# Patient Record
Sex: MALE | Race: WHITE | NOT HISPANIC OR LATINO | Employment: OTHER | URBAN - METROPOLITAN AREA
[De-identification: names, ages, dates, MRNs, and addresses within clinical notes are randomized per-mention and may not be internally consistent; named-entity substitution may affect disease eponyms.]

---

## 2017-01-31 RX ORDER — WARFARIN 3 MG/1
TABLET ORAL
Qty: 30 TABLET | Refills: 0 | OUTPATIENT
Start: 2017-01-31

## 2017-02-06 ENCOUNTER — CLINICAL SUPPORT (OUTPATIENT)
Dept: ELECTROPHYSIOLOGY | Facility: CLINIC | Age: 57
End: 2017-02-06
Payer: MEDICARE

## 2017-02-06 DIAGNOSIS — I50.9 CONGESTIVE HEART FAILURE: ICD-10-CM

## 2017-02-06 DIAGNOSIS — Z95.810 AUTOMATIC IMPLANTABLE CARDIOVERTER-DEFIBRILLATOR IN SITU: ICD-10-CM

## 2017-02-06 PROCEDURE — 93296 REM INTERROG EVL PM/IDS: CPT | Mod: PBBFAC | Performed by: INTERNAL MEDICINE

## 2017-02-06 PROCEDURE — 93295 DEV INTERROG REMOTE 1/2/MLT: CPT | Mod: ,,, | Performed by: INTERNAL MEDICINE

## 2017-02-16 RX ORDER — WARFARIN 3 MG/1
TABLET ORAL
Qty: 30 TABLET | Refills: 0 | OUTPATIENT
Start: 2017-02-16

## 2017-03-29 ENCOUNTER — TELEPHONE (OUTPATIENT)
Dept: TRANSPLANT | Facility: CLINIC | Age: 57
End: 2017-03-29

## 2017-03-29 DIAGNOSIS — I50.22 CHRONIC SYSTOLIC CONGESTIVE HEART FAILURE: Primary | ICD-10-CM

## 2017-03-31 ENCOUNTER — LAB VISIT (OUTPATIENT)
Dept: LAB | Facility: HOSPITAL | Age: 57
End: 2017-03-31
Attending: INTERNAL MEDICINE
Payer: MEDICARE

## 2017-03-31 ENCOUNTER — OFFICE VISIT (OUTPATIENT)
Dept: TRANSPLANT | Facility: CLINIC | Age: 57
End: 2017-03-31
Attending: INTERNAL MEDICINE
Payer: MEDICARE

## 2017-03-31 VITALS
WEIGHT: 251.13 LBS | HEART RATE: 70 BPM | HEIGHT: 70 IN | BODY MASS INDEX: 35.95 KG/M2 | DIASTOLIC BLOOD PRESSURE: 75 MMHG | SYSTOLIC BLOOD PRESSURE: 110 MMHG

## 2017-03-31 DIAGNOSIS — I50.22 CHRONIC SYSTOLIC CONGESTIVE HEART FAILURE: Primary | ICD-10-CM

## 2017-03-31 DIAGNOSIS — I48.0 PAROXYSMAL ATRIAL FIBRILLATION: ICD-10-CM

## 2017-03-31 DIAGNOSIS — Z91.89 AT RISK FOR AMIODARONE TOXICITY WITH LONG TERM USE: ICD-10-CM

## 2017-03-31 DIAGNOSIS — Z79.01 LONG-TERM (CURRENT) USE OF ANTICOAGULANTS: ICD-10-CM

## 2017-03-31 DIAGNOSIS — I50.22 CHRONIC SYSTOLIC CONGESTIVE HEART FAILURE: ICD-10-CM

## 2017-03-31 DIAGNOSIS — Z79.899 AT RISK FOR AMIODARONE TOXICITY WITH LONG TERM USE: ICD-10-CM

## 2017-03-31 DIAGNOSIS — I42.0 COCM (CONGESTIVE CARDIOMYOPATHY): ICD-10-CM

## 2017-03-31 DIAGNOSIS — E78.2 MIXED HYPERLIPIDEMIA: ICD-10-CM

## 2017-03-31 LAB
ALBUMIN SERPL BCP-MCNC: 3.9 G/DL
ALP SERPL-CCNC: 44 U/L
ALT SERPL W/O P-5'-P-CCNC: 88 U/L
ANION GAP SERPL CALC-SCNC: 11 MMOL/L
AST SERPL-CCNC: 48 U/L
BILIRUB SERPL-MCNC: 0.7 MG/DL
BNP SERPL-MCNC: 158 PG/ML
BUN SERPL-MCNC: 24 MG/DL
CALCIUM SERPL-MCNC: 9.1 MG/DL
CHLORIDE SERPL-SCNC: 105 MMOL/L
CO2 SERPL-SCNC: 28 MMOL/L
CREAT SERPL-MCNC: 1.6 MG/DL
EST. GFR  (AFRICAN AMERICAN): 54.5 ML/MIN/1.73 M^2
EST. GFR  (NON AFRICAN AMERICAN): 47.1 ML/MIN/1.73 M^2
GLUCOSE SERPL-MCNC: 110 MG/DL
POTASSIUM SERPL-SCNC: 3.9 MMOL/L
PROT SERPL-MCNC: 7.4 G/DL
SODIUM SERPL-SCNC: 144 MMOL/L

## 2017-03-31 PROCEDURE — 99999 PR PBB SHADOW E&M-EST. PATIENT-LVL III: CPT | Mod: PBBFAC,,, | Performed by: INTERNAL MEDICINE

## 2017-03-31 PROCEDURE — 99214 OFFICE O/P EST MOD 30 MIN: CPT | Mod: S$PBB,,, | Performed by: INTERNAL MEDICINE

## 2017-03-31 PROCEDURE — 99213 OFFICE O/P EST LOW 20 MIN: CPT | Mod: PBBFAC | Performed by: INTERNAL MEDICINE

## 2017-03-31 RX ORDER — LEVALBUTEROL INHALATION SOLUTION 1.25 MG/3ML
SOLUTION RESPIRATORY (INHALATION)
Status: ON HOLD | COMMUNITY
Start: 2017-03-15 | End: 2017-07-10

## 2017-03-31 RX ORDER — ATORVASTATIN CALCIUM 20 MG/1
1 TABLET, FILM COATED ORAL DAILY
COMMUNITY
Start: 2017-03-22 | End: 2017-04-06 | Stop reason: SDUPTHER

## 2017-03-31 RX ORDER — WARFARIN 3 MG/1
TABLET ORAL
COMMUNITY
Start: 2017-02-22 | End: 2017-03-31 | Stop reason: ALTCHOICE

## 2017-03-31 RX ORDER — CARVEDILOL 6.25 MG/1
1 TABLET ORAL 2 TIMES DAILY
Status: ON HOLD | COMMUNITY
Start: 2017-02-22 | End: 2017-07-14 | Stop reason: HOSPADM

## 2017-03-31 RX ORDER — AMIODARONE HYDROCHLORIDE 200 MG/1
1 TABLET ORAL DAILY
COMMUNITY
Start: 2017-03-22 | End: 2017-08-14 | Stop reason: SDUPTHER

## 2017-03-31 NOTE — LETTER
April 6, 2017        Dipesh De La Torre  [address] [place] [address]  Phone: [phone]  Fax: [phone]             [facility]  Tony Chavez  [facility] [address]  Phone: [phone]   Patient: Tae Delgado   MR Number: [id number]   YOB: 1960   Date of Visit: 3/31/2017       Dear Dr. Dipesh De La Torre    Thank you for referring Tae Delgado to me for evaluation. Attached you will find relevant portions of my assessment and plan of care.    If you have questions, please do not hesitate to call me. I look forward to following Tae Delgado along with you.    Sincerely,    Karson Bucio Jr, MD    Enclosure    If you would like to receive this communication electronically, please contact [email or web address] or [phone] to request Conjure Link access.    Conjure Link is a tool which provides read-only access to select patient information with whom you have a relationship. Its easy to use and provides real time access to review your patients record including encounter summaries, notes, results, and demographic information.    If you feel you have received this communication in error or would no longer like to receive these types of communications, please e-mail [email or web address]

## 2017-03-31 NOTE — PROGRESS NOTES
Subjective:     Patient ID:  Tae Delgado is a 57 y.o. male who presents for follow-up of Congestive Heart Failure    HPI:  58 yo WM with COCM and severely reduced LVEF was seen by me for first time in July 2016 at which time he refused change to Entresto.  I was worried regarding decline in renal function and possibility that he was downplaying symptoms and asked him to f/u with Dr. Pearson Oct 2016.  He did not do so and returns today for routine visit.  He has declined consideration of advanced options and last visit declined Entresto.  He loves Dr. Pearson and was to f/u with him Oct 2016 but did not do so.  He stopped lisinopril for lightheaded sensations and BP 86/50 range at that time. Off treatment BP generally 95 range.  He emjoyed hunting season.  Says walking on level ground can do whatever he wants but problem climbing up boat ramp. Sleeps on 2-3 pillows for general comfort and denies PND.  No shocks or symptomatic arrhythmia.    Review of Systems   Constitution: Negative for chills, fever, weakness, malaise/fatigue, night sweats, weight gain and weight loss.   Cardiovascular: Positive for dyspnea on exertion (see HPI.  This year continued to hunt large deer and haul carcas out of the woods--see HPI) and leg swelling (at the end of the day). Negative for chest pain, irregular heartbeat, near-syncope, orthopnea (2-3 pillows for comfort), palpitations, paroxysmal nocturnal dyspnea and syncope.   Respiratory: Negative for cough, sputum production and wheezing.    Hematologic/Lymphatic: Does not bruise/bleed easily.   Musculoskeletal: Negative for arthritis, joint pain and stiffness.        Tendinitis and had injection yesterday.  Denies using NSAIDs.   Gastrointestinal: Negative for hematochezia and melena.   Genitourinary: Negative for hematuria.   Neurological: Negative for brief paralysis, focal weakness and seizures.        Objective:   Physical Exam   Constitutional: He is oriented to person, place, and  "time. No distress.   BP 95/65 (BP Location: Right arm, Patient Position: Sitting, BP Method: Automatic)  Pulse (!) 57  Ht 5' 10" (1.778 m)  Wt 116.3 kg (256 lb 6.3 oz)  BMI 36.79 kg/m2  Friendly, obese WM in NAD   HENT:   Head: Normocephalic and atraumatic.   Eyes: Conjunctivae are normal.   Neck: No JVD (just at clavicle sitting est 10 cm) present. No tracheal deviation present. No thyromegaly present.   Cardiovascular: Normal rate and regular rhythm.  Exam reveals gallop (S3).    No murmur heard.  Pulmonary/Chest: Effort normal and breath sounds normal.   Abdominal: Soft. Bowel sounds are normal. He exhibits no distension (obese). There is no tenderness. There is no rebound and no guarding.   Musculoskeletal: He exhibits edema (trace). He exhibits no tenderness.   Neurological: He is alert and oriented to person, place, and time.   Skin: Skin is warm. He is not diaphoretic.   Psychiatric: He has a normal mood and affect. His behavior is normal.     Assessment:     1. Chronic systolic congestive heart failure    2. COCM (congestive cardiomyopathy)    3. Paroxysmal atrial fibrillation    4. At risk for amiodarone toxicity with long term use    5. Long-term (current) use of anticoagulants    6. Mixed hyperlipidemia      Plan:   Obtain CBC, TSH, T4 CXR ON AMIODARONE  Check lipids  For risk stratification obtain CPX and RHC--he wants to wait 3 months  He is interested in going with Eliquis instead of coumadin--not taking  I told him that he could stop ASA    4/6/17 ADDENDUM REVIEWED LABS  Lab Results   Component Value Date     (H) 03/31/2017     03/31/2017    K 3.9 03/31/2017    MG 2.4 07/20/2016     03/31/2017    CO2 28 03/31/2017    BUN 24 (H) 03/31/2017    CREATININE 1.6 (H) 03/31/2017     03/31/2017    AST 48 (H) 03/31/2017    ALT 88 (H) 03/31/2017    ALBUMIN 3.9 03/31/2017    PROT 7.4 03/31/2017    BILITOT 0.7 03/31/2017    WBC 6.10 02/12/2015    HGB 16.2 02/12/2015    HCT 48.6 02/12/2015 "     02/12/2015    INR 1.3 (H) 01/18/2016    TSH 1.790 01/18/2016    CHOL 196 01/18/2016    HDL 29 (L) 01/18/2016    LDLCALC 122.8 01/18/2016    TRIG 221 (H) 01/18/2016     Lipids should be treated for primary prevention though he is not inclined towards medicine I did offer and sent in script for atorvastatin 40 mg (supposed to have been taking 20 mg) bedtime to  and start if agreeable and check CMP and lipids on treatment 3 months (phone message)

## 2017-04-06 RX ORDER — ATORVASTATIN CALCIUM 40 MG/1
40 TABLET, FILM COATED ORAL NIGHTLY
Qty: 30 TABLET | Refills: 5 | Status: SHIPPED | OUTPATIENT
Start: 2017-04-06 | End: 2017-11-22 | Stop reason: SDUPTHER

## 2017-05-15 ENCOUNTER — CLINICAL SUPPORT (OUTPATIENT)
Dept: ELECTROPHYSIOLOGY | Facility: CLINIC | Age: 57
End: 2017-05-15
Payer: MEDICARE

## 2017-05-15 DIAGNOSIS — I50.9 CHF (CONGESTIVE HEART FAILURE): ICD-10-CM

## 2017-05-15 DIAGNOSIS — I50.22 CHRONIC SYSTOLIC CONGESTIVE HEART FAILURE: ICD-10-CM

## 2017-05-15 DIAGNOSIS — I48.0 PAROXYSMAL ATRIAL FIBRILLATION: ICD-10-CM

## 2017-05-15 PROCEDURE — 93282 PRGRMG EVAL IMPLANTABLE DFB: CPT | Mod: PBBFAC | Performed by: INTERNAL MEDICINE

## 2017-05-15 RX ORDER — SPIRONOLACTONE 25 MG/1
TABLET ORAL
Qty: 30 TABLET | Refills: 0 | Status: SHIPPED | OUTPATIENT
Start: 2017-05-15 | End: 2017-06-19 | Stop reason: SDUPTHER

## 2017-06-19 RX ORDER — SPIRONOLACTONE 25 MG/1
TABLET ORAL
Qty: 30 TABLET | Refills: 0 | Status: SHIPPED | OUTPATIENT
Start: 2017-06-19 | End: 2017-08-14 | Stop reason: SDUPTHER

## 2017-07-07 DIAGNOSIS — I50.22 CHRONIC SYSTOLIC (CONGESTIVE) HEART FAILURE: Primary | ICD-10-CM

## 2017-07-07 DIAGNOSIS — Z86.711 PERSONAL HISTORY OF PULMONARY EMBOLISM: ICD-10-CM

## 2017-07-10 ENCOUNTER — HOSPITAL ENCOUNTER (INPATIENT)
Facility: HOSPITAL | Age: 57
LOS: 4 days | Discharge: HOME | DRG: 287 | End: 2017-07-14
Attending: INTERNAL MEDICINE | Admitting: INTERNAL MEDICINE
Payer: MEDICARE

## 2017-07-10 ENCOUNTER — HOSPITAL ENCOUNTER (OUTPATIENT)
Dept: CARDIOLOGY | Facility: CLINIC | Age: 57
Discharge: HOME | DRG: 287 | End: 2017-07-10
Attending: INTERNAL MEDICINE
Payer: MEDICARE

## 2017-07-10 DIAGNOSIS — I50.43 ACUTE ON CHRONIC COMBINED SYSTOLIC AND DIASTOLIC HEART FAILURE: ICD-10-CM

## 2017-07-10 DIAGNOSIS — I48.0 PAROXYSMAL ATRIAL FIBRILLATION: ICD-10-CM

## 2017-07-10 DIAGNOSIS — E78.2 MIXED HYPERLIPIDEMIA: Primary | ICD-10-CM

## 2017-07-10 DIAGNOSIS — I42.0 COCM (CONGESTIVE CARDIOMYOPATHY): ICD-10-CM

## 2017-07-10 DIAGNOSIS — Z91.89 AT RISK FOR AMIODARONE TOXICITY WITH LONG TERM USE: ICD-10-CM

## 2017-07-10 DIAGNOSIS — I11.0 HYPERTENSIVE HEART DISEASE WITH HEART FAILURE: ICD-10-CM

## 2017-07-10 DIAGNOSIS — Z79.899 AT RISK FOR AMIODARONE TOXICITY WITH LONG TERM USE: ICD-10-CM

## 2017-07-10 DIAGNOSIS — Z79.01 CHRONIC ANTICOAGULATION: ICD-10-CM

## 2017-07-10 DIAGNOSIS — E66.01 MORBID OBESITY DUE TO EXCESS CALORIES: ICD-10-CM

## 2017-07-10 DIAGNOSIS — I50.22 CHRONIC SYSTOLIC CONGESTIVE HEART FAILURE: ICD-10-CM

## 2017-07-10 DIAGNOSIS — Z86.711 HISTORY OF PULMONARY EMBOLISM: ICD-10-CM

## 2017-07-10 DIAGNOSIS — I24.0 LEFT VENTRICULAR THROMBUS WITHOUT MI: ICD-10-CM

## 2017-07-10 DIAGNOSIS — Z79.01 LONG TERM (CURRENT) USE OF ANTICOAGULANTS: ICD-10-CM

## 2017-07-10 DIAGNOSIS — R07.9 CHEST PAIN: ICD-10-CM

## 2017-07-10 DIAGNOSIS — I50.23 ACUTE ON CHRONIC SYSTOLIC HEART FAILURE: ICD-10-CM

## 2017-07-10 LAB
BNP SERPL-MCNC: 203 PG/ML
DIASTOLIC DYSFUNCTION: NO
INR PPP: 1.1
PROTHROMBIN TIME: 11.9 SEC

## 2017-07-10 PROCEDURE — 85610 PROTHROMBIN TIME: CPT

## 2017-07-10 PROCEDURE — 4A023N6 MEASUREMENT OF CARDIAC SAMPLING AND PRESSURE, RIGHT HEART, PERCUTANEOUS APPROACH: ICD-10-PCS | Performed by: INTERNAL MEDICINE

## 2017-07-10 PROCEDURE — 83880 ASSAY OF NATRIURETIC PEPTIDE: CPT

## 2017-07-10 PROCEDURE — 63600175 PHARM REV CODE 636 W HCPCS: Performed by: PHYSICIAN ASSISTANT

## 2017-07-10 PROCEDURE — 93005 ELECTROCARDIOGRAM TRACING: CPT

## 2017-07-10 PROCEDURE — 20600001 HC STEP DOWN PRIVATE ROOM

## 2017-07-10 PROCEDURE — 02HQ32Z INSERTION OF MONITORING DEVICE INTO RIGHT PULMONARY ARTERY, PERCUTANEOUS APPROACH: ICD-10-PCS | Performed by: INTERNAL MEDICINE

## 2017-07-10 PROCEDURE — 25000003 PHARM REV CODE 250: Performed by: PHYSICIAN ASSISTANT

## 2017-07-10 PROCEDURE — 93010 ELECTROCARDIOGRAM REPORT: CPT | Mod: ,,, | Performed by: INTERNAL MEDICINE

## 2017-07-10 PROCEDURE — 94621 CARDIOPULM EXERCISE TESTING: CPT | Mod: 26,S$PBB,, | Performed by: INTERNAL MEDICINE

## 2017-07-10 PROCEDURE — B2141ZZ FLUOROSCOPY OF RIGHT HEART USING LOW OSMOLAR CONTRAST: ICD-10-PCS | Performed by: INTERNAL MEDICINE

## 2017-07-10 RX ORDER — AMIODARONE HYDROCHLORIDE 200 MG/1
200 TABLET ORAL DAILY
Status: DISCONTINUED | OUTPATIENT
Start: 2017-07-11 | End: 2017-07-11

## 2017-07-10 RX ORDER — ATORVASTATIN CALCIUM 20 MG/1
40 TABLET, FILM COATED ORAL NIGHTLY
Status: DISCONTINUED | OUTPATIENT
Start: 2017-07-10 | End: 2017-07-11

## 2017-07-10 RX ORDER — WARFARIN SODIUM 5 MG/1
5 TABLET ORAL DAILY
Status: DISCONTINUED | OUTPATIENT
Start: 2017-07-10 | End: 2017-07-12

## 2017-07-10 RX ORDER — FUROSEMIDE 10 MG/ML
80 INJECTION INTRAMUSCULAR; INTRAVENOUS ONCE
Status: COMPLETED | OUTPATIENT
Start: 2017-07-10 | End: 2017-07-10

## 2017-07-10 RX ORDER — ACETAMINOPHEN 325 MG/1
650 TABLET ORAL EVERY 4 HOURS PRN
Status: DISCONTINUED | OUTPATIENT
Start: 2017-07-10 | End: 2017-07-14 | Stop reason: HOSPADM

## 2017-07-10 RX ORDER — METHOCARBAMOL 500 MG/1
500 TABLET, FILM COATED ORAL 3 TIMES DAILY PRN
Status: DISCONTINUED | OUTPATIENT
Start: 2017-07-11 | End: 2017-07-14 | Stop reason: HOSPADM

## 2017-07-10 RX ORDER — SODIUM CHLORIDE 0.9 % (FLUSH) 0.9 %
3 SYRINGE (ML) INJECTION EVERY 8 HOURS
Status: DISCONTINUED | OUTPATIENT
Start: 2017-07-10 | End: 2017-07-14 | Stop reason: HOSPADM

## 2017-07-10 RX ORDER — SPIRONOLACTONE 25 MG/1
25 TABLET ORAL DAILY
Status: DISCONTINUED | OUTPATIENT
Start: 2017-07-11 | End: 2017-07-14 | Stop reason: HOSPADM

## 2017-07-10 RX ORDER — CARVEDILOL 6.25 MG/1
6.25 TABLET ORAL 2 TIMES DAILY
Status: DISCONTINUED | OUTPATIENT
Start: 2017-07-10 | End: 2017-07-11

## 2017-07-10 RX ADMIN — ACETAMINOPHEN 650 MG: 325 TABLET ORAL at 11:07

## 2017-07-10 RX ADMIN — FUROSEMIDE 80 MG: 10 INJECTION, SOLUTION INTRAVENOUS at 09:07

## 2017-07-10 RX ADMIN — WARFARIN SODIUM 5 MG: 5 TABLET ORAL at 11:07

## 2017-07-10 RX ADMIN — CARVEDILOL 6.25 MG: 6.25 TABLET, FILM COATED ORAL at 09:07

## 2017-07-10 RX ADMIN — FUROSEMIDE 10 MG/HR: 10 INJECTION, SOLUTION INTRAMUSCULAR; INTRAVENOUS at 09:07

## 2017-07-10 RX ADMIN — SODIUM CHLORIDE, PRESERVATIVE FREE 3 ML: 5 INJECTION INTRAVENOUS at 11:07

## 2017-07-10 NOTE — ASSESSMENT & PLAN NOTE
-continued home dose of Amiodarone  -[id number] is 2.  Will stop Eliquis and start Coumadin at patients request.

## 2017-07-10 NOTE — ASSESSMENT & PLAN NOTE
-Patient wants to stop Eliquis and change to Coumadin  -Will start Coumadin 5 mg QDaily and get 2D echo to evaluate if thrombus is still present

## 2017-07-10 NOTE — HPI
55 y.o. WM with history of NICMP diagnosed in 2010, ICD, LV thrombus (with prior splenic and renal emboli), paroxysmal atrial fib, HTN, HLP admitted from the procedure room after RHC demonstrated significantly elevated left and right side filling pressures (RA= 17, PCWP=40 mm of Hg). Severe pulmonary HTN  (PA=89/ mm of Hg)in the setting of elevated left sided filling pressures. Low cardiac index and output  (CI=1.4, CO= L/min). After NTG, PA pressures decrEased dramatically to 52/30 Mean PA=35 mm of Hg. PCWP decreased to 25 mm of Hg. CI increased to 2.2 l/MIN/M2. CPX demonstrated: indicative of functional impairment secondary to circulatory insufficiency and ventilatory impairment. Patient reports prior to today he has felt well and has no complaints.  He reports yesterday he cut down a tree with no problems.  He reports he had to stop for 20-30 minutes to rest but he reports this is his baseline and hasn't noticed any worsening symptoms.  He reports he has had some increase leg edema but denies weight gain, increasing SOB, orthopnea and PND. He reports he does not think he is interested in pursuing LVAD/OHTx workup as he lives alone and doesn't have much family around.  He is open to getting information. He wants to get changed from Eliquis to Coumadin as his insurance is not covering Eliquis and he can get labwork around his house.

## 2017-07-10 NOTE — H&P
[facility]  Heart Transplant  H&P    Patient Name: Tae Delgado  MRN: [id number]  Admission Date: (Not on file)  Attending Physician: Karson Bucio Jr.,*  Primary Care Provider: Elham Pearson MD  Principal Problem:Acute on chronic combined systolic and diastolic heart failure    Subjective:     History of Present Illness:  55 y.o. WM with history of NICMP diagnosed in 2010, ICD, LV thrombus (with prior splenic and renal emboli), paroxysmal atrial fib, HTN, HLP admitted from the procedure room after RHC demonstrated significantly elevated left and right side filling pressures (RA= 17, PCWP=40 mm of Hg). Severe pulmonary HTN  (PA=89/ mm of Hg)in the setting of elevated left sided filling pressures. Low cardiac index and output  (CI=1.4, CO= L/min). After NTG, PA pressures decrEased dramatically to 52/30 Mean PA=35 mm of Hg. PCWP decreased to 25 mm of Hg. CI increased to 2.2 l/MIN/M2. CPX demonstrated: indicative of functional impairment secondary to circulatory insufficiency and ventilatory impairment.    Patient reports prior to today he has felt well and has no complaints.  He reports yesterday he cut down a tree with no problems.  He reports he had to stop for 20-30 minutes to rest but he reports this is his baseline and hasn't noticed any worsening symptoms.  He reports he has had some increase leg edema but denies weight gain, increasing SOB, orthopnea and PND.    He reports he does not think he is interested in pursuing LVAD/OHTx workup as he lives alone and doesn't have much family around.  He is open to getting information. He wants to get changed from Eliquis to Coumadin as his insurance is not covering Eliquis and he can get labwork around his house.      Past Medical History:   Diagnosis Date    CHF (congestive heart failure) 1/2010    Dx  1/2010 w/ decreased LV systolic function (EF 15%) by ECHO 1/2015    COCM (congestive cardiomyopathy) 7/20/2016    Hyperlipidemia      Hypertension     Paroxysmal atrial fibrillation     Pulmonary embolus 2008    Stroke     Superficial thrombophlebitis        Past Surgical History:   Procedure Laterality Date    TONSILLECTOMY      VEIN LIGATION AND STRIPPING         Review of patient's allergies indicates:   Allergen Reactions    Percocet [oxycodone-acetaminophen] Itching    Penicillins Rash       No current facility-administered medications for this encounter.      Current Outpatient Prescriptions   Medication Sig    amiodarone (PACERONE) 200 MG Tab Take 1 tablet by mouth Daily.    apixaban 5 mg Tab Take 1 tablet (5 mg total) by mouth 2 (two) times daily.    atorvastatin (LIPITOR) 40 MG tablet Take 1 tablet (40 mg total) by mouth every evening.    carvedilol (COREG) 6.25 MG tablet Take 1 tablet by mouth 2 (two) times daily.    spironolactone (ALDACTONE) 25 MG tablet TAKE ONE TABLET BY MOUTH ONCE DAILY    furosemide (LASIX) 40 MG tablet Take 1 tablet (40 mg total) by mouth once daily.     Family History     Problem Relation (Age of Onset)    Cancer Mother        Social History Main Topics    Smoking status: Never Smoker    Smokeless tobacco: Never Used    Alcohol use No    Drug use: No    Sexual activity: Not on file     Review of Systems  Objective:     Vital Signs (Most Recent):    Vital Signs (24h Range):           There is no height or weight on file to calculate BMI.    No intake or output data in the 24 hours ending 07/10/17 1604    Physical Exam   Constitutional: He is oriented to person, place, and time. He appears well-developed and well-nourished.   Neck: Normal range of motion. Neck supple. JVD present.   JVD elevation to jawline   Cardiovascular: Normal rate and regular rhythm.  Exam reveals no gallop and no friction rub.    No murmur heard.  Pulmonary/Chest: Effort normal and breath sounds normal. He has no wheezes. He has no rales.   Abdominal: Soft. Bowel sounds are normal. There is no tenderness.    Musculoskeletal: He exhibits no edema.   Neurological: He is alert and oriented to person, place, and time.   Skin: Skin is warm and dry. There is erythema.   2+ edema lower extremities bilaterally        Significant Labs:  CBC:    Recent Labs  Lab 07/10/17  1315   WBC 6.54   RBC 4.70   HGB 13.4*   HCT 40.3      MCV 86   MCH 28.5   MCHC 33.3     BNP:  No results for input(s): BNP in the last 72 hours.    Invalid input(s): BNPTRIAGELBLO  CMP:    Recent Labs  Lab 07/10/17  1315      CALCIUM 9.2   ALBUMIN 3.8   PROT 7.0      K 4.0   CO2 26      BUN 23*   CREATININE 1.6*   ALKPHOS 52*   ALT 68*   AST 50*   BILITOT 0.7      Coagulation:   No results for input(s): INR, APTT in the last 72 hours.    Invalid input(s): PT  LDH:  No results for input(s): LDH in the last 72 hours.  Microbiology:  Microbiology Results (last 7 days)     ** No results found for the last 168 hours. **          I have reviewed all pertinent labs within the past 24 hours.    Diagnostic Results:see report for full detail  CPX: 7/10/17  RHC: 7/10/17    Assessment/Plan:     55 y.o. WM with history of NICMP diagnosed in 2010, ICD, LV thrombus (with prior splenic and renal emboli), paroxysmal atrial fib, HTN, HLP admitted from the procedure room after RHC demonstrated significantly elevated left and right side filling pressures    * Acute on chronic combined systolic and diastolic heart failure    -NICM EF 20-25% per echo done January 2016; will repeat echo tomorroe   -Hypervolemic on examination today  -Current diuretic regimen: Furosemide PO QDaily at home.  RHC with elevated filling pressures.  Will start diuresis with Lasix infusion at 10mg/hr after 80mg IV bolus.  Will monitor response and titrate as needed  -GDMT with home dose of Coreg and Spironolactone.  Will monitor and consider adding Entresto.  He had refused it in the past but is not open to it if his insurance covers it.   -Patient is not currently being  evaluated for OHTx/VAD  -2g Na dietary restriction, 1500 mL fluid restriction, strict I/Os          Left ventricular thrombus without MI    -Patient wants to stop Eliquis and change to Coumadin  -Will start Coumadin 5 mg QDaily and get 2D echo to evaluate if thrombus is still present        Hypertension    -continued home dose of Coreg and Spironolactone.    -Will titrate as tolerated        Hyperlipidemia    -continue home Lipitor dose        Obesity    -Patient will likely need sleep study upon discharge        Paroxysmal atrial fibrillation    -continued home dose of Amiodarone  -[id number] is 2.  Will stop Eliquis and start Coumadin at patients request.             MARBELLA Mcfadden  Heart Transplant spectralink: [phone]  [facility]

## 2017-07-10 NOTE — SUBJECTIVE & OBJECTIVE
Past Medical History:   Diagnosis Date    CHF (congestive heart failure) 1/2010    Dx  1/2010 w/ decreased LV systolic function (EF 15%) by ECHO 1/2015    COCM (congestive cardiomyopathy) 7/20/2016    Hyperlipidemia     Hypertension     Paroxysmal atrial fibrillation     Pulmonary embolus 2008    Stroke     Superficial thrombophlebitis        Past Surgical History:   Procedure Laterality Date    TONSILLECTOMY      VEIN LIGATION AND STRIPPING         Review of patient's allergies indicates:   Allergen Reactions    Percocet [oxycodone-acetaminophen] Itching    Penicillins Rash       No current facility-administered medications for this encounter.      Current Outpatient Prescriptions   Medication Sig    amiodarone (PACERONE) 200 MG Tab Take 1 tablet by mouth Daily.    apixaban 5 mg Tab Take 1 tablet (5 mg total) by mouth 2 (two) times daily.    atorvastatin (LIPITOR) 40 MG tablet Take 1 tablet (40 mg total) by mouth every evening.    carvedilol (COREG) 6.25 MG tablet Take 1 tablet by mouth 2 (two) times daily.    spironolactone (ALDACTONE) 25 MG tablet TAKE ONE TABLET BY MOUTH ONCE DAILY    furosemide (LASIX) 40 MG tablet Take 1 tablet (40 mg total) by mouth once daily.     Family History     Problem Relation (Age of Onset)    Cancer Mother        Social History Main Topics    Smoking status: Never Smoker    Smokeless tobacco: Never Used    Alcohol use No    Drug use: No    Sexual activity: Not on file     Review of Systems  Objective:     Vital Signs (Most Recent):    Vital Signs (24h Range):           There is no height or weight on file to calculate BMI.    No intake or output data in the 24 hours ending 07/10/17 1604    Physical Exam   Constitutional: He is oriented to person, place, and time. He appears well-developed and well-nourished.   Neck: Normal range of motion. Neck supple. JVD present.   JVD elevation to jawline   Cardiovascular: Normal rate and regular rhythm.  Exam reveals no  gallop and no friction rub.    No murmur heard.  Pulmonary/Chest: Effort normal and breath sounds normal. He has no wheezes. He has no rales.   Abdominal: Soft. Bowel sounds are normal. There is no tenderness.   Musculoskeletal: He exhibits no edema.   Neurological: He is alert and oriented to person, place, and time.   Skin: Skin is warm and dry. There is erythema.   2+ edema lower extremities bilaterally        Significant Labs:  CBC:    Recent Labs  Lab 07/10/17  1315   WBC 6.54   RBC 4.70   HGB 13.4*   HCT 40.3      MCV 86   MCH 28.5   MCHC 33.3     BNP:  No results for input(s): BNP in the last 72 hours.    Invalid input(s): BNPTRIAGELBLO  CMP:    Recent Labs  Lab 07/10/17  1315      CALCIUM 9.2   ALBUMIN 3.8   PROT 7.0      K 4.0   CO2 26      BUN 23*   CREATININE 1.6*   ALKPHOS 52*   ALT 68*   AST 50*   BILITOT 0.7      Coagulation:   No results for input(s): INR, APTT in the last 72 hours.    Invalid input(s): PT  LDH:  No results for input(s): LDH in the last 72 hours.  Microbiology:  Microbiology Results (last 7 days)     ** No results found for the last 168 hours. **          I have reviewed all pertinent labs within the past 24 hours.    Diagnostic Results:see report for full detail  CPX: 7/10/17  RHC: 7/10/17

## 2017-07-10 NOTE — ASSESSMENT & PLAN NOTE
-NICM EF 20-25% per echo done January 2016; will repeat echo tomorroe   -Hypervolemic on examination today  -Current diuretic regimen: Furosemide PO QDaily at home.  RHC with elevated filling pressures.  Will start diuresis with Lasix infusion at 10mg/hr after 80mg IV bolus.  Will monitor response and titrate as needed  -GDMT with home dose of Coreg and Spironolactone.  Will monitor and consider adding Entresto.  He had refused it in the past but is not open to it if his insurance covers it.   -Patient is not currently being evaluated for OHTx/VAD  -2g Na dietary restriction, 1500 mL fluid restriction, strict I/Os

## 2017-07-11 LAB
ALBUMIN SERPL BCP-MCNC: 4 G/DL
ALP SERPL-CCNC: 48 U/L
ALT SERPL W/O P-5'-P-CCNC: 68 U/L
ANION GAP SERPL CALC-SCNC: 14 MMOL/L
AORTIC VALVE REGURGITATION: ABNORMAL
AST SERPL-CCNC: 38 U/L
BASOPHILS # BLD AUTO: 0.03 K/UL
BASOPHILS NFR BLD: 0.4 %
BILIRUB SERPL-MCNC: 0.7 MG/DL
BUN SERPL-MCNC: 22 MG/DL
CALCIUM SERPL-MCNC: 9.6 MG/DL
CHLORIDE SERPL-SCNC: 102 MMOL/L
CO2 SERPL-SCNC: 27 MMOL/L
CREAT SERPL-MCNC: 1.6 MG/DL
DIASTOLIC DYSFUNCTION: YES
DIFFERENTIAL METHOD: ABNORMAL
EOSINOPHIL # BLD AUTO: 0.2 K/UL
EOSINOPHIL NFR BLD: 2.5 %
ERYTHROCYTE [DISTWIDTH] IN BLOOD BY AUTOMATED COUNT: 13.3 %
EST. GFR  (AFRICAN AMERICAN): 54.5 ML/MIN/1.73 M^2
EST. GFR  (NON AFRICAN AMERICAN): 47.1 ML/MIN/1.73 M^2
ESTIMATED PA SYSTOLIC PRESSURE: 21.66
GLUCOSE SERPL-MCNC: 106 MG/DL
HCT VFR BLD AUTO: 40.8 %
HGB BLD-MCNC: 13.9 G/DL
INR PPP: 1.1
LYMPHOCYTES # BLD AUTO: 1.9 K/UL
LYMPHOCYTES NFR BLD: 26.6 %
MAGNESIUM SERPL-MCNC: 2.2 MG/DL
MCH RBC QN AUTO: 29 PG
MCHC RBC AUTO-ENTMCNC: 34.1 %
MCV RBC AUTO: 85 FL
MITRAL VALVE REGURGITATION: ABNORMAL
MONOCYTES # BLD AUTO: 0.8 K/UL
MONOCYTES NFR BLD: 11.6 %
NEUTROPHILS # BLD AUTO: 4.2 K/UL
NEUTROPHILS NFR BLD: 58.2 %
PLATELET # BLD AUTO: 167 K/UL
PMV BLD AUTO: 11.4 FL
POTASSIUM SERPL-SCNC: 3.8 MMOL/L
PROT SERPL-MCNC: 7.3 G/DL
PROTHROMBIN TIME: 11.7 SEC
RBC # BLD AUTO: 4.8 M/UL
RETIRED EF AND QEF - SEE NOTES: 23 (ref 55–65)
SODIUM SERPL-SCNC: 143 MMOL/L
TRICUSPID VALVE REGURGITATION: ABNORMAL
WBC # BLD AUTO: 7.23 K/UL

## 2017-07-11 PROCEDURE — 93306 TTE W/DOPPLER COMPLETE: CPT | Mod: 26,,, | Performed by: INTERNAL MEDICINE

## 2017-07-11 PROCEDURE — 63600175 PHARM REV CODE 636 W HCPCS: Performed by: INTERNAL MEDICINE

## 2017-07-11 PROCEDURE — 93306 TTE W/DOPPLER COMPLETE: CPT

## 2017-07-11 PROCEDURE — 83735 ASSAY OF MAGNESIUM: CPT

## 2017-07-11 PROCEDURE — 25000003 PHARM REV CODE 250: Performed by: PHYSICIAN ASSISTANT

## 2017-07-11 PROCEDURE — 85025 COMPLETE CBC W/AUTO DIFF WBC: CPT

## 2017-07-11 PROCEDURE — 20600001 HC STEP DOWN PRIVATE ROOM

## 2017-07-11 PROCEDURE — 80053 COMPREHEN METABOLIC PANEL: CPT

## 2017-07-11 PROCEDURE — 36415 COLL VENOUS BLD VENIPUNCTURE: CPT

## 2017-07-11 PROCEDURE — 25000003 PHARM REV CODE 250: Performed by: INTERNAL MEDICINE

## 2017-07-11 PROCEDURE — 63600175 PHARM REV CODE 636 W HCPCS: Performed by: PHYSICIAN ASSISTANT

## 2017-07-11 PROCEDURE — 85610 PROTHROMBIN TIME: CPT

## 2017-07-11 PROCEDURE — 99222 1ST HOSP IP/OBS MODERATE 55: CPT | Mod: ,,, | Performed by: INTERNAL MEDICINE

## 2017-07-11 RX ORDER — ENOXAPARIN SODIUM 100 MG/ML
100 INJECTION SUBCUTANEOUS EVERY 12 HOURS
Qty: 14 SYRINGE | Refills: 1 | Status: SHIPPED | OUTPATIENT
Start: 2017-07-11 | End: 2017-07-14 | Stop reason: HOSPADM

## 2017-07-11 RX ORDER — ATORVASTATIN CALCIUM 20 MG/1
40 TABLET, FILM COATED ORAL DAILY
Status: DISCONTINUED | OUTPATIENT
Start: 2017-07-11 | End: 2017-07-14 | Stop reason: HOSPADM

## 2017-07-11 RX ORDER — METOPROLOL SUCCINATE 25 MG/1
50 TABLET, EXTENDED RELEASE ORAL DAILY
Status: DISCONTINUED | OUTPATIENT
Start: 2017-07-12 | End: 2017-07-14 | Stop reason: HOSPADM

## 2017-07-11 RX ORDER — AMIODARONE HYDROCHLORIDE 200 MG/1
200 TABLET ORAL DAILY
Status: DISCONTINUED | OUTPATIENT
Start: 2017-07-11 | End: 2017-07-14 | Stop reason: HOSPADM

## 2017-07-11 RX ORDER — ENOXAPARIN SODIUM 100 MG/ML
100 INJECTION SUBCUTANEOUS 2 TIMES DAILY
Status: DISCONTINUED | OUTPATIENT
Start: 2017-07-11 | End: 2017-07-14

## 2017-07-11 RX ORDER — ENOXAPARIN SODIUM 100 MG/ML
100 INJECTION SUBCUTANEOUS 2 TIMES DAILY
Status: DISCONTINUED | OUTPATIENT
Start: 2017-07-11 | End: 2017-07-11

## 2017-07-11 RX ADMIN — SACUBITRIL AND VALSARTAN 1 TABLET: 24; 26 TABLET, FILM COATED ORAL at 09:07

## 2017-07-11 RX ADMIN — SODIUM CHLORIDE, PRESERVATIVE FREE 3 ML: 5 INJECTION INTRAVENOUS at 09:07

## 2017-07-11 RX ADMIN — METOPROLOL SUCCINATE 12.5 MG: 25 TABLET, EXTENDED RELEASE ORAL at 11:07

## 2017-07-11 RX ADMIN — WARFARIN SODIUM 5 MG: 5 TABLET ORAL at 04:07

## 2017-07-11 RX ADMIN — CARVEDILOL 6.25 MG: 6.25 TABLET, FILM COATED ORAL at 08:07

## 2017-07-11 RX ADMIN — SPIRONOLACTONE 25 MG: 25 TABLET, FILM COATED ORAL at 08:07

## 2017-07-11 RX ADMIN — ENOXAPARIN SODIUM 100 MG: 100 INJECTION SUBCUTANEOUS at 09:07

## 2017-07-11 RX ADMIN — FUROSEMIDE 10 MG/HR: 10 INJECTION, SOLUTION INTRAMUSCULAR; INTRAVENOUS at 09:07

## 2017-07-11 RX ADMIN — AMIODARONE HYDROCHLORIDE 200 MG: 200 TABLET ORAL at 09:07

## 2017-07-11 RX ADMIN — ATORVASTATIN CALCIUM 40 MG: 20 TABLET, FILM COATED ORAL at 09:07

## 2017-07-11 NOTE — ASSESSMENT & PLAN NOTE
-Patient wants to stop Eliquis and change to Coumadin  -Will start Coumadin 5 mg QDaily and get 2D echo to evaluate if thrombus is still present  -bridging with Lovenox

## 2017-07-11 NOTE — PROGRESS NOTES
[facility]  Heart Transplant  Progress Note    Patient Name: Tae Delgado  MRN: [id number]  Admission Date: 7/10/2017  Hospital Length of Stay: 1 days  Attending Physician: Karson Bucio Jr.,*  Primary Care Provider: Elham Pearson MD  Principal Problem:Acute on chronic combined systolic and diastolic heart failure    Subjective:     Interval History: Patient reports feeling better since starting on IV Lasix yesterday.  He states his urine output has decreased just slightly today.  Denies CP, SOB, palpitations or dizziness.      Continuous Infusions:   furosemide (LASIX) 1 mg/mL infusion (non-titrating) 10 mg/hr (07/10/17 2107)     Scheduled Meds:   amiodarone  200 mg Oral Daily    atorvastatin  40 mg Oral Daily    enoxaparin  100 mg Subcutaneous BID    metoprolol succinate  12.5 mg Oral Once    [START ON 7/12/2017] metoprolol succinate  50 mg Oral Daily    sacubitril-valsartan  1 tablet Oral BID    sodium chloride 0.9%  3 mL Intravenous Q8H    spironolactone  25 mg Oral Daily    warfarin  5 mg Oral Daily     PRN Meds:acetaminophen, methocarbamol    Review of patient's allergies indicates:   Allergen Reactions    Percocet [oxycodone-acetaminophen] Itching    Penicillins Rash     Objective:     Vital Signs (Most Recent):  Temp: 97.8 °F (36.6 °C) (07/11/17 0803)  Pulse: 70 (07/11/17 0803)  Resp: 18 (07/11/17 0803)  BP: 109/74 (07/11/17 0803)  SpO2: (!) 94 % (07/11/17 0803) Vital Signs (24h Range):  Temp:  [97.1 °F (36.2 °C)-98.4 °F (36.9 °C)] 97.8 °F (36.6 °C)  Pulse:  [59-70] 70  Resp:  [16-18] 18  SpO2:  [93 %-97 %] 94 %  BP: ()/(55-86) 109/74     Weight: 111.7 kg (246 lb 4.1 oz)  Body mass index is 35.33 kg/m².      Intake/Output Summary (Last 24 hours) at 07/11/17 1051  Last data filed at 07/11/17 0600   Gross per 24 hour   Intake             1150 ml   Output             3475 ml   Net            -2325 ml       Physical Exam   Constitutional: He is oriented to person, place,  and time. He appears well-developed and well-nourished.   Neck: Normal range of motion. Neck supple.   JVD difficult to access secondary to body habitus.     Cardiovascular: Normal rate and regular rhythm.  Exam reveals no gallop and no friction rub.    No murmur heard.  Pulmonary/Chest: He has no wheezes. He has no rales.   CTA   Abdominal: Soft. Bowel sounds are normal. There is no tenderness.   Musculoskeletal: He exhibits edema.   Trace edema lower extremities bilaterally    Neurological: He is alert and oriented to person, place, and time.   Skin: Skin is warm and dry.       Significant Labs:  CBC:    Recent Labs  Lab 07/11/17  0439   WBC 7.23   RBC 4.80   HGB 13.9*   HCT 40.8      MCV 85   MCH 29.0   MCHC 34.1     BNP:    Recent Labs  Lab 07/10/17  1943   *     CMP:    Recent Labs  Lab 07/11/17 0439      CALCIUM 9.6   ALBUMIN 4.0   PROT 7.3      K 3.8   CO2 27      BUN 22*   CREATININE 1.6*   ALKPHOS 48*   ALT 68*   AST 38   BILITOT 0.7      Coagulation:     Recent Labs  Lab 07/11/17  0439   INR 1.1     LDH:  No results for input(s): LDH in the last 72 hours.  Microbiology:  Microbiology Results (last 7 days)     ** No results found for the last 168 hours. **          I have reviewed all pertinent labs within the past 24 hours.    Estimated Creatinine Clearance: 63.8 mL/min (based on Cr of 1.6).    Diagnostic Results: see report for full details;  CPX: 7/10/17  RHC: 7/10/17      Assessment and Plan:     55 y.o. WM with history of NICMP diagnosed in 2010, ICD, LV thrombus (with prior splenic and renal emboli), paroxysmal atrial fib, HTN, HLP admitted from the procedure room after RHC demonstrated significantly elevated left and right side filling pressures    * Acute on chronic combined systolic and diastolic heart failure    -NICM EF 20-25% per echo done January 2016; repeat echo ordered for today  -Hypervolemic on examination today; RHC on admit with elevated filling  pressures   -Continue Lasix infusion at 10mg/hr.    -GDMT: will continue home dose of Spironolactone; change Coreg to Metoprolol and add Entresto.   -Patient is not currently being evaluated for OHTx/VAD; he is unclear if he would be agreeable to LVAD.  Will provide patient education and not initiate pathway   -2g Na dietary restriction, 1500 mL fluid restriction, strict I/Os          Left ventricular thrombus without MI    -Patient wants to stop Eliquis and change to Coumadin  -Will start Coumadin 5 mg QDaily and get 2D echo to evaluate if thrombus is still present  -bridging with Lovenox        Paroxysmal atrial fibrillation    -continued home dose of Amiodarone  -[id number] is 4 with hx CVA.  Patient requested to stop Eliquis and start Coumadin. Will start 5mg and adjust once INR therapeutic.  Patient reports he was previously on 3mg when he took it before.  Will bridge with Lovenox  -CT head done to r/o CVA and was negative.         Hyperlipidemia    -continue home Lipitor dose        Hypertension    -Will monitor BP with changes made today: stop Coreg and start Metoprolol, start Entresto and continue home dose of Spironolactone.     -Will titrate as tolerated        Obesity    -Body mass index is 35.33 kg/m².  -Patient will need sleep study upon discharge            MARBELLA Mcfadden  Heart Transplant  [facility]

## 2017-07-11 NOTE — PROGRESS NOTES
Pt reported that he has had a stroke in the past, started with a headache in his right lower head/upper neck very sharp and severe that went away on its own,  Pt has partial visual field loss on the right side of each eye (medial field of his left eye and lateral field of his right eye).  He reported that the headaches have been common on eloquis.    Two nights ago he got a pretty bad headache that started as a sharp pain at the base of his neck and radiated centrally to the posterior top center of his head, pt complains that his head is  to the touch today, two days later, and he is concerned that he had another stroke and is more worried with starting 5 mg coumadin.  He had no other symptoms.   Notified Dr. Ronquillo of pt complaints.     0100  CT of the head ordered and completed. Tylenol provided patient adequate headache relief. Will continue to closely monitor.

## 2017-07-11 NOTE — PLAN OF CARE
Problem: Patient Care Overview (Adult)  Goal: Plan of Care Review  Outcome: Ongoing (interventions implemented as appropriate)  Plan of care discussed with patient. Patient is free of fall/trauma/injury. Denies CP, SOB, or pain/discomfort. LAsix maintained at 10mg/h. All questions addressed. Will continue to monitor.

## 2017-07-11 NOTE — PLAN OF CARE
Problem: Patient Care Overview  Goal: Plan of Care Review  Outcome: Ongoing (interventions implemented as appropriate)  Pt c/o head tenderness at rest, since 2 days age. CT of head completed.  Pt remains free of injury and falls. All personal items within reach, call bell in reach, bed is lowest locked position, Non skid socks used while out of bed. Room free of clutter. Reviewed new medications with patient.  Pt is being diuresed with Lasix gtt at 10mg/hr.   Pt reported that he does not feel like he has much fluid on him.  He is actively ready for dietary changes and strategies to strengthen his heart and preserve what function he has and wants to improve.      VS and assessment per flow sheet, patient progressing towards goals as tolerated, plan of care reviewed with Tae Delgado and family, all concerns addressed, will continue to monitor.      Problem: Fall Risk (Adult)  Goal: Absence of Falls  Patient will demonstrate the desired outcomes by discharge/transition of care.   Outcome: Ongoing (interventions implemented as appropriate)  Pt has a steady gait, he remains free of injuries and falls. Lasix gtt, close BP monitoring.     Problem: Fluid Volume Excess (Adult,Obstetrics,Pediatric)  Goal: Stable Weight  Patient will demonstrate the desired outcomes by discharge/transition of care.  Outcome: Ongoing (interventions implemented as appropriate)  Daily standing weight  Goal: Balanced Intake/Output  Patient will demonstrate the desired outcomes by discharge/transition of care.  1500 Fluid restriction, cardiac low sodium diet.  Pt is informed of importance of recording urine output and oral intake and verbalized understanding.     Problem: Cardiac Output Decreased (Adult)  Goal: Adequate Cardiac Output/Effective Tissue Perfusion  Patient will demonstrate the desired outcomes by discharge/transition of care.  Outcome: Ongoing (interventions implemented as appropriate)  Medication management

## 2017-07-11 NOTE — ASSESSMENT & PLAN NOTE
-Will monitor BP with changes made today: stop Coreg and start Metoprolol, start Entresto and continue home dose of Spironolactone.     -Will titrate as tolerated

## 2017-07-11 NOTE — ASSESSMENT & PLAN NOTE
-continued home dose of Amiodarone  -[id number] is 4 with hx CVA.  Patient requested to stop Eliquis and start Coumadin. Will start 5mg and adjust once INR therapeutic.  Patient reports he was previously on 3mg when he took it before.  Will bridge with Lovenox  -CT head done to r/o CVA and was negative.

## 2017-07-11 NOTE — PROGRESS NOTES
Pt arrived to the floor, oriented to room, voiced no complaints or pain or discomfort. RIJ dressing is clean, dry, and intact from [facility]. Notified telemetry that box is needed. Awaiting box.  Informed patient of lasix gtt and intake and output monitoring as well as early morning standing weights.  Pt verbalized understanding.

## 2017-07-11 NOTE — SUBJECTIVE & OBJECTIVE
Interval History: Patient reports feeling better since starting on IV Lasix yesterday.  He states his urine output has decreased just slightly today.  Denies CP, SOB, palpitations or dizziness.      Continuous Infusions:   furosemide (LASIX) 1 mg/mL infusion (non-titrating) 10 mg/hr (07/10/17 2107)     Scheduled Meds:   amiodarone  200 mg Oral Daily    atorvastatin  40 mg Oral Daily    enoxaparin  100 mg Subcutaneous BID    metoprolol succinate  12.5 mg Oral Once    [START ON 7/12/2017] metoprolol succinate  50 mg Oral Daily    sacubitril-valsartan  1 tablet Oral BID    sodium chloride 0.9%  3 mL Intravenous Q8H    spironolactone  25 mg Oral Daily    warfarin  5 mg Oral Daily     PRN Meds:acetaminophen, methocarbamol    Review of patient's allergies indicates:   Allergen Reactions    Percocet [oxycodone-acetaminophen] Itching    Penicillins Rash     Objective:     Vital Signs (Most Recent):  Temp: 97.8 °F (36.6 °C) (07/11/17 0803)  Pulse: 70 (07/11/17 0803)  Resp: 18 (07/11/17 0803)  BP: 109/74 (07/11/17 0803)  SpO2: (!) 94 % (07/11/17 0803) Vital Signs (24h Range):  Temp:  [97.1 °F (36.2 °C)-98.4 °F (36.9 °C)] 97.8 °F (36.6 °C)  Pulse:  [59-70] 70  Resp:  [16-18] 18  SpO2:  [93 %-97 %] 94 %  BP: ()/(55-86) 109/74     Weight: 111.7 kg (246 lb 4.1 oz)  Body mass index is 35.33 kg/m².      Intake/Output Summary (Last 24 hours) at 07/11/17 1051  Last data filed at 07/11/17 0600   Gross per 24 hour   Intake             1150 ml   Output             3475 ml   Net            -2325 ml       Physical Exam   Constitutional: He is oriented to person, place, and time. He appears well-developed and well-nourished.   Neck: Normal range of motion. Neck supple.   JVD difficult to access secondary to body habitus.     Cardiovascular: Normal rate and regular rhythm.  Exam reveals no gallop and no friction rub.    No murmur heard.  Pulmonary/Chest: He has no wheezes. He has no rales.   CTA   Abdominal: Soft. Bowel  sounds are normal. There is no tenderness.   Musculoskeletal: He exhibits edema.   Trace edema lower extremities bilaterally    Neurological: He is alert and oriented to person, place, and time.   Skin: Skin is warm and dry.       Significant Labs:  CBC:    Recent Labs  Lab 07/11/17 0439   WBC 7.23   RBC 4.80   HGB 13.9*   HCT 40.8      MCV 85   MCH 29.0   MCHC 34.1     BNP:    Recent Labs  Lab 07/10/17  1943   *     CMP:    Recent Labs  Lab 07/11/17 0439      CALCIUM 9.6   ALBUMIN 4.0   PROT 7.3      K 3.8   CO2 27      BUN 22*   CREATININE 1.6*   ALKPHOS 48*   ALT 68*   AST 38   BILITOT 0.7      Coagulation:     Recent Labs  Lab 07/11/17 0439   INR 1.1     LDH:  No results for input(s): LDH in the last 72 hours.  Microbiology:  Microbiology Results (last 7 days)     ** No results found for the last 168 hours. **          I have reviewed all pertinent labs within the past 24 hours.    Estimated Creatinine Clearance: 63.8 mL/min (based on Cr of 1.6).    Diagnostic Results: see report for full details;  CPX: 7/10/17  RHC: 7/10/17

## 2017-07-12 LAB
ALBUMIN SERPL BCP-MCNC: 4.2 G/DL
ALP SERPL-CCNC: 53 U/L
ALT SERPL W/O P-5'-P-CCNC: 74 U/L
ANION GAP SERPL CALC-SCNC: 14 MMOL/L
AST SERPL-CCNC: 40 U/L
BASOPHILS # BLD AUTO: 0.02 K/UL
BASOPHILS NFR BLD: 0.2 %
BILIRUB SERPL-MCNC: 0.7 MG/DL
BUN SERPL-MCNC: 27 MG/DL
CALCIUM SERPL-MCNC: 9.7 MG/DL
CHLORIDE SERPL-SCNC: 96 MMOL/L
CO2 SERPL-SCNC: 31 MMOL/L
CREAT SERPL-MCNC: 1.9 MG/DL
DIFFERENTIAL METHOD: ABNORMAL
EOSINOPHIL # BLD AUTO: 0.1 K/UL
EOSINOPHIL NFR BLD: 1.6 %
ERYTHROCYTE [DISTWIDTH] IN BLOOD BY AUTOMATED COUNT: 13.3 %
EST. GFR  (AFRICAN AMERICAN): 44.3 ML/MIN/1.73 M^2
EST. GFR  (NON AFRICAN AMERICAN): 38.3 ML/MIN/1.73 M^2
GLUCOSE SERPL-MCNC: 129 MG/DL
HCT VFR BLD AUTO: 43.2 %
HGB BLD-MCNC: 15.2 G/DL
INR PPP: 1.3
LYMPHOCYTES # BLD AUTO: 2.1 K/UL
LYMPHOCYTES NFR BLD: 23.3 %
MAGNESIUM SERPL-MCNC: 2.4 MG/DL
MCH RBC QN AUTO: 29.7 PG
MCHC RBC AUTO-ENTMCNC: 35.2 %
MCV RBC AUTO: 84 FL
MONOCYTES # BLD AUTO: 1.1 K/UL
MONOCYTES NFR BLD: 12.1 %
NEUTROPHILS # BLD AUTO: 5.5 K/UL
NEUTROPHILS NFR BLD: 62.3 %
PLATELET # BLD AUTO: 174 K/UL
PMV BLD AUTO: 11.3 FL
POTASSIUM SERPL-SCNC: 3.3 MMOL/L
PROT SERPL-MCNC: 7.8 G/DL
PROTHROMBIN TIME: 13.3 SEC
RBC # BLD AUTO: 5.12 M/UL
SODIUM SERPL-SCNC: 141 MMOL/L
WBC # BLD AUTO: 8.83 K/UL

## 2017-07-12 PROCEDURE — 85025 COMPLETE CBC W/AUTO DIFF WBC: CPT

## 2017-07-12 PROCEDURE — 93005 ELECTROCARDIOGRAM TRACING: CPT

## 2017-07-12 PROCEDURE — 20600001 HC STEP DOWN PRIVATE ROOM

## 2017-07-12 PROCEDURE — 25000003 PHARM REV CODE 250: Performed by: PHYSICIAN ASSISTANT

## 2017-07-12 PROCEDURE — 99232 SBSQ HOSP IP/OBS MODERATE 35: CPT | Mod: ,,, | Performed by: INTERNAL MEDICINE

## 2017-07-12 PROCEDURE — 93010 ELECTROCARDIOGRAM REPORT: CPT | Mod: ,,, | Performed by: INTERNAL MEDICINE

## 2017-07-12 PROCEDURE — 63600175 PHARM REV CODE 636 W HCPCS: Performed by: INTERNAL MEDICINE

## 2017-07-12 PROCEDURE — 85610 PROTHROMBIN TIME: CPT

## 2017-07-12 PROCEDURE — 80053 COMPREHEN METABOLIC PANEL: CPT

## 2017-07-12 PROCEDURE — 36415 COLL VENOUS BLD VENIPUNCTURE: CPT

## 2017-07-12 PROCEDURE — 25000003 PHARM REV CODE 250: Performed by: INTERNAL MEDICINE

## 2017-07-12 PROCEDURE — 83735 ASSAY OF MAGNESIUM: CPT

## 2017-07-12 RX ORDER — FUROSEMIDE 40 MG/1
40 TABLET ORAL DAILY
Status: DISCONTINUED | OUTPATIENT
Start: 2017-07-13 | End: 2017-07-14 | Stop reason: HOSPADM

## 2017-07-12 RX ORDER — WARFARIN 2 MG/1
4 TABLET ORAL DAILY
Status: DISCONTINUED | OUTPATIENT
Start: 2017-07-12 | End: 2017-07-13

## 2017-07-12 RX ORDER — POTASSIUM CHLORIDE 20 MEQ/1
40 TABLET, EXTENDED RELEASE ORAL EVERY 4 HOURS
Status: COMPLETED | OUTPATIENT
Start: 2017-07-12 | End: 2017-07-12

## 2017-07-12 RX ORDER — DIGOXIN 125 MCG
0.12 TABLET ORAL DAILY
Status: DISCONTINUED | OUTPATIENT
Start: 2017-07-12 | End: 2017-07-14 | Stop reason: HOSPADM

## 2017-07-12 RX ADMIN — METOPROLOL SUCCINATE 50 MG: 25 TABLET, EXTENDED RELEASE ORAL at 08:07

## 2017-07-12 RX ADMIN — ENOXAPARIN SODIUM 100 MG: 100 INJECTION SUBCUTANEOUS at 08:07

## 2017-07-12 RX ADMIN — ATORVASTATIN CALCIUM 40 MG: 20 TABLET, FILM COATED ORAL at 08:07

## 2017-07-12 RX ADMIN — AMIODARONE HYDROCHLORIDE 200 MG: 200 TABLET ORAL at 08:07

## 2017-07-12 RX ADMIN — SODIUM CHLORIDE, PRESERVATIVE FREE 3 ML: 5 INJECTION INTRAVENOUS at 02:07

## 2017-07-12 RX ADMIN — POTASSIUM CHLORIDE 40 MEQ: 1500 TABLET, EXTENDED RELEASE ORAL at 02:07

## 2017-07-12 RX ADMIN — SODIUM CHLORIDE, PRESERVATIVE FREE 3 ML: 5 INJECTION INTRAVENOUS at 09:07

## 2017-07-12 RX ADMIN — WARFARIN SODIUM 4 MG: 2 TABLET ORAL at 05:07

## 2017-07-12 RX ADMIN — SACUBITRIL AND VALSARTAN 1 TABLET: 24; 26 TABLET, FILM COATED ORAL at 09:07

## 2017-07-12 RX ADMIN — SACUBITRIL AND VALSARTAN 1 TABLET: 24; 26 TABLET, FILM COATED ORAL at 08:07

## 2017-07-12 RX ADMIN — POTASSIUM CHLORIDE 40 MEQ: 1500 TABLET, EXTENDED RELEASE ORAL at 08:07

## 2017-07-12 RX ADMIN — ENOXAPARIN SODIUM 100 MG: 100 INJECTION SUBCUTANEOUS at 09:07

## 2017-07-12 RX ADMIN — DIGOXIN 0.12 MG: 125 TABLET ORAL at 08:07

## 2017-07-12 RX ADMIN — SPIRONOLACTONE 25 MG: 25 TABLET, FILM COATED ORAL at 08:07

## 2017-07-12 NOTE — ASSESSMENT & PLAN NOTE
-continued home dose of Amiodarone  -[id number] is 4 with hx CVA.  Patient requested to stop Eliquis and start Coumadin. Started 5mg and will adjust once INR therapeutic.  Patient reports he was previously on 3mg when he took it before.  Will bridge with Lovenox  -CT head done to r/o CVA and was negative.

## 2017-07-12 NOTE — PROGRESS NOTES
[facility]  Heart Transplant  Progress Note    Patient Name: Tae Delgado  MRN: [id number]  Admission Date: 7/10/2017  Hospital Length of Stay: 2 days  Attending Physician: Karson Bucio Jr.,*  Primary Care Provider: Elham Pearson MD  Principal Problem:Acute on chronic combined systolic and diastolic heart failure    Subjective:     Interval History: Patient reports urine output has decreased and he feels like he is at his baseline in terms of fluid status.  He has no complaints and is eager to go home.  2D echo done yesterday and EF 23% and no thrombus appreciated    Continuous Infusions:     Scheduled Meds:   amiodarone  200 mg Oral Daily    atorvastatin  40 mg Oral Daily    digoxin  0.125 mg Oral Daily    enoxaparin  100 mg Subcutaneous BID    [START ON 7/13/2017] furosemide  40 mg Oral Daily    metoprolol succinate  50 mg Oral Daily    potassium chloride  40 mEq Oral Q4H    sacubitril-valsartan  1 tablet Oral BID    sodium chloride 0.9%  3 mL Intravenous Q8H    spironolactone  25 mg Oral Daily    warfarin  4 mg Oral Daily     PRN Meds:acetaminophen, methocarbamol    Review of patient's allergies indicates:   Allergen Reactions    Percocet [oxycodone-acetaminophen] Itching    Penicillins Rash     Objective:     Vital Signs (Most Recent):  Temp: 98.4 °F (36.9 °C) (07/12/17 0835)  Pulse: 63 (07/12/17 1100)  Resp: 18 (07/12/17 0835)  BP: 112/72 (07/12/17 0835)  SpO2: (!) 94 % (07/12/17 0835) Vital Signs (24h Range):  Temp:  [97.5 °F (36.4 °C)-99.2 °F (37.3 °C)] 98.4 °F (36.9 °C)  Pulse:  [56-71] 63  Resp:  [16-18] 18  SpO2:  [93 %-97 %] 94 %  BP: ()/(57-78) 112/72     Weight: 110.7 kg (244 lb 0.8 oz)  Body mass index is 35.02 kg/m².      Intake/Output Summary (Last 24 hours) at 07/12/17 1202  Last data filed at 07/12/17 0600   Gross per 24 hour   Intake              720 ml   Output             3100 ml   Net            -2380 ml       Physical Exam   Constitutional: He is  oriented to person, place, and time. He appears well-developed and well-nourished.   Neck: Normal range of motion. Neck supple.   JVD difficult to access secondary to body habitus, but today appears just above the clavicle     Cardiovascular: Normal rate and regular rhythm.  Exam reveals no gallop and no friction rub.    No murmur heard.  Pulmonary/Chest: He has no wheezes. He has no rales.   CTA   Abdominal: Soft. Bowel sounds are normal. There is no tenderness.   Musculoskeletal: He exhibits edema.   Trace edema lower extremities bilaterally    Neurological: He is alert and oriented to person, place, and time.   Skin: Skin is warm and dry.       Significant Labs:  CBC:    Recent Labs  Lab 07/12/17  0426   WBC 8.83   RBC 5.12   HGB 15.2   HCT 43.2      MCV 84   MCH 29.7   MCHC 35.2     BNP:    Recent Labs  Lab 07/10/17  1943   *     CMP:    Recent Labs  Lab 07/12/17 0426   *   CALCIUM 9.7   ALBUMIN 4.2   PROT 7.8      K 3.3*   CO2 31*   CL 96   BUN 27*   CREATININE 1.9*   ALKPHOS 53*   ALT 74*   AST 40   BILITOT 0.7      Coagulation:     Recent Labs  Lab 07/12/17 0426   INR 1.3*     LDH:  No results for input(s): LDH in the last 72 hours.  Microbiology:  Microbiology Results (last 7 days)     ** No results found for the last 168 hours. **          I have reviewed all pertinent labs within the past 24 hours.    Estimated Creatinine Clearance: 53.5 mL/min (based on Cr of 1.9).    Diagnostic Results: see report for full details;  CPX: 7/10/17  RHC: 7/10/17  2D echo with CFD: 7/11/17      Assessment and Plan:   55 y.o. WM with history of NICMP diagnosed in 2010, ICD, LV thrombus (with prior splenic and renal emboli), paroxysmal atrial fib, HTN, HLP admitted from the procedure room after RHC demonstrated significantly elevated left and right side filling pressures      * Acute on chronic combined systolic and diastolic heart failure    -NICM EF 20-25%; LVEDD: 6.7 cm  -Diuresed on Lasix  infusion at 10mg/hr and is net negative 4.4L total.  BUN/Creat up slightly today.  Will stop infusion and start oral Lasix at previous home dose tomorrow    -GDMT: continued home dose of Spironolactone; changed Coreg to Metoprolol; added Entresto and Digoxin.   -Patient is not currently being evaluated for OHTx/VAD; he is unclear if he would be agreeable to LVAD.  Will provide patient education and not initiate pathway   -2g Na dietary restriction, 1500 mL fluid restriction, strict I/Os          Left ventricular thrombus without MI    -Patient wanted to stop Eliquis and change to Coumadin  -Thrombus not present on current echo   -bridging with Lovenox        Paroxysmal atrial fibrillation    -continued home dose of Amiodarone  -[id number] is 4 with hx CVA.  Patient requested to stop Eliquis and start Coumadin. Started 5mg and will adjust once INR therapeutic.  Patient reports he was previously on 3mg when he took it before.  Will bridge with Lovenox  -CT head done to r/o CVA and was negative.         Hyperlipidemia    -continue home Lipitor dose        Hypertension    -Will monitor BP with changes made today: stop Coreg and start Metoprolol, start Entresto and continue home dose of Spironolactone.     -Will titrate as tolerated        Obesity    -Body mass index is 35.33 kg/m².  -Patient will need sleep study upon discharge            MARBELLA Mcfadden  Heart Transplant spectralink: [id number]  [facility]

## 2017-07-12 NOTE — ASSESSMENT & PLAN NOTE
-Patient wanted to stop Eliquis and change to Coumadin  -Thrombus not present on current echo   -bridging with Lovenox

## 2017-07-12 NOTE — ASSESSMENT & PLAN NOTE
-NICM EF 20-25%; LVEDD: 6.7 cm  -Diuresed on Lasix infusion at 10mg/hr and is net negative 4.4L total.  BUN/Creat up slightly today.  Will stop infusion and start oral Lasix at previous home dose tomorrow    -GDMT: continued home dose of Spironolactone; changed Coreg to Metoprolol; added Entresto and Digoxin.   -Patient is not currently being evaluated for OHTx/VAD; he is unclear if he would be agreeable to LVAD.  Will provide patient education and not initiate pathway   -2g Na dietary restriction, 1500 mL fluid restriction, strict I/Os

## 2017-07-12 NOTE — PLAN OF CARE
Problem: Patient Care Overview  Goal: Plan of Care Review  Outcome: Ongoing (interventions implemented as appropriate)  Plan of care discussed with patient. Patient is free of fall/trauma/injury. Lasix drip d/c, to start Lasix PO 40mg daily tomorrow. Also started on digoxin today. Episode of discoloration of hands-- see previous note. All questions addressed. Will continue to monitor.

## 2017-07-12 NOTE — PROGRESS NOTES
Admit Note     Met with patient and significant other, Lavern Carbajal, to assess needs. Patient is a 57 y.o.   male, admitted for for heart failure.      Patient admitted from clinic on 7/10/2017.  At this time, patient presents as alert and oriented x 4, pleasant, good eye contact, communicative and asking and answering questions appropriately.  At this time, patients caregiver presents as alert and oriented x 4, pleasant, calm, communicative, cooperative and asking and answering questions appropriately.    Household/Family Systems     Patient resides with patient's significant other, and her 2 dtrs at:     [address] [place] [id number]    Cell [phone]  Lavern cell [phone]    Support system includes SO and adult children.  Patient does not have dependents that are need of being cared for. Pt reports 3 adult children.    Patients primary caregiver is Lavern steve significant other.    During admission, patient's caregiver plans to stay in patient's room.  Confirmed patient and patients caregivers do have access to reliable transportation.    Cognitive Status/Learning     Patient reports reading ability as college and states patient does not have difficulty with seeing and hearing.  Pt wears OTC reading glasses at 1.25 strength and reports 50% hearing loss in left ear from . Pt does not use hearing aid.  Patient reports patient learns best by visual.   Needed: No.   Highest education level: Associate/Bachelor Degree from \[employer]\""    Vocation/Disability     Working for Income: No  If no, reason not working: Disability  Patient is disabled due to heart since 2010.  Prior to disability, patient  was employed as [employer].    Adherence     Patient reports a medium level of adherence to patients health care regimen.  Pt reports very adherent to low sodium diet, and reads all labels. Pt reports making changes to prescribed medications because he was unhappy with side effects.   Adherence counseling and education provided.  Patient verbalizes understanding.    Substance Use    Patient reports the following substance usage.    Tobacco: none, patient denies any use.  Alcohol: Pt reports drinking approximately 1 glass of wine a week.   Illicit Drugs/Non-prescribed Medications: none, patient denies any use.    Patient states clear understanding of the potential impact of substance use.  Substance abstinence/cessation counseling, education and resources provided and reviewed.     Services Utilizing/ADLS    Infusion Service: Prior to admission, patient utilizing? no  Home Health: Prior to admission, patient utilizing? no  DME: Prior to admission: no  Pulmonary/Cardiac Rehab: Prior to admission, no  Dialysis:  Prior to admission, no  Transplant Specialty Pharmacy:  Prior to admission, N/A    Prior to admission, patient reports patient was independent with ADLS and was driving.  Patient reports patient is able to care for self at this time..  Patient indicates a willingness to care for self once medically cleared to do so.    Insurance/Medications    Insured by   Payor/Plan Subscr  Sex Relation Sub. Ins. ID Effective Group Num   1. MEDICARE - ELMIRA MATOS 1960 Male  [id number]                                    [address]   2.  FOR LELMIRA CAPPS 1960 Male  [id number] 1[birth year]                                    [address]     Primary Insurance (for UNOS reporting): Public Insurance - Medicare FFS (Fee For Service)  Secondary Insurance (for UNOS reporting): Public Insurance - Other Government    Patient reports patient is able to obtain and afford medications at this time and at time of discharge.    Living Will/Healthcare Power of     Patient states patient has a LW and/or HCPA and states the documents are at home. Per pt HCPAs are his youngest son, Epi, ph [phone] and sister Brianda, ph [phone].  Pt reports plan to make SO Lavern HCPA. SW provided pt  with blank advanced directives forms and educated pt on how to complete forms.     Coping/Mental Health    Patient is coping adequately with the aid of  family members, friends and staying active. Patient denies mental health difficulties.     Discharge Planning    At time of discharge, patient plans to return to patient's home under the care of self and SO.  Patients significant other will transport patient.  Per rounds today, expected discharge date has not been medically determined at this time. Patient and patients caretaker verbalize understanding and are involved in treatment planning and discharge process.    Additional Concerns    Patient is being followed for needs, education, resources, information, emotional support, supportive counseling, and for supportive and skilled discharge plan of care.  providing ongoing psychosocial support, education, resources and d/c planning as needed.  SW remains available. Patient denies additional needs and/or concerns at this time. Patient verbalizes understanding and agreement with information reviewed, social work availability, and how to access available resources as needed.

## 2017-07-12 NOTE — PROGRESS NOTES
"Patient walking around unit. RN annex found patient to have purple/blue hands and fingers. Hands warmed between RN's hands and patient returned to room and lying in bed. Warm packs placed on hands, color returning to extremities. Vitals taken, see blow. Patient then complaining of "dull, constant" chest pain 3/10. MARBELLA Cm notified. EKG ordered. Will continue to monitor.   "

## 2017-07-12 NOTE — SUBJECTIVE & OBJECTIVE
Interval History: Patient reports urine output has decreased and he feels like he is at his baseline in terms of fluid status.  He has no complaints and is eager to go home.  2D echo done yesterday and EF 23% and no thrombus appreciated    Continuous Infusions:     Scheduled Meds:   amiodarone  200 mg Oral Daily    atorvastatin  40 mg Oral Daily    digoxin  0.125 mg Oral Daily    enoxaparin  100 mg Subcutaneous BID    [START ON 7/13/2017] furosemide  40 mg Oral Daily    metoprolol succinate  50 mg Oral Daily    potassium chloride  40 mEq Oral Q4H    sacubitril-valsartan  1 tablet Oral BID    sodium chloride 0.9%  3 mL Intravenous Q8H    spironolactone  25 mg Oral Daily    warfarin  4 mg Oral Daily     PRN Meds:acetaminophen, methocarbamol    Review of patient's allergies indicates:   Allergen Reactions    Percocet [oxycodone-acetaminophen] Itching    Penicillins Rash     Objective:     Vital Signs (Most Recent):  Temp: 98.4 °F (36.9 °C) (07/12/17 0835)  Pulse: 63 (07/12/17 1100)  Resp: 18 (07/12/17 0835)  BP: 112/72 (07/12/17 0835)  SpO2: (!) 94 % (07/12/17 0835) Vital Signs (24h Range):  Temp:  [97.5 °F (36.4 °C)-99.2 °F (37.3 °C)] 98.4 °F (36.9 °C)  Pulse:  [56-71] 63  Resp:  [16-18] 18  SpO2:  [93 %-97 %] 94 %  BP: ()/(57-78) 112/72     Weight: 110.7 kg (244 lb 0.8 oz)  Body mass index is 35.02 kg/m².      Intake/Output Summary (Last 24 hours) at 07/12/17 1202  Last data filed at 07/12/17 0600   Gross per 24 hour   Intake              720 ml   Output             3100 ml   Net            -2380 ml       Physical Exam   Constitutional: He is oriented to person, place, and time. He appears well-developed and well-nourished.   Neck: Normal range of motion. Neck supple.   JVD difficult to access secondary to body habitus, but today appears just above the clavicle     Cardiovascular: Normal rate and regular rhythm.  Exam reveals no gallop and no friction rub.    No murmur heard.  Pulmonary/Chest: He  has no wheezes. He has no rales.   CTA   Abdominal: Soft. Bowel sounds are normal. There is no tenderness.   Musculoskeletal: He exhibits edema.   Trace edema lower extremities bilaterally    Neurological: He is alert and oriented to person, place, and time.   Skin: Skin is warm and dry.       Significant Labs:  CBC:    Recent Labs  Lab 07/12/17 0426   WBC 8.83   RBC 5.12   HGB 15.2   HCT 43.2      MCV 84   MCH 29.7   MCHC 35.2     BNP:    Recent Labs  Lab 07/10/17  1943   *     CMP:    Recent Labs  Lab 07/12/17 0426   *   CALCIUM 9.7   ALBUMIN 4.2   PROT 7.8      K 3.3*   CO2 31*   CL 96   BUN 27*   CREATININE 1.9*   ALKPHOS 53*   ALT 74*   AST 40   BILITOT 0.7      Coagulation:     Recent Labs  Lab 07/12/17 0426   INR 1.3*     LDH:  No results for input(s): LDH in the last 72 hours.  Microbiology:  Microbiology Results (last 7 days)     ** No results found for the last 168 hours. **          I have reviewed all pertinent labs within the past 24 hours.    Estimated Creatinine Clearance: 53.5 mL/min (based on Cr of 1.9).    Diagnostic Results: see report for full details;  CPX: 7/10/17  RHC: 7/10/17  2D echo with CFD: 7/11/17

## 2017-07-12 NOTE — HOSPITAL COURSE
55 y.o. WM with history of NICMP diagnosed in 2010, ICD, LV thrombus (with prior splenic and renal emboli), paroxysmal atrial fib, HTN, HLP admitted from the procedure room after RHC demonstrated significantly elevated left and right side filling pressures (RA= 17, PCWP=40 mm of Hg). Severe pulmonary HTN  (PA=89/ mm of Hg)in the setting of elevated left sided filling pressures. Low cardiac index and output  (CI=1.4, CO= L/min). After NTG, PA pressures decrEased dramatically to 52/30 Mean PA=35 mm of Hg. PCWP decreased to 25 mm of Hg. CI increased to 2.2 l/MIN/M2. CPX demonstrated: indicative of functional impairment secondary to circulatory insufficiency and ventilatory impairment.  Upon admit; patient was started on Lasix infusion at 10mg/hr and diuresed well and was transitioned to oral Lasix at previous home dose of 40mg QDaily.  He is net negative 5.3L throughout his hospital stay and Dry weight on day of discharge is 247lbs.  His 2D echo demonstrated an EF of 23% and LVEDD of 6.7cm.  We discussed advanced options; however, patietn wants to try optimal medical therapy first.  We optimized his regimen by changing home dose of of Coreg to metoprolol to allow for more BP room; added Digoxin and Entresto and continued home dose of Spironolactone. His BP remained stable in 90's and patient did not have symptoms of hypotension.Upon admit, patient reported he wanted to be taken off of Entresto; reporting that it was expensive and his insurance did not cover it.  We stopped Entresto and started Coumadin.  He complained of a headache and reported symptoms were similar to previous CVA, but CT of head was negative and headache resolved.  He is a [id number] is 4; we started Coumadin at 5mg and bridged him with Lovenox.  He will likely be discharged on lower dose as he previously took 3 mg daily.  He will be enrolled in our Coumadin clinic, sent home on 3 PO qd of coumadin. Patient with Pulmonary HTN and hx obestity and  snoring.  He will need sleep study upon discharge (orders placed for outpatient referral to sleep medicine).

## 2017-07-12 NOTE — PLAN OF CARE
Problem: Patient Care Overview (Adult)  Goal: Plan of Care Review  Outcome: Ongoing (interventions implemented as appropriate)  Pt had no significant events over night. No S/s of SOB noted. Pt denied any pain throughout the shift. Pt receiving lasix to pull fluid off. Pt family remained at beside and pt was encourage to call when in need for assistance.

## 2017-07-13 LAB
ALBUMIN SERPL BCP-MCNC: 4.1 G/DL
ALP SERPL-CCNC: 52 U/L
ALT SERPL W/O P-5'-P-CCNC: 65 U/L
ANION GAP SERPL CALC-SCNC: 9 MMOL/L
AST SERPL-CCNC: 32 U/L
BASOPHILS # BLD AUTO: 0.04 K/UL
BASOPHILS NFR BLD: 0.4 %
BILIRUB SERPL-MCNC: 0.9 MG/DL
BUN SERPL-MCNC: 27 MG/DL
CALCIUM SERPL-MCNC: 9.5 MG/DL
CHLORIDE SERPL-SCNC: 99 MMOL/L
CO2 SERPL-SCNC: 28 MMOL/L
CREAT SERPL-MCNC: 1.6 MG/DL
DIFFERENTIAL METHOD: NORMAL
EOSINOPHIL # BLD AUTO: 0.1 K/UL
EOSINOPHIL NFR BLD: 1.1 %
ERYTHROCYTE [DISTWIDTH] IN BLOOD BY AUTOMATED COUNT: 13.3 %
EST. GFR  (AFRICAN AMERICAN): 54.5 ML/MIN/1.73 M^2
EST. GFR  (NON AFRICAN AMERICAN): 47.1 ML/MIN/1.73 M^2
GLUCOSE SERPL-MCNC: 115 MG/DL
HCT VFR BLD AUTO: 46.6 %
HGB BLD-MCNC: 16.1 G/DL
INR PPP: 1.8
LYMPHOCYTES # BLD AUTO: 2.4 K/UL
LYMPHOCYTES NFR BLD: 25.1 %
MAGNESIUM SERPL-MCNC: 2.4 MG/DL
MCH RBC QN AUTO: 29 PG
MCHC RBC AUTO-ENTMCNC: 34.5 %
MCV RBC AUTO: 84 FL
MONOCYTES # BLD AUTO: 0.7 K/UL
MONOCYTES NFR BLD: 7.3 %
NEUTROPHILS # BLD AUTO: 6.2 K/UL
NEUTROPHILS NFR BLD: 65.7 %
PLATELET # BLD AUTO: 201 K/UL
PMV BLD AUTO: 11.4 FL
POTASSIUM SERPL-SCNC: 3.9 MMOL/L
PROT SERPL-MCNC: 7.5 G/DL
PROTHROMBIN TIME: 18.5 SEC
RBC # BLD AUTO: 5.55 M/UL
SODIUM SERPL-SCNC: 136 MMOL/L
WBC # BLD AUTO: 9.37 K/UL

## 2017-07-13 PROCEDURE — 63600175 PHARM REV CODE 636 W HCPCS: Performed by: INTERNAL MEDICINE

## 2017-07-13 PROCEDURE — 99232 SBSQ HOSP IP/OBS MODERATE 35: CPT | Mod: ,,, | Performed by: INTERNAL MEDICINE

## 2017-07-13 PROCEDURE — 25000003 PHARM REV CODE 250: Performed by: INTERNAL MEDICINE

## 2017-07-13 PROCEDURE — 83735 ASSAY OF MAGNESIUM: CPT

## 2017-07-13 PROCEDURE — 85025 COMPLETE CBC W/AUTO DIFF WBC: CPT

## 2017-07-13 PROCEDURE — 25000003 PHARM REV CODE 250: Performed by: PHYSICIAN ASSISTANT

## 2017-07-13 PROCEDURE — 20600001 HC STEP DOWN PRIVATE ROOM

## 2017-07-13 PROCEDURE — 80053 COMPREHEN METABOLIC PANEL: CPT

## 2017-07-13 PROCEDURE — 36415 COLL VENOUS BLD VENIPUNCTURE: CPT

## 2017-07-13 PROCEDURE — 85610 PROTHROMBIN TIME: CPT

## 2017-07-13 RX ORDER — WARFARIN 2.5 MG/1
2.5 TABLET ORAL DAILY
Status: DISCONTINUED | OUTPATIENT
Start: 2017-07-13 | End: 2017-07-13

## 2017-07-13 RX ORDER — WARFARIN 3 MG/1
3 TABLET ORAL DAILY
Status: DISCONTINUED | OUTPATIENT
Start: 2017-07-13 | End: 2017-07-14 | Stop reason: HOSPADM

## 2017-07-13 RX ORDER — WARFARIN 3 MG/1
3 TABLET ORAL DAILY
Status: DISCONTINUED | OUTPATIENT
Start: 2017-07-13 | End: 2017-07-13

## 2017-07-13 RX ADMIN — WARFARIN SODIUM 3 MG: 3 TABLET ORAL at 04:07

## 2017-07-13 RX ADMIN — FUROSEMIDE 40 MG: 40 TABLET ORAL at 09:07

## 2017-07-13 RX ADMIN — SODIUM CHLORIDE, PRESERVATIVE FREE 3 ML: 5 INJECTION INTRAVENOUS at 10:07

## 2017-07-13 RX ADMIN — SPIRONOLACTONE 25 MG: 25 TABLET, FILM COATED ORAL at 09:07

## 2017-07-13 RX ADMIN — SODIUM CHLORIDE, PRESERVATIVE FREE 3 ML: 5 INJECTION INTRAVENOUS at 05:07

## 2017-07-13 RX ADMIN — ENOXAPARIN SODIUM 100 MG: 100 INJECTION SUBCUTANEOUS at 10:07

## 2017-07-13 RX ADMIN — ENOXAPARIN SODIUM 100 MG: 100 INJECTION SUBCUTANEOUS at 09:07

## 2017-07-13 RX ADMIN — SODIUM CHLORIDE, PRESERVATIVE FREE 3 ML: 5 INJECTION INTRAVENOUS at 02:07

## 2017-07-13 RX ADMIN — ATORVASTATIN CALCIUM 40 MG: 20 TABLET, FILM COATED ORAL at 09:07

## 2017-07-13 RX ADMIN — DIGOXIN 0.12 MG: 125 TABLET ORAL at 09:07

## 2017-07-13 RX ADMIN — SACUBITRIL AND VALSARTAN 1 TABLET: 24; 26 TABLET, FILM COATED ORAL at 10:07

## 2017-07-13 RX ADMIN — AMIODARONE HYDROCHLORIDE 200 MG: 200 TABLET ORAL at 09:07

## 2017-07-13 RX ADMIN — SACUBITRIL AND VALSARTAN 1 TABLET: 24; 26 TABLET, FILM COATED ORAL at 09:07

## 2017-07-13 RX ADMIN — METOPROLOL SUCCINATE 50 MG: 25 TABLET, EXTENDED RELEASE ORAL at 09:07

## 2017-07-13 NOTE — ASSESSMENT & PLAN NOTE
-Will monitor BP with changes made yesterady: stopped Coreg and start Metoprolol, continue Entresto and continue home dose of Spironolactone.     -Will titrate as tolerated

## 2017-07-13 NOTE — ASSESSMENT & PLAN NOTE
-continued home dose of Amiodarone  -[id number] is 4 with hx CVA.  Patient requested to stop Eliquis and start Coumadin. Started 5mg and will adjust once INR therapeutic.  Patient reports he was previously on 3mg when he took it before.  Will bridge with Lovenox  -CT head done to r/o CVA and was negative.

## 2017-07-13 NOTE — ASSESSMENT & PLAN NOTE
-NICM EF 20-25%; LVEDD: 6.7 cm  -Diuresed on Lasix infusion at 10mg/hr and is net negative -.12 overnight. Down 253 to 245 lbs since admit.  Now on PO lasix (home dose). Cr back down today to 1.6   -GDMT: continued home dose of Spironolactone; changed Coreg to Metoprolol; added Entresto and Digoxin.   -Patient is not currently being evaluated for OHTx/VAD; he is unclear if he would be agreeable to LVAD.  Will provide patient education and not initiate pathway   -2g Na dietary restriction, 1500 mL fluid restriction, strict I/Os

## 2017-07-13 NOTE — SUBJECTIVE & OBJECTIVE
"Interval History: Patient had episode of subjective bilateral color change in arms when walking around. States his arms got "cold" and turned "blue" and began to tingle. He states when he sat down and warmed up his hands they returned to normal color. Was concerned this was related to the new meds (entresto). Spent a lot of time with patient discussing CPX and mortality benefits of current GDMT, patient appreciated.    Continuous Infusions:   Scheduled Meds:   amiodarone  200 mg Oral Daily    atorvastatin  40 mg Oral Daily    digoxin  0.125 mg Oral Daily    enoxaparin  100 mg Subcutaneous BID    furosemide  40 mg Oral Daily    metoprolol succinate  50 mg Oral Daily    sacubitril-valsartan  1 tablet Oral BID    sodium chloride 0.9%  3 mL Intravenous Q8H    spironolactone  25 mg Oral Daily    warfarin  2.5 mg Oral Daily     PRN Meds:acetaminophen, methocarbamol    Review of patient's allergies indicates:   Allergen Reactions    Percocet [oxycodone-acetaminophen] Itching    Penicillins Rash     Objective:     Vital Signs (Most Recent):  Temp: 97.8 °F (36.6 °C) (07/13/17 1000)  Pulse: 65 (07/13/17 1100)  Resp: 18 (07/13/17 1000)  BP: 137/87 (07/13/17 1000)  SpO2: 96 % (07/13/17 1000) Vital Signs (24h Range):  Temp:  [96.9 °F (36.1 °C)-98.1 °F (36.7 °C)] 97.8 °F (36.6 °C)  Pulse:  [60-72] 65  Resp:  [18] 18  SpO2:  [94 %-99 %] 96 %  BP: ()/(56-87) 137/87     Weight: 111.2 kg (245 lb 2.4 oz)  Body mass index is 35.18 kg/m².      Intake/Output Summary (Last 24 hours) at 07/13/17 1237  Last data filed at 07/13/17 0953   Gross per 24 hour   Intake             1080 ml   Output             1480 ml   Net             -400 ml     Hemodynamic Parameters:      Telemetry: NSR, no events overnight    Physical Exam   Constitutional: He appears well-developed and well-nourished. No distress.   HENT:   Head: Normocephalic and atraumatic.   Eyes: Conjunctivae are normal.   Neck: Normal range of motion. Neck supple. JVD " (slightly above clavicle (improved)) present.   Cardiovascular: Normal rate, regular rhythm, normal heart sounds and intact distal pulses.    Pulmonary/Chest: Effort normal and breath sounds normal. No respiratory distress. He has no wheezes.   Abdominal: Soft. Bowel sounds are normal.   Musculoskeletal: Normal range of motion. He exhibits no edema or deformity.   Neurological: He is alert.   Skin: Skin is warm and dry. Capillary refill takes less than 2 seconds. He is not diaphoretic. There is erythema. No pallor.   Psychiatric: He has a normal mood and affect. His behavior is normal. Judgment and thought content normal.   Nursing note and vitals reviewed.      Significant Labs:  CBC:    Recent Labs  Lab 07/13/17  0610   WBC 9.37   RBC 5.55   HGB 16.1   HCT 46.6      MCV 84   MCH 29.0   MCHC 34.5     BNP:    Recent Labs  Lab 07/10/17  1943   *     CMP:    Recent Labs  Lab 07/13/17  0610   *   CALCIUM 9.5   ALBUMIN 4.1   PROT 7.5      K 3.9   CO2 28   CL 99   BUN 27*   CREATININE 1.6*   ALKPHOS 52*   ALT 65*   AST 32   BILITOT 0.9      Coagulation:     Recent Labs  Lab 07/13/17  0610   INR 1.8*     LDH:  No results for input(s): LDH in the last 72 hours.  Microbiology:  Microbiology Results (last 7 days)     ** No results found for the last 168 hours. **          I have reviewed all pertinent labs within the past 24 hours.    Estimated Creatinine Clearance: 63.6 mL/min (based on Cr of 1.6).    Diagnostic Results:  I have reviewed all pertinent imaging results/findings within the past 24 hours.

## 2017-07-13 NOTE — PROGRESS NOTES
After ambulating in [place], pt complained of feeling slightly dizzy, a little back pain and his hands turning purple. Notified MARBELLA Martínez with HTS.   VS charted (see epic). MARBELLA Martínez notified pt that Dr. Bucio would be in shortly. No further orders. Will continue to monitor.

## 2017-07-13 NOTE — PROGRESS NOTES
"[facility]  Heart Transplant  Progress Note    Patient Name: Tae Delgado  MRN: [id number]  Admission Date: 7/10/2017  Hospital Length of Stay: 3 days  Attending Physician: Karson Bucio Jr.,*  Primary Care Provider: Elham Pearson MD  Principal Problem:Acute on chronic combined systolic and diastolic heart failure    Subjective:     Interval History: Patient had episode of subjective bilateral color change in arms when walking around. States his arms got "cold" and turned "blue" and began to tingle. He states when he sat down and warmed up his hands they returned to normal color. Was concerned this was related to the new meds (entresto). Spent a lot of time with patient discussing CPX and mortality benefits of current GDMT, patient appreciated.    Continuous Infusions:   Scheduled Meds:   amiodarone  200 mg Oral Daily    atorvastatin  40 mg Oral Daily    digoxin  0.125 mg Oral Daily    enoxaparin  100 mg Subcutaneous BID    furosemide  40 mg Oral Daily    metoprolol succinate  50 mg Oral Daily    sacubitril-valsartan  1 tablet Oral BID    sodium chloride 0.9%  3 mL Intravenous Q8H    spironolactone  25 mg Oral Daily    warfarin  2.5 mg Oral Daily     PRN Meds:acetaminophen, methocarbamol    Review of patient's allergies indicates:   Allergen Reactions    Percocet [oxycodone-acetaminophen] Itching    Penicillins Rash     Objective:     Vital Signs (Most Recent):  Temp: 97.8 °F (36.6 °C) (07/13/17 1000)  Pulse: 65 (07/13/17 1100)  Resp: 18 (07/13/17 1000)  BP: 137/87 (07/13/17 1000)  SpO2: 96 % (07/13/17 1000) Vital Signs (24h Range):  Temp:  [96.9 °F (36.1 °C)-98.1 °F (36.7 °C)] 97.8 °F (36.6 °C)  Pulse:  [60-72] 65  Resp:  [18] 18  SpO2:  [94 %-99 %] 96 %  BP: ()/(56-87) 137/87     Weight: 111.2 kg (245 lb 2.4 oz)  Body mass index is 35.18 kg/m².      Intake/Output Summary (Last 24 hours) at 07/13/17 3307  Last data filed at 07/13/17 0953   Gross per 24 hour   Intake          "    1080 ml   Output             1480 ml   Net             -400 ml     Hemodynamic Parameters:      Telemetry: NSR, no events overnight    Physical Exam   Constitutional: He appears well-developed and well-nourished. No distress.   HENT:   Head: Normocephalic and atraumatic.   Eyes: Conjunctivae are normal.   Neck: Normal range of motion. Neck supple. JVD (slightly above clavicle (improved)) present.   Cardiovascular: Normal rate, regular rhythm, normal heart sounds and intact distal pulses.    Pulmonary/Chest: Effort normal and breath sounds normal. No respiratory distress. He has no wheezes.   Abdominal: Soft. Bowel sounds are normal.   Musculoskeletal: Normal range of motion. He exhibits no edema or deformity.   Neurological: He is alert.   Skin: Skin is warm and dry. Capillary refill takes less than 2 seconds. He is not diaphoretic. There is erythema. No pallor.   Psychiatric: He has a normal mood and affect. His behavior is normal. Judgment and thought content normal.   Nursing note and vitals reviewed.      Significant Labs:  CBC:    Recent Labs  Lab 07/13/17  0610   WBC 9.37   RBC 5.55   HGB 16.1   HCT 46.6      MCV 84   MCH 29.0   MCHC 34.5     BNP:    Recent Labs  Lab 07/10/17  1943   *     CMP:    Recent Labs  Lab 07/13/17  0610   *   CALCIUM 9.5   ALBUMIN 4.1   PROT 7.5      K 3.9   CO2 28   CL 99   BUN 27*   CREATININE 1.6*   ALKPHOS 52*   ALT 65*   AST 32   BILITOT 0.9      Coagulation:     Recent Labs  Lab 07/13/17  0610   INR 1.8*     LDH:  No results for input(s): LDH in the last 72 hours.  Microbiology:  Microbiology Results (last 7 days)     ** No results found for the last 168 hours. **          I have reviewed all pertinent labs within the past 24 hours.    Estimated Creatinine Clearance: 63.6 mL/min (based on Cr of 1.6).    Diagnostic Results:  I have reviewed all pertinent imaging results/findings within the past 24 hours.    Assessment and Plan:     * Acute on chronic  combined systolic and diastolic heart failure    -NICM EF 20-25%; LVEDD: 6.7 cm  -Diuresed on Lasix infusion at 10mg/hr and is net negative -.12 overnight. Down 253 to 245 lbs since admit.  Now on PO lasix (home dose). Cr back down today to 1.6   -GDMT: continued home dose of Spironolactone; changed Coreg to Metoprolol; added Entresto and Digoxin.   -Patient is not currently being evaluated for OHTx/VAD; he is unclear if he would be agreeable to LVAD.  Will provide patient education and not initiate pathway   -2g Na dietary restriction, 1500 mL fluid restriction, strict I/Os          Paroxysmal atrial fibrillation    -continued home dose of Amiodarone  -[id number] is 4 with hx CVA.  Patient requested to stop Eliquis and start Coumadin. Started 5mg and will adjust once INR therapeutic.  Patient reports he was previously on 3mg when he took it before.  Will bridge with Lovenox  -CT head done to r/o CVA and was negative.         Left ventricular thrombus without MI    - Patient wanted to stop Eliquis and change to Coumadin  -Thrombus not present on current echo yesterday  - bridging with Lovenox, INR 1.8 today. Will give 3mg coumadin tonight        Obesity    -Body mass index is 35.33 kg/m².  -Patient will need sleep study upon discharge        Hypertension    -Will monitor BP with changes made yesterady: stopped Coreg and start Metoprolol, continue Entresto and continue home dose of Spironolactone.     -Will titrate as tolerated        Hyperlipidemia    -continue home Lipitor dose            Jasmina Charles PA-C  Heart Transplant  [facility]

## 2017-07-13 NOTE — ASSESSMENT & PLAN NOTE
- Patient wanted to stop Eliquis and change to Coumadin  -Thrombus not present on current echo yesterday  - bridging with Lovenox, INR 1.8 today. Will give 3mg coumadin tonight

## 2017-07-13 NOTE — PLAN OF CARE
Problem: Patient Care Overview  Goal: Plan of Care Review  Outcome: Ongoing (interventions implemented as appropriate)  Pt had no significant events over night. No S/s of SOB noted. Pt denied any pain throughout the shift. Pt lasix stopped IV lasix and will start PO lasix today. Pt family remained at beside and pt was encourage to call when in need for assistance. Pt is concerned with current medication regime

## 2017-07-13 NOTE — PLAN OF CARE
Problem: Patient Care Overview  Goal: Plan of Care Review  Outcome: Ongoing (interventions implemented as appropriate)  Pt free of falls/trauma/injuries this shift. Pt ambulating independently with fall precautions in place. Plan of care reviewed with patient at bedside, no new questions at this time. Pt had many questions this morning about medication regimen, notified HTS team. Patient tolerating well. Patient educated on s/s of heart failure. VSS. Pt voices no complaints of chest pain, SOB, or dizziness. No acute distress noted. Will continue to monitor.

## 2017-07-14 VITALS
SYSTOLIC BLOOD PRESSURE: 120 MMHG | DIASTOLIC BLOOD PRESSURE: 74 MMHG | OXYGEN SATURATION: 93 % | BODY MASS INDEX: 35.42 KG/M2 | RESPIRATION RATE: 18 BRPM | HEIGHT: 70 IN | HEART RATE: 60 BPM | TEMPERATURE: 96 F | WEIGHT: 247.38 LBS

## 2017-07-14 DIAGNOSIS — I50.22 CHRONIC SYSTOLIC HEART FAILURE: ICD-10-CM

## 2017-07-14 DIAGNOSIS — I50.43 ACUTE ON CHRONIC COMBINED SYSTOLIC AND DIASTOLIC HEART FAILURE: Primary | ICD-10-CM

## 2017-07-14 DIAGNOSIS — E66.9 OBESITY (BMI 30.0-34.9): Primary | ICD-10-CM

## 2017-07-14 LAB
ALBUMIN SERPL BCP-MCNC: 3.8 G/DL
ALP SERPL-CCNC: 52 U/L
ALT SERPL W/O P-5'-P-CCNC: 62 U/L
ANION GAP SERPL CALC-SCNC: 8 MMOL/L
AST SERPL-CCNC: 32 U/L
BASOPHILS # BLD AUTO: 0.02 K/UL
BASOPHILS NFR BLD: 0.3 %
BILIRUB SERPL-MCNC: 0.7 MG/DL
BUN SERPL-MCNC: 24 MG/DL
CALCIUM SERPL-MCNC: 9.5 MG/DL
CHLORIDE SERPL-SCNC: 100 MMOL/L
CO2 SERPL-SCNC: 28 MMOL/L
CREAT SERPL-MCNC: 1.6 MG/DL
DIFFERENTIAL METHOD: NORMAL
EOSINOPHIL # BLD AUTO: 0.1 K/UL
EOSINOPHIL NFR BLD: 1.4 %
ERYTHROCYTE [DISTWIDTH] IN BLOOD BY AUTOMATED COUNT: 13.2 %
EST. GFR  (AFRICAN AMERICAN): 54.5 ML/MIN/1.73 M^2
EST. GFR  (NON AFRICAN AMERICAN): 47.1 ML/MIN/1.73 M^2
GLUCOSE SERPL-MCNC: 111 MG/DL
HCT VFR BLD AUTO: 44.7 %
HGB BLD-MCNC: 15.3 G/DL
INR PPP: 2.3
LYMPHOCYTES # BLD AUTO: 2.3 K/UL
LYMPHOCYTES NFR BLD: 30.2 %
MAGNESIUM SERPL-MCNC: 2.5 MG/DL
MCH RBC QN AUTO: 29.1 PG
MCHC RBC AUTO-ENTMCNC: 34.2 %
MCV RBC AUTO: 85 FL
MONOCYTES # BLD AUTO: 0.9 K/UL
MONOCYTES NFR BLD: 12.3 %
NEUTROPHILS # BLD AUTO: 4.2 K/UL
NEUTROPHILS NFR BLD: 55.4 %
PLATELET # BLD AUTO: 176 K/UL
PMV BLD AUTO: 11.2 FL
POTASSIUM SERPL-SCNC: 4.2 MMOL/L
PROT SERPL-MCNC: 7.1 G/DL
PROTHROMBIN TIME: 23 SEC
RBC # BLD AUTO: 5.26 M/UL
SODIUM SERPL-SCNC: 136 MMOL/L
WBC # BLD AUTO: 7.62 K/UL

## 2017-07-14 PROCEDURE — 85025 COMPLETE CBC W/AUTO DIFF WBC: CPT

## 2017-07-14 PROCEDURE — 83735 ASSAY OF MAGNESIUM: CPT

## 2017-07-14 PROCEDURE — 25000003 PHARM REV CODE 250: Performed by: PHYSICIAN ASSISTANT

## 2017-07-14 PROCEDURE — 80053 COMPREHEN METABOLIC PANEL: CPT

## 2017-07-14 PROCEDURE — 99239 HOSP IP/OBS DSCHRG MGMT >30: CPT | Mod: ,,, | Performed by: INTERNAL MEDICINE

## 2017-07-14 PROCEDURE — 85610 PROTHROMBIN TIME: CPT

## 2017-07-14 PROCEDURE — 36415 COLL VENOUS BLD VENIPUNCTURE: CPT

## 2017-07-14 PROCEDURE — 25000003 PHARM REV CODE 250: Performed by: INTERNAL MEDICINE

## 2017-07-14 RX ORDER — WARFARIN 3 MG/1
3 TABLET ORAL DAILY
Qty: 90 TABLET | Refills: 3 | Status: SHIPPED | OUTPATIENT
Start: 2017-07-14 | End: 2018-10-18 | Stop reason: SDUPTHER

## 2017-07-14 RX ORDER — METHOCARBAMOL 500 MG/1
500 TABLET, FILM COATED ORAL 3 TIMES DAILY PRN
Qty: 30 TABLET | Refills: 0 | Status: SHIPPED | OUTPATIENT
Start: 2017-07-14 | End: 2017-07-24

## 2017-07-14 RX ORDER — DIGOXIN 125 MCG
0.12 TABLET ORAL DAILY
Qty: 30 TABLET | Refills: 11 | Status: SHIPPED | OUTPATIENT
Start: 2017-07-14 | End: 2018-09-17

## 2017-07-14 RX ORDER — METOPROLOL SUCCINATE 50 MG/1
50 TABLET, EXTENDED RELEASE ORAL DAILY
Qty: 30 TABLET | Refills: 11 | Status: SHIPPED | OUTPATIENT
Start: 2017-07-14 | End: 2017-11-30 | Stop reason: SDUPTHER

## 2017-07-14 RX ADMIN — SACUBITRIL AND VALSARTAN 1 TABLET: 24; 26 TABLET, FILM COATED ORAL at 09:07

## 2017-07-14 RX ADMIN — FUROSEMIDE 40 MG: 40 TABLET ORAL at 09:07

## 2017-07-14 RX ADMIN — AMIODARONE HYDROCHLORIDE 200 MG: 200 TABLET ORAL at 09:07

## 2017-07-14 RX ADMIN — SODIUM CHLORIDE, PRESERVATIVE FREE 3 ML: 5 INJECTION INTRAVENOUS at 05:07

## 2017-07-14 RX ADMIN — ATORVASTATIN CALCIUM 40 MG: 20 TABLET, FILM COATED ORAL at 09:07

## 2017-07-14 RX ADMIN — SPIRONOLACTONE 25 MG: 25 TABLET, FILM COATED ORAL at 09:07

## 2017-07-14 RX ADMIN — METOPROLOL SUCCINATE 50 MG: 25 TABLET, EXTENDED RELEASE ORAL at 09:07

## 2017-07-14 NOTE — PROGRESS NOTES
Patient was switched from Apixaban to warfarin for AF with a history of embolic stroke due to cost issues.  INR goal is 2-3.  He has been enrolled into the anticoagulation clinic.  Discussed importance of adherence, meaning of INR, timing of administration, drug interactions, food interactions, and monitoring for s/sx of bleeding.  Discharge dose will be 3mg/day.  Recommend warfarin clinic recheck INR on Monday.

## 2017-07-14 NOTE — ASSESSMENT & PLAN NOTE
-continued home dose of Amiodarone  -[id number] is 4 with hx CVA.  Patient requested to stop Eliquis and start Coumadin. Patient reports he was previously on 3mg when he took it before.  Bridged with lovenox in the interim.   -CT head done to r/o CVA and was negative.

## 2017-07-14 NOTE — ASSESSMENT & PLAN NOTE
-NICM EF 20-25%; LVEDD: 6.7 cm  -Down 253 to 245 lbs since admit.  Now on PO lasix (home dose). Cr back down today to 1.6   -GDMT: continued home dose of Spironolactone; changed Coreg to Metoprolol; added Entresto and Digoxin.   -Patient is not currently being evaluated for OHTx/VAD; he is unclear if he would be agreeable to LVAD.  Will provide patient education and not initiate pathway   -2g Na dietary restriction, 1500 mL fluid restriction, strict I/Os

## 2017-07-14 NOTE — PROGRESS NOTES
[facility]  Heart Transplant  Progress Note    Patient Name: Tae Delgado  MRN: [id number]  Admission Date: 7/10/2017  Hospital Length of Stay: 4 days  Attending Physician: Karson Bucio Jr.,*  Primary Care Provider: Elham Pearson MD  Principal Problem:Acute on chronic combined systolic and diastolic heart failure    Subjective:     Interval History: No complaints overnight. Ready to go home. Understands he will need close follow up to uptitrate his GDMT. After patient proves compliance with coumadin will try and set up home monitoring device.     Continuous Infusions:   Scheduled Meds:   amiodarone  200 mg Oral Daily    atorvastatin  40 mg Oral Daily    digoxin  0.125 mg Oral Daily    furosemide  40 mg Oral Daily    metoprolol succinate  50 mg Oral Daily    sacubitril-valsartan  1 tablet Oral BID    sodium chloride 0.9%  3 mL Intravenous Q8H    spironolactone  25 mg Oral Daily    warfarin  3 mg Oral Daily     PRN Meds:acetaminophen, methocarbamol    Review of patient's allergies indicates:   Allergen Reactions    Percocet [oxycodone-acetaminophen] Itching    Penicillins Rash     Objective:     Vital Signs (Most Recent):  Temp: 96.1 °F (35.6 °C) (07/14/17 0800)  Pulse: 60 (07/14/17 1100)  Resp: 18 (07/14/17 0800)  BP: 120/74 (07/14/17 0800)  SpO2: (!) 93 % (07/14/17 0800) Vital Signs (24h Range):  Temp:  [96.1 °F (35.6 °C)-97.9 °F (36.6 °C)] 96.1 °F (35.6 °C)  Pulse:  [57-69] 60  Resp:  [17-18] 18  SpO2:  [90 %-97 %] 93 %  BP: ()/(54-78) 120/74     Weight: 112.2 kg (247 lb 5.7 oz)  Body mass index is 35.49 kg/m².      Intake/Output Summary (Last 24 hours) at 07/14/17 1154  Last data filed at 07/14/17 0936   Gross per 24 hour   Intake             1140 ml   Output             1580 ml   Net             -440 ml       Hemodynamic Parameters:       Telemetry: No events overnight    Physical Exam   Constitutional: He appears well-developed and well-nourished. No distress.   HENT:    Head: Normocephalic and atraumatic.   Eyes: Conjunctivae are normal.   Neck: Normal range of motion. Neck supple. JVD (at clavicle) present.   Cardiovascular: Normal rate, regular rhythm, normal heart sounds and intact distal pulses.    Pulmonary/Chest: Effort normal and breath sounds normal. No respiratory distress. He has no wheezes.   Abdominal: Soft. Bowel sounds are normal.   Musculoskeletal: Normal range of motion. He exhibits no edema or deformity.   Neurological: He is alert.   Skin: Skin is warm and dry. Capillary refill takes less than 2 seconds. He is not diaphoretic. There is erythema. No pallor.   Psychiatric: He has a normal mood and affect. His behavior is normal. Judgment and thought content normal.   Nursing note and vitals reviewed.    Significant Labs:  CBC:    Recent Labs  Lab 07/14/17 0523   WBC 7.62   RBC 5.26   HGB 15.3   HCT 44.7      MCV 85   MCH 29.1   MCHC 34.2     BNP:  No results for input(s): BNP in the last 72 hours.    Invalid input(s): BNPTRIAGELBLO  CMP:    Recent Labs  Lab 07/14/17  0523   *   CALCIUM 9.5   ALBUMIN 3.8   PROT 7.1      K 4.2   CO2 28      BUN 24*   CREATININE 1.6*   ALKPHOS 52*   ALT 62*   AST 32   BILITOT 0.7      Coagulation:     Recent Labs  Lab 07/14/17 0523   INR 2.3*     LDH:  No results for input(s): LDH in the last 72 hours.  Microbiology:  Microbiology Results (last 7 days)     ** No results found for the last 168 hours. **        I have reviewed all pertinent labs within the past 24 hours.    Estimated Creatinine Clearance: 63.9 mL/min (based on Cr of 1.6).    Diagnostic Results:  I have reviewed all pertinent imaging results/findings within the past 24 hours.    Assessment and Plan:     * Acute on chronic combined systolic and diastolic heart failure    -NICM EF 20-25%; LVEDD: 6.7 cm  -Down 253 to 245 lbs since admit.  Now on PO lasix (home dose). Cr back down today to 1.6   -GDMT: continued home dose of Spironolactone;  changed Coreg to Metoprolol; added Entresto and Digoxin.   -Patient is not currently being evaluated for OHTx/VAD; he is unclear if he would be agreeable to LVAD.  Will provide patient education and not initiate pathway   -2g Na dietary restriction, 1500 mL fluid restriction, strict I/Os          Paroxysmal atrial fibrillation    -continued home dose of Amiodarone  -[id number] is 4 with hx CVA.  Patient requested to stop Eliquis and start Coumadin. Patient reports he was previously on 3mg when he took it before.  Bridged with lovenox in the interim.   -CT head done to r/o CVA and was negative.         Left ventricular thrombus without MI    - Patient wanted to stop Eliquis and change to Coumadin  -Thrombus not present on current echo yesterday  - bridging with Lovenox, INR 2.3 today. Will send home on 3 mg QD of coumadin.        Obesity    -Body mass index is 35.33 kg/m².  -Patient will need sleep study upon discharge        Hypertension    -Will monitor BP with changes made yesterady: stopped Coreg and start Metoprolol, continue Entresto and continue home dose of Spironolactone.     -Will titrate as tolerated        Hyperlipidemia    -continue home Lipitor dose          Dispo:  Home today with 2 week follow up in HTS clinic. Will set up with coumadin clinic.      Jasmina Charles PA-C  Heart Transplant  [facility]

## 2017-07-14 NOTE — SUBJECTIVE & OBJECTIVE
Interval History: No complaints overnight. Ready to go home. Understands he will need close follow up to uptitrate his GDMT. After patient proves compliance with coumadin will try and set up home monitoring device.     Continuous Infusions:   Scheduled Meds:   amiodarone  200 mg Oral Daily    atorvastatin  40 mg Oral Daily    digoxin  0.125 mg Oral Daily    furosemide  40 mg Oral Daily    metoprolol succinate  50 mg Oral Daily    sacubitril-valsartan  1 tablet Oral BID    sodium chloride 0.9%  3 mL Intravenous Q8H    spironolactone  25 mg Oral Daily    warfarin  3 mg Oral Daily     PRN Meds:acetaminophen, methocarbamol    Review of patient's allergies indicates:   Allergen Reactions    Percocet [oxycodone-acetaminophen] Itching    Penicillins Rash     Objective:     Vital Signs (Most Recent):  Temp: 96.1 °F (35.6 °C) (07/14/17 0800)  Pulse: 60 (07/14/17 1100)  Resp: 18 (07/14/17 0800)  BP: 120/74 (07/14/17 0800)  SpO2: (!) 93 % (07/14/17 0800) Vital Signs (24h Range):  Temp:  [96.1 °F (35.6 °C)-97.9 °F (36.6 °C)] 96.1 °F (35.6 °C)  Pulse:  [57-69] 60  Resp:  [17-18] 18  SpO2:  [90 %-97 %] 93 %  BP: ()/(54-78) 120/74     Weight: 112.2 kg (247 lb 5.7 oz)  Body mass index is 35.49 kg/m².      Intake/Output Summary (Last 24 hours) at 07/14/17 1154  Last data filed at 07/14/17 0936   Gross per 24 hour   Intake             1140 ml   Output             1580 ml   Net             -440 ml       Hemodynamic Parameters:       Telemetry: No events overnight    Physical Exam   Constitutional: He appears well-developed and well-nourished. No distress.   HENT:   Head: Normocephalic and atraumatic.   Eyes: Conjunctivae are normal.   Neck: Normal range of motion. Neck supple. JVD (at clavicle) present.   Cardiovascular: Normal rate, regular rhythm, normal heart sounds and intact distal pulses.    Pulmonary/Chest: Effort normal and breath sounds normal. No respiratory distress. He has no wheezes.   Abdominal: Soft.  Bowel sounds are normal.   Musculoskeletal: Normal range of motion. He exhibits no edema or deformity.   Neurological: He is alert.   Skin: Skin is warm and dry. Capillary refill takes less than 2 seconds. He is not diaphoretic. There is erythema. No pallor.   Psychiatric: He has a normal mood and affect. His behavior is normal. Judgment and thought content normal.   Nursing note and vitals reviewed.    Significant Labs:  CBC:    Recent Labs  Lab 07/14/17  0523   WBC 7.62   RBC 5.26   HGB 15.3   HCT 44.7      MCV 85   MCH 29.1   MCHC 34.2     BNP:  No results for input(s): BNP in the last 72 hours.    Invalid input(s): BNPTRIAGELBLO  CMP:    Recent Labs  Lab 07/14/17 0523   *   CALCIUM 9.5   ALBUMIN 3.8   PROT 7.1      K 4.2   CO2 28      BUN 24*   CREATININE 1.6*   ALKPHOS 52*   ALT 62*   AST 32   BILITOT 0.7      Coagulation:     Recent Labs  Lab 07/14/17 0523   INR 2.3*     LDH:  No results for input(s): LDH in the last 72 hours.  Microbiology:  Microbiology Results (last 7 days)     ** No results found for the last 168 hours. **        I have reviewed all pertinent labs within the past 24 hours.    Estimated Creatinine Clearance: 63.9 mL/min (based on Cr of 1.6).    Diagnostic Results:  I have reviewed all pertinent imaging results/findings within the past 24 hours.

## 2017-07-14 NOTE — PROGRESS NOTES
D/C Note:    SW to pt's room for D/C. Pt and significant other aaox4, calm, and communicative. Pt and significant other report in agreement with plan for D/C home today with no needs. Pt and significant other report coping well at this time. Significant other to provide transport home. Pt reports plan to review advanced directives paperwork with family and complete forms at a later date. Pt reports no questions or concerns for SW. SW providing psychosocial counseling and support, education, assistance, resources, and D/C planning as indicated. SW remains available.

## 2017-07-14 NOTE — DISCHARGE SUMMARY
[facility]  Heart Transplant  Discharge Summary      Patient Name: Tae Delgado  MRN: [id number]  Admission Date: 7/10/2017  Hospital Length of Stay: 4 days  Discharge Date and Time: 07/14/2017 1:37 PM  Attending Physician: No att. providers found   Discharging Provider: Jasmina Charles PA-C  Primary Care Provider: Elham Pearson MD     HPI: 55 y.o. WM with history of NICMP diagnosed in 2010, ICD, LV thrombus (with prior splenic and renal emboli), paroxysmal atrial fib, HTN, HLP admitted from the procedure room after RHC demonstrated significantly elevated left and right side filling pressures (RA= 17, PCWP=40 mm of Hg). Severe pulmonary HTN  (PA=89/ mm of Hg)in the setting of elevated left sided filling pressures. Low cardiac index and output  (CI=1.4, CO= L/min). After NTG, PA pressures decrEased dramatically to 52/30 Mean PA=35 mm of Hg. PCWP decreased to 25 mm of Hg. CI increased to 2.2 l/MIN/M2. CPX demonstrated: indicative of functional impairment secondary to circulatory insufficiency and ventilatory impairment. Patient reports prior to today he has felt well and has no complaints.  He reports yesterday he cut down a tree with no problems.  He reports he had to stop for 20-30 minutes to rest but he reports this is his baseline and hasn't noticed any worsening symptoms.  He reports he has had some increase leg edema but denies weight gain, increasing SOB, orthopnea and PND. He reports he does not think he is interested in pursuing LVAD/OHTx workup as he lives alone and doesn't have much family around.  He is open to getting information. He wants to get changed from Eliquis to Coumadin as his insurance is not covering Eliquis and he can get labwork around his house.      * No surgery found *     Hospital Course: 55 y.o. WM with history of NICMP diagnosed in 2010, ICD, LV thrombus (with prior splenic and renal emboli), paroxysmal atrial fib, HTN, HLP admitted from the procedure room after RHC  demonstrated significantly elevated left and right side filling pressures (RA= 17, PCWP=40 mm of Hg). Severe pulmonary HTN  (PA=89/ mm of Hg)in the setting of elevated left sided filling pressures. Low cardiac index and output  (CI=1.4, CO= L/min). After NTG, PA pressures decrEased dramatically to 52/30 Mean PA=35 mm of Hg. PCWP decreased to 25 mm of Hg. CI increased to 2.2 l/MIN/M2. CPX demonstrated: indicative of functional impairment secondary to circulatory insufficiency and ventilatory impairment.  Upon admit; patient was started on Lasix infusion at 10mg/hr and diuresed well and was transitioned to oral Lasix at previous home dose of 40mg QDaily.  He is net negative 5.3L throughout his hospital stay and Dry weight on day of discharge is 247lbs.  His 2D echo demonstrated an EF of 23% and LVEDD of 6.7cm.  We discussed advanced options; however, patietn wants to try optimal medical therapy first.  We optimized his regimen by changing home dose of of Coreg to metoprolol to allow for more BP room; added Digoxin and Entresto and continued home dose of Spironolactone. His BP remained stable in 90's and patient did not have symptoms of hypotension.Upon admit, patient reported he wanted to be taken off of Entresto; reporting that it was expensive and his insurance did not cover it.  We stopped Entresto and started Coumadin.  He complained of a headache and reported symptoms were similar to previous CVA, but CT of head was negative and headache resolved.  He is a [id number] is 4; we started Coumadin at 5mg and bridged him with Lovenox.  He will likely be discharged on lower dose as he previously took 3 mg daily.  He will be enrolled in our Coumadin clinic, sent home on 3 PO qd of coumadin. Patient with Pulmonary HTN and hx obestity and snoring.  He will need sleep study upon discharge (orders placed for outpatient referral to sleep medicine).     Significant Diagnostic Studies: Labs: All labs within the past 24 hours  have been reviewed  Cardiac Graphics: Echocardiogram:   2D echo with color flow doppler:   Results for orders placed or performed during the hospital encounter of 07/10/17   2D echo with color flow doppler   Result Value Ref Range    EF 23 (A) 55 - 65    Mitral Valve Regurgitation MILD     Diastolic Dysfunction Yes (A)     Aortic Valve Regurgitation TRIVIAL     Est. PA Systolic Pressure 21.66     Tricuspid Valve Regurgitation TRIVIAL TO MILD        Pending Diagnostic Studies:     None        Final Active Diagnoses:    Diagnosis Date Noted POA    PRINCIPAL PROBLEM:  Acute on chronic combined systolic and diastolic heart failure [I50.43] 07/10/2017 Yes    Hypertensive heart disease with heart failure [I11.0]  Yes    COCM (congestive cardiomyopathy) [I42.0] 07/20/2016 Yes    Chronic anticoagulation [Z79.01] 01/19/2016 Not Applicable    Paroxysmal atrial fibrillation [I48.0] 01/18/2016 Yes    Chronic systolic congestive heart failure [I50.22] 01/18/2016 Yes    Left ventricular thrombus without MI [I51.3] 08/31/2015 Yes    At risk for amiodarone toxicity with long term use [Z79.899] 07/24/2015 Not Applicable    Obesity [E66.9] 02/06/2015 Yes    Hypertension [I10] 02/06/2015 Yes    Hyperlipidemia [E78.5] 02/06/2015 Yes      Problems Resolved During this Admission:    Diagnosis Date Noted Date Resolved POA      Discharged Condition: stable    Disposition: Home or Self Care    Follow Up: Patient will follow up in HTS clinic with labs in 2 weeks, also has order in for outpatient sleep medicine evaluation.    Patient Instructions:     Diet general       Medications:  Reconciled Home Medications:   Discharge Medication List as of 7/14/2017 12:06 PM      START taking these medications    Details   digoxin (LANOXIN) 125 mcg tablet Take 1 tablet (0.125 mg total) by mouth once daily., Starting Fri 7/14/2017, Until Sat 7/14/2018, Normal      methocarbamol (ROBAXIN) 500 MG Tab Take 1 tablet (500 mg total) by mouth 3  (three) times daily as needed., Starting Fri 7/14/2017, Until Mon 7/24/2017, Normal      metoprolol succinate (TOPROL-XL) 50 MG 24 hr tablet Take 1 tablet (50 mg total) by mouth once daily., Starting Fri 7/14/2017, Until Sat 7/14/2018, Normal      warfarin (COUMADIN) 3 MG tablet Take 1 tablet (3 mg total) by mouth Daily., Starting Fri 7/14/2017, Until Sat 7/14/2018, Normal         CONTINUE these medications which have CHANGED    Details   sacubitril-valsartan (ENTRESTO) 24-26 mg per tablet Take 1 tablet by mouth 2 (two) times daily., Starting Wed 7/12/2017, Normal         CONTINUE these medications which have NOT CHANGED    Details   amiodarone (PACERONE) 200 MG Tab Take 1 tablet by mouth Daily., Starting 3/22/2017, Until Discontinued, Historical Med      atorvastatin (LIPITOR) 40 MG tablet Take 1 tablet (40 mg total) by mouth every evening., Starting u 4/6/2017, Normal      furosemide (LASIX) 40 MG tablet Take 1 tablet (40 mg total) by mouth once daily., Starting 2/22/2016, Until Fri 3/31/17, Normal      spironolactone (ALDACTONE) 25 MG tablet TAKE ONE TABLET BY MOUTH ONCE DAILY, Normal         STOP taking these medications       apixaban 5 mg Tab Comments:   Reason for Stopping:         carvedilol (COREG) 6.25 MG tablet Comments:   Reason for Stopping:               Jasmina Charles PA-C  Heart Transplant  [facility]

## 2017-07-14 NOTE — PLAN OF CARE
Problem: Patient Care Overview  Goal: Plan of Care Review  Outcome: Ongoing (interventions implemented as appropriate)  Pt had no significant events over night. No S/s of SOB noted. Pt denied any pain throughout the shift. Pt started on PO lasix today. Pt family remained at beside and pt was encourage to call when in need for assistance. Pt possibily being D/C tomorrow.

## 2017-07-14 NOTE — ASSESSMENT & PLAN NOTE
- Patient wanted to stop Eliquis and change to Coumadin  -Thrombus not present on current echo yesterday  - bridging with Lovenox, INR 2.3 today. Will send home on 3 mg QD of coumadin.

## 2017-07-18 ENCOUNTER — ANTI-COAG VISIT (OUTPATIENT)
Dept: CARDIOLOGY | Facility: CLINIC | Age: 57
End: 2017-07-18

## 2017-07-18 ENCOUNTER — PATIENT OUTREACH (OUTPATIENT)
Dept: ADMINISTRATIVE | Facility: CLINIC | Age: 57
End: 2017-07-18

## 2017-07-18 DIAGNOSIS — Z79.01 LONG TERM (CURRENT) USE OF ANTICOAGULANTS: Primary | ICD-10-CM

## 2017-07-18 DIAGNOSIS — I48.0 PAROXYSMAL ATRIAL FIBRILLATION: ICD-10-CM

## 2017-07-18 NOTE — PATIENT INSTRUCTIONS
Cholesterol Medications  Your health care provider has prescribed medication to help control your cholesterol. This sheet tells you how cholesterol affects your health. It also explains how medications can help improve your cholesterol levels.  Understanding cholesterol  Cholesterol is a type of fat (lipid) thats carried in the blood. Your body makes cholesterol in the liver. You also get it from certain foods. The body needs some cholesterol to stay healthy. But high cholesterol increases buildup of plaque (a fatty substance) in the blood vessels. Over time, this plaque narrows and hardens the blood vessels. This reduces or blocks blood flow in these vessels and raises your risk of heart attack (also known as acute myocardial infarction, or AMI), stroke, and other health problems. This is known as atherosclerotic cardiovascular disease (ASCVD).  Types of lipids  There are three key fats in the blood:  · LDL (low-density lipoprotein) cholesterol. This is called bad because it can cause plaque buildup in the blood vessels.  · HDL (high-density lipoprotein) cholesterol. This is called good because it helps remove harmful cholesterol from the bloodstream.  · Triglycerides. These are a primary form of fat your body uses to store energy. Like LDL cholesterol, they can cause plaque buildup in the blood vessels.  Healthy cholesterol levels  You can find out your levels by having a blood test. High blood cholesterol is a risk factor for getting ASCVD. Talk to your health care provider about what levels are best for you. To find out more about cholesterol levels, visit www.heart.org/cholesterol.   Types of cholesterol medications    Medications can help control the amount of cholesterol in the blood. There are several types. Each controls cholesterol in a different way. They also have different effects in terms of how well they work to lower cholesterol and reduce death rates. Discuss these effects with your health  care provider. Your health care provider will prescribe the type of medicine that is best for you. They may be used alone or combined. The main types are:  · Statins (HMG-CoA reductase inhibitors). Statins are thought to be the best at lowering cholesterol. They do this by keeping the body from making cholesterol. Benefits: Statins lower LDL cholesterol. They also slightly raise HDL cholesterol and lower triglycerides.  · Selective cholesterol absorption inhibitors. These prevent the body from taking cholesterol from food. They may be prescribed for use alone or with a statin. Benefits: These medications lower LDL cholesterol. They also slightly raise HDL cholesterol and lower triglycerides.  · Resins (also called bile acid sequestrants or bile acid-binding drugs). Resins promote increased disposal of cholesterol through the intestines. They work by binding to bile (a substance that helps the body digest food). The body uses cholesterol to make bile. Normally, most bile is absorbed by the body during digestion. But when bile is bound to resin, it is excreted from the body. So, the body must make more bile. To do this, the body takes up more cholesterol from the blood. Benefits: Resins lower LDL cholesterol.  · Fibrates (fibric acid derivatives). These are best at reducing the amount of triglycerides the body makes. They are not very effective lowering LDL. Benefits: Fibrates lower triglycerides. They also raise HDL cholesterol.  · Niacin (nicotinic acid). Niacin (vitamin B3) affects how the liver makes blood fats. (Note: Non-prescription niacin should not be used to treat cholesterol problems as it is not regulated by the FDA.) Benefits: Niacin raises HDL cholesterol. It also lowers triglycerides and LDL cholesterol.  · Omega-3 fatty acids. These reduce the amount of triglycerides the body makes. They also help to clear these lipids from the blood. Omega-3 fatty acids are found in many foods. These include salmon  and other oily fish, and walnuts. Your health care provider may prescribe these fatty acids in capsule form. Benefits: Omega-3s lower triglycerides. (Note: They may increase LDL cholesterol in some patients.)   Taking cholesterol medications  Take your medication exactly as your health care provider instructs. This will help it work best. Here are tips for taking cholesterol medications:  · Know when and how to take your medications. Some may need to be taken with food. Others may need to be taken on an empty stomach or at a certain time of day.  · Stick to a schedule. Try the following:  ¨ Dont skip doses or stop taking your medication. This is important even if you feel better or if your cholesterol numbers improve.  ¨ Set things up to help you remember. For instance, work taking your medications into your routine. You could plan to take them when you get up in the morning or when you go to bed at night.  ¨ Keep track of what you take. You may take a few different medications. If so, a list or chart can help you take the right pills at the right time. A pillbox with days of the week or times of day is also a good tool for keeping track.  · Prevent drug interactions. Some medications and supplements can interact with one another (affect how other drugs work when taken together). Be sure to tell your health care provider about all other medications you take. This includes vitamins, herbs, and over-the-counter medications.  · Know how to deal with side effects. Many people have side effects when they first start taking a medication. These are things like headache and stomach upset. Side effects should go away in a few weeks. Tell your health care provider about any side effects you have. Certain side effects should be reported to your health care provider right away. These include yellowing of the eyes and blurred vision. Also report muscle aches and breathing problems.  Note: If you are pregnant or breastfeeding,  tell your health care provider before taking any cholesterol medications.  A healthy lifestyle  Your health care provider will help you make changes your lifestyle if needed. These changes are needed to help you lower your cholesterol and keep the ASCVD from getting worse. Things you may need to work on are:  Diet  Your health care provider will give you information on changes that you may need to make to your diet. You may need to see a registered dietitian or nutritionist for help with these changes. You might be asked to:  · Eat less meat containing saturated fat and high levels of cholesterol  · Eat less sodium (salt), especially if you have high blood pressure  · Eat more fresh vegetables and fruits  · Eat lean protein, like fish, chicken and turkey, and beans and peas  · Eat less processed meats, like deli meats, sausage, and pepperoni  · Choose non-fat and low-fat milk, yogurt and cheese  · Use vegetable and nut oils instead of butter, shortening, and margarine  · Limit sweets and packaged foods, like chips, cookies, and baked goods  Physical activity  Your health care provider may ask you to be more active. Depending on your health, your provider may recommend that you get moderate to vigorous intensity exercise for at least 40 minutes each day, 3 to 4 days each week. Some examples of moderate to vigorous exercise are:  · Walking at a brisk pace, about 3-4 miles per hour  · Jogging or running  · Riding a bicycle or stationary bike  · Swimming or water aerobics  · Dancing  · Hiking  · Martial arts  · Tennis  You may not be able to do 40 minutes right away. You may have to start with 5 to 10 minutes a day, 2 times a day, and then keep adding to it until you can do 40 minutes.  Weight management  If you are overweight or obese, your health care provider may tell you to lose weight and lower your BMI (body mass index). Changing your diet and getting more exercise can help.   Smoking  If you smoke, quit now. Ask  your health care provider for information on medications that can help you fight cravings. Enroll in a stop-smoking program to improve your chances of success.  Stress  Learn ways to help you deal with stress in your home and work life. Here are a few ideas:  · Take a yoga class. You can find classes at local community centers or on the Internet.  · Get regular exercise. Exercise helps your mind let go of problems and release stress in your muscles.  · Deep breathing. Take a few minutes several times a day to just sit quietly and breathe. Concentrate on the air going into and out of your body.  Date Last Reviewed: 3/8/2014  © 0075-9060 [employer]. [address] [place] [address], [place] [address]. All rights reserved. This information is not intended as a substitute for professional medical care. Always follow your healthcare professional's instructions.

## 2017-07-18 NOTE — PROGRESS NOTES
C3 nurse attempted to contact patient. No answer. The following message was left for the patient to return the call:  Good morning I am a nurse calling on behalf of [facility] from the [facility].  This is a Transitional Care Call for Tae Delgado. When you have a moment please contact us at [phone] or [phone] Monday through Friday, between the hours of 8 am to 4 pm. We look forward to speaking with you. On behalf of [facility] have a nice day.    The patient does not have a scheduled HOSFU appointment within 7-14 days post hospital discharge date 07/14/17. Message sent to Physician staff to assist with HOSFU appointment scheduling.

## 2017-07-18 NOTE — PROGRESS NOTES
"56yo M recently admitted from 7/10 through 7/14 from the procedure room after RHC demonstrated significantly elevated left and right side filling pressures and severe pulmonary HTN in the setting of elevated left sided filling pressures.  Pts PMHx: Paroxysmal Afib, hx of LV thrombus (with prior splenic and renal emboli), hx of PE, hx of stroke, non-ischemic cardiomyopathy, ICD, HTN, HLD, pulm HTN and obesity.  See calendar for recent INRs and Warfarin dosing.  Per his med card, he was discharged with Warfarin 3mg tablets.  LMFCB with pt    The pt was previously enrolled to the Coumadin Clinic in 1/2016.  Per note on 1/19/16: "He was unwilling to have us monitor his coumadin, as he feels that he "can handle it on his own."  He told me that he has been taking coumadin for years and he doesn't need to be "babysat."  He said he would go to a local lab for his INRs and he did not need us to contact him, as that would get "harassing"."  Therefore, he was discharged from our services at that time.  "

## 2017-07-24 NOTE — PROGRESS NOTES
I was able to reach the pt in the AM on 7/24.  He reports having a Warfarin 3mg tablet and takes 1 pill daily in the mornings (for compliance.)  He reports having stable INRs, usually at the top of his range, with this dosing.  He will continue this dose and go for an INR at his local lab on 7/25.  I have faxed that lab a standing order.

## 2017-07-26 NOTE — PROGRESS NOTES
The pt was due to go into the lab for an INR on 7/25.  Per Diamond at the [facility], he did not go in.  I tried to reach the pt and had to [facility].

## 2017-08-14 ENCOUNTER — LAB VISIT (OUTPATIENT)
Dept: LAB | Facility: HOSPITAL | Age: 57
End: 2017-08-14
Attending: INTERNAL MEDICINE
Payer: MEDICARE

## 2017-08-14 ENCOUNTER — OFFICE VISIT (OUTPATIENT)
Dept: TRANSPLANT | Facility: CLINIC | Age: 57
End: 2017-08-14
Payer: MEDICARE

## 2017-08-14 VITALS
SYSTOLIC BLOOD PRESSURE: 108 MMHG | WEIGHT: 253.06 LBS | HEART RATE: 57 BPM | HEIGHT: 70 IN | DIASTOLIC BLOOD PRESSURE: 72 MMHG | BODY MASS INDEX: 36.23 KG/M2

## 2017-08-14 DIAGNOSIS — I50.43 ACUTE ON CHRONIC COMBINED SYSTOLIC AND DIASTOLIC HEART FAILURE: ICD-10-CM

## 2017-08-14 DIAGNOSIS — E66.09 NON MORBID OBESITY DUE TO EXCESS CALORIES: ICD-10-CM

## 2017-08-14 DIAGNOSIS — I10 ESSENTIAL HYPERTENSION: ICD-10-CM

## 2017-08-14 DIAGNOSIS — E78.2 MIXED HYPERLIPIDEMIA: ICD-10-CM

## 2017-08-14 DIAGNOSIS — I50.22 CHRONIC SYSTOLIC CONGESTIVE HEART FAILURE: Primary | ICD-10-CM

## 2017-08-14 DIAGNOSIS — I48.0 PAROXYSMAL ATRIAL FIBRILLATION: ICD-10-CM

## 2017-08-14 LAB
ALBUMIN SERPL BCP-MCNC: 4 G/DL
ALP SERPL-CCNC: 55 U/L
ALT SERPL W/O P-5'-P-CCNC: 68 U/L
ANION GAP SERPL CALC-SCNC: 9 MMOL/L
AST SERPL-CCNC: 40 U/L
BASOPHILS # BLD AUTO: 0.04 K/UL
BASOPHILS NFR BLD: 0.4 %
BILIRUB SERPL-MCNC: 1.1 MG/DL
BNP SERPL-MCNC: 357 PG/ML
BUN SERPL-MCNC: 20 MG/DL
CALCIUM SERPL-MCNC: 9.1 MG/DL
CHLORIDE SERPL-SCNC: 105 MMOL/L
CO2 SERPL-SCNC: 27 MMOL/L
CREAT SERPL-MCNC: 1.5 MG/DL
DIFFERENTIAL METHOD: NORMAL
EOSINOPHIL # BLD AUTO: 0.2 K/UL
EOSINOPHIL NFR BLD: 2 %
ERYTHROCYTE [DISTWIDTH] IN BLOOD BY AUTOMATED COUNT: 13.7 %
EST. GFR  (AFRICAN AMERICAN): 58.9 ML/MIN/1.73 M^2
EST. GFR  (NON AFRICAN AMERICAN): 50.9 ML/MIN/1.73 M^2
GLUCOSE SERPL-MCNC: 92 MG/DL
HCT VFR BLD AUTO: 44.5 %
HGB BLD-MCNC: 14.9 G/DL
INR PPP: 3.6
LYMPHOCYTES # BLD AUTO: 2.9 K/UL
LYMPHOCYTES NFR BLD: 31.4 %
MCH RBC QN AUTO: 28.8 PG
MCHC RBC AUTO-ENTMCNC: 33.5 G/DL
MCV RBC AUTO: 86 FL
MONOCYTES # BLD AUTO: 1 K/UL
MONOCYTES NFR BLD: 11.3 %
NEUTROPHILS # BLD AUTO: 5 K/UL
NEUTROPHILS NFR BLD: 54.5 %
PLATELET # BLD AUTO: 211 K/UL
PMV BLD AUTO: 10.8 FL
POTASSIUM SERPL-SCNC: 3.7 MMOL/L
PROT SERPL-MCNC: 7.3 G/DL
PROTHROMBIN TIME: 36.5 SEC
RBC # BLD AUTO: 5.18 M/UL
SODIUM SERPL-SCNC: 141 MMOL/L
WBC # BLD AUTO: 9.12 K/UL

## 2017-08-14 PROCEDURE — 3074F SYST BP LT 130 MM HG: CPT | Mod: ,,, | Performed by: INTERNAL MEDICINE

## 2017-08-14 PROCEDURE — 3008F BODY MASS INDEX DOCD: CPT | Mod: ,,, | Performed by: INTERNAL MEDICINE

## 2017-08-14 PROCEDURE — 99999 PR PBB SHADOW E&M-EST. PATIENT-LVL III: CPT | Mod: PBBFAC,,, | Performed by: INTERNAL MEDICINE

## 2017-08-14 PROCEDURE — 99213 OFFICE O/P EST LOW 20 MIN: CPT | Mod: PBBFAC | Performed by: INTERNAL MEDICINE

## 2017-08-14 PROCEDURE — 99214 OFFICE O/P EST MOD 30 MIN: CPT | Mod: S$PBB,,, | Performed by: INTERNAL MEDICINE

## 2017-08-14 PROCEDURE — 3078F DIAST BP <80 MM HG: CPT | Mod: ,,, | Performed by: INTERNAL MEDICINE

## 2017-08-14 RX ORDER — SPIRONOLACTONE 25 MG/1
25 TABLET ORAL DAILY
Qty: 30 TABLET | Refills: 11 | Status: SHIPPED | OUTPATIENT
Start: 2017-08-14 | End: 2018-08-27 | Stop reason: SDUPTHER

## 2017-08-14 RX ORDER — AMIODARONE HYDROCHLORIDE 200 MG/1
200 TABLET ORAL DAILY
Qty: 30 TABLET | Refills: 11 | Status: SHIPPED | OUTPATIENT
Start: 2017-08-14 | End: 2017-11-22 | Stop reason: SDUPTHER

## 2017-08-14 RX ORDER — METHOCARBAMOL 500 MG/1
500 TABLET, FILM COATED ORAL DAILY PRN
COMMUNITY
End: 2018-09-17

## 2017-08-14 NOTE — LETTER
August 14, 2017        Dipesh De La Torre  [address] [facility] [place] [address]  Phone: [phone]  Fax: [phone]             [facility] [address] [place] [address]  Phone: [phone]   Patient: Tae Delgado   MR Number: [id number]   YOB: 1960   Date of Visit: 8/14/2017       Dear Dr. Dipesh De La Torre    Thank you for referring Tae Delgado to me for evaluation. Attached you will find relevant portions of my assessment and plan of care.    If you have questions, please do not hesitate to call me. I look forward to following Tae Delgado along with you.    Sincerely,    Elham Pearson MD    Enclosure    If you would like to receive this communication electronically, please contact [email or web address] or [phone] to request [employer] access.    [employer] Link is a tool which provides read-only access to select patient information with whom you have a relationship. Its easy to use and provides real time access to review your patients record including encounter summaries, notes, results, and demographic information.    If you feel you have received this communication in error or would no longer like to receive these types of communications, please e-mail [email or web address]

## 2017-08-14 NOTE — PROGRESS NOTES
Subjective:     HPI:  Mr. Delgado is a very pleasant 55 y.o. WM with history of NICMP diagnosed in 2010, ICD, LV thrombus (with prior splenic and renal emboli), paroxysmal atrial fib, HTN, HLP recently admitted from the procedure room after RHC demonstrated significantly elevated left and right side filling pressures (RA= 17, PCWP=40 mm of Hg). Severe pulmonary HTN  (PA=89/ mm of Hg)in the setting of elevated left sided filling pressures. Low cardiac index and output  (CI=1.4, CO= L/min). After NTG, PA pressures decrEased dramatically to 52/30 Mean PA=35 mm of Hg. PCWP decreased to 25 mm of Hg. CI increased to 2.2 l/MIN/M2. CPX demonstrated: indicative of functional impairment secondary to circulatory insufficiency and ventilatory impairment. Upon admit; patient was started on Lasix infusion at 10mg/hr and diuresed well and was transitioned to oral Lasix at previous home dose of 40mg QDaily.  He is net negative 5.3L throughout his hospital stay and Dry weight on day of discharge is 247lbs.  His 2D echo demonstrated an EF of 23% and LVEDD of 6.7cm.  We discussed advanced options; however, patietn wants to try optimal medical therapy first.  His HF regimen was modified by changing home dose of of Coreg to metoprolol to allow for more BP room; added Digoxin and Entresto and continued home dose of Spironolactone. His BP remained stable in 90's and patient did not have symptoms of hypotension.Upon admit, patient reported he wanted to be taken off of Entresto; reporting that it was expensive and his insurance did not cover it.  We stopped Entresto and started Coumadin.  He complained of a headache and reported symptoms were similar to previous CVA, but CT of head was negative and headache resolved.  He comes today for a F/U. Remains stable. Reports NYHA class II-III symptoms.     Past Medical History:   Diagnosis Date    CHF (congestive heart failure) 1/2010    Dx  1/2010 w/ decreased LV systolic function (EF 15%) by ECHO  "1/2015    COCM (congestive cardiomyopathy) 7/20/2016    Hyperlipidemia     Hypertension     Paroxysmal atrial fibrillation     Pulmonary embolus 2008    Stroke     Superficial thrombophlebitis      Past Surgical History:   Procedure Laterality Date    TONSILLECTOMY      VEIN LIGATION AND STRIPPING         Review of Systems   Constitution: Positive for weight gain. Negative for chills, decreased appetite, diaphoresis, fever, weakness, malaise/fatigue, night sweats and weight loss.   Eyes: Negative.    Cardiovascular: Positive for dyspnea on exertion. Negative for chest pain, claudication, cyanosis, irregular heartbeat, leg swelling, near-syncope, orthopnea, palpitations, paroxysmal nocturnal dyspnea and syncope.   Respiratory: Negative for cough, hemoptysis and shortness of breath.    Endocrine: Negative.    Hematologic/Lymphatic: Negative.    Skin: Negative for color change, dry skin and nail changes.   Musculoskeletal: Negative.    Gastrointestinal: Negative.    Genitourinary: Negative.        Objective:   Blood pressure 108/72, pulse (!) 57, height 5' 10" (1.778 m), weight 114.8 kg (253 lb 1.4 oz).body mass index is 36.31 kg/m².  Physical Exam   Constitutional: He appears well-developed.   /72   Pulse (!) 57   Ht 5' 10" (1.778 m)   Wt 114.8 kg (253 lb 1.4 oz)   BMI 36.31 kg/m²      HENT:   Head: Normocephalic.   Neck: No JVD present. Carotid bruit is not present.   Cardiovascular: Regular rhythm, normal heart sounds and normal pulses.  PMI is displaced.    No murmur heard.  Pulmonary/Chest: Effort normal and breath sounds normal. No respiratory distress. He has no wheezes. He has no rales.   Abdominal: Soft. Bowel sounds are normal.   Musculoskeletal: He exhibits edema.   Neurological: He is alert.   Skin: Skin is warm.   Vitals reviewed.      Labs:    Chemistry        Component Value Date/Time     08/14/2017 0720    K 3.7 08/14/2017 0720     08/14/2017 0720    CO2 27 08/14/2017 0720 "    BUN 20 08/14/2017 0720    CREATININE 1.5 (H) 08/14/2017 0720    GLU 92 08/14/2017 0720        Component Value Date/Time    CALCIUM 9.1 08/14/2017 0720    ALKPHOS 55 08/14/2017 0720    AST 40 08/14/2017 0720    ALT 68 (H) 08/14/2017 0720    BILITOT 1.1 (H) 08/14/2017 0720    ESTGFRAFRICA 58.9 (A) 08/14/2017 0720    EGFRNONAA 50.9 (A) 08/14/2017 0720          Magnesium   Date Value Ref Range Status   07/14/2017 2.5 1.6 - 2.6 mg/dL Final     Lab Results   Component Value Date    WBC 9.12 08/14/2017    HGB 14.9 08/14/2017    HCT 44.5 08/14/2017     08/14/2017     Lab Results   Component Value Date    INR 3.6 (H) 08/14/2017    INR 2.3 (H) 07/14/2017    INR 1.8 (H) 07/13/2017     BNP   Date Value Ref Range Status   08/14/2017 357 (H) 0 - 99 pg/mL Final     Comment:     Values of less than 100 pg/ml are consistent with non-CHF populations.   07/10/2017 203 (H) 0 - 99 pg/mL Final     Comment:     Values of less than 100 pg/ml are consistent with non-CHF populations.   03/31/2017 158 (H) 0 - 99 pg/mL Final     Comment:     Values of less than 100 pg/ml are consistent with non-CHF populations.     No results found for: LDH  No results found for this or any previous visit.  No results found for this or any previous visit.    Assessment:      1. Chronic systolic congestive heart failure    2. Essential hypertension    3. Paroxysmal atrial fibrillation    4. Mixed hyperlipidemia    5. Non morbid obesity due to excess calories        Plan:   Clinically remains stable.  Continue current HF regimen.  Recommend 2 gram sodium restriction and 1500cc fluid restriction.  Encourage physical activity with graded exercise program - needs to lose weight.  Requested patient to weigh themselves daily, and to notify us if their weight increases by more than 3 lbs in 1 day or 5 lbs in 1 week.  Patient states he's having trouble with his insurance to cover his Entresto. I provided him with a card to get a reduced copay.     RTC in 3  months with labs     Elham Perason MD

## 2017-08-15 ENCOUNTER — CLINICAL SUPPORT (OUTPATIENT)
Dept: ELECTROPHYSIOLOGY | Facility: CLINIC | Age: 57
End: 2017-08-15
Payer: MEDICARE

## 2017-08-15 DIAGNOSIS — I42.0 CONGESTIVE CARDIOMYOPATHY: ICD-10-CM

## 2017-08-15 DIAGNOSIS — I50.22 CHRONIC SYSTOLIC (CONGESTIVE) HEART FAILURE: ICD-10-CM

## 2017-08-15 DIAGNOSIS — I48.0 PAROXYSMAL ATRIAL FIBRILLATION: ICD-10-CM

## 2017-08-15 DIAGNOSIS — Z45.02 ICD (IMPLANTABLE CARDIOVERTER-DEFIBRILLATOR) BATTERY DEPLETION: Primary | ICD-10-CM

## 2017-08-15 PROCEDURE — 93296 REM INTERROG EVL PM/IDS: CPT | Mod: PBBFAC | Performed by: INTERNAL MEDICINE

## 2017-08-15 PROCEDURE — 93295 DEV INTERROG REMOTE 1/2/MLT: CPT | Mod: ,,, | Performed by: INTERNAL MEDICINE

## 2017-08-24 ENCOUNTER — ANTI-COAG VISIT (OUTPATIENT)
Dept: CARDIOLOGY | Facility: CLINIC | Age: 57
End: 2017-08-24

## 2017-08-24 DIAGNOSIS — Z79.01 LONG TERM (CURRENT) USE OF ANTICOAGULANTS: ICD-10-CM

## 2017-08-24 DIAGNOSIS — I48.0 PAROXYSMAL ATRIAL FIBRILLATION: ICD-10-CM

## 2017-08-29 ENCOUNTER — ANTI-COAG VISIT (OUTPATIENT)
Dept: CARDIOLOGY | Facility: CLINIC | Age: 57
End: 2017-08-29

## 2017-08-29 DIAGNOSIS — Z79.01 LONG TERM (CURRENT) USE OF ANTICOAGULANTS: ICD-10-CM

## 2017-08-29 DIAGNOSIS — I48.0 PAROXYSMAL ATRIAL FIBRILLATION: ICD-10-CM

## 2017-08-29 LAB — INR PPP: 2.8

## 2017-10-16 ENCOUNTER — ANTI-COAG VISIT (OUTPATIENT)
Dept: CARDIOLOGY | Facility: CLINIC | Age: 57
End: 2017-10-16

## 2017-10-16 LAB — INR PPP: 3.1

## 2017-11-22 DIAGNOSIS — E78.2 MIXED HYPERLIPIDEMIA: ICD-10-CM

## 2017-11-22 RX ORDER — ATORVASTATIN CALCIUM 40 MG/1
TABLET, FILM COATED ORAL
Qty: 30 TABLET | Refills: 5 | Status: SHIPPED | OUTPATIENT
Start: 2017-11-22 | End: 2018-06-05 | Stop reason: SDUPTHER

## 2017-11-22 RX ORDER — AMIODARONE HYDROCHLORIDE 200 MG/1
200 TABLET ORAL DAILY
Qty: 30 TABLET | Refills: 11 | Status: SHIPPED | OUTPATIENT
Start: 2017-11-22 | End: 2018-12-26 | Stop reason: SDUPTHER

## 2017-11-22 NOTE — TELEPHONE ENCOUNTER
----- Message from Albaro Ledesma sent at 11/22/2017  2:20 PM CST -----  Contact: Pt called  I refilled his Amiodarone, but wanted you to know that he cannot pay for the Entresto.    Thanks  ----- Message -----  From: Caprice Jacobson MA  Sent: 11/22/2017  11:44 AM  To: [facility]    Please call [facility] at [phone].  Mr. Delgado needs a refill for Amiodarone   200 mg.  He states that the Insurance Company will not pay for Entresto.  Mr. Delgado is a patient of Dr Pearson.    Thank You,  Rosy

## 2017-11-28 ENCOUNTER — TELEPHONE (OUTPATIENT)
Dept: TRANSPLANT | Facility: CLINIC | Age: 57
End: 2017-11-28

## 2017-11-28 NOTE — TELEPHONE ENCOUNTER
Called pt three time last weekend to ask him what going with his Entresto that he call the office about.    11/28/17 Called pt again still no answer

## 2017-11-30 RX ORDER — METOPROLOL SUCCINATE 50 MG/1
50 TABLET, EXTENDED RELEASE ORAL DAILY
Qty: 30 TABLET | Refills: 11 | Status: SHIPPED | OUTPATIENT
Start: 2017-11-30 | End: 2018-12-26 | Stop reason: SDUPTHER

## 2018-01-22 RX ORDER — FUROSEMIDE 40 MG/1
40 TABLET ORAL DAILY
Qty: 90 TABLET | Refills: 3 | Status: SHIPPED | OUTPATIENT
Start: 2018-01-22 | End: 2019-03-11 | Stop reason: SDUPTHER

## 2018-03-19 ENCOUNTER — OFFICE VISIT (OUTPATIENT)
Dept: TRANSPLANT | Facility: CLINIC | Age: 58
End: 2018-03-19
Payer: MEDICARE

## 2018-03-19 VITALS
WEIGHT: 255.5 LBS | BODY MASS INDEX: 36.58 KG/M2 | DIASTOLIC BLOOD PRESSURE: 79 MMHG | HEIGHT: 70 IN | HEART RATE: 66 BPM | SYSTOLIC BLOOD PRESSURE: 123 MMHG

## 2018-03-19 DIAGNOSIS — I48.0 PAROXYSMAL ATRIAL FIBRILLATION: ICD-10-CM

## 2018-03-19 DIAGNOSIS — I50.22 CHRONIC SYSTOLIC CONGESTIVE HEART FAILURE: Primary | ICD-10-CM

## 2018-03-19 DIAGNOSIS — E78.2 MIXED HYPERLIPIDEMIA: ICD-10-CM

## 2018-03-19 DIAGNOSIS — I10 ESSENTIAL HYPERTENSION: ICD-10-CM

## 2018-03-19 DIAGNOSIS — I42.0 DILATED CARDIOMYOPATHY: ICD-10-CM

## 2018-03-19 PROCEDURE — 99999 PR PBB SHADOW E&M-EST. PATIENT-LVL III: CPT | Mod: PBBFAC,,, | Performed by: INTERNAL MEDICINE

## 2018-03-19 PROCEDURE — 99214 OFFICE O/P EST MOD 30 MIN: CPT | Mod: S$PBB,,, | Performed by: INTERNAL MEDICINE

## 2018-03-19 PROCEDURE — 99213 OFFICE O/P EST LOW 20 MIN: CPT | Mod: PBBFAC | Performed by: INTERNAL MEDICINE

## 2018-03-19 RX ORDER — LISINOPRIL 5 MG/1
5 TABLET ORAL DAILY
Qty: 90 TABLET | Refills: 3 | Status: SHIPPED | OUTPATIENT
Start: 2018-03-19 | End: 2018-12-20

## 2018-03-19 NOTE — LETTER
March 19, 2018        Dipesh De La Torre  [address] [facility] [place] [address]  Phone: [phone]  Fax: [phone]             [facility] [address] [place] [address]  Phone: [phone]   Patient: Tae Delgado   MR Number: [id number]   YOB: 1960   Date of Visit: 3/19/2018       Dear Dr. Dipesh De La Torre    Thank you for referring Tae Delgado to me for evaluation. Attached you will find relevant portions of my assessment and plan of care.    If you have questions, please do not hesitate to call me. I look forward to following Tae Delgado along with you.    Sincerely,    Elham Pearson MD    Enclosure    If you would like to receive this communication electronically, please contact [email or web address] or [phone] to request [employer] Link access.    [employer] Link is a tool which provides read-only access to select patient information with whom you have a relationship. Its easy to use and provides real time access to review your patients record including encounter summaries, notes, results, and demographic information.    If you feel you have received this communication in error or would no longer like to receive these types of communications, please e-mail [email or web address]

## 2018-03-19 NOTE — PROGRESS NOTES
Subjective:   Transplant status: declined    HPI:  Mr. Delgado is a very pleasant 55 y.o. WM with history of NICMP diagnosed in 2010, ICD, LV thrombus (with prior splenic and renal emboli), paroxysmal atrial fib, HTN, HLP recently admitted from the procedure room after RHC demonstrated significantly elevated left and right side filling pressures (RA= 17, PCWP=40 mm of Hg). Severe pulmonary HTN  (PA=89/ mm of Hg)in the setting of elevated left sided filling pressures. Low cardiac index and output  (CI=1.4, CO= L/min). After NTG, PA pressures decrEased dramatically to 52/30 Mean PA=35 mm of Hg. PCWP decreased to 25 mm of Hg. CI increased to 2.2 l/MIN/M2. CPX demonstrated: indicative of functional impairment secondary to circulatory insufficiency and ventilatory impairment. Upon admit; patient was started on Lasix infusion at 10mg/hr and diuresed well and was transitioned to oral Lasix at previous home dose of 40mg QDaily.  He is net negative 5.3L throughout his hospital stay and Dry weight on day of discharge is 247lbs.  His 2D echo demonstrated an EF of 23% and LVEDD of 6.7cm.  We discussed advanced options; however, patietn wants to try optimal medical therapy first.  His HF regimen was modified by changing home dose of of Coreg to metoprolol to allow for more BP room; added Digoxin and Entresto and continued home dose of Spironolactone. His BP remained stable in 90's and patient did not have symptoms of hypotension.Upon admit, patient reported he wanted to be taken off of Entresto; reporting that it was expensive and his insurance did not cover it.  We stopped Entresto and started Coumadin.  He complained of a headache and reported symptoms were similar to previous CVA, but CT of head was negative and headache resolved.  He comes today for a F/U. Remains stable. Reports NYHA class II-III symptoms.     Past Medical History:   Diagnosis Date    CHF (congestive heart failure) 1/2010    Dx  1/2010 w/ decreased LV systolic  "function (EF 15%) by ECHO 1/2015    COCM (congestive cardiomyopathy) 7/20/2016    Hyperlipidemia     Hypertension     Paroxysmal atrial fibrillation     Pulmonary embolus 2008    Stroke     Superficial thrombophlebitis      Past Surgical History:   Procedure Laterality Date    TONSILLECTOMY      VEIN LIGATION AND STRIPPING         Review of Systems   Constitution: Negative. Negative for chills, decreased appetite, diaphoresis, fever, weakness, malaise/fatigue, night sweats, weight gain and weight loss.   Eyes: Negative.    Cardiovascular: Negative for chest pain, claudication, cyanosis, dyspnea on exertion, irregular heartbeat, leg swelling, near-syncope, orthopnea, palpitations, paroxysmal nocturnal dyspnea and syncope.   Respiratory: Negative for cough, hemoptysis and shortness of breath.    Endocrine: Negative.    Hematologic/Lymphatic: Negative.    Skin: Negative for color change, dry skin and nail changes.   Musculoskeletal: Negative.    Gastrointestinal: Negative.    Genitourinary: Negative.        Objective:   Blood pressure 123/79, pulse 66, height 5' 10" (1.778 m), weight 115.9 kg (255 lb 8.2 oz).body mass index is 36.66 kg/m².  Physical Exam   Constitutional: He appears well-developed.   /79   Pulse 66   Ht 5' 10" (1.778 m)   Wt 115.9 kg (255 lb 8.2 oz)   BMI 36.66 kg/m²      HENT:   Head: Normocephalic.   Neck: No JVD present. Carotid bruit is not present.   Cardiovascular: Regular rhythm, normal heart sounds and normal pulses.    No murmur heard.  Pulmonary/Chest: Effort normal and breath sounds normal. No respiratory distress. He has no wheezes. He has no rales.   Abdominal: Soft. Bowel sounds are normal. He exhibits no distension. There is no tenderness. There is no rebound.   Musculoskeletal: He exhibits no edema.   Neurological: He is alert.   Skin: Skin is warm.   Vitals reviewed.      Labs:    Chemistry        Component Value Date/Time     08/14/2017 0720    K 3.7 " 08/14/2017 0720     08/14/2017 0720    CO2 27 08/14/2017 0720    BUN 20 08/14/2017 0720    CREATININE 1.5 (H) 08/14/2017 0720    GLU 92 08/14/2017 0720        Component Value Date/Time    CALCIUM 9.1 08/14/2017 0720    ALKPHOS 55 08/14/2017 0720    AST 40 08/14/2017 0720    ALT 68 (H) 08/14/2017 0720    BILITOT 1.1 (H) 08/14/2017 0720    ESTGFRAFRICA 58.9 (A) 08/14/2017 0720    EGFRNONAA 50.9 (A) 08/14/2017 0720          Magnesium   Date Value Ref Range Status   07/14/2017 2.5 1.6 - 2.6 mg/dL Final     Lab Results   Component Value Date    WBC 9.12 08/14/2017    HGB 14.9 08/14/2017    HCT 44.5 08/14/2017     08/14/2017     Lab Results   Component Value Date    INR 3.1 10/16/2017    INR 2.8 08/29/2017    INR 3.6 (H) 08/14/2017     BNP   Date Value Ref Range Status   08/14/2017 357 (H) 0 - 99 pg/mL Final     Comment:     Values of less than 100 pg/ml are consistent with non-CHF populations.   07/10/2017 203 (H) 0 - 99 pg/mL Final     Comment:     Values of less than 100 pg/ml are consistent with non-CHF populations.   03/31/2017 158 (H) 0 - 99 pg/mL Final     Comment:     Values of less than 100 pg/ml are consistent with non-CHF populations.       Assessment:      1. Chronic systolic congestive heart failure    2. Dilated cardiomyopathy    3. Essential hypertension    4. Mixed hyperlipidemia    5. Paroxysmal atrial fibrillation        Plan:   Clinically remains stable. NYHA class II-III. Start low dose lisinopril.   BMP in 1 week  Continue current HF regimen.  Recommend 2 gram sodium restriction and 1500cc fluid restriction.  Encourage physical activity with graded exercise program - needs to lose weight.  Requested patient to weigh themselves daily, and to notify us if their weight increases by more than 3 lbs in 1 day or 5 lbs in 1 week.  Patient states he's having trouble with his insurance to cover his Entresto. I provided him with a card to get a reduced copay.     RTC in 3 months with  labs      Elham Pearson MD

## 2018-04-17 ENCOUNTER — ANTI-COAG VISIT (OUTPATIENT)
Dept: CARDIOLOGY | Facility: CLINIC | Age: 58
End: 2018-04-17

## 2018-04-17 ENCOUNTER — PATIENT MESSAGE (OUTPATIENT)
Dept: CARDIOLOGY | Facility: CLINIC | Age: 58
End: 2018-04-17

## 2018-04-17 DIAGNOSIS — Z79.01 LONG TERM (CURRENT) USE OF ANTICOAGULANTS: ICD-10-CM

## 2018-04-17 DIAGNOSIS — I48.0 PAROXYSMAL ATRIAL FIBRILLATION: ICD-10-CM

## 2018-04-17 LAB — INR PPP: 4.4

## 2018-04-17 NOTE — PROGRESS NOTES
Verbal result taken from ____[facility]_. PT/INR __4.4_____ Date drawn_____4/17/2018___ Hardcopy to be faxed.

## 2018-04-18 NOTE — PROGRESS NOTES
Unable to reach patient 4/17. Spoke to him 4/18 about high INR and not having an INR since October. He reports he has been to lab several times since October. He has not been managed by someone locally. He didn't think to notify us when he went to the lab or follow up after the fact when he didn't hear anything. Patient advised that he needs to follow the dates we schedule for him and to contact us if he needs to r/s otherwise we don't know to reschedule. Patient also advised that if he does to the lab and does not hear from us within 1-2 days then something is wrong and to call.    In regards to high INR. He has been sick the last few weeks. He has had tendonitis, the flu, and HF exacerbation. He is s/p lasix infusion last week. He reports incorrect dose of 3mg daily except 0mg Sat. No signs or symptoms of bleeding. We will hold a dose today then resume his reported dose with follow up INR on Monday.

## 2018-04-30 ENCOUNTER — ANTI-COAG VISIT (OUTPATIENT)
Dept: CARDIOLOGY | Facility: CLINIC | Age: 58
End: 2018-04-30

## 2018-04-30 DIAGNOSIS — Z79.01 LONG TERM (CURRENT) USE OF ANTICOAGULANTS: ICD-10-CM

## 2018-04-30 DIAGNOSIS — I48.0 PAROXYSMAL ATRIAL FIBRILLATION: ICD-10-CM

## 2018-04-30 LAB — INR PPP: 2.6

## 2018-06-05 DIAGNOSIS — E78.2 MIXED HYPERLIPIDEMIA: ICD-10-CM

## 2018-06-05 RX ORDER — ATORVASTATIN CALCIUM 40 MG/1
TABLET, FILM COATED ORAL
Qty: 30 TABLET | Refills: 5 | Status: SHIPPED | OUTPATIENT
Start: 2018-06-05 | End: 2019-09-09

## 2018-08-28 RX ORDER — SPIRONOLACTONE 25 MG/1
TABLET ORAL
Qty: 30 TABLET | Refills: 11 | Status: SHIPPED | OUTPATIENT
Start: 2018-08-28 | End: 2019-09-21 | Stop reason: SDUPTHER

## 2018-09-11 ENCOUNTER — CLINICAL SUPPORT (OUTPATIENT)
Dept: ELECTROPHYSIOLOGY | Facility: CLINIC | Age: 58
End: 2018-09-11
Attending: INTERNAL MEDICINE
Payer: MEDICARE

## 2018-09-11 DIAGNOSIS — Z95.810 AICD (AUTOMATIC CARDIOVERTER/DEFIBRILLATOR) PRESENT: ICD-10-CM

## 2018-09-11 DIAGNOSIS — I50.22 CHRONIC SYSTOLIC CHF (CONGESTIVE HEART FAILURE): ICD-10-CM

## 2018-09-11 DIAGNOSIS — I48.0 PAF (PAROXYSMAL ATRIAL FIBRILLATION): ICD-10-CM

## 2018-09-11 DIAGNOSIS — Z95.810 AICD (AUTOMATIC CARDIOVERTER/DEFIBRILLATOR) PRESENT: Primary | ICD-10-CM

## 2018-09-11 PROCEDURE — 93295 DEV INTERROG REMOTE 1/2/MLT: CPT | Mod: ,,, | Performed by: INTERNAL MEDICINE

## 2018-09-11 PROCEDURE — 93296 REM INTERROG EVL PM/IDS: CPT | Mod: PBBFAC | Performed by: INTERNAL MEDICINE

## 2018-09-17 ENCOUNTER — LAB VISIT (OUTPATIENT)
Dept: LAB | Facility: HOSPITAL | Age: 58
End: 2018-09-17
Attending: INTERNAL MEDICINE
Payer: MEDICARE

## 2018-09-17 ENCOUNTER — ANTI-COAG VISIT (OUTPATIENT)
Dept: CARDIOLOGY | Facility: CLINIC | Age: 58
End: 2018-09-17

## 2018-09-17 ENCOUNTER — OFFICE VISIT (OUTPATIENT)
Dept: TRANSPLANT | Facility: CLINIC | Age: 58
End: 2018-09-17
Payer: MEDICARE

## 2018-09-17 VITALS
DIASTOLIC BLOOD PRESSURE: 76 MMHG | SYSTOLIC BLOOD PRESSURE: 114 MMHG | BODY MASS INDEX: 36.02 KG/M2 | WEIGHT: 257.25 LBS | HEIGHT: 71 IN | HEART RATE: 72 BPM

## 2018-09-17 DIAGNOSIS — Z79.01 LONG TERM CURRENT USE OF ANTICOAGULANT THERAPY: ICD-10-CM

## 2018-09-17 DIAGNOSIS — I50.22 CHRONIC SYSTOLIC CONGESTIVE HEART FAILURE: Primary | ICD-10-CM

## 2018-09-17 DIAGNOSIS — E78.2 MIXED HYPERLIPIDEMIA: ICD-10-CM

## 2018-09-17 DIAGNOSIS — I10 ESSENTIAL HYPERTENSION: ICD-10-CM

## 2018-09-17 DIAGNOSIS — Z79.01 LONG TERM (CURRENT) USE OF ANTICOAGULANTS: ICD-10-CM

## 2018-09-17 DIAGNOSIS — I48.0 PAROXYSMAL ATRIAL FIBRILLATION: ICD-10-CM

## 2018-09-17 DIAGNOSIS — I50.22 CHRONIC SYSTOLIC CONGESTIVE HEART FAILURE: ICD-10-CM

## 2018-09-17 DIAGNOSIS — Z95.810 ICD (IMPLANTABLE CARDIOVERTER-DEFIBRILLATOR) IN PLACE: ICD-10-CM

## 2018-09-17 LAB
ALBUMIN SERPL BCP-MCNC: 3.8 G/DL
ALP SERPL-CCNC: 55 U/L
ALT SERPL W/O P-5'-P-CCNC: 36 U/L
ANION GAP SERPL CALC-SCNC: 7 MMOL/L
AST SERPL-CCNC: 20 U/L
BILIRUB SERPL-MCNC: 0.7 MG/DL
BUN SERPL-MCNC: 25 MG/DL
CALCIUM SERPL-MCNC: 9.2 MG/DL
CHLORIDE SERPL-SCNC: 106 MMOL/L
CO2 SERPL-SCNC: 27 MMOL/L
CREAT SERPL-MCNC: 1.5 MG/DL
EST. GFR  (AFRICAN AMERICAN): 58.5 ML/MIN/1.73 M^2
EST. GFR  (NON AFRICAN AMERICAN): 50.6 ML/MIN/1.73 M^2
GLUCOSE SERPL-MCNC: 110 MG/DL
INR PPP: 2.3
POTASSIUM SERPL-SCNC: 4.3 MMOL/L
PROT SERPL-MCNC: 7.1 G/DL
PROTHROMBIN TIME: 22.2 SEC
SODIUM SERPL-SCNC: 140 MMOL/L

## 2018-09-17 PROCEDURE — 80053 COMPREHEN METABOLIC PANEL: CPT

## 2018-09-17 PROCEDURE — 85610 PROTHROMBIN TIME: CPT

## 2018-09-17 PROCEDURE — 36415 COLL VENOUS BLD VENIPUNCTURE: CPT

## 2018-09-17 PROCEDURE — 99999 PR PBB SHADOW E&M-EST. PATIENT-LVL III: CPT | Mod: PBBFAC,,, | Performed by: INTERNAL MEDICINE

## 2018-09-17 PROCEDURE — 99213 OFFICE O/P EST LOW 20 MIN: CPT | Mod: PBBFAC | Performed by: INTERNAL MEDICINE

## 2018-09-17 PROCEDURE — 99214 OFFICE O/P EST MOD 30 MIN: CPT | Mod: S$PBB,,, | Performed by: INTERNAL MEDICINE

## 2018-09-17 RX ORDER — COLCHICINE 0.6 MG/1
0.6 CAPSULE ORAL
COMMUNITY
Start: 2018-09-04 | End: 2018-12-20

## 2018-09-17 NOTE — PROGRESS NOTES
"Subjective:     HPI:  Mr. Delgado is a very pleasant 55 y.o. WM with history of NICMP diagnosed in 2010, ICD, LV thrombus (with prior splenic and renal emboli), paroxysmal atrial fib, HTN, HLP who comes today for a F/U. Remains stable. Reports NYHA class II-III symptoms. No PND or orthopnea. No ER visits or HF admissions. No ICD shocks.     Past Medical History:   Diagnosis Date    CHF (congestive heart failure) 1/2010    Dx  1/2010 w/ decreased LV systolic function (EF 15%) by ECHO 1/2015    COCM (congestive cardiomyopathy) 7/20/2016    Hyperlipidemia     Hypertension     Paroxysmal atrial fibrillation     Pulmonary embolus 2008    Stroke     Superficial thrombophlebitis      Past Surgical History:   Procedure Laterality Date    HEART CATH-RIGHT Right 7/10/2017    Performed by Elham Pearson MD at [facility] C[facility]    TONSILLECTOMY      VEIN LIGATION AND STRIPPING         Review of Systems   Constitution: Negative. Negative for chills, decreased appetite, diaphoresis, fever, weakness, malaise/fatigue, night sweats, weight gain and weight loss.   Eyes: Negative.    Cardiovascular: Positive for dyspnea on exertion. Negative for chest pain, claudication, cyanosis, irregular heartbeat, leg swelling, near-syncope, orthopnea, palpitations, paroxysmal nocturnal dyspnea and syncope.   Respiratory: Negative for cough, hemoptysis and shortness of breath.    Endocrine: Negative.    Hematologic/Lymphatic: Negative.    Skin: Negative for color change, dry skin and nail changes.   Musculoskeletal: Negative.    Gastrointestinal: Negative.    Genitourinary: Negative.        Objective:   Blood pressure 114/76, pulse 72, height 5' 11" (1.803 m), weight 116.7 kg (257 lb 4.4 oz).body mass index is 35.88 kg/m².  Physical Exam   Constitutional: He appears well-developed.   /76 (BP Location: Left arm, Patient Position: Sitting, BP Method: Medium (Automatic))   Pulse 72   Ht 5' 11" (1.803 m)   Wt 116.7 kg (257 lb 4.4 oz)   " BMI 35.88 kg/m²      HENT:   Head: Normocephalic.   Neck: No JVD present. Carotid bruit is not present.   Cardiovascular: Regular rhythm, normal heart sounds and normal pulses. PMI is displaced. Exam reveals no S3.   No murmur heard.  Pulmonary/Chest: Effort normal and breath sounds normal. No respiratory distress. He has no wheezes. He has no rales.   Abdominal: Soft. Bowel sounds are normal. He exhibits no distension. There is no tenderness. There is no rebound.   Musculoskeletal: He exhibits no edema.   Neurological: He is alert.   Skin: Skin is warm.   Vitals reviewed.      Labs:    Chemistry        Component Value Date/Time     09/17/2018 0808    K 4.3 09/17/2018 0808     09/17/2018 0808    CO2 27 09/17/2018 0808    BUN 25 (H) 09/17/2018 0808    CREATININE 1.5 (H) 09/17/2018 0808     09/17/2018 0808        Component Value Date/Time    CALCIUM 9.2 09/17/2018 0808    ALKPHOS 55 09/17/2018 0808    AST 20 09/17/2018 0808    ALT 36 09/17/2018 0808    BILITOT 0.7 09/17/2018 0808    ESTGFRAFRICA 58.5 (A) 09/17/2018 0808    EGFRNONAA 50.6 (A) 09/17/2018 0808          Magnesium   Date Value Ref Range Status   07/14/2017 2.5 1.6 - 2.6 mg/dL Final     Lab Results   Component Value Date    WBC 9.12 08/14/2017    HGB 14.9 08/14/2017    HCT 44.5 08/14/2017     08/14/2017     Lab Results   Component Value Date    INR 2.3 (H) 09/17/2018    INR 2.6 04/30/2018    INR 4.4 04/17/2018     BNP   Date Value Ref Range Status   08/14/2017 357 (H) 0 - 99 pg/mL Final     Comment:     Values of less than 100 pg/ml are consistent with non-CHF populations.   07/10/2017 203 (H) 0 - 99 pg/mL Final     Comment:     Values of less than 100 pg/ml are consistent with non-CHF populations.   03/31/2017 158 (H) 0 - 99 pg/mL Final     Comment:     Values of less than 100 pg/ml are consistent with non-CHF populations.         Assessment:      1. Chronic systolic congestive heart failure    2. Essential hypertension    3.  Mixed hyperlipidemia    4. Paroxysmal atrial fibrillation    5. ICD (implantable cardioverter-defibrillator) in place        Plan:   Clinically remains stable. NYHA class II-III.   Continue current HF regimen.  Recommend 2 gram sodium restriction and 1500cc fluid restriction.  Encourage physical activity with graded exercise program - needs to lose weight.  Requested patient to weigh themselves daily, and to notify us if their weight increases by more than 3 lbs in 1 day or 5 lbs in 1 week.     RTC in 6 months with labs      Elham Pearson MD

## 2018-09-17 NOTE — PROGRESS NOTES
The pt was last due for an INR on 6/11/18, which was missed.  He went into the lab for an INR today.

## 2018-09-17 NOTE — LETTER
September 17, 2018        Dipesh De La Torre  [address] [facility] [place] [address]  Phone: [phone]  Fax: [phone]             [facility] [address] [place] [address]  Phone: [phone]   Patient: Tae Delgado   MR Number: [id number]   YOB: 1960   Date of Visit: 9/17/2018       Dear Dr. Dipesh De La Torre    Thank you for referring Tae Delgado to me for evaluation. Attached you will find relevant portions of my assessment and plan of care.    If you have questions, please do not hesitate to call me. I look forward to following Tae Delgado along with you.    Sincerely,    Elham Pearson MD    Enclosure    If you would like to receive this communication electronically, please contact [email or web address] or [phone] to request [employer] Link access.    [employer] Link is a tool which provides read-only access to select patient information with whom you have a relationship. Its easy to use and provides real time access to review your patients record including encounter summaries, notes, results, and demographic information.    If you feel you have received this communication in error or would no longer like to receive these types of communications, please e-mail [email or web address]

## 2018-10-18 RX ORDER — WARFARIN 3 MG/1
TABLET ORAL
Qty: 30 TABLET | Refills: 4 | Status: SHIPPED | OUTPATIENT
Start: 2018-10-18 | End: 2019-11-27 | Stop reason: SDUPTHER

## 2018-11-01 ENCOUNTER — PATIENT MESSAGE (OUTPATIENT)
Dept: CARDIOLOGY | Facility: CLINIC | Age: 58
End: 2018-11-01

## 2018-11-29 ENCOUNTER — TELEPHONE (OUTPATIENT)
Dept: ELECTROPHYSIOLOGY | Facility: CLINIC | Age: 58
End: 2018-11-29

## 2018-11-29 DIAGNOSIS — I48.0 PAROXYSMAL ATRIAL FIBRILLATION: Primary | ICD-10-CM

## 2018-11-29 NOTE — TELEPHONE ENCOUNTER
----- Message from Danielle Packer MA sent at 11/29/2018  2:14 PM CST -----  Can you please put order for ECHO/ PAF/Abisamra>  Thank you.  ----- Message -----  From: Masoud Patel MD  Sent: 11/25/2018  10:49 AM  To: Darcie Valdivia, Sabrina Gonzalez, #    Mr pinto needs to see me in follow up for his ICD etc -- please get an updated echo prior  ----- Message -----  From: Darcie Valdivia  Sent: 9/15/2018  10:30 AM  To: Masoud Patel MD    Mr Pinto is seeing Elham Pearson Monday 9/17 @ 9:30. Have you had a chance to mention it to Dr Pearson? Thanks

## 2018-12-03 ENCOUNTER — TELEPHONE (OUTPATIENT)
Dept: ELECTROPHYSIOLOGY | Facility: CLINIC | Age: 58
End: 2018-12-03

## 2018-12-03 DIAGNOSIS — I48.0 PAF (PAROXYSMAL ATRIAL FIBRILLATION): Primary | ICD-10-CM

## 2018-12-03 NOTE — TELEPHONE ENCOUNTER
----- Message from Sabrina Gonzalez sent at 11/26/2018  8:04 AM CST -----  The patient is currently being followed in the device clinic with remote monitoring.   Please arrange FAS appt as he requested below.     Thanks,  Sabrina    ----- Message -----  From: Masoud Patel MD  Sent: 11/25/2018  10:49 AM  To: Sabrina Steele, #    Mr pinto needs to see me in follow up for his ICD etc -- please get an updated echo prior  ----- Message -----  From: Darcie Valdivia  Sent: 9/15/2018  10:30 AM  To: Masoud Patel MD    Mr Pinto is seeing Elham Pearson Monday 9/17 @ 9:30. Have you had a chance to mention it to Dr Pearson? Thanks

## 2018-12-17 ENCOUNTER — CLINICAL SUPPORT (OUTPATIENT)
Dept: CARDIOLOGY | Facility: HOSPITAL | Age: 58
End: 2018-12-17
Attending: INTERNAL MEDICINE
Payer: MEDICARE

## 2018-12-17 DIAGNOSIS — I48.0 PAF (PAROXYSMAL ATRIAL FIBRILLATION): ICD-10-CM

## 2018-12-17 DIAGNOSIS — I50.22 CHRONIC SYSTOLIC CHF (CONGESTIVE HEART FAILURE): ICD-10-CM

## 2018-12-17 DIAGNOSIS — Z95.810 AICD (AUTOMATIC CARDIOVERTER/DEFIBRILLATOR) PRESENT: ICD-10-CM

## 2018-12-17 PROCEDURE — 93296 REM INTERROG EVL PM/IDS: CPT

## 2018-12-20 ENCOUNTER — HOSPITAL ENCOUNTER (OUTPATIENT)
Dept: PULMONOLOGY | Facility: CLINIC | Age: 58
Discharge: HOME | End: 2018-12-20
Payer: MEDICARE

## 2018-12-20 ENCOUNTER — ANTI-COAG VISIT (OUTPATIENT)
Dept: CARDIOLOGY | Facility: CLINIC | Age: 58
End: 2018-12-20

## 2018-12-20 ENCOUNTER — OFFICE VISIT (OUTPATIENT)
Dept: ELECTROPHYSIOLOGY | Facility: CLINIC | Age: 58
End: 2018-12-20
Payer: MEDICARE

## 2018-12-20 ENCOUNTER — HOSPITAL ENCOUNTER (OUTPATIENT)
Dept: CARDIOLOGY | Facility: CLINIC | Age: 58
Discharge: HOME | End: 2018-12-20
Payer: MEDICARE

## 2018-12-20 ENCOUNTER — HOSPITAL ENCOUNTER (OUTPATIENT)
Dept: CARDIOLOGY | Facility: CLINIC | Age: 58
Discharge: HOME | End: 2018-12-20
Attending: INTERNAL MEDICINE
Payer: MEDICARE

## 2018-12-20 VITALS
SYSTOLIC BLOOD PRESSURE: 120 MMHG | WEIGHT: 257 LBS | HEART RATE: 70 BPM | HEIGHT: 72 IN | DIASTOLIC BLOOD PRESSURE: 60 MMHG | BODY MASS INDEX: 34.81 KG/M2

## 2018-12-20 VITALS
HEART RATE: 70 BPM | DIASTOLIC BLOOD PRESSURE: 88 MMHG | HEIGHT: 70 IN | WEIGHT: 250 LBS | SYSTOLIC BLOOD PRESSURE: 126 MMHG | BODY MASS INDEX: 35.79 KG/M2

## 2018-12-20 DIAGNOSIS — E66.01 CLASS 2 SEVERE OBESITY DUE TO EXCESS CALORIES WITH SERIOUS COMORBIDITY AND BODY MASS INDEX (BMI) OF 35.0 TO 35.9 IN ADULT: ICD-10-CM

## 2018-12-20 DIAGNOSIS — I48.0 PAF (PAROXYSMAL ATRIAL FIBRILLATION): ICD-10-CM

## 2018-12-20 DIAGNOSIS — Z79.01 LONG TERM (CURRENT) USE OF ANTICOAGULANTS: Primary | ICD-10-CM

## 2018-12-20 DIAGNOSIS — Z86.711 HISTORY OF PULMONARY EMBOLISM: ICD-10-CM

## 2018-12-20 DIAGNOSIS — I50.22 CHRONIC SYSTOLIC CONGESTIVE HEART FAILURE: ICD-10-CM

## 2018-12-20 DIAGNOSIS — I11.0 HYPERTENSIVE HEART DISEASE WITH HEART FAILURE: ICD-10-CM

## 2018-12-20 DIAGNOSIS — E78.2 MIXED HYPERLIPIDEMIA: ICD-10-CM

## 2018-12-20 DIAGNOSIS — I24.0 LEFT VENTRICULAR THROMBUS WITHOUT MI: ICD-10-CM

## 2018-12-20 DIAGNOSIS — Z91.89 AT RISK FOR AMIODARONE TOXICITY WITH LONG TERM USE: ICD-10-CM

## 2018-12-20 DIAGNOSIS — I48.0 PAROXYSMAL ATRIAL FIBRILLATION: ICD-10-CM

## 2018-12-20 DIAGNOSIS — Z79.899 AT RISK FOR AMIODARONE TOXICITY WITH LONG TERM USE: ICD-10-CM

## 2018-12-20 DIAGNOSIS — I42.0 COCM (CONGESTIVE CARDIOMYOPATHY): Primary | ICD-10-CM

## 2018-12-20 LAB
ASCENDING AORTA: 3.54 CM
AV INDEX (PROSTH): 0.82
AV MEAN GRADIENT: 2.59 MMHG
AV PEAK GRADIENT: 4.41 MMHG
AV VALVE AREA: 3.37 CM2
BSA FOR ECHO PROCEDURE: 2.43 M2
CV ECHO LV RWT: 0.25 CM
DOP CALC AO PEAK VEL: 1.05 M/S
DOP CALC AO VTI: 18.82 CM
DOP CALC LVOT AREA: 4.12 CM2
DOP CALC LVOT DIAMETER: 2.29 CM
DOP CALC LVOT STROKE VOLUME: 63.44 CM3
DOP CALCLVOT PEAK VEL VTI: 15.41 CM
ECHO LV POSTERIOR WALL: 0.84 CM (ref 0.6–1.1)
FRACTIONAL SHORTENING: 4 % (ref 28–44)
INTERVENTRICULAR SEPTUM: 0.85 CM (ref 0.6–1.1)
IVRT: 0.04 MSEC
LA MAJOR: 6.87 CM
LA MINOR: 6.46 CM
LA WIDTH: 5.34 CM
LEFT ATRIUM SIZE: 5.01 CM
LEFT ATRIUM VOLUME INDEX: 63.9 ML/M2
LEFT ATRIUM VOLUME: 151.42 CM3
LEFT INTERNAL DIMENSION IN SYSTOLE: 6.4 CM (ref 2.1–4)
LEFT VENTRICLE DIASTOLIC VOLUME INDEX: 76.13 ML/M2
LEFT VENTRICLE DIASTOLIC VOLUME: 180.43 ML
LEFT VENTRICLE MASS INDEX: 102 G/M2
LEFT VENTRICLE SYSTOLIC VOLUME INDEX: 60.3 ML/M2
LEFT VENTRICLE SYSTOLIC VOLUME: 142.99 ML
LEFT VENTRICULAR INTERNAL DIMENSION IN DIASTOLE: 6.7 CM (ref 3.5–6)
LEFT VENTRICULAR MASS: 241.74 G
PISA TR MAX VEL: 3.02 M/S
PRE FEV1 FVC: 76
PRE FEV1: 2.42
PRE FVC: 3.2
PREDICTED FEV1 FVC: 80
PREDICTED FEV1: 3.63
PREDICTED FVC: 4.48
PULM VEIN S/D RATIO: 0.26
PV PEAK D VEL: 0.99 M/S
PV PEAK S VEL: 0.26 M/S
RA MAJOR: 5.78 CM
RA PRESSURE: 3 MMHG
RA WIDTH: 4.02 CM
RIGHT VENTRICULAR END-DIASTOLIC DIMENSION: 4.7 CM
SINUS: 3.56 CM
STJ: 3.49 CM
TDI LATERAL: 0.13
TDI SEPTAL: 0.05
TDI: 0.09
TR MAX PG: 36.48 MMHG
TRICUSPID ANNULAR PLANE SYSTOLIC EXCURSION: 1.19 CM
TV REST PULMONARY ARTERY PRESSURE: 39 MMHG

## 2018-12-20 PROCEDURE — 94729 DIFFUSING CAPACITY: CPT | Mod: 26,S$PBB,, | Performed by: INTERNAL MEDICINE

## 2018-12-20 PROCEDURE — 94010 BREATHING CAPACITY TEST: CPT | Mod: 26,S$PBB,, | Performed by: INTERNAL MEDICINE

## 2018-12-20 PROCEDURE — 94010 BREATHING CAPACITY TEST: CPT | Mod: PBBFAC | Performed by: INTERNAL MEDICINE

## 2018-12-20 PROCEDURE — 99215 OFFICE O/P EST HI 40 MIN: CPT | Mod: S$PBB,,, | Performed by: INTERNAL MEDICINE

## 2018-12-20 PROCEDURE — 93306 TTE W/DOPPLER COMPLETE: CPT | Mod: PBBFAC | Performed by: INTERNAL MEDICINE

## 2018-12-20 PROCEDURE — 99999 PR PBB SHADOW E&M-EST. PATIENT-LVL III: CPT | Mod: PBBFAC,,, | Performed by: INTERNAL MEDICINE

## 2018-12-20 PROCEDURE — 94729 DIFFUSING CAPACITY: CPT | Mod: PBBFAC | Performed by: INTERNAL MEDICINE

## 2018-12-20 PROCEDURE — 93010 ELECTROCARDIOGRAM REPORT: CPT | Mod: S$PBB,,, | Performed by: INTERNAL MEDICINE

## 2018-12-20 PROCEDURE — 93005 ELECTROCARDIOGRAM TRACING: CPT | Mod: PBBFAC | Performed by: INTERNAL MEDICINE

## 2018-12-20 PROCEDURE — 99213 OFFICE O/P EST LOW 20 MIN: CPT | Mod: PBBFAC,25 | Performed by: INTERNAL MEDICINE

## 2018-12-20 RX ORDER — LOSARTAN POTASSIUM 50 MG/1
50 TABLET ORAL DAILY
Qty: 30 TABLET | Refills: 11 | Status: SHIPPED | OUTPATIENT
Start: 2018-12-20 | End: 2019-03-29

## 2018-12-20 NOTE — PROGRESS NOTES
"  Subjective:   Patient ID:  Tae Delgado is a 58 y.o. male     Chief complaint:Atrial Fibrillation      HPI  Background as recorded in my last note (jan 2016):  55 man  DCM -- was in class IV  In June 2015 -- got admitted and meds rearranged -- tikosyn stopped for amio -- for a while he was acting as class I-- felt much better  "I am 110% better -- I am not even SOB -- I tested myself with activities that I thought would be too much and I had no issues at all"  He had been sent home with a lifeVest but he took it off because it started alarming a few times a day and he was worried about being shocked. However, he had repeat echo and his EF was still 15 to 20% or so >> ICD implanted on 3/9/15   He has since scaled back a bit on his ability to do things. He has no issues with stairs. He is climbing trees.   ECG today shows NSR -- 65,   ICD In good repair  CPX from a couple days ago:  The PkVO2 was 14.0 ml/kg/min which is 48% of predicted equating to a functional capacity of 4.0 METS indicating severe functional impairment. But RER only 1.04. He says he had to stop when he did.   Echo from August:  1 - Mild left ventricular enlargement. (THEA 6.1, ESD 4.9)  2 - Severely depressed left ventricular systolic function (EF 25-30%).   3 - Right ventricular enlargement with mildly depressed systolic function.   4 - Left ventricular diastolic dysfunction.   5 - Biatrial enlargement.   6 - Mild mitral regurgitation.   7 - There is probable layered thrombus in the cardiac apex.   8 - The estimated PA systolic pressure is 32 mmHg.   BNP 1/16/16: 254    Update since then:  He says now that he can do anything he wants -- he goes hunting often, hauls 50 lb bags of corn etc   He has stopped drinking beer and he has adjusted his diet a lot   Most recent echo was today:  >>  · Moderate left ventricular enlargement.  · Severely decreased left ventricular systolic function. The estimated ejection fraction is 20%  · Mild right " ventricular enlargement.  · Mildly to moderately reduced right ventricular systolic function.  · Left ventricular diastolic dysfunction.  · Global hypokinetic wall motion.  · Biatrial enlargement.  · Mild tricuspid regurgitation.  · Mild aortic regurgitation.  · The estimated PA systolic pressure is 39 mm Hg  · Normal central venous pressure (3 mm Hg)    He is here to consider CCM implant but his Fc seems to be too good   ICD eval today-- all in good repair   I have reviewed the actual image of the ECG tracing obtained today and it shows NSR with normal intervals        Current Outpatient Medications   Medication Sig    amiodarone (PACERONE) 200 MG Tab Take 1 tablet (200 mg total) by mouth Daily.    atorvastatin (LIPITOR) 40 MG tablet TAKE ONE TABLET BY MOUTH ONCE DAILY IN THE EVENING    furosemide (LASIX) 40 MG tablet Take 1 tablet (40 mg total) by mouth once daily.    metoprolol succinate (TOPROL-XL) 50 MG 24 hr tablet Take 1 tablet (50 mg total) by mouth once daily.    spironolactone (ALDACTONE) 25 MG tablet TAKE ONE TABLET BY MOUTH ONCE DAILY    warfarin (COUMADIN) 3 MG tablet Take 1 tablet (3mg.) by mouth daily, except 0mg on Saturday,  Or as directed by [facility]    losartan (COZAAR) 50 MG tablet Take 1 tablet (50 mg total) by mouth once daily.     No current facility-administered medications for this visit.      Review of Systems   Constitution: Negative for decreased appetite, weakness, malaise/fatigue, weight gain and weight loss.   Eyes: Negative for blurred vision.   Cardiovascular: Negative for chest pain, claudication, cyanosis, dyspnea on exertion, irregular heartbeat, leg swelling, near-syncope, orthopnea and palpitations.   Respiratory: Positive for shortness of breath. Negative for cough, sleep disturbances due to breathing, snoring and wheezing.    Endocrine: Negative for heat intolerance.   Hematologic/Lymphatic: Does not bruise/bleed easily.   Musculoskeletal: Negative for muscle  weakness and myalgias.   Gastrointestinal: Negative for melena, nausea and vomiting.   Genitourinary: Negative for nocturia.   Neurological: Negative for excessive daytime sleepiness, dizziness, headaches and light-headedness.   Psychiatric/Behavioral: Negative for depression, memory loss and substance abuse. The patient does not have insomnia and is not nervous/anxious.        Objective:   Physical Exam   Constitutional: He is oriented to person, place, and time. He appears well-developed and well-nourished.   overweight   HENT:   Head: Normocephalic and atraumatic.   Right Ear: External ear normal.   Left Ear: External ear normal.   Eyes: Conjunctivae are normal. Pupils are equal, round, and reactive to light. Left conjunctiva is not injected. Left conjunctiva has no hemorrhage.   Neck: Neck supple. No JVD present. No thyromegaly present.   Cardiovascular: Normal rate, regular rhythm, normal heart sounds and intact distal pulses. PMI is not displaced. Exam reveals no gallop, no friction rub, no midsystolic click and no opening snap.   No murmur heard.  Pulses:       Carotid pulses are 2+ on the right side, and 2+ on the left side.       Radial pulses are 2+ on the right side, and 2+ on the left side.        Dorsalis pedis pulses are 2+ on the right side, and 2+ on the left side.        Posterior tibial pulses are 2+ on the right side, and 2+ on the left side.   Pulmonary/Chest: Effort normal and breath sounds normal. No respiratory distress. He has no wheezes. He has no rales. He exhibits no tenderness.   Abdominal: Soft. Normal appearance. He exhibits no pulsatile liver. There is no hepatomegaly. There is no tenderness. There is no rigidity and no guarding.   Obese abdomen   Musculoskeletal: Normal range of motion. He exhibits no edema or tenderness.        Right knee: He exhibits no swelling.        Left knee: He exhibits no swelling.        Right ankle: He exhibits no swelling.        Left ankle: He exhibits no  "swelling.        Right lower leg: He exhibits no swelling.        Left lower leg: He exhibits no swelling.        Right foot: There is no swelling.        Left foot: There is no swelling.   Neurological: He is alert and oriented to person, place, and time. He has normal strength and normal reflexes. No cranial nerve deficit. Coordination normal.   Skin: Skin is warm and dry. No rash noted. No cyanosis. No pallor.   Psychiatric: He has a normal mood and affect. His behavior is normal.   Nursing note and vitals reviewed.    /88   Pulse 70   Ht 5' 10" (1.778 m)   Wt 113.4 kg (250 lb)   BMI 35.87 kg/m²      Assessment:      1. COCM (congestive cardiomyopathy)    2. Hypertensive heart disease with heart failure    3. Paroxysmal atrial fibrillation    4. Mixed hyperlipidemia    5. Chronic systolic congestive heart failure    6. At risk for amiodarone toxicity with long term use    7. History of pulmonary embolism    8. Left ventricular thrombus without MI    9. Class 2 severe obesity due to excess calories with serious comorbidity and body mass index (BMI) of 35.0 to 35.9 in adult        Plan:    Will replace lisinopril with Losartan   He should be on Entresto  -- In the past there was an insurance refusal issue -- will refer this back top Dr Pearson  He is not a candidate for CCM -- EF too low, Fc too low (too good)  Orders Placed This Encounter   Procedures    Brain natriuretic peptide     Standing Status:   Future     Standing Expiration Date:   2/18/2020    TSH     Standing Status:   Future     Standing Expiration Date:   2/18/2020    Comprehensive metabolic panel     Standing Status:   Future     Standing Expiration Date:   2/18/2020    Hemoglobin     Standing Status:   Future     Standing Expiration Date:   2/18/2020    Amiodarone level     Standing Status:   Future     Standing Expiration Date:   2/18/2020    Complete PFT with bronchodilator     Standing Status:   Future     Standing Expiration Date:   " 12/20/2019     Follow-up in about 1 year (around 12/20/2019), or if symptoms worsen or fail to improve.  Medications Discontinued During This Encounter   Medication Reason    colchicine (MITIGARE) 0.6 mg Cap Patient no longer taking    lisinopril (PRINIVIL,ZESTRIL) 5 MG tablet SafeMed Discontinuation     Medications Ordered This Encounter   Medications    losartan (COZAAR) 50 MG tablet     Sig: Take 1 tablet (50 mg total) by mouth once daily.     Dispense:  30 tablet     Refill:  11     This SmartLink is deprecated. Use AVDigistriveEDLIST instead to display the medication list for a patient.

## 2018-12-24 ENCOUNTER — TELEPHONE (OUTPATIENT)
Dept: ELECTROPHYSIOLOGY | Facility: CLINIC | Age: 58
End: 2018-12-24

## 2018-12-24 NOTE — TELEPHONE ENCOUNTER
----- Message from Masoud Patel MD sent at 12/23/2018 10:09 AM CST -----  Please see comments below and call patient.  In general you do not need to route back to me.  TX  TSH OK   BNP Hi but expected- Will defer to Dr Pearson for management  Selim  In case u haven't seen this   FYI  Best

## 2018-12-24 NOTE — TELEPHONE ENCOUNTER
PFTs----- Message from Masoud Patel MD sent at 12/23/2018  9:52 AM CST -----  Please see comments below and call patient.  In general you do not need to route back to me.  All OK -- amio not an issue

## 2018-12-26 RX ORDER — METOPROLOL SUCCINATE 50 MG/1
TABLET, EXTENDED RELEASE ORAL
Qty: 30 TABLET | Refills: 11 | Status: ON HOLD | OUTPATIENT
Start: 2018-12-26 | End: 2020-02-17 | Stop reason: HOSPADM

## 2018-12-31 ENCOUNTER — TELEPHONE (OUTPATIENT)
Dept: ELECTROPHYSIOLOGY | Facility: CLINIC | Age: 58
End: 2018-12-31

## 2018-12-31 DIAGNOSIS — I48.0 PAROXYSMAL ATRIAL FIBRILLATION: Primary | ICD-10-CM

## 2018-12-31 RX ORDER — AMIODARONE HYDROCHLORIDE 200 MG/1
TABLET ORAL
Qty: 20 TABLET | Refills: 11 | Status: ON HOLD | OUTPATIENT
Start: 2018-12-31 | End: 2020-02-17 | Stop reason: HOSPADM

## 2018-12-31 RX ORDER — AMIODARONE HYDROCHLORIDE 200 MG/1
TABLET ORAL
Qty: 30 TABLET | Refills: 11 | Status: SHIPPED | OUTPATIENT
Start: 2018-12-31 | End: 2018-12-31

## 2018-12-31 RX ORDER — AMIODARONE HYDROCHLORIDE 200 MG/1
200 TABLET ORAL DAILY
Qty: 30 TABLET | Refills: 11 | OUTPATIENT
Start: 2018-12-31

## 2018-12-31 NOTE — TELEPHONE ENCOUNTER
Amiodarone level ----- Message from Masoud Patel MD sent at 12/30/2018  7:43 PM CST -----  Please see comments below and call patient.  In general you do not need to route back to me.  A bit on th e HI side >. Decrease dose to 200 a day five times a week (skip W and Sunday)  Repeat levels in 3 months

## 2018-12-31 NOTE — TELEPHONE ENCOUNTER
Attempt to call patient, called all phone numbers listed on the patient's demographics, no answer- left a message Notifying:   wanted to provide you with the results of your recent lab work-Amiodarone-Per - A bit on the HI side. Decrease dose to 200 a day five times a week (skip Wednesday and Sunday)  Repeat levels in 3 months    Portal message sent as well.    Doris Navarro RN

## 2019-01-19 NOTE — PROGRESS NOTES
Patient missed appropriately scheduled lab. We did not receive the lab from 8/14 until 8/24 because we were not notified that he went and did not receive a fax. I am dosing the INR on 8/25 since we could not mikhail him yesterday. Will adjust dose slightly today and we need a repeat INR 8/28  
cough

## 2019-02-15 PROCEDURE — 93295 DEV INTERROG REMOTE 1/2/MLT: CPT | Mod: ,,, | Performed by: INTERNAL MEDICINE

## 2019-03-11 RX ORDER — FUROSEMIDE 40 MG/1
TABLET ORAL
Qty: 90 TABLET | Refills: 3 | Status: SHIPPED | OUTPATIENT
Start: 2019-03-11 | End: 2019-09-09 | Stop reason: SDUPTHER

## 2019-03-18 ENCOUNTER — CLINICAL SUPPORT (OUTPATIENT)
Dept: CARDIOLOGY | Facility: HOSPITAL | Age: 59
End: 2019-03-18
Attending: INTERNAL MEDICINE
Payer: MEDICARE

## 2019-03-18 DIAGNOSIS — I48.0 PAF (PAROXYSMAL ATRIAL FIBRILLATION): ICD-10-CM

## 2019-03-18 DIAGNOSIS — I50.22 CHRONIC SYSTOLIC CHF (CONGESTIVE HEART FAILURE): ICD-10-CM

## 2019-03-18 DIAGNOSIS — Z95.810 AICD (AUTOMATIC CARDIOVERTER/DEFIBRILLATOR) PRESENT: ICD-10-CM

## 2019-03-18 PROCEDURE — 93296 REM INTERROG EVL PM/IDS: CPT

## 2019-03-29 ENCOUNTER — OFFICE VISIT (OUTPATIENT)
Dept: TRANSPLANT | Facility: CLINIC | Age: 59
End: 2019-03-29
Payer: MEDICARE

## 2019-03-29 ENCOUNTER — LAB VISIT (OUTPATIENT)
Dept: LAB | Facility: HOSPITAL | Age: 59
End: 2019-03-29
Attending: INTERNAL MEDICINE
Payer: MEDICARE

## 2019-03-29 VITALS
DIASTOLIC BLOOD PRESSURE: 80 MMHG | WEIGHT: 259.06 LBS | HEIGHT: 70 IN | BODY MASS INDEX: 37.09 KG/M2 | HEART RATE: 69 BPM | SYSTOLIC BLOOD PRESSURE: 115 MMHG

## 2019-03-29 DIAGNOSIS — I48.0 PAROXYSMAL ATRIAL FIBRILLATION: ICD-10-CM

## 2019-03-29 DIAGNOSIS — I42.8 CARDIOMYOPATHY, NONISCHEMIC: ICD-10-CM

## 2019-03-29 DIAGNOSIS — I50.22 CHRONIC SYSTOLIC CONGESTIVE HEART FAILURE: ICD-10-CM

## 2019-03-29 DIAGNOSIS — I50.22 CHRONIC SYSTOLIC CONGESTIVE HEART FAILURE: Primary | ICD-10-CM

## 2019-03-29 DIAGNOSIS — I10 ESSENTIAL HYPERTENSION: ICD-10-CM

## 2019-03-29 LAB
ALBUMIN SERPL BCP-MCNC: 4.1 G/DL (ref 3.5–5.2)
ALP SERPL-CCNC: 45 U/L (ref 55–135)
ALT SERPL W/O P-5'-P-CCNC: 34 U/L (ref 10–44)
ANION GAP SERPL CALC-SCNC: 8 MMOL/L (ref 8–16)
AST SERPL-CCNC: 22 U/L (ref 10–40)
BILIRUB SERPL-MCNC: 0.6 MG/DL (ref 0.1–1)
BNP SERPL-MCNC: 401 PG/ML (ref 0–99)
BUN SERPL-MCNC: 28 MG/DL (ref 6–20)
CALCIUM SERPL-MCNC: 9.9 MG/DL (ref 8.7–10.5)
CHLORIDE SERPL-SCNC: 102 MMOL/L (ref 95–110)
CO2 SERPL-SCNC: 29 MMOL/L (ref 23–29)
CREAT SERPL-MCNC: 1.7 MG/DL (ref 0.5–1.4)
EST. GFR  (AFRICAN AMERICAN): 49.9 ML/MIN/1.73 M^2
EST. GFR  (NON AFRICAN AMERICAN): 43.2 ML/MIN/1.73 M^2
GLUCOSE SERPL-MCNC: 93 MG/DL (ref 70–110)
POTASSIUM SERPL-SCNC: 4.4 MMOL/L (ref 3.5–5.1)
PROT SERPL-MCNC: 7.5 G/DL (ref 6–8.4)
SODIUM SERPL-SCNC: 139 MMOL/L (ref 136–145)

## 2019-03-29 PROCEDURE — 99214 PR OFFICE/OUTPT VISIT, EST, LEVL IV, 30-39 MIN: ICD-10-PCS | Mod: S$PBB,,, | Performed by: INTERNAL MEDICINE

## 2019-03-29 PROCEDURE — 99213 OFFICE O/P EST LOW 20 MIN: CPT | Mod: PBBFAC | Performed by: INTERNAL MEDICINE

## 2019-03-29 PROCEDURE — 99999 PR PBB SHADOW E&M-EST. PATIENT-LVL III: CPT | Mod: PBBFAC,,, | Performed by: INTERNAL MEDICINE

## 2019-03-29 PROCEDURE — 99999 PR PBB SHADOW E&M-EST. PATIENT-LVL III: ICD-10-PCS | Mod: PBBFAC,,, | Performed by: INTERNAL MEDICINE

## 2019-03-29 PROCEDURE — 99214 OFFICE O/P EST MOD 30 MIN: CPT | Mod: S$PBB,,, | Performed by: INTERNAL MEDICINE

## 2019-03-29 PROCEDURE — 80299 QUANTITATIVE ASSAY DRUG: CPT

## 2019-03-29 PROCEDURE — 83880 ASSAY OF NATRIURETIC PEPTIDE: CPT

## 2019-03-29 PROCEDURE — 80053 COMPREHEN METABOLIC PANEL: CPT

## 2019-03-29 RX ORDER — COLCHICINE 0.6 MG/1
0.6 TABLET ORAL
COMMUNITY
Start: 2019-02-25 | End: 2020-01-09

## 2019-03-29 NOTE — LETTER
March 29, 2019        Dipesh De La Torre  [address] [facility] [place] [address]  Phone: [phone]  Fax: [phone]             [facility] [address] [place] [address]  Phone: [phone]   Patient: Tae Delgado   MR Number: [id number]   YOB: 1960   Date of Visit: 3/29/2019       Dear Dr. Dipesh De La Torre    Thank you for referring Tae Delgado to me for evaluation. Attached you will find relevant portions of my assessment and plan of care.    If you have questions, please do not hesitate to call me. I look forward to following Tae Delgado along with you.    Sincerely,    Elham Pearson MD    Enclosure    If you would like to receive this communication electronically, please contact [email or web address] or [phone] to request [facility] Link access.    [facility] Link is a tool which provides read-only access to select patient information with whom you have a relationship. Its easy to use and provides real time access to review your patients record including encounter summaries, notes, results, and demographic information.    If you feel you have received this communication in error or would no longer like to receive these types of communications, please e-mail [email or web address]

## 2019-03-29 NOTE — PROGRESS NOTES
"Subjective:     HPI:  Mr. Delgado is a very pleasant 55 y.o. WM with history of NICMP diagnosed in 2010, ICD, LV thrombus (with prior splenic and renal emboli), paroxysmal atrial fib, HTN, HLP who comes today for a F/U. Remains stable. Reports NYHA class II-III symptoms. No PND or orthopnea. No ER visits or HF admissions. No ICD shocks.     Past Medical History:   Diagnosis Date    CHF (congestive heart failure) 1/2010    Dx  1/2010 w/ decreased LV systolic function (EF 15%) by ECHO 1/2015    COCM (congestive cardiomyopathy) 7/20/2016    Hyperlipidemia     Hypertension     Paroxysmal atrial fibrillation     Pulmonary embolus 2008    Stroke     Superficial thrombophlebitis      Past Surgical History:   Procedure Laterality Date    HEART CATH-RIGHT Right 7/10/2017    Performed by Elham Pearson MD at [facility]    TONSILLECTOMY      VEIN LIGATION AND STRIPPING         Review of Systems   Constitution: Negative. Negative for chills, decreased appetite, diaphoresis, fever, malaise/fatigue, night sweats, weight gain and weight loss.   Eyes: Negative.    Cardiovascular: Positive for dyspnea on exertion. Negative for chest pain, claudication, cyanosis, irregular heartbeat, leg swelling, near-syncope, orthopnea, palpitations, paroxysmal nocturnal dyspnea and syncope.   Respiratory: Negative for cough, hemoptysis and shortness of breath.    Endocrine: Negative.    Hematologic/Lymphatic: Negative.    Skin: Negative for color change, dry skin and nail changes.   Musculoskeletal: Negative.    Gastrointestinal: Negative.    Genitourinary: Negative.    Neurological: Negative for weakness.       Objective:   Blood pressure 115/80, pulse 69, height 5' 10" (1.778 m), weight 117.5 kg (259 lb 0.7 oz).body mass index is 37.17 kg/m².  Physical Exam   Constitutional: He appears well-developed.   /80 (BP Location: Left arm, Patient Position: Sitting, BP Method: Medium (Automatic))   Pulse 69   Ht 5' 10" (1.778 m)   Wt " 117.5 kg (259 lb 0.7 oz)   BMI 37.17 kg/m²      HENT:   Head: Normocephalic.   Neck: No JVD present. Carotid bruit is not present.   Cardiovascular: Regular rhythm and normal heart sounds. PMI is displaced.   No murmur heard.  Pulmonary/Chest: Effort normal and breath sounds normal. No respiratory distress. He has no wheezes. He has no rales.   Abdominal: Soft. Bowel sounds are normal. He exhibits no distension. There is no tenderness. There is no rebound.   Neurological: He is alert.   Skin: Skin is warm.   Vitals reviewed.      Labs:    Chemistry        Component Value Date/Time     03/29/2019 1226    K 4.4 03/29/2019 1226     03/29/2019 1226    CO2 29 03/29/2019 1226    BUN 28 (H) 03/29/2019 1226    CREATININE 1.7 (H) 03/29/2019 1226    GLU 93 03/29/2019 1226        Component Value Date/Time    CALCIUM 9.9 03/29/2019 1226    ALKPHOS 45 (L) 03/29/2019 1226    AST 22 03/29/2019 1226    ALT 34 03/29/2019 1226    BILITOT 0.6 03/29/2019 1226    ESTGFRAFRICA 49.9 (A) 03/29/2019 1226    EGFRNONAA 43.2 (A) 03/29/2019 1226          Magnesium   Date Value Ref Range Status   07/14/2017 2.5 1.6 - 2.6 mg/dL Final     Lab Results   Component Value Date    WBC 9.12 08/14/2017    HGB 14.9 12/20/2018    HCT 44.5 08/14/2017     08/14/2017     Lab Results   Component Value Date    INR 1.9 (H) 12/20/2018    INR 2.3 (H) 09/17/2018    INR 2.6 04/30/2018     BNP   Date Value Ref Range Status   03/29/2019 401 (H) 0 - 99 pg/mL Final     Comment:     Values of less than 100 pg/ml are consistent with non-CHF populations.   12/20/2018 459 (H) 0 - 99 pg/mL Final     Comment:     Values of less than 100 pg/ml are consistent with non-CHF populations.   08/14/2017 357 (H) 0 - 99 pg/mL Final     Comment:     Values of less than 100 pg/ml are consistent with non-CHF populations.       Assessment:      1. Chronic systolic congestive heart failure    2. Cardiomyopathy, nonischemic    3. Essential hypertension    4. Paroxysmal  atrial fibrillation        Plan:   Clinically remains stable. NYHA class II-III.   DC Losartan  Start Entresto 24/26 mg BID  Continue current HF regimen.  Recommend 2 gram sodium restriction and 1500cc fluid restriction.  Encourage physical activity with graded exercise program - needs to lose weight.  Requested patient to weigh themselves daily, and to notify us if their weight increases by more than 3 lbs in 1 day or 5 lbs in 1 week.     BMP next week   RTC in 3 months with labs      Elham Pearson MD

## 2019-04-01 LAB
AMIODARONE SERPL-SCNC: 1.5 UG/ML (ref 1–3)
N-DESETHYL-AMIODARONE: 1.1 UG/ML

## 2019-04-05 ENCOUNTER — TELEPHONE (OUTPATIENT)
Dept: TRANSPLANT | Facility: CLINIC | Age: 59
End: 2019-04-05

## 2019-04-05 NOTE — TELEPHONE ENCOUNTER
"1050 am:     F/U on today's nurse lab phone review:  To call pt to see if he received Entresto and if and when he started taking it  Called both phone numbers listed for pt-NA at either. LVM on the [phone] number with details of reason for call, my name , MD office with, day, date time and asked for a call back today Left our office call back number as well.   Also called the [phone] number X3 and each time recording stating, "I'm sorry , you call can not be completed as dialed".    "

## 2019-04-08 ENCOUNTER — TELEPHONE (OUTPATIENT)
Dept: TRANSPLANT | Facility: CLINIC | Age: 59
End: 2019-04-08

## 2019-04-08 ENCOUNTER — TELEPHONE (OUTPATIENT)
Dept: PHARMACY | Facility: CLINIC | Age: 59
End: 2019-04-08

## 2019-04-08 NOTE — TELEPHONE ENCOUNTER
1:40 pm : No return call from message I left pt on 4/5/19  Called pt again at this time as a follow up from nurse review 4/5/19 to determine if pt received and started his Entresto and how he is doing.  Again, NA, I left detailed voice message of reason for call, office I am with, day, date time, my name and call back phone number . Asked pt return my call.     4/9/19 pm:  No return call from pt.  However other HFN has been working with pts insurance and [facility] regarding trying to obtain approval for pts Entresto.

## 2019-04-08 NOTE — TELEPHONE ENCOUNTER
Good Afternoon.    The prior authorization for Mr. Delgado's Entresto 24-26mg prescription has been denied. The patient's  insurance provider states that they did not see the patient following proper step therapy requirements which included taking a beta-blocker at the max tolerated dose allowed.    If there are any additional questions or concerns about the denied medication request please contact the pharmacy at, [phone].    Thanks.    Isadora Whipple CPhT.  Patient Care Advocate  [facility]  [email or web address]

## 2019-04-10 ENCOUNTER — PATIENT MESSAGE (OUTPATIENT)
Dept: TRANSPLANT | Facility: CLINIC | Age: 59
End: 2019-04-10

## 2019-04-11 ENCOUNTER — PATIENT MESSAGE (OUTPATIENT)
Dept: TRANSPLANT | Facility: CLINIC | Age: 59
End: 2019-04-11

## 2019-04-17 ENCOUNTER — ANTI-COAG VISIT (OUTPATIENT)
Dept: CARDIOLOGY | Facility: CLINIC | Age: 59
End: 2019-04-17
Payer: MEDICARE

## 2019-04-17 DIAGNOSIS — Z79.01 LONG TERM (CURRENT) USE OF ANTICOAGULANTS: ICD-10-CM

## 2019-04-17 DIAGNOSIS — I48.0 PAROXYSMAL ATRIAL FIBRILLATION: ICD-10-CM

## 2019-04-17 LAB — INR PPP: 2.9

## 2019-04-17 PROCEDURE — 93793 PR ANTICOAGULANT MGMT FOR PT TAKING WARFARIN: ICD-10-PCS | Mod: ,,,

## 2019-04-17 PROCEDURE — 93793 ANTICOAG MGMT PT WARFARIN: CPT | Mod: ,,,

## 2019-04-24 ENCOUNTER — TELEPHONE (OUTPATIENT)
Dept: TRANSPLANT | Facility: CLINIC | Age: 59
End: 2019-04-24

## 2019-04-24 NOTE — TELEPHONE ENCOUNTER
"Attempted to return pts call. LVM. Pt keeps asking for us to send  a "Dr. Sánchez" a prescription for Entresto. [facility]  LVM for pt the last time he asked about this, and didn't receive call back to explain to him how this works. .  "

## 2019-06-19 ENCOUNTER — TELEPHONE (OUTPATIENT)
Dept: ELECTROPHYSIOLOGY | Facility: CLINIC | Age: 59
End: 2019-06-19

## 2019-06-19 NOTE — TELEPHONE ENCOUNTER
Pt did not show for ICD check. Left vm to reschedule. Also explained in message of Dr. Patel's relocation to . Explained options of following Dr. Patel or remaining at [facility] Arrhythmia with another EP. Left clinic phone number for pt to call back.

## 2019-08-12 ENCOUNTER — CLINICAL SUPPORT (OUTPATIENT)
Dept: CARDIOLOGY | Facility: HOSPITAL | Age: 59
End: 2019-08-12
Attending: INTERNAL MEDICINE
Payer: MEDICARE

## 2019-08-12 DIAGNOSIS — I50.22 CHRONIC SYSTOLIC CHF (CONGESTIVE HEART FAILURE): ICD-10-CM

## 2019-08-12 DIAGNOSIS — Z95.810 AICD (AUTOMATIC CARDIOVERTER/DEFIBRILLATOR) PRESENT: ICD-10-CM

## 2019-08-12 DIAGNOSIS — I48.0 PAF (PAROXYSMAL ATRIAL FIBRILLATION): ICD-10-CM

## 2019-08-12 PROCEDURE — 93296 REM INTERROG EVL PM/IDS: CPT

## 2019-08-14 ENCOUNTER — CLINICAL SUPPORT (OUTPATIENT)
Dept: CARDIOLOGY | Facility: HOSPITAL | Age: 59
End: 2019-08-14
Payer: MEDICARE

## 2019-08-14 DIAGNOSIS — Z95.810 PRESENCE OF AUTOMATIC (IMPLANTABLE) CARDIAC DEFIBRILLATOR: ICD-10-CM

## 2019-09-09 ENCOUNTER — OFFICE VISIT (OUTPATIENT)
Dept: TRANSPLANT | Facility: CLINIC | Age: 59
End: 2019-09-09
Payer: MEDICARE

## 2019-09-09 VITALS
HEIGHT: 70 IN | DIASTOLIC BLOOD PRESSURE: 78 MMHG | WEIGHT: 259.25 LBS | SYSTOLIC BLOOD PRESSURE: 132 MMHG | BODY MASS INDEX: 37.11 KG/M2 | HEART RATE: 70 BPM

## 2019-09-09 DIAGNOSIS — E78.2 MIXED HYPERLIPIDEMIA: ICD-10-CM

## 2019-09-09 DIAGNOSIS — I50.22 CHRONIC SYSTOLIC CONGESTIVE HEART FAILURE: Primary | ICD-10-CM

## 2019-09-09 DIAGNOSIS — I48.0 PAROXYSMAL ATRIAL FIBRILLATION: ICD-10-CM

## 2019-09-09 DIAGNOSIS — I42.0 DILATED CARDIOMYOPATHY: ICD-10-CM

## 2019-09-09 DIAGNOSIS — I10 ESSENTIAL HYPERTENSION: ICD-10-CM

## 2019-09-09 PROCEDURE — 99214 OFFICE O/P EST MOD 30 MIN: CPT | Mod: S$PBB,,, | Performed by: INTERNAL MEDICINE

## 2019-09-09 PROCEDURE — 99214 PR OFFICE/OUTPT VISIT, EST, LEVL IV, 30-39 MIN: ICD-10-PCS | Mod: S$PBB,,, | Performed by: INTERNAL MEDICINE

## 2019-09-09 PROCEDURE — 99999 PR PBB SHADOW E&M-EST. PATIENT-LVL III: ICD-10-PCS | Mod: PBBFAC,,, | Performed by: INTERNAL MEDICINE

## 2019-09-09 PROCEDURE — 96372 THER/PROPH/DIAG INJ SC/IM: CPT | Mod: PBBFAC

## 2019-09-09 PROCEDURE — 99213 OFFICE O/P EST LOW 20 MIN: CPT | Mod: PBBFAC,25 | Performed by: INTERNAL MEDICINE

## 2019-09-09 PROCEDURE — 99999 PR PBB SHADOW E&M-EST. PATIENT-LVL III: CPT | Mod: PBBFAC,,, | Performed by: INTERNAL MEDICINE

## 2019-09-09 RX ORDER — FUROSEMIDE 10 MG/ML
60 INJECTION INTRAMUSCULAR; INTRAVENOUS
Status: COMPLETED | OUTPATIENT
Start: 2019-09-09 | End: 2019-09-09

## 2019-09-09 RX ORDER — LOSARTAN POTASSIUM 25 MG/1
25 TABLET ORAL DAILY
Qty: 90 TABLET | Refills: 3 | Status: SHIPPED | OUTPATIENT
Start: 2019-09-09 | End: 2019-12-13

## 2019-09-09 RX ORDER — FUROSEMIDE 40 MG/1
40 TABLET ORAL 2 TIMES DAILY
Qty: 90 TABLET | Refills: 3 | Status: ON HOLD | OUTPATIENT
Start: 2019-09-09 | End: 2020-02-17 | Stop reason: HOSPADM

## 2019-09-09 RX ADMIN — FUROSEMIDE 60 MG: 10 INJECTION, SOLUTION INTRAMUSCULAR; INTRAVENOUS at 09:09

## 2019-09-09 NOTE — LETTER
September 9, 2019        Dipesh De La Torre  [address] [facility] [place] [address]  Phone: [phone]  Fax: [phone]             [facility] [address] [place] [address]  Phone: [phone]   Patient: Tae Delgado   MR Number: [id number]   YOB: 1960   Date of Visit: 9/9/2019       Dear Dr. Dipesh De La Torre    Thank you for referring Tae Delgado to me for evaluation. Attached you will find relevant portions of my assessment and plan of care.    If you have questions, please do not hesitate to call me. I look forward to following Tae Delgado along with you.    Sincerely,    Elham Pearson MD    Enclosure    If you would like to receive this communication electronically, please contact [email or web address] or [phone] to request [facility] Link access.    [facility] Link is a tool which provides read-only access to select patient information with whom you have a relationship. Its easy to use and provides real time access to review your patients record including encounter summaries, notes, results, and demographic information.    If you feel you have received this communication in error or would no longer like to receive these types of communications, please e-mail [email or web address]

## 2019-09-09 NOTE — PROGRESS NOTES
"Subjective:     HPI:  Mr. Delgado is a very pleasant 55 y.o. WM with history of NICMP diagnosed in 2010, ICD, LV thrombus (with prior splenic and renal emboli), paroxysmal atrial fib, HTN, HLP who comes today for a F/U. Reports worsening BELTRAN with  NYHA class III symptoms. With occasional PND or orthopnea. No ER visits or HF admissions. No ICD shocks.     Past Medical History:   Diagnosis Date    CHF (congestive heart failure) 1/2010    Dx  1/2010 w/ decreased LV systolic function (EF 15%) by ECHO 1/2015    COCM (congestive cardiomyopathy) 7/20/2016    Hyperlipidemia     Hypertension     Paroxysmal atrial fibrillation     Pulmonary embolus 2008    Stroke     Superficial thrombophlebitis      Past Surgical History:   Procedure Laterality Date    HEART CATH-RIGHT Right 7/10/2017    Performed by Elham Pearson MD at [facility] CATH LAB    TONSILLECTOMY      VEIN LIGATION AND STRIPPING         Review of Systems   Constitution: Positive for weight gain. Negative for chills, decreased appetite, diaphoresis, fever, malaise/fatigue, night sweats and weight loss.   Eyes: Negative.    Cardiovascular: Positive for dyspnea on exertion, orthopnea and paroxysmal nocturnal dyspnea. Negative for chest pain, claudication, cyanosis, irregular heartbeat, leg swelling, near-syncope, palpitations and syncope.   Respiratory: Negative for cough, hemoptysis and shortness of breath.    Endocrine: Negative.    Hematologic/Lymphatic: Negative.    Skin: Negative for color change, dry skin and nail changes.   Musculoskeletal: Negative.    Gastrointestinal: Negative.    Genitourinary: Negative.    Neurological: Negative for weakness.       Objective:   Blood pressure 132/78, pulse 70, height 5' 10" (1.778 m), weight 117.6 kg (259 lb 4.2 oz).body mass index is 37.2 kg/m².  Physical Exam   Constitutional: He appears well-developed.   /78 (BP Location: Left arm, Patient Position: Sitting, BP Method: Large (Automatic))   Pulse 70   Ht 5' 10" " (1.778 m)   Wt 117.6 kg (259 lb 4.2 oz)   BMI 37.20 kg/m²      HENT:   Head: Normocephalic.   Neck: JVD present. Carotid bruit is not present.   Cardiovascular: Regular rhythm and normal heart sounds. PMI is displaced.   No murmur heard.  Pulmonary/Chest: Effort normal and breath sounds normal. No respiratory distress. He has no wheezes. He has no rales.   Abdominal: Soft. Bowel sounds are normal. He exhibits no distension. There is no tenderness. There is no rebound.   Musculoskeletal: He exhibits edema.   Neurological: He is alert.   Skin: Skin is warm.   Vitals reviewed.      Labs:    Chemistry        Component Value Date/Time     03/29/2019 1226    K 4.4 03/29/2019 1226     03/29/2019 1226    CO2 29 03/29/2019 1226    BUN 28 (H) 03/29/2019 1226    CREATININE 1.7 (H) 03/29/2019 1226    GLU 93 03/29/2019 1226        Component Value Date/Time    CALCIUM 9.9 03/29/2019 1226    ALKPHOS 45 (L) 03/29/2019 1226    AST 22 03/29/2019 1226    ALT 34 03/29/2019 1226    BILITOT 0.6 03/29/2019 1226    ESTGFRAFRICA 49.9 (A) 03/29/2019 1226    EGFRNONAA 43.2 (A) 03/29/2019 1226          Magnesium   Date Value Ref Range Status   07/14/2017 2.5 1.6 - 2.6 mg/dL Final     Lab Results   Component Value Date    WBC 9.12 08/14/2017    HGB 14.9 12/20/2018    HCT 44.5 08/14/2017     08/14/2017     Lab Results   Component Value Date    INR 2.9 04/17/2019    INR 1.9 (H) 12/20/2018    INR 2.3 (H) 09/17/2018     BNP   Date Value Ref Range Status   03/29/2019 401 (H) 0 - 99 pg/mL Final     Comment:     Values of less than 100 pg/ml are consistent with non-CHF populations.   12/20/2018 459 (H) 0 - 99 pg/mL Final     Comment:     Values of less than 100 pg/ml are consistent with non-CHF populations.   08/14/2017 357 (H) 0 - 99 pg/mL Final     Comment:     Values of less than 100 pg/ml are consistent with non-CHF populations.       Assessment:      1. Chronic systolic congestive heart failure    2. Dilated cardiomyopathy     3. Essential hypertension    4. Mixed hyperlipidemia    5. Paroxysmal atrial fibrillation        Plan:   Clinically with volume overload on exam. NYHA class III.   Increase Lasix to 40 mg BID  Not taking his Entresto anymore due to insurance coverage.   Resume Losartan 25 mg daily.   Continue current HF regimen.  Recommend 2 gram sodium restriction and 1500cc fluid restriction.  Encourage physical activity with graded exercise program - needs to lose weight.  Requested patient to weigh themselves daily, and to notify us if their weight increases by more than 3 lbs in 1 day or 5 lbs in 1 week.     BMP next week   RTC in 1 months with labs      Elham Pearson MD

## 2019-09-10 ENCOUNTER — TELEPHONE (OUTPATIENT)
Dept: TRANSPLANT | Facility: CLINIC | Age: 59
End: 2019-09-10

## 2019-09-10 ENCOUNTER — DOCUMENTATION ONLY (OUTPATIENT)
Dept: TRANSPLANT | Facility: CLINIC | Age: 59
End: 2019-09-10

## 2019-09-21 RX ORDER — SPIRONOLACTONE 25 MG/1
TABLET ORAL
Qty: 30 TABLET | Refills: 11 | Status: ON HOLD | OUTPATIENT
Start: 2019-09-21 | End: 2020-02-17 | Stop reason: HOSPADM

## 2019-09-23 ENCOUNTER — CLINICAL SUPPORT (OUTPATIENT)
Dept: CARDIOLOGY | Facility: HOSPITAL | Age: 59
End: 2019-09-23
Attending: INTERNAL MEDICINE
Payer: MEDICARE

## 2019-09-23 DIAGNOSIS — I48.0 PAF (PAROXYSMAL ATRIAL FIBRILLATION): ICD-10-CM

## 2019-09-23 DIAGNOSIS — I50.22 CHRONIC SYSTOLIC CHF (CONGESTIVE HEART FAILURE): ICD-10-CM

## 2019-09-23 DIAGNOSIS — Z95.810 AICD (AUTOMATIC CARDIOVERTER/DEFIBRILLATOR) PRESENT: ICD-10-CM

## 2019-10-09 ENCOUNTER — LAB VISIT (OUTPATIENT)
Dept: LAB | Facility: HOSPITAL | Age: 59
End: 2019-10-09
Attending: INTERNAL MEDICINE
Payer: MEDICARE

## 2019-10-09 ENCOUNTER — OFFICE VISIT (OUTPATIENT)
Dept: TRANSPLANT | Facility: CLINIC | Age: 59
End: 2019-10-09
Payer: MEDICARE

## 2019-10-09 VITALS
HEIGHT: 70 IN | OXYGEN SATURATION: 97 % | BODY MASS INDEX: 37.15 KG/M2 | WEIGHT: 259.5 LBS | DIASTOLIC BLOOD PRESSURE: 74 MMHG | SYSTOLIC BLOOD PRESSURE: 109 MMHG | HEART RATE: 78 BPM

## 2019-10-09 DIAGNOSIS — E78.2 MIXED HYPERLIPIDEMIA: ICD-10-CM

## 2019-10-09 DIAGNOSIS — I50.22 CHRONIC SYSTOLIC CONGESTIVE HEART FAILURE: ICD-10-CM

## 2019-10-09 DIAGNOSIS — I48.0 PAROXYSMAL ATRIAL FIBRILLATION: ICD-10-CM

## 2019-10-09 DIAGNOSIS — I50.22 CHRONIC SYSTOLIC CONGESTIVE HEART FAILURE: Primary | ICD-10-CM

## 2019-10-09 DIAGNOSIS — I10 ESSENTIAL HYPERTENSION: ICD-10-CM

## 2019-10-09 LAB
ALBUMIN SERPL BCP-MCNC: 4.1 G/DL (ref 3.5–5.2)
ALP SERPL-CCNC: 46 U/L (ref 55–135)
ALT SERPL W/O P-5'-P-CCNC: 48 U/L (ref 10–44)
ANION GAP SERPL CALC-SCNC: 8 MMOL/L (ref 8–16)
AST SERPL-CCNC: 34 U/L (ref 10–40)
BILIRUB SERPL-MCNC: 0.5 MG/DL (ref 0.1–1)
BNP SERPL-MCNC: 754 PG/ML (ref 0–99)
BUN SERPL-MCNC: 26 MG/DL (ref 6–20)
CALCIUM SERPL-MCNC: 9.6 MG/DL (ref 8.7–10.5)
CHLORIDE SERPL-SCNC: 105 MMOL/L (ref 95–110)
CO2 SERPL-SCNC: 27 MMOL/L (ref 23–29)
CREAT SERPL-MCNC: 1.8 MG/DL (ref 0.5–1.4)
EST. GFR  (AFRICAN AMERICAN): 46.6 ML/MIN/1.73 M^2
EST. GFR  (NON AFRICAN AMERICAN): 40.3 ML/MIN/1.73 M^2
GLUCOSE SERPL-MCNC: 88 MG/DL (ref 70–110)
POTASSIUM SERPL-SCNC: 4.7 MMOL/L (ref 3.5–5.1)
PROT SERPL-MCNC: 7.5 G/DL (ref 6–8.4)
SODIUM SERPL-SCNC: 140 MMOL/L (ref 136–145)

## 2019-10-09 PROCEDURE — 80053 COMPREHEN METABOLIC PANEL: CPT

## 2019-10-09 PROCEDURE — 83880 ASSAY OF NATRIURETIC PEPTIDE: CPT

## 2019-10-09 PROCEDURE — 99999 PR PBB SHADOW E&M-EST. PATIENT-LVL III: CPT | Mod: PBBFAC,,, | Performed by: INTERNAL MEDICINE

## 2019-10-09 PROCEDURE — 99214 PR OFFICE/OUTPT VISIT, EST, LEVL IV, 30-39 MIN: ICD-10-PCS | Mod: S$PBB,,, | Performed by: INTERNAL MEDICINE

## 2019-10-09 PROCEDURE — 99214 OFFICE O/P EST MOD 30 MIN: CPT | Mod: S$PBB,,, | Performed by: INTERNAL MEDICINE

## 2019-10-09 PROCEDURE — 36415 COLL VENOUS BLD VENIPUNCTURE: CPT

## 2019-10-09 PROCEDURE — 99999 PR PBB SHADOW E&M-EST. PATIENT-LVL III: ICD-10-PCS | Mod: PBBFAC,,, | Performed by: INTERNAL MEDICINE

## 2019-10-09 PROCEDURE — 99213 OFFICE O/P EST LOW 20 MIN: CPT | Mod: PBBFAC | Performed by: INTERNAL MEDICINE

## 2019-10-09 NOTE — PROGRESS NOTES
"Subjective:     HPI:  Mr. Delgado is a very pleasant 55 y.o. WM with history of NICMP diagnosed in 2010, ICD, LV thrombus (with prior splenic and renal emboli), paroxysmal atrial fib, HTN, HLP who comes today for a F/U. I had seen him about a month ago. Reported worsening BELTRAN with  NYHA class III symptoms. With occasional PND or orthopnea. I had increased his Lasix to 40 mg BID and added Losartan. Feels better. Diuresing better. Labs are pending.     Past Medical History:   Diagnosis Date    CHF (congestive heart failure) 1/2010    Dx  1/2010 w/ decreased LV systolic function (EF 15%) by ECHO 1/2015    COCM (congestive cardiomyopathy) 7/20/2016    Hyperlipidemia     Hypertension     Paroxysmal atrial fibrillation     Pulmonary embolus 2008    Stroke     Superficial thrombophlebitis      Past Surgical History:   Procedure Laterality Date    TONSILLECTOMY      VEIN LIGATION AND STRIPPING         Review of Systems   Constitution: Negative. Negative for chills, decreased appetite, diaphoresis, fever, malaise/fatigue, night sweats, weight gain and weight loss.   Eyes: Negative.    Cardiovascular: Positive for dyspnea on exertion. Negative for chest pain, claudication, cyanosis, irregular heartbeat, leg swelling, near-syncope, orthopnea, palpitations, paroxysmal nocturnal dyspnea and syncope.   Respiratory: Negative for cough, hemoptysis and shortness of breath.    Endocrine: Negative.    Hematologic/Lymphatic: Negative.    Skin: Negative for color change, dry skin and nail changes.   Musculoskeletal: Negative.    Gastrointestinal: Negative.    Genitourinary: Negative.    Neurological: Negative for weakness.       Objective:   Blood pressure 109/74, pulse 78, height 5' 10" (1.778 m), weight 117.7 kg (259 lb 7.7 oz), SpO2 97 %.body mass index is 37.23 kg/m².  Physical Exam   Constitutional: He appears well-developed.   /74 (BP Location: Right arm, Patient Position: Sitting, BP Method: Large (Automatic))   " "Pulse 78   Ht 5' 10" (1.778 m)   Wt 117.7 kg (259 lb 7.7 oz)   SpO2 97%   BMI 37.23 kg/m²      HENT:   Head: Normocephalic.   Neck: No JVD present. Carotid bruit is not present.   Cardiovascular: Regular rhythm, normal heart sounds and normal pulses. PMI is displaced.   No murmur heard.  Pulmonary/Chest: Effort normal and breath sounds normal. No respiratory distress. He has no wheezes. He has no rales.   Abdominal: Soft. Bowel sounds are normal. He exhibits no distension. There is no tenderness. There is no rebound.   Musculoskeletal: He exhibits no edema.   Neurological: He is alert.   Skin: Skin is warm.   Vitals reviewed.      Labs:    Chemistry        Component Value Date/Time     09/09/2019 1003    K 4.3 09/09/2019 1003     09/09/2019 1003    CO2 26 09/09/2019 1003    BUN 26 (H) 09/09/2019 1003    CREATININE 1.8 (H) 09/09/2019 1003     09/09/2019 1003        Component Value Date/Time    CALCIUM 10.0 09/09/2019 1003    ALKPHOS 45 (L) 03/29/2019 1226    AST 22 03/29/2019 1226    ALT 34 03/29/2019 1226    BILITOT 0.6 03/29/2019 1226    ESTGFRAFRICA 46.6 (A) 09/09/2019 1003    EGFRNONAA 40.3 (A) 09/09/2019 1003          Magnesium   Date Value Ref Range Status   07/14/2017 2.5 1.6 - 2.6 mg/dL Final     Lab Results   Component Value Date    WBC 9.12 08/14/2017    HGB 14.9 12/20/2018    HCT 44.5 08/14/2017     08/14/2017     Lab Results   Component Value Date    INR 2.9 04/17/2019    INR 1.9 (H) 12/20/2018    INR 2.3 (H) 09/17/2018     BNP   Date Value Ref Range Status   03/29/2019 401 (H) 0 - 99 pg/mL Final     Comment:     Values of less than 100 pg/ml are consistent with non-CHF populations.   12/20/2018 459 (H) 0 - 99 pg/mL Final     Comment:     Values of less than 100 pg/ml are consistent with non-CHF populations.   08/14/2017 357 (H) 0 - 99 pg/mL Final     Comment:     Values of less than 100 pg/ml are consistent with non-CHF populations.       Assessment:      1. Chronic systolic " congestive heart failure    2. Essential hypertension    3. Mixed hyperlipidemia    4. Paroxysmal atrial fibrillation        Plan:   Clinically reports feeling better. NYHA class II-III.   Continue Lasix to 40 mg BID  Continue Losartan 25 mg daily.   Continue current HF regimen.  Discussed about the CardioMEMS and GUIDE HF study but he wants to think about it.   Recommend 2 gram sodium restriction and 1500cc fluid restriction.  Encourage physical activity with graded exercise program - needs to lose weight.  Requested patient to weigh themselves daily, and to notify us if their weight increases by more than 3 lbs in 1 day or 5 lbs in 1 week.     RTC in 2 months with labs      Elham Pearson MD

## 2019-10-09 NOTE — LETTER
October 9, 2019        Dipesh De La Torre  [address] [place] [address]  Phone: [phone]  Fax: [phone]             [facility] [address] [place] [address]  Phone: [phone]   Patient: Tae Delgado   MR Number: [id number]   YOB: 1960   Date of Visit: 10/9/2019       Dear Dr. Dipesh De La Torre    Thank you for referring Tae Delgado to me for evaluation. Attached you will find relevant portions of my assessment and plan of care.    If you have questions, please do not hesitate to call me. I look forward to following Tae Delgado along with you.    Sincerely,    Elham Pearson MD    Enclosure    If you would like to receive this communication electronically, please contact [email or web address] or [phone] to request [employer] access.    [employer] is a tool which provides read-only access to select patient information with whom you have a relationship. Its easy to use and provides real time access to review your patients record including encounter summaries, notes, results, and demographic information.    If you feel you have received this communication in error or would no longer like to receive these types of communications, please e-mail [email or web address]

## 2019-10-10 ENCOUNTER — ANTI-COAG VISIT (OUTPATIENT)
Dept: CARDIOLOGY | Facility: CLINIC | Age: 59
End: 2019-10-10
Payer: MEDICARE

## 2019-10-10 DIAGNOSIS — I48.0 PAROXYSMAL ATRIAL FIBRILLATION: ICD-10-CM

## 2019-10-10 DIAGNOSIS — Z79.01 LONG TERM (CURRENT) USE OF ANTICOAGULANTS: ICD-10-CM

## 2019-10-10 PROCEDURE — 93793 ANTICOAG MGMT PT WARFARIN: CPT | Mod: ,,,

## 2019-10-10 PROCEDURE — 93793 PR ANTICOAGULANT MGMT FOR PT TAKING WARFARIN: ICD-10-PCS | Mod: ,,,

## 2019-10-10 NOTE — PROGRESS NOTES
Per pt said he was going to find another hospital to go too. I gave him our phone to call us so we can get that INR. He said he is behind 2 months instead of 6 months.

## 2019-10-10 NOTE — PROGRESS NOTES
INR received bc he has visit with Dr. Pearson yesterday. Otherwise, we would not have gotten an INR. Last INR was received 6 MONTHS AGO and that is inappropriate. Patient will be advised of risks with not monitoring coumadin as recommended including bleeding, stroke, and death. Will need to make sure he understands his next test date and knows how to reach us.

## 2019-10-19 ENCOUNTER — PATIENT MESSAGE (OUTPATIENT)
Dept: TRANSPLANT | Facility: CLINIC | Age: 59
End: 2019-10-19

## 2019-11-12 ENCOUNTER — CLINICAL SUPPORT (OUTPATIENT)
Dept: CARDIOLOGY | Facility: HOSPITAL | Age: 59
End: 2019-11-12

## 2019-11-12 DIAGNOSIS — I50.22 CHRONIC SYSTOLIC CHF (CONGESTIVE HEART FAILURE): ICD-10-CM

## 2019-11-12 DIAGNOSIS — Z95.810 AICD (AUTOMATIC CARDIOVERTER/DEFIBRILLATOR) PRESENT: ICD-10-CM

## 2019-11-12 DIAGNOSIS — I48.0 PAF (PAROXYSMAL ATRIAL FIBRILLATION): ICD-10-CM

## 2019-11-27 DIAGNOSIS — Z79.01 LONG TERM (CURRENT) USE OF ANTICOAGULANTS: ICD-10-CM

## 2019-11-27 DIAGNOSIS — I48.0 PAROXYSMAL ATRIAL FIBRILLATION: ICD-10-CM

## 2019-11-27 RX ORDER — WARFARIN 3 MG/1
TABLET ORAL
Qty: 30 TABLET | Refills: 5 | Status: ON HOLD | OUTPATIENT
Start: 2019-11-27 | End: 2020-02-17 | Stop reason: HOSPADM

## 2019-12-02 LAB — INR PPP: 2.5

## 2019-12-09 ENCOUNTER — ANTI-COAG VISIT (OUTPATIENT)
Dept: CARDIOLOGY | Facility: CLINIC | Age: 59
End: 2019-12-09
Payer: MEDICARE

## 2019-12-09 DIAGNOSIS — Z79.01 LONG TERM (CURRENT) USE OF ANTICOAGULANTS: ICD-10-CM

## 2019-12-09 DIAGNOSIS — I48.0 PAROXYSMAL ATRIAL FIBRILLATION: ICD-10-CM

## 2019-12-09 PROCEDURE — 93793 PR ANTICOAGULANT MGMT FOR PT TAKING WARFARIN: ICD-10-PCS | Mod: ,,,

## 2019-12-09 PROCEDURE — 93793 ANTICOAG MGMT PT WARFARIN: CPT | Mod: ,,,

## 2019-12-09 NOTE — PROGRESS NOTES
INR was due 11/20. Patient again not adhering to recommended follow up for INR. Nor is he calling us to r/s. Please make sure patient has our contact info and calls us to r/s lab appointments. If we dont know he goes to the lab, then we dont know to follow up for it and we could miss it. Additionally, please advise pt of increased risks of complications for patients who do not follow recommendations for lab follow up including bleeding or stroke, either of which could lead to hospitalizations and death.

## 2019-12-13 ENCOUNTER — HOSPITAL ENCOUNTER (OUTPATIENT)
Dept: CARDIOLOGY | Facility: CLINIC | Age: 59
Discharge: HOME | End: 2019-12-13
Attending: INTERNAL MEDICINE
Payer: MEDICARE

## 2019-12-13 ENCOUNTER — DOCUMENTATION ONLY (OUTPATIENT)
Dept: TRANSPLANT | Facility: CLINIC | Age: 59
End: 2019-12-13
Payer: MEDICARE

## 2019-12-13 ENCOUNTER — OFFICE VISIT (OUTPATIENT)
Dept: TRANSPLANT | Facility: CLINIC | Age: 59
End: 2019-12-13
Payer: MEDICARE

## 2019-12-13 VITALS
DIASTOLIC BLOOD PRESSURE: 76 MMHG | HEIGHT: 70 IN | SYSTOLIC BLOOD PRESSURE: 118 MMHG | HEIGHT: 70 IN | HEART RATE: 109 BPM | BODY MASS INDEX: 36.61 KG/M2 | HEART RATE: 107 BPM | SYSTOLIC BLOOD PRESSURE: 121 MMHG | WEIGHT: 259 LBS | BODY MASS INDEX: 37.08 KG/M2 | DIASTOLIC BLOOD PRESSURE: 84 MMHG | WEIGHT: 255.75 LBS

## 2019-12-13 DIAGNOSIS — N18.4 CKD (CHRONIC KIDNEY DISEASE), STAGE IV: ICD-10-CM

## 2019-12-13 DIAGNOSIS — I10 ESSENTIAL HYPERTENSION: ICD-10-CM

## 2019-12-13 DIAGNOSIS — I50.22 CHRONIC SYSTOLIC CONGESTIVE HEART FAILURE: ICD-10-CM

## 2019-12-13 DIAGNOSIS — I50.22 CHRONIC SYSTOLIC CONGESTIVE HEART FAILURE: Primary | ICD-10-CM

## 2019-12-13 DIAGNOSIS — E78.2 MIXED HYPERLIPIDEMIA: ICD-10-CM

## 2019-12-13 DIAGNOSIS — I48.0 PAROXYSMAL ATRIAL FIBRILLATION: ICD-10-CM

## 2019-12-13 LAB
ASCENDING AORTA: 3.22 CM
AV INDEX (PROSTH): 0.64
AV MEAN GRADIENT: 2 MMHG
AV PEAK GRADIENT: 2 MMHG
AV VALVE AREA: 3.11 CM2
AV VELOCITY RATIO: 0.67
BSA FOR ECHO PROCEDURE: 2.41 M2
CV ECHO LV RWT: 0.26 CM
DOP CALC AO PEAK VEL: 0.72 M/S
DOP CALC AO VTI: 11.83 CM
DOP CALC LVOT AREA: 4.9 CM2
DOP CALC LVOT DIAMETER: 2.49 CM
DOP CALC LVOT PEAK VEL: 0.48 M/S
DOP CALC LVOT STROKE VOLUME: 36.84 CM3
DOP CALCLVOT PEAK VEL VTI: 7.57 CM
E/E' RATIO: 13.47 M/S
ECHO LV POSTERIOR WALL: 0.95 CM (ref 0.6–1.1)
FRACTIONAL SHORTENING: 6 % (ref 28–44)
INTERVENTRICULAR SEPTUM: 0.83 CM (ref 0.6–1.1)
IVRT: 0.09 MSEC
LA MAJOR: 7.08 CM
LA MINOR: 6.96 CM
LA WIDTH: 4.09 CM
LEFT ATRIUM SIZE: 5.3 CM
LEFT ATRIUM VOLUME INDEX: 55.5 ML/M2
LEFT ATRIUM VOLUME: 129.34 CM3
LEFT INTERNAL DIMENSION IN SYSTOLE: 6.8 CM (ref 2.1–4)
LEFT VENTRICLE DIASTOLIC VOLUME INDEX: 92.57 ML/M2
LEFT VENTRICLE DIASTOLIC VOLUME: 215.65 ML
LEFT VENTRICLE MASS INDEX: 126 G/M2
LEFT VENTRICLE SYSTOLIC VOLUME INDEX: 79.7 ML/M2
LEFT VENTRICLE SYSTOLIC VOLUME: 185.69 ML
LEFT VENTRICULAR INTERNAL DIMENSION IN DIASTOLE: 7.2 CM (ref 3.5–6)
LEFT VENTRICULAR MASS: 292.55 G
LV LATERAL E/E' RATIO: 7.77 M/S
LV SEPTAL E/E' RATIO: 50.5 M/S
MV PEAK E VEL: 1.01 M/S
PISA TR MAX VEL: 2.81 M/S
RA MAJOR: 6.61 CM
RA PRESSURE: 15 MMHG
RA WIDTH: 6.27 CM
RIGHT VENTRICULAR END-DIASTOLIC DIMENSION: 5.17 CM
RV TISSUE DOPPLER FREE WALL SYSTOLIC VELOCITY 1 (APICAL 4 CHAMBER VIEW): 7.06 CM/S
SINUS: 3.47 CM
STJ: 3.19 CM
TDI LATERAL: 0.13 M/S
TDI SEPTAL: 0.02 M/S
TDI: 0.08 M/S
TR MAX PG: 32 MMHG
TRICUSPID ANNULAR PLANE SYSTOLIC EXCURSION: 1.33 CM
TV REST PULMONARY ARTERY PRESSURE: 47 MMHG

## 2019-12-13 PROCEDURE — 96372 THER/PROPH/DIAG INJ SC/IM: CPT | Mod: PBBFAC

## 2019-12-13 PROCEDURE — 99999 PR PBB SHADOW E&M-EST. PATIENT-LVL III: CPT | Mod: PBBFAC,,, | Performed by: INTERNAL MEDICINE

## 2019-12-13 PROCEDURE — 93306 ECHO (CUPID ONLY): ICD-10-PCS | Mod: 26,S$PBB,, | Performed by: INTERNAL MEDICINE

## 2019-12-13 PROCEDURE — 99999 PR PBB SHADOW E&M-EST. PATIENT-LVL III: ICD-10-PCS | Mod: PBBFAC,,, | Performed by: INTERNAL MEDICINE

## 2019-12-13 PROCEDURE — 93306 TTE W/DOPPLER COMPLETE: CPT | Mod: PBBFAC | Performed by: INTERNAL MEDICINE

## 2019-12-13 PROCEDURE — 99214 PR OFFICE/OUTPT VISIT, EST, LEVL IV, 30-39 MIN: ICD-10-PCS | Mod: S$PBB,,, | Performed by: INTERNAL MEDICINE

## 2019-12-13 PROCEDURE — 99213 OFFICE O/P EST LOW 20 MIN: CPT | Mod: PBBFAC,25 | Performed by: INTERNAL MEDICINE

## 2019-12-13 PROCEDURE — 99214 OFFICE O/P EST MOD 30 MIN: CPT | Mod: S$PBB,,, | Performed by: INTERNAL MEDICINE

## 2019-12-13 RX ORDER — FUROSEMIDE 10 MG/ML
60 INJECTION INTRAMUSCULAR; INTRAVENOUS
Status: COMPLETED | OUTPATIENT
Start: 2019-12-13 | End: 2019-12-13

## 2019-12-13 RX ADMIN — FUROSEMIDE 60 MG: 10 INJECTION, SOLUTION INTRAMUSCULAR; INTRAVENOUS at 11:12

## 2019-12-13 NOTE — LETTER
December 13, 2019        Dipesh De La Torre  [address] [facility] [place] [address]  Phone: [phone]  Fax: [phone]             [facility] [address] [place] [address]  Phone: [phone]   Patient: Tae Delgado   MR Number: [id number]   YOB: 1960   Date of Visit: 12/13/2019       Dear Dr. Dipesh De La Torre    Thank you for referring Tae Delgado to me for evaluation. Attached you will find relevant portions of my assessment and plan of care.    If you have questions, please do not hesitate to call me. I look forward to following Tae Delgado along with you.    Sincerely,    Elham Pearson MD    Enclosure    If you would like to receive this communication electronically, please contact [email or web address] or [phone] to request [employer] access.    [employer] is a tool which provides read-only access to select patient information with whom you have a relationship. Its easy to use and provides real time access to review your patients record including encounter summaries, notes, results, and demographic information.    If you feel you have received this communication in error or would no longer like to receive these types of communications, please e-mail [email or web address]

## 2019-12-13 NOTE — PROGRESS NOTES
"Subjective:     HPI:  Mr. Delgado is a very pleasant 55 y.o. WM with history of NICMP diagnosed in 2010, ICD, LV thrombus (with prior splenic and renal emboli), paroxysmal atrial fib, HTN, HLP who comes today for a F/U. I had seen him about a month ago. Reported worsening BELTRAN with  NYHA class III symptoms. With occasional PND or orthopnea. I had increased his Lasix to 40 mg BID and added Losartan. However he dicontinued his Losartan thinking it was making his symptoms worse. Also has been reporting exertional chest pain.     Past Medical History:   Diagnosis Date    CHF (congestive heart failure) 1/2010    Dx  1/2010 w/ decreased LV systolic function (EF 15%) by ECHO 1/2015    COCM (congestive cardiomyopathy) 7/20/2016    Hyperlipidemia     Hypertension     Paroxysmal atrial fibrillation     Pulmonary embolus 2008    Stroke     Superficial thrombophlebitis      Past Surgical History:   Procedure Laterality Date    TONSILLECTOMY      VEIN LIGATION AND STRIPPING         Review of Systems   Constitution: Negative. Negative for chills, decreased appetite, diaphoresis, fever, malaise/fatigue, night sweats, weight gain and weight loss.   Eyes: Negative.    Cardiovascular: Positive for chest pain and dyspnea on exertion. Negative for claudication, cyanosis, irregular heartbeat, leg swelling, near-syncope, orthopnea, palpitations, paroxysmal nocturnal dyspnea and syncope.   Respiratory: Negative for cough, hemoptysis and shortness of breath.    Endocrine: Negative.    Hematologic/Lymphatic: Negative.    Skin: Negative for color change, dry skin and nail changes.   Musculoskeletal: Negative.    Gastrointestinal: Negative.    Genitourinary: Negative.    Neurological: Negative for weakness.       Objective:   Blood pressure 121/84, pulse 109, height 5' 10" (1.778 m), weight 116 kg (255 lb 11.7 oz).body mass index is 36.69 kg/m².  Physical Exam   Constitutional: He appears well-developed.   /84 (BP Location: Left " "arm, Patient Position: Sitting, BP Method: Medium (Automatic))   Pulse 109   Ht 5' 10" (1.778 m)   Wt 116 kg (255 lb 11.7 oz)   BMI 36.69 kg/m²      HENT:   Head: Normocephalic.   Neck: JVD present. Carotid bruit is not present.   Cardiovascular: Regular rhythm, normal heart sounds and normal pulses. PMI is displaced.   No murmur heard.  Pulmonary/Chest: Effort normal and breath sounds normal. No respiratory distress. He has no wheezes. He has no rales.   Abdominal: Soft. Bowel sounds are normal. He exhibits no distension. There is no tenderness. There is no rebound.   Musculoskeletal: He exhibits edema.   Neurological: He is alert.   Skin: Skin is warm.   Vitals reviewed.      Labs:    Chemistry        Component Value Date/Time     12/13/2019 0904    K 3.8 12/13/2019 0904     12/13/2019 0904    CO2 26 12/13/2019 0904    BUN 23 (H) 12/13/2019 0904    CREATININE 1.7 (H) 12/13/2019 0904     (H) 12/13/2019 0904        Component Value Date/Time    CALCIUM 9.5 12/13/2019 0904    ALKPHOS 46 (L) 10/09/2019 1050    AST 34 10/09/2019 1050    ALT 48 (H) 10/09/2019 1050    BILITOT 0.5 10/09/2019 1050    ESTGFRAFRICA 49.9 (A) 12/13/2019 0904    EGFRNONAA 43.2 (A) 12/13/2019 0904          Magnesium   Date Value Ref Range Status   07/14/2017 2.5 1.6 - 2.6 mg/dL Final     Lab Results   Component Value Date    WBC 9.12 08/14/2017    HGB 14.9 12/20/2018    HCT 44.5 08/14/2017     08/14/2017     Lab Results   Component Value Date    INR 2.5 12/02/2019    INR 2.2 (H) 10/09/2019    INR 2.9 04/17/2019     BNP   Date Value Ref Range Status   10/09/2019 754 (H) 0 - 99 pg/mL Final     Comment:     Values of less than 100 pg/ml are consistent with non-CHF populations.   03/29/2019 401 (H) 0 - 99 pg/mL Final     Comment:     Values of less than 100 pg/ml are consistent with non-CHF populations.   12/20/2018 459 (H) 0 - 99 pg/mL Final     Comment:     Values of less than 100 pg/ml are consistent with non-CHF " populations.       Assessment:      1. Chronic systolic congestive heart failure    2. Essential hypertension    3. Mixed hyperlipidemia    4. Paroxysmal atrial fibrillation        Plan:   Clinically reports feeling better. NYHA class III.   Was given 60 mg of IV lasix on clinic along 50 meq of K.   Continue current HF regimen.  Discussed about the CardioMEMS and GUIDE HF study but he wants to think about it and will rediscuss next month  Cardiac PET stress to r/o ischemia.   Recommend 2 gram sodium restriction and 1500cc fluid restriction.  Encourage physical activity with graded exercise program - needs to lose weight.  Requested patient to weigh themselves daily, and to notify us if their weight increases by more than 3 lbs in 1 day or 5 lbs in 1 week.     RTC in 2 weeks with labs      Elham Pearson MD

## 2019-12-19 ENCOUNTER — TELEPHONE (OUTPATIENT)
Dept: TRANSPLANT | Facility: CLINIC | Age: 59
End: 2019-12-19

## 2019-12-19 NOTE — TELEPHONE ENCOUNTER
2:20 pm:  Returned call to pt twice.  No answer either time and message said voice mail box not set up.    Will ask  MA fax as requested and notify pt        ----- Message from Ksenia Fernandes sent at 12/19/2019  2:14 PM CST -----  Contact: pt  Sorry about that.....    Hillary  [facility] [phone] ATTN: Holley Plunkett NP today before 4:45pm.  Pt would like to be notified when this has been done [phone].    Thank you

## 2019-12-19 NOTE — TELEPHONE ENCOUNTER
Spoke to pt friend Lavern and informed her that [facility]. Lavern told me she would let pt know because he had  Just left  out the door.

## 2020-01-06 ENCOUNTER — TELEPHONE (OUTPATIENT)
Dept: TRANSPLANT | Facility: CLINIC | Age: 60
End: 2020-01-06

## 2020-01-06 ENCOUNTER — PATIENT MESSAGE (OUTPATIENT)
Dept: TRANSPLANT | Facility: CLINIC | Age: 60
End: 2020-01-06

## 2020-01-07 ENCOUNTER — TELEPHONE (OUTPATIENT)
Dept: CARDIOLOGY | Facility: CLINIC | Age: 60
End: 2020-01-07

## 2020-01-09 ENCOUNTER — OFFICE VISIT (OUTPATIENT)
Dept: NEPHROLOGY | Facility: CLINIC | Age: 60
DRG: 001 | End: 2020-01-09
Payer: MEDICARE

## 2020-01-09 ENCOUNTER — CLINICAL SUPPORT (OUTPATIENT)
Dept: CARDIOLOGY | Facility: CLINIC | Age: 60
End: 2020-01-09
Attending: INTERNAL MEDICINE
Payer: MEDICARE

## 2020-01-09 ENCOUNTER — OFFICE VISIT (OUTPATIENT)
Dept: TRANSPLANT | Facility: CLINIC | Age: 60
DRG: 001 | End: 2020-01-09
Payer: MEDICARE

## 2020-01-09 ENCOUNTER — HOSPITAL ENCOUNTER (INPATIENT)
Facility: HOSPITAL | Age: 60
LOS: 39 days | Discharge: NURSING, REHAB OR LONG-TERM CARE | DRG: 001 | End: 2020-02-17
Attending: INTERNAL MEDICINE | Admitting: INTERNAL MEDICINE
Payer: MEDICARE

## 2020-01-09 VITALS
HEART RATE: 80 BPM | DIASTOLIC BLOOD PRESSURE: 83 MMHG | WEIGHT: 252.63 LBS | SYSTOLIC BLOOD PRESSURE: 110 MMHG | HEIGHT: 70 IN | BODY MASS INDEX: 36.17 KG/M2

## 2020-01-09 VITALS
WEIGHT: 251.31 LBS | DIASTOLIC BLOOD PRESSURE: 86 MMHG | BODY MASS INDEX: 36.06 KG/M2 | SYSTOLIC BLOOD PRESSURE: 104 MMHG | OXYGEN SATURATION: 98 % | HEART RATE: 85 BPM

## 2020-01-09 DIAGNOSIS — N18.9 ACUTE KIDNEY INJURY SUPERIMPOSED ON CHRONIC KIDNEY DISEASE: ICD-10-CM

## 2020-01-09 DIAGNOSIS — R73.9 ACUTE HYPERGLYCEMIA: ICD-10-CM

## 2020-01-09 DIAGNOSIS — Z78.9 ON ENTERAL NUTRITION: ICD-10-CM

## 2020-01-09 DIAGNOSIS — I10 ESSENTIAL HYPERTENSION: ICD-10-CM

## 2020-01-09 DIAGNOSIS — I50.9 ACUTE ON CHRONIC HEART FAILURE: ICD-10-CM

## 2020-01-09 DIAGNOSIS — Q24.9 HEART ABNORMALITY: ICD-10-CM

## 2020-01-09 DIAGNOSIS — I42.0 DILATED CARDIOMYOPATHY: ICD-10-CM

## 2020-01-09 DIAGNOSIS — R74.01 TRANSAMINITIS: ICD-10-CM

## 2020-01-09 DIAGNOSIS — I50.22 CHRONIC SYSTOLIC CONGESTIVE HEART FAILURE: ICD-10-CM

## 2020-01-09 DIAGNOSIS — Z76.82 HEART TRANSPLANT CANDIDATE: ICD-10-CM

## 2020-01-09 DIAGNOSIS — R53.81 DEBILITY: ICD-10-CM

## 2020-01-09 DIAGNOSIS — N17.9 ACUTE KIDNEY INJURY SUPERIMPOSED ON CHRONIC KIDNEY DISEASE: ICD-10-CM

## 2020-01-09 DIAGNOSIS — I50.43 ACUTE ON CHRONIC COMBINED SYSTOLIC AND DIASTOLIC HEART FAILURE, NYHA CLASS 4: ICD-10-CM

## 2020-01-09 DIAGNOSIS — E78.2 MIXED HYPERLIPIDEMIA: ICD-10-CM

## 2020-01-09 DIAGNOSIS — I24.0 LEFT VENTRICULAR THROMBUS WITHOUT MI: ICD-10-CM

## 2020-01-09 DIAGNOSIS — R91.1 RIGHT LOWER LOBE PULMONARY NODULE: ICD-10-CM

## 2020-01-09 DIAGNOSIS — D68.9 COAGULOPATHY: ICD-10-CM

## 2020-01-09 DIAGNOSIS — J95.89 ACUTE POSTOPERATIVE RESPIRATORY INSUFFICIENCY: ICD-10-CM

## 2020-01-09 DIAGNOSIS — Z95.810 ICD (IMPLANTABLE CARDIOVERTER-DEFIBRILLATOR) IN PLACE: ICD-10-CM

## 2020-01-09 DIAGNOSIS — N18.30 CHRONIC KIDNEY DISEASE, STAGE III (MODERATE): Primary | ICD-10-CM

## 2020-01-09 DIAGNOSIS — I50.43 ACUTE ON CHRONIC COMBINED SYSTOLIC AND DIASTOLIC HEART FAILURE: Primary | ICD-10-CM

## 2020-01-09 DIAGNOSIS — I48.0 PAROXYSMAL ATRIAL FIBRILLATION: ICD-10-CM

## 2020-01-09 DIAGNOSIS — I50.9 CHF (CONGESTIVE HEART FAILURE): ICD-10-CM

## 2020-01-09 DIAGNOSIS — I82.90 THROMBUS: ICD-10-CM

## 2020-01-09 DIAGNOSIS — Z95.811 LVAD (LEFT VENTRICULAR ASSIST DEVICE) PRESENT: ICD-10-CM

## 2020-01-09 DIAGNOSIS — I47.29 NSVT (NONSUSTAINED VENTRICULAR TACHYCARDIA): ICD-10-CM

## 2020-01-09 DIAGNOSIS — I10 HYPERTENSION, UNSPECIFIED TYPE: ICD-10-CM

## 2020-01-09 DIAGNOSIS — R94.31 ABNORMAL EKG: ICD-10-CM

## 2020-01-09 DIAGNOSIS — Z01.818 PRE-TRANSPLANT EVALUATION FOR HEART TRANSPLANT: ICD-10-CM

## 2020-01-09 DIAGNOSIS — I51.3 THROMBUS OF LEFT ATRIAL APPENDAGE: ICD-10-CM

## 2020-01-09 DIAGNOSIS — I50.22 CHRONIC SYSTOLIC HEART FAILURE: ICD-10-CM

## 2020-01-09 DIAGNOSIS — E87.0 HYPERNATREMIA: ICD-10-CM

## 2020-01-09 DIAGNOSIS — K76.1 HEPATIC CONGESTION: ICD-10-CM

## 2020-01-09 DIAGNOSIS — I50.9 HEART FAILURE: ICD-10-CM

## 2020-01-09 DIAGNOSIS — D72.829 LEUKOCYTOSIS, UNSPECIFIED TYPE: ICD-10-CM

## 2020-01-09 LAB
ALBUMIN SERPL BCP-MCNC: 4.1 G/DL (ref 3.5–5.2)
ALP SERPL-CCNC: 53 U/L (ref 55–135)
ALT SERPL W/O P-5'-P-CCNC: 132 U/L (ref 10–44)
ANION GAP SERPL CALC-SCNC: 11 MMOL/L (ref 8–16)
AST SERPL-CCNC: 96 U/L (ref 10–40)
BASOPHILS # BLD AUTO: 0.05 K/UL (ref 0–0.2)
BASOPHILS NFR BLD: 0.6 % (ref 0–1.9)
BILIRUB SERPL-MCNC: 1.5 MG/DL (ref 0.1–1)
BUN SERPL-MCNC: 47 MG/DL (ref 6–20)
CALCIUM SERPL-MCNC: 10.2 MG/DL (ref 8.7–10.5)
CHLORIDE SERPL-SCNC: 100 MMOL/L (ref 95–110)
CO2 SERPL-SCNC: 30 MMOL/L (ref 23–29)
CREAT SERPL-MCNC: 2.6 MG/DL (ref 0.5–1.4)
DIFFERENTIAL METHOD: ABNORMAL
EOSINOPHIL # BLD AUTO: 0.1 K/UL (ref 0–0.5)
EOSINOPHIL NFR BLD: 1.1 % (ref 0–8)
ERYTHROCYTE [DISTWIDTH] IN BLOOD BY AUTOMATED COUNT: 15 % (ref 11.5–14.5)
EST. GFR  (AFRICAN AMERICAN): 29.9 ML/MIN/1.73 M^2
EST. GFR  (NON AFRICAN AMERICAN): 25.8 ML/MIN/1.73 M^2
GLUCOSE SERPL-MCNC: 96 MG/DL (ref 70–110)
HCT VFR BLD AUTO: 50 % (ref 40–54)
HGB BLD-MCNC: 15.6 G/DL (ref 14–18)
IMM GRANULOCYTES # BLD AUTO: 0.04 K/UL (ref 0–0.04)
IMM GRANULOCYTES NFR BLD AUTO: 0.5 % (ref 0–0.5)
LYMPHOCYTES # BLD AUTO: 2.6 K/UL (ref 1–4.8)
LYMPHOCYTES NFR BLD: 29.1 % (ref 18–48)
MAGNESIUM SERPL-MCNC: 2.2 MG/DL (ref 1.6–2.6)
MCH RBC QN AUTO: 27.9 PG (ref 27–31)
MCHC RBC AUTO-ENTMCNC: 31.2 G/DL (ref 32–36)
MCV RBC AUTO: 89 FL (ref 82–98)
MONOCYTES # BLD AUTO: 0.8 K/UL (ref 0.3–1)
MONOCYTES NFR BLD: 9.1 % (ref 4–15)
NEUTROPHILS # BLD AUTO: 5.2 K/UL (ref 1.8–7.7)
NEUTROPHILS NFR BLD: 59.6 % (ref 38–73)
NRBC BLD-RTO: 0 /100 WBC
PHOSPHATE SERPL-MCNC: 4 MG/DL (ref 2.7–4.5)
PLATELET # BLD AUTO: 227 K/UL (ref 150–350)
PMV BLD AUTO: 11.4 FL (ref 9.2–12.9)
POTASSIUM SERPL-SCNC: 4.1 MMOL/L (ref 3.5–5.1)
PROT SERPL-MCNC: 7.5 G/DL (ref 6–8.4)
RBC # BLD AUTO: 5.59 M/UL (ref 4.6–6.2)
SODIUM SERPL-SCNC: 141 MMOL/L (ref 136–145)
WBC # BLD AUTO: 8.79 K/UL (ref 3.9–12.7)

## 2020-01-09 PROCEDURE — 99214 OFFICE O/P EST MOD 30 MIN: CPT | Mod: PBBFAC,25 | Performed by: STUDENT IN AN ORGANIZED HEALTH CARE EDUCATION/TRAINING PROGRAM

## 2020-01-09 PROCEDURE — 83735 ASSAY OF MAGNESIUM: CPT

## 2020-01-09 PROCEDURE — 99204 PR OFFICE/OUTPT VISIT, NEW, LEVL IV, 45-59 MIN: ICD-10-PCS | Mod: S$PBB,GC,, | Performed by: STUDENT IN AN ORGANIZED HEALTH CARE EDUCATION/TRAINING PROGRAM

## 2020-01-09 PROCEDURE — 84100 ASSAY OF PHOSPHORUS: CPT

## 2020-01-09 PROCEDURE — 99999 PR PBB SHADOW E&M-EST. PATIENT-LVL III: ICD-10-PCS | Mod: PBBFAC,,, | Performed by: INTERNAL MEDICINE

## 2020-01-09 PROCEDURE — 99204 OFFICE O/P NEW MOD 45 MIN: CPT | Mod: S$PBB,GC,, | Performed by: STUDENT IN AN ORGANIZED HEALTH CARE EDUCATION/TRAINING PROGRAM

## 2020-01-09 PROCEDURE — 36415 COLL VENOUS BLD VENIPUNCTURE: CPT

## 2020-01-09 PROCEDURE — 63600175 PHARM REV CODE 636 W HCPCS: Performed by: HOSPITALIST

## 2020-01-09 PROCEDURE — 99214 PR OFFICE/OUTPT VISIT, EST, LEVL IV, 30-39 MIN: ICD-10-PCS | Mod: S$PBB,,, | Performed by: INTERNAL MEDICINE

## 2020-01-09 PROCEDURE — 85025 COMPLETE CBC W/AUTO DIFF WBC: CPT

## 2020-01-09 PROCEDURE — 93005 ELECTROCARDIOGRAM TRACING: CPT

## 2020-01-09 PROCEDURE — 93010 EKG 12-LEAD: ICD-10-PCS | Mod: ,,, | Performed by: INTERNAL MEDICINE

## 2020-01-09 PROCEDURE — 99999 PR PBB SHADOW E&M-EST. PATIENT-LVL IV: CPT | Mod: PBBFAC,GC,, | Performed by: STUDENT IN AN ORGANIZED HEALTH CARE EDUCATION/TRAINING PROGRAM

## 2020-01-09 PROCEDURE — 25000003 PHARM REV CODE 250: Performed by: HOSPITALIST

## 2020-01-09 PROCEDURE — 99999 PR PBB SHADOW E&M-EST. PATIENT-LVL IV: ICD-10-PCS | Mod: PBBFAC,GC,, | Performed by: STUDENT IN AN ORGANIZED HEALTH CARE EDUCATION/TRAINING PROGRAM

## 2020-01-09 PROCEDURE — 99214 OFFICE O/P EST MOD 30 MIN: CPT | Mod: S$PBB,,, | Performed by: INTERNAL MEDICINE

## 2020-01-09 PROCEDURE — 99999 PR PBB SHADOW E&M-EST. PATIENT-LVL III: CPT | Mod: PBBFAC,,, | Performed by: INTERNAL MEDICINE

## 2020-01-09 PROCEDURE — 93010 ELECTROCARDIOGRAM REPORT: CPT | Mod: ,,, | Performed by: INTERNAL MEDICINE

## 2020-01-09 PROCEDURE — 99213 OFFICE O/P EST LOW 20 MIN: CPT | Mod: PBBFAC,25,27 | Performed by: INTERNAL MEDICINE

## 2020-01-09 PROCEDURE — 20600001 HC STEP DOWN PRIVATE ROOM

## 2020-01-09 PROCEDURE — 80053 COMPREHEN METABOLIC PANEL: CPT

## 2020-01-09 RX ORDER — ISOSORBIDE DINITRATE AND HYDRALAZINE HYDROCHLORIDE 37.5; 2 MG/1; MG/1
1 TABLET ORAL 3 TIMES DAILY
Status: DISCONTINUED | OUTPATIENT
Start: 2020-01-09 | End: 2020-01-12

## 2020-01-09 RX ORDER — FUROSEMIDE 10 MG/ML
80 INJECTION INTRAMUSCULAR; INTRAVENOUS ONCE
Status: COMPLETED | OUTPATIENT
Start: 2020-01-09 | End: 2020-01-09

## 2020-01-09 RX ORDER — METOPROLOL SUCCINATE 50 MG/1
50 TABLET, EXTENDED RELEASE ORAL DAILY
Status: DISCONTINUED | OUTPATIENT
Start: 2020-01-10 | End: 2020-01-09

## 2020-01-09 RX ORDER — AMIODARONE HYDROCHLORIDE 200 MG/1
200 TABLET ORAL DAILY
Status: DISCONTINUED | OUTPATIENT
Start: 2020-01-10 | End: 2020-01-23

## 2020-01-09 RX ORDER — WARFARIN 3 MG/1
3 TABLET ORAL DAILY
Status: DISCONTINUED | OUTPATIENT
Start: 2020-01-10 | End: 2020-01-10

## 2020-01-09 RX ORDER — MONTELUKAST SODIUM 10 MG/1
1 TABLET ORAL DAILY
COMMUNITY
Start: 2019-12-16 | End: 2020-01-09

## 2020-01-09 RX ORDER — HYDROCORTISONE 25 MG/G
1 OINTMENT TOPICAL 2 TIMES DAILY
Status: ON HOLD | COMMUNITY
Start: 2019-12-27 | End: 2020-02-17 | Stop reason: HOSPADM

## 2020-01-09 RX ORDER — SODIUM CHLORIDE 0.9 % (FLUSH) 0.9 %
10 SYRINGE (ML) INJECTION
Status: DISCONTINUED | OUTPATIENT
Start: 2020-01-09 | End: 2020-02-17 | Stop reason: HOSPADM

## 2020-01-09 RX ORDER — ALBUTEROL SULFATE 90 UG/1
1 AEROSOL, METERED RESPIRATORY (INHALATION) EVERY 4 HOURS PRN
COMMUNITY
Start: 2019-12-16 | End: 2020-02-28

## 2020-01-09 RX ADMIN — FUROSEMIDE 80 MG: 10 INJECTION, SOLUTION INTRAMUSCULAR; INTRAVENOUS at 09:01

## 2020-01-09 RX ADMIN — HYDRALAZINE HYDROCHLORIDE AND ISOSORBIDE DINITRATE 1 TABLET: 37.5; 2 TABLET, FILM COATED ORAL at 09:01

## 2020-01-09 RX ADMIN — FUROSEMIDE 20 MG/HR: 10 INJECTION, SOLUTION INTRAMUSCULAR; INTRAVENOUS at 07:01

## 2020-01-09 NOTE — Clinical Note
The PA catheter is repositioned to the main pulmonary artery. Hemodynamics performed. O2 saturation measured at 67%.

## 2020-01-09 NOTE — PATIENT INSTRUCTIONS
- Return to clinic in 3 months  - CBC, Urine studies today  - CMP, Iron studies, PTH,  Renal Ultrasound for next appointment  -

## 2020-01-09 NOTE — LETTER
[address]  [facility] [address]  Phone: [phone]  Fax: [phone]          01/27/2020    [facility]  [address] [place] [address]. [address]    To Whom It May Concern:    This letter is to inform you that one of our patients will be discharged into your community.  We want to let you know because this patient has special healthcare needs.   As a potential , you may appreciate knowing in advance how to respond to potential emergencies concerning this patient.    The patient (whose contact information appears below) has a Heartmate 3 Left Ventricular Assist System (LVAD) or blood pump.  The device is implanted alongside the patients native heart.  It takes over the pumping function of the patients sick or weakened heart so that the patients lungs, organs, and tissues get the oxygen-rich blood they need.  The LVAD is a life-sustaining device. The patients information is as follows:    Tae Delgado  59 y.o. 1960  [address] [place] [id number]    This device runs on batteries and AC power.   There is no hand-pumping.  There is a back up controller, batteries, battery charger and a Mobile power unit (MPU). If called to patient house, please take the emergency bag with the extra controller, batteries, and clips with patient to the hospital also, PAGE the VAD COORDINATOR on call immediately via the  [phone] for VAD-specific emergency instructions. This patient CAN have chest compressions and CAN be defibrillated/DC Cardioverted. Please DO NOT use a MARK Device. This device is fluid dependent so you may need to administer  cc to start.  THIS PATIENT MAY NOT HAVE A BLOOD PRESSURE OR A PALPABLE PULSE.  For additional information, visit www.[email or web address].com for further VAD education materials.       Please contact one of the VAD coordinators with any further questions or concerns.  Brittni Wright RN, CCRN     [phone]    Vaishali Bruner, RN--[phone]   Alta Staples, LATONYAN, RN--[phone]   Asiya Davies, LATONYAN, RN, CCRN [phone]   VAD Office Fax: [phone]  Sincerely,      Jamaica Doshi M.D.  Medical Director, Mechanical Circulatory Device Support Program  Section of Cardiomyopathy & Heart Transplantation

## 2020-01-09 NOTE — PROGRESS NOTES
"Subjective:     HPI:  Mr. Delgado is a very pleasant 55 y.o. WM with history of NICMP diagnosed in 2010, ICD, LV thrombus (with prior splenic and renal emboli), paroxysmal atrial fib, HTN, HLP who comes today for a F/U. I had seen him about a month ago. Reported worsening BELTRAN with  NYHA class III symptoms. With occasional PND or orthopnea. I had increased his Lasix dose and added Losartan. However he dicontinued his Losartan thinking it was making his symptoms worse. Also has been reporting exertional chest pain. Today he underwent a cardiac PET stress but could not complete it as he could not lay flat.    Past Medical History:   Diagnosis Date    CHF (congestive heart failure) 1/2010    Dx  1/2010 w/ decreased LV systolic function (EF 15%) by ECHO 1/2015    COCM (congestive cardiomyopathy) 7/20/2016    Hyperlipidemia     Hypertension     Paroxysmal atrial fibrillation     Pulmonary embolus 2008    Stroke     Superficial thrombophlebitis      Past Surgical History:   Procedure Laterality Date    TONSILLECTOMY      VEIN LIGATION AND STRIPPING         Review of Systems   Constitution: Positive for weight gain. Negative for chills, decreased appetite, diaphoresis, fever, malaise/fatigue, night sweats and weight loss.   Eyes: Negative.    Cardiovascular: Positive for dyspnea on exertion, orthopnea and paroxysmal nocturnal dyspnea. Negative for chest pain, claudication, cyanosis, irregular heartbeat, leg swelling, near-syncope, palpitations and syncope.   Respiratory: Negative for cough, hemoptysis and shortness of breath.    Endocrine: Negative.    Hematologic/Lymphatic: Negative.    Skin: Negative for color change, dry skin and nail changes.   Musculoskeletal: Negative.    Gastrointestinal: Negative.    Genitourinary: Negative.    Neurological: Negative for weakness.       Objective:   Blood pressure 110/83, pulse 80, height 5' 10" (1.778 m), weight 114.6 kg (252 lb 10.4 oz).body mass index is 36.25 " "kg/m².  Physical Exam   Constitutional: He appears well-developed.   /83 (BP Location: Right arm, Patient Position: Sitting, BP Method: Large (Automatic))   Pulse 80   Ht 5' 10" (1.778 m)   Wt 114.6 kg (252 lb 10.4 oz)   BMI 36.25 kg/m²      HENT:   Head: Normocephalic.   Neck: JVD present. Carotid bruit is not present.   Cardiovascular: Regular rhythm, normal heart sounds and normal pulses. PMI is displaced.   No murmur heard.  Pulmonary/Chest: Effort normal and breath sounds normal. No respiratory distress. He has no wheezes. He has no rales.   Abdominal: Soft. Bowel sounds are normal. He exhibits distension. There is no tenderness.   Musculoskeletal: He exhibits edema.   Neurological: He is alert.   Skin: Skin is warm.   Vitals reviewed.      Labs:    Chemistry        Component Value Date/Time     01/09/2020 1203     01/09/2020 1203    K 4.7 01/09/2020 1203    K 4.7 01/09/2020 1203     01/09/2020 1203     01/09/2020 1203    CO2 28 01/09/2020 1203    CO2 28 01/09/2020 1203    BUN 49 (H) 01/09/2020 1203    BUN 49 (H) 01/09/2020 1203    CREATININE 2.6 (H) 01/09/2020 1203    CREATININE 2.6 (H) 01/09/2020 1203     01/09/2020 1203     01/09/2020 1203        Component Value Date/Time    CALCIUM 10.3 01/09/2020 1203    CALCIUM 10.3 01/09/2020 1203    ALKPHOS 51 (L) 01/09/2020 1203    AST 92 (H) 01/09/2020 1203     (H) 01/09/2020 1203    BILITOT 1.8 (H) 01/09/2020 1203    ESTGFRAFRICA 29.9 (A) 01/09/2020 1203    ESTGFRAFRICA 29.9 (A) 01/09/2020 1203    EGFRNONAA 25.8 (A) 01/09/2020 1203    EGFRNONAA 25.8 (A) 01/09/2020 1203          Magnesium   Date Value Ref Range Status   07/14/2017 2.5 1.6 - 2.6 mg/dL Final     Lab Results   Component Value Date    WBC 8.34 01/09/2020    HGB 15.6 01/09/2020    HCT 49.1 01/09/2020     01/09/2020     Lab Results   Component Value Date    INR 3.0 (H) 01/09/2020    INR 2.5 12/02/2019    INR 2.2 (H) 10/09/2019     BNP   Date " Value Ref Range Status   10/09/2019 754 (H) 0 - 99 pg/mL Final     Comment:     Values of less than 100 pg/ml are consistent with non-CHF populations.   03/29/2019 401 (H) 0 - 99 pg/mL Final     Comment:     Values of less than 100 pg/ml are consistent with non-CHF populations.   12/20/2018 459 (H) 0 - 99 pg/mL Final     Comment:     Values of less than 100 pg/ml are consistent with non-CHF populations.       Assessment:      1. Acute on chronic combined systolic and diastolic heart failure    2. Dilated cardiomyopathy    3. Essential hypertension    4. Mixed hyperlipidemia    5. Paroxysmal atrial fibrillation        Plan:   Clinically reports worsening BELTRAN, NYHA class III.   Appears with significant volume overload on exam.  Admit for IV diuresis.   Discussed about the CardioMEMS and GUIDE HF study but he wants to think about it   Cardiac PET stress donr today to r/o  ischemia.   Recommend 2 gram sodium restriction and 1500cc fluid restriction.  Encourage physical activity with graded exercise program - needs to lose weight.  Requested patient to weigh themselves daily, and to notify us if their weight increases by more than 3 lbs in 1 day or 5 lbs in 1 week.          Elham Pearson MD

## 2020-01-09 NOTE — Clinical Note
The site was marked. Prepped: right neck. Prepped with: Betadine and alcohol. The site was clipped. The patient was draped.

## 2020-01-09 NOTE — Clinical Note
Pre- existing cortis catheter in place in the RIJ, Replaced in room with INTRODUCER RX PSI KIT 8.5 FR.

## 2020-01-09 NOTE — LETTER
[address]  [facility] [address]  Phone: [phone]  Fax: [phone]             01/27/2020  Marbella Blankenship [address]  [address] [place] [address]    To Whom It May Concern,     This letter is to inform you that we anticipate that one of our patients will be discharged into your community.  We want to let you know because this patient has special healthcare needs.      The patient (whose contact information appears below) has a Heartmate 3 Left Ventricular Assist Device (LVAD) or blood pump.  The device is surgically implanted alongside the patients native heart.  It takes over the pumping function of the patients sick or weakened heart so that the patients lungs, organs, and tissues get the oxygen-rich blood they need.  The LVAD is a life-sustaining device. The patients information is as follows:    Tae Delgado  59 y.o. 1960  [address] [place] [address]    This device runs on batteries and AC power.   There is no hand-pumping.  There is a back up controller, batteries, battery charger and a Mobile Power Unit (MPU)/Power Module. If called to patient house, please take the emergency bag with the extra controller, batteries, and clips with patient to the hospital.      PAGE the VAD COORDINATOR on call immediately via the  [phone] for VAD-specific emergency instructions. This patient CAN have chest compressions and CAN be defibrillated/DC Cardioverted. Please DO NOT use a Shan Device. This device is fluid dependent so you may need to administer  cc to start.  THIS PATIENT MAY NOT HAVE A BLOOD PRESSURE OR A PALPABLE PULSE.     Please enter the patient information into your computer so that if patient calls 911 from their house, a message may appear on the screen that the patient has an LVAD and to follow all directions above.    For additional information, visit [email or web address] for further VAD education materials.       Please contact one of  the VAD coordinators with any further questions or concerns.  Brittni Wright, RN, CCRN     [phone]   Vaishali Bruner, RN--[phone]   LATONYA SmithN, RN--[phone]   NATHAN Mclean, RN, CCRN [phone]   VAD Office Fax: [phone]  Sincerely,      Jamaica Doshi M.D.  Medical Director, Mechanical Circulatory Device Support Program  Section of Cardiomyopathy & Heart Transplantation

## 2020-01-09 NOTE — LETTER
[address]  [facility] [address]  Phone: [phone]  Fax: [phone]                    01/27/2020    [facility]  [address] [place] [address]    To Whom It May Concern:    This letter is to inform you that one of our patients will be discharged into your community.  We want to let you know because this patient has special healthcare needs.   As a potential , you may appreciate knowing in advance how to respond to potential emergencies concerning this patient.    The patient (whose contact information appears below) has a Heartmate 3 Left Ventricular Assist System (LVAD) or blood pump.   The device is implanted alongside the patients native heart.  It takes over the pumping function of the patients sick or weakened heart so that the patients lungs, organs, and tissues get the oxygen-rich blood they need.  The LVAD is a life-sustaining device. The patients information is as follows:    Tae Delgado  59 y.o. 1960  [address] [place] [address]    This device runs on batteries and AC power.   There is no hand-pumping.  There is a back up controller, batteries, battery charger and a Mobile power unit (MPU)/Power Module. If called to patient house, please take the emergency bag with the extra controller, batteries, and clips with patient to the hospital also, PAGE the VAD COORDINATOR on call immediately via the  [phone] for VAD-specific emergency instructions. This patient CAN have chest compressions and CAN be defibrillated/DC Cardioverted. Please DO NOT use a Shan Device. This device is fluid dependent so you may need to administer  cc to start.  THIS PATIENT MAY NOT HAVE A BLOOD PRESSURE OR A PALPABLE PULSE.  For additional information, visit [email or web address] for further VAD education materials.       Please contact one of the VAD coordinators with any further questions or concerns.  Brittni Wright RN, CCRN      [phone]   Vaishali Bruner, RN--[phone]   Val Capps, NATHAN, RN, CCRN [phone]   LATONYA SmithN, RN--[phone]   NATHAN Mclean, RN, CCRN [phone]   VAD Office Fax: [phone]  Sincerely,      Jamaica Doshi M.D.  Medical Director, Mechanical Circulatory Device Support Program  Section of Cardiomyopathy & Heart Transplantation

## 2020-01-09 NOTE — LETTER
[address]  [facility] [address]  Phone: [phone]  Fax: [phone]                                  01/27/2020  [facility]  [address] [place] [address]      To Whom It May Concern:    This letter is to inform you that one of our patients will be discharged to your community.  We want to let you know because this patient has special health needs.     This patient (whose information appears below) has a Heartmate 3 Left Ventricular Assist Device (LVAD) or blood pump.   The device is implanted inside the patients native heart.  It takes over pumping so that the patients organs and tissues get the oxygen-rich blood they need.  The VAD is a life-sustaining device.  The patients information is as follows:    Tae Delgado  59 y.o. 1960  [address] [place] [address]    This device runs on batteries and on AC power.  If the patient should present to you during a medical emergency, please contact us immediately at ([phone]) and ask to page VAD coordinator on call for VAD-specific emergency instructions. THIS PATIENT CAN HAVE CHEST COMPRESSIONS if medically necessary and CAN BE Defibrillated/DC Cardioverted without disconnecting the controller. Please DO NOT use a MARK  Device.  This device is fluid dependent so  cc to start if required.   THIS PATIENT MAY NOT HAVE A BLOOD PRESSURE OR A PALPABLE PULSE.     For additional information, visit [email or web address] for further VAD education materials.       Please contact one of the VAD coordinators with any further questions or concerns.  Brittni Wright, RN, CCRN     [phone]   Vaishali Bruner RN--[phone]   NATHAN Smith, RN--[phone]   LATONYA McleanN, RN, CCRN [phone]   VAD Office Fax: [phone]  Sincerely,      Jamaica Doshi M.D.  Medical Director, Mechanical Circulatory Device Support Program  Section of Cardiomyopathy & Heart Transplantation

## 2020-01-09 NOTE — LETTER
[address]  [facility] [address]  Phone: [phone]  Fax: [phone]            01/27/2020        [place]  [address] [place] [address]. [address]    To Whom It May Concern,     This letter is to inform you that we anticipate that one of our patients will be discharged into your community.  We want to let you know because this patient has special healthcare needs.      The patient (whose contact information appears below) has a Heartmate 3 Left Ventricular Assist Device (LVAD) or blood pump.  The device is surgically implanted alongside the patients native heart.  It takes over the pumping function of the patients sick or weakened heart so that the patients lungs, organs, and tissues get the oxygen-rich blood they need.  The LVAD is a life-sustaining device. The patients information is as follows:    Tae Delgado  59 y.o. 1960  [address] [place] [address]    This device runs on either AC power or external batteries.  The external batteries are worn in a double holster located on both the right and left side of the body.  (See photo). The holster cannot be removed.  The Heartmate  will be connected between the patient and wall unit or external batteries. THIS PATIENT MAY NOT HAVE A PALPABLE PULSE.    For additional information, visit [email or web address] for further VAD education materials.       Please contact one of the VAD coordinators with any further questions or concerns.  Brittni Wright, RN, CCRN     [phone]   Vaishali Bruner RN--[phone]   NATHAN Smith, RN--[phone]   NATHAN Mclean, RN, CCRN [phone]   VAD Office Fax: [phone]  Sincerely,      Jamaica Doshi M.D.  Medical Director, Mechanical Circulatory Device Support Program  Section of Cardiomyopathy & Heart Transplantation  \

## 2020-01-09 NOTE — LETTER
January 10, 2020        Dipesh De La Torre  [address] [facility] [place] [address]  Phone: [phone]  Fax: [phone]             [facility] [address] [place] [address]  Phone: [phone]   Patient: Tae Delgado   MR Number: [id number]   YOB: 1960   Date of Visit: 1/9/2020       Dear Dr. Dipesh De La Torre    Thank you for referring Tae Delgado to me for evaluation. Attached you will find relevant portions of my assessment and plan of care.    If you have questions, please do not hesitate to call me. I look forward to following Tae Delgado along with you.    Sincerely,    Elham Pearson MD    Enclosure    If you would like to receive this communication electronically, please contact [email or web address] or [phone] to request [facility] Link access.    [facility] [employer] Link is a tool which provides read-only access to select patient information with whom you have a relationship. Its easy to use and provides real time access to review your patients record including encounter summaries, notes, results, and demographic information.    If you feel you have received this communication in error or would no longer like to receive these types of communications, please e-mail [email or web address]

## 2020-01-09 NOTE — NURSING NOTE
Pt here for pet stress test, but pt unable to complete test because he is unable to lay on his back due to his breathing at this time. Pt states that he has a hard time breathing on his back and will start to hyperventilate. Message sent to Dr. Pearson's office.

## 2020-01-09 NOTE — LETTER
[address]  [facility] [address]  Phone: [phone]  Fax: [phone]                      01/27/2020  [address] [facility]    To Whom It May Concern:     This letter is to inform you that one of our patients will be discharged into your community.  We want to let you know because this patient has special healthcare needs.    The patient has a Heartmate 3 Left Ventricular Assist Device (LVAD) or blood pump.  The device takes over the pumping function of the patients sick or weakened heart so that the patients lungs, organs, and tissues get the oxygen-rich blood they need.  The LVAD is a life-sustaining device.    Although batteries can power the device short-term, its primary power source is AC power from an electrical outlet.  Therefore, we are requesting that the patient be put on a priority power restoration list in the event of an electrical power outage.  The contact information for this patient appears below:    Account Number: [id number]  Name on the Account: Tae Delgado    Patient: Tae Delgado  59 y.o. 1960  [address] [place] [address]       For additional information, visit [email or web address] for further VAD education materials.         If you have any questions about the device or its reliance on AC electricity, lease contact one of the VAD coordinators.  Brittni Wright, RN, CCRN     [phone]   Vaishali Bruner RN--[phone]   NATHAN Smith, RN--[phone]   NATHAN Mclean, RN, CCRN [phone]   VAD Office Fax: [phone]  Sincerely,      Jamaica Doshi M.D.  Medical Director, Mechanical Circulatory Device Support Program  Section of Cardiomyopathy & Heart Transplantation

## 2020-01-09 NOTE — PROGRESS NOTES
Nephrology Clinic Progress Note    Patient ID: Tae Delgado is a 59 y.o. White male who presents for initial evaluation of CKD and establish care.    HPI:  Tae Delgado is a 59 y.o. White male with history of CKD stage 3, HFrEF w ICD(~2015), HTN, Hyperlipidemia, Paroximal A-fib, comes to establish care.   The patient denies any SOB, chest pain, palpitations, dysuria, problems voiding, N/V/D, taking NSAIDs or new antibiotics, recreational drugs, recent episode of dehydration, acute illness, hospitalization or exposure to IV radiocontrast.    Interval hx:  1/09/20: in the past months has been admitted 3 times for volume management requiring high doses of diuretics, in [facility]. Admits not following a strict diet and gaining weight. Now, he refers following a diet and current weight 245 lb but went up to 251 lb when not following diet. Refuses to continue Losartan(stopped 4 days ago) because makes him feel bad and SBP in 90's at home.     Past Medical History:   Diagnosis Date    CHF (congestive heart failure) 1/2010    Dx  1/2010 w/ decreased LV systolic function (EF 15%) by ECHO 1/2015    COCM (congestive cardiomyopathy) 7/20/2016    Hyperlipidemia     Hypertension     Paroxysmal atrial fibrillation     Pulmonary embolus 2008    Stroke     Superficial thrombophlebitis        Family History   Problem Relation Age of Onset    Cancer Mother        Past Surgical History:   Procedure Laterality Date    TONSILLECTOMY      VEIN LIGATION AND STRIPPING           Current Outpatient Medications:     amiodarone (PACERONE) 200 MG Tab, Tale 1 tablet (200mg) by mouth on Monday, Tuesday, Thursday, Friday and Saturday. Only take medication 5 days per week., Disp: 20 tablet, Rfl: 11    colchicine (COLCRYS) 0.6 mg tablet, Take 0.6 mg by mouth as needed. , Disp: , Rfl:     furosemide (LASIX) 40 MG tablet, Take 1 tablet (40 mg total) by mouth 2 (two) times daily., Disp: 90 tablet, Rfl: 3    metoprolol  succinate (TOPROL-XL) 50 MG 24 hr tablet, TAKE ONE TABLET BY MOUTH ONCE DAILY, Disp: 30 tablet, Rfl: 11    spironolactone (ALDACTONE) 25 MG tablet, TAKE 1 TABLET BY MOUTH ONCE DAILY, Disp: 30 tablet, Rfl: 11    warfarin (COUMADIN) 3 MG tablet, TAKE 1 TABLET BY MOUTH ONCE DAILY EXCEPT  SATURDAY, Disp: 30 tablet, Rfl: 5    Patient's medical, family, surgical, and medication hx reviewed.    Review of Systems    Constitutional: Negative for chills, diaphoresis, fever and weight loss.   HENT: Negative for nosebleeds and tinnitus.    Eyes: Negative for blurred vision, double vision and photophobia.   Respiratory: Negative for cough and shortness of breath.    Cardiovascular: . Negative for chest pain, palpitations, swelling orthopnea and PND.   Gastrointestinal: Negative for abdominal pain, diarrhea, constipation nausea and vomiting.   Genitourinary: Negative for dysuria, flank pain, frequency, hematuria and urgency.   Musculoskeletal: +2 pitting edema Negative for back pain, falls, joint pain, myalgias and neck pain.   Skin: Negative.    Neurological: Negative for dizziness, tingling, tremors, sensory change, speech change, focal weakness, seizures, loss of consciousness, weakness and headaches.   Endo/Heme/Allergies: Negative for environmental allergies and polydipsia. Does not bruise/bleed easily.   Psychiatric/Behavioral: Negative for depression, hallucinations, memory loss, substance abuse and suicidal ideas. The patient is not nervous/anxious and does not have insomnia.        Objective:     Wt Readings from Last 3 Encounters:   12/13/19 117.5 kg (259 lb)   12/13/19 116 kg (255 lb 11.7 oz)   10/09/19 117.7 kg (259 lb 7.7 oz)     Temp Readings from Last 3 Encounters:   07/14/17 96.1 °F (35.6 °C) (Oral)   03/13/15 98.3 °F (36.8 °C) (Oral)   02/12/15 97.4 °F (36.3 °C)     BP Readings from Last 3 Encounters:   12/13/19 118/76   12/13/19 121/84   10/09/19 109/74     Pulse Readings from Last 3 Encounters:   12/13/19 107    12/13/19 109   10/09/19 78        Physical Exam    Constitutional:  well-developed and well-nourished. No distress.   HENT:   Head: Normocephalic and atraumatic.   Neck: Normal range of motion. Neck supple.   Cardiovascular: Normal rate, regular rhythm, normal heart sounds and intact distal pulses.  Exam reveals no gallop and no friction rub.    No murmur heard.  Pulmonary/Chest: Effort normal and breath sounds normal. No respiratory distress. no wheezes, no rales, no tenderness.   Abdominal: Soft. Bowel sounds are normal, no distension. There is no tenderness. There is no rebound and no guarding.   Musculoskeletal: Normal range of motion. No edema or deformity.   Neurological: Awake alert and oriented x 4. Motor strength preserved 5/5. DTR symmetrical.  Skin: Skin is warm and dry. No rash noted. She is not diaphoretic. No erythema. No pallor.       Assessment:       No diagnosis found.     Plan:   1. CKD stage G3b: not met criteria for stage 4 yet given timeframe. Most likely KRISTIN/CKD given recent hx of volume overload and possible Cardiorenal syndrome. No urine studies available.    Will encourage a low salt diet, fluid restriction and f/u in 3 months.    Baseline Creatine:  Lab Results   Component Value Date    CREATININE 1.7 (H) 12/13/2019       Urine Protein: Not available  No results found for: UTPCR    Acid-Base: within acceptable range  Lab Results   Component Value Date     12/13/2019    K 3.8 12/13/2019    CO2 26 12/13/2019         2. HTN: 104/86. Controlled with current management.       2. Anemia:  Will order iron studies for next appointment and CBC for next appointment. Last CBC from 2018.   Lab Results   Component Value Date    HGB 14.9 12/20/2018     No results found for: IRON, TIBC, FERRITIN, SATURATEDIRO      Plan:    - RTC in 3 months  - U/A, UPr/Cr and CBC today  - Renal US, Renal panel, Iron studies, PTH for next appointment   - Low salt diet, fluid restriction.           *Cased discussed  with attending physician, Dr. Belen Guerra MD  Nephrology Fellow  [facility]

## 2020-01-10 ENCOUNTER — TELEPHONE (OUTPATIENT)
Dept: TRANSPLANT | Facility: CLINIC | Age: 60
End: 2020-01-10

## 2020-01-10 ENCOUNTER — EDUCATION (OUTPATIENT)
Dept: TRANSPLANT | Facility: CLINIC | Age: 60
End: 2020-01-10

## 2020-01-10 LAB
ALBUMIN SERPL BCP-MCNC: 3.8 G/DL (ref 3.5–5.2)
ALP SERPL-CCNC: 52 U/L (ref 55–135)
ALT SERPL W/O P-5'-P-CCNC: 118 U/L (ref 10–44)
ANION GAP SERPL CALC-SCNC: 11 MMOL/L (ref 8–16)
ANION GAP SERPL CALC-SCNC: 11 MMOL/L (ref 8–16)
ASCENDING AORTA: 3.76 CM
AST SERPL-CCNC: 82 U/L (ref 10–40)
AV INDEX (PROSTH): 0.71
AV MEAN GRADIENT: 2 MMHG
AV PEAK GRADIENT: 2 MMHG
AV VALVE AREA: 3.18 CM2
AV VELOCITY RATIO: 0.8
BASOPHILS # BLD AUTO: 0.07 K/UL (ref 0–0.2)
BASOPHILS NFR BLD: 1 % (ref 0–1.9)
BILIRUB SERPL-MCNC: 1.3 MG/DL (ref 0.1–1)
BNP SERPL-MCNC: 1656 PG/ML (ref 0–99)
BSA FOR ECHO PROCEDURE: 2.35 M2
BUN SERPL-MCNC: 46 MG/DL (ref 6–20)
BUN SERPL-MCNC: 46 MG/DL (ref 6–20)
CALCIUM SERPL-MCNC: 9.9 MG/DL (ref 8.7–10.5)
CALCIUM SERPL-MCNC: 9.9 MG/DL (ref 8.7–10.5)
CHLORIDE SERPL-SCNC: 97 MMOL/L (ref 95–110)
CHLORIDE SERPL-SCNC: 97 MMOL/L (ref 95–110)
CO2 SERPL-SCNC: 33 MMOL/L (ref 23–29)
CO2 SERPL-SCNC: 33 MMOL/L (ref 23–29)
CREAT SERPL-MCNC: 2.5 MG/DL (ref 0.5–1.4)
CREAT SERPL-MCNC: 2.5 MG/DL (ref 0.5–1.4)
CV ECHO LV RWT: 0.26 CM
DIFFERENTIAL METHOD: ABNORMAL
DOP CALC AO PEAK VEL: 0.79 M/S
DOP CALC AO VTI: 15.84 CM
DOP CALC LVOT AREA: 4.5 CM2
DOP CALC LVOT DIAMETER: 2.39 CM
DOP CALC LVOT PEAK VEL: 0.63 M/S
DOP CALC LVOT STROKE VOLUME: 50.4 CM3
DOP CALCLVOT PEAK VEL VTI: 11.24 CM
E/E' RATIO: 20 M/S
ECHO LV POSTERIOR WALL: 0.9 CM (ref 0.6–1.1)
EOSINOPHIL # BLD AUTO: 0.1 K/UL (ref 0–0.5)
EOSINOPHIL NFR BLD: 2 % (ref 0–8)
ERYTHROCYTE [DISTWIDTH] IN BLOOD BY AUTOMATED COUNT: 15.2 % (ref 11.5–14.5)
EST. GFR  (AFRICAN AMERICAN): 31.3 ML/MIN/1.73 M^2
EST. GFR  (AFRICAN AMERICAN): 31.3 ML/MIN/1.73 M^2
EST. GFR  (NON AFRICAN AMERICAN): 27.1 ML/MIN/1.73 M^2
EST. GFR  (NON AFRICAN AMERICAN): 27.1 ML/MIN/1.73 M^2
FRACTIONAL SHORTENING: 11 % (ref 28–44)
GLUCOSE SERPL-MCNC: 94 MG/DL (ref 70–110)
GLUCOSE SERPL-MCNC: 94 MG/DL (ref 70–110)
HCT VFR BLD AUTO: 48.6 % (ref 40–54)
HGB BLD-MCNC: 15.5 G/DL (ref 14–18)
IMM GRANULOCYTES # BLD AUTO: 0.02 K/UL (ref 0–0.04)
IMM GRANULOCYTES NFR BLD AUTO: 0.3 % (ref 0–0.5)
INR PPP: 2.5 (ref 0.8–1.2)
INTERVENTRICULAR SEPTUM: 0.63 CM (ref 0.6–1.1)
LA MAJOR: 6.86 CM
LA MINOR: 7 CM
LA WIDTH: 5.35 CM
LEFT ATRIUM SIZE: 4.82 CM
LEFT ATRIUM VOLUME INDEX: 67.2 ML/M2
LEFT ATRIUM VOLUME: 151.88 CM3
LEFT INTERNAL DIMENSION IN SYSTOLE: 6.22 CM (ref 2.1–4)
LEFT VENTRICLE DIASTOLIC VOLUME INDEX: 111.66 ML/M2
LEFT VENTRICLE DIASTOLIC VOLUME: 252.48 ML
LEFT VENTRICLE MASS INDEX: 101 G/M2
LEFT VENTRICLE SYSTOLIC VOLUME INDEX: 86.4 ML/M2
LEFT VENTRICLE SYSTOLIC VOLUME: 195.29 ML
LEFT VENTRICULAR INTERNAL DIMENSION IN DIASTOLE: 6.96 CM (ref 3.5–6)
LEFT VENTRICULAR MASS: 229.24 G
LV LATERAL E/E' RATIO: 12.22 M/S
LV SEPTAL E/E' RATIO: 55 M/S
LYMPHOCYTES # BLD AUTO: 2.5 K/UL (ref 1–4.8)
LYMPHOCYTES NFR BLD: 34.5 % (ref 18–48)
MCH RBC QN AUTO: 27.6 PG (ref 27–31)
MCHC RBC AUTO-ENTMCNC: 31.9 G/DL (ref 32–36)
MCV RBC AUTO: 87 FL (ref 82–98)
MONOCYTES # BLD AUTO: 0.7 K/UL (ref 0.3–1)
MONOCYTES NFR BLD: 10 % (ref 4–15)
MV PEAK E VEL: 1.1 M/S
NEUTROPHILS # BLD AUTO: 3.7 K/UL (ref 1.8–7.7)
NEUTROPHILS NFR BLD: 52.2 % (ref 38–73)
NRBC BLD-RTO: 0 /100 WBC
PISA TR MAX VEL: 2.42 M/S
PLATELET # BLD AUTO: 201 K/UL (ref 150–350)
PMV BLD AUTO: 11.9 FL (ref 9.2–12.9)
POTASSIUM SERPL-SCNC: 3.8 MMOL/L (ref 3.5–5.1)
POTASSIUM SERPL-SCNC: 3.8 MMOL/L (ref 3.5–5.1)
PROT SERPL-MCNC: 7 G/DL (ref 6–8.4)
PROTHROMBIN TIME: 24.1 SEC (ref 9–12.5)
PULM VEIN S/D RATIO: 0.27
PV PEAK D VEL: 0.63 M/S
PV PEAK S VEL: 0.17 M/S
RA MAJOR: 6.55 CM
RA PRESSURE: 8 MMHG
RA WIDTH: 6.1 CM
RBC # BLD AUTO: 5.62 M/UL (ref 4.6–6.2)
RIGHT VENTRICULAR END-DIASTOLIC DIMENSION: 5.17 CM
RV TISSUE DOPPLER FREE WALL SYSTOLIC VELOCITY 1 (APICAL 4 CHAMBER VIEW): 6 CM/S
SINUS: 3.79 CM
SODIUM SERPL-SCNC: 141 MMOL/L (ref 136–145)
SODIUM SERPL-SCNC: 141 MMOL/L (ref 136–145)
STJ: 3.09 CM
TDI LATERAL: 0.09 M/S
TDI SEPTAL: 0.02 M/S
TDI: 0.06 M/S
TR MAX PG: 23 MMHG
TRICUSPID ANNULAR PLANE SYSTOLIC EXCURSION: 0.89 CM
TV REST PULMONARY ARTERY PRESSURE: 31 MMHG
WBC # BLD AUTO: 7.11 K/UL (ref 3.9–12.7)

## 2020-01-10 PROCEDURE — 80053 COMPREHEN METABOLIC PANEL: CPT

## 2020-01-10 PROCEDURE — 83880 ASSAY OF NATRIURETIC PEPTIDE: CPT

## 2020-01-10 PROCEDURE — 99223 PR INITIAL HOSPITAL CARE,LEVL III: ICD-10-PCS | Mod: ,,, | Performed by: INTERNAL MEDICINE

## 2020-01-10 PROCEDURE — 63600175 PHARM REV CODE 636 W HCPCS: Performed by: HOSPITALIST

## 2020-01-10 PROCEDURE — 85025 COMPLETE CBC W/AUTO DIFF WBC: CPT

## 2020-01-10 PROCEDURE — 25000003 PHARM REV CODE 250: Performed by: HOSPITALIST

## 2020-01-10 PROCEDURE — 99223 1ST HOSP IP/OBS HIGH 75: CPT | Mod: ,,, | Performed by: INTERNAL MEDICINE

## 2020-01-10 PROCEDURE — 85610 PROTHROMBIN TIME: CPT

## 2020-01-10 PROCEDURE — 20600001 HC STEP DOWN PRIVATE ROOM

## 2020-01-10 PROCEDURE — 36415 COLL VENOUS BLD VENIPUNCTURE: CPT

## 2020-01-10 RX ORDER — POTASSIUM CHLORIDE 20 MEQ/1
40 TABLET, EXTENDED RELEASE ORAL ONCE
Status: COMPLETED | OUTPATIENT
Start: 2020-01-10 | End: 2020-01-10

## 2020-01-10 RX ORDER — LANOLIN ALCOHOL/MO/W.PET/CERES
400 CREAM (GRAM) TOPICAL ONCE
Status: COMPLETED | OUTPATIENT
Start: 2020-01-10 | End: 2020-01-10

## 2020-01-10 RX ADMIN — POTASSIUM CHLORIDE 40 MEQ: 1500 TABLET, EXTENDED RELEASE ORAL at 08:01

## 2020-01-10 RX ADMIN — Medication 400 MG: at 08:01

## 2020-01-10 RX ADMIN — CHLOROTHIAZIDE SODIUM 250 MG: 500 INJECTION, POWDER, LYOPHILIZED, FOR SOLUTION INTRAVENOUS at 12:01

## 2020-01-10 RX ADMIN — HYDRALAZINE HYDROCHLORIDE AND ISOSORBIDE DINITRATE 1 TABLET: 37.5; 2 TABLET, FILM COATED ORAL at 08:01

## 2020-01-10 RX ADMIN — AMIODARONE HYDROCHLORIDE 200 MG: 200 TABLET ORAL at 08:01

## 2020-01-10 RX ADMIN — FUROSEMIDE 20 MG/HR: 10 INJECTION, SOLUTION INTRAMUSCULAR; INTRAVENOUS at 02:01

## 2020-01-10 NOTE — PROGRESS NOTES
"PRE-EDUCATION BOOKLET NOTE:    Met with Tae Delgado and had a brief discussion regarding the advanced heart failure evaluation process including options for heart transplantation and/or left ventricular assist device (LVAD) implantation.     Heart Transplant Educational Booklet given to patient, which included the following handouts:  · Treatment options for Advanced Heart Failure: A Referral Guide for patients  · Pre Heart Transplant Coordinator Team Contact Information   · Recipient Informed Consent   · Wellness Contract  · Multiple Listing Protocol and UNOS toll free numbers   · VAD Education Packet   · Advanced Directives  · 8 Step Plan for Heart Failure Patients     Significant History:  · Prior sternotomies: No  · ICD:  Yes, [employer] 2-3 years ago  · Blood transfusions:  No  · Angiography:  Yes; approx. 2010 in [place]; unsure of facility  · Colonoscopy:  Yes, at age 55 at [facility]; states negative  · Tobacco history:  Never used  · Stroke history: Yes; states told by eye doctor 3-4 years ago; confirmed by head CT but doesn't remember where CT done  · Thromboembolism history:  states had PE "from leg" in 2006 or 2007; doesn't remember where dx'd    Pt was accompanied by his girlfriend, Taylor Carbajal.  Question and answer session was conducted.  Patient was encouraged to contact the coordinator team with any questions or concerns.  Understanding verbalized.    "

## 2020-01-10 NOTE — PLAN OF CARE
Plan of care discussed with patient and spouse. Patient is free of fall or injury. Denies CP, SOB, or pain. K/Mg replaced. Lasix gtt continued. Echo showed 15% EF. Refused Bidil x2. Plan to diurese and discuss possible LVAD. All questions addressed; will continue to monitor.

## 2020-01-10 NOTE — PROGRESS NOTES
"Admit Note     Met with patient and significant other, Lavern Carbajal, to assess needs. Patient is a 59 y.o.   male, admitted for Heart failure [I50.9]  CHF (congestive heart failure) [I50.9] per medical record.    Patient admitted from clinic on 1/9/2020.  At this time, patient presents as alert and oriented x 4, pleasant, good eye contact, communicative and asking and answering questions appropriately.  At this time, patients caregiver presents as alert and oriented x 4, pleasant, calm, communicative, cooperative and asking and answering questions appropriately.    Household/Family Systems     Patient resides with patient's significant other, Lavern, at:     [address] [facility] [address]    Cell [phone]  [place] cell [phone]    Support system includes SO and adult children.  Patient does not have dependents that are need of being cared for. Pt reports 3 adult children.    Patients primary caregiver is Lavern, patients significant other.    During admission, patient's caregiver plans to stay in patient's room.  Confirmed patient and patients caregivers do have access to reliable transportation.    Cognitive Status/Learning     Patient reports reading ability as college and states patient does not have difficulty with seeing and hearing.  Pt wears OTC reading glasses at 1.25 strength and reports 50% hearing loss in left ear from . Pt does not use hearing aid.  Patient reports patient learns best by visual.   Needed: No.   Highest education level: Associate/Bachelor Degree from [employer]    Vocation/Disability     Working for Income: No  If no, reason not working: Disability  Patient is disabled due to heart since 2010.  Prior to disability, patient  was employed as [employer].    Adherence     Patient reports a high level of adherence to patients health care regimen.  Pt reports he finds the food here on the cardiac diet "too salty" because he does not cook with or add salt to " foods. Adherence counseling and education provided. Patient verbalizes understanding.    Substance Use    Patient reports the following substance usage.    Tobacco: none, patient denies any use.  Alcohol: Pt reports drank approximately 1 glass of wine a week in the past but none at present. Pt reports he gave all the alcohol in his home to his son.  Illicit Drugs/Non-prescribed Medications: none, patient denies any use.    Patient states clear understanding of the potential impact of substance use.  Substance abstinence/cessation counseling, education and resources provided and reviewed.     Services Utilizing/ADLS    Infusion Service: Prior to admission, patient utilizing? no  Home Health: Prior to admission, patient utilizing? no  DME: Prior to admission: no  Pulmonary/Cardiac Rehab: Prior to admission, no  Dialysis:  Prior to admission, no  Transplant Specialty Pharmacy:  Prior to admission, N/A    Prior to admission, patient reports patient was independent with ADLS and was driving.  Patient reports patient is able to care for self at this time..  Patient indicates a willingness to care for self once medically cleared to do so.    Insurance/Medications    Insured by   Payor/Plan Subscr  Sex Relation Sub. Ins. ID Effective Group Num   1. MEDICARE - ELMIRA MATOS 1960 Male  [id number]                                    [address]   2.  FOR LELMIRA CAPPS 1960 Male  [id number] 1/[birth year]                                    [address]     Primary Insurance (for UNOS reporting): Public Insurance - Medicare FFS (Fee For Service)  Secondary Insurance (for UNOS reporting): Public Insurance - Other Government    Patient reports patient is able to obtain and afford medications at this time and at time of discharge.     Pt states he has been prescribed Entresto in the past but insurance would never cover. Provided pt with [employer] patient assistance application in case he will need it a  "discharge. However per rounds pt is currently too ill to be on Entresto. SW will f/u.    Living Will/Healthcare Power of     Patient states patient has a LW and/or HCPA and states the documents are at home. Per pt HCPA is his youngest son, Epi, ph [phone]. Pt's sister was secondary but she passed in Nov 2018.     Coping/Mental Health    Patient is coping adequately with the aid of  family members, friends and staying active. Patient denies mental health difficulties. Pt reports "life is good."    Discharge Planning    At time of discharge, patient plans to return to patient's home under the care of self and SO. Patients significant other will transport patient. Per rounds today, expected discharge date has not been medically determined at this time. Patient and patients caretaker verbalize understanding and are involved in treatment planning and discharge process.    Additional Concerns    Patient is being followed for needs, education, resources, information, emotional support, supportive counseling, and for supportive and skilled discharge plan of care.  providing ongoing psychosocial support, education, resources and d/c planning as needed.  SW remains available. Patient denies additional needs and/or concerns at this time. Patient verbalizes understanding and agreement with information reviewed, social work availability, and how to access available resources as needed.  "

## 2020-01-10 NOTE — HPI
55 y.o. WM with history of NICMP diagnosed in 2010, ICD, LV thrombus (with prior splenic and renal emboli), Embolic  CVA , paroxysmal atrial fib, HTN, HLP  presents for  F/U today to clinic and he was volume overloaded on exam .  He states he had 3 admission in the last 3 months for volume overload. He started having more SOB on exertion since one month. Also endorses of Orthopnea since last one month. Alble to walk only 150 ft. He also has Bilateral lower extremity edema. He was admitted here in 2017 for ADHD here at [facility] during that admission showed PCWP 40 and CVP 17. Currently denies chest pain, lightheadedness

## 2020-01-10 NOTE — SUBJECTIVE & OBJECTIVE
Interval History: Had few beats of Nonsustained VT last night. Denies any SOB ,Chest pain     Continuous Infusions:   furosemide (LASIX) 2 mg/mL infusion (non-titrating) 20 mg/hr (01/09/20 1957)     Scheduled Meds:   amiodarone  200 mg Oral Daily    isosorbide-hydrALAZINE 20-37.5 mg  1 tablet Oral TID    magnesium oxide  400 mg Oral Once    potassium chloride  40 mEq Oral Once    warfarin  3 mg Oral Daily     PRN Meds:sodium chloride 0.9%    Review of patient's allergies indicates:   Allergen Reactions    Percocet [oxycodone-acetaminophen] Itching    Penicillins Rash     Objective:     Vital Signs (Most Recent):  Temp: 98.5 °F (36.9 °C) (01/09/20 1943)  Pulse: 62 (01/10/20 0600)  Resp: 18 (01/09/20 2340)  BP: 101/68 (01/10/20 0545)  SpO2: (!) 94 % (01/09/20 2340) Vital Signs (24h Range):  Temp:  [97.5 °F (36.4 °C)-98.5 °F (36.9 °C)] 98.5 °F (36.9 °C)  Pulse:  [60-85] 62  Resp:  [16-18] 18  SpO2:  [94 %-98 %] 94 %  BP: (101-112)/(68-86) 101/68     Patient Vitals for the past 72 hrs (Last 3 readings):   Weight   01/10/20 0545 109.5 kg (241 lb 6.5 oz)   01/09/20 1823 111.6 kg (246 lb 0.5 oz)     Body mass index is 34.64 kg/m².      Intake/Output Summary (Last 24 hours) at 1/10/2020 0816  Last data filed at 1/10/2020 0600  Gross per 24 hour   Intake 620.5 ml   Output 2700 ml   Net -2079.5 ml       Hemodynamic Parameters:       Telemetry: NSR    Physical Exam  Constitutional: He is oriented to person, place, and time. He appears well-developed and well-nourished.   HENT:   Head: Normocephalic.   Eyes: Pupils are equal, round, and reactive to light. EOM are normal.   Neck: Normal range of motion. JVD present.   JVP up to angle of jaw    Cardiovascular: Normal rate and regular rhythm.   S3 gallop heard    Pulmonary/Chest: Effort normal and breath sounds normal.   Abdominal: Soft. Bowel sounds are normal. He exhibits distension.   Musculoskeletal: He exhibits edema.   Neurological: He is alert and oriented to  person, place, and time.   Skin:   Slightly cold    Psychiatric: He has a normal mood and affect.   Significant Labs:  CBC:  Recent Labs   Lab 01/09/20  1531 01/09/20  1845 01/10/20  0436   WBC 8.34 8.79 7.11   RBC 5.46 5.59 5.62   HGB 15.6 15.6 15.5   HCT 49.1 50.0 48.6    227 201   MCV 90 89 87   MCH 28.6 27.9 27.6   MCHC 31.8* 31.2* 31.9*     BNP:  Recent Labs   Lab 01/10/20  0436   BNP 1,656*     CMP:  Recent Labs   Lab 01/09/20  1203 01/09/20  1845 01/10/20  0436     100 96 94  94   CALCIUM 10.3  10.3 10.2 9.9  9.9   ALBUMIN 3.9 4.1 3.8   PROT 7.3 7.5 7.0     140 141 141  141   K 4.7  4.7 4.1 3.8  3.8   CO2 28  28 30* 33*  33*     100 100 97  97   BUN 49*  49* 47* 46*  46*   CREATININE 2.6*  2.6* 2.6* 2.5*  2.5*   ALKPHOS 51* 53* 52*   * 132* 118*   AST 92* 96* 82*   BILITOT 1.8* 1.5* 1.3*      Coagulation:   Recent Labs   Lab 01/09/20  1203   INR 3.0*     LDH:  No results for input(s): LDH in the last 72 hours.  Microbiology:  Microbiology Results (last 7 days)     ** No results found for the last 168 hours. **          I have reviewed all pertinent labs within the past 24 hours.    Estimated Creatinine Clearance: 39.4 mL/min (A) (based on SCr of 2.5 mg/dL (H)).    Diagnostic Results:  I have reviewed and interpreted all pertinent imaging results/findings within the past 24 hours.

## 2020-01-10 NOTE — PROGRESS NOTES
[facility] [address]  Heart Transplant  Progress Note    Patient Name: Tae Delgado  MRN: [id number]  Admission Date: 1/9/2020  Hospital Length of Stay: 1 days  Attending Physician: Isiah Montero MD  Primary Care Provider: Elham Pearson MD  Principal Problem:<principal problem not specified>    Subjective:     Interval History: Had few beats of Nonsustained VT last night. Denies any SOB ,Chest pain     Continuous Infusions:   furosemide (LASIX) 2 mg/mL infusion (non-titrating) 20 mg/hr (01/09/20 1957)     Scheduled Meds:   amiodarone  200 mg Oral Daily    isosorbide-hydrALAZINE 20-37.5 mg  1 tablet Oral TID    magnesium oxide  400 mg Oral Once    potassium chloride  40 mEq Oral Once    warfarin  3 mg Oral Daily     PRN Meds:sodium chloride 0.9%    Review of patient's allergies indicates:   Allergen Reactions    Percocet [oxycodone-acetaminophen] Itching    Penicillins Rash     Objective:     Vital Signs (Most Recent):  Temp: 98.5 °F (36.9 °C) (01/09/20 1943)  Pulse: 62 (01/10/20 0600)  Resp: 18 (01/09/20 2340)  BP: 101/68 (01/10/20 0545)  SpO2: (!) 94 % (01/09/20 2340) Vital Signs (24h Range):  Temp:  [97.5 °F (36.4 °C)-98.5 °F (36.9 °C)] 98.5 °F (36.9 °C)  Pulse:  [60-85] 62  Resp:  [16-18] 18  SpO2:  [94 %-98 %] 94 %  BP: (101-112)/(68-86) 101/68     Patient Vitals for the past 72 hrs (Last 3 readings):   Weight   01/10/20 0545 109.5 kg (241 lb 6.5 oz)   01/09/20 1823 111.6 kg (246 lb 0.5 oz)     Body mass index is 34.64 kg/m².      Intake/Output Summary (Last 24 hours) at 1/10/2020 0816  Last data filed at 1/10/2020 0600  Gross per 24 hour   Intake 620.5 ml   Output 2700 ml   Net -2079.5 ml       Hemodynamic Parameters:       Telemetry: NSR    Physical Exam  Constitutional: He is oriented to person, place, and time. He appears well-developed and well-nourished.   HENT:   Head: Normocephalic.   Eyes: Pupils are equal, round, and reactive to light. EOM are normal.   Neck: Normal range of motion.  JVD present.   JVP  4 fingers above clavicle   Cardiovascular: Normal rate and regular rhythm.   S3 gallop heard    Pulmonary/Chest: Effort normal and breath sounds normal.   Abdominal: Soft. Bowel sounds are normal. He exhibits distension.   Musculoskeletal: He exhibits edema.   Neurological: He is alert and oriented to person, place, and time.   Skin:   Slightly cold    Psychiatric: He has a normal mood and affect.   Significant Labs:  CBC:  Recent Labs   Lab 01/09/20  1531 01/09/20  1845 01/10/20  0436   WBC 8.34 8.79 7.11   RBC 5.46 5.59 5.62   HGB 15.6 15.6 15.5   HCT 49.1 50.0 48.6    227 201   MCV 90 89 87   MCH 28.6 27.9 27.6   MCHC 31.8* 31.2* 31.9*     BNP:  Recent Labs   Lab 01/10/20  0436   BNP 1,656*     CMP:  Recent Labs   Lab 01/09/20  1203 01/09/20  1845 01/10/20  0436     100 96 94  94   CALCIUM 10.3  10.3 10.2 9.9  9.9   ALBUMIN 3.9 4.1 3.8   PROT 7.3 7.5 7.0     140 141 141  141   K 4.7  4.7 4.1 3.8  3.8   CO2 28  28 30* 33*  33*     100 100 97  97   BUN 49*  49* 47* 46*  46*   CREATININE 2.6*  2.6* 2.6* 2.5*  2.5*   ALKPHOS 51* 53* 52*   * 132* 118*   AST 92* 96* 82*   BILITOT 1.8* 1.5* 1.3*      Coagulation:   Recent Labs   Lab 01/09/20  1203   INR 3.0*     LDH:  No results for input(s): LDH in the last 72 hours.  Microbiology:  Microbiology Results (last 7 days)     ** No results found for the last 168 hours. **          I have reviewed all pertinent labs within the past 24 hours.    Estimated Creatinine Clearance: 39.4 mL/min (A) (based on SCr of 2.5 mg/dL (H)).    Diagnostic Results:  I have reviewed and interpreted all pertinent imaging results/findings within the past 24 hours.    Assessment and Plan:       55 y.o. WM with history of NICMP diagnosed in 2010, ICD, LV thrombus (with prior splenic and renal emboli), Embolic  CVA , paroxysmal atrial fib, HTN, HLP  presents for  F/U today to clinic and he was volume overloaded on exam .  He states  he had 3 admission in the last 3 months for volume overload. He started having more SOB on exertion since one month. Also endorses of Orthopnea since last one month. Alble to walk only 150 ft. He also has Bilateral lower extremity edema. He was admitted here in 2017 for ADHD here at [facility] during that admission showed PCWP 40 and CVP 17. Currently denies chest pain, lightheadedness     CHF (congestive heart failure)  Patient is cold and wet. BP on arrival 115/86. On Toprol and Aldactone at home. Cr on presentation is 2.6. Will place him on bidil and DC both toprol and aldactone.   On lasix drip at 20 . Net 2 lit negative in 12 hrs,  Will give diuril to boost his diuresis     Acute kidney injury superimposed on chronic kidney disease  Cr 2.5 this morning from 2.6. Cont to monitor with diuresis     Paroxysmal atrial fibrillation  Currently in NSR. On Amiodarone and coumadin for AC. INR  2.5. Hold coumadin today         Andriy Perkins MD  Heart Transplant  [facility]

## 2020-01-10 NOTE — PROGRESS NOTES
Patient arrived to the unit. Telemetry box applied and vital signs documented in flow sheet. Identification, allergy and fall risk band placed on patient. Team notified and to bedside. Oriented to room, call bell with in reach, bed is in low lock position. Admit completed, plan of care initiated. Will continue to monitor.

## 2020-01-10 NOTE — PROGRESS NOTES
"Sponsor: Dr. Jonatan Flowers M.D.    Study Title/IRB Number: Voice Signal Characteristics in Decompensated Heart Failure / 2018.324    Principle Investigator: Jonatan Flowers M.D.    Present for Discussion: Yes; Patient and significant other    Is LAR Consenting for Subject: No    Prior to the Informed Consent (IC) being signed, or any study protocol required data collection, testing, procedure, or intervention being performed, the following was done and/or discussed:   Patient was given a copy of the IC for review    Purpose of the study and qualifications to participate    Study design, Follow up schedule, and tests or procedures done at each visit   Confidentiality and HIPAA Authorization for Release of Medical Records for the research trial/ subject's rights/research related injury   Risk, Benefits, Alternative Treatments, Compensation and Costs   Participation in the research trial is voluntary and patient may withdraw at anytime   Contact information for study related questions    Patient verbalizes understanding of the above: Yes  Contact information for CRC and PI given to patient: Yes  Patient able to adequately summarize: the purpose of the study, the risks associated with the study, and all procedures, testing, and follow-ups associated with the study: Yes    Patient signed the informed consent form for the Voice Signal Characteristics in Decompensated Heart Failure research study with an IRB approval date of 09/11/2018.  Each page of the consent form was reviewed with patient and patient's significant other and all questions answered satisfactorily. Patient signed the consent form and received a copy of same. The original consent was scanned into electronic medical records (EPIC) and filed into the subject's research study chart.    Mr. Delgado was able to complete the following upon entry of the study:    - Subject was outfitted with [employer]'s "ReDS" vest. Height and weight obtained " "from patient medical history, which was input into the "ReDS" interface system. Patient instructed on use, lung water reading obtained: Yes  - Subject was able to read the following passages, and a voice recording was obtained:  Sustained vowels: Yes  CAPE-V sentences: Yes  [place]: Yes  - Subject was able to stand upright with bare feet on the Bodyport scale for a minimum of 30 seconds: Yes    Dr. Jonatan Flowers M.D. has reviewed the following inclusion/ exclusion criteria and confirms that subject meets all Inclusion and no Exclusion criteria at this time:      Inclusion-   - Subject is currently admitted with acute decompensated heart failure to [facility].  -  Subject is awake, alert, can speak English without difficulty.  - Subject can stand for =/> 30 seconds without difficulty.  - Subject must be literate.  - Subject must have a BMI of 22 to 38.  - Subject must be between 5'1" and 6'5" in height.  - Subject must be able to provide informed consent.    Exclusion-  - Subject cannot read.  - Subject has difficulty standing and maintaining balance for 30 seconds.  - Subject has a BMI under 22 or over 38.  - Subject is under 5'1" or over 6'5" in height.  - Subject has an LVAD.  - Subject has an implanted device (ie: pacemaker, port) on the right side of chest wall.  - Subject has had a heart transplant within the last 6 weeks.  - Subject has had any surgery or incision in the thoracic region within the last 6 weeks that would cause discomfort during vest readings.  - Subjects that require masked (Venturi or rebreather) oxygen support, or require mechanical support (i.e. left ventricular assist device, intra-aortic balloon, etc.).          "

## 2020-01-10 NOTE — PROGRESS NOTES
[facility]    The note from the fellow/resident was reviewed. I have personally interviewed and examined the patient. There were no additional findings with regards to the history or physical exam.    I agree with the assessment and plan of  Dr. Santhosh Guerra

## 2020-01-10 NOTE — PROGRESS NOTES
Dr. Perkins notified that patient refused am Bidil. Education about Bidil provided, patient states he is uncomfortable with SBP below 100. Rx held. MD also notified of 3 beat VT run.

## 2020-01-10 NOTE — SUBJECTIVE & OBJECTIVE
Past Medical History:   Diagnosis Date    CHF (congestive heart failure) 1/2010    Dx  1/2010 w/ decreased LV systolic function (EF 15%) by ECHO 1/2015    COCM (congestive cardiomyopathy) 7/20/2016    Hyperlipidemia     Hypertension     Paroxysmal atrial fibrillation     Pulmonary embolus 2008    Stroke     Superficial thrombophlebitis        Past Surgical History:   Procedure Laterality Date    TONSILLECTOMY      VEIN LIGATION AND STRIPPING         Review of patient's allergies indicates:   Allergen Reactions    Percocet [oxycodone-acetaminophen] Itching    Penicillins Rash       Current Facility-Administered Medications   Medication    [START ON 1/10/2020] amiodarone tablet 200 mg    furosemide (LASIX) 2 mg/mL in sodium chloride 0.9% 100 mL infusion (conc: 2 mg/mL)    furosemide injection 80 mg    isosorbide-hydrALAZINE 20-37.5 mg per tablet 1 tablet    [START ON 1/10/2020] metoprolol succinate (TOPROL-XL) 24 hr tablet 50 mg    sodium chloride 0.9% flush 10 mL    [START ON 1/10/2020] warfarin (COUMADIN) tablet 3 mg     Family History     Problem Relation (Age of Onset)    Cancer Mother        Tobacco Use    Smoking status: Never Smoker    Smokeless tobacco: Never Used   Substance and Sexual Activity    Alcohol use: No    Drug use: No    Sexual activity: Not on file     Review of Systems   Constitutional: Negative.    HENT: Negative.    Eyes: Negative.    Respiratory: Positive for shortness of breath and wheezing.    Cardiovascular: Positive for leg swelling.   Gastrointestinal: Positive for abdominal distention.   Endocrine: Negative.    Genitourinary: Negative.    Musculoskeletal: Negative.    Skin: Negative.    Neurological: Negative.    Psychiatric/Behavioral: Negative.      Objective:     Vital Signs (Most Recent):  Temp: 97.5 °F (36.4 °C) (01/09/20 1823)  Pulse: 78 (01/09/20 1823)  Resp: 16 (01/09/20 1823)  BP: 111/85 (01/09/20 1823)  SpO2: 98 % (01/09/20 1823) Vital Signs (24h  Range):  Temp:  [97.5 °F (36.4 °C)] 97.5 °F (36.4 °C)  Pulse:  [78-85] 78  Resp:  [16] 16  SpO2:  [98 %] 98 %  BP: (104-111)/(83-86) 111/85     Patient Vitals for the past 72 hrs (Last 3 readings):   Weight   01/09/20 1823 111.6 kg (246 lb 0.5 oz)     Body mass index is 35.3 kg/m².    No intake or output data in the 24 hours ending 01/09/20 1853    Physical Exam   Constitutional: He is oriented to person, place, and time. He appears well-developed and well-nourished.   HENT:   Head: Normocephalic.   Eyes: Pupils are equal, round, and reactive to light. EOM are normal.   Neck: Normal range of motion. JVD present.   JVP up to angle of jaw    Cardiovascular: Normal rate and regular rhythm.   S3 gallop heard    Pulmonary/Chest: Effort normal and breath sounds normal.   Abdominal: Soft. Bowel sounds are normal. He exhibits distension.   Musculoskeletal: He exhibits edema.   Neurological: He is alert and oriented to person, place, and time.   Skin:   Slightly cold    Psychiatric: He has a normal mood and affect.       Significant Labs:  CBC:  Recent Labs   Lab 01/09/20  1531   WBC 8.34   RBC 5.46   HGB 15.6   HCT 49.1      MCV 90   MCH 28.6   MCHC 31.8*     BNP:  No results for input(s): BNP in the last 168 hours.    Invalid input(s): BNPTRIAGELBLO  CMP:  Recent Labs   Lab 01/09/20  1203     100   CALCIUM 10.3  10.3   ALBUMIN 3.9   PROT 7.3     140   K 4.7  4.7   CO2 28  28     100   BUN 49*  49*   CREATININE 2.6*  2.6*   ALKPHOS 51*   *   AST 92*   BILITOT 1.8*      Coagulation:   Recent Labs   Lab 01/09/20  1203   INR 3.0*     LDH:  No results for input(s): LDH in the last 72 hours.  Microbiology:  Microbiology Results (last 7 days)     ** No results found for the last 168 hours. **          I have reviewed all pertinent labs within the past 24 hours.    Diagnostic Results:  I have reviewed and interpreted all pertinent imaging results/findings within the past 24 hours.   Stage 12: Additional Anesthesia Type: 1% lidocaine with epinephrine

## 2020-01-10 NOTE — NURSING
At the request of Dr. Zhao, I have been asked to meet patient and provide VAD education. Introduced self and reason for visit. Pt AAAO, wife asleep at bedside.  Provided phase 1 written VAD education. Included in Phase 1 folder is the following:     Evaluation Eval for [facility]  VAD support flyer  [place]: Living a more active life  Living with hVAD system  [employer] pamphlet  Picture of 3 VADs offered at [facility]    Explained that we use 3 different types of pumps here and information on pumps is in the black folder. I explained the work up process as well.     Explained to look over the entire contents and read Evaluation Eval for [facility] acknowledgement form.  Also explained that they should bring this folder with them to all clinic visits and if they are admitted to the hospital so that we can continue education as needed. Should there be any questions, please write them down and bring with you or feel free to call and we can talk on the phone. All questions answered to patient's satisfaction as evidence by verbal acknowledgement.

## 2020-01-10 NOTE — PLAN OF CARE
Plan of care discussed with patient. Patient is free of fall/trauma/injury. Denies CP, SOB, or pain/discomfort. Monitor showing SR with first degree block and BBB. Lasix 80mg IV bolus along with gtt at 20mg/hr began. Adequate urine output noted. Instructed pt on strict I/O. All questions addressed. Will continue to monitor

## 2020-01-10 NOTE — ASSESSMENT & PLAN NOTE
Patient is cold and wet. BP on arrival 115/86. On Toprol and Aldactone at home. Cr on presentation is 2.6. Will place him on bidil and DC both toprol and aldactone.   On lasix drip at 20 . Net 2 lit negative in 12 hrs,  Will give diuril to boost his diuresis

## 2020-01-10 NOTE — H&P
[facility]  Heart Transplant  H&P    Patient Name: Tae Delgado  MRN: [id number]  Admission Date: 1/9/2020  Attending Physician: Isiah Montero MD  Primary Care Provider: Elham Pearson MD  Principal Problem:<principal problem not specified>    Subjective:     History of Present Illness:    55 y.o. WM with history of NICMP diagnosed in 2010, ICD, LV thrombus (with prior splenic and renal emboli), Embolic  CVA , paroxysmal atrial fib, HTN, HLP  presents for  F/U today to clinic and he was volume overloaded on exam .  He states he had 3 admission in the last 3 months for volume overload. He started having more SOB on exertion since one month. Also endorses of Orthopnea since last one month. Alble to walk only 150 ft. He also has Bilateral lower extremity edema. He was admitted here in 2017 for ADHD here at [facility] and [facility] during that admission showed PCWP 40 and CVP 17. Currently denies chest pain, lightheadedness     Past Medical History:   Diagnosis Date    CHF (congestive heart failure) 1/2010    Dx  1/2010 w/ decreased LV systolic function (EF 15%) by ECHO 1/2015    COCM (congestive cardiomyopathy) 7/20/2016    Hyperlipidemia     Hypertension     Paroxysmal atrial fibrillation     Pulmonary embolus 2008    Stroke     Superficial thrombophlebitis        Past Surgical History:   Procedure Laterality Date    TONSILLECTOMY      VEIN LIGATION AND STRIPPING         Review of patient's allergies indicates:   Allergen Reactions    Percocet [oxycodone-acetaminophen] Itching    Penicillins Rash       Current Facility-Administered Medications   Medication    [START ON 1/10/2020] amiodarone tablet 200 mg    furosemide (LASIX) 2 mg/mL in sodium chloride 0.9% 100 mL infusion (conc: 2 mg/mL)    furosemide injection 80 mg    isosorbide-hydrALAZINE 20-37.5 mg per tablet 1 tablet    [START ON 1/10/2020] metoprolol succinate (TOPROL-XL) 24 hr tablet 50 mg    sodium chloride 0.9% flush 10 mL     [START ON 1/10/2020] warfarin (COUMADIN) tablet 3 mg     Family History     Problem Relation (Age of Onset)    Cancer Mother        Tobacco Use    Smoking status: Never Smoker    Smokeless tobacco: Never Used   Substance and Sexual Activity    Alcohol use: No    Drug use: No    Sexual activity: Not on file     Review of Systems   Constitutional: Negative.    HENT: Negative.    Eyes: Negative.    Respiratory: Positive for shortness of breath and wheezing.    Cardiovascular: Positive for leg swelling.   Gastrointestinal: Positive for abdominal distention.   Endocrine: Negative.    Genitourinary: Negative.    Musculoskeletal: Negative.    Skin: Negative.    Neurological: Negative.    Psychiatric/Behavioral: Negative.      Objective:     Vital Signs (Most Recent):  Temp: 97.5 °F (36.4 °C) (01/09/20 1823)  Pulse: 78 (01/09/20 1823)  Resp: 16 (01/09/20 1823)  BP: 111/85 (01/09/20 1823)  SpO2: 98 % (01/09/20 1823) Vital Signs (24h Range):  Temp:  [97.5 °F (36.4 °C)] 97.5 °F (36.4 °C)  Pulse:  [78-85] 78  Resp:  [16] 16  SpO2:  [98 %] 98 %  BP: (104-111)/(83-86) 111/85     Patient Vitals for the past 72 hrs (Last 3 readings):   Weight   01/09/20 1823 111.6 kg (246 lb 0.5 oz)     Body mass index is 35.3 kg/m².    No intake or output data in the 24 hours ending 01/09/20 1853    Physical Exam   Constitutional: He is oriented to person, place, and time. He appears well-developed and well-nourished.   HENT:   Head: Normocephalic.   Eyes: Pupils are equal, round, and reactive to light. EOM are normal.   Neck: Normal range of motion. JVD present.   JVP up to angle of jaw    Cardiovascular: Normal rate and regular rhythm.   S3 gallop heard    Pulmonary/Chest: Effort normal and breath sounds normal.   Abdominal: Soft. Bowel sounds are normal. He exhibits distension.   Musculoskeletal: He exhibits edema.   Neurological: He is alert and oriented to person, place, and time.   Skin:   Slightly cold    Psychiatric: He has a  normal mood and affect.       Significant Labs:  CBC:  Recent Labs   Lab 01/09/20  1531   WBC 8.34   RBC 5.46   HGB 15.6   HCT 49.1      MCV 90   MCH 28.6   MCHC 31.8*     BNP:  No results for input(s): BNP in the last 168 hours.    Invalid input(s): BNPTRIAGELBLO  CMP:  Recent Labs   Lab 01/09/20  1203     100   CALCIUM 10.3  10.3   ALBUMIN 3.9   PROT 7.3     140   K 4.7  4.7   CO2 28  28     100   BUN 49*  49*   CREATININE 2.6*  2.6*   ALKPHOS 51*   *   AST 92*   BILITOT 1.8*      Coagulation:   Recent Labs   Lab 01/09/20  1203   INR 3.0*     LDH:  No results for input(s): LDH in the last 72 hours.  Microbiology:  Microbiology Results (last 7 days)     ** No results found for the last 168 hours. **          I have reviewed all pertinent labs within the past 24 hours.    Diagnostic Results:  I have reviewed and interpreted all pertinent imaging results/findings within the past 24 hours.    Assessment/Plan:     CHF (congestive heart failure)  Patient is cold and wet. BP on arrival 115/86. On Toprol and Aldactone at home. Cr on presentation is 2.6. Will place him on bidil and DC both toprol and aldactone. Will start on lasix drip after IV push lasix 80 mg. If no response to lasix , we will get central line to check HD and escalate care     Acute kidney injury superimposed on chronic kidney disease  Presented with cr of 2.6. Will cont to monitor with diuresis     Paroxysmal atrial fibrillation  Currently in NSR. On Amiodarone and coumadin for AC. INR  3.0     Congestive Hepatopathy  Cont to monitor LFTs with Diuresis     Andriy Perkins MD  Heart Transplant  [facility]

## 2020-01-10 NOTE — PROGRESS NOTES
14 beat run VT noted on monitor per monitor tech. Asymtomatic. Resting quietly with eyes closed. NAD.

## 2020-01-10 NOTE — ASSESSMENT & PLAN NOTE
Patient is cold and wet. BP on arrival 115/86. On Toprol and Aldactone at home. Cr on presentation is 2.6. Will place him on bidil and DC both toprol and aldactone. Will start on lasix drip after IV push lasix 80 mg. If no response to lasix , we will get central line to check HD and escalate care

## 2020-01-11 LAB
ALBUMIN SERPL BCP-MCNC: 4 G/DL (ref 3.5–5.2)
ALLENS TEST: ABNORMAL
ALLENS TEST: ABNORMAL
ALP SERPL-CCNC: 55 U/L (ref 55–135)
ALT SERPL W/O P-5'-P-CCNC: 117 U/L (ref 10–44)
ANION GAP SERPL CALC-SCNC: 11 MMOL/L (ref 8–16)
ANION GAP SERPL CALC-SCNC: 13 MMOL/L (ref 8–16)
ANION GAP SERPL CALC-SCNC: 13 MMOL/L (ref 8–16)
AST SERPL-CCNC: 67 U/L (ref 10–40)
BASOPHILS # BLD AUTO: 0.06 K/UL (ref 0–0.2)
BASOPHILS NFR BLD: 0.8 % (ref 0–1.9)
BILIRUB SERPL-MCNC: 1.6 MG/DL (ref 0.1–1)
BUN SERPL-MCNC: 42 MG/DL (ref 6–20)
BUN SERPL-MCNC: 45 MG/DL (ref 6–20)
BUN SERPL-MCNC: 45 MG/DL (ref 6–20)
CALCIUM SERPL-MCNC: 10.1 MG/DL (ref 8.7–10.5)
CALCIUM SERPL-MCNC: 10.1 MG/DL (ref 8.7–10.5)
CALCIUM SERPL-MCNC: 10.3 MG/DL (ref 8.7–10.5)
CHLORIDE SERPL-SCNC: 91 MMOL/L (ref 95–110)
CHLORIDE SERPL-SCNC: 91 MMOL/L (ref 95–110)
CHLORIDE SERPL-SCNC: 92 MMOL/L (ref 95–110)
CO2 SERPL-SCNC: 34 MMOL/L (ref 23–29)
CO2 SERPL-SCNC: 34 MMOL/L (ref 23–29)
CO2 SERPL-SCNC: 35 MMOL/L (ref 23–29)
CREAT SERPL-MCNC: 2.4 MG/DL (ref 0.5–1.4)
CREAT SERPL-MCNC: 2.5 MG/DL (ref 0.5–1.4)
CREAT SERPL-MCNC: 2.5 MG/DL (ref 0.5–1.4)
DELSYS: ABNORMAL
DIFFERENTIAL METHOD: ABNORMAL
EOSINOPHIL # BLD AUTO: 0.1 K/UL (ref 0–0.5)
EOSINOPHIL NFR BLD: 1.6 % (ref 0–8)
ERYTHROCYTE [DISTWIDTH] IN BLOOD BY AUTOMATED COUNT: 14.8 % (ref 11.5–14.5)
EST. GFR  (AFRICAN AMERICAN): 31.3 ML/MIN/1.73 M^2
EST. GFR  (AFRICAN AMERICAN): 31.3 ML/MIN/1.73 M^2
EST. GFR  (AFRICAN AMERICAN): 32.9 ML/MIN/1.73 M^2
EST. GFR  (NON AFRICAN AMERICAN): 27.1 ML/MIN/1.73 M^2
EST. GFR  (NON AFRICAN AMERICAN): 27.1 ML/MIN/1.73 M^2
EST. GFR  (NON AFRICAN AMERICAN): 28.5 ML/MIN/1.73 M^2
GLUCOSE SERPL-MCNC: 115 MG/DL (ref 70–110)
GLUCOSE SERPL-MCNC: 115 MG/DL (ref 70–110)
GLUCOSE SERPL-MCNC: 125 MG/DL (ref 70–110)
HCO3 UR-SCNC: 31.3 MMOL/L (ref 24–28)
HCO3 UR-SCNC: 32.6 MMOL/L (ref 24–28)
HCT VFR BLD AUTO: 52.4 % (ref 40–54)
HGB BLD-MCNC: 16.6 G/DL (ref 14–18)
IMM GRANULOCYTES # BLD AUTO: 0.04 K/UL (ref 0–0.04)
IMM GRANULOCYTES NFR BLD AUTO: 0.5 % (ref 0–0.5)
INR PPP: 2.1 (ref 0.8–1.2)
LYMPHOCYTES # BLD AUTO: 2 K/UL (ref 1–4.8)
LYMPHOCYTES NFR BLD: 25.7 % (ref 18–48)
MAGNESIUM SERPL-MCNC: 2.4 MG/DL (ref 1.6–2.6)
MCH RBC QN AUTO: 27.8 PG (ref 27–31)
MCHC RBC AUTO-ENTMCNC: 31.7 G/DL (ref 32–36)
MCV RBC AUTO: 88 FL (ref 82–98)
MODE: ABNORMAL
MONOCYTES # BLD AUTO: 0.9 K/UL (ref 0.3–1)
MONOCYTES NFR BLD: 12.1 % (ref 4–15)
NEUTROPHILS # BLD AUTO: 4.5 K/UL (ref 1.8–7.7)
NEUTROPHILS NFR BLD: 59.3 % (ref 38–73)
NRBC BLD-RTO: 0 /100 WBC
PCO2 BLDA: 42.4 MMHG (ref 35–45)
PCO2 BLDA: 42.9 MMHG (ref 35–45)
PH SMN: 7.48 [PH] (ref 7.35–7.45)
PH SMN: 7.49 [PH] (ref 7.35–7.45)
PLATELET # BLD AUTO: 198 K/UL (ref 150–350)
PMV BLD AUTO: 11.8 FL (ref 9.2–12.9)
PO2 BLDA: 29 MMHG (ref 40–60)
PO2 BLDA: 31 MMHG (ref 40–60)
POC BE: 8 MMOL/L
POC BE: 9 MMOL/L
POC SATURATED O2: 60 % (ref 95–100)
POC SATURATED O2: 63 % (ref 95–100)
POC TCO2: 33 MMOL/L (ref 24–29)
POC TCO2: 34 MMOL/L (ref 24–29)
POTASSIUM SERPL-SCNC: 3 MMOL/L (ref 3.5–5.1)
POTASSIUM SERPL-SCNC: 3 MMOL/L (ref 3.5–5.1)
POTASSIUM SERPL-SCNC: 3.6 MMOL/L (ref 3.5–5.1)
POTASSIUM SERPL-SCNC: 3.7 MMOL/L (ref 3.5–5.1)
PROT SERPL-MCNC: 7.4 G/DL (ref 6–8.4)
PROTHROMBIN TIME: 20.1 SEC (ref 9–12.5)
RBC # BLD AUTO: 5.97 M/UL (ref 4.6–6.2)
SAMPLE: ABNORMAL
SAMPLE: ABNORMAL
SITE: ABNORMAL
SITE: ABNORMAL
SODIUM SERPL-SCNC: 138 MMOL/L (ref 136–145)
WBC # BLD AUTO: 7.59 K/UL (ref 3.9–12.7)

## 2020-01-11 PROCEDURE — 99900035 HC TECH TIME PER 15 MIN (STAT)

## 2020-01-11 PROCEDURE — 20600001 HC STEP DOWN PRIVATE ROOM

## 2020-01-11 PROCEDURE — 82803 BLOOD GASES ANY COMBINATION: CPT

## 2020-01-11 PROCEDURE — 63600175 PHARM REV CODE 636 W HCPCS: Performed by: HOSPITALIST

## 2020-01-11 PROCEDURE — 99232 PR SUBSEQUENT HOSPITAL CARE,LEVL II: ICD-10-PCS | Mod: ,,, | Performed by: INTERNAL MEDICINE

## 2020-01-11 PROCEDURE — 83735 ASSAY OF MAGNESIUM: CPT

## 2020-01-11 PROCEDURE — 84132 ASSAY OF SERUM POTASSIUM: CPT

## 2020-01-11 PROCEDURE — 99232 SBSQ HOSP IP/OBS MODERATE 35: CPT | Mod: ,,, | Performed by: INTERNAL MEDICINE

## 2020-01-11 PROCEDURE — 25000003 PHARM REV CODE 250: Performed by: HOSPITALIST

## 2020-01-11 PROCEDURE — 25000003 PHARM REV CODE 250: Performed by: INTERNAL MEDICINE

## 2020-01-11 PROCEDURE — 36415 COLL VENOUS BLD VENIPUNCTURE: CPT

## 2020-01-11 PROCEDURE — 80053 COMPREHEN METABOLIC PANEL: CPT

## 2020-01-11 PROCEDURE — 85025 COMPLETE CBC W/AUTO DIFF WBC: CPT

## 2020-01-11 PROCEDURE — 85610 PROTHROMBIN TIME: CPT

## 2020-01-11 PROCEDURE — 80048 BASIC METABOLIC PNL TOTAL CA: CPT

## 2020-01-11 RX ORDER — SODIUM CHLORIDE 9 MG/ML
INJECTION, SOLUTION INTRAVENOUS CONTINUOUS
Status: ACTIVE | OUTPATIENT
Start: 2020-01-11 | End: 2020-01-11

## 2020-01-11 RX ORDER — POTASSIUM CHLORIDE 20 MEQ/1
40 TABLET, EXTENDED RELEASE ORAL ONCE
Status: DISCONTINUED | OUTPATIENT
Start: 2020-01-11 | End: 2020-01-15

## 2020-01-11 RX ORDER — POTASSIUM CHLORIDE 20 MEQ/1
40 TABLET, EXTENDED RELEASE ORAL ONCE
Status: COMPLETED | OUTPATIENT
Start: 2020-01-12 | End: 2020-01-12

## 2020-01-11 RX ORDER — POTASSIUM CHLORIDE 20 MEQ/1
40 TABLET, EXTENDED RELEASE ORAL
Status: COMPLETED | OUTPATIENT
Start: 2020-01-11 | End: 2020-01-11

## 2020-01-11 RX ORDER — WARFARIN SODIUM 5 MG/1
5 TABLET ORAL DAILY
Status: DISCONTINUED | OUTPATIENT
Start: 2020-01-11 | End: 2020-01-12

## 2020-01-11 RX ORDER — LANOLIN ALCOHOL/MO/W.PET/CERES
400 CREAM (GRAM) TOPICAL ONCE
Status: COMPLETED | OUTPATIENT
Start: 2020-01-11 | End: 2020-01-11

## 2020-01-11 RX ORDER — POTASSIUM CHLORIDE 14.9 MG/ML
20 INJECTION INTRAVENOUS
Status: DISCONTINUED | OUTPATIENT
Start: 2020-01-11 | End: 2020-01-11

## 2020-01-11 RX ORDER — TRAMADOL HYDROCHLORIDE 50 MG/1
50 TABLET ORAL ONCE
Status: COMPLETED | OUTPATIENT
Start: 2020-01-11 | End: 2020-01-11

## 2020-01-11 RX ORDER — POTASSIUM CHLORIDE 20 MEQ/1
60 TABLET, EXTENDED RELEASE ORAL ONCE
Status: DISCONTINUED | OUTPATIENT
Start: 2020-01-11 | End: 2020-01-15

## 2020-01-11 RX ORDER — POTASSIUM CHLORIDE 14.9 MG/ML
20 INJECTION INTRAVENOUS ONCE
Status: COMPLETED | OUTPATIENT
Start: 2020-01-11 | End: 2020-01-11

## 2020-01-11 RX ORDER — POLYETHYLENE GLYCOL 3350 17 G/17G
17 POWDER, FOR SOLUTION ORAL 2 TIMES DAILY
Status: DISCONTINUED | OUTPATIENT
Start: 2020-01-11 | End: 2020-01-31

## 2020-01-11 RX ORDER — DOBUTAMINE HYDROCHLORIDE 400 MG/100ML
3 INJECTION, SOLUTION INTRAVENOUS CONTINUOUS
Status: DISCONTINUED | OUTPATIENT
Start: 2020-01-11 | End: 2020-01-14

## 2020-01-11 RX ORDER — ACETAMINOPHEN 325 MG/1
650 TABLET ORAL EVERY 6 HOURS PRN
Status: DISCONTINUED | OUTPATIENT
Start: 2020-01-11 | End: 2020-01-21

## 2020-01-11 RX ORDER — WARFARIN SODIUM 5 MG/1
5 TABLET ORAL DAILY
Status: DISCONTINUED | OUTPATIENT
Start: 2020-01-12 | End: 2020-01-11

## 2020-01-11 RX ADMIN — Medication 400 MG: at 10:01

## 2020-01-11 RX ADMIN — ACETAMINOPHEN 650 MG: 325 TABLET ORAL at 07:01

## 2020-01-11 RX ADMIN — SODIUM CHLORIDE: 0.9 INJECTION, SOLUTION INTRAVENOUS at 06:01

## 2020-01-11 RX ADMIN — DOCUSATE SODIUM 50 MG: 50 CAPSULE, LIQUID FILLED ORAL at 12:01

## 2020-01-11 RX ADMIN — Medication 400 MG: at 06:01

## 2020-01-11 RX ADMIN — POTASSIUM CHLORIDE 20 MEQ: 200 INJECTION, SOLUTION INTRAVENOUS at 06:01

## 2020-01-11 RX ADMIN — POTASSIUM CHLORIDE 40 MEQ: 1500 TABLET, EXTENDED RELEASE ORAL at 10:01

## 2020-01-11 RX ADMIN — FUROSEMIDE 20 MG/HR: 10 INJECTION, SOLUTION INTRAMUSCULAR; INTRAVENOUS at 01:01

## 2020-01-11 RX ADMIN — POTASSIUM CHLORIDE 40 MEQ: 1500 TABLET, EXTENDED RELEASE ORAL at 08:01

## 2020-01-11 RX ADMIN — WARFARIN SODIUM 5 MG: 5 TABLET ORAL at 07:01

## 2020-01-11 RX ADMIN — HYDRALAZINE HYDROCHLORIDE AND ISOSORBIDE DINITRATE 1 TABLET: 37.5; 2 TABLET, FILM COATED ORAL at 08:01

## 2020-01-11 RX ADMIN — TRAMADOL HYDROCHLORIDE 50 MG: 50 TABLET, FILM COATED ORAL at 09:01

## 2020-01-11 RX ADMIN — DOCUSATE SODIUM 50 MG: 50 CAPSULE, LIQUID FILLED ORAL at 08:01

## 2020-01-11 RX ADMIN — POLYETHYLENE GLYCOL 3350 17 G: 17 POWDER, FOR SOLUTION ORAL at 12:01

## 2020-01-11 RX ADMIN — HYDRALAZINE HYDROCHLORIDE AND ISOSORBIDE DINITRATE 1 TABLET: 37.5; 2 TABLET, FILM COATED ORAL at 02:01

## 2020-01-11 RX ADMIN — DOBUTAMINE IN DEXTROSE 5 MCG/KG/MIN: 200 INJECTION, SOLUTION INTRAVENOUS at 06:01

## 2020-01-11 RX ADMIN — FUROSEMIDE 20 MG/HR: 10 INJECTION, SOLUTION INTRAMUSCULAR; INTRAVENOUS at 08:01

## 2020-01-11 RX ADMIN — POLYETHYLENE GLYCOL 3350 17 G: 17 POWDER, FOR SOLUTION ORAL at 08:01

## 2020-01-11 RX ADMIN — AMIODARONE HYDROCHLORIDE 200 MG: 200 TABLET ORAL at 08:01

## 2020-01-11 NOTE — PROGRESS NOTES
[facility]  Heart Transplant  Progress Note    Patient Name: Tae Delgado  MRN: [id number]  Admission Date: 1/9/2020  Hospital Length of Stay: 2 days  Attending Physician: Isiah Montero MD  Primary Care Provider: Elham Pearson MD  Principal Problem:<principal problem not specified>    Subjective:     Interval History: No events overnight. Net Negative 5 lit     Continuous Infusions:   furosemide (LASIX) 2 mg/mL infusion (non-titrating) 20 mg/hr (01/11/20 0836)     Scheduled Meds:   amiodarone  200 mg Oral Daily    isosorbide-hydrALAZINE 20-37.5 mg  1 tablet Oral TID    magnesium oxide  400 mg Oral Once    potassium chloride  40 mEq Oral Q1H    potassium chloride  60 mEq Oral Once     PRN Meds:sodium chloride 0.9%    Review of patient's allergies indicates:   Allergen Reactions    Percocet [oxycodone-acetaminophen] Itching    Penicillins Rash     Objective:     Vital Signs (Most Recent):  Temp: 97.7 °F (36.5 °C) (01/11/20 0800)  Pulse: 70 (01/11/20 0800)  Resp: 16 (01/11/20 0800)  BP: 105/72 (01/11/20 0800)  SpO2: 96 % (01/11/20 0800) Vital Signs (24h Range):  Temp:  [97.2 °F (36.2 °C)-98.4 °F (36.9 °C)] 97.7 °F (36.5 °C)  Pulse:  [62-88] 70  Resp:  [16-19] 16  SpO2:  [94 %-98 %] 96 %  BP: ()/(60-75) 105/72     Patient Vitals for the past 72 hrs (Last 3 readings):   Weight   01/11/20 0500 105.9 kg (233 lb 7.5 oz)   01/10/20 0545 109.5 kg (241 lb 6.5 oz)   01/09/20 1823 111.6 kg (246 lb 0.5 oz)     Body mass index is 33.5 kg/m².      Intake/Output Summary (Last 24 hours) at 1/11/2020 0856  Last data filed at 1/11/2020 0837  Gross per 24 hour   Intake 960 ml   Output 6325 ml   Net -5365 ml       Hemodynamic Parameters:       Telemetry: NSR    Physical Exam  Constitutional: He is oriented to person, place, and time. He appears well-developed and well-nourished.   HENT:   Head: Normocephalic.   Eyes: Pupils are equal, round, and reactive to light. EOM are normal.   Neck: Normal range of  motion. Unable to appreciate JVP   Cardiovascular: Normal rate and regular rhythm.   S3 gallop heard    Pulmonary/Chest: Effort normal and breath sounds normal.   Abdominal: Soft. Bowel sounds are normal.    Musculoskeletal: He exhibits mild edema.   Neurological: He is alert and oriented to person, place, and time.   Skin: WARM    Psychiatric: He has a normal mood and affect.   Significant Labs:  CBC:  Recent Labs   Lab 01/09/20  1845 01/10/20  0436 01/11/20  0355   WBC 8.79 7.11 7.59   RBC 5.59 5.62 5.97   HGB 15.6 15.5 16.6   HCT 50.0 48.6 52.4    201 198   MCV 89 87 88   MCH 27.9 27.6 27.8   MCHC 31.2* 31.9* 31.7*     BNP:  Recent Labs   Lab 01/10/20  0436   BNP 1,656*     CMP:  Recent Labs   Lab 01/09/20  1845 01/10/20  0436 01/11/20  0355   GLU 96 94  94 115*  115*   CALCIUM 10.2 9.9  9.9 10.1  10.1   ALBUMIN 4.1 3.8 4.0   PROT 7.5 7.0 7.4    141  141 138  138   K 4.1 3.8  3.8 3.0*  3.0*   CO2 30* 33*  33* 34*  34*    97  97 91*  91*   BUN 47* 46*  46* 45*  45*   CREATININE 2.6* 2.5*  2.5* 2.5*  2.5*   ALKPHOS 53* 52* 55   * 118* 117*   AST 96* 82* 67*   BILITOT 1.5* 1.3* 1.6*      Coagulation:   Recent Labs   Lab 01/09/20  1203 01/10/20  0834 01/11/20  0355   INR 3.0* 2.5* 2.1*     LDH:  No results for input(s): LDH in the last 72 hours.  Microbiology:  Microbiology Results (last 7 days)     ** No results found for the last 168 hours. **          I have reviewed all pertinent labs within the past 24 hours.    Estimated Creatinine Clearance: 38.8 mL/min (A) (based on SCr of 2.5 mg/dL (H)).    Diagnostic Results:  I have reviewed all pertinent imaging results/findings within the past 24 hours.  I have reviewed and interpreted all pertinent imaging results/findings within the past 24 hours.    Assessment and Plan:       55 y.o. WM with history of NICMP diagnosed in 2010, ICD, LV thrombus (with prior splenic and renal emboli), Embolic  CVA , paroxysmal atrial fib, HTN,  GUILLERMO  presents for  F/U today to clinic and he was volume overloaded on exam .  He states he had 3 admission in the last 3 months for volume overload. He started having more SOB on exertion since one month. Also endorses of Orthopnea since last one month. Alble to walk only 150 ft. He also has Bilateral lower extremity edema. He was admitted here in 2017 for ADHD here at [facility] and [facility] during that admission showed PCWP 40 and CVP 17. Currently denies chest pain, lightheadedness     CHF (congestive heart failure)  On lasix drip at 20 and received Diuril yesterday with good urine output.  Cr still 2.5,no change from yesterday after significant diuresis.Unable to appreciate JVP. Will need central line placement to know CVP and CO     Acute kidney injury superimposed on chronic kidney disease  Cr 2.5 this morning from 2.6. Cont to monitor with diuresis     Paroxysmal atrial fibrillation  Currently in NSR. On Amiodarone and coumadin for AC. Coumadin on hold for last 2 days.  INR 2.1         Andriy Perkins MD  Heart Transplant  [facility]

## 2020-01-11 NOTE — PROGRESS NOTES
01/11/20 1600   Invasive Hemodynamic Monitoring   CVP (mmHg) 2 mmHg   SVO2 (%) 60 %     MD Gregorio notified of pt's CVP and SvO2. Ordered to stop Lasix gtt. Will place orders accordingly. Will continue to monitor.

## 2020-01-11 NOTE — PLAN OF CARE
Plan of care discussed with patient. Patient is free of fall/trauma/injury. Denies CP, SOB, or pain/discomfort. Does c/o leg cramps. Remains on lasix gtt at 20mg/hr with good urine output. Concerned about not getting coumadin and INR going below 2.0.  All questions addressed. VSS. Uneventful night shift.

## 2020-01-11 NOTE — SUBJECTIVE & OBJECTIVE
Interval History: No events overnight. Net Negative 5 lit     Continuous Infusions:   furosemide (LASIX) 2 mg/mL infusion (non-titrating) 20 mg/hr (01/11/20 0836)     Scheduled Meds:   amiodarone  200 mg Oral Daily    isosorbide-hydrALAZINE 20-37.5 mg  1 tablet Oral TID    magnesium oxide  400 mg Oral Once    potassium chloride  40 mEq Oral Q1H    potassium chloride  60 mEq Oral Once     PRN Meds:sodium chloride 0.9%    Review of patient's allergies indicates:   Allergen Reactions    Percocet [oxycodone-acetaminophen] Itching    Penicillins Rash     Objective:     Vital Signs (Most Recent):  Temp: 97.7 °F (36.5 °C) (01/11/20 0800)  Pulse: 70 (01/11/20 0800)  Resp: 16 (01/11/20 0800)  BP: 105/72 (01/11/20 0800)  SpO2: 96 % (01/11/20 0800) Vital Signs (24h Range):  Temp:  [97.2 °F (36.2 °C)-98.4 °F (36.9 °C)] 97.7 °F (36.5 °C)  Pulse:  [62-88] 70  Resp:  [16-19] 16  SpO2:  [94 %-98 %] 96 %  BP: ()/(60-75) 105/72     Patient Vitals for the past 72 hrs (Last 3 readings):   Weight   01/11/20 0500 105.9 kg (233 lb 7.5 oz)   01/10/20 0545 109.5 kg (241 lb 6.5 oz)   01/09/20 1823 111.6 kg (246 lb 0.5 oz)     Body mass index is 33.5 kg/m².      Intake/Output Summary (Last 24 hours) at 1/11/2020 0856  Last data filed at 1/11/2020 0837  Gross per 24 hour   Intake 960 ml   Output 6325 ml   Net -5365 ml       Hemodynamic Parameters:       Telemetry: NSR    Physical Exam  Constitutional: He is oriented to person, place, and time. He appears well-developed and well-nourished.   HENT:   Head: Normocephalic.   Eyes: Pupils are equal, round, and reactive to light. EOM are normal.   Neck: Normal range of motion. Unable to appreciate JVP   Cardiovascular: Normal rate and regular rhythm.   S3 gallop heard    Pulmonary/Chest: Effort normal and breath sounds normal.   Abdominal: Soft. Bowel sounds are normal.    Musculoskeletal: He exhibits mild edema.   Neurological: He is alert and oriented to person, place, and time.    Skin: WARM    Psychiatric: He has a normal mood and affect.   Significant Labs:  CBC:  Recent Labs   Lab 01/09/20  1845 01/10/20  0436 01/11/20  0355   WBC 8.79 7.11 7.59   RBC 5.59 5.62 5.97   HGB 15.6 15.5 16.6   HCT 50.0 48.6 52.4    201 198   MCV 89 87 88   MCH 27.9 27.6 27.8   MCHC 31.2* 31.9* 31.7*     BNP:  Recent Labs   Lab 01/10/20  0436   BNP 1,656*     CMP:  Recent Labs   Lab 01/09/20  1845 01/10/20  0436 01/11/20  0355   GLU 96 94  94 115*  115*   CALCIUM 10.2 9.9  9.9 10.1  10.1   ALBUMIN 4.1 3.8 4.0   PROT 7.5 7.0 7.4    141  141 138  138   K 4.1 3.8  3.8 3.0*  3.0*   CO2 30* 33*  33* 34*  34*    97  97 91*  91*   BUN 47* 46*  46* 45*  45*   CREATININE 2.6* 2.5*  2.5* 2.5*  2.5*   ALKPHOS 53* 52* 55   * 118* 117*   AST 96* 82* 67*   BILITOT 1.5* 1.3* 1.6*      Coagulation:   Recent Labs   Lab 01/09/20  1203 01/10/20  0834 01/11/20 0355   INR 3.0* 2.5* 2.1*     LDH:  No results for input(s): LDH in the last 72 hours.  Microbiology:  Microbiology Results (last 7 days)     ** No results found for the last 168 hours. **          I have reviewed all pertinent labs within the past 24 hours.    Estimated Creatinine Clearance: 38.8 mL/min (A) (based on SCr of 2.5 mg/dL (H)).    Diagnostic Results:  I have reviewed all pertinent imaging results/findings within the past 24 hours.  I have reviewed and interpreted all pertinent imaging results/findings within the past 24 hours.

## 2020-01-11 NOTE — ASSESSMENT & PLAN NOTE
CVP 5, SVO2 70, CO/CI 5.9/2.6,SVR 1150. On Dobutamine at 5mcg. Will increase hydralazine dose to 50 mg TID and cont Isordil 20 mg Every 8h .    Cr trended to 2.3. Lasix stopped. On Dobutamine. Received 400cc fluid this morning.  Recheck CVP at 2 PM

## 2020-01-12 LAB
ALBUMIN SERPL BCP-MCNC: 3.8 G/DL (ref 3.5–5.2)
ALLENS TEST: ABNORMAL
ALLENS TEST: ABNORMAL
ALP SERPL-CCNC: 54 U/L (ref 55–135)
ALT SERPL W/O P-5'-P-CCNC: 84 U/L (ref 10–44)
ANION GAP SERPL CALC-SCNC: 11 MMOL/L (ref 8–16)
ANION GAP SERPL CALC-SCNC: 9 MMOL/L (ref 8–16)
APTT BLDCRRT: 21.6 SEC (ref 21–32)
APTT BLDCRRT: 34.3 SEC (ref 21–32)
APTT BLDCRRT: 34.3 SEC (ref 21–32)
AST SERPL-CCNC: 36 U/L (ref 10–40)
BASOPHILS # BLD AUTO: 0.04 K/UL (ref 0–0.2)
BASOPHILS # BLD AUTO: 0.05 K/UL (ref 0–0.2)
BASOPHILS NFR BLD: 0.5 % (ref 0–1.9)
BASOPHILS NFR BLD: 0.6 % (ref 0–1.9)
BILIRUB DIRECT SERPL-MCNC: 0.7 MG/DL (ref 0.1–0.3)
BILIRUB SERPL-MCNC: 1.9 MG/DL (ref 0.1–1)
BNP SERPL-MCNC: 249 PG/ML (ref 0–99)
BUN SERPL-MCNC: 34 MG/DL (ref 6–20)
BUN SERPL-MCNC: 36 MG/DL (ref 6–20)
CALCIUM SERPL-MCNC: 9.7 MG/DL (ref 8.7–10.5)
CALCIUM SERPL-MCNC: 9.7 MG/DL (ref 8.7–10.5)
CHLORIDE SERPL-SCNC: 96 MMOL/L (ref 95–110)
CHLORIDE SERPL-SCNC: 99 MMOL/L (ref 95–110)
CO2 SERPL-SCNC: 29 MMOL/L (ref 23–29)
CO2 SERPL-SCNC: 34 MMOL/L (ref 23–29)
CREAT SERPL-MCNC: 2.2 MG/DL (ref 0.5–1.4)
CREAT SERPL-MCNC: 2.3 MG/DL (ref 0.5–1.4)
DELSYS: ABNORMAL
DIFFERENTIAL METHOD: ABNORMAL
DIFFERENTIAL METHOD: ABNORMAL
EOSINOPHIL # BLD AUTO: 0.1 K/UL (ref 0–0.5)
EOSINOPHIL # BLD AUTO: 0.1 K/UL (ref 0–0.5)
EOSINOPHIL NFR BLD: 0.8 % (ref 0–8)
EOSINOPHIL NFR BLD: 1.1 % (ref 0–8)
ERYTHROCYTE [DISTWIDTH] IN BLOOD BY AUTOMATED COUNT: 14.6 % (ref 11.5–14.5)
ERYTHROCYTE [DISTWIDTH] IN BLOOD BY AUTOMATED COUNT: 14.7 % (ref 11.5–14.5)
EST. GFR  (AFRICAN AMERICAN): 34.6 ML/MIN/1.73 M^2
EST. GFR  (AFRICAN AMERICAN): 36.5 ML/MIN/1.73 M^2
EST. GFR  (NON AFRICAN AMERICAN): 30 ML/MIN/1.73 M^2
EST. GFR  (NON AFRICAN AMERICAN): 31.6 ML/MIN/1.73 M^2
FIO2: 21
GLUCOSE SERPL-MCNC: 131 MG/DL (ref 70–110)
GLUCOSE SERPL-MCNC: 97 MG/DL (ref 70–110)
HCO3 UR-SCNC: 28.8 MMOL/L (ref 24–28)
HCO3 UR-SCNC: 33.8 MMOL/L (ref 24–28)
HCT VFR BLD AUTO: 49.2 % (ref 40–54)
HCT VFR BLD AUTO: 51.7 % (ref 40–54)
HGB BLD-MCNC: 15.9 G/DL (ref 14–18)
HGB BLD-MCNC: 16.4 G/DL (ref 14–18)
IMM GRANULOCYTES # BLD AUTO: 0.02 K/UL (ref 0–0.04)
IMM GRANULOCYTES # BLD AUTO: 0.06 K/UL (ref 0–0.04)
IMM GRANULOCYTES NFR BLD AUTO: 0.3 % (ref 0–0.5)
IMM GRANULOCYTES NFR BLD AUTO: 0.6 % (ref 0–0.5)
INR PPP: 1.5 (ref 0.8–1.2)
INR PPP: 1.6 (ref 0.8–1.2)
INR PPP: 1.7 (ref 0.8–1.2)
LYMPHOCYTES # BLD AUTO: 1.6 K/UL (ref 1–4.8)
LYMPHOCYTES # BLD AUTO: 1.6 K/UL (ref 1–4.8)
LYMPHOCYTES NFR BLD: 16.9 % (ref 18–48)
LYMPHOCYTES NFR BLD: 21.9 % (ref 18–48)
MAGNESIUM SERPL-MCNC: 2.4 MG/DL (ref 1.6–2.6)
MCH RBC QN AUTO: 27.7 PG (ref 27–31)
MCH RBC QN AUTO: 28 PG (ref 27–31)
MCHC RBC AUTO-ENTMCNC: 31.7 G/DL (ref 32–36)
MCHC RBC AUTO-ENTMCNC: 32.3 G/DL (ref 32–36)
MCV RBC AUTO: 87 FL (ref 82–98)
MCV RBC AUTO: 88 FL (ref 82–98)
MODE: ABNORMAL
MONOCYTES # BLD AUTO: 0.9 K/UL (ref 0.3–1)
MONOCYTES # BLD AUTO: 1 K/UL (ref 0.3–1)
MONOCYTES NFR BLD: 13.1 % (ref 4–15)
MONOCYTES NFR BLD: 9.9 % (ref 4–15)
NEUTROPHILS # BLD AUTO: 4.6 K/UL (ref 1.8–7.7)
NEUTROPHILS # BLD AUTO: 6.6 K/UL (ref 1.8–7.7)
NEUTROPHILS NFR BLD: 63.3 % (ref 38–73)
NEUTROPHILS NFR BLD: 71 % (ref 38–73)
NRBC BLD-RTO: 0 /100 WBC
NRBC BLD-RTO: 0 /100 WBC
PCO2 BLDA: 40.2 MMHG (ref 35–45)
PCO2 BLDA: 46.2 MMHG (ref 35–45)
PH SMN: 7.46 [PH] (ref 7.35–7.45)
PH SMN: 7.47 [PH] (ref 7.35–7.45)
PLATELET # BLD AUTO: 186 K/UL (ref 150–350)
PLATELET # BLD AUTO: 203 K/UL (ref 150–350)
PMV BLD AUTO: 10.9 FL (ref 9.2–12.9)
PMV BLD AUTO: 11.4 FL (ref 9.2–12.9)
PO2 BLDA: 36 MMHG (ref 40–60)
PO2 BLDA: 36 MMHG (ref 40–60)
POC BE: 10 MMOL/L
POC BE: 5 MMOL/L
POC SATURATED O2: 72 % (ref 95–100)
POC SATURATED O2: 72 % (ref 95–100)
POC TCO2: 30 MMOL/L (ref 24–29)
POC TCO2: 35 MMOL/L (ref 24–29)
POTASSIUM SERPL-SCNC: 3.4 MMOL/L (ref 3.5–5.1)
POTASSIUM SERPL-SCNC: 4.1 MMOL/L (ref 3.5–5.1)
PROT SERPL-MCNC: 7.2 G/DL (ref 6–8.4)
PROTHROMBIN TIME: 14.9 SEC (ref 9–12.5)
PROTHROMBIN TIME: 15.4 SEC (ref 9–12.5)
PROTHROMBIN TIME: 16.1 SEC (ref 9–12.5)
RBC # BLD AUTO: 5.67 M/UL (ref 4.6–6.2)
RBC # BLD AUTO: 5.91 M/UL (ref 4.6–6.2)
SAMPLE: ABNORMAL
SAMPLE: ABNORMAL
SITE: ABNORMAL
SITE: ABNORMAL
SODIUM SERPL-SCNC: 139 MMOL/L (ref 136–145)
SODIUM SERPL-SCNC: 139 MMOL/L (ref 136–145)
WBC # BLD AUTO: 7.23 K/UL (ref 3.9–12.7)
WBC # BLD AUTO: 9.25 K/UL (ref 3.9–12.7)

## 2020-01-12 PROCEDURE — 63600175 PHARM REV CODE 636 W HCPCS: Performed by: INTERNAL MEDICINE

## 2020-01-12 PROCEDURE — 85610 PROTHROMBIN TIME: CPT | Mod: 91

## 2020-01-12 PROCEDURE — 99232 SBSQ HOSP IP/OBS MODERATE 35: CPT | Mod: ,,, | Performed by: INTERNAL MEDICINE

## 2020-01-12 PROCEDURE — 63600175 PHARM REV CODE 636 W HCPCS: Performed by: HOSPITALIST

## 2020-01-12 PROCEDURE — 83735 ASSAY OF MAGNESIUM: CPT

## 2020-01-12 PROCEDURE — 20600001 HC STEP DOWN PRIVATE ROOM

## 2020-01-12 PROCEDURE — 80048 BASIC METABOLIC PNL TOTAL CA: CPT | Mod: 91

## 2020-01-12 PROCEDURE — 99232 PR SUBSEQUENT HOSPITAL CARE,LEVL II: ICD-10-PCS | Mod: ,,, | Performed by: INTERNAL MEDICINE

## 2020-01-12 PROCEDURE — 85610 PROTHROMBIN TIME: CPT

## 2020-01-12 PROCEDURE — 93010 ELECTROCARDIOGRAM REPORT: CPT | Mod: ,,, | Performed by: INTERNAL MEDICINE

## 2020-01-12 PROCEDURE — 83880 ASSAY OF NATRIURETIC PEPTIDE: CPT

## 2020-01-12 PROCEDURE — 85730 THROMBOPLASTIN TIME PARTIAL: CPT

## 2020-01-12 PROCEDURE — 80076 HEPATIC FUNCTION PANEL: CPT

## 2020-01-12 PROCEDURE — 85025 COMPLETE CBC W/AUTO DIFF WBC: CPT | Mod: 91

## 2020-01-12 PROCEDURE — 25000003 PHARM REV CODE 250: Performed by: HOSPITALIST

## 2020-01-12 PROCEDURE — 25000003 PHARM REV CODE 250: Performed by: STUDENT IN AN ORGANIZED HEALTH CARE EDUCATION/TRAINING PROGRAM

## 2020-01-12 PROCEDURE — 82803 BLOOD GASES ANY COMBINATION: CPT

## 2020-01-12 PROCEDURE — 25000003 PHARM REV CODE 250: Performed by: INTERNAL MEDICINE

## 2020-01-12 PROCEDURE — 93005 ELECTROCARDIOGRAM TRACING: CPT

## 2020-01-12 PROCEDURE — 93010 EKG 12-LEAD: ICD-10-PCS | Mod: ,,, | Performed by: INTERNAL MEDICINE

## 2020-01-12 PROCEDURE — 99900035 HC TECH TIME PER 15 MIN (STAT)

## 2020-01-12 RX ORDER — HEPARIN SODIUM,PORCINE/D5W 25000/250
12 INTRAVENOUS SOLUTION INTRAVENOUS CONTINUOUS
Status: DISCONTINUED | OUTPATIENT
Start: 2020-01-12 | End: 2020-01-12

## 2020-01-12 RX ORDER — SODIUM CHLORIDE 9 MG/ML
INJECTION, SOLUTION INTRAVENOUS CONTINUOUS
Status: ACTIVE | OUTPATIENT
Start: 2020-01-12 | End: 2020-01-12

## 2020-01-12 RX ORDER — POTASSIUM CHLORIDE 14.9 MG/ML
20 INJECTION INTRAVENOUS ONCE
Status: COMPLETED | OUTPATIENT
Start: 2020-01-12 | End: 2020-01-12

## 2020-01-12 RX ORDER — TRAMADOL HYDROCHLORIDE 50 MG/1
50 TABLET ORAL 2 TIMES DAILY PRN
Status: DISCONTINUED | OUTPATIENT
Start: 2020-01-12 | End: 2020-01-21

## 2020-01-12 RX ORDER — POTASSIUM CHLORIDE 20 MEQ/1
40 TABLET, EXTENDED RELEASE ORAL ONCE
Status: COMPLETED | OUTPATIENT
Start: 2020-01-12 | End: 2020-01-12

## 2020-01-12 RX ORDER — HYDRALAZINE HYDROCHLORIDE 25 MG/1
50 TABLET, FILM COATED ORAL EVERY 8 HOURS
Status: DISCONTINUED | OUTPATIENT
Start: 2020-01-12 | End: 2020-01-13

## 2020-01-12 RX ORDER — HEPARIN SODIUM,PORCINE/D5W 25000/250
18 INTRAVENOUS SOLUTION INTRAVENOUS CONTINUOUS
Status: DISCONTINUED | OUTPATIENT
Start: 2020-01-12 | End: 2020-01-21

## 2020-01-12 RX ORDER — SODIUM CHLORIDE 9 MG/ML
INJECTION, SOLUTION INTRAVENOUS CONTINUOUS
Status: DISCONTINUED | OUTPATIENT
Start: 2020-01-12 | End: 2020-01-12

## 2020-01-12 RX ORDER — WARFARIN 3 MG/1
3 TABLET ORAL DAILY
Status: DISCONTINUED | OUTPATIENT
Start: 2020-01-12 | End: 2020-01-13

## 2020-01-12 RX ADMIN — HYDRALAZINE HYDROCHLORIDE 50 MG: 25 TABLET, FILM COATED ORAL at 05:01

## 2020-01-12 RX ADMIN — DOCUSATE SODIUM 50 MG: 50 CAPSULE, LIQUID FILLED ORAL at 08:01

## 2020-01-12 RX ADMIN — POTASSIUM CHLORIDE 40 MEQ: 1500 TABLET, EXTENDED RELEASE ORAL at 12:01

## 2020-01-12 RX ADMIN — AMIODARONE HYDROCHLORIDE 200 MG: 200 TABLET ORAL at 08:01

## 2020-01-12 RX ADMIN — SODIUM CHLORIDE: 0.9 INJECTION, SOLUTION INTRAVENOUS at 08:01

## 2020-01-12 RX ADMIN — DOBUTAMINE IN DEXTROSE 5 MCG/KG/MIN: 200 INJECTION, SOLUTION INTRAVENOUS at 10:01

## 2020-01-12 RX ADMIN — HYDRALAZINE HYDROCHLORIDE AND ISOSORBIDE DINITRATE 1 TABLET: 37.5; 2 TABLET, FILM COATED ORAL at 08:01

## 2020-01-12 RX ADMIN — WARFARIN SODIUM 3 MG: 3 TABLET ORAL at 05:01

## 2020-01-12 RX ADMIN — POLYETHYLENE GLYCOL 3350 17 G: 17 POWDER, FOR SOLUTION ORAL at 08:01

## 2020-01-12 RX ADMIN — HEPARIN SODIUM 12 UNITS/KG/HR: 10000 INJECTION, SOLUTION INTRAVENOUS at 01:01

## 2020-01-12 RX ADMIN — TRAMADOL HYDROCHLORIDE 50 MG: 50 TABLET, FILM COATED ORAL at 09:01

## 2020-01-12 RX ADMIN — HEPARIN SODIUM AND DEXTROSE 12 UNITS/KG/HR: 10000; 5 INJECTION INTRAVENOUS at 10:01

## 2020-01-12 RX ADMIN — POTASSIUM CHLORIDE 40 MEQ: 1500 TABLET, EXTENDED RELEASE ORAL at 06:01

## 2020-01-12 RX ADMIN — POTASSIUM CHLORIDE 20 MEQ: 200 INJECTION, SOLUTION INTRAVENOUS at 06:01

## 2020-01-12 NOTE — PLAN OF CARE
Plan of care discussed with patient and significant other. Patient is free of fall/trauma/injury. Denies CP or SOB. C/o H/A. Pain unrelieved with tylenol. Tramadol 50mg po once given. CV--2 and VBG-60 and 2200. Replaced K+=3.6 with 40meq po once. Additional 250mls NS iv given. Dobutamine gtt remains at 5mcg/kg/min. Mani well. All questions addressed. 0430-rechecked CVP-5 and VBG-70. Labs drawn via RT IJ TLC and sent to lab

## 2020-01-12 NOTE — PROGRESS NOTES
01/12/20 1500   Invasive Hemodynamic Monitoring   CVP (mean) 7 mmHg   SVO2 (%) 72 %     MD Gregorio notified. No orders received. Will continue to monitor.

## 2020-01-12 NOTE — NURSING
Per MD Gregorio, verbal orders received to infuse NS @100cc/hr for 4 hrs, not 5 hrs like initially ordered. IVF started at 0800, will stop at 1200. Also received orders for repeat CVP, SvO2 and repeat BMP at 1400. Will implement, will follow up. Will continue to monitor.

## 2020-01-12 NOTE — PROGRESS NOTES
Tramadol 50mg po given for c/o 9/10 H/A.  CVP-2 and VBG-60. Notified HTS and another 250mls NS at 100ml/s per hr ordered and began.

## 2020-01-12 NOTE — NURSING
Pt with 12 beat run of VT this AM. Asymptomatic, ambulating to chair. 20mEq K infusing to RIJ for K 3.4 earlier this AM. Pt already received 40mEq K PO prior to shift. MD Gregorio notified; ordered 100mL/hr NS infusion over 3 hours, for a total of 300cc NS. Will implement. Will continue to monitor.

## 2020-01-12 NOTE — PROGRESS NOTES
[facility]  Heart Transplant  Progress Note    Patient Name: Tae Delgado  MRN: [id number]  Admission Date: 1/9/2020  Hospital Length of Stay: 3 days  Attending Physician: Isiah Montero MD  Primary Care Provider: Elham Pearson MD  Principal Problem:<principal problem not specified>    Subjective:     Interval History: Patient had 12 beat of nonsustained Vtach     Continuous Infusions:   sodium chloride 0.9% 100 mL/hr at 01/12/20 0806    DOBUTamine 5 mcg/kg/min (01/12/20 1030)     Scheduled Meds:   amiodarone  200 mg Oral Daily    docusate sodium  50 mg Oral BID    hydrALAZINE  50 mg Oral Q8H    polyethylene glycol  17 g Oral BID    potassium chloride  40 mEq Oral Once    potassium chloride  60 mEq Oral Once    warfarin  5 mg Oral Daily     PRN Meds:acetaminophen, sodium chloride 0.9%    Review of patient's allergies indicates:   Allergen Reactions    Percocet [oxycodone-acetaminophen] Itching    Penicillins Rash     Objective:     Vital Signs (Most Recent):  Temp: 98.4 °F (36.9 °C) (01/12/20 1147)  Pulse: 104 (01/12/20 1147)  Resp: 17 (01/12/20 1147)  BP: 113/73 (01/12/20 1147)  SpO2: 98 % (01/12/20 1147) Vital Signs (24h Range):  Temp:  [97.7 °F (36.5 °C)-98.4 °F (36.9 °C)] 98.4 °F (36.9 °C)  Pulse:  [] 104  Resp:  [16-18] 17  SpO2:  [93 %-98 %] 98 %  BP: ()/(56-77) 113/73     Patient Vitals for the past 72 hrs (Last 3 readings):   Weight   01/12/20 0500 107.4 kg (236 lb 12.4 oz)   01/11/20 0500 105.9 kg (233 lb 7.5 oz)   01/10/20 0545 109.5 kg (241 lb 6.5 oz)     Body mass index is 33.97 kg/m².      Intake/Output Summary (Last 24 hours) at 1/12/2020 1157  Last data filed at 1/12/2020 0605  Gross per 24 hour   Intake 1769.6 ml   Output 2250 ml   Net -480.4 ml       Hemodynamic Parameters:  CVP:  [2 mmHg-5 mmHg] 5 mmHg    Telemetry: NSR    Physical Exam  Constitutional: He is oriented to person, place, and time. He appears well-developed and well-nourished.   HENT:    Head: Normocephalic.   Eyes: Pupils are equal, round, and reactive to light. EOM are normal.   Neck: Normal range of motion. Unable to appreciate JVP   Cardiovascular: Normal rate and regular rhythm.   S3 gallop heard    Pulmonary/Chest: Effort normal and breath sounds normal.   Abdominal: Soft. Bowel sounds are normal.    Musculoskeletal: He exhibits mild edema.   Neurological: He is alert and oriented to person, place, and time.   Skin: WARM    Psychiatric: He has a normal mood and affect.  Significant Labs:  CBC:  Recent Labs   Lab 01/11/20  0355 01/12/20  0444 01/12/20  0825   WBC 7.59 7.23 9.25   RBC 5.97 5.67 5.91   HGB 16.6 15.9 16.4   HCT 52.4 49.2 51.7    186 203   MCV 88 87 88   MCH 27.8 28.0 27.7   MCHC 31.7* 32.3 31.7*     BNP:  Recent Labs   Lab 01/10/20  0436 01/12/20  0825   BNP 1,656* 249*     CMP:  Recent Labs   Lab 01/10/20  0436 01/11/20  0355 01/11/20  1538 01/11/20  2216 01/12/20  0444 01/12/20  0825   GLU 94  94 115*  115* 125*  --  97  --    CALCIUM 9.9  9.9 10.1  10.1 10.3  --  9.7  --    ALBUMIN 3.8 4.0  --   --   --  3.8   PROT 7.0 7.4  --   --   --  7.2     141 138  138 138  --  139  --    K 3.8  3.8 3.0*  3.0* 3.7 3.6 3.4*  --    CO2 33*  33* 34*  34* 35*  --  34*  --    CL 97  97 91*  91* 92*  --  96  --    BUN 46*  46* 45*  45* 42*  --  36*  --    CREATININE 2.5*  2.5* 2.5*  2.5* 2.4*  --  2.3*  --    ALKPHOS 52* 55  --   --   --  54*   * 117*  --   --   --  84*   AST 82* 67*  --   --   --  36   BILITOT 1.3* 1.6*  --   --   --  1.9*      Coagulation:   Recent Labs   Lab 01/11/20  0355 01/12/20  0444 01/12/20  0825   INR 2.1* 1.6* 1.5*   APTT  --   --  21.6     LDH:  No results for input(s): LDH in the last 72 hours.  Microbiology:  Microbiology Results (last 7 days)     ** No results found for the last 168 hours. **          I have reviewed all pertinent labs within the past 24 hours.    Estimated Creatinine Clearance: 42.5 mL/min (A) (based on SCr  of 2.3 mg/dL (H)).    Diagnostic Results:  I have reviewed and interpreted all pertinent imaging results/findings within the past 24 hours.    Assessment and Plan:       55 y.o. WM with history of NICMP diagnosed in 2010, ICD, LV thrombus (with prior splenic and renal emboli), Embolic  CVA , paroxysmal atrial fib, HTN, HLP  presents for  F/U today to clinic and he was volume overloaded on exam .  He states he had 3 admission in the last 3 months for volume overload. He started having more SOB on exertion since one month. Also endorses of Orthopnea since last one month. Alble to walk only 150 ft. He also has Bilateral lower extremity edema. He was admitted here in 2017 for ADHD [facility] at [facility] and [facility] during that admission showed PCWP 40 and CVP 17. Currently denies chest pain, lightheadedness     CHF (congestive heart failure)  CVP 5, SVO2 70, CO/CI 5.9/2.6,SVR 1150. On Dobutamine at 5mcg. Will increase hydralazine dose to 50 mg TID and cont Isordil 20 mg Every 8h .    Cr trended to 2.3. Lasix stopped. On Dobutamine. Received 400cc fluid this morning.  Recheck CVP at 2 PM   Will get CT head, CT chest/Abdomen as part of VAD/OHT work up     Acute kidney injury superimposed on chronic kidney disease  Cr trended to 2.3. Lasix stopped. On Dobutamine. Received 400cc fluid this morning     Paroxysmal atrial fibrillation  INR 1.5. Will bridge him with heparin and keep him on 3 mg coumadin         Andriy Perkins MD  Heart Transplant  [facility]

## 2020-01-12 NOTE — PLAN OF CARE
Plan of care discussed with patient and family. Concerned about having headaches with pt's history of previous strokes. Patient able to ambulate without assistive devices. RIJ TLC inserted, CVP=2, and SVO2=60. Intact and flushing without complications. Lasix gtt D/C'd, 500cc bolus initiated and to be infused over 5 hours.  gtt initiated at 5mcg/kg, tolerating well. K replaced throughout shift, 3.7 with last redraw. IV K given x1, will reassess in am. VSS. No distress noted. Will continue to monitor.

## 2020-01-12 NOTE — PROCEDURES
"Tae Delgado is a 59 y.o. male patient.    Temp: 97.7 °F (36.5 °C) (01/11/20 1542)  Pulse: 75 (01/11/20 1836)  Resp: 16 (01/11/20 1542)  BP: 112/72( START) (01/11/20 1836)  SpO2: 98 % (01/11/20 1542)  Weight: 105.9 kg (233 lb 7.5 oz) (01/11/20 0500)  Height: 5' 10" (177.8 cm) (01/09/20 1823)    Central Line  Date/Time: 1/11/2020 6:39 PM  Performed by: Andriy Perkins MD  Assisting provider: Brien Celaya MD  Site marked: the operative site was marked  Time out: Immediately prior to procedure a "time out" was called to verify the correct patient, procedure, equipment, support staff and site/side marked as required.  Indications: diagnostic evaluation  Anesthesia: local infiltration    Anesthesia:  Local anesthesia used: yes  Local Anesthetic: lidocaine 1% without epinephrine  Preparation: skin prepped with ChloraPrep  Skin prep agent dried: skin prep agent completely dried prior to procedure  Sterile barriers: all five maximum sterile barriers used - cap, mask, sterile gown, sterile gloves, and large sterile sheet  Hand hygiene: hand hygiene performed prior to central venous catheter insertion  Location details: right internal jugular  Catheter type: triple lumen  Catheter size: 7.5 Fr  Ultrasound guidance: yes  Vessel Caliber: medium, patentManometry: Yes  Number of attempts: 1  Assessment: placement verified by x-ray  Complications: none  Estimated blood loss (mL): 2  Specimens: No  Implants: No  Post-procedure: line sutured,  chlorhexidine patch,  sterile dressing applied and blood return through all ports  Complications: No          No flowsheet data found.    Andriy Perkins  1/11/2020    "

## 2020-01-12 NOTE — SUBJECTIVE & OBJECTIVE
Interval History: Patient had 12 beat of nonsustained Vtach     Continuous Infusions:   sodium chloride 0.9% 100 mL/hr at 01/12/20 0806    DOBUTamine 5 mcg/kg/min (01/12/20 1030)     Scheduled Meds:   amiodarone  200 mg Oral Daily    docusate sodium  50 mg Oral BID    hydrALAZINE  50 mg Oral Q8H    polyethylene glycol  17 g Oral BID    potassium chloride  40 mEq Oral Once    potassium chloride  60 mEq Oral Once    warfarin  5 mg Oral Daily     PRN Meds:acetaminophen, sodium chloride 0.9%    Review of patient's allergies indicates:   Allergen Reactions    Percocet [oxycodone-acetaminophen] Itching    Penicillins Rash     Objective:     Vital Signs (Most Recent):  Temp: 98.4 °F (36.9 °C) (01/12/20 1147)  Pulse: 104 (01/12/20 1147)  Resp: 17 (01/12/20 1147)  BP: 113/73 (01/12/20 1147)  SpO2: 98 % (01/12/20 1147) Vital Signs (24h Range):  Temp:  [97.7 °F (36.5 °C)-98.4 °F (36.9 °C)] 98.4 °F (36.9 °C)  Pulse:  [] 104  Resp:  [16-18] 17  SpO2:  [93 %-98 %] 98 %  BP: ()/(56-77) 113/73     Patient Vitals for the past 72 hrs (Last 3 readings):   Weight   01/12/20 0500 107.4 kg (236 lb 12.4 oz)   01/11/20 0500 105.9 kg (233 lb 7.5 oz)   01/10/20 0545 109.5 kg (241 lb 6.5 oz)     Body mass index is 33.97 kg/m².      Intake/Output Summary (Last 24 hours) at 1/12/2020 1157  Last data filed at 1/12/2020 0605  Gross per 24 hour   Intake 1769.6 ml   Output 2250 ml   Net -480.4 ml       Hemodynamic Parameters:  CVP:  [2 mmHg-5 mmHg] 5 mmHg    Telemetry: NSR    Physical Exam  Constitutional: He is oriented to person, place, and time. He appears well-developed and well-nourished.   HENT:   Head: Normocephalic.   Eyes: Pupils are equal, round, and reactive to light. EOM are normal.   Neck: Normal range of motion. Unable to appreciate JVP   Cardiovascular: Normal rate and regular rhythm.   S3 gallop heard    Pulmonary/Chest: Effort normal and breath sounds normal.   Abdominal: Soft. Bowel sounds are normal.     Musculoskeletal: He exhibits mild edema.   Neurological: He is alert and oriented to person, place, and time.   Skin: WARM    Psychiatric: He has a normal mood and affect.  Significant Labs:  CBC:  Recent Labs   Lab 01/11/20 0355 01/12/20 0444 01/12/20  0825   WBC 7.59 7.23 9.25   RBC 5.97 5.67 5.91   HGB 16.6 15.9 16.4   HCT 52.4 49.2 51.7    186 203   MCV 88 87 88   MCH 27.8 28.0 27.7   MCHC 31.7* 32.3 31.7*     BNP:  Recent Labs   Lab 01/10/20  0436 01/12/20  0825   BNP 1,656* 249*     CMP:  Recent Labs   Lab 01/10/20  0436 01/11/20  0355 01/11/20  1538 01/11/20  2216 01/12/20 0444 01/12/20  0825   GLU 94  94 115*  115* 125*  --  97  --    CALCIUM 9.9  9.9 10.1  10.1 10.3  --  9.7  --    ALBUMIN 3.8 4.0  --   --   --  3.8   PROT 7.0 7.4  --   --   --  7.2     141 138  138 138  --  139  --    K 3.8  3.8 3.0*  3.0* 3.7 3.6 3.4*  --    CO2 33*  33* 34*  34* 35*  --  34*  --    CL 97  97 91*  91* 92*  --  96  --    BUN 46*  46* 45*  45* 42*  --  36*  --    CREATININE 2.5*  2.5* 2.5*  2.5* 2.4*  --  2.3*  --    ALKPHOS 52* 55  --   --   --  54*   * 117*  --   --   --  84*   AST 82* 67*  --   --   --  36   BILITOT 1.3* 1.6*  --   --   --  1.9*      Coagulation:   Recent Labs   Lab 01/11/20 0355 01/12/20 0444 01/12/20  0825   INR 2.1* 1.6* 1.5*   APTT  --   --  21.6     LDH:  No results for input(s): LDH in the last 72 hours.  Microbiology:  Microbiology Results (last 7 days)     ** No results found for the last 168 hours. **          I have reviewed all pertinent labs within the past 24 hours.    Estimated Creatinine Clearance: 42.5 mL/min (A) (based on SCr of 2.3 mg/dL (H)).    Diagnostic Results:  I have reviewed and interpreted all pertinent imaging results/findings within the past 24 hours.

## 2020-01-13 ENCOUNTER — TELEPHONE (OUTPATIENT)
Dept: TRANSPLANT | Facility: CLINIC | Age: 60
End: 2020-01-13

## 2020-01-13 PROBLEM — R91.1 RIGHT LOWER LOBE PULMONARY NODULE: Status: ACTIVE | Noted: 2020-01-13

## 2020-01-13 PROBLEM — I50.9 CHF (CONGESTIVE HEART FAILURE): Status: RESOLVED | Noted: 2020-01-09 | Resolved: 2020-01-13

## 2020-01-13 PROBLEM — Z95.810 ICD (IMPLANTABLE CARDIOVERTER-DEFIBRILLATOR) IN PLACE: Status: ACTIVE | Noted: 2020-01-13

## 2020-01-13 LAB
ABO + RH BLD: NORMAL
ALBUMIN SERPL BCP-MCNC: 3.9 G/DL (ref 3.5–5.2)
ALLENS TEST: ABNORMAL
ALP SERPL-CCNC: 55 U/L (ref 55–135)
ALT SERPL W/O P-5'-P-CCNC: 66 U/L (ref 10–44)
ANION GAP SERPL CALC-SCNC: 10 MMOL/L (ref 8–16)
APTT BLDCRRT: 40.2 SEC (ref 21–32)
APTT BLDCRRT: 48.4 SEC (ref 21–32)
AST SERPL-CCNC: 27 U/L (ref 10–40)
BASOPHILS # BLD AUTO: 0.06 K/UL (ref 0–0.2)
BASOPHILS NFR BLD: 0.7 % (ref 0–1.9)
BILIRUB DIRECT SERPL-MCNC: 0.9 MG/DL (ref 0.1–0.3)
BILIRUB SERPL-MCNC: 2.3 MG/DL (ref 0.1–1)
BLD GP AB SCN CELLS X3 SERPL QL: NORMAL
BSA FOR ECHO PROCEDURE: 2.32 M2
BUN SERPL-MCNC: 25 MG/DL (ref 6–20)
CALCIUM SERPL-MCNC: 9.5 MG/DL (ref 8.7–10.5)
CHLORIDE SERPL-SCNC: 99 MMOL/L (ref 95–110)
CO2 SERPL-SCNC: 28 MMOL/L (ref 23–29)
CREAT SERPL-MCNC: 1.7 MG/DL (ref 0.5–1.4)
DELSYS: ABNORMAL
DIFFERENTIAL METHOD: ABNORMAL
EOSINOPHIL # BLD AUTO: 0.3 K/UL (ref 0–0.5)
EOSINOPHIL NFR BLD: 3.6 % (ref 0–8)
ERYTHROCYTE [DISTWIDTH] IN BLOOD BY AUTOMATED COUNT: 15.2 % (ref 11.5–14.5)
EST. GFR  (AFRICAN AMERICAN): 49.9 ML/MIN/1.73 M^2
EST. GFR  (NON AFRICAN AMERICAN): 43.2 ML/MIN/1.73 M^2
GLUCOSE SERPL-MCNC: 93 MG/DL (ref 70–110)
HCO3 UR-SCNC: 28.9 MMOL/L (ref 24–28)
HCT VFR BLD AUTO: 48.8 % (ref 40–54)
HGB BLD-MCNC: 15.4 G/DL (ref 14–18)
IMM GRANULOCYTES # BLD AUTO: 0.04 K/UL (ref 0–0.04)
IMM GRANULOCYTES NFR BLD AUTO: 0.5 % (ref 0–0.5)
INR PPP: 1.8 (ref 0.8–1.2)
LYMPHOCYTES # BLD AUTO: 1.9 K/UL (ref 1–4.8)
LYMPHOCYTES NFR BLD: 21.7 % (ref 18–48)
MAGNESIUM SERPL-MCNC: 2.2 MG/DL (ref 1.6–2.6)
MCH RBC QN AUTO: 27.9 PG (ref 27–31)
MCHC RBC AUTO-ENTMCNC: 31.6 G/DL (ref 32–36)
MCV RBC AUTO: 89 FL (ref 82–98)
MONOCYTES # BLD AUTO: 1 K/UL (ref 0.3–1)
MONOCYTES NFR BLD: 11.3 % (ref 4–15)
NEUTROPHILS # BLD AUTO: 5.3 K/UL (ref 1.8–7.7)
NEUTROPHILS NFR BLD: 62.2 % (ref 38–73)
NRBC BLD-RTO: 0 /100 WBC
PCO2 BLDA: 40.6 MMHG (ref 35–45)
PH SMN: 7.46 [PH] (ref 7.35–7.45)
PISA TR MAX VEL: 3.57 M/S
PLATELET # BLD AUTO: 192 K/UL (ref 150–350)
PMV BLD AUTO: 11.2 FL (ref 9.2–12.9)
PO2 BLDA: 28 MMHG (ref 40–60)
POC BE: 5 MMOL/L
POC SATURATED O2: 57 % (ref 95–100)
POC TCO2: 30 MMOL/L (ref 24–29)
POTASSIUM SERPL-SCNC: 4.1 MMOL/L (ref 3.5–5.1)
PROT SERPL-MCNC: 7.1 G/DL (ref 6–8.4)
PROTHROMBIN TIME: 17.5 SEC (ref 9–12.5)
RBC # BLD AUTO: 5.51 M/UL (ref 4.6–6.2)
SAMPLE: ABNORMAL
SITE: ABNORMAL
SODIUM SERPL-SCNC: 137 MMOL/L (ref 136–145)
TR MAX PG: 51 MMHG
WBC # BLD AUTO: 8.57 K/UL (ref 3.9–12.7)

## 2020-01-13 PROCEDURE — 80048 BASIC METABOLIC PNL TOTAL CA: CPT

## 2020-01-13 PROCEDURE — 25000003 PHARM REV CODE 250: Mod: NTX | Performed by: STUDENT IN AN ORGANIZED HEALTH CARE EDUCATION/TRAINING PROGRAM

## 2020-01-13 PROCEDURE — 25000003 PHARM REV CODE 250: Mod: NTX | Performed by: NURSE PRACTITIONER

## 2020-01-13 PROCEDURE — 36415 COLL VENOUS BLD VENIPUNCTURE: CPT | Mod: NTX

## 2020-01-13 PROCEDURE — 85025 COMPLETE CBC W/AUTO DIFF WBC: CPT

## 2020-01-13 PROCEDURE — 85730 THROMBOPLASTIN TIME PARTIAL: CPT | Mod: 91,NTX

## 2020-01-13 PROCEDURE — 85730 THROMBOPLASTIN TIME PARTIAL: CPT

## 2020-01-13 PROCEDURE — 80076 HEPATIC FUNCTION PANEL: CPT | Mod: NTX

## 2020-01-13 PROCEDURE — 20600001 HC STEP DOWN PRIVATE ROOM: Mod: NTX

## 2020-01-13 PROCEDURE — 99222 PR INITIAL HOSPITAL CARE,LEVL II: ICD-10-PCS | Mod: GC,NTX,, | Performed by: INTERNAL MEDICINE

## 2020-01-13 PROCEDURE — 85610 PROTHROMBIN TIME: CPT | Mod: NTX

## 2020-01-13 PROCEDURE — 99233 PR SUBSEQUENT HOSPITAL CARE,LEVL III: ICD-10-PCS | Mod: ,,, | Performed by: INTERNAL MEDICINE

## 2020-01-13 PROCEDURE — 86901 BLOOD TYPING SEROLOGIC RH(D): CPT | Mod: NTX

## 2020-01-13 PROCEDURE — 63600175 PHARM REV CODE 636 W HCPCS: Performed by: HOSPITALIST

## 2020-01-13 PROCEDURE — 25000003 PHARM REV CODE 250: Mod: NTX | Performed by: HOSPITALIST

## 2020-01-13 PROCEDURE — 63600175 PHARM REV CODE 636 W HCPCS: Mod: NTX | Performed by: INTERNAL MEDICINE

## 2020-01-13 PROCEDURE — 99900035 HC TECH TIME PER 15 MIN (STAT)

## 2020-01-13 PROCEDURE — 99222 1ST HOSP IP/OBS MODERATE 55: CPT | Mod: GC,NTX,, | Performed by: INTERNAL MEDICINE

## 2020-01-13 PROCEDURE — 83735 ASSAY OF MAGNESIUM: CPT | Mod: NTX

## 2020-01-13 PROCEDURE — 99233 SBSQ HOSP IP/OBS HIGH 50: CPT | Mod: ,,, | Performed by: INTERNAL MEDICINE

## 2020-01-13 RX ORDER — ONDANSETRON 2 MG/ML
4 INJECTION INTRAMUSCULAR; INTRAVENOUS EVERY 6 HOURS PRN
Status: DISCONTINUED | OUTPATIENT
Start: 2020-01-13 | End: 2020-01-21

## 2020-01-13 RX ORDER — HYDRALAZINE HYDROCHLORIDE 25 MG/1
75 TABLET, FILM COATED ORAL EVERY 8 HOURS
Status: DISCONTINUED | OUTPATIENT
Start: 2020-01-13 | End: 2020-01-14

## 2020-01-13 RX ORDER — DIPHENHYDRAMINE HCL 25 MG
25 CAPSULE ORAL ONCE
Status: COMPLETED | OUTPATIENT
Start: 2020-01-13 | End: 2020-01-13

## 2020-01-13 RX ORDER — FUROSEMIDE 40 MG/1
40 TABLET ORAL 2 TIMES DAILY
Status: DISCONTINUED | OUTPATIENT
Start: 2020-01-13 | End: 2020-01-16

## 2020-01-13 RX ADMIN — DOCUSATE SODIUM 50 MG: 50 CAPSULE, LIQUID FILLED ORAL at 09:01

## 2020-01-13 RX ADMIN — ACETAMINOPHEN 650 MG: 325 TABLET ORAL at 09:01

## 2020-01-13 RX ADMIN — HEPARIN SODIUM 12 UNITS/KG/HR: 10000 INJECTION, SOLUTION INTRAVENOUS at 01:01

## 2020-01-13 RX ADMIN — HYDRALAZINE HYDROCHLORIDE 75 MG: 25 TABLET, FILM COATED ORAL at 03:01

## 2020-01-13 RX ADMIN — HYDRALAZINE HYDROCHLORIDE 75 MG: 25 TABLET, FILM COATED ORAL at 08:01

## 2020-01-13 RX ADMIN — DOCUSATE SODIUM 50 MG: 50 CAPSULE, LIQUID FILLED ORAL at 08:01

## 2020-01-13 RX ADMIN — AMIODARONE HYDROCHLORIDE 200 MG: 200 TABLET ORAL at 09:01

## 2020-01-13 RX ADMIN — HUMAN ALBUMIN MICROSPHERES AND PERFLUTREN 0.66 MG: 10; .22 INJECTION, SOLUTION INTRAVENOUS at 01:01

## 2020-01-13 RX ADMIN — DOBUTAMINE IN DEXTROSE 5 MCG/KG/MIN: 200 INJECTION, SOLUTION INTRAVENOUS at 02:01

## 2020-01-13 RX ADMIN — DIPHENHYDRAMINE HYDROCHLORIDE 25 MG: 25 CAPSULE ORAL at 10:01

## 2020-01-13 RX ADMIN — POLYETHYLENE GLYCOL 3350 17 G: 17 POWDER, FOR SOLUTION ORAL at 09:01

## 2020-01-13 RX ADMIN — TRAMADOL HYDROCHLORIDE 50 MG: 50 TABLET, FILM COATED ORAL at 08:01

## 2020-01-13 RX ADMIN — FUROSEMIDE 40 MG: 40 TABLET ORAL at 09:01

## 2020-01-13 RX ADMIN — FUROSEMIDE 40 MG: 40 TABLET ORAL at 05:01

## 2020-01-13 NOTE — HPI
Mr. Delgado is a 58 yo  male with past medical history of paroxysmal Atrial fibrillation, HLD, HTN, CKD, NICM(recent echo with EF 15%, diastolic dysfunction, moderate RV dysfunction), history of PE(on warfarin) and LV thrombus who was admitted for CHF exacerbation characterized by worsening dyspnea on exertion, orthopnea, PND, and lower extremity swelling. They have diuresing him, he has been placed on dobutamine gtt, and he is currently undergoing VAD/heart transplant evaluation. Pulmonology is being consulted because of an abnormal finding on his CT chest obtained as part of workup for VAD/Heart transplant. Within the medial aspect of the right lower lobe a 1 cm consolidative/sub-solid solitary pulmonary nodule was found. He did not have any mediastinal lymphadenopathy. He has no prior CT to compare to but nodule not visualized on prior chest xrays from 2 and 3 years ago.     He denies any concerning symptoms including weight loss, decreased appetite, fevers, night sweats, hemoptysis, chronic cough. He has dyspnea on exertion related to his volume status. His family history of cancer includes his mother who  of breast cancer in her 70s. He denies any history of smoking or history of lung disease.

## 2020-01-13 NOTE — PLAN OF CARE
Plan of care discussed with patient. Patient is free of fall/trauma/injury. Denies CP, SOB, or pain/discomfort. Telemetry monitor showing SR/ST with 1st degree AV block as confirmed with EKG. Remains on dobutamine gtt at 5mcg/kg/min. Heparin gtt at 14u/kg/hr with last PTT at 0030-48.4. Will check next PTT at 0600 with am labs. Tramadol 50mg po given for neck pain/ H/A.  All questions addressed. Will continue to monitor

## 2020-01-13 NOTE — ASSESSMENT & PLAN NOTE
Mr. Delgado is a 58 yo male with pmh of HTN, HLD, CKD, and NICM(EF 15%, dd, RV dysfunction) who presents with solitary consolidative/sub-solid 1 cm pulmonary nodule in the right medial lower lobe found incidentally on CT chest as part of workup for VAD/Heart transplant. He denies smoking history, lung disease, family history of lung cancer, weight loss, decreased appetite, night sweats, hemoptysis. Patient is low risk for malignancy.    Plan:  -Recommend repeat CT chest in 3 months  -Nothing from pulmonary standpoint to prohibit further eval for VAD/Heart transplant

## 2020-01-13 NOTE — PLAN OF CARE
Pt free of falls/trauma/injuries.  Denies c/o SOB; O2Sats remain stable on room air.  Incentive spirometry encouraged throughout shift. Generalized skin remains CDI; trace edema noted to BLEs.  Pt being diuresed with PO lasix; diuresing well.   Wt remains stable.  Electrolytes replaced as ordered. Pt able to ambulate in hallway independently. RIJ dressing changed. LVAD coordinator contacted for pt he would like to get more information from them. Transplant nurse also here to see pt. Continues on  gtt and heparin gtt that is now therapeutic.  Pt tolerating plan of care.

## 2020-01-13 NOTE — PHYSICIAN QUERY
PT Name: Tae Delgado  MR #: [id number]  Physician Query Form - CKD Clarification     CDS/: Becki Walker RN, CCDS             Contact information: [email or web address]  This form is a permanent document in the medical record.     Query Date: January 13, 2020    By submitting this query, we are merely seeking further clarification of documentation. Please utilize your independent clinical judgment when addressing the question(s) below.    The Medical record contains the following:     Indicators   Supporting Clinical Findings   Location in Medical Record   X CKD or Chronic Kidney (Renal) Failure / Disease Acute kidney injury superimposed on chronic kidney disease 1/9 h/p   X BUN/Creatinine                          GFR Cr -2.6-->2.5-->2.4-->2.3->1.7  gfr 25.8-->27.1-->28.5-->30.0  43.2 1/9- 1/13 lab    Dehydration      Nausea / Vomiting      Dialysis / CRRT      Medication      Treatment     X Other Chronic Conditions COCM, HTN, HLD,   PAF 1/9 h/p    Other       Provider, please further specify the stage of CKD.    [   ] Chronic Kidney Disease (CKD) (please specify stage* below)      [employer] Definitions     Stage Description eGFR (mL/min)   [   ]    I Slight kidney damage with normal or increased filtration 90+   [   ]   II Mildly reduced kidney function 60-89   [   ]    III Moderately reduced kidney function 30-59   [   ] Other (please specify): ____AKI on CKD stage 111________    [   ]  Clinically Undetermined          Please document in your progress notes daily for the duration of treatment until resolved and include in your discharge summary.

## 2020-01-13 NOTE — ASSESSMENT & PLAN NOTE
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 Last echo done with contrast here was in 2016 and was -ve for thrombus. TTE done here 3/11/15 did show LV thrombus. Will repeat with contrast today as he is in w/u for LVAD  - Continue Heparin birdge, but D/C Coumadin as he is in w/u.

## 2020-01-13 NOTE — PROGRESS NOTES
[facility]  Heart Transplant  Progress Note    Patient Name: Tae Delgado  MRN: [id number]  Admission Date: 1/9/2020  Hospital Length of Stay: 4 days  Attending Physician: Isiah Montero MD  Primary Care Provider: Elham Pearson MD  Principal Problem:Acute on chronic combined systolic and diastolic heart failure    Subjective:     Interval History: Feels a little nauseated this morning as well as jittery. HR was in the 80's prior to , and on  100's-110's which is concerning to patient. CVP is up to 13 after getting a total of 900 cc IVF since 1/11. Creatinine is down to 1.7; total bili up to 2.3. sVO2 57 with calculated CO 4.57 and SVR 1365.  decreased to 3 mcg/kg/min, Hydralazine increased to 75 mg every 8 hours and resumed home dose of Lasix 40 mg po bid. Pulmonology consulted for 1 cm RLL opacity on CT chest/abd/pelvis (never smoked). If cleared, will start pathway    Continuous Infusions:   DOBUTamine 3 mcg/kg/min (01/13/20 0836)    heparin (porcine) in D5W 12 Units/kg/hr (01/13/20 0118)     Scheduled Meds:   amiodarone  200 mg Oral Daily    docusate sodium  50 mg Oral BID    furosemide  40 mg Oral BID    heparin (PORCINE)  60 Units/kg (Adjusted) Intravenous Once    hydrALAZINE  75 mg Oral Q8H    polyethylene glycol  17 g Oral BID    potassium chloride  40 mEq Oral Once    potassium chloride  60 mEq Oral Once    warfarin  3 mg Oral Daily     PRN Meds:acetaminophen, heparin (PORCINE), heparin (PORCINE), ondansetron, sodium chloride 0.9%, traMADol    Review of patient's allergies indicates:   Allergen Reactions    Percocet [oxycodone-acetaminophen] Itching    Penicillins Rash     Objective:     Vital Signs (Most Recent):  Temp: 98 °F (36.7 °C) (01/13/20 0928)  Pulse: 101 (01/13/20 0928)  Resp: 18 (01/13/20 0928)  BP: 118/75 (01/13/20 0928)  SpO2: 98 % (01/13/20 0928) Vital Signs (24h Range):  Temp:  [97.8 °F (36.6 °C)-98.4 °F (36.9 °C)] 98 °F (36.7 °C)  Pulse:  []  101  Resp:  [16-18] 18  SpO2:  [95 %-98 %] 98 %  BP: ()/(57-76) 118/75     Patient Vitals for the past 72 hrs (Last 3 readings):   Weight   01/13/20 0500 108.7 kg (239 lb 10.2 oz)   01/12/20 0500 107.4 kg (236 lb 12.4 oz)   01/11/20 0500 105.9 kg (233 lb 7.5 oz)     Body mass index is 34.38 kg/m².      Intake/Output Summary (Last 24 hours) at 1/13/2020 1033  Last data filed at 1/13/2020 0900  Gross per 24 hour   Intake 1358.93 ml   Output 1125 ml   Net 233.93 ml       Hemodynamic Parameters:       Telemetry: ST with NSVT    Physical Exam   Constitutional: He is oriented to person, place, and time. He appears well-developed and well-nourished.   HENT:   Head: Normocephalic and atraumatic.   Eyes: Pupils are equal, round, and reactive to light. Conjunctivae and EOM are normal.   Neck: Normal range of motion. Neck supple. No thyromegaly present. RIJ TLC present  Cardiovascular: Regular rhythm.   Tachycardic, + soft S3   Pulmonary/Chest: Effort normal and breath sounds normal.   Abdominal: Soft. Bowel sounds are normal.   Musculoskeletal: Normal range of motion. He exhibits no edema.   Neurological: He is alert and oriented to person, place, and time.   Skin: Skin is warm and dry. Capillary refill takes 2 to 3 seconds.   Psychiatric: He has a normal mood and affect. His behavior is normal. Judgment and thought content normal.       Significant Labs:  CBC:  Recent Labs   Lab 01/12/20  0444 01/12/20  0825 01/13/20  0430   WBC 7.23 9.25 8.57  8.57  8.57   RBC 5.67 5.91 5.51  5.51  5.51   HGB 15.9 16.4 15.4  15.4  15.4   HCT 49.2 51.7 48.8  48.8  48.8    203 192  192  192   MCV 87 88 89  89  89   MCH 28.0 27.7 27.9  27.9  27.9   MCHC 32.3 31.7* 31.6*  31.6*  31.6*     BNP:  Recent Labs   Lab 01/10/20  0436 01/12/20  0825   BNP 1,656* 249*     CMP:  Recent Labs   Lab 01/11/20  0355  01/12/20  0444 01/12/20  0825 01/12/20  1528 01/13/20  0430 01/13/20  0818   *  115*   < > 97  --  131* 93   --    CALCIUM 10.1  10.1   < > 9.7  --  9.7 9.5  --    ALBUMIN 4.0  --   --  3.8  --   --  3.9   PROT 7.4  --   --  7.2  --   --  7.1     138   < > 139  --  139 137  --    K 3.0*  3.0*   < > 3.4*  --  4.1 4.1  --    CO2 34*  34*   < > 34*  --  29 28  --    CL 91*  91*   < > 96  --  99 99  --    BUN 45*  45*   < > 36*  --  34* 25*  --    CREATININE 2.5*  2.5*   < > 2.3*  --  2.2* 1.7*  --    ALKPHOS 55  --   --  54*  --   --  55   *  --   --  84*  --   --  66*   AST 67*  --   --  36  --   --  27   BILITOT 1.6*  --   --  1.9*  --   --  2.3*    < > = values in this interval not displayed.      Coagulation:   Recent Labs   Lab 01/12/20  0825 01/12/20  1751 01/13/20  0037 01/13/20  0600   INR 1.5* 1.7*  --  1.8*   APTT 21.6 34.3*  34.3* 48.4* 40.2*     LDH:  No results for input(s): LDH in the last 72 hours.  Microbiology:  Microbiology Results (last 7 days)     ** No results found for the last 168 hours. **          I have reviewed all pertinent labs within the past 24 hours.    Estimated Creatinine Clearance: 57.8 mL/min (A) (based on SCr of 1.7 mg/dL (H)).    Diagnostic Results:  I have reviewed all pertinent imaging results/findings within the past 24 hours.    Assessment and Plan:       55 y.o. WM with history of NICMP diagnosed in 2010, ICD, LV thrombus (with prior splenic and renal emboli), Embolic  CVA , paroxysmal atrial fib, HTN, HLP  presents for  F/U today to clinic and he was volume overloaded on exam .  He states he had 3 admission in the last 3 months for volume overload. He started having more SOB on exertion since one month. Also endorses of Orthopnea since last one month. Alble to walk only 150 ft. He also has Bilateral lower extremity edema. He was admitted here in 2017 for ADHD here at [facility] and [facility] during that admission showed PCWP 40 and CVP 17. Currently denies chest pain, lightheadedness     * Acute on chronic combined systolic and diastolic heart failure  - NIDCM dx'd  2010  - TTE 1/10: LVEDD 6.96, LVEF 15%, diastolic dysfunction, RV midly dilated, mod decreased, severe LUCI, mild MR, PAQS 31 and IVC 8  - In the past months has been admitted 3 times for volume management requiring high doses of diuretics, in [facility]. Admits not following a strict diet and gaining weight. Now, he refers following a diet and current weight 245 lb but went up to 251 lb when not following diet.   - Initially diuresed this admit, but diuretics stopped 1/11 when central line placed and CVP was 2. Has been given 900 cc IVF since, and CVP is up to 13. Resumed home dose of Lasix 40 mg po bid  - sVO2 57 with calculated CO 4.57 and SVR 1365 on  5 mcg/kg/min, but patient feels jittery and is concerned about increase in HR on . Decreased to 3 mcg/kg/min, and Hydralazine increased to 75 mg every 8 hours  - GDMT: Toprol on hold 2/2 decompensation, need for . Continue Hyralazine, Isordil.Refuses to continue Losartan (stopped 4 days PTA) because makes him feel bad and SBP in 90's at home.   - Pathway initiated          ICD (implantable cardioverter-defibrillator) in place  - S/P Biotronik dual chamber ICD    Right lower lobe pulmonary nodule  - Pulmonology consulted.    Acute kidney injury superimposed on chronic kidney disease  - Creatinine on admit 2.6 (baseline ~ 1.8). 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 Last echo done with contrast here was in 2016 and was -ve for thrombus. TTE done here 3/11/15 did show LV thrombus. Will repeat with contrast today as he is in w/u for LVAD  - Continue Heparin birdge, but D/C Coumadin as he is  in w/u.          Marylin Lazcano NP [id number]  Heart Transplant  [facility]

## 2020-01-13 NOTE — PROGRESS NOTES
Per erin colbert sonographer, optison given ivp via  Left forearm sl for imaging. Denies transfusion rxn. Tolerated well. Sl flushed before and after with 10 cc ns.

## 2020-01-13 NOTE — PROGRESS NOTES
EKG done at bedside for elevated HR.  ? Change in rhythm. Dr. Landon at bedside. EKG showing ST.  VSS. Tramadol ordered for generalized pain/anxiety.

## 2020-01-13 NOTE — PROGRESS NOTES
"HTS Progress Note:    Called to evaluate increased HR, possible rhythm change. Pt anxious regarding increased HR, "usually 80" and concerned with increase. Also concerned with med change today (bidil switched to hydralazine monotherapy) and causing increased HR. Reports mild palpitations, does not feel like his AF episodes. Tele with -110 for last several hrs, prev 80s-90s. RRR, lungs clear, no JVD (CVP 7 last check).     EKG sinus tachycardia. Reassured pt normal rhythm.  Also with increased shoulder pain, pain at CVC site, requesting tramadol which helped previously. Tramadol 50 ordered.    Dipesh Nichole MD  Cardiology Fellow PGY-6  Pager: [phone]     "

## 2020-01-13 NOTE — CONSULTS
Visited w/pt for about 15 minutes, then they came to take him to a test. I will return in about 2 hours. He had begun to share with me his concerns re: getting the L-VAD, but we were just beginning and didn't get to his spiritual needs or prayer yet. I will return later and post a note re: visit.

## 2020-01-13 NOTE — ASSESSMENT & PLAN NOTE
- [facility] dx'd 2010  - TTE 1/10: LVEDD 6.96, LVEF 15%, diastolic dysfunction, RV midly dilated, mod decreased, severe LUCI, mild MR, PAQS 31 and IVC 8  - In the past months has been admitted 3 times for volume management requiring high doses of diuretics, in [facility]. Admits not following a strict diet and gaining weight. Now, he refers following a diet and current weight 245 lb but went up to 251 lb when not following diet.   - Initially diuresed this admit, but diuretics stopped 1/11 when central line placed and CVP was 2. Has been given 900 cc IVF since, and CVP is up to 13. Resumed home dose of Lasix 40 mg po bid  - sVO2 57 with calculated CO 4.57 and SVR 1365 on  5 mcg/kg/min, but patient feels jittery and is concerned about increase in HR on . Decreased to 3 mcg/kg/min, and Hydralazine increased to 75 mg every 8 hours  - GDMT: Toprol on hold 2/2 decompensation, need for . Continue Hyralazine, Isordil.Refuses to continue Losartan (stopped 4 days PTA) because makes him feel bad and SBP in 90's at home.   - Pathway initiated

## 2020-01-13 NOTE — SUBJECTIVE & OBJECTIVE
Interval History: Feels a little nauseated this morning as well as jittery. HR was in the 80's prior to , and on  100's-110's which is concerning to patient. CVP is up to 13 after getting a total of 900 cc IVF since 1/11. Creatinine is down to 1.7; total bili up to 2.3. sVO2 57 with calculated CO 4.57 and SVR 1365.  decreased to 3 mcg/kg/min, Hydralazine increased to 75 mg every 8 hours and resumed home dose of Lasix 40 mg po bid. Pulmonology consulted for 1 cm RLL opacity on CT chest/abd/pelvis (never smoked). If cleared, will start pathway    Continuous Infusions:   DOBUTamine 3 mcg/kg/min (01/13/20 0836)    heparin (porcine) in D5W 12 Units/kg/hr (01/13/20 0118)     Scheduled Meds:   amiodarone  200 mg Oral Daily    docusate sodium  50 mg Oral BID    furosemide  40 mg Oral BID    heparin (PORCINE)  60 Units/kg (Adjusted) Intravenous Once    hydrALAZINE  75 mg Oral Q8H    polyethylene glycol  17 g Oral BID    potassium chloride  40 mEq Oral Once    potassium chloride  60 mEq Oral Once    warfarin  3 mg Oral Daily     PRN Meds:acetaminophen, heparin (PORCINE), heparin (PORCINE), ondansetron, sodium chloride 0.9%, traMADol    Review of patient's allergies indicates:   Allergen Reactions    Percocet [oxycodone-acetaminophen] Itching    Penicillins Rash     Objective:     Vital Signs (Most Recent):  Temp: 98 °F (36.7 °C) (01/13/20 0928)  Pulse: 101 (01/13/20 0928)  Resp: 18 (01/13/20 0928)  BP: 118/75 (01/13/20 0928)  SpO2: 98 % (01/13/20 0928) Vital Signs (24h Range):  Temp:  [97.8 °F (36.6 °C)-98.4 °F (36.9 °C)] 98 °F (36.7 °C)  Pulse:  [] 101  Resp:  [16-18] 18  SpO2:  [95 %-98 %] 98 %  BP: ()/(57-76) 118/75     Patient Vitals for the past 72 hrs (Last 3 readings):   Weight   01/13/20 0500 108.7 kg (239 lb 10.2 oz)   01/12/20 0500 107.4 kg (236 lb 12.4 oz)   01/11/20 0500 105.9 kg (233 lb 7.5 oz)     Body mass index is 34.38 kg/m².      Intake/Output Summary (Last 24 hours) at  1/13/2020 1033  Last data filed at 1/13/2020 0900  Gross per 24 hour   Intake 1358.93 ml   Output 1125 ml   Net 233.93 ml       Hemodynamic Parameters:       Telemetry: ST with NSVT    Physical Exam   Constitutional: He is oriented to person, place, and time. He appears well-developed and well-nourished.   HENT:   Head: Normocephalic and atraumatic.   Eyes: Pupils are equal, round, and reactive to light. Conjunctivae and EOM are normal.   Neck: Normal range of motion. Neck supple. No JVD present. No thyromegaly present.   Cardiovascular: Regular rhythm.   Tachycardic, + soft S3   Pulmonary/Chest: Effort normal and breath sounds normal.   Abdominal: Soft. Bowel sounds are normal.   Musculoskeletal: Normal range of motion. He exhibits no edema.   Neurological: He is alert and oriented to person, place, and time.   Skin: Skin is warm and dry. Capillary refill takes 2 to 3 seconds.   Psychiatric: He has a normal mood and affect. His behavior is normal. Judgment and thought content normal.       Significant Labs:  CBC:  Recent Labs   Lab 01/12/20 0444 01/12/20 0825 01/13/20  0430   WBC 7.23 9.25 8.57  8.57  8.57   RBC 5.67 5.91 5.51  5.51  5.51   HGB 15.9 16.4 15.4  15.4  15.4   HCT 49.2 51.7 48.8  48.8  48.8    203 192  192  192   MCV 87 88 89  89  89   MCH 28.0 27.7 27.9  27.9  27.9   MCHC 32.3 31.7* 31.6*  31.6*  31.6*     BNP:  Recent Labs   Lab 01/10/20  0436 01/12/20  0825   BNP 1,656* 249*     CMP:  Recent Labs   Lab 01/11/20  0355  01/12/20  0444 01/12/20  0825 01/12/20  1528 01/13/20  0430 01/13/20  0818   *  115*   < > 97  --  131* 93  --    CALCIUM 10.1  10.1   < > 9.7  --  9.7 9.5  --    ALBUMIN 4.0  --   --  3.8  --   --  3.9   PROT 7.4  --   --  7.2  --   --  7.1     138   < > 139  --  139 137  --    K 3.0*  3.0*   < > 3.4*  --  4.1 4.1  --    CO2 34*  34*   < > 34*  --  29 28  --    CL 91*  91*   < > 96  --  99 99  --    BUN 45*  45*   < > 36*  --  34* 25*  --     CREATININE 2.5*  2.5*   < > 2.3*  --  2.2* 1.7*  --    ALKPHOS 55  --   --  54*  --   --  55   *  --   --  84*  --   --  66*   AST 67*  --   --  36  --   --  27   BILITOT 1.6*  --   --  1.9*  --   --  2.3*    < > = values in this interval not displayed.      Coagulation:   Recent Labs   Lab 01/12/20  0825 01/12/20  1751 01/13/20  0037 01/13/20  0600   INR 1.5* 1.7*  --  1.8*   APTT 21.6 34.3*  34.3* 48.4* 40.2*     LDH:  No results for input(s): LDH in the last 72 hours.  Microbiology:  Microbiology Results (last 7 days)     ** No results found for the last 168 hours. **          I have reviewed all pertinent labs within the past 24 hours.    Estimated Creatinine Clearance: 57.8 mL/min (A) (based on SCr of 1.7 mg/dL (H)).    Diagnostic Results:  I have reviewed all pertinent imaging results/findings within the past 24 hours.

## 2020-01-13 NOTE — PLAN OF CARE
Plan of care discussed with patient and family. Pt remains free of falls and injuries. No questions or concerns regarding care plan. More questions regarding LVADs. Informed as appropriate. VSS, no distress noted. CT of head and abdomen complete. Ultrasound of torso completed. Will continue to monitor.

## 2020-01-13 NOTE — NURSING
Met with pt at bedside to discuss oht/vad eval.  Pt has not yet read his transplant information.  Instructed pt to read his OHT consent for evaluation form to be prepared for an education session hopefully tomorrow.  Pt voiced understanding.     Pt had more vad than transplant questions at this time.  Will have VAD nurse return to see pt and request that a VAD pt visit so pt can see the equipment.

## 2020-01-13 NOTE — TELEPHONE ENCOUNTER
Per Dr Dowell, inpatient oht/vad eval pathway to be initiated.     Financial clearance requested.

## 2020-01-13 NOTE — SUBJECTIVE & OBJECTIVE
Past Medical History:   Diagnosis Date    CHF (congestive heart failure) 1/2010    Dx  1/2010 w/ decreased LV systolic function (EF 15%) by ECHO 1/2015    COCM (congestive cardiomyopathy) 7/20/2016    Hyperlipidemia     Hypertension     Paroxysmal atrial fibrillation     Pulmonary embolus 2008    Stroke     Superficial thrombophlebitis        Past Surgical History:   Procedure Laterality Date    TONSILLECTOMY      VEIN LIGATION AND STRIPPING         Review of patient's allergies indicates:   Allergen Reactions    Percocet [oxycodone-acetaminophen] Itching    Penicillins Rash       Family History     Problem Relation (Age of Onset)    Cancer Mother        Tobacco Use    Smoking status: Never Smoker    Smokeless tobacco: Never Used   Substance and Sexual Activity    Alcohol use: No    Drug use: No    Sexual activity: Not on file         Review of Systems   Constitutional: Negative for appetite change, chills, diaphoresis, fatigue, fever and unexpected weight change.   HENT: Negative for rhinorrhea, sinus pain, sore throat and trouble swallowing.    Eyes: Negative for photophobia and visual disturbance.   Respiratory: Positive for shortness of breath (with exertion). Negative for cough.    Cardiovascular: Negative for chest pain and leg swelling.   Gastrointestinal: Negative for abdominal pain, constipation, diarrhea and nausea.   Genitourinary: Negative for difficulty urinating, dysuria, frequency, hematuria and urgency.   Musculoskeletal: Negative for arthralgias and gait problem.   Skin: Negative for color change, pallor and rash.   Neurological: Negative for light-headedness and headaches.     Objective:     Vital Signs (Most Recent):  Temp: 98 °F (36.7 °C) (01/13/20 0928)  Pulse: 101 (01/13/20 0928)  Resp: 18 (01/13/20 0928)  BP: 118/75 (01/13/20 0928)  SpO2: 98 % (01/13/20 0928) Vital Signs (24h Range):  Temp:  [97.8 °F (36.6 °C)-98.4 °F (36.9 °C)] 98 °F (36.7 °C)  Pulse:  [] 101  Resp:   [16-18] 18  SpO2:  [95 %-98 %] 98 %  BP: ()/(57-76) 118/75     Weight: 108.7 kg (239 lb 10.2 oz)  Body mass index is 34.38 kg/m².      Intake/Output Summary (Last 24 hours) at 1/13/2020 0931  Last data filed at 1/13/2020 0900  Gross per 24 hour   Intake 1442.14 ml   Output 1125 ml   Net 317.14 ml       Physical Exam   Constitutional: He is oriented to person, place, and time. He appears well-developed. No distress.   HENT:   Head: Normocephalic and atraumatic.   Mouth/Throat: No oropharyngeal exudate.   Right IJ in place without evidence of erythema, swelling   Eyes: Pupils are equal, round, and reactive to light. Conjunctivae are normal. No scleral icterus.   Neck: Normal range of motion. Neck supple. No JVD present.   Cardiovascular: Normal rate, regular rhythm, normal heart sounds and intact distal pulses. Exam reveals no gallop and no friction rub.   No murmur heard.  Pulmonary/Chest: Effort normal and breath sounds normal. No respiratory distress. He has no wheezes. He has no rales.   No increased work of breathing on room air  His lungs are clear to auscultation bilaterally--no crackles or focal wheezing   Abdominal: Soft. Bowel sounds are normal. He exhibits no distension. There is no tenderness.   Musculoskeletal: Normal range of motion. He exhibits no edema.   Neurological: He is alert and oriented to person, place, and time.   Skin: Skin is warm and dry.       Lines/Drains/Airways     Central Venous Catheter Line                 Percutaneous Central Line Insertion/Assessment - triple lumen  01/11/20 1300 right internal jugular 1 day          Peripheral Intravenous Line                 Peripheral IV - Single Lumen 01/09/20 1935 20 G Anterior;Left Forearm 3 days                Significant Labs:    CBC/Anemia Profile:  Recent Labs   Lab 01/12/20  0444 01/12/20  0825 01/13/20  0430   WBC 7.23 9.25 8.57  8.57  8.57   HGB 15.9 16.4 15.4  15.4  15.4   HCT 49.2 51.7 48.8  48.8  48.8    203 192   192  192   MCV 87 88 89  89  89   RDW 14.6* 14.7* 15.2*  15.2*  15.2*        Chemistries:  Recent Labs   Lab 01/11/20  2216 01/12/20 0444 01/12/20 0825 01/12/20  1528 01/13/20  0430 01/13/20  0818   NA  --  139  --  139 137  --    K 3.6 3.4*  --  4.1 4.1  --    CL  --  96  --  99 99  --    CO2  --  34*  --  29 28  --    BUN  --  36*  --  34* 25*  --    CREATININE  --  2.3*  --  2.2* 1.7*  --    CALCIUM  --  9.7  --  9.7 9.5  --    ALBUMIN  --   --  3.8  --   --  3.9   PROT  --   --  7.2  --   --  7.1   BILITOT  --   --  1.9*  --   --  2.3*   ALKPHOS  --   --  54*  --   --  55   ALT  --   --  84*  --   --  66*   AST  --   --  36  --   --  27   MG 2.4 2.4  --   --   --  2.2       CMP:   Recent Labs   Lab 01/12/20 0444 01/12/20 0825 01/12/20  1528 01/13/20  0430 01/13/20  0818     --  139 137  --    K 3.4*  --  4.1 4.1  --    CL 96  --  99 99  --    CO2 34*  --  29 28  --    GLU 97  --  131* 93  --    BUN 36*  --  34* 25*  --    CREATININE 2.3*  --  2.2* 1.7*  --    CALCIUM 9.7  --  9.7 9.5  --    PROT  --  7.2  --   --  7.1   ALBUMIN  --  3.8  --   --  3.9   BILITOT  --  1.9*  --   --  2.3*   ALKPHOS  --  54*  --   --  55   AST  --  36  --   --  27   ALT  --  84*  --   --  66*   ANIONGAP 9  --  11 10  --    EGFRNONAA 30.0*  --  31.6* 43.2*  --      Significant Imaging:   EXAMINATION:  CT CHEST ABDOMEN PELVIS WITHOUT CONTRAST(XPD)    CLINICAL HISTORY:  as part LVAD/OHT WORK UP;    TECHNIQUE:  Low dose axial images, sagittal and coronal reformations were obtained from the thoracic inlet to the pubic symphysis Oral contrast was not administered.    COMPARISON:  None    FINDINGS:  The structures at the base of the neck are unremarkable.  No significant mediastinal lymphadenopathy.  The heart is enlarged.  Pacer noted.  Right central venous catheter tip terminates within the distal SVC.  There is some calcification in the distribution of the coronary arteries.  No pericardial effusion.    Allowing for  respiratory motion, the airways are patent.  There is bilateral basilar dependent atelectasis.  No large focal consolidation.  There is a nodular focus of consolidative opacity within the medial aspect of the right lower lobe measuring 1 cm.  No pleural effusion.  No pneumatosis.    The liver, spleen, pancreas, gallbladder and adrenal glands have a grossly unremarkable noncontrast appearance.  There is no biliary dilation or ascites.  The pancreatic duct is not dilated.  No significant abdominal lymphadenopathy.    The kidneys have a grossly unremarkable noncontrast appearance without hydronephrosis or nephrolithiasis.  The bilateral ureters are unremarkable without calculi seen.  The urinary bladder is unremarkable.  The prostate is not enlarged.    There are a few scattered colonic diverticula without inflammation.  The terminal ileum and appendix are unremarkable.  The small bowel is unremarkable.  There is atherosclerotic calcification of the aorta and its branches.  No focal organized pelvic fluid collection.    Degenerative changes are noted of the spine.  There is grade 1 anterolisthesis of L5 on S1 likely related to bilateral L5 pars defects.  No significant inguinal lymphadenopathy.      Impression       This report was flagged in Epic as abnormal.    1. Rounded focus of consolidative/sub solid attenuation within the medial aspect of the right lower lobe.  This may reflect atelectasis or possibly sequela of developing infection although malignancy is not excluded.  Comparison with any previous examinations would be helpful.  Given its size, follow-up is recommended at 3 month interval, to ensure resolution or to assess interval change.  Additional follow-up or workup at that time as warranted.  2. Several additional findings above.

## 2020-01-13 NOTE — TELEPHONE ENCOUNTER
Per prior notes from 1/10/20, pt moved back to preht section.   New referral captured. FC notified

## 2020-01-13 NOTE — CHAPLAIN
Great visit w/pt-- 50 minutes. Talked through the lifestyle change after L-VAD and the relationship with his Christian, spirituality, etc. He seems more settled and comfortable with the procedure.  will continue to follow.

## 2020-01-13 NOTE — ASSESSMENT & PLAN NOTE
- Currently in NSR  - Continue Amiodarone 200 mg qd  - Continue Heparin bridge, but D/C Coumadin as he is in w/u

## 2020-01-13 NOTE — PROGRESS NOTES
"Pt refused hydralazine 50mg po this am. Pt said he took the first dose yesterday and made him feel "jitter" and thought it was too high of a dose.   "

## 2020-01-13 NOTE — ASSESSMENT & PLAN NOTE
- Creatinine on admit 2.6 (baseline ~ 1.8). Creatinine has trended down to 1.7 today  - Followed by Nephrology as an outpatient

## 2020-01-13 NOTE — CONSULTS
[facility]  Pulmonology  Consult Note    Patient Name: Tae Delgado  MRN: [id number]  Admission Date: 2020  Hospital Length of Stay: 4 days  Code Status: Full Code  Attending Physician: Isiah Montero MD  Primary Care Provider: Elham Pearson MD   Principal Problem: Acute on chronic combined systolic and diastolic heart failure    Inpatient consult to Pulmonology  Consult performed by: Fernando Mathew MD  Consult ordered by: Marylin Lazcano NP        Subjective:     HPI:  Mr. Delgado is a 58 yo  male with past medical history of paroxysmal Atrial fibrillation, HLD, HTN, CKD, NICM(recent echo with EF 15%, diastolic dysfunction, moderate RV dysfunction), history of PE(on warfarin) and LV thrombus who was admitted for CHF exacerbation characterized by worsening dyspnea on exertion, orthopnea, PND, and lower extremity swelling. They have diuresing him, he has been placed on dobutamine gtt, and he is currently undergoing VAD/heart transplant evaluation. Pulmonology is being consulted because of an abnormal finding on his CT chest obtained as part of workup for VAD/Heart transplant. Within the medial aspect of the right lower lobe a 1 cm consolidative/sub-solid solitary pulmonary nodule was found. He did not have any mediastinal lymphadenopathy. He has no prior CT to compare to but nodule not visualized on prior chest xrays from 2 and 3 years ago.     He denies any concerning symptoms including weight loss, decreased appetite, fevers, night sweats, hemoptysis, chronic cough. He has dyspnea on exertion related to his volume status. His family history of cancer includes his mother who  of breast cancer in her 70s. He denies any history of smoking or history of lung disease.    Past Medical History:   Diagnosis Date    CHF (congestive heart failure) 2010    Dx  2010 w/ decreased LV systolic function (EF 15%) by ECHO 2015    COCM (congestive cardiomyopathy) 2016    Hyperlipidemia      Hypertension     Paroxysmal atrial fibrillation     Pulmonary embolus 2008    Stroke     Superficial thrombophlebitis        Past Surgical History:   Procedure Laterality Date    TONSILLECTOMY      VEIN LIGATION AND STRIPPING         Review of patient's allergies indicates:   Allergen Reactions    Percocet [oxycodone-acetaminophen] Itching    Penicillins Rash       Family History     Problem Relation (Age of Onset)    Cancer Mother        Tobacco Use    Smoking status: Never Smoker    Smokeless tobacco: Never Used   Substance and Sexual Activity    Alcohol use: No    Drug use: No    Sexual activity: Not on file         Review of Systems   Constitutional: Negative for appetite change, chills, diaphoresis, fatigue, fever and unexpected weight change.   HENT: Negative for rhinorrhea, sinus pain, sore throat and trouble swallowing.    Eyes: Negative for photophobia and visual disturbance.   Respiratory: Positive for shortness of breath (with exertion). Negative for cough.    Cardiovascular: Negative for chest pain and leg swelling.   Gastrointestinal: Negative for abdominal pain, constipation, diarrhea and nausea.   Genitourinary: Negative for difficulty urinating, dysuria, frequency, hematuria and urgency.   Musculoskeletal: Negative for arthralgias and gait problem.   Skin: Negative for color change, pallor and rash.   Neurological: Negative for light-headedness and headaches.     Objective:     Vital Signs (Most Recent):  Temp: 98 °F (36.7 °C) (01/13/20 0928)  Pulse: 101 (01/13/20 0928)  Resp: 18 (01/13/20 0928)  BP: 118/75 (01/13/20 0928)  SpO2: 98 % (01/13/20 0928) Vital Signs (24h Range):  Temp:  [97.8 °F (36.6 °C)-98.4 °F (36.9 °C)] 98 °F (36.7 °C)  Pulse:  [] 101  Resp:  [16-18] 18  SpO2:  [95 %-98 %] 98 %  BP: ()/(57-76) 118/75     Weight: 108.7 kg (239 lb 10.2 oz)  Body mass index is 34.38 kg/m².      Intake/Output Summary (Last 24 hours) at 1/13/2020 0900  Last data filed at  1/13/2020 0900  Gross per 24 hour   Intake 1442.14 ml   Output 1125 ml   Net 317.14 ml       Physical Exam   Constitutional: He is oriented to person, place, and time. He appears well-developed. No distress.   HENT:   Head: Normocephalic and atraumatic.   Mouth/Throat: No oropharyngeal exudate.   Right IJ in place without evidence of erythema, swelling   Eyes: Pupils are equal, round, and reactive to light. Conjunctivae are normal. No scleral icterus.   Neck: Normal range of motion. Neck supple. No JVD present.   Cardiovascular: Normal rate, regular rhythm, normal heart sounds and intact distal pulses. Exam reveals no gallop and no friction rub.   No murmur heard.  Pulmonary/Chest: Effort normal and breath sounds normal. No respiratory distress. He has no wheezes. He has no rales.   No increased work of breathing on room air  His lungs are clear to auscultation bilaterally--no crackles or focal wheezing   Abdominal: Soft. Bowel sounds are normal. He exhibits no distension. There is no tenderness.   Musculoskeletal: Normal range of motion. He exhibits no edema.   Neurological: He is alert and oriented to person, place, and time.   Skin: Skin is warm and dry.       Lines/Drains/Airways     Central Venous Catheter Line                 Percutaneous Central Line Insertion/Assessment - triple lumen  01/11/20 1300 right internal jugular 1 day          Peripheral Intravenous Line                 Peripheral IV - Single Lumen 01/09/20 1935 20 G Anterior;Left Forearm 3 days                Significant Labs:    CBC/Anemia Profile:  Recent Labs   Lab 01/12/20  0444 01/12/20  0825 01/13/20  0430   WBC 7.23 9.25 8.57  8.57  8.57   HGB 15.9 16.4 15.4  15.4  15.4   HCT 49.2 51.7 48.8  48.8  48.8    203 192  192  192   MCV 87 88 89  89  89   RDW 14.6* 14.7* 15.2*  15.2*  15.2*        Chemistries:  Recent Labs   Lab 01/11/20  2216 01/12/20  0444 01/12/20  0825 01/12/20  1528 01/13/20  0430 01/13/20  0818   NA  --   139  --  139 137  --    K 3.6 3.4*  --  4.1 4.1  --    CL  --  96  --  99 99  --    CO2  --  34*  --  29 28  --    BUN  --  36*  --  34* 25*  --    CREATININE  --  2.3*  --  2.2* 1.7*  --    CALCIUM  --  9.7  --  9.7 9.5  --    ALBUMIN  --   --  3.8  --   --  3.9   PROT  --   --  7.2  --   --  7.1   BILITOT  --   --  1.9*  --   --  2.3*   ALKPHOS  --   --  54*  --   --  55   ALT  --   --  84*  --   --  66*   AST  --   --  36  --   --  27   MG 2.4 2.4  --   --   --  2.2       CMP:   Recent Labs   Lab 01/12/20  0444 01/12/20  0825 01/12/20  1528 01/13/20  0430 01/13/20  0818     --  139 137  --    K 3.4*  --  4.1 4.1  --    CL 96  --  99 99  --    CO2 34*  --  29 28  --    GLU 97  --  131* 93  --    BUN 36*  --  34* 25*  --    CREATININE 2.3*  --  2.2* 1.7*  --    CALCIUM 9.7  --  9.7 9.5  --    PROT  --  7.2  --   --  7.1   ALBUMIN  --  3.8  --   --  3.9   BILITOT  --  1.9*  --   --  2.3*   ALKPHOS  --  54*  --   --  55   AST  --  36  --   --  27   ALT  --  84*  --   --  66*   ANIONGAP 9  --  11 10  --    EGFRNONAA 30.0*  --  31.6* 43.2*  --      Significant Imaging:   EXAMINATION:  CT CHEST ABDOMEN PELVIS WITHOUT CONTRAST(XPD)    CLINICAL HISTORY:  as part LVAD/OHT WORK UP;    TECHNIQUE:  Low dose axial images, sagittal and coronal reformations were obtained from the thoracic inlet to the pubic symphysis Oral contrast was not administered.    COMPARISON:  None    FINDINGS:  The structures at the base of the neck are unremarkable.  No significant mediastinal lymphadenopathy.  The heart is enlarged.  Pacer noted.  Right central venous catheter tip terminates within the distal SVC.  There is some calcification in the distribution of the coronary arteries.  No pericardial effusion.    Allowing for respiratory motion, the airways are patent.  There is bilateral basilar dependent atelectasis.  No large focal consolidation.  There is a nodular focus of consolidative opacity within the medial aspect of the right lower  lobe measuring 1 cm.  No pleural effusion.  No pneumatosis.    The liver, spleen, pancreas, gallbladder and adrenal glands have a grossly unremarkable noncontrast appearance.  There is no biliary dilation or ascites.  The pancreatic duct is not dilated.  No significant abdominal lymphadenopathy.    The kidneys have a grossly unremarkable noncontrast appearance without hydronephrosis or nephrolithiasis.  The bilateral ureters are unremarkable without calculi seen.  The urinary bladder is unremarkable.  The prostate is not enlarged.    There are a few scattered colonic diverticula without inflammation.  The terminal ileum and appendix are unremarkable.  The small bowel is unremarkable.  There is atherosclerotic calcification of the aorta and its branches.  No focal organized pelvic fluid collection.    Degenerative changes are noted of the spine.  There is grade 1 anterolisthesis of L5 on S1 likely related to bilateral L5 pars defects.  No significant inguinal lymphadenopathy.      Impression       This report was flagged in Epic as abnormal.    1. Rounded focus of consolidative/sub solid attenuation within the medial aspect of the right lower lobe.  This may reflect atelectasis or possibly sequela of developing infection although malignancy is not excluded.  Comparison with any previous examinations would be helpful.  Given its size, follow-up is recommended at 3 month interval, to ensure resolution or to assess interval change.  Additional follow-up or workup at that time as warranted.  2. Several additional findings above.           Assessment/Plan:     Right lower lobe pulmonary nodule  Mr. Delgado is a 60 yo male with pmh of HTN, HLD, CKD, and NICM(EF 15%, dd, RV dysfunction) who presents with solitary consolidative/sub-solid 1 cm pulmonary nodule in the right medial lower lobe found incidentally on CT chest as part of workup for VAD/Heart transplant. He denies smoking history, lung disease, family history of lung  cancer, weight loss, decreased appetite, night sweats, hemoptysis. Patient is low risk for malignancy.    Plan:  -Recommend repeat CT chest in 3 months  -Nothing from pulmonary standpoint to prohibit further eval for VAD/Heart transplant          Thank you for your consult. I will sign off. Please contact us if you have any additional questions.     Fernando Mathew MD  Pulmonology  [facility]

## 2020-01-14 ENCOUNTER — EDUCATION (OUTPATIENT)
Dept: TRANSPLANT | Facility: CLINIC | Age: 60
End: 2020-01-14

## 2020-01-14 LAB
ALBUMIN SERPL BCP-MCNC: 3.6 G/DL (ref 3.5–5.2)
ALLENS TEST: ABNORMAL
ALP SERPL-CCNC: 51 U/L (ref 55–135)
ALT SERPL W/O P-5'-P-CCNC: 57 U/L (ref 10–44)
ANION GAP SERPL CALC-SCNC: 12 MMOL/L (ref 8–16)
APTT BLDCRRT: 45 SEC (ref 21–32)
AST SERPL-CCNC: 33 U/L (ref 10–40)
BASOPHILS # BLD AUTO: 0.05 K/UL (ref 0–0.2)
BASOPHILS NFR BLD: 0.6 % (ref 0–1.9)
BILIRUB SERPL-MCNC: 2 MG/DL (ref 0.1–1)
BUN SERPL-MCNC: 20 MG/DL (ref 6–20)
CALCIUM SERPL-MCNC: 9.3 MG/DL (ref 8.7–10.5)
CHLORIDE SERPL-SCNC: 100 MMOL/L (ref 95–110)
CO2 SERPL-SCNC: 26 MMOL/L (ref 23–29)
CREAT SERPL-MCNC: 1.6 MG/DL (ref 0.5–1.4)
DELSYS: ABNORMAL
DIFFERENTIAL METHOD: ABNORMAL
EOSINOPHIL # BLD AUTO: 0.3 K/UL (ref 0–0.5)
EOSINOPHIL NFR BLD: 4.3 % (ref 0–8)
ERYTHROCYTE [DISTWIDTH] IN BLOOD BY AUTOMATED COUNT: 15.4 % (ref 11.5–14.5)
EST. GFR  (AFRICAN AMERICAN): 53.7 ML/MIN/1.73 M^2
EST. GFR  (NON AFRICAN AMERICAN): 46.5 ML/MIN/1.73 M^2
GLUCOSE SERPL-MCNC: 90 MG/DL (ref 70–110)
HCO3 UR-SCNC: 28.5 MMOL/L (ref 24–28)
HCT VFR BLD AUTO: 47.9 % (ref 40–54)
HGB BLD-MCNC: 15.6 G/DL (ref 14–18)
IMM GRANULOCYTES # BLD AUTO: 0.05 K/UL (ref 0–0.04)
IMM GRANULOCYTES NFR BLD AUTO: 0.6 % (ref 0–0.5)
INR PPP: 2.2 (ref 0.8–1.2)
LYMPHOCYTES # BLD AUTO: 2.1 K/UL (ref 1–4.8)
LYMPHOCYTES NFR BLD: 27.1 % (ref 18–48)
MAGNESIUM SERPL-MCNC: 2 MG/DL (ref 1.6–2.6)
MCH RBC QN AUTO: 28.6 PG (ref 27–31)
MCHC RBC AUTO-ENTMCNC: 32.6 G/DL (ref 32–36)
MCV RBC AUTO: 88 FL (ref 82–98)
MONOCYTES # BLD AUTO: 0.9 K/UL (ref 0.3–1)
MONOCYTES NFR BLD: 11.2 % (ref 4–15)
NEUTROPHILS # BLD AUTO: 4.4 K/UL (ref 1.8–7.7)
NEUTROPHILS NFR BLD: 56.2 % (ref 38–73)
NRBC BLD-RTO: 0 /100 WBC
PCO2 BLDA: 40.4 MMHG (ref 35–45)
PH SMN: 7.46 [PH] (ref 7.35–7.45)
PLATELET # BLD AUTO: 189 K/UL (ref 150–350)
PMV BLD AUTO: 10.9 FL (ref 9.2–12.9)
PO2 BLDA: 31 MMHG (ref 40–60)
POC BE: 5 MMOL/L
POC SATURATED O2: 64 % (ref 95–100)
POC TCO2: 30 MMOL/L (ref 24–29)
POTASSIUM SERPL-SCNC: 3.8 MMOL/L (ref 3.5–5.1)
PROT SERPL-MCNC: 6.7 G/DL (ref 6–8.4)
PROTHROMBIN TIME: 20.7 SEC (ref 9–12.5)
RBC # BLD AUTO: 5.45 M/UL (ref 4.6–6.2)
SAMPLE: ABNORMAL
SITE: ABNORMAL
SODIUM SERPL-SCNC: 138 MMOL/L (ref 136–145)
WBC # BLD AUTO: 7.75 K/UL (ref 3.9–12.7)

## 2020-01-14 PROCEDURE — 93005 ELECTROCARDIOGRAM TRACING: CPT | Mod: NTX

## 2020-01-14 PROCEDURE — 25000003 PHARM REV CODE 250: Mod: NTX | Performed by: NURSE PRACTITIONER

## 2020-01-14 PROCEDURE — 82803 BLOOD GASES ANY COMBINATION: CPT | Mod: NTX

## 2020-01-14 PROCEDURE — 97161 PT EVAL LOW COMPLEX 20 MIN: CPT | Mod: NTX

## 2020-01-14 PROCEDURE — 63600175 PHARM REV CODE 636 W HCPCS: Mod: NTX | Performed by: HOSPITALIST

## 2020-01-14 PROCEDURE — 85025 COMPLETE CBC W/AUTO DIFF WBC: CPT | Mod: NTX

## 2020-01-14 PROCEDURE — 97165 OT EVAL LOW COMPLEX 30 MIN: CPT | Mod: NTX

## 2020-01-14 PROCEDURE — 99233 PR SUBSEQUENT HOSPITAL CARE,LEVL III: ICD-10-PCS | Mod: NTX,,, | Performed by: INTERNAL MEDICINE

## 2020-01-14 PROCEDURE — 93010 ELECTROCARDIOGRAM REPORT: CPT | Mod: NTX,,, | Performed by: INTERNAL MEDICINE

## 2020-01-14 PROCEDURE — 83735 ASSAY OF MAGNESIUM: CPT | Mod: NTX

## 2020-01-14 PROCEDURE — 93010 EKG 12-LEAD: ICD-10-PCS | Mod: NTX,,, | Performed by: INTERNAL MEDICINE

## 2020-01-14 PROCEDURE — 63600175 PHARM REV CODE 636 W HCPCS: Mod: NTX | Performed by: NURSE PRACTITIONER

## 2020-01-14 PROCEDURE — 20600001 HC STEP DOWN PRIVATE ROOM: Mod: NTX

## 2020-01-14 PROCEDURE — 25000003 PHARM REV CODE 250: Mod: NTX | Performed by: STUDENT IN AN ORGANIZED HEALTH CARE EDUCATION/TRAINING PROGRAM

## 2020-01-14 PROCEDURE — 99900035 HC TECH TIME PER 15 MIN (STAT): Mod: NTX

## 2020-01-14 PROCEDURE — 85610 PROTHROMBIN TIME: CPT | Mod: NTX

## 2020-01-14 PROCEDURE — 99233 SBSQ HOSP IP/OBS HIGH 50: CPT | Mod: NTX,,, | Performed by: INTERNAL MEDICINE

## 2020-01-14 PROCEDURE — 85730 THROMBOPLASTIN TIME PARTIAL: CPT | Mod: NTX

## 2020-01-14 PROCEDURE — 80053 COMPREHEN METABOLIC PANEL: CPT | Mod: NTX

## 2020-01-14 PROCEDURE — 25000003 PHARM REV CODE 250: Mod: NTX | Performed by: HOSPITALIST

## 2020-01-14 RX ORDER — HYDRALAZINE HYDROCHLORIDE 50 MG/1
100 TABLET, FILM COATED ORAL EVERY 8 HOURS
Status: DISCONTINUED | OUTPATIENT
Start: 2020-01-14 | End: 2020-01-18

## 2020-01-14 RX ORDER — ALPRAZOLAM 0.5 MG/1
0.5 TABLET ORAL ONCE
Status: COMPLETED | OUTPATIENT
Start: 2020-01-14 | End: 2020-01-14

## 2020-01-14 RX ADMIN — HYDRALAZINE HYDROCHLORIDE 100 MG: 50 TABLET, FILM COATED ORAL at 09:01

## 2020-01-14 RX ADMIN — POLYETHYLENE GLYCOL 3350 17 G: 17 POWDER, FOR SOLUTION ORAL at 09:01

## 2020-01-14 RX ADMIN — DOBUTAMINE IN DEXTROSE 3 MCG/KG/MIN: 200 INJECTION, SOLUTION INTRAVENOUS at 12:01

## 2020-01-14 RX ADMIN — HEPARIN SODIUM 14 UNITS/KG/HR: 10000 INJECTION, SOLUTION INTRAVENOUS at 12:01

## 2020-01-14 RX ADMIN — DOCUSATE SODIUM 50 MG: 50 CAPSULE, LIQUID FILLED ORAL at 09:01

## 2020-01-14 RX ADMIN — HEPARIN SODIUM 14 UNITS/KG/HR: 10000 INJECTION, SOLUTION INTRAVENOUS at 09:01

## 2020-01-14 RX ADMIN — AMIODARONE HYDROCHLORIDE 200 MG: 200 TABLET ORAL at 09:01

## 2020-01-14 RX ADMIN — TRAMADOL HYDROCHLORIDE 50 MG: 50 TABLET, FILM COATED ORAL at 12:01

## 2020-01-14 RX ADMIN — ACETAMINOPHEN 650 MG: 325 TABLET ORAL at 09:01

## 2020-01-14 RX ADMIN — ALPRAZOLAM 0.5 MG: 0.5 TABLET ORAL at 04:01

## 2020-01-14 RX ADMIN — HYDRALAZINE HYDROCHLORIDE 75 MG: 25 TABLET, FILM COATED ORAL at 04:01

## 2020-01-14 RX ADMIN — HYDRALAZINE HYDROCHLORIDE 100 MG: 50 TABLET, FILM COATED ORAL at 02:01

## 2020-01-14 RX ADMIN — FUROSEMIDE 40 MG: 40 TABLET ORAL at 05:01

## 2020-01-14 RX ADMIN — FUROSEMIDE 40 MG: 40 TABLET ORAL at 09:01

## 2020-01-14 RX ADMIN — TRAMADOL HYDROCHLORIDE 50 MG: 50 TABLET, FILM COATED ORAL at 09:01

## 2020-01-14 NOTE — ASSESSMENT & PLAN NOTE
- Currently in ST  - Continue Amiodarone 200 mg qd  - Continue Heparin bridge, but D/C Coumadin as he is in w/u

## 2020-01-14 NOTE — NURSING TRANSFER
Nursing Transfer Note      1/14/2020     Transfer From: [facility] [id number]    Transfer - walked over    Transfer with cardiac monitoring    Transported by RN    Medicines sent: yes    Chart send with patient: Yes    Patient reassessed at: 1/14 1000    Upon arrival to floor: cardiac monitor applied, patient oriented to room, call bell in reach and bed in lowest position

## 2020-01-14 NOTE — PROGRESS NOTES
"Pt with c/o pain and tenderness to R IJ site, stating "something is pulling". Upon site assessment RN noted swelling and redness to R IJ site, specifically in a ring where the biopatch sits. RN with concerns for chlorhexidine allergy. MD Cara notified. Will remove biopatch and give 25mg of po Benadryl now. RN to continue to monitor site.   "

## 2020-01-14 NOTE — ASSESSMENT & PLAN NOTE
- Incidental finding on CT chest/abd/pelvis on 1/12. Pulmonology consulted, and rec repeat CT of chest in 3 months  - Will need to follow-up in pulmonary clinic.

## 2020-01-14 NOTE — ASSESSMENT & PLAN NOTE
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

## 2020-01-14 NOTE — PLAN OF CARE
Pt with complaints of burning to R IJ site overnight, see note for details. Pt with questions about coumadin administration, per MD note was d/c'd as Pt is in w/u.  Pt educated on fall risks, Pt remained free from falls/trauma/injury. VSS. Denies any CP, SOB, palpitations, and dizziness. Turns/repositions independently in bed. Plan of care reviewed with patient and all questions answered, verbalizes understanding. No acute distress noted. Will continue to monitor.

## 2020-01-14 NOTE — ASSESSMENT & PLAN NOTE
- Creatinine on admit 2.6 (baseline ~ 1.8). Creatinine has trended down to 1.6 today  - Followed by Nephrology as an outpatient

## 2020-01-14 NOTE — SIGNIFICANT EVENT
"Called to bedside by RN who reports patient c/o increased SOB and heart racing.  D/C"d.  Examined patient at bedside. His HR had climbed to the 120's prior to stopping , and he appeared generally flushed. Normotensive. EKG done, and EP fellow called to bedside. Not clear if he is in ST or possibly AFL, but his HR is coming down to 114 and he is feeling less SOB. No O2 desaturations. Will monitor closely.  "

## 2020-01-14 NOTE — PROGRESS NOTES
PT aao x3 while ambulating in room.  Pt reports doing well, is reviewing VAD education.  Pt reports his caregiver will be his significant other, Taylor.  Pt reports Taylor will be coming Thursday and we can review LVAD education at that time.  Pt also reports knowing and LVAD patient.  PT denies any questions or concerns at this time.  Will follow up with pt soon.

## 2020-01-14 NOTE — PROGRESS NOTES
[facility]  Heart Transplant  Progress Note    Patient Name: Tae Delgado  MRN: [id number]  Admission Date: 1/9/2020  Hospital Length of Stay: 5 days  Attending Physician: Isiah Montero MD  Primary Care Provider: Elhma Pearson MD  Principal Problem:Acute on chronic combined systolic and diastolic heart failure    Subjective:     Interval History: Jitters are improving since  decreased to 3 mcg/kg/min yesterday, and CO is 5.6. Overnight developed burning pain under Biopatch at [facility] site. Biopatch removed, and site under clear dressing is not red; no rash. CVP has trended back down to 11 since resuming home dose of Lasix 40 mg po bid yesterday.     Continuous Infusions:   DOBUTamine 3 mcg/kg/min (01/14/20 0049)    heparin (porcine) in D5W 14 Units/kg/hr (01/14/20 0049)     Scheduled Meds:   amiodarone  200 mg Oral Daily    docusate sodium  50 mg Oral BID    furosemide  40 mg Oral BID    heparin (PORCINE)  60 Units/kg (Adjusted) Intravenous Once    hydrALAZINE  100 mg Oral Q8H    polyethylene glycol  17 g Oral BID    potassium chloride  40 mEq Oral Once    potassium chloride  60 mEq Oral Once     PRN Meds:acetaminophen, heparin (PORCINE), heparin (PORCINE), ondansetron, sodium chloride 0.9%, traMADol    Review of patient's allergies indicates:   Allergen Reactions    Biopatch [chlorhexidine gluconate]      Site burning    Percocet [oxycodone-acetaminophen] Itching    Penicillins Rash     Objective:     Vital Signs (Most Recent):  Temp: 98.9 °F (37.2 °C) (01/14/20 0804)  Pulse: (!) 114 (01/14/20 1106)  Resp: 18 (01/14/20 0804)  BP: 112/76 (01/14/20 0804)  SpO2: 98 % (01/14/20 0804) Vital Signs (24h Range):  Temp:  [97.5 °F (36.4 °C)-98.9 °F (37.2 °C)] 98.9 °F (37.2 °C)  Pulse:  [] 114  Resp:  [16-18] 18  SpO2:  [95 %-98 %] 98 %  BP: (112-133)/(72-83) 112/76     Patient Vitals for the past 72 hrs (Last 3 readings):   Weight   01/14/20 0450 108.7 kg (239 lb 10.2 oz)   01/13/20  1401 108.9 kg (240 lb)   01/13/20 0500 108.7 kg (239 lb 10.2 oz)     Body mass index is 34.38 kg/m².      Intake/Output Summary (Last 24 hours) at 1/14/2020 1129  Last data filed at 1/14/2020 0500  Gross per 24 hour   Intake 960 ml   Output 850 ml   Net 110 ml       Hemodynamic Parameters:       Telemetry: ST    Physical Exam   Constitutional: He is oriented to person, place, and time. He appears well-developed and well-nourished.   HENT:   Head: Normocephalic and atraumatic.   Eyes: Pupils are equal, round, and reactive to light. Conjunctivae and EOM are normal.   Neck: Normal range of motion. Neck supple. No JVD present. No thyromegaly present.   Cardiovascular: Regular rhythm.   Tachycardic, + soft S3   Pulmonary/Chest: Effort normal and breath sounds normal.   Abdominal: Soft. Bowel sounds are normal.   Musculoskeletal: Normal range of motion. He exhibits no edema.   Neurological: He is alert and oriented to person, place, and time.   Skin: Skin is warm and dry. Capillary refill takes 2 to 3 seconds.   Psychiatric: He has a normal mood and affect. His behavior is normal. Judgment and thought content normal.       Significant Labs:  CBC:  Recent Labs   Lab 01/12/20 0825 01/13/20 0430 01/14/20 0439   WBC 9.25 8.57  8.57  8.57 7.75   RBC 5.91 5.51  5.51  5.51 5.45   HGB 16.4 15.4  15.4  15.4 15.6   HCT 51.7 48.8  48.8  48.8 47.9    192  192  192 189   MCV 88 89  89  89 88   MCH 27.7 27.9  27.9  27.9 28.6   MCHC 31.7* 31.6*  31.6*  31.6* 32.6     BNP:  Recent Labs   Lab 01/10/20  0436 01/12/20 0825   BNP 1,656* 249*     CMP:  Recent Labs   Lab 01/12/20  0825 01/12/20  1528 01/13/20 0430 01/13/20  0818 01/14/20  0439   GLU  --  131* 93  --  90   CALCIUM  --  9.7 9.5  --  9.3   ALBUMIN 3.8  --   --  3.9 3.6   PROT 7.2  --   --  7.1 6.7   NA  --  139 137  --  138   K  --  4.1 4.1  --  3.8   CO2  --  29 28  --  26   CL  --  99 99  --  100   BUN  --  34* 25*  --  20   CREATININE  --  2.2* 1.7*   --  1.6*   ALKPHOS 54*  --   --  55 51*   ALT 84*  --   --  66* 57*   AST 36  --   --  27 33   BILITOT 1.9*  --   --  2.3* 2.0*      Coagulation:   Recent Labs   Lab 01/12/20  1751 01/13/20  0037 01/13/20  0600 01/14/20  0439   INR 1.7*  --  1.8* 2.2*   APTT 34.3*  34.3* 48.4* 40.2* 45.0*     LDH:  No results for input(s): LDH in the last 72 hours.  Microbiology:  Microbiology Results (last 7 days)     ** No results found for the last 168 hours. **          I have reviewed all pertinent labs within the past 24 hours.    Estimated Creatinine Clearance: 61.4 mL/min (A) (based on SCr of 1.6 mg/dL (H)).    Diagnostic Results:  I have reviewed all pertinent imaging results/findings within the past 24 hours.    Assessment and Plan:       55 y.o. WM with history of NICMP diagnosed in 2010, ICD, LV thrombus (with prior splenic and renal emboli), Embolic  CVA , paroxysmal atrial fib, HTN, HLP  presents for  F/U today to clinic and he was volume overloaded on exam .  He states he had 3 admission in the last 3 months for volume overload. He started having more SOB on exertion since one month. Also endorses of Orthopnea since last one month. Alble to walk only 150 ft. He also has Bilateral lower extremity edema. He was admitted here in 2017 for ADHD here at [facility] and [facility] during that admission showed PCWP 40 and CVP 17. Currently denies chest pain, lightheadedness     * Acute on chronic combined systolic and diastolic heart failure  - NIDCM dx'd 2010  - TTE 1/10: LVEDD 6.96, LVEF 15%, diastolic dysfunction, RV midly dilated, mod decreased, severe LUCI, mild MR, PAQS 31 and IVC 8  - In the past months has been admitted 3 times for volume management requiring high doses of diuretics, in [facility]. Admits not following a strict diet and gaining weight. Now, he refers following a diet and current weight 239 lb but went up to 251 lb when not following diet.   - Initially diuresed this admit, but diuretics  stopped 1/11 when central line placed and CVP was 2. Was given 900 cc IVF since, and CVP was up to 13. Resumed home dose of Lasix 40 mg po bid on 1/13, and CVP is back down to 11  - sVO2 64 with calculated CO 5.64 and SVR 1105 on  3 mcg/kg/min (decreased from 5 mcg on 1/13 2/2 feeling jittery). Feels less jittery since  - GDMT: Toprol on hold 2/2 decompensation, need for . Increase Hyralazine, continue Isordil. Refuses to continue Losartan (stopped 4 days PTA) because makes him feel bad and SBP in 90's at home.   - Pathway initiated - advance as appropriate          Hepatic congestion  - Liver US on 1/11 unremarkable    ICD (implantable cardioverter-defibrillator) in place  - S/P Biotronik dual chamber ICD    Right lower lobe pulmonary nodule  - Incidental finding on CT chest/abd/pelvis on 1/12. Pulmonology consulted, and rec repeat CT of chest in 3 months  - Will need to follow-up in pulmonary clinic.     Acute kidney injury superimposed on chronic kidney disease  - Creatinine on admit 2.6 (baseline ~ 1.8). Creatinine has trended down to 1.6 today  - Followed by Nephrology as an outpatient    Paroxysmal atrial fibrillation  - Currently in ST  - Continue Amiodarone 200 mg qd  - Continue Heparin bridge, but D/C Coumadin as he is in w/u      Left ventricular thrombus without MI  - H/O LV thrombus with h/o splenic and renal emboli as well as embolic CVA  - Limited TTE done here 1/13 showed no thrombus  - Continue Heparin birdge, but D/C Coumadin as he is in w/[place] [id number]  Heart Transplant  [facility]

## 2020-01-14 NOTE — PT/OT/SLP EVAL
Occupational Therapy   Evaluation and Discharge Note    Name: Tae Delgado  MRN: [id number]  Admitting Diagnosis:  Acute on chronic combined systolic and diastolic heart failure      Recommendations:     Discharge Recommendations: home  Discharge Equipment Recommendations:  none  Barriers to discharge:  None    Assessment:     Tae Delgado is a 59 y.o. male with a medical diagnosis of Acute on chronic combined systolic and diastolic heart failure. Patient is a work up for potential advanced treatment options, including VAD. At this time, patient is functioning at their prior level of function and does not require further acute OT services.     Plan:     During this hospitalization, patient does not require further acute OT services.  Please re-consult if situation changes.    · Plan of Care Reviewed with: patient    Subjective     Chief Complaint: pain in neck from IV  Patient/Family Comments/goals: to go home soon    Occupational Profile:  Living Environment: Patient lives with significant other in a [facility] with 0 MELINDA. Patient has a tub/shower combo.  Previous level of function: Independent with ADLs, IADLs, driving, ambulation, etc.  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 Patient has a RW, BSC, and shower chair that belonged to his mother.  Assistance upon Discharge: Significant other will be able to assist when not at work ().    Pain/Comfort:  · Pain Rating 1: 6/10(neck from IV)    Patients cultural, spiritual, Rastafari conflicts given the current situation: no    Objective:     Communicated with: RN prior to session. Patient found sitting EOB with central line, telemetry upon OT entry to room.    General Precautions: Standard,     Orthopedic Precautions:N/A   Braces: N/A     Occupational Performance:    Bed Mobility:    · Not assessed; Patient found sitting EOB at start of session and left sitting  EOB    Functional Mobility/Transfers:  · Patient completed Sit <> Stand Transfer with independence with no AD  · Patient completed Toilet Transfer Step Transfer technique with independence with no AD  · Functional Mobility: Patient ambulated in hallway using no AD with independence.     Activities of Daily Living:  · Not assessed; patient reported no difficulties with ADLs    Cognitive/Visual Perceptual:  Cognitive/Psychosocial Skills:     -       Oriented to: Person, Place, Time and Situation   -       Follows Commands/attention:Follows multistep  commands  -       Communication: clear/fluent  -       Memory: No Deficits noted  -       Safety awareness/insight to disability: intact   -       Mood/Affect/Coping skills/emotional control: Appropriate to situation and Cooperative    Physical Exam:  Postural examination/scapula alignment:    -       No postural abnormalities identified  Upper Extremity Range of Motion:     -       Right Upper Extremity: WNL  -       Left Upper Extremity: WNL  Upper Extremity Strength:    -       Right Upper Extremity: WNL  -       Left Upper Extremity: WNL   Strength:    -       Right Upper Extremity: WNL  -       Left Upper Extremity: WNL  Fine Motor Coordination:    -       Intact  Gross motor coordination:   WFL    AMPAC 6 Click ADL:  AMPAC Total Score: 24    Treatment & Education:   Educated patient on sternal precautions in preparation for advanced treatment options if patient decides to proceed with surgery. Patient able to demonstrate sit <> stand and toilet transfer without using B UEs.   Therapist provided facilitation and instruction of proper body mechanics, energy conservation, and fall prevention strategies during tasks listed above.   Instructed patient to use call light to have nursing staff assist with transfers.    Educated patient on OT POC and answered all questions within OT scope of practice.   Whiteboard updated   Education:    Patient left sitting EOB  with all lines intact and call button in reach    GOALS:   Multidisciplinary Problems     Occupational Therapy Goals     Not on file          Multidisciplinary Problems (Resolved)        Problem: Occupational Therapy Goal    Goal Priority Disciplines Outcome Interventions   Occupational Therapy Goal   (Resolved)     OT, PT/OT Met    Description:  Skilled OT services not necessary at this time secondary to patient performing ADLs at [facility]. Patient in agreement. Please re-consult OT if change in patient's functional status is noted.                     History:     Past Medical History:   Diagnosis Date    CHF (congestive heart failure) 1/2010    Dx  1/2010 w/ decreased LV systolic function (EF 15%) by ECHO 1/2015    COCM (congestive cardiomyopathy) 7/20/2016    Hyperlipidemia     Hypertension     Paroxysmal atrial fibrillation     Pulmonary embolus 2008    Stroke     Superficial thrombophlebitis        Past Surgical History:   Procedure Laterality Date    TONSILLECTOMY      VEIN LIGATION AND STRIPPING         Time Tracking:     OT Date of Treatment: 01/14/20  OT Start Time: 1051  OT Stop Time: 1108  OT Total Time (min): 17 min    Billable Minutes:Evaluation 17    Leslee Terrell OT  1/14/2020

## 2020-01-14 NOTE — PROGRESS NOTES
EDUCATION NOTE:    Tae Delgado was seen today for pre-heart transplant education.  Patient signed wellness contract and informed consent to undergo heart transplant evaluation work-up.  Thorough pre-transplant education conducted.      Information presented included:  · Evaluation process  · Members of the transplant team  · Selection committee members and role of the committee  · Listing process for transplant  · Different listing designations, including status 7  · 1-year graft survival statistics  · LVAD as bridge to transplant or DT  · Need to reach patient within 15 minutes of donor offer  · CDC high risk donors  · Blood transfusions  · Process for matching donor with recipient  · Need for weight loss and how it relates to the wait time  · Post-transplant immunosuppression for life with need to be able to afford post-transplant medications  · Need for a caregiver to be with them at all times beginning with discharge from ICU, through at least the first 6 weeks post-transplant  · Need to find local housing for the first 6 weeks post-transplant  · How to reach team members at any time  · UNOS website with written instructions regarding how to look up [facility]  · Use of Hepatitis C organs     Patient asked pertinent questions, which were answered to their satisfaction.  Patient was also given a copy of the wellness contract and informed consent to undergo evaluation work-up.

## 2020-01-14 NOTE — HPI
55 y.o. WM with history of NICMP diagnosed in 2010, ICD, LV thrombus (with prior splenic and renal emboli), Embolic  CVA , paroxysmal atrial fib, HTN, HLP  presents for  F/U today to clinic and he was volume overloaded on exam .  He states he had 3 admission in the last 3 months for volume overload. He started having more SOB on exertion since one month. Also endorses of Orthopnea since last one month. Alble to walk only 150 ft. He also has Bilateral lower extremity edema. He was admitted here in 2017 for ADHD here at [facility] and [facility] during that admission showed PCWP 40 and CVP 17. Currently denies chest pain, lightheadedness

## 2020-01-14 NOTE — PLAN OF CARE
Problem: Occupational Therapy Goal  Goal: Occupational Therapy Goal  Description  Skilled OT services not necessary at this time secondary to patient performing ADLs at [facility]. Patient in agreement. Please re-consult OT if change in patient's functional status is noted.    Outcome: Met

## 2020-01-14 NOTE — PLAN OF CARE
"Plan of care discussed with patient. Patient is free of fall or injury. Denies CP, SOB. RIJ site is visibly red/swollen, dressing changed and site cleansed w/betadine. Pain treated with PRN medications.Heparin drip continued.  discontinued after patient c/o "jitters" and HR/134.  HR decreased after  d/c. Plan to continue LVAD w/u. All questions addressed; will continue to monitor.   "

## 2020-01-14 NOTE — PT/OT/SLP EVAL
Physical Therapy Evaluation/Discharge    Patient Name:  Tae Delgado   MRN:  [id number]    Recommendations:     Discharge Recommendations:  (home no needs)   Discharge Equipment Recommendations: none   Barriers to discharge: None    Assessment:     Tae Delgado is a 59 y.o. male admitted with a medical diagnosis of Acute on chronic combined systolic and diastolic heart failure.  He presents with the following impairments/functional limitations:  (no rehab problems) pt roxy treatment well and is Independent with mobility and gait. No skilled PT is needed. Pt will be able to discharge home with no needs when medically stable.     Rehab Prognosis: Good; patient would benefit from acute skilled PT services to address these deficits and reach maximum level of function.    Recent Surgery: * No surgery found *      Plan:     During this hospitalization, patient to be seen (pt is being discharged from therapy. ) to address the identified rehab impairments via (pt is safe to ambulate on unit. ) and progress toward the following goals:    · Plan of Care Expires:  01/14/20    Subjective     Chief Complaint: pt c/o pain in neck from IV.    Patient/Family Comments/goals: to get better and go home.   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 Pt lives with significant other who works as teacher. They live  in  1 story [facility].   Prior to admission, patients level of function was Independent.  Equipment used at home: walker, rolling, shower chair, bedside commode.  DME owned (not currently used): none.  Upon discharge, patient will have assistance from significant other if needed. .    Objective:     Communicated with nurse prior to session.  Patient found standing in room with central line, telemetry  upon  PT entry to room.    General Precautions: Standard, fall   Orthopedic Precautions:    Braces:       Exams:  · Cognitive Exam:  Patient is oriented to Person, Place, Time and Situation  · RLE ROM: WFL  · RLE Strength: WFL  · LLE ROM: WFL  · LLE Strength: WFL    Functional Mobility:   · Transfers:     · Sit to Stand:  independence with no AD  ·   · Gait: pt received gait training ~ 250 ft Independently.         AM-PAC 6 CLICK MOBILITY  Total Score:24     Patient left sitting on EOB with all lines intact and call button in reach.    GOALS:   Multidisciplinary Problems     Physical Therapy Goals     Not on file                History:     Past Medical History:   Diagnosis Date    CHF (congestive heart failure) 1/2010    Dx  1/2010 w/ decreased LV systolic function (EF 15%) by ECHO 1/2015    COCM (congestive cardiomyopathy) 7/20/2016    Hyperlipidemia     Hypertension     Paroxysmal atrial fibrillation     Pulmonary embolus 2008    Stroke     Superficial thrombophlebitis        Past Surgical History:   Procedure Laterality Date    TONSILLECTOMY      VEIN LIGATION AND STRIPPING         Time Tracking:     PT Received On: 01/14/20  PT Start Time: 1051     PT Stop Time: 1103  PT Total Time (min): 12 min     Billable Minutes: Evaluation 12 min      Lucille Rivera, PT  01/14/2020

## 2020-01-14 NOTE — PROGRESS NOTES
"Patient called RN to room. Patient was visibly agitated in chair, stating "I'm messed up. I can't see... I feel like my eyes are going. I feel like my throat is closing up." VS obtained, LJ=[id number]. NP Marylin Lazcano directed RN to stop . NP to room and stat ekg ordered. Patient assisted to bed.  "

## 2020-01-14 NOTE — SUBJECTIVE & OBJECTIVE
Interval History: Jitters are improving since  decreased to 3 mcg/kg/min yesterday, and CO is 5.6. Overnight developed burning pain under Biopatch at [facility] TLC site. Biopatch removed, and site under clear dressing is not red; no rash. CVP has trended back down to 11 since resuming home dose of Lasix 40 mg po bid yesterday.     Continuous Infusions:   DOBUTamine 3 mcg/kg/min (01/14/20 0049)    heparin (porcine) in D5W 14 Units/kg/hr (01/14/20 0049)     Scheduled Meds:   amiodarone  200 mg Oral Daily    docusate sodium  50 mg Oral BID    furosemide  40 mg Oral BID    heparin (PORCINE)  60 Units/kg (Adjusted) Intravenous Once    hydrALAZINE  100 mg Oral Q8H    polyethylene glycol  17 g Oral BID    potassium chloride  40 mEq Oral Once    potassium chloride  60 mEq Oral Once     PRN Meds:acetaminophen, heparin (PORCINE), heparin (PORCINE), ondansetron, sodium chloride 0.9%, traMADol    Review of patient's allergies indicates:   Allergen Reactions    Biopatch [chlorhexidine gluconate]      Site burning    Percocet [oxycodone-acetaminophen] Itching    Penicillins Rash     Objective:     Vital Signs (Most Recent):  Temp: 98.9 °F (37.2 °C) (01/14/20 0804)  Pulse: (!) 114 (01/14/20 1106)  Resp: 18 (01/14/20 0804)  BP: 112/76 (01/14/20 0804)  SpO2: 98 % (01/14/20 0804) Vital Signs (24h Range):  Temp:  [97.5 °F (36.4 °C)-98.9 °F (37.2 °C)] 98.9 °F (37.2 °C)  Pulse:  [] 114  Resp:  [16-18] 18  SpO2:  [95 %-98 %] 98 %  BP: (112-133)/(72-83) 112/76     Patient Vitals for the past 72 hrs (Last 3 readings):   Weight   01/14/20 0450 108.7 kg (239 lb 10.2 oz)   01/13/20 1401 108.9 kg (240 lb)   01/13/20 0500 108.7 kg (239 lb 10.2 oz)     Body mass index is 34.38 kg/m².      Intake/Output Summary (Last 24 hours) at 1/14/2020 1129  Last data filed at 1/14/2020 0500  Gross per 24 hour   Intake 960 ml   Output 850 ml   Net 110 ml       Hemodynamic Parameters:       Telemetry: ST    Physical Exam   Constitutional: He is  oriented to person, place, and time. He appears well-developed and well-nourished.   HENT:   Head: Normocephalic and atraumatic.   Eyes: Pupils are equal, round, and reactive to light. Conjunctivae and EOM are normal.   Neck: Normal range of motion. Neck supple. No JVD present. No thyromegaly present.   Cardiovascular: Regular rhythm.   Tachycardic, + soft S3   Pulmonary/Chest: Effort normal and breath sounds normal.   Abdominal: Soft. Bowel sounds are normal.   Musculoskeletal: Normal range of motion. He exhibits no edema.   Neurological: He is alert and oriented to person, place, and time.   Skin: Skin is warm and dry. Capillary refill takes 2 to 3 seconds.   Psychiatric: He has a normal mood and affect. His behavior is normal. Judgment and thought content normal.       Significant Labs:  CBC:  Recent Labs   Lab 01/12/20 0825 01/13/20  0430 01/14/20  0439   WBC 9.25 8.57  8.57  8.57 7.75   RBC 5.91 5.51  5.51  5.51 5.45   HGB 16.4 15.4  15.4  15.4 15.6   HCT 51.7 48.8  48.8  48.8 47.9    192  192  192 189   MCV 88 89  89  89 88   MCH 27.7 27.9  27.9  27.9 28.6   MCHC 31.7* 31.6*  31.6*  31.6* 32.6     BNP:  Recent Labs   Lab 01/10/20  0436 01/12/20  0825   BNP 1,656* 249*     CMP:  Recent Labs   Lab 01/12/20  0825 01/12/20  1528 01/13/20  0430 01/13/20  0818 01/14/20  0439   GLU  --  131* 93  --  90   CALCIUM  --  9.7 9.5  --  9.3   ALBUMIN 3.8  --   --  3.9 3.6   PROT 7.2  --   --  7.1 6.7   NA  --  139 137  --  138   K  --  4.1 4.1  --  3.8   CO2  --  29 28  --  26   CL  --  99 99  --  100   BUN  --  34* 25*  --  20   CREATININE  --  2.2* 1.7*  --  1.6*   ALKPHOS 54*  --   --  55 51*   ALT 84*  --   --  66* 57*   AST 36  --   --  27 33   BILITOT 1.9*  --   --  2.3* 2.0*      Coagulation:   Recent Labs   Lab 01/12/20  1751 01/13/20  0037 01/13/20  0600 01/14/20  0439   INR 1.7*  --  1.8* 2.2*   APTT 34.3*  34.3* 48.4* 40.2* 45.0*     LDH:  No results for input(s): LDH in the last 72  hours.  Microbiology:  Microbiology Results (last 7 days)     ** No results found for the last 168 hours. **          I have reviewed all pertinent labs within the past 24 hours.    Estimated Creatinine Clearance: 61.4 mL/min (A) (based on SCr of 1.6 mg/dL (H)).    Diagnostic Results:  I have reviewed all pertinent imaging results/findings within the past 24 hours.

## 2020-01-14 NOTE — ASSESSMENT & PLAN NOTE
- [facility] dx'd 2010  - TTE 1/10: LVEDD 6.96, LVEF 15%, diastolic dysfunction, RV midly dilated, mod decreased, severe LUCI, mild MR, PAQS 31 and IVC 8  - In the past months has been admitted 3 times for volume management requiring high doses of diuretics, in [facility]. Admits not following a strict diet and gaining weight. Now, he refers following a diet and current weight 239 lb but went up to 251 lb when not following diet.   - Initially diuresed this admit, but diuretics stopped 1/11 when central line placed and CVP was 2. Was given 900 cc IVF since, and CVP was up to 13. Resumed home dose of Lasix 40 mg po bid on 1/13, and CVP is back down to 11  - sVO2 64 with calculated CO 5.64 and SVR 1105 on  3 mcg/kg/min (decreased from 5 mcg on 1/13 2/2 feeling jittery). Feels less jittery since  - GDMT: Toprol on hold 2/2 decompensation, need for . Increase Hyralazine, continue Isordil. Refuses to continue Losartan (stopped 4 days PTA) because makes him feel bad and SBP in 90's at home.   - Pathway initiated - advance as appropriate

## 2020-01-14 NOTE — SUBJECTIVE & OBJECTIVE
No current facility-administered medications on file prior to encounter.      Current Outpatient Medications on File Prior to Encounter   Medication Sig    amiodarone (PACERONE) 200 MG Tab Tale 1 tablet (200mg) by mouth on Monday, Tuesday, Thursday, Friday and Saturday. Only take medication 5 days per week.    furosemide (LASIX) 40 MG tablet Take 1 tablet (40 mg total) by mouth 2 (two) times daily.    hydrocortisone 2.5 % ointment Apply 1 application topically 2 (two) times daily.    metoprolol succinate (TOPROL-XL) 50 MG 24 hr tablet TAKE ONE TABLET BY MOUTH ONCE DAILY    spironolactone (ALDACTONE) 25 MG tablet TAKE 1 TABLET BY MOUTH ONCE DAILY    VENTOLIN HFA 90 mcg/actuation inhaler Inhale 1 Inhaler into the lungs every 4 (four) hours as needed.    warfarin (COUMADIN) 3 MG tablet TAKE 1 TABLET BY MOUTH ONCE DAILY EXCEPT  SATURDAY       Review of patient's allergies indicates:   Allergen Reactions    Biopatch [chlorhexidine gluconate]      Site burning    Percocet [oxycodone-acetaminophen] Itching    Penicillins Rash       Past Medical History:   Diagnosis Date    CHF (congestive heart failure) 1/2010    Dx  1/2010 w/ decreased LV systolic function (EF 15%) by ECHO 1/2015    COCM (congestive cardiomyopathy) 7/20/2016    Hyperlipidemia     Hypertension     Paroxysmal atrial fibrillation     Pulmonary embolus 2008    Stroke     Superficial thrombophlebitis      Past Surgical History:   Procedure Laterality Date    TONSILLECTOMY      VEIN LIGATION AND STRIPPING       Family History     Problem Relation (Age of Onset)    Cancer Mother        Tobacco Use    Smoking status: Never Smoker    Smokeless tobacco: Never Used   Substance and Sexual Activity    Alcohol use: No    Drug use: No    Sexual activity: Not on file     Review of Systems   Constitutional: Positive for activity change.   HENT: Negative for nosebleeds.    Eyes: Negative for visual disturbance.   Respiratory: Negative for shortness  of breath.    Cardiovascular: Negative for chest pain.   Gastrointestinal: Negative for nausea.   Musculoskeletal: Negative for gait problem.   Skin: Negative for color change.   Neurological: Negative for seizures, syncope and weakness.   Hematological: Does not bruise/bleed easily.   Psychiatric/Behavioral: Negative for sleep disturbance.     Objective:     Vital Signs (Most Recent):  Temp: 98.9 °F (37.2 °C) (01/14/20 0804)  Pulse: (!) 114 (01/14/20 1106)  Resp: 18 (01/14/20 0804)  BP: 112/76 (01/14/20 0804)  SpO2: 98 % (01/14/20 0804) Vital Signs (24h Range):  Temp:  [97.5 °F (36.4 °C)-98.9 °F (37.2 °C)] 98.9 °F (37.2 °C)  Pulse:  [] 114  Resp:  [16-18] 18  SpO2:  [95 %-98 %] 98 %  BP: (112-133)/(72-83) 112/76     Weight: 108.7 kg (239 lb 10.2 oz)  Body mass index is 34.38 kg/m².    SpO2: 98 %  O2 Device (Oxygen Therapy): room air     Intake/Output - Last 3 Shifts       01/12 0700 - 01/13 0659 01/13 0700 - 01/14 0659 01/14 0700 - 01/15 0659    P.O. 480 1320     I.V. (mL/kg) 576.1 (5.3)      IV Piggyback 26      Total Intake(mL/kg) 1082.1 (10) 1320 (12.1)     Urine (mL/kg/hr) 1325 (0.5) 850 (0.3)     Total Output 1325 850     Net -242.9 +470            Urine Occurrence  600 x            Lines/Drains/Airways     Central Venous Catheter Line                 Percutaneous Central Line Insertion/Assessment - triple lumen  01/11/20 1300 right internal jugular 2 days          Peripheral Intravenous Line                 Peripheral IV - Single Lumen 01/09/20 1935 20 G Anterior;Left Forearm 4 days                 STS Risk Score: n/a    Physical Exam   Constitutional: He is oriented to person, place, and time. He appears well-developed and well-nourished. No distress.   HENT:   Head: Normocephalic and atraumatic.   Eyes: Pupils are equal, round, and reactive to light. EOM are normal.   Neck: Normal range of motion.   Cardiovascular: Regular rhythm and normal pulses.   Tachycardic    Pulmonary/Chest: Effort normal. No  respiratory distress.   Abdominal: Soft.   Musculoskeletal: Normal range of motion.   Neurological: He is alert and oriented to person, place, and time.   Skin: Skin is warm, dry and intact. Capillary refill takes less than 2 seconds. He is not diaphoretic.   Psychiatric: He has a normal mood and affect. His speech is normal and behavior is normal. Judgment and thought content normal.   Vitals reviewed.      Significant Labs:  ABGs:   Recent Labs   Lab 01/14/20 0428   PH 7.456*   PCO2 40.4   PO2 31*   HCO3 28.5*   POCSATURATED 64*   BE 5     Amylase: No results for input(s): AMYLASE in the last 48 hours.  BMP:   Recent Labs   Lab 01/14/20 0439   GLU 90      K 3.8      CO2 26   BUN 20   CREATININE 1.6*   CALCIUM 9.3   MG 2.0     Cardiac markers: No results for input(s): CKMB, CPKMB, TROPONINT, TROPONINI, MYOGLOBIN in the last 48 hours.  CBC:   Recent Labs   Lab 01/14/20 0439   WBC 7.75   RBC 5.45   HGB 15.6   HCT 47.9      MCV 88   MCH 28.6   MCHC 32.6     CMP:   Recent Labs   Lab 01/14/20 0439   GLU 90   CALCIUM 9.3   ALBUMIN 3.6   PROT 6.7      K 3.8   CO2 26      BUN 20   CREATININE 1.6*   ALKPHOS 51*   ALT 57*   AST 33   BILITOT 2.0*     Coagulation:   Recent Labs   Lab 01/14/20 0439   INR 2.2*   APTT 45.0*     Lactic Acid: No results for input(s): LACTATE in the last 48 hours.  LFTs:   Recent Labs   Lab 01/14/20 0439   ALT 57*   AST 33   ALKPHOS 51*   BILITOT 2.0*   PROT 6.7   ALBUMIN 3.6     Lipase: No results for input(s): LIPASE in the last 48 hours.    Significant Diagnostics: reviewed   TTE 1/13/2020  Limited echo performed for assessment of LV thrombus  Globally reduced LV systolic function with LVEF estimated at 15%  Normal RV systolic function  No LV thrombus with echo contrast.    TTE 1/10/2020  · Severe left ventricular enlargement.  · Severely decreased left ventricular systolic function. The estimated ejection fraction is 15%.  · Mild right ventricular  enlargement.  · Moderately reduced right ventricular systolic function.  · Left ventricular diastolic dysfunction.  · Severe biatrial enlargement.  · Mild tricuspid regurgitation.  · The estimated PA systolic pressure is 31 mmHg.  · Intermediate central venous pressure (8 mmHg).  · TAPSE 0.89cm  · LVIDD 6.96cm    CT chest reviewed

## 2020-01-15 ENCOUNTER — CLINICAL SUPPORT (OUTPATIENT)
Dept: CARDIOLOGY | Facility: CLINIC | Age: 60
DRG: 001 | End: 2020-01-15
Attending: INTERNAL MEDICINE
Payer: MEDICARE

## 2020-01-15 LAB
ABORH REPEAT: NORMAL
ALBUMIN SERPL BCP-MCNC: 3.5 G/DL (ref 3.5–5.2)
ALLENS TEST: ABNORMAL
ALP SERPL-CCNC: 50 U/L (ref 55–135)
ALT SERPL W/O P-5'-P-CCNC: 99 U/L (ref 10–44)
ANION GAP SERPL CALC-SCNC: 10 MMOL/L (ref 8–16)
APTT BLDCRRT: 45.4 SEC (ref 21–32)
AST SERPL-CCNC: 97 U/L (ref 10–40)
BASOPHILS # BLD AUTO: 0.07 K/UL (ref 0–0.2)
BASOPHILS NFR BLD: 0.9 % (ref 0–1.9)
BILIRUB DIRECT SERPL-MCNC: 0.8 MG/DL (ref 0.1–0.3)
BILIRUB SERPL-MCNC: 1.5 MG/DL (ref 0.1–1)
BILIRUB UR QL STRIP: NEGATIVE
BNP SERPL-MCNC: 668 PG/ML (ref 0–99)
BUN SERPL-MCNC: 23 MG/DL (ref 6–20)
CALCIUM SERPL-MCNC: 9.4 MG/DL (ref 8.7–10.5)
CHLORIDE SERPL-SCNC: 98 MMOL/L (ref 95–110)
CHOLEST SERPL-MCNC: 177 MG/DL (ref 120–199)
CHOLEST/HDLC SERPL: 3.8 {RATIO} (ref 2–5)
CLARITY UR REFRACT.AUTO: CLEAR
CO2 SERPL-SCNC: 29 MMOL/L (ref 23–29)
COLOR UR AUTO: YELLOW
COMPLEXED PSA SERPL-MCNC: 1.7 NG/ML (ref 0–4)
CREAT SERPL-MCNC: 1.9 MG/DL (ref 0.5–1.4)
DELSYS: ABNORMAL
DIFFERENTIAL METHOD: ABNORMAL
EOSINOPHIL # BLD AUTO: 0.3 K/UL (ref 0–0.5)
EOSINOPHIL NFR BLD: 3.6 % (ref 0–8)
ERYTHROCYTE [DISTWIDTH] IN BLOOD BY AUTOMATED COUNT: 15.3 % (ref 11.5–14.5)
EST. GFR  (AFRICAN AMERICAN): 43.6 ML/MIN/1.73 M^2
EST. GFR  (NON AFRICAN AMERICAN): 37.7 ML/MIN/1.73 M^2
FERRITIN SERPL-MCNC: 187 NG/ML (ref 20–300)
GLUCOSE SERPL-MCNC: 98 MG/DL (ref 70–110)
GLUCOSE UR QL STRIP: NEGATIVE
HCO3 UR-SCNC: 29.6 MMOL/L (ref 24–28)
HCT VFR BLD AUTO: 47.9 % (ref 40–54)
HDLC SERPL-MCNC: 46 MG/DL (ref 40–75)
HDLC SERPL: 26 % (ref 20–50)
HGB BLD-MCNC: 15.1 G/DL (ref 14–18)
HGB UR QL STRIP: NEGATIVE
IMM GRANULOCYTES # BLD AUTO: 0.05 K/UL (ref 0–0.04)
IMM GRANULOCYTES NFR BLD AUTO: 0.6 % (ref 0–0.5)
INR PPP: 1.8 (ref 0.8–1.2)
IRON SERPL-MCNC: 116 UG/DL (ref 45–160)
KETONES UR QL STRIP: NEGATIVE
LDH SERPL L TO P-CCNC: 389 U/L (ref 110–260)
LDLC SERPL CALC-MCNC: 105.2 MG/DL (ref 63–159)
LEFT ABI: 1.17
LEFT ARM BP: 114 MMHG
LEFT DORSALIS PEDIS: 121 MMHG
LEFT POSTERIOR TIBIAL: 135 MMHG
LEUKOCYTE ESTERASE UR QL STRIP: NEGATIVE
LYMPHOCYTES # BLD AUTO: 1.4 K/UL (ref 1–4.8)
LYMPHOCYTES NFR BLD: 17.5 % (ref 18–48)
MAGNESIUM SERPL-MCNC: 2 MG/DL (ref 1.6–2.6)
MCH RBC QN AUTO: 27.9 PG (ref 27–31)
MCHC RBC AUTO-ENTMCNC: 31.5 G/DL (ref 32–36)
MCV RBC AUTO: 89 FL (ref 82–98)
MONOCYTES # BLD AUTO: 0.9 K/UL (ref 0.3–1)
MONOCYTES NFR BLD: 12.1 % (ref 4–15)
NEUTROPHILS # BLD AUTO: 5.1 K/UL (ref 1.8–7.7)
NEUTROPHILS NFR BLD: 65.3 % (ref 38–73)
NITRITE UR QL STRIP: NEGATIVE
NONHDLC SERPL-MCNC: 131 MG/DL
NRBC BLD-RTO: 0 /100 WBC
PCO2 BLDA: 41.8 MMHG (ref 35–45)
PH SMN: 7.46 [PH] (ref 7.35–7.45)
PH UR STRIP: 5 [PH] (ref 5–8)
PHOSPHATE SERPL-MCNC: 2.9 MG/DL (ref 2.7–4.5)
PLATELET # BLD AUTO: 191 K/UL (ref 150–350)
PMV BLD AUTO: 11.1 FL (ref 9.2–12.9)
PO2 BLDA: 35 MMHG (ref 40–60)
POC BE: 6 MMOL/L
POC SATURATED O2: 71 % (ref 95–100)
POC TCO2: 31 MMOL/L (ref 24–29)
POTASSIUM SERPL-SCNC: 3.8 MMOL/L (ref 3.5–5.1)
PREALB SERPL-MCNC: 27 MG/DL (ref 20–43)
PROT SERPL-MCNC: 6.5 G/DL (ref 6–8.4)
PROT UR QL STRIP: NEGATIVE
PROTHROMBIN TIME: 17.3 SEC (ref 9–12.5)
RBC # BLD AUTO: 5.41 M/UL (ref 4.6–6.2)
RIGHT ABI: 1.09
RIGHT ARM BP: 115 MMHG
RIGHT DORSALIS PEDIS: 120 MMHG
RIGHT POSTERIOR TIBIAL: 125 MMHG
SAMPLE: ABNORMAL
SITE: ABNORMAL
SODIUM SERPL-SCNC: 137 MMOL/L (ref 136–145)
SP GR UR STRIP: 1.01 (ref 1–1.03)
TRANSFERRIN SERPL-MCNC: 332 MG/DL (ref 200–375)
TRIGL SERPL-MCNC: 129 MG/DL (ref 30–150)
URN SPEC COLLECT METH UR: NORMAL
WBC # BLD AUTO: 7.78 K/UL (ref 3.9–12.7)

## 2020-01-15 PROCEDURE — 86703 HIV-1/HIV-2 1 RESULT ANTBDY: CPT

## 2020-01-15 PROCEDURE — 80061 LIPID PANEL: CPT

## 2020-01-15 PROCEDURE — 83735 ASSAY OF MAGNESIUM: CPT | Mod: NTX

## 2020-01-15 PROCEDURE — 85025 COMPLETE CBC W/AUTO DIFF WBC: CPT | Mod: NTX

## 2020-01-15 PROCEDURE — 25000003 PHARM REV CODE 250: Mod: NTX | Performed by: PHYSICIAN ASSISTANT

## 2020-01-15 PROCEDURE — 99233 SBSQ HOSP IP/OBS HIGH 50: CPT | Mod: NTX,,, | Performed by: INTERNAL MEDICINE

## 2020-01-15 PROCEDURE — 86790 VIRUS ANTIBODY NOS: CPT

## 2020-01-15 PROCEDURE — 86682 HELMINTH ANTIBODY: CPT | Mod: NTX

## 2020-01-15 PROCEDURE — 86901 BLOOD TYPING SEROLOGIC RH(D): CPT | Mod: NTX

## 2020-01-15 PROCEDURE — 85730 THROMBOPLASTIN TIME PARTIAL: CPT | Mod: NTX

## 2020-01-15 PROCEDURE — 93922 UPR/L XTREMITY ART 2 LEVELS: CPT | Mod: 50,NTX

## 2020-01-15 PROCEDURE — 80323 ALKALOIDS NOS: CPT | Mod: NTX

## 2020-01-15 PROCEDURE — 84153 ASSAY OF PSA TOTAL: CPT

## 2020-01-15 PROCEDURE — 81003 URINALYSIS AUTO W/O SCOPE: CPT | Mod: NTX

## 2020-01-15 PROCEDURE — 83540 ASSAY OF IRON: CPT

## 2020-01-15 PROCEDURE — 86706 HEP B SURFACE ANTIBODY: CPT

## 2020-01-15 PROCEDURE — 86644 CMV ANTIBODY: CPT

## 2020-01-15 PROCEDURE — 81240 F2 GENE: CPT

## 2020-01-15 PROCEDURE — 80053 COMPREHEN METABOLIC PANEL: CPT | Mod: NTX

## 2020-01-15 PROCEDURE — 85610 PROTHROMBIN TIME: CPT | Mod: NTX

## 2020-01-15 PROCEDURE — 99233 SBSQ HOSP IP/OBS HIGH 50: CPT | Mod: NTX,,, | Performed by: THORACIC SURGERY (CARDIOTHORACIC VASCULAR SURGERY)

## 2020-01-15 PROCEDURE — 83036 HEMOGLOBIN GLYCOSYLATED A1C: CPT

## 2020-01-15 PROCEDURE — 86696 HERPES SIMPLEX TYPE 2 TEST: CPT

## 2020-01-15 PROCEDURE — 84100 ASSAY OF PHOSPHORUS: CPT

## 2020-01-15 PROCEDURE — 86787 VARICELLA-ZOSTER ANTIBODY: CPT

## 2020-01-15 PROCEDURE — 80307 DRUG TEST PRSMV CHEM ANLYZR: CPT | Mod: NTX

## 2020-01-15 PROCEDURE — 99900035 HC TECH TIME PER 15 MIN (STAT): Mod: NTX

## 2020-01-15 PROCEDURE — 99233 PR SUBSEQUENT HOSPITAL CARE,LEVL III: ICD-10-PCS | Mod: NTX,,, | Performed by: INTERNAL MEDICINE

## 2020-01-15 PROCEDURE — 36415 COLL VENOUS BLD VENIPUNCTURE: CPT

## 2020-01-15 PROCEDURE — 93922 UPR/L XTREMITY ART 2 LEVELS: CPT | Mod: 26,NTX,, | Performed by: INTERNAL MEDICINE

## 2020-01-15 PROCEDURE — 86665 EPSTEIN-BARR CAPSID VCA: CPT

## 2020-01-15 PROCEDURE — 63600175 PHARM REV CODE 636 W HCPCS: Mod: NTX | Performed by: HOSPITALIST

## 2020-01-15 PROCEDURE — 99233 PR SUBSEQUENT HOSPITAL CARE,LEVL III: ICD-10-PCS | Mod: NTX,,, | Performed by: THORACIC SURGERY (CARDIOTHORACIC VASCULAR SURGERY)

## 2020-01-15 PROCEDURE — 86803 HEPATITIS C AB TEST: CPT

## 2020-01-15 PROCEDURE — 93922 CV US ANKLE BRACHIAL INDICES RESTING (CUPID ONLY): ICD-10-PCS | Mod: 26,NTX,, | Performed by: INTERNAL MEDICINE

## 2020-01-15 PROCEDURE — 83880 ASSAY OF NATRIURETIC PEPTIDE: CPT

## 2020-01-15 PROCEDURE — 82728 ASSAY OF FERRITIN: CPT

## 2020-01-15 PROCEDURE — 84134 ASSAY OF PREALBUMIN: CPT

## 2020-01-15 PROCEDURE — 84466 ASSAY OF TRANSFERRIN: CPT

## 2020-01-15 PROCEDURE — 82248 BILIRUBIN DIRECT: CPT

## 2020-01-15 PROCEDURE — 25000003 PHARM REV CODE 250: Mod: NTX | Performed by: HOSPITALIST

## 2020-01-15 PROCEDURE — 86592 SYPHILIS TEST NON-TREP QUAL: CPT

## 2020-01-15 PROCEDURE — 87340 HEPATITIS B SURFACE AG IA: CPT

## 2020-01-15 PROCEDURE — 86704 HEP B CORE ANTIBODY TOTAL: CPT

## 2020-01-15 PROCEDURE — 82803 BLOOD GASES ANY COMBINATION: CPT | Mod: NTX

## 2020-01-15 PROCEDURE — 86777 TOXOPLASMA ANTIBODY: CPT

## 2020-01-15 PROCEDURE — 83615 LACTATE (LD) (LDH) ENZYME: CPT

## 2020-01-15 PROCEDURE — 20600001 HC STEP DOWN PRIVATE ROOM: Mod: NTX

## 2020-01-15 PROCEDURE — 25000003 PHARM REV CODE 250: Mod: NTX | Performed by: NURSE PRACTITIONER

## 2020-01-15 RX ORDER — POTASSIUM CHLORIDE 750 MG/1
30 CAPSULE, EXTENDED RELEASE ORAL ONCE
Status: COMPLETED | OUTPATIENT
Start: 2020-01-15 | End: 2020-01-15

## 2020-01-15 RX ADMIN — DOCUSATE SODIUM 50 MG: 50 CAPSULE, LIQUID FILLED ORAL at 09:01

## 2020-01-15 RX ADMIN — POTASSIUM CHLORIDE 30 MEQ: 750 CAPSULE, EXTENDED RELEASE ORAL at 09:01

## 2020-01-15 RX ADMIN — AMIODARONE HYDROCHLORIDE 200 MG: 200 TABLET ORAL at 09:01

## 2020-01-15 RX ADMIN — HYDRALAZINE HYDROCHLORIDE 100 MG: 50 TABLET, FILM COATED ORAL at 01:01

## 2020-01-15 RX ADMIN — HEPARIN SODIUM 14 UNITS/KG/HR: 10000 INJECTION, SOLUTION INTRAVENOUS at 05:01

## 2020-01-15 RX ADMIN — FUROSEMIDE 40 MG: 40 TABLET ORAL at 09:01

## 2020-01-15 RX ADMIN — FUROSEMIDE 40 MG: 40 TABLET ORAL at 05:01

## 2020-01-15 RX ADMIN — HYDRALAZINE HYDROCHLORIDE 100 MG: 50 TABLET, FILM COATED ORAL at 05:01

## 2020-01-15 RX ADMIN — HYDRALAZINE HYDROCHLORIDE 100 MG: 50 TABLET, FILM COATED ORAL at 09:01

## 2020-01-15 NOTE — SUBJECTIVE & OBJECTIVE
Interval History: Jitters, tachycardia, and flushing have all improved since stopping . Walking around floor this AM, feels well but does have to stop frequently to rest. Plan for RHC tomorrow and continuing pathway work up.   Discussed concerned about needing VAD soon since patient does not tolerate  or much GDMT due to symptoms. CVP 9 this AM, improved since restarting lasix.     Continuous Infusions:   heparin (porcine) in D5W 14 Units/kg/hr (01/14/20 2132)     Scheduled Meds:   amiodarone  200 mg Oral Daily    docusate sodium  50 mg Oral BID    furosemide  40 mg Oral BID    heparin (PORCINE)  60 Units/kg (Adjusted) Intravenous Once    hydrALAZINE  100 mg Oral Q8H    polyethylene glycol  17 g Oral BID     PRN Meds:acetaminophen, heparin (PORCINE), heparin (PORCINE), ondansetron, sodium chloride, sodium chloride 0.9%, traMADol    Review of patient's allergies indicates:   Allergen Reactions    Biopatch [chlorhexidine gluconate]      Site burning    Dobutamine in d5w      Tachycardia, tremors, SOB, flushing    Percocet [oxycodone-acetaminophen] Itching    Penicillins Rash     Objective:     Vital Signs (Most Recent):  Temp: 97.9 °F (36.6 °C) (01/15/20 0755)  Pulse: (!) 114 (01/15/20 1112)  Resp: 14 (01/15/20 0755)  BP: 105/80 (01/15/20 0755)  SpO2: (!) 94 % (01/15/20 0755) Vital Signs (24h Range):  Temp:  [96.9 °F (36.1 °C)-98.8 °F (37.1 °C)] 97.9 °F (36.6 °C)  Pulse:  [] 114  Resp:  [14-22] 14  SpO2:  [92 %-97 %] 94 %  BP: (105-128)/(72-91) 105/80     Patient Vitals for the past 72 hrs (Last 3 readings):   Weight   01/15/20 0700 108.3 kg (238 lb 12.1 oz)   01/14/20 0450 108.7 kg (239 lb 10.2 oz)   01/13/20 1401 108.9 kg (240 lb)     Body mass index is 34.26 kg/m².      Intake/Output Summary (Last 24 hours) at 1/15/2020 1133  Last data filed at 1/15/2020 0800  Gross per 24 hour   Intake 1494.3 ml   Output 2270 ml   Net -775.7 ml       Hemodynamic Parameters:       Telemetry: ST    Physical  Exam   Constitutional: He is oriented to person, place, and time. He appears well-developed and well-nourished.   HENT:   Head: Normocephalic and atraumatic.   Eyes: Pupils are equal, round, and reactive to light. Conjunctivae and EOM are normal.   Neck: Normal range of motion. Neck supple. No JVD present. No thyromegaly present.   Cardiovascular: Regular rhythm.   Tachycardic, + soft S3   Pulmonary/Chest: Effort normal and breath sounds normal.   Abdominal: Soft. Bowel sounds are normal.   Musculoskeletal: Normal range of motion. He exhibits no edema.   Neurological: He is alert and oriented to person, place, and time.   Skin: Skin is warm and dry. Capillary refill takes 2 to 3 seconds.   Psychiatric: He has a normal mood and affect. His behavior is normal. Judgment and thought content normal.       Significant Labs:  CBC:  Recent Labs   Lab 01/13/20  0430 01/14/20  0439 01/15/20  0418   WBC 8.57  8.57  8.57 7.75 7.78   RBC 5.51  5.51  5.51 5.45 5.41   HGB 15.4  15.4  15.4 15.6 15.1   HCT 48.8  48.8  48.8 47.9 47.9     192  192 189 191   MCV 89  89  89 88 89   MCH 27.9  27.9  27.9 28.6 27.9   MCHC 31.6*  31.6*  31.6* 32.6 31.5*     BNP:  Recent Labs   Lab 01/10/20  0436 01/12/20  0825   BNP 1,656* 249*     CMP:  Recent Labs   Lab 01/13/20 0430 01/13/20  0818 01/14/20  0439 01/15/20  0418   GLU 93  --  90 98   CALCIUM 9.5  --  9.3 9.4   ALBUMIN  --  3.9 3.6 3.5   PROT  --  7.1 6.7 6.5     --  138 137   K 4.1  --  3.8 3.8   CO2 28  --  26 29   CL 99  --  100 98   BUN 25*  --  20 23*   CREATININE 1.7*  --  1.6* 1.9*   ALKPHOS  --  55 51* 50*   ALT  --  66* 57* 99*   AST  --  27 33 97*   BILITOT  --  2.3* 2.0* 1.5*      Coagulation:   Recent Labs   Lab 01/13/20  0600 01/14/20  0439 01/15/20  0418   INR 1.8* 2.2* 1.8*   APTT 40.2* 45.0* 45.4*     LDH:  No results for input(s): LDH in the last 72 hours.  Microbiology:  Microbiology Results (last 7 days)     ** No results found for the last  168 hours. **          I have reviewed all pertinent labs within the past 24 hours.    Estimated Creatinine Clearance: 51.6 mL/min (A) (based on SCr of 1.9 mg/dL (H)).    Diagnostic Results:  I have reviewed all pertinent imaging results/findings within the past 24 hours.

## 2020-01-15 NOTE — PROGRESS NOTES
Spoke with pt.  Pt reports still reviewing VAD information but denies any questions.  Pt also reports his caregiver, Significant other, will come to [facility] Thursday evening.  Will plan to meet with pt and caregiver to review education materials Friday.  Will follow up with pt then.

## 2020-01-15 NOTE — NURSING TRANSFER
Nursing Transfer Note      1/15/2020     Transfer From: echo    Transfer via walking v/rn    Transfer with cardiac monitoring    Transported by RN    Medicines sent: yes    Chart send with patient: No     Patient reassessed at: 1/15/20 1606    Upon arrival to floor: cardiac monitor applied, patient oriented to room, call bell in reach and bed in lowest position

## 2020-01-15 NOTE — ASSESSMENT & PLAN NOTE
- Creatinine on admit 2.6 (baseline ~ 1.8).  - monitor with diuresis  - Followed by Nephrology as an outpatient

## 2020-01-15 NOTE — CONSULTS
[facility]  Cardiothoracic Surgery  Consult Note    Patient Name: Tae Delgado  MRN: [id number]  Admission Date: 1/9/2020  Attending Physician: Isiah Montero MD  Referring Provider: Elham Pearson MD    Patient information was obtained from patient and past medical records.     Inpatient consult to Cardiothoracic Surgery  Consult performed by: Willow Crystal PA-C  Consult ordered by: Marylin Lazcano NP        Subjective:     Principal Problem: Acute on chronic combined systolic and diastolic heart failure    History of Present Illness:  55 y.o. WM with history of NICMP diagnosed in 2010, ICD, LV thrombus (with prior splenic and renal emboli), Embolic  CVA , paroxysmal atrial fib, HTN, HLP  presents for  F/U today to clinic and he was volume overloaded on exam .  He states he had 3 admission in the last 3 months for volume overload. He started having more SOB on exertion since one month. Also endorses of Orthopnea since last one month. Alble to walk only 150 ft. He also has Bilateral lower extremity edema. He was admitted here in 2017 for ADHD here at [facility] and RHC during that admission showed PCWP 40 and CVP 17. Currently denies chest pain, lightheadedness        No current facility-administered medications on file prior to encounter.      Current Outpatient Medications on File Prior to Encounter   Medication Sig    amiodarone (PACERONE) 200 MG Tab Tale 1 tablet (200mg) by mouth on Monday, Tuesday, Thursday, Friday and Saturday. Only take medication 5 days per week.    furosemide (LASIX) 40 MG tablet Take 1 tablet (40 mg total) by mouth 2 (two) times daily.    hydrocortisone 2.5 % ointment Apply 1 application topically 2 (two) times daily.    metoprolol succinate (TOPROL-XL) 50 MG 24 hr tablet TAKE ONE TABLET BY MOUTH ONCE DAILY    spironolactone (ALDACTONE) 25 MG tablet TAKE 1 TABLET BY MOUTH ONCE DAILY    VENTOLIN HFA 90 mcg/actuation inhaler Inhale 1 Inhaler into the lungs  every 4 (four) hours as needed.    warfarin (COUMADIN) 3 MG tablet TAKE 1 TABLET BY MOUTH ONCE DAILY EXCEPT  SATURDAY       Review of patient's allergies indicates:   Allergen Reactions    Biopatch [chlorhexidine gluconate]      Site burning    Percocet [oxycodone-acetaminophen] Itching    Penicillins Rash       Past Medical History:   Diagnosis Date    CHF (congestive heart failure) 1/2010    Dx  1/2010 w/ decreased LV systolic function (EF 15%) by ECHO 1/2015    COCM (congestive cardiomyopathy) 7/20/2016    Hyperlipidemia     Hypertension     Paroxysmal atrial fibrillation     Pulmonary embolus 2008    Stroke     Superficial thrombophlebitis      Past Surgical History:   Procedure Laterality Date    TONSILLECTOMY      VEIN LIGATION AND STRIPPING       Family History     Problem Relation (Age of Onset)    Cancer Mother        Tobacco Use    Smoking status: Never Smoker    Smokeless tobacco: Never Used   Substance and Sexual Activity    Alcohol use: No    Drug use: No    Sexual activity: Not on file     Review of Systems   Constitutional: Positive for activity change.   HENT: Negative for nosebleeds.    Eyes: Negative for visual disturbance.   Respiratory: Negative for shortness of breath.    Cardiovascular: Negative for chest pain.   Gastrointestinal: Negative for nausea.   Musculoskeletal: Negative for gait problem.   Skin: Negative for color change.   Neurological: Negative for seizures, syncope and weakness.   Hematological: Does not bruise/bleed easily.   Psychiatric/Behavioral: Negative for sleep disturbance.     Objective:     Vital Signs (Most Recent):  Temp: 98.9 °F (37.2 °C) (01/14/20 0804)  Pulse: (!) 114 (01/14/20 1106)  Resp: 18 (01/14/20 0804)  BP: 112/76 (01/14/20 0804)  SpO2: 98 % (01/14/20 0804) Vital Signs (24h Range):  Temp:  [97.5 °F (36.4 °C)-98.9 °F (37.2 °C)] 98.9 °F (37.2 °C)  Pulse:  [] 114  Resp:  [16-18] 18  SpO2:  [95 %-98 %] 98 %  BP: (112-133)/(72-83) 112/76      Weight: 108.7 kg (239 lb 10.2 oz)  Body mass index is 34.38 kg/m².    SpO2: 98 %  O2 Device (Oxygen Therapy): room air     Intake/Output - Last 3 Shifts       01/12 0700 - 01/13 0659 01/13 0700 - 01/14 0659 01/14 0700 - 01/15 0659    P.O. 480 1320     I.V. (mL/kg) 576.1 (5.3)      IV Piggyback 26      Total Intake(mL/kg) 1082.1 (10) 1320 (12.1)     Urine (mL/kg/hr) 1325 (0.5) 850 (0.3)     Total Output 1325 850     Net -242.9 +470            Urine Occurrence  600 x            Lines/Drains/Airways     Central Venous Catheter Line                 Percutaneous Central Line Insertion/Assessment - triple lumen  01/11/20 1300 right internal jugular 2 days          Peripheral Intravenous Line                 Peripheral IV - Single Lumen 01/09/20 1935 20 G Anterior;Left Forearm 4 days                 STS Risk Score: n/a    Physical Exam   Constitutional: He is oriented to person, place, and time. He appears well-developed and well-nourished. No distress.   HENT:   Head: Normocephalic and atraumatic.   Eyes: Pupils are equal, round, and reactive to light. EOM are normal.   Neck: Normal range of motion.   Cardiovascular: Regular rhythm and normal pulses.   Tachycardic    Pulmonary/Chest: Effort normal. No respiratory distress.   Abdominal: Soft.   Musculoskeletal: Normal range of motion.   Neurological: He is alert and oriented to person, place, and time.   Skin: Skin is warm, dry and intact. Capillary refill takes less than 2 seconds. He is not diaphoretic.   Psychiatric: He has a normal mood and affect. His speech is normal and behavior is normal. Judgment and thought content normal.   Vitals reviewed.      Significant Labs:  ABGs:   Recent Labs   Lab 01/14/20 0428   PH 7.456*   PCO2 40.4   PO2 31*   HCO3 28.5*   POCSATURATED 64*   BE 5     Amylase: No results for input(s): AMYLASE in the last 48 hours.  BMP:   Recent Labs   Lab 01/14/20  0439   GLU 90      K 3.8      CO2 26   BUN 20   CREATININE 1.6*    CALCIUM 9.3   MG 2.0     Cardiac markers: No results for input(s): CKMB, CPKMB, TROPONINT, TROPONINI, MYOGLOBIN in the last 48 hours.  CBC:   Recent Labs   Lab 01/14/20 0439   WBC 7.75   RBC 5.45   HGB 15.6   HCT 47.9      MCV 88   MCH 28.6   MCHC 32.6     CMP:   Recent Labs   Lab 01/14/20 0439   GLU 90   CALCIUM 9.3   ALBUMIN 3.6   PROT 6.7      K 3.8   CO2 26      BUN 20   CREATININE 1.6*   ALKPHOS 51*   ALT 57*   AST 33   BILITOT 2.0*     Coagulation:   Recent Labs   Lab 01/14/20 0439   INR 2.2*   APTT 45.0*     Lactic Acid: No results for input(s): LACTATE in the last 48 hours.  LFTs:   Recent Labs   Lab 01/14/20 0439   ALT 57*   AST 33   ALKPHOS 51*   BILITOT 2.0*   PROT 6.7   ALBUMIN 3.6     Lipase: No results for input(s): LIPASE in the last 48 hours.    Significant Diagnostics: reviewed   TTE 1/13/2020  Limited echo performed for assessment of LV thrombus  Globally reduced LV systolic function with LVEF estimated at 15%  Normal RV systolic function  No LV thrombus with echo contrast.    TTE 1/10/2020  · Severe left ventricular enlargement.  · Severely decreased left ventricular systolic function. The estimated ejection fraction is 15%.  · Mild right ventricular enlargement.  · Moderately reduced right ventricular systolic function.  · Left ventricular diastolic dysfunction.  · Severe biatrial enlargement.  · Mild tricuspid regurgitation.  · The estimated PA systolic pressure is 31 mmHg.  · Intermediate central venous pressure (8 mmHg).  · TAPSE 0.89cm  · LVIDD 6.96cm    CT chest reviewed     Assessment/Plan:     NYHA Score: NYHA III: marked limitation of physical activity, comfortable at rest    * Acute on chronic combined systolic and diastolic heart failure  CT does not preclude patient from advanced options. Continue pathway. EF 15%, severe bi-atrial enlargement, LVIDD 6.96, TAPSE 0.89. Creatinine up slightly to 1.9 from 1.6 yesterday. Tbili down to 1.5. Dr. Schmitt to review and  staff.       Thank you for your consult. I will follow-up with patient. Please contact us if you have any additional questions.    Willow Crystal PA-C  Cardiothoracic Surgery  [facility]

## 2020-01-15 NOTE — PLAN OF CARE
Problem: Adult Inpatient Plan of Care  Goal: Plan of Care Review  Outcome: Ongoing, Progressing   Pt free of falls/traumas/injuries. Skin remains clean, dry, and intact.  Pt's  was stopped around 1500. Pt states he feels better. Pt's heparin gtt continued, aptt due 1/15 in am. Pt re-educated on importance of measuring accurate intake and out put; pt verbalized and demonstrates understanding. Reviewed plan of care with pt; and pt verbalized understanding.  Pt AAOX4, VSS, and  in no distress will continue to monitor.

## 2020-01-15 NOTE — ASSESSMENT & PLAN NOTE
CT does not preclude patient from advanced options. Continue pathway. EF 15%, severe bi-atrial enlargement, LVIDD 6.96, TAPSE 0.89. Creatinine up slightly to 1.9 from 1.6 yesterday. Tbili down to 1.5. Dr. Schmitt to review and staff.

## 2020-01-15 NOTE — ASSESSMENT & PLAN NOTE
- [facility] dx'd 2010  - TTE 1/10: LVEDD 6.96, LVEF 15%, diastolic dysfunction, RV midly dilated, mod decreased, severe LUCI, mild MR, PAQS 31 and IVC 8  - In the past months has been admitted 3 times for volume management requiring high doses of diuretics, in [facility]. Admits not following a strict diet and gaining weight. Now, he refers following a diet and current weight 239 lb but went up to 251 lb when not following diet.   - Initially diuresed this admit, but diuretics stopped 1/11 when central line placed and CVP was 2. Was given 900 cc IVF since, and CVP was up to 13. Resumed home dose of Lasix 40 mg po bid on 1/13, and CVP is back down to 11  - sVO2 71 with calculated CO 6.9 off of . (stopped completely on 1-14 due to tachycardia and tremors/flushing with resolution of symptoms.  - GDMT: Toprol on hold 2/2 decompensation. Isosorbide stopped. Remains on hydralazine.  Refuses to continue Losartan (stopped 4 days PTA) because makes him feel bad and SBP in 90's at home.   - RH tomorrow  - Pathway initiated - advance as appropriate

## 2020-01-15 NOTE — PROGRESS NOTES
[facility]  Heart Transplant  Progress Note    Patient Name: Tae Delgado  MRN: [id number]  Admission Date: 1/9/2020  Hospital Length of Stay: 6 days  Attending Physician: Isiah Montero MD  Primary Care Provider: Elham Pearson MD  Principal Problem:Acute on chronic combined systolic and diastolic heart failure    Subjective:     Interval History: Jitters, tachycardia, and flushing have all improved since stopping . Walking around floor this AM, feels well but does have to stop frequently to rest. Plan for RHC tomorrow and continuing pathway work up.   Discussed concerned about needing VAD soon since patient does not tolerate  or much GDMT due to symptoms. CVP 9 this AM, improved since restarting lasix.     Continuous Infusions:   heparin (porcine) in D5W 14 Units/kg/hr (01/14/20 2132)     Scheduled Meds:   amiodarone  200 mg Oral Daily    docusate sodium  50 mg Oral BID    furosemide  40 mg Oral BID    heparin (PORCINE)  60 Units/kg (Adjusted) Intravenous Once    hydrALAZINE  100 mg Oral Q8H    polyethylene glycol  17 g Oral BID     PRN Meds:acetaminophen, heparin (PORCINE), heparin (PORCINE), ondansetron, sodium chloride, sodium chloride 0.9%, traMADol    Review of patient's allergies indicates:   Allergen Reactions    Biopatch [chlorhexidine gluconate]      Site burning    Dobutamine in d5w      Tachycardia, tremors, SOB, flushing    Percocet [oxycodone-acetaminophen] Itching    Penicillins Rash     Objective:     Vital Signs (Most Recent):  Temp: 97.9 °F (36.6 °C) (01/15/20 0755)  Pulse: (!) 114 (01/15/20 1112)  Resp: 14 (01/15/20 0755)  BP: 105/80 (01/15/20 0755)  SpO2: (!) 94 % (01/15/20 0755) Vital Signs (24h Range):  Temp:  [96.9 °F (36.1 °C)-98.8 °F (37.1 °C)] 97.9 °F (36.6 °C)  Pulse:  [] 114  Resp:  [14-22] 14  SpO2:  [92 %-97 %] 94 %  BP: (105-128)/(72-91) 105/80     Patient Vitals for the past 72 hrs (Last 3 readings):   Weight   01/15/20 0700 108.3 kg (238 lb  12.1 oz)   01/14/20 0450 108.7 kg (239 lb 10.2 oz)   01/13/20 1401 108.9 kg (240 lb)     Body mass index is 34.26 kg/m².      Intake/Output Summary (Last 24 hours) at 1/15/2020 1133  Last data filed at 1/15/2020 0800  Gross per 24 hour   Intake 1494.3 ml   Output 2270 ml   Net -775.7 ml       Hemodynamic Parameters:       Telemetry: ST    Physical Exam   Constitutional: He is oriented to person, place, and time. He appears well-developed and well-nourished.   HENT:   Head: Normocephalic and atraumatic.   Eyes: Pupils are equal, round, and reactive to light. Conjunctivae and EOM are normal.   Neck: Normal range of motion. Neck supple. No JVD present. No thyromegaly present.   Cardiovascular: Regular rhythm.   Tachycardic, + soft S3   Pulmonary/Chest: Effort normal and breath sounds normal.   Abdominal: Soft. Bowel sounds are normal.   Musculoskeletal: Normal range of motion. He exhibits no edema.   Neurological: He is alert and oriented to person, place, and time.   Skin: Skin is warm and dry. Capillary refill takes 2 to 3 seconds.   Psychiatric: He has a normal mood and affect. His behavior is normal. Judgment and thought content normal.       Significant Labs:  CBC:  Recent Labs   Lab 01/13/20  0430 01/14/20  0439 01/15/20  0418   WBC 8.57  8.57  8.57 7.75 7.78   RBC 5.51  5.51  5.51 5.45 5.41   HGB 15.4  15.4  15.4 15.6 15.1   HCT 48.8  48.8  48.8 47.9 47.9     192  192 189 191   MCV 89  89  89 88 89   MCH 27.9  27.9  27.9 28.6 27.9   MCHC 31.6*  31.6*  31.6* 32.6 31.5*     BNP:  Recent Labs   Lab 01/10/20  0436 01/12/20  0825   BNP 1,656* 249*     CMP:  Recent Labs   Lab 01/13/20 0430 01/13/20  0818 01/14/20  0439 01/15/20  0418   GLU 93  --  90 98   CALCIUM 9.5  --  9.3 9.4   ALBUMIN  --  3.9 3.6 3.5   PROT  --  7.1 6.7 6.5     --  138 137   K 4.1  --  3.8 3.8   CO2 28  --  26 29   CL 99  --  100 98   BUN 25*  --  20 23*   CREATININE 1.7*  --  1.6* 1.9*   ALKPHOS  --  55 51* 50*    ALT  --  66* 57* 99*   AST  --  27 33 97*   BILITOT  --  2.3* 2.0* 1.5*      Coagulation:   Recent Labs   Lab 01/13/20  0600 01/14/20  0439 01/15/20  0418   INR 1.8* 2.2* 1.8*   APTT 40.2* 45.0* 45.4*     LDH:  No results for input(s): LDH in the last 72 hours.  Microbiology:  Microbiology Results (last 7 days)     ** No results found for the last 168 hours. **          I have reviewed all pertinent labs within the past 24 hours.    Estimated Creatinine Clearance: 51.6 mL/min (A) (based on SCr of 1.9 mg/dL (H)).    Diagnostic Results:  I have reviewed all pertinent imaging results/findings within the past 24 hours.    Assessment and Plan:       55 y.o. WM with history of NICMP diagnosed in 2010, ICD, LV thrombus (with prior splenic and renal emboli), Embolic  CVA , paroxysmal atrial fib, HTN, HLP  presents for  F/U today to clinic and he was volume overloaded on exam .  He states he had 3 admission in the last 3 months for volume overload. He started having more SOB on exertion since one month. Also endorses of Orthopnea since last one month. Alble to walk only 150 ft. He also has Bilateral lower extremity edema. He was admitted here in 2017 for ADHD here at [facility] and RHC during that admission showed PCWP 40 and CVP 17. Currently denies chest pain, lightheadedness     * Acute on chronic combined systolic and diastolic heart failure  - [facility] dx'd 2010  - TTE 1/10: LVEDD 6.96, LVEF 15%, diastolic dysfunction, RV midly dilated, mod decreased, severe LUCI, mild MR, PAQS 31 and IVC 8  - In the past months has been admitted 3 times for volume management requiring high doses of diuretics, in [facility]. Admits not following a strict diet and gaining weight. Now, he refers following a diet and current weight 239 lb but went up to 251 lb when not following diet.   - Initially diuresed this admit, but diuretics stopped 1/11 when central line placed and CVP was 2. Was given 900 cc IVF since, and CVP  was up to 13. Resumed home dose of Lasix 40 mg po bid on 1/13, and CVP is back down to 11  - sVO2 71 with calculated CO 6.9 off of . (stopped completely on 1-14 due to tachycardia and tremors/flushing with resolution of symptoms.  - GDMT: Toprol on hold 2/2 decompensation. Isosorbide stopped. Remains on hydralazine.  Refuses to continue Losartan (stopped 4 days PTA) because makes him feel bad and SBP in 90's at home.   - RHC tomorrow  - Pathway initiated - advance as appropriate          Hepatic congestion  - Liver US on 1/11 unremarkable    ICD (implantable cardioverter-defibrillator) in place  - S/P Biotronik dual chamber ICD    Right lower lobe pulmonary nodule  - Incidental finding on CT chest/abd/pelvis on 1/12. Pulmonology consulted, and rec repeat CT of chest in 3 months  - Will need to follow-up in pulmonary clinic.     Acute kidney injury superimposed on chronic kidney disease  - Creatinine on admit 2.6 (baseline ~ 1.8).  - monitor with diuresis  - Followed by Nephrology as an outpatient    Paroxysmal atrial fibrillation  - Currently in ST  - Continue Amiodarone 200 mg qd  - Continue Heparin bridge, but D/C Coumadin as he is in w/u      Left ventricular thrombus without MI  - H/O LV thrombus with h/o splenic and renal emboli as well as embolic CVA  - Limited TTE done here 1/13 showed no thrombus  - Continue Heparin birdge, but D/C Coumadin as he is in w/u.          JUAN LanierC  Heart Transplant  [facility]

## 2020-01-15 NOTE — PLAN OF CARE
Plan of care discussed with patient. Patient is free of fall or injury and ambulating independently in the savage. Denies CP, SOB, or pain. Pain treated with scheduled/PRN medications. 12 beat VT run last night, ST on monitor throughout shift. K replaced and Heparin drip continued. Blood specimen tubes collected and hand delivered as instructed by core lab. Plan for RHC tomorrow.  All questions addressed; will continue to monitor.

## 2020-01-15 NOTE — NURSING TRANSFER
Nursing Transfer Note      1/15/2020     Transfer To: ECHO    Transfer via wheelchair    Transfer with cardiac monitoring    Transported by transport     Medicines sent: heparin    Chart send with patient: No

## 2020-01-16 ENCOUNTER — TELEPHONE (OUTPATIENT)
Dept: TRANSPLANT | Facility: CLINIC | Age: 60
End: 2020-01-16

## 2020-01-16 DIAGNOSIS — I50.43 ACUTE ON CHRONIC COMBINED SYSTOLIC AND DIASTOLIC HEART FAILURE: Primary | ICD-10-CM

## 2020-01-16 LAB
ALBUMIN SERPL BCP-MCNC: 3.4 G/DL (ref 3.5–5.2)
ALLENS TEST: ABNORMAL
ALP SERPL-CCNC: 47 U/L (ref 55–135)
ALT SERPL W/O P-5'-P-CCNC: 124 U/L (ref 10–44)
ANION GAP SERPL CALC-SCNC: 11 MMOL/L (ref 8–16)
APTT BLDCRRT: 32.5 SEC (ref 21–32)
APTT BLDCRRT: 32.5 SEC (ref 21–32)
AST SERPL-CCNC: 113 U/L (ref 10–40)
BASOPHILS # BLD AUTO: 0.06 K/UL (ref 0–0.2)
BASOPHILS NFR BLD: 0.7 % (ref 0–1.9)
BILIRUB SERPL-MCNC: 1.3 MG/DL (ref 0.1–1)
BUN SERPL-MCNC: 25 MG/DL (ref 6–20)
CALCIUM SERPL-MCNC: 9.4 MG/DL (ref 8.7–10.5)
CARDIOLIPIN IGG SER IA-ACNC: <9.4 GPL (ref 0–14.99)
CARDIOLIPIN IGM SER IA-ACNC: <9.4 MPL (ref 0–12.49)
CHLORIDE SERPL-SCNC: 98 MMOL/L (ref 95–110)
CO2 SERPL-SCNC: 27 MMOL/L (ref 23–29)
CREAT SERPL-MCNC: 1.8 MG/DL (ref 0.5–1.4)
CRP SERPL-MCNC: 16.2 MG/L (ref 0–8.2)
DELSYS: ABNORMAL
DIFFERENTIAL METHOD: ABNORMAL
DLCO ADJ PRE: 28.45 ML/(MIN*MMHG) (ref 22.49–36.34)
DLCO SINGLE BREATH LLN: 22.49
DLCO SINGLE BREATH PRE REF: 95 %
DLCO SINGLE BREATH REF: 29.42
DLCOC SBVA LLN: 2.94
DLCOC SBVA PRE REF: 121.9 %
DLCOC SBVA REF: 4.12
DLCOC SINGLE BREATH LLN: 22.49
DLCOC SINGLE BREATH PRE REF: 96.7 %
DLCOC SINGLE BREATH REF: 29.42
DLCOCSBVAULN: 5.3
DLCOCSINGLEBREATHULN: 36.34
DLCOSINGLEBREATHULN: 36.34
DLCOVA LLN: 2.94
DLCOVA PRE REF: 119.8 %
DLCOVA PRE: 4.93 ML/(MIN*MMHG*L) (ref 2.94–5.3)
DLCOVA REF: 4.12
DLCOVAULN: 5.3
DLVAADJ PRE: 5.02 ML/(MIN*MMHG*L) (ref 2.94–5.3)
EBV VCA IGG SER QL IA: POSITIVE
EOSINOPHIL # BLD AUTO: 0.3 K/UL (ref 0–0.5)
EOSINOPHIL NFR BLD: 3.2 % (ref 0–8)
ERVN2 LLN: 1.21
ERVN2 PRE REF: 45.6 %
ERVN2 PRE: 0.55 L (ref 1.21–1.21)
ERVN2 REF: 1.21
ERVN2ULN: 1.21
ERYTHROCYTE [DISTWIDTH] IN BLOOD BY AUTOMATED COUNT: 15.3 % (ref 11.5–14.5)
ERYTHROCYTE [SEDIMENTATION RATE] IN BLOOD BY WESTERGREN METHOD: 22 MM/H
EST. GFR  (AFRICAN AMERICAN): 46.6 ML/MIN/1.73 M^2
EST. GFR  (NON AFRICAN AMERICAN): 40.3 ML/MIN/1.73 M^2
ESTIMATED AVG GLUCOSE: 131 MG/DL (ref 68–131)
FACT VIII ACT/NOR PPP: 253 % (ref 60–170)
FEF 25 75 LLN: 1.48
FEF 25 75 PRE REF: 101.1 %
FEF 25 75 REF: 3
FEV05 LLN: 1.7
FEV05 REF: 2.84
FEV1 FVC LLN: 66
FEV1 FVC PRE REF: 106.2 %
FEV1 FVC REF: 77
FEV1 LLN: 2.72
FEV1 PRE REF: 74.6 %
FEV1 REF: 3.6
FIBRINOGEN PPP-MCNC: 388 MG/DL (ref 182–366)
FIO2: 21
FRCN2 LLN: 2.62
FRCN2 PRE REF: 74.3 %
FRCN2 REF: 3.61
FRCN2ULN: 4.59
FVC LLN: 3.55
FVC PRE REF: 70 %
FVC REF: 4.66
GLUCOSE SERPL-MCNC: 94 MG/DL (ref 70–110)
HBA1C MFR BLD HPLC: 6.2 % (ref 4–5.6)
HBV CORE AB SERPL QL IA: NEGATIVE
HBV SURFACE AB SER-ACNC: NEGATIVE M[IU]/ML
HBV SURFACE AG SERPL QL IA: NEGATIVE
HCO3 UR-SCNC: 30.8 MMOL/L (ref 24–28)
HCT VFR BLD AUTO: 45 % (ref 40–54)
HCV AB SERPL QL IA: NEGATIVE
HCYS SERPL-SCNC: 19.6 UMOL/L (ref 4–16.5)
HEPATITIS A ANTIBODY, IGG: POSITIVE
HGB BLD-MCNC: 14 G/DL (ref 14–18)
HIV 1+2 AB+HIV1 P24 AG SERPL QL IA: NEGATIVE
HSV1 IGG SERPL QL IA: POSITIVE
HSV2 IGG SERPL QL IA: NEGATIVE
IMM GRANULOCYTES # BLD AUTO: 0.04 K/UL (ref 0–0.04)
IMM GRANULOCYTES NFR BLD AUTO: 0.5 % (ref 0–0.5)
INR PPP: 1.6 (ref 0.8–1.2)
INR PPP: 1.6 (ref 0.8–1.2)
IVC PRE: 3.47 L (ref 3.55–5.76)
IVC SINGLE BREATH LLN: 3.55
IVC SINGLE BREATH PRE REF: 74.4 %
IVC SINGLE BREATH REF: 4.66
IVCSINGLEBREATHULN: 5.76
LYMPHOCYTES # BLD AUTO: 1.4 K/UL (ref 1–4.8)
LYMPHOCYTES NFR BLD: 17.8 % (ref 18–48)
MAGNESIUM SERPL-MCNC: 2 MG/DL (ref 1.6–2.6)
MCH RBC QN AUTO: 27.7 PG (ref 27–31)
MCHC RBC AUTO-ENTMCNC: 31.1 G/DL (ref 32–36)
MCV RBC AUTO: 89 FL (ref 82–98)
MODE: ABNORMAL
MONOCYTES # BLD AUTO: 1 K/UL (ref 0.3–1)
MONOCYTES NFR BLD: 12 % (ref 4–15)
MVV LLN: 117
MVV PRE REF: 68.1 %
MVV REF: 138
NEUTROPHILS # BLD AUTO: 5.3 K/UL (ref 1.8–7.7)
NEUTROPHILS NFR BLD: 65.8 % (ref 38–73)
NRBC BLD-RTO: 0 /100 WBC
PCO2 BLDA: 42.1 MMHG (ref 35–45)
PEF LLN: 6.99
PEF PRE REF: 99.3 %
PEF REF: 9.31
PH SMN: 7.47 [PH] (ref 7.35–7.45)
PHYSICIAN COMMENT: ABNORMAL
PLATELET # BLD AUTO: 167 K/UL (ref 150–350)
PLATELET FUNCTION ASSAY - EPINEPHRINE: 74 SECS (ref 76–199)
PMV BLD AUTO: 11.7 FL (ref 9.2–12.9)
PO2 BLDA: 31 MMHG (ref 40–60)
POC BE: 7 MMOL/L
POC SATURATED O2: 64 % (ref 95–100)
POC TCO2: 32 MMOL/L (ref 24–29)
POTASSIUM SERPL-SCNC: 3.8 MMOL/L (ref 3.5–5.1)
PRE DLCO: 27.96 ML/(MIN*MMHG) (ref 22.49–36.34)
PRE FEF 25 75: 3.04 L/S (ref 1.48–4.53)
PRE FET 100: 6.29 SEC
PRE FEV05 REF: 79.7 %
PRE FEV1 FVC: 82.32 % (ref 65.5–89.47)
PRE FEV1: 2.69 L (ref 2.72–4.48)
PRE FEV5: 2.26 L (ref 1.7–3.97)
PRE FRC N2: 2.68 L
PRE FVC: 3.26 L (ref 3.55–5.76)
PRE MVV: 94 L/MIN (ref 117.4–158.84)
PRE PEF: 9.25 L/S (ref 6.99–11.63)
PROT SERPL-MCNC: 6.3 G/DL (ref 6–8.4)
PROTHROMBIN TIME: 15.1 SEC (ref 9–12.5)
PROTHROMBIN TIME: 15.1 SEC (ref 9–12.5)
RBC # BLD AUTO: 5.06 M/UL (ref 4.6–6.2)
RPR SER QL: NORMAL
RVN2 LLN: 1.73
RVN2 PRE REF: 88.8 %
RVN2 PRE: 2.13 L (ref 1.73–3.07)
RVN2 REF: 2.4
RVN2TLCN2 LLN: 27.99
RVN2TLCN2 PRE REF: 101.6 %
RVN2TLCN2 PRE: 37.57 % (ref 27.99–45.95)
RVN2TLCN2 REF: 36.97
RVN2TLCN2ULN: 45.95
RVN2ULN: 3.07
SAMPLE: ABNORMAL
SITE: ABNORMAL
SODIUM SERPL-SCNC: 136 MMOL/L (ref 136–145)
SP02: 98
TLCN2 LLN: 5.99
TLCN2 PRE REF: 79.4 %
TLCN2 PRE: 5.67 L (ref 5.99–8.29)
TLCN2 REF: 7.14
TLCN2ULN: 8.29
TSH SERPL DL<=0.005 MIU/L-ACNC: 3.27 UIU/ML (ref 0.4–4)
VA PRE: 5.67 L (ref 6.99–6.99)
VA SINGLE BREATH LLN: 6.99
VA SINGLE BREATH PRE REF: 81.1 %
VA SINGLE BREATH REF: 6.99
VASINGLEBREATHULN: 6.99
VCMAXN2 LLN: 3.55
VCMAXN2 PRE REF: 76 %
VCMAXN2 PRE: 3.54 L (ref 3.55–5.76)
VCMAXN2 REF: 4.66
VCMAXN2ULN: 5.76
WBC # BLD AUTO: 8.07 K/UL (ref 3.9–12.7)

## 2020-01-16 PROCEDURE — 25000003 PHARM REV CODE 250: Mod: NTX | Performed by: NURSE PRACTITIONER

## 2020-01-16 PROCEDURE — 81376 HLA II TYPING 1 LOCUS LR: CPT | Mod: 91

## 2020-01-16 PROCEDURE — 63600367 HC NITRIC OXIDE PER HOUR: Mod: NTX

## 2020-01-16 PROCEDURE — 85247 CLOT FACTOR VIII MULTIMETRIC: CPT | Mod: NTX

## 2020-01-16 PROCEDURE — 86829 HLA CLASS I/II ANTIBODY QUAL: CPT | Mod: TXP

## 2020-01-16 PROCEDURE — 94010 BREATHING CAPACITY TEST: CPT | Mod: NTX | Performed by: INTERNAL MEDICINE

## 2020-01-16 PROCEDURE — 82803 BLOOD GASES ANY COMBINATION: CPT | Mod: NTX

## 2020-01-16 PROCEDURE — 83735 ASSAY OF MAGNESIUM: CPT | Mod: NTX

## 2020-01-16 PROCEDURE — 63600175 PHARM REV CODE 636 W HCPCS: Mod: NTX | Performed by: HOSPITALIST

## 2020-01-16 PROCEDURE — 25000003 PHARM REV CODE 250: Mod: NTX | Performed by: PHYSICIAN ASSISTANT

## 2020-01-16 PROCEDURE — 86140 C-REACTIVE PROTEIN: CPT | Mod: NTX

## 2020-01-16 PROCEDURE — 86825 HLA X-MATH NON-CYTOTOXIC: CPT | Mod: 91,TXP

## 2020-01-16 PROCEDURE — 99900035 HC TECH TIME PER 15 MIN (STAT): Mod: NTX

## 2020-01-16 PROCEDURE — 94727 GAS DIL/WSHOT DETER LNG VOL: CPT | Mod: NTX | Performed by: INTERNAL MEDICINE

## 2020-01-16 PROCEDURE — 86833 HLA CLASS II HIGH DEFIN QUAL: CPT

## 2020-01-16 PROCEDURE — 85240 CLOT FACTOR VIII AHG 1 STAGE: CPT | Mod: NTX

## 2020-01-16 PROCEDURE — 86977 RBC SERUM PRETX INCUBJ/INHIB: CPT | Mod: 59

## 2020-01-16 PROCEDURE — C1751 CATH, INF, PER/CENT/MIDLINE: HCPCS | Mod: NTX | Performed by: INTERNAL MEDICINE

## 2020-01-16 PROCEDURE — 99233 SBSQ HOSP IP/OBS HIGH 50: CPT | Mod: NTX,,, | Performed by: INTERNAL MEDICINE

## 2020-01-16 PROCEDURE — 85610 PROTHROMBIN TIME: CPT | Mod: NTX

## 2020-01-16 PROCEDURE — 85397 CLOTTING FUNCT ACTIVITY: CPT | Mod: NTX

## 2020-01-16 PROCEDURE — C1751 CATH, INF, PER/CENT/MIDLINE: HCPCS | Mod: NTX

## 2020-01-16 PROCEDURE — 93451 RIGHT HEART CATH: CPT | Mod: NTX | Performed by: INTERNAL MEDICINE

## 2020-01-16 PROCEDURE — 25000003 PHARM REV CODE 250: Mod: NTX | Performed by: HOSPITALIST

## 2020-01-16 PROCEDURE — 93451 PR RIGHT HEART CATH O2 SATURATION & CARDIAC OUTPUT: ICD-10-PCS | Mod: 26,NTX,, | Performed by: INTERNAL MEDICINE

## 2020-01-16 PROCEDURE — 20000000 HC ICU ROOM: Mod: NTX

## 2020-01-16 PROCEDURE — 99233 PR SUBSEQUENT HOSPITAL CARE,LEVL III: ICD-10-PCS | Mod: NTX,,, | Performed by: INTERNAL MEDICINE

## 2020-01-16 PROCEDURE — 25000003 PHARM REV CODE 250: Mod: NTX | Performed by: INTERNAL MEDICINE

## 2020-01-16 PROCEDURE — 36573 INSJ PICC RS&I 5 YR+: CPT | Mod: NTX

## 2020-01-16 PROCEDURE — A4216 STERILE WATER/SALINE, 10 ML: HCPCS | Mod: NTX | Performed by: INTERNAL MEDICINE

## 2020-01-16 PROCEDURE — 86828 HLA CLASS I&II ANTIBODY QUAL: CPT | Mod: 91,TXP

## 2020-01-16 PROCEDURE — 81376 HLA II TYPING 1 LOCUS LR: CPT

## 2020-01-16 PROCEDURE — 97802 MEDICAL NUTRITION INDIV IN: CPT | Mod: NTX

## 2020-01-16 PROCEDURE — 81373 HLA I TYPING 1 LOCUS LR: CPT

## 2020-01-16 PROCEDURE — 81373 HLA I TYPING 1 LOCUS LR: CPT | Mod: 91

## 2020-01-16 PROCEDURE — 63600175 PHARM REV CODE 636 W HCPCS: Mod: NTX | Performed by: PHYSICIAN ASSISTANT

## 2020-01-16 PROCEDURE — 36415 COLL VENOUS BLD VENIPUNCTURE: CPT | Mod: NTX

## 2020-01-16 PROCEDURE — 86147 CARDIOLIPIN ANTIBODY EA IG: CPT | Mod: 59,NTX

## 2020-01-16 PROCEDURE — 86825 HLA X-MATH NON-CYTOTOXIC: CPT | Mod: TXP

## 2020-01-16 PROCEDURE — 76937 US GUIDE VASCULAR ACCESS: CPT | Mod: NTX

## 2020-01-16 PROCEDURE — 85301 ANTITHROMBIN III ANTIGEN: CPT | Mod: NTX

## 2020-01-16 PROCEDURE — 85246 CLOT FACTOR VIII VW ANTIGEN: CPT | Mod: NTX

## 2020-01-16 PROCEDURE — 82955 ASSAY OF G6PD ENZYME: CPT | Mod: NTX

## 2020-01-16 PROCEDURE — 85384 FIBRINOGEN ACTIVITY: CPT | Mod: NTX

## 2020-01-16 PROCEDURE — 85025 COMPLETE CBC W/AUTO DIFF WBC: CPT | Mod: NTX

## 2020-01-16 PROCEDURE — 85730 THROMBOPLASTIN TIME PARTIAL: CPT | Mod: NTX

## 2020-01-16 PROCEDURE — 84443 ASSAY THYROID STIM HORMONE: CPT | Mod: NTX

## 2020-01-16 PROCEDURE — 85576 BLOOD PLATELET AGGREGATION: CPT | Mod: NTX

## 2020-01-16 PROCEDURE — 94729 DIFFUSING CAPACITY: CPT | Mod: NTX | Performed by: INTERNAL MEDICINE

## 2020-01-16 PROCEDURE — C1894 INTRO/SHEATH, NON-LASER: HCPCS | Mod: NTX | Performed by: INTERNAL MEDICINE

## 2020-01-16 PROCEDURE — 81380 HLA I TYPING 1 LOCUS HR: CPT

## 2020-01-16 PROCEDURE — 86480 TB TEST CELL IMMUN MEASURE: CPT | Mod: NTX

## 2020-01-16 PROCEDURE — 86832 HLA CLASS I HIGH DEFIN QUAL: CPT

## 2020-01-16 PROCEDURE — 93451 RIGHT HEART CATH: CPT | Mod: 26,NTX,, | Performed by: INTERNAL MEDICINE

## 2020-01-16 PROCEDURE — 83090 ASSAY OF HOMOCYSTEINE: CPT | Mod: NTX

## 2020-01-16 PROCEDURE — 80053 COMPREHEN METABOLIC PANEL: CPT | Mod: NTX

## 2020-01-16 RX ORDER — FUROSEMIDE 10 MG/ML
80 INJECTION INTRAMUSCULAR; INTRAVENOUS 2 TIMES DAILY
Status: DISCONTINUED | OUTPATIENT
Start: 2020-01-16 | End: 2020-01-19

## 2020-01-16 RX ORDER — ISOSORBIDE DINITRATE 10 MG/1
10 TABLET ORAL 3 TIMES DAILY
Status: DISCONTINUED | OUTPATIENT
Start: 2020-01-16 | End: 2020-01-18

## 2020-01-16 RX ORDER — POTASSIUM CHLORIDE 20 MEQ/1
20 TABLET, EXTENDED RELEASE ORAL ONCE
Status: COMPLETED | OUTPATIENT
Start: 2020-01-16 | End: 2020-01-16

## 2020-01-16 RX ORDER — LIDOCAINE HYDROCHLORIDE 20 MG/ML
INJECTION, SOLUTION INFILTRATION; PERINEURAL
Status: DISCONTINUED | OUTPATIENT
Start: 2020-01-16 | End: 2020-01-16 | Stop reason: HOSPADM

## 2020-01-16 RX ORDER — SODIUM CHLORIDE 0.9 % (FLUSH) 0.9 %
10 SYRINGE (ML) INJECTION EVERY 6 HOURS
Status: DISCONTINUED | OUTPATIENT
Start: 2020-01-16 | End: 2020-01-21

## 2020-01-16 RX ORDER — SODIUM CHLORIDE 0.9 % (FLUSH) 0.9 %
10 SYRINGE (ML) INJECTION
Status: DISCONTINUED | OUTPATIENT
Start: 2020-01-16 | End: 2020-01-21

## 2020-01-16 RX ADMIN — AMIODARONE HYDROCHLORIDE 200 MG: 200 TABLET ORAL at 08:01

## 2020-01-16 RX ADMIN — HEPARIN SODIUM 12 UNITS/KG/HR: 10000 INJECTION, SOLUTION INTRAVENOUS at 07:01

## 2020-01-16 RX ADMIN — Medication 10 ML: at 06:01

## 2020-01-16 RX ADMIN — POTASSIUM CHLORIDE 20 MEQ: 1500 TABLET, EXTENDED RELEASE ORAL at 12:01

## 2020-01-16 RX ADMIN — ISOSORBIDE DINITRATE 10 MG: 10 TABLET ORAL at 09:01

## 2020-01-16 RX ADMIN — POLYETHYLENE GLYCOL 3350 17 G: 17 POWDER, FOR SOLUTION ORAL at 09:01

## 2020-01-16 RX ADMIN — DOCUSATE SODIUM 50 MG: 50 CAPSULE, LIQUID FILLED ORAL at 09:01

## 2020-01-16 RX ADMIN — HYDRALAZINE HYDROCHLORIDE 100 MG: 50 TABLET, FILM COATED ORAL at 03:01

## 2020-01-16 RX ADMIN — HYDRALAZINE HYDROCHLORIDE 100 MG: 50 TABLET, FILM COATED ORAL at 05:01

## 2020-01-16 RX ADMIN — FUROSEMIDE 80 MG: 20 INJECTION, SOLUTION INTRAMUSCULAR; INTRAVENOUS at 07:01

## 2020-01-16 RX ADMIN — FUROSEMIDE 40 MG: 40 TABLET ORAL at 08:01

## 2020-01-16 RX ADMIN — DOCUSATE SODIUM 50 MG: 50 CAPSULE, LIQUID FILLED ORAL at 08:01

## 2020-01-16 RX ADMIN — HYDRALAZINE HYDROCHLORIDE 100 MG: 50 TABLET, FILM COATED ORAL at 09:01

## 2020-01-16 RX ADMIN — ISOSORBIDE DINITRATE 10 MG: 10 TABLET ORAL at 03:01

## 2020-01-16 NOTE — PROGRESS NOTES
Update    Attempted to see pt to conduct psychosocial assessment. However, pt's primary caregiver, Lavern, will not be here until after 5pm. Pt states she will be spending the night. Pt aware SW will return to room around 9:30 am tomorrow to do psychosocial.     Pt presents aaox4 with open affect. Pt reports coping well and discusses memories of his mother and sister, both now . Support provided. Transportation arrived to take pt to a test.     Pt reports coping well and denies further needs, questions, concerns at this time and none indicated. Providing ongoing psychosocial and counseling support, education, resources, assistance and discharge planning as indicated. Following and available.

## 2020-01-16 NOTE — ASSESSMENT & PLAN NOTE
- [facility] dx'd 2010  - TTE 1/10: LVEDD 6.96, LVEF 15%, diastolic dysfunction, RV midly dilated, mod decreased, severe LUCI, mild MR, PAQS 31 and IVC 8  - In the past months has been admitted 3 times for volume management requiring high doses of diuretics, in [facility]. Admits not following a strict diet and gaining weight. Now, he refers following a diet and current weight 239 lb but went up to 251 lb when not following diet.   - Initially diuresed this admit, but diuretics stopped 1/11 when central line placed and CVP was 2. Was given 900 cc IVF since, and CVP was up to 13. Resumed home dose of Lasix 40 mg po bid on 1/13, and CVP is back down to 5 today  - sVO2 64 this AM off of . (stopped completely on 1/14 due to tachycardia and tremors/flushing with resolution of symptoms.  - GDMT: Toprol on hold 2/2 decompensation. Isosorbide stopped. Remains on hydralazine.  Refuses to continue Losartan (stopped 4 days PTA) because makes him feel bad and SBP in 90's at home.   - RHC today and will adjust meds accordingly  - Pathway initiated - advance as appropriate

## 2020-01-16 NOTE — H&P
Subjective:      Tae Delgado is a 59 y.o. male with a who needs RHC / biopsy for evluation of cardiac hemodynamics   Currently able to lay flat. PTTat 1/16/20 at 5 am was 32.5 with INR 1.6.  Heparin stopped on call prior to procedure (at around 9 am) to minimize interruption of anticoagulation as pt has had embolic events from LV thrombus previously.    Past Medical History:   Diagnosis Date    CHF (congestive heart failure) 1/2010    Dx  1/2010 w/ decreased LV systolic function (EF 15%) by ECHO 1/2015    COCM (congestive cardiomyopathy) 7/20/2016    Hyperlipidemia     Hypertension     Paroxysmal atrial fibrillation     Pulmonary embolus 2008    Stroke     Superficial thrombophlebitis      Past Surgical History:   Procedure Laterality Date    TONSILLECTOMY      VEIN LIGATION AND STRIPPING       Social History     Socioeconomic History    Marital status:      Spouse name: Not on file    Number of children: Not on file    Years of education: Not on file    Highest education level: Not on file   Occupational History    Not on file   Social Needs    Financial resource strain: Not on file    Food insecurity:     Worry: Not on file     Inability: Not on file    Transportation needs:     Medical: Not on file     Non-medical: Not on file   Tobacco Use    Smoking status: Never Smoker    Smokeless tobacco: Never Used   Substance and Sexual Activity    Alcohol use: No    Drug use: No    Sexual activity: Not on file   Lifestyle    Physical activity:     Days per week: Not on file     Minutes per session: Not on file    Stress: Not on file   Relationships    Social connections:     Talks on phone: Not on file     Gets together: Not on file     Attends Yarsani service: Not on file     Active member of club or organization: Not on file     Attends meetings of clubs or organizations: Not on file     Relationship status: Not on file   Other Topics Concern    Not on file   Social History Narrative  "   Not on file     Family History   Problem Relation Age of Onset    Cancer Mother            Other significant clinical information:  Review of patient's allergies indicates:   Allergen Reactions    Biopatch [chlorhexidine gluconate]      Site burning    Dobutamine in d5w      Tachycardia, tremors, SOB, flushing    Percocet [oxycodone-acetaminophen] Itching    Penicillins Rash     No current facility-administered medications on file prior to encounter.      Current Outpatient Medications on File Prior to Encounter   Medication Sig    amiodarone (PACERONE) 200 MG Tab Tale 1 tablet (200mg) by mouth on Monday, Tuesday, Thursday, Friday and Saturday. Only take medication 5 days per week.    furosemide (LASIX) 40 MG tablet Take 1 tablet (40 mg total) by mouth 2 (two) times daily.    hydrocortisone 2.5 % ointment Apply 1 application topically 2 (two) times daily.    metoprolol succinate (TOPROL-XL) 50 MG 24 hr tablet TAKE ONE TABLET BY MOUTH ONCE DAILY    spironolactone (ALDACTONE) 25 MG tablet TAKE 1 TABLET BY MOUTH ONCE DAILY    VENTOLIN HFA 90 mcg/actuation inhaler Inhale 1 Inhaler into the lungs every 4 (four) hours as needed.    warfarin (COUMADIN) 3 MG tablet TAKE 1 TABLET BY MOUTH ONCE DAILY EXCEPT  SATURDAY            Objective:      Physical Exam  BP (!) 132/99 (Patient Position: Sitting)   Pulse (!) 111   Temp 97.5 °F (36.4 °C) (Oral)   Resp 14   Ht 5' 10" (1.778 m)   Wt 108.5 kg (239 lb 3.2 oz)   SpO2 96%   BMI 34.32 kg/m²     General Appearance:    Alert, cooperative, no distress, appears stated age   Head:    Normocephalic, without obvious abnormality, atraumatic   Eyes:    PERRL, conjunctiva/corneas clear, EOM's intact, fundi     benign, both eyes        Ears:    Normal TM's and external ear canals, both ears   Nose:   Nares normal, septum midline, mucosa normal, no drainage    or sinus tenderness   Throat:   Lips, mucosa, and tongue normal; teeth and gums normal   Neck:   Supple, " symmetrical, trachea midline, no adenopathy;        thyroid:  No enlargement/tenderness/nodules; no carotid    bruit or JVD   Back:     Symmetric, no curvature, ROM normal, no CVA tenderness   Lungs:     Clear to auscultation bilaterally, respirations unlabored   Chest wall:    No tenderness or deformity   Heart:    Regular rate and rhythm, S1 and S2 normal, no murmur, rub   or gallop   Abdomen:     Soft, non-tender, bowel sounds active all four quadrants,     no masses, no organomegaly   Genitalia:    Normal male without lesion, discharge or tenderness   Rectal:    Normal tone, normal prostate, no masses or tenderness;    guaiac negative stool   Extremities:   Extremities normal, atraumatic, no cyanosis or edema   Pulses:   2+ and symmetric all extremities   Skin:   Skin color, texture, turgor normal, no rashes or lesions   Lymph nodes:   Cervical, supraclavicular, and axillary nodes normal   Neurologic:   CNII-XII intact. Normal strength, sensation and reflexes       throughout         Lab Review   Lab Results   Component Value Date    WBC 8.07 01/16/2020    HGB 14.0 01/16/2020    HCT 45.0 01/16/2020    MCV 89 01/16/2020     01/16/2020     Lab Results   Component Value Date    INR 1.6 (H) 01/16/2020    INR 1.6 (H) 01/16/2020    INR 1.8 (H) 01/15/2020           Assessment:       Plan:     I have explained the risks, benefits, and alternatives of the procedure in detail.  The patient expresses understanding and all questions have been answered.  The patient agrees to the proceed as planned.  RHC via RIJ   PT aware of increase risk of bleeding from being on anticoagulation prior to procedure. Micropuncture access needle will be used to minimize bleeding risk.

## 2020-01-16 NOTE — ASSESSMENT & PLAN NOTE
- elevated on admission, trended down on  but now trending back up since  stopped   - monitor closely, RHC today  - on amiodarone for AF

## 2020-01-16 NOTE — NURSING
Plan of care reviewed with pt. Pt remained free of falls, trauma, and injury. VSS. Pt went for RHC today. Adv options w/u continued. Pt will be transferred to ICU later today. Diuretic change to 80 mg IVP BID. Right IJ DC'd. Order place for PICC line. Heparin gtt continued at ordered rate of 14 units. Last Ptt: 45.4. Will continue to monitor.

## 2020-01-16 NOTE — SUBJECTIVE & OBJECTIVE
Interval History: No complaints, no events overnight. Feels generally well. Denies NVD, SOB, Chest pain. Advanced to step 3 yesterday. Plan for RHC today and will adjust meds accordingly.    Continuous Infusions:   heparin (porcine) in D5W 14 Units/kg/hr (01/15/20 1726)     Scheduled Meds:   amiodarone  200 mg Oral Daily    docusate sodium  50 mg Oral BID    furosemide  40 mg Oral BID    heparin (PORCINE)  60 Units/kg (Adjusted) Intravenous Once    hydrALAZINE  100 mg Oral Q8H    polyethylene glycol  17 g Oral BID     PRN Meds:acetaminophen, heparin (PORCINE), heparin (PORCINE), lidocaine HCL 20 mg/ml (2%), ondansetron, sodium chloride, sodium chloride 0.9%, traMADol    Review of patient's allergies indicates:   Allergen Reactions    Biopatch [chlorhexidine gluconate]      Site burning    Dobutamine in d5w      Tachycardia, tremors, SOB, flushing    Percocet [oxycodone-acetaminophen] Itching    Penicillins Rash     Objective:     Vital Signs (Most Recent):  Temp: 97.5 °F (36.4 °C) (01/16/20 0839)  Pulse: (!) 111 (01/16/20 0839)  Resp: 14 (01/16/20 0839)  BP: (!) 132/99 (01/16/20 0839)  SpO2: 96 % (01/16/20 0839) Vital Signs (24h Range):  Temp:  [97.3 °F (36.3 °C)-98.4 °F (36.9 °C)] 97.5 °F (36.4 °C)  Pulse:  [] 111  Resp:  [14-18] 14  SpO2:  [96 %-98 %] 96 %  BP: (109-133)/(65-99) 132/99     Patient Vitals for the past 72 hrs (Last 3 readings):   Weight   01/16/20 0710 108.5 kg (239 lb 3.2 oz)   01/15/20 0700 108.3 kg (238 lb 12.1 oz)   01/14/20 0450 108.7 kg (239 lb 10.2 oz)     Body mass index is 34.32 kg/m².      Intake/Output Summary (Last 24 hours) at 1/16/2020 1048  Last data filed at 1/16/2020 0500  Gross per 24 hour   Intake 892 ml   Output 1575 ml   Net -683 ml       Hemodynamic Parameters:  CVP:  [5 mmHg] 5 mmHg    Telemetry: ST    Physical Exam   Constitutional: He is oriented to person, place, and time. He appears well-developed and well-nourished.   HENT:   Head: Normocephalic and  atraumatic.   Eyes: Pupils are equal, round, and reactive to light. Conjunctivae and EOM are normal.   Neck: Normal range of motion. Neck supple. No JVD present. No thyromegaly present.   Cardiovascular: Regular rhythm.   Tachycardic, + soft S3   Pulmonary/Chest: Effort normal and breath sounds normal.   Abdominal: Soft. Bowel sounds are normal.   Musculoskeletal: Normal range of motion. He exhibits no edema.   Neurological: He is alert and oriented to person, place, and time.   Skin: Skin is warm and dry. Capillary refill takes 2 to 3 seconds.   Psychiatric: He has a normal mood and affect. His behavior is normal. Judgment and thought content normal.       Significant Labs:  CBC:  Recent Labs   Lab 01/14/20  0439 01/15/20  0418 01/16/20  0459   WBC 7.75 7.78 8.07   RBC 5.45 5.41 5.06   HGB 15.6 15.1 14.0   HCT 47.9 47.9 45.0    191 167   MCV 88 89 89   MCH 28.6 27.9 27.7   MCHC 32.6 31.5* 31.1*     BNP:  Recent Labs   Lab 01/10/20  0436 01/12/20  0825 01/15/20  1611   BNP 1,656* 249* 668*     CMP:  Recent Labs   Lab 01/14/20  0439 01/15/20  0418 01/16/20  0459   GLU 90 98 94   CALCIUM 9.3 9.4 9.4   ALBUMIN 3.6 3.5 3.4*   PROT 6.7 6.5 6.3    137 136   K 3.8 3.8 3.8   CO2 26 29 27    98 98   BUN 20 23* 25*   CREATININE 1.6* 1.9* 1.8*   ALKPHOS 51* 50* 47*   ALT 57* 99* 124*   AST 33 97* 113*   BILITOT 2.0* 1.5* 1.3*      Coagulation:   Recent Labs   Lab 01/14/20  0439 01/15/20  0418 01/16/20  0459   INR 2.2* 1.8* 1.6*  1.6*   APTT 45.0* 45.4* 32.5*  32.5*     LDH:  Recent Labs   Lab 01/15/20  1611   *     Microbiology:  Microbiology Results (last 7 days)     ** No results found for the last 168 hours. **          I have reviewed all pertinent labs within the past 24 hours.    Estimated Creatinine Clearance: 54.5 mL/min (A) (based on SCr of 1.8 mg/dL (H)).    Diagnostic Results:  I have reviewed all pertinent imaging results/findings within the past 24 hours.

## 2020-01-16 NOTE — NURSING
Per Mor in HLA lab, not all of the correct tubes for pt's HLA workup were received yesterday.  Order must be cancelled and redrawn.  HLA recipient workup ordered stat.  Spoke with bedside nurse, Wanda to request redraw be done ASAP.  She reports pt is headed to cath lab now. She will drawn upon pt's return.

## 2020-01-16 NOTE — CONSULTS
"  [facility]  Adult Nutrition  Consult Note    SUMMARY     Recommendations    Recommendation:   1. Continue cardiac diet as tolerated.   2. RD to monitor & follow up.    Goals: Adequate PO intake to meet % of EEN and EPN by RD follow up.  Nutrition Goal Status: new  Communication of RD Recs: other (comment)(POC)    Reason for Assessment    Reason For Assessment: consult  Diagnosis: cardiac disease(CHF)  Relevant Medical History: HTN, HLD, CHF, afib, stroke  Interdisciplinary Rounds: attended  General Information Comments: Pt endorses good appetite/PO intake both currently and PTA. 100% intake per RN documentation. Pt reports fluid wt gain PTA and states his UBW is 239#. Pt appears nourished, NFPE not indicated at this time. Provided and reviewed handouts on low Na diet, general healthy diet, and vitamin K and Coumadin. Pt voiced understanding. All questions and concerns answered.  Nutrition Discharge Planning: Adequate PO intake on cardiac diet    Nutrition Risk Screen    Nutrition Risk Screen: no indicators present    Nutrition/Diet History    Spiritual, Cultural Beliefs, Jehovah's witness Practices, Values that Affect Care: no    Anthropometrics    Temp: 97.4 °F (36.3 °C)  Height: 5' 10" (177.8 cm)  Height (inches): 70 in  Weight Method: Standard Scale  Weight: 108.5 kg (239 lb 3.2 oz)  Weight (lb): 239.2 lb  Ideal Body Weight (IBW), Male: 166 lb  % Ideal Body Weight, Male (lb): 144.58 %  BMI (Calculated): 34.3    Lab/Procedures/Meds    Pertinent Labs Reviewed: reviewed  Pertinent Labs Comments: BUN 25, Cr 1.8, GFR 40.3, Alb 3.4, prealb 27, CRP 16.2  Pertinent Medications Reviewed: reviewed  Pertinent Medications Comments: amiodarone, docusate, lasix, heparin    Estimated/Assessed Needs    Weight Used For Calorie Calculations: 108.5 kg (239 lb 3.2 oz)  Energy Calorie Requirements (kcal): 2383 kcal/day  Energy Need Method: [place](x 1.25 PAL)  Protein Requirements: 109-131 g/day(1-1.2 " g/kg)  Weight Used For Protein Calculations: 108.5 kg (239 lb 3.2 oz)  Fluid Requirements (mL): per MD or 1 ml/kcal     RDA Method (mL): 2383    Nutrition Prescription Ordered    Current Diet Order: Cardiac    Evaluation of Received Nutrient/Fluid Intake    I/O: -9.8L since admit  % Intake of Estimated Energy Needs: 75 - 100 %  % Meal Intake: 75 - 100 %    Nutrition Risk    Level of Risk/Frequency of Follow-up: low     Assessment and Plan    Nutrition Problem  Increased nutrient needs    Related to (etiology):   Physiological causes    Signs and Symptoms (as evidenced by):   LVAD/OHTx w/u    Interventions (treatment strategy):  Collaboration with other providers  Modified diet - cardiac  Nutrition education - low Na, vitamin K and Coumadin    Nutrition Diagnosis Status:   New     Monitor and Evaluation    Food and Nutrient Intake: energy intake, food and beverage intake  Food and Nutrient Adminstration: diet order  Knowledge/Beliefs/Attitudes: food and nutrition knowledge/skill  Anthropometric Measurements: weight, weight change, body mass index  Biochemical Data, Medical Tests and Procedures: electrolyte and renal panel, gastrointestinal profile, glucose/endocrine profile, inflammatory profile, lipid profile  Nutrition-Focused Physical Findings: overall appearance     Malnutrition Assessment  Pt does not meet criteria for malnutrition at this time.    Nutrition Follow-Up    RD Follow-up?: Yes

## 2020-01-16 NOTE — PROGRESS NOTES
Pt aao x3 while sitting up on side of bed with no family at bedside.  Pt reports his caregiver, Taylor is coming to [facility].  Pt reports he was told today that he is going for LVAD implant 1/21.  Pt reports fear and concern. Pt had several questions about surgery in general which encouraged him to ask the MD team for more information.  Pt denies any questions about VAD education.  Pt givem HM3 handbook, EMS form, home checklist and soap/water dressing guidline (since pt has a chlorehexidene allergy).  Will have LVAD patient meet pt today.  Pt denies any other needs.  Will follow up with pt tomorrow to do VAD education when caregiver present.

## 2020-01-16 NOTE — PLAN OF CARE
Heparin gtt maintained per orders. Remains on RA. Daily CVP and VBG. POC is advance options/ VAD workup. RHC tomorrow. VSS. Pt denies SOB or chest pain. Pt remain on telemetry; ST on telemetry. Fall precautions maintained. Pt free of falls and injuries. POC discussed with patient/family, verbalized understanding. Questions answered, no distress at present.

## 2020-01-16 NOTE — PROCEDURES
"Tae Delgado is a 59 y.o. male patient.    Temp: 97.4 °F (36.3 °C) (01/16/20 1323)  Pulse: 105 (01/16/20 1509)  Resp: 16 (01/16/20 1323)  BP: 124/87 (01/16/20 1323)  SpO2: (!) 94 % (01/16/20 1323)  Weight: 108.5 kg (239 lb 3.2 oz) (01/16/20 0710)  Height: 5' 10" (177.8 cm) (01/13/20 1401)    PICC  Date/Time: 1/16/2020 5:24 PM  Performed by: Kenneth Aldridge RN  Assisting provider: Kena Jasso LPN  Consent Done: Yes  Time out: Immediately prior to procedure a time out was called to verify the correct patient, procedure, equipment, support staff and site/side marked as required  Indications: med administration  Anesthesia: local infiltration  Local anesthetic: lidocaine 1% without epinephrine  Anesthetic Total (mL): 2  Preparation: skin prepped with Betadine  Skin prep agent dried: skin prep agent completely dried prior to procedure  Sterile barriers: all five maximum sterile barriers used - cap, mask, sterile gown, sterile gloves, and large sterile sheet  Hand hygiene: hand hygiene performed prior to central venous catheter insertion  Location details: right basilic  Catheter type: double lumen  Catheter size: 5 Fr  Catheter Length: 37cm    Ultrasound guidance: yes  Vessel Caliber: medium and patent, compressibility normal  Vascular Doppler: not done  Needle advanced into vessel with real time Ultrasound guidance.  Guidewire confirmed in vessel.  Image recorded and saved.  Sterile sheath used.  Number of attempts: 1  Post-procedure: blood return through all ports and sterile dressing applied  Technical procedures used: 3CG  Specimens: No  Implants: No  Assessment: placement verified by x-ray  Complications: none          Kena Jasso  1/16/2020  "

## 2020-01-16 NOTE — CONSULTS
D/L PICC placed in R BASILIC vein, 37cm in length with 0cm exposed. Arm circumference 39cm. Lot#[id number]

## 2020-01-16 NOTE — PROGRESS NOTES
[facility]  Heart Transplant  Progress Note    Patient Name: Tae Delgado  MRN: [id number]  Admission Date: 1/9/2020  Hospital Length of Stay: 7 days  Attending Physician: Isiah Montero MD  Primary Care Provider: Elham Pearson MD  Principal Problem:Acute on chronic combined systolic and diastolic heart failure    Subjective:     Interval History: No complaints, no events overnight. Feels generally well. Denies NVD, SOB, Chest pain. Advanced to step 3 yesterday. Plan for RHC today and will adjust meds accordingly.    Continuous Infusions:   heparin (porcine) in D5W 14 Units/kg/hr (01/15/20 1726)     Scheduled Meds:   amiodarone  200 mg Oral Daily    docusate sodium  50 mg Oral BID    furosemide  40 mg Oral BID    heparin (PORCINE)  60 Units/kg (Adjusted) Intravenous Once    hydrALAZINE  100 mg Oral Q8H    polyethylene glycol  17 g Oral BID     PRN Meds:acetaminophen, heparin (PORCINE), heparin (PORCINE), lidocaine HCL 20 mg/ml (2%), ondansetron, sodium chloride, sodium chloride 0.9%, traMADol    Review of patient's allergies indicates:   Allergen Reactions    Biopatch [chlorhexidine gluconate]      Site burning    Dobutamine in d5w      Tachycardia, tremors, SOB, flushing    Percocet [oxycodone-acetaminophen] Itching    Penicillins Rash     Objective:     Vital Signs (Most Recent):  Temp: 97.5 °F (36.4 °C) (01/16/20 0839)  Pulse: (!) 111 (01/16/20 0839)  Resp: 14 (01/16/20 0839)  BP: (!) 132/99 (01/16/20 0839)  SpO2: 96 % (01/16/20 0839) Vital Signs (24h Range):  Temp:  [97.3 °F (36.3 °C)-98.4 °F (36.9 °C)] 97.5 °F (36.4 °C)  Pulse:  [] 111  Resp:  [14-18] 14  SpO2:  [96 %-98 %] 96 %  BP: (109-133)/(65-99) 132/99     Patient Vitals for the past 72 hrs (Last 3 readings):   Weight   01/16/20 0710 108.5 kg (239 lb 3.2 oz)   01/15/20 0700 108.3 kg (238 lb 12.1 oz)   01/14/20 0450 108.7 kg (239 lb 10.2 oz)     Body mass index is 34.32 kg/m².      Intake/Output Summary (Last 24 hours)  at 1/16/2020 1048  Last data filed at 1/16/2020 0500  Gross per 24 hour   Intake 892 ml   Output 1575 ml   Net -683 ml       Hemodynamic Parameters:  CVP:  [5 mmHg] 5 mmHg    Telemetry: ST    Physical Exam   Constitutional: He is oriented to person, place, and time. He appears well-developed and well-nourished.   HENT:   Head: Normocephalic and atraumatic.   Eyes: Pupils are equal, round, and reactive to light. Conjunctivae and EOM are normal.   Neck: Normal range of motion. Neck supple. No JVD present. No thyromegaly present.   Cardiovascular: Regular rhythm.   Tachycardic, + soft S3   Pulmonary/Chest: Effort normal and breath sounds normal.   Abdominal: Soft. Bowel sounds are normal.   Musculoskeletal: Normal range of motion. He exhibits no edema.   Neurological: He is alert and oriented to person, place, and time.   Skin: Skin is warm and dry. Capillary refill takes 2 to 3 seconds.   Psychiatric: He has a normal mood and affect. His behavior is normal. Judgment and thought content normal.       Significant Labs:  CBC:  Recent Labs   Lab 01/14/20  0439 01/15/20  0418 01/16/20  0459   WBC 7.75 7.78 8.07   RBC 5.45 5.41 5.06   HGB 15.6 15.1 14.0   HCT 47.9 47.9 45.0    191 167   MCV 88 89 89   MCH 28.6 27.9 27.7   MCHC 32.6 31.5* 31.1*     BNP:  Recent Labs   Lab 01/10/20  0436 01/12/20  0825 01/15/20  1611   BNP 1,656* 249* 668*     CMP:  Recent Labs   Lab 01/14/20  0439 01/15/20  0418 01/16/20  0459   GLU 90 98 94   CALCIUM 9.3 9.4 9.4   ALBUMIN 3.6 3.5 3.4*   PROT 6.7 6.5 6.3    137 136   K 3.8 3.8 3.8   CO2 26 29 27    98 98   BUN 20 23* 25*   CREATININE 1.6* 1.9* 1.8*   ALKPHOS 51* 50* 47*   ALT 57* 99* 124*   AST 33 97* 113*   BILITOT 2.0* 1.5* 1.3*      Coagulation:   Recent Labs   Lab 01/14/20  0439 01/15/20  0418 01/16/20  0459   INR 2.2* 1.8* 1.6*  1.6*   APTT 45.0* 45.4* 32.5*  32.5*     LDH:  Recent Labs   Lab 01/15/20  1611   *     Microbiology:  Microbiology Results (last 7  days)     ** No results found for the last 168 hours. **          I have reviewed all pertinent labs within the past 24 hours.    Estimated Creatinine Clearance: 54.5 mL/min (A) (based on SCr of 1.8 mg/dL (H)).    Diagnostic Results:  I have reviewed all pertinent imaging results/findings within the past 24 hours.    Assessment and Plan:       55 y.o. WM with history of NICMP diagnosed in 2010, ICD, LV thrombus (with prior splenic and renal emboli), Embolic  CVA , paroxysmal atrial fib, HTN, HLP  presents for  F/U today to clinic and he was volume overloaded on exam .  He states he had 3 admission in the last 3 months for volume overload. He started having more SOB on exertion since one month. Also endorses of Orthopnea since last one month. Alble to walk only 150 ft. He also has Bilateral lower extremity edema. He was admitted here in 2017 for ADHD here at [facility] and RHC during that admission showed PCWP 40 and CVP 17. Currently denies chest pain, lightheadedness     * Acute on chronic combined systolic and diastolic heart failure  - NIDCM dx'd 2010  - TTE 1/10: LVEDD 6.96, LVEF 15%, diastolic dysfunction, RV midly dilated, mod decreased, severe LUCI, mild MR, PAQS 31 and IVC 8  - In the past months has been admitted 3 times for volume management requiring high doses of diuretics, in [facility]. Admits not following a strict diet and gaining weight. Now, he refers following a diet and current weight 239 lb but went up to 251 lb when not following diet.   - Initially diuresed this admit, but diuretics stopped 1/11 when central line placed and CVP was 2. Was given 900 cc IVF since, and CVP was up to 13. Resumed home dose of Lasix 40 mg po bid on 1/13, and CVP is back down to 5 today  - sVO2 64 this AM off of . (stopped completely on 1/14 due to tachycardia and tremors/flushing with resolution of symptoms.  - GDMT: Toprol on hold 2/2 decompensation. Isosorbide stopped. Remains on  hydralazine.  Refuses to continue Losartan (stopped 4 days PTA) because makes him feel bad and SBP in 90's at home.   - RHC today and will adjust meds accordingly  - Pathway initiated - advance as appropriate    Hepatic congestion  - Liver US on 1/11 unremarkable    Transaminitis  - elevated on admission, trended down on  but now trending back up since  stopped   - monitor closely, RHC today  - on amiodarone for AF    ICD (implantable cardioverter-defibrillator) in place  - S/P Biotronik dual chamber ICD    Right lower lobe pulmonary nodule  - Incidental finding on CT chest/abd/pelvis on 1/12. Pulmonology consulted, and rec repeat CT of chest in 3 months  - Will need to follow-up in pulmonary clinic.     Acute kidney injury superimposed on chronic kidney disease  - Creatinine on admit 2.6 (baseline ~ 1.8).  - monitor with diuresis  - Followed by Nephrology as an outpatient    Paroxysmal atrial fibrillation  - Currently in ST  - Continue Amiodarone 200 mg qd  - Continue Heparin bridge, but D/C Coumadin as he is in w/u      Left ventricular thrombus without MI  - H/O LV thrombus with h/o splenic and renal emboli as well as embolic CVA  - Limited TTE done here 1/13 showed no thrombus  - Continue Heparin birdge, but D/C Coumadin as he is in w/u.          Jasmina Charles PA-C  Heart Transplant  [facility]

## 2020-01-16 NOTE — PLAN OF CARE
Recommendations     Recommendation:   1. Continue cardiac diet as tolerated.   2. RD to monitor & follow up.     Goals: Adequate PO intake to meet % of EEN and EPN by RD follow up.  Nutrition Goal Status: new  Communication of RD Recs: other (comment)(POC)

## 2020-01-17 ENCOUNTER — CLINICAL SUPPORT (OUTPATIENT)
Dept: CARDIOLOGY | Facility: CLINIC | Age: 60
DRG: 001 | End: 2020-01-17
Attending: INTERNAL MEDICINE
Payer: MEDICARE

## 2020-01-17 ENCOUNTER — COMMITTEE REVIEW (OUTPATIENT)
Dept: TRANSPLANT | Facility: CLINIC | Age: 60
End: 2020-01-17

## 2020-01-17 ENCOUNTER — SOCIAL WORK (OUTPATIENT)
Dept: TRANSPLANT | Facility: CLINIC | Age: 60
End: 2020-01-17

## 2020-01-17 ENCOUNTER — TELEPHONE (OUTPATIENT)
Dept: ADMINISTRATIVE | Facility: HOSPITAL | Age: 60
End: 2020-01-17

## 2020-01-17 LAB
ABO + RH BLD: NORMAL
ALBUMIN SERPL BCP-MCNC: 3.6 G/DL (ref 3.5–5.2)
ALBUMIN SERPL BCP-MCNC: 3.8 G/DL (ref 3.5–5.2)
ALLENS TEST: ABNORMAL
ALP SERPL-CCNC: 53 U/L (ref 55–135)
ALP SERPL-CCNC: 54 U/L (ref 55–135)
ALT SERPL W/O P-5'-P-CCNC: 118 U/L (ref 10–44)
ALT SERPL W/O P-5'-P-CCNC: 126 U/L (ref 10–44)
AMPHETAMINES SERPL QL: NEGATIVE
ANION GAP SERPL CALC-SCNC: 12 MMOL/L (ref 8–16)
ANION GAP SERPL CALC-SCNC: 13 MMOL/L (ref 8–16)
APTT BLDCRRT: 29.9 SEC (ref 21–32)
APTT BLDCRRT: 31.7 SEC (ref 21–32)
APTT BLDCRRT: 45.1 SEC (ref 21–32)
AST SERPL-CCNC: 73 U/L (ref 10–40)
AST SERPL-CCNC: 93 U/L (ref 10–40)
AT III AG ACT/NOR PPP IA: 75 % (ref 80–120)
BARBITURATES SERPL QL SCN: NEGATIVE
BASOPHILS # BLD AUTO: 0.06 K/UL (ref 0–0.2)
BASOPHILS NFR BLD: 0.8 % (ref 0–1.9)
BENZODIAZ SERPL QL SCN: NEGATIVE
BILIRUB SERPL-MCNC: 1 MG/DL (ref 0.1–1)
BILIRUB SERPL-MCNC: 1.1 MG/DL (ref 0.1–1)
BLD GP AB SCN CELLS X3 SERPL QL: NORMAL
BUN SERPL-MCNC: 28 MG/DL (ref 6–20)
BUN SERPL-MCNC: 29 MG/DL (ref 6–20)
BZE SERPL QL: NEGATIVE
CALCIUM SERPL-MCNC: 9.4 MG/DL (ref 8.7–10.5)
CALCIUM SERPL-MCNC: 9.8 MG/DL (ref 8.7–10.5)
CARBOXYTHC SERPL QL SCN: NEGATIVE
CHLORIDE SERPL-SCNC: 94 MMOL/L (ref 95–110)
CHLORIDE SERPL-SCNC: 98 MMOL/L (ref 95–110)
CMV IGG SERPL QL IA: REACTIVE
CO2 SERPL-SCNC: 27 MMOL/L (ref 23–29)
CO2 SERPL-SCNC: 29 MMOL/L (ref 23–29)
CREAT SERPL-MCNC: 1.8 MG/DL (ref 0.5–1.4)
CREAT SERPL-MCNC: 2.1 MG/DL (ref 0.5–1.4)
DELSYS: ABNORMAL
DIFFERENTIAL METHOD: ABNORMAL
EOSINOPHIL # BLD AUTO: 0.3 K/UL (ref 0–0.5)
EOSINOPHIL NFR BLD: 4.2 % (ref 0–8)
ERYTHROCYTE [DISTWIDTH] IN BLOOD BY AUTOMATED COUNT: 15 % (ref 11.5–14.5)
ERYTHROCYTE [SEDIMENTATION RATE] IN BLOOD BY WESTERGREN METHOD: 18 MM/H
EST. GFR  (AFRICAN AMERICAN): 38.7 ML/MIN/1.73 M^2
EST. GFR  (AFRICAN AMERICAN): 46.6 ML/MIN/1.73 M^2
EST. GFR  (NON AFRICAN AMERICAN): 33.4 ML/MIN/1.73 M^2
EST. GFR  (NON AFRICAN AMERICAN): 40.3 ML/MIN/1.73 M^2
ETHANOL SERPL QL SCN: NEGATIVE
FLOW: 4
GLUCOSE SERPL-MCNC: 103 MG/DL (ref 70–110)
GLUCOSE SERPL-MCNC: 179 MG/DL (ref 70–110)
HCO3 UR-SCNC: 30.2 MMOL/L (ref 24–28)
HCT VFR BLD AUTO: 43.8 % (ref 40–54)
HGB BLD-MCNC: 13.9 G/DL (ref 14–18)
IMM GRANULOCYTES # BLD AUTO: 0.04 K/UL (ref 0–0.04)
IMM GRANULOCYTES NFR BLD AUTO: 0.5 % (ref 0–0.5)
INR PPP: 1.2 (ref 0.8–1.2)
LEFT CBA DIAS: 23 CM/S
LEFT CBA SYS: 72 CM/S
LEFT CCA DIST DIAS: 22 CM/S
LEFT CCA DIST SYS: 69 CM/S
LEFT CCA MID DIAS: 17 CM/S
LEFT CCA MID SYS: 75 CM/S
LEFT CCA PROX DIAS: 19 CM/S
LEFT CCA PROX SYS: 63 CM/S
LEFT ECA DIAS: 11 CM/S
LEFT ECA SYS: 48 CM/S
LEFT ICA DIST DIAS: 32 CM/S
LEFT ICA DIST SYS: 62 CM/S
LEFT ICA MID DIAS: 31 CM/S
LEFT ICA MID SYS: 67 CM/S
LEFT ICA PROX DIAS: 26 CM/S
LEFT ICA PROX SYS: 56 CM/S
LEFT VERTEBRAL DIAS: 19 CM/S
LEFT VERTEBRAL SYS: 40 CM/S
LYMPHOCYTES # BLD AUTO: 1.8 K/UL (ref 1–4.8)
LYMPHOCYTES NFR BLD: 24 % (ref 18–48)
MAGNESIUM SERPL-MCNC: 2 MG/DL (ref 1.6–2.6)
MAGNESIUM SERPL-MCNC: 2.1 MG/DL (ref 1.6–2.6)
MCH RBC QN AUTO: 28.2 PG (ref 27–31)
MCHC RBC AUTO-ENTMCNC: 31.7 G/DL (ref 32–36)
MCV RBC AUTO: 89 FL (ref 82–98)
METHADONE SERPL QL SCN: NEGATIVE
METHEMOGLOBIN: 0.6 % (ref 0–3)
MODE: ABNORMAL
MONOCYTES # BLD AUTO: 0.9 K/UL (ref 0.3–1)
MONOCYTES NFR BLD: 11.5 % (ref 4–15)
NEUTROPHILS # BLD AUTO: 4.5 K/UL (ref 1.8–7.7)
NEUTROPHILS NFR BLD: 59 % (ref 38–73)
NRBC BLD-RTO: 0 /100 WBC
OHS CV CAROTID RIGHT ICA EDV HIGHEST: 34
OHS CV CAROTID ULTRASOUND LEFT ICA/CCA RATIO: 0.89
OHS CV CAROTID ULTRASOUND RIGHT ICA/CCA RATIO: 0.99
OHS CV PV CAROTID LEFT HIGHEST CCA: 75
OHS CV PV CAROTID LEFT HIGHEST ICA: 67
OHS CV PV CAROTID RIGHT HIGHEST CCA: 68
OHS CV PV CAROTID RIGHT HIGHEST ICA: 67
OHS CV US CAROTID LEFT HIGHEST EDV: 32
OPIATES SERPL QL SCN: NEGATIVE
PCO2 BLDA: 44.3 MMHG (ref 35–45)
PCP SERPL QL SCN: NEGATIVE
PH SMN: 7.44 [PH] (ref 7.35–7.45)
PHOSPHATE SERPL-MCNC: 6.1 MG/DL (ref 2.7–4.5)
PLATELET # BLD AUTO: 174 K/UL (ref 150–350)
PMV BLD AUTO: 11 FL (ref 9.2–12.9)
PO2 BLDA: 32 MMHG (ref 40–60)
POC BE: 6 MMOL/L
POC SATURATED O2: 64 % (ref 95–100)
POC TCO2: 32 MMOL/L (ref 24–29)
POTASSIUM SERPL-SCNC: 3.6 MMOL/L (ref 3.5–5.1)
POTASSIUM SERPL-SCNC: 3.8 MMOL/L (ref 3.5–5.1)
PROPOXYPH SERPL QL: NEGATIVE
PROT SERPL-MCNC: 6.6 G/DL (ref 6–8.4)
PROT SERPL-MCNC: 7.2 G/DL (ref 6–8.4)
PROTHROMBIN TIME: 12.2 SEC (ref 9–12.5)
RBC # BLD AUTO: 4.93 M/UL (ref 4.6–6.2)
RIGHT ARM DIASTOLIC BLOOD PRESSURE: 69 MMHG
RIGHT ARM SYSTOLIC BLOOD PRESSURE: 109 MMHG
RIGHT CBA DIAS: 19 CM/S
RIGHT CBA SYS: 70 CM/S
RIGHT CCA DIST DIAS: 22 CM/S
RIGHT CCA DIST SYS: 68 CM/S
RIGHT CCA MID DIAS: 25 CM/S
RIGHT CCA MID SYS: 67 CM/S
RIGHT CCA PROX DIAS: 14 CM/S
RIGHT CCA PROX SYS: 48 CM/S
RIGHT ECA DIAS: 6 CM/S
RIGHT ECA SYS: 51 CM/S
RIGHT ICA DIST DIAS: 34 CM/S
RIGHT ICA DIST SYS: 67 CM/S
RIGHT ICA MID DIAS: 31 CM/S
RIGHT ICA MID SYS: 64 CM/S
RIGHT ICA PROX DIAS: 15 CM/S
RIGHT ICA PROX SYS: 44 CM/S
RIGHT VERTEBRAL DIAS: 14 CM/S
RIGHT VERTEBRAL SYS: 29 CM/S
SAMPLE: ABNORMAL
SITE: ABNORMAL
SODIUM SERPL-SCNC: 136 MMOL/L (ref 136–145)
SODIUM SERPL-SCNC: 137 MMOL/L (ref 136–145)
STRONGYLOIDES ANTIBODY IGG: NEGATIVE
VARICELLA INTERPRETATION: POSITIVE
VARICELLA ZOSTER IGG: 3.29 ISR (ref 0–0.9)
VWF AG ACT/NOR PPP IA: 235 % (ref 55–200)
VWF:AC ACT/NOR PPP IA: 183 % (ref 55–200)
WBC # BLD AUTO: 7.57 K/UL (ref 3.9–12.7)

## 2020-01-17 PROCEDURE — 25000003 PHARM REV CODE 250: Performed by: NURSE PRACTITIONER

## 2020-01-17 PROCEDURE — 84100 ASSAY OF PHOSPHORUS: CPT

## 2020-01-17 PROCEDURE — 93880 CV US DOPPLER CAROTID (CUPID ONLY): ICD-10-PCS | Mod: 26,,, | Performed by: INTERNAL MEDICINE

## 2020-01-17 PROCEDURE — 63600367 HC NITRIC OXIDE PER HOUR: Mod: NTX

## 2020-01-17 PROCEDURE — 25000003 PHARM REV CODE 250: Mod: NTX | Performed by: STUDENT IN AN ORGANIZED HEALTH CARE EDUCATION/TRAINING PROGRAM

## 2020-01-17 PROCEDURE — A4216 STERILE WATER/SALINE, 10 ML: HCPCS | Mod: NTX | Performed by: INTERNAL MEDICINE

## 2020-01-17 PROCEDURE — 63600175 PHARM REV CODE 636 W HCPCS: Mod: NTX | Performed by: PHYSICIAN ASSISTANT

## 2020-01-17 PROCEDURE — 99223 1ST HOSP IP/OBS HIGH 75: CPT | Mod: GC,,, | Performed by: INTERNAL MEDICINE

## 2020-01-17 PROCEDURE — 63600175 PHARM REV CODE 636 W HCPCS: Mod: NTX | Performed by: INTERNAL MEDICINE

## 2020-01-17 PROCEDURE — 99291 CRITICAL CARE FIRST HOUR: CPT | Mod: NTX,,, | Performed by: NURSE PRACTITIONER

## 2020-01-17 PROCEDURE — 63600175 PHARM REV CODE 636 W HCPCS: Mod: NTX | Performed by: NURSE PRACTITIONER

## 2020-01-17 PROCEDURE — 85730 THROMBOPLASTIN TIME PARTIAL: CPT | Mod: NTX

## 2020-01-17 PROCEDURE — 25000003 PHARM REV CODE 250: Mod: NTX | Performed by: HOSPITALIST

## 2020-01-17 PROCEDURE — 85730 THROMBOPLASTIN TIME PARTIAL: CPT | Mod: 91

## 2020-01-17 PROCEDURE — 99223 PR INITIAL HOSPITAL CARE,LEVL III: ICD-10-PCS | Mod: GC,,, | Performed by: INTERNAL MEDICINE

## 2020-01-17 PROCEDURE — 86900 BLOOD TYPING SEROLOGIC ABO: CPT | Mod: NTX

## 2020-01-17 PROCEDURE — 83735 ASSAY OF MAGNESIUM: CPT | Mod: NTX

## 2020-01-17 PROCEDURE — 85025 COMPLETE CBC W/AUTO DIFF WBC: CPT | Mod: NTX

## 2020-01-17 PROCEDURE — 25000003 PHARM REV CODE 250: Mod: NTX | Performed by: PHYSICIAN ASSISTANT

## 2020-01-17 PROCEDURE — 63600175 PHARM REV CODE 636 W HCPCS: Mod: NTX | Performed by: HOSPITALIST

## 2020-01-17 PROCEDURE — 94761 N-INVAS EAR/PLS OXIMETRY MLT: CPT | Mod: NTX

## 2020-01-17 PROCEDURE — 83735 ASSAY OF MAGNESIUM: CPT | Mod: 91

## 2020-01-17 PROCEDURE — 93880 EXTRACRANIAL BILAT STUDY: CPT

## 2020-01-17 PROCEDURE — 83050 HGB METHEMOGLOBIN QUAN: CPT | Mod: NTX

## 2020-01-17 PROCEDURE — 82803 BLOOD GASES ANY COMBINATION: CPT | Mod: NTX

## 2020-01-17 PROCEDURE — 25000003 PHARM REV CODE 250: Mod: NTX | Performed by: INTERNAL MEDICINE

## 2020-01-17 PROCEDURE — 20000000 HC ICU ROOM

## 2020-01-17 PROCEDURE — 27000221 HC OXYGEN, UP TO 24 HOURS: Mod: NTX

## 2020-01-17 PROCEDURE — 80053 COMPREHEN METABOLIC PANEL: CPT | Mod: 91

## 2020-01-17 PROCEDURE — 85610 PROTHROMBIN TIME: CPT | Mod: NTX

## 2020-01-17 PROCEDURE — 93880 EXTRACRANIAL BILAT STUDY: CPT | Mod: 26,,, | Performed by: INTERNAL MEDICINE

## 2020-01-17 PROCEDURE — 80053 COMPREHEN METABOLIC PANEL: CPT | Mod: NTX

## 2020-01-17 PROCEDURE — 99900035 HC TECH TIME PER 15 MIN (STAT): Mod: NTX

## 2020-01-17 PROCEDURE — 99291 PR CRITICAL CARE, E/M 30-74 MINUTES: ICD-10-PCS | Mod: NTX,,, | Performed by: NURSE PRACTITIONER

## 2020-01-17 RX ORDER — POTASSIUM CHLORIDE 20 MEQ/1
40 TABLET, EXTENDED RELEASE ORAL ONCE
Status: COMPLETED | OUTPATIENT
Start: 2020-01-17 | End: 2020-01-17

## 2020-01-17 RX ORDER — ALPRAZOLAM 0.5 MG/1
0.5 TABLET ORAL 2 TIMES DAILY PRN
Status: DISCONTINUED | OUTPATIENT
Start: 2020-01-17 | End: 2020-01-21

## 2020-01-17 RX ADMIN — ISOSORBIDE DINITRATE 10 MG: 10 TABLET ORAL at 09:01

## 2020-01-17 RX ADMIN — POTASSIUM CHLORIDE 40 MEQ: 1500 TABLET, EXTENDED RELEASE ORAL at 04:01

## 2020-01-17 RX ADMIN — ISOSORBIDE DINITRATE 10 MG: 10 TABLET ORAL at 08:01

## 2020-01-17 RX ADMIN — EPINEPHRINE 0.02 MCG/KG/MIN: 1 INJECTION INTRAMUSCULAR; INTRAVENOUS; SUBCUTANEOUS at 10:01

## 2020-01-17 RX ADMIN — POLYETHYLENE GLYCOL 3350 17 G: 17 POWDER, FOR SOLUTION ORAL at 08:01

## 2020-01-17 RX ADMIN — Medication 10 ML: at 10:01

## 2020-01-17 RX ADMIN — DOCUSATE SODIUM 50 MG: 50 CAPSULE, LIQUID FILLED ORAL at 09:01

## 2020-01-17 RX ADMIN — Medication 10 ML: at 12:01

## 2020-01-17 RX ADMIN — ISOSORBIDE DINITRATE 10 MG: 10 TABLET ORAL at 02:01

## 2020-01-17 RX ADMIN — HEPARIN SODIUM 15 UNITS/KG/HR: 10000 INJECTION, SOLUTION INTRAVENOUS at 11:01

## 2020-01-17 RX ADMIN — AMIODARONE HYDROCHLORIDE 200 MG: 200 TABLET ORAL at 09:01

## 2020-01-17 RX ADMIN — HYDRALAZINE HYDROCHLORIDE 100 MG: 50 TABLET, FILM COATED ORAL at 05:01

## 2020-01-17 RX ADMIN — ACETAMINOPHEN 650 MG: 325 TABLET ORAL at 09:01

## 2020-01-17 RX ADMIN — Medication 10 ML: at 06:01

## 2020-01-17 RX ADMIN — DOCUSATE SODIUM 50 MG: 50 CAPSULE, LIQUID FILLED ORAL at 08:01

## 2020-01-17 RX ADMIN — TRAMADOL HYDROCHLORIDE 50 MG: 50 TABLET, FILM COATED ORAL at 11:01

## 2020-01-17 RX ADMIN — Medication 10 ML: at 05:01

## 2020-01-17 RX ADMIN — POLYETHYLENE GLYCOL 3350 17 G: 17 POWDER, FOR SOLUTION ORAL at 09:01

## 2020-01-17 RX ADMIN — FUROSEMIDE 80 MG: 20 INJECTION, SOLUTION INTRAMUSCULAR; INTRAVENOUS at 05:01

## 2020-01-17 RX ADMIN — FUROSEMIDE 80 MG: 20 INJECTION, SOLUTION INTRAMUSCULAR; INTRAVENOUS at 09:01

## 2020-01-17 RX ADMIN — HYDRALAZINE HYDROCHLORIDE 100 MG: 50 TABLET, FILM COATED ORAL at 02:01

## 2020-01-17 RX ADMIN — HYDRALAZINE HYDROCHLORIDE 100 MG: 50 TABLET, FILM COATED ORAL at 10:01

## 2020-01-17 NOTE — COMMITTEE REVIEW
Native Organ Dx: NICM    Denied     Pt's case presented at an urgently convened Selection Committee 1/17/20. It was the committee decision to decline the pt for transplant listing due to elevated PA pressures. He is approved for LVAD as DT.    Pt was informed of this decision at bedside by the inpatient team.     Note was written by Yissel Petty.    ==========================================================    I was present at the meeting and agree to the decision of the committee.

## 2020-01-17 NOTE — PROGRESS NOTES
Report received from CTSU nurse. Pt transported to SICU [id number] with portable telemetry. Pt connected to ICU monitor. SICU charge and heart tx team called and made aware of patient arrival.  Pt assessed, immediate needs met.    Admit Skin Note: Gauze CDI from R. IJ line removal. No other signs of skin breakdown noted.

## 2020-01-17 NOTE — PROGRESS NOTES
Left Ventricular Assist Device (LVAD) and Transplant Recipient Adult Psychosocial Assessment    Pt's primary caregiver was present for this psychosocial. If pt needs to be considered for transplant back up caregiver will need to come to meet with SW, either during pt's hospital stay or to a clinic visit.    Encounter Date: 1/17/2020     Tae Delgado  [address] [place] [id number]     3.5 hours from [facility]    Telephone Information:   Mobile [phone]   Work  There is no work phone number on file.  E-mail  [email or web address]    Sex: male  YOB: 1960  Age: 59 y.o.    [place] Citizen: yes  Primary Language: English   Needed: no    Emergency Contact:  Name: Lavern Carbajal  Relationship: significant other  Address: [facility]  Phone Numbers: [phone]    Family/Social Support:   Number of dependents/: none reported  Marital history: in relationship with Lavern Carbajal  Other family dynamics: 3 adult children    Household Composition:  Pt lives with SO Lavern Carbajal and both drive. Pt has a dog and some goats.    Do you and your caregivers have access to reliable transportation? yes     PRIMARY CAREGIVER: Lavern Carbajal, 45 y/o, pt's significant other, will be primary caregiver, phone number [phone]..      provided in-depth information to Patient and Caregiver regarding  regarding pre- and post-LVAD and pre- and post-transplant caregiver role.   strongly encourages Patient and Caregiver to have concrete plan regarding post-transplant care giving, including back-up caregiver(s) to ensure care giving needs are met as needed.    Caregiver states understanding all aspects of caregiver role/commitment and is able/willing/committed to being caregiver to the fullest extent necessary.      Patient verbalizes understanding of the education provided today and caregiver responsibilities.       remains available. Patient and Caregiver agree to contact   in a timely manner if concerns arise.      Able to take time off work without financial concerns: yes, she is a special education classroom aid.     Additional Significant Others who will Assist with LVAD/Transplant:  Pt states his niece may be able to be his backup. Pt states understanding SW will need to meet the back up caregiver for pt to be considered suitable for transplant.     Living Will: yes  Healthcare Power of : yes, pt's son Epi hubbard [phone] is pt's HCPA.  Advance Directives on file: <<no information> per medical record.  Verbally reviewed LW/HCPA information.   provided patient with copy of LW/HCPA documents and provided education on completion of forms    Highest Education Level: Associate/Bachelor Degree  Reading Ability: college  Reports difficulty with: 50% hearing loss from time in , wears OTC reading glasses  Learns Best By:       Status: yes: [employer], date: 4474-0147  VA Benefits: no     Working for Income: No  If no, reason not working: Disability  Spouse/Significant Other Employment: special ed teachers aid    Disabled: yes: date disability began: , from [employer], due to: CHF.    Monthly Income: $3400 [employer] long-term, $2000 SS    Able to afford all costs now and if transplanted or receives LVAD, including medications: yes  Pt reports secure power source? yes  Pt reports ability to afford monthly electric bill? yes  Pt reports ability to afford LVAD dressing supplies? yes     Patient and Caregiver verbalizes understanding of personal responsibilities related to LVAD and transplant costs and the importance of having a financial plan to ensure that patients LVAD and transplant costs are fully covered.       provided fundraising information/education.  Patient and Caregiver verbalizes understanding.   remains available.    Insurance:   Payor/Plan Subscr  Sex Relation Sub. Ins. ID Effective Group Num   1. MEDICARE - [place]*  ELMIRA MORRISON 1960 Male  [id number] 7/1/12                                    [address]   2.  - TRI* ELMIRA MORRISON 1960 Male Self [id number] 1/1/18 [id number]                                   [address], [facility] [address]     Primary Insurance (for UNOS reporting): Public Insurance - Medicare FFS (Fee For Service)  Secondary Insurance (for UNOS reporting): Public Insurance - Other [facility]    Patient and Caregiver verbalizes clear understanding that patient may experience difficulty obtaining and/or be denied insurance coverage post-surgery. This includes and is not limited to disability insurance, life insurance, health insurance, burial insurance, long term care insurance, and other insurances.      Patient and Caregiver also reports understanding that future health concerns related to or unrelated to LVAD or transplantation may not be covered by patient's insurance.  Resources and information provided and reviewed.     Patient and Caregiver provides verbal permission to release any necessary information to outside resources for patient care and discharge planning.  Resources and information provided are reviewed.      Infusion Service: patient utilizing? no  Home Health: patient utilizing? no  DME: no  Pulmonary/Cardiac Rehab: no  ADLS:  Pt reports was independent and was driving    Adherence: Pt reports a high level of adherence to pt's medical regimen. Adherence education and counseling provided.     Per History Section:  Past Medical History:   Diagnosis Date    CHF (congestive heart failure) 1/2010    Dx  1/2010 w/ decreased LV systolic function (EF 15%) by ECHO 1/2015    COCM (congestive cardiomyopathy) 7/20/2016    Hyperlipidemia     Hypertension     Paroxysmal atrial fibrillation     Pulmonary embolus 2008    Stroke     Superficial thrombophlebitis      Social History     Tobacco Use    Smoking status: Never Smoker    Smokeless tobacco: Never Used   Substance Use  "Topics    Alcohol use: No     Social History     Substance and Sexual Activity   Drug Use No     Social History     Substance and Sexual Activity   Sexual Activity Not on file       Per Today's Psychosocial:  Tobacco: none, patient denies any use.  Alcohol:  Pt reports previously drank 1 glass wine/week; pt reports no current use.  Illicit Drugs/Non-prescribed Medications: none, patient denies any use.    Patient and Caregiver states clear understanding of the potential impact of substance use as it relates to LVAD and transplant candidacy and is aware of possible random substance screening.  Substance abstinence/cessation counseling, education and resources provided and reviewed.     Arrests/DWI/Treatment/Rehab: patient denies    Psychiatric History:    Mental Health: pt reports feels nervous if he is feeling good and MDs still want to "tweak" his medications  Psychiatrist/Counselor: pt denies  Medications:  Pt denies  Suicide/Homicide Issues: pt denies past or current SI/HI  Safety at home: no concerns voiced or identified at this time    Knowledge: Patient and Caregiver states having clear understanding and realistic expectations regarding the potential risks and potential benefits LVAD implantation and organ transplantation and organ donation and agrees to discuss with health care team members and support system members, as well as to utilize available resources and express questions and/or concerns in order to further facilitate the pt informed decision-making.  Resources and information provided and reviewed.    Patient and Caregiver is aware of [facility]'s affiliation and/or partnership with agencies in home health care, LTAC, SNF, [facility], and other hospitals and clinics.    Understanding: Patient and Caregiver reports having a clear understanding of the many lifetime commitments involved with being an LVAD and transplant recipient, including costs, compliance, medications, lab work, procedures, appointments, " "concrete and financial planning, preparedness, timely and appropriate communication of concerns, abstinence (ETOH, tobacco, illicit non-prescribed drugs), adherence to all health care team recommendations, support system and caregiver involvement, appropriate and timely resource utilization and follow-through, mental health counseling as needed/recommended, and patient and caregiver responsibilities.  Social Service Handbook, resources and detailed educational information provided and reviewed.  Educational information provided.    Patient and Caregiver also reports current and expected compliance with health care regime and states having a clear understanding of the importance of compliance.       Patient and Caregiver reports a clear understanding that risks and benefits may be involved with LVAD heart failure treatment and organ transplantation and with organ donation.     Patient and Caregiver also reports clear understanding that psychosocial risk factors may affect patient, and include but are not limited to feelings of depression, generalized anxiety, anxiety regarding dependence on others, post traumatic stress disorder, feelings of guilt and other emotional and/or mental concerns, and/or exacerbation of existing mental health concerns.  Detailed resources provided and discussed.      Patient and Caregiver agrees to access appropriate resources in a timely manner as needed and/or as recommended, and to communicate concerns appropriately.     Patient and Caregiver also reports a clear understanding of treatment options available.     Feelings or Concerns: pt reports he found it helpful to meet a current LVAD pt and states he plans to contact the pt with some further questions.    Coping: Identify Patient & Caregiver Strategies to Arthur:    Pt demonstrates anxiety about new medications, reactions to same, and junior not like medical regimen to be "tweaked" once pt is feeling better. Support and education " provided.     1. Currently & Pre-transplant - be with his dog and goats, being outside, hunting and fishing     2. At the time of surgery - walk when able, talk to SO, have window blind open so he can see outside     3. During post-Transplant & Recovery Period - as above, as he is able    Goals: Pt reports he feels he can get back to his previous abilities including chopping wood and doing carpentry around the house.    Interview Behavior: Patient and Caregiver presents as alert and oriented x 4, pleasant, well groomed, calm, communicative, cooperative and asking and answering questions appropriately.  Pt's SO is pt's primary caregiver for both LVAD and Transplant.       Transplant Social Work - Candidacy  Assessment/Plan:     Psychosocial Suitability: Patient presents as a suitable candidate for LVAD at this time. Based on psychosocial risk factors, patient presents as medium risk, due to pt appears to have difficulty being on new medications, and most of his coping tools involve outside activities. Pt's primary caregiver is supportive and engaged.    Pt is not suitable for transplant until SW meets pt's back up caregiver. Pt states this will probably be his niece.    Recommendations/Additional Comments: Psych consult to determine if pt has anxiety that can be treated.    Pt and caregiver state they plan to stay in the area for the first month after LVAD to be closer to [facility] for weekly appointments. Pt and caregiver state understanding this is not required.    Astrid Garcia LCSW

## 2020-01-17 NOTE — PLAN OF CARE
Pt VSS at this time. HR  bpm on cont cardiac monitor, BP goal maintained, O2 sat 97% on 4 L nasal cannula 10 ppm nitric oxide, afebrile. AAOx4, following commands and moving all extremities well. Family member at bedside, updated on current condition and POC for pm shift. All questions answered and emotional support provided. Gtts: heparin inc to 15 un/kg/hr. Pt assisted with weight shift, and encouraged to cough/deep breathe. Remains free of falls and injury for pm shift. MD Cara updated on current condition, vitals, labs, and gtts. Orders reviewed/received and carried out, will continue to monitor.

## 2020-01-17 NOTE — PROGRESS NOTES
Patient and caregiver(Lavern Carbajal) have read this VAD education.  I have reviewed the contents of this in detail and all questions have been answered to patient and caregiver's satisfaction as evidence by verbal acknowledgement.     Verbal and written VAD Education:     Please carefully read and review the following statement, and, sign to indicate you have been fully informed about the candidacy and evaluation process for the mechanical circulatory support device (MCSD) also known as the Ventricular Assist Device (VAD).     Why a VAD Is Needed   Heart failure is defined as a condition in which your heart is unable to pump enough blood to support the basic needs of your body. This can make you feel tired, have abnormal rhythms, and shortness of breath. Heart failure can also lead to failure of other organs (e.g. liver or kidneys). You are being offered this treatment option because you have a marked increase risk of irreversible end-organ damage or death. For this reason, you are being considered for placement of a Left Ventricular Assist Device (VAD) at [facility]. The heart pump is designed to take over the pumping action of your heart.   Prior to undergoing this procedure, it is important that you and your family understand the options, benefits, risks, and expectations associated with having a VAD. It is required that you and your proposed caregiver(s) understand and agree with the treatment plan and are willing to participate in the guidelines outlined in the following pages.     Bridge to transplant (BTT) is when a VAD is used to help support heart function for someone waiting for a heart transplant. This treatment plan is subject to change pending the results from your evaluation and your physicians decision. If your medical condition worsens after you receive the VAD, you may not be a transplant candidate.   The information within this document pertains only to VAD therapy. You will receive  information regarding heart transplantation allocation, procedures, and risks from the Pre-Transplant when appropriate.   OR   Destination therapy is when a VAD is used as a long-term treatment for patients who are not candidates for transplant, such as those with end-stage congestive heart failure. In these patients, the pumps are placed permanently to help the heart work better. This is subject to change pending the results from your evaluation and your physicians decision.     Types of Ventricular Assist Devices:   There are several different types of mechanical circulatory support devices:   -Left ventricular assist devices (LVAD) help the left side of the heart pump blood to the largest artery of the body, the aorta.     Your VAD Team may also talk with you about:   -Right ventricular assist devices (RVAD) help the right side of the heart pump blood to the lungs.   -Total Artificial Heart (IFEOMA) that replaces the heart and pumps blood to the body.   A VAD is a long-term device utilized by patients for months to years. Some patients may receive another device prior to receiving a VAD depending on their treatment and health status.   When Is a VAD Used?   When medications can no longer help, and other surgical options have been exhausted, a physician may recommend a VAD. A VAD is used to assist the pumping action of a severely weakened heart. It works with your heart to improve and to increase blood flow; it does not replace your own heart. VADs are most often used for patients experiencing New York Heart Association (NYHA) Class III-IV heart failure symptoms.     Alternative Options:   If you are not found to be a candidate for VAD implantation or if you decide that a VAD is not the best option for you, you will continue to receive customary standard of care. You may also continue optimal medical management alone including the use of inotrope therapy. An inotrope is an IV medication that helps the strength of  the hearts contraction. However, the reason that you are being considered for VAD implantation is because optimal medical management has not been adequate and without a VAD, your condition is likely to deteriorate over time. While going straight to transplant may be a possibility, death is a possibility as well.   You May Not Be Eligible To Receive A VAD If You Are Found To Have Any Of The Following:      Any irreversible non-cardiac disease state with less than 2-year survival rate    Inadequate social support to be successful at home after surgery    Irreversible pulmonary disease or fixed pulmonary hypertension    Irreversible renal disease    Irreversible liver disease    Unresolved stroke or uncorrected cerebral vascular disease    A history of chronic noncompliance    Using illicit drugs or alcohol    Uncorrected thyroid disease    Significant right ventricular failure    Obstructive or restrictive cardiomyopathy    Amyloidosis    Active pericardial disease    Untreated aortic aneurysm    Irreversible cognitive dysfunction    History of psychiatric disease, uncontrolled affective disorder, or any cognitive dysfunction that may prevent you from managing self-care    Diabetes with severe retinopathy or peripheral neuropathy    Obesity with BMI (body mass index) greater than 35    Severe chronic malnutrition    Uncorrected blood disorders    Active uncontrolled infection    Pregnancy (positive pregnancy test)    HIV positive or immunosuppression that could result in device infection    Muscular dystrophy, MS or similar disease states    Active systemic infection    Cancer    Insufficient funding     Possible Benefits:   The overall goal is improved health and quality of life. In most cases, because circulation has been restored as a result of the VAD, you can expect to have more energy and also experience less heart failure symptoms. Since VADs help deliver more oxygen rich blood, you  may feel well enough to resume many of the usual activities and hobbies that you enjoy. In fact, many patients return to work and are no longer disabled, depending on the type of work they do. Be aware that you may lose your disability post VAD based on review of your records and disability benefits. It is important that you speak with a  so that you may plan for this should it occur.   The improved circulation may prolong life and may improve some organ damage caused by your heart failure. This is supported by some studies that have shown that VAD patients have a longer survival than patients treated with medications alone. Although the VAD can improve your chances for survival, the type and severity of your heart disease may outweigh any benefits from the device and you may still die.     Possible Surgical/ Anesthesia Risks:   As with any surgery or procedure, there are risks and the possibility of complications or death. There are also risks related to the operation itself and undergoing anesthesia, and risks related to the device itself. You will discuss the risks in detail with the Cardiac Surgeon who intends to perform your surgery. Your surgeon and anesthesia provider will review consent forms prior to surgery.     Operative Procedure Risks:   There are many risks with this operation including but not limited to: death, heart attack, stroke, nerve injury, blood clots, damage to arteries needing limb amputation, bleeding and hemorrhage, hemolysis, infection, development of new antibodies in your blood, mediastinitis, arrhythmia, right heart failure, heart block, or the need for pacemaker or ICD implantation. The need for re-operation for any reason may cause: renal, hepatic or pulmonary failure resulting in death or long-term need of ventilation or dialysis, and blood transfusion with its risk of HIV, and hepatitis. Studies have also shown that patients may have problems with memory,  attention, and speed of processing thoughts after a cardiac surgical procedure. Any of these complications will be explained to you in more detail if you desire. In addition to these potential complications, there may be other risks that are currently unknown.     Risks Related to VAD Therapy:   Include but not limited to: death, need for re-operation, device malfunction or device infection, renal failure requiring dialysis, blood clots, stroke, pain, or bleeding. These risks may lead to prolonged hospital stay or re-hospitalization. Pump exchange due to complications is a possibility. Patients may also experience a potential decrease in their quality of life including limitations of their normal activities. Patients may reach a point where quality of life is so impaired, that the decision to terminate VAD-support will need to be addressed. The longer you are on the device, the greater the chances that complications will develop.   Please see addendum for likelihood of these risks impacting you in your VAD therapy journey.     Evaluation Process:   There will be many people involved in the evaluation process to assure that this is the best choice for you. You will receive a number of tests and consultations. Some of the people that may help evaluate you include Heart Failure Physicians, Cardiac Surgeons, Social Workers and VAD Coordinators. During the evaluation process, you will be given education about the VAD and the care you would require. After the evaluation, the group will decide if you meet the criteria to have a VAD implanted. You may require or have already been implanted with a short-term VAD prior to surgery for a long-term VAD.     Care Team   Additionally, you will meet with a number of different team members to coordinate your care: financial counselor to discuss insurance and dressing supplies, research coordinators, anesthesiologist, psychiatrist/psychologist, physical therapist or occupational  therapist to discuss rehabilitation after VAD, and dietician to improve nutrition. Some patients may be referred to other services for consultation. These may include, but are not limited to: infectious disease doctor, gastroenterologist, nephrologist (kidney doctor), pulmonologist (lung doctor), hepatologist (liver doctor), or an ophthalmologist (eye doctor). It is recommended that you see your dentist to ensure no infection is present and all needed dental work is completed before surgery. Cavities and rotten teeth can cause an infection which can lead to death.   Testing is required to determine VAD candidacy. These include but are not limited to the following:   Laboratory studies of blood and urine, chest x-ray, abdominal ultrasound, CT scan, echocardiogram, cardiopulmonary treadmill, right heart catheterization, electrocardiogram, pulmonary function tests, colonoscopy or endoscopy, vascular studies, and pulmonary studies.     Device Choice:   VADs are currently approved by the Food and Drug Administration (FDA) to be used as Bridge to Transplantation (BTT) or Destination Therapy (DT). A full list will be provided for you and your family to review if desired. [facility] also participates in clinical trials with devices that are considered investigational, and are not yet FDA approved for BTT/DT. Your Surgeon and Cardiologist will discuss with you which device is the best option for you. VADs have four main parts: the implantable heart pump, a tube that passes through the skin of your abdomen (driveline), a controller (small computer) that controls the pump operation, and an external power source (batteries or power device). In addition, there are other VADs that are used temporarily when patients are in cardiogenic shock.   You have the right to refuse surgery at any time. This educational document consent will help you to make an informed decision about your VAD option. If you decide this is not the  best option for you , please make your physician aware. You and your family make the decision to proceed.   Pre-operative Care expectations:    Your surgeon will request a tentative operative date in your name in the event that VAD therapy is the right option defined by you, your family, and provider team. You may see this date on [email or web address].    Informed surgical and anesthesia consents will be completed prior to the procedure.    You will be admitted to the hospital a few days before the day of surgery for optimization before surgery (unless already hospitalized).    Intra-aortic balloon pump or impella will be placed before surgery    If you are listed for transplant, once you go for the VAD your listing status will change. This will be discussed with you by your Transplant team.    If you have an ICD (defibrillator), it will be turned off prior to surgery, and then turned back on after surgery. It will NOT be removed.     Surgical Procedure:   The surgical procedure to implant the VAD will require open-heart surgery and can take on average between 6-12 hours. The surgeon will need to make an incision down the front of your chest to reach your heart. You will have a breathing tube and ventilator while under general anesthesia. The VAD is placed below the heart and the surgeon will connect the pump to your heart and secure it in place with sutures. Once the pump is in place, the VAD along with your natural heart will resume pumping blood through your body. After the surgery is completed, you will receive care in the ICU.     Post-Operative Care Expectations:   Upon arrival to the ICU, you will receive close monitoring and support from the following medical mechanisms:    Upon arrival to ICU, please adhere to our ICU (intensive care unit) visiting hours. You will need to rest and we ask that family not stay the night for a few days.    Heart monitor (telemetry) to monitor heart rate and rhythm.    A  breathing tube (endotracheal tube) and ventilator to assist with breathing and maintain and open airway.    An oral-gastric tube will be utilized to keep the stomach empty when connected to suction, as well as to give the nursing staff the capability to administer oral medications directly into the stomach.    A King catheter to measure urinary output.    A [place] Catheter to measure pressures within the heart and lungs.    An arterial line catheter in order to measure arterial blood pressure.    Chest tubes to collect and measure drainage from surgery.    A VAD driveline that exits the skin in the abdominal area and is connected to the VAD power source.    Your chest may be left open for 24 to 72 hours after implantation, after which you will be taken back to the operating room to close your chest.     You will receive medications for sedation and to control your pain in order to achieve a tolerable level of comfort. You will also be on IV medications until your blood pressure and fluid status are stable. Your home medications will be resumed as soon as possible if still medically relevant. In addition to your previous taken medications, patients with VADs are commonly prescribed medications for anticoagulation/anti-platelet, antibiotics, blood pressure, and vitamin/mineral supplements. Your length of stay in the ICU will depend on how fast you recover. Once you are more stable, breathing on your own with your lines and tubes out, you will be transferred to a general care unit where you can expect to stay for another 1-3 weeks. On average, your total length of stay will be about three or four weeks after your surgery. During this time, it is expected that you and your family will begin to learn to manage   the device and learn how to manage your care at home. Most patients are able to return home after VAD implantation, but this cannot be guaranteed. Complications may require a prolonged period of  hospitalization, long term acute care facility, or inpatient rehabilitation. Be aware, if you are unattended and the device fails, you may not be able to perform the emergency procedures yourself, which could result in death and/or blood clots in the device.     Education:   Verbal, written, and visual educational materials are provided throughout your hospitalization and are available to anyone involved in your care at home. You and your caregiver(s) will be trained by a VAD Coordinator on how to manage your care and device. Other staff such as your bedside Nurse, the Occupational and Physical Therapists will also provide training to you. You and your caregiver(s) must show ability to manage the device, understand how it operates, troubleshoot problems, and care for your driveline exit site. It is expected that a caregiver(s) will be present and available while you are in the hospital to learn how to manage the device and how to care for you when you are at home. The education will be an ongoing process while you are here at the hospital. Prior to your expected discharge, your family and/or caregivers will be required to show competency in the care and management of you and your VAD. In addition, a VAD Coordinator will ensure that your local fire department, emergency personnel, and any other community members will be given education materials and training as necessary. Your home must have consistent electricity and phone services; the outlets must be three pronged and grounded. Any additional safety needs are arranged during this time. You will need to have your glasses and hearing aids at the hospital in order to be educated, as soon as possible.     Caregiver Requirements:   VAD implantation is based on suitability, need, and a committed caregiver. The caregiver must agree to certain responsibilities for the VAD implant process to continue. This is not negotiable. The VAD patient may be completely dependent on  the caregiver. While the patient is in the hospital, the caregiver is expected to visit daily, preferably at a time between 8 am- 4 pm. The caregiver will need to learn the dressing change process by the nurses and consistently demonstrate the ability to perform VAD dressing change in addition to helping the patient learn about the VAD. If the patient is unable to meet education goals before discharge, they may need 24-hour care. 24-hour caregivers may experience increased stress in their day-to-day life resulting from caring for a loved one with a VAD. There are support groups available to help you through living with a VAD.   The patient will not be able to drive for several months. The caregiver will need to drive the patient to appointments, as frequently as two to three times weekly at first. The caregiver will need to assist the patient with medication management. The caregiver will need to encourage the patient to document VAD numbers daily and know when to call for a problem.   Education begins now and is on-going throughout the VAD patients life.   The VAD team recognizes that VAD patients have an increased satisfactory outcome with a dedicated and committed caregiver. The caregiver has a tremendous responsibility. It is essential for you, the caregiver, to understand what is expected prior to moving forward. Any concerns about being the caregiver should be discussed with the , the VAD coordinator, or the physician.     Discharge Process:   Your daily progress will be followed by a team of people involved in your care including your Surgeon, Cardiologist, VAD Coordinators, Staff Nurses, Nurse Practitioners, Physician Assistants, Physical/Occupational Therapy, Social Workers, and a Discharge Planner. They will monitor your recovery and help you to adjust to life with a VAD. You will need to demonstrate the ability to care for yourself, such as grooming and exercise. You will also need to  demonstrate the ability to care for your VAD, the equipment, and alarms. You must have a thermometer, weight scale, flashlight, and a blood pressure cuff at your home. Most patients return home however, some patients choose to live with a caregiver or need a rehabilitation facility for a short period before returning home. If insurance allows, a Home Health nurse will be recommended to come to your home and assist you in your care when you return home. The length of time that the Visiting Nurse will come to your home will depend on your overall recovery. It is recommended  after you return home that you enroll in a Cardiac Rehab program to continue to improve your physical health.     Follow up care:   After you are discharged, you will follow up with your Surgeon, your Heart Failure Cardiologist and your VAD coordinator. They will collaboratively care for you and make decisions about your treatment. Typically, your first visit will be 1 week after discharge. We will see you every week for 3-4 weeks, followed by every 2 weeks for 1 month, then monthly thereafter while you have the VAD. Once you are considered a stable established patient, your Physicians may decide that you can follow-up every 2-3 months. Along with seeing your Cardiologist and Surgeon, you will have laboratory testing, and other physiological testing done on a regular basis in order to monitor and maintain your progress and health. The types of testing that you may need and the frequency will be decided by your Physicians but can include blood tests, EKG, Echocardiogram, Right Heart Catheterization, V02 Treadmill Stress Test, and Implantable Cardioverter Defibrillator (ICD) device check. If you have received an investigational VAD, you will have other testing that will be required for the research study. You will be required to take anticoagulation medications, also known as blood thinning medications (coumadin, warfarin). You will   be required  to have frequent blood draws, up to 2-3 times a week, in order to monitor your blood count and blood thinning agents. You will also be in frequent contact with a VAD Coordinator who will make phone calls to assess how you are doing at home and assist you with any problems that may arise. A VAD Coordinator and a Heart Failure Cardiologist are also available 24 hours a day in the event that you have an emergency. On average, you can expect that within 12 weeks after surgery, you will be able to return to most activities, with the permission of your VAD Team.     Lifestyle Changes and Prohibited Activities:   You will have limitations and can resume most usual activities. Certain activities are hazardous or fatal after implant. Persons with implantable VADs must not allow their controller/computer and electrical equipment to submerge in water. Showering is possible with proper protective equipment. You may only resume showering once your driveline has healed and your VAD coordinator gives their permission. Swimming and baths are prohibited. You cannot sleep on your stomach or the side the driveline is exiting your abdomen. Contact sports, repetitive jumping, or impact with an airbag are examples of activities that may cause trauma to the pump attachments and must be avoided.   You may be sexually active but must care for your driveline. Female patients cannot get pregnant. Medical care after implant includes lifetime follow up to monitor device function and health status. You may not have a magnetic resonance imaging (MRI) test because of the magnetic fields. You may not vacuum, touch television or computer screens, or take hot clothes out of the dryer due to the static electricity. VAD therapy requires significant self-care responsibility and a willingness to participate with you VAD team. Driveline exit site dressings must be performed daily using sterile technique outlined in your care of driveline documentation or  as directed by your VAD team. Care of your driveline must be done daily by yourself or caregiver. Maintenance care of the device components, batteries, and driveline is necessary to prevent pump failure, infections, or other serious complications. You will continue to take medications for your heart failure although the dose and or medication name may change. You may continue to take your diuretic such as lasix or demedex. You may also continue to be on a fluid restriction. You may drive once approved by your VAD team. You may be able to travel with your VAD, depending on the situation.     VAD Equipment:   Along with the device that is implanted inside your body, you will have a number of other external pieces of equipment that will require care and maintenance. You will have a driveline that exits your body through your abdomen that will power a controller, which is the computer component that tells the heart pump how to perform. The controller will also tell you about alarms, sounds, and words, on how your pump is operating and if there are any problems. In order to power the device and the controller, you will have batteries and a battery charger and/or power device. The batteries allow you to be mobile and move freely without being attached to outlet power. The  or power device allows   you to be connected to power for long periods of time such as when you are sleeping. Different VADs have similar pieces of equipment, but will vary depending on the device you receive. You will receive education and teaching before you leave the hospital to make sure you understand clearly how to operate the equipment and troubleshoot problems that may occur.     Confidentiality:   Hospital personnel who are involved in the course of your care may review your medical record. Communication between you and [facility] remains confidential. If you do become a candidate for transplant, data about your case, which will include  your identity, will be sent to [employer] ([employer]) and may be sent to other places involved in the transplant process as permitted by law. Data about your VAD, which will include your identity, will be shared with the  of the device. Device  may be involved in your care alongside your VAD Team in the hospital and/or clinic setting. Your information may also be entered into a registry for pump implants, known as INTERMACS. Your participation in this is voluntary, and will be discussed at greater length prior to implant.     End of Life:   If you are approaching the end of life, the VAD can be turned off. You may be in a hospital, home, or hospice setting. If you become very sick and do not have a chance to survive, we may talk about stopping the pump. The doctor would talk to you or your family about what is right for you. It is helpful to talk to your family about your goals before surgery so they know what your wishes are. A member of our Goals of Care team will visit you before surgery to help you learn your goals. You have the right to have the device turned off or to decline pump exchange if your pump fails or malfunctions.     Device Return:   At the time of either transplant or death, the surgeon may need to remove the device to return to the . You or a family member may need to sign a separate consent for removal of the device.   Questions   We encourage you to learn everything you can about the potential benefits and risks of having a VAD. If you or your family has any questions, you should feel free to contact your Transplant Coordinator or VAD Coordinator.   By signing this form, you understand and have reviewed the implant procedure as well as the potential benefits and risks involved with the getting a VAD. You also acknowledge and understand the care that will be required to maintain this device and yourself, including changes in your  lifestyle, and impact on your independence.

## 2020-01-17 NOTE — NURSING
"NO increased from 10 ppm to 15 ppm at 0940 am. Epi initiated at 0.02 mcg/kg/min @ 1001 am. At 1045 RN called into the room. Pt reports starting to "have the same feeling with Dobutamine". RN asked patient to describe his symptoms. Patient describes feeling as "getting the 100 mile stare. My brain is shutting down and I cannot focus on any more information" Pt also describes symptoms of "nose going numb" "jittery" "cold" "throat closing up". RN reported symptoms to Marylin Lazcano NP. Orders received to stop Epi infusion and keep NO at 15 ppm. Pt requesting NO to be turned to 10 ppm. RN explained to patient we do not want to make too many changes at once. Pt verbalized understanding, but contributes numbness in nose to NO being turned up from 10 to 15ppm. RN offered anxiolytics to see if this helps with symptoms. Pt refuses anxiolytics at this time stating minutes after Epi stopped his "brain is opening back up as well as his throat".    During initial complaints of above symptoms. VSS. . No ectopy. Sats 100% on 3.5 L and 15 ppm NO. /80.   "

## 2020-01-17 NOTE — CONSULTS
Consult Note    Inpatient consult to Hematology/Oncology  Consult performed by: Gisella Farnsworth MD  Consult ordered by: Marylin Lazcano NP        SUBJECTIVE:     History of Present Illness:  Tae Delgado is a 59-year-old male with history of NICMP diagnosed in 2010, ICD, LV thrombus (with prior splenic and renal emboli), Embolic CVA , paroxysmal atrial fib, HTN, HLP, who was admitted to cardiology service, with plan for possible LVAD for acute on chronic heart failure exacerbation. Patient endorsed worsening dyspnea on exertion, orthopnea, and lower extremity edema. Hematology consulted d/t abnormal coagulation labs.    Patient was on Coumadin prior to admission, came in with therapeutic INR of 3.0. Last dose of Coumadin given on 1/12/20. Patient's PTT normal on admission, now therapeutic on heparin IV. Factor 8 activity was checked and is elevated, vwF normal/elevated. Fibrinogen 253. CBC is within normal limits: WBC 7.57, Hb 13.9, plt 174.    Patient seen today, denies any current or history of unusual bleeding/bruising. Denies any h/o cancer. States has been regular on Coumadin 3mg daily without any issues for years. Denies night sweats, weight los, lymphadenopathy. Endorses dyspnea and leg swelling which have improved.    Review of patient's allergies indicates:   Allergen Reactions    Biopatch [chlorhexidine gluconate]      Site burning    Dobutamine in d5w      Tachycardia, tremors, SOB, flushing    Percocet [oxycodone-acetaminophen] Itching    Penicillins Rash     Past Medical History:   Diagnosis Date    CHF (congestive heart failure) 1/2010    Dx  1/2010 w/ decreased LV systolic function (EF 15%) by ECHO 1/2015    COCM (congestive cardiomyopathy) 7/20/2016    Hyperlipidemia     Hypertension     Paroxysmal atrial fibrillation     Pulmonary embolus 2008    Stroke     Superficial thrombophlebitis      Past Surgical History:   Procedure Laterality Date    RIGHT HEART CATHETERIZATION Right 1/16/2020     Procedure: INSERTION, CATHETER, RIGHT HEART;  Surgeon: Isiah Montero MD;  Location: [facility] CATH LAB;  Service: Cardiology;  Laterality: Right;    TONSILLECTOMY      VEIN LIGATION AND STRIPPING       Family History   Problem Relation Age of Onset    Cancer Mother      Social History     Tobacco Use    Smoking status: Never Smoker    Smokeless tobacco: Never Used   Substance Use Topics    Alcohol use: No    Drug use: No     Review of Systems   Constitutional: Negative for chills, fever, malaise/fatigue and weight loss.   HENT: Negative for nosebleeds and sore throat.    Eyes: Negative for blurred vision and double vision.   Respiratory: Positive for shortness of breath. Negative for cough and hemoptysis.    Cardiovascular: Positive for leg swelling. Negative for chest pain.   Gastrointestinal: Negative for abdominal pain, blood in stool, melena, nausea and vomiting.   Genitourinary: Negative for hematuria.   Musculoskeletal: Negative for joint pain and myalgias.   Skin: Negative for rash.   Neurological: Negative for dizziness, sensory change and headaches.   Endo/Heme/Allergies: Does not bruise/bleed easily.     OBJECTIVE:     Vital Signs:  Temp:  [97.6 °F (36.4 °C)-97.8 °F (36.6 °C)]   Pulse:  []   Resp:  [13-38]   BP: (103-138)/(68-94)   SpO2:  [94 %-99 %]     Physical Exam   Constitutional: He appears well-developed and well-nourished.   HENT:   Head: Normocephalic and atraumatic.   Eyes: Pupils are equal, round, and reactive to light. EOM are normal. No scleral icterus.   Neck: Neck supple.   Cardiovascular: Normal rate and regular rhythm.   Pulmonary/Chest: Effort normal. No respiratory distress.   On nasal cannula   Abdominal: Soft. He exhibits no distension. There is no tenderness.   Musculoskeletal: Normal range of motion. He exhibits edema.   Lymphadenopathy:     He has no cervical adenopathy.   Neurological: He is alert. Coordination abnormal.   Skin: Skin is warm and dry.   Psychiatric: He  has a normal mood and affect. His behavior is normal.     Laboratory:  CBC:   Recent Labs   Lab 01/17/20  0230   WBC 7.57   RBC 4.93   HGB 13.9*   HCT 43.8      MCV 89   MCH 28.2   MCHC 31.7*     CMP:   Recent Labs   Lab 01/17/20  0230      CALCIUM 9.4   ALBUMIN 3.6   PROT 6.6      K 3.6   CO2 27   CL 98   BUN 28*   CREATININE 1.8*   ALKPHOS 54*   *   AST 93*   BILITOT 1.1*     Coagulation:   Recent Labs   Lab 01/17/20  0230 01/17/20  0900   LABPROT 12.2  --    INR 1.2  --    APTT 29.9 45.1*       Narrative     EXAMINATION:  CT CHEST ABDOMEN PELVIS WITHOUT CONTRAST(XPD)    CLINICAL HISTORY:  as part LVAD/OHT WORK UP;    TECHNIQUE:  Low dose axial images, sagittal and coronal reformations were obtained from the thoracic inlet to the pubic symphysis Oral contrast was not administered.    COMPARISON:  None    FINDINGS:  The structures at the base of the neck are unremarkable.  No significant mediastinal lymphadenopathy.  The heart is enlarged.  Pacer noted.  Right central venous catheter tip terminates within the distal SVC.  There is some calcification in the distribution of the coronary arteries.  No pericardial effusion.    Allowing for respiratory motion, the airways are patent.  There is bilateral basilar dependent atelectasis.  No large focal consolidation.  There is a nodular focus of consolidative opacity within the medial aspect of the right lower lobe measuring 1 cm.  No pleural effusion.  No pneumatosis.    The liver, spleen, pancreas, gallbladder and adrenal glands have a grossly unremarkable noncontrast appearance.  There is no biliary dilation or ascites.  The pancreatic duct is not dilated.  No significant abdominal lymphadenopathy.    The kidneys have a grossly unremarkable noncontrast appearance without hydronephrosis or nephrolithiasis.  The bilateral ureters are unremarkable without calculi seen.  The urinary bladder is unremarkable.  The prostate is not enlarged.    There  are a few scattered colonic diverticula without inflammation.  The terminal ileum and appendix are unremarkable.  The small bowel is unremarkable.  There is atherosclerotic calcification of the aorta and its branches.  No focal organized pelvic fluid collection.    Degenerative changes are noted of the spine.  There is grade 1 anterolisthesis of L5 on S1 likely related to bilateral L5 pars defects.  No significant inguinal lymphadenopathy.      Impression       This report was flagged in Epic as abnormal.    1. Rounded focus of consolidative/sub solid attenuation within the medial aspect of the right lower lobe.  This may reflect atelectasis or possibly sequela of developing infection although malignancy is not excluded.  Comparison with any previous examinations would be helpful.  Given its size, follow-up is recommended at 3 month interval, to ensure resolution or to assess interval change.  Additional follow-up or workup at that time as warranted.  2. Several additional findings above.      Electronically signed by: Carlos Bravo MD  Date: 01/12/2020  Time: 16:02         ASSESSMENT/PLAN:   1) Concern for coagulopathy  Patient was on Coumadin prior to admission, came in with therapeutic INR of 3.0. Last dose of Coumadin given on 1/12/20. Patient's PTT normal on admission, now therapeutic on heparin IV. Factor 8 activity was checked and is elevated, vwF normal/elevated. Fibrinogen 388. CBC is within normal limits: WBC 7.57, Hb 13.9, plt 174.  -Factor VII low in 2012 in [place]  -Patient without any bleeding history even while on therapeutic Coumadin for years    2) Acute on chronic systolic failure  -Planning for possible LVAD    3) LV thrombus (with prior splenic and renal emboli), Embolic CVA , paroxysmal atrial fib    Recommendations:  -Patient on heparin thus PTT therapeutic appropriately  -No evidence of underlying coagulopathy at this time  -INR has downtrended appropriately since stopping Coumadin  -No  evidence of DIC at this time  -Low suspicion for platelet disorder at this time, without any history of unusual bleeding  -ATIII low with exposure to heparin  -Can consider repeating factor VII (low in 2012 at [facility]) although low suspicion of deficiency given no significant clinical bleeding even on therapeutic Coumadin  -Patient should likely be on lifelong anti-coagulation as long as there is no contraindication given thrombi history    The following was staffed and discussed with supervising physician Dr. Allen. We will sign off. Please contact Hematology Consult Fellow with any additional questions or concerns.    Gisella Farnsworth MD PGY-V  Hematology/Oncology Fellow    Attending Note  I have personally taken the history and examined this patient and agree with the fellow's note as stated above.  Reasonable to recheck Factor VII- he has had no bleed history even in situations where might have been anticipated  Outside prior labs reviewed   He will require lifelong anticoagulation as long as benefit outweighs risk as it continues to do currently

## 2020-01-17 NOTE — SUBJECTIVE & OBJECTIVE
**Interval History: Feels a lot better. Per patient, he walked for a total of 4 hours yesterday in the halls. Transferred overnight to ICU for RV support with Corby. LFT's trending down on Corby    Continuous Infusions:   heparin (porcine) in D5W 15 Units/kg/hr (01/17/20 0800)    nitric oxide gas       Scheduled Meds:   amiodarone  200 mg Oral Daily    docusate sodium  50 mg Oral BID    furosemide  80 mg Intravenous BID    heparin (PORCINE)  60 Units/kg (Adjusted) Intravenous Once    hydrALAZINE  100 mg Oral Q8H    isosorbide dinitrate  10 mg Oral TID    polyethylene glycol  17 g Oral BID    sodium chloride 0.9%  10 mL Intravenous Q6H     PRN Meds:acetaminophen, ALPRAZolam, heparin (PORCINE), heparin (PORCINE), ondansetron, sodium chloride, sodium chloride 0.9%, Flushing PICC Protocol **AND** sodium chloride 0.9% **AND** sodium chloride 0.9%, traMADol    Review of patient's allergies indicates:   Allergen Reactions    Biopatch [chlorhexidine gluconate]      Site burning    Dobutamine in d5w      Tachycardia, tremors, SOB, flushing    Percocet [oxycodone-acetaminophen] Itching    Penicillins Rash     Objective:     Vital Signs (Most Recent):  Temp: 97.6 °F (36.4 °C) (01/17/20 0700)  Pulse: 97 (01/17/20 0800)  Resp: 15 (01/17/20 0800)  BP: 108/72 (01/17/20 0800)  SpO2: 96 % (01/17/20 0800) Vital Signs (24h Range):  Temp:  [97.4 °F (36.3 °C)-98 °F (36.7 °C)] 97.6 °F (36.4 °C)  Pulse:  [] 97  Resp:  [15-28] 15  SpO2:  [94 %-99 %] 96 %  BP: ()/() 108/72     Patient Vitals for the past 72 hrs (Last 3 readings):   Weight   01/16/20 0710 108.5 kg (239 lb 3.2 oz)   01/15/20 0700 108.3 kg (238 lb 12.1 oz)     Body mass index is 34.32 kg/m².      Intake/Output Summary (Last 24 hours) at 1/17/2020 0842  Last data filed at 1/17/2020 0700  Gross per 24 hour   Intake 1014.36 ml   Output 2735 ml   Net -1720.64 ml       Hemodynamic Parameters:  CVP:  [6 mmHg] 6 mmHg    Telemetry: SR - ST with one episode  PAT and occasional NSVT    Physical Exam   Constitutional: He is oriented to person, place, and time. He appears well-developed and well-nourished.   HENT:   Head: Normocephalic and atraumatic.   Eyes: Pupils are equal, round, and reactive to light. Conjunctivae and EOM are normal.   Neck: Normal range of motion. Neck supple. No JVD present. No thyromegaly present.   Cardiovascular: Regular rhythm.   Tachycardic, + soft S3   Pulmonary/Chest: Effort normal and breath sounds normal.   Abdominal: Soft. Bowel sounds are normal.   Musculoskeletal: Normal range of motion. He exhibits no edema.   Neurological: He is alert and oriented to person, place, and time.   Skin: Skin is warm and dry. Capillary refill takes 2 to 3 seconds.   Psychiatric: He has a normal mood and affect. His behavior is normal. Judgment and thought content normal.       Significant Labs:  CBC:  Recent Labs   Lab 01/15/20  0418 01/16/20  0459 01/17/20  0230   WBC 7.78 8.07 7.57   RBC 5.41 5.06 4.93   HGB 15.1 14.0 13.9*   HCT 47.9 45.0 43.8    167 174   MCV 89 89 89   MCH 27.9 27.7 28.2   MCHC 31.5* 31.1* 31.7*     BNP:  Recent Labs   Lab 01/12/20  0825 01/15/20  1611   * 668*     CMP:  Recent Labs   Lab 01/15/20  0418 01/16/20  0459 01/17/20  0230   GLU 98 94 103   CALCIUM 9.4 9.4 9.4   ALBUMIN 3.5 3.4* 3.6   PROT 6.5 6.3 6.6    136 137   K 3.8 3.8 3.6   CO2 29 27 27   CL 98 98 98   BUN 23* 25* 28*   CREATININE 1.9* 1.8* 1.8*   ALKPHOS 50* 47* 54*   ALT 99* 124* 126*   AST 97* 113* 93*   BILITOT 1.5* 1.3* 1.1*      Coagulation:   Recent Labs   Lab 01/15/20  0418 01/16/20  0459 01/17/20  0230   INR 1.8* 1.6*  1.6* 1.2   APTT 45.4* 32.5*  32.5* 29.9     LDH:  Recent Labs   Lab 01/15/20  1611   *     Microbiology:  Microbiology Results (last 7 days)     ** No results found for the last 168 hours. **          I have reviewed all pertinent labs within the past 24 hours.    Estimated Creatinine Clearance: 54.5 mL/min (A) (based  on SCr of 1.8 mg/dL (H)).    Diagnostic Results:  I have reviewed all pertinent imaging results/findings within the past 24 hours.

## 2020-01-17 NOTE — ASSESSMENT & PLAN NOTE
- Currently in SR - ST  - Continue Amiodarone 200 mg qd  - Continue Heparin bridge, but D/C'd Coumadin as he is in w/u

## 2020-01-17 NOTE — ASSESSMENT & PLAN NOTE
- Creatinine on admit 2.6 (baseline ~ 1.8). Creatinine today is 1.8  - monitor with diuresis  - Followed by Nephrology as an outpatient

## 2020-01-17 NOTE — ASSESSMENT & PLAN NOTE
- [facility] dx'd 2010  - TTE 1/10: LVEDD 6.96, LVEF 15%, diastolic dysfunction, RV midly dilated, mod decreased, severe LUCI, mild MR, PAQS 31 and IVC 8  - In the past months has been admitted 3 times for volume management requiring high doses of diuretics, in [facility]. Admits not following a strict diet and gaining weight. Now, he refers following a diet and current weight 239 lb but went up to 251 lb when not following diet.   - RHC 1/16 off : RA 12, PA 70/36 (52), w 27, CO/CI 5.07/2.25, SVR 1277  - Transferred to ICU 1/16 for RV support with Corby, currently at 10 ppm  - Initially diuresed this admit, but diuretics stopped 1/11 when central line placed and CVP was 2. Was given 900 cc IVF since, and CVP was up to 13. Resumed home dose of Lasix 40 mg po bid on 1/13, but transitioned back to Lasix 80 mg IVP bid on 1/16 after RHC.  CVP 6 via PICC line today  - sVO2 64 this AM off of . (stopped completely on 1/14 due to tachycardia and tremors/flushing with resolution of symptoms).  - GDMT: Toprol on hold 2/2 decompensation. Remains on hydralazine and Isordil.  Refuses to continue Losartan (stopped 4 days PTA) because makes him feel bad and SBP in 90's at home. Will D/C Hyd/Isordil if approved for LVAD next week  - On step 3. To be presented at urgent selection today for consideration for LVAD next week  - Pathway initiated - advance as appropriate

## 2020-01-17 NOTE — ASSESSMENT & PLAN NOTE
- H/O LV thrombus with h/o splenic and renal emboli as well as embolic CVA  - Limited TTE done here 1/13 showed no thrombus  - Continue Heparin birdge, but D/C'd Coumadin as he is in w/u.

## 2020-01-17 NOTE — NURSING
"      SICU PLAN OF CARE NOTE    Dx: Acute on chronic combined systolic and diastolic heart failure    Shift Events: Increased NO to 15 ppm. Attempted Epi infusion, but patient did not tolerate (see notes for symptoms). Approved for LVAD next week.    Goals of Care: Continue diuresis. Ambulate. Optimize for LVAD implantation.     Neuro: AAO x4, Follows Commands and Moves All Extremities    Vital Signs: /83   Pulse 108   Temp 97.8 °F (36.6 °C) (Oral)   Resp 20   Ht 5' 10" (1.778 m)   Wt 108.5 kg (239 lb 3.2 oz)   SpO2 97%   BMI 34.32 kg/m²     Respiratory: Nasal Cannula w/ Corby 3.5 L and 15 ppm    Diet: Cardiac Diet 1.5 L FR    Gtts: Heparin    Urine Output: Voids Spontaneously; Lasix IVP BID; 1510ml urine output     Labs: PTT q6h    Skin: intact. No breakdown noted. Ambulates with SBA only. Up to chair and repositions self independently.        "

## 2020-01-17 NOTE — SIGNIFICANT EVENT
"Corby increased to 15 ppm this morning, and fixed dose Epi started at 0.02mcg/kg/min.While HR is still in the low 100's (ST), he is normotensive and O2 sats are WNL, he c/o of his head feeling "messed up". No focal deficits. Will stop the Epi and hold Corby at 15 ppm for now  "

## 2020-01-17 NOTE — PROGRESS NOTES
[facility]  Heart Transplant  Progress Note    Patient Name: Tae Delgado  MRN: [id number]  Admission Date: 1/9/2020  Hospital Length of Stay: 8 days  Attending Physician: Isiah Montero MD  Primary Care Provider: Elham Pearson MD  Principal Problem:Acute on chronic combined systolic and diastolic heart failure    Subjective:     **Interval History: Feels a lot better. Per patient, he walked for a total of 4 hours yesterday in the halls. Transferred overnight to ICU for RV support with Corby. LFT's trending down on Corby    Continuous Infusions:   heparin (porcine) in D5W 15 Units/kg/hr (01/17/20 0800)    nitric oxide gas       Scheduled Meds:   amiodarone  200 mg Oral Daily    docusate sodium  50 mg Oral BID    furosemide  80 mg Intravenous BID    heparin (PORCINE)  60 Units/kg (Adjusted) Intravenous Once    hydrALAZINE  100 mg Oral Q8H    isosorbide dinitrate  10 mg Oral TID    polyethylene glycol  17 g Oral BID    sodium chloride 0.9%  10 mL Intravenous Q6H     PRN Meds:acetaminophen, ALPRAZolam, heparin (PORCINE), heparin (PORCINE), ondansetron, sodium chloride, sodium chloride 0.9%, Flushing PICC Protocol **AND** sodium chloride 0.9% **AND** sodium chloride 0.9%, traMADol    Review of patient's allergies indicates:   Allergen Reactions    Biopatch [chlorhexidine gluconate]      Site burning    Dobutamine in d5w      Tachycardia, tremors, SOB, flushing    Percocet [oxycodone-acetaminophen] Itching    Penicillins Rash     Objective:     Vital Signs (Most Recent):  Temp: 97.6 °F (36.4 °C) (01/17/20 0700)  Pulse: 97 (01/17/20 0800)  Resp: 15 (01/17/20 0800)  BP: 108/72 (01/17/20 0800)  SpO2: 96 % (01/17/20 0800) Vital Signs (24h Range):  Temp:  [97.4 °F (36.3 °C)-98 °F (36.7 °C)] 97.6 °F (36.4 °C)  Pulse:  [] 97  Resp:  [15-28] 15  SpO2:  [94 %-99 %] 96 %  BP: ()/() 108/72     Patient Vitals for the past 72 hrs (Last 3 readings):   Weight   01/16/20 0710 108.5 kg (239  lb 3.2 oz)   01/15/20 0700 108.3 kg (238 lb 12.1 oz)     Body mass index is 34.32 kg/m².      Intake/Output Summary (Last 24 hours) at 1/17/2020 0842  Last data filed at 1/17/2020 0700  Gross per 24 hour   Intake 1014.36 ml   Output 2735 ml   Net -1720.64 ml       Hemodynamic Parameters:  CVP:  [6 mmHg] 6 mmHg    Telemetry: SR - ST with one episode PAT and occasional NSVT    Physical Exam   Constitutional: He is oriented to person, place, and time. He appears well-developed and well-nourished.   HENT:   Head: Normocephalic and atraumatic.   Eyes: Pupils are equal, round, and reactive to light. Conjunctivae and EOM are normal.   Neck: Normal range of motion. Neck supple. No JVD present. No thyromegaly present.   Cardiovascular: Regular rhythm.   Tachycardic, + soft S3   Pulmonary/Chest: Effort normal and breath sounds normal.   Abdominal: Soft. Bowel sounds are normal.   Musculoskeletal: Normal range of motion. He exhibits no edema.   Neurological: He is alert and oriented to person, place, and time.   Skin: Skin is warm and dry. Capillary refill takes 2 to 3 seconds.   Psychiatric: He has a normal mood and affect. His behavior is normal. Judgment and thought content normal.       Significant Labs:  CBC:  Recent Labs   Lab 01/15/20  0418 01/16/20  0459 01/17/20  0230   WBC 7.78 8.07 7.57   RBC 5.41 5.06 4.93   HGB 15.1 14.0 13.9*   HCT 47.9 45.0 43.8    167 174   MCV 89 89 89   MCH 27.9 27.7 28.2   MCHC 31.5* 31.1* 31.7*     BNP:  Recent Labs   Lab 01/12/20  0825 01/15/20  1611   * 668*     CMP:  Recent Labs   Lab 01/15/20  0418 01/16/20  0459 01/17/20  0230   GLU 98 94 103   CALCIUM 9.4 9.4 9.4   ALBUMIN 3.5 3.4* 3.6   PROT 6.5 6.3 6.6    136 137   K 3.8 3.8 3.6   CO2 29 27 27   CL 98 98 98   BUN 23* 25* 28*   CREATININE 1.9* 1.8* 1.8*   ALKPHOS 50* 47* 54*   ALT 99* 124* 126*   AST 97* 113* 93*   BILITOT 1.5* 1.3* 1.1*      Coagulation:   Recent Labs   Lab 01/15/20  0418 01/16/20  0459  01/17/20  0230   INR 1.8* 1.6*  1.6* 1.2   APTT 45.4* 32.5*  32.5* 29.9     LDH:  Recent Labs   Lab 01/15/20  1611   *     Microbiology:  Microbiology Results (last 7 days)     ** No results found for the last 168 hours. **          I have reviewed all pertinent labs within the past 24 hours.    Estimated Creatinine Clearance: 54.5 mL/min (A) (based on SCr of 1.8 mg/dL (H)).    Diagnostic Results:  I have reviewed all pertinent imaging results/findings within the past 24 hours.    Assessment and Plan:       55 y.o. WM with history of NICMP diagnosed in 2010, ICD, LV thrombus (with prior splenic and renal emboli), Embolic  CVA , paroxysmal atrial fib, HTN, HLP  presents for  F/U today to clinic and he was volume overloaded on exam .  He states he had 3 admission in the last 3 months for volume overload. He started having more SOB on exertion since one month. Also endorses of Orthopnea since last one month. Alble to walk only 150 ft. He also has Bilateral lower extremity edema. He was admitted here in 2017 for ADHD here at [facility] and RHC during that admission showed PCWP 40 and CVP 17. Currently denies chest pain, lightheadedness     * Acute on chronic combined systolic and diastolic heart failure  - [facility] dx'd 2010  - TTE 1/10: LVEDD 6.96, LVEF 15%, diastolic dysfunction, RV midly dilated, mod decreased, severe LUCI, mild MR, PAQS 31 and IVC 8  - In the past months has been admitted 3 times for volume management requiring high doses of diuretics, in [facility]. Admits not following a strict diet and gaining weight. Now, he refers following a diet and current weight 239 lb but went up to 251 lb when not following diet.   - RHC 1/16 off : RA 12, PA 70/36 (52), w 27, CO/CI 5.07/2.25, SVR 1277  - Transferred to ICU 1/16 for RV support with Corby, currently at 10 ppm  - Initially diuresed this admit, but diuretics stopped 1/11 when central line placed and CVP was 2. Was given 900 cc IVF  since, and CVP was up to 13. Resumed home dose of Lasix 40 mg po bid on 1/13, but transitioned back to Lasix 80 mg IVP bid on 1/16 after RHC.  CVP 6 via PICC line today  - sVO2 64 this AM off of . (stopped completely on 1/14 due to tachycardia and tremors/flushing with resolution of symptoms).  - GDMT: Toprol on hold 2/2 decompensation. Remains on hydralazine and Isordil.  Refuses to continue Losartan (stopped 4 days PTA) because makes him feel bad and SBP in 90's at home. Will D/C Hyd/Isordil if approved for LVAD next week  - On step 3. To be presented at urgent selection today for consideration for LVAD next week  - Pathway initiated - advance as appropriate    Hepatic congestion  - Liver US on 1/11 unremarkable    Transaminitis  - elevated on admission, trended down on  but trended back up off of . Trending back down now on Corby   - monitor closely  - on amiodarone for AF    ICD (implantable cardioverter-defibrillator) in place  - S/P Biotronik dual chamber ICD    Right lower lobe pulmonary nodule  - Incidental finding on CT chest/abd/pelvis on 1/12. Pulmonology consulted, and rec repeat CT of chest in 3 months  - Will need to follow-up in pulmonary clinic.     Acute kidney injury superimposed on chronic kidney disease  - Creatinine on admit 2.6 (baseline ~ 1.8). Creatinine today is 1.8  - monitor with diuresis  - Followed by Nephrology as an outpatient    Paroxysmal atrial fibrillation  - Currently in SR - ST  - Continue Amiodarone 200 mg qd  - Continue Heparin bridge, but D/C'd Coumadin as he is in w/u      Left ventricular thrombus without MI  - H/O LV thrombus with h/o splenic and renal emboli as well as embolic CVA  - Limited TTE done here 1/13 showed no thrombus  - Continue Heparin birdge, but D/C'd Coumadin as he is in w/u.        Uninterrupted Critical Care/Counseling Time (not including procedures): [place] [id number]  Heart Transplant  [facility]

## 2020-01-17 NOTE — ASSESSMENT & PLAN NOTE
- elevated on admission, trended down on  but trended back up off of . Trending back down now on Corby   - monitor closely  - on amiodarone for AF

## 2020-01-18 PROBLEM — Z71.89 ACP (ADVANCE CARE PLANNING): Status: ACTIVE | Noted: 2020-01-18

## 2020-01-18 LAB
ALBUMIN SERPL BCP-MCNC: 3.6 G/DL (ref 3.5–5.2)
ALLENS TEST: ABNORMAL
ALP SERPL-CCNC: 53 U/L (ref 55–135)
ALT SERPL W/O P-5'-P-CCNC: 108 U/L (ref 10–44)
ANION GAP SERPL CALC-SCNC: 13 MMOL/L (ref 8–16)
APTT BLDCRRT: 38.3 SEC (ref 21–32)
APTT BLDCRRT: 46.5 SEC (ref 21–32)
APTT BLDCRRT: 48.4 SEC (ref 21–32)
APTT BLDCRRT: 57.3 SEC (ref 21–32)
AST SERPL-CCNC: 65 U/L (ref 10–40)
BASOPHILS # BLD AUTO: 0.09 K/UL (ref 0–0.2)
BASOPHILS NFR BLD: 1.2 % (ref 0–1.9)
BILIRUB SERPL-MCNC: 1 MG/DL (ref 0.1–1)
BUN SERPL-MCNC: 28 MG/DL (ref 6–20)
CALCIUM SERPL-MCNC: 9.7 MG/DL (ref 8.7–10.5)
CHLORIDE SERPL-SCNC: 96 MMOL/L (ref 95–110)
CO2 SERPL-SCNC: 29 MMOL/L (ref 23–29)
COTININE SERPL-MCNC: <3 NG/ML
CREAT SERPL-MCNC: 2 MG/DL (ref 0.5–1.4)
DELSYS: ABNORMAL
DIFFERENTIAL METHOD: ABNORMAL
EOSINOPHIL # BLD AUTO: 0.2 K/UL (ref 0–0.5)
EOSINOPHIL NFR BLD: 3 % (ref 0–8)
ERYTHROCYTE [DISTWIDTH] IN BLOOD BY AUTOMATED COUNT: 14.9 % (ref 11.5–14.5)
ERYTHROCYTE [SEDIMENTATION RATE] IN BLOOD BY WESTERGREN METHOD: 16 MM/H
EST. GFR  (AFRICAN AMERICAN): 41 ML/MIN/1.73 M^2
EST. GFR  (NON AFRICAN AMERICAN): 35.5 ML/MIN/1.73 M^2
F2 GENE MUT ANL BLD/T: NORMAL
FLOW: 3.5
G6PD RBC-CCNT: 14 U/G HGB (ref 7–20.5)
GAMMA INTERFERON BACKGROUND BLD IA-ACNC: 0.02 IU/ML
GLUCOSE SERPL-MCNC: 107 MG/DL (ref 70–110)
HCO3 UR-SCNC: 33.3 MMOL/L (ref 24–28)
HCT VFR BLD AUTO: 43.8 % (ref 40–54)
HGB BLD-MCNC: 13.9 G/DL (ref 14–18)
IMM GRANULOCYTES # BLD AUTO: 0.05 K/UL (ref 0–0.04)
IMM GRANULOCYTES NFR BLD AUTO: 0.7 % (ref 0–0.5)
INR PPP: 1.1 (ref 0.8–1.2)
LYMPHOCYTES # BLD AUTO: 1.9 K/UL (ref 1–4.8)
LYMPHOCYTES NFR BLD: 25.5 % (ref 18–48)
M TB IFN-G CD4+ BCKGRND COR BLD-ACNC: 0.01 IU/ML
MAGNESIUM SERPL-MCNC: 2.1 MG/DL (ref 1.6–2.6)
MAGNESIUM SERPL-MCNC: 2.1 MG/DL (ref 1.6–2.6)
MCH RBC QN AUTO: 28.1 PG (ref 27–31)
MCHC RBC AUTO-ENTMCNC: 31.7 G/DL (ref 32–36)
MCV RBC AUTO: 89 FL (ref 82–98)
MITOGEN IGNF BCKGRD COR BLD-ACNC: 6.83 IU/ML
MODE: ABNORMAL
MONOCYTES # BLD AUTO: 0.8 K/UL (ref 0.3–1)
MONOCYTES NFR BLD: 11.6 % (ref 4–15)
NEUTROPHILS # BLD AUTO: 4.2 K/UL (ref 1.8–7.7)
NEUTROPHILS NFR BLD: 58 % (ref 38–73)
NICOTINE SERPL-MCNC: <3 NG/ML
NRBC BLD-RTO: 0 /100 WBC
PCO2 BLDA: 47.9 MMHG (ref 35–45)
PH SMN: 7.45 [PH] (ref 7.35–7.45)
PHOSPHATE SERPL-MCNC: 5.1 MG/DL (ref 2.7–4.5)
PLATELET # BLD AUTO: 185 K/UL (ref 150–350)
PMV BLD AUTO: 11.2 FL (ref 9.2–12.9)
PO2 BLDA: 36 MMHG (ref 40–60)
POC BE: 9 MMOL/L
POC SATURATED O2: 71 % (ref 95–100)
POC TCO2: 35 MMOL/L (ref 24–29)
POTASSIUM SERPL-SCNC: 3.6 MMOL/L (ref 3.5–5.1)
POTASSIUM SERPL-SCNC: 4.3 MMOL/L (ref 3.5–5.1)
PROT SERPL-MCNC: 6.6 G/DL (ref 6–8.4)
PROTHROMBIN TIME: 11.6 SEC (ref 9–12.5)
RBC # BLD AUTO: 4.95 M/UL (ref 4.6–6.2)
SAMPLE: ABNORMAL
SITE: ABNORMAL
SODIUM SERPL-SCNC: 138 MMOL/L (ref 136–145)
SP02: 97
TB GOLD PLUS: NEGATIVE
TB2 - NIL: 0.01 IU/ML
WBC # BLD AUTO: 7.25 K/UL (ref 3.9–12.7)

## 2020-01-18 PROCEDURE — 63600175 PHARM REV CODE 636 W HCPCS: Performed by: INTERNAL MEDICINE

## 2020-01-18 PROCEDURE — 99498 ADVNCD CARE PLAN ADDL 30 MIN: CPT | Mod: ,,, | Performed by: INTERNAL MEDICINE

## 2020-01-18 PROCEDURE — 99497 ADVNCD CARE PLAN 30 MIN: CPT | Mod: ,,, | Performed by: INTERNAL MEDICINE

## 2020-01-18 PROCEDURE — 83735 ASSAY OF MAGNESIUM: CPT | Mod: 91

## 2020-01-18 PROCEDURE — 85025 COMPLETE CBC W/AUTO DIFF WBC: CPT

## 2020-01-18 PROCEDURE — 80053 COMPREHEN METABOLIC PANEL: CPT

## 2020-01-18 PROCEDURE — 63600175 PHARM REV CODE 636 W HCPCS: Performed by: PHYSICIAN ASSISTANT

## 2020-01-18 PROCEDURE — 99233 PR SUBSEQUENT HOSPITAL CARE,LEVL III: ICD-10-PCS | Mod: ,,, | Performed by: INTERNAL MEDICINE

## 2020-01-18 PROCEDURE — 27000221 HC OXYGEN, UP TO 24 HOURS

## 2020-01-18 PROCEDURE — 84100 ASSAY OF PHOSPHORUS: CPT

## 2020-01-18 PROCEDURE — 99233 SBSQ HOSP IP/OBS HIGH 50: CPT | Mod: ,,, | Performed by: INTERNAL MEDICINE

## 2020-01-18 PROCEDURE — A4216 STERILE WATER/SALINE, 10 ML: HCPCS | Performed by: INTERNAL MEDICINE

## 2020-01-18 PROCEDURE — 99497 PR ADVNCD CARE PLAN 30 MIN: ICD-10-PCS | Mod: ,,, | Performed by: INTERNAL MEDICINE

## 2020-01-18 PROCEDURE — 25000003 PHARM REV CODE 250: Performed by: INTERNAL MEDICINE

## 2020-01-18 PROCEDURE — 25000003 PHARM REV CODE 250: Performed by: HOSPITALIST

## 2020-01-18 PROCEDURE — 94761 N-INVAS EAR/PLS OXIMETRY MLT: CPT

## 2020-01-18 PROCEDURE — 25000003 PHARM REV CODE 250: Performed by: NURSE PRACTITIONER

## 2020-01-18 PROCEDURE — 85610 PROTHROMBIN TIME: CPT

## 2020-01-18 PROCEDURE — 99291 PR CRITICAL CARE, E/M 30-74 MINUTES: ICD-10-PCS | Mod: ,,, | Performed by: NURSE PRACTITIONER

## 2020-01-18 PROCEDURE — 63600367 HC NITRIC OXIDE PER HOUR

## 2020-01-18 PROCEDURE — 85730 THROMBOPLASTIN TIME PARTIAL: CPT | Mod: 91

## 2020-01-18 PROCEDURE — 82803 BLOOD GASES ANY COMBINATION: CPT

## 2020-01-18 PROCEDURE — 83735 ASSAY OF MAGNESIUM: CPT

## 2020-01-18 PROCEDURE — 20000000 HC ICU ROOM

## 2020-01-18 PROCEDURE — 99498 PR ADVNCD CARE PLAN ADDL 30 MIN: ICD-10-PCS | Mod: ,,, | Performed by: INTERNAL MEDICINE

## 2020-01-18 PROCEDURE — 99291 CRITICAL CARE FIRST HOUR: CPT | Mod: ,,, | Performed by: NURSE PRACTITIONER

## 2020-01-18 PROCEDURE — 99900035 HC TECH TIME PER 15 MIN (STAT)

## 2020-01-18 PROCEDURE — 25000003 PHARM REV CODE 250: Performed by: STUDENT IN AN ORGANIZED HEALTH CARE EDUCATION/TRAINING PROGRAM

## 2020-01-18 PROCEDURE — 84132 ASSAY OF SERUM POTASSIUM: CPT

## 2020-01-18 RX ORDER — SYRING-NEEDL,DISP,INSUL,0.3 ML 29 G X1/2"
296 SYRINGE, EMPTY DISPOSABLE MISCELLANEOUS ONCE
Status: COMPLETED | OUTPATIENT
Start: 2020-01-18 | End: 2020-01-18

## 2020-01-18 RX ORDER — POTASSIUM CHLORIDE 20 MEQ/1
40 TABLET, EXTENDED RELEASE ORAL ONCE
Status: COMPLETED | OUTPATIENT
Start: 2020-01-18 | End: 2020-01-18

## 2020-01-18 RX ADMIN — POLYETHYLENE GLYCOL 3350 17 G: 17 POWDER, FOR SOLUTION ORAL at 08:01

## 2020-01-18 RX ADMIN — HEPARIN SODIUM 18 UNITS/KG/HR: 10000 INJECTION, SOLUTION INTRAVENOUS at 01:01

## 2020-01-18 RX ADMIN — POTASSIUM CHLORIDE 40 MEQ: 1500 TABLET, EXTENDED RELEASE ORAL at 05:01

## 2020-01-18 RX ADMIN — HEPARIN SODIUM 20 UNITS/KG/HR: 10000 INJECTION, SOLUTION INTRAVENOUS at 05:01

## 2020-01-18 RX ADMIN — AMIODARONE HYDROCHLORIDE 200 MG: 200 TABLET ORAL at 08:01

## 2020-01-18 RX ADMIN — DOCUSATE SODIUM 50 MG: 50 CAPSULE, LIQUID FILLED ORAL at 08:01

## 2020-01-18 RX ADMIN — FUROSEMIDE 80 MG: 20 INJECTION, SOLUTION INTRAMUSCULAR; INTRAVENOUS at 06:01

## 2020-01-18 RX ADMIN — FUROSEMIDE 80 MG: 20 INJECTION, SOLUTION INTRAMUSCULAR; INTRAVENOUS at 08:01

## 2020-01-18 RX ADMIN — MAGNESIUM CITRATE 296 ML: 1.75 LIQUID ORAL at 12:01

## 2020-01-18 RX ADMIN — Medication 10 ML: at 11:01

## 2020-01-18 RX ADMIN — HYDRALAZINE HYDROCHLORIDE 100 MG: 50 TABLET, FILM COATED ORAL at 05:01

## 2020-01-18 RX ADMIN — Medication 10 ML: at 06:01

## 2020-01-18 NOTE — CONSULTS
[facility]  Palliative Medicine  Consult Note    Patient Name: Tae Delgado  MRN: [id number]  Admission Date: 1/9/2020  Hospital Length of Stay: 9 days  Code Status: Full Code   Attending Provider: Isiah Montero MD  Consulting Provider: Princess Rees MD  Primary Care Physician: Elham Pearson MD  Principal Problem:Acute on chronic combined systolic and diastolic heart failure    Patient information was obtained from patient and spouse/SO.      Consults  Assessment/Plan:     * Acute on chronic combined systolic and diastolic heart failure  Patient has minimal symptoms at this time. He is standing and talking without difficulty or obvious dyspnea. He is accepting of LVAD at this time.    ACP (advance care planning)  Advanced Directives::  Living Will: Yes. Copy on chart: Completed today and will scan to chart.  LaPOST: No  Do Not Resuscitate Status: Yes  Medical Power of : Yes. Agent's Name: Epi Delgado-youngest son. Agent's Contact Number: [phone]  Second agent is Jose Elias Delgado [phone] son Third agent: Lavern Carbajal-girlfriend of 6 years.  Decision-Making Capacity: Patient answered questions, Lavern was present. He has documents at home in the [facility] safe so asked to replicate the documents which was done today.  He would not want to be maintained on life support if he was unable to participate in his own life in a meaningful way. He has always been active and that is his goal. He would like fixable things fixed and temporary ventilatory or JODIE support if he was able to recover. He would want all stopped if he was never going to get better. He would want fluids if he was thirsty but we talked about not providing fluids if it would prolong or cause burdens.   Total time of advance care planning/discussion/completion of documents 60 minutes beyond initial assessment.      Pre-transplant evaluation for heart transplant  The need for preparedness planning was discussed with special attention and  in reference to:  a) device failure b) suboptimal QOL post LVAD procedure, c) impact on co- morbid conditions, and d) catastrophic complications such as stroke, renal failure/hemodialysis or other chronic critical illness. In particular, the following points should be noted in the event of:  1. Device failure: Pt aware this is a possible complication laurence can lead to death.   2. a) Post LVAD QOL goals are: to have a good quality of life where he feels well enough to go out and do things with his wife. He promised her a trip to [place] when he gets well enough to go.   b) Chronic poor QOL is defined as: feeling tired, SOB and unable to live a full life.         3)   Catastrophic Complications: Discussed including stroke, gi/head bleed, drive-line infection, kidney failure.Patient and his girlfriend discussed openly about his wishes if he is terminal, irreversible in a state where he cannot make decisions.  His son Epi is his HCPOA. Documents completed today.        During the discussion the patient and caregiver demonstrated x__excellent understanding concerning the risk vs benefits of obtaining an LVAD. They wish to proceed on Tuesday. Both sons-Epi and Jose Elias should be here tomorrow.  Total time of advance care planning/discussion/completion of documents 60 minutes beyond initial assessment.             Thank you for your consult. I will follow-up with patient. Please contact us if you have any additional questions.    Subjective:     HPI:   Kelvin Delgado (Tae Delgado) is a 59 year old man who is admitted for Non ischemic cardiomyopathy. I have been asked to see him as a palliative medicine consultation prior to LVAD placement. He is not a candidate at this time for a heart transplant.  Mr. 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 He was managed by Dr. Albert Romeo in  at [facility] until  "approximately 5 years ago when he was seen by the AHF team at [facility]. At that time he refused LVAD. He has been doing reasonably well until about 4-6 weeks ago when he developed increased fluid overload. He is now a candidate and has accepted LVAD placement scheduled for Tuesday 1/21/2020.  I interviewed him in the room with his girlfriend of 6 years Lavern Carbajal along with nurse Eden Conner.    Hospital Course:  No notes on file    Interval History: He is standing in the room with monitors and NC in place. Conversant and not obviously short of breath. He is happy that he can continue living with LVAD placement. He has several friends including someone who lives near him with LVAD and they live "good lives". He is proud of being [place] to his grandchildren and is very active even very recently hunting, fishing, using his tractor and other equipment. He also has been chopping wood although he does have help with the "big stuff".    Past Medical History:   Diagnosis Date    CHF (congestive heart failure) 1/2010    Dx  1/2010 w/ decreased LV systolic function (EF 15%) by ECHO 1/2015    COCM (congestive cardiomyopathy) 7/20/2016    Hyperlipidemia     Hypertension     Paroxysmal atrial fibrillation     Pulmonary embolus 2008    Stroke     Superficial thrombophlebitis        Past Surgical History:   Procedure Laterality Date    RIGHT HEART CATHETERIZATION Right 1/16/2020    Procedure: INSERTION, CATHETER, RIGHT HEART;  Surgeon: Isiah Montero MD;  Location: [facility] CATH LAB;  Service: Cardiology;  Laterality: Right;    TONSILLECTOMY      VEIN LIGATION AND STRIPPING         Review of patient's allergies indicates:   Allergen Reactions    Biopatch [chlorhexidine gluconate]      Site burning    Dobutamine in d5w      Tachycardia, tremors, SOB, flushing    Percocet [oxycodone-acetaminophen] Itching    Penicillins Rash       Medications:  Continuous Infusions:   heparin (porcine) in D5W 20 Units/kg/hr " (01/18/20 1200)    nitric oxide gas       Scheduled Meds:   amiodarone  200 mg Oral Daily    docusate sodium  50 mg Oral BID    furosemide  80 mg Intravenous BID    heparin (PORCINE)  60 Units/kg (Adjusted) Intravenous Once    polyethylene glycol  17 g Oral BID    sodium chloride 0.9%  10 mL Intravenous Q6H     PRN Meds:acetaminophen, ALPRAZolam, heparin (PORCINE), heparin (PORCINE), ondansetron, sodium chloride, sodium chloride 0.9%, Flushing PICC Protocol **AND** sodium chloride 0.9% **AND** sodium chloride 0.9%, traMADol    Family History     Problem Relation (Age of Onset)    Cancer Mother        Tobacco Use    Smoking status: Never Smoker    Smokeless tobacco: Never Used   Substance and Sexual Activity    Alcohol use: No    Drug use: No    Sexual activity: Not on file       Review of Systems   Constitutional: Negative.    HENT: Negative.    Eyes: Negative.    Respiratory: Negative.         A little sob but walked for 3 hours in [place] [facility] on 3 prior to admit to ICU! He is standing and pacing in his ICU room without symptoms.   Cardiovascular: Negative.    Gastrointestinal: Positive for constipation.   Endocrine: Negative.    Genitourinary: Negative.    Musculoskeletal: Negative.    Skin: Negative.    Allergic/Immunologic: Negative.    Neurological: Negative.    Hematological: Negative.    Psychiatric/Behavioral: Negative.      Objective:     Vital Signs (Most Recent):  Temp: 97.7 °F (36.5 °C) (01/18/20 1101)  Pulse: (!) 125 (01/18/20 1200)  Resp: (!) 24 (01/18/20 1200)  BP: (!) 130/97 (01/18/20 1101)  SpO2: 95 % (01/18/20 1200) Vital Signs (24h Range):  Temp:  [97.7 °F (36.5 °C)-98.6 °F (37 °C)] 97.7 °F (36.5 °C)  Pulse:  [] 125  Resp:  [13-52] 24  SpO2:  [93 %-99 %] 95 %  BP: (103-138)/(68-97) 130/97     Weight: 108.5 kg (239 lb 3.2 oz)  Body mass index is 34.32 kg/m².    Review of Symptoms  Symptom Assessment (ESAS 0-10 scale)     ESAS 0 1 2 3 4 5 6 7 8 9 10   Pain [x]  []  []  []  []  []   []  []  []  []  []    Dyspnea []  [x]  []  []  []  []  []  []  []  []  []    Anxiety [x]  []  []  []  []  []  []  []  []  []  []    Nausea [x]  []  []  []  []  []  []  []  []  []  []    Depression  [x]  []  []  []  []  []  []  []  []  []  []    Anorexia [x]  []  []  []  []  []  []  []  []  []  []    Fatigue []  [x]  []  []  []  []  []  []  []  []  []    Insomnia [x]  []  []  []  []  []  []  []  []  []  []    Restlessness  [x]  []  []  []  []  []  []  []  []  []  []    Agitation [x]  []  []  []  []  []  []  []  []  []  []      CAM / Delirium NO  Constipation     _yes_ --  ___+   Diarrhea           _no_ --  ___+  Bowel Management Plan (BMP): Yes    Comments: Just took MgCitrate    Pain Assessment: NO PAIN. He would always want to receive pain medications!  OME in 24 hours: 0    Performance Status: 90    ECOG Performance Status Grade: 1 - Ambulates, capable of light work    Physical Exam   Constitutional: He is oriented to person, place, and time. He appears well-developed and well-nourished.   HENT:   Head: Normocephalic and atraumatic.   Nose: Nose normal.   Eyes: Pupils are equal, round, and reactive to light. Conjunctivae and EOM are normal.   Neck: Normal range of motion. Neck supple.   Cardiovascular:   Tachycardic   Pulmonary/Chest: Effort normal.   Musculoskeletal: Normal range of motion.   Neurological: He is alert and oriented to person, place, and time.   Skin: Skin is warm and dry.   Psychiatric: He has a normal mood and affect. His behavior is normal. Judgment and thought content normal.   Nursing note and vitals reviewed.      Significant Labs: All pertinent labs within the past 24 hours have been reviewed.  CBC:   Recent Labs   Lab 01/18/20  0352   WBC 7.25   HGB 13.9*   HCT 43.8   MCV 89        BMP:  Recent Labs   Lab 01/18/20  0352 01/18/20  1104     --      --    K 3.6 4.3   CL 96  --    CO2 29  --    BUN 28*  --    CREATININE 2.0*  --    CALCIUM 9.7  --    MG 2.1 2.1      LFT:  Lab Results   Component Value Date    AST 65 (H) 01/18/2020    ALKPHOS 53 (L) 01/18/2020    BILITOT 1.0 01/18/2020     Albumin:   Albumin   Date Value Ref Range Status   01/18/2020 3.6 3.5 - 5.2 g/dL Final     Protein:   Total Protein   Date Value Ref Range Status   01/18/2020 6.6 6.0 - 8.4 g/dL Final     Lactic acid:   No results found for: LACTATE    Significant Imaging: I have reviewed all pertinent imaging results/findings within the past 24 hours.    Advance Care Planning   Advanced Directives::  Living Will: Yes. Copy on chart: No  LaPOST: No  Do Not Resuscitate Status: Yes  Medical Power of : Yes. Agent's Name: Epi Delgado-youngest son. Agent's Contact Number: [phone]  Second agent is Jose Elias Delgado [phone] son Third agent: Lavern Carbajal-girlfriend of 6 years.  Decision-Making Capacity: Patient answered questions, Lavern was present. He has documents at home in the [facility] safe so asked to replicate the documents which was done today.  He would not want to be maintained on life support if he was unable to participate in his own life in a meaningful way. He has always been active and that is his goal. He would like fixable things fixed and temporary ventilatory or JODIE support if he was able to recover. He would want all stopped if he was never going to get better. He would want fluids if he was thirsty but we talked about not providing fluids if it would prolong or cause burdens.        Living Arrangements: Lives with friend, Lives in home, Lives [place] miles from [facility]. From [place] acres. He is interested in living there doing majority of his current activity. He has been on warfarin for 10 years and has not had any problems with it and his level of activity so far.   Psychosocial/Cultural:  Has a girlfriend of 6 years Lavern Carbajal with whom he lives in [place] [facility]. She will be his caregiver. Has ex-wife who lives in [place] and with whom he had a difficult divorce. He gained custody of all  three boys. He is close to all three sons (Epi/Jose Elias/Marito) and has 6 grandchildren. He has a good relationship with his son Marito but his wife does not allow him to speak to his father in her presence. She also does not let the grandchildren see him or accept his gifts even at [place]. This is a source of frustration for him but he does not want his son Marito to lose custody of his children should they divorce [place].    Patient's most important priorities:  Being active and involved with grandchildren and girlfriend. Hunting fishing and feeding his goats.     Patient's biggest concerns/fears:  Not getting better    Previous death/end of life care history:  Took care of his mother and friend who had cancer.    Patient's goals/hopes:  To live well with his disability.    Spiritual:     F- [place] and Belief: Quaker    I - Importance: yes  .  C - Community: yes    A - Address in Care: yes    Total time of advance care planning/discussion/completion of documents 60 minutes beyond initial assessment.      Princess Rees MD  Palliative Medicine  [facility]

## 2020-01-18 NOTE — SUBJECTIVE & OBJECTIVE
"Interval History: He is standing in the room with monitors and NC in place. Conversant and not obviously short of breath. He is happy that he can continue living with LVAD placement. He has several friends including someone who lives near him with LVAD and they live "good lives". He is proud of being [place] to his grandchildren and is very active even very recently hunting, fishing, using his tractor and other equipment. He also has been chopping wood although he does have help with the "big stuff".    Past Medical History:   Diagnosis Date    CHF (congestive heart failure) 1/2010    Dx  1/2010 w/ decreased LV systolic function (EF 15%) by ECHO 1/2015    COCM (congestive cardiomyopathy) 7/20/2016    Hyperlipidemia     Hypertension     Paroxysmal atrial fibrillation     Pulmonary embolus 2008    Stroke     Superficial thrombophlebitis        Past Surgical History:   Procedure Laterality Date    RIGHT HEART CATHETERIZATION Right 1/16/2020    Procedure: INSERTION, CATHETER, RIGHT HEART;  Surgeon: Isiah Montero MD;  Location: [facility] CATH LAB;  Service: Cardiology;  Laterality: Right;    TONSILLECTOMY      VEIN LIGATION AND STRIPPING         Review of patient's allergies indicates:   Allergen Reactions    Biopatch [chlorhexidine gluconate]      Site burning    Dobutamine in d5w      Tachycardia, tremors, SOB, flushing    Percocet [oxycodone-acetaminophen] Itching    Penicillins Rash       Medications:  Continuous Infusions:   heparin (porcine) in D5W 20 Units/kg/hr (01/18/20 1200)    nitric oxide gas       Scheduled Meds:   amiodarone  200 mg Oral Daily    docusate sodium  50 mg Oral BID    furosemide  80 mg Intravenous BID    heparin (PORCINE)  60 Units/kg (Adjusted) Intravenous Once    polyethylene glycol  17 g Oral BID    sodium chloride 0.9%  10 mL Intravenous Q6H     PRN Meds:acetaminophen, ALPRAZolam, heparin (PORCINE), heparin (PORCINE), ondansetron, sodium chloride, sodium chloride " 0.9%, Flushing PICC Protocol **AND** sodium chloride 0.9% **AND** sodium chloride 0.9%, traMADol    Family History     Problem Relation (Age of Onset)    Cancer Mother        Tobacco Use    Smoking status: Never Smoker    Smokeless tobacco: Never Used   Substance and Sexual Activity    Alcohol use: No    Drug use: No    Sexual activity: Not on file       Review of Systems   Constitutional: Negative.    HENT: Negative.    Eyes: Negative.    Respiratory: Negative.         A little sob but walked for 3 hours in winter garden on 3 prior to admit to ICU! He is standing and pacing in his ICU room without symptoms.   Cardiovascular: Negative.    Gastrointestinal: Positive for constipation.   Endocrine: Negative.    Genitourinary: Negative.    Musculoskeletal: Negative.    Skin: Negative.    Allergic/Immunologic: Negative.    Neurological: Negative.    Hematological: Negative.    Psychiatric/Behavioral: Negative.      Objective:     Vital Signs (Most Recent):  Temp: 97.7 °F (36.5 °C) (01/18/20 1101)  Pulse: (!) 125 (01/18/20 1200)  Resp: (!) 24 (01/18/20 1200)  BP: (!) 130/97 (01/18/20 1101)  SpO2: 95 % (01/18/20 1200) Vital Signs (24h Range):  Temp:  [97.7 °F (36.5 °C)-98.6 °F (37 °C)] 97.7 °F (36.5 °C)  Pulse:  [] 125  Resp:  [13-52] 24  SpO2:  [93 %-99 %] 95 %  BP: (103-138)/(68-97) 130/97     Weight: 108.5 kg (239 lb 3.2 oz)  Body mass index is 34.32 kg/m².    Review of Symptoms  Symptom Assessment (ESAS 0-10 scale)     ESAS 0 1 2 3 4 5 6 7 8 9 10   Pain [x]  []  []  []  []  []  []  []  []  []  []    Dyspnea []  [x]  []  []  []  []  []  []  []  []  []    Anxiety [x]  []  []  []  []  []  []  []  []  []  []    Nausea [x]  []  []  []  []  []  []  []  []  []  []    Depression  [x]  []  []  []  []  []  []  []  []  []  []    Anorexia [x]  []  []  []  []  []  []  []  []  []  []    Fatigue []  [x]  []  []  []  []  []  []  []  []  []    Insomnia [x]  []  []  []  []  []  []  []  []  []  []    Restlessness  [x]  []  []   []  []  []  []  []  []  []  []    Agitation [x]  []  []  []  []  []  []  []  []  []  []      CAM / Delirium NO  Constipation     _yes_ --  ___+   Diarrhea           _no_ --  ___+  Bowel Management Plan (BMP): Yes    Comments: Just took MgCitrate    Pain Assessment: NO PAIN. He would always want to receive pain medications!  OME in 24 hours: 0    Performance Status: 90    ECOG Performance Status Grade: 1 - Ambulates, capable of light work    Physical Exam   Constitutional: He is oriented to person, place, and time. He appears well-developed and well-nourished.   HENT:   Head: Normocephalic and atraumatic.   Nose: Nose normal.   Eyes: Pupils are equal, round, and reactive to light. Conjunctivae and EOM are normal.   Neck: Normal range of motion. Neck supple.   Cardiovascular:   Tachycardic   Pulmonary/Chest: Effort normal.   Musculoskeletal: Normal range of motion.   Neurological: He is alert and oriented to person, place, and time.   Skin: Skin is warm and dry.   Psychiatric: He has a normal mood and affect. His behavior is normal. Judgment and thought content normal.   Nursing note and vitals reviewed.      Significant Labs: All pertinent labs within the past 24 hours have been reviewed.  CBC:   Recent Labs   Lab 01/18/20  0352   WBC 7.25   HGB 13.9*   HCT 43.8   MCV 89        BMP:  Recent Labs   Lab 01/18/20  0352 01/18/20  1104     --      --    K 3.6 4.3   CL 96  --    CO2 29  --    BUN 28*  --    CREATININE 2.0*  --    CALCIUM 9.7  --    MG 2.1 2.1     LFT:  Lab Results   Component Value Date    AST 65 (H) 01/18/2020    ALKPHOS 53 (L) 01/18/2020    BILITOT 1.0 01/18/2020     Albumin:   Albumin   Date Value Ref Range Status   01/18/2020 3.6 3.5 - 5.2 g/dL Final     Protein:   Total Protein   Date Value Ref Range Status   01/18/2020 6.6 6.0 - 8.4 g/dL Final     Lactic acid:   No results found for: LACTATE    Significant Imaging: I have reviewed all pertinent imaging results/findings within the  past 24 hours.    Advance Care Planning   Advanced Directives::  Living Will: Yes. Copy on chart: No  LaPOST: No  Do Not Resuscitate Status: Yes  Medical Power of : Yes. Agent's Name: Epi Delgado-youngest son. Agent's Contact Number: [phone]  Second agent is Jose Elias Delgado [phone] son Third agent: Lavern Carbajal-girlfriend of 6 years.  Decision-Making Capacity: Patient answered questions, Lavern was present. He has documents at home in the [facility] safe so asked to replicate the documents which was done today.  He would not want to be maintained on life support if he was unable to participate in his own life in a meaningful way. He has always been active and that is his goal. He would like fixable things fixed and temporary ventilatory or JODIE support if he was able to recover. He would want all stopped if he was never going to get better. He would want fluids if he was thirsty but we talked about not providing fluids if it would prolong or cause burdens.        Living Arrangements: Lives with friend, Lives in home, Lives [place] miles from [facility]. From [place]. He is interested in living there doing majority of his current activity. He has been on warfarin for 10 years and has not had any problems with it and his level of activity so far.   Psychosocial/Cultural:  Has a girlfriend of 6 years Lavern Carbajal with whom he lives in [facility]. She will be his caregiver. Has ex-wife who lives in [place] and with whom he had a difficult divorce. He gained custody of all three boys. He is close to all three sons (Epi/Jose Elias/Marito) and has 6 grandchildren. He has a good relationship with his son Marito but his wife does not allow him to speak to his father in her presence. She also does not let the grandchildren see him or accept his gifts even at [place]. This is a source of frustration for him but he does not want his son Marito to lose custody of his children should they divorce since he works off  darren.    Patient's most important priorities:  Being active and involved with grandchildren and girlfriend. Hunting fishing and feeding his goats.     Patient's biggest concerns/fears:  Not getting better    Previous death/end of life care history:  Took care of his mother and friend who had cancer.    Patient's goals/hopes:  To live well with his disability.    Spiritual:     F- [place] and Belief: Gnosticism    I - Importance: yes  .  C - Community: yes    A - Address in Care: yes

## 2020-01-18 NOTE — SUBJECTIVE & OBJECTIVE
"**Interval History: Did not tolerate even low dose Epi with "fuzzy head". Feels much better off of it. Increased Corby to 20 ppm this morning as creatinine has climbed to 2.0. Hydralazine/Isordil D/C'd in anticipation of  LVAD implant on Tuesday, 1/21    Continuous Infusions:   heparin (porcine) in D5W 20 Units/kg/hr (01/18/20 0952)    nitric oxide gas       Scheduled Meds:   amiodarone  200 mg Oral Daily    docusate sodium  50 mg Oral BID    furosemide  80 mg Intravenous BID    heparin (PORCINE)  60 Units/kg (Adjusted) Intravenous Once    polyethylene glycol  17 g Oral BID    sodium chloride 0.9%  10 mL Intravenous Q6H     PRN Meds:acetaminophen, ALPRAZolam, heparin (PORCINE), heparin (PORCINE), ondansetron, sodium chloride, sodium chloride 0.9%, Flushing PICC Protocol **AND** sodium chloride 0.9% **AND** sodium chloride 0.9%, traMADol    Review of patient's allergies indicates:   Allergen Reactions    Biopatch [chlorhexidine gluconate]      Site burning    Dobutamine in d5w      Tachycardia, tremors, SOB, flushing    Percocet [oxycodone-acetaminophen] Itching    Penicillins Rash     Objective:     Vital Signs (Most Recent):  Temp: 97.9 °F (36.6 °C) (01/18/20 0701)  Pulse: 97 (01/18/20 0900)  Resp: (!) 23 (01/18/20 0900)  BP: 114/76 (01/18/20 0900)  SpO2: 98 % (01/18/20 0900) Vital Signs (24h Range):  Temp:  [97.6 °F (36.4 °C)-98.6 °F (37 °C)] 97.9 °F (36.6 °C)  Pulse:  [] 97  Resp:  [13-52] 23  SpO2:  [95 %-99 %] 98 %  BP: (103-138)/(68-94) 114/76     Patient Vitals for the past 72 hrs (Last 3 readings):   Weight   01/17/20 1700 108.5 kg (239 lb 3.2 oz)   01/16/20 0710 108.5 kg (239 lb 3.2 oz)     Body mass index is 34.32 kg/m².      Intake/Output Summary (Last 24 hours) at 1/18/2020 0952  Last data filed at 1/18/2020 0900  Gross per 24 hour   Intake 1372 ml   Output 2560 ml   Net -1188 ml       Hemodynamic Parameters:       Telemetry: ST with paroxysmal WCT    Physical Exam   Constitutional: He is " oriented to person, place, and time. He appears well-developed and well-nourished.   HENT:   Head: Normocephalic and atraumatic.   Eyes: Pupils are equal, round, and reactive to light. Conjunctivae and EOM are normal.   Neck: Normal range of motion. Neck supple. No JVD present. No thyromegaly present.   Cardiovascular: Regular rhythm.   Tachycardic, + soft S3   Pulmonary/Chest: Effort normal and breath sounds normal.   Abdominal: Soft. Bowel sounds are normal.   Musculoskeletal: Normal range of motion. He exhibits no edema.   Neurological: He is alert and oriented to person, place, and time.   Skin: Skin is warm and dry. Capillary refill takes 2 to 3 seconds.   Psychiatric: He has a normal mood and affect. His behavior is normal. Judgment and thought content normal.       Significant Labs:  CBC:  Recent Labs   Lab 01/16/20  0459 01/17/20  0230 01/18/20  0352   WBC 8.07 7.57 7.25   RBC 5.06 4.93 4.95   HGB 14.0 13.9* 13.9*   HCT 45.0 43.8 43.8    174 185   MCV 89 89 89   MCH 27.7 28.2 28.1   MCHC 31.1* 31.7* 31.7*     BNP:  Recent Labs   Lab 01/12/20  0825 01/15/20  1611   * 668*     CMP:  Recent Labs   Lab 01/17/20  0230 01/17/20  2242 01/18/20  0352    179* 107   CALCIUM 9.4 9.8 9.7   ALBUMIN 3.6 3.8 3.6   PROT 6.6 7.2 6.6    136 138   K 3.6 3.8 3.6   CO2 27 29 29   CL 98 94* 96   BUN 28* 29* 28*   CREATININE 1.8* 2.1* 2.0*   ALKPHOS 54* 53* 53*   * 118* 108*   AST 93* 73* 65*   BILITOT 1.1* 1.0 1.0      Coagulation:   Recent Labs   Lab 01/16/20  0459 01/17/20  0230  01/17/20  1500 01/17/20  2319 01/18/20  0352 01/18/20  0835   INR 1.6*  1.6* 1.2  --   --   --  1.1  --    APTT 32.5*  32.5* 29.9   < > 31.7 46.5*  --  38.3*    < > = values in this interval not displayed.     LDH:  Recent Labs   Lab 01/15/20  1611   *     Microbiology:  Microbiology Results (last 7 days)     ** No results found for the last 168 hours. **          I have reviewed all pertinent labs within the  past 24 hours.    Estimated Creatinine Clearance: 49.1 mL/min (A) (based on SCr of 2 mg/dL (H)).    Diagnostic Results:  I have reviewed all pertinent imaging results/findings within the past 24 hours.

## 2020-01-18 NOTE — HPI
Kelvin Delgado (Tae Delgado) is a 59 year old man who is admitted for Non ischemic cardiomyopathy. I have been asked to see him as a palliative medicine consultation prior to LVAD placement. He is not a candidate at this time for a heart transplant.  Mr. 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 He was managed by Dr. Albert Romeo in  at [facility] until approximately 5 years ago when he was seen by the AHF team at [facility]. At that time he refused LVAD. He has been doing reasonably well until about 4-6 weeks ago when he developed increased fluid overload. He is now a candidate and has accepted LVAD placement scheduled for Tuesday 1/21/2020.  I interviewed him in the room with his girlfriend of 6 years Lavern Carbajal along with nurse Eden Conner.

## 2020-01-18 NOTE — ASSESSMENT & PLAN NOTE
"- [facility] dx'd 2010  - TTE 1/10: LVEDD 6.96, LVEF 15%, diastolic dysfunction, RV midly dilated, mod decreased, severe LUCI, mild MR, PAQS 31 and IVC 8  - In the past months has been admitted 3 times for volume management requiring high doses of diuretics, in [facility]. Admits not following a strict diet and gaining weight. Now, he refers following a diet and current weight 239 lb but went up to 251 lb when not following diet.   - RHC 1/16 off : RA 12, PA 70/36 (52), w 27, CO/CI 5.07/2.25, SVR 1277  - Transferred to ICU 1/16 for RV support with Corby, increase to 20 ppm this morning given bump in creatinine to 2.0. May need IABP as bridge to LVAD  - Initially diuresed this admit, but diuretics stopped 1/11 when central line placed and CVP was 2. Was given 900 cc IVF since, and CVP was up to 13. Resumed home dose of Lasix 40 mg po bid on 1/13, but transitioned back to Lasix 80 mg IVP bid on 1/16 after RHC.  CVP 7 via PICC line today  - sVO2 71 this AM off of  and low dose Epi ( stopped completely on 1/14 due to tachycardia and tremors/flushing with resolution of symptoms, and Epi D/C'd 1/17 2 hours after initiation 2/2 "fuzzy head").  - GDMT: Toprol on hold 2/2 decompensation. Hydralazine/Isordil D/C'd this morning in anticipation of LVAD implant on 1/21.  Refused to continue Losartan (stopped 4 days PTA) because makes him feel bad and SBP in 90's at home  - On step 3.   - Discussed at urgent selection 1/17: It was the committee decision to decline the pt for transplant listing due to elevated PA pressures. He is approved for LVAD as DT.  - Repeat TTE on Monday  "

## 2020-01-18 NOTE — ASSESSMENT & PLAN NOTE
Advanced Directives::  Living Will: Yes. Copy on chart: Completed today and will scan to chart.  LaPOST: No  Do Not Resuscitate Status: Yes  Medical Power of : Yes. Agent's Name: Epi Delgado-youngest son. Agent's Contact Number: [phone]  Second agent is Jose Elias Delgado [phone] son Third agent: Lavern Carbajal-girlfriend of 6 years.  Decision-Making Capacity: Patient answered questions, Lavern was present. He has documents at home in the [facility] safe so asked to replicate the documents which was done today.  He would not want to be maintained on life support if he was unable to participate in his own life in a meaningful way. He has always been active and that is his goal. He would like fixable things fixed and temporary ventilatory or JODIE support if he was able to recover. He would want all stopped if he was never going to get better. He would want fluids if he was thirsty but we talked about not providing fluids if it would prolong or cause burdens.   Total time of advance care planning/discussion/completion of documents 60 minutes beyond initial assessment.

## 2020-01-18 NOTE — ASSESSMENT & PLAN NOTE
- Creatinine on admit 2.6 (baseline ~ 1.8). Creatinine today has bumped a bit at 2.0  - monitor with diuresis  - Followed by Nephrology as an outpatient  - See A/C CHF

## 2020-01-18 NOTE — PLAN OF CARE
POC reviewed with pt and significant other Pt on heparin at 18 unit/kg/hr (titraed per low intensity protocol). Pt on NC @ 3.5L with 1 ppm of Nitric. Pt Denies any SOB. Pt urinates spontaneously UO >800 cc for shift.  No BM throughout shift. Skin free from any new break down. VSS. See flow sheets for additional details.

## 2020-01-18 NOTE — CONSULTS
Consult received. Discussed with Marylin Lazcano NP with Heart Transplant team.  Patient scheduled for LVAD on Tuesday. Will see prior to LVAD placement.

## 2020-01-18 NOTE — PROGRESS NOTES
"[facility]  Heart Transplant  Progress Note    Patient Name: Tae Delgado  MRN: [id number]  Admission Date: 1/9/2020  Hospital Length of Stay: 9 days  Attending Physician: Isiah Montero MD  Primary Care Provider: Elham Pearson MD  Principal Problem:Acute on chronic combined systolic and diastolic heart failure    Subjective:     **Interval History: Did not tolerate even low dose Epi with "fuzzy head". Feels much better off of it. Increased Corby to 20 ppm this morning as creatinine has climbed to 2.0. Hydralazine/Isordil D/C'd in anticipation of  LVAD implant on Tuesday, 1/21    Continuous Infusions:   heparin (porcine) in D5W 20 Units/kg/hr (01/18/20 0952)    nitric oxide gas       Scheduled Meds:   amiodarone  200 mg Oral Daily    docusate sodium  50 mg Oral BID    furosemide  80 mg Intravenous BID    heparin (PORCINE)  60 Units/kg (Adjusted) Intravenous Once    polyethylene glycol  17 g Oral BID    sodium chloride 0.9%  10 mL Intravenous Q6H     PRN Meds:acetaminophen, ALPRAZolam, heparin (PORCINE), heparin (PORCINE), ondansetron, sodium chloride, sodium chloride 0.9%, Flushing PICC Protocol **AND** sodium chloride 0.9% **AND** sodium chloride 0.9%, traMADol    Review of patient's allergies indicates:   Allergen Reactions    Biopatch [chlorhexidine gluconate]      Site burning    Dobutamine in d5w      Tachycardia, tremors, SOB, flushing    Percocet [oxycodone-acetaminophen] Itching    Penicillins Rash     Objective:     Vital Signs (Most Recent):  Temp: 97.9 °F (36.6 °C) (01/18/20 0701)  Pulse: 97 (01/18/20 0900)  Resp: (!) 23 (01/18/20 0900)  BP: 114/76 (01/18/20 0900)  SpO2: 98 % (01/18/20 0900) Vital Signs (24h Range):  Temp:  [97.6 °F (36.4 °C)-98.6 °F (37 °C)] 97.9 °F (36.6 °C)  Pulse:  [] 97  Resp:  [13-52] 23  SpO2:  [95 %-99 %] 98 %  BP: (103-138)/(68-94) 114/76     Patient Vitals for the past 72 hrs (Last 3 readings):   Weight   01/17/20 1700 108.5 kg (239 lb 3.2 oz) "   01/16/20 0710 108.5 kg (239 lb 3.2 oz)     Body mass index is 34.32 kg/m².      Intake/Output Summary (Last 24 hours) at 1/18/2020 0952  Last data filed at 1/18/2020 0900  Gross per 24 hour   Intake 1372 ml   Output 2560 ml   Net -1188 ml       Hemodynamic Parameters:       Telemetry: ST with paroxysmal WCT    Physical Exam   Constitutional: He is oriented to person, place, and time. He appears well-developed and well-nourished.   HENT:   Head: Normocephalic and atraumatic.   Eyes: Pupils are equal, round, and reactive to light. Conjunctivae and EOM are normal.   Neck: Normal range of motion. Neck supple. No JVD present. No thyromegaly present.   Cardiovascular: Regular rhythm.   Tachycardic, + soft S3   Pulmonary/Chest: Effort normal and breath sounds normal.   Abdominal: Soft. Bowel sounds are normal.   Musculoskeletal: Normal range of motion. He exhibits no edema.   Neurological: He is alert and oriented to person, place, and time.   Skin: Skin is warm and dry. Capillary refill takes 2 to 3 seconds.   Psychiatric: He has a normal mood and affect. His behavior is normal. Judgment and thought content normal.       Significant Labs:  CBC:  Recent Labs   Lab 01/16/20  0459 01/17/20  0230 01/18/20  0352   WBC 8.07 7.57 7.25   RBC 5.06 4.93 4.95   HGB 14.0 13.9* 13.9*   HCT 45.0 43.8 43.8    174 185   MCV 89 89 89   MCH 27.7 28.2 28.1   MCHC 31.1* 31.7* 31.7*     BNP:  Recent Labs   Lab 01/12/20  0825 01/15/20  1611   * 668*     CMP:  Recent Labs   Lab 01/17/20  0230 01/17/20  2242 01/18/20  0352    179* 107   CALCIUM 9.4 9.8 9.7   ALBUMIN 3.6 3.8 3.6   PROT 6.6 7.2 6.6    136 138   K 3.6 3.8 3.6   CO2 27 29 29   CL 98 94* 96   BUN 28* 29* 28*   CREATININE 1.8* 2.1* 2.0*   ALKPHOS 54* 53* 53*   * 118* 108*   AST 93* 73* 65*   BILITOT 1.1* 1.0 1.0      Coagulation:   Recent Labs   Lab 01/16/20  0459 01/17/20  0230  01/17/20  1500 01/17/20  2319 01/18/20  0352 01/18/20  0835   INR 1.6*   1.6* 1.2  --   --   --  1.1  --    APTT 32.5*  32.5* 29.9   < > 31.7 46.5*  --  38.3*    < > = values in this interval not displayed.     LDH:  Recent Labs   Lab 01/15/20  1611   *     Microbiology:  Microbiology Results (last 7 days)     ** No results found for the last 168 hours. **          I have reviewed all pertinent labs within the past 24 hours.    Estimated Creatinine Clearance: 49.1 mL/min (A) (based on SCr of 2 mg/dL (H)).    Diagnostic Results:  I have reviewed all pertinent imaging results/findings within the past 24 hours.    Assessment and Plan:       55 y.o. WM with history of NICMP diagnosed in 2010, ICD, LV thrombus (with prior splenic and renal emboli), Embolic  CVA , paroxysmal atrial fib, HTN, HLP  presents for  F/U today to clinic and he was volume overloaded on exam .  He states he had 3 admission in the last 3 months for volume overload. He started having more SOB on exertion since one month. Also endorses of Orthopnea since last one month. Alble to walk only 150 ft. He also has Bilateral lower extremity edema. He was admitted here in 2017 for ADHD here at [facility] and RHC during that admission showed PCWP 40 and CVP 17. Currently denies chest pain, lightheadedness     * Acute on chronic combined systolic and diastolic heart failure  - [facility] dx'd 2010  - TTE 1/10: LVEDD 6.96, LVEF 15%, diastolic dysfunction, RV midly dilated, mod decreased, severe LUCI, mild MR, PAQS 31 and IVC 8  - In the past months has been admitted 3 times for volume management requiring high doses of diuretics, in [facility]. Admits not following a strict diet and gaining weight. Now, he refers following a diet and current weight 239 lb but went up to 251 lb when not following diet.   - RHC 1/16 off : RA 12, PA 70/36 (52), w 27, CO/CI 5.07/2.25, SVR 1277  - Transferred to ICU 1/16 for RV support with Corby, increase to 20 ppm this morning given bump in creatinine to 2.0. May need IABP as  "bridge to LVAD  - Initially diuresed this admit, but diuretics stopped 1/11 when central line placed and CVP was 2. Was given 900 cc IVF since, and CVP was up to 13. Resumed home dose of Lasix 40 mg po bid on 1/13, but transitioned back to Lasix 80 mg IVP bid on 1/16 after RHC.  CVP 7 via PICC line today  - sVO2 71 this AM off of  and low dose Epi ( stopped completely on 1/14 due to tachycardia and tremors/flushing with resolution of symptoms, and Epi D/C'd 1/17 2 hours after initiation 2/2 "fuzzy head").  - GDMT: Toprol on hold 2/2 decompensation. Hydralazine/Isordil D/C'd this morning in anticipation of LVAD implant on 1/21.  Refused to continue Losartan (stopped 4 days PTA) because makes him feel bad and SBP in 90's at home  - On step 3.   - Discussed at urgent selection 1/17: It was the committee decision to decline the pt for transplant listing due to elevated PA pressures. He is approved for LVAD as DT.  - Repeat TTE on Monday    Hepatic congestion  - Liver US on 1/11 unremarkable    Transaminitis  - elevated on admission, trended down on  but trended back up off of . Trending back down now on Corby   - monitor closely  - on amiodarone for AF    ICD (implantable cardioverter-defibrillator) in place  - S/P Biotronik dual chamber ICD    Right lower lobe pulmonary nodule  - Incidental finding on CT chest/abd/pelvis on 1/12. Pulmonology consulted, and rec repeat CT of chest in 3 months  - Will need to follow-up in pulmonary clinic.     Acute kidney injury superimposed on chronic kidney disease  - Creatinine on admit 2.6 (baseline ~ 1.8). Creatinine today has bumped a bit at 2.0  - monitor with diuresis  - Followed by Nephrology as an outpatient  - See A/C CHF    Paroxysmal atrial fibrillation  - Currently in SR - ST  - Continue Amiodarone 200 mg qd  - Continue Heparin bridge, but D/C'd Coumadin as he is in w/u      Left ventricular thrombus without MI  - H/O LV thrombus with h/o splenic and renal emboli " as well as embolic CVA  - Limited TTE done here 1/13 showed no thrombus  - Continue Heparin birdge, but D/C'd Coumadin as he is in w/u.        Uninterrupted Critical Care/Counseling Time (not including procedures): 30 minutes      Marylin Lazcano NP [id number]  Heart Transplant  [facility]

## 2020-01-18 NOTE — PLAN OF CARE
POC reviewed with Pt and family. Pt is AAOX4. Pt O2 sats >95% on 3.5L NC with 20ppm of NO. Pts HR has been 90s-100s. CVP was 7. SBP 120s-140s. Heparin gtt titrated per algorithm. PTTs Q6. Pt tolerating diet. Pt voids via urinal. Pt had 1 BM on my shift. Electrolytes trended and replaced. Pt denies pain. Family at bedside. Plan is for LVAD placement on 1/21. VSS. Will continue to monitor.

## 2020-01-18 NOTE — ASSESSMENT & PLAN NOTE
Patient has minimal symptoms at this time. He is standing and talking without difficulty or obvious dyspnea. He is accepting of LVAD at this time.

## 2020-01-18 NOTE — ASSESSMENT & PLAN NOTE
The need for preparedness planning was discussed with special attention and in reference to:  a) device failure b) suboptimal QOL post LVAD procedure, c) impact on co- morbid conditions, and d) catastrophic complications such as stroke, renal failure/hemodialysis or other chronic critical illness. In particular, the following points should be noted in the event of:  1. Device failure: Pt aware this is a possible complication laurence can lead to death.   2. a) Post LVAD QOL goals are: to have a good quality of life where he feels well enough to go out and do things with his wife. He promised her a trip to [place] when he gets well enough to go.   b) Chronic poor QOL is defined as: feeling tired, SOB and unable to live a full life.         3)   Catastrophic Complications: Discussed including stroke, gi/head bleed, drive-line infection, kidney failure.Patient and his girlfriend discussed openly about his wishes if he is terminal, irreversible in a state where he cannot make decisions.  His son Epi is his HCPOA. Documents completed today.        During the discussion the patient and caregiver demonstrated x__excellent understanding concerning the risk vs benefits of obtaining an LVAD. They wish to proceed on Tuesday. Both sons-Epi and Jose Elias should be here tomorrow.  Total time of advance care planning/discussion/completion of documents 60 minutes beyond initial assessment.

## 2020-01-19 PROBLEM — D68.9 COAGULOPATHY: Status: ACTIVE | Noted: 2020-01-19

## 2020-01-19 LAB
ALBUMIN SERPL BCP-MCNC: 3.7 G/DL (ref 3.5–5.2)
ALLENS TEST: ABNORMAL
ALP SERPL-CCNC: 50 U/L (ref 55–135)
ALT SERPL W/O P-5'-P-CCNC: 87 U/L (ref 10–44)
ANION GAP SERPL CALC-SCNC: 13 MMOL/L (ref 8–16)
APTT BLDCRRT: 65.5 SEC (ref 21–32)
AST SERPL-CCNC: 48 U/L (ref 10–40)
BASOPHILS # BLD AUTO: 0.08 K/UL (ref 0–0.2)
BASOPHILS NFR BLD: 1 % (ref 0–1.9)
BILIRUB SERPL-MCNC: 0.9 MG/DL (ref 0.1–1)
BUN SERPL-MCNC: 32 MG/DL (ref 6–20)
CALCIUM SERPL-MCNC: 9.6 MG/DL (ref 8.7–10.5)
CHLORIDE SERPL-SCNC: 94 MMOL/L (ref 95–110)
CO2 SERPL-SCNC: 30 MMOL/L (ref 23–29)
CREAT SERPL-MCNC: 2 MG/DL (ref 0.5–1.4)
DIFFERENTIAL METHOD: ABNORMAL
EOSINOPHIL # BLD AUTO: 0.2 K/UL (ref 0–0.5)
EOSINOPHIL NFR BLD: 2.5 % (ref 0–8)
ERYTHROCYTE [DISTWIDTH] IN BLOOD BY AUTOMATED COUNT: 14.7 % (ref 11.5–14.5)
EST. GFR  (AFRICAN AMERICAN): 41 ML/MIN/1.73 M^2
EST. GFR  (NON AFRICAN AMERICAN): 35.5 ML/MIN/1.73 M^2
GLUCOSE SERPL-MCNC: 90 MG/DL (ref 70–110)
HCO3 UR-SCNC: 35.5 MMOL/L (ref 24–28)
HCT VFR BLD AUTO: 44.7 % (ref 40–54)
HGB BLD-MCNC: 14 G/DL (ref 14–18)
IMM GRANULOCYTES # BLD AUTO: 0.07 K/UL (ref 0–0.04)
IMM GRANULOCYTES NFR BLD AUTO: 0.8 % (ref 0–0.5)
INR PPP: 1.2 (ref 0.8–1.2)
LYMPHOCYTES # BLD AUTO: 1.8 K/UL (ref 1–4.8)
LYMPHOCYTES NFR BLD: 21.6 % (ref 18–48)
MAGNESIUM SERPL-MCNC: 2.6 MG/DL (ref 1.6–2.6)
MCH RBC QN AUTO: 27.8 PG (ref 27–31)
MCHC RBC AUTO-ENTMCNC: 31.3 G/DL (ref 32–36)
MCV RBC AUTO: 89 FL (ref 82–98)
METHEMOGLOBIN: 0.7 % (ref 0–3)
MONOCYTES # BLD AUTO: 1 K/UL (ref 0.3–1)
MONOCYTES NFR BLD: 12.2 % (ref 4–15)
NEUTROPHILS # BLD AUTO: 5.2 K/UL (ref 1.8–7.7)
NEUTROPHILS NFR BLD: 61.9 % (ref 38–73)
NRBC BLD-RTO: 0 /100 WBC
PCO2 BLDA: 54.6 MMHG (ref 35–45)
PH SMN: 7.42 [PH] (ref 7.35–7.45)
PLATELET # BLD AUTO: 180 K/UL (ref 150–350)
PMV BLD AUTO: 11.1 FL (ref 9.2–12.9)
PO2 BLDA: 33 MMHG (ref 40–60)
POC BE: 11 MMOL/L
POC SATURATED O2: 63 % (ref 95–100)
POC TCO2: 37 MMOL/L (ref 24–29)
POTASSIUM SERPL-SCNC: 4.1 MMOL/L (ref 3.5–5.1)
PROT SERPL-MCNC: 6.9 G/DL (ref 6–8.4)
PROTHROMBIN TIME: 11.7 SEC (ref 9–12.5)
RBC # BLD AUTO: 5.03 M/UL (ref 4.6–6.2)
SAMPLE: ABNORMAL
SITE: ABNORMAL
SODIUM SERPL-SCNC: 137 MMOL/L (ref 136–145)
WBC # BLD AUTO: 8.33 K/UL (ref 3.9–12.7)

## 2020-01-19 PROCEDURE — 82803 BLOOD GASES ANY COMBINATION: CPT

## 2020-01-19 PROCEDURE — 85730 THROMBOPLASTIN TIME PARTIAL: CPT

## 2020-01-19 PROCEDURE — 25000003 PHARM REV CODE 250: Performed by: HOSPITALIST

## 2020-01-19 PROCEDURE — 99499 NO LOS: ICD-10-PCS | Mod: ,,, | Performed by: INTERNAL MEDICINE

## 2020-01-19 PROCEDURE — 27000221 HC OXYGEN, UP TO 24 HOURS

## 2020-01-19 PROCEDURE — 63600367 HC NITRIC OXIDE PER HOUR

## 2020-01-19 PROCEDURE — 83050 HGB METHEMOGLOBIN QUAN: CPT

## 2020-01-19 PROCEDURE — 99291 CRITICAL CARE FIRST HOUR: CPT | Mod: ,,, | Performed by: NURSE PRACTITIONER

## 2020-01-19 PROCEDURE — 83735 ASSAY OF MAGNESIUM: CPT

## 2020-01-19 PROCEDURE — 99499 UNLISTED E&M SERVICE: CPT | Mod: ,,, | Performed by: INTERNAL MEDICINE

## 2020-01-19 PROCEDURE — 99291 PR CRITICAL CARE, E/M 30-74 MINUTES: ICD-10-PCS | Mod: ,,, | Performed by: NURSE PRACTITIONER

## 2020-01-19 PROCEDURE — 63600175 PHARM REV CODE 636 W HCPCS: Performed by: INTERNAL MEDICINE

## 2020-01-19 PROCEDURE — 80053 COMPREHEN METABOLIC PANEL: CPT

## 2020-01-19 PROCEDURE — 94761 N-INVAS EAR/PLS OXIMETRY MLT: CPT

## 2020-01-19 PROCEDURE — 99900035 HC TECH TIME PER 15 MIN (STAT)

## 2020-01-19 PROCEDURE — 20000000 HC ICU ROOM

## 2020-01-19 PROCEDURE — 85025 COMPLETE CBC W/AUTO DIFF WBC: CPT

## 2020-01-19 PROCEDURE — 85610 PROTHROMBIN TIME: CPT

## 2020-01-19 RX ORDER — FUROSEMIDE 10 MG/ML
40 INJECTION INTRAMUSCULAR; INTRAVENOUS ONCE
Status: COMPLETED | OUTPATIENT
Start: 2020-01-19 | End: 2020-01-19

## 2020-01-19 RX ADMIN — DOCUSATE SODIUM 50 MG: 50 CAPSULE, LIQUID FILLED ORAL at 09:01

## 2020-01-19 RX ADMIN — AMIODARONE HYDROCHLORIDE 200 MG: 200 TABLET ORAL at 09:01

## 2020-01-19 RX ADMIN — POLYETHYLENE GLYCOL 3350 17 G: 17 POWDER, FOR SOLUTION ORAL at 09:01

## 2020-01-19 RX ADMIN — HEPARIN SODIUM 20.05 UNITS/KG/HR: 10000 INJECTION, SOLUTION INTRAVENOUS at 08:01

## 2020-01-19 RX ADMIN — FUROSEMIDE 40 MG: 10 INJECTION, SOLUTION INTRAVENOUS at 05:01

## 2020-01-19 NOTE — PLAN OF CARE
POC reviewed with pt and significant other Pt on heparin at 20 unit/kg/hr (titraed per low intensity protocol). Pt on NC @ 4L with 20 ppm of Nitric. Pt Denies any SOB. Pt urinates spontaneously UO >450 cc for shift.  No BM throughout shift. Skin free from any new break down. VSS. See flow sheets for additional details.

## 2020-01-19 NOTE — PROGRESS NOTES
"Tae ROXANNA Sandy was discussed at selection committee on 1/17/20. Patient presented for OHT and VAD. Declined for OHT due to:  High PAP    Patient has an anticipated survival benefit. Patient has NYHA IV symptoms which have failed to respond to optimal medical management.     Decision made to proceed with DT VAD implantation. This has been discussed with the patient and family, including the reasons for why he is not a transplant candidate at this time.     Pt was unable to tolerate  due to tachycardia/Flushing.,"Patient has demonstrated functional limitation with a peak oxygen consumption of less than or equal to 14.7 ml/kg/min." in 2017}    Patient is INTERMACS class 2, ACC stage D.     Discussed with the patient and family [facility] outcomes as reported in INTERMACS (Interagency Registry for Mechanically Assisted Circulatory Support):    1 year survival = 92.7%  2 year survival = 86.7%  3 year survival = 86.7%    Patient and family acknowledged receipt of this information and all questions were answered.     "

## 2020-01-19 NOTE — CONSULTS
[facility]  Infectious Disease  Consult Note    Patient Name: Tae Delgado  MRN: [id number]  Admission Date: 1/9/2020  Hospital Length of Stay: 10 days  Attending Physician: Isiah Montero MD  Primary Care Provider: Elham Pearson MD     Isolation Status: No active isolations        Inpatient consult to Infectious Diseases  Consult performed by: JENNIFER Ochoa, ANP  Consult ordered by: Apoorva Zhao MD  Reason for consult: heart transplant/LVAD        ID Consult acknowledged.   Full consult and recommendations to follow.   In the interim, please call for any immediate concerns.     Thank you.   JENNIFER Austin, ANP-C  [phone] pager,  [id number] [phone]

## 2020-01-19 NOTE — PROGRESS NOTES
[facility]  Heart Transplant  Progress Note    Patient Name: Tae Delgado  MRN: [id number]  Admission Date: 1/9/2020  Hospital Length of Stay: 10 days  Attending Physician: Isiah Montero MD  Primary Care Provider: Elham Pearson MD  Principal Problem:Acute on chronic combined systolic and diastolic heart failure    Subjective:     **Interval History: No complaints or vovrnight issues. Creatinine remains 2.0 despite increasing Corby to 20 ppm yesterday. Will D/C IVP Lasix, but may still need a balloon pump to optimize kidney fx prior to LVAD on Tuesday. LFT's trending down    Continuous Infusions:   heparin (porcine) in D5W 20.046 Units/kg/hr (01/19/20 0500)    nitric oxide gas       Scheduled Meds:   amiodarone  200 mg Oral Daily    docusate sodium  50 mg Oral BID    heparin (PORCINE)  60 Units/kg (Adjusted) Intravenous Once    polyethylene glycol  17 g Oral BID    sodium chloride 0.9%  10 mL Intravenous Q6H     PRN Meds:acetaminophen, ALPRAZolam, heparin (PORCINE), heparin (PORCINE), ondansetron, sodium chloride, sodium chloride 0.9%, Flushing PICC Protocol **AND** sodium chloride 0.9% **AND** sodium chloride 0.9%, traMADol    Review of patient's allergies indicates:   Allergen Reactions    Biopatch [chlorhexidine gluconate]      Site burning    Dobutamine in d5w      Tachycardia, tremors, SOB, flushing    Percocet [oxycodone-acetaminophen] Itching    Penicillins Rash     Objective:     Vital Signs (Most Recent):  Temp: 98.4 °F (36.9 °C) (01/19/20 0300)  Pulse: 98 (01/19/20 0545)  Resp: 17 (01/19/20 0545)  BP: 112/84 (01/19/20 0500)  SpO2: 97 % (01/19/20 0545) Vital Signs (24h Range):  Temp:  [97.7 °F (36.5 °C)-98.5 °F (36.9 °C)] 98.4 °F (36.9 °C)  Pulse:  [] 98  Resp:  [13-55] 17  SpO2:  [93 %-99 %] 97 %  BP: (101-134)/(71-97) 112/84     Patient Vitals for the past 72 hrs (Last 3 readings):   Weight   01/17/20 1700 108.5 kg (239 lb 3.2 oz)     Body mass index is 34.32  kg/m².      Intake/Output Summary (Last 24 hours) at 1/19/2020 0730  Last data filed at 1/19/2020 0500  Gross per 24 hour   Intake 1721 ml   Output 1250 ml   Net 471 ml       Hemodynamic Parameters:       Telemetry: SR-ST with NSVT    Physical Exam   Constitutional: He is oriented to person, place, and time. He appears well-developed and well-nourished.   HENT:   Head: Normocephalic and atraumatic.   Eyes: Pupils are equal, round, and reactive to light. Conjunctivae and EOM are normal.   Neck: Normal range of motion. Neck supple. No JVD present. No thyromegaly present.   Cardiovascular: Regular rhythm.   Tachycardic, + soft S3   Pulmonary/Chest: Effort normal and breath sounds normal.   Abdominal: Soft. Bowel sounds are normal.   Musculoskeletal: Normal range of motion. He exhibits no edema.   Neurological: He is alert and oriented to person, place, and time.   Skin: Skin is warm and dry. Capillary refill takes 2 to 3 seconds.   Psychiatric: He has a normal mood and affect. His behavior is normal. Judgment and thought content normal.       Significant Labs:  CBC:  Recent Labs   Lab 01/17/20  0230 01/18/20  0352 01/19/20  0326   WBC 7.57 7.25 8.33   RBC 4.93 4.95 5.03   HGB 13.9* 13.9* 14.0   HCT 43.8 43.8 44.7    185 180   MCV 89 89 89   MCH 28.2 28.1 27.8   MCHC 31.7* 31.7* 31.3*     BNP:  Recent Labs   Lab 01/12/20  0825 01/15/20  1611   * 668*     CMP:  Recent Labs   Lab 01/17/20  2242 01/18/20  0352 01/18/20  1104 01/19/20  0326   * 107  --  90   CALCIUM 9.8 9.7  --  9.6   ALBUMIN 3.8 3.6  --  3.7   PROT 7.2 6.6  --  6.9    138  --  137   K 3.8 3.6 4.3 4.1   CO2 29 29  --  30*   CL 94* 96  --  94*   BUN 29* 28*  --  32*   CREATININE 2.1* 2.0*  --  2.0*   ALKPHOS 53* 53*  --  50*   * 108*  --  87*   AST 73* 65*  --  48*   BILITOT 1.0 1.0  --  0.9      Coagulation:   Recent Labs   Lab 01/17/20  0230  01/18/20  0352  01/18/20  1556 01/18/20  2151 01/19/20  0326   INR 1.2  --  1.1   --   --   --  1.2   APTT 29.9   < >  --    < > 48.4* 57.3* 65.5*    < > = values in this interval not displayed.     LDH:  No results for input(s): LDH in the last 72 hours.  Microbiology:  Microbiology Results (last 7 days)     Procedure Component Value Units Date/Time    Blood culture [[id number]]     Order Status:  No result Specimen:  Blood     Blood culture [[id number]]     Order Status:  No result Specimen:  Blood           I have reviewed all pertinent labs within the past 24 hours.    Estimated Creatinine Clearance: 49.1 mL/min (A) (based on SCr of 2 mg/dL (H)).    Diagnostic Results:  I have reviewed all pertinent imaging results/findings within the past 24 hours.    Assessment and Plan:       55 y.o. WM with history of NICMP diagnosed in 2010, ICD, LV thrombus (with prior splenic and renal emboli), Embolic  CVA , paroxysmal atrial fib, HTN, HLP  presents for  F/U today to clinic and he was volume overloaded on exam .  He states he had 3 admission in the last 3 months for volume overload. He started having more SOB on exertion since one month. Also endorses of Orthopnea since last one month. Alble to walk only 150 ft. He also has Bilateral lower extremity edema. He was admitted here in 2017 for ADHD here at [facility] and RHC during that admission showed PCWP 40 and CVP 17. Currently denies chest pain, lightheadedness     * Acute on chronic combined systolic and diastolic heart failure  - [facility] dx'd 2010  - TTE 1/10: LVEDD 6.96, LVEF 15%, diastolic dysfunction, RV midly dilated, mod decreased, severe LUCI, mild MR, PAQS 31 and IVC 8  - In the past months has been admitted 3 times for volume management requiring high doses of diuretics, in [facility]. Admits not following a strict diet and gaining weight. Now, he refers following a diet and current weight 239 lb but went up to 251 lb when not following diet.   - RHC 1/16 off : RA 12, PA 70/36 (52), w 27, CO/CI 5.07/2.25, SVR 1277  -  "Transferred to ICU 1/16 for RV support with Corby, increase to 20 ppm 1/18 given bump in creatinine to 2.0, but creatinine remains 2 this morning. Will D/C IVP Lasix. May need IABP as bridge to LVAD  - Initially diuresed this admit, but diuretics stopped 1/11 when central line placed and CVP was 2. Was given 900 cc IVF since, and CVP was up to 13. Resumed home dose of Lasix 40 mg po bid on 1/13, but transitioned back to Lasix 80 mg IVP bid on 1/16 after RHC.  CVP 9 via PICC line today. D/C'd Lasix this morning as above  - sVO2 63 this AM off of  and low dose Epi ( stopped completely on 1/14 due to tachycardia and tremors/flushing with resolution of symptoms, and Epi D/C'd 1/17 2 hours after initiation 2/2 "fuzzy head").  - GDMT: Toprol on hold 2/2 decompensation. Hydralazine/Isordil D/C'd 1/18 in anticipation of LVAD implant on 1/21.  Refused to continue Losartan (stopped 4 days PTA) because makes him feel bad and SBP in 90's at home  - On step 4.   - Discussed at urgent selection 1/17: It was the committee decision to decline the pt for transplant listing due to elevated PA pressures. He is approved for LVAD as DT.  - Repeat TTE tomorrow    Hepatic congestion  - Liver US on 1/11 unremarkable    Transaminitis  - elevated on admission, trended down on  but trended back up off of . Trending back down now on Corby   - monitor closely  - on amiodarone for AF    ICD (implantable cardioverter-defibrillator) in place  - S/P Biotronik dual chamber ICD    Right lower lobe pulmonary nodule  - Incidental finding on CT chest/abd/pelvis on 1/12. Pulmonology consulted, and rec repeat CT of chest in 3 months  - Will need to follow-up in pulmonary clinic.     Acute kidney injury superimposed on chronic kidney disease  - Creatinine on admit 2.6 (baseline ~ 1.8). Creatinine today still elevated at 2.0  - monitor with diuresis  - Followed by Nephrology as an outpatient  - See A/C CHF    Paroxysmal atrial fibrillation  - " Currently in SR - ST  - Continue Amiodarone 200 mg qd  - Continue Heparin bridge, but D/C'd Coumadin as he is in w/u      Left ventricular thrombus without MI  - H/O LV thrombus with h/o splenic and renal emboli as well as embolic CVA  - Limited TTE done here 1/13 showed no thrombus  - Continue Heparin birdge, but D/C'd Coumadin as he is in w/u.      Uninterrupted Critical Care/Counseling Time (not including procedures): 30 minutes      Marylin Lazcano NP [id number]  Heart Transplant  [facility]

## 2020-01-19 NOTE — CONSULTS
Infectious Disease LVAD/Open Heart Pre-Transplant Evaluation Note:    Consulting Physician: Dr. Zhao  Reason for Consult: Pre LVAD/Open Heart Transplant evaluation      Assessment, Plan and Counselin. Risks of Infection: No unusual risks of infection or significant barriers to LVAD/transplantation were identified from the infectious disease standpoint given the available information at this time.  No active signs of infection.  Serologies for chronic infection are negative.       Understand that after consult requested, patient was declined for OHT due to high pulmonary artery pressures.  Now plan for VAD implantation as destination therapy.  In event clinical situation changes and is considered for transplant/immunosuppression, recommend re-consulting ID for follow up/further evaluation of the following:     -  1 cm pulmonary nodule/opacity noted RLL on CT.  Repeat CT in 3 months as recommended.    -   Coccidioides, schistosoma antibodies given travel history        2. Immunizations:  The patient's immunization history and serologies were reviewed.  Based on the patients immunization history and serologies, immunizations are recommended. Patient declined any vaccines today:     -  Flu vaccine prior to discharge.  Need flu vaccine annually   -  Heplisav B series (0, 1 month)   -  Recommend Shingrix vaccine series at age 60       3. Counseling: I discussed with the patient the risk for increased susceptibility to infections following transplantation including increased risk for infection right after transplant and if rejection should occur. Discussed infection risk, infection prevention with LVAD.   The patient has been counseled on the importance of vaccinations including but not limited to a yearly flu vaccine.    4. Specific guidance has been provided to the patient regarding the patients occupation, hobbies and activities to avoid future infectious complications including but not limited to avoiding  undercooked meats and seafood, proper hygiene, and contact with animals.        Thank you.   Please call for any questions or concerns.  JENNIFER Austin, ANP-C  [phone] pager, [phone]    Problem List:  Active Hospital Problems    Diagnosis  POA    *Acute on chronic combined systolic and diastolic heart failure [I50.43]  Yes    ACP (advance care planning) [Z71.89]  Not Applicable     Advanced Directives::  Living Will: Yes. Copy on chart: No  LaPOST: No  Do Not Resuscitate Status: Yes  Medical Power of : Yes. Agent's Name: Epi Delgado-youngest son. Agent's Contact Number: [phone]  Second agent is Jose Elias Delgado [phone] son Third agent: Lavern Carbajal-girlfriend of 6 years.  Decision-Making Capacity: Patient answered questions, Lavern was present. He has documents at home in the [facility] safe so asked to replicate the documents which was done today.  He would not want to be maintained on life support if he was unable to participate in his own life in a meaningful way. He has always been active and that is his goal. He would like fixable things fixed and temporary ventilatory or JODIE support if he was able to recover. He would want all stopped if he was never going to get better. He would want fluids if he was thirsty but we talked about not providing fluids if it would prolong or cause burdens.         Abnormal EKG [R94.31]  Unknown    Acute on chronic heart failure [I50.9]  Unknown    Heart abnormality [Q24.9]  Not Applicable    Right lower lobe pulmonary nodule [R91.1]  Yes    ICD (implantable cardioverter-defibrillator) in place [Z95.810]  Unknown    Transaminitis [R74.0]  Yes    Hepatic congestion [K76.1]  Yes    NSVT (nonsustained ventricular tachycardia) [I47.2]  Yes    Pre-transplant evaluation for heart transplant [Z01.818]  Not Applicable    Thrombus [I82.90]  Yes    Acute kidney injury superimposed on chronic kidney disease [N17.9, N18.9]  Yes    Paroxysmal atrial fibrillation [I48.0]   Yes    Left ventricular thrombus without MI [I51.3]  Yes     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  Infectious disease is consulted for LVAD/pre-heart transplant evaluation.     The infectious disease pre-transplant questionnaire was reviewed with the patient and his family.     Patient denies recent fevers, chills, infective illnesses.     No history of diabetes.  No current or long term steroid use.   Denies splenectomy    Denies history of chronic, recurring infections of sinuses, throat, bladder/kidneys, intestines, skin, reproductive organs, teeth or gums.     History of chickenpox.  No shingles.   Denies history of syphilis, gonorrhea, other STDs/viral hepatitis/ or other infective illnesses.  Does report history of oral herpes.      Denies known exposure to TB  History of residence in coccidioides endemic areas  Extensive foreign travel as  - [place], [place], [place], [place], and [facility]    Animal exposures: pet goats and dogs  Occupational: Retired [employer], Contractor  Hobbies: Hunting, fishing, house maintenance, boating, 4 wheelers      Immunization history:  Usual childhood vaccines; multiple series of vaccines in miliatry  Flu - denies  Tdap - approx 4 years ago  Hepatitis A - IgG positive   Hepatitis B - 3 dose series 1996  Pneumonia - Pneumovax 2 years ago  Shingles - denies      Serologies:  Results for ELMIRA MORRISON (MRN [id number]) as of 1/19/2020 06:57   Ref. Range 1/15/2020 16:11 1/16/2020 04:59   Hep B Core Total Ab Unknown Negative    Hep B S Ab Unknown  Negative    Hepatitis B Surface Ag Latest Ref Range: Negative  Negative    Hepatitis C Ab Latest Ref Range: Negative  Negative    Mitogen - Nil Latest Ref Range: See text IU/mL  6.830   NIL Latest Ref Range: See text IU/mL  0.020   TB Gold Plus Unknown  Negative   TB1 - Nil Latest Ref Range: See text IU/mL  0.010   TB2 - Nil Latest Ref Range: See text IU/mL  0.010   HIV 1/2 Ag/Ab Latest Ref Range: Negative  Negative    RPR Latest Ref Range: Non-reactive  Non-reactive    CMV IgG Interpretation Latest Ref Range: Non-Reactive  Reactive (A)    Dwight-Barr Virus IgG (VCA) Latest Ref Range: Negative  Positive (A)    HSV 1 IgG Latest Ref Range: Negative  Positive (A)    HSV 2 IgG Latest Ref Range: Negative  Negative    Strongyloides Ab IgG Latest Ref Range: Negative  Negative    Varicella IgG Latest Ref Range: 0.00 - 0.90 ISR 3.29 (H)    Varicella Interpretation Latest Ref Range: Negative  Positive (A)      Results for ELMIRA MORRISON ROXANNA (MRN [id number]) as of 1/19/2020 06:57   Ref. Range 1/15/2020 16:11   Hepatitis A Antibody IgG Unknown Positive (A)       Micro:    Blood cultures 1/20 - NGTD    Imaging:  CT Chest - nodular focus of consolidative opacity within the medial aspect of the right lower lobe measuring 1 cm.    Past Medical History:  Past Medical History:   Diagnosis Date    CHF (congestive heart failure) 1/2010    Dx  1/2010 w/ decreased LV systolic function (EF 15%) by ECHO 1/2015    COCM (congestive cardiomyopathy) 7/20/2016    Hyperlipidemia     Hypertension     Paroxysmal atrial fibrillation     Pulmonary embolus 2008    Stroke     Superficial thrombophlebitis        Past Surgical History:  Past Surgical History:   Procedure Laterality Date    RIGHT HEART CATHETERIZATION Right 1/16/2020    Procedure: INSERTION, CATHETER, RIGHT HEART;  Surgeon: Isiah Montero MD;  Location: [facility] CATH LAB;  Service: Cardiology;  Laterality: Right;    TONSILLECTOMY      VEIN LIGATION AND STRIPPING         Family  History:  Family History   Problem Relation Age of Onset    Cancer Mother        Social History:  Social History     Socioeconomic History    Marital status:      Spouse name: Not on file    Number of children: Not on file    Years of education: Not on file    Highest education level: Not on file   Occupational History    Not on file   Social Needs    Financial resource strain: Not on file    Food insecurity:     Worry: Not on file     Inability: Not on file    Transportation needs:     Medical: Not on file     Non-medical: Not on file   Tobacco Use    Smoking status: Never Smoker    Smokeless tobacco: Never Used   Substance and Sexual Activity    Alcohol use: No    Drug use: No    Sexual activity: Not on file   Lifestyle    Physical activity:     Days per week: Not on file     Minutes per session: Not on file    Stress: Not on file   Relationships    Social connections:     Talks on phone: Not on file     Gets together: Not on file     Attends Denominational service: Not on file     Active member of club or organization: Not on file     Attends meetings of clubs or organizations: Not on file     Relationship status: Not on file   Other Topics Concern    Not on file   Social History Narrative    Not on file       Allergies:  Review of patient's allergies indicates:   Allergen Reactions    Biopatch [chlorhexidine gluconate]      Site burning    Dobutamine in d5w      Tachycardia, tremors, SOB, flushing    Percocet [oxycodone-acetaminophen] Itching    Penicillins Rash       Immunizations:    There is no immunization history on file for this patient.     Review of Systems   Constitution: Negative for chills, decreased appetite, fever, malaise/fatigue, night sweats, weight gain and weight loss.   HENT: Negative for congestion, ear pain, hearing loss, hoarse voice, sore throat and tinnitus.    Eyes: Negative for blurred vision, redness and visual disturbance.   Cardiovascular: Negative for chest  pain, leg swelling and palpitations.   Respiratory: Negative for cough, hemoptysis, shortness of breath, sputum production and wheezing.    Endocrine: Negative for cold intolerance and heat intolerance.   Hematologic/Lymphatic: Negative for adenopathy. Does not bruise/bleed easily.   Skin: Negative for dry skin, itching, rash and suspicious lesions.   Musculoskeletal: Negative for back pain, joint pain, myalgias and neck pain.   Gastrointestinal: Positive for constipation. Negative for abdominal pain, diarrhea, heartburn, nausea and vomiting.   Genitourinary: Negative for dysuria, flank pain, frequency, hematuria, hesitancy and urgency.   Neurological: Negative for dizziness, headaches, numbness, paresthesias and weakness.   Psychiatric/Behavioral: Negative for depression and memory loss. The patient does not have insomnia and is not nervous/anxious.    Allergic/Immunologic: Negative for environmental allergies, HIV exposure, hives and persistent infections.     Pertinent Medications:  Antibiotics:   Antibiotics (From admission, onward)    None          Physical Exam:  VS (24h):   Vitals:    01/19/20 0545   BP:    Pulse: 98   Resp: 17   Temp:      Temp:  [97.7 °F (36.5 °C)-98.5 °F (36.9 °C)]     Physical Exam   Constitutional: He is oriented to person, place, and time. He appears well-developed and well-nourished. No distress.   HENT:   Head: Normocephalic and atraumatic.   Mouth/Throat: Uvula is midline, oropharynx is clear and moist and mucous membranes are normal. He does not have dentures. No oral lesions. Normal dentition. No dental abscesses, lacerations or dental caries. No oropharyngeal exudate.   Eyes: Conjunctivae and lids are normal. No scleral icterus.   Neck: Normal range of motion. Neck supple.   Cardiovascular: Regular rhythm. Tachycardia present. Exam reveals no gallop and no friction rub.   No murmur heard.  Pulmonary/Chest: Effort normal and breath sounds normal. No respiratory distress. He has no  decreased breath sounds. He has no wheezes. He has no rhonchi. He has no rales.   Abdominal: Soft. Normal appearance and bowel sounds are normal. He exhibits no distension. There is no hepatosplenomegaly. There is no tenderness. There is no guarding.   Musculoskeletal: He exhibits no edema.   Lymphadenopathy:        Head (right side): No submental, no submandibular, no tonsillar, no preauricular, no posterior auricular and no occipital adenopathy present.        Head (left side): No submental, no submandibular, no tonsillar, no preauricular, no posterior auricular and no occipital adenopathy present.     He has no cervical adenopathy.     He has no axillary adenopathy.        Right: No inguinal, no supraclavicular and no epitrochlear adenopathy present.        Left: No inguinal, no supraclavicular and no epitrochlear adenopathy present.   Neurological: He is alert and oriented to person, place, and time. No cranial nerve deficit.   Skin: Skin is warm, dry and intact. No lesion and no rash noted. He is not diaphoretic. No erythema. No pallor.   PICC RUE c/d/i - complaining of itching under adhesive.  No rash/erythema noted   Psychiatric: He has a normal mood and affect. His behavior is normal.   Vitals reviewed.      Labs:  CBC:   Lab Results   Component Value Date    WBC 8.33 01/19/2020    WBC 7.25 01/18/2020    WBC 7.57 01/17/2020    WBC 8.07 01/16/2020    WBC 7.78 01/15/2020    HGB 14.0 01/19/2020    HCT 44.7 01/19/2020    MCV 89 01/19/2020     01/19/2020       BMP:   Recent Labs   Lab 01/19/20  0326   GLU 90      K 4.1   CL 94*   CO2 30*   BUN 32*   CREATININE 2.0*   CALCIUM 9.6   MG 2.6       LFT:   Lab Results   Component Value Date    ALT 87 (H) 01/19/2020    AST 48 (H) 01/19/2020    ALKPHOS 50 (L) 01/19/2020    BILITOT 0.9 01/19/2020       RPR:   Lab Results   Component Value Date    RPR Non-reactive 01/15/2020        Quantiferon Gold Tb: No results found for: QUANTIFERON    Hepatitis Serologies:    Lab Results   Component Value Date    HEPBCAB Negative 01/15/2020    HEPCAB Negative 01/15/2020        Microbiology x 7d:   Microbiology Results (last 7 days)     Procedure Component Value Units Date/Time    Blood culture [[id number]]     Order Status:  No result Specimen:  Blood     Blood culture [[id number]]     Order Status:  No result Specimen:  Blood              Assessment/Plan - Please see top of page

## 2020-01-19 NOTE — SUBJECTIVE & OBJECTIVE
**Interval History: No complaints or overnight issues. Creatinine remains 2.0 despite increasing Corby to 20 ppm yesterday. Will D/C IVP Lasix, but may still need a balloon pump to optimize kidney fx prior to LVAD on Tuesday. LFT's trending down    Continuous Infusions:   heparin (porcine) in D5W 20.046 Units/kg/hr (01/19/20 0500)    nitric oxide gas       Scheduled Meds:   amiodarone  200 mg Oral Daily    docusate sodium  50 mg Oral BID    heparin (PORCINE)  60 Units/kg (Adjusted) Intravenous Once    polyethylene glycol  17 g Oral BID    sodium chloride 0.9%  10 mL Intravenous Q6H     PRN Meds:acetaminophen, ALPRAZolam, heparin (PORCINE), heparin (PORCINE), ondansetron, sodium chloride, sodium chloride 0.9%, Flushing PICC Protocol **AND** sodium chloride 0.9% **AND** sodium chloride 0.9%, traMADol    Review of patient's allergies indicates:   Allergen Reactions    Biopatch [chlorhexidine gluconate]      Site burning    Dobutamine in d5w      Tachycardia, tremors, SOB, flushing    Percocet [oxycodone-acetaminophen] Itching    Penicillins Rash     Objective:     Vital Signs (Most Recent):  Temp: 98.4 °F (36.9 °C) (01/19/20 0300)  Pulse: 98 (01/19/20 0545)  Resp: 17 (01/19/20 0545)  BP: 112/84 (01/19/20 0500)  SpO2: 97 % (01/19/20 0545) Vital Signs (24h Range):  Temp:  [97.7 °F (36.5 °C)-98.5 °F (36.9 °C)] 98.4 °F (36.9 °C)  Pulse:  [] 98  Resp:  [13-55] 17  SpO2:  [93 %-99 %] 97 %  BP: (101-134)/(71-97) 112/84     Patient Vitals for the past 72 hrs (Last 3 readings):   Weight   01/17/20 1700 108.5 kg (239 lb 3.2 oz)     Body mass index is 34.32 kg/m².      Intake/Output Summary (Last 24 hours) at 1/19/2020 0730  Last data filed at 1/19/2020 0500  Gross per 24 hour   Intake 1721 ml   Output 1250 ml   Net 471 ml       Hemodynamic Parameters:       Telemetry: SR-ST with NSVT    Physical Exam   Constitutional: He is oriented to person, place, and time. He appears well-developed and well-nourished.   HENT:    Head: Normocephalic and atraumatic.   Eyes: Pupils are equal, round, and reactive to light. Conjunctivae and EOM are normal.   Neck: Normal range of motion. Neck supple. No JVD present. No thyromegaly present.   Cardiovascular: Regular rhythm.   Tachycardic, + soft S3   Pulmonary/Chest: Effort normal and breath sounds normal.   Abdominal: Soft. Bowel sounds are normal.   Musculoskeletal: Normal range of motion. He exhibits no edema.   Neurological: He is alert and oriented to person, place, and time.   Skin: Skin is warm and dry. Capillary refill takes 2 to 3 seconds.   Psychiatric: He has a normal mood and affect. His behavior is normal. Judgment and thought content normal.       Significant Labs:  CBC:  Recent Labs   Lab 01/17/20 0230 01/18/20  0352 01/19/20  0326   WBC 7.57 7.25 8.33   RBC 4.93 4.95 5.03   HGB 13.9* 13.9* 14.0   HCT 43.8 43.8 44.7    185 180   MCV 89 89 89   MCH 28.2 28.1 27.8   MCHC 31.7* 31.7* 31.3*     BNP:  Recent Labs   Lab 01/12/20  0825 01/15/20  1611   * 668*     CMP:  Recent Labs   Lab 01/17/20  2242 01/18/20  0352 01/18/20  1104 01/19/20  0326   * 107  --  90   CALCIUM 9.8 9.7  --  9.6   ALBUMIN 3.8 3.6  --  3.7   PROT 7.2 6.6  --  6.9    138  --  137   K 3.8 3.6 4.3 4.1   CO2 29 29  --  30*   CL 94* 96  --  94*   BUN 29* 28*  --  32*   CREATININE 2.1* 2.0*  --  2.0*   ALKPHOS 53* 53*  --  50*   * 108*  --  87*   AST 73* 65*  --  48*   BILITOT 1.0 1.0  --  0.9      Coagulation:   Recent Labs   Lab 01/17/20  0230  01/18/20  0352  01/18/20  1556 01/18/20  2151 01/19/20  0326   INR 1.2  --  1.1  --   --   --  1.2   APTT 29.9   < >  --    < > 48.4* 57.3* 65.5*    < > = values in this interval not displayed.     LDH:  No results for input(s): LDH in the last 72 hours.  Microbiology:  Microbiology Results (last 7 days)     Procedure Component Value Units Date/Time    Blood culture [[id number]]     Order Status:  No result Specimen:  Blood     Blood culture  [[id number]]     Order Status:  No result Specimen:  Blood           I have reviewed all pertinent labs within the past 24 hours.    Estimated Creatinine Clearance: 49.1 mL/min (A) (based on SCr of 2 mg/dL (H)).    Diagnostic Results:  I have reviewed all pertinent imaging results/findings within the past 24 hours.

## 2020-01-19 NOTE — PLAN OF CARE
CVp -15, Svo2- 63, Ci/CO/SVR: 2.5/5.4/1000   ml with one unmeasured  One dose of 40 mg IV lasix and reassess  Cr of 2  D/W Dr. Zhao

## 2020-01-19 NOTE — ASSESSMENT & PLAN NOTE
- Creatinine on admit 2.6 (baseline ~ 1.8). Creatinine today still elevated at 2.0  - monitor with diuresis  - Followed by Nephrology as an outpatient  - See A/C CHF

## 2020-01-19 NOTE — ASSESSMENT & PLAN NOTE
"- [facility] dx'd 2010  - TTE 1/10: LVEDD 6.96, LVEF 15%, diastolic dysfunction, RV midly dilated, mod decreased, severe LUCI, mild MR, PAQS 31 and IVC 8  - In the past months has been admitted 3 times for volume management requiring high doses of diuretics, in [facility]. Admits not following a strict diet and gaining weight. Now, he refers following a diet and current weight 239 lb but went up to 251 lb when not following diet.   - RHC 1/16 off : RA 12, PA 70/36 (52), w 27, CO/CI 5.07/2.25, SVR 1277  - Transferred to ICU 1/16 for RV support with Corby, increase to 20 ppm 1/18 given bump in creatinine to 2.0, but creatinine remains 2 this morning. Will D/C IVP Lasix. May need IABP as bridge to LVAD  - Initially diuresed this admit, but diuretics stopped 1/11 when central line placed and CVP was 2. Was given 900 cc IVF since, and CVP was up to 13. Resumed home dose of Lasix 40 mg po bid on 1/13, but transitioned back to Lasix 80 mg IVP bid on 1/16 after RHC.  CVP 9 via PICC line today. D/C'd Lasix this morning as above  - sVO2 63 this AM off of  and low dose Epi ( stopped completely on 1/14 due to tachycardia and tremors/flushing with resolution of symptoms, and Epi D/C'd 1/17 2 hours after initiation 2/2 "fuzzy head").  - GDMT: Toprol on hold 2/2 decompensation. Hydralazine/Isordil D/C'd 1/18 in anticipation of LVAD implant on 1/21.  Refused to continue Losartan (stopped 4 days PTA) because makes him feel bad and SBP in 90's at home  - On step 4.   - Discussed at urgent selection 1/17: It was the committee decision to decline the pt for transplant listing due to elevated PA pressures. He is approved for LVAD as DT.  - Repeat TTE tomorrow  "

## 2020-01-19 NOTE — PROGRESS NOTES
[facility]  Heart Transplant  Progress Note    Patient Name: Tae Delgado  MRN: [id number]  Admission Date: 1/9/2020  Hospital Length of Stay: 10 days  Attending Physician: Isiah Montero MD  Primary Care Provider: Elham Pearson MD  Principal Problem:Acute on chronic combined systolic and diastolic heart failure    Subjective:     **Interval History: No complaints or overnight issues. Creatinine remains 2.0 despite increasing Corby to 20 ppm yesterday. Will D/C IVP Lasix, but may still need a balloon pump to optimize kidney fx prior to LVAD on Tuesday. LFT's trending down    Continuous Infusions:   heparin (porcine) in D5W 20.046 Units/kg/hr (01/19/20 0500)    nitric oxide gas       Scheduled Meds:   amiodarone  200 mg Oral Daily    docusate sodium  50 mg Oral BID    heparin (PORCINE)  60 Units/kg (Adjusted) Intravenous Once    polyethylene glycol  17 g Oral BID    sodium chloride 0.9%  10 mL Intravenous Q6H     PRN Meds:acetaminophen, ALPRAZolam, heparin (PORCINE), heparin (PORCINE), ondansetron, sodium chloride, sodium chloride 0.9%, Flushing PICC Protocol **AND** sodium chloride 0.9% **AND** sodium chloride 0.9%, traMADol    Review of patient's allergies indicates:   Allergen Reactions    Biopatch [chlorhexidine gluconate]      Site burning    Dobutamine in d5w      Tachycardia, tremors, SOB, flushing    Percocet [oxycodone-acetaminophen] Itching    Penicillins Rash     Objective:     Vital Signs (Most Recent):  Temp: 98.4 °F (36.9 °C) (01/19/20 0300)  Pulse: 98 (01/19/20 0545)  Resp: 17 (01/19/20 0545)  BP: 112/84 (01/19/20 0500)  SpO2: 97 % (01/19/20 0545) Vital Signs (24h Range):  Temp:  [97.7 °F (36.5 °C)-98.5 °F (36.9 °C)] 98.4 °F (36.9 °C)  Pulse:  [] 98  Resp:  [13-55] 17  SpO2:  [93 %-99 %] 97 %  BP: (101-134)/(71-97) 112/84     Patient Vitals for the past 72 hrs (Last 3 readings):   Weight   01/17/20 1700 108.5 kg (239 lb 3.2 oz)     Body mass index is 34.32  kg/m².      Intake/Output Summary (Last 24 hours) at 1/19/2020 0730  Last data filed at 1/19/2020 0500  Gross per 24 hour   Intake 1721 ml   Output 1250 ml   Net 471 ml       Hemodynamic Parameters:       Telemetry: SR-ST with NSVT    Physical Exam   Constitutional: He is oriented to person, place, and time. He appears well-developed and well-nourished.   HENT:   Head: Normocephalic and atraumatic.   Eyes: Pupils are equal, round, and reactive to light. Conjunctivae and EOM are normal.   Neck: Normal range of motion. Neck supple. No JVD present. No thyromegaly present.   Cardiovascular: Regular rhythm.   Tachycardic, + soft S3   Pulmonary/Chest: Effort normal and breath sounds normal.   Abdominal: Soft. Bowel sounds are normal.   Musculoskeletal: Normal range of motion. He exhibits no edema.   Neurological: He is alert and oriented to person, place, and time.   Skin: Skin is warm and dry. Capillary refill takes 2 to 3 seconds.   Psychiatric: He has a normal mood and affect. His behavior is normal. Judgment and thought content normal.       Significant Labs:  CBC:  Recent Labs   Lab 01/17/20  0230 01/18/20  0352 01/19/20  0326   WBC 7.57 7.25 8.33   RBC 4.93 4.95 5.03   HGB 13.9* 13.9* 14.0   HCT 43.8 43.8 44.7    185 180   MCV 89 89 89   MCH 28.2 28.1 27.8   MCHC 31.7* 31.7* 31.3*     BNP:  Recent Labs   Lab 01/12/20  0825 01/15/20  1611   * 668*     CMP:  Recent Labs   Lab 01/17/20  2242 01/18/20  0352 01/18/20  1104 01/19/20  0326   * 107  --  90   CALCIUM 9.8 9.7  --  9.6   ALBUMIN 3.8 3.6  --  3.7   PROT 7.2 6.6  --  6.9    138  --  137   K 3.8 3.6 4.3 4.1   CO2 29 29  --  30*   CL 94* 96  --  94*   BUN 29* 28*  --  32*   CREATININE 2.1* 2.0*  --  2.0*   ALKPHOS 53* 53*  --  50*   * 108*  --  87*   AST 73* 65*  --  48*   BILITOT 1.0 1.0  --  0.9      Coagulation:   Recent Labs   Lab 01/17/20  0230  01/18/20  0352  01/18/20  1556 01/18/20  2151 01/19/20  0326   INR 1.2  --  1.1   --   --   --  1.2   APTT 29.9   < >  --    < > 48.4* 57.3* 65.5*    < > = values in this interval not displayed.     LDH:  No results for input(s): LDH in the last 72 hours.  Microbiology:  Microbiology Results (last 7 days)     Procedure Component Value Units Date/Time    Blood culture [[id number]]     Order Status:  No result Specimen:  Blood     Blood culture [[id number]]     Order Status:  No result Specimen:  Blood           I have reviewed all pertinent labs within the past 24 hours.    Estimated Creatinine Clearance: 49.1 mL/min (A) (based on SCr of 2 mg/dL (H)).    Diagnostic Results:  I have reviewed all pertinent imaging results/findings within the past 24 hours.    Assessment and Plan:       55 y.o. WM with history of NICMP diagnosed in 2010, ICD, LV thrombus (with prior splenic and renal emboli), Embolic  CVA , paroxysmal atrial fib, HTN, HLP  presents for  F/U today to clinic and he was volume overloaded on exam .  He states he had 3 admission in the last 3 months for volume overload. He started having more SOB on exertion since one month. Also endorses of Orthopnea since last one month. Alble to walk only 150 ft. He also has Bilateral lower extremity edema. He was admitted here in 2017 for ADHD here at [facility] and RHC during that admission showed PCWP 40 and CVP 17. Currently denies chest pain, lightheadedness     * Acute on chronic combined systolic and diastolic heart failure  - [facility] dx'd 2010  - TTE 1/10: LVEDD 6.96, LVEF 15%, diastolic dysfunction, RV midly dilated, mod decreased, severe LUCI, mild MR, PAQS 31 and IVC 8  - In the past months has been admitted 3 times for volume management requiring high doses of diuretics, in [facility]. Admits not following a strict diet and gaining weight. Now, he refers following a diet and current weight 239 lb but went up to 251 lb when not following diet.   - RHC 1/16 off : RA 12, PA 70/36 (52), w 27, CO/CI 5.07/2.25, SVR 1277  -  "Transferred to ICU 1/16 for RV support with Corby, increase to 20 ppm 1/18 given bump in creatinine to 2.0, but creatinine remains 2 this morning. Will D/C IVP Lasix. May need IABP as bridge to LVAD  - Initially diuresed this admit, but diuretics stopped 1/11 when central line placed and CVP was 2. Was given 900 cc IVF since, and CVP was up to 13. Resumed home dose of Lasix 40 mg po bid on 1/13, but transitioned back to Lasix 80 mg IVP bid on 1/16 after RHC.  CVP 9 via PICC line today. D/C'd Lasix this morning as above  - sVO2 63 this AM off of  and low dose Epi ( stopped completely on 1/14 due to tachycardia and tremors/flushing with resolution of symptoms, and Epi D/C'd 1/17 2 hours after initiation 2/2 "fuzzy head").  - GDMT: Toprol on hold 2/2 decompensation. Hydralazine/Isordil D/C'd 1/18 in anticipation of LVAD implant on 1/21.  Refused to continue Losartan (stopped 4 days PTA) because makes him feel bad and SBP in 90's at home  - On step 4.   - Discussed at urgent selection 1/17: It was the committee decision to decline the pt for transplant listing due to elevated PA pressures. He is approved for LVAD as DT.  - Repeat TTE tomorrow    Coagulopathy  - Appreciate Hem/Onc's help. No evidence of underlying coagulopathy    Hepatic congestion  - Liver US on 1/11 unremarkable    Transaminitis  - elevated on admission, trended down on  but trended back up off of . Trending back down now on Corby   - monitor closely  - on amiodarone for AF    ICD (implantable cardioverter-defibrillator) in place  - S/P Biotronik dual chamber ICD    Right lower lobe pulmonary nodule  - Incidental finding on CT chest/abd/pelvis on 1/12. Pulmonology consulted, and rec repeat CT of chest in 3 months  - Will need to follow-up in pulmonary clinic.     Acute kidney injury superimposed on chronic kidney disease  - Creatinine on admit 2.6 (baseline ~ 1.8). Creatinine today still elevated at 2.0  - monitor with diuresis  - Followed by " Nephrology as an outpatient  - See A/C CHF    Paroxysmal atrial fibrillation  - Currently in SR - ST  - Continue Amiodarone 200 mg qd  - Continue Heparin bridge, but D/C'd Coumadin as he is in w/u      Left ventricular thrombus without MI  - H/O LV thrombus with h/o splenic and renal emboli as well as embolic CVA  - Limited TTE done here 1/13 showed no thrombus  - Continue Heparin birdge, but D/C'd Coumadin as he is in w/u.        Uninterrupted Critical Care/Counseling Time (not including procedures): 30 minutes      Marylin Lazcano NP  Heart Transplant  [facility]

## 2020-01-20 ENCOUNTER — CONFERENCE (OUTPATIENT)
Dept: TRANSPLANT | Facility: CLINIC | Age: 60
End: 2020-01-20

## 2020-01-20 ENCOUNTER — ANESTHESIA EVENT (OUTPATIENT)
Dept: SURGERY | Facility: HOSPITAL | Age: 60
DRG: 001 | End: 2020-01-20
Payer: MEDICARE

## 2020-01-20 ENCOUNTER — ANTI-COAG VISIT (OUTPATIENT)
Dept: CARDIOLOGY | Facility: CLINIC | Age: 60
End: 2020-01-20

## 2020-01-20 DIAGNOSIS — I48.0 PAROXYSMAL ATRIAL FIBRILLATION: ICD-10-CM

## 2020-01-20 DIAGNOSIS — Z79.01 LONG TERM (CURRENT) USE OF ANTICOAGULANTS: ICD-10-CM

## 2020-01-20 LAB
ABO + RH BLD: NORMAL
ALBUMIN SERPL BCP-MCNC: 3.6 G/DL (ref 3.5–5.2)
ALLENS TEST: ABNORMAL
ALLENS TEST: ABNORMAL
ALP SERPL-CCNC: 49 U/L (ref 55–135)
ALT SERPL W/O P-5'-P-CCNC: 66 U/L (ref 10–44)
ANION GAP SERPL CALC-SCNC: 11 MMOL/L (ref 8–16)
APTT BLDCRRT: 56.5 SEC (ref 21–32)
AST SERPL-CCNC: 30 U/L (ref 10–40)
AV INDEX (PROSTH): 0.9
AV MEAN GRADIENT: 1 MMHG
AV PEAK GRADIENT: 2 MMHG
AV VALVE AREA: 3.77 CM2
AV VELOCITY RATIO: 0.76
BASOPHILS # BLD AUTO: 0.07 K/UL (ref 0–0.2)
BASOPHILS NFR BLD: 0.7 % (ref 0–1.9)
BILIRUB SERPL-MCNC: 1.4 MG/DL (ref 0.1–1)
BILIRUB UR QL STRIP: NEGATIVE
BLD GP AB SCN CELLS X3 SERPL QL: NORMAL
BNP SERPL-MCNC: 740 PG/ML (ref 0–99)
BSA FOR ECHO PROCEDURE: 2.43 M2
BUN SERPL-MCNC: 34 MG/DL (ref 6–20)
CALCIUM SERPL-MCNC: 9.3 MG/DL (ref 8.7–10.5)
CHLORIDE SERPL-SCNC: 93 MMOL/L (ref 95–110)
CLARITY UR REFRACT.AUTO: CLEAR
CLASS I ANTIBODIES - LUMINEX: NEGATIVE
CLASS II ANTIBODY COMMENTS - LUMINEX: NORMAL
CO2 SERPL-SCNC: 29 MMOL/L (ref 23–29)
COLOR UR AUTO: NORMAL
CPRA %: 0
CREAT SERPL-MCNC: 1.8 MG/DL (ref 0.5–1.4)
CV ECHO LV RWT: 0.24 CM
DELSYS: ABNORMAL
DELSYS: ABNORMAL
DIFFERENTIAL METHOD: ABNORMAL
DOP CALC AO PEAK VEL: 0.67 M/S
DOP CALC AO VTI: 9.66 CM
DOP CALC LVOT AREA: 4.2 CM2
DOP CALC LVOT DIAMETER: 2.31 CM
DOP CALC LVOT PEAK VEL: 0.51 M/S
DOP CALC LVOT STROKE VOLUME: 36.4 CM3
DOP CALCLVOT PEAK VEL VTI: 8.69 CM
E/E' RATIO: 10.88 M/S
ECHO LV POSTERIOR WALL: 0.81 CM (ref 0.6–1.1)
EOSINOPHIL # BLD AUTO: 0.1 K/UL (ref 0–0.5)
EOSINOPHIL NFR BLD: 1.2 % (ref 0–8)
ERYTHROCYTE [DISTWIDTH] IN BLOOD BY AUTOMATED COUNT: 14.6 % (ref 11.5–14.5)
EST. GFR  (AFRICAN AMERICAN): 46.6 ML/MIN/1.73 M^2
EST. GFR  (NON AFRICAN AMERICAN): 40.3 ML/MIN/1.73 M^2
FIO2: 30
FLOW: 3
FLOW: 4
FRACTIONAL SHORTENING: 6 % (ref 28–44)
GLUCOSE SERPL-MCNC: 110 MG/DL (ref 70–110)
GLUCOSE UR QL STRIP: NEGATIVE
HCO3 UR-SCNC: 31.9 MMOL/L (ref 24–28)
HCO3 UR-SCNC: 32.7 MMOL/L (ref 24–28)
HCT VFR BLD AUTO: 41.1 % (ref 40–54)
HGB BLD-MCNC: 13.1 G/DL (ref 14–18)
HGB UR QL STRIP: NEGATIVE
IMM GRANULOCYTES # BLD AUTO: 0.08 K/UL (ref 0–0.04)
IMM GRANULOCYTES NFR BLD AUTO: 0.8 % (ref 0–0.5)
INR PPP: 1.1 (ref 0.8–1.2)
INTERVENTRICULAR SEPTUM: 0.65 CM (ref 0.6–1.1)
KETONES UR QL STRIP: NEGATIVE
LA MAJOR: 6.82 CM
LA MINOR: 6.82 CM
LA WIDTH: 4.3 CM
LEFT ATRIUM SIZE: 4.37 CM
LEFT ATRIUM VOLUME INDEX: 46.5 ML/M2
LEFT ATRIUM VOLUME: 108.93 CM3
LEFT INTERNAL DIMENSION IN SYSTOLE: 6.41 CM (ref 2.1–4)
LEFT VENTRICLE DIASTOLIC VOLUME INDEX: 99.76 ML/M2
LEFT VENTRICLE DIASTOLIC VOLUME: 233.92 ML
LEFT VENTRICLE MASS INDEX: 89 G/M2
LEFT VENTRICLE SYSTOLIC VOLUME INDEX: 89.2 ML/M2
LEFT VENTRICLE SYSTOLIC VOLUME: 209.17 ML
LEFT VENTRICULAR INTERNAL DIMENSION IN DIASTOLE: 6.8 CM (ref 3.5–6)
LEFT VENTRICULAR MASS: 207.87 G
LEUKOCYTE ESTERASE UR QL STRIP: NEGATIVE
LV LATERAL E/E' RATIO: 7.91 M/S
LV SEPTAL E/E' RATIO: 17.4 M/S
LYMPHOCYTES # BLD AUTO: 1.3 K/UL (ref 1–4.8)
LYMPHOCYTES NFR BLD: 12.8 % (ref 18–48)
MAGNESIUM SERPL-MCNC: 2.4 MG/DL (ref 1.6–2.6)
MCH RBC QN AUTO: 27.9 PG (ref 27–31)
MCHC RBC AUTO-ENTMCNC: 31.9 G/DL (ref 32–36)
MCV RBC AUTO: 88 FL (ref 82–98)
METHEMOGLOBIN: 0.7 % (ref 0–3)
MICROSCOPIC COMMENT: NORMAL
MODE: ABNORMAL
MODE: ABNORMAL
MONOCYTES # BLD AUTO: 1.2 K/UL (ref 0.3–1)
MONOCYTES NFR BLD: 11.9 % (ref 4–15)
MV PEAK E VEL: 0.87 M/S
NEUTROPHILS # BLD AUTO: 7.3 K/UL (ref 1.8–7.7)
NEUTROPHILS NFR BLD: 72.6 % (ref 38–73)
NITRITE UR QL STRIP: NEGATIVE
NRBC BLD-RTO: 0 /100 WBC
PCO2 BLDA: 48.5 MMHG (ref 35–45)
PCO2 BLDA: 49.2 MMHG (ref 35–45)
PH SMN: 7.42 [PH] (ref 7.35–7.45)
PH SMN: 7.44 [PH] (ref 7.35–7.45)
PH UR STRIP: 5 [PH] (ref 5–8)
PISA TR MAX VEL: 2.16 M/S
PLATELET # BLD AUTO: 160 K/UL (ref 150–350)
PMV BLD AUTO: 11.6 FL (ref 9.2–12.9)
PO2 BLDA: 30 MMHG (ref 40–60)
PO2 BLDA: 34 MMHG (ref 40–60)
POC BE: 7 MMOL/L
POC BE: 8 MMOL/L
POC SATURATED O2: 59 % (ref 95–100)
POC SATURATED O2: 66 % (ref 95–100)
POC TCO2: 33 MMOL/L (ref 24–29)
POC TCO2: 34 MMOL/L (ref 24–29)
POTASSIUM SERPL-SCNC: 3.9 MMOL/L (ref 3.5–5.1)
PROT SERPL-MCNC: 6.7 G/DL (ref 6–8.4)
PROT UR QL STRIP: NEGATIVE
PROTHROMBIN TIME: 11.2 SEC (ref 9–12.5)
PULM VEIN S/D RATIO: 0.26
PV PEAK D VEL: 0.62 M/S
PV PEAK S VEL: 0.16 M/S
RA MAJOR: 6.73 CM
RA PRESSURE: 15 MMHG
RA WIDTH: 6.2 CM
RBC # BLD AUTO: 4.69 M/UL (ref 4.6–6.2)
RBC #/AREA URNS AUTO: 0 /HPF (ref 0–4)
RIGHT VENTRICULAR END-DIASTOLIC DIMENSION: 4.39 CM
SAMPLE: ABNORMAL
SAMPLE: ABNORMAL
SERUM COLLECTION DT - LUMINEX CLASS I: NORMAL
SERUM COLLECTION DT - LUMINEX CLASS II: NORMAL
SINUS: 3.89 CM
SITE: ABNORMAL
SITE: ABNORMAL
SODIUM SERPL-SCNC: 133 MMOL/L (ref 136–145)
SP GR UR STRIP: 1.02 (ref 1–1.03)
SP02: 98
SPCL1 TESTING DATE: NORMAL
SPCL2 TESTING DATE: NORMAL
SPLUA TESTING DATE: NORMAL
TDI LATERAL: 0.11 M/S
TDI SEPTAL: 0.05 M/S
TDI: 0.08 M/S
TR MAX PG: 19 MMHG
TRICUSPID ANNULAR PLANE SYSTOLIC EXCURSION: 0.97 CM
TV REST PULMONARY ARTERY PRESSURE: 34 MMHG
URN SPEC COLLECT METH UR: NORMAL
WBC # BLD AUTO: 10.02 K/UL (ref 3.9–12.7)
WBC #/AREA URNS AUTO: 0 /HPF (ref 0–5)

## 2020-01-20 PROCEDURE — 85025 COMPLETE CBC W/AUTO DIFF WBC: CPT

## 2020-01-20 PROCEDURE — 85730 THROMBOPLASTIN TIME PARTIAL: CPT

## 2020-01-20 PROCEDURE — 86901 BLOOD TYPING SEROLOGIC RH(D): CPT

## 2020-01-20 PROCEDURE — 99291 CRITICAL CARE FIRST HOUR: CPT | Mod: ,,, | Performed by: INTERNAL MEDICINE

## 2020-01-20 PROCEDURE — 99900035 HC TECH TIME PER 15 MIN (STAT)

## 2020-01-20 PROCEDURE — 63600175 PHARM REV CODE 636 W HCPCS: Performed by: NURSE PRACTITIONER

## 2020-01-20 PROCEDURE — 83050 HGB METHEMOGLOBIN QUAN: CPT

## 2020-01-20 PROCEDURE — A4216 STERILE WATER/SALINE, 10 ML: HCPCS | Performed by: INTERNAL MEDICINE

## 2020-01-20 PROCEDURE — 83735 ASSAY OF MAGNESIUM: CPT

## 2020-01-20 PROCEDURE — 20000000 HC ICU ROOM

## 2020-01-20 PROCEDURE — 82803 BLOOD GASES ANY COMBINATION: CPT

## 2020-01-20 PROCEDURE — 99291 PR CRITICAL CARE, E/M 30-74 MINUTES: ICD-10-PCS | Mod: ,,, | Performed by: INTERNAL MEDICINE

## 2020-01-20 PROCEDURE — 25000003 PHARM REV CODE 250: Performed by: STUDENT IN AN ORGANIZED HEALTH CARE EDUCATION/TRAINING PROGRAM

## 2020-01-20 PROCEDURE — 25000003 PHARM REV CODE 250: Performed by: INTERNAL MEDICINE

## 2020-01-20 PROCEDURE — 86920 COMPATIBILITY TEST SPIN: CPT

## 2020-01-20 PROCEDURE — 25000003 PHARM REV CODE 250: Performed by: HOSPITALIST

## 2020-01-20 PROCEDURE — 87040 BLOOD CULTURE FOR BACTERIA: CPT | Mod: 59

## 2020-01-20 PROCEDURE — 25000003 PHARM REV CODE 250: Performed by: NURSE PRACTITIONER

## 2020-01-20 PROCEDURE — 81001 URINALYSIS AUTO W/SCOPE: CPT

## 2020-01-20 PROCEDURE — 83880 ASSAY OF NATRIURETIC PEPTIDE: CPT

## 2020-01-20 PROCEDURE — 27000221 HC OXYGEN, UP TO 24 HOURS

## 2020-01-20 PROCEDURE — 94761 N-INVAS EAR/PLS OXIMETRY MLT: CPT

## 2020-01-20 PROCEDURE — 99223 PR INITIAL HOSPITAL CARE,LEVL III: ICD-10-PCS | Mod: ,,, | Performed by: NURSE PRACTITIONER

## 2020-01-20 PROCEDURE — 85610 PROTHROMBIN TIME: CPT

## 2020-01-20 PROCEDURE — 63600175 PHARM REV CODE 636 W HCPCS: Performed by: INTERNAL MEDICINE

## 2020-01-20 PROCEDURE — 80053 COMPREHEN METABOLIC PANEL: CPT

## 2020-01-20 PROCEDURE — 63600367 HC NITRIC OXIDE PER HOUR

## 2020-01-20 PROCEDURE — 99223 1ST HOSP IP/OBS HIGH 75: CPT | Mod: ,,, | Performed by: NURSE PRACTITIONER

## 2020-01-20 RX ORDER — POTASSIUM CHLORIDE 20 MEQ/1
20 TABLET, EXTENDED RELEASE ORAL ONCE
Status: COMPLETED | OUTPATIENT
Start: 2020-01-20 | End: 2020-01-20

## 2020-01-20 RX ORDER — FUROSEMIDE 10 MG/ML
60 INJECTION INTRAMUSCULAR; INTRAVENOUS ONCE
Status: COMPLETED | OUTPATIENT
Start: 2020-01-20 | End: 2020-01-20

## 2020-01-20 RX ORDER — DIPHENHYDRAMINE HCL 12.5MG/5ML
12.5 ELIXIR ORAL EVERY 6 HOURS PRN
Status: DISCONTINUED | OUTPATIENT
Start: 2020-01-20 | End: 2020-01-21

## 2020-01-20 RX ADMIN — Medication 10 ML: at 06:01

## 2020-01-20 RX ADMIN — HUMAN ALBUMIN MICROSPHERES AND PERFLUTREN 0.66 MG: 10; .22 INJECTION, SOLUTION INTRAVENOUS at 08:01

## 2020-01-20 RX ADMIN — DOCUSATE SODIUM 50 MG: 50 CAPSULE, LIQUID FILLED ORAL at 08:01

## 2020-01-20 RX ADMIN — POTASSIUM CHLORIDE 20 MEQ: 1500 TABLET, EXTENDED RELEASE ORAL at 04:01

## 2020-01-20 RX ADMIN — AMIODARONE HYDROCHLORIDE 200 MG: 200 TABLET ORAL at 08:01

## 2020-01-20 RX ADMIN — Medication 10 ML: at 12:01

## 2020-01-20 RX ADMIN — PHYTONADIONE 10 MG: 10 INJECTION, EMULSION INTRAMUSCULAR; INTRAVENOUS; SUBCUTANEOUS at 11:01

## 2020-01-20 RX ADMIN — Medication 10 ML: at 11:01

## 2020-01-20 RX ADMIN — TRAMADOL HYDROCHLORIDE 50 MG: 50 TABLET, FILM COATED ORAL at 04:01

## 2020-01-20 RX ADMIN — FUROSEMIDE 60 MG: 10 INJECTION, SOLUTION INTRAVENOUS at 04:01

## 2020-01-20 RX ADMIN — DIPHENHYDRAMINE HYDROCHLORIDE 12.5 MG: 25 SOLUTION ORAL at 09:01

## 2020-01-20 RX ADMIN — DOCUSATE SODIUM 50 MG: 50 CAPSULE, LIQUID FILLED ORAL at 09:01

## 2020-01-20 RX ADMIN — PHYTONADIONE 10 MG: 10 INJECTION, EMULSION INTRAMUSCULAR; INTRAVENOUS; SUBCUTANEOUS at 06:01

## 2020-01-20 NOTE — PROGRESS NOTES
[facility]  Heart Transplant  Progress Note    Patient Name: Tae Delgado  MRN: [id number]  Admission Date: 1/9/2020  Hospital Length of Stay: 11 days  Attending Physician: Isiah Montero MD  Primary Care Provider: Elham Pearson MD  Principal Problem:Acute on chronic combined systolic and diastolic heart failure    Subjective:     **Interval History: Got Lasix 40 mg IVP X 1 overnight for CVP up to 13 - now down to 10 and is net -ve. Creatinine has trended down to 1.8 Had normal bowel movement last night. Feels generally well. TTE repeated this morning. Plan is still for LVAD implant tomorrow (Dr. Schmitt)    Continuous Infusions:   heparin (porcine) in D5W 20 Units/kg/hr (01/20/20 1000)    nitric oxide gas       Scheduled Meds:   amiodarone  200 mg Oral Daily    docusate sodium  50 mg Oral BID    heparin (PORCINE)  60 Units/kg (Adjusted) Intravenous Once    polyethylene glycol  17 g Oral BID    sodium chloride 0.9%  10 mL Intravenous Q6H     PRN Meds:acetaminophen, ALPRAZolam, heparin (PORCINE), heparin (PORCINE), ondansetron, sodium chloride, sodium chloride 0.9%, Flushing PICC Protocol **AND** sodium chloride 0.9% **AND** sodium chloride 0.9%, traMADol    Review of patient's allergies indicates:   Allergen Reactions    Biopatch [chlorhexidine gluconate]      Site burning    Dobutamine in d5w      Tachycardia, tremors, SOB, flushing    Percocet [oxycodone-acetaminophen] Itching    Penicillins Rash     Objective:     Vital Signs (Most Recent):  Temp: 98.6 °F (37 °C) (01/20/20 0700)  Pulse: 100 (01/20/20 1000)  Resp: (!) 21 (01/20/20 1000)  BP: (!) 121/93 (01/20/20 1000)  SpO2: 96 % (01/20/20 1000) Vital Signs (24h Range):  Temp:  [98.1 °F (36.7 °C)-98.6 °F (37 °C)] 98.6 °F (37 °C)  Pulse:  [] 100  Resp:  [10-42] 21  SpO2:  [83 %-100 %] 96 %  BP: (105-148)/(71-95) 121/93     Patient Vitals for the past 72 hrs (Last 3 readings):   Weight   01/20/20 1000 119.3 kg (263 lb)   01/17/20  1700 108.5 kg (239 lb 3.2 oz)     Body mass index is 37.74 kg/m².      Intake/Output Summary (Last 24 hours) at 1/20/2020 1108  Last data filed at 1/20/2020 0900  Gross per 24 hour   Intake 725 ml   Output 950 ml   Net -225 ml       Hemodynamic Parameters:       Telemetry: SR-ST with NSVT    Physical Exam   Constitutional: He is oriented to person, place, and time. He appears well-developed and well-nourished.   HENT:   Head: Normocephalic and atraumatic.   Eyes: Pupils are equal, round, and reactive to light. Conjunctivae and EOM are normal.   Neck: Normal range of motion. Neck supple. No JVD present. No thyromegaly present.   Cardiovascular: Regular rhythm.   Tachycardic, + soft S3   Pulmonary/Chest: Effort normal and breath sounds normal.   Abdominal: Soft. Bowel sounds are normal.   Musculoskeletal: Normal range of motion. He exhibits no edema.   Neurological: He is alert and oriented to person, place, and time.   Skin: Skin is warm and dry. Capillary refill takes 2 to 3 seconds.   Psychiatric: He has a normal mood and affect. His behavior is normal. Judgment and thought content normal.       Significant Labs:  CBC:  Recent Labs   Lab 01/18/20  0352 01/19/20  0326 01/20/20  0300   WBC 7.25 8.33 10.02   RBC 4.95 5.03 4.69   HGB 13.9* 14.0 13.1*   HCT 43.8 44.7 41.1    180 160   MCV 89 89 88   MCH 28.1 27.8 27.9   MCHC 31.7* 31.3* 31.9*     BNP:  Recent Labs   Lab 01/15/20  1611 01/20/20  0300   * 740*     CMP:  Recent Labs   Lab 01/18/20  0352 01/18/20  1104 01/19/20  0326 01/20/20  0300     --  90 110   CALCIUM 9.7  --  9.6 9.3   ALBUMIN 3.6  --  3.7 3.6   PROT 6.6  --  6.9 6.7     --  137 133*   K 3.6 4.3 4.1 3.9   CO2 29  --  30* 29   CL 96  --  94* 93*   BUN 28*  --  32* 34*   CREATININE 2.0*  --  2.0* 1.8*   ALKPHOS 53*  --  50* 49*   *  --  87* 66*   AST 65*  --  48* 30   BILITOT 1.0  --  0.9 1.4*      Coagulation:   Recent Labs   Lab 01/18/20  0352  01/18/20  7788  01/19/20  0326 01/20/20  0300   INR 1.1  --   --  1.2 1.1   APTT  --    < > 57.3* 65.5* 56.5*    < > = values in this interval not displayed.     LDH:  No results for input(s): LDH in the last 72 hours.  Microbiology:  Microbiology Results (last 7 days)     Procedure Component Value Units Date/Time    Blood culture [[id number]] Collected:  01/20/20 1035    Order Status:  Sent Specimen:  Blood from Peripheral, Wrist, Left Updated:  01/20/20 1044    Blood culture [[id number]] Collected:  01/20/20 1042    Order Status:  Sent Specimen:  Blood from Peripheral, Antecubital, Left Updated:  01/20/20 1043    Blood culture [[id number]]     Order Status:  No result Specimen:  Blood     Blood culture [[id number]]     Order Status:  No result Specimen:  Blood           I have reviewed all pertinent labs within the past 24 hours.    Estimated Creatinine Clearance: 57.2 mL/min (A) (based on SCr of 1.8 mg/dL (H)).    Diagnostic Results:  I have reviewed all pertinent imaging results/findings within the past 24 hours.    Assessment and Plan:       55 y.o. WM with history of NICMP diagnosed in 2010, ICD, LV thrombus (with prior splenic and renal emboli), Embolic  CVA , paroxysmal atrial fib, HTN, HLP  presents for  F/U today to clinic and he was volume overloaded on exam .  He states he had 3 admission in the last 3 months for volume overload. He started having more SOB on exertion since one month. Also endorses of Orthopnea since last one month. Alble to walk only 150 ft. He also has Bilateral lower extremity edema. He was admitted here in 2017 for ADHD here at [facility] and RHC during that admission showed PCWP 40 and CVP 17. Currently denies chest pain, lightheadedness     * Acute on chronic combined systolic and diastolic heart failure  - [facility] dx'd 2010  - TTE 1/10: LVEDD 6.96, LVEF 15%, diastolic dysfunction, RV midly dilated, mod decreased, severe LUCI, mild MR, PAQS 31 and IVC 8. Repeating echo today  - In the past months has  "been admitted 3 times for volume management requiring high doses of diuretics, in [facility]. Admits not following a strict diet and gaining weight. Now, he refers following a diet and current weight 239 lb but went up to 251 lb when not following diet.   - RHC 1/16 off : RA 12, PA 70/36 (52), w 27, CO/CI 5.07/2.25, SVR 1277  - Transferred to ICU 1/16 for RV support with Corby, increase to 20 ppm 1/18 given bump in creatinine to 2.0, but creatinine remains 2 this morning. Will D/C IVP Lasix. May need IABP as bridge to LVAD  - Initially diuresed this admit, but diuretics stopped 1/11 when central line placed and CVP was 2. Was given 900 cc IVF since, and CVP was up to 13. Resumed home dose of Lasix 40 mg po bid on 1/13, but transitioned back to Lasix 80 mg IVP bid on 1/16 after RHC.  CVP 13 overnight - got one dose IVP Lasix, and CVP back down to 10  - sVO2 66 this AM off of  and low dose Epi ( stopped completely on 1/14 due to tachycardia and tremors/flushing with resolution of symptoms, and Epi D/C'd 1/17 2 hours after initiation 2/2 "fuzzy head").  - GDMT: Toprol on hold 2/2 decompensation. Hydralazine/Isordil D/C'd 1/18 in anticipation of LVAD implant on 1/21.  Refused to continue Losartan (stopped 4 days PTA) because makes him feel bad and SBP in 90's at home  - Pathway completed  - Discussed at urgent selection 1/17: It was the committee decision to decline the pt for transplant listing due to elevated PA pressures. He is approved for LVAD as DT.  - Plan DT LVAD tomorrow (Dr. Schmitt). Will pull PICC and replace with IJ TLC    Coagulopathy  - Appreciate Hem/Onc's help. No evidence of underlying coagulopathy (h/o LV thrombus with splenic and renal emboli, h/o embolic CVA)    Hepatic congestion  - Liver US on 1/11 unremarkable    Transaminitis  - elevated on admission, trended down on  but trended back up off of . Trending back down now on Corby   - monitor closely  - on amiodarone " for AF    ICD (implantable cardioverter-defibrillator) in place  - S/P Biotronik dual chamber ICD    Right lower lobe pulmonary nodule  - Incidental finding on CT chest/abd/pelvis on 1/12. Pulmonology consulted, and rec repeat CT of chest in 3 months  - Will need to follow-up in pulmonary clinic.     Acute kidney injury superimposed on chronic kidney disease  - Creatinine on admit 2.6 (baseline ~ 1.8). Creatinine today has trended down to 1.8  - monitor with diuresis  - Followed by Nephrology as an outpatient  - See A/C CHF    Paroxysmal atrial fibrillation  - Currently in SR - ST  - Continue Amiodarone 200 mg qd  - Continue Heparin bridge, but D/C'd Coumadin as he is in w/u      Left ventricular thrombus without MI  - H/O LV thrombus with h/o splenic and renal emboli as well as embolic CVA  - Limited TTE done here 1/13 showed no thrombus  - Continue Heparin birdge, but D/C'd Coumadin as he is in w/u.        Uninterrupted Critical Care/Counseling Time (not including procedures): 30 minutes      Marylin Lazcano NP [id number]  Heart Transplant  [facility]

## 2020-01-20 NOTE — PLAN OF CARE
"      SICU PLAN OF CARE NOTE    Dx: Acute on chronic combined systolic and diastolic heart failure    Goals of Care: Plan for Echo today Monday 1/20 and LVAD placement on Tuesday 1/21    Neuro: AAO x4, Follows Commands and Moves All Extremities    Vital Signs: /78 (BP Location: Left arm, Patient Position: Lying)   Pulse 90   Temp 98.2 °F (36.8 °C) (Oral)   Resp 10   Ht 5' 10" (1.778 m)   Wt 108.5 kg (239 lb 3.2 oz)   SpO2 96%   BMI 34.32 kg/m²  CVP 10    Respiratory: Nasal Cannula w/ Corby 4L Nitric 20ppm, AM svO2 66%    Diet: Cardiac Diet 1500ml FR    Gtts: Heparin    Urine Output: Voids Spontaneously 250 cc/shift     Labs/Accuchecks: Daily Labs    Skin: Heels, elbows, and sacrum w/o redness or breakdown.       "

## 2020-01-20 NOTE — TELEPHONE ENCOUNTER
Tae Delgado was discussed at selection committee on 01/17/20. Patient presented for OHT and VAD. Declined for OHT due to:  Pulmonary hypertension     Patient has an anticipated survival benefit. Patient has NYHA IV symptoms which have failed to respond to optimal medical management.     Decision made to proceed with DT VAD implantation. This has been discussed with the patient and family, including the reasons for why he is not a transplant candidate at this time.     Patient has a continued need for IV inotropic therapy, and is on epinephrine. he was tried at dobutamine however had severe reaction to it, therefore sent to ICU to do epi and nitric oxide.  at 0.04 mcg/kg/min. Patient has not had a CPX due to being on inotropes.    Patient is INTERMACS class 3, ACC stage D.     Discussed with the patient and family [facility] program outcomes as reported in INTERMACS (Interagency Registry for Mechanically Assisted Circulatory Support):    1 year survival = 92.7%  2 year survival = 86.7%  3 year survival = 86.7%    Patient and family acknowledged receipt of this information and all questions were answered.

## 2020-01-20 NOTE — SUBJECTIVE & OBJECTIVE
**Interval History: Got Lasix 40 mg IVP X 1 overnight for CVP up to 13 - now down to 10 and is net -ve. Creatinine has trended down to 1.8 Had normal bowel movement last night. Feels generally well. TTE repeated this morning. Plan is still for LVAD implant tomorrow (Dr. Schmitt)    Continuous Infusions:   heparin (porcine) in D5W 20 Units/kg/hr (01/20/20 1000)    nitric oxide gas       Scheduled Meds:   amiodarone  200 mg Oral Daily    docusate sodium  50 mg Oral BID    heparin (PORCINE)  60 Units/kg (Adjusted) Intravenous Once    polyethylene glycol  17 g Oral BID    sodium chloride 0.9%  10 mL Intravenous Q6H     PRN Meds:acetaminophen, ALPRAZolam, heparin (PORCINE), heparin (PORCINE), ondansetron, sodium chloride, sodium chloride 0.9%, Flushing PICC Protocol **AND** sodium chloride 0.9% **AND** sodium chloride 0.9%, traMADol    Review of patient's allergies indicates:   Allergen Reactions    Biopatch [chlorhexidine gluconate]      Site burning    Dobutamine in d5w      Tachycardia, tremors, SOB, flushing    Percocet [oxycodone-acetaminophen] Itching    Penicillins Rash     Objective:     Vital Signs (Most Recent):  Temp: 98.6 °F (37 °C) (01/20/20 0700)  Pulse: 100 (01/20/20 1000)  Resp: (!) 21 (01/20/20 1000)  BP: (!) 121/93 (01/20/20 1000)  SpO2: 96 % (01/20/20 1000) Vital Signs (24h Range):  Temp:  [98.1 °F (36.7 °C)-98.6 °F (37 °C)] 98.6 °F (37 °C)  Pulse:  [] 100  Resp:  [10-42] 21  SpO2:  [83 %-100 %] 96 %  BP: (105-148)/(71-95) 121/93     Patient Vitals for the past 72 hrs (Last 3 readings):   Weight   01/20/20 1000 119.3 kg (263 lb)   01/17/20 1700 108.5 kg (239 lb 3.2 oz)     Body mass index is 37.74 kg/m².      Intake/Output Summary (Last 24 hours) at 1/20/2020 1108  Last data filed at 1/20/2020 0900  Gross per 24 hour   Intake 725 ml   Output 950 ml   Net -225 ml       Hemodynamic Parameters:       Telemetry: SR-ST with NSVT    Physical Exam   Constitutional: He is oriented to person,  place, and time. He appears well-developed and well-nourished.   HENT:   Head: Normocephalic and atraumatic.   Eyes: Pupils are equal, round, and reactive to light. Conjunctivae and EOM are normal.   Neck: Normal range of motion. Neck supple. No JVD present. No thyromegaly present.   Cardiovascular: Regular rhythm.   Tachycardic, + soft S3   Pulmonary/Chest: Effort normal and breath sounds normal.   Abdominal: Soft. Bowel sounds are normal.   Musculoskeletal: Normal range of motion. He exhibits no edema.   Neurological: He is alert and oriented to person, place, and time.   Skin: Skin is warm and dry. Capillary refill takes 2 to 3 seconds.   Psychiatric: He has a normal mood and affect. His behavior is normal. Judgment and thought content normal.       Significant Labs:  CBC:  Recent Labs   Lab 01/18/20 0352 01/19/20  0326 01/20/20  0300   WBC 7.25 8.33 10.02   RBC 4.95 5.03 4.69   HGB 13.9* 14.0 13.1*   HCT 43.8 44.7 41.1    180 160   MCV 89 89 88   MCH 28.1 27.8 27.9   MCHC 31.7* 31.3* 31.9*     BNP:  Recent Labs   Lab 01/15/20  1611 01/20/20  0300   * 740*     CMP:  Recent Labs   Lab 01/18/20 0352 01/18/20  1104 01/19/20  0326 01/20/20  0300     --  90 110   CALCIUM 9.7  --  9.6 9.3   ALBUMIN 3.6  --  3.7 3.6   PROT 6.6  --  6.9 6.7     --  137 133*   K 3.6 4.3 4.1 3.9   CO2 29  --  30* 29   CL 96  --  94* 93*   BUN 28*  --  32* 34*   CREATININE 2.0*  --  2.0* 1.8*   ALKPHOS 53*  --  50* 49*   *  --  87* 66*   AST 65*  --  48* 30   BILITOT 1.0  --  0.9 1.4*      Coagulation:   Recent Labs   Lab 01/18/20 0352 01/18/20  2151 01/19/20  0326 01/20/20  0300   INR 1.1  --   --  1.2 1.1   APTT  --    < > 57.3* 65.5* 56.5*    < > = values in this interval not displayed.     LDH:  No results for input(s): LDH in the last 72 hours.  Microbiology:  Microbiology Results (last 7 days)     Procedure Component Value Units Date/Time    Blood culture [[id number]] Collected:  01/20/20 1035     Order Status:  Sent Specimen:  Blood from Peripheral, Wrist, Left Updated:  01/20/20 1044    Blood culture [[id number]] Collected:  01/20/20 1042    Order Status:  Sent Specimen:  Blood from Peripheral, Antecubital, Left Updated:  01/20/20 1043    Blood culture [[id number]]     Order Status:  No result Specimen:  Blood     Blood culture [[id number]]     Order Status:  No result Specimen:  Blood           I have reviewed all pertinent labs within the past 24 hours.    Estimated Creatinine Clearance: 57.2 mL/min (A) (based on SCr of 1.8 mg/dL (H)).    Diagnostic Results:  I have reviewed all pertinent imaging results/findings within the past 24 hours.

## 2020-01-20 NOTE — PROGRESS NOTES
Pt aaox3 while sitting up in chair with sons and significant other at bedside.  Pt reports readiness for LVAD implant tomorrow.  Pt denies any needs at this time.  Encouraged pt to notify nurse if they have any questions, problems or concerns for LVAD coordinator.  Pt verbalized understanding and in agreement of plan. Will follow up with pt soon.

## 2020-01-20 NOTE — ASSESSMENT & PLAN NOTE
- Appreciate Hem/Onc's help. No evidence of underlying coagulopathy (h/o LV thrombus with splenic and renal emboli, h/o embolic CVA)

## 2020-01-20 NOTE — PROCEDURES
"Tae Delgado is a 59 y.o. male patient.    Temp: 98.6 °F (37 °C) (01/20/20 1500)  Pulse: 99 (01/20/20 1700)  Resp: 18 (01/20/20 1700)  BP: 114/86 (01/20/20 1700)  SpO2: 97 % (01/20/20 1700)  Weight: 119.3 kg (263 lb) (01/20/20 1000)  Height: 5' 10" (177.8 cm) (01/20/20 1000)    Central Line  Date/Time: 1/20/2020 5:23 PM  Performed by: Andriy Perkins MD  Supervising provider: Dr Suarez   Consent Done: Yes  Site marked: the operative site was marked  Time out: Immediately prior to procedure a "time out" was called to verify the correct patient, procedure, equipment, support staff and site/side marked as required.  Indications: diagnostic evaluation  Anesthesia: local infiltration    Anesthesia:  Local anesthesia used: yes  Local Anesthetic: lidocaine 1% without epinephrine  Preparation: skin prepped with betadine  Skin prep agent dried: skin prep agent completely dried prior to procedure  Sterile barriers: all five maximum sterile barriers used - cap, mask, sterile gown, sterile gloves, and large sterile sheet  Hand hygiene: hand hygiene performed prior to central venous catheter insertion  Location details: right internal jugular  Catheter type: triple lumen  Catheter size: 7.5 Fr  Ultrasound guidance: yes  Vessel Caliber: medium, patent, compressibility normal  Needle advanced into vessel with real time Ultrasound guidance.  Guidewire confirmed in vessel.  Sterile sheath used.  Manometry: Yes  Number of attempts: 1  Assessment: placement verified by x-ray  Complications: none  Post-procedure: line sutured,  chlorhexidine patch,  sterile dressing applied and blood return through all ports          No flowsheet data found.    Andriy Perkins  1/20/2020    "

## 2020-01-20 NOTE — ANESTHESIA PREPROCEDURE EVALUATION
[facility]  Anesthesia Pre-Operative Evaluation         Patient Name: Tae Delgado  YOB: 1960  MRN: [id number]    SUBJECTIVE:     Pre-operative evaluation for Procedure(s) (LRB):  INSERTION-LEFT VENTRICULAR ASSIST DEVICE (Left)     01/20/2020    Tae Delgado is a 59 y.o. 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   - S/p RHC 1/16 with normal CO/CI but high filling and PAP.     - Per patient he is a hard stick and they had issues with inserting needles during his heart cath.  - Pt had a reaction to dobutamine and epinephrine >> it wasn't allergic, it's mostly color skin changes (red with dobutamine, and gray with epinephrine)  - He felt his throat was closing up with epinephrine , but no documented reaction     - Biopatch and cholorohexidine severe skin rashes    Patient now presents for the above procedure(s).      2D ECHO: 1/10/2020    · Severe left ventricular enlargement.  · Severely decreased left ventricular systolic function. The estimated ejection fraction is 15%.  · Mild right ventricular enlargement.  · Moderately reduced right ventricular systolic function.  · Left ventricular diastolic dysfunction.  · Severe biatrial enlargement.  · Mild tricuspid regurgitation.  · The estimated PA systolic pressure is 31 mmHg.  · Intermediate central venous pressure (8 mmHg).        LDA:   Rt IJ         PICC Double Lumen 01/16/20 6548 right basilic (Active)   Site Assessment Clean;Dry;Intact;No redness;No swelling 1/20/2020  3:00 AM   Lumen 1 Status Infusing 1/20/2020  3:00 AM   Lumen 2 Status Flushed;Normal saline locked 1/20/2020  3:00 AM   Length gay (cm) 37 cm 1/17/2020  7:00  AM   Current Exposed Catheter (cm) 0 cm 1/16/2020  5:25 PM   Extremity Circumference (cm) 39 cm 1/17/2020  7:00 AM   Dressing Type Transparent 1/20/2020  3:00 AM   Dressing Status Clean;Dry;Intact 1/20/2020  3:00 AM   Dressing Intervention Dressing reinforced 1/20/2020  3:00 AM   Dressing Change Due 01/23/20 1/19/2020  3:15 PM   Daily Line Review Performed 1/19/2020  3:15 PM   Number of days: 3       Prev airway: None documented.    Drips:    heparin (porcine) in D5W 20 Units/kg/hr (01/20/20 0600)    nitric oxide gas         Patient Active Problem List   Diagnosis    History of pulmonary embolism    Hyperlipidemia    Gout    Hypertension    Obesity    At risk for amiodarone toxicity with long term use    Left ventricular thrombus without MI    Paroxysmal atrial fibrillation    Chronic systolic congestive heart failure    Chronic anticoagulation    COCM (congestive cardiomyopathy)    Acute on chronic combined systolic and diastolic heart failure    Acute on chronic systolic heart failure    Hypertensive heart disease with heart failure    Long term (current) use of anticoagulants    Acute kidney injury superimposed on chronic kidney disease    Right lower lobe pulmonary nodule    ICD (implantable cardioverter-defibrillator) in place    Transaminitis    Hepatic congestion    NSVT (nonsustained ventricular tachycardia)    Pre-transplant evaluation for heart transplant    Thrombus    Abnormal EKG    Acute on chronic heart failure    Heart abnormality    ACP (advance care planning)    Coagulopathy       Review of patient's allergies indicates:   Allergen Reactions    Biopatch [chlorhexidine gluconate]      Site burning    Dobutamine in d5w      Tachycardia, tremors, SOB, flushing    Percocet [oxycodone-acetaminophen] Itching    Penicillins Rash       Current Inpatient Medications:   amiodarone  200 mg Oral Daily    docusate sodium  50 mg Oral BID    heparin (PORCINE)  60 Units/kg  (Adjusted) Intravenous Once    polyethylene glycol  17 g Oral BID    sodium chloride 0.9%  10 mL Intravenous Q6H       No current facility-administered medications on file prior to encounter.      Current Outpatient Medications on File Prior to Encounter   Medication Sig Dispense Refill    amiodarone (PACERONE) 200 MG Tab Tale 1 tablet (200mg) by mouth on Monday, Tuesday, Thursday, Friday and Saturday. Only take medication 5 days per week. 20 tablet 11    furosemide (LASIX) 40 MG tablet Take 1 tablet (40 mg total) by mouth 2 (two) times daily. 90 tablet 3    hydrocortisone 2.5 % ointment Apply 1 application topically 2 (two) times daily.      metoprolol succinate (TOPROL-XL) 50 MG 24 hr tablet TAKE ONE TABLET BY MOUTH ONCE DAILY 30 tablet 11    spironolactone (ALDACTONE) 25 MG tablet TAKE 1 TABLET BY MOUTH ONCE DAILY 30 tablet 11    VENTOLIN HFA 90 mcg/actuation inhaler Inhale 1 Inhaler into the lungs every 4 (four) hours as needed.      warfarin (COUMADIN) 3 MG tablet TAKE 1 TABLET BY MOUTH ONCE DAILY EXCEPT  SATURDAY 30 tablet 5       Past Surgical History:   Procedure Laterality Date    RIGHT HEART CATHETERIZATION Right 1/16/2020    Procedure: INSERTION, CATHETER, RIGHT HEART;  Surgeon: Isiah Montero MD;  Location: [facility] CATH LAB;  Service: Cardiology;  Laterality: Right;    TONSILLECTOMY      VEIN LIGATION AND STRIPPING         Social History     Socioeconomic History    Marital status:      Spouse name: Not on file    Number of children: Not on file    Years of education: Not on file    Highest education level: Not on file   Occupational History    Not on file   Social Needs    Financial resource strain: Not on file    Food insecurity:     Worry: Not on file     Inability: Not on file    Transportation needs:     Medical: Not on file     Non-medical: Not on file   Tobacco Use    Smoking status: Never Smoker    Smokeless tobacco: Never Used   Substance and Sexual Activity     Alcohol use: No    Drug use: No    Sexual activity: Not on file   Lifestyle    Physical activity:     Days per week: Not on file     Minutes per session: Not on file    Stress: Not on file   Relationships    Social connections:     Talks on phone: Not on file     Gets together: Not on file     Attends Yarsanism service: Not on file     Active member of club or organization: Not on file     Attends meetings of clubs or organizations: Not on file     Relationship status: Not on file   Other Topics Concern    Not on file   Social History Narrative    Not on file       OBJECTIVE:     Vital Signs Range (Last 24H):  Temp:  [36.7 °C (98.1 °F)-36.9 °C (98.4 °F)]   Pulse:  []   Resp:  [10-45]   BP: (105-148)/(71-94)   SpO2:  [83 %-100 %]       Significant Labs:  Lab Results   Component Value Date    WBC 10.02 01/20/2020    HGB 13.1 (L) 01/20/2020    HCT 41.1 01/20/2020     01/20/2020    CHOL 177 01/15/2020    TRIG 129 01/15/2020    HDL 46 01/15/2020    ALT 66 (H) 01/20/2020    AST 30 01/20/2020     (L) 01/20/2020    K 3.9 01/20/2020    CL 93 (L) 01/20/2020    CREATININE 1.8 (H) 01/20/2020    BUN 34 (H) 01/20/2020    CO2 29 01/20/2020    TSH 3.268 01/16/2020    PSA 1.7 01/15/2020    INR 1.1 01/20/2020    HGBA1C 6.2 (H) 01/15/2020       Diagnostic Studies: No relevant studies.    EKG: No recent studies available.    2D ECHO: 1/10/2020    · Severe left ventricular enlargement.  · Severely decreased left ventricular systolic function. The estimated ejection fraction is 15%.  · Mild right ventricular enlargement.  · Moderately reduced right ventricular systolic function.  · Left ventricular diastolic dysfunction.  · Severe biatrial enlargement.  · Mild tricuspid regurgitation.  · The estimated PA systolic pressure is 31 mmHg.  · Intermediate central venous pressure (8 mmHg).          ASSESSMENT/PLAN:       Anesthesia Evaluation    I have reviewed the Patient Summary Reports.    I have reviewed the  Nursing Notes.   I have reviewed the Medications.     Review of Systems  Anesthesia Hx:  No problems with previous Anesthesia Denies Hx of Anesthetic complications  History of prior surgery of interest to airway management or planning: Previous anesthesia: General Denies Family Hx of Anesthesia complications.   Denies Personal Hx of Anesthesia complications.   Social:  Non-Smoker, No Alcohol Use    Hematology/Oncology:         -- Anemia: Denies Current/Recent Cancer   EENT/Dental:   denies chronic allergic rhinitis  Denies Otitis Media Denies Chronic Tonsillitis   Cardiovascular:   Denies Pacemaker. Hypertension  Denies MI.  Denies CAD.    Denies CABG/stent.  Denies Dysrhythmias.   Denies Angina. CHF         hyperlipidemia     ICD Functional Capacity good / => 4 METS    Pulmonary:   Denies Pneumonia Denies COPD.  Denies Asthma.  Denies Shortness of breath.  Denies Recent URI.  Denies Sleep Apnea. Hx of PE   Renal/:   Chronic Renal Disease    Hepatic/GI:   Denies PUD. Denies Hiatal Hernia.  Denies GERD. Denies Liver Disease.  Denies Hepatitis.    Musculoskeletal:  Musculoskeletal Normal Denies Arthritis.    Ankylosing Spondylitis    Neurological:   Denies TIA. CVA Denies Neuromuscular Disease.  Denies Headaches. Denies Seizures. C-spine cleared.     Denies Chronic Pain Syndrome  Denies Peripheral Neuropathy    Endocrine:  Endocrine Normal Denies Diabetes. Denies Hypothyroidism.  Denies Hyperthyroidism.    Psych:  Psychiatric Normal  Denies Psychiatric History. denies anxiety denies depression          Physical Exam  General:  Well nourished    Airway/Jaw/Neck:  Airway Findings: Mouth Opening: Small, but > 3cm Tongue: Normal  General Airway Assessment: Adult  Mallampati: IV  Improves to III with phonation.  TM Distance: 4 - 6 cm  Jaw/Neck Findings:  Micrognathia: Negative Mandibular Fracture: Negative    Neck ROM: Normal ROM  Neck Findings:  Girth Increased   R CVC   Eyes/Ears/Nose:  EYES/EARS/NOSE FINDINGS: Normal    Dental:  Dental Findings: In tact   Chest/Lungs:  Chest/Lungs Findings: Clear to auscultation     Heart/Vascular:  Heart Findings: Normal Heart murmur: negative    Abdomen:  Abdomen Findings:  Normal, Soft       Mental Status:  Mental Status Findings:  Cooperative, Alert and Oriented         Anesthesia Plan  Type of Anesthesia, risks & benefits discussed:  Anesthesia Type:  general  Patient's Preference:   Intra-op Monitoring Plan: arterial line, central line and standard ASA monitors  Intra-op Monitoring Plan Comments:   Post Op Pain Control Plan: multimodal analgesia, per primary service following discharge from PACU and IV/PO Opioids PRN  Post Op Pain Control Plan Comments:   Induction:   IV  Beta Blocker:  Patient is on a Beta-Blocker and has received one dose within the past 24 hours (No further documentation required).       Informed Consent: Patient understands risks and agrees with Anesthesia plan.  Questions answered. Anesthesia consent signed with patient.  ASA Score: 4     Day of Surgery Review of History & Physical:    H&P update referred to the surgeon.         Ready For Surgery From Anesthesia Perspective.

## 2020-01-20 NOTE — ASSESSMENT & PLAN NOTE
"- [facility] dx'd 2010  - TTE 1/10: LVEDD 6.96, LVEF 15%, diastolic dysfunction, RV midly dilated, mod decreased, severe LUCI, mild MR, PAQS 31 and IVC 8. Repeating echo today  - In the past months has been admitted 3 times for volume management requiring high doses of diuretics, in [facility]. Admits not following a strict diet and gaining weight. Now, he refers following a diet and current weight 239 lb but went up to 251 lb when not following diet.   - RHC 1/16 off : RA 12, PA 70/36 (52), w 27, CO/CI 5.07/2.25, SVR 1277  - Transferred to ICU 1/16 for RV support with [place], increase to 20 ppm 1/18 given bump in creatinine to 2.0, but creatinine remains 2 this morning. Will D/C IVP Lasix. May need IABP as bridge to LVAD  - Initially diuresed this admit, but diuretics stopped 1/11 when central line placed and CVP was 2. Was given 900 cc IVF since, and CVP was up to 13. Resumed home dose of Lasix 40 mg po bid on 1/13, but transitioned back to Lasix 80 mg IVP bid on 1/16 after RHC.  CVP 13 overnight - got one dose IVP Lasix, and CVP back down to 10  - sVO2 66 this AM off of  and low dose Epi ( stopped completely on 1/14 due to tachycardia and tremors/flushing with resolution of symptoms, and Epi D/C'd 1/17 2 hours after initiation 2/2 "fuzzy head").  - GDMT: Toprol on hold 2/2 decompensation. Hydralazine/Isordil D/C'd 1/18 in anticipation of LVAD implant on 1/21.  Refused to continue Losartan (stopped 4 days PTA) because makes him feel bad and SBP in 90's at home  - Pathway completed  - Discussed at urgent selection 1/17: It was the committee decision to decline the pt for transplant listing due to elevated PA pressures. He is approved for LVAD as DT.  - Plan DT LVAD tomorrow (Dr. Schmitt).  "

## 2020-01-20 NOTE — PLAN OF CARE
No acute events during shift. VSS. Pt remains on 4L NC with 20 NO, O2 >95%. ST 100s. -120s. CVP 15. 40 Lasix given. No reports of pain. UO >1000ml, 1 BM. See flowsheets for intake, output and assessments. Pt remains free from falls, injuries, and skin breakdown. Pt able to walk around room independently. POC reviewed with pt and family. WCTM.

## 2020-01-20 NOTE — ASSESSMENT & PLAN NOTE
- Creatinine on admit 2.6 (baseline ~ 1.8). Creatinine today has trended down to 1.8  - monitor with diuresis  - Followed by Nephrology as an outpatient  - See A/C CHF

## 2020-01-21 ENCOUNTER — ANESTHESIA EVENT (OUTPATIENT)
Dept: SURGERY | Facility: HOSPITAL | Age: 60
DRG: 001 | End: 2020-01-21
Payer: MEDICARE

## 2020-01-21 ENCOUNTER — DOCUMENTATION ONLY (OUTPATIENT)
Dept: CARDIOLOGY | Facility: HOSPITAL | Age: 60
End: 2020-01-21

## 2020-01-21 ENCOUNTER — ANESTHESIA (OUTPATIENT)
Dept: SURGERY | Facility: HOSPITAL | Age: 60
DRG: 001 | End: 2020-01-21
Payer: MEDICARE

## 2020-01-21 DIAGNOSIS — I50.43 ACUTE ON CHRONIC COMBINED SYSTOLIC AND DIASTOLIC HEART FAILURE: Primary | ICD-10-CM

## 2020-01-21 PROBLEM — I51.3 THROMBUS OF LEFT ATRIAL APPENDAGE: Status: ACTIVE | Noted: 2020-01-21

## 2020-01-21 LAB
ALBUMIN SERPL BCP-MCNC: 3.3 G/DL (ref 3.5–5.2)
ALLENS TEST: ABNORMAL
ALLENS TEST: ABNORMAL
ALP SERPL-CCNC: 45 U/L (ref 55–135)
ALT SERPL W/O P-5'-P-CCNC: 48 U/L (ref 10–44)
ANION GAP SERPL CALC-SCNC: 12 MMOL/L (ref 8–16)
ANION GAP SERPL CALC-SCNC: 9 MMOL/L (ref 8–16)
APTT BLDCRRT: 22 SEC (ref 21–32)
APTT BLDCRRT: 35.1 SEC (ref 21–32)
APTT BLDCRRT: 40.8 SEC (ref 21–32)
APTT BLDCRRT: 70.3 SEC (ref 21–32)
AST SERPL-CCNC: 25 U/L (ref 10–40)
BASOPHILS # BLD AUTO: 0.07 K/UL (ref 0–0.2)
BASOPHILS NFR BLD: 0.9 % (ref 0–1.9)
BILIRUB SERPL-MCNC: 1 MG/DL (ref 0.1–1)
BUN SERPL-MCNC: 35 MG/DL (ref 6–20)
BUN SERPL-MCNC: 37 MG/DL (ref 6–20)
CALCIUM SERPL-MCNC: 9 MG/DL (ref 8.7–10.5)
CALCIUM SERPL-MCNC: 9 MG/DL (ref 8.7–10.5)
CHLORIDE SERPL-SCNC: 93 MMOL/L (ref 95–110)
CHLORIDE SERPL-SCNC: 95 MMOL/L (ref 95–110)
CO2 SERPL-SCNC: 28 MMOL/L (ref 23–29)
CO2 SERPL-SCNC: 32 MMOL/L (ref 23–29)
CREAT SERPL-MCNC: 1.7 MG/DL (ref 0.5–1.4)
CREAT SERPL-MCNC: 2 MG/DL (ref 0.5–1.4)
DELSYS: ABNORMAL
DELSYS: ABNORMAL
DIFFERENTIAL METHOD: ABNORMAL
EOSINOPHIL # BLD AUTO: 0.1 K/UL (ref 0–0.5)
EOSINOPHIL NFR BLD: 1.2 % (ref 0–8)
ERYTHROCYTE [DISTWIDTH] IN BLOOD BY AUTOMATED COUNT: 14.6 % (ref 11.5–14.5)
ERYTHROCYTE [SEDIMENTATION RATE] IN BLOOD BY WESTERGREN METHOD: 16 MM/H
EST. GFR  (AFRICAN AMERICAN): 41 ML/MIN/1.73 M^2
EST. GFR  (AFRICAN AMERICAN): 49.9 ML/MIN/1.73 M^2
EST. GFR  (NON AFRICAN AMERICAN): 35.5 ML/MIN/1.73 M^2
EST. GFR  (NON AFRICAN AMERICAN): 43.2 ML/MIN/1.73 M^2
FIO2: 100
FIO2: 36
FLOW: 4
GLUCOSE SERPL-MCNC: 104 MG/DL (ref 70–110)
GLUCOSE SERPL-MCNC: 154 MG/DL (ref 70–110)
HCO3 UR-SCNC: 29.1 MMOL/L (ref 24–28)
HCO3 UR-SCNC: 32.4 MMOL/L (ref 24–28)
HCT VFR BLD AUTO: 37.7 % (ref 40–54)
HGB BLD-MCNC: 12 G/DL (ref 14–18)
IMM GRANULOCYTES # BLD AUTO: 0.03 K/UL (ref 0–0.04)
IMM GRANULOCYTES NFR BLD AUTO: 0.4 % (ref 0–0.5)
INR PPP: 1 (ref 0.8–1.2)
INR PPP: 1.1 (ref 0.8–1.2)
LDH SERPL L TO P-CCNC: 2.93 MMOL/L (ref 0.36–1.25)
LYMPHOCYTES # BLD AUTO: 1.5 K/UL (ref 1–4.8)
LYMPHOCYTES NFR BLD: 20.3 % (ref 18–48)
MAGNESIUM SERPL-MCNC: 2.2 MG/DL (ref 1.6–2.6)
MAGNESIUM SERPL-MCNC: 2.3 MG/DL (ref 1.6–2.6)
MCH RBC QN AUTO: 28.2 PG (ref 27–31)
MCHC RBC AUTO-ENTMCNC: 31.8 G/DL (ref 32–36)
MCV RBC AUTO: 89 FL (ref 82–98)
METHEMOGLOBIN: 0.6 % (ref 0–3)
MODE: ABNORMAL
MODE: ABNORMAL
MONOCYTES # BLD AUTO: 1 K/UL (ref 0.3–1)
MONOCYTES NFR BLD: 13.4 % (ref 4–15)
NEUTROPHILS # BLD AUTO: 4.7 K/UL (ref 1.8–7.7)
NEUTROPHILS NFR BLD: 63.8 % (ref 38–73)
NRBC BLD-RTO: 0 /100 WBC
PCO2 BLDA: 49.2 MMHG (ref 35–45)
PCO2 BLDA: 56.8 MMHG (ref 35–45)
PEEP: 5
PH SMN: 7.32 [PH] (ref 7.35–7.45)
PH SMN: 7.43 [PH] (ref 7.35–7.45)
PHOSPHATE SERPL-MCNC: 4.3 MG/DL (ref 2.7–4.5)
PHOSPHATE SERPL-MCNC: 4.6 MG/DL (ref 2.7–4.5)
PLATELET # BLD AUTO: 144 K/UL (ref 150–350)
PMV BLD AUTO: 11.1 FL (ref 9.2–12.9)
PO2 BLDA: 254 MMHG (ref 80–100)
PO2 BLDA: 30 MMHG (ref 40–60)
POC BE: 3 MMOL/L
POC BE: 8 MMOL/L
POC SATURATED O2: 100 % (ref 95–100)
POC SATURATED O2: 57 % (ref 95–100)
POC TCO2: 31 MMOL/L (ref 23–27)
POC TCO2: 34 MMOL/L (ref 24–29)
POCT GLUCOSE: 172 MG/DL (ref 70–110)
POTASSIUM SERPL-SCNC: 3.7 MMOL/L (ref 3.5–5.1)
POTASSIUM SERPL-SCNC: 4.3 MMOL/L (ref 3.5–5.1)
PROT SERPL-MCNC: 6.5 G/DL (ref 6–8.4)
PROTHROMBIN TIME: 10.5 SEC (ref 9–12.5)
PROTHROMBIN TIME: 11.1 SEC (ref 9–12.5)
RBC # BLD AUTO: 4.25 M/UL (ref 4.6–6.2)
SAMPLE: ABNORMAL
SAMPLE: ABNORMAL
SITE: ABNORMAL
SITE: ABNORMAL
SODIUM SERPL-SCNC: 134 MMOL/L (ref 136–145)
SODIUM SERPL-SCNC: 135 MMOL/L (ref 136–145)
VT: 420
WBC # BLD AUTO: 7.4 K/UL (ref 3.9–12.7)

## 2020-01-21 PROCEDURE — 27100171 HC OXYGEN HIGH FLOW UP TO 24 HOURS

## 2020-01-21 PROCEDURE — 63600175 PHARM REV CODE 636 W HCPCS: Performed by: PHYSICIAN ASSISTANT

## 2020-01-21 PROCEDURE — 37799 UNLISTED PX VASCULAR SURGERY: CPT

## 2020-01-21 PROCEDURE — D9220A PRA ANESTHESIA: Mod: ,,, | Performed by: ANESTHESIOLOGY

## 2020-01-21 PROCEDURE — 25000003 PHARM REV CODE 250: Performed by: STUDENT IN AN ORGANIZED HEALTH CARE EDUCATION/TRAINING PROGRAM

## 2020-01-21 PROCEDURE — 85610 PROTHROMBIN TIME: CPT | Mod: 91

## 2020-01-21 PROCEDURE — 27100092 HC HIGH FLOW DELIVERY CANNULA

## 2020-01-21 PROCEDURE — 80053 COMPREHEN METABOLIC PANEL: CPT

## 2020-01-21 PROCEDURE — 83735 ASSAY OF MAGNESIUM: CPT

## 2020-01-21 PROCEDURE — D9220A PRA ANESTHESIA: ICD-10-PCS | Mod: ,,, | Performed by: ANESTHESIOLOGY

## 2020-01-21 PROCEDURE — 37000008 HC ANESTHESIA 1ST 15 MINUTES: Performed by: THORACIC SURGERY (CARDIOTHORACIC VASCULAR SURGERY)

## 2020-01-21 PROCEDURE — 84100 ASSAY OF PHOSPHORUS: CPT

## 2020-01-21 PROCEDURE — 85730 THROMBOPLASTIN TIME PARTIAL: CPT

## 2020-01-21 PROCEDURE — 83050 HGB METHEMOGLOBIN QUAN: CPT

## 2020-01-21 PROCEDURE — 99291 PR CRITICAL CARE, E/M 30-74 MINUTES: ICD-10-PCS | Mod: ,,, | Performed by: PHYSICIAN ASSISTANT

## 2020-01-21 PROCEDURE — 27200966 HC CLOSED SUCTION SYSTEM

## 2020-01-21 PROCEDURE — 36620 INSERTION CATHETER ARTERY: CPT | Mod: 59,,, | Performed by: ANESTHESIOLOGY

## 2020-01-21 PROCEDURE — A4216 STERILE WATER/SALINE, 10 ML: HCPCS | Performed by: INTERNAL MEDICINE

## 2020-01-21 PROCEDURE — 63600175 PHARM REV CODE 636 W HCPCS: Performed by: NURSE PRACTITIONER

## 2020-01-21 PROCEDURE — 36000706: Performed by: THORACIC SURGERY (CARDIOTHORACIC VASCULAR SURGERY)

## 2020-01-21 PROCEDURE — 85730 THROMBOPLASTIN TIME PARTIAL: CPT | Mod: 91

## 2020-01-21 PROCEDURE — 83735 ASSAY OF MAGNESIUM: CPT | Mod: 91

## 2020-01-21 PROCEDURE — 99900026 HC AIRWAY MAINTENANCE (STAT)

## 2020-01-21 PROCEDURE — 20000000 HC ICU ROOM

## 2020-01-21 PROCEDURE — 27201040 HC RC 50 FILTER

## 2020-01-21 PROCEDURE — 84100 ASSAY OF PHOSPHORUS: CPT | Mod: 91

## 2020-01-21 PROCEDURE — 37000009 HC ANESTHESIA EA ADD 15 MINS: Performed by: THORACIC SURGERY (CARDIOTHORACIC VASCULAR SURGERY)

## 2020-01-21 PROCEDURE — 85610 PROTHROMBIN TIME: CPT

## 2020-01-21 PROCEDURE — 99900035 HC TECH TIME PER 15 MIN (STAT)

## 2020-01-21 PROCEDURE — 27000221 HC OXYGEN, UP TO 24 HOURS

## 2020-01-21 PROCEDURE — 63600175 PHARM REV CODE 636 W HCPCS: Performed by: STUDENT IN AN ORGANIZED HEALTH CARE EDUCATION/TRAINING PROGRAM

## 2020-01-21 PROCEDURE — 36620 PR INSERT CATH,ART,PERCUT,SHORTTERM: ICD-10-PCS | Mod: 59,,, | Performed by: ANESTHESIOLOGY

## 2020-01-21 PROCEDURE — 82803 BLOOD GASES ANY COMBINATION: CPT

## 2020-01-21 PROCEDURE — 85025 COMPLETE CBC W/AUTO DIFF WBC: CPT

## 2020-01-21 PROCEDURE — 63600175 PHARM REV CODE 636 W HCPCS: Performed by: SURGERY

## 2020-01-21 PROCEDURE — 36000707: Performed by: THORACIC SURGERY (CARDIOTHORACIC VASCULAR SURGERY)

## 2020-01-21 PROCEDURE — 25000003 PHARM REV CODE 250: Performed by: INTERNAL MEDICINE

## 2020-01-21 PROCEDURE — 99291 CRITICAL CARE FIRST HOUR: CPT | Mod: ,,, | Performed by: PHYSICIAN ASSISTANT

## 2020-01-21 PROCEDURE — 63600367 HC NITRIC OXIDE PER HOUR

## 2020-01-21 PROCEDURE — 94761 N-INVAS EAR/PLS OXIMETRY MLT: CPT

## 2020-01-21 PROCEDURE — 80048 BASIC METABOLIC PNL TOTAL CA: CPT

## 2020-01-21 PROCEDURE — 63600175 PHARM REV CODE 636 W HCPCS: Performed by: INTERNAL MEDICINE

## 2020-01-21 PROCEDURE — 94002 VENT MGMT INPAT INIT DAY: CPT

## 2020-01-21 PROCEDURE — 83605 ASSAY OF LACTIC ACID: CPT

## 2020-01-21 RX ORDER — MAGNESIUM SULFATE HEPTAHYDRATE 40 MG/ML
2 INJECTION, SOLUTION INTRAVENOUS ONCE
Status: COMPLETED | OUTPATIENT
Start: 2020-01-21 | End: 2020-01-21

## 2020-01-21 RX ORDER — PROPOFOL 10 MG/ML
VIAL (ML) INTRAVENOUS
Status: DISCONTINUED | OUTPATIENT
Start: 2020-01-21 | End: 2020-01-21

## 2020-01-21 RX ORDER — LIDOCAINE HCL/PF 100 MG/5ML
SYRINGE (ML) INTRAVENOUS
Status: DISCONTINUED | OUTPATIENT
Start: 2020-01-21 | End: 2020-01-21

## 2020-01-21 RX ORDER — EPINEPHRINE 1 MG/ML
INJECTION, SOLUTION INTRACARDIAC; INTRAMUSCULAR; INTRAVENOUS; SUBCUTANEOUS
Status: DISCONTINUED | OUTPATIENT
Start: 2020-01-21 | End: 2020-01-21

## 2020-01-21 RX ORDER — MUPIROCIN 20 MG/G
OINTMENT TOPICAL
Status: DISCONTINUED | OUTPATIENT
Start: 2020-01-21 | End: 2020-01-21 | Stop reason: HOSPADM

## 2020-01-21 RX ORDER — RIFAMPIN 600 MG/10ML
600 INJECTION, POWDER, LYOPHILIZED, FOR SOLUTION INTRAVENOUS
Status: DISCONTINUED | OUTPATIENT
Start: 2020-01-21 | End: 2020-01-21 | Stop reason: HOSPADM

## 2020-01-21 RX ORDER — PROPOFOL 10 MG/ML
5 INJECTION, EMULSION INTRAVENOUS CONTINUOUS
Status: DISCONTINUED | OUTPATIENT
Start: 2020-01-21 | End: 2020-01-23

## 2020-01-21 RX ORDER — MUPIROCIN 20 MG/G
1 OINTMENT TOPICAL
Status: DISCONTINUED | OUTPATIENT
Start: 2020-01-21 | End: 2020-01-21 | Stop reason: HOSPADM

## 2020-01-21 RX ORDER — FENTANYL CITRATE-0.9 % NACL/PF 10 MCG/ML
PLASTIC BAG, INJECTION (ML) INTRAVENOUS CONTINUOUS
Status: DISCONTINUED | OUTPATIENT
Start: 2020-01-21 | End: 2020-01-23

## 2020-01-21 RX ORDER — KETAMINE HCL IN 0.9 % NACL 50 MG/5 ML
SYRINGE (ML) INTRAVENOUS
Status: DISCONTINUED | OUTPATIENT
Start: 2020-01-21 | End: 2020-01-21

## 2020-01-21 RX ORDER — HEPARIN SODIUM,PORCINE/D5W 25000/250
26 INTRAVENOUS SOLUTION INTRAVENOUS CONTINUOUS
Status: DISCONTINUED | OUTPATIENT
Start: 2020-01-21 | End: 2020-01-23

## 2020-01-21 RX ORDER — ROCURONIUM BROMIDE 10 MG/ML
INJECTION, SOLUTION INTRAVENOUS
Status: DISCONTINUED | OUTPATIENT
Start: 2020-01-21 | End: 2020-01-21

## 2020-01-21 RX ORDER — FENTANYL CITRATE 50 UG/ML
INJECTION, SOLUTION INTRAMUSCULAR; INTRAVENOUS
Status: DISCONTINUED | OUTPATIENT
Start: 2020-01-21 | End: 2020-01-21

## 2020-01-21 RX ORDER — CEFEPIME HYDROCHLORIDE 2 G/1
2 INJECTION, POWDER, FOR SOLUTION INTRAVENOUS
Status: DISCONTINUED | OUTPATIENT
Start: 2020-01-21 | End: 2020-01-21 | Stop reason: HOSPADM

## 2020-01-21 RX ORDER — MIDAZOLAM HYDROCHLORIDE 1 MG/ML
INJECTION INTRAMUSCULAR; INTRAVENOUS
Status: DISCONTINUED | OUTPATIENT
Start: 2020-01-21 | End: 2020-01-21

## 2020-01-21 RX ORDER — FUROSEMIDE 10 MG/ML
80 INJECTION INTRAMUSCULAR; INTRAVENOUS ONCE
Status: COMPLETED | OUTPATIENT
Start: 2020-01-21 | End: 2020-01-21

## 2020-01-21 RX ORDER — POTASSIUM CHLORIDE 14.9 MG/ML
20 INJECTION INTRAVENOUS ONCE
Status: COMPLETED | OUTPATIENT
Start: 2020-01-21 | End: 2020-01-21

## 2020-01-21 RX ORDER — TRANEXAMIC ACID 100 MG/ML
INJECTION, SOLUTION INTRAVENOUS CONTINUOUS PRN
Status: DISCONTINUED | OUTPATIENT
Start: 2020-01-21 | End: 2020-01-21

## 2020-01-21 RX ORDER — NOREPINEPHRINE BITARTRATE 1 MG/ML
INJECTION, SOLUTION INTRAVENOUS
Status: DISCONTINUED | OUTPATIENT
Start: 2020-01-21 | End: 2020-01-21

## 2020-01-21 RX ADMIN — LIDOCAINE HYDROCHLORIDE 100 MG: 20 INJECTION, SOLUTION INTRAVENOUS at 07:01

## 2020-01-21 RX ADMIN — PROPOFOL 35 MCG/KG/MIN: 10 INJECTION, EMULSION INTRAVENOUS at 03:01

## 2020-01-21 RX ADMIN — HEPARIN SODIUM AND DEXTROSE 18 UNITS/KG/HR: 10000; 5 INJECTION INTRAVENOUS at 11:01

## 2020-01-21 RX ADMIN — PROPOFOL 35 MCG/KG/MIN: 10 INJECTION, EMULSION INTRAVENOUS at 09:01

## 2020-01-21 RX ADMIN — Medication 25 MG: at 07:01

## 2020-01-21 RX ADMIN — PROPOFOL 50 MCG/KG/MIN: 10 INJECTION, EMULSION INTRAVENOUS at 10:01

## 2020-01-21 RX ADMIN — FUROSEMIDE 20 MG/HR: 10 INJECTION, SOLUTION INTRAMUSCULAR; INTRAVENOUS at 10:01

## 2020-01-21 RX ADMIN — MAGNESIUM SULFATE IN WATER 2 G: 40 INJECTION, SOLUTION INTRAVENOUS at 06:01

## 2020-01-21 RX ADMIN — EPINEPHRINE 5 MCG: 1 INJECTION, SOLUTION INTRAMUSCULAR; SUBCUTANEOUS at 07:01

## 2020-01-21 RX ADMIN — TRANEXAMIC ACID 1 MG/KG/HR: 100 INJECTION, SOLUTION INTRAVENOUS at 07:01

## 2020-01-21 RX ADMIN — EPINEPHRINE 0.04 MCG/KG/MIN: 1 INJECTION INTRAMUSCULAR; INTRAVENOUS; SUBCUTANEOUS at 10:01

## 2020-01-21 RX ADMIN — PROPOFOL 20 MG: 10 INJECTION, EMULSION INTRAVENOUS at 07:01

## 2020-01-21 RX ADMIN — Medication 25 MG: at 08:01

## 2020-01-21 RX ADMIN — FENTANYL CITRATE 50 MCG/HR: 50 INJECTION, SOLUTION INTRAMUSCULAR; INTRAVENOUS at 10:01

## 2020-01-21 RX ADMIN — FUROSEMIDE 80 MG: 10 INJECTION, SOLUTION INTRAMUSCULAR; INTRAVENOUS at 10:01

## 2020-01-21 RX ADMIN — FUROSEMIDE 20 MG/HR: 10 INJECTION, SOLUTION INTRAMUSCULAR; INTRAVENOUS at 09:01

## 2020-01-21 RX ADMIN — EPINEPHRINE 0.02 MCG/KG/MIN: 1 INJECTION INTRAMUSCULAR; INTRAVENOUS; SUBCUTANEOUS at 07:01

## 2020-01-21 RX ADMIN — PROPOFOL 40 MCG/KG/MIN: 10 INJECTION, EMULSION INTRAVENOUS at 11:01

## 2020-01-21 RX ADMIN — MIDAZOLAM HYDROCHLORIDE 2 MG: 1 INJECTION, SOLUTION INTRAMUSCULAR; INTRAVENOUS at 08:01

## 2020-01-21 RX ADMIN — FENTANYL CITRATE 100 MCG: 50 INJECTION, SOLUTION INTRAMUSCULAR; INTRAVENOUS at 09:01

## 2020-01-21 RX ADMIN — FENTANYL CITRATE 150 MCG: 50 INJECTION, SOLUTION INTRAMUSCULAR; INTRAVENOUS at 07:01

## 2020-01-21 RX ADMIN — NOREPINEPHRINE BITARTRATE 8 MCG: 1 INJECTION, SOLUTION, CONCENTRATE INTRAVENOUS at 07:01

## 2020-01-21 RX ADMIN — POTASSIUM CHLORIDE 20 MEQ: 14.9 INJECTION, SOLUTION INTRAVENOUS at 04:01

## 2020-01-21 RX ADMIN — Medication 10 ML: at 12:01

## 2020-01-21 RX ADMIN — MIDAZOLAM HYDROCHLORIDE 2 MG: 1 INJECTION, SOLUTION INTRAMUSCULAR; INTRAVENOUS at 07:01

## 2020-01-21 RX ADMIN — ROCURONIUM BROMIDE 100 MG: 10 INJECTION, SOLUTION INTRAVENOUS at 07:01

## 2020-01-21 RX ADMIN — HEPARIN SODIUM AND DEXTROSE 18 UNITS/KG/HR: 10000; 5 INJECTION INTRAVENOUS at 10:01

## 2020-01-21 NOTE — ANESTHESIA POSTPROCEDURE EVALUATION
Anesthesia Post Evaluation    Patient: Tae Delgado    Procedure(s) Performed: Procedure(s):  ECHOCARDIOGRAM,TRANSESOPHAGEAL LVAD aborted after JACINTO    Final Anesthesia Type: general    Patient location during evaluation: ICU  Patient participation: No - Unable to Participate, Sedation  Level of consciousness: sedated  Post-procedure vital signs: reviewed and stable  Pain management: adequate  Airway patency: patent    PONV status at discharge: No PONV  Anesthetic complications: no      Cardiovascular status: hemodynamically stable  Respiratory status: intubated            Vitals Value Taken Time   /73 1/21/2020 10:02 AM   Temp 37.7 °C (99.9 °F) 1/21/2020  9:30 AM   Pulse 92 1/21/2020 10:35 AM   Resp 19 1/21/2020 10:35 AM   SpO2 98 % 1/21/2020 10:35 AM   Vitals shown include unvalidated device data.      No case tracking events are documented in the log.      Pain/Dequan Score: Pain Rating Prior to Med Admin: 0 (sedation) (1/21/2020 10:32 AM)

## 2020-01-21 NOTE — PROGRESS NOTES
Update:    SW met with pt's s/o and son in pt's room in order to provide continuity of care and resource. Pt remains intubated and sedated as VAD surgery postponed due to clot found. Pt scheduled to return to OR for surgery on Thurs if clot has dissolved. Pt's s/o and son coping adequately. Son requesting resources for lodging as they would like to stay in the area for one month after LVAD as pt lives 4 hours away and they would prefer to stay local while attending weekly VAD appts. SW provided list of apartments and hotels in the area. Son reports that he will follow up with the resources and expressed appreciation.  SW provided space for family to ask questions/voice concerns. S/o and son denying further needs at this time. SW remains available for continued psychosocial support, education, resources, and additional d/c planning as needed.

## 2020-01-21 NOTE — PLAN OF CARE
"      SICU PLAN OF CARE NOTE    Dx: Acute on chronic combined systolic and diastolic heart failure    Shift Events: Patient and patient's family updated on plan of care. Patient went to OR this AM for LVAD placement; however, during the JACINTO a left atrial appendage clot found and procedure aborted. Admitted to SICU where patient remains intubated and sedated. Lasix and high intensity Heparin gtt started. All VSS. All questions and concerns acknowledged and answered. See flow sheets for full assessment details. Will continue to monitor patient closely.    Goals of Care: Diurese and continue Heparin gtt; JACINTO tomorrow to see if clot is still present. Possible LVAD procedure Thursday.     Neuro: Sedated, Follows Commands and Moves All Extremities    Vital Signs: BP 94/60 (BP Location: Right arm, Patient Position: Lying)   Pulse 88   Temp 98.7 °F (37.1 °C) (Oral)   Resp 16   Ht 5' 10" (1.778 m)   Wt 119.3 kg (263 lb)   SpO2 98%   BMI 37.74 kg/m²  CVP down to 9 from 17    Respiratory: Ventilator AC/ VC 50% FiO2 and 5 PEEP    Diet: NPO (HTS team did not want OG tube)    Gtts: Propfol, Fentanyl, Epinephrine, Lasix and Heparin    Urine Output: Urinary Catheter 1590 cc/shift     Labs/Accuchecks: daily    Skin: No skin breakdown noted.        "

## 2020-01-21 NOTE — PROGRESS NOTES
Patient taken to OR by anesthesia attached to portable EKG monitoring and attached to O2 tank @ 4L NC and 10 ppm nitric. Charts located in the bed with patient. Blood consent, anesthesia consent, and LVAD consent verified and in chart. Questions and concerns addressed. VSS at this time. Charge RN and MD made aware of departure.

## 2020-01-21 NOTE — ADDENDUM NOTE
Addendum  created 01/21/20 1407 by Farnaz Soria MD    Intraprocedure LDAs edited, LDA properties accepted

## 2020-01-21 NOTE — SUBJECTIVE & OBJECTIVE
**Interval History: Patient taken down to OR for VAD placement.  JACINTO done in OR demonstrated clot in DAMON.  Surgery aborted and plan is to treat with heparin; repeat JACINTO tomorrow and possibly reschedule VAD if DAMON thrombus has resolved.  Patient intubated and sedated on Propofol.  He has some agitation so Fentanyl added.    Continuous Infusions:   epinephrine 0.04 mcg/kg/min (01/21/20 1100)    fentanyl 50 mcg/hr (01/21/20 1032)    furosemide (LASIX) 2 mg/mL infusion (non-titrating) 20 mg/hr (01/21/20 1100)    heparin (porcine) in D5W 18 Units/kg/hr (01/21/20 1100)    heparin (porcine) in D5W Stopped (01/21/20 0500)    nitric oxide gas      propofol 40 mcg/kg/min (01/21/20 1134)     Scheduled Meds:   amiodarone  200 mg Oral Daily    docusate sodium  50 mg Oral BID    heparin (PORCINE)  60 Units/kg (Adjusted) Intravenous Once    polyethylene glycol  17 g Oral BID     PRN Meds:acetaminophen, ALPRAZolam, diphenhydrAMINE, heparin (PORCINE), heparin (PORCINE), heparin (PORCINE), heparin (PORCINE), ondansetron, sodium chloride, sodium chloride 0.9%, traMADol    Review of patient's allergies indicates:   Allergen Reactions    Biopatch [chlorhexidine gluconate]      Site burning    Dobutamine in d5w      Tachycardia, tremors, SOB, flushing    Percocet [oxycodone-acetaminophen] Itching    Penicillins Rash     Objective:     Vital Signs (Most Recent):  Temp: 98.6 °F (37 °C) (01/21/20 1100)  Pulse: 90 (01/21/20 1100)  Resp: 16 (01/21/20 1100)  BP: 109/71 (01/21/20 1100)  SpO2: 99 % (01/21/20 1100) Vital Signs (24h Range):  Temp:  [98.6 °F (37 °C)-99.9 °F (37.7 °C)] 98.6 °F (37 °C)  Pulse:  [] 90  Resp:  [9-37] 16  SpO2:  [95 %-100 %] 99 %  BP: (102-157)/(68-86) 109/71  Arterial Line BP: ()/(60-77) 106/63     Patient Vitals for the past 72 hrs (Last 3 readings):   Weight   01/20/20 1000 119.3 kg (263 lb)     Body mass index is 37.74 kg/m².      Intake/Output Summary (Last 24 hours) at 1/21/2020  1136  Last data filed at 1/21/2020 1100  Gross per 24 hour   Intake 816.56 ml   Output 1400 ml   Net -583.44 ml       Hemodynamic Parameters:       Telemetry: SR-ST with NSVT  Physical Exam   Constitutional: Intubated and sedated.  Family at bedside.   Eyes: Pupils are equal, round, and reactive to light. Conjunctivae and EOM are normal.   Neck: Normal range of motion. Neck supple. JVD elevation to mid neck present. No thyromegaly present.   Cardiovascular: Regular rhythm. Tachycardic, + soft S3   Pulmonary/Chest: Effort normal and breath sounds normal.   Abdominal: Soft. Bowel sounds are normal.   Musculoskeletal: Normal range of motion. He exhibits no edema.   Neurological: He is alert and oriented to person, place, and time.   Skin: Skin is warm and dry. Capillary refill takes 2 to 3 seconds.   Psychiatric: He has a normal mood and affect. His behavior is normal. Judgment and thought content normal.       Significant Labs:  CBC:  Recent Labs   Lab 01/19/20  0326 01/20/20  0300 01/21/20  0300   WBC 8.33 10.02 7.40   RBC 5.03 4.69 4.25*   HGB 14.0 13.1* 12.0*   HCT 44.7 41.1 37.7*    160 144*   MCV 89 88 89   MCH 27.8 27.9 28.2   MCHC 31.3* 31.9* 31.8*     BNP:  Recent Labs   Lab 01/15/20  1611 01/20/20  0300   * 740*     CMP:  Recent Labs   Lab 01/19/20  0326 01/20/20  0300 01/21/20  0300   GLU 90 110 104   CALCIUM 9.6 9.3 9.0   ALBUMIN 3.7 3.6 3.3*   PROT 6.9 6.7 6.5    133* 134*   K 4.1 3.9 3.7   CO2 30* 29 32*   CL 94* 93* 93*   BUN 32* 34* 35*   CREATININE 2.0* 1.8* 1.7*   ALKPHOS 50* 49* 45*   ALT 87* 66* 48*   AST 48* 30 25   BILITOT 0.9 1.4* 1.0      Coagulation:   Recent Labs   Lab 01/20/20  0300 01/21/20  0300 01/21/20  0943   INR 1.1 1.1 1.0   APTT 56.5* 70.3* 22.0     LDH:  No results for input(s): LDH in the last 72 hours.  Microbiology:  Microbiology Results (last 7 days)     Procedure Component Value Units Date/Time    Blood culture [[id number]] Collected:  01/20/20 1035    Order  Status:  Completed Specimen:  Blood from Peripheral, Wrist, Left Updated:  01/20/20 1915     Blood Culture, Routine No Growth to date    Blood culture [[id number]] Collected:  01/20/20 1042    Order Status:  Completed Specimen:  Blood from Peripheral, Antecubital, Left Updated:  01/20/20 1915     Blood Culture, Routine No Growth to date    Blood culture [[id number]]     Order Status:  No result Specimen:  Blood     Blood culture [[id number]]     Order Status:  No result Specimen:  Blood           I have reviewed all pertinent labs within the past 24 hours.    Estimated Creatinine Clearance: 60.6 mL/min (A) (based on SCr of 1.7 mg/dL (H)).    Diagnostic Results:  I have reviewed all pertinent imaging results/findings within the past 24 hours.

## 2020-01-21 NOTE — CARE UPDATE
HTS Cross Cover    ICD anti tachy therapies turned off in preparation for surgery. Please call arrhythmia clinic after surgery to turn back on.    Yinka Sharp MD  Cardiology Fellow, PGY4

## 2020-01-21 NOTE — ASSESSMENT & PLAN NOTE
- Creatinine on admit 2.6 (baseline ~ 1.8). Creatinine today has trended down to 1.7  - monitor with diuresis  - Followed by Nephrology as an outpatient  - See A/C CHF

## 2020-01-21 NOTE — RESPIRATORY THERAPY
Pt arrived to SICU via bag and mask.  Connected to vent on documented settings.  Pt tolerated transport well.  No signs of respiratory distress.  Will continue to monitor.

## 2020-01-21 NOTE — ASSESSMENT & PLAN NOTE
"- [facility] dx'd 2010  - TTE 1/20 LVEDD 6.8, LVEF 15%, diastolic dysfunction, RV midly dilated,   - In the past months has been admitted 3 times for volume management requiring high doses of diuretics, in [facility]. Admits not following a strict diet and gaining weight. Now, he refers following a diet and current weight 239 lb but went up to 251 lb when not following diet.   - RHC 1/16 off : RA 12, PA 70/36 (52), w 27, CO/CI 5.07/2.25, SVR 1277  - Transferred to ICU 1/16 for RV support with Corby, increase to 20 ppm 1/18 given bump in creatinine to 2.0,  - Initially diuresed this admit, but diuretics stopped 1/11 when central line placed and CVP was 2. Was given 900 cc IVF since, and CVP was up to 13. Resumed home dose of Lasix 40 mg po bid on 1/13, but transitioned back to Lasix 80 mg IVP bid on 1/16 after RHC.  CVP 17 this morning.  Starting a Lasix drip today  - sVO2 57 this AM off of  and low dose Epi ( stopped completely on 1/14 due to tachycardia and tremors/flushing with resolution of symptoms, and Epi D/C'd 1/17 2 hours after initiation 2/2 "fuzzy head").  - GDMT: Toprol on hold 2/2 decompensation. Hydralazine/Isordil D/C'd 1/18 in anticipation of LVAD implant. Refused to continue Losartan (stopped 4 days PTA) because makes him feel bad and SBP in 90's at home  - Pathway completed  - Discussed at urgent selection 1/17: It was the committee decision to decline the pt for transplant listing due to elevated PA pressures. He is approved for LVAD as DT.  - Plan DT LVAD on Thursday: 1/223 if DAMON resolves (Dr. Schmitt).  "

## 2020-01-21 NOTE — PLAN OF CARE
Case Not performed per Dr. Schmitt's decision.No surgical incision made so no closing or final count performed.

## 2020-01-21 NOTE — PLAN OF CARE
AAOx4. Afebrile. HR 90-100s SR. MAP >65. Last CVP 9. Pt on 4L NC and 20ppm nitric oxide, O2 sats >96%. Pt voided 850 cc overnight. Heparin turned off at 05:00 per MD order, last PTT 70.3. Pt NPO at 00:00. SvO2 59 this AM. Labs trended and lytes replaced per order. LVAD scheduled for today at 07:00. Plan of care reviewed with pt and family. VSS at this time. Will continue to monitor. See flowsheet for full assessment details.

## 2020-01-21 NOTE — TRANSFER OF CARE
"Anesthesia Transfer of Care Note    Patient: Tae Delgado    Procedure(s) Performed: Procedure(s):  ECHOCARDIOGRAM,TRANSESOPHAGEAL LVAD aborted after JACINTO    Patient location: ICU    Anesthesia Type: general    Transport from OR: Transported from OR intubated on 100% O2 by AMBU with adequate controlled ventilation. Upon arrival to PACU/ICU, patient attached to ventilator and auscultated to confirm bilateral breath sounds and adequate TV. Continuous ECG monitoring in transport. Continuous SpO2 monitoring in transport. Continuos invasive BP monitoring in transport    Post pain: adequate analgesia    Post assessment: no apparent anesthetic complications    Post vital signs: stable    Level of consciousness: sedated    Nausea/Vomiting: no nausea/vomiting    Complications: none    Transfer of care protocol was followed      Last vitals:   Visit Vitals  /74 (BP Location: Left arm, Patient Position: Lying)   Pulse 91   Temp 37.2 °C (99 °F) (Oral)   Resp (!) 25   Ht 5' 10" (1.778 m)   Wt 119.3 kg (263 lb)   SpO2 96%   BMI 37.74 kg/m²     "

## 2020-01-21 NOTE — ANESTHESIA PROCEDURE NOTES
JACINTO    Diagnosis: Dx: Acute on chronic combined systolic and diastolic heart failure, NYHA class 4 (I50.43 (ICD-10-CM))  Patient location during procedure: OR  Procedure start time: 1/21/2020 8:34 AM  Timeout: 1/21/2020 8:34 AM  Procedure end time: 1/21/2020 8:34 AM  Exam type: Baseline  Staffing  Anesthesiologist: Emily Acosta MD  Performed: anesthesiologist and fellow   Preanesthetic Checklist  Completed: patient identified, surgical consent, pre-op evaluation, timeout performed, risks and benefits discussed, monitors and equipment checked, anesthesia consent given, oxygen available, suction available, hand hygiene performed and patient being monitored  Setup & Induction  Patient preparation: bite block inserted  Probe Insertion: easy  Exam: complete      Findings  Impression  Other Findings  Diagnostic intraoperative JACINTO performed at the request of Dr. Schmitt for LVAD. After initial exam, case was aborted so the patient can be medically optimized before proceeding.     Summary:  Severely globally decreased left ventricular systolic function with LVEF ~15%  Global longitudinal strain of - 4 %  Mild central AI  Mild to moderate central MR, Systolic blunting in LUPV/RUPV. There is no evidence of mitral stenosis.   Small left to right PFO demonstrated on color flow doppler  LV appears to have small layered thrombus in apex   Spontaneous echo contrast seen in LA and DAMON. The left atrial appendage velocities are 15-17 cm/sec suggesting an increased chance of DAMON clot. There appears to be heterogenous clot in DAMON adherent to the wall which exhibits different echogenicity than the wall of the appendage.   The RV is moderately to severely dilated and had moderately to severely decreased systolic function immediately s/p probe insertion. At that time, there was some evidence of tricuspid annular motion but little to no motion the remaining RV free wall. At that time, the epinephrine drip was uptitrated and over the  course of the remaining exam, there was a slight increase in right ventricular systolic function with some improved contractility of the apical portion of the RV.   The hepatic vein had complete blunting of the systolic component, without evidence of flow reversal.   Tricuspid regurgitation is mild in the setting of ICD lead traversing the valve and reduced RV systolic function.   The portal vein appeared mildly pulsatile, suggesting right ventricular failure.   No effusions noted  Aorta intact, no dissection, no significant pathology  These findings were discussed with the surgeon at the time of the exam, at which point the decision was made to abort the case for today and further medically optimize the patient before proceeding.     Probe removed atraumatically   Probe Removal      Exam     Left Heart  Left Atruim: dilated  Left appendage velocity:15    Left Ventricle: cm, severely abnormal (men >/= 6.8; women>/= 6.1)  LV Wall Thickness (posterior wall): cm, normal (men 0.6-1.0 cm; women 0.6-0.9 cm)    LVAD:no  Estimated Ejection Fraction: < 25% severe            Right Heart  Right Ventricle: dilated  Right Ventricle Function: severely decreased  Right Atria: cm and moderately abnormal    Intra Atrial Septum  PFO: yes by color flow doppler  no IAS aneurysm  no lipomatous hypertrophy  no Atrial Septal Defect (Asd)    Right Ventricle  Size: dilated    Aortic Valve:  Stenosis: none  Morphology: trileaflet  Regurgitation: mild (2+) aortic regurgitation     Mitral Valve:  Morphology:normal  Jet Description: mild-to-moderate    Tricuspid Valve:  Morphology: normal  Regurgitation: mild    Pulmonic Valve:  Morphology:normal  Regurgitation(color flow): none    Great Vessels  Ascending Aorta Atherosclerosis: 2=mild dz (<3mm)  Aortic Arch Atherosclerosis: 2=mild dz (<3mm)  IABP: no  Descending Aorta Atherosclerosis: 2=mild dz (<3mm)  Aorta    Descending aorta IABP: no    Effusions  Effusions: none    Summary  Findings  discussed with surgeon.    Other Findings   Diagnostic intraoperative JACINTO performed at the request of Dr. Schmitt for LVAD. After initial exam, case was aborted so the patient can be medically optimized before proceeding.     Summary:  Severely globally decreased left ventricular systolic function with LVEF ~15%  Global longitudinal strain of - 4 %  Mild central AI  Mild to moderate central MR, Systolic blunting in LUPV/RUPV. There is no evidence of mitral stenosis.   Small left to right PFO demonstrated on color flow doppler  LV appears to have small layered thrombus in apex   Spontaneous echo contrast seen in LA and DAMON. The left atrial appendage velocities are 15-17 cm/sec suggesting an increased chance of DAMON clot. There appears to be heterogenous clot in DAMON adherent to the wall which exhibits different echogenicity than the wall of the appendage.   The RV is moderately to severely dilated and had moderately to severely decreased systolic function immediately s/p probe insertion. At that time, there was some evidence of tricuspid annular motion but little to no motion the remaining RV free wall. At that time, the epinephrine drip was uptitrated and over the course of the remaining exam, there was a slight increase in right ventricular systolic function with some improved contractility of the apical portion of the RV.   The hepatic vein had complete blunting of the systolic component, without evidence of flow reversal.   Tricuspid regurgitation is mild in the setting of ICD lead traversing the valve and reduced RV systolic function.   The portal vein appeared mildly pulsatile, suggesting right ventricular failure.   No effusions noted  Aorta intact, no dissection, no significant pathology  These findings were discussed with the surgeon at the time of the exam, at which point the decision was made to abort the case for today and further medically optimize the patient before proceeding.     Probe removed  atraumatically

## 2020-01-21 NOTE — PLAN OF CARE
"      SICU PLAN OF CARE NOTE    Dx: Acute on chronic combined systolic and diastolic heart failure    Shift Events: Echo EF 15%. SVO2 59. CVP 13.     Goals of Care: LVAD tomorrow.     Neuro: AAO x4    Vital Signs: /83   Pulse 102   Temp 98.6 °F (37 °C) (Oral)   Resp 18   Ht 5' 10" (1.778 m)   Wt 119.3 kg (263 lb)   SpO2 98%   BMI 37.74 kg/m²     Respiratory: Nasal Cannula w/ Corby    Diet: Cardiac Diet, NPO at midnight     Gtts: Heparin     Urine Output: 750 cc on shift     Labs/Accuchecks: Labs trended.    Skin: No skin breakdown noted.        "

## 2020-01-21 NOTE — PROGRESS NOTES
[facility]  Heart Transplant  Progress Note    Patient Name: Tae Delgado  MRN: [id number]  Admission Date: 1/9/2020  Hospital Length of Stay: 12 days  Attending Physician: Isiah Montero MD  Primary Care Provider: Elham Pearson MD  Principal Problem:Acute on chronic combined systolic and diastolic heart failure    Subjective:     **Interval History: Patient taken down to OR for VAD placement.  JACINTO done in OR demonstrated clot in DAMON.  Surgery aborted and plan is to treat with heparin; repeat JACINTO tomorrow and possibly reschedule VAD if DAMON thrombus has resolved.  Patient intubated and sedated on Propofol.  He has some agitation so Fentanyl added.    Continuous Infusions:   epinephrine 0.04 mcg/kg/min (01/21/20 1100)    fentanyl 50 mcg/hr (01/21/20 1032)    furosemide (LASIX) 2 mg/mL infusion (non-titrating) 20 mg/hr (01/21/20 1100)    heparin (porcine) in D5W 18 Units/kg/hr (01/21/20 1100)    heparin (porcine) in D5W Stopped (01/21/20 0500)    nitric oxide gas      propofol 40 mcg/kg/min (01/21/20 1134)     Scheduled Meds:   amiodarone  200 mg Oral Daily    docusate sodium  50 mg Oral BID    heparin (PORCINE)  60 Units/kg (Adjusted) Intravenous Once    polyethylene glycol  17 g Oral BID     PRN Meds:acetaminophen, ALPRAZolam, diphenhydrAMINE, heparin (PORCINE), heparin (PORCINE), heparin (PORCINE), heparin (PORCINE), ondansetron, sodium chloride, sodium chloride 0.9%, traMADol    Review of patient's allergies indicates:   Allergen Reactions    Biopatch [chlorhexidine gluconate]      Site burning    Dobutamine in d5w      Tachycardia, tremors, SOB, flushing    Percocet [oxycodone-acetaminophen] Itching    Penicillins Rash     Objective:     Vital Signs (Most Recent):  Temp: 98.6 °F (37 °C) (01/21/20 1100)  Pulse: 90 (01/21/20 1100)  Resp: 16 (01/21/20 1100)  BP: 109/71 (01/21/20 1100)  SpO2: 99 % (01/21/20 1100) Vital Signs (24h Range):  Temp:  [98.6 °F (37 °C)-99.9 °F (37.7 °C)]  98.6 °F (37 °C)  Pulse:  [] 90  Resp:  [9-37] 16  SpO2:  [95 %-100 %] 99 %  BP: (102-157)/(68-86) 109/71  Arterial Line BP: ()/(60-77) 106/63     Patient Vitals for the past 72 hrs (Last 3 readings):   Weight   01/20/20 1000 119.3 kg (263 lb)     Body mass index is 37.74 kg/m².      Intake/Output Summary (Last 24 hours) at 1/21/2020 1136  Last data filed at 1/21/2020 1100  Gross per 24 hour   Intake 816.56 ml   Output 1400 ml   Net -583.44 ml       Hemodynamic Parameters:       Telemetry: SR-ST with NSVT  Physical Exam   Constitutional: Intubated and sedated.  Family at bedside.   Eyes: Pupils are equal, round, and reactive to light. Conjunctivae and EOM are normal.   Neck: Normal range of motion. Neck supple. JVD elevation to mid neck present. No thyromegaly present.   Cardiovascular: Regular rhythm. Tachycardic, + soft S3   Pulmonary/Chest: Effort normal and breath sounds normal.   Abdominal: Soft. Bowel sounds are normal.   Musculoskeletal: Normal range of motion. He exhibits no edema.   Neurological: He is alert and oriented to person, place, and time.   Skin: Skin is warm and dry. Capillary refill takes 2 to 3 seconds.   Psychiatric: He has a normal mood and affect. His behavior is normal. Judgment and thought content normal.       Significant Labs:  CBC:  Recent Labs   Lab 01/19/20  0326 01/20/20  0300 01/21/20  0300   WBC 8.33 10.02 7.40   RBC 5.03 4.69 4.25*   HGB 14.0 13.1* 12.0*   HCT 44.7 41.1 37.7*    160 144*   MCV 89 88 89   MCH 27.8 27.9 28.2   MCHC 31.3* 31.9* 31.8*     BNP:  Recent Labs   Lab 01/15/20  1611 01/20/20  0300   * 740*     CMP:  Recent Labs   Lab 01/19/20  0326 01/20/20  0300 01/21/20  0300   GLU 90 110 104   CALCIUM 9.6 9.3 9.0   ALBUMIN 3.7 3.6 3.3*   PROT 6.9 6.7 6.5    133* 134*   K 4.1 3.9 3.7   CO2 30* 29 32*   CL 94* 93* 93*   BUN 32* 34* 35*   CREATININE 2.0* 1.8* 1.7*   ALKPHOS 50* 49* 45*   ALT 87* 66* 48*   AST 48* 30 25   BILITOT 0.9 1.4* 1.0       Coagulation:   Recent Labs   Lab 01/20/20  0300 01/21/20  0300 01/21/20  0943   INR 1.1 1.1 1.0   APTT 56.5* 70.3* 22.0     LDH:  No results for input(s): LDH in the last 72 hours.  Microbiology:  Microbiology Results (last 7 days)     Procedure Component Value Units Date/Time    Blood culture [[id number]] Collected:  01/20/20 1035    Order Status:  Completed Specimen:  Blood from Peripheral, Wrist, Left Updated:  01/20/20 1915     Blood Culture, Routine No Growth to date    Blood culture [[id number]] Collected:  01/20/20 1042    Order Status:  Completed Specimen:  Blood from Peripheral, Antecubital, Left Updated:  01/20/20 1915     Blood Culture, Routine No Growth to date    Blood culture [[id number]]     Order Status:  No result Specimen:  Blood     Blood culture [[id number]]     Order Status:  No result Specimen:  Blood           I have reviewed all pertinent labs within the past 24 hours.    Estimated Creatinine Clearance: 60.6 mL/min (A) (based on SCr of 1.7 mg/dL (H)).    Diagnostic Results:  I have reviewed all pertinent imaging results/findings within the past 24 hours.    Assessment and Plan:       55 y.o. WM with history of NICMP diagnosed in 2010, ICD, LV thrombus (with prior splenic and renal emboli), Embolic  CVA , paroxysmal atrial fib, HTN, HLP  presents for  F/U today to clinic and he was volume overloaded on exam .  He states he had 3 admission in the last 3 months for volume overload. He started having more SOB on exertion since one month. Also endorses of Orthopnea since last one month. Alble to walk only 150 ft. He also has Bilateral lower extremity edema. He was admitted here in 2017 for ADHD here at [facility] and [facility] during that admission showed PCWP 40 and CVP 17. Currently denies chest pain, lightheadedness     * Acute on chronic combined systolic and diastolic heart failure  - [facility] dx'd 2010  - TTE 1/20 LVEDD 6.8, LVEF 15%, diastolic dysfunction, RV midly dilated,   - In the past months  "has been admitted 3 times for volume management requiring high doses of diuretics, in [facility]. Admits not following a strict diet and gaining weight. Now, he refers following a diet and current weight 239 lb but went up to 251 lb when not following diet.   - RHC 1/16 off : RA 12, PA 70/36 (52), w 27, CO/CI 5.07/2.25, SVR 1277  - Transferred to ICU 1/16 for RV support with Corby, increase to 20 ppm 1/18 given bump in creatinine to 2.0,  - Initially diuresed this admit, but diuretics stopped 1/11 when central line placed and CVP was 2. Was given 900 cc IVF since, and CVP was up to 13. Resumed home dose of Lasix 40 mg po bid on 1/13, but transitioned back to Lasix 80 mg IVP bid on 1/16 after RHC.  CVP 17 this morning.  Starting a Lasix drip today  - sVO2 57 this AM off of  and low dose Epi ( stopped completely on 1/14 due to tachycardia and tremors/flushing with resolution of symptoms, and Epi D/C'd 1/17 2 hours after initiation 2/2 "fuzzy head").  - GDMT: Toprol on hold 2/2 decompensation. Hydralazine/Isordil D/C'd 1/18 in anticipation of LVAD implant. Refused to continue Losartan (stopped 4 days PTA) because makes him feel bad and SBP in 90's at home  - Pathway completed  - Discussed at urgent selection 1/17: It was the committee decision to decline the pt for transplant listing due to elevated PA pressures. He is approved for LVAD as DT.  - Plan DT LVAD on Thursday: 1/223 if DAMON resolves (Dr. Schmitt).    Thrombus of left atrial appendage  -Found on JACINTO in OR pre VAD  -Plan to start heparin infusion and repeat JACINTO tomorrow    Paroxysmal atrial fibrillation  - Currently in SR - ST  - Continue Amiodarone 200 mg qd  - Continue Heparin bridge, but D/C'd Coumadin as he is in w/u      Left ventricular thrombus without MI  - H/O LV thrombus with h/o splenic and renal emboli as well as embolic CVA  - Limited TTE done here 1/13 showed no thrombus  - Continue Heparin birdge, but D/C'd Coumadin as " he is in w/u.      Acute kidney injury superimposed on chronic kidney disease  - Creatinine on admit 2.6 (baseline ~ 1.8). Creatinine today has trended down to 1.7  - monitor with diuresis  - Followed by Nephrology as an outpatient  - See A/C CHF    Transaminitis  - elevated on admission, trended down on  but trended back up off of . Trending back down now on Corby   - monitor closely  - on amiodarone for AF    Right lower lobe pulmonary nodule  - Incidental finding on CT chest/abd/pelvis on 1/12. Pulmonology consulted, and rec repeat CT of chest in 3 months  - Will need to follow-up in pulmonary clinic.     Hepatic congestion  - Liver US on 1/11 unremarkable    ICD (implantable cardioverter-defibrillator) in place  - S/P Biotronik dual chamber ICD    Coagulopathy  - Appreciate Hem/Onc's help. No evidence of underlying coagulopathy (h/o LV thrombus with splenic and renal emboli, h/o embolic CVA)    Uninterrupted Critical Care/Counseling Time (not including procedures): 50 minutes      MARBELLA Mcfadden  Heart Transplant  [facility]

## 2020-01-22 ENCOUNTER — ANESTHESIA (OUTPATIENT)
Dept: SURGERY | Facility: HOSPITAL | Age: 60
DRG: 001 | End: 2020-01-22
Payer: MEDICARE

## 2020-01-22 ENCOUNTER — ANESTHESIA EVENT (OUTPATIENT)
Dept: SURGERY | Facility: HOSPITAL | Age: 60
DRG: 001 | End: 2020-01-22
Payer: MEDICARE

## 2020-01-22 LAB
ALBUMIN SERPL BCP-MCNC: 3.1 G/DL (ref 3.5–5.2)
ALLENS TEST: ABNORMAL
ALLENS TEST: ABNORMAL
ALP SERPL-CCNC: 43 U/L (ref 55–135)
ALT SERPL W/O P-5'-P-CCNC: 38 U/L (ref 10–44)
ANION GAP SERPL CALC-SCNC: 11 MMOL/L (ref 8–16)
ANION GAP SERPL CALC-SCNC: 11 MMOL/L (ref 8–16)
APTT BLDCRRT: 37.8 SEC (ref 21–32)
APTT BLDCRRT: 39.9 SEC (ref 21–32)
APTT BLDCRRT: 41.3 SEC (ref 21–32)
APTT BLDCRRT: 44.6 SEC (ref 21–32)
ASCENDING AORTA: 3.7 CM
AST SERPL-CCNC: 18 U/L (ref 10–40)
BASOPHILS # BLD AUTO: 0.1 K/UL (ref 0–0.2)
BASOPHILS NFR BLD: 0.9 % (ref 0–1.9)
BILIRUB SERPL-MCNC: 1 MG/DL (ref 0.1–1)
BSA FOR ECHO PROCEDURE: 2.43 M2
BUN SERPL-MCNC: 37 MG/DL (ref 6–20)
BUN SERPL-MCNC: 37 MG/DL (ref 6–20)
CALCIUM SERPL-MCNC: 9.1 MG/DL (ref 8.7–10.5)
CALCIUM SERPL-MCNC: 9.7 MG/DL (ref 8.7–10.5)
CHLORIDE SERPL-SCNC: 92 MMOL/L (ref 95–110)
CHLORIDE SERPL-SCNC: 93 MMOL/L (ref 95–110)
CO2 SERPL-SCNC: 31 MMOL/L (ref 23–29)
CO2 SERPL-SCNC: 33 MMOL/L (ref 23–29)
CREAT SERPL-MCNC: 2 MG/DL (ref 0.5–1.4)
CREAT SERPL-MCNC: 2.1 MG/DL (ref 0.5–1.4)
DELSYS: ABNORMAL
DELSYS: ABNORMAL
DIFFERENTIAL METHOD: ABNORMAL
EOSINOPHIL # BLD AUTO: 0 K/UL (ref 0–0.5)
EOSINOPHIL NFR BLD: 0.2 % (ref 0–8)
ERYTHROCYTE [DISTWIDTH] IN BLOOD BY AUTOMATED COUNT: 14.4 % (ref 11.5–14.5)
ERYTHROCYTE [SEDIMENTATION RATE] IN BLOOD BY WESTERGREN METHOD: 16 MM/H
ERYTHROCYTE [SEDIMENTATION RATE] IN BLOOD BY WESTERGREN METHOD: 16 MM/H
EST. GFR  (AFRICAN AMERICAN): 38.7 ML/MIN/1.73 M^2
EST. GFR  (AFRICAN AMERICAN): 41 ML/MIN/1.73 M^2
EST. GFR  (NON AFRICAN AMERICAN): 33.4 ML/MIN/1.73 M^2
EST. GFR  (NON AFRICAN AMERICAN): 35.5 ML/MIN/1.73 M^2
FIO2: 50
FIO2: 50
GLUCOSE SERPL-MCNC: 135 MG/DL (ref 70–110)
GLUCOSE SERPL-MCNC: 144 MG/DL (ref 70–110)
HCO3 UR-SCNC: 36.3 MMOL/L (ref 24–28)
HCO3 UR-SCNC: 37.8 MMOL/L (ref 24–28)
HCT VFR BLD AUTO: 38.6 % (ref 40–54)
HGB BLD-MCNC: 12.1 G/DL (ref 14–18)
IMM GRANULOCYTES # BLD AUTO: 0.06 K/UL (ref 0–0.04)
IMM GRANULOCYTES NFR BLD AUTO: 0.5 % (ref 0–0.5)
INR PPP: 1.1 (ref 0.8–1.2)
LEFT VENTRICULAR INTERNAL DIMENSION IN DIASTOLE: 7.2 CM (ref 3.5–6)
LYMPHOCYTES # BLD AUTO: 1.4 K/UL (ref 1–4.8)
LYMPHOCYTES NFR BLD: 12.9 % (ref 18–48)
MAGNESIUM SERPL-MCNC: 2.1 MG/DL (ref 1.6–2.6)
MCH RBC QN AUTO: 27.9 PG (ref 27–31)
MCHC RBC AUTO-ENTMCNC: 31.3 G/DL (ref 32–36)
MCV RBC AUTO: 89 FL (ref 82–98)
METHEMOGLOBIN: 0.8 % (ref 0–3)
MODE: ABNORMAL
MODE: ABNORMAL
MONOCYTES # BLD AUTO: 1.5 K/UL (ref 0.3–1)
MONOCYTES NFR BLD: 13.1 % (ref 4–15)
NEUTROPHILS # BLD AUTO: 8 K/UL (ref 1.8–7.7)
NEUTROPHILS NFR BLD: 72.4 % (ref 38–73)
NRBC BLD-RTO: 0 /100 WBC
PCO2 BLDA: 53.1 MMHG (ref 35–45)
PCO2 BLDA: 62.7 MMHG (ref 35–45)
PEEP: 5
PEEP: 5
PH SMN: 7.39 [PH] (ref 7.35–7.45)
PH SMN: 7.44 [PH] (ref 7.35–7.45)
PHOSPHATE SERPL-MCNC: 5.5 MG/DL (ref 2.7–4.5)
PLATELET # BLD AUTO: 190 K/UL (ref 150–350)
PMV BLD AUTO: 11.8 FL (ref 9.2–12.9)
PO2 BLDA: 188 MMHG (ref 80–100)
PO2 BLDA: 44 MMHG (ref 40–60)
POC BE: 12 MMOL/L
POC BE: 13 MMOL/L
POC SATURATED O2: 100 % (ref 95–100)
POC SATURATED O2: 78 % (ref 95–100)
POC TCO2: 38 MMOL/L (ref 23–27)
POC TCO2: 40 MMOL/L (ref 24–29)
POTASSIUM SERPL-SCNC: 3.8 MMOL/L (ref 3.5–5.1)
POTASSIUM SERPL-SCNC: 4 MMOL/L (ref 3.5–5.1)
PROT SERPL-MCNC: 6.7 G/DL (ref 6–8.4)
PROTHROMBIN TIME: 11.5 SEC (ref 9–12.5)
RBC # BLD AUTO: 4.33 M/UL (ref 4.6–6.2)
RIGHT VENTRICULAR END-DIASTOLIC DIMENSION: 4.2 CM
SAMPLE: ABNORMAL
SAMPLE: ABNORMAL
SINUS: 3.3 CM
SITE: ABNORMAL
SITE: ABNORMAL
SODIUM SERPL-SCNC: 134 MMOL/L (ref 136–145)
SODIUM SERPL-SCNC: 137 MMOL/L (ref 136–145)
STJ: 3.1 CM
T GONDII IGG SER QL IA: NORMAL
T GONDII IGG SERPL IA-ACNC: 5.2 IU/ML (ref 0–6.4)
VT: 420
VT: 420
WBC # BLD AUTO: 11.08 K/UL (ref 3.9–12.7)

## 2020-01-22 PROCEDURE — 85025 COMPLETE CBC W/AUTO DIFF WBC: CPT

## 2020-01-22 PROCEDURE — 85730 THROMBOPLASTIN TIME PARTIAL: CPT | Mod: 91

## 2020-01-22 PROCEDURE — 85610 PROTHROMBIN TIME: CPT

## 2020-01-22 PROCEDURE — 97803 MED NUTRITION INDIV SUBSEQ: CPT

## 2020-01-22 PROCEDURE — 85730 THROMBOPLASTIN TIME PARTIAL: CPT

## 2020-01-22 PROCEDURE — 83050 HGB METHEMOGLOBIN QUAN: CPT

## 2020-01-22 PROCEDURE — 99291 CRITICAL CARE FIRST HOUR: CPT | Mod: ,,, | Performed by: PHYSICIAN ASSISTANT

## 2020-01-22 PROCEDURE — 27000221 HC OXYGEN, UP TO 24 HOURS

## 2020-01-22 PROCEDURE — 63600175 PHARM REV CODE 636 W HCPCS: Performed by: PHYSICIAN ASSISTANT

## 2020-01-22 PROCEDURE — 84100 ASSAY OF PHOSPHORUS: CPT

## 2020-01-22 PROCEDURE — 63600175 PHARM REV CODE 636 W HCPCS: Performed by: INTERNAL MEDICINE

## 2020-01-22 PROCEDURE — 37799 UNLISTED PX VASCULAR SURGERY: CPT

## 2020-01-22 PROCEDURE — 94761 N-INVAS EAR/PLS OXIMETRY MLT: CPT

## 2020-01-22 PROCEDURE — 83735 ASSAY OF MAGNESIUM: CPT

## 2020-01-22 PROCEDURE — 94003 VENT MGMT INPAT SUBQ DAY: CPT

## 2020-01-22 PROCEDURE — 63600367 HC NITRIC OXIDE PER HOUR

## 2020-01-22 PROCEDURE — 80048 BASIC METABOLIC PNL TOTAL CA: CPT

## 2020-01-22 PROCEDURE — 80053 COMPREHEN METABOLIC PANEL: CPT

## 2020-01-22 PROCEDURE — 99900035 HC TECH TIME PER 15 MIN (STAT)

## 2020-01-22 PROCEDURE — 20000000 HC ICU ROOM

## 2020-01-22 PROCEDURE — 99900026 HC AIRWAY MAINTENANCE (STAT)

## 2020-01-22 PROCEDURE — 82803 BLOOD GASES ANY COMBINATION: CPT

## 2020-01-22 PROCEDURE — 99291 PR CRITICAL CARE, E/M 30-74 MINUTES: ICD-10-PCS | Mod: ,,, | Performed by: PHYSICIAN ASSISTANT

## 2020-01-22 RX ORDER — MUPIROCIN 20 MG/G
1 OINTMENT TOPICAL
Status: CANCELLED | OUTPATIENT
Start: 2020-01-22

## 2020-01-22 RX ORDER — MUPIROCIN 20 MG/G
OINTMENT TOPICAL
Status: CANCELLED | OUTPATIENT
Start: 2020-01-22

## 2020-01-22 RX ORDER — POTASSIUM CHLORIDE 14.9 MG/ML
20 INJECTION INTRAVENOUS ONCE
Status: COMPLETED | OUTPATIENT
Start: 2020-01-22 | End: 2020-01-22

## 2020-01-22 RX ORDER — CEFEPIME HYDROCHLORIDE 2 G/1
2 INJECTION, POWDER, FOR SOLUTION INTRAVENOUS
Status: CANCELLED | OUTPATIENT
Start: 2020-01-23 | End: 2020-01-23

## 2020-01-22 RX ADMIN — PROPOFOL 35 MCG/KG/MIN: 10 INJECTION, EMULSION INTRAVENOUS at 09:01

## 2020-01-22 RX ADMIN — HEPARIN SODIUM AND DEXTROSE 22 UNITS/KG/HR: 10000; 5 INJECTION INTRAVENOUS at 01:01

## 2020-01-22 RX ADMIN — POTASSIUM CHLORIDE 20 MEQ: 14.9 INJECTION, SOLUTION INTRAVENOUS at 04:01

## 2020-01-22 RX ADMIN — PROPOFOL 30 MCG/KG/MIN: 10 INJECTION, EMULSION INTRAVENOUS at 06:01

## 2020-01-22 RX ADMIN — PROPOFOL 30 MCG/KG/MIN: 10 INJECTION, EMULSION INTRAVENOUS at 11:01

## 2020-01-22 RX ADMIN — PROPOFOL 35 MCG/KG/MIN: 10 INJECTION, EMULSION INTRAVENOUS at 01:01

## 2020-01-22 RX ADMIN — PROPOFOL 35 MCG/KG/MIN: 10 INJECTION, EMULSION INTRAVENOUS at 03:01

## 2020-01-22 RX ADMIN — EPINEPHRINE 0.04 MCG/KG/MIN: 1 INJECTION INTRAMUSCULAR; INTRAVENOUS; SUBCUTANEOUS at 04:01

## 2020-01-22 RX ADMIN — FUROSEMIDE 10 MG/HR: 10 INJECTION, SOLUTION INTRAMUSCULAR; INTRAVENOUS at 09:01

## 2020-01-22 NOTE — PLAN OF CARE
"      SICU PLAN OF CARE NOTE    Dx: Acute on chronic combined systolic and diastolic heart failure    Shift Events: Patient and patient's family updated on plan of care. JACINTO done at bedside today with confirmation of no clot in left atrial appendage.    Goals of Care: Plan for LVAD procedure tomorrow.    Neuro: Sedated, Arouses to Voice, Follows Commands and Moves All Extremities    Vital Signs: /68 (BP Location: Right arm, Patient Position: Lying)   Pulse 90   Temp 99.1 °F (37.3 °C) (Oral)   Resp 19   Ht 5' 10" (1.778 m)   Wt 119.3 kg (263 lb)   SpO2 99%   BMI 37.74 kg/m²     Respiratory: Ventilator AC/VC 50%, 5 PEEP with 20 ppm of Nitric Oxide     Diet: NPO    Gtts: Propfol, Fentanyl, Epinephrine, Lasix and Heparin    Urine Output: Urinary Catheter 970 cc/shift       Labs/Accuchecks: BMP at 0000; pTT at 2200    Skin: No skin breakdown noted.       "

## 2020-01-22 NOTE — PROGRESS NOTES
"[facility]  Adult Nutrition  Progress Note    SUMMARY       Recommendations  1. If patient to remain intubated, recommend initiating TF of Peptamen Intense VHP at a goal rate of 50 mL/hr - to provide 1200 kcal/day (1768 kcal w/propofol), 110g protein/day, and 1008mL free fluid/day.    -If no longer on propofol, recommend increasing goal rate to 60 mL/hr.   2. When able to extubate, ADAT to Cardiac with texture per SLP.   RD to monitor.    Goals: Patient to receive nutrition by RD follow-up  Nutrition Goal Status: new  Communication of RD Recs: discussed on rounds    Reason for Assessment  Reason For Assessment: RD follow-up  Diagnosis: cardiac disease(CHF)  Relevant Medical History: HTN, HLD, CHF, afib, stroke  Interdisciplinary Rounds: attended  General Information Comments: Intubated, sedated. Went to OR for LVAD placement yesterday but L atrial appendage clot found so procedure aborted. Plan for JACINTO today. Possible LVAD placement tomorrow. Patient had good PO intake prior to intubation, no weight loss, and continues to appear nourished.  Nutrition Discharge Planning: Unable to determine at this time.    Nutrition Risk Screen  Nutrition Risk Screen: no indicators present    Nutrition/Diet History  Spiritual, Cultural Beliefs, Judaism Practices, Values that Affect Care: no  Factors Affecting Nutritional Intake: NPO, on mechanical ventilation    Anthropometrics  Temp: 99.2 °F (37.3 °C)  Height: 5' 10" (177.8 cm)  Height (inches): 70 in  Weight Method: Standard Scale  Weight: 119.3 kg (263 lb)  Weight (lb): 263 lb  Ideal Body Weight (IBW), Male: 166 lb  % Ideal Body Weight, Male (lb): 158.43 %  BMI (Calculated): 37.7  BMI Grade: 35 - 39.9 - obesity - grade II    Lab/Procedures/Meds  Pertinent Labs Reviewed: reviewed  Pertinent Labs Comments: Na 134, Cl 92, BUN 37, Creat 2.1, Glu 144, Alb 3.1  Pertinent Medications Reviewed: reviewed  Pertinent Medications Comments: docusate, epinephrine, " fentanyl, propofol    Estimated/Assessed Needs  Weight Used For Calorie Calculations: 108.5 kg (239 lb 3.2 oz)  Energy Calorie Requirements (kcal): 8242-2927 kcal/day  Energy Need Method: Kcal/kg(11-14)  Protein Requirements: 113-151 g/day(1.5-2.0 g/kg)  Weight Used For Protein Calculations: 75.5 kg (166 lb 7.2 oz)(IBW)  Fluid Requirements (mL): per MD or 1 ml/kcal  Estimated Fluid Requirement Method: RDA Method  RDA Method (mL): 1194    Nutrition Prescription Ordered  Current Diet Order: NPO    Evaluation of Received Nutrient/Fluid Intake  Other Calories (kcal): 568(propofol)  I/O: -2.5L x 24hrs, -14.9L since admit  Comments: LBM 1/20  % Intake of Estimated Energy Needs: 0 - 25 %  % Meal Intake: NPO    Nutrition Risk  Level of Risk/Frequency of Follow-up: high(2x/week)     Assessment and Plan  Nutrition Problem  Inadequate energy intake    Related to (etiology):   Decreased ability to consume sufficient energy    Signs and Symptoms (as evidenced by):   NPO with no alternative means of nutrition at this time    Interventions (treatment strategy):  Collaboration of nutrition care with other providers    Nutrition Diagnosis Status:   New    Monitor and Evaluation  Food and Nutrient Intake: energy intake, food and beverage intake  Food and Nutrient Adminstration: diet order  Knowledge/Beliefs/Attitudes: food and nutrition knowledge/skill  Anthropometric Measurements: weight, weight change, body mass index  Biochemical Data, Medical Tests and Procedures: electrolyte and renal panel, gastrointestinal profile, glucose/endocrine profile, inflammatory profile, lipid profile  Nutrition-Focused Physical Findings: overall appearance     Nutrition Follow-Up  RD Follow-up?: Yes

## 2020-01-22 NOTE — PHYSICIAN QUERY
PT Name: Tae Delgado  MR #: [id number]    Physician Query Form - CardioPulmonary Clarification      CDS/: Becki Walker RN, CCDS             Contact information: [email or web address]    This form is a permanent document in the medical record.    Query Date: January 22, 2020    By submitting this query, we are merely seeking further clarification of documentation. Please utilize your independent clinical judgment when addressing the question(s) below.    The Medical record contains the following:   Indicators   Supporting Clinical Findings Location in Medical Record   X Pulmonary Hypertension documented · Pulmonary HTN is severe.    Declined for OHT due to:  Pulmonary hypertension  1/16 RHC      1/20 telephone encounter   X Acute/Chronic Illness Admitted with acute on chronic combined HF, acute on chronic renal insufficiency, transaminitis/hepatic congestion for diuresis and consideration of advanced HF tx- 1/10 prog note   X Echo and/or Heart Cath Findings · Filling pressures on the right are mildly elevated. Pulmonary HTN is severe.  · Moderately elevated left sided filling pressures (confirmed by sat)  · Low normal CO /CI off inotropes 1/16 R[facility]    BiPAP/Intubation/Supplemental O2      SOB, BELTRAN, Fatigue, Dizziness, LE Edema, Cyanosis, Chest Pain, Respiratory Distress, Hypoxia, etc.      Treatment         Medication      Other     Provider, please specify the type of pulmonary hypertension:  [   ] Group 1:  Pulmonary Arterial Hypertension - includes Primary, Idiopathic, Inheritable, and Secondary (due to drugs, toxins, congenital heart diease, HIV infection, etc.)     [ X  ] Group 2:  Pulmonary Hypertension due to Left Heart Disease, including left heart failure and/or left heart valve disease     [   ] Group 5:  Pulmonary Hypertension due to other, multifactorial, or unclear mechanisms     [   ] Pulmonary Hypertension, unspecified     [   ] Other Cardiopulmonary Condition (please specify):     [  ]  Clinically Undetermined         Please document in your progress notes daily for the duration of treatment, until resolved, and include in your discharge summary.

## 2020-01-22 NOTE — ANESTHESIA PREPROCEDURE EVALUATION
[facility]  Anesthesia Pre-Operative Evaluation         Patient Name: Tae Delgado  YOB: 1960  MRN: [id number]    SUBJECTIVE:     Pre-operative evaluation for Procedure(s) (LRB):  INSERTION-LEFT VENTRICULAR ASSIST DEVICE (Left)     01/22/2020    Tae Delgado is a 59 y.o. 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   - S/p RHC 1/16 with normal CO/CI but high filling and PAP.     - Per patient he is a hard stick and they had issues with inserting needles during his heart cath.  - on 1/21/2020 his procedure was aborted due to medically optimize the Pt medical status.   - Intra-op JACINTO showed = LV appears to have small layered thrombus in apex, and severely worsening RV function.     Things that happen during his admission:  - Pt had a reaction to dobutamine and epinephrine >> it wasn't allergic, it's mostly color skin changes (red with dobutamine, and gray with epinephrine)  - He felt his throat was closing up with epinephrine , but no documented reaction     - Biopatch and cholorohexidine severe skin rashes    Patient now presents for the above procedure(s).      2D ECHO: 1/10/2020    · Severe left ventricular enlargement.  · Severely decreased left ventricular systolic function. The estimated ejection fraction is 15%.  · Mild right ventricular enlargement.  · Moderately reduced right ventricular systolic function.  · Left ventricular diastolic dysfunction.  · Severe biatrial enlargement.  · Mild tricuspid regurgitation.  · The estimated PA systolic pressure is 31 mmHg.  · Intermediate central venous pressure (8 mmHg).        LDA:   Rt IJ         PICC Double Lumen 01/16/20 4714  right basilic (Active)   Site Assessment Clean;Dry;Intact;No redness;No swelling 1/20/2020  3:00 AM   Lumen 1 Status Infusing 1/20/2020  3:00 AM   Lumen 2 Status Flushed;Normal saline locked 1/20/2020  3:00 AM   Length gay (cm) 37 cm 1/17/2020  7:00 AM   Current Exposed Catheter (cm) 0 cm 1/16/2020  5:25 PM   Extremity Circumference (cm) 39 cm 1/17/2020  7:00 AM   Dressing Type Transparent 1/20/2020  3:00 AM   Dressing Status Clean;Dry;Intact 1/20/2020  3:00 AM   Dressing Intervention Dressing reinforced 1/20/2020  3:00 AM   Dressing Change Due 01/23/20 1/19/2020  3:15 PM   Daily Line Review Performed 1/19/2020  3:15 PM   Number of days: 3       Prev airway: None documented.    Drips: None documented      Patient Active Problem List   Diagnosis    History of pulmonary embolism    Hyperlipidemia    Gout    Hypertension    Obesity    At risk for amiodarone toxicity with long term use    Left ventricular thrombus without MI    Paroxysmal atrial fibrillation    Chronic systolic congestive heart failure    Chronic anticoagulation    COCM (congestive cardiomyopathy)    Acute on chronic combined systolic and diastolic heart failure    Acute on chronic systolic heart failure    Hypertensive heart disease with heart failure    Long term (current) use of anticoagulants    Acute kidney injury superimposed on chronic kidney disease    Right lower lobe pulmonary nodule    ICD (implantable cardioverter-defibrillator) in place    Transaminitis    Hepatic congestion    NSVT (nonsustained ventricular tachycardia)    Pre-transplant evaluation for heart transplant    Thrombus    Abnormal EKG    Acute on chronic heart failure    Heart abnormality    ACP (advance care planning)    Coagulopathy    Thrombus of left atrial appendage       Review of patient's allergies indicates:   Allergen Reactions    Biopatch [chlorhexidine gluconate]      Site burning    Dobutamine in d5w      Tachycardia, tremors, SOB,  flushing    Percocet [oxycodone-acetaminophen] Itching    Penicillins Rash       Current Inpatient Medications:      Current Facility-Administered Medications on File Prior to Visit   Medication Dose Route Frequency Provider Last Rate Last Dose    amiodarone tablet 200 mg  200 mg Oral Daily Andriy Perkins MD   200 mg at 01/20/20 0822    docusate sodium capsule 50 mg  50 mg Oral BID Andriy Perkins MD   50 mg at 01/20/20 2159    EPINEPHrine (ADRENALIN) 5 mg in sodium chloride 0.9% 250 mL infusion  0.02 mcg/kg/min Intravenous Continuous MARBELLA Mcfadden 14.3 mL/hr at 01/22/20 0900 0.04 mcg/kg/min at 01/22/20 0900    fentaNYL 2500 mcg in 0.9% sodium chloride 250 mL infusion premix (titrating)   Intravenous Continuous MARBELLA Mcfadden 7.5 mL/hr at 01/22/20 0900      heparin 25,000 units in dextrose 5% (100 units/ml) IV bolus from bag - ADDITIONAL PRN BOLUS - 30 units/kg  30 Units/kg (Adjusted) Intravenous PRN MARBELLA Mcfadden   2,745 Units at 01/21/20 2312    heparin 25,000 units in dextrose 5% (100 units/ml) IV bolus from bag - ADDITIONAL PRN BOLUS - 60 units/kg  60 Units/kg (Adjusted) Intravenous PRN MARBELLA Mcfadden        heparin 25,000 units in dextrose 5% 250 mL (100 units/mL) infusion HIGH INTENSITY nomogram - [facility]  20 Units/kg/hr (Adjusted) Intravenous Continuous Yinka Sharp MD 18.3 mL/hr at 01/22/20 0900 20 Units/kg/hr at 01/22/20 0900    nitric oxide gas Gas 20 ppm  20 ppm Inhalation Continuous Marylin Lazcano NP        polyethylene glycol packet 17 g  17 g Oral BID Andriy Perkins MD   Stopped at 01/20/20 0900    propofol (DIPRIVAN) 10 mg/mL infusion  5 mcg/kg/min Intravenous Continuous MARBELLA Mcfadden 21.5 mL/hr at 01/22/20 0900 30 mcg/kg/min at 01/22/20 0900    sodium chloride 0.9% flush 10 mL  10 mL Intravenous PRN Andriy Perkins MD         Current Outpatient Medications on File Prior to Visit   Medication Sig Dispense Refill    amiodarone (PACERONE) 200 MG Tab  Tale 1 tablet (200mg) by mouth on Monday, Tuesday, Thursday, Friday and Saturday. Only take medication 5 days per week. 20 tablet 11    furosemide (LASIX) 40 MG tablet Take 1 tablet (40 mg total) by mouth 2 (two) times daily. 90 tablet 3    hydrocortisone 2.5 % ointment Apply 1 application topically 2 (two) times daily.      metoprolol succinate (TOPROL-XL) 50 MG 24 hr tablet TAKE ONE TABLET BY MOUTH ONCE DAILY 30 tablet 11    spironolactone (ALDACTONE) 25 MG tablet TAKE 1 TABLET BY MOUTH ONCE DAILY 30 tablet 11    VENTOLIN HFA 90 mcg/actuation inhaler Inhale 1 Inhaler into the lungs every 4 (four) hours as needed.      warfarin (COUMADIN) 3 MG tablet TAKE 1 TABLET BY MOUTH ONCE DAILY EXCEPT  SATURDAY 30 tablet 5       Past Surgical History:   Procedure Laterality Date    RIGHT HEART CATHETERIZATION Right 1/16/2020    Procedure: INSERTION, CATHETER, RIGHT HEART;  Surgeon: Isiah oMntero MD;  Location: [facility] CATH LAB;  Service: Cardiology;  Laterality: Right;    TONSILLECTOMY      VEIN LIGATION AND STRIPPING         Social History     Socioeconomic History    Marital status:      Spouse name: Not on file    Number of children: Not on file    Years of education: Not on file    Highest education level: Not on file   Occupational History    Not on file   Social Needs    Financial resource strain: Not on file    Food insecurity:     Worry: Not on file     Inability: Not on file    Transportation needs:     Medical: Not on file     Non-medical: Not on file   Tobacco Use    Smoking status: Never Smoker    Smokeless tobacco: Never Used   Substance and Sexual Activity    Alcohol use: No    Drug use: No    Sexual activity: Not on file   Lifestyle    Physical activity:     Days per week: Not on file     Minutes per session: Not on file    Stress: Not on file   Relationships    Social connections:     Talks on phone: Not on file     Gets together: Not on file     Attends Mandaeism service: Not  on file     Active member of club or organization: Not on file     Attends meetings of clubs or organizations: Not on file     Relationship status: Not on file   Other Topics Concern    Not on file   Social History Narrative    Not on file       OBJECTIVE:     Vital Signs Range (Last 24H):  Temp:  [37 °C (98.6 °F)-37.7 °C (99.9 °F)]   Pulse:  []   Resp:  [14-29]   BP: ()/(60-83)   SpO2:  [92 %-99 %]   Arterial Line BP: ()/(56-79)       Significant Labs:  Lab Results   Component Value Date    WBC 11.08 01/22/2020    HGB 12.1 (L) 01/22/2020    HCT 38.6 (L) 01/22/2020     01/22/2020    CHOL 177 01/15/2020    TRIG 129 01/15/2020    HDL 46 01/15/2020    ALT 38 01/22/2020    AST 18 01/22/2020     (L) 01/22/2020    K 4.0 01/22/2020    CL 92 (L) 01/22/2020    CREATININE 2.1 (H) 01/22/2020    BUN 37 (H) 01/22/2020    CO2 31 (H) 01/22/2020    TSH 3.268 01/16/2020    PSA 1.7 01/15/2020    INR 1.1 01/22/2020    HGBA1C 6.2 (H) 01/15/2020       Diagnostic Studies: No relevant studies.    EKG: No recent studies available.    2D ECHO: 1/10/2020  - Please F/U latest JACINTO results on 1/22/2020    · Severe left ventricular enlargement.  · Severely decreased left ventricular systolic function. The estimated ejection fraction is 15%.  · Mild right ventricular enlargement.  · Moderately reduced right ventricular systolic function.  · Left ventricular diastolic dysfunction.  · Severe biatrial enlargement.  · Mild tricuspid regurgitation.  · The estimated PA systolic pressure is 31 mmHg.  · Intermediate central venous pressure (8 mmHg).          ASSESSMENT/PLAN:       Pre-op Assessment    I have reviewed the Patient Summary Reports.     I have reviewed the Nursing Notes.   I have reviewed the Medications.     Review of Systems  Anesthesia Hx:  No problems with previous Anesthesia Denies Hx of Anesthetic complications  History of prior surgery of interest to airway management or planning: Previous anesthesia:  General Denies Family Hx of Anesthesia complications.   Denies Personal Hx of Anesthesia complications.   Social:  Non-Smoker, No Alcohol Use    Hematology/Oncology:         -- Anemia: Denies Current/Recent Cancer   EENT/Dental:   denies chronic allergic rhinitis  Denies Otitis Media Denies Chronic Tonsillitis   Cardiovascular:   Denies Pacemaker. Hypertension  Denies MI.  Denies CAD.    Denies CABG/stent.  Denies Dysrhythmias.   Denies Angina. CHF         hyperlipidemia     ICD Functional Capacity good / => 4 METS    Pulmonary:   Denies Pneumonia Denies COPD.  Denies Asthma.  Denies Shortness of breath.  Denies Recent URI.  Denies Sleep Apnea. Hx of PE   Renal/:   Chronic Renal Disease    Hepatic/GI:   Denies PUD. Denies Hiatal Hernia.  Denies GERD. Denies Liver Disease.  Denies Hepatitis.    Musculoskeletal:  Musculoskeletal Normal Denies Arthritis.      Neurological:   Denies TIA. CVA Denies Neuromuscular Disease.  Denies Headaches. Denies Seizures. C-spine cleared.     Denies Chronic Pain Syndrome  Denies Peripheral Neuropathy    Endocrine:  Endocrine Normal Denies Diabetes. Denies Hypothyroidism.  Denies Hyperthyroidism.    Psych:  Psychiatric Normal  Denies Psychiatric History. denies anxiety denies depression          Physical Exam  General:  Well nourished    Airway/Jaw/Neck:  Airway Findings: Mouth Opening: Small, but > 3cm Tongue: Normal  General Airway Assessment: Adult  Mallampati: IV  Improves to III with phonation.  TM Distance: 4 - 6 cm  Jaw/Neck Findings:  Micrognathia: Negative Mandibular Fracture: Negative    Neck ROM: Normal ROM  Neck Findings:  Girth Increased   R CVC   Eyes/Ears/Nose:  EYES/EARS/NOSE FINDINGS: Normal   Dental:  Dental Findings: In tact   Chest/Lungs:  Chest/Lungs Findings: Clear to auscultation     Heart/Vascular:  Heart Findings: Normal Heart murmur: negative    Abdomen:  Abdomen Findings:  Normal, Soft       Mental Status:  Mental Status Findings:  Cooperative, Alert and  Oriented         Anesthesia Plan  Type of Anesthesia, risks & benefits discussed:  Anesthesia Type:  general  Patient's Preference:   Intra-op Monitoring Plan: arterial line, central line and standard ASA monitors  Intra-op Monitoring Plan Comments:   Post Op Pain Control Plan: multimodal analgesia, per primary service following discharge from PACU and IV/PO Opioids PRN  Post Op Pain Control Plan Comments:   Induction:   IV  Beta Blocker:  Patient is not currently on a Beta-Blocker (No further documentation required).       Informed Consent: Patient understands risks and agrees with Anesthesia plan.  Questions answered. Anesthesia consent signed with patient.  ASA Score: 4     Day of Surgery Review of History & Physical:    H&P update referred to the surgeon.         Ready For Surgery From Anesthesia Perspective.

## 2020-01-22 NOTE — CONSULTS
"[facility]  Cardiology  Consult Note    Patient Name: Tae Delgado  MRN: [id number]  Admission Date: 1/9/2020  Hospital Length of Stay: 13 days  Code Status: Full Code   Attending Provider: Isiah Montero MD   Consulting Provider: Soila Reyes MD  Primary Care Physician: Elham Pearson MD  Principal Problem:Acute on chronic combined systolic and diastolic heart failure    Patient information was obtained from patient and ER records.     Consults  Subjective:     HPI: Tae Delgado is a 58 yo M with PMHx significant for NICMP LVEF 15% (dx 2010), s/p ICD, hx LV thrombus (with prior splenic and renal emboli), pAF, hx embolic CVA, HTN, DLD who was initially admitted from [facility] clinic on 1/9 with volume overload / ADHF. Patient diuresed but subsequently transferred to ICU on 1/16 for additional RV support with [place]. He was started on advanced options pathway and was approved for LVAD as DT. Patient was taken to OR yesterday for VAD placement with Dr Schmitt however intraop JACINTO demonstrated DAMON thrombus therefore his surgery was aborted/postponed. He was started on therapeutic heparin gtt. Plan is to take back to OR tomorrow on 1/23 if repeat JACINTO requested by [facility] demonstrates resolution of DAMON thrombus.      Intraoperative JACINTO:  "Severely globally decreased left ventricular systolic function with LVEF ~15%  Global longitudinal strain of - 4 %  Mild central AI  Mild to moderate central MR, Systolic blunting in LUPV/RUPV. There is no evidence of mitral stenosis.   Small left to right PFO demonstrated on color flow doppler  LV appears to have small layered thrombus in apex   Spontaneous echo contrast seen in LA and DAMON. The left atrial appendage velocities are 15-17 cm/sec suggesting an increased chance of DAMON clot. There appears to be heterogenous clot in DAMON adherent to the wall which exhibits different echogenicity than the wall of the appendage.   The RV is moderately to severely dilated and had " "moderately to severely decreased systolic function immediately s/p probe insertion. At that time, there was some evidence of tricuspid annular motion but little to no motion the remaining RV free wall. At that time, the epinephrine drip was uptitrated and over the course of the remaining exam, there was a slight increase in right ventricular systolic function with some improved contractility of the apical portion of the RV.   The hepatic vein had complete blunting of the systolic component, without evidence of flow reversal.   Tricuspid regurgitation is mild in the setting of ICD lead traversing the valve and reduced RV systolic function.   The portal vein appeared mildly pulsatile, suggesting right ventricular failure.   No effusions noted  Aorta intact, no dissection, no significant pathology  These findings were discussed with the surgeon at the time of the exam, at which point the decision was made to abort the case for today and further medically optimize the patient before proceeding."      Past Medical History:   Diagnosis Date    CHF (congestive heart failure) 1/2010    Dx  1/2010 w/ decreased LV systolic function (EF 15%) by ECHO 1/2015    COCM (congestive cardiomyopathy) 7/20/2016    Hyperlipidemia     Hypertension     Paroxysmal atrial fibrillation     Pulmonary embolus 2008    Stroke     Superficial thrombophlebitis        Past Surgical History:   Procedure Laterality Date    RIGHT HEART CATHETERIZATION Right 1/16/2020    Procedure: INSERTION, CATHETER, RIGHT HEART;  Surgeon: Isiah Montero MD;  Location: [facility] CATH LAB;  Service: Cardiology;  Laterality: Right;    TONSILLECTOMY      VEIN LIGATION AND STRIPPING         Review of patient's allergies indicates:   Allergen Reactions    Biopatch [chlorhexidine gluconate]      Site burning    Dobutamine in d5w      Tachycardia, tremors, SOB, flushing    Percocet [oxycodone-acetaminophen] Itching    Penicillins Rash       No current " facility-administered medications on file prior to encounter.      Current Outpatient Medications on File Prior to Encounter   Medication Sig    amiodarone (PACERONE) 200 MG Tab Tale 1 tablet (200mg) by mouth on Monday, Tuesday, Thursday, Friday and Saturday. Only take medication 5 days per week.    furosemide (LASIX) 40 MG tablet Take 1 tablet (40 mg total) by mouth 2 (two) times daily.    hydrocortisone 2.5 % ointment Apply 1 application topically 2 (two) times daily.    metoprolol succinate (TOPROL-XL) 50 MG 24 hr tablet TAKE ONE TABLET BY MOUTH ONCE DAILY    spironolactone (ALDACTONE) 25 MG tablet TAKE 1 TABLET BY MOUTH ONCE DAILY    VENTOLIN HFA 90 mcg/actuation inhaler Inhale 1 Inhaler into the lungs every 4 (four) hours as needed.    warfarin (COUMADIN) 3 MG tablet TAKE 1 TABLET BY MOUTH ONCE DAILY EXCEPT  SATURDAY     Family History     Problem Relation (Age of Onset)    Cancer Mother        Tobacco Use    Smoking status: Never Smoker    Smokeless tobacco: Never Used   Substance and Sexual Activity    Alcohol use: No    Drug use: No    Sexual activity: Not on file     Review of Systems   Unable to perform ROS: intubated     Objective:     Vital Signs (Most Recent):  Temp: 99.3 °F (37.4 °C) (01/22/20 0701)  Pulse: 85 (01/22/20 0830)  Resp: 16 (01/22/20 0830)  BP: 116/75 (01/22/20 0800)  SpO2: 98 % (01/22/20 0830) Vital Signs (24h Range):  Temp:  [98.6 °F (37 °C)-99.9 °F (37.7 °C)] 99.3 °F (37.4 °C)  Pulse:  [] 85  Resp:  [14-29] 16  SpO2:  [92 %-99 %] 98 %  BP: ()/(60-83) 116/75  Arterial Line BP: ()/(56-79) 110/64     Weight: 119.3 kg (263 lb)  Body mass index is 37.74 kg/m².    SpO2: 98 %  O2 Device (Oxygen Therapy): ventilator      Intake/Output Summary (Last 24 hours) at 1/22/2020 0844  Last data filed at 1/22/2020 0800  Gross per 24 hour   Intake 1649 ml   Output 4240 ml   Net -2591 ml       Lines/Drains/Airways     Central Venous Catheter Line                 Percutaneous  Central Line Insertion/Assessment - triple lumen  01/20/20 1511 right internal jugular 1 day          Drain                 Urethral Catheter 01/21/20 0752 Non-latex;Straight-tip;Temperature probe 16 Fr. 1 day          Airway                 Airway - Non-Surgical 01/21/20 0742 Endotracheal Tube 1 day                Physical Exam   Constitutional: He appears well-developed. No distress.   HENT:   Head: Normocephalic and atraumatic.   Eyes: Pupils are equal, round, and reactive to light.   Neck: Normal range of motion. Neck supple.   Cardiovascular: Regular rhythm and normal heart sounds.   Pulmonary/Chest: Effort normal and breath sounds normal.   Abdominal: Soft.   Neurological:   Intubated/sedated   Skin: Skin is warm, dry and intact.   Psychiatric:   Unable to assess       Significant Labs: All pertinent lab results from the last 24 hours have been reviewed.    Significant Imaging: Reviewed in EPIC.    Assessment and Plan:     Thrombus of left atrial appendage  1. JACINTO for evaluation of DAMON to assess for absence / presence of thrombus.   -No absolute contraindications of esophageal stricture, tumor, perforation, laceration,or diverticulum and/or active GI bleed  -The risks, benefits & alternatives of the procedure were explained to the patient.   -The risks of transesophageal echo include but are not limited to:  Dental trauma, esophageal trauma/perforation, bleeding, laryngospasm/brochospasm, aspiration, sore throat/hoarseness, & dislodgement of the endotracheal tube/nasogastric tube (where applicable).    -The risks of moderate sedation include hypotension, respiratory depression, arrhythmias, bronchospasm, & death.    -Informed consent was obtained. The patient is agreeable to proceed with the procedure and all questions and concerns addressed.    Case discussed with an attending in echocardiography lab.     Further recommendations per attending addendum            Soila Reyes MD  Cardiology   [facility]

## 2020-01-22 NOTE — ASSESSMENT & PLAN NOTE
1. JACINTO for evaluation of DAMON to assess for absence / presence of thrombus.   -No absolute contraindications of esophageal stricture, tumor, perforation, laceration,or diverticulum and/or active GI bleed  -The risks, benefits & alternatives of the procedure were explained to the patient.   -The risks of transesophageal echo include but are not limited to:  Dental trauma, esophageal trauma/perforation, bleeding, laryngospasm/brochospasm, aspiration, sore throat/hoarseness, & dislodgement of the endotracheal tube/nasogastric tube (where applicable).    -The risks of moderate sedation include hypotension, respiratory depression, arrhythmias, bronchospasm, & death.    -Informed consent was obtained. The patient is agreeable to proceed with the procedure and all questions and concerns addressed.    Case discussed with an attending in echocardiography lab.     Further recommendations per attending addendum

## 2020-01-22 NOTE — ASSESSMENT & PLAN NOTE
- Creatinine on admit 2.6 (baseline ~ 1.8). Creatinine today 2.1  - monitor with diuresis  - Followed by Nephrology as an outpatient  - See A/C CHF

## 2020-01-22 NOTE — PLAN OF CARE
Pt follows commands, moves all extremities. Pt remains on ventilator, ACVC 50% FiO2 and 5 PEEP, rate 16, and 20 ppm of nitric oxide, O2 sats 98%. MAP maintained >65. CVP 8, 9, and 10. Epi gtt @ 0.04 mcg/kg/min, lasix gtt @ 20 mg/h, propofol gtt @ 30 mcg/kg/min, and fentanyl gtt @ 75 mcg/h. Heparin increased to 20 U/kg/h overnight. Last PTT therapeutic at 41.3, and next PTT to be checked at 11:40. -250 cc/h per giles. SvO2 78%. Labs monitored. Pt turned Q2h, no skin breakdown noted. Plans for JACINTO today. Plan of care reviewed with pt and family. VSS at this time. Will continue to monitor. See flowsheet for full assessment details.

## 2020-01-22 NOTE — PROGRESS NOTES
[facility] [address]  Heart Transplant  Progress Note    Patient Name: Tae Delgado  MRN: [id number]  Admission Date: 1/9/2020  Hospital Length of Stay: 13 days  Attending Physician: Isiah Montero MD  Primary Care Provider: Elham Pearson MD  Principal Problem:Acute on chronic combined systolic and diastolic heart failure    Subjective:     **Interval History: Patient intubated and sedated.  Remained on heparin infusion overnight and PTT is 41.3.  Increased heparin dose to get PTT closer to 60.  Diuresed well on Lasix infusion and is net negative 2.4L in the last 24 hours.  CVP: 10, SVO2: 78, CO: 12.9, CI: 5.24 and SVR: 483.  Held last this morning as creat is up to 2.1.  Plan for JACINTO this afternoon and VAD tomorrow if DAMON thrombus has resolved.     Continuous Infusions:   epinephrine 0.04 mcg/kg/min (01/22/20 1100)    fentanyl 2.5 mL/hr at 01/22/20 1100    heparin (porcine) in D5W 22 Units/kg/hr (01/22/20 1100)    nitric oxide gas      propofol 30 mcg/kg/min (01/22/20 1134)     Scheduled Meds:   amiodarone  200 mg Oral Daily    docusate sodium  50 mg Oral BID    polyethylene glycol  17 g Oral BID     PRN Meds:heparin (PORCINE), heparin (PORCINE), sodium chloride 0.9%    Review of patient's allergies indicates:   Allergen Reactions    Biopatch [chlorhexidine gluconate]      Site burning    Dobutamine in d5w      Tachycardia, tremors, SOB, flushing    Percocet [oxycodone-acetaminophen] Itching    Penicillins Rash     Objective:     Vital Signs (Most Recent):  Temp: 99.2 °F (37.3 °C) (01/22/20 1100)  Pulse: 82 (01/22/20 1125)  Resp: 16 (01/22/20 1125)  BP: 108/67 (01/22/20 1100)  SpO2: 99 % (01/22/20 1125) Vital Signs (24h Range):  Temp:  [98.7 °F (37.1 °C)-99.6 °F (37.6 °C)] 99.2 °F (37.3 °C)  Pulse:  [] 82  Resp:  [14-27] 16  SpO2:  [92 %-99 %] 99 %  BP: ()/(60-78) 108/67  Arterial Line BP: ()/(56-79) 109/62     Patient Vitals for the past 72 hrs (Last 3 readings):   Weight    01/20/20 1000 119.3 kg (263 lb)     Body mass index is 37.74 kg/m².      Intake/Output Summary (Last 24 hours) at 1/22/2020 1242  Last data filed at 1/22/2020 1100  Gross per 24 hour   Intake 1649 ml   Output 4035 ml   Net -2386 ml       Hemodynamic Parameters:     Telemetry: SR-ST with NSVT  Physical Exam   Constitutional: Intubated and sedated.  Family at bedside.   Eyes: Pupils are equal, round, and reactive to light. Conjunctivae and EOM are normal.   Neck: Normal range of motion. Neck supple. JVD elevation to mid neck present. No thyromegaly present.   Cardiovascular: Regular rhythm. Tachycardic, + soft S3   Pulmonary/Chest: Effort normal and breath sounds normal.   Abdominal: Soft. Bowel sounds are normal.   Musculoskeletal: Normal range of motion. He exhibits no edema.   Neurological: He is alert and oriented to person, place, and time.   Skin: Skin is warm and dry. Capillary refill takes 2 to 3 seconds.   Psychiatric: He has a normal mood and affect. His behavior is normal. Judgment and thought content normal.       Significant Labs:  CBC:  Recent Labs   Lab 01/20/20  0300 01/21/20  0300 01/22/20  0311   WBC 10.02 7.40 11.08   RBC 4.69 4.25* 4.33*   HGB 13.1* 12.0* 12.1*   HCT 41.1 37.7* 38.6*    144* 190   MCV 88 89 89   MCH 27.9 28.2 27.9   MCHC 31.9* 31.8* 31.3*     BNP:  Recent Labs   Lab 01/15/20  1611 01/20/20  0300   * 740*     CMP:  Recent Labs   Lab 01/20/20  0300 01/21/20  0300 01/21/20  1510 01/22/20  0311    104 154* 144*   CALCIUM 9.3 9.0 9.0 9.1   ALBUMIN 3.6 3.3*  --  3.1*   PROT 6.7 6.5  --  6.7   * 134* 135* 134*   K 3.9 3.7 4.3 4.0   CO2 29 32* 28 31*   CL 93* 93* 95 92*   BUN 34* 35* 37* 37*   CREATININE 1.8* 1.7* 2.0* 2.1*   ALKPHOS 49* 45*  --  43*   ALT 66* 48*  --  38   AST 30 25  --  18   BILITOT 1.4* 1.0  --  1.0      Coagulation:   Recent Labs   Lab 01/21/20  0300 01/21/20  0943  01/21/20  2235 01/22/20  0311 01/22/20  0540 01/22/20  1135   INR 1.1 1.0   --   --  1.1  --   --    APTT 70.3* 22.0   < > 35.1*  --  41.3* 39.9*    < > = values in this interval not displayed.     LDH:  No results for input(s): LDH in the last 72 hours.  Microbiology:  Microbiology Results (last 7 days)     Procedure Component Value Units Date/Time    Blood culture [[id number]] Collected:  01/20/20 1035    Order Status:  Completed Specimen:  Blood from Peripheral, Wrist, Left Updated:  01/21/20 1412     Blood Culture, Routine No Growth to date      No Growth to date    Blood culture [[id number]] Collected:  01/20/20 1042    Order Status:  Completed Specimen:  Blood from Peripheral, Antecubital, Left Updated:  01/21/20 1412     Blood Culture, Routine No Growth to date      No Growth to date    Blood culture [[id number]]     Order Status:  No result Specimen:  Blood     Blood culture [[id number]]     Order Status:  No result Specimen:  Blood           I have reviewed all pertinent labs within the past 24 hours.    Estimated Creatinine Clearance: 49 mL/min (A) (based on SCr of 2.1 mg/dL (H)).    Diagnostic Results:  I have reviewed all pertinent imaging results/findings within the past 24 hours.I    Assessment and Plan:       55 y.o. WM with history of NICMP diagnosed in 2010, ICD, LV thrombus (with prior splenic and renal emboli), Embolic  CVA , paroxysmal atrial fib, HTN, HLP  presents for  F/U today to clinic and he was volume overloaded on exam .  He states he had 3 admission in the last 3 months for volume overload. He started having more SOB on exertion since one month. Also endorses of Orthopnea since last one month. Alble to walk only 150 ft. He also has Bilateral lower extremity edema. He was admitted here in 2017 for ADHD here at [facility] and RHC during that admission showed PCWP 40 and CVP 17. Currently denies chest pain, lightheadedness     * Acute on chronic combined systolic and diastolic heart failure  - [facility] dx'd 2010  - TTE 1/20 LVEDD 6.8, LVEF 15%, diastolic dysfunction, RV  "midly dilated,   - In the past months has been admitted 3 times for volume management requiring high doses of diuretics, in [facility]. Admits not following a strict diet and gaining weight. Now, he refers following a diet and current weight 239 lb but went up to 251 lb when not following diet.   - RHC 1/16 off : RA 12, PA 70/36 (52), w 27, CO/CI 5.07/2.25, SVR 1277  - Transferred to ICU 1/16 for RV support with Corby, increase to 20 ppm 1/18 given bump in creatinine to 2.0,  - Initially diuresed this admit, but diuretics stopped 1/11 when central line placed and CVP was 2. Was given 900 cc IVF since, and CVP was up to 13. Resumed home dose of Lasix 40 mg po bid on 1/13, but transitioned back to Lasix 80 mg IVP bid on 1/16 after RHC.  CVP 17 this morning.  Starting a Lasix drip today  - sVO2 78 this AM off of  and low dose Epi ( stopped completely on 1/14 due to tachycardia and tremors/flushing with resolution of symptoms, and Epi D/C'd 1/17 2 hours after initiation 2/2 "fuzzy head").  - GDMT: Toprol on hold 2/2 decompensation. Hydralazine/Isordil D/C'd 1/18 in anticipation of LVAD implant. Refused to continue Losartan (stopped 4 days PTA) because makes him feel bad and SBP in 90's at home  - Pathway completed  - Discussed at urgent selection 1/17: It was the committee decision to decline the pt for transplant listing due to elevated PA pressures. He is approved for LVAD as DT.  - Plan DT LVAD on Thursday: 1/223 if DAMON resolves (Dr. Schmitt).    Thrombus of left atrial appendage  -Found on JACINTO in OR pre VAD  -Plan to start heparin infusion and repeat JACINTO today    Paroxysmal atrial fibrillation  - Currently in SR - ST  - Continue Amiodarone 200 mg qd  - Continue Heparin bridge, but D/C'd Coumadin as he is in w/u      Left ventricular thrombus without MI  - H/O LV thrombus with h/o splenic and renal emboli as well as embolic CVA  - Limited TTE done here 1/13 showed no thrombus  - Continue " Heparin birdge, but D/C'd Coumadin as he is in w/u.      Acute kidney injury superimposed on chronic kidney disease  - Creatinine on admit 2.6 (baseline ~ 1.8). Creatinine today 2.1  - monitor with diuresis  - Followed by Nephrology as an outpatient  - See A/C CHF    Transaminitis  - elevated on admission, trended down on  but trended back up off of . Trending back down now on Corby   - monitor closely  - on amiodarone for AF    Right lower lobe pulmonary nodule  - Incidental finding on CT chest/abd/pelvis on 1/12. Pulmonology consulted, and rec repeat CT of chest in 3 months  - Will need to follow-up in pulmonary clinic.     Hepatic congestion  - Liver US on 1/11 unremarkable    ICD (implantable cardioverter-defibrillator) in place  - S/P Biotronik dual chamber ICD    Coagulopathy  - Appreciate Hem/Onc's help. No evidence of underlying coagulopathy (h/o LV thrombus with splenic and renal emboli, h/o embolic CVA)    Uninterrupted Critical Care/Counseling Time (not including procedures): 60 minutes        MARBELLA Mcfadden  Heart Transplant  [facility]

## 2020-01-22 NOTE — HPI
"Tae Delgado is a 60 yo M with PMHx significant for NICMP EF 15% (dx 2010), s/p ICD, hx LV thrombus (with prior splenic and renal emboli), pAF, hx embolic CVA, HTN, DLD who was initially admitted from [facility] clinic with volume overload / ADHF. Patient diuresed but subsequently transferred to ICU on 1/16 for additional RV support with Corby. He was started on advanced options pathway and was approved for LVAD as DT. Patient was taken to OR yesterday for VAD placement however intraop JACINTO demonstrated DAMON thrombus therefore his surgery was aborted/postponed. He was started on therapeutic heparin gtt. Plan is to take back to OR tomorrow on 1/23 if repeat JACINTO demonstrates resolution of DAMON thrombus.      Intraoperative JACINTO:  "Severely globally decreased left ventricular systolic function with LVEF ~15%  Global longitudinal strain of - 4 %  Mild central AI  Mild to moderate central MR, Systolic blunting in LUPV/RUPV. There is no evidence of mitral stenosis.   Small left to right PFO demonstrated on color flow doppler  LV appears to have small layered thrombus in apex   Spontaneous echo contrast seen in LA and DAMON. The left atrial appendage velocities are 15-17 cm/sec suggesting an increased chance of DAMON clot. There appears to be heterogenous clot in DAMON adherent to the wall which exhibits different echogenicity than the wall of the appendage.   The RV is moderately to severely dilated and had moderately to severely decreased systolic function immediately s/p probe insertion. At that time, there was some evidence of tricuspid annular motion but little to no motion the remaining RV free wall. At that time, the epinephrine drip was uptitrated and over the course of the remaining exam, there was a slight increase in right ventricular systolic function with some improved contractility of the apical portion of the RV.   The hepatic vein had complete blunting of the systolic component, without evidence of flow reversal.   Tricuspid " "regurgitation is mild in the setting of ICD lead traversing the valve and reduced RV systolic function.   The portal vein appeared mildly pulsatile, suggesting right ventricular failure.   No effusions noted  Aorta intact, no dissection, no significant pathology  These findings were discussed with the surgeon at the time of the exam, at which point the decision was made to abort the case for today and further medically optimize the patient before proceeding."    "

## 2020-01-22 NOTE — SUBJECTIVE & OBJECTIVE
**Interval History: Patient intubated and sedated.  Remained on heparin infusion overnight and PTT is 41.3.  Increased heparin dose to get PTT closer to 60.  Diuresed well on Lasix infusion and is net negative 2.4L in the last 24 hours.  CVP: 10, SVO2: 78, CO: 12.9, CI: 5.24 and SVR: 483.  Held last this morning as creat is up to 2.1.  Plan for JACINTO this afternoon and VAD tomorrow if DAMON thrombus has resolved.     Continuous Infusions:   epinephrine 0.04 mcg/kg/min (01/22/20 1100)    fentanyl 2.5 mL/hr at 01/22/20 1100    heparin (porcine) in D5W 22 Units/kg/hr (01/22/20 1100)    nitric oxide gas      propofol 30 mcg/kg/min (01/22/20 1134)     Scheduled Meds:   amiodarone  200 mg Oral Daily    docusate sodium  50 mg Oral BID    polyethylene glycol  17 g Oral BID     PRN Meds:heparin (PORCINE), heparin (PORCINE), sodium chloride 0.9%    Review of patient's allergies indicates:   Allergen Reactions    Biopatch [chlorhexidine gluconate]      Site burning    Dobutamine in d5w      Tachycardia, tremors, SOB, flushing    Percocet [oxycodone-acetaminophen] Itching    Penicillins Rash     Objective:     Vital Signs (Most Recent):  Temp: 99.2 °F (37.3 °C) (01/22/20 1100)  Pulse: 82 (01/22/20 1125)  Resp: 16 (01/22/20 1125)  BP: 108/67 (01/22/20 1100)  SpO2: 99 % (01/22/20 1125) Vital Signs (24h Range):  Temp:  [98.7 °F (37.1 °C)-99.6 °F (37.6 °C)] 99.2 °F (37.3 °C)  Pulse:  [] 82  Resp:  [14-27] 16  SpO2:  [92 %-99 %] 99 %  BP: ()/(60-78) 108/67  Arterial Line BP: ()/(56-79) 109/62     Patient Vitals for the past 72 hrs (Last 3 readings):   Weight   01/20/20 1000 119.3 kg (263 lb)     Body mass index is 37.74 kg/m².      Intake/Output Summary (Last 24 hours) at 1/22/2020 1242  Last data filed at 1/22/2020 1100  Gross per 24 hour   Intake 1649 ml   Output 4035 ml   Net -2386 ml       Hemodynamic Parameters:     Telemetry: SR-ST with NSVT  Physical Exam   Constitutional: Intubated and sedated.   Family at bedside.   Eyes: Pupils are equal, round, and reactive to light. Conjunctivae and EOM are normal.   Neck: Normal range of motion. Neck supple. JVD elevation to mid neck present. No thyromegaly present.   Cardiovascular: Regular rhythm. Tachycardic, + soft S3   Pulmonary/Chest: Effort normal and breath sounds normal.   Abdominal: Soft. Bowel sounds are normal.   Musculoskeletal: Normal range of motion. He exhibits no edema.   Neurological: He is alert and oriented to person, place, and time.   Skin: Skin is warm and dry. Capillary refill takes 2 to 3 seconds.   Psychiatric: He has a normal mood and affect. His behavior is normal. Judgment and thought content normal.       Significant Labs:  CBC:  Recent Labs   Lab 01/20/20 0300 01/21/20 0300 01/22/20  0311   WBC 10.02 7.40 11.08   RBC 4.69 4.25* 4.33*   HGB 13.1* 12.0* 12.1*   HCT 41.1 37.7* 38.6*    144* 190   MCV 88 89 89   MCH 27.9 28.2 27.9   MCHC 31.9* 31.8* 31.3*     BNP:  Recent Labs   Lab 01/15/20  1611 01/20/20  0300   * 740*     CMP:  Recent Labs   Lab 01/20/20  0300 01/21/20  0300 01/21/20  1510 01/22/20  0311    104 154* 144*   CALCIUM 9.3 9.0 9.0 9.1   ALBUMIN 3.6 3.3*  --  3.1*   PROT 6.7 6.5  --  6.7   * 134* 135* 134*   K 3.9 3.7 4.3 4.0   CO2 29 32* 28 31*   CL 93* 93* 95 92*   BUN 34* 35* 37* 37*   CREATININE 1.8* 1.7* 2.0* 2.1*   ALKPHOS 49* 45*  --  43*   ALT 66* 48*  --  38   AST 30 25  --  18   BILITOT 1.4* 1.0  --  1.0      Coagulation:   Recent Labs   Lab 01/21/20  0300 01/21/20  0943  01/21/20  2235 01/22/20  0311 01/22/20  0540 01/22/20  1135   INR 1.1 1.0  --   --  1.1  --   --    APTT 70.3* 22.0   < > 35.1*  --  41.3* 39.9*    < > = values in this interval not displayed.     LDH:  No results for input(s): LDH in the last 72 hours.  Microbiology:  Microbiology Results (last 7 days)     Procedure Component Value Units Date/Time    Blood culture [[id number]] Collected:  01/20/20 1035    Order Status:   Completed Specimen:  Blood from Peripheral, Wrist, Left Updated:  01/21/20 1412     Blood Culture, Routine No Growth to date      No Growth to date    Blood culture [[id number]] Collected:  01/20/20 1042    Order Status:  Completed Specimen:  Blood from Peripheral, Antecubital, Left Updated:  01/21/20 1412     Blood Culture, Routine No Growth to date      No Growth to date    Blood culture [[id number]]     Order Status:  No result Specimen:  Blood     Blood culture [[id number]]     Order Status:  No result Specimen:  Blood           I have reviewed all pertinent labs within the past 24 hours.    Estimated Creatinine Clearance: 49 mL/min (A) (based on SCr of 2.1 mg/dL (H)).    Diagnostic Results:  I have reviewed all pertinent imaging results/findings within the past 24 hours.I

## 2020-01-22 NOTE — ASSESSMENT & PLAN NOTE
"- [facility] dx'd 2010  - TTE 1/20 LVEDD 6.8, LVEF 15%, diastolic dysfunction, RV midly dilated,   - In the past months has been admitted 3 times for volume management requiring high doses of diuretics, in [facility]. Admits not following a strict diet and gaining weight. Now, he refers following a diet and current weight 239 lb but went up to 251 lb when not following diet.   - RHC 1/16 off : RA 12, PA 70/36 (52), w 27, CO/CI 5.07/2.25, SVR 1277  - Transferred to ICU 1/16 for RV support with Corby, increase to 20 ppm 1/18 given bump in creatinine to 2.0,  - Initially diuresed this admit, but diuretics stopped 1/11 when central line placed and CVP was 2. Was given 900 cc IVF since, and CVP was up to 13. Resumed home dose of Lasix 40 mg po bid on 1/13, but transitioned back to Lasix 80 mg IVP bid on 1/16 after RHC.  CVP 17 this morning.  Starting a Lasix drip today  - sVO2 78 this AM off of  and low dose Epi ( stopped completely on 1/14 due to tachycardia and tremors/flushing with resolution of symptoms, and Epi D/C'd 1/17 2 hours after initiation 2/2 "fuzzy head").  - GDMT: Toprol on hold 2/2 decompensation. Hydralazine/Isordil D/C'd 1/18 in anticipation of LVAD implant. Refused to continue Losartan (stopped 4 days PTA) because makes him feel bad and SBP in 90's at home  - Pathway completed  - Discussed at urgent selection 1/17: It was the committee decision to decline the pt for transplant listing due to elevated PA pressures. He is approved for LVAD as DT.  - Plan DT LVAD on Thursday: 1/223 if DAMON resolves (Dr. Schmitt).  "

## 2020-01-22 NOTE — PLAN OF CARE
Recommendations  1. If patient to remain intubated, recommend initiating TF of Peptamen Intense VHP at a goal rate of 50 mL/hr - to provide 1200 kcal/day (1768 kcal w/propofol), 110g protein/day, and 1008mL free fluid/day.               -If no longer on propofol, recommend increasing goal rate to 60 mL/hr.   2. When able to extubate, ADAT to Cardiac with texture per SLP.   RD to monitor.     Goals: Patient to receive nutrition by RD follow-up  Nutrition Goal Status: new    Full assessment completed, see RD Note 1/22/2020.

## 2020-01-22 NOTE — PHYSICIAN QUERY
"PT Name: Tae Delgado  MR #: [id number]    Physician Query Form - Perfusion Diagnosis Clarification     CDS/: Becki Walker RN, CDS              Contact information: [email or web address]  This form is a permanent document in the medical record.     Query Date: January 22, 2020    By submitting this query, we are merely seeking further clarification of documentation. Please utilize your independent clinical judgment when addressing the question(s) below.    The medical record contains the following:    Indicators   Supporting Clinical Findings   Location in Medical Record   X Acute Illness (e.g. AMI, Sepsis, etc.) Acute on chronic combined systolic and diastolic heart failure    KRISTIN on CKD 3 1/13- 1/122 prog notes      1/13 query    Acidosis documented      ABGs / Labs      Vital Signs      Hypotension or Low Blood Pressure documented      Altered Mental Status or Confusion     X Diaphoresis, Cold Extremities or Cyanosis Pt is cold and wet 1/9 h/p    Oliguria     X Medication/Treatment:  -Vasopressors  -Inotropic Drugs  -IV Fluids  -Cardiac Assist Devices  -Hemodynamic Monitoring  -Blood/Blood Products · CVP 2- lasix gtt d/c'd, 500 cc bolus initiated,  gtt initiated  another 250mls NS at 100ml/s per hr ordered and began    Additional 250mls NS iv given. Dobutamine gtt remains at 5mcg/kg/min    Continues on  gtt     c/o increased SOB and heart racing.  D/C"d.       Pulmonary HTN is severe    Low normal CO /CI off inotropes    will move to ICU for Corby and anticipate IABP tomorrow for further unloading    Corby increased to 15 ppm this morning, and fixed dose Epi started at 0.02mcg/kg/min     did not tolerate epi but remains on Corby-     taken down to OR for VAD placement.  JACINTO done in OR demonstrated clot in DAMON.  Surgery aborted .  Patient intubated and sedated on Propofol   1/11 nurse note            1/12 nurse note        1/13 nurse note    1/14 nurse note      1/16 RHC        1/16 prog note        1/17  " NP significant event        1/18 prog note      1/21 prog note    Other:          Provider, please specify diagnosis or diagnoses associated with above clinical findings.    [ X  ] Cardiogenic Shock   [   ] Other Shock (please specify):   [   ] Shock Unspecified   [   ] Other Condition (please specify):   [  ] Clinically Undetermined         Please document in your progress notes daily for the duration of treatment until resolved and include in your discharge summary.

## 2020-01-22 NOTE — SUBJECTIVE & OBJECTIVE
Past Medical History:   Diagnosis Date    CHF (congestive heart failure) 1/2010    Dx  1/2010 w/ decreased LV systolic function (EF 15%) by ECHO 1/2015    COCM (congestive cardiomyopathy) 7/20/2016    Hyperlipidemia     Hypertension     Paroxysmal atrial fibrillation     Pulmonary embolus 2008    Stroke     Superficial thrombophlebitis        Past Surgical History:   Procedure Laterality Date    RIGHT HEART CATHETERIZATION Right 1/16/2020    Procedure: INSERTION, CATHETER, RIGHT HEART;  Surgeon: Isiah Montero MD;  Location: [facility] CATH LAB;  Service: Cardiology;  Laterality: Right;    TONSILLECTOMY      VEIN LIGATION AND STRIPPING         Review of patient's allergies indicates:   Allergen Reactions    Biopatch [chlorhexidine gluconate]      Site burning    Dobutamine in d5w      Tachycardia, tremors, SOB, flushing    Percocet [oxycodone-acetaminophen] Itching    Penicillins Rash       No current facility-administered medications on file prior to encounter.      Current Outpatient Medications on File Prior to Encounter   Medication Sig    amiodarone (PACERONE) 200 MG Tab Tale 1 tablet (200mg) by mouth on Monday, Tuesday, Thursday, Friday and Saturday. Only take medication 5 days per week.    furosemide (LASIX) 40 MG tablet Take 1 tablet (40 mg total) by mouth 2 (two) times daily.    hydrocortisone 2.5 % ointment Apply 1 application topically 2 (two) times daily.    metoprolol succinate (TOPROL-XL) 50 MG 24 hr tablet TAKE ONE TABLET BY MOUTH ONCE DAILY    spironolactone (ALDACTONE) 25 MG tablet TAKE 1 TABLET BY MOUTH ONCE DAILY    VENTOLIN HFA 90 mcg/actuation inhaler Inhale 1 Inhaler into the lungs every 4 (four) hours as needed.    warfarin (COUMADIN) 3 MG tablet TAKE 1 TABLET BY MOUTH ONCE DAILY EXCEPT  SATURDAY     Family History     Problem Relation (Age of Onset)    Cancer Mother        Tobacco Use    Smoking status: Never Smoker    Smokeless tobacco: Never Used   Substance and Sexual  Activity    Alcohol use: No    Drug use: No    Sexual activity: Not on file     Review of Systems   Unable to perform ROS: intubated     Objective:     Vital Signs (Most Recent):  Temp: 99.3 °F (37.4 °C) (01/22/20 0701)  Pulse: 85 (01/22/20 0830)  Resp: 16 (01/22/20 0830)  BP: 116/75 (01/22/20 0800)  SpO2: 98 % (01/22/20 0830) Vital Signs (24h Range):  Temp:  [98.6 °F (37 °C)-99.9 °F (37.7 °C)] 99.3 °F (37.4 °C)  Pulse:  [] 85  Resp:  [14-29] 16  SpO2:  [92 %-99 %] 98 %  BP: ()/(60-83) 116/75  Arterial Line BP: ()/(56-79) 110/64     Weight: 119.3 kg (263 lb)  Body mass index is 37.74 kg/m².    SpO2: 98 %  O2 Device (Oxygen Therapy): ventilator      Intake/Output Summary (Last 24 hours) at 1/22/2020 0844  Last data filed at 1/22/2020 0800  Gross per 24 hour   Intake 1649 ml   Output 4240 ml   Net -2591 ml       Lines/Drains/Airways     Central Venous Catheter Line                 Percutaneous Central Line Insertion/Assessment - triple lumen  01/20/20 1511 right internal jugular 1 day          Drain                 Urethral Catheter 01/21/20 0752 Non-latex;Straight-tip;Temperature probe 16 Fr. 1 day          Airway                 Airway - Non-Surgical 01/21/20 0742 Endotracheal Tube 1 day                Physical Exam   Constitutional: He appears well-developed. No distress.   HENT:   Head: Normocephalic and atraumatic.   Eyes: Pupils are equal, round, and reactive to light.   Neck: Normal range of motion. Neck supple.   Cardiovascular: Regular rhythm and normal heart sounds.   Pulmonary/Chest: Effort normal and breath sounds normal.   Abdominal: Soft.   Neurological:   Intubated/sedated   Skin: Skin is warm, dry and intact.   Psychiatric:   Unable to assess       Significant Labs: All pertinent lab results from the last 24 hours have been reviewed.    Significant Imaging: Reviewed in EPIC.

## 2020-01-23 ENCOUNTER — DOCUMENTATION ONLY (OUTPATIENT)
Dept: ELECTROPHYSIOLOGY | Facility: CLINIC | Age: 60
End: 2020-01-23

## 2020-01-23 ENCOUNTER — ANESTHESIA (OUTPATIENT)
Dept: SURGERY | Facility: HOSPITAL | Age: 60
DRG: 001 | End: 2020-01-23
Payer: MEDICARE

## 2020-01-23 LAB
ALBUMIN SERPL BCP-MCNC: 2.6 G/DL (ref 3.5–5.2)
ALBUMIN SERPL BCP-MCNC: 3.1 G/DL (ref 3.5–5.2)
ALLENS TEST: ABNORMAL
ALLENS TEST: NORMAL
ALP SERPL-CCNC: 45 U/L (ref 55–135)
ALP SERPL-CCNC: 46 U/L (ref 55–135)
ALT SERPL W/O P-5'-P-CCNC: 31 U/L (ref 10–44)
ALT SERPL W/O P-5'-P-CCNC: 32 U/L (ref 10–44)
ANION GAP SERPL CALC-SCNC: 10 MMOL/L (ref 8–16)
ANION GAP SERPL CALC-SCNC: 10 MMOL/L (ref 8–16)
ANION GAP SERPL CALC-SCNC: 11 MMOL/L (ref 8–16)
ANION GAP SERPL CALC-SCNC: 12 MMOL/L (ref 8–16)
ANION GAP SERPL CALC-SCNC: 15 MMOL/L (ref 8–16)
APTT BLDCRRT: 23.1 SEC (ref 21–32)
APTT BLDCRRT: 23.4 SEC (ref 21–32)
APTT BLDCRRT: 32.6 SEC (ref 21–32)
APTT BLDCRRT: 54 SEC (ref 21–32)
AST SERPL-CCNC: 16 U/L (ref 10–40)
AST SERPL-CCNC: 78 U/L (ref 10–40)
BASOPHILS # BLD AUTO: 0.04 K/UL (ref 0–0.2)
BASOPHILS # BLD AUTO: 0.06 K/UL (ref 0–0.2)
BASOPHILS # BLD AUTO: 0.07 K/UL (ref 0–0.2)
BASOPHILS # BLD AUTO: 0.08 K/UL (ref 0–0.2)
BASOPHILS NFR BLD: 0.3 % (ref 0–1.9)
BASOPHILS NFR BLD: 0.3 % (ref 0–1.9)
BASOPHILS NFR BLD: 0.5 % (ref 0–1.9)
BASOPHILS NFR BLD: 0.8 % (ref 0–1.9)
BILIRUB DIRECT SERPL-MCNC: 1.3 MG/DL (ref 0.1–0.3)
BILIRUB SERPL-MCNC: 1.2 MG/DL (ref 0.1–1)
BILIRUB SERPL-MCNC: 2.2 MG/DL (ref 0.1–1)
BUN SERPL-MCNC: 36 MG/DL (ref 6–20)
BUN SERPL-MCNC: 36 MG/DL (ref 6–20)
BUN SERPL-MCNC: 38 MG/DL (ref 6–20)
BUN SERPL-MCNC: 40 MG/DL (ref 6–20)
BUN SERPL-MCNC: 41 MG/DL (ref 6–20)
CALCIUM SERPL-MCNC: 9.3 MG/DL (ref 8.7–10.5)
CALCIUM SERPL-MCNC: 9.4 MG/DL (ref 8.7–10.5)
CALCIUM SERPL-MCNC: 9.4 MG/DL (ref 8.7–10.5)
CALCIUM SERPL-MCNC: 9.5 MG/DL (ref 8.7–10.5)
CALCIUM SERPL-MCNC: 9.7 MG/DL (ref 8.7–10.5)
CHLORIDE SERPL-SCNC: 102 MMOL/L (ref 95–110)
CHLORIDE SERPL-SCNC: 102 MMOL/L (ref 95–110)
CHLORIDE SERPL-SCNC: 93 MMOL/L (ref 95–110)
CHLORIDE SERPL-SCNC: 94 MMOL/L (ref 95–110)
CHLORIDE SERPL-SCNC: 96 MMOL/L (ref 95–110)
CO2 SERPL-SCNC: 26 MMOL/L (ref 23–29)
CO2 SERPL-SCNC: 29 MMOL/L (ref 23–29)
CO2 SERPL-SCNC: 30 MMOL/L (ref 23–29)
CO2 SERPL-SCNC: 32 MMOL/L (ref 23–29)
CO2 SERPL-SCNC: 33 MMOL/L (ref 23–29)
CREAT SERPL-MCNC: 1.9 MG/DL (ref 0.5–1.4)
CREAT SERPL-MCNC: 1.9 MG/DL (ref 0.5–1.4)
CREAT SERPL-MCNC: 2 MG/DL (ref 0.5–1.4)
CREAT SERPL-MCNC: 2 MG/DL (ref 0.5–1.4)
CREAT SERPL-MCNC: 2.1 MG/DL (ref 0.5–1.4)
DELSYS: ABNORMAL
DIFFERENTIAL METHOD: ABNORMAL
EOSINOPHIL # BLD AUTO: 0 K/UL (ref 0–0.5)
EOSINOPHIL # BLD AUTO: 0.1 K/UL (ref 0–0.5)
EOSINOPHIL NFR BLD: 0 % (ref 0–8)
EOSINOPHIL NFR BLD: 0 % (ref 0–8)
EOSINOPHIL NFR BLD: 0.2 % (ref 0–8)
EOSINOPHIL NFR BLD: 0.8 % (ref 0–8)
ERYTHROCYTE [DISTWIDTH] IN BLOOD BY AUTOMATED COUNT: 14.5 % (ref 11.5–14.5)
ERYTHROCYTE [DISTWIDTH] IN BLOOD BY AUTOMATED COUNT: 14.5 % (ref 11.5–14.5)
ERYTHROCYTE [DISTWIDTH] IN BLOOD BY AUTOMATED COUNT: 14.6 % (ref 11.5–14.5)
ERYTHROCYTE [DISTWIDTH] IN BLOOD BY AUTOMATED COUNT: 14.6 % (ref 11.5–14.5)
ERYTHROCYTE [SEDIMENTATION RATE] IN BLOOD BY WESTERGREN METHOD: 14 MM/H
ERYTHROCYTE [SEDIMENTATION RATE] IN BLOOD BY WESTERGREN METHOD: 16 MM/H
ERYTHROCYTE [SEDIMENTATION RATE] IN BLOOD BY WESTERGREN METHOD: 16 MM/H
ERYTHROCYTE [SEDIMENTATION RATE] IN BLOOD BY WESTERGREN METHOD: 18 MM/H
EST. GFR  (AFRICAN AMERICAN): 38.7 ML/MIN/1.73 M^2
EST. GFR  (AFRICAN AMERICAN): 41 ML/MIN/1.73 M^2
EST. GFR  (AFRICAN AMERICAN): 41 ML/MIN/1.73 M^2
EST. GFR  (AFRICAN AMERICAN): 43.6 ML/MIN/1.73 M^2
EST. GFR  (AFRICAN AMERICAN): 43.6 ML/MIN/1.73 M^2
EST. GFR  (NON AFRICAN AMERICAN): 33.4 ML/MIN/1.73 M^2
EST. GFR  (NON AFRICAN AMERICAN): 35.5 ML/MIN/1.73 M^2
EST. GFR  (NON AFRICAN AMERICAN): 35.5 ML/MIN/1.73 M^2
EST. GFR  (NON AFRICAN AMERICAN): 37.7 ML/MIN/1.73 M^2
EST. GFR  (NON AFRICAN AMERICAN): 37.7 ML/MIN/1.73 M^2
FIBRINOGEN PPP-MCNC: 574 MG/DL (ref 182–366)
FIBRINOGEN PPP-MCNC: 682 MG/DL (ref 182–366)
FIO2: 50
GLUCOSE SERPL-MCNC: 136 MG/DL (ref 70–110)
GLUCOSE SERPL-MCNC: 138 MG/DL (ref 70–110)
GLUCOSE SERPL-MCNC: 157 MG/DL (ref 70–110)
GLUCOSE SERPL-MCNC: 179 MG/DL (ref 70–110)
GLUCOSE SERPL-MCNC: 186 MG/DL (ref 70–110)
GLUCOSE SERPL-MCNC: 187 MG/DL (ref 70–110)
GLUCOSE SERPL-MCNC: 194 MG/DL (ref 70–110)
GLUCOSE SERPL-MCNC: 214 MG/DL (ref 70–110)
GLUCOSE SERPL-MCNC: 218 MG/DL (ref 70–110)
HCO3 UR-SCNC: 29.5 MMOL/L (ref 24–28)
HCO3 UR-SCNC: 29.6 MMOL/L (ref 24–28)
HCO3 UR-SCNC: 29.7 MMOL/L (ref 24–28)
HCO3 UR-SCNC: 29.8 MMOL/L (ref 24–28)
HCO3 UR-SCNC: 30.3 MMOL/L (ref 24–28)
HCO3 UR-SCNC: 30.5 MMOL/L (ref 24–28)
HCO3 UR-SCNC: 30.6 MMOL/L (ref 24–28)
HCO3 UR-SCNC: 32.9 MMOL/L (ref 24–28)
HCO3 UR-SCNC: 34 MMOL/L (ref 24–28)
HCO3 UR-SCNC: 34.4 MMOL/L (ref 24–28)
HCO3 UR-SCNC: 35 MMOL/L (ref 24–28)
HCO3 UR-SCNC: 35.1 MMOL/L (ref 24–28)
HCO3 UR-SCNC: 37.1 MMOL/L (ref 24–28)
HCO3 UR-SCNC: 37.3 MMOL/L (ref 24–28)
HCT VFR BLD AUTO: 34.2 % (ref 40–54)
HCT VFR BLD AUTO: 35.8 % (ref 40–54)
HCT VFR BLD AUTO: 37 % (ref 40–54)
HCT VFR BLD AUTO: 39.6 % (ref 40–54)
HCT VFR BLD CALC: 28 %PCV (ref 36–54)
HCT VFR BLD CALC: 30 %PCV (ref 36–54)
HCT VFR BLD CALC: 30 %PCV (ref 36–54)
HCT VFR BLD CALC: 32 %PCV (ref 36–54)
HCT VFR BLD CALC: 32 %PCV (ref 36–54)
HCT VFR BLD CALC: 33 %PCV (ref 36–54)
HCT VFR BLD CALC: 33 %PCV (ref 36–54)
HCT VFR BLD CALC: 34 %PCV (ref 36–54)
HCT VFR BLD CALC: 37 %PCV (ref 36–54)
HCT VFR BLD CALC: 40 %PCV (ref 36–54)
HGB BLD-MCNC: 10.8 G/DL (ref 14–18)
HGB BLD-MCNC: 11.4 G/DL (ref 14–18)
HGB BLD-MCNC: 12.3 G/DL (ref 14–18)
HGB BLD-MCNC: 12.8 G/DL (ref 14–18)
HLA DRB4 1: NORMAL
HLA SSO DNA TYPING CLASS I & II INTERPRETATION: NORMAL
HLA-A 1 SERO. EQUIV: 2
HLA-A 1: NORMAL
HLA-A 2 SERO. EQUIV: 30
HLA-A 2: NORMAL
HLA-B 1 SERO. EQUIV: 44
HLA-B 1: NORMAL
HLA-B 2 SERO. EQUIV: 55
HLA-B 2: NORMAL
HLA-BW 1 SERO. EQUIV: 4
HLA-BW 2 SERO. EQUIV: 6
HLA-C 1: NORMAL
HLA-C 2: NORMAL
HLA-C1 HI RES: NORMAL
HLA-CW 1 SERO. EQUIV: 9
HLA-CW 2 SERO. EQUIV: 4
HLA-DQ 1 SERO. EQUIV: 2
HLA-DQ 2 SERO. EQUIV: 5
HLA-DQB1 1: NORMAL
HLA-DQB1 2: NORMAL
HLA-DRB1 1 SERO. EQUIV: 7
HLA-DRB1 1: NORMAL
HLA-DRB1 2 SERO. EQUIV: 14
HLA-DRB1 2: NORMAL
HLA-DRB3 1: NORMAL
HLA-DRB3 2: NORMAL
HLA-DRB345 1 SERO. EQUIV: 52
HLA-DRB345 2 SERO. EQUIV: 53
HLA-DRB4 2: NORMAL
HLA-DRB5 1: NORMAL
HLA-DRB5 2: NORMAL
HRC TESTING DATE: NORMAL
IMM GRANULOCYTES # BLD AUTO: 0.06 K/UL (ref 0–0.04)
IMM GRANULOCYTES # BLD AUTO: 0.12 K/UL (ref 0–0.04)
IMM GRANULOCYTES # BLD AUTO: 0.18 K/UL (ref 0–0.04)
IMM GRANULOCYTES # BLD AUTO: 0.36 K/UL (ref 0–0.04)
IMM GRANULOCYTES NFR BLD AUTO: 0.6 % (ref 0–0.5)
IMM GRANULOCYTES NFR BLD AUTO: 1 % (ref 0–0.5)
IMM GRANULOCYTES NFR BLD AUTO: 1.2 % (ref 0–0.5)
IMM GRANULOCYTES NFR BLD AUTO: 2 % (ref 0–0.5)
INR PPP: 1.1 (ref 0.8–1.2)
INR PPP: 1.1 (ref 0.8–1.2)
INR PPP: 1.2 (ref 0.8–1.2)
INR PPP: 1.2 (ref 0.8–1.2)
LACTATE SERPL-SCNC: 4.1 MMOL/L (ref 0.5–2.2)
LDH SERPL L TO P-CCNC: 1.17 MMOL/L (ref 0.36–1.25)
LDH SERPL L TO P-CCNC: 1.6 MMOL/L (ref 0.36–1.25)
LDH SERPL L TO P-CCNC: 1.77 MMOL/L (ref 0.36–1.25)
LDH SERPL L TO P-CCNC: 2.16 MMOL/L (ref 0.36–1.25)
LDH SERPL L TO P-CCNC: 2.5 MMOL/L (ref 0.36–1.25)
LDH SERPL L TO P-CCNC: 2.79 MMOL/L (ref 0.36–1.25)
LDH SERPL L TO P-CCNC: 3.34 MMOL/L (ref 0.36–1.25)
LDH SERPL L TO P-CCNC: 4.91 MMOL/L (ref 0.36–1.25)
LDH SERPL L TO P-CCNC: 736 U/L (ref 110–260)
LYMPHOCYTES # BLD AUTO: 0.5 K/UL (ref 1–4.8)
LYMPHOCYTES # BLD AUTO: 0.6 K/UL (ref 1–4.8)
LYMPHOCYTES # BLD AUTO: 0.7 K/UL (ref 1–4.8)
LYMPHOCYTES # BLD AUTO: 1.3 K/UL (ref 1–4.8)
LYMPHOCYTES NFR BLD: 11.8 % (ref 18–48)
LYMPHOCYTES NFR BLD: 3.5 % (ref 18–48)
LYMPHOCYTES NFR BLD: 3.8 % (ref 18–48)
LYMPHOCYTES NFR BLD: 5.3 % (ref 18–48)
MAGNESIUM SERPL-MCNC: 2.4 MG/DL (ref 1.6–2.6)
MAGNESIUM SERPL-MCNC: 2.7 MG/DL (ref 1.6–2.6)
MAGNESIUM SERPL-MCNC: 3.2 MG/DL (ref 1.6–2.6)
MAGNESIUM SERPL-MCNC: 3.2 MG/DL (ref 1.6–2.6)
MCH RBC QN AUTO: 28.1 PG (ref 27–31)
MCH RBC QN AUTO: 28.3 PG (ref 27–31)
MCH RBC QN AUTO: 28.5 PG (ref 27–31)
MCH RBC QN AUTO: 29 PG (ref 27–31)
MCHC RBC AUTO-ENTMCNC: 31.6 G/DL (ref 32–36)
MCHC RBC AUTO-ENTMCNC: 31.8 G/DL (ref 32–36)
MCHC RBC AUTO-ENTMCNC: 32.3 G/DL (ref 32–36)
MCHC RBC AUTO-ENTMCNC: 33.2 G/DL (ref 32–36)
MCV RBC AUTO: 87 FL (ref 82–98)
MCV RBC AUTO: 88 FL (ref 82–98)
MCV RBC AUTO: 89 FL (ref 82–98)
MCV RBC AUTO: 89 FL (ref 82–98)
METHEMOGLOBIN: 0.7 % (ref 0–3)
MODE: ABNORMAL
MONOCYTES # BLD AUTO: 0.7 K/UL (ref 0.3–1)
MONOCYTES # BLD AUTO: 1.1 K/UL (ref 0.3–1)
MONOCYTES # BLD AUTO: 1.1 K/UL (ref 0.3–1)
MONOCYTES # BLD AUTO: 1.3 K/UL (ref 0.3–1)
MONOCYTES NFR BLD: 10 % (ref 4–15)
MONOCYTES NFR BLD: 4.1 % (ref 4–15)
MONOCYTES NFR BLD: 8.3 % (ref 4–15)
MONOCYTES NFR BLD: 8.7 % (ref 4–15)
NEUTROPHILS # BLD AUTO: 10.2 K/UL (ref 1.8–7.7)
NEUTROPHILS # BLD AUTO: 13.1 K/UL (ref 1.8–7.7)
NEUTROPHILS # BLD AUTO: 16.3 K/UL (ref 1.8–7.7)
NEUTROPHILS # BLD AUTO: 8.1 K/UL (ref 1.8–7.7)
NEUTROPHILS NFR BLD: 76 % (ref 38–73)
NEUTROPHILS NFR BLD: 84.7 % (ref 38–73)
NEUTROPHILS NFR BLD: 86.5 % (ref 38–73)
NEUTROPHILS NFR BLD: 89.6 % (ref 38–73)
NRBC BLD-RTO: 0 /100 WBC
PCO2 BLDA: 39.4 MMHG (ref 35–45)
PCO2 BLDA: 39.7 MMHG (ref 35–45)
PCO2 BLDA: 41 MMHG (ref 35–45)
PCO2 BLDA: 41.1 MMHG (ref 35–45)
PCO2 BLDA: 43.2 MMHG (ref 35–45)
PCO2 BLDA: 44.7 MMHG (ref 35–45)
PCO2 BLDA: 44.9 MMHG (ref 35–45)
PCO2 BLDA: 45 MMHG (ref 35–45)
PCO2 BLDA: 48 MMHG (ref 35–45)
PCO2 BLDA: 48.5 MMHG (ref 35–45)
PCO2 BLDA: 50.3 MMHG (ref 35–45)
PCO2 BLDA: 53.8 MMHG (ref 35–45)
PCO2 BLDA: 54 MMHG (ref 35–45)
PCO2 BLDA: 63.7 MMHG (ref 35–45)
PEEP: 5
PEEP: 5
PEEP: 8
PH SMN: 7.38 [PH] (ref 7.35–7.45)
PH SMN: 7.39 [PH] (ref 7.35–7.45)
PH SMN: 7.42 [PH] (ref 7.35–7.45)
PH SMN: 7.43 [PH] (ref 7.35–7.45)
PH SMN: 7.44 [PH] (ref 7.35–7.45)
PH SMN: 7.44 [PH] (ref 7.35–7.45)
PH SMN: 7.45 [PH] (ref 7.35–7.45)
PH SMN: 7.46 [PH] (ref 7.35–7.45)
PH SMN: 7.47 [PH] (ref 7.35–7.45)
PH SMN: 7.48 [PH] (ref 7.35–7.45)
PH SMN: 7.49 [PH] (ref 7.35–7.45)
PH SMN: 7.5 [PH] (ref 7.35–7.45)
PHOSPHATE SERPL-MCNC: 2 MG/DL (ref 2.7–4.5)
PHOSPHATE SERPL-MCNC: 2.6 MG/DL (ref 2.7–4.5)
PHOSPHATE SERPL-MCNC: 2.8 MG/DL (ref 2.7–4.5)
PLATELET # BLD AUTO: 131 K/UL (ref 150–350)
PLATELET # BLD AUTO: 135 K/UL (ref 150–350)
PLATELET # BLD AUTO: 139 K/UL (ref 150–350)
PLATELET # BLD AUTO: 220 K/UL (ref 150–350)
PLATELET BLD QL SMEAR: ABNORMAL
PMV BLD AUTO: 11.1 FL (ref 9.2–12.9)
PMV BLD AUTO: 11.4 FL (ref 9.2–12.9)
PMV BLD AUTO: 11.4 FL (ref 9.2–12.9)
PMV BLD AUTO: 11.6 FL (ref 9.2–12.9)
PO2 BLDA: 100 MMHG (ref 80–100)
PO2 BLDA: 108 MMHG (ref 80–100)
PO2 BLDA: 108 MMHG (ref 80–100)
PO2 BLDA: 125 MMHG (ref 80–100)
PO2 BLDA: 127 MMHG (ref 80–100)
PO2 BLDA: 134 MMHG (ref 80–100)
PO2 BLDA: 139 MMHG (ref 80–100)
PO2 BLDA: 159 MMHG (ref 80–100)
PO2 BLDA: 196 MMHG (ref 80–100)
PO2 BLDA: 199 MMHG (ref 80–100)
PO2 BLDA: 33 MMHG (ref 80–100)
PO2 BLDA: 37 MMHG (ref 40–60)
PO2 BLDA: 37 MMHG (ref 40–60)
PO2 BLDA: 40 MMHG (ref 40–60)
PO2 BLDA: 79 MMHG (ref 80–100)
POC BE: 10 MMOL/L
POC BE: 11 MMOL/L
POC BE: 11 MMOL/L
POC BE: 12 MMOL/L
POC BE: 12 MMOL/L
POC BE: 13 MMOL/L
POC BE: 5 MMOL/L
POC BE: 6 MMOL/L
POC BE: 7 MMOL/L
POC BE: 7 MMOL/L
POC BE: 9 MMOL/L
POC IONIZED CALCIUM: 1.02 MMOL/L (ref 1.06–1.42)
POC IONIZED CALCIUM: 1.03 MMOL/L (ref 1.06–1.42)
POC IONIZED CALCIUM: 1.04 MMOL/L (ref 1.06–1.42)
POC IONIZED CALCIUM: 1.05 MMOL/L (ref 1.06–1.42)
POC IONIZED CALCIUM: 1.13 MMOL/L (ref 1.06–1.42)
POC IONIZED CALCIUM: 1.14 MMOL/L (ref 1.06–1.42)
POC IONIZED CALCIUM: 1.15 MMOL/L (ref 1.06–1.42)
POC IONIZED CALCIUM: 1.16 MMOL/L (ref 1.06–1.42)
POC IONIZED CALCIUM: 1.18 MMOL/L (ref 1.06–1.42)
POC IONIZED CALCIUM: 1.18 MMOL/L (ref 1.06–1.42)
POC IONIZED CALCIUM: 1.19 MMOL/L (ref 1.06–1.42)
POC IONIZED CALCIUM: 1.21 MMOL/L (ref 1.06–1.42)
POC SATURATED O2: 100 % (ref 95–100)
POC SATURATED O2: 63 % (ref 95–100)
POC SATURATED O2: 71 % (ref 95–100)
POC SATURATED O2: 72 % (ref 95–100)
POC SATURATED O2: 72 % (ref 95–100)
POC SATURATED O2: 96 % (ref 95–100)
POC SATURATED O2: 98 % (ref 95–100)
POC SATURATED O2: 98 % (ref 95–100)
POC SATURATED O2: 99 % (ref 95–100)
POC TCO2: 31 MMOL/L (ref 23–27)
POC TCO2: 32 MMOL/L (ref 23–27)
POC TCO2: 32 MMOL/L (ref 23–27)
POC TCO2: 34 MMOL/L (ref 23–27)
POC TCO2: 35 MMOL/L (ref 24–29)
POC TCO2: 36 MMOL/L (ref 23–27)
POC TCO2: 36 MMOL/L (ref 24–29)
POC TCO2: 37 MMOL/L (ref 23–27)
POC TCO2: 39 MMOL/L (ref 23–27)
POC TCO2: 39 MMOL/L (ref 24–29)
POCT GLUCOSE: 146 MG/DL (ref 70–110)
POCT GLUCOSE: 153 MG/DL (ref 70–110)
POCT GLUCOSE: 154 MG/DL (ref 70–110)
POCT GLUCOSE: 156 MG/DL (ref 70–110)
POCT GLUCOSE: 159 MG/DL (ref 70–110)
POCT GLUCOSE: 163 MG/DL (ref 70–110)
POCT GLUCOSE: 163 MG/DL (ref 70–110)
POCT GLUCOSE: 166 MG/DL (ref 70–110)
POCT GLUCOSE: 172 MG/DL (ref 70–110)
POCT GLUCOSE: 176 MG/DL (ref 70–110)
POTASSIUM BLD-SCNC: 3.5 MMOL/L (ref 3.5–5.1)
POTASSIUM BLD-SCNC: 3.5 MMOL/L (ref 3.5–5.1)
POTASSIUM BLD-SCNC: 3.8 MMOL/L (ref 3.5–5.1)
POTASSIUM BLD-SCNC: 3.9 MMOL/L (ref 3.5–5.1)
POTASSIUM BLD-SCNC: 3.9 MMOL/L (ref 3.5–5.1)
POTASSIUM BLD-SCNC: 4 MMOL/L (ref 3.5–5.1)
POTASSIUM BLD-SCNC: 4 MMOL/L (ref 3.5–5.1)
POTASSIUM BLD-SCNC: 4.1 MMOL/L (ref 3.5–5.1)
POTASSIUM BLD-SCNC: 4.2 MMOL/L (ref 3.5–5.1)
POTASSIUM BLD-SCNC: 4.4 MMOL/L (ref 3.5–5.1)
POTASSIUM SERPL-SCNC: 3.4 MMOL/L (ref 3.5–5.1)
POTASSIUM SERPL-SCNC: 3.8 MMOL/L (ref 3.5–5.1)
POTASSIUM SERPL-SCNC: 4 MMOL/L (ref 3.5–5.1)
POTASSIUM SERPL-SCNC: 4.3 MMOL/L (ref 3.5–5.1)
POTASSIUM SERPL-SCNC: 4.5 MMOL/L (ref 3.5–5.1)
PROT SERPL-MCNC: 6.8 G/DL (ref 6–8.4)
PROT SERPL-MCNC: 7.1 G/DL (ref 6–8.4)
PROTHROMBIN TIME: 11.2 SEC (ref 9–12.5)
PROTHROMBIN TIME: 11.4 SEC (ref 9–12.5)
PROTHROMBIN TIME: 11.9 SEC (ref 9–12.5)
PROTHROMBIN TIME: 12.3 SEC (ref 9–12.5)
RBC # BLD AUTO: 3.84 M/UL (ref 4.6–6.2)
RBC # BLD AUTO: 4.03 M/UL (ref 4.6–6.2)
RBC # BLD AUTO: 4.24 M/UL (ref 4.6–6.2)
RBC # BLD AUTO: 4.49 M/UL (ref 4.6–6.2)
SAMPLE: ABNORMAL
SAMPLE: NORMAL
SITE: ABNORMAL
SITE: NORMAL
SODIUM BLD-SCNC: 135 MMOL/L (ref 136–145)
SODIUM BLD-SCNC: 136 MMOL/L (ref 136–145)
SODIUM BLD-SCNC: 137 MMOL/L (ref 136–145)
SODIUM BLD-SCNC: 138 MMOL/L (ref 136–145)
SODIUM BLD-SCNC: 139 MMOL/L (ref 136–145)
SODIUM SERPL-SCNC: 137 MMOL/L (ref 136–145)
SODIUM SERPL-SCNC: 137 MMOL/L (ref 136–145)
SODIUM SERPL-SCNC: 138 MMOL/L (ref 136–145)
SODIUM SERPL-SCNC: 141 MMOL/L (ref 136–145)
SODIUM SERPL-SCNC: 142 MMOL/L (ref 136–145)
SSDQB TESTING DATE: NORMAL
SSDRB TESTING DATE: NORMAL
SSOA TESTING DATE: NORMAL
SSOB TESTING DATE: NORMAL
SSOC TESTING DATE: NORMAL
SSODR TESTING DATE: NORMAL
TYSSO TESTING DATE: NORMAL
VT: 420
VT: 420
VT: 500
VT: 520
WBC # BLD AUTO: 10.64 K/UL (ref 3.9–12.7)
WBC # BLD AUTO: 12.03 K/UL (ref 3.9–12.7)
WBC # BLD AUTO: 15.1 K/UL (ref 3.9–12.7)
WBC # BLD AUTO: 18.14 K/UL (ref 3.9–12.7)

## 2020-01-23 PROCEDURE — 80048 BASIC METABOLIC PNL TOTAL CA: CPT | Mod: 91

## 2020-01-23 PROCEDURE — 94761 N-INVAS EAR/PLS OXIMETRY MLT: CPT

## 2020-01-23 PROCEDURE — 37799 UNLISTED PX VASCULAR SURGERY: CPT

## 2020-01-23 PROCEDURE — 20000000 HC ICU ROOM

## 2020-01-23 PROCEDURE — 82803 BLOOD GASES ANY COMBINATION: CPT

## 2020-01-23 PROCEDURE — 88305 TISSUE EXAM BY PATHOLOGIST: CPT | Mod: 26,,, | Performed by: PATHOLOGY

## 2020-01-23 PROCEDURE — 99291 PR CRITICAL CARE, E/M 30-74 MINUTES: ICD-10-PCS | Mod: GC,,, | Performed by: SURGERY

## 2020-01-23 PROCEDURE — 27200953 HC CARDIOPLEGIA SYSTEM

## 2020-01-23 PROCEDURE — D9220A PRA ANESTHESIA: ICD-10-PCS | Mod: ,,, | Performed by: ANESTHESIOLOGY

## 2020-01-23 PROCEDURE — 82330 ASSAY OF CALCIUM: CPT

## 2020-01-23 PROCEDURE — 83735 ASSAY OF MAGNESIUM: CPT

## 2020-01-23 PROCEDURE — 93312 ECHO TRANSESOPHAGEAL: CPT | Mod: 26,59,, | Performed by: ANESTHESIOLOGY

## 2020-01-23 PROCEDURE — 85610 PROTHROMBIN TIME: CPT

## 2020-01-23 PROCEDURE — 94003 VENT MGMT INPAT SUBQ DAY: CPT

## 2020-01-23 PROCEDURE — 63600175 PHARM REV CODE 636 W HCPCS: Performed by: THORACIC SURGERY (CARDIOTHORACIC VASCULAR SURGERY)

## 2020-01-23 PROCEDURE — 83605 ASSAY OF LACTIC ACID: CPT

## 2020-01-23 PROCEDURE — 27201037 HC PRESSURE MONITORING SET UP

## 2020-01-23 PROCEDURE — 36620 INSERTION CATHETER ARTERY: CPT | Mod: 59,,, | Performed by: ANESTHESIOLOGY

## 2020-01-23 PROCEDURE — C1751 CATH, INF, PER/CENT/MIDLINE: HCPCS | Performed by: ANESTHESIOLOGY

## 2020-01-23 PROCEDURE — 93503 INSERT/PLACE HEART CATHETER: CPT | Mod: 59,,, | Performed by: ANESTHESIOLOGY

## 2020-01-23 PROCEDURE — 25000003 PHARM REV CODE 250: Performed by: STUDENT IN AN ORGANIZED HEALTH CARE EDUCATION/TRAINING PROGRAM

## 2020-01-23 PROCEDURE — 80048 BASIC METABOLIC PNL TOTAL CA: CPT

## 2020-01-23 PROCEDURE — 27000175 HC ADULT BYPASS PUMP

## 2020-01-23 PROCEDURE — 83615 LACTATE (LD) (LDH) ENZYME: CPT

## 2020-01-23 PROCEDURE — 33390 VALVULOPLASTY AORTIC VALVE: CPT | Mod: 51,,, | Performed by: THORACIC SURGERY (CARDIOTHORACIC VASCULAR SURGERY)

## 2020-01-23 PROCEDURE — 83735 ASSAY OF MAGNESIUM: CPT | Mod: 91

## 2020-01-23 PROCEDURE — 27801475 HC HEARTMATE III IMPLANT KIT

## 2020-01-23 PROCEDURE — 99900026 HC AIRWAY MAINTENANCE (STAT)

## 2020-01-23 PROCEDURE — C1729 CATH, DRAINAGE: HCPCS | Performed by: THORACIC SURGERY (CARDIOTHORACIC VASCULAR SURGERY)

## 2020-01-23 PROCEDURE — 36000713 HC OR TIME LEV V EA ADD 15 MIN: Performed by: THORACIC SURGERY (CARDIOTHORACIC VASCULAR SURGERY)

## 2020-01-23 PROCEDURE — 80053 COMPREHEN METABOLIC PANEL: CPT

## 2020-01-23 PROCEDURE — 85014 HEMATOCRIT: CPT

## 2020-01-23 PROCEDURE — 33425 PR MITRALPLASTY W CP BYPASS: ICD-10-PCS | Mod: ,,, | Performed by: THORACIC SURGERY (CARDIOTHORACIC VASCULAR SURGERY)

## 2020-01-23 PROCEDURE — 27201423 OPTIME MED/SURG SUP & DEVICES STERILE SUPPLY: Performed by: THORACIC SURGERY (CARDIOTHORACIC VASCULAR SURGERY)

## 2020-01-23 PROCEDURE — 84132 ASSAY OF SERUM POTASSIUM: CPT

## 2020-01-23 PROCEDURE — 85384 FIBRINOGEN ACTIVITY: CPT | Mod: 91

## 2020-01-23 PROCEDURE — 27100019 HC AMBU BAG ADULT/PED: Performed by: ANESTHESIOLOGY

## 2020-01-23 PROCEDURE — 88305 TISSUE EXAM BY PATHOLOGIST: ICD-10-PCS | Mod: 26,,, | Performed by: PATHOLOGY

## 2020-01-23 PROCEDURE — A4216 STERILE WATER/SALINE, 10 ML: HCPCS | Performed by: SURGERY

## 2020-01-23 PROCEDURE — C9113 INJ PANTOPRAZOLE SODIUM, VIA: HCPCS | Performed by: SURGERY

## 2020-01-23 PROCEDURE — D9220A PRA ANESTHESIA: Mod: ,,, | Performed by: ANESTHESIOLOGY

## 2020-01-23 PROCEDURE — 88305 TISSUE EXAM BY PATHOLOGIST: CPT | Performed by: PATHOLOGY

## 2020-01-23 PROCEDURE — 63600175 PHARM REV CODE 636 W HCPCS: Performed by: SURGERY

## 2020-01-23 PROCEDURE — 85384 FIBRINOGEN ACTIVITY: CPT

## 2020-01-23 PROCEDURE — 36000712 HC OR TIME LEV V 1ST 15 MIN: Performed by: THORACIC SURGERY (CARDIOTHORACIC VASCULAR SURGERY)

## 2020-01-23 PROCEDURE — C1769 GUIDE WIRE: HCPCS | Performed by: ANESTHESIOLOGY

## 2020-01-23 PROCEDURE — 27200677 HC TRANSDUCER MONITOR KIT SINGLE: Performed by: ANESTHESIOLOGY

## 2020-01-23 PROCEDURE — 37000009 HC ANESTHESIA EA ADD 15 MINS: Performed by: THORACIC SURGERY (CARDIOTHORACIC VASCULAR SURGERY)

## 2020-01-23 PROCEDURE — 93312 PR ECHO HEART,TRANSESOPHAGEAL: ICD-10-PCS | Mod: 26,59,, | Performed by: ANESTHESIOLOGY

## 2020-01-23 PROCEDURE — C1768 GRAFT, VASCULAR: HCPCS | Performed by: THORACIC SURGERY (CARDIOTHORACIC VASCULAR SURGERY)

## 2020-01-23 PROCEDURE — 33390 PR VALVUPLASTY, AORTIC, OPEN W CP BYPASS, SIMPLE: ICD-10-PCS | Mod: 51,,, | Performed by: THORACIC SURGERY (CARDIOTHORACIC VASCULAR SURGERY)

## 2020-01-23 PROCEDURE — 99291 CRITICAL CARE FIRST HOUR: CPT | Mod: GC,,, | Performed by: SURGERY

## 2020-01-23 PROCEDURE — 36620 ARTERIAL: ICD-10-PCS | Mod: 59,,, | Performed by: ANESTHESIOLOGY

## 2020-01-23 PROCEDURE — 83050 HGB METHEMOGLOBIN QUAN: CPT

## 2020-01-23 PROCEDURE — 27201015 HC HEMO-CONCENTRATOR

## 2020-01-23 PROCEDURE — 63600175 PHARM REV CODE 636 W HCPCS: Performed by: PHYSICIAN ASSISTANT

## 2020-01-23 PROCEDURE — 84100 ASSAY OF PHOSPHORUS: CPT

## 2020-01-23 PROCEDURE — 33979 PR INSERT VENT ASST DEV,IMPLANT,SINGLE VENT: ICD-10-PCS | Mod: 51,,, | Performed by: THORACIC SURGERY (CARDIOTHORACIC VASCULAR SURGERY)

## 2020-01-23 PROCEDURE — 63600367 HC NITRIC OXIDE PER HOUR

## 2020-01-23 PROCEDURE — 37000008 HC ANESTHESIA 1ST 15 MINUTES: Performed by: THORACIC SURGERY (CARDIOTHORACIC VASCULAR SURGERY)

## 2020-01-23 PROCEDURE — 85730 THROMBOPLASTIN TIME PARTIAL: CPT | Mod: 91

## 2020-01-23 PROCEDURE — 27200966 HC CLOSED SUCTION SYSTEM

## 2020-01-23 PROCEDURE — 93010 ELECTROCARDIOGRAM REPORT: CPT | Mod: ,,, | Performed by: INTERNAL MEDICINE

## 2020-01-23 PROCEDURE — 84295 ASSAY OF SERUM SODIUM: CPT

## 2020-01-23 PROCEDURE — 85520 HEPARIN ASSAY: CPT

## 2020-01-23 PROCEDURE — 25000003 PHARM REV CODE 250: Performed by: SURGERY

## 2020-01-23 PROCEDURE — 27800505 HC CATH, RADIAL ARTERY KIT: Performed by: ANESTHESIOLOGY

## 2020-01-23 PROCEDURE — 63600175 PHARM REV CODE 636 W HCPCS: Performed by: INTERNAL MEDICINE

## 2020-01-23 PROCEDURE — 99900035 HC TECH TIME PER 15 MIN (STAT)

## 2020-01-23 PROCEDURE — 80076 HEPATIC FUNCTION PANEL: CPT

## 2020-01-23 PROCEDURE — 63600175 PHARM REV CODE 636 W HCPCS: Performed by: STUDENT IN AN ORGANIZED HEALTH CARE EDUCATION/TRAINING PROGRAM

## 2020-01-23 PROCEDURE — 27100088 HC CELL SAVER

## 2020-01-23 PROCEDURE — 63600175 PHARM REV CODE 636 W HCPCS

## 2020-01-23 PROCEDURE — 33979 INSERT INTRACORPOREAL DEVICE: CPT | Mod: 51,,, | Performed by: THORACIC SURGERY (CARDIOTHORACIC VASCULAR SURGERY)

## 2020-01-23 PROCEDURE — 36592 COLLECT BLOOD FROM PICC: CPT

## 2020-01-23 PROCEDURE — 85730 THROMBOPLASTIN TIME PARTIAL: CPT

## 2020-01-23 PROCEDURE — 93503 PR INSERT/PLACE FLOW DIRECT CATH: ICD-10-PCS | Mod: 59,,, | Performed by: ANESTHESIOLOGY

## 2020-01-23 PROCEDURE — 93005 ELECTROCARDIOGRAM TRACING: CPT

## 2020-01-23 PROCEDURE — 33425 REPAIR OF MITRAL VALVE: CPT | Mod: ,,, | Performed by: THORACIC SURGERY (CARDIOTHORACIC VASCULAR SURGERY)

## 2020-01-23 PROCEDURE — 27100025 HC TUBING, SET FLUID WARMER: Performed by: ANESTHESIOLOGY

## 2020-01-23 PROCEDURE — 85025 COMPLETE CBC W/AUTO DIFF WBC: CPT | Mod: 91

## 2020-01-23 PROCEDURE — 93010 EKG 12-LEAD: ICD-10-PCS | Mod: ,,, | Performed by: INTERNAL MEDICINE

## 2020-01-23 PROCEDURE — 85610 PROTHROMBIN TIME: CPT | Mod: 91

## 2020-01-23 PROCEDURE — 27000221 HC OXYGEN, UP TO 24 HOURS

## 2020-01-23 PROCEDURE — 27000191 HC C-V MONITORING

## 2020-01-23 DEVICE — FELT TEFLON 1INX6IN: Type: IMPLANTABLE DEVICE | Site: HEART | Status: FUNCTIONAL

## 2020-01-23 DEVICE — IMPLANTABLE DEVICE: Type: IMPLANTABLE DEVICE | Site: HEART | Status: FUNCTIONAL

## 2020-01-23 RX ORDER — ALBUMIN HUMAN 50 G/1000ML
500 SOLUTION INTRAVENOUS
Status: DISCONTINUED | OUTPATIENT
Start: 2020-01-23 | End: 2020-02-17 | Stop reason: HOSPADM

## 2020-01-23 RX ORDER — ALBUTEROL SULFATE 2.5 MG/.5ML
2.5 SOLUTION RESPIRATORY (INHALATION) EVERY 4 HOURS PRN
Status: DISCONTINUED | OUTPATIENT
Start: 2020-01-23 | End: 2020-01-25

## 2020-01-23 RX ORDER — FUROSEMIDE 10 MG/ML
INJECTION INTRAMUSCULAR; INTRAVENOUS
Status: DISPENSED
Start: 2020-01-23 | End: 2020-01-24

## 2020-01-23 RX ORDER — POTASSIUM CHLORIDE 14.9 MG/ML
40 INJECTION INTRAVENOUS
Status: DISCONTINUED | OUTPATIENT
Start: 2020-01-23 | End: 2020-01-23

## 2020-01-23 RX ORDER — NOREPINEPHRINE BITARTRATE 1 MG/ML
INJECTION, SOLUTION INTRAVENOUS
Status: DISCONTINUED | OUTPATIENT
Start: 2020-01-23 | End: 2020-01-23

## 2020-01-23 RX ORDER — NITROGLYCERIN 5 MG/ML
INJECTION, SOLUTION INTRAVENOUS
Status: DISCONTINUED | OUTPATIENT
Start: 2020-01-23 | End: 2020-01-23

## 2020-01-23 RX ORDER — MUPIROCIN 20 MG/G
OINTMENT TOPICAL 2 TIMES DAILY
Status: COMPLETED | OUTPATIENT
Start: 2020-01-23 | End: 2020-01-28

## 2020-01-23 RX ORDER — ASPIRIN 325 MG
325 TABLET ORAL ONCE
Status: COMPLETED | OUTPATIENT
Start: 2020-01-23 | End: 2020-01-23

## 2020-01-23 RX ORDER — FUROSEMIDE 10 MG/ML
INJECTION INTRAMUSCULAR; INTRAVENOUS
Status: DISCONTINUED | OUTPATIENT
Start: 2020-01-23 | End: 2020-01-23

## 2020-01-23 RX ORDER — PANTOPRAZOLE SODIUM 40 MG/10ML
40 INJECTION, POWDER, LYOPHILIZED, FOR SOLUTION INTRAVENOUS DAILY
Status: DISCONTINUED | OUTPATIENT
Start: 2020-01-23 | End: 2020-02-04

## 2020-01-23 RX ORDER — VASOPRESSIN 20 [USP'U]/ML
INJECTION, SOLUTION INTRAMUSCULAR; SUBCUTANEOUS
Status: DISCONTINUED | OUTPATIENT
Start: 2020-01-23 | End: 2020-01-23

## 2020-01-23 RX ORDER — CEFEPIME HYDROCHLORIDE 2 G/1
2 INJECTION, POWDER, FOR SOLUTION INTRAVENOUS
Status: DISCONTINUED | OUTPATIENT
Start: 2020-01-24 | End: 2020-01-25

## 2020-01-23 RX ORDER — ASPIRIN 325 MG
325 TABLET, DELAYED RELEASE (ENTERIC COATED) ORAL DAILY
Status: DISCONTINUED | OUTPATIENT
Start: 2020-01-23 | End: 2020-01-27

## 2020-01-23 RX ORDER — NICARDIPINE HYDROCHLORIDE 0.2 MG/ML
1 INJECTION INTRAVENOUS CONTINUOUS
Status: DISCONTINUED | OUTPATIENT
Start: 2020-01-23 | End: 2020-02-03

## 2020-01-23 RX ORDER — CEFEPIME HYDROCHLORIDE 2 G/1
2 INJECTION, POWDER, FOR SOLUTION INTRAVENOUS
Status: DISCONTINUED | OUTPATIENT
Start: 2020-01-23 | End: 2020-01-23

## 2020-01-23 RX ORDER — ONDANSETRON 2 MG/ML
INJECTION INTRAMUSCULAR; INTRAVENOUS
Status: DISCONTINUED | OUTPATIENT
Start: 2020-01-23 | End: 2020-01-23

## 2020-01-23 RX ORDER — FUROSEMIDE 10 MG/ML
20 INJECTION INTRAMUSCULAR; INTRAVENOUS ONCE
Status: COMPLETED | OUTPATIENT
Start: 2020-01-23 | End: 2020-01-23

## 2020-01-23 RX ORDER — ALBUMIN HUMAN 50 G/1000ML
25 SOLUTION INTRAVENOUS ONCE
Status: COMPLETED | OUTPATIENT
Start: 2020-01-23 | End: 2020-01-23

## 2020-01-23 RX ORDER — FUROSEMIDE 10 MG/ML
10 INJECTION INTRAMUSCULAR; INTRAVENOUS ONCE
Status: COMPLETED | OUTPATIENT
Start: 2020-01-23 | End: 2020-01-23

## 2020-01-23 RX ORDER — PROPOFOL 10 MG/ML
10 INJECTION, EMULSION INTRAVENOUS CONTINUOUS
Status: DISCONTINUED | OUTPATIENT
Start: 2020-01-23 | End: 2020-02-03

## 2020-01-23 RX ORDER — OXYCODONE HYDROCHLORIDE 5 MG/1
5 TABLET ORAL EVERY 4 HOURS PRN
Status: DISCONTINUED | OUTPATIENT
Start: 2020-01-23 | End: 2020-02-03

## 2020-01-23 RX ORDER — MAGNESIUM SULFATE HEPTAHYDRATE 40 MG/ML
2 INJECTION, SOLUTION INTRAVENOUS ONCE
Status: COMPLETED | OUTPATIENT
Start: 2020-01-23 | End: 2020-01-23

## 2020-01-23 RX ORDER — MIDAZOLAM HYDROCHLORIDE 1 MG/ML
INJECTION, SOLUTION INTRAMUSCULAR; INTRAVENOUS
Status: DISCONTINUED | OUTPATIENT
Start: 2020-01-23 | End: 2020-01-23

## 2020-01-23 RX ORDER — MAGNESIUM SULFATE HEPTAHYDRATE 40 MG/ML
2 INJECTION, SOLUTION INTRAVENOUS
Status: DISCONTINUED | OUTPATIENT
Start: 2020-01-23 | End: 2020-01-24

## 2020-01-23 RX ORDER — POTASSIUM CHLORIDE 14.9 MG/ML
60 INJECTION INTRAVENOUS
Status: DISCONTINUED | OUTPATIENT
Start: 2020-01-23 | End: 2020-01-24

## 2020-01-23 RX ORDER — NICARDIPINE HYDROCHLORIDE 0.2 MG/ML
1 INJECTION INTRAVENOUS CONTINUOUS
Status: DISCONTINUED | OUTPATIENT
Start: 2020-01-23 | End: 2020-01-23

## 2020-01-23 RX ORDER — POTASSIUM CHLORIDE 29.8 MG/ML
40 INJECTION INTRAVENOUS ONCE
Status: DISCONTINUED | OUTPATIENT
Start: 2020-01-23 | End: 2020-01-25

## 2020-01-23 RX ORDER — BISACODYL 10 MG
10 SUPPOSITORY, RECTAL RECTAL DAILY PRN
Status: DISCONTINUED | OUTPATIENT
Start: 2020-01-23 | End: 2020-02-07

## 2020-01-23 RX ORDER — ROCURONIUM BROMIDE 10 MG/ML
INJECTION, SOLUTION INTRAVENOUS
Status: DISCONTINUED | OUTPATIENT
Start: 2020-01-23 | End: 2020-01-23

## 2020-01-23 RX ORDER — DOCUSATE SODIUM 100 MG/1
200 CAPSULE, LIQUID FILLED ORAL NIGHTLY
Status: DISCONTINUED | OUTPATIENT
Start: 2020-01-23 | End: 2020-02-17 | Stop reason: HOSPADM

## 2020-01-23 RX ORDER — FERROUS GLUCONATE 324(37.5)
324 TABLET ORAL
Status: DISCONTINUED | OUTPATIENT
Start: 2020-01-24 | End: 2020-02-17 | Stop reason: HOSPADM

## 2020-01-23 RX ORDER — KETAMINE HCL IN 0.9 % NACL 50 MG/5 ML
SYRINGE (ML) INTRAVENOUS
Status: DISCONTINUED | OUTPATIENT
Start: 2020-01-23 | End: 2020-01-23

## 2020-01-23 RX ORDER — ACETAMINOPHEN 10 MG/ML
INJECTION, SOLUTION INTRAVENOUS
Status: DISCONTINUED | OUTPATIENT
Start: 2020-01-23 | End: 2020-01-23

## 2020-01-23 RX ORDER — MAGNESIUM SULFATE HEPTAHYDRATE 40 MG/ML
2 INJECTION, SOLUTION INTRAVENOUS
Status: DISCONTINUED | OUTPATIENT
Start: 2020-01-23 | End: 2020-02-07

## 2020-01-23 RX ORDER — VANCOMYCIN HCL IN 5 % DEXTROSE 1G/250ML
1000 PLASTIC BAG, INJECTION (ML) INTRAVENOUS
Status: DISCONTINUED | OUTPATIENT
Start: 2020-01-24 | End: 2020-01-25

## 2020-01-23 RX ORDER — LIDOCAINE HCL/PF 100 MG/5ML
SYRINGE (ML) INTRAVENOUS
Status: DISCONTINUED | OUTPATIENT
Start: 2020-01-23 | End: 2020-01-23

## 2020-01-23 RX ORDER — POTASSIUM CHLORIDE 29.8 MG/ML
40 INJECTION INTRAVENOUS
Status: DISCONTINUED | OUTPATIENT
Start: 2020-01-23 | End: 2020-01-24

## 2020-01-23 RX ORDER — HEPARIN SODIUM 1000 [USP'U]/ML
INJECTION, SOLUTION INTRAVENOUS; SUBCUTANEOUS
Status: DISCONTINUED | OUTPATIENT
Start: 2020-01-23 | End: 2020-01-23

## 2020-01-23 RX ORDER — RIFAMPIN 600 MG/10ML
600 INJECTION, POWDER, LYOPHILIZED, FOR SOLUTION INTRAVENOUS ONCE
Status: DISCONTINUED | OUTPATIENT
Start: 2020-01-23 | End: 2020-01-23

## 2020-01-23 RX ORDER — DOBUTAMINE HYDROCHLORIDE 200 MG/100ML
INJECTION INTRAVENOUS CONTINUOUS PRN
Status: DISCONTINUED | OUTPATIENT
Start: 2020-01-23 | End: 2020-01-23

## 2020-01-23 RX ORDER — PANTOPRAZOLE SODIUM 40 MG/1
40 TABLET, DELAYED RELEASE ORAL DAILY
Status: DISCONTINUED | OUTPATIENT
Start: 2020-01-23 | End: 2020-01-27

## 2020-01-23 RX ORDER — NOREPINEPHRINE BITARTRATE/D5W 4MG/250ML
0.05 PLASTIC BAG, INJECTION (ML) INTRAVENOUS CONTINUOUS
Status: DISCONTINUED | OUTPATIENT
Start: 2020-01-23 | End: 2020-01-28

## 2020-01-23 RX ORDER — TRANEXAMIC ACID 100 MG/ML
INJECTION, SOLUTION INTRAVENOUS
Status: DISCONTINUED | OUTPATIENT
Start: 2020-01-23 | End: 2020-01-23

## 2020-01-23 RX ORDER — POTASSIUM CHLORIDE 29.8 MG/ML
10 INJECTION INTRAVENOUS ONCE
Status: COMPLETED | OUTPATIENT
Start: 2020-01-23 | End: 2020-01-23

## 2020-01-23 RX ORDER — MAGNESIUM SULFATE HEPTAHYDRATE 40 MG/ML
4 INJECTION, SOLUTION INTRAVENOUS
Status: DISCONTINUED | OUTPATIENT
Start: 2020-01-23 | End: 2020-01-24

## 2020-01-23 RX ORDER — CEFEPIME HYDROCHLORIDE 1 G/50ML
1 INJECTION, SOLUTION INTRAVENOUS
Status: DISCONTINUED | OUTPATIENT
Start: 2020-01-23 | End: 2020-01-23

## 2020-01-23 RX ORDER — ADHESIVE BANDAGE
30 BANDAGE TOPICAL DAILY PRN
Status: DISCONTINUED | OUTPATIENT
Start: 2020-01-23 | End: 2020-02-17 | Stop reason: HOSPADM

## 2020-01-23 RX ORDER — ALBUTEROL SULFATE 2.5 MG/.5ML
2.5 SOLUTION RESPIRATORY (INHALATION) EVERY 4 HOURS PRN
Status: DISCONTINUED | OUTPATIENT
Start: 2020-01-23 | End: 2020-02-17 | Stop reason: HOSPADM

## 2020-01-23 RX ORDER — TRANEXAMIC ACID 100 MG/ML
INJECTION, SOLUTION INTRAVENOUS CONTINUOUS PRN
Status: DISCONTINUED | OUTPATIENT
Start: 2020-01-23 | End: 2020-01-23

## 2020-01-23 RX ORDER — DEXTROSE MONOHYDRATE, SODIUM CHLORIDE, AND POTASSIUM CHLORIDE 50; 2.98; 4.5 G/1000ML; G/1000ML; G/1000ML
INJECTION, SOLUTION INTRAVENOUS CONTINUOUS
Status: DISCONTINUED | OUTPATIENT
Start: 2020-01-23 | End: 2020-01-25

## 2020-01-23 RX ORDER — SODIUM CHLORIDE 0.9 % (FLUSH) 0.9 %
3 SYRINGE (ML) INJECTION EVERY 8 HOURS
Status: DISCONTINUED | OUTPATIENT
Start: 2020-01-23 | End: 2020-02-12

## 2020-01-23 RX ORDER — POTASSIUM CHLORIDE 29.8 MG/ML
80 INJECTION INTRAVENOUS
Status: DISCONTINUED | OUTPATIENT
Start: 2020-01-23 | End: 2020-01-24

## 2020-01-23 RX ORDER — NICARDIPINE HYDROCHLORIDE 0.2 MG/ML
INJECTION INTRAVENOUS CONTINUOUS PRN
Status: DISCONTINUED | OUTPATIENT
Start: 2020-01-23 | End: 2020-01-23

## 2020-01-23 RX ORDER — OXYCODONE HYDROCHLORIDE 10 MG/1
10 TABLET ORAL EVERY 4 HOURS PRN
Status: DISCONTINUED | OUTPATIENT
Start: 2020-01-23 | End: 2020-02-03

## 2020-01-23 RX ORDER — FENTANYL CITRATE 50 UG/ML
INJECTION, SOLUTION INTRAMUSCULAR; INTRAVENOUS
Status: DISCONTINUED | OUTPATIENT
Start: 2020-01-23 | End: 2020-01-23

## 2020-01-23 RX ORDER — ALBUMIN HUMAN 50 G/1000ML
SOLUTION INTRAVENOUS
Status: COMPLETED
Start: 2020-01-23 | End: 2020-01-23

## 2020-01-23 RX ORDER — ASPIRIN 325 MG
325 TABLET ORAL DAILY
Status: DISCONTINUED | OUTPATIENT
Start: 2020-01-24 | End: 2020-01-23

## 2020-01-23 RX ADMIN — PROPOFOL 50 MCG/KG/MIN: 10 INJECTION, EMULSION INTRAVENOUS at 08:01

## 2020-01-23 RX ADMIN — FENTANYL CITRATE 100 MCG: 50 INJECTION, SOLUTION INTRAMUSCULAR; INTRAVENOUS at 12:01

## 2020-01-23 RX ADMIN — FENTANYL CITRATE 50 MCG: 50 INJECTION, SOLUTION INTRAMUSCULAR; INTRAVENOUS at 01:01

## 2020-01-23 RX ADMIN — FUROSEMIDE 20 MG: 10 INJECTION, SOLUTION INTRAMUSCULAR; INTRAVENOUS at 12:01

## 2020-01-23 RX ADMIN — NICARDIPINE HYDROCHLORIDE 5 MG/HR: 0.2 INJECTION, SOLUTION INTRAVENOUS at 07:01

## 2020-01-23 RX ADMIN — CEFEPIME HYDROCHLORIDE 2 G: 1 INJECTION, SOLUTION INTRAVENOUS at 08:01

## 2020-01-23 RX ADMIN — ROCURONIUM BROMIDE 50 MG: 10 INJECTION, SOLUTION INTRAVENOUS at 07:01

## 2020-01-23 RX ADMIN — ROCURONIUM BROMIDE 50 MG: 10 INJECTION, SOLUTION INTRAVENOUS at 12:01

## 2020-01-23 RX ADMIN — PANTOPRAZOLE SODIUM 40 MG: 40 INJECTION, POWDER, FOR SOLUTION INTRAVENOUS at 02:01

## 2020-01-23 RX ADMIN — AMIODARONE HYDROCHLORIDE 1 MG/MIN: 1.8 INJECTION, SOLUTION INTRAVENOUS at 11:01

## 2020-01-23 RX ADMIN — HEPARIN SODIUM 38000 UNITS: 1000 INJECTION, SOLUTION INTRAVENOUS; SUBCUTANEOUS at 10:01

## 2020-01-23 RX ADMIN — Medication 3 ML: at 02:01

## 2020-01-23 RX ADMIN — SODIUM CHLORIDE, SODIUM GLUCONATE, SODIUM ACETATE, POTASSIUM CHLORIDE, MAGNESIUM CHLORIDE, SODIUM PHOSPHATE, DIBASIC, AND POTASSIUM PHOSPHATE: .53; .5; .37; .037; .03; .012; .00082 INJECTION, SOLUTION INTRAVENOUS at 08:01

## 2020-01-23 RX ADMIN — FUROSEMIDE 20 MG: 10 INJECTION, SOLUTION INTRAMUSCULAR; INTRAVENOUS at 08:01

## 2020-01-23 RX ADMIN — NITROGLYCERIN 50 MCG: 5 INJECTION, SOLUTION INTRAVENOUS at 10:01

## 2020-01-23 RX ADMIN — VANCOMYCIN HYDROCHLORIDE 1.25 G: 1 INJECTION, POWDER, LYOPHILIZED, FOR SOLUTION INTRAVENOUS at 08:01

## 2020-01-23 RX ADMIN — ACETAMINOPHEN 1000 MG: 10 INJECTION, SOLUTION INTRAVENOUS at 09:01

## 2020-01-23 RX ADMIN — Medication 3 ML: at 10:01

## 2020-01-23 RX ADMIN — NOREPINEPHRINE BITARTRATE 16 MCG: 1 INJECTION, SOLUTION, CONCENTRATE INTRAVENOUS at 09:01

## 2020-01-23 RX ADMIN — AMIODARONE HYDROCHLORIDE 150 MG: 1.5 INJECTION, SOLUTION INTRAVENOUS at 08:01

## 2020-01-23 RX ADMIN — FENTANYL CITRATE 150 MCG: 50 INJECTION, SOLUTION INTRAMUSCULAR; INTRAVENOUS at 08:01

## 2020-01-23 RX ADMIN — FENTANYL CITRATE 50 MCG: 50 INJECTION, SOLUTION INTRAMUSCULAR; INTRAVENOUS at 06:01

## 2020-01-23 RX ADMIN — MIDAZOLAM HYDROCHLORIDE 2 MG: 1 INJECTION, SOLUTION INTRAMUSCULAR; INTRAVENOUS at 06:01

## 2020-01-23 RX ADMIN — HEPARIN SODIUM 5000 UNITS: 1000 INJECTION, SOLUTION INTRAVENOUS; SUBCUTANEOUS at 10:01

## 2020-01-23 RX ADMIN — VASOPRESSIN 2 UNITS: 20 INJECTION INTRAVENOUS at 12:01

## 2020-01-23 RX ADMIN — SODIUM PHOSPHATE, MONOBASIC, MONOHYDRATE 15 MMOL: 276; 142 INJECTION, SOLUTION INTRAVENOUS at 05:01

## 2020-01-23 RX ADMIN — Medication 30 MG: at 07:01

## 2020-01-23 RX ADMIN — MUPIROCIN: 20 OINTMENT TOPICAL at 08:01

## 2020-01-23 RX ADMIN — POTASSIUM CHLORIDE 10 MEQ: 29.8 INJECTION, SOLUTION INTRAVENOUS at 01:01

## 2020-01-23 RX ADMIN — CEFEPIME HYDROCHLORIDE 2 G: 1 INJECTION, SOLUTION INTRAVENOUS at 12:01

## 2020-01-23 RX ADMIN — PROPOFOL 25 MCG/KG/MIN: 10 INJECTION, EMULSION INTRAVENOUS at 05:01

## 2020-01-23 RX ADMIN — PROPOFOL 45 MCG/KG/MIN: 10 INJECTION, EMULSION INTRAVENOUS at 11:01

## 2020-01-23 RX ADMIN — CALCIUM CHLORIDE 1 G: 100 INJECTION, SOLUTION INTRAVENOUS at 12:01

## 2020-01-23 RX ADMIN — POTASSIUM CHLORIDE, DEXTROSE MONOHYDRATE AND SODIUM CHLORIDE: 300; 5; 450 INJECTION, SOLUTION INTRAVENOUS at 03:01

## 2020-01-23 RX ADMIN — FENTANYL CITRATE 50 MCG: 50 INJECTION, SOLUTION INTRAMUSCULAR; INTRAVENOUS at 08:01

## 2020-01-23 RX ADMIN — Medication 20 MG: at 06:01

## 2020-01-23 RX ADMIN — NICARDIPINE HYDROCHLORIDE 2.5 MG/HR: 0.2 INJECTION, SOLUTION INTRAVENOUS at 06:01

## 2020-01-23 RX ADMIN — TRANEXAMIC ACID 1 MG/KG/HR: 100 INJECTION, SOLUTION INTRAVENOUS at 08:01

## 2020-01-23 RX ADMIN — EPINEPHRINE 0.04 MCG/KG/MIN: 1 INJECTION INTRAMUSCULAR; INTRAVENOUS; SUBCUTANEOUS at 02:01

## 2020-01-23 RX ADMIN — AMIODARONE HYDROCHLORIDE 0.5 MG/MIN: 1.8 INJECTION, SOLUTION INTRAVENOUS at 03:01

## 2020-01-23 RX ADMIN — MAGNESIUM SULFATE IN WATER 2 G: 40 INJECTION, SOLUTION INTRAVENOUS at 03:01

## 2020-01-23 RX ADMIN — HEPARIN SODIUM AND DEXTROSE 26 UNITS/KG/HR: 10000; 5 INJECTION INTRAVENOUS at 03:01

## 2020-01-23 RX ADMIN — NOREPINEPHRINE BITARTRATE 0.04 MCG/KG/MIN: 1 INJECTION, SOLUTION, CONCENTRATE INTRAVENOUS at 11:01

## 2020-01-23 RX ADMIN — FUROSEMIDE 10 MG: 10 INJECTION, SOLUTION INTRAMUSCULAR; INTRAVENOUS at 06:01

## 2020-01-23 RX ADMIN — POTASSIUM CHLORIDE 40 MEQ: 14.9 INJECTION, SOLUTION INTRAVENOUS at 03:01

## 2020-01-23 RX ADMIN — ASPIRIN 325 MG ORAL TABLET 325 MG: 325 PILL ORAL at 08:01

## 2020-01-23 RX ADMIN — FENTANYL CITRATE 100 MCG: 50 INJECTION, SOLUTION INTRAMUSCULAR; INTRAVENOUS at 09:01

## 2020-01-23 RX ADMIN — VASOPRESSIN 0.04 UNITS/MIN: 20 INJECTION INTRAVENOUS at 11:01

## 2020-01-23 RX ADMIN — SODIUM CHLORIDE 5 UNITS/HR: 9 INJECTION, SOLUTION INTRAVENOUS at 12:01

## 2020-01-23 RX ADMIN — EPINEPHRINE 0.08 MCG/KG/MIN: 1 INJECTION PARENTERAL at 04:01

## 2020-01-23 RX ADMIN — ROCURONIUM BROMIDE 50 MG: 10 INJECTION, SOLUTION INTRAVENOUS at 09:01

## 2020-01-23 RX ADMIN — LIDOCAINE HYDROCHLORIDE 100 MG: 20 INJECTION, SOLUTION INTRAVENOUS at 11:01

## 2020-01-23 RX ADMIN — ROCURONIUM BROMIDE 50 MG: 10 INJECTION, SOLUTION INTRAVENOUS at 08:01

## 2020-01-23 RX ADMIN — DOBUTAMINE HYDROCHLORIDE 5 MCG/KG/MIN: 200 INJECTION INTRAVENOUS at 12:01

## 2020-01-23 RX ADMIN — TRANEXAMIC ACID 1000 MG: 100 INJECTION, SOLUTION INTRAVENOUS at 08:01

## 2020-01-23 NOTE — PLAN OF CARE
Pt follows commands, moves all extremities. Pt remains on ventilator, ACVC 50% FiO2 and 5 PEEP, rate 16, and 20 ppm of nitric oxide, O2 sats 98%. MAP maintained >65. CVP 6, 7, and 10. Epi gtt @ 0.04 mcg/kg/min, lasix gtt @ 10 mg/h, propofol gtt @ 35 mcg/kg/min, and fentanyl gtt @ 50 mcg/h. Heparin gtt @ 26 U/kg/h. Last 2 PTTs therapeutic at 44.6 and 54. UOP  cc/h per giles. SvO2 71%. ABG and CXR done. Labs monitored. Pt turned Q2h, no skin breakdown noted. Plans for LVAD today. Plan of care reviewed with pt and family. VSS at this time. Will continue to monitor. See flowsheet for full assessment details.     6:42: Dr. Sharp at bedside, turned AICD off in preparation for LVAD placement. VSS. [facility].

## 2020-01-23 NOTE — PROGRESS NOTES
Wilkes ventilator alarming and walked into pt's room to find ETT a few inches out from where it was originally measured. Immediately called RT and charge RN. RT was able to deflate cuff, push tube back to its originally place to 25 at teeth, and re-inflate cuff. Breath sounds auscultated. Notified MD Aron and MD Kendrick. Dr. Sharp, RT, and charge RN at bedside. Pt's O2 sats 97% and vT 400s. Pt following commands. Stat CXR ordered and Dr. Sharp confirms ETT in correct position. VSS. Will continue to monitor.

## 2020-01-23 NOTE — HPI
"Tae Delgado is a 60 yo M with PMHx significant for NICMP LVEF 15% (dx 2010), s/p ICD, hx LV thrombus (with prior splenic and renal emboli), pAF, hx embolic CVA, HTN, DLD who was initially admitted from [facility] clinic on 1/9 with volume overload / ADHF. Patient diuresed but subsequently transferred to ICU on 1/16 for additional RV support with Corby. He was started on advanced options pathway and was approved for LVAD as DT. Patient was taken to OR on 1/21 for VAD placement with Dr Schmitt however intraop JACINTO demonstrated DAMON thrombus therefore his surgery was aborted/postponed. He was started on therapeutic heparin gtt. Repeat JACINTO on 1/22 found no thormbus in either the LA or LV. Pt proceeded to OR on 1/23 for LVAD.    1/21/20 Intraoperative JACINTO:  "Severely globally decreased left ventricular systolic function with LVEF ~15%  Global longitudinal strain of - 4 %  Mild central AI  Mild to moderate central MR, Systolic blunting in LUPV/RUPV. There is no evidence of mitral stenosis.   Small left to right PFO demonstrated on color flow doppler  LV appears to have small layered thrombus in apex   Spontaneous echo contrast seen in LA and DAMON. The left atrial appendage velocities are 15-17 cm/sec suggesting an increased chance of DAMON clot. There appears to be heterogenous clot in DAMON adherent to the wall which exhibits different echogenicity than the wall of the appendage.   The RV is moderately to severely dilated and had moderately to severely decreased systolic function immediately s/p probe insertion. At that time, there was some evidence of tricuspid annular motion but little to no motion the remaining RV free wall. At that time, the epinephrine drip was uptitrated and over the course of the remaining exam, there was a slight increase in right ventricular systolic function with some improved contractility of the apical portion of the RV.   The hepatic vein had complete blunting of the systolic component, without evidence of " "flow reversal.   Tricuspid regurgitation is mild in the setting of ICD lead traversing the valve and reduced RV systolic function.   The portal vein appeared mildly pulsatile, suggesting right ventricular failure.   No effusions noted  Aorta intact, no dissection, no significant pathology  These findings were discussed with the surgeon at the time of the exam, at which point the decision was made to abort the case for today and further medically optimize the patient before proceeding."      1/22/20 JACINTO  · Severe left ventricular enlargement.  · No thrombus visualized in the LV apex.  · Severely decreased left ventricular systolic function. The estimated ejection fraction is 15%.  · Moderate right ventricular enlargement.  · Moderately to severely reduced right ventricular systolic function. FAC 20%. RV/LV size ratio 0.58  · The left atrial appendage is normal "chicken wing" appearance. The DAMON emptying velocity is abnormal, 0.2 m/s.  · Light spontaneous echo contrast in the atrial body and in the atrial appendage. No thrombus is present in the appendage which was confirmed with contrast enhancement.  · Mild-to-moderate mitral regurgitation.  · Possible small patent foramen ovale present with left to right shunting indicated by color flow Doppler.  · Grade 1 atheroma present in the descending aorta. The atheroma is layered.  "

## 2020-01-23 NOTE — ANESTHESIA PROCEDURE NOTES
Arterial    Diagnosis: CHF    Patient location during procedure: done in OR  Procedure start time: 1/23/2020 1:35 PM  Timeout: 1/23/2020 1:35 PM  Procedure end time: 1/23/2020 1:42 PM    Staffing  Authorizing Provider: Elvin Little Jr., MD  Performing Provider: Kenneth Pascual MD    Anesthesiologist was present at the time of the procedure.    Preanesthetic Checklist  Completed: patient identified, surgical consent, pre-op evaluation, timeout performed, IV checked, risks and benefits discussed, monitors and equipment checked and anesthesia consent givenArterial  Skin Prep: chlorhexidine gluconate  Local Infiltration: none  Orientation: right  Location: radial  Catheter Size: 20 G  Catheter placement by Anatomical landmarks and Ultrasound guidance. Heme positive aspiration all ports.  Vessel Caliber: medium, patent, compressibility normal  Needle advanced into vessel with real time Ultrasound guidance.  Sterile sheath used.Insertion Attempts: 1  Assessment  Dressing: secured with tape and tegaderm and sutured in place and taped  Patient: Tolerated well

## 2020-01-23 NOTE — ANESTHESIA PROCEDURE NOTES
Arterial    Diagnosis: Heart failure    Patient location during procedure: done in OR  Procedure start time: 1/21/2020 7:30 AM  Procedure end time: 1/21/2020 7:40 AM    Staffing  Authorizing Provider: Emily Acosta MD  Performing Provider: Farnaz Soria MD    Anesthesiologist was present at the time of the procedure.    Preanesthetic Checklist  Completed: patient identified, site marked, surgical consent, pre-op evaluation, timeout performed, IV checked, risks and benefits discussed, monitors and equipment checked and anesthesia consent givenArterial  Skin Prep: chlorhexidine gluconate  Local Infiltration: lidocaine  Orientation: left  Location: brachial  Catheter Size: 20 G  Catheter placement by Ultrasound guidance. Heme positive aspiration all ports.  Vessel Caliber: medium, patent  Needle advanced into vessel with real time Ultrasound guidance.Insertion Attempts: 2  Assessment  Dressing: secured with tape and tegaderm and sutured in place and taped

## 2020-01-23 NOTE — H&P
"[facility]  Critical Care - Surgery  History & Physical    Patient Name: Tae Delgado  MRN: [id number]  Admission Date: 1/9/2020  Code Status: Full Code  Attending Physician: Ino Schmitt MD   Primary Care Provider: Elham Pearson MD   Principal Problem: Acute on chronic combined systolic and diastolic heart failure    Subjective:     HPI:  Tae Delgado is a 60 yo M with PMHx significant for NICMP LVEF 15% (dx 2010), s/p ICD, hx LV thrombus (with prior splenic and renal emboli), pAF, hx embolic CVA, HTN, DLD who was initially admitted from [facility] clinic on 1/9 with volume overload / ADHF. Patient diuresed but subsequently transferred to ICU on 1/16 for additional RV support with Corby. He was started on advanced options pathway and was approved for LVAD as DT. Patient was taken to OR on 1/21 for VAD placement with Dr Schmitt however intraop JACINTO demonstrated DAMON thrombus therefore his surgery was aborted/postponed. He was started on therapeutic heparin gtt. Repeat JACINTO on 1/22 found no thormbus in either the LA or LV. Pt proceeded to OR on 1/23 for LVAD.    1/21/20 Intraoperative JACINTO:  "Severely globally decreased left ventricular systolic function with LVEF ~15%  Global longitudinal strain of - 4 %  Mild central AI  Mild to moderate central MR, Systolic blunting in LUPV/RUPV. There is no evidence of mitral stenosis.   Small left to right PFO demonstrated on color flow doppler  LV appears to have small layered thrombus in apex   Spontaneous echo contrast seen in LA and DAMON. The left atrial appendage velocities are 15-17 cm/sec suggesting an increased chance of DAMON clot. There appears to be heterogenous clot in DAMON adherent to the wall which exhibits different echogenicity than the wall of the appendage.   The RV is moderately to severely dilated and had moderately to severely decreased systolic function immediately s/p probe insertion. At that time, there was some evidence of tricuspid annular motion but " "little to no motion the remaining RV free wall. At that time, the epinephrine drip was uptitrated and over the course of the remaining exam, there was a slight increase in right ventricular systolic function with some improved contractility of the apical portion of the RV.   The hepatic vein had complete blunting of the systolic component, without evidence of flow reversal.   Tricuspid regurgitation is mild in the setting of ICD lead traversing the valve and reduced RV systolic function.   The portal vein appeared mildly pulsatile, suggesting right ventricular failure.   No effusions noted  Aorta intact, no dissection, no significant pathology  These findings were discussed with the surgeon at the time of the exam, at which point the decision was made to abort the case for today and further medically optimize the patient before proceeding."      1/22/20 JACINTO  · Severe left ventricular enlargement.  · No thrombus visualized in the LV apex.  · Severely decreased left ventricular systolic function. The estimated ejection fraction is 15%.  · Moderate right ventricular enlargement.  · Moderately to severely reduced right ventricular systolic function. FAC 20%. RV/LV size ratio 0.58  · The left atrial appendage is normal "chicken wing" appearance. The DAMON emptying velocity is abnormal, 0.2 m/s.  · Light spontaneous echo contrast in the atrial body and in the atrial appendage. No thrombus is present in the appendage which was confirmed with contrast enhancement.  · Mild-to-moderate mitral regurgitation.  · Possible small patent foramen ovale present with left to right shunting indicated by color flow Doppler.  Grade 1 atheroma present in the descending aorta. The atheroma is layered.    Hospital/ICU Course:  1/23: Pt arrives from OR intubated, open chest dressed with Ioban, CTs with SS output. Drips on arrival: Epi 0.08, Cardene 5, TXA, Nitric at 30. Pt received 1.5L crystalloid, no blood product, 20 mg Lasix (put out 250cc " in response).   Intra Op JACINTO Exam:  PRE:  Severely reduced left ventricular systolic function. Dilated LV. No definitive thrombus noted.  Moderate-to-severely reduced right ventricular systolic function. FAC 12-24%  Mild-to-moderate AI. No AS.  Moderate MR with systolic blunting of the pulmonary veins.  Trace-to-mild TR.  Mild PI. PAAT <90ms.  Small PFO by CF doppler.  SEC in La and DAMON. No clear thrombus noted.  Grade 2 atheromatous disease of the aorta.  No effusions.    POST:  Severely depressed left ventriclar dysfunction.  Moderately depressed right ventricular systolic function.  LVAD inflow and outflow cannula noted with laminar flows.  No AI.  Mild MR, vahe stitch observed. No MS.  Mild-to-moderate TR.  PFO still visible on CF doppler.  Aorta intact, no dissection.  No effusions.      Follow-up For: Procedure(s) (LRB):  INSERTION-LEFT VENTRICULAR ASSIST DEVICE (Left)    Post-Operative Day: Day of Surgery     Past Medical History:   Diagnosis Date    CHF (congestive heart failure) 1/2010    Dx  1/2010 w/ decreased LV systolic function (EF 15%) by ECHO 1/2015    COCM (congestive cardiomyopathy) 7/20/2016    Hyperlipidemia     Hypertension     Paroxysmal atrial fibrillation     Pulmonary embolus 2008    Stroke     Superficial thrombophlebitis        Past Surgical History:   Procedure Laterality Date    RIGHT HEART CATHETERIZATION Right 1/16/2020    Procedure: INSERTION, CATHETER, RIGHT HEART;  Surgeon: Isiah Montero MD;  Location: [facility] CATH LAB;  Service: Cardiology;  Laterality: Right;    TONSILLECTOMY      VEIN LIGATION AND STRIPPING         Review of patient's allergies indicates:   Allergen Reactions    Biopatch [chlorhexidine gluconate]      Site burning    Dobutamine in d5w      Tachycardia, tremors, SOB, flushing    Percocet [oxycodone-acetaminophen] Itching    Penicillins Rash     Cefepime given on 1/23/2020 without issue       Family History     Problem Relation (Age of Onset)     Cancer Mother        Tobacco Use    Smoking status: Never Smoker    Smokeless tobacco: Never Used   Substance and Sexual Activity    Alcohol use: No    Drug use: No    Sexual activity: Not on file      Review of Systems   Unable to perform ROS: Intubated     Objective:     Vital Signs (Most Recent):  Temp: 99.2 °F (37.3 °C) (01/23/20 1400)  Pulse: (!) 124 (01/23/20 1437)  Resp: 14 (01/23/20 1437)  BP: (!) 100/59 (01/23/20 1400)  SpO2: 97 % (01/23/20 1430) Vital Signs (24h Range):  Temp:  [98.8 °F (37.1 °C)-99.8 °F (37.7 °C)] 99.2 °F (37.3 °C)  Pulse:  [] 124  Resp:  [14-26] 14  SpO2:  [89 %-100 %] 97 %  BP: ()/(59-79) 100/59  Arterial Line BP: ()/(50-83) 92/78     Weight: 119.3 kg (263 lb)  Body mass index is 37.74 kg/m².      Intake/Output Summary (Last 24 hours) at 1/23/2020 1456  Last data filed at 1/23/2020 1400  Gross per 24 hour   Intake 2885 ml   Output 2360 ml   Net 525 ml       Physical Exam   Constitutional: He appears well-developed and well-nourished.   HENT:   Head: Normocephalic and atraumatic.   Mouth/Throat: No oropharyngeal exudate.   ETT and OG secured in place.   Eyes: Pupils are equal, round, and reactive to light.   Neck: Normal range of motion. Neck supple.   Left IJ double lumen + Enola Heena in place, dressing CLD  Right IJ triple lumen, dressing CLD   Cardiovascular:   Irregular tachyarrythmia. LVAD hum.    Pulmonary/Chest: Breath sounds normal. He has no wheezes. He has no rales.   Intubated. Open chest, dressed with Ioban. CTs with SS drainage.   Abdominal: Soft. He exhibits no distension.   Musculoskeletal: He exhibits no edema or deformity.   Neurological:   sedated   Skin: Skin is warm and dry.       Vents:  Vent Mode: A/C (01/23/20 1437)  Ventilator Initiated: Yes (01/21/20 0905)  Set Rate: 14 bmp (01/23/20 1437)  Vt Set: 500 mL (01/23/20 1437)  PEEP/CPAP: 8 cmH20 (01/23/20 1437)  Oxygen Concentration (%): 50 (01/23/20 1437)  Peak Airway Pressure: 29 cmH2O  (01/23/20 1437)  Plateau Pressure: 0 cmH20 (01/23/20 1437)  Total Ve: 7.49 mL (01/23/20 1437)  F/VT Ratio<105 (RSBI): (!) (P) 28.63 (01/23/20 1437)    Lines/Drains/Airways     Central Venous Catheter Line                 Percutaneous Central Line Insertion/Assessment - triple lumen  01/20/20 1511 right internal jugular 2 days         Percutaneous Central Line Insertion/Assessment - double lumen  01/23/20 0751 less than 1 day         Pulmonary Artery Catheter Assessment  01/23/20 0751 less than 1 day          Drain                 Urethral Catheter 01/21/20 0752 Non-latex;Straight-tip;Temperature probe 16 Fr. 2 days         Chest Tube 01/23/20 1230 1 Right Pleural less than 1 day         Chest Tube 01/23/20 1414 2 Mediastinal less than 1 day         Chest Tube 01/23/20 1415 3 Left Pleural less than 1 day          Airway                 Airway - Non-Surgical 01/21/20 0742 Endotracheal Tube 2 days          Arterial Line                 Arterial Line 01/21/20 0730 Left Other (Comment) 2 days         Arterial Line 01/23/20 1330 Right Radial less than 1 day         Arterial Line 01/23/20 1335 Right Radial less than 1 day          Line                 VAD 01/23/20 1148 Left ventricular assist device HeartMate 3 less than 1 day          Peripheral Intravenous Line                 Peripheral IV - Single Lumen 01/22/20 1730 20 G Right Hand less than 1 day         Peripheral IV - Single Lumen 01/22/20 1800 20 G Left Hand less than 1 day                Significant Labs:    CBC/Anemia Profile:  Recent Labs   Lab 01/22/20  0311 01/23/20  0355  01/23/20  1149 01/23/20  1407 01/23/20  1423   WBC 11.08 10.64  --   --   --  18.14*   HGB 12.1* 12.8*  --   --   --  12.3*   HCT 38.6* 39.6*   < > 37 34* 37.0*    220  --   --   --   --    MCV 89 88  --   --   --  87   RDW 14.4 14.5  --   --   --  14.5    < > = values in this interval not displayed.        Chemistries:  Recent Labs   Lab 01/21/20  1510 01/22/20  0307 01/22/20  0319  01/22/20  1447 01/23/20  0010 01/23/20  0355   *  --  134* 137 137 138   K 4.3  --  4.0 3.8 3.8 4.0   CL 95  --  92* 93* 93* 94*   CO2 28  --  31* 33* 33* 32*   BUN 37*  --  37* 37* 36* 36*   CREATININE 2.0*  --  2.1* 2.0* 1.9* 2.0*   CALCIUM 9.0  --  9.1 9.7 9.3 9.7   ALBUMIN  --   --  3.1*  --   --  3.1*   PROT  --   --  6.7  --   --  7.1   BILITOT  --   --  1.0  --   --  1.2*   ALKPHOS  --   --  43*  --   --  46*   ALT  --   --  38  --   --  31   AST  --   --  18  --   --  16   MG 2.3  --  2.1  --   --  2.4   PHOS 4.6* 5.5*  --   --   --   --        ABGs:   Recent Labs   Lab 01/23/20  1514   PH 7.390   PCO2 50.3*   HCO3 30.5*   POCSATURATED 98   BE 6     Coagulation:   Recent Labs   Lab 01/23/20  1423   INR 1.2   APTT 23.4     Lactic Acid:   Recent Labs   Lab 01/23/20  1423   LACTATE 4.1*       Significant Imaging: I have reviewed all pertinent imaging results/findings within the past 24 hours.    Assessment/Plan:     * Acute on chronic combined systolic and diastolic heart failure  Tae Delgado is a 59 y.o. 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 Approved for DT LVAD. Went to OR on 1/21 and found to have DAMON and LV thrombus and case was aborted. Pt was placed on a heparin gtt and thrombus had dissipated on repeat JACINTO on 1/22. Pt underwent LVAD placement on 1/23.     Neuro:  - Follows commands in all extremities when sedation is held  - Prop gtt with prn Fentanyl    CVS:   - Current LVAD flow @ 5300 with appropriate PIs   - Epi @ 0.08, Nitric @ 15, Cardene @ 2 - changes per CTS  - Pt started on Amio gtt for irregular tachyarrhthymias   - EP service to turn ICD back on ASAP  - Chest closure planned for 1/24    Pulm:  - Mechanical  ventilation while chest open  - ABGs per CTS  - CXR daily  - Monitor CT output    GI:  - NPO  - Protonix 40 daily  - Docusate    Endo:   - Insulin gtt   - Endocrine consulted, appreciate their services    Renal:  - Strict I/O  - Follow post-op labs    Lytes:  - Follow post-op labs  - Magnesium 2g now    Heme:  - No blood products needed during the OR  - Follow post op labs  - Ok to give ASA later this evening if not bleeding    ID:   - Lona op ABX  - Follow WBC  - Follow fever curve    PPx:  - A/C per CTS  - GI ppx    Dispo:  - Cont SICU care     Critical care was time spent personally by me on the following activities: development of treatment plan with patient or surrogate and bedside caregivers, discussions with consultants, evaluation of patient's response to treatment, examination of patient, ordering and performing treatments and interventions, ordering and review of laboratory studies, ordering and review of radiographic studies, pulse oximetry, re-evaluation of patient's condition.  This critical care time did not overlap with that of any other provider or involve time for any procedures.     Marycruz Burnette MD  Critical Care - Surgery  [facility]

## 2020-01-23 NOTE — PROGRESS NOTES
01/23/2020  Perfusionist:  Renaldo Miles CCP, LP  Surgeon(s) and Role:     * Ino Schmitt MD - Primary     * Terence Gonzalez MD - Fellow  Anesthesiologist:  Elvin Little Jr., MD    Past Medical History:   Diagnosis Date    CHF (congestive heart failure) 1/2010    Dx  1/2010 w/ decreased LV systolic function (EF 15%) by ECHO 1/2015    COCM (congestive cardiomyopathy) 7/20/2016    Hyperlipidemia     Hypertension     Paroxysmal atrial fibrillation     Pulmonary embolus 2008    Stroke     Superficial thrombophlebitis        Device implanted: HM3    For implantable VADs  Pump assembled and wet-tested by: ALLI STEELE CCP, LP  Pump SN: [id number], EXP 9/17/22  [place] [facility] [id number]  [place]up controller[place] [facility] [id number]    At the end of the procedure, the current device settings and alarms were verified to be accurate.  The patient was transported to SICU post operatively, back-up equipment was either delivered to the patients room or verified by the bedside nurse, and report given.  There were no issues.    1:04 PM

## 2020-01-23 NOTE — ANESTHESIA PROCEDURE NOTES
Arterial    Diagnosis: Heart failure    Patient location during procedure: done in OR  Procedure start time: 1/23/2020 1:30 PM  Procedure end time: 1/23/2020 1:32 PM    Staffing  Authorizing Provider: Elvin Little Jr., MD  Performing Provider: Kenneth Pascual MD    Anesthesiologist was present at the time of the procedure.    Preanesthetic Checklist  Completed: patient identified, site marked, surgical consent, pre-op evaluation, timeout performed, IV checked, risks and benefits discussed, monitors and equipment checked and anesthesia consent givenArterial  Skin Prep: povidone-iodine 7.5% surgical scrub  Local Infiltration: none  Orientation: right  Location: radial  Catheter Size: 20 G  Catheter placement by Ultrasound guidance. Heme positive aspiration all ports.  Vessel Caliber: patent  Needle advanced into vessel with real time Ultrasound guidance.  Sterile sheath used.Insertion Attempts: 1  Assessment  Patient: Tolerated well

## 2020-01-23 NOTE — ANESTHESIA PROCEDURE NOTES
Central Line    Diagnosis: Heart failure  Patient location during procedure: done in OR  Procedure start time: 1/23/2020 7:51 AM  Timeout: 1/23/2020 7:50 AM  Procedure end time: 1/23/2020 8:06 AM    Staffing  Authorizing Provider: Elvin Little Jr., MD  Performing Provider: Farnaz Soria MD    Staffing  Anesthesiologist: Elvin Little Jr., MD  Resident/CRNA: Farnaz Soria MD  Performed: resident/CRNA   Anesthesiologist was present at the time of the procedure.  Preanesthetic Checklist  Completed: patient identified, site marked, surgical consent, pre-op evaluation, timeout performed, IV checked, risks and benefits discussed, monitors and equipment checked and anesthesia consent given  Indication   Indication: vascular access, hemodynamic monitoring, med administration     Anesthesia   general anesthesia    Central Line   Skin Prep: skin prepped with Betadine, skin prep agent completely dried prior to procedure  maximum sterile barriers used during central venous catheter insertion  hand hygiene performed prior to central venous catheter insertion  Location: left, internal jugular.   Catheter type: double lumen  Catheter Size: 9 Fr  Inserted Catheter Length: 11.5 cm  Ultrasound: vascular probe with ultrasound  Vessel Caliber: medium, patent  Needle advanced into vessel with real time Ultrasound guidance.   Manometry: Venous cannualation confirmed by visual estimation of blood vessel pressure using manometry.  Insertion Attempts: 1   Securement:line sutured, chlorhexidine patch, sterile dressing applied and blood return through all ports    Post-Procedure   Adverse Events:none    Guidewire Guidewire removed intact.

## 2020-01-23 NOTE — PROGRESS NOTES
Pharmacokinetic Initial Assessment: IV Vancomycin    Assessment/Plan:    Received vancomycin IV 1250 mg once then initiated on 750 mg every 24 hours.     Increase to vancomycin 1000 mg IV every 24 hours for desired serum trough concentration of 10 to 20 mcg/mL.    As duration of therapy is supposed to be 48 hours after chest closure planned for 1/24, no vancomycin levels are necessary at this time. Levels will be ordered as needed if plans change.     Pharmacy will continue to follow and monitor vancomycin.      Please contact pharmacy at extension [phone] with any questions regarding this assessment.     Thank you for the consult,   Namrata Rojas, PharmD, BCCCP             Patient brief summary:  Tae Delgado is a 59 y.o. male initiated on antimicrobial therapy with IV vancomycin for surgical prophylaxis     Drug Allergies:   Review of patient's allergies indicates:   Allergen Reactions    Biopatch [chlorhexidine gluconate]      Site burning    Dobutamine in d5w      Tachycardia, tremors, SOB, flushing    Percocet [oxycodone-acetaminophen] Itching    Penicillins Rash     Cefepime given on 1/23/2020 without issue       Actual Body Weight:   110.8 kg     Renal Function:   Estimated Creatinine Clearance: 52.2 mL/min (A) (based on SCr of 1.9 mg/dL (H)).    Dialysis Method (if applicable):  N/A    CBC (last 72 hours):  Recent Labs   Lab Result Units 01/21/20  0300 01/22/20  0311 01/23/20  0355 01/23/20  1423   WBC K/uL 7.40 11.08 10.64 18.14*   Hemoglobin g/dL 12.0* 12.1* 12.8* 12.3*   Hematocrit % 37.7* 38.6* 39.6* 37.0*   Platelets K/uL 144* 190 220 131*   Gran% % 63.8 72.4 76.0* 89.6*   Lymph% % 20.3 12.9* 11.8* 3.8*   Mono% % 13.4 13.1 10.0 4.1   Eosinophil% % 1.2 0.2 0.8 0.2   Basophil% % 0.9 0.9 0.8 0.3   Differential Method  Automated Automated Automated Automated       Metabolic Panel (last 72 hours):  Recent Labs   Lab Result Units 01/21/20  0300 01/21/20  1510 01/22/20  0307 01/22/20  0311 01/22/20  1447  01/23/20  0010 01/23/20  0355 01/23/20  1423   Sodium mmol/L 134* 135*  --  134* 137 137 138 137   Potassium mmol/L 3.7 4.3  --  4.0 3.8 3.8 4.0 3.4*   Chloride mmol/L 93* 95  --  92* 93* 93* 94* 96   CO2 mmol/L 32* 28  --  31* 33* 33* 32* 26   Glucose mg/dL 104 154*  --  144* 135* 136* 138* 186*   BUN, Bld mg/dL 35* 37*  --  37* 37* 36* 36* 38*   Creatinine mg/dL 1.7* 2.0*  --  2.1* 2.0* 1.9* 2.0* 1.9*   Albumin g/dL 3.3*  --   --  3.1*  --   --  3.1* 2.6*   Total Bilirubin mg/dL 1.0  --   --  1.0  --   --  1.2* 2.2*   Alkaline Phosphatase U/L 45*  --   --  43*  --   --  46* 45*   AST U/L 25  --   --  18  --   --  16 78*   ALT U/L 48*  --   --  38  --   --  31 32   Magnesium mg/dL 2.2 2.3  --  2.1  --   --  2.4 2.7*   Phosphorus mg/dL 4.3 4.6* 5.5*  --   --   --   --  2.6*       Drug levels (last 3 results):  No results for input(s): VANCOMYCINRA, VANCOMYCINPE, VANCOMYCINTR in the last 72 hours.    Microbiologic Results:  Microbiology Results (last 7 days)     Procedure Component Value Units Date/Time    Blood culture [[id number]] Collected:  01/20/20 1042    Order Status:  Completed Specimen:  Blood from Peripheral, Antecubital, Left Updated:  01/23/20 1412     Blood Culture, Routine No Growth to date      No Growth to date      No Growth to date      No Growth to date    Blood culture [[id number]] Collected:  01/20/20 1035    Order Status:  Completed Specimen:  Blood from Peripheral, Wrist, Left Updated:  01/23/20 1412     Blood Culture, Routine No Growth to date      No Growth to date      No Growth to date      No Growth to date    Blood culture [[id number]]     Order Status:  Canceled Specimen:  Blood     Blood culture [[id number]]     Order Status:  Canceled Specimen:  Blood

## 2020-01-23 NOTE — PROGRESS NOTES
Arrhythmia Department  Biotronik ICD    Orders to re-enable tachy therapies s/p LVAD placement.    Tachy therapies ENABLED.    Please call Arrhythmia Department at ext [phone] for any further assistance.

## 2020-01-23 NOTE — PROGRESS NOTES
Notified MD Aron of CVP 7 and latest BMP: K 3.8 and Cr 1.9. K+ replaced per order. VSS. Will continue to monitor.

## 2020-01-23 NOTE — CARE UPDATE
HTS Cross Cover    Patient has a Biotronik ICD. Anti tachy therapies turned off this AM in preparation for LVAD implant today. Please call arrhythmia clinic after surgery to turn back on.     Yinka Sharp MD  Cardiology Fellow, PGY4

## 2020-01-23 NOTE — RESPIRATORY THERAPY
RT called to bedside by RN, patient had a hold of ETT and pulled it out by 1-2 inches. The cuff was deflated, tube pushed back in to 25 @ the teeth. Bilateral breath sounds heard. Chest x-ray ordered. Dr. Sharp at bedside, confirmed tube was in correct position. Will continue to monitor.

## 2020-01-23 NOTE — ANESTHESIA PROCEDURE NOTES
JACIN[facility]    Diagnosis: Dx: Acute on chronic combined systolic and diastolic heart failure, NYHA class 4 (I50.43 (ICD-10-CM))  Patient location during procedure: OR  Procedure start time: 1/23/2020 7:30 AM  Timeout: 1/23/2020 7:30 AM  Surgery related to: LVAD implantation   Exam type: Baseline  Staffing  Anesthesiologist: Elvin Little Jr., MD  Performed: fellow and anesthesiologist   Preanesthetic Checklist  Completed: patient identified, surgical consent, pre-op evaluation, timeout performed, risks and benefits discussed, monitors and equipment checked, anesthesia consent given, oxygen available, suction available, hand hygiene performed and patient being monitored  Setup & Induction  Patient preparation: bite block inserted  Probe Insertion: easy  Exam: complete      Findings  Impression  Other Findings  PRE:  Severely reduced left ventricular systolic function. Dilated LV. No definitive thrombus noted.  Moderate-to-severely reduced right ventricular systolic function. FAC 12-24%  Mild-to-moderate AI. No AS.  Moderate MR with systolic blunting of the pulmonary veins.  Trace-to-mild TR.  Mild PI. PAAT <90ms.  Small PFO by CF doppler.  SEC in La and DAMON. No clear thrombus noted.  Grade 2 atheromatous disease of the aorta.  No effusions.    POST:  Severely depressed left ventriclar dysfunction.  Moderately depressed right ventricular systolic function.  LVAD inflow and outflow cannula noted with laminar flows.  No AI.  Mild MR, vahe stitch observed. No MS.  Mild-to-moderate TR.  PFO still visible on CF doppler.  Aorta intact, no dissection.  No effusions.    Probe removed atraumatically.    Probe Removal      Exam     Left Heart  Left Atruim: dilated, spontaneous echo contrast in appendage and dilated coronary sinus  Left appendage velocity:23    Left Ventricle: cm, severely abnormal (men >/= 6.8; women>/= 6.1)  LV Miscellaneous: possible thrombus    LVAD:no  Estimated Ejection Fraction: < 25% severe            Right  Heart  Right Ventricle: dilated  Right Ventricle Function: moderately decreased  Right Atria: cm and moderately abnormal    Intra Atrial Septum  PFO: yes by color flow doppler  no IAS aneurysm  no lipomatous hypertrophy  no Atrial Septal Defect (Asd)    Right Ventricle  Size: dilated  Fractional Area of Change:24    Aortic Valve:  Stenosis: none  AV Mean Gradient: 2 mmHg  Regurgitation: mild to moderate aortic regurgitation     Mitral Valve:  Morphology:normal  Prolapse: none  Flail: no flail  Jet Description: mild-to-moderateStenosis: no significant stenosis.    Tricuspid Valve:  Morphology: normal  TRICUSPID REGURGITATION: trace.    Pulmonic Valve:  Morphology:normal  Regurgitation(color flow): mild    Great Vessels  Ascending Aorta Diameter: 3.48 cm   Ascending Aorta Atherosclerosis: 2=mild dz (<3mm)  Aortic Arch Atherosclerosis: 2=mild dz (<3mm)  IABP: no  Descending Aorta Atherosclerosis: 2=mild dz (<3mm)  Aorta    Descending aorta IABP: no    Effusions  Effusions: none    Summary  Findings discussed with surgeon.    Other Findings   PRE:  Severely reduced left ventricular systolic function. Dilated LV. No definitive thrombus noted.  Moderate-to-severely reduced right ventricular systolic function. FAC 12-24%  Mild-to-moderate AI. No AS.  Moderate MR with systolic blunting of the pulmonary veins.  Trace-to-mild TR.  Mild PI. PAAT <90ms.  Small PFO by CF doppler.  SEC in La and DAMON. No clear thrombus noted.  Grade 2 atheromatous disease of the aorta.  No effusions.    POST:  Severely depressed left ventriclar dysfunction.  Moderately depressed right ventricular systolic function.  LVAD inflow and outflow cannula noted with laminar flows.  No AI.  Mild MR, vahe stitch observed. No MS.  Mild-to-moderate TR.  PFO still visible on CF doppler.  Aorta intact, no dissection.  No effusions.    Probe removed atraumatically.

## 2020-01-23 NOTE — ADDENDUM NOTE
Addendum  created 01/23/20 0855 by Farnaz Soria MD    Child order released for a procedure order, Intraprocedure Blocks edited, LDA created via procedure documentation, Sign clinical note

## 2020-01-23 NOTE — ANESTHESIA PREPROCEDURE EVALUATION
[facility]  Anesthesia Pre-Operative Evaluation         Patient Name: Tae Delgado  YOB: 1960  MRN: [id number]    SUBJECTIVE:     Pre-operative evaluation for Procedure(s) (LRB):  INSERTION-LEFT VENTRICULAR ASSIST DEVICE (Left)     01/23/2020    Tae Delgado is a 59 y.o. 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   - S/p RHC 1/16 with normal CO/CI but high filling and PAP.     - Per patient he is a hard stick and they had issues with inserting needles during his heart cath.  - on 1/21/2020 his procedure was aborted due to medically optimize the Pt medical status.   - Intra-op JACINTO showed = LV appears to have small layered thrombus in apex, and severely worsening RV function.   - Currently S/P VAD placement on 1/23/2020    Things that happen during his admission:  - Pt had a reaction to dobutamine and epinephrine >> it wasn't allergic, it's mostly color skin changes (red with dobutamine, and gray with epinephrine)  - He felt his throat was closing up with epinephrine , but no documented reaction     - Biopatch and cholorohexidine severe skin rashes    Patient now presents for the above procedure(s).      2D ECHO: 1/10/2020    · Severe left ventricular enlargement.  · Severely decreased left ventricular systolic function. The estimated ejection fraction is 15%.  · Mild right ventricular enlargement.  · Moderately reduced right ventricular systolic function.  · Left ventricular diastolic dysfunction.  · Severe biatrial enlargement.  · Mild tricuspid regurgitation.  · The estimated PA systolic pressure is 31 mmHg.  · Intermediate central venous pressure (8 mmHg).        LDA:   Rt  IJ         PICC Double Lumen 01/16/20 1731 right basilic (Active)   Site Assessment Clean;Dry;Intact;No redness;No swelling 1/20/2020  3:00 AM   Lumen 1 Status Infusing 1/20/2020  3:00 AM   Lumen 2 Status Flushed;Normal saline locked 1/20/2020  3:00 AM   Length gay (cm) 37 cm 1/17/2020  7:00 AM   Current Exposed Catheter (cm) 0 cm 1/16/2020  5:25 PM   Extremity Circumference (cm) 39 cm 1/17/2020  7:00 AM   Dressing Type Transparent 1/20/2020  3:00 AM   Dressing Status Clean;Dry;Intact 1/20/2020  3:00 AM   Dressing Intervention Dressing reinforced 1/20/2020  3:00 AM   Dressing Change Due 01/23/20 1/19/2020  3:15 PM   Daily Line Review Performed 1/19/2020  3:15 PM   Number of days: 3       Prev airway: Placement Date: 01/21/20; Method of Intubation: Glidescope; ndotracheal Tube; Mask Ventilation: Easy - oral; Intubated: Postinduction; Blade: Kyler #4; Airway Device Size: 8.0; Style: Cuffed; Cuff Inflation: Minimal occlusive pressure; Grade: Grade I (Grade III view with DL Shen 2);     Drips: None documented      Patient Active Problem List   Diagnosis    History of pulmonary embolism    Hyperlipidemia    Gout    Hypertension    Obesity    At risk for amiodarone toxicity with long term use    Left ventricular thrombus without MI    Paroxysmal atrial fibrillation    Chronic systolic congestive heart failure    Chronic anticoagulation    COCM (congestive cardiomyopathy)    Acute on chronic combined systolic and diastolic heart failure    Acute on chronic systolic heart failure    Hypertensive heart disease with heart failure    Long term (current) use of anticoagulants    Acute kidney injury superimposed on chronic kidney disease    Right lower lobe pulmonary nodule    ICD (implantable cardioverter-defibrillator) in place    Transaminitis    Hepatic congestion    NSVT (nonsustained ventricular tachycardia)    Pre-transplant evaluation for heart transplant    Thrombus    Abnormal EKG     Acute on chronic heart failure    Heart abnormality    ACP (advance care planning)    Coagulopathy    Thrombus of left atrial appendage       Review of patient's allergies indicates:   Allergen Reactions    Biopatch [chlorhexidine gluconate]      Site burning    Dobutamine in d5w      Tachycardia, tremors, SOB, flushing    Percocet [oxycodone-acetaminophen] Itching    Penicillins Rash     Cefepime given on 1/23/2020 without issue       Current Inpatient Medications:      Current Facility-Administered Medications on File Prior to Visit   Medication Dose Route Frequency Provider Last Rate Last Dose    amiodarone tablet 200 mg  200 mg Oral Daily Andriy Perkins MD   200 mg at 01/20/20 0822    cefepime in dextrose 5 % 1 gram/50 mL IVPB 1 g  1 g Intravenous Q12H Isiah Montero MD   2 g at 01/23/20 1240    docusate sodium capsule 50 mg  50 mg Oral BID Andriy Perkins MD   50 mg at 01/20/20 2159    EPINEPHrine (ADRENALIN) 5 mg in sodium chloride 0.9% 250 mL infusion  0.02 mcg/kg/min Intravenous Continuous MARBELLA Mcfadden 28.6 mL/hr at 01/23/20 1151 0.08 mcg/kg/min at 01/23/20 1151    fentaNYL 2500 mcg in 0.9% sodium chloride 250 mL infusion premix (titrating)   Intravenous Continuous MARBELLA Mcfadden   Stopped at 01/23/20 0650    furosemide (LASIX) 2 mg/mL in sodium chloride 0.9% 100 mL infusion (conc: 2 mg/mL)  10 mg/hr Intravenous Continuous Brien Celaya MD   Stopped at 01/23/20 0650    heparin 25,000 units in dextrose 5% (100 units/ml) IV bolus from bag - ADDITIONAL PRN BOLUS - 30 units/kg  30 Units/kg (Adjusted) Intravenous PRN MARBELLA Mcfadden   2,745 Units at 01/21/20 2312    heparin 25,000 units in dextrose 5% (100 units/ml) IV bolus from bag - ADDITIONAL PRN BOLUS - 60 units/kg  60 Units/kg (Adjusted) Intravenous PRN MARBELLA Mcfadden        heparin 25,000 units in dextrose 5% 250 mL (100 units/mL) infusion HIGH INTENSITY nomogram - [facility]  26 Units/kg/hr (Adjusted) Intravenous  Continuous MARBELLA Mcfadden 23.8 mL/hr at 01/23/20 0600 26 Units/kg/hr at 01/23/20 0600    microplegia arrest solution (KCL 80mEq/40 mL)  80 mEq Other Once Ino Schmitt MD        microplegia protection soln (LIDOCAINE 100mg/MAGNESIUM 4g/qs NS 40mL)   Other Once Ino Schmitt MD        nitric oxide gas Gas 20 ppm  20 ppm Inhalation Continuous Marylin Lazcano NP        polyethylene glycol packet 17 g  17 g Oral BID Andriy Perkins MD   Stopped at 01/20/20 0900    propofol (DIPRIVAN) 10 mg/mL infusion  5 mcg/kg/min Intravenous Continuous MARBELLA Mcfadden   Stopped at 01/23/20 0710    rifAMpin injection 600 mg  600 mg Irrigation Once Ino Schmitt MD        sodium chloride 0.9% flush 10 mL  10 mL Intravenous PRN Andriy Perkins MD         Current Outpatient Medications on File Prior to Visit   Medication Sig Dispense Refill    amiodarone (PACERONE) 200 MG Tab Tale 1 tablet (200mg) by mouth on Monday, Tuesday, Thursday, Friday and Saturday. Only take medication 5 days per week. 20 tablet 11    furosemide (LASIX) 40 MG tablet Take 1 tablet (40 mg total) by mouth 2 (two) times daily. 90 tablet 3    hydrocortisone 2.5 % ointment Apply 1 application topically 2 (two) times daily.      metoprolol succinate (TOPROL-XL) 50 MG 24 hr tablet TAKE ONE TABLET BY MOUTH ONCE DAILY 30 tablet 11    spironolactone (ALDACTONE) 25 MG tablet TAKE 1 TABLET BY MOUTH ONCE DAILY 30 tablet 11    VENTOLIN HFA 90 mcg/actuation inhaler Inhale 1 Inhaler into the lungs every 4 (four) hours as needed.      warfarin (COUMADIN) 3 MG tablet TAKE 1 TABLET BY MOUTH ONCE DAILY EXCEPT  SATURDAY 30 tablet 5       Past Surgical History:   Procedure Laterality Date    RIGHT HEART CATHETERIZATION Right 1/16/2020    Procedure: INSERTION, CATHETER, RIGHT HEART;  Surgeon: Isiah Montero MD;  Location: [facility] CATH LAB;  Service: Cardiology;  Laterality: Right;    TONSILLECTOMY      VEIN LIGATION AND STRIPPING         Social History      Socioeconomic History    Marital status:      Spouse name: Not on file    Number of children: Not on file    Years of education: Not on file    Highest education level: Not on file   Occupational History    Not on file   Social Needs    Financial resource strain: Not on file    Food insecurity:     Worry: Not on file     Inability: Not on file    Transportation needs:     Medical: Not on file     Non-medical: Not on file   Tobacco Use    Smoking status: Never Smoker    Smokeless tobacco: Never Used   Substance and Sexual Activity    Alcohol use: No    Drug use: No    Sexual activity: Not on file   Lifestyle    Physical activity:     Days per week: Not on file     Minutes per session: Not on file    Stress: Not on file   Relationships    Social connections:     Talks on phone: Not on file     Gets together: Not on file     Attends Mu-ism service: Not on file     Active member of club or organization: Not on file     Attends meetings of clubs or organizations: Not on file     Relationship status: Not on file   Other Topics Concern    Not on file   Social History Narrative    Not on file       OBJECTIVE:     Vital Signs Range (Last 24H):  Temp:  [37.1 °C (98.8 °F)-37.7 °C (99.8 °F)]   Pulse:  []   Resp:  [16-26]   BP: ()/(61-79)   SpO2:  [97 %-100 %]   Arterial Line BP: ()/(50-83)       Significant Labs:  Lab Results   Component Value Date    WBC 10.64 01/23/2020    HGB 12.8 (L) 01/23/2020    HCT 37 01/23/2020     01/23/2020    CHOL 177 01/15/2020    TRIG 129 01/15/2020    HDL 46 01/15/2020    ALT 31 01/23/2020    AST 16 01/23/2020     01/23/2020    K 4.0 01/23/2020    CL 94 (L) 01/23/2020    CREATININE 2.0 (H) 01/23/2020    BUN 36 (H) 01/23/2020    CO2 32 (H) 01/23/2020    TSH 3.268 01/16/2020    PSA 1.7 01/15/2020    INR 1.1 01/23/2020    HGBA1C 6.2 (H) 01/15/2020       Diagnostic Studies: No relevant studies.    EKG: No recent studies available.    2D  ECHO: 1/10/2020  - Please F/U latest JACINTO results on 1/22/2020    · Severe left ventricular enlargement.  · Severely decreased left ventricular systolic function. The estimated ejection fraction is 15%.  · Mild right ventricular enlargement.  · Moderately reduced right ventricular systolic function.  · Left ventricular diastolic dysfunction.  · Severe biatrial enlargement.  · Mild tricuspid regurgitation.  · The estimated PA systolic pressure is 31 mmHg.  · Intermediate central venous pressure (8 mmHg).          ASSESSMENT/PLAN:       Pre-op Assessment    I have reviewed the Patient Summary Reports.     I have reviewed the Nursing Notes.   I have reviewed the Medications.     Review of Systems  Anesthesia Hx:  No problems with previous Anesthesia Denies Hx of Anesthetic complications  History of prior surgery of interest to airway management or planning: Previous anesthesia: General Denies Family Hx of Anesthesia complications.   Denies Personal Hx of Anesthesia complications.   Social:  Non-Smoker, No Alcohol Use    Hematology/Oncology:         -- Anemia: Denies Current/Recent Cancer   EENT/Dental:   denies chronic allergic rhinitis  Denies Otitis Media Denies Chronic Tonsillitis   Cardiovascular:   Denies Pacemaker. Hypertension  Denies MI.  Denies CAD.    Denies CABG/stent.  Denies Dysrhythmias.   Denies Angina. CHF         hyperlipidemia     ICD Functional Capacity good / => 4 METS    Pulmonary:   Denies Pneumonia Denies COPD.  Denies Asthma.  Denies Shortness of breath.  Denies Recent URI.  Denies Sleep Apnea. Hx of PE   Renal/:   Chronic Renal Disease    Hepatic/GI:   Denies PUD. Denies Hiatal Hernia.  Denies GERD. Denies Liver Disease.  Denies Hepatitis.    Musculoskeletal:  Musculoskeletal Normal Denies Arthritis.      Neurological:   Denies TIA. CVA Denies Neuromuscular Disease.  Denies Headaches. Denies Seizures. C-spine cleared.     Denies Chronic Pain Syndrome  Denies Peripheral Neuropathy     Endocrine:  Endocrine Normal Denies Diabetes. Denies Hypothyroidism.  Denies Hyperthyroidism.    Psych:  Psychiatric Normal  Denies Psychiatric History. denies anxiety denies depression          Physical Exam  General:  Well nourished    Airway/Jaw/Neck:  Airway Findings: Mouth Opening: Small, but > 3cm Tongue: Normal  General Airway Assessment: Adult  Mallampati: IV  Improves to III with phonation.  TM Distance: 4 - 6 cm  Jaw/Neck Findings:  Micrognathia: Negative Mandibular Fracture: Negative    Neck ROM: Normal ROM  Neck Findings:  Girth Increased   R CVC   Eyes/Ears/Nose:  EYES/EARS/NOSE FINDINGS: Normal   Dental:  Dental Findings: In tact   Chest/Lungs:  Chest/Lungs Findings: Clear to auscultation     Heart/Vascular:  Heart Findings: Normal Heart murmur: negative    Abdomen:  Abdomen Findings:  Normal, Soft       Mental Status:  Mental Status Findings:  Cooperative, Alert and Oriented         Anesthesia Plan  Type of Anesthesia, risks & benefits discussed:  Anesthesia Type:  general  Patient's Preference:   Intra-op Monitoring Plan: arterial line, central line and standard ASA monitors  Intra-op Monitoring Plan Comments:   Post Op Pain Control Plan: multimodal analgesia, per primary service following discharge from PACU and IV/PO Opioids PRN  Post Op Pain Control Plan Comments:   Induction:   IV  Beta Blocker:  Patient is not currently on a Beta-Blocker (No further documentation required).       Informed Consent: Patient understands risks and agrees with Anesthesia plan.  Questions answered. Anesthesia consent signed with patient.  ASA Score: 4     Day of Surgery Review of History & Physical:    H&P update referred to the surgeon.         Ready For Surgery From Anesthesia Perspective.

## 2020-01-23 NOTE — ANESTHESIA PROCEDURE NOTES
[place] [facility]    Diagnosis: Heart failure  Patient location during procedure: done in OR  Procedure start time: 1/23/2020 7:51 AM  Timeout: 1/23/2020 7:50 AM  Procedure end time: 1/23/2020 8:06 AM    Staffing  Authorizing Provider: Elvin Little Jr., MD  Performing Provider: Farnaz Soria MD    Anesthesiologist was present at the time of the procedure.  Preanesthetic Checklist  Completed: patient identified, site marked, surgical consent, pre-op evaluation, timeout performed, IV checked, risks and benefits discussed, monitors and equipment checked and anesthesia consent given  [place] [facility]  Skin Prep: povidone-iodine 7.5% surgical scrub  Local Infiltration: none  Location: left,  internal jugular vein  Vessel Caliber: medium, patent, compressibility normal  Introducer: 9 Fr single lumen,   Device: standard thermodilution catheter  Catheter Size: 8 Fr  Catheter placement by yes. Heme positive aspiration all ports. PAC floated with balloon up not wedgedSterile sheath used  Locked at: 82 cm.Insertion Attempts: 1  Indication: intravenous therapy, hemodynamic monitoring  Ultrasound Guidance  Needle advanced into vessel with real time Ultrasound guidance.  Sterile sheath used.  Assessment  Central Line Bundle Protocol followed. Hand hygiene before procedure, surgical cap worn, surgical mask worn, sterile surgical gloves worn, large sterile drape used.  Verification: blood return  Dressing: sutured in place and taped and tegaderm  Patient: Tolerated Well

## 2020-01-23 NOTE — ASSESSMENT & PLAN NOTE
Tae Delgado is a 59 y.o. 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 Approved for DT LVAD. Went to OR on 1/21 and found to have DAMON and LV thrombus and case was aborted. Pt was placed on a heparin gtt and thrombus had dissipated on repeat JACINTO on 1/22. Pt underwent LVAD placement on 1/23.     Neuro:  - Follows commands in all extremities when sedation is held  - Prop gtt with prn Fentanyl    CVS:   - Current LVAD flow @ 5300 with appropriate PIs   - Epi @ 0.08, Nitric @ 15, Cardene @ 2 - changes per CTS  - Pt started on Amio gtt for irregular tachyarrhthymias   - EP service to turn ICD back on ASAP  - Chest closure planned for 1/24    Pulm:  - Mechanical ventilation while chest open  - ABGs per CTS  - CXR daily  - Monitor CT output    GI:  - NPO  - Protonix 40 daily  - Docusate    Endo:   - Insulin gtt   - Endocrine consulted, appreciate their services    Renal:  - Strict I/O  - Follow post-op labs    Lytes:  - Follow post-op labs  - Magnesium 2g now    Heme:  - No blood products needed during the OR  - Follow post op labs  - Ok to give ASA later this evening if not bleeding    ID:   - Lona op ABX  - Follow WBC  - Follow fever curve    PPx:  - A/C per CTS  - GI ppx    Dispo:  - Cont SICU care

## 2020-01-23 NOTE — SUBJECTIVE & OBJECTIVE
Follow-up For: Procedure(s) (LRB):  INSERTION-LEFT VENTRICULAR ASSIST DEVICE (Left)    Post-Operative Day: Day of Surgery     Past Medical History:   Diagnosis Date    CHF (congestive heart failure) 1/2010    Dx  1/2010 w/ decreased LV systolic function (EF 15%) by ECHO 1/2015    COCM (congestive cardiomyopathy) 7/20/2016    Hyperlipidemia     Hypertension     Paroxysmal atrial fibrillation     Pulmonary embolus 2008    Stroke     Superficial thrombophlebitis        Past Surgical History:   Procedure Laterality Date    RIGHT HEART CATHETERIZATION Right 1/16/2020    Procedure: INSERTION, CATHETER, RIGHT HEART;  Surgeon: Isiah Montero MD;  Location: [facility] CATH LAB;  Service: Cardiology;  Laterality: Right;    TONSILLECTOMY      VEIN LIGATION AND STRIPPING         Review of patient's allergies indicates:   Allergen Reactions    Biopatch [chlorhexidine gluconate]      Site burning    Dobutamine in d5w      Tachycardia, tremors, SOB, flushing    Percocet [oxycodone-acetaminophen] Itching    Penicillins Rash     Cefepime given on 1/23/2020 without issue       Family History     Problem Relation (Age of Onset)    Cancer Mother        Tobacco Use    Smoking status: Never Smoker    Smokeless tobacco: Never Used   Substance and Sexual Activity    Alcohol use: No    Drug use: No    Sexual activity: Not on file      Review of Systems   Unable to perform ROS: Intubated     Objective:     Vital Signs (Most Recent):  Temp: 99.2 °F (37.3 °C) (01/23/20 1400)  Pulse: (!) 124 (01/23/20 1437)  Resp: 14 (01/23/20 1437)  BP: (!) 100/59 (01/23/20 1400)  SpO2: 97 % (01/23/20 1430) Vital Signs (24h Range):  Temp:  [98.8 °F (37.1 °C)-99.8 °F (37.7 °C)] 99.2 °F (37.3 °C)  Pulse:  [] 124  Resp:  [14-26] 14  SpO2:  [89 %-100 %] 97 %  BP: ()/(59-79) 100/59  Arterial Line BP: ()/(50-83) 92/78     Weight: 119.3 kg (263 lb)  Body mass index is 37.74 kg/m².      Intake/Output Summary (Last 24 hours) at  1/23/2020 1456  Last data filed at 1/23/2020 1400  Gross per 24 hour   Intake 2885 ml   Output 2360 ml   Net 525 ml       Physical Exam   Constitutional: He appears well-developed and well-nourished.   HENT:   Head: Normocephalic and atraumatic.   Mouth/Throat: No oropharyngeal exudate.   ETT and OG secured in place.   Eyes: Pupils are equal, round, and reactive to light.   Neck: Normal range of motion. Neck supple.   Left IJ double lumen + [place] Heena in place, dressing CLD  Right IJ triple lumen, dressing CLD   Cardiovascular:   Irregular tachyarrythmia. LVAD hum.    Pulmonary/Chest: Breath sounds normal. He has no wheezes. He has no rales.   Intubated. Open chest, dressed with Ioban. CTs with SS drainage.   Abdominal: Soft. He exhibits no distension.   Musculoskeletal: He exhibits no edema or deformity.   Neurological:   sedated   Skin: Skin is warm and dry.       Vents:  Vent Mode: A/C (01/23/20 1437)  Ventilator Initiated: Yes (01/21/20 0905)  Set Rate: 14 bmp (01/23/20 1437)  Vt Set: 500 mL (01/23/20 1437)  PEEP/CPAP: 8 cmH20 (01/23/20 1437)  Oxygen Concentration (%): 50 (01/23/20 1437)  Peak Airway Pressure: 29 cmH2O (01/23/20 1437)  Plateau Pressure: 0 cmH20 (01/23/20 1437)  Total Ve: 7.49 mL (01/23/20 1437)  F/VT Ratio<105 (RSBI): (!) (P) 28.63 (01/23/20 1437)    Lines/Drains/Airways     Central Venous Catheter Line                 Percutaneous Central Line Insertion/Assessment - triple lumen  01/20/20 1511 right internal jugular 2 days         Percutaneous Central Line Insertion/Assessment - double lumen  01/23/20 0751 less than 1 day         Pulmonary Artery Catheter Assessment  01/23/20 0751 less than 1 day          Drain                 Urethral Catheter 01/21/20 0752 Non-latex;Straight-tip;Temperature probe 16 Fr. 2 days         Chest Tube 01/23/20 1230 1 Right Pleural less than 1 day         Chest Tube 01/23/20 1414 2 Mediastinal less than 1 day         Chest Tube 01/23/20 1415 3 Left Pleural less than  1 day          Airway                 Airway - Non-Surgical 01/21/20 0742 Endotracheal Tube 2 days          Arterial Line                 Arterial Line 01/21/20 0730 Left Other (Comment) 2 days         Arterial Line 01/23/20 1330 Right Radial less than 1 day         Arterial Line 01/23/20 1335 Right Radial less than 1 day          Line                 VAD 01/23/20 1148 Left ventricular assist device HeartMate 3 less than 1 day          Peripheral Intravenous Line                 Peripheral IV - Single Lumen 01/22/20 1730 20 G Right Hand less than 1 day         Peripheral IV - Single Lumen 01/22/20 1800 20 G Left Hand less than 1 day                Significant Labs:    CBC/Anemia Profile:  Recent Labs   Lab 01/22/20  0311 01/23/20  0355  01/23/20  1149 01/23/20  1407 01/23/20  1423   WBC 11.08 10.64  --   --   --  18.14*   HGB 12.1* 12.8*  --   --   --  12.3*   HCT 38.6* 39.6*   < > 37 34* 37.0*    220  --   --   --   --    MCV 89 88  --   --   --  87   RDW 14.4 14.5  --   --   --  14.5    < > = values in this interval not displayed.        Chemistries:  Recent Labs   Lab 01/21/20  1510 01/22/20  0307 01/22/20  0311 01/22/20  1447 01/23/20  0010 01/23/20  0355   *  --  134* 137 137 138   K 4.3  --  4.0 3.8 3.8 4.0   CL 95  --  92* 93* 93* 94*   CO2 28  --  31* 33* 33* 32*   BUN 37*  --  37* 37* 36* 36*   CREATININE 2.0*  --  2.1* 2.0* 1.9* 2.0*   CALCIUM 9.0  --  9.1 9.7 9.3 9.7   ALBUMIN  --   --  3.1*  --   --  3.1*   PROT  --   --  6.7  --   --  7.1   BILITOT  --   --  1.0  --   --  1.2*   ALKPHOS  --   --  43*  --   --  46*   ALT  --   --  38  --   --  31   AST  --   --  18  --   --  16   MG 2.3  --  2.1  --   --  2.4   PHOS 4.6* 5.5*  --   --   --   --        ABGs:   Recent Labs   Lab 01/23/20  1514   PH 7.390   PCO2 50.3*   HCO3 30.5*   POCSATURATED 98   BE 6     Coagulation:   Recent Labs   Lab 01/23/20  1423   INR 1.2   APTT 23.4     Lactic Acid:   Recent Labs   Lab 01/23/20  1423   LACTATE 4.1*        Significant Imaging: I have reviewed all pertinent imaging results/findings within the past 24 hours.

## 2020-01-23 NOTE — HOSPITAL COURSE
1/23: Pt arrives from OR intubated, open chest dressed with Ioban, CTs with SS output. Drips on arrival: Epi 0.08, Cardene 5, TXA, Nitric at 30. Pt received 1.5L crystalloid, no blood product, 20 mg Lasix (put out 250cc in response).   Intra Op JACINTO Exam:  PRE:  Severely reduced left ventricular systolic function. Dilated LV. No definitive thrombus noted.  Moderate-to-severely reduced right ventricular systolic function. FAC 12-24%  Mild-to-moderate AI. No AS.  Moderate MR with systolic blunting of the pulmonary veins.  Trace-to-mild TR.  Mild PI. PAAT <90ms.  Small PFO by CF doppler.  SEC in La and DAMON. No clear thrombus noted.  Grade 2 atheromatous disease of the aorta.  No effusions.    POST:  Severely depressed left ventriclar dysfunction.  Moderately depressed right ventricular systolic function.  LVAD inflow and outflow cannula noted with laminar flows.  No AI.  Mild MR, vahe stitch observed. No MS.  Mild-to-moderate TR.  PFO still visible on CF doppler.  Aorta intact, no dissection.  No effusions.     1/24: run of Vtach overnight. Amio bolus given, amio gtt increased to 1, lasix push given. Improved. LVAD hemodynamics appropriate overnight. Going for closure this am. Upon return, developed tamponade physiology and returned to OR. Came back to SICU with chest open.  1/26: Diuresed over the weekend, chest kept open. Lidocaine gtt and digoxin for tachyarrhythmia. Otherwise NAEON.   1/27: Went to OR for possible chest closure, decided to do a wash out. Returned to SICU with chest open. One tachyarrhythmia episode overnight, resolved with 100mg Lidocaine bolus and increasing lidocaine drip from 0.75mg/hr to 1mg/hr.  1/28: NAEON. Lasix gtt decreased throughout the day with adequate UOP still.   1/29: NAEON  2/03: NAEON.  Comfortable on 4 L NC. Hypernatremia resolved with increased free water flushes. Good UOP overnight. Cr improving. NG tube came out overnight, replaced. Restart TF today. Enema overnight with BM  x1. Digoxin increased to 0.25 mg. CVC pulled and PICC placed. DC'd opioid due to somnolence and scheduled tylenol.   02/04: NAEON. Satting well on NC 4L. Heparin gtt held this AM for aPTT 78.2. Rechecking aPTT at 0600. INR 2.0.  Weaning Corby and Epi gtt. Pureed diet with continued TF due to poor appetite. Heparin gtt restarted at 400 non-titrating. Digoxin 0.125 QD. Warfain 2 today. Miralax. Scheduled oxy 5 mg q6h for pain. Increasing TF.   02/05: NAEON. Weaning Epi gtt. Will give warfarin 2 mg today. Heparin gtt OFF. Lasix gtt increase to 10 mg. Fluid restriction 1200 cc daily.  Schedule robaxin. Golytely bowel regimen.  02/06: NAEON. Weaned off Epi and HDS. Will give Warfarin 1 mg today. CVP 14. Lasix 10 mg/hr. Slowly working more with PT.

## 2020-01-23 NOTE — PLAN OF CARE
HTS Cross Cover    Notified by nurse that patient had pulled ETT. On arrival to bedside, RT had already pushed ETT back to original position. SaO2 97%. Bilateral lung sounds. CXR confirmed adequate ETT position. Restrains secured by nurse, sedation increased.    Discussed with Dr. Michel Sharp MD  Cardiology Fellow, PGY4

## 2020-01-24 ENCOUNTER — DOCUMENTATION ONLY (OUTPATIENT)
Dept: ELECTROPHYSIOLOGY | Facility: CLINIC | Age: 60
End: 2020-01-24

## 2020-01-24 ENCOUNTER — ANESTHESIA (OUTPATIENT)
Dept: SURGERY | Facility: HOSPITAL | Age: 60
DRG: 001 | End: 2020-01-24
Payer: MEDICARE

## 2020-01-24 ENCOUNTER — ANESTHESIA EVENT (OUTPATIENT)
Dept: SURGERY | Facility: HOSPITAL | Age: 60
DRG: 001 | End: 2020-01-24
Payer: MEDICARE

## 2020-01-24 PROBLEM — R73.9 ACUTE HYPERGLYCEMIA: Status: ACTIVE | Noted: 2020-01-24

## 2020-01-24 PROBLEM — Z95.811 LVAD (LEFT VENTRICULAR ASSIST DEVICE) PRESENT: Status: ACTIVE | Noted: 2020-01-24

## 2020-01-24 LAB
ABO + RH BLD: NORMAL
ALBUMIN SERPL BCP-MCNC: 3 G/DL (ref 3.5–5.2)
ALLENS TEST: ABNORMAL
ALLENS TEST: NORMAL
ALP SERPL-CCNC: 40 U/L (ref 55–135)
ALT SERPL W/O P-5'-P-CCNC: 34 U/L (ref 10–44)
ANION GAP SERPL CALC-SCNC: 11 MMOL/L (ref 8–16)
ANION GAP SERPL CALC-SCNC: 11 MMOL/L (ref 8–16)
ANION GAP SERPL CALC-SCNC: 12 MMOL/L (ref 8–16)
ANION GAP SERPL CALC-SCNC: 13 MMOL/L (ref 8–16)
ANION GAP SERPL CALC-SCNC: 14 MMOL/L (ref 8–16)
ANION GAP SERPL CALC-SCNC: 9 MMOL/L (ref 8–16)
APTT BLDCRRT: 23.6 SEC (ref 21–32)
APTT BLDCRRT: 23.8 SEC (ref 21–32)
APTT BLDCRRT: 23.9 SEC (ref 21–32)
APTT BLDCRRT: 24.9 SEC (ref 21–32)
APTT BLDCRRT: 25.1 SEC (ref 21–32)
APTT BLDCRRT: 26.3 SEC (ref 21–32)
AST SERPL-CCNC: 97 U/L (ref 10–40)
BASOPHILS # BLD AUTO: 0.05 K/UL (ref 0–0.2)
BASOPHILS # BLD AUTO: 0.07 K/UL (ref 0–0.2)
BASOPHILS # BLD AUTO: 0.1 K/UL (ref 0–0.2)
BASOPHILS # BLD AUTO: 0.11 K/UL (ref 0–0.2)
BASOPHILS # BLD AUTO: 0.12 K/UL (ref 0–0.2)
BASOPHILS # BLD AUTO: 0.12 K/UL (ref 0–0.2)
BASOPHILS NFR BLD: 0.5 % (ref 0–1.9)
BASOPHILS NFR BLD: 0.6 % (ref 0–1.9)
BASOPHILS NFR BLD: 0.7 % (ref 0–1.9)
BASOPHILS NFR BLD: 0.7 % (ref 0–1.9)
BILIRUB DIRECT SERPL-MCNC: 0.8 MG/DL (ref 0.1–0.3)
BILIRUB SERPL-MCNC: 1.1 MG/DL (ref 0.1–1)
BLD GP AB SCN CELLS X3 SERPL QL: NORMAL
BLD PROD TYP BPU: NORMAL
BLOOD UNIT EXPIRATION DATE: NORMAL
BLOOD UNIT TYPE CODE: 600
BLOOD UNIT TYPE CODE: 6200
BLOOD UNIT TYPE: NORMAL
BNP SERPL-MCNC: 968 PG/ML (ref 0–99)
BUN SERPL-MCNC: 43 MG/DL (ref 6–20)
BUN SERPL-MCNC: 43 MG/DL (ref 6–20)
BUN SERPL-MCNC: 44 MG/DL (ref 6–20)
BUN SERPL-MCNC: 46 MG/DL (ref 6–20)
BUN SERPL-MCNC: 49 MG/DL (ref 6–20)
BUN SERPL-MCNC: 50 MG/DL (ref 6–20)
CALCIUM SERPL-MCNC: 8.8 MG/DL (ref 8.7–10.5)
CALCIUM SERPL-MCNC: 8.9 MG/DL (ref 8.7–10.5)
CALCIUM SERPL-MCNC: 9.2 MG/DL (ref 8.7–10.5)
CALCIUM SERPL-MCNC: 9.3 MG/DL (ref 8.7–10.5)
CALCIUM SERPL-MCNC: 9.4 MG/DL (ref 8.7–10.5)
CALCIUM SERPL-MCNC: 9.9 MG/DL (ref 8.7–10.5)
CHLORIDE SERPL-SCNC: 101 MMOL/L (ref 95–110)
CHLORIDE SERPL-SCNC: 102 MMOL/L (ref 95–110)
CHLORIDE SERPL-SCNC: 102 MMOL/L (ref 95–110)
CHLORIDE SERPL-SCNC: 103 MMOL/L (ref 95–110)
CHLORIDE SERPL-SCNC: 103 MMOL/L (ref 95–110)
CHLORIDE SERPL-SCNC: 99 MMOL/L (ref 95–110)
CO2 SERPL-SCNC: 24 MMOL/L (ref 23–29)
CO2 SERPL-SCNC: 25 MMOL/L (ref 23–29)
CO2 SERPL-SCNC: 26 MMOL/L (ref 23–29)
CO2 SERPL-SCNC: 27 MMOL/L (ref 23–29)
CO2 SERPL-SCNC: 27 MMOL/L (ref 23–29)
CO2 SERPL-SCNC: 31 MMOL/L (ref 23–29)
CODING SYSTEM: NORMAL
CREAT SERPL-MCNC: 2 MG/DL (ref 0.5–1.4)
CREAT SERPL-MCNC: 2.2 MG/DL (ref 0.5–1.4)
CREAT SERPL-MCNC: 2.4 MG/DL (ref 0.5–1.4)
CREAT SERPL-MCNC: 2.6 MG/DL (ref 0.5–1.4)
CRP SERPL-MCNC: 283 MG/L (ref 0–8.2)
DELSYS: ABNORMAL
DELSYS: NORMAL
DELSYS: NORMAL
DIFFERENTIAL METHOD: ABNORMAL
DISPENSE STATUS: NORMAL
EOSINOPHIL # BLD AUTO: 0 K/UL (ref 0–0.5)
EOSINOPHIL NFR BLD: 0 % (ref 0–8)
EOSINOPHIL NFR BLD: 0 % (ref 0–8)
EOSINOPHIL NFR BLD: 0.1 % (ref 0–8)
ERYTHROCYTE [DISTWIDTH] IN BLOOD BY AUTOMATED COUNT: 14.7 % (ref 11.5–14.5)
ERYTHROCYTE [DISTWIDTH] IN BLOOD BY AUTOMATED COUNT: 14.8 % (ref 11.5–14.5)
ERYTHROCYTE [DISTWIDTH] IN BLOOD BY AUTOMATED COUNT: 15 % (ref 11.5–14.5)
ERYTHROCYTE [SEDIMENTATION RATE] IN BLOOD BY WESTERGREN METHOD: 18 MM/H
EST. GFR  (AFRICAN AMERICAN): 29.9 ML/MIN/1.73 M^2
EST. GFR  (AFRICAN AMERICAN): 32.9 ML/MIN/1.73 M^2
EST. GFR  (AFRICAN AMERICAN): 36.5 ML/MIN/1.73 M^2
EST. GFR  (AFRICAN AMERICAN): 41 ML/MIN/1.73 M^2
EST. GFR  (NON AFRICAN AMERICAN): 25.8 ML/MIN/1.73 M^2
EST. GFR  (NON AFRICAN AMERICAN): 28.5 ML/MIN/1.73 M^2
EST. GFR  (NON AFRICAN AMERICAN): 31.6 ML/MIN/1.73 M^2
EST. GFR  (NON AFRICAN AMERICAN): 35.5 ML/MIN/1.73 M^2
FIO2: 50
FIO2: 60
FIO2: 70
GLUCOSE SERPL-MCNC: 153 MG/DL (ref 70–110)
GLUCOSE SERPL-MCNC: 153 MG/DL (ref 70–110)
GLUCOSE SERPL-MCNC: 160 MG/DL (ref 70–110)
GLUCOSE SERPL-MCNC: 164 MG/DL (ref 70–110)
GLUCOSE SERPL-MCNC: 173 MG/DL (ref 70–110)
GLUCOSE SERPL-MCNC: 179 MG/DL (ref 70–110)
GLUCOSE SERPL-MCNC: 189 MG/DL (ref 70–110)
GLUCOSE SERPL-MCNC: 195 MG/DL (ref 70–110)
GLUCOSE SERPL-MCNC: 199 MG/DL (ref 70–110)
GLUCOSE SERPL-MCNC: 206 MG/DL (ref 70–110)
HCO3 UR-SCNC: 24.7 MMOL/L (ref 24–28)
HCO3 UR-SCNC: 25.4 MMOL/L (ref 24–28)
HCO3 UR-SCNC: 25.8 MMOL/L (ref 24–28)
HCO3 UR-SCNC: 26.2 MMOL/L (ref 24–28)
HCO3 UR-SCNC: 26.4 MMOL/L (ref 24–28)
HCO3 UR-SCNC: 26.4 MMOL/L (ref 24–28)
HCO3 UR-SCNC: 27 MMOL/L (ref 24–28)
HCO3 UR-SCNC: 27.1 MMOL/L (ref 24–28)
HCO3 UR-SCNC: 27.3 MMOL/L (ref 24–28)
HCO3 UR-SCNC: 27.4 MMOL/L (ref 24–28)
HCO3 UR-SCNC: 27.6 MMOL/L (ref 24–28)
HCO3 UR-SCNC: 27.8 MMOL/L (ref 24–28)
HCO3 UR-SCNC: 28.1 MMOL/L (ref 24–28)
HCO3 UR-SCNC: 28.5 MMOL/L (ref 24–28)
HCO3 UR-SCNC: 28.6 MMOL/L (ref 24–28)
HCO3 UR-SCNC: 28.7 MMOL/L (ref 24–28)
HCO3 UR-SCNC: 28.8 MMOL/L (ref 24–28)
HCO3 UR-SCNC: 29 MMOL/L (ref 24–28)
HCO3 UR-SCNC: 29.3 MMOL/L (ref 24–28)
HCO3 UR-SCNC: 30.1 MMOL/L (ref 24–28)
HCO3 UR-SCNC: 30.2 MMOL/L (ref 24–28)
HCT VFR BLD AUTO: 32.7 % (ref 40–54)
HCT VFR BLD AUTO: 33 % (ref 40–54)
HCT VFR BLD AUTO: 33.6 % (ref 40–54)
HCT VFR BLD AUTO: 33.7 % (ref 40–54)
HCT VFR BLD AUTO: 33.9 % (ref 40–54)
HCT VFR BLD AUTO: 35.7 % (ref 40–54)
HCT VFR BLD CALC: 28 %PCV (ref 36–54)
HCT VFR BLD CALC: 28 %PCV (ref 36–54)
HCT VFR BLD CALC: 29 %PCV (ref 36–54)
HCT VFR BLD CALC: 29 %PCV (ref 36–54)
HCT VFR BLD CALC: 30 %PCV (ref 36–54)
HCT VFR BLD CALC: 31 %PCV (ref 36–54)
HCT VFR BLD CALC: 32 %PCV (ref 36–54)
HCT VFR BLD CALC: 43 %PCV (ref 36–54)
HGB BLD-MCNC: 10.1 G/DL (ref 14–18)
HGB BLD-MCNC: 10.6 G/DL (ref 14–18)
HGB BLD-MCNC: 10.7 G/DL (ref 14–18)
HGB BLD-MCNC: 10.7 G/DL (ref 14–18)
HGB BLD-MCNC: 10.8 G/DL (ref 14–18)
HGB BLD-MCNC: 11.2 G/DL (ref 14–18)
IMM GRANULOCYTES # BLD AUTO: 0.15 K/UL (ref 0–0.04)
IMM GRANULOCYTES # BLD AUTO: 0.17 K/UL (ref 0–0.04)
IMM GRANULOCYTES # BLD AUTO: 0.21 K/UL (ref 0–0.04)
IMM GRANULOCYTES # BLD AUTO: 0.25 K/UL (ref 0–0.04)
IMM GRANULOCYTES # BLD AUTO: 0.28 K/UL (ref 0–0.04)
IMM GRANULOCYTES # BLD AUTO: 0.29 K/UL (ref 0–0.04)
IMM GRANULOCYTES NFR BLD AUTO: 1.3 % (ref 0–0.5)
IMM GRANULOCYTES NFR BLD AUTO: 1.4 % (ref 0–0.5)
IMM GRANULOCYTES NFR BLD AUTO: 1.4 % (ref 0–0.5)
IMM GRANULOCYTES NFR BLD AUTO: 1.5 % (ref 0–0.5)
IMM GRANULOCYTES NFR BLD AUTO: 1.6 % (ref 0–0.5)
IMM GRANULOCYTES NFR BLD AUTO: 1.6 % (ref 0–0.5)
INR PPP: 1.1 (ref 0.8–1.2)
LDH SERPL L TO P-CCNC: 0.86 MMOL/L (ref 0.36–1.25)
LDH SERPL L TO P-CCNC: 1.02 MMOL/L (ref 0.36–1.25)
LDH SERPL L TO P-CCNC: 1.1 MMOL/L (ref 0.36–1.25)
LDH SERPL L TO P-CCNC: 1.25 MMOL/L (ref 0.36–1.25)
LDH SERPL L TO P-CCNC: 1.29 MMOL/L (ref 0.36–1.25)
LDH SERPL L TO P-CCNC: 1.34 MMOL/L (ref 0.36–1.25)
LDH SERPL L TO P-CCNC: 1.36 MMOL/L (ref 0.36–1.25)
LDH SERPL L TO P-CCNC: 1.47 MMOL/L (ref 0.36–1.25)
LDH SERPL L TO P-CCNC: 1.48 MMOL/L (ref 0.36–1.25)
LDH SERPL L TO P-CCNC: 1.56 MMOL/L (ref 0.36–1.25)
LDH SERPL L TO P-CCNC: 1.71 MMOL/L (ref 0.36–1.25)
LDH SERPL L TO P-CCNC: 1.9 MMOL/L (ref 0.36–1.25)
LDH SERPL L TO P-CCNC: 1.96 MMOL/L (ref 0.36–1.25)
LDH SERPL L TO P-CCNC: 570 U/L (ref 110–260)
LYMPHOCYTES # BLD AUTO: 1 K/UL (ref 1–4.8)
LYMPHOCYTES # BLD AUTO: 1.1 K/UL (ref 1–4.8)
LYMPHOCYTES # BLD AUTO: 1.1 K/UL (ref 1–4.8)
LYMPHOCYTES # BLD AUTO: 1.2 K/UL (ref 1–4.8)
LYMPHOCYTES # BLD AUTO: 1.2 K/UL (ref 1–4.8)
LYMPHOCYTES # BLD AUTO: 1.4 K/UL (ref 1–4.8)
LYMPHOCYTES NFR BLD: 10 % (ref 18–48)
LYMPHOCYTES NFR BLD: 11 % (ref 18–48)
LYMPHOCYTES NFR BLD: 5.7 % (ref 18–48)
LYMPHOCYTES NFR BLD: 5.8 % (ref 18–48)
LYMPHOCYTES NFR BLD: 6.9 % (ref 18–48)
LYMPHOCYTES NFR BLD: 8.5 % (ref 18–48)
MAGNESIUM SERPL-MCNC: 2.6 MG/DL (ref 1.6–2.6)
MAGNESIUM SERPL-MCNC: 2.7 MG/DL (ref 1.6–2.6)
MAGNESIUM SERPL-MCNC: 2.8 MG/DL (ref 1.6–2.6)
MAGNESIUM SERPL-MCNC: 2.9 MG/DL (ref 1.6–2.6)
MCH RBC QN AUTO: 27.8 PG (ref 27–31)
MCH RBC QN AUTO: 28.2 PG (ref 27–31)
MCH RBC QN AUTO: 28.2 PG (ref 27–31)
MCH RBC QN AUTO: 28.3 PG (ref 27–31)
MCH RBC QN AUTO: 28.3 PG (ref 27–31)
MCH RBC QN AUTO: 28.5 PG (ref 27–31)
MCHC RBC AUTO-ENTMCNC: 30.9 G/DL (ref 32–36)
MCHC RBC AUTO-ENTMCNC: 31.4 G/DL (ref 32–36)
MCHC RBC AUTO-ENTMCNC: 31.6 G/DL (ref 32–36)
MCHC RBC AUTO-ENTMCNC: 31.8 G/DL (ref 32–36)
MCHC RBC AUTO-ENTMCNC: 32 G/DL (ref 32–36)
MCHC RBC AUTO-ENTMCNC: 32.1 G/DL (ref 32–36)
MCV RBC AUTO: 88 FL (ref 82–98)
MCV RBC AUTO: 89 FL (ref 82–98)
MCV RBC AUTO: 90 FL (ref 82–98)
MCV RBC AUTO: 90 FL (ref 82–98)
METHEMOGLOBIN: 0.9 % (ref 0–3)
MODE: ABNORMAL
MODE: NORMAL
MODE: NORMAL
MONOCYTES # BLD AUTO: 1.2 K/UL (ref 0.3–1)
MONOCYTES # BLD AUTO: 1.4 K/UL (ref 0.3–1)
MONOCYTES # BLD AUTO: 1.7 K/UL (ref 0.3–1)
MONOCYTES NFR BLD: 10.9 % (ref 4–15)
MONOCYTES NFR BLD: 11 % (ref 4–15)
MONOCYTES NFR BLD: 7.6 % (ref 4–15)
MONOCYTES NFR BLD: 7.6 % (ref 4–15)
MONOCYTES NFR BLD: 9.6 % (ref 4–15)
MONOCYTES NFR BLD: 9.7 % (ref 4–15)
NEUTROPHILS # BLD AUTO: 11.4 K/UL (ref 1.8–7.7)
NEUTROPHILS # BLD AUTO: 14.5 K/UL (ref 1.8–7.7)
NEUTROPHILS # BLD AUTO: 15.4 K/UL (ref 1.8–7.7)
NEUTROPHILS # BLD AUTO: 15.8 K/UL (ref 1.8–7.7)
NEUTROPHILS # BLD AUTO: 8.5 K/UL (ref 1.8–7.7)
NEUTROPHILS # BLD AUTO: 9.6 K/UL (ref 1.8–7.7)
NEUTROPHILS NFR BLD: 76 % (ref 38–73)
NEUTROPHILS NFR BLD: 77.1 % (ref 38–73)
NEUTROPHILS NFR BLD: 79.5 % (ref 38–73)
NEUTROPHILS NFR BLD: 81.3 % (ref 38–73)
NEUTROPHILS NFR BLD: 84.4 % (ref 38–73)
NEUTROPHILS NFR BLD: 84.5 % (ref 38–73)
NRBC BLD-RTO: 0 /100 WBC
NUM UNITS TRANS FFP: NORMAL
PCO2 BLDA: 35.3 MMHG (ref 35–45)
PCO2 BLDA: 36.2 MMHG (ref 35–45)
PCO2 BLDA: 38.2 MMHG (ref 35–45)
PCO2 BLDA: 38.3 MMHG (ref 35–45)
PCO2 BLDA: 38.6 MMHG (ref 35–45)
PCO2 BLDA: 38.8 MMHG (ref 35–45)
PCO2 BLDA: 39.3 MMHG (ref 35–45)
PCO2 BLDA: 40.1 MMHG (ref 35–45)
PCO2 BLDA: 40.2 MMHG (ref 35–45)
PCO2 BLDA: 40.3 MMHG (ref 35–45)
PCO2 BLDA: 40.6 MMHG (ref 35–45)
PCO2 BLDA: 40.8 MMHG (ref 35–45)
PCO2 BLDA: 41.3 MMHG (ref 35–45)
PCO2 BLDA: 41.5 MMHG (ref 35–45)
PCO2 BLDA: 41.7 MMHG (ref 35–45)
PCO2 BLDA: 41.7 MMHG (ref 35–45)
PCO2 BLDA: 43.3 MMHG (ref 35–45)
PCO2 BLDA: 47.4 MMHG (ref 35–45)
PCO2 BLDA: 48.8 MMHG (ref 35–45)
PEEP: 5
PEEP: 8
PH SMN: 7.37 [PH] (ref 7.35–7.45)
PH SMN: 7.39 [PH] (ref 7.35–7.45)
PH SMN: 7.4 [PH] (ref 7.35–7.45)
PH SMN: 7.4 [PH] (ref 7.35–7.45)
PH SMN: 7.42 [PH] (ref 7.35–7.45)
PH SMN: 7.42 [PH] (ref 7.35–7.45)
PH SMN: 7.43 [PH] (ref 7.35–7.45)
PH SMN: 7.44 [PH] (ref 7.35–7.45)
PH SMN: 7.46 [PH] (ref 7.35–7.45)
PH SMN: 7.47 [PH] (ref 7.35–7.45)
PH SMN: 7.48 [PH] (ref 7.35–7.45)
PH SMN: 7.48 [PH] (ref 7.35–7.45)
PH SMN: 7.49 [PH] (ref 7.35–7.45)
PHOSPHATE SERPL-MCNC: 3.8 MG/DL (ref 2.7–4.5)
PHOSPHATE SERPL-MCNC: 4.2 MG/DL (ref 2.7–4.5)
PHOSPHATE SERPL-MCNC: 4.7 MG/DL (ref 2.7–4.5)
PHOSPHATE SERPL-MCNC: 4.9 MG/DL (ref 2.7–4.5)
PHOSPHATE SERPL-MCNC: 4.9 MG/DL (ref 2.7–4.5)
PHOSPHATE SERPL-MCNC: 5.8 MG/DL (ref 2.7–4.5)
PLATELET # BLD AUTO: 141 K/UL (ref 150–350)
PLATELET # BLD AUTO: 146 K/UL (ref 150–350)
PLATELET # BLD AUTO: 151 K/UL (ref 150–350)
PLATELET # BLD AUTO: 171 K/UL (ref 150–350)
PLATELET # BLD AUTO: 172 K/UL (ref 150–350)
PLATELET # BLD AUTO: 179 K/UL (ref 150–350)
PMV BLD AUTO: 11.4 FL (ref 9.2–12.9)
PMV BLD AUTO: 11.4 FL (ref 9.2–12.9)
PMV BLD AUTO: 11.5 FL (ref 9.2–12.9)
PMV BLD AUTO: 11.6 FL (ref 9.2–12.9)
PMV BLD AUTO: 11.7 FL (ref 9.2–12.9)
PMV BLD AUTO: 11.8 FL (ref 9.2–12.9)
PO2 BLDA: 120 MMHG (ref 80–100)
PO2 BLDA: 127 MMHG (ref 80–100)
PO2 BLDA: 132 MMHG (ref 80–100)
PO2 BLDA: 135 MMHG (ref 80–100)
PO2 BLDA: 141 MMHG (ref 80–100)
PO2 BLDA: 145 MMHG (ref 80–100)
PO2 BLDA: 147 MMHG (ref 80–100)
PO2 BLDA: 148 MMHG (ref 80–100)
PO2 BLDA: 154 MMHG (ref 80–100)
PO2 BLDA: 154 MMHG (ref 80–100)
PO2 BLDA: 156 MMHG (ref 80–100)
PO2 BLDA: 161 MMHG (ref 80–100)
PO2 BLDA: 161 MMHG (ref 80–100)
PO2 BLDA: 164 MMHG (ref 80–100)
PO2 BLDA: 24 MMHG (ref 40–60)
PO2 BLDA: 260 MMHG (ref 80–100)
PO2 BLDA: 276 MMHG (ref 80–100)
PO2 BLDA: 30 MMHG (ref 40–60)
PO2 BLDA: 31 MMHG (ref 40–60)
PO2 BLDA: 33 MMHG (ref 40–60)
PO2 BLDA: 64 MMHG (ref 80–100)
POC BE: 0 MMOL/L
POC BE: 1 MMOL/L
POC BE: 1 MMOL/L
POC BE: 2 MMOL/L
POC BE: 2 MMOL/L
POC BE: 3 MMOL/L
POC BE: 4 MMOL/L
POC BE: 5 MMOL/L
POC BE: 5 MMOL/L
POC BE: 6 MMOL/L
POC BE: 6 MMOL/L
POC BE: 7 MMOL/L
POC IONIZED CALCIUM: 1.08 MMOL/L (ref 1.06–1.42)
POC IONIZED CALCIUM: 1.09 MMOL/L (ref 1.06–1.42)
POC IONIZED CALCIUM: 1.1 MMOL/L (ref 1.06–1.42)
POC IONIZED CALCIUM: 1.11 MMOL/L (ref 1.06–1.42)
POC IONIZED CALCIUM: 1.11 MMOL/L (ref 1.06–1.42)
POC IONIZED CALCIUM: 1.12 MMOL/L (ref 1.06–1.42)
POC IONIZED CALCIUM: 1.12 MMOL/L (ref 1.06–1.42)
POC IONIZED CALCIUM: 1.13 MMOL/L (ref 1.06–1.42)
POC IONIZED CALCIUM: 1.13 MMOL/L (ref 1.06–1.42)
POC IONIZED CALCIUM: 1.14 MMOL/L (ref 1.06–1.42)
POC IONIZED CALCIUM: 1.14 MMOL/L (ref 1.06–1.42)
POC IONIZED CALCIUM: 1.15 MMOL/L (ref 1.06–1.42)
POC IONIZED CALCIUM: 1.15 MMOL/L (ref 1.06–1.42)
POC IONIZED CALCIUM: 1.16 MMOL/L (ref 1.06–1.42)
POC IONIZED CALCIUM: 1.17 MMOL/L (ref 1.06–1.42)
POC IONIZED CALCIUM: 1.21 MMOL/L (ref 1.06–1.42)
POC SATURATED O2: 100 % (ref 95–100)
POC SATURATED O2: 41 % (ref 95–100)
POC SATURATED O2: 57 % (ref 95–100)
POC SATURATED O2: 58 % (ref 95–100)
POC SATURATED O2: 64 % (ref 95–100)
POC SATURATED O2: 92 % (ref 95–100)
POC SATURATED O2: 99 % (ref 95–100)
POC TCO2: 26 MMOL/L (ref 23–27)
POC TCO2: 27 MMOL/L (ref 23–27)
POC TCO2: 27 MMOL/L (ref 24–29)
POC TCO2: 28 MMOL/L (ref 23–27)
POC TCO2: 29 MMOL/L (ref 23–27)
POC TCO2: 30 MMOL/L (ref 23–27)
POC TCO2: 30 MMOL/L (ref 23–27)
POC TCO2: 30 MMOL/L (ref 24–29)
POC TCO2: 31 MMOL/L (ref 23–27)
POCT GLUCOSE: 128 MG/DL (ref 70–110)
POCT GLUCOSE: 143 MG/DL (ref 70–110)
POCT GLUCOSE: 143 MG/DL (ref 70–110)
POCT GLUCOSE: 144 MG/DL (ref 70–110)
POCT GLUCOSE: 145 MG/DL (ref 70–110)
POCT GLUCOSE: 145 MG/DL (ref 70–110)
POCT GLUCOSE: 146 MG/DL (ref 70–110)
POCT GLUCOSE: 146 MG/DL (ref 70–110)
POCT GLUCOSE: 150 MG/DL (ref 70–110)
POCT GLUCOSE: 153 MG/DL (ref 70–110)
POCT GLUCOSE: 157 MG/DL (ref 70–110)
POCT GLUCOSE: 163 MG/DL (ref 70–110)
POCT GLUCOSE: 163 MG/DL (ref 70–110)
POCT GLUCOSE: 165 MG/DL (ref 70–110)
POCT GLUCOSE: 168 MG/DL (ref 70–110)
POCT GLUCOSE: 168 MG/DL (ref 70–110)
POCT GLUCOSE: 178 MG/DL (ref 70–110)
POCT GLUCOSE: 183 MG/DL (ref 70–110)
POCT GLUCOSE: 204 MG/DL (ref 70–110)
POTASSIUM BLD-SCNC: 3.7 MMOL/L (ref 3.5–5.1)
POTASSIUM BLD-SCNC: 3.8 MMOL/L (ref 3.5–5.1)
POTASSIUM BLD-SCNC: 3.9 MMOL/L (ref 3.5–5.1)
POTASSIUM BLD-SCNC: 3.9 MMOL/L (ref 3.5–5.1)
POTASSIUM BLD-SCNC: 4 MMOL/L (ref 3.5–5.1)
POTASSIUM BLD-SCNC: 4.1 MMOL/L (ref 3.5–5.1)
POTASSIUM BLD-SCNC: 4.2 MMOL/L (ref 3.5–5.1)
POTASSIUM BLD-SCNC: 4.3 MMOL/L (ref 3.5–5.1)
POTASSIUM BLD-SCNC: 4.4 MMOL/L (ref 3.5–5.1)
POTASSIUM BLD-SCNC: 4.5 MMOL/L (ref 3.5–5.1)
POTASSIUM BLD-SCNC: 4.5 MMOL/L (ref 3.5–5.1)
POTASSIUM BLD-SCNC: 4.6 MMOL/L (ref 3.5–5.1)
POTASSIUM SERPL-SCNC: 4 MMOL/L (ref 3.5–5.1)
POTASSIUM SERPL-SCNC: 4.1 MMOL/L (ref 3.5–5.1)
POTASSIUM SERPL-SCNC: 4.2 MMOL/L (ref 3.5–5.1)
POTASSIUM SERPL-SCNC: 4.2 MMOL/L (ref 3.5–5.1)
POTASSIUM SERPL-SCNC: 4.5 MMOL/L (ref 3.5–5.1)
POTASSIUM SERPL-SCNC: 4.7 MMOL/L (ref 3.5–5.1)
PREALB SERPL-MCNC: 11 MG/DL (ref 20–43)
PROT SERPL-MCNC: 6.4 G/DL (ref 6–8.4)
PROTHROMBIN TIME: 11.3 SEC (ref 9–12.5)
PROTHROMBIN TIME: 11.4 SEC (ref 9–12.5)
PROTHROMBIN TIME: 11.4 SEC (ref 9–12.5)
PROTHROMBIN TIME: 11.6 SEC (ref 9–12.5)
RBC # BLD AUTO: 3.63 M/UL (ref 4.6–6.2)
RBC # BLD AUTO: 3.75 M/UL (ref 4.6–6.2)
RBC # BLD AUTO: 3.78 M/UL (ref 4.6–6.2)
RBC # BLD AUTO: 3.79 M/UL (ref 4.6–6.2)
RBC # BLD AUTO: 3.79 M/UL (ref 4.6–6.2)
RBC # BLD AUTO: 3.97 M/UL (ref 4.6–6.2)
SAMPLE: ABNORMAL
SAMPLE: NORMAL
SITE: ABNORMAL
SITE: NORMAL
SODIUM BLD-SCNC: 137 MMOL/L (ref 136–145)
SODIUM BLD-SCNC: 138 MMOL/L (ref 136–145)
SODIUM BLD-SCNC: 139 MMOL/L (ref 136–145)
SODIUM SERPL-SCNC: 137 MMOL/L (ref 136–145)
SODIUM SERPL-SCNC: 140 MMOL/L (ref 136–145)
SODIUM SERPL-SCNC: 140 MMOL/L (ref 136–145)
SODIUM SERPL-SCNC: 141 MMOL/L (ref 136–145)
TRANS ERYTHROCYTES VOL PATIENT: NORMAL ML
VT: 500
WBC # BLD AUTO: 11 K/UL (ref 3.9–12.7)
WBC # BLD AUTO: 12.69 K/UL (ref 3.9–12.7)
WBC # BLD AUTO: 14.33 K/UL (ref 3.9–12.7)
WBC # BLD AUTO: 17.87 K/UL (ref 3.9–12.7)
WBC # BLD AUTO: 18.16 K/UL (ref 3.9–12.7)
WBC # BLD AUTO: 18.67 K/UL (ref 3.9–12.7)

## 2020-01-24 PROCEDURE — 27000248 HC VAD-ADDITIONAL DAY

## 2020-01-24 PROCEDURE — 63600367 HC NITRIC OXIDE PER HOUR

## 2020-01-24 PROCEDURE — 86920 COMPATIBILITY TEST SPIN: CPT

## 2020-01-24 PROCEDURE — 83735 ASSAY OF MAGNESIUM: CPT | Mod: 91

## 2020-01-24 PROCEDURE — 63600175 PHARM REV CODE 636 W HCPCS: Performed by: STUDENT IN AN ORGANIZED HEALTH CARE EDUCATION/TRAINING PROGRAM

## 2020-01-24 PROCEDURE — 93312 PR ECHO HEART,TRANSESOPHAGEAL: ICD-10-PCS | Mod: 26,59,, | Performed by: ANESTHESIOLOGY

## 2020-01-24 PROCEDURE — 84100 ASSAY OF PHOSPHORUS: CPT | Mod: 91

## 2020-01-24 PROCEDURE — 36000712 HC OR TIME LEV V 1ST 15 MIN: Performed by: THORACIC SURGERY (CARDIOTHORACIC VASCULAR SURGERY)

## 2020-01-24 PROCEDURE — 35820 PR EXPLOR POSTOP BLEED,INFEC,CLOT-CHST: ICD-10-PCS | Mod: 78,XE,, | Performed by: THORACIC SURGERY (CARDIOTHORACIC VASCULAR SURGERY)

## 2020-01-24 PROCEDURE — 99900035 HC TECH TIME PER 15 MIN (STAT)

## 2020-01-24 PROCEDURE — 99291 CRITICAL CARE FIRST HOUR: CPT | Mod: ,,, | Performed by: NURSE PRACTITIONER

## 2020-01-24 PROCEDURE — 86901 BLOOD TYPING SEROLOGIC RH(D): CPT

## 2020-01-24 PROCEDURE — 83880 ASSAY OF NATRIURETIC PEPTIDE: CPT

## 2020-01-24 PROCEDURE — 25000003 PHARM REV CODE 250: Performed by: STUDENT IN AN ORGANIZED HEALTH CARE EDUCATION/TRAINING PROGRAM

## 2020-01-24 PROCEDURE — 36556 INSERT NON-TUNNEL CV CATH: CPT | Mod: 59,,, | Performed by: ANESTHESIOLOGY

## 2020-01-24 PROCEDURE — C9113 INJ PANTOPRAZOLE SODIUM, VIA: HCPCS | Performed by: SURGERY

## 2020-01-24 PROCEDURE — C1768 GRAFT, VASCULAR: HCPCS | Performed by: THORACIC SURGERY (CARDIOTHORACIC VASCULAR SURGERY)

## 2020-01-24 PROCEDURE — 86140 C-REACTIVE PROTEIN: CPT

## 2020-01-24 PROCEDURE — 80076 HEPATIC FUNCTION PANEL: CPT

## 2020-01-24 PROCEDURE — 99223 1ST HOSP IP/OBS HIGH 75: CPT | Mod: ,,, | Performed by: NURSE PRACTITIONER

## 2020-01-24 PROCEDURE — D9220A PRA ANESTHESIA: ICD-10-PCS | Mod: ,,, | Performed by: ANESTHESIOLOGY

## 2020-01-24 PROCEDURE — A4216 STERILE WATER/SALINE, 10 ML: HCPCS | Performed by: SURGERY

## 2020-01-24 PROCEDURE — 36000713 HC OR TIME LEV V EA ADD 15 MIN: Performed by: THORACIC SURGERY (CARDIOTHORACIC VASCULAR SURGERY)

## 2020-01-24 PROCEDURE — 83615 LACTATE (LD) (LDH) ENZYME: CPT

## 2020-01-24 PROCEDURE — 85610 PROTHROMBIN TIME: CPT | Mod: 91

## 2020-01-24 PROCEDURE — 20000000 HC ICU ROOM

## 2020-01-24 PROCEDURE — 99900026 HC AIRWAY MAINTENANCE (STAT)

## 2020-01-24 PROCEDURE — 63600175 PHARM REV CODE 636 W HCPCS: Performed by: SURGERY

## 2020-01-24 PROCEDURE — 36000708 HC OR TIME LEV III 1ST 15 MIN: Performed by: THORACIC SURGERY (CARDIOTHORACIC VASCULAR SURGERY)

## 2020-01-24 PROCEDURE — 25000003 PHARM REV CODE 250: Performed by: THORACIC SURGERY (CARDIOTHORACIC VASCULAR SURGERY)

## 2020-01-24 PROCEDURE — 93005 ELECTROCARDIOGRAM TRACING: CPT | Performed by: INTERNAL MEDICINE

## 2020-01-24 PROCEDURE — 27201040 HC RC 50 FILTER

## 2020-01-24 PROCEDURE — C1729 CATH, DRAINAGE: HCPCS | Performed by: THORACIC SURGERY (CARDIOTHORACIC VASCULAR SURGERY)

## 2020-01-24 PROCEDURE — 63600175 PHARM REV CODE 636 W HCPCS: Performed by: THORACIC SURGERY (CARDIOTHORACIC VASCULAR SURGERY)

## 2020-01-24 PROCEDURE — 84295 ASSAY OF SERUM SODIUM: CPT

## 2020-01-24 PROCEDURE — 83605 ASSAY OF LACTIC ACID: CPT

## 2020-01-24 PROCEDURE — 80048 BASIC METABOLIC PNL TOTAL CA: CPT | Mod: 91

## 2020-01-24 PROCEDURE — 99291 PR CRITICAL CARE, E/M 30-74 MINUTES: ICD-10-PCS | Mod: ,,, | Performed by: NURSE PRACTITIONER

## 2020-01-24 PROCEDURE — 84134 ASSAY OF PREALBUMIN: CPT

## 2020-01-24 PROCEDURE — 37799 UNLISTED PX VASCULAR SURGERY: CPT

## 2020-01-24 PROCEDURE — C1751 CATH, INF, PER/CENT/MIDLINE: HCPCS | Performed by: ANESTHESIOLOGY

## 2020-01-24 PROCEDURE — 84100 ASSAY OF PHOSPHORUS: CPT

## 2020-01-24 PROCEDURE — 93010 EKG 12-LEAD: ICD-10-PCS | Mod: ,,, | Performed by: INTERNAL MEDICINE

## 2020-01-24 PROCEDURE — 27000221 HC OXYGEN, UP TO 24 HOURS

## 2020-01-24 PROCEDURE — 99223 PR INITIAL HOSPITAL CARE,LEVL III: ICD-10-PCS | Mod: ,,, | Performed by: NURSE PRACTITIONER

## 2020-01-24 PROCEDURE — 85014 HEMATOCRIT: CPT

## 2020-01-24 PROCEDURE — 85730 THROMBOPLASTIN TIME PARTIAL: CPT | Mod: 91

## 2020-01-24 PROCEDURE — 83050 HGB METHEMOGLOBIN QUAN: CPT

## 2020-01-24 PROCEDURE — 37000009 HC ANESTHESIA EA ADD 15 MINS: Performed by: THORACIC SURGERY (CARDIOTHORACIC VASCULAR SURGERY)

## 2020-01-24 PROCEDURE — 82803 BLOOD GASES ANY COMBINATION: CPT

## 2020-01-24 PROCEDURE — 82330 ASSAY OF CALCIUM: CPT

## 2020-01-24 PROCEDURE — 27201423 OPTIME MED/SURG SUP & DEVICES STERILE SUPPLY: Performed by: THORACIC SURGERY (CARDIOTHORACIC VASCULAR SURGERY)

## 2020-01-24 PROCEDURE — 94003 VENT MGMT INPAT SUBQ DAY: CPT

## 2020-01-24 PROCEDURE — 85025 COMPLETE CBC W/AUTO DIFF WBC: CPT | Mod: 91

## 2020-01-24 PROCEDURE — 94761 N-INVAS EAR/PLS OXIMETRY MLT: CPT

## 2020-01-24 PROCEDURE — 27000239 HC STAND-BY BYPASS PUMP

## 2020-01-24 PROCEDURE — 93010 ELECTROCARDIOGRAM REPORT: CPT | Mod: ,,, | Performed by: INTERNAL MEDICINE

## 2020-01-24 PROCEDURE — 25000003 PHARM REV CODE 250: Performed by: SURGERY

## 2020-01-24 PROCEDURE — D9220A PRA ANESTHESIA: Mod: ,,, | Performed by: ANESTHESIOLOGY

## 2020-01-24 PROCEDURE — 99291 CRITICAL CARE FIRST HOUR: CPT | Mod: GC,,, | Performed by: SURGERY

## 2020-01-24 PROCEDURE — 36000709 HC OR TIME LEV III EA ADD 15 MIN: Performed by: THORACIC SURGERY (CARDIOTHORACIC VASCULAR SURGERY)

## 2020-01-24 PROCEDURE — 21750 PR CLOSE MED STERNOTOMY SEP, W/WO DEBRIDE: ICD-10-PCS | Mod: 58,,, | Performed by: THORACIC SURGERY (CARDIOTHORACIC VASCULAR SURGERY)

## 2020-01-24 PROCEDURE — 93312 ECHO TRANSESOPHAGEAL: CPT | Mod: 26,59,, | Performed by: ANESTHESIOLOGY

## 2020-01-24 PROCEDURE — 37000008 HC ANESTHESIA 1ST 15 MINUTES: Performed by: THORACIC SURGERY (CARDIOTHORACIC VASCULAR SURGERY)

## 2020-01-24 PROCEDURE — 99291 PR CRITICAL CARE, E/M 30-74 MINUTES: ICD-10-PCS | Mod: GC,,, | Performed by: SURGERY

## 2020-01-24 PROCEDURE — 36556 PR INSERT NON-TUNNEL CV CATH 5+ YRS OLD: ICD-10-PCS | Mod: 59,,, | Performed by: ANESTHESIOLOGY

## 2020-01-24 PROCEDURE — 27201041 HC RESERVOIR, CARDIOTOMY

## 2020-01-24 PROCEDURE — 35820 EXPLORE CHEST VESSELS: CPT | Mod: 78,XE,, | Performed by: THORACIC SURGERY (CARDIOTHORACIC VASCULAR SURGERY)

## 2020-01-24 PROCEDURE — 84132 ASSAY OF SERUM POTASSIUM: CPT

## 2020-01-24 PROCEDURE — 25000003 PHARM REV CODE 250: Performed by: PHYSICIAN ASSISTANT

## 2020-01-24 PROCEDURE — 80048 BASIC METABOLIC PNL TOTAL CA: CPT

## 2020-01-24 PROCEDURE — 21750 REPAIR OF STERNUM SEPARATION: CPT | Mod: 58,,, | Performed by: THORACIC SURGERY (CARDIOTHORACIC VASCULAR SURGERY)

## 2020-01-24 PROCEDURE — 85520 HEPARIN ASSAY: CPT

## 2020-01-24 PROCEDURE — 27100025 HC TUBING, SET FLUID WARMER: Performed by: ANESTHESIOLOGY

## 2020-01-24 DEVICE — MEMBRANE PERICARDIAL 15X20CM: Type: IMPLANTABLE DEVICE | Site: CHEST | Status: FUNCTIONAL

## 2020-01-24 RX ORDER — LIDOCAINE HYDROCHLORIDE 20 MG/ML
100 INJECTION INTRAVENOUS ONCE
Status: COMPLETED | OUTPATIENT
Start: 2020-01-24 | End: 2020-01-24

## 2020-01-24 RX ORDER — DIGOXIN 0.25 MG/ML
250 INJECTION INTRAMUSCULAR; INTRAVENOUS ONCE
Status: COMPLETED | OUTPATIENT
Start: 2020-01-24 | End: 2020-01-24

## 2020-01-24 RX ORDER — GLYCOPYRROLATE 0.2 MG/ML
INJECTION INTRAMUSCULAR; INTRAVENOUS
Status: DISCONTINUED | OUTPATIENT
Start: 2020-01-24 | End: 2020-01-24

## 2020-01-24 RX ORDER — HYDRALAZINE HYDROCHLORIDE 20 MG/ML
10 INJECTION INTRAMUSCULAR; INTRAVENOUS EVERY 6 HOURS PRN
Status: DISCONTINUED | OUTPATIENT
Start: 2020-01-24 | End: 2020-02-12

## 2020-01-24 RX ORDER — BACITRACIN 50000 [IU]/1
INJECTION, POWDER, FOR SOLUTION INTRAMUSCULAR
Status: DISCONTINUED | OUTPATIENT
Start: 2020-01-24 | End: 2020-01-24 | Stop reason: HOSPADM

## 2020-01-24 RX ORDER — FENTANYL CITRATE 50 UG/ML
25 INJECTION, SOLUTION INTRAMUSCULAR; INTRAVENOUS
Status: DISCONTINUED | OUTPATIENT
Start: 2020-01-24 | End: 2020-01-24

## 2020-01-24 RX ORDER — FENTANYL CITRATE 50 UG/ML
INJECTION, SOLUTION INTRAMUSCULAR; INTRAVENOUS
Status: DISCONTINUED | OUTPATIENT
Start: 2020-01-24 | End: 2020-01-24

## 2020-01-24 RX ORDER — ROCURONIUM BROMIDE 10 MG/ML
INJECTION, SOLUTION INTRAVENOUS
Status: DISCONTINUED | OUTPATIENT
Start: 2020-01-24 | End: 2020-01-24

## 2020-01-24 RX ORDER — LIDOCAINE HYDROCHLORIDE ANHYDROUS AND DEXTROSE MONOHYDRATE .8; 5 G/100ML; G/100ML
0.5 INJECTION, SOLUTION INTRAVENOUS CONTINUOUS
Status: DISCONTINUED | OUTPATIENT
Start: 2020-01-24 | End: 2020-01-31

## 2020-01-24 RX ORDER — FUROSEMIDE 10 MG/ML
40 INJECTION INTRAMUSCULAR; INTRAVENOUS ONCE
Status: COMPLETED | OUTPATIENT
Start: 2020-01-24 | End: 2020-01-24

## 2020-01-24 RX ORDER — MIDAZOLAM HYDROCHLORIDE 1 MG/ML
INJECTION, SOLUTION INTRAMUSCULAR; INTRAVENOUS
Status: DISCONTINUED | OUTPATIENT
Start: 2020-01-24 | End: 2020-01-24

## 2020-01-24 RX ORDER — ADENOSINE 3 MG/ML
INJECTION, SOLUTION INTRAVENOUS
Status: DISPENSED
Start: 2020-01-24 | End: 2020-01-25

## 2020-01-24 RX ORDER — FENTANYL CITRATE 50 UG/ML
50 INJECTION, SOLUTION INTRAMUSCULAR; INTRAVENOUS ONCE
Status: COMPLETED | OUTPATIENT
Start: 2020-01-24 | End: 2020-01-24

## 2020-01-24 RX ORDER — ASPIRIN 325 MG
325 TABLET ORAL DAILY
Status: DISCONTINUED | OUTPATIENT
Start: 2020-01-24 | End: 2020-02-17 | Stop reason: HOSPADM

## 2020-01-24 RX ORDER — FUROSEMIDE 10 MG/ML
INJECTION INTRAMUSCULAR; INTRAVENOUS
Status: DISCONTINUED | OUTPATIENT
Start: 2020-01-24 | End: 2020-01-24

## 2020-01-24 RX ORDER — FENTANYL CITRATE 50 UG/ML
INJECTION, SOLUTION INTRAMUSCULAR; INTRAVENOUS
Status: DISPENSED
Start: 2020-01-24 | End: 2020-01-24

## 2020-01-24 RX ORDER — FUROSEMIDE 10 MG/ML
INJECTION INTRAMUSCULAR; INTRAVENOUS
Status: COMPLETED
Start: 2020-01-24 | End: 2020-01-24

## 2020-01-24 RX ORDER — FUROSEMIDE 10 MG/ML
80 INJECTION INTRAMUSCULAR; INTRAVENOUS ONCE
Status: COMPLETED | OUTPATIENT
Start: 2020-01-24 | End: 2020-01-24

## 2020-01-24 RX ORDER — NEOSTIGMINE METHYLSULFATE 0.5 MG/ML
INJECTION, SOLUTION INTRAVENOUS
Status: DISCONTINUED | OUTPATIENT
Start: 2020-01-24 | End: 2020-01-24

## 2020-01-24 RX ORDER — POTASSIUM CHLORIDE 14.9 MG/ML
20 INJECTION INTRAVENOUS ONCE
Status: DISCONTINUED | OUTPATIENT
Start: 2020-01-24 | End: 2020-01-24

## 2020-01-24 RX ORDER — EPINEPHRINE 1 MG/ML
INJECTION, SOLUTION INTRACARDIAC; INTRAMUSCULAR; INTRAVENOUS; SUBCUTANEOUS
Status: DISCONTINUED | OUTPATIENT
Start: 2020-01-24 | End: 2020-01-24

## 2020-01-24 RX ORDER — LIDOCAINE HYDROCHLORIDE 10 MG/ML
5 INJECTION INFILTRATION; PERINEURAL ONCE
Status: DISCONTINUED | OUTPATIENT
Start: 2020-01-24 | End: 2020-01-24

## 2020-01-24 RX ORDER — FENTANYL CITRATE 50 UG/ML
25 INJECTION, SOLUTION INTRAMUSCULAR; INTRAVENOUS
Status: DISCONTINUED | OUTPATIENT
Start: 2020-01-24 | End: 2020-02-02

## 2020-01-24 RX ADMIN — NEOSTIGMINE METHYLSULFATE 5 MG: 0.5 INJECTION INTRAVENOUS at 03:01

## 2020-01-24 RX ADMIN — LIDOCAINE HYDROCHLORIDE 100 MG: 20 INJECTION INTRAVENOUS at 10:01

## 2020-01-24 RX ADMIN — LIDOCAINE HYDROCHLORIDE 100 MG: 20 INJECTION INTRAVENOUS at 06:01

## 2020-01-24 RX ADMIN — SUGAMMADEX 200 MG: 100 INJECTION, SOLUTION INTRAVENOUS at 10:01

## 2020-01-24 RX ADMIN — Medication 3 ML: at 04:01

## 2020-01-24 RX ADMIN — PANTOPRAZOLE SODIUM 40 MG: 40 INJECTION, POWDER, FOR SOLUTION INTRAVENOUS at 10:01

## 2020-01-24 RX ADMIN — HYDRALAZINE HYDROCHLORIDE 10 MG: 20 INJECTION INTRAMUSCULAR; INTRAVENOUS at 09:01

## 2020-01-24 RX ADMIN — EPINEPHRINE 0.1 MCG/KG/MIN: 1 INJECTION PARENTERAL at 11:01

## 2020-01-24 RX ADMIN — MIDAZOLAM HYDROCHLORIDE 2 MG: 1 INJECTION, SOLUTION INTRAMUSCULAR; INTRAVENOUS at 02:01

## 2020-01-24 RX ADMIN — NICARDIPINE HYDROCHLORIDE 8 MG/HR: 0.2 INJECTION, SOLUTION INTRAVENOUS at 03:01

## 2020-01-24 RX ADMIN — Medication 3 ML: at 10:01

## 2020-01-24 RX ADMIN — CEFEPIME 2 G: 2 INJECTION, POWDER, FOR SOLUTION INTRAVENOUS at 01:01

## 2020-01-24 RX ADMIN — DIGOXIN 250 MCG: 0.25 INJECTION INTRAMUSCULAR; INTRAVENOUS at 05:01

## 2020-01-24 RX ADMIN — Medication 3 ML: at 06:01

## 2020-01-24 RX ADMIN — FUROSEMIDE 40 MG: 10 INJECTION, SOLUTION INTRAMUSCULAR; INTRAVENOUS at 08:01

## 2020-01-24 RX ADMIN — AMIODARONE HYDROCHLORIDE 0.5 MG/MIN: 1.8 INJECTION, SOLUTION INTRAVENOUS at 04:01

## 2020-01-24 RX ADMIN — FUROSEMIDE 80 MG: 10 INJECTION, SOLUTION INTRAMUSCULAR; INTRAVENOUS at 11:01

## 2020-01-24 RX ADMIN — MIDAZOLAM HYDROCHLORIDE 2 MG: 1 INJECTION, SOLUTION INTRAMUSCULAR; INTRAVENOUS at 12:01

## 2020-01-24 RX ADMIN — SODIUM CHLORIDE, SODIUM GLUCONATE, SODIUM ACETATE, POTASSIUM CHLORIDE, MAGNESIUM CHLORIDE, SODIUM PHOSPHATE, DIBASIC, AND POTASSIUM PHOSPHATE: .53; .5; .37; .037; .03; .012; .00082 INJECTION, SOLUTION INTRAVENOUS at 12:01

## 2020-01-24 RX ADMIN — ROCURONIUM BROMIDE 50 MG: 10 INJECTION, SOLUTION INTRAVENOUS at 09:01

## 2020-01-24 RX ADMIN — PROPOFOL 40 MCG/KG/MIN: 10 INJECTION, EMULSION INTRAVENOUS at 04:01

## 2020-01-24 RX ADMIN — EPINEPHRINE 0.08 MCG/KG/MIN: 1 INJECTION PARENTERAL at 03:01

## 2020-01-24 RX ADMIN — AMIODARONE HYDROCHLORIDE 150 MG: 1.5 INJECTION, SOLUTION INTRAVENOUS at 05:01

## 2020-01-24 RX ADMIN — ASPIRIN 325 MG ORAL TABLET 325 MG: 325 PILL ORAL at 11:01

## 2020-01-24 RX ADMIN — FUROSEMIDE 10 MG: 10 INJECTION, SOLUTION INTRAMUSCULAR; INTRAVENOUS at 09:01

## 2020-01-24 RX ADMIN — MIDAZOLAM HYDROCHLORIDE 2 MG: 1 INJECTION, SOLUTION INTRAMUSCULAR; INTRAVENOUS at 09:01

## 2020-01-24 RX ADMIN — LIDOCAINE HYDROCHLORIDE 100 MG: 20 INJECTION, SOLUTION INTRAVENOUS at 07:01

## 2020-01-24 RX ADMIN — LIDOCAINE HYDROCHLORIDE 2 MG/MIN: 8 INJECTION, SOLUTION INTRAVENOUS at 08:01

## 2020-01-24 RX ADMIN — FENTANYL CITRATE 50 MCG: 50 INJECTION INTRAMUSCULAR; INTRAVENOUS at 11:01

## 2020-01-24 RX ADMIN — CEFEPIME 2 G: 2 INJECTION, POWDER, FOR SOLUTION INTRAVENOUS at 02:01

## 2020-01-24 RX ADMIN — AMIODARONE HYDROCHLORIDE 1 MG/MIN: 1.8 INJECTION, SOLUTION INTRAVENOUS at 11:01

## 2020-01-24 RX ADMIN — MUPIROCIN: 20 OINTMENT TOPICAL at 10:01

## 2020-01-24 RX ADMIN — EPINEPHRINE 0.09 MCG/KG/MIN: 1 INJECTION PARENTERAL at 07:01

## 2020-01-24 RX ADMIN — PROPOFOL 30 MCG/KG/MIN: 10 INJECTION, EMULSION INTRAVENOUS at 03:01

## 2020-01-24 RX ADMIN — AMIODARONE HYDROCHLORIDE 1 MG/MIN: 1.8 INJECTION, SOLUTION INTRAVENOUS at 05:01

## 2020-01-24 RX ADMIN — FENTANYL CITRATE 100 MCG: 50 INJECTION, SOLUTION INTRAMUSCULAR; INTRAVENOUS at 01:01

## 2020-01-24 RX ADMIN — EPINEPHRINE 20 MCG: 1 INJECTION, SOLUTION INTRAMUSCULAR; SUBCUTANEOUS at 03:01

## 2020-01-24 RX ADMIN — CALCIUM GLUCONATE 2 G: 98 INJECTION, SOLUTION INTRAVENOUS at 07:01

## 2020-01-24 RX ADMIN — VANCOMYCIN HYDROCHLORIDE 1000 MG: 1 INJECTION, POWDER, LYOPHILIZED, FOR SOLUTION INTRAVENOUS at 11:01

## 2020-01-24 RX ADMIN — PROPOFOL: 10 INJECTION, EMULSION INTRAVENOUS at 01:01

## 2020-01-24 RX ADMIN — ROCURONIUM BROMIDE 50 MG: 10 INJECTION, SOLUTION INTRAVENOUS at 12:01

## 2020-01-24 RX ADMIN — ROCURONIUM BROMIDE 50 MG: 10 INJECTION, SOLUTION INTRAVENOUS at 08:01

## 2020-01-24 RX ADMIN — FERROUS GLUCONATE TAB 324 MG (37.5 MG ELEMENTAL IRON) 324 MG: 324 (37.5 FE) TAB at 11:01

## 2020-01-24 RX ADMIN — FUROSEMIDE 10 MG/HR: 10 INJECTION, SOLUTION INTRAMUSCULAR; INTRAVENOUS at 12:01

## 2020-01-24 RX ADMIN — POTASSIUM CHLORIDE 40 MEQ: 29.8 INJECTION, SOLUTION INTRAVENOUS at 05:01

## 2020-01-24 RX ADMIN — EPINEPHRINE 0.09 MCG/KG/MIN: 1 INJECTION INTRAMUSCULAR; INTRAVENOUS; SUBCUTANEOUS at 07:01

## 2020-01-24 RX ADMIN — DIGOXIN 250 MCG: 0.25 INJECTION INTRAMUSCULAR; INTRAVENOUS at 04:01

## 2020-01-24 RX ADMIN — FUROSEMIDE 10 MG: 10 INJECTION, SOLUTION INTRAMUSCULAR; INTRAVENOUS at 02:01

## 2020-01-24 RX ADMIN — MUPIROCIN: 20 OINTMENT TOPICAL at 09:01

## 2020-01-24 RX ADMIN — CHLOROTHIAZIDE SODIUM 500 MG: 500 INJECTION, POWDER, LYOPHILIZED, FOR SOLUTION INTRAVENOUS at 03:01

## 2020-01-24 RX ADMIN — GLYCOPYRROLATE 0.6 MG: 0.2 INJECTION, SOLUTION INTRAMUSCULAR; INTRAVENOUS at 03:01

## 2020-01-24 RX ADMIN — MAGNESIUM SULFATE HEPTAHYDRATE 1 G: 500 INJECTION, SOLUTION INTRAMUSCULAR; INTRAVENOUS at 06:01

## 2020-01-24 RX ADMIN — ROCURONIUM BROMIDE 50 MG: 10 INJECTION, SOLUTION INTRAVENOUS at 01:01

## 2020-01-24 RX ADMIN — POLYETHYLENE GLYCOL 3350 17 G: 17 POWDER, FOR SOLUTION ORAL at 10:01

## 2020-01-24 RX ADMIN — VASOPRESSIN 0.04 UNITS/MIN: 20 INJECTION INTRAVENOUS at 03:01

## 2020-01-24 NOTE — PROGRESS NOTES
Patient returned from OR intubated/sedated. Epi @ 0.1, vaso +/- 0.02. Nitric @ 20. CVP improved; @ 17. Of note, patient did not receive RVAD in OR; chest opened and monitored; hemodynamics improved without intervention. Chest left open upon return. Lasix gtt increased to 20, diuril to be started. 100 cc UOP total during case, about 275 cc UOP total during day. Trialysis line placed in OR in anticipation of possible dialysis need later; nephrology consulted. EP also consulted; still in tachyarrythmia upon return. Amio continues. WCTM closely.    Micki Christianson MD  Critical Care Surgery, PGYII [facility]

## 2020-01-24 NOTE — ASSESSMENT & PLAN NOTE
- Creatinine on admit 2.6 (baseline ~ 1.8). Creatinine today 2.0  - Continue to monitor  - Followed by Nephrology as an outpatient

## 2020-01-24 NOTE — ANESTHESIA PREPROCEDURE EVALUATION
01/24/2020  Pre-operative evaluation for Procedure(s) (LRB):  EXPLORATION, WOUND (N/A)  CLOSURE, WOUND, STERNUM    Tae Delgado is a 59 y.o. male s/p LVAD and chest closure earlier today now back for chest reopening    Patient Active Problem List   Diagnosis    History of pulmonary embolism    Hyperlipidemia    Gout    Hypertension    Obesity    At risk for amiodarone toxicity with long term use    Left ventricular thrombus without MI    Paroxysmal atrial fibrillation    Chronic systolic congestive heart failure    Chronic anticoagulation    COCM (congestive cardiomyopathy)    Acute on chronic combined systolic and diastolic heart failure    Acute on chronic systolic heart failure    Hypertensive heart disease with heart failure    Long term (current) use of anticoagulants    Acute kidney injury superimposed on chronic kidney disease    Right lower lobe pulmonary nodule    ICD (implantable cardioverter-defibrillator) in place    Hepatic congestion    NSVT (nonsustained ventricular tachycardia)    Pre-transplant evaluation for heart transplant    Acute on chronic heart failure    Heart abnormality    ACP (advance care planning)    Coagulopathy    Thrombus of left atrial appendage    LVAD (left ventricular assist device) present    Acute hyperglycemia       Review of patient's allergies indicates:   Allergen Reactions    Biopatch [chlorhexidine gluconate]      Site burning    Dobutamine in d5w      Tachycardia, tremors, SOB, flushing    Percocet [oxycodone-acetaminophen] Itching    Penicillins Rash     Cefepime given on 1/23/2020 without issue       No current facility-administered medications on file prior to encounter.      Current Outpatient Medications on File Prior to Encounter   Medication Sig Dispense Refill    amiodarone (PACERONE) 200 MG Tab Tale 1 tablet (200mg) by  mouth on Monday, Tuesday, Thursday, Friday and Saturday. Only take medication 5 days per week. 20 tablet 11    furosemide (LASIX) 40 MG tablet Take 1 tablet (40 mg total) by mouth 2 (two) times daily. 90 tablet 3    hydrocortisone 2.5 % ointment Apply 1 application topically 2 (two) times daily.      metoprolol succinate (TOPROL-XL) 50 MG 24 hr tablet TAKE ONE TABLET BY MOUTH ONCE DAILY 30 tablet 11    spironolactone (ALDACTONE) 25 MG tablet TAKE 1 TABLET BY MOUTH ONCE DAILY 30 tablet 11    VENTOLIN HFA 90 mcg/actuation inhaler Inhale 1 Inhaler into the lungs every 4 (four) hours as needed.      warfarin (COUMADIN) 3 MG tablet TAKE 1 TABLET BY MOUTH ONCE DAILY EXCEPT  SATURDAY 30 tablet 5       Past Surgical History:   Procedure Laterality Date    LEFT VENTRICULAR ASSIST DEVICE Left 1/23/2020    Procedure: INSERTION-LEFT VENTRICULAR ASSIST DEVICE;  Surgeon: Ino Schmitt MD;  Location: [facility] OR [address] [place] [address];  Service: Cardiovascular;  Laterality: Left;  DT HM3    RIGHT HEART CATHETERIZATION Right 1/16/2020    Procedure: INSERTION, CATHETER, RIGHT HEART;  Surgeon: Isiah Montero MD;  Location: [facility] CATH LAB;  Service: Cardiology;  Laterality: Right;    TONSILLECTOMY      VEIN LIGATION AND STRIPPING         Social History     Socioeconomic History    Marital status:      Spouse name: Not on file    Number of children: Not on file    Years of education: Not on file    Highest education level: Not on file   Occupational History    Not on file   Social Needs    Financial resource strain: Not on file    Food insecurity:     Worry: Not on file     Inability: Not on file    Transportation needs:     Medical: Not on file     Non-medical: Not on file   Tobacco Use    Smoking status: Never Smoker    Smokeless tobacco: Never Used   Substance and Sexual Activity    Alcohol use: No    Drug use: No    Sexual activity: Not on file   Lifestyle    Physical activity:     Days per week: Not on file      Minutes per session: Not on file    Stress: Not on file   Relationships    Social connections:     Talks on phone: Not on file     Gets together: Not on file     Attends Christianity service: Not on file     Active member of club or organization: Not on file     Attends meetings of clubs or organizations: Not on file     Relationship status: Not on file   Other Topics Concern    Not on file   Social History Narrative    Not on file         CBC:   Recent Labs     01/24/20 0753 01/24/20  1037 01/24/20  1047   WBC 14.33*  --  18.16*  --    RBC 3.78*  --  3.97*  --    HGB 10.7*  --  11.2*  --    HCT 33.6*   < > 35.7* 31*     --  172  --    MCV 89  --  90  --    MCH 28.3  --  28.2  --    MCHC 31.8*  --  31.4*  --     < > = values in this interval not displayed.       CMP:   Recent Labs     01/23/20  1423  01/24/20  0334  01/24/20  0753 01/24/20  1037      < >  --    < > 141 140   K 3.4*   < >  --    < > 4.0 4.7   CL 96   < >  --    < > 103 102   CO2 26   < >  --    < > 27 25   BUN 38*   < >  --    < > 44* 46*   CREATININE 1.9*   < >  --    < > 2.0* 2.2*   *   < >  --    < > 153* 199*   MG 2.7*   < > 2.8*  --  2.8* 2.7*   PHOS 2.6*   < > 4.2  --  4.7* 5.8*   CALCIUM 9.4   < >  --    < > 9.2 9.3   ALBUMIN 2.6*  --  3.0*  --   --   --    PROT 6.8  --  6.4  --   --   --    ALKPHOS 45*  --  40*  --   --   --    ALT 32  --  34  --   --   --    AST 78*  --  97*  --   --   --    BILITOT 2.2*  --  1.1*  --   --   --     < > = values in this interval not displayed.       INR  Recent Labs     01/24/20  0334 01/24/20 0753 01/24/20  1037   INR 1.1 1.1 1.1   APTT 23.8 26.3 23.9             2D Echo:  Results for orders placed or performed during the hospital encounter of 07/10/17   2D echo with color flow doppler   Result Value Ref Range    QEF 23 (A) 55 - 65    Mitral Valve Regurgitation MILD     Diastolic Dysfunction Yes (A)     Aortic Valve Regurgitation TRIVIAL     Est. PA Systolic Pressure 21.66      Tricuspid Valve Regurgitation TRIVIAL TO MILD          Anesthesia Evaluation    I have reviewed the Patient Summary Reports.    I have reviewed the Nursing Notes.   I have reviewed the Medications.     Review of Systems  Anesthesia Hx:  No problems with previous Anesthesia    Cardiovascular:   Hypertension CHF    Renal/:   Chronic Renal Disease    Hepatic/GI:   Liver Disease,        Physical Exam  General:  Well nourished    Airway/Jaw/Neck:  Airway Findings: Pre-Existing Airway Tube(s): Oral Endotracheal tube      Chest/Lungs:  Chest/Lungs Findings: Clear to auscultation, Normal Respiratory Rate     Heart/Vascular:  Heart Findings: Rate: Tachycardia             Anesthesia Plan  Type of Anesthesia, risks & benefits discussed:  Anesthesia Type:  general  Patient's Preference:   Intra-op Monitoring Plan: arterial line, standard ASA monitors, central line and [place]  Intra-op Monitoring Plan Comments:   Post Op Pain Control Plan: multimodal analgesia  Post Op Pain Control Plan Comments:   Induction:   Inhalation  Beta Blocker:         Informed Consent: Patient understands risks and agrees with Anesthesia plan.  Questions answered. Anesthesia consent signed with patient.  ASA Score: 4  emergent   Day of Surgery Review of History & Physical:            Ready For Surgery From Anesthesia Perspective.

## 2020-01-24 NOTE — TRANSFER OF CARE
"Anesthesia Transfer of Care Note    Patient: Tae Delgado    Procedure(s) Performed: Procedure(s) (LRB):  CLOSURE, WOUND, STERNUM (N/A)  INSERTION, GRAFT, PERICARDIUM (N/A)  DRAINAGE, PLEURAL EFFUSION (Left)  WOUND WASHOUT (N/A)    Patient location: ICU    Anesthesia Type: general    Transport from OR: Transported from OR intubated on 100% O2 by AMBU with assisted ventilation    Post pain: adequate analgesia    Post assessment: no apparent anesthetic complications    Post vital signs: stable    Level of consciousness: sedated    Nausea/Vomiting: no nausea/vomiting    Complications: none    Transfer of care protocol was followed      Last vitals:   Visit Vitals  BP (!) 84/0 (BP Location: Right arm, Patient Position: Lying)   Pulse (!) 112   Temp (!) 38.2 °C (100.7 °F) (Core ([place]))   Resp (!) 21   Ht 5' 10" (1.778 m)   Wt 110.8 kg (244 lb 4.3 oz)   SpO2 100%   BMI 35.05 kg/m²     "

## 2020-01-24 NOTE — PROGRESS NOTES
Dr. Schmitt at the bedside, updated on patient status. CVP 18, UO 50-80mL/hr. Dr Schmitt stated to give 40 mg lasix IV push now. MD stated he will take patient to OR earlier than scheduled. EP nurse called to turn off AICD.

## 2020-01-24 NOTE — PROGRESS NOTES
Patient returned to the floor s/p chest closure this am. However, CVPs elevated upon return; CVP of 26. Nitric @ 20, was on lasix gtt @ 10 and had received 50 IV lasix already throughout the morning with minimal response. Of note, in the OR noted to have poor RV function. Attempted to improve urinary output by giving 80 mg IV lasix push in addition to lasix gtt. However, patient did not have response. In addition CVP continued to trend up; CVP 28-29. Was on 0.1 epi, Amiodarone @ 0.5, Lasix gtt @ 10, Propofol @ 40. Attempted utilizing cardene with minimal response; turned off. Was in a tachyarrythmia; ICD turned on. EKG obtained; noted afib with RVR.     Dr. Gonzalez, Dr. Joseph, and Dr. Schmitt notified of findings. Per Dr. Schmitt, patient booked for class A procedure; RVAD placement. Family discussion was completed; family ok with proceeding with RVAD placement. Consents obtained for surgery and anesthesia. Family allowed time with family and then patient taken down for surgery.    Micki Christianson MD  Critical Care Surgery, PGYII [facility]

## 2020-01-24 NOTE — ANESTHESIA PROCEDURE NOTES
Central Line    Diagnosis: CHF  Patient location during procedure: done in OR  Procedure start time: 1/24/2020 3:15 PM  Timeout: 1/24/2020 3:15 PM  Procedure end time: 1/24/2020 3:25 PM    Staffing  Authorizing Provider: Kevan Bonilla MD  Performing Provider: William Pugh MD    Staffing  Anesthesiologist: Oc Russell Jr., MD  Resident/CRNA: Andrea Villegas MD  Performed: resident/CRNA   Anesthesiologist was present at the time of the procedure.  Preanesthetic Checklist  Completed: patient identified, site marked, surgical consent, pre-op evaluation, timeout performed, IV checked, risks and benefits discussed, monitors and equipment checked and anesthesia consent given  Indication   Indication: hemodialysis, vascular access, med administration     Anesthesia   general anesthesia    Central Line   Skin Prep: skin prepped with Betadine, skin prep agent completely dried prior to procedure  maximum sterile barriers used during central venous catheter insertion  hand hygiene performed prior to central venous catheter insertion  Location: right, internal jugular.   Catheter type: triple lumen  Catheter Size: 12 Fr  Ultrasound: none (over guidewire through sterile prepared triple lumen previously in RIJ)  Manometry: none  Insertion Attempts: 1   Securement:line sutured, sterile dressing applied and blood return through all ports    Post-Procedure   Adverse Events:none    Guidewire Guidewire removed intact. Guidewire removed intact, verified with nurse.

## 2020-01-24 NOTE — OP NOTE
DATE OF PROCEDURE:  01/24/2020    PREOPERATIVE DIAGNOSES:  1.  Status post left ventricular device placement.  2.  Sternal closure.  3.  Severe RV failure.    POSTOPERATIVE DIAGNOSES:  1.  Status post left ventricular device placement.  2.  Sternal closure.  3.  Severe RV failure.    PROCEDURES PERFORMED:  1.  Emergent exploration of the chest.  2.  Temporary closure of the chest.    STAFF SURGEON:  Ino Schmitt M.D.    FIRST ASSISTANT:  Shawnee Rodriguez.    ANESTHESIA:  GETA.    ESTIMATED BLOOD LOSS:  10 mL.    KEY FINDINGS OF THE OPERATION:  1.  Significantly depressed RV.  2.  As soon as the sternum was opened up, some improvement in RV function was   noted including better filling up of the LV.  3.  No hematoma or clots or bleeding noted.    INDICATIONS OF OPERATION:  This is a 59-year-old gentleman who underwent left   ventricular device placement.  Postoperatively, the patient's chest was just   closed today.  Within 2 hours, the patient's urine output significantly   decreased from 50 mL to 10 mL and as a result, decision was made to explore the   patient in anticipation of any cardiac tamponade.  Informed consent was   obtained.    DESCRIPTION OF OPERATION:  The patient was brought to the Operating Room and   placed in a supine position.  After induction of anesthesia, the area was   prepped and draped in the usual sterile fashion.  The previously placed incision   was opened up all the way down to the sternum.  Sternal wires were cut and   removed.  A chest retractor was placed.  Immediate some improvement in   hemodynamic was noted.  Also, the PI on the LVAD increased from 1.8 to 3.3 along   with kind of better filling up of the LV as visualized on JACINTO examination.    Once that was all achieved, the chest tubes were irrigated and repositioned.    The pericardial well was free of any clots or bleeding.  We waited for about an   hour to let the heart recover and we noticed that the patient started making    better urine output.  The decision was made to leave the chest open.  A 30 mL   strut was created for helping relieve any pressure on the RV.  Once that was   done, temporary closure of the chest was achieved by using Esmarch and Ioban to   obtain an airtight seal.  Terminal count of needles, sponges and instrument was   found to be correct.    MEDICARE ATTESTATION:  Due to unavailability of an adequately trained   Cardiothoracic Surgery resident, I performed all parts of the operation myself.      AB/IN  dd: 01/24/2020 17:34:27 (CST)  td: 01/24/2020 17:55:10 (CST)  Doc ID   #[id number]  Job ID #[id number]    CC:

## 2020-01-24 NOTE — PLAN OF CARE
Patient POD 0 s/p LVAD insertion. Patient with HM 3, speed at 5300, flows 4.5, PI 2.6, power 3.8. Patient remaining intubated on A/C FiO2 50%, PEEP 8, NO 15 ppm. Patient on 0.08 mcg/kg/min of epinephrine and amiodarone at 0.5 mg/min. Patient also on cardene, propofol, and insulin infusions, titrated per orders. Urine output 50-60 mL/hr, CVP 19-22. Lasix 10 mg IV push given once per MD order. Will monitor urine output. Minimal dark red chest tube drainage. H/H stable. Electrolytes replaced as ordered. Q4H labs and Q1H ABG performed. Chest incision open, plan for patient to go to OR tomorrow for chest closure. Patient's family at bedside, updated on patient status and questions answered by RN.

## 2020-01-24 NOTE — PROGRESS NOTES
Dr. An aware CI has been less than 2.2 since 2300, all other VSS. No new orders, will continue to monitor.

## 2020-01-24 NOTE — PROGRESS NOTES
Orders received to reprogram patient's Biotronik ICD prior to going back to OR for surgical procedure.    Tachy therapies DISABLED.    Please call Arrhythmia Dept once patient returns from surgery at ext [phone]

## 2020-01-24 NOTE — ANESTHESIA PROCEDURE NOTES
JACINTO    Diagnosis: Acute on chronic combined systolic and diastolic heart failure ICD-10-CM: I50.43   Patient location during procedure: OR  Procedure start time: 1/24/2020 8:45 AM  Timeout: 1/24/2020 8:45 AM  Exam type: Baseline  Staffing  Anesthesiologist: Oc Russell Jr., MD  Performed: fellow and anesthesiologist   Preanesthetic Checklist  Completed: patient identified, surgical consent, pre-op evaluation, timeout performed, risks and benefits discussed, monitors and equipment checked, anesthesia consent given, oxygen available, suction available, hand hygiene performed and patient being monitored  Setup & Induction  Patient preparation: bite block inserted  Probe Insertion: easy  Exam: complete      Findings  Impression  Other Findings  Severely depressed left ventricular systolic function. LVAD in place. Inflow and outflow cannula visualized with laminar flows.  Moderately reduced right ventricular systolic function.   Elevated right-sided pressures with noted right-to-left shunt through the PFO as noted with both CF and PW doppler. Aneurysmal motion of the intraatrial septum.  Trivial AI.  Mild MR. No MS.  Severe TR with hepatic vain systolic reversal noted.   Mild PI.  No thrombus noted in DAMON. Velocities in DAMON 11.7cm/s  Aorta intact, no dissection.  Left pleural effusion noted.     Post chest closure:  Unchanged from prior with RV moderately to severely reduced with R to left shunt through PFO and severe TR  No changes in pre    Probe removed atraumatically  Probe Removal      Exam     Left Heart  Left Atruim: dilated  Left appendage velocity:11.7    Left Ventricle: cm, moderately abnormal (men 6.4-6.8; women 5.7-6.1)    LVAD:yes  Inflow Cannula and Direction: seen  Septum:D-shaped  Estimated Ejection Fraction: < 25% severe            Right Heart  Right Ventricle: dilated  Right Ventricle Function: moderately decreased  Right Atria: cm and moderately abnormal    Intra Atrial Septum  PFO: yes by color  flow doppler  IAS aneurysm  no lipomatous hypertrophy  no Atrial Septal Defect (Asd)    Right Ventricle  Size: dilated    Aortic Valve:  Morphology: trileaflet  Regurgitation: trivial     Mitral Valve:  Morphology:normal  Prolapse: none  Flail: no flail  Jet Description: mild and centrally-directedStenosis: no significant stenosis.    Tricuspid Valve:  Morphology: normal  Regurgitation: severe, TV Hepatic Vein flow:systolic reversal    Pulmonic Valve:  Morphology:normal  Regurgitation(color flow): mild    Great Vessels  Ascending Aorta Atherosclerosis: 2=mild dz (<3mm)  Aortic Arch Atherosclerosis: 2=mild dz (<3mm)  IABP: no  Descending Aorta Atherosclerosis: 2=mild dz (<3mm)  Aorta    Descending aorta IABP: no    Effusions  Effusions: left pleural    Summary  Findings discussed with surgeon.    Other Findings   Severely depressed left ventricular systolic function. LVAD in place. Inflow and outflow cannula visualized with laminar flows.  Moderately reduced right ventricular systolic function.   Elevated right-sided pressures with noted right-to-left shunt through the PFO as noted with both CF and PW doppler. Aneurysmal motion of the intraatrial septum.  Trivial AI.  Mild MR. No MS.  Severe TR with hepatic vain systolic reversal noted.   Mild PI.  No thrombus noted in DAMON. Velocities in DAMON 11.7cm/s  Aorta intact, no dissection.  Left pleural effusion noted.     Post chest closure:  Unchanged from prior with RV moderately to severely reduced with R to left shunt through PFO and severe TR  No changes in pre    Probe removed atraumatically

## 2020-01-24 NOTE — PROGRESS NOTES
Dr. Christianson at bedside, stated case request placed for patient to go back to OR. Dr. Christianson updated family on patient status. Family allowed to come to patient's room prior to patient leaving for OR.

## 2020-01-24 NOTE — PROGRESS NOTES
Dr. Christianson (CTS resident) and Dr. Gonzalez (CTS fellow) at bedside, notified that CVP was 21 when patient arrived from OR and is currently 32-37. Urine output 30mL since patient arrived from OR. No urine in giles bag upon patient arrival from OR. CI 1.7. SvO2 41. Dr. Christianson stated to given additional 80 mg lasix IV push. No further orders at this time.

## 2020-01-24 NOTE — SUBJECTIVE & OBJECTIVE
**Interval History: Intubated and sedated, but opens eyes and follows commands. Has been in A fib since LVAD implant yesterday (120's) - Amiodarone reloaded and gtt increased to 1 mg/hr overnight.  Hemodynamically stable on Epi, Cardene, Corby. To have chest closed today    Continuous Infusions:   amiodarone in dextrose 5% 1 mg/min (01/24/20 0826)    dextrose 5 % and 0.45 % NaCl with KCl 40 mEq 5 mL/hr at 01/23/20 1800    epinephrine 0.08 mcg/kg/min (01/24/20 0826)    insulin (HUMAN R) infusion (adults) 0.9 Units/hr (01/24/20 0826)    niCARdipine 8 mg/hr (01/24/20 0919)    nitric oxide gas      norepinephrine bitartrate-D5W Stopped (01/23/20 1430)    propofol 40 mcg/kg/min (01/24/20 0826)     Scheduled Meds:   aspirin  325 mg Oral Daily    ceFEPime (MAXIPIME) IVPB  2 g Intravenous Q12H    And    vancomycin (VANCOCIN) IVPB  1,000 mg Intravenous Q24H    docusate sodium  200 mg Oral QHS    docusate sodium  50 mg Oral BID    ferrous gluconate  324 mg Oral Daily with breakfast    mupirocin   Nasal BID    pantoprazole  40 mg Oral Daily    pantoprazole  40 mg Intravenous Daily    polyethylene glycol  17 g Oral BID    potassium chloride in water  40 mEq Intravenous Once    sodium chloride 0.9%  3 mL Intravenous Q8H     PRN Meds:albumin human 5%, albuterol sulfate, albuterol sulfate, bacitracin, bisacodyl, calcium gluconate IVPB, calcium gluconate IVPB, calcium gluconate IVPB, Dextrose 10% Bolus, Dextrose 10% Bolus, magnesium hydroxide 400 mg/5 ml, magnesium sulfate IVPB, magnesium sulfate IVPB, magnesium sulfate IVPB, oxyCODONE, oxyCODONE, potassium chloride in water **AND** potassium chloride in water **AND** potassium chloride in water, sodium chloride 0.9%, sodium phosphate IVPB, sodium phosphate IVPB, sodium phosphate IVPB    Review of patient's allergies indicates:   Allergen Reactions    Biopatch [chlorhexidine gluconate]      Site burning    Dobutamine in d5w      Tachycardia, tremors, SOB,  flushing    Percocet [oxycodone-acetaminophen] Itching    Penicillins Rash     Cefepime given on 1/23/2020 without issue     Objective:     Vital Signs (Most Recent):  Temp: (!) 100.7 °F (38.2 °C) (01/24/20 0700)  Pulse: (!) 112 (01/24/20 0809)  Resp: (!) 21 (01/24/20 0809)  BP: (!) 84/0 (01/24/20 0730)  SpO2: 100 % (01/24/20 0809) Vital Signs (24h Range):  Temp:  [99.2 °F (37.3 °C)-100.7 °F (38.2 °C)] 100.7 °F (38.2 °C)  Pulse:  [] 112  Resp:  [7-21] 21  SpO2:  [89 %-100 %] 100 %  BP: ()/(0-75) 84/0  Arterial Line BP: (80-96)/(67-81) 94/80     Patient Vitals for the past 72 hrs (Last 3 readings):   Weight   01/23/20 1530 110.8 kg (244 lb 4.3 oz)     Body mass index is 35.05 kg/m².      Intake/Output Summary (Last 24 hours) at 1/24/2020 0940  Last data filed at 1/24/2020 0800  Gross per 24 hour   Intake 4882 ml   Output 2175 ml   Net 2707 ml       Hemodynamic Parameters:  PAP: (42-54)/(13-30) 53/15  PAP (Mean):  [31 mmHg-37 mmHg] 36 mmHg  PCWP:  [27 mmHg-29 mmHg] 27 mmHg  CO:  [4.4 L/min-9.2 L/min] 5.2 L/min  CI:  [1.9 L/min/m2-3.9 L/min/m2] 2.2 L/min/m2    Telemetry: A fib    Physical Exam   Constitutional: He appears well-developed and well-nourished.   HENT:   Head: Normocephalic and atraumatic.   Eyes: Pupils are equal, round, and reactive to light. Conjunctivae are normal.   Neck: Neck supple. No thyromegaly present.   Bilateral IJ lines   Cardiovascular:   Irregularly irregular, tachycardic, smooth VAD hum. Chest open   Pulmonary/Chest: Effort normal and breath sounds normal.   Intubated and sedated   Abdominal: Soft.   No bowel sounds appreciated   Musculoskeletal: He exhibits no edema.   Neurological:   Intubated and sedated   Skin: Skin is warm and dry. Capillary refill takes 2 to 3 seconds.       Significant Labs:  CBC:  Recent Labs   Lab 01/24/20  0024  01/24/20  0334 01/24/20  0451 01/24/20  0753 01/24/20  0809   WBC 11.00  --  12.69  --  14.33*  --    RBC 3.75*  --  3.79*  --  3.78*  --     HGB 10.6*  --  10.7*  --  10.7*  --    HCT 33.0*   < > 33.9* 30* 33.6* 29*   *  --  146*  --  151  --    MCV 88  --  89  --  89  --    MCH 28.3  --  28.2  --  28.3  --    MCHC 32.1  --  31.6*  --  31.8*  --     < > = values in this interval not displayed.     BNP:  Recent Labs   Lab 01/20/20  0300 01/24/20 0334   * 968*     CMP:  Recent Labs   Lab 01/23/20  0355 01/23/20 1423 01/24/20 0024 01/24/20 0334 01/24/20  0601 01/24/20  0753   * 186*   < > 160*  --  153* 153*   CALCIUM 9.7 9.4   < > 9.4  --  8.9 9.2   ALBUMIN 3.1* 2.6*  --   --  3.0*  --   --    PROT 7.1 6.8  --   --  6.4  --   --     137   < > 141  --  140 141   K 4.0 3.4*   < > 4.1  --  4.2 4.0   CO2 32* 26   < > 31*  --  27 27   CL 94* 96   < > 101  --  102 103   BUN 36* 38*   < > 43*  --  43* 44*   CREATININE 2.0* 1.9*   < > 2.0*  --  2.0* 2.0*   ALKPHOS 46* 45*  --   --  40*  --   --    ALT 31 32  --   --  34  --   --    AST 16 78*  --   --  97*  --   --    BILITOT 1.2* 2.2*  --   --  1.1*  --   --     < > = values in this interval not displayed.      Coagulation:   Recent Labs   Lab 01/24/20 0024 01/24/20 0334 01/24/20 0753   INR 1.1 1.1 1.1   APTT 23.6 23.8 26.3     LDH:  Recent Labs   Lab 01/23/20 1423 01/24/20 0334   * 570*     Microbiology:  Microbiology Results (last 7 days)     Procedure Component Value Units Date/Time    Blood culture [[id number]] Collected:  01/20/20 1042    Order Status:  Completed Specimen:  Blood from Peripheral, Antecubital, Left Updated:  01/23/20 1412     Blood Culture, Routine No Growth to date      No Growth to date      No Growth to date      No Growth to date    Blood culture [[id number]] Collected:  01/20/20 1035    Order Status:  Completed Specimen:  Blood from Peripheral, Wrist, Left Updated:  01/23/20 1412     Blood Culture, Routine No Growth to date      No Growth to date      No Growth to date      No Growth to date    Blood culture [[id number]]     Order Status:   Canceled Specimen:  Blood     Blood culture [[id number]]     Order Status:  Canceled Specimen:  Blood           I have reviewed all pertinent labs within the past 24 hours.    Estimated Creatinine Clearance: 49.6 mL/min (A) (based on SCr of 2 mg/dL (H)).    Diagnostic Results:  I have reviewed all pertinent imaging results/findings within the past 24 hours.

## 2020-01-24 NOTE — PROGRESS NOTES
Patient arrived to SICU from OR s/p LVAD insertion. Patient on epi, insulin, cardene, propofol and txa infusions. LVAD speed at 5500 rpm. Patient intubated on A/C FiO2 50%, PEEP 8, NO 20ppm. Dr. Christianson at bedside to place orders. Chest incision remaining open. Labs drawn and sent, ABG performed.

## 2020-01-24 NOTE — ASSESSMENT & PLAN NOTE
- H/O LV thrombus with h/o splenic and renal emboli as well as embolic CVA  - Limited TTE done here 1/13 showed no thrombus  - JACINTO 1/23 intra op showed resolution of DAMON thrombus  - AC per CTS

## 2020-01-24 NOTE — PROGRESS NOTES
[facility]  Heart Transplant  Progress Note    Patient Name: Tae Delgado  MRN: [id number]  Admission Date: 1/9/2020  Hospital Length of Stay: 15 days  Attending Physician: Ino Schmitt MD  Primary Care Provider: Elham Pearson MD  Principal Problem:Acute on chronic combined systolic and diastolic heart failure    Subjective:     **Interval History: Intubated and sedated, but opens eyes and follows commands. Has been in A fib since LVAD implant yesterday (120's) - Amiodarone reloaded and gtt increased to 1 mg/hr overnight.  Hemodynamically stable on Epi, Cardene, Corby. To have chest closed today    Continuous Infusions:   amiodarone in dextrose 5% 1 mg/min (01/24/20 0826)    dextrose 5 % and 0.45 % NaCl with KCl 40 mEq 5 mL/hr at 01/23/20 1800    epinephrine 0.08 mcg/kg/min (01/24/20 0826)    insulin (HUMAN R) infusion (adults) 0.9 Units/hr (01/24/20 0826)    niCARdipine 8 mg/hr (01/24/20 0919)    nitric oxide gas      norepinephrine bitartrate-D5W Stopped (01/23/20 1430)    propofol 40 mcg/kg/min (01/24/20 0826)     Scheduled Meds:   aspirin  325 mg Oral Daily    ceFEPime (MAXIPIME) IVPB  2 g Intravenous Q12H    And    vancomycin (VANCOCIN) IVPB  1,000 mg Intravenous Q24H    docusate sodium  200 mg Oral QHS    docusate sodium  50 mg Oral BID    ferrous gluconate  324 mg Oral Daily with breakfast    mupirocin   Nasal BID    pantoprazole  40 mg Oral Daily    pantoprazole  40 mg Intravenous Daily    polyethylene glycol  17 g Oral BID    potassium chloride in water  40 mEq Intravenous Once    sodium chloride 0.9%  3 mL Intravenous Q8H     PRN Meds:albumin human 5%, albuterol sulfate, albuterol sulfate, bacitracin, bisacodyl, calcium gluconate IVPB, calcium gluconate IVPB, calcium gluconate IVPB, Dextrose 10% Bolus, Dextrose 10% Bolus, magnesium hydroxide 400 mg/5 ml, magnesium sulfate IVPB, magnesium sulfate IVPB, magnesium sulfate IVPB, oxyCODONE, oxyCODONE, potassium chloride in  water **AND** potassium chloride in water **AND** potassium chloride in water, sodium chloride 0.9%, sodium phosphate IVPB, sodium phosphate IVPB, sodium phosphate IVPB    Review of patient's allergies indicates:   Allergen Reactions    Biopatch [chlorhexidine gluconate]      Site burning    Dobutamine in d5w      Tachycardia, tremors, SOB, flushing    Percocet [oxycodone-acetaminophen] Itching    Penicillins Rash     Cefepime given on 1/23/2020 without issue     Objective:     Vital Signs (Most Recent):  Temp: (!) 100.7 °F (38.2 °C) (01/24/20 0700)  Pulse: (!) 112 (01/24/20 0809)  Resp: (!) 21 (01/24/20 0809)  BP: (!) 84/0 (01/24/20 0730)  SpO2: 100 % (01/24/20 0809) Vital Signs (24h Range):  Temp:  [99.2 °F (37.3 °C)-100.7 °F (38.2 °C)] 100.7 °F (38.2 °C)  Pulse:  [] 112  Resp:  [7-21] 21  SpO2:  [89 %-100 %] 100 %  BP: ()/(0-75) 84/0  Arterial Line BP: (80-96)/(67-81) 94/80     Patient Vitals for the past 72 hrs (Last 3 readings):   Weight   01/23/20 1530 110.8 kg (244 lb 4.3 oz)     Body mass index is 35.05 kg/m².      Intake/Output Summary (Last 24 hours) at 1/24/2020 0940  Last data filed at 1/24/2020 0800  Gross per 24 hour   Intake 4882 ml   Output 2175 ml   Net 2707 ml       Hemodynamic Parameters:  PAP: (42-54)/(13-30) 53/15  PAP (Mean):  [31 mmHg-37 mmHg] 36 mmHg  PCWP:  [27 mmHg-29 mmHg] 27 mmHg  CO:  [4.4 L/min-9.2 L/min] 5.2 L/min  CI:  [1.9 L/min/m2-3.9 L/min/m2] 2.2 L/min/m2    Telemetry: A fib    Physical Exam   Constitutional: He appears well-developed and well-nourished.   HENT:   Head: Normocephalic and atraumatic.   Eyes: Pupils are equal, round, and reactive to light. Conjunctivae are normal.   Neck: Neck supple. No thyromegaly present.   Bilateral IJ lines   Cardiovascular:   Irregularly irregular, tachycardic, smooth VAD hum. Chest open   Pulmonary/Chest: Effort normal and breath sounds normal.   Intubated and sedated   Abdominal: Soft.   No bowel sounds appreciated    Musculoskeletal: He exhibits no edema.   Neurological:   Intubated and sedated   Skin: Skin is warm and dry. Capillary refill takes 2 to 3 seconds.       Significant Labs:  CBC:  Recent Labs   Lab 01/24/20  0024  01/24/20  0334 01/24/20  0451 01/24/20  0753 01/24/20  0809   WBC 11.00  --  12.69  --  14.33*  --    RBC 3.75*  --  3.79*  --  3.78*  --    HGB 10.6*  --  10.7*  --  10.7*  --    HCT 33.0*   < > 33.9* 30* 33.6* 29*   *  --  146*  --  151  --    MCV 88  --  89  --  89  --    MCH 28.3  --  28.2  --  28.3  --    MCHC 32.1  --  31.6*  --  31.8*  --     < > = values in this interval not displayed.     BNP:  Recent Labs   Lab 01/20/20  0300 01/24/20 0334   * 968*     CMP:  Recent Labs   Lab 01/23/20  0355 01/23/20  1423  01/24/20  0024 01/24/20  0334 01/24/20  0601 01/24/20  0753   * 186*   < > 160*  --  153* 153*   CALCIUM 9.7 9.4   < > 9.4  --  8.9 9.2   ALBUMIN 3.1* 2.6*  --   --  3.0*  --   --    PROT 7.1 6.8  --   --  6.4  --   --     137   < > 141  --  140 141   K 4.0 3.4*   < > 4.1  --  4.2 4.0   CO2 32* 26   < > 31*  --  27 27   CL 94* 96   < > 101  --  102 103   BUN 36* 38*   < > 43*  --  43* 44*   CREATININE 2.0* 1.9*   < > 2.0*  --  2.0* 2.0*   ALKPHOS 46* 45*  --   --  40*  --   --    ALT 31 32  --   --  34  --   --    AST 16 78*  --   --  97*  --   --    BILITOT 1.2* 2.2*  --   --  1.1*  --   --     < > = values in this interval not displayed.      Coagulation:   Recent Labs   Lab 01/24/20  0024 01/24/20  0334 01/24/20  0753   INR 1.1 1.1 1.1   APTT 23.6 23.8 26.3     LDH:  Recent Labs   Lab 01/23/20  1423 01/24/20  0334   * 570*     Microbiology:  Microbiology Results (last 7 days)     Procedure Component Value Units Date/Time    Blood culture [[id number]] Collected:  01/20/20 1042    Order Status:  Completed Specimen:  Blood from Peripheral, Antecubital, Left Updated:  01/23/20 1412     Blood Culture, Routine No Growth to date      No Growth to date      No  Growth to date      No Growth to date    Blood culture [[id number]] Collected:  01/20/20 1035    Order Status:  Completed Specimen:  Blood from Peripheral, Wrist, Left Updated:  01/23/20 1412     Blood Culture, Routine No Growth to date      No Growth to date      No Growth to date      No Growth to date    Blood culture [[id number]]     Order Status:  Canceled Specimen:  Blood     Blood culture [[id number]]     Order Status:  Canceled Specimen:  Blood           I have reviewed all pertinent labs within the past 24 hours.    Estimated Creatinine Clearance: 49.6 mL/min (A) (based on SCr of 2 mg/dL (H)).    Diagnostic Results:  I have reviewed all pertinent imaging results/findings within the past 24 hours.    Assessment and Plan:       55 y.o. WM with history of NICMP diagnosed in 2010, ICD, LV thrombus (with prior splenic and renal emboli), Embolic  CVA , paroxysmal atrial fib, HTN, HLP  presents for  F/U today to clinic and he was volume overloaded on exam .  He states he had 3 admission in the last 3 months for volume overload. He started having more SOB on exertion since one month. Also endorses of Orthopnea since last one month. Alble to walk only 150 ft. He also has Bilateral lower extremity edema. He was admitted here in 2017 for ADHD here at [facility] and [facility] during that admission showed PCWP 40 and CVP 17. Currently denies chest pain, lightheadedness     * Acute on chronic combined systolic and diastolic heart failure  - S/P DT HM3 with closure of AV and plication of MV for regurg 1/23/20 (See LVAD)  - [facility] dx'd 2010        LVAD (left ventricular assist device) present  - S/P DT HM3 with closure of AV and plication of MV for regurg 1/23/20. POD 1  - CTS primary  - Currently hemodynamically stable on Epi, Cardene and Corby  - To have chest closed today  - Current speed 5300    Procedure: Device Interrogation Including analysis of device parameters  Current Settings: Ventricular Assist Device  Review of device  function is stable/unstabe    TXP LVAD INTERROGATIONS 1/24/2020 1/24/2020 1/24/2020 1/24/2020 1/24/2020 1/24/2020 1/24/2020   Type HeartMate3 HeartMate3 HeartMate3 HeartMate3 HeartMate3 HeartMate3 HeartMate3   Flow 4.7 4.5 4.5 4.5 4.6 4.5 4.5   Speed 5300 `5300 5300 5300 5300 5300 5300   PI 2.7 3.3 3.3 3.3 3.1 3.6 3.3   Power (Berry) 3.8 3.9 3.9 3.9 3.9 3.8 3.8   LSL 4900 4900 - - - 4900 -   Pulsatility - - - - - Pulse -       Thrombus of left atrial appendage  -Found on JACINTO in OR pre VAD  -Plan to start heparin infusion and repeat JACINTO today    Coagulopathy  - Appreciate Hem/Onc's help. No evidence of underlying coagulopathy (h/o LV thrombus with splenic and renal emboli, h/o embolic CVA)    NSVT (nonsustained ventricular tachycardia)  - Currently on Amiodarone at 1 mg/hr    Hepatic congestion  - Liver US on 1/11 unremarkable    ICD (implantable cardioverter-defibrillator) in place  - S/P Biotronik dual chamber ICD    Right lower lobe pulmonary nodule  - Incidental finding on CT chest/abd/pelvis on 1/12. Pulmonology consulted, and rec repeat CT of chest in 3 months  - Will need to follow-up in pulmonary clinic.     Acute kidney injury superimposed on chronic kidney disease  - Creatinine on admit 2.6 (baseline ~ 1.8). Creatinine today 2.0  - Continue to monitor  - Followed by Nephrology as an outpatient      Paroxysmal atrial fibrillation  - Has been back in A fib since surgery  - Now on Amio gtt at 1 mg/hr  - AC per CTS      Left ventricular thrombus without MI  - H/O LV thrombus with h/o splenic and renal emboli as well as embolic CVA  - Limited TTE done here 1/13 showed no thrombus  - JACINTO 1/23 intra op showed resolution of DAMON thrombus  - AC per CTS        Uninterrupted Critical Care/Counseling Time (not including procedures): [place] [id number]  Heart Transplant  [facility]

## 2020-01-24 NOTE — PT/OT/SLP PROGRESS
Physical Therapy      Patient Name:  Tae Delgado   MRN:  [id number]    Patient not seen today secondary to (pt not seen 2nd to going to surgery for chest closure. ). Will follow-up at a later date with new therapy orders. .    Lucille Rivera, PT   1/24/2020

## 2020-01-24 NOTE — ASSESSMENT & PLAN NOTE
S/p LVAD on 1/23/20  Managed per primary team  Avoid hypoglycemia  Optimize BG control for surgical wound healing

## 2020-01-24 NOTE — PROGRESS NOTES
Pt intubated and sedated with no family at bedside.  VAD parameters stable.  Critical care team at bedside, concerned about MAPs and CVP and UO.  Will updated Dr. Pearson and Dr. Montero about concerns.

## 2020-01-24 NOTE — PROGRESS NOTES
01/24/2020  Renaldo Miles    Current provider:  Ino Schmitt MD      I, Renaldo Miles, rounded on Tae Delgado to ensure all mechanical assist device settings (IABP or VAD) were appropriate and all parameters were within limits.  I was able to ensure all back up equipment was present, the staff had no issues, and the Perfusion Department daily rounding was complete.    7:15 AM

## 2020-01-24 NOTE — PROGRESS NOTES
Anesthesia at bedside to take patient to the OR for chest closure. Anesthesia notified that patient's AICD still on, awaiting nurse from EP clinic to turn AICD off. MD stated to ask EP nurse to come to OR 10 to turn AICD off. Called clinic and asked for EP nurse to go to OR 10 instead of SICU room 75661.     Patient transported to OR intubated with inhaled NO. Patient on epi, cardene, insulin, amio and propofol infusions. LVAD placed to battery power and back up batteries/ emergency bag sent with patient.

## 2020-01-24 NOTE — PROGRESS NOTES
Dr. Schmitt at bedside, updated on patient status, vitals and infusions. Dr. Schmitt stated to give patient 40 mEq of potassium chloride and 2 grams of magnesium IVPB. Amiodarone infusion started per Dr. Schmitt's order at 0.5 mg/min. Dr. Schmitt stated to given 500 mL of albumin 5% over 5 hours. Dr. Schmitt decreased to LVAD speed from 5500 rpm to 5300 rpm. No further orders at this time.

## 2020-01-24 NOTE — PROGRESS NOTES
Patient transported back to OR by anesthesia. Patient transported intubated with NO. Epi, propofol, insulin, lasix and amiodarone drips infusing. Family at bedside, updated on plan of care by surgery team and anesthesia team.

## 2020-01-24 NOTE — PROGRESS NOTES
Dr. Christianson and Dr. Joseph at bedside. CI remains 1.7, SvO2 45, CVP >30. Patient's urine output not responding to lasix. King catheter flushed. Per Dr. Joseph, cardene infusion initiated at 5 mg/hr. No further changes to medications at this time.

## 2020-01-24 NOTE — SUBJECTIVE & OBJECTIVE
Interval HPI:   Overnight events: Remains in ICU, back to OR for chest closure today but then re-opened due to decompensation.  BG well controlled on intensive insulin protocol, rates ranging from 0.6-1 u/hr.  Noted creatinine of 2.2.  Febrile, on IV antibiotics.  Eating:   NPO  Nausea: No  Hypoglycemia and intervention: No  Fever: Yes  TPN and/or TF: No    PMH, PSH, FH, SH reviewed     ROS:  Unable to obtain due to: Sedation,Intubation,Altered mental status,Critical illness,Reviewed ROS from note dated 1/9/20 by Andriy Perkins MD.        Current Medications and/or Treatments Impacting Glycemic Control  Immunotherapy:    Immunosuppressants     None        Steroids:   Hormones (From admission, onward)    None        Pressors:    Autonomic Drugs (From admission, onward)    Start     Stop Route Frequency Ordered    01/23/20 1430  norepinephrine 4 mg in dextrose 5% 250 mL infusion (premix) (titrating)     Question Answer Comment   Titrate by: (in mcg/kg/min) 0.05    Titrate interval: (in minutes) 5    Titrate to maintain: (MAP or SBP) MAP    Greater than: (in mmHg) 60    Maximum dose: (in mcg/kg/min) 3        -- IV Continuous 01/23/20 1325    01/23/20 1430  EPINEPHrine (ADRENALIN) 5 mg in sodium chloride 0.9% 250 mL infusion     Question Answer Comment   Titrate by: (in mcg/kg/min) 0.02    Titrate interval: (in minutes) 10    Titrate to maintain: (SBP or MAP or Cardiac Index) MAP    Greater than: (in mmHg) 60    Maximum dose: (in mcg/kg/min) 1        -- IV Continuous 01/23/20 1325        Hyperglycemia/Diabetes Medications:   Antihyperglycemics (From admission, onward)    Start     Stop Route Frequency Ordered    01/23/20 1430  insulin regular 100 Units in sodium chloride 0.9% 100 mL infusion     Question:  Insulin Rate Adjustment (DO NOT MODIFY ANSWER)  Answer:  [email or web address]    -- IV Continuous 01/23/20 1325             PHYSICAL EXAMINATION:  Vitals:     01/24/20 1230   BP:    Pulse: (!) 125   Resp: (!) 21   Temp:      Body mass index is 35.05 kg/m².    Physical Exam     Constitutional:  Well developed, well nourished, NAD.  ENT: External ears no masses with nose patent  Neck:  Supple; trachea midline   Cardiovascular: LVAD hum, no LE edema.     Lungs:  Normal effort; lungs anterior bilaterally clear to auscultation. Intubated on a ventilator.   Abdomen:  Soft, no masses, no hernias.  Hypoactive BS  MS: No clubbing or cyanosis of nails noted; unable to assess gait.  Skin: No rashes, lesions, or ulcers; no nodules.  Mid-sternal incision - chest open with dressing; chest tubes x 3.  Injection sites are ok. No lipo hypertropthy or atrophy.  Psychiatric: DANIE  Neurological: DANIE  Foot: nails in good condition, no amputations noted.

## 2020-01-24 NOTE — PROGRESS NOTES
[facility]  Critical Care - Surgery  Progress Note    Patient Name: Tae Delgado  MRN: [id number]  Admission Date: 1/9/2020  Hospital Length of Stay: 15 days  Code Status: Full Code  Attending Provider: Ino Schmitt MD  Primary Care Provider: Elham Pearson MD   Principal Problem: Acute on chronic combined systolic and diastolic heart failure    Subjective:     Hospital/ICU Course:  1/23: Pt arrives from OR intubated, open chest dressed with Ioban, CTs with SS output. Drips on arrival: Epi 0.08, Cardene 5, TXA, Nitric at 30. Pt received 1.5L crystalloid, no blood product, 20 mg Lasix (put out 250cc in response).   Intra Op JACINTO Exam:  PRE:  Severely reduced left ventricular systolic function. Dilated LV. No definitive thrombus noted.  Moderate-to-severely reduced right ventricular systolic function. FAC 12-24%  Mild-to-moderate AI. No AS.  Moderate MR with systolic blunting of the pulmonary veins.  Trace-to-mild TR.  Mild PI. PAAT <90ms.  Small PFO by CF doppler.  SEC in La and DAMON. No clear thrombus noted.  Grade 2 atheromatous disease of the aorta.  No effusions.    POST:  Severely depressed left ventriclar dysfunction.  Moderately depressed right ventricular systolic function.  LVAD inflow and outflow cannula noted with laminar flows.  No AI.  Mild MR, vahe stitch observed. No MS.  Mild-to-moderate TR.  PFO still visible on CF doppler.  Aorta intact, no dissection.  No effusions.     1/24: run of Vtach overnight. Amio bolus given, amio gtt increased to 1, lasix push given. Improved. LVAD hemodynamics appropriate overnight. Going for closure this am.       Follow-up For: Procedure(s) (LRB):  CLOSURE, WOUND, STERNUM (N/A)  INSERTION-RIGHT VENTRICULAR ASSIST DEVICE (Right)    Post-Operative Day: Day of Surgery    Objective:     Vital Signs (Most Recent):  Temp: (!) 100.7 °F (38.2 °C) (01/24/20 0700)  Pulse: 106 (01/24/20 0700)  Resp: 18 (01/24/20 0700)  BP: 97/72 (01/24/20 0300)  SpO2: 100 %  (01/24/20 0700) Vital Signs (24h Range):  Temp:  [99.2 °F (37.3 °C)-100.7 °F (38.2 °C)] 100.7 °F (38.2 °C)  Pulse:  [] 106  Resp:  [7-18] 18  SpO2:  [89 %-100 %] 100 %  BP: ()/(0-75) 97/72  Arterial Line BP: (80-94)/(67-81) 92/76     Weight: 110.8 kg (244 lb 4.3 oz)  Body mass index is 35.05 kg/m².      Intake/Output Summary (Last 24 hours) at 1/24/2020 0806  Last data filed at 1/24/2020 0700  Gross per 24 hour   Intake 4882 ml   Output 2115 ml   Net 2767 ml       Physical Exam   Constitutional: He appears well-developed and well-nourished.   HENT:   Head: Normocephalic and atraumatic.   Mouth/Throat: No oropharyngeal exudate.   ETT and OG secured in place.   Eyes: Pupils are equal, round, and reactive to light.   Neck: Normal range of motion. Neck supple.   Left IJ double lumen + [place] Heena in place, dressing CLD  Right IJ triple lumen, dressing CLD   Cardiovascular:   Irregular tachyarrythmia. LVAD hum.    Pulmonary/Chest: Breath sounds normal. He has no wheezes. He has no rales.   Intubated. Open chest, dressed with Ioban. CTs with SS drainage.   Abdominal: Soft. He exhibits no distension.   Musculoskeletal: He exhibits no edema or deformity.   Neurological:   sedated   Skin: Skin is warm and dry.     Vents:  Vent Mode: A/C (01/24/20 0518)  Ventilator Initiated: Yes (01/21/20 0905)  Set Rate: 18 bmp (01/24/20 0518)  Vt Set: 500 mL (01/24/20 0518)  PEEP/CPAP: 8 cmH20 (01/24/20 0518)  Oxygen Concentration (%): 50 (01/24/20 0700)  Peak Airway Pressure: 27 cmH2O (01/24/20 0518)  Plateau Pressure: 0 cmH20 (01/24/20 0518)  Total Ve: 9.19 mL (01/24/20 0518)  F/VT Ratio<105 (RSBI): (!) 35.02 (01/24/20 0518)    Lines/Drains/Airways     Central Venous Catheter Line                 Percutaneous Central Line Insertion/Assessment - triple lumen  01/20/20 1511 right internal jugular 3 days         Percutaneous Central Line Insertion/Assessment - double lumen  01/23/20 0751 left internal jugular 1 day          Pulmonary Artery Catheter Assessment  01/23/20 0751 1 day          Drain                 Urethral Catheter 01/21/20 0752 Non-latex;Straight-tip;Temperature probe 16 Fr. 3 days         NG/OG Tube 01/23/20 orogastric Center mouth 1 day         Chest Tube 01/23/20 1230 1 Right Pleural less than 1 day         Chest Tube 01/23/20 1414 2 Right Mediastinal less than 1 day         Chest Tube 01/23/20 1415 3 Left Mediastinal less than 1 day          Airway                 Airway - Non-Surgical 01/21/20 0742 Endotracheal Tube 3 days          Arterial Line                 Arterial Line 01/21/20 0730 Left Other (Comment) 3 days         Arterial Line 01/23/20 1335 Right Radial less than 1 day          Line                 VAD 01/23/20 1148 Left ventricular assist device HeartMate 3 less than 1 day          Peripheral Intravenous Line                 Peripheral IV - Single Lumen 01/22/20 1730 20 G Right Hand 1 day         Peripheral IV - Single Lumen 01/22/20 1800 20 G Left Hand 1 day                Significant Labs:    CBC/Anemia Profile:  Recent Labs   Lab 01/23/20 1958 01/24/20  0024 01/24/20  0208 01/24/20  0334 01/24/20  0451   WBC 12.03  --  11.00  --  12.69  --    HGB 10.8*  --  10.6*  --  10.7*  --    HCT 34.2*   < > 33.0* 30* 33.9* 30*   *  --  141*  --  146*  --    MCV 89  --  88  --  89  --    RDW 14.6*  --  14.8*  --  15.0*  --     < > = values in this interval not displayed.        Chemistries:  Recent Labs   Lab 01/23/20  0355 01/23/20  1423  01/23/20 1958 01/24/20  0024 01/24/20  0334 01/24/20  0601    137   < > 142 141  --  140   K 4.0 3.4*   < > 4.3 4.1  --  4.2   CL 94* 96   < > 102 101  --  102   CO2 32* 26   < > 30* 31*  --  27   BUN 36* 38*   < > 41* 43*  --  43*   CREATININE 2.0* 1.9*   < > 2.1* 2.0*  --  2.0*   CALCIUM 9.7 9.4   < > 9.4 9.4  --  8.9   ALBUMIN 3.1* 2.6*  --   --   --  3.0*  --    PROT 7.1 6.8  --   --   --  6.4  --    BILITOT 1.2* 2.2*  --   --   --  1.1*  --    ALKPHOS 46*  45*  --   --   --  40*  --    ALT 31 32  --   --   --  34  --    AST 16 78*  --   --   --  97*  --    MG 2.4 2.7*   < > 3.2* 2.9* 2.8*  --    PHOS  --  2.6*   < > 2.8 3.8 4.2  --     < > = values in this interval not displayed.       ABGs:   Recent Labs   Lab 01/24/20  0456   PH 7.430   PCO2 43.3   HCO3 28.7*   POCSATURATED 64*   BE 4     Coagulation:   Recent Labs   Lab 01/24/20  0334   INR 1.1   APTT 23.8     Lactic Acid:   Recent Labs   Lab 01/23/20  1423   LACTATE 4.1*       Significant Imaging:  I have reviewed all pertinent imaging results/findings within the past 24 hours.    Assessment/Plan:     * Acute on chronic combined systolic and diastolic heart failure  Tae Delgado is a 59 y.o. 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 Approved for DT LVAD. Went to OR on 1/21 and found to have DAMON and LV thrombus and case was aborted. Pt was placed on a heparin gtt and thrombus had dissipated on repeat JACINTO on 1/22. Pt underwent LVAD placement on 1/23.     Neuro:  - Follows commands in all extremities when sedation is held  - Prop gtt with prn Fentanyl    CVS:   - Current LVAD flow @ 5300 with appropriate PIs   - Epi @ 0.08, Nitric @ 10, Cardene increased overnight, now at 8 - changes per CTS  - Amio bolus overnight for run of vtach, amio gtt increased to 1  - EP service to turn over ICD prior to chest closure  - Chest closure planned for 1/24    Pulm:  - Mechanical ventilation while chest open  - ABGs per CTS, q4h now  - CXR daily  - Monitor CT output, no overt signs of bleeding overnight    GI:  - NPO  - Protonix 40 daily  - Docusate    Endo:   - Insulin gtt   - Endocrine consulted, appreciate their services    Renal:  - Strict I/O  -  Trend BUN/Cr  - Lasix 40 mg bolus now    Lytes:  - Monitor labs  - Replace per protocol    Heme:  - No blood products needed during the OR  - H/H stable this am    ID:   - Lona op ABX  - Follow WBC  - Follow fever curve    PPx:  - A/C per CTS  - GI ppx    Dispo:  - Cont SICU care   - OR today for chest closure    Critical care was time spent personally by me on the following activities: development of treatment plan with patient or surrogate and bedside caregivers, discussions with consultants, evaluation of patient's response to treatment, examination of patient, ordering and performing treatments and interventions, ordering and review of laboratory studies, ordering and review of radiographic studies, pulse oximetry, re-evaluation of patient's condition.  This critical care time did not overlap with that of any other provider or involve time for any procedures.     Elin Claros MD  Critical Care - Surgery  [facility]

## 2020-01-24 NOTE — SUBJECTIVE & OBJECTIVE
Follow-up For: Procedure(s) (LRB):  CLOSURE, WOUND, STERNUM (N/A)  INSERTION-RIGHT VENTRICULAR ASSIST DEVICE (Right)    Post-Operative Day: Day of Surgery    Objective:     Vital Signs (Most Recent):  Temp: (!) 100.7 °F (38.2 °C) (01/24/20 0700)  Pulse: 106 (01/24/20 0700)  Resp: 18 (01/24/20 0700)  BP: 97/72 (01/24/20 0300)  SpO2: 100 % (01/24/20 0700) Vital Signs (24h Range):  Temp:  [99.2 °F (37.3 °C)-100.7 °F (38.2 °C)] 100.7 °F (38.2 °C)  Pulse:  [] 106  Resp:  [7-18] 18  SpO2:  [89 %-100 %] 100 %  BP: ()/(0-75) 97/72  Arterial Line BP: (80-94)/(67-81) 92/76     Weight: 110.8 kg (244 lb 4.3 oz)  Body mass index is 35.05 kg/m².      Intake/Output Summary (Last 24 hours) at 1/24/2020 0806  Last data filed at 1/24/2020 0700  Gross per 24 hour   Intake 4882 ml   Output 2115 ml   Net 2767 ml       Physical Exam   Constitutional: He appears well-developed and well-nourished.   HENT:   Head: Normocephalic and atraumatic.   Mouth/Throat: No oropharyngeal exudate.   ETT and OG secured in place.   Eyes: Pupils are equal, round, and reactive to light.   Neck: Normal range of motion. Neck supple.   Left IJ double lumen + [place] Heena in place, dressing CLD  Right IJ triple lumen, dressing CLD   Cardiovascular:   Irregular tachyarrythmia. LVAD hum.    Pulmonary/Chest: Breath sounds normal. He has no wheezes. He has no rales.   Intubated. Open chest, dressed with Ioban. CTs with SS drainage.   Abdominal: Soft. He exhibits no distension.   Musculoskeletal: He exhibits no edema or deformity.   Neurological:   sedated   Skin: Skin is warm and dry.     Vents:  Vent Mode: A/C (01/24/20 0518)  Ventilator Initiated: Yes (01/21/20 0905)  Set Rate: 18 bmp (01/24/20 0518)  Vt Set: 500 mL (01/24/20 0518)  PEEP/CPAP: 8 cmH20 (01/24/20 0518)  Oxygen Concentration (%): 50 (01/24/20 0700)  Peak Airway Pressure: 27 cmH2O (01/24/20 0518)  Plateau Pressure: 0 cmH20 (01/24/20 0518)  Total Ve: 9.19 mL (01/24/20 0518)  F/VT Ratio<105  (RSBI): (!) 35.02 (01/24/20 0518)    Lines/Drains/Airways     Central Venous Catheter Line                 Percutaneous Central Line Insertion/Assessment - triple lumen  01/20/20 1511 right internal jugular 3 days         Percutaneous Central Line Insertion/Assessment - double lumen  01/23/20 0751 left internal jugular 1 day         Pulmonary Artery Catheter Assessment  01/23/20 0751 1 day          Drain                 Urethral Catheter 01/21/20 0752 Non-latex;Straight-tip;Temperature probe 16 Fr. 3 days         NG/OG Tube 01/23/20 orogastric Center mouth 1 day         Chest Tube 01/23/20 1230 1 Right Pleural less than 1 day         Chest Tube 01/23/20 1414 2 Right Mediastinal less than 1 day         Chest Tube 01/23/20 1415 3 Left Mediastinal less than 1 day          Airway                 Airway - Non-Surgical 01/21/20 0742 Endotracheal Tube 3 days          Arterial Line                 Arterial Line 01/21/20 0730 Left Other (Comment) 3 days         Arterial Line 01/23/20 1335 Right Radial less than 1 day          Line                 VAD 01/23/20 1148 Left ventricular assist device HeartMate 3 less than 1 day          Peripheral Intravenous Line                 Peripheral IV - Single Lumen 01/22/20 1730 20 G Right Hand 1 day         Peripheral IV - Single Lumen 01/22/20 1800 20 G Left Hand 1 day                Significant Labs:    CBC/Anemia Profile:  Recent Labs   Lab 01/23/20 1958 01/24/20  0024 01/24/20  0208 01/24/20  0334 01/24/20  0451   WBC 12.03  --  11.00  --  12.69  --    HGB 10.8*  --  10.6*  --  10.7*  --    HCT 34.2*   < > 33.0* 30* 33.9* 30*   *  --  141*  --  146*  --    MCV 89  --  88  --  89  --    RDW 14.6*  --  14.8*  --  15.0*  --     < > = values in this interval not displayed.        Chemistries:  Recent Labs   Lab 01/23/20  0355 01/23/20  1423  01/23/20 1958 01/24/20  0024 01/24/20  0334 01/24/20  0601    137   < > 142 141  --  140   K 4.0 3.4*   < > 4.3 4.1  --  4.2   CL  94* 96   < > 102 101  --  102   CO2 32* 26   < > 30* 31*  --  27   BUN 36* 38*   < > 41* 43*  --  43*   CREATININE 2.0* 1.9*   < > 2.1* 2.0*  --  2.0*   CALCIUM 9.7 9.4   < > 9.4 9.4  --  8.9   ALBUMIN 3.1* 2.6*  --   --   --  3.0*  --    PROT 7.1 6.8  --   --   --  6.4  --    BILITOT 1.2* 2.2*  --   --   --  1.1*  --    ALKPHOS 46* 45*  --   --   --  40*  --    ALT 31 32  --   --   --  34  --    AST 16 78*  --   --   --  97*  --    MG 2.4 2.7*   < > 3.2* 2.9* 2.8*  --    PHOS  --  2.6*   < > 2.8 3.8 4.2  --     < > = values in this interval not displayed.       ABGs:   Recent Labs   Lab 01/24/20  0456   PH 7.430   PCO2 43.3   HCO3 28.7*   POCSATURATED 64*   BE 4     Coagulation:   Recent Labs   Lab 01/24/20  0334   INR 1.1   APTT 23.8     Lactic Acid:   Recent Labs   Lab 01/23/20  1423   LACTATE 4.1*       Significant Imaging:  I have reviewed all pertinent imaging results/findings within the past 24 hours.

## 2020-01-24 NOTE — PROGRESS NOTES
Arrhythmia Department  Biotronik ICD    Orders to re-enable tachy therapies s/p chest closure procedure.    Tachy therapies now ENABLED.    Please call Arrhythmia Department at ext [phone] for any further assistance.

## 2020-01-24 NOTE — CARE UPDATE
Pt returned from OR to [id number]. Pt remained intubated and placed on mechanical ventilation at documented settings. Will continue to monitor.

## 2020-01-24 NOTE — TRANSFER OF CARE
"Anesthesia Transfer of Care Note    Patient: Tae Delgado    Procedure(s) Performed: Procedure(s) (LRB):  EXPLORATION, WOUND (N/A)  CLOSURE, WOUND, STERNUM    Patient location: ICU    Anesthesia Type: general    Transport from OR: Transported from OR intubated on 100% O2 by AMBU with adequate controlled ventilation    Post pain: adequate analgesia    Post assessment: no apparent anesthetic complications    Post vital signs: stable    Level of consciousness: sedated    Nausea/Vomiting: no nausea/vomiting    Complications: none    Transfer of care protocol was followed      Last vitals:   Visit Vitals  BP (!) 86/0 (BP Location: Right arm, Patient Position: Lying)   Pulse (!) 160   Temp (!) 38.4 °C (101.1 °F) (Core ([place]))   Resp 17   Ht 5' 10" (1.778 m)   Wt 110.8 kg (244 lb 4.3 oz)   SpO2 99%   BMI 35.05 kg/m²     "

## 2020-01-24 NOTE — ASSESSMENT & PLAN NOTE
- S/P DT HM3 with closure of AV and plication of MV for regurg 1/23/20 (See LVAD)  - [facility] dx'd 2010

## 2020-01-24 NOTE — PROGRESS NOTES
Dr. Schmitt at bedside, updated on patient status. CVP is 22 and urine output has been 50-60 ml/hr. Dr. Schmitt stated to given lasix 10 mg IV push now.

## 2020-01-24 NOTE — ASSESSMENT & PLAN NOTE
- S/P DT HM3 with closure of AV and plication of MV for regurg 1/23/20. POD 1  - CTS primary  - Currently hemodynamically stable on Epi, Cardene and [place]  - To have chest closed today  - Current speed 5300    Procedure: Device Interrogation Including analysis of device parameters  Current Settings: Ventricular Assist Device  Review of device function is stable/unstabe    TXP LVAD INTERROGATIONS 1/24/2020 1/24/2020 1/24/2020 1/24/2020 1/24/2020 1/24/2020 1/24/2020   Type HeartMate3 HeartMate3 HeartMate3 HeartMate3 HeartMate3 HeartMate3 HeartMate3   Flow 4.7 4.5 4.5 4.5 4.6 4.5 4.5   Speed 5300 `5300 5300 5300 5300 5300 5300   PI 2.7 3.3 3.3 3.3 3.1 3.6 3.3   Power (Berry) 3.8 3.9 3.9 3.9 3.9 3.8 3.8   LSL 4900 4900 - - - 4900 -   Pulsatility - - - - - Pulse -

## 2020-01-24 NOTE — OP NOTE
DATE OF PROCEDURE:  01/24/2020    PREOPERATIVE DIAGNOSES:  1.  Status post left ventricular assist device placement.  2.  Status post diffuse coagulopathy.  3.  Severe RV failure.    POSTOPERATIVE DIAGNOSES:  1.  Status post left ventricular assist device placement.  2.  Status post diffuse coagulopathy.  3.  Severe RV failure.    OPERATIONS PERFORMED:  1.  Washout of the mediastinum.  2.  Creation of sharlene-pericardium.  3.  Sternal closure.  4.  Placement of a Prevena wound VAC, 22 x 5 cm in size.  5.  Drainage of left pleural effusion, drainage of 500 mL of straw-colored heart   failure fluid from the left pleural space.    STAFF SURGEON:  Ino Schmitt M.D.    FIRST ASSISTANT:  Shawnee Rodriguez.    ANESTHESIA:  GETA.    ESTIMATED BLOOD LOSS:  50 mL.    KEY FINDINGS OF THE OPERATION:  1.  Still significantly depressed RV.  However, diffuse coagulopathy has   significantly improved.  2.  Severe TR present; however, adequate filling of the LV with the pump being   run at about 5200 RPMs with excellent hemodynamics.  3.  The patient will be on epinephrine at 0.08 and nitric oxide 10 parts per   million.    INDICATION OF OPERATION:  This is a 59-year-old gentleman who underwent left   ventricular assist device placement.  Postoperatively, the chest was left open   due to diffuse coagulopathy and RV dysfunction.  Overnight, the patient did very   well and a decision was made to take the patient back to the Operating Room for   possible closure.  Risks and benefits were discussed and informed consent was   obtained.    DESCRIPTION OF OPERATION:  The patient was brought to the Operating Room and   placed in a supine position.  After induction of anesthesia, area was prepped   and draped in the usual sterile fashion.  The previously placed temporary   dressing was taken down.  Chest retractor was placed.  Copious amount of   irrigation was used to irrigate the chest cavity.  Chest tubes were irrigated   and repositioned.  On  JACINTO examination, it was noted that the patient had   left-sided pleural effusion.  Left pleura was taken down and left chest was   found to have 500 mL of heart failure fluid that was drained.  Once that was   done, creation of sharlene-pericardium was done with the help of [place] membrane to   help in reentry at the time of transplant or VAD exchange.  Remnant portion of   the outflow graft was then slit open and placed on the outflow graft as an   additional layer of protection.  Sternum was then approximated with #6 stainless   steel wires.  Skin and subcutaneous tissues were closed in multiple layers.    Due to the patient's body habitus, the patient was at increased risk for sternal   dehiscence and wound complications and as a result, a wound VAC of 22 x 5 cm in   size was placed and a good seal was obtained and the patient was taken back to   the Intensive Care Unit in stable condition.  Terminal count of needles,   sponges, and instrument was found to be correct.      AB/IN  dd: 01/24/2020 17:31:08 (CST)  td: 01/24/2020 17:48:28 (CST)  Doc ID   #[id number]  Job ID #[id number]    CC:

## 2020-01-24 NOTE — PROGRESS NOTES
Arrhythmia Department  Biotronik ICD      Called to OR 10 to disable tachy therapies pre-chest closure procedure.    Tachy therapies DISABLED.    Please call Arrhythmia Department at ext [phone] once patient returns from surgery to re-program device to original settings.

## 2020-01-24 NOTE — PLAN OF CARE
55 y.o. PMH of  NICM, Biotronik S Ch ICD , LV thrombus (with prior splenic and renal emboli), CVA , Atrial fibrillation was admitted for HF exacerbation is now s/p DT HM3 1/23/2020 and complicated by RV failure and DAMON thrombus.     EP team was called because of post operative rhythm determination. Biotronik device was interrogated and he was found to be in Atrial fibrillation at least since 4 pm 1/23/2020. He currently is in Afib with V rate of 140s.     Plan:  ==============  -Device readjusted , baseline mode was VVI 40 , base rate increased to VVI 90bpm .  -VT/VF therapy was turned back on.   -he is on amiodarone, primary team to consider digoxin as next rate control step.       Please call us back if you have any further questions or concerns.

## 2020-01-24 NOTE — ASSESSMENT & PLAN NOTE
BG goal 140-180    Continue IV insulin infusion protocol  Requires intensive BG monitoring while on protocol     Discharge planning: ISABEL

## 2020-01-24 NOTE — PT/OT/SLP PROGRESS
Occupational Therapy      Patient Name:  Tae Delgado   MRN:  [id number]    Patient not seen today secondary to (pt not seen 2/2 to going to surgery for chest closure). Will follow-up when medically stable with new therapy orders.    Shaila Wilcox OT  1/24/2020

## 2020-01-24 NOTE — CONSULTS
[facility]  Endocrinology  Diabetes Consult Note    Consult Requested by: Ino Schmitt MD   Reason for admit: Acute on chronic combined systolic and diastolic heart failure    HISTORY OF PRESENT ILLNESS:  Reason for Consult: Management of Hyperglycemia     Surgical Procedure and Date: LVAD 1/23/20    Lab Results   Component Value Date    HGBA1C 6.2 (H) 01/15/2020       HPI:   Patient is a 59 y.o. male with a diagnosis of afib, KRISTIN on CKD, and CHF, who is s/p LVAD placement on 1/23/20.  No personal history of DM but slightly elevated A1C upon admission.  Endocrinology consulted for BG management.            Interval HPI:   Overnight events: Remains in ICU, back to OR for chest closure today but then re-opened due to decompensation.  BG well controlled on intensive insulin protocol, rates ranging from 0.6-1 u/hr.  Noted creatinine of 2.2.  Febrile, on IV antibiotics.  Eating:   NPO  Nausea: No  Hypoglycemia and intervention: No  Fever: Yes  TPN and/or TF: No    PMH, PSH, FH, SH reviewed     ROS:  Unable to obtain due to: Sedation,Intubation,Altered mental status,Critical illness,Reviewed ROS from note dated 1/9/20 by Andriy Perkins MD.        Current Medications and/or Treatments Impacting Glycemic Control  Immunotherapy:    Immunosuppressants     None        Steroids:   Hormones (From admission, onward)    None        Pressors:    Autonomic Drugs (From admission, onward)    Start     Stop Route Frequency Ordered    01/23/20 1430  norepinephrine 4 mg in dextrose 5% 250 mL infusion (premix) (titrating)     Question Answer Comment   Titrate by: (in mcg/kg/min) 0.05    Titrate interval: (in minutes) 5    Titrate to maintain: (MAP or SBP) MAP    Greater than: (in mmHg) 60    Maximum dose: (in mcg/kg/min) 3        -- IV Continuous 01/23/20 1325    01/23/20 1430  EPINEPHrine (ADRENALIN) 5 mg in sodium chloride 0.9% 250 mL infusion     Question Answer Comment   Titrate by: (in mcg/kg/min) 0.02     Titrate interval: (in minutes) 10    Titrate to maintain: (SBP or MAP or Cardiac Index) MAP    Greater than: (in mmHg) 60    Maximum dose: (in mcg/kg/min) 1        -- IV Continuous 01/23/20 1325        Hyperglycemia/Diabetes Medications:   Antihyperglycemics (From admission, onward)    Start     Stop Route Frequency Ordered    01/23/20 1430  insulin regular 100 Units in sodium chloride 0.9% 100 mL infusion     Question:  Insulin Rate Adjustment (DO NOT MODIFY ANSWER)  Answer:  \\[email or web address]    -- IV Continuous 01/23/20 1325             PHYSICAL EXAMINATION:  Vitals:    01/24/20 1230   BP:    Pulse: (!) 125   Resp: (!) 21   Temp:      Body mass index is 35.05 kg/m².    Physical Exam     Constitutional:  Well developed, well nourished, NAD.  ENT: External ears no masses with nose patent  Neck:  Supple; trachea midline   Cardiovascular: LVAD hum, no LE edema.     Lungs:  Normal effort; lungs anterior bilaterally clear to auscultation. Intubated on a ventilator.   Abdomen:  Soft, no masses, no hernias.  Hypoactive BS  MS: No clubbing or cyanosis of nails noted; unable to assess gait.  Skin: No rashes, lesions, or ulcers; no nodules.  Mid-sternal incision - chest open with dressing; chest tubes x 3.  Injection sites are ok. No lipo hypertropthy or atrophy.  Psychiatric: DANIE  Neurological: DANIE  Foot: nails in good condition, no amputations noted.        Labs Reviewed and Include   Recent Labs   Lab 01/24/20  0334  01/24/20  1545   GLU  --    < > 173*   CALCIUM  --    < > 8.8   ALBUMIN 3.0*  --   --    PROT 6.4  --   --    NA  --    < > 141   K  --    < > 4.5   CO2  --    < > 26   CL  --    < > 103   BUN  --    < > 49*   CREATININE  --    < > 2.4*   ALKPHOS 40*  --   --    ALT 34  --   --    AST 97*  --   --    BILITOT 1.1*  --   --     < > = values in this interval not displayed.     Lab Results   Component Value Date    WBC 17.87 (H) 01/24/2020    HGB 10.1  (L) 01/24/2020    HCT 29 (L) 01/24/2020    MCV 90 01/24/2020     01/24/2020     No results for input(s): TSH, FREET4 in the last 168 hours.  Lab Results   Component Value Date    HGBA1C 6.2 (H) 01/15/2020       Nutritional status:   Body mass index is 35.05 kg/m².  Lab Results   Component Value Date    ALBUMIN 3.0 (L) 01/24/2020    ALBUMIN 2.6 (L) 01/23/2020    ALBUMIN 3.1 (L) 01/23/2020     Lab Results   Component Value Date    PREALBUMIN 11 (L) 01/24/2020    PREALBUMIN 27 01/15/2020       Estimated Creatinine Clearance: 41.3 mL/min (A) (based on SCr of 2.4 mg/dL (H)).    Accu-Checks  Recent Labs     01/24/20  0316 01/24/20  0412 01/24/20  0448 01/24/20  0604 01/24/20  0707 01/24/20  0758 01/24/20  1044 01/24/20  1209 01/24/20  1556 01/24/20  1656   POCTGLUCOSE 143* 128* 145* 153* 146* 157* 204* 145* 168* 165*        ASSESSMENT and PLAN    * Acute on chronic combined systolic and diastolic heart failure  Managed per primary team  S/p LVAD        Acute hyperglycemia  BG goal 140-180    Continue IV insulin infusion protocol  Requires intensive BG monitoring while on protocol     Discharge planning: TBD        LVAD (left ventricular assist device) present  S/p LVAD on 1/23/20  Managed per primary team  Avoid hypoglycemia  Optimize BG control for surgical wound healing        Acute kidney injury superimposed on chronic kidney disease  Titrate insulin slowly to avoid hypoglycemia as the risk of hypoglycemia increases with decreased creatinine clearance.  Caution with insulin stacking  Estimated Creatinine Clearance: 45.1 mL/min (A) (based on SCr of 2.2 mg/dL (H)).            Plan discussed with RN at bedside.     Jan Moore NP  Endocrinology  [facility]

## 2020-01-24 NOTE — HPI
Reason for Consult: Management of Hyperglycemia     Surgical Procedure and Date: LVAD 1/23/20; chest closure 1/29/20     Lab Results   Component Value Date    HGBA1C 6.2 (H) 01/15/2020       HPI:   Patient is a 59 y.o. male with a diagnosis of afib, KRISTIN on CKD, and CHF, who is s/p LVAD placement on 1/23/20.  No personal history of DM but slightly elevated A1C upon admission.  Endocrinology consulted for BG management.

## 2020-01-24 NOTE — ASSESSMENT & PLAN NOTE
Tae Delgado is a 59 y.o. 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 Approved for DT LVAD. Went to OR on 1/21 and found to have DAMON and LV thrombus and case was aborted. Pt was placed on a heparin gtt and thrombus had dissipated on repeat JACINTO on 1/22. Pt underwent LVAD placement on 1/23.     Neuro:  - Follows commands in all extremities when sedation is held  - Prop gtt with prn Fentanyl    CVS:   - Current LVAD flow @ 5300 with appropriate PIs   - Epi @ 0.08, Nitric @ 10, Cardene increased overnight, now at 8 - changes per CTS  - Amio bolus overnight for run of vtach, amio gtt increased to 1  - EP service to turn over ICD prior to chest closure  - Chest closure planned for 1/24    Pulm:  - Mechanical ventilation while chest open  - ABGs per CTS, q4h now  - CXR daily  - Monitor CT output, no overt signs of bleeding overnight    GI:  - NPO  - Protonix 40 daily  - Docusate    Endo:   - Insulin gtt   - Endocrine consulted, appreciate their services    Renal:  - Strict I/O  - Trend BUN/Cr  - Lasix 40 mg bolus now    Lytes:  - Monitor labs  - Replace per protocol    Heme:  - No blood products needed during the OR  - H/H stable this am    ID:   - Lona op ABX  - Follow WBC  - Follow fever curve    PPx:  - A/C per CTS  - GI ppx    Dispo:  - Cont SICU care   - OR today for chest closure

## 2020-01-24 NOTE — PROGRESS NOTES
Dr. Schmitt made aware of patient having short runs of VT on monitor. All vital signs stable, only PI decreases during these episodes. AICD is on per arrhythmia note. Orders received to give Amio load dose, and then increase gtt to 1 mg/min, Lasix 20 mg IVP, and use Cardene to keep MAP <85. Also keep K >4, and Mag >2.

## 2020-01-24 NOTE — PROGRESS NOTES
Patient arrived back from OR s/p chest closure. Patient remaining intubated on A.C FiO2 70%, PEEP 8, NO 20. Patient on epi at 0.1 mcg/kg/min, amiodarone at 1 mg/min, lasix infusion at 10 mg/hr, propofol at 40 mcg/kg/min and insulin at.9 units/hr/. See epic flow sheet for LVAD numbers and vital signs. CTS resident at bedside. Labs drawn and sent, ABG obtained.

## 2020-01-24 NOTE — ASSESSMENT & PLAN NOTE
Titrate insulin slowly to avoid hypoglycemia as the risk of hypoglycemia increases with decreased creatinine clearance.  Caution with insulin stacking  Estimated Creatinine Clearance: 45.1 mL/min (A) (based on SCr of 2.2 mg/dL (H)).

## 2020-01-24 NOTE — ANESTHESIA PROCEDURE NOTES
JACINTO    Diagnosis: Acute on chronic combined systolic and diastolic heart failure ICD-10-CM: I50.43   Patient location during procedure: OR  Procedure start time: 1/24/2020 12:50 PM  Timeout: 1/24/2020 12:50 PM  Surgery related to: RV failure  Exam type: Baseline  Staffing  Anesthesiologist: Kevan Bonilla MD  Performed: fellow and anesthesiologist   Preanesthetic Checklist  Completed: patient identified, surgical consent, pre-op evaluation, timeout performed, risks and benefits discussed, monitors and equipment checked, anesthesia consent given, oxygen available, suction available, hand hygiene performed and patient being monitored  Setup & Induction  Patient preparation: bite block inserted  Probe Insertion: easy  Exam: complete      Findings  Impression  Other Findings  Pre chest opening:  Severely depressed LV systolic function.  LVAD inflow/outflow cannula seen, laminar flows.   Moderately reduced RV systolic function. Base with mildly depressed contractility; apex severely hypokinetic.  No AI.  Mild MR.  Severe TR with systolic reversal in the hepatic veins.  Pulsatile flow in portal vein.   Right-to-left flows through PFO.  No effusions    Post chest opening:  Improved RV systolic function.  TR remains severe.   Decreased right-to-left shunt through the PFO.      Probe Removal      Exam     Left Heart  Left Atruim: dilated    Left Ventricle: cm, mildy abnormal (men 6.0-6.3; women 5.3-5.6)    LVAD:yes  Inflow Cannula and Direction: seen  Septum:D-shaped  Estimated Ejection Fraction: < 25% severe            Right Heart  Right Ventricle: dilated  Right Ventricle Function: moderately decreased  Right Atria: cm and moderately abnormal    Intra Atrial Septum  PFO: yes by color flow doppler  no IAS aneurysm  no lipomatous hypertrophy  no Atrial Septal Defect (Asd)    Right Ventricle  Size: dilated    Aortic Valve:  Morphology: trileaflet  Regurgitation: no aortic valve regurgitation     Mitral  Valve:  Morphology:normal  Prolapse: none  Flail: no flail  Jet Description: mildStenosis: no significant stenosis.    Tricuspid Valve:  Morphology: normal  Regurgitation: severe    Pulmonic Valve:  Morphology:normal  Regurgitation(color flow): mild    Great Vessels  IABP: no  Aorta    Descending aorta IABP: no    Effusions  Effusions: none    Summary  Findings discussed with surgeon.    Other Findings   Pre chest opening:  Severely depressed LV systolic function.  LVAD inflow/outflow cannula seen, laminar flows.   Moderately reduced RV systolic function. Base with mildly depressed contractility; apex severely hypokinetic.  No AI.  Mild MR.  Severe TR with systolic reversal in the hepatic veins.  Pulsatile flow in portal vein.   Right-to-left flows through PFO.  No effusions    Post chest opening:  Improved RV systolic function.  TR remains severe.   Decreased right-to-left shunt through the PFO.

## 2020-01-25 PROBLEM — I51.3 THROMBUS OF LEFT ATRIAL APPENDAGE: Status: RESOLVED | Noted: 2020-01-21 | Resolved: 2020-01-25

## 2020-01-25 LAB
ALBUMIN SERPL BCP-MCNC: 2.8 G/DL (ref 3.5–5.2)
ALLENS TEST: ABNORMAL
ALLENS TEST: NORMAL
ALP SERPL-CCNC: 46 U/L (ref 55–135)
ALT SERPL W/O P-5'-P-CCNC: 34 U/L (ref 10–44)
ANION GAP SERPL CALC-SCNC: 12 MMOL/L (ref 8–16)
ANION GAP SERPL CALC-SCNC: 15 MMOL/L (ref 8–16)
ANION GAP SERPL CALC-SCNC: 16 MMOL/L (ref 8–16)
ANION GAP SERPL CALC-SCNC: 16 MMOL/L (ref 8–16)
ANISOCYTOSIS BLD QL SMEAR: SLIGHT
APTT BLDCRRT: 25 SEC (ref 21–32)
APTT BLDCRRT: 25.6 SEC (ref 21–32)
APTT BLDCRRT: 26.2 SEC (ref 21–32)
APTT BLDCRRT: 26.6 SEC (ref 21–32)
APTT BLDCRRT: 26.6 SEC (ref 21–32)
APTT BLDCRRT: 27.9 SEC (ref 21–32)
AST SERPL-CCNC: 64 U/L (ref 10–40)
BACTERIA BLD CULT: NORMAL
BACTERIA BLD CULT: NORMAL
BASOPHILS # BLD AUTO: 0.08 K/UL (ref 0–0.2)
BASOPHILS # BLD AUTO: 0.09 K/UL (ref 0–0.2)
BASOPHILS # BLD AUTO: 0.11 K/UL (ref 0–0.2)
BASOPHILS # BLD AUTO: 0.12 K/UL (ref 0–0.2)
BASOPHILS NFR BLD: 0.4 % (ref 0–1.9)
BASOPHILS NFR BLD: 0.5 % (ref 0–1.9)
BASOPHILS NFR BLD: 0.5 % (ref 0–1.9)
BASOPHILS NFR BLD: 0.6 % (ref 0–1.9)
BILIRUB SERPL-MCNC: 0.7 MG/DL (ref 0.1–1)
BUN SERPL-MCNC: 54 MG/DL (ref 6–20)
BUN SERPL-MCNC: 57 MG/DL (ref 6–20)
BUN SERPL-MCNC: 61 MG/DL (ref 6–20)
BUN SERPL-MCNC: 63 MG/DL (ref 6–20)
BUN SERPL-MCNC: 64 MG/DL (ref 6–20)
BUN SERPL-MCNC: 68 MG/DL (ref 6–20)
CALCIUM SERPL-MCNC: 10 MG/DL (ref 8.7–10.5)
CALCIUM SERPL-MCNC: 9.5 MG/DL (ref 8.7–10.5)
CALCIUM SERPL-MCNC: 9.7 MG/DL (ref 8.7–10.5)
CALCIUM SERPL-MCNC: 9.8 MG/DL (ref 8.7–10.5)
CHLORIDE SERPL-SCNC: 100 MMOL/L (ref 95–110)
CHLORIDE SERPL-SCNC: 100 MMOL/L (ref 95–110)
CHLORIDE SERPL-SCNC: 101 MMOL/L (ref 95–110)
CHLORIDE SERPL-SCNC: 98 MMOL/L (ref 95–110)
CO2 SERPL-SCNC: 24 MMOL/L (ref 23–29)
CO2 SERPL-SCNC: 25 MMOL/L (ref 23–29)
CO2 SERPL-SCNC: 26 MMOL/L (ref 23–29)
CO2 SERPL-SCNC: 27 MMOL/L (ref 23–29)
CO2 SERPL-SCNC: 28 MMOL/L (ref 23–29)
CO2 SERPL-SCNC: 29 MMOL/L (ref 23–29)
CREAT SERPL-MCNC: 2.6 MG/DL (ref 0.5–1.4)
CREAT SERPL-MCNC: 2.7 MG/DL (ref 0.5–1.4)
CREAT SERPL-MCNC: 2.8 MG/DL (ref 0.5–1.4)
CREAT SERPL-MCNC: 2.9 MG/DL (ref 0.5–1.4)
CREAT SERPL-MCNC: 3 MG/DL (ref 0.5–1.4)
CREAT SERPL-MCNC: 3 MG/DL (ref 0.5–1.4)
DELSYS: ABNORMAL
DELSYS: NORMAL
DIFFERENTIAL METHOD: ABNORMAL
DIGOXIN SERPL-MCNC: 1 NG/ML (ref 0.8–2)
EOSINOPHIL # BLD AUTO: 0 K/UL (ref 0–0.5)
EOSINOPHIL NFR BLD: 0 % (ref 0–8)
ERYTHROCYTE [DISTWIDTH] IN BLOOD BY AUTOMATED COUNT: 15 % (ref 11.5–14.5)
ERYTHROCYTE [DISTWIDTH] IN BLOOD BY AUTOMATED COUNT: 15 % (ref 11.5–14.5)
ERYTHROCYTE [DISTWIDTH] IN BLOOD BY AUTOMATED COUNT: 15.1 % (ref 11.5–14.5)
ERYTHROCYTE [DISTWIDTH] IN BLOOD BY AUTOMATED COUNT: 15.1 % (ref 11.5–14.5)
ERYTHROCYTE [DISTWIDTH] IN BLOOD BY AUTOMATED COUNT: 15.2 % (ref 11.5–14.5)
ERYTHROCYTE [DISTWIDTH] IN BLOOD BY AUTOMATED COUNT: 15.2 % (ref 11.5–14.5)
ERYTHROCYTE [SEDIMENTATION RATE] IN BLOOD BY WESTERGREN METHOD: 18 MM/H
EST. GFR  (AFRICAN AMERICAN): 25.1 ML/MIN/1.73 M^2
EST. GFR  (AFRICAN AMERICAN): 25.1 ML/MIN/1.73 M^2
EST. GFR  (AFRICAN AMERICAN): 26.2 ML/MIN/1.73 M^2
EST. GFR  (AFRICAN AMERICAN): 27.3 ML/MIN/1.73 M^2
EST. GFR  (AFRICAN AMERICAN): 28.5 ML/MIN/1.73 M^2
EST. GFR  (AFRICAN AMERICAN): 29.9 ML/MIN/1.73 M^2
EST. GFR  (NON AFRICAN AMERICAN): 21.7 ML/MIN/1.73 M^2
EST. GFR  (NON AFRICAN AMERICAN): 21.7 ML/MIN/1.73 M^2
EST. GFR  (NON AFRICAN AMERICAN): 22.6 ML/MIN/1.73 M^2
EST. GFR  (NON AFRICAN AMERICAN): 23.6 ML/MIN/1.73 M^2
EST. GFR  (NON AFRICAN AMERICAN): 24.7 ML/MIN/1.73 M^2
EST. GFR  (NON AFRICAN AMERICAN): 25.8 ML/MIN/1.73 M^2
FIO2: 50
FIO2: 60
GIANT PLATELETS BLD QL SMEAR: PRESENT
GLUCOSE SERPL-MCNC: 132 MG/DL (ref 70–110)
GLUCOSE SERPL-MCNC: 136 MG/DL (ref 70–110)
GLUCOSE SERPL-MCNC: 161 MG/DL (ref 70–110)
GLUCOSE SERPL-MCNC: 162 MG/DL (ref 70–110)
GLUCOSE SERPL-MCNC: 169 MG/DL (ref 70–110)
GLUCOSE SERPL-MCNC: 181 MG/DL (ref 70–110)
HCO3 UR-SCNC: 26.7 MMOL/L (ref 24–28)
HCO3 UR-SCNC: 27.8 MMOL/L (ref 24–28)
HCO3 UR-SCNC: 27.9 MMOL/L (ref 24–28)
HCO3 UR-SCNC: 28.2 MMOL/L (ref 24–28)
HCO3 UR-SCNC: 29.1 MMOL/L (ref 24–28)
HCO3 UR-SCNC: 29.2 MMOL/L (ref 24–28)
HCO3 UR-SCNC: 30.8 MMOL/L (ref 24–28)
HCO3 UR-SCNC: 31.2 MMOL/L (ref 24–28)
HCT VFR BLD AUTO: 32.7 % (ref 40–54)
HCT VFR BLD AUTO: 32.9 % (ref 40–54)
HCT VFR BLD AUTO: 33 % (ref 40–54)
HCT VFR BLD AUTO: 33.4 % (ref 40–54)
HCT VFR BLD AUTO: 33.9 % (ref 40–54)
HCT VFR BLD AUTO: 34.2 % (ref 40–54)
HCT VFR BLD CALC: 29 %PCV (ref 36–54)
HCT VFR BLD CALC: 30 %PCV (ref 36–54)
HCT VFR BLD CALC: 31 %PCV (ref 36–54)
HCT VFR BLD CALC: 31 %PCV (ref 36–54)
HCT VFR BLD CALC: 32 %PCV (ref 36–54)
HGB BLD-MCNC: 10.2 G/DL (ref 14–18)
HGB BLD-MCNC: 10.2 G/DL (ref 14–18)
HGB BLD-MCNC: 10.4 G/DL (ref 14–18)
HGB BLD-MCNC: 10.5 G/DL (ref 14–18)
HGB BLD-MCNC: 10.6 G/DL (ref 14–18)
HGB BLD-MCNC: 10.8 G/DL (ref 14–18)
HYPOCHROMIA BLD QL SMEAR: ABNORMAL
IMM GRANULOCYTES # BLD AUTO: 0.26 K/UL (ref 0–0.04)
IMM GRANULOCYTES # BLD AUTO: 0.33 K/UL (ref 0–0.04)
IMM GRANULOCYTES # BLD AUTO: 0.42 K/UL (ref 0–0.04)
IMM GRANULOCYTES # BLD AUTO: 0.45 K/UL (ref 0–0.04)
IMM GRANULOCYTES # BLD AUTO: 0.47 K/UL (ref 0–0.04)
IMM GRANULOCYTES # BLD AUTO: 0.53 K/UL (ref 0–0.04)
IMM GRANULOCYTES NFR BLD AUTO: 1.3 % (ref 0–0.5)
IMM GRANULOCYTES NFR BLD AUTO: 1.7 % (ref 0–0.5)
IMM GRANULOCYTES NFR BLD AUTO: 2 % (ref 0–0.5)
IMM GRANULOCYTES NFR BLD AUTO: 2 % (ref 0–0.5)
IMM GRANULOCYTES NFR BLD AUTO: 2.2 % (ref 0–0.5)
IMM GRANULOCYTES NFR BLD AUTO: 2.6 % (ref 0–0.5)
INR PPP: 1.1 (ref 0.8–1.2)
INR PPP: 1.1 (ref 0.8–1.2)
INR PPP: 1.2 (ref 0.8–1.2)
INR PPP: 1.7 (ref 0.8–1.2)
LDH SERPL L TO P-CCNC: 1.01 MMOL/L (ref 0.36–1.25)
LDH SERPL L TO P-CCNC: 1.03 MMOL/L (ref 0.36–1.25)
LDH SERPL L TO P-CCNC: 1.18 MMOL/L (ref 0.36–1.25)
LDH SERPL L TO P-CCNC: 1.22 MMOL/L (ref 0.36–1.25)
LDH SERPL L TO P-CCNC: 1.33 MMOL/L (ref 0.36–1.25)
LDH SERPL L TO P-CCNC: 1.38 MMOL/L (ref 0.36–1.25)
LDH SERPL L TO P-CCNC: 1.4 MMOL/L (ref 0.36–1.25)
LDH SERPL L TO P-CCNC: 525 U/L (ref 110–260)
LYMPHOCYTES # BLD AUTO: 1 K/UL (ref 1–4.8)
LYMPHOCYTES # BLD AUTO: 1.1 K/UL (ref 1–4.8)
LYMPHOCYTES # BLD AUTO: 1.2 K/UL (ref 1–4.8)
LYMPHOCYTES # BLD AUTO: 1.4 K/UL (ref 1–4.8)
LYMPHOCYTES # BLD AUTO: 1.4 K/UL (ref 1–4.8)
LYMPHOCYTES # BLD AUTO: 1.6 K/UL (ref 1–4.8)
LYMPHOCYTES NFR BLD: 5.3 % (ref 18–48)
LYMPHOCYTES NFR BLD: 5.4 % (ref 18–48)
LYMPHOCYTES NFR BLD: 5.9 % (ref 18–48)
LYMPHOCYTES NFR BLD: 6.2 % (ref 18–48)
LYMPHOCYTES NFR BLD: 6.6 % (ref 18–48)
LYMPHOCYTES NFR BLD: 7.3 % (ref 18–48)
MAGNESIUM SERPL-MCNC: 2.7 MG/DL (ref 1.6–2.6)
MAGNESIUM SERPL-MCNC: 2.7 MG/DL (ref 1.6–2.6)
MAGNESIUM SERPL-MCNC: 3 MG/DL (ref 1.6–2.6)
MAGNESIUM SERPL-MCNC: 3.1 MG/DL (ref 1.6–2.6)
MAGNESIUM SERPL-MCNC: 3.1 MG/DL (ref 1.6–2.6)
MAGNESIUM SERPL-MCNC: 3.8 MG/DL (ref 1.6–2.6)
MCH RBC QN AUTO: 27.4 PG (ref 27–31)
MCH RBC QN AUTO: 27.7 PG (ref 27–31)
MCH RBC QN AUTO: 27.8 PG (ref 27–31)
MCH RBC QN AUTO: 28 PG (ref 27–31)
MCH RBC QN AUTO: 28.5 PG (ref 27–31)
MCH RBC QN AUTO: 28.6 PG (ref 27–31)
MCHC RBC AUTO-ENTMCNC: 30.4 G/DL (ref 32–36)
MCHC RBC AUTO-ENTMCNC: 31 G/DL (ref 32–36)
MCHC RBC AUTO-ENTMCNC: 31.2 G/DL (ref 32–36)
MCHC RBC AUTO-ENTMCNC: 31.3 G/DL (ref 32–36)
MCHC RBC AUTO-ENTMCNC: 31.8 G/DL (ref 32–36)
MCHC RBC AUTO-ENTMCNC: 32.3 G/DL (ref 32–36)
MCV RBC AUTO: 88 FL (ref 82–98)
MCV RBC AUTO: 89 FL (ref 82–98)
MCV RBC AUTO: 90 FL (ref 82–98)
MCV RBC AUTO: 90 FL (ref 82–98)
METHEMOGLOBIN: 0.8 % (ref 0–3)
MIN VOL: 10.3
MIN VOL: 10.5
MIN VOL: 11.3
MIN VOL: 11.6
MIN VOL: 9.6
MIN VOL: 9.62
MODE: ABNORMAL
MODE: NORMAL
MONOCYTES # BLD AUTO: 1.7 K/UL (ref 0.3–1)
MONOCYTES # BLD AUTO: 1.8 K/UL (ref 0.3–1)
MONOCYTES # BLD AUTO: 1.8 K/UL (ref 0.3–1)
MONOCYTES # BLD AUTO: 1.9 K/UL (ref 0.3–1)
MONOCYTES # BLD AUTO: 1.9 K/UL (ref 0.3–1)
MONOCYTES # BLD AUTO: 2.2 K/UL (ref 0.3–1)
MONOCYTES NFR BLD: 10.2 % (ref 4–15)
MONOCYTES NFR BLD: 8.3 % (ref 4–15)
MONOCYTES NFR BLD: 8.4 % (ref 4–15)
MONOCYTES NFR BLD: 8.8 % (ref 4–15)
MONOCYTES NFR BLD: 9.2 % (ref 4–15)
MONOCYTES NFR BLD: 9.5 % (ref 4–15)
NEUTROPHILS # BLD AUTO: 16.4 K/UL (ref 1.8–7.7)
NEUTROPHILS # BLD AUTO: 16.6 K/UL (ref 1.8–7.7)
NEUTROPHILS # BLD AUTO: 16.8 K/UL (ref 1.8–7.7)
NEUTROPHILS # BLD AUTO: 17.3 K/UL (ref 1.8–7.7)
NEUTROPHILS # BLD AUTO: 17.6 K/UL (ref 1.8–7.7)
NEUTROPHILS # BLD AUTO: 18.1 K/UL (ref 1.8–7.7)
NEUTROPHILS NFR BLD: 80.1 % (ref 38–73)
NEUTROPHILS NFR BLD: 81.6 % (ref 38–73)
NEUTROPHILS NFR BLD: 82.8 % (ref 38–73)
NEUTROPHILS NFR BLD: 82.8 % (ref 38–73)
NEUTROPHILS NFR BLD: 82.9 % (ref 38–73)
NEUTROPHILS NFR BLD: 84.1 % (ref 38–73)
NRBC BLD-RTO: 0 /100 WBC
OVALOCYTES BLD QL SMEAR: ABNORMAL
PCO2 BLDA: 35.6 MMHG (ref 35–45)
PCO2 BLDA: 37.5 MMHG (ref 35–45)
PCO2 BLDA: 39.9 MMHG (ref 35–45)
PCO2 BLDA: 39.9 MMHG (ref 35–45)
PCO2 BLDA: 41.2 MMHG (ref 35–45)
PCO2 BLDA: 41.6 MMHG (ref 35–45)
PCO2 BLDA: 43.2 MMHG (ref 35–45)
PCO2 BLDA: 44.7 MMHG (ref 35–45)
PEEP: 5
PH SMN: 7.38 [PH] (ref 7.35–7.45)
PH SMN: 7.45 [PH] (ref 7.35–7.45)
PH SMN: 7.46 [PH] (ref 7.35–7.45)
PH SMN: 7.46 [PH] (ref 7.35–7.45)
PH SMN: 7.47 [PH] (ref 7.35–7.45)
PH SMN: 7.48 [PH] (ref 7.35–7.45)
PH SMN: 7.5 [PH] (ref 7.35–7.45)
PH SMN: 7.5 [PH] (ref 7.35–7.45)
PHOSPHATE SERPL-MCNC: 5.5 MG/DL (ref 2.7–4.5)
PHOSPHATE SERPL-MCNC: 6.3 MG/DL (ref 2.7–4.5)
PHOSPHATE SERPL-MCNC: 6.4 MG/DL (ref 2.7–4.5)
PHOSPHATE SERPL-MCNC: 6.5 MG/DL (ref 2.7–4.5)
PHOSPHATE SERPL-MCNC: 6.7 MG/DL (ref 2.7–4.5)
PHOSPHATE SERPL-MCNC: 6.8 MG/DL (ref 2.7–4.5)
PIP: 19
PIP: 20
PIP: 21
PIP: 22
PLATELET # BLD AUTO: 199 K/UL (ref 150–350)
PLATELET # BLD AUTO: 200 K/UL (ref 150–350)
PLATELET # BLD AUTO: 201 K/UL (ref 150–350)
PLATELET # BLD AUTO: 203 K/UL (ref 150–350)
PLATELET # BLD AUTO: 208 K/UL (ref 150–350)
PLATELET # BLD AUTO: 214 K/UL (ref 150–350)
PLATELET BLD QL SMEAR: ABNORMAL
PMV BLD AUTO: 11.2 FL (ref 9.2–12.9)
PMV BLD AUTO: 11.5 FL (ref 9.2–12.9)
PMV BLD AUTO: 11.6 FL (ref 9.2–12.9)
PMV BLD AUTO: 11.7 FL (ref 9.2–12.9)
PO2 BLDA: 141 MMHG (ref 80–100)
PO2 BLDA: 145 MMHG (ref 80–100)
PO2 BLDA: 158 MMHG (ref 80–100)
PO2 BLDA: 159 MMHG (ref 80–100)
PO2 BLDA: 160 MMHG (ref 80–100)
PO2 BLDA: 164 MMHG (ref 80–100)
PO2 BLDA: 166 MMHG (ref 80–100)
PO2 BLDA: 33 MMHG (ref 40–60)
POC BE: 2 MMOL/L
POC BE: 4 MMOL/L
POC BE: 4 MMOL/L
POC BE: 5 MMOL/L
POC BE: 5 MMOL/L
POC BE: 6 MMOL/L
POC BE: 7 MMOL/L
POC BE: 8 MMOL/L
POC IONIZED CALCIUM: 1.13 MMOL/L (ref 1.06–1.42)
POC IONIZED CALCIUM: 1.14 MMOL/L (ref 1.06–1.42)
POC IONIZED CALCIUM: 1.15 MMOL/L (ref 1.06–1.42)
POC SATURATED O2: 100 % (ref 95–100)
POC SATURATED O2: 62 % (ref 95–100)
POC SATURATED O2: 99 % (ref 95–100)
POC SATURATED O2: 99 % (ref 95–100)
POC TCO2: 28 MMOL/L (ref 24–29)
POC TCO2: 29 MMOL/L (ref 23–27)
POC TCO2: 30 MMOL/L (ref 23–27)
POC TCO2: 30 MMOL/L (ref 23–27)
POC TCO2: 32 MMOL/L (ref 23–27)
POC TCO2: 33 MMOL/L (ref 23–27)
POCT GLUCOSE: 131 MG/DL (ref 70–110)
POCT GLUCOSE: 138 MG/DL (ref 70–110)
POCT GLUCOSE: 155 MG/DL (ref 70–110)
POCT GLUCOSE: 159 MG/DL (ref 70–110)
POCT GLUCOSE: 162 MG/DL (ref 70–110)
POCT GLUCOSE: 164 MG/DL (ref 70–110)
POCT GLUCOSE: 168 MG/DL (ref 70–110)
POCT GLUCOSE: 170 MG/DL (ref 70–110)
POCT GLUCOSE: 173 MG/DL (ref 70–110)
POCT GLUCOSE: 174 MG/DL (ref 70–110)
POCT GLUCOSE: 175 MG/DL (ref 70–110)
POCT GLUCOSE: 175 MG/DL (ref 70–110)
POCT GLUCOSE: 180 MG/DL (ref 70–110)
POIKILOCYTOSIS BLD QL SMEAR: SLIGHT
POLYCHROMASIA BLD QL SMEAR: ABNORMAL
POTASSIUM BLD-SCNC: 3.8 MMOL/L (ref 3.5–5.1)
POTASSIUM BLD-SCNC: 3.9 MMOL/L (ref 3.5–5.1)
POTASSIUM BLD-SCNC: 3.9 MMOL/L (ref 3.5–5.1)
POTASSIUM BLD-SCNC: 4 MMOL/L (ref 3.5–5.1)
POTASSIUM BLD-SCNC: 4.1 MMOL/L (ref 3.5–5.1)
POTASSIUM SERPL-SCNC: 3.8 MMOL/L (ref 3.5–5.1)
POTASSIUM SERPL-SCNC: 3.9 MMOL/L (ref 3.5–5.1)
POTASSIUM SERPL-SCNC: 4.2 MMOL/L (ref 3.5–5.1)
POTASSIUM SERPL-SCNC: 4.3 MMOL/L (ref 3.5–5.1)
PROT SERPL-MCNC: 6.6 G/DL (ref 6–8.4)
PROTHROMBIN TIME: 11.5 SEC (ref 9–12.5)
PROTHROMBIN TIME: 11.6 SEC (ref 9–12.5)
PROTHROMBIN TIME: 11.7 SEC (ref 9–12.5)
PROTHROMBIN TIME: 11.7 SEC (ref 9–12.5)
PROTHROMBIN TIME: 11.9 SEC (ref 9–12.5)
PROTHROMBIN TIME: 16.3 SEC (ref 9–12.5)
RBC # BLD AUTO: 3.64 M/UL (ref 4.6–6.2)
RBC # BLD AUTO: 3.68 M/UL (ref 4.6–6.2)
RBC # BLD AUTO: 3.69 M/UL (ref 4.6–6.2)
RBC # BLD AUTO: 3.78 M/UL (ref 4.6–6.2)
RBC # BLD AUTO: 3.79 M/UL (ref 4.6–6.2)
RBC # BLD AUTO: 3.81 M/UL (ref 4.6–6.2)
SAMPLE: ABNORMAL
SAMPLE: NORMAL
SITE: ABNORMAL
SITE: NORMAL
SODIUM BLD-SCNC: 137 MMOL/L (ref 136–145)
SODIUM BLD-SCNC: 138 MMOL/L (ref 136–145)
SODIUM BLD-SCNC: 139 MMOL/L (ref 136–145)
SODIUM BLD-SCNC: 139 MMOL/L (ref 136–145)
SODIUM SERPL-SCNC: 139 MMOL/L (ref 136–145)
SODIUM SERPL-SCNC: 140 MMOL/L (ref 136–145)
SODIUM SERPL-SCNC: 141 MMOL/L (ref 136–145)
SP02: 100
SP02: 99
SP02: 99
VANCOMYCIN SERPL-MCNC: 11.5 UG/ML
VT: 500
WBC # BLD AUTO: 19.53 K/UL (ref 3.9–12.7)
WBC # BLD AUTO: 20.04 K/UL (ref 3.9–12.7)
WBC # BLD AUTO: 20.61 K/UL (ref 3.9–12.7)
WBC # BLD AUTO: 20.92 K/UL (ref 3.9–12.7)
WBC # BLD AUTO: 21.83 K/UL (ref 3.9–12.7)
WBC # BLD AUTO: 21.99 K/UL (ref 3.9–12.7)

## 2020-01-25 PROCEDURE — 99232 PR SUBSEQUENT HOSPITAL CARE,LEVL II: ICD-10-PCS | Mod: ,,, | Performed by: NURSE PRACTITIONER

## 2020-01-25 PROCEDURE — 20000000 HC ICU ROOM

## 2020-01-25 PROCEDURE — 85610 PROTHROMBIN TIME: CPT | Mod: 91

## 2020-01-25 PROCEDURE — 93750 INTERROGATION VAD IN PERSON: CPT | Mod: ,,, | Performed by: INTERNAL MEDICINE

## 2020-01-25 PROCEDURE — 99291 CRITICAL CARE FIRST HOUR: CPT | Mod: ,,, | Performed by: PHYSICIAN ASSISTANT

## 2020-01-25 PROCEDURE — 63600367 HC NITRIC OXIDE PER HOUR

## 2020-01-25 PROCEDURE — 63600175 PHARM REV CODE 636 W HCPCS: Performed by: THORACIC SURGERY (CARDIOTHORACIC VASCULAR SURGERY)

## 2020-01-25 PROCEDURE — 94761 N-INVAS EAR/PLS OXIMETRY MLT: CPT

## 2020-01-25 PROCEDURE — 25000003 PHARM REV CODE 250: Performed by: STUDENT IN AN ORGANIZED HEALTH CARE EDUCATION/TRAINING PROGRAM

## 2020-01-25 PROCEDURE — C9113 INJ PANTOPRAZOLE SODIUM, VIA: HCPCS | Performed by: SURGERY

## 2020-01-25 PROCEDURE — 80162 ASSAY OF DIGOXIN TOTAL: CPT

## 2020-01-25 PROCEDURE — 27000248 HC VAD-ADDITIONAL DAY

## 2020-01-25 PROCEDURE — 83735 ASSAY OF MAGNESIUM: CPT | Mod: 91

## 2020-01-25 PROCEDURE — 83615 LACTATE (LD) (LDH) ENZYME: CPT

## 2020-01-25 PROCEDURE — 80053 COMPREHEN METABOLIC PANEL: CPT

## 2020-01-25 PROCEDURE — 85730 THROMBOPLASTIN TIME PARTIAL: CPT | Mod: 91

## 2020-01-25 PROCEDURE — 80202 ASSAY OF VANCOMYCIN: CPT

## 2020-01-25 PROCEDURE — 84132 ASSAY OF SERUM POTASSIUM: CPT

## 2020-01-25 PROCEDURE — A4216 STERILE WATER/SALINE, 10 ML: HCPCS | Performed by: SURGERY

## 2020-01-25 PROCEDURE — 84100 ASSAY OF PHOSPHORUS: CPT | Mod: 91

## 2020-01-25 PROCEDURE — 63600175 PHARM REV CODE 636 W HCPCS: Performed by: STUDENT IN AN ORGANIZED HEALTH CARE EDUCATION/TRAINING PROGRAM

## 2020-01-25 PROCEDURE — 84100 ASSAY OF PHOSPHORUS: CPT

## 2020-01-25 PROCEDURE — 80048 BASIC METABOLIC PNL TOTAL CA: CPT | Mod: 91

## 2020-01-25 PROCEDURE — 25000003 PHARM REV CODE 250: Performed by: THORACIC SURGERY (CARDIOTHORACIC VASCULAR SURGERY)

## 2020-01-25 PROCEDURE — 27200966 HC CLOSED SUCTION SYSTEM

## 2020-01-25 PROCEDURE — 63600175 PHARM REV CODE 636 W HCPCS: Performed by: SURGERY

## 2020-01-25 PROCEDURE — 99900026 HC AIRWAY MAINTENANCE (STAT)

## 2020-01-25 PROCEDURE — 99291 PR CRITICAL CARE, E/M 30-74 MINUTES: ICD-10-PCS | Mod: ,,, | Performed by: PHYSICIAN ASSISTANT

## 2020-01-25 PROCEDURE — 83735 ASSAY OF MAGNESIUM: CPT

## 2020-01-25 PROCEDURE — 37799 UNLISTED PX VASCULAR SURGERY: CPT

## 2020-01-25 PROCEDURE — 85730 THROMBOPLASTIN TIME PARTIAL: CPT

## 2020-01-25 PROCEDURE — 94003 VENT MGMT INPAT SUBQ DAY: CPT

## 2020-01-25 PROCEDURE — 83050 HGB METHEMOGLOBIN QUAN: CPT

## 2020-01-25 PROCEDURE — 83605 ASSAY OF LACTIC ACID: CPT

## 2020-01-25 PROCEDURE — 85610 PROTHROMBIN TIME: CPT

## 2020-01-25 PROCEDURE — 25000003 PHARM REV CODE 250: Performed by: PHYSICIAN ASSISTANT

## 2020-01-25 PROCEDURE — 99232 SBSQ HOSP IP/OBS MODERATE 35: CPT | Mod: ,,, | Performed by: NURSE PRACTITIONER

## 2020-01-25 PROCEDURE — 84295 ASSAY OF SERUM SODIUM: CPT

## 2020-01-25 PROCEDURE — 63600175 PHARM REV CODE 636 W HCPCS: Performed by: NURSE PRACTITIONER

## 2020-01-25 PROCEDURE — 85014 HEMATOCRIT: CPT

## 2020-01-25 PROCEDURE — 93750 PR INTERROGATE VENT ASSIST DEV, IN PERSON, W PHYSICIAN ANALYSIS: ICD-10-PCS | Mod: ,,, | Performed by: INTERNAL MEDICINE

## 2020-01-25 PROCEDURE — 25000003 PHARM REV CODE 250: Performed by: SURGERY

## 2020-01-25 PROCEDURE — 82330 ASSAY OF CALCIUM: CPT

## 2020-01-25 PROCEDURE — 99900035 HC TECH TIME PER 15 MIN (STAT)

## 2020-01-25 PROCEDURE — 85025 COMPLETE CBC W/AUTO DIFF WBC: CPT | Mod: 91

## 2020-01-25 PROCEDURE — 80048 BASIC METABOLIC PNL TOTAL CA: CPT

## 2020-01-25 PROCEDURE — 27000221 HC OXYGEN, UP TO 24 HOURS

## 2020-01-25 PROCEDURE — 82803 BLOOD GASES ANY COMBINATION: CPT

## 2020-01-25 RX ORDER — LANOLIN ALCOHOL/MO/W.PET/CERES
400 CREAM (GRAM) TOPICAL 3 TIMES DAILY
Status: DISCONTINUED | OUTPATIENT
Start: 2020-01-25 | End: 2020-02-08

## 2020-01-25 RX ORDER — AMIODARONE HYDROCHLORIDE 200 MG/1
400 TABLET ORAL 3 TIMES DAILY
Status: DISCONTINUED | OUTPATIENT
Start: 2020-01-25 | End: 2020-01-26

## 2020-01-25 RX ORDER — POTASSIUM CHLORIDE 14.9 MG/ML
20 INJECTION INTRAVENOUS ONCE
Status: COMPLETED | OUTPATIENT
Start: 2020-01-25 | End: 2020-01-25

## 2020-01-25 RX ORDER — POTASSIUM CHLORIDE 20 MEQ/15ML
40 SOLUTION ORAL ONCE
Status: COMPLETED | OUTPATIENT
Start: 2020-01-25 | End: 2020-01-25

## 2020-01-25 RX ORDER — INSULIN ASPART 100 [IU]/ML
0-5 INJECTION, SOLUTION INTRAVENOUS; SUBCUTANEOUS
Status: DISCONTINUED | OUTPATIENT
Start: 2020-01-25 | End: 2020-01-26

## 2020-01-25 RX ORDER — DIGOXIN 0.25 MG/ML
0.25 INJECTION INTRAMUSCULAR; INTRAVENOUS ONCE
Status: COMPLETED | OUTPATIENT
Start: 2020-01-25 | End: 2020-01-25

## 2020-01-25 RX ORDER — GLUCAGON 1 MG
1 KIT INJECTION
Status: DISCONTINUED | OUTPATIENT
Start: 2020-01-25 | End: 2020-01-26

## 2020-01-25 RX ORDER — IBUPROFEN 200 MG
16 TABLET ORAL
Status: DISCONTINUED | OUTPATIENT
Start: 2020-01-25 | End: 2020-01-26

## 2020-01-25 RX ORDER — CEFEPIME HYDROCHLORIDE 2 G/1
2 INJECTION, POWDER, FOR SOLUTION INTRAVENOUS
Status: DISCONTINUED | OUTPATIENT
Start: 2020-01-25 | End: 2020-01-30

## 2020-01-25 RX ORDER — IBUPROFEN 200 MG
24 TABLET ORAL
Status: DISCONTINUED | OUTPATIENT
Start: 2020-01-25 | End: 2020-01-26

## 2020-01-25 RX ORDER — HEPARIN SODIUM 10000 [USP'U]/100ML
400 INJECTION, SOLUTION INTRAVENOUS CONTINUOUS
Status: DISCONTINUED | OUTPATIENT
Start: 2020-01-25 | End: 2020-01-26

## 2020-01-25 RX ADMIN — POTASSIUM CHLORIDE 40 MEQ: 20 SOLUTION ORAL at 12:01

## 2020-01-25 RX ADMIN — ASPIRIN 325 MG ORAL TABLET 325 MG: 325 PILL ORAL at 08:01

## 2020-01-25 RX ADMIN — POLYETHYLENE GLYCOL 3350 17 G: 17 POWDER, FOR SOLUTION ORAL at 08:01

## 2020-01-25 RX ADMIN — HYDRALAZINE HYDROCHLORIDE 10 MG: 20 INJECTION INTRAMUSCULAR; INTRAVENOUS at 02:01

## 2020-01-25 RX ADMIN — Medication 3 ML: at 10:01

## 2020-01-25 RX ADMIN — DIGOXIN 0.25 MG: 0.25 INJECTION INTRAMUSCULAR; INTRAVENOUS at 11:01

## 2020-01-25 RX ADMIN — EPINEPHRINE 0.08 MCG/KG/MIN: 1 INJECTION INTRAMUSCULAR; INTRAVENOUS; SUBCUTANEOUS at 02:01

## 2020-01-25 RX ADMIN — FENTANYL CITRATE 25 MCG: 50 INJECTION INTRAMUSCULAR; INTRAVENOUS at 04:01

## 2020-01-25 RX ADMIN — HYDRALAZINE HYDROCHLORIDE 10 MG: 20 INJECTION INTRAMUSCULAR; INTRAVENOUS at 03:01

## 2020-01-25 RX ADMIN — MUPIROCIN: 20 OINTMENT TOPICAL at 08:01

## 2020-01-25 RX ADMIN — FERROUS GLUCONATE TAB 324 MG (37.5 MG ELEMENTAL IRON) 324 MG: 324 (37.5 FE) TAB at 07:01

## 2020-01-25 RX ADMIN — PROPOFOL 30 MCG/KG/MIN: 10 INJECTION, EMULSION INTRAVENOUS at 02:01

## 2020-01-25 RX ADMIN — DIGOXIN 0.25 MG: 0.25 INJECTION INTRAMUSCULAR; INTRAVENOUS at 09:01

## 2020-01-25 RX ADMIN — CHLOROTHIAZIDE SODIUM 250 MG: 500 INJECTION, POWDER, LYOPHILIZED, FOR SOLUTION INTRAVENOUS at 08:01

## 2020-01-25 RX ADMIN — CEFEPIME 2 G: 2 INJECTION, POWDER, FOR SOLUTION INTRAVENOUS at 02:01

## 2020-01-25 RX ADMIN — EPINEPHRINE 0.09 MCG/KG/MIN: 1 INJECTION INTRAMUSCULAR; INTRAVENOUS; SUBCUTANEOUS at 04:01

## 2020-01-25 RX ADMIN — VANCOMYCIN HYDROCHLORIDE 750 MG: 750 INJECTION, POWDER, LYOPHILIZED, FOR SOLUTION INTRAVENOUS at 07:01

## 2020-01-25 RX ADMIN — AMIODARONE HYDROCHLORIDE 400 MG: 200 TABLET ORAL at 02:01

## 2020-01-25 RX ADMIN — PANTOPRAZOLE SODIUM 40 MG: 40 INJECTION, POWDER, FOR SOLUTION INTRAVENOUS at 08:01

## 2020-01-25 RX ADMIN — HEPARIN SODIUM AND DEXTROSE 100 UNITS/HR: 10000; 5 INJECTION INTRAVENOUS at 02:01

## 2020-01-25 RX ADMIN — INSULIN ASPART 1 UNITS: 100 INJECTION, SOLUTION INTRAVENOUS; SUBCUTANEOUS at 11:01

## 2020-01-25 RX ADMIN — PROPOFOL 15 MCG/KG/MIN: 10 INJECTION, EMULSION INTRAVENOUS at 06:01

## 2020-01-25 RX ADMIN — PROPOFOL 25 MCG/KG/MIN: 10 INJECTION, EMULSION INTRAVENOUS at 11:01

## 2020-01-25 RX ADMIN — FUROSEMIDE 20 MG/HR: 10 INJECTION, SOLUTION INTRAMUSCULAR; INTRAVENOUS at 03:01

## 2020-01-25 RX ADMIN — AMIODARONE HYDROCHLORIDE 400 MG: 200 TABLET ORAL at 08:01

## 2020-01-25 RX ADMIN — DIGOXIN 0.25 MG: 0.25 INJECTION INTRAMUSCULAR; INTRAVENOUS at 02:01

## 2020-01-25 RX ADMIN — POTASSIUM CHLORIDE 20 MEQ: 14.9 INJECTION, SOLUTION INTRAVENOUS at 10:01

## 2020-01-25 RX ADMIN — FENTANYL CITRATE 25 MCG: 50 INJECTION INTRAMUSCULAR; INTRAVENOUS at 08:01

## 2020-01-25 RX ADMIN — POTASSIUM CHLORIDE 40 MEQ: 20 SOLUTION ORAL at 08:01

## 2020-01-25 RX ADMIN — Medication 3 ML: at 06:01

## 2020-01-25 RX ADMIN — AMIODARONE HYDROCHLORIDE 400 MG: 200 TABLET ORAL at 09:01

## 2020-01-25 RX ADMIN — LIDOCAINE HYDROCHLORIDE 1.25 MG/MIN: 8 INJECTION, SOLUTION INTRAVENOUS at 02:01

## 2020-01-25 RX ADMIN — FENTANYL CITRATE 25 MCG: 50 INJECTION INTRAMUSCULAR; INTRAVENOUS at 01:01

## 2020-01-25 RX ADMIN — FUROSEMIDE 17.5 MG/HR: 10 INJECTION, SOLUTION INTRAMUSCULAR; INTRAVENOUS at 02:01

## 2020-01-25 NOTE — PROGRESS NOTES
Patient remaining intubated on A/C FiO2 60%/ PEEP5, NO 30. Patient with heart mate 3, speed 5200, flows around 4.5, PI 3.8-4.1, power 3.8. No issues with LVAD today. Patient on epi at 0.09 mcg/kg/min, amio at 0.5 mg/min, insulin at 1 unit/hr, lasix at 20 mg/hr and cardene titrated to keep MAP <85. Patient given several doses of lasix throughout the day with no response in urine output. Patient given a dose of diuril after coming out of the OR the second time. Urine output responded appropriately. Urine output  mL/hr since diuril given. Most recent CVP trends are 16-18.     Propofol infusion currently off. Patient opens eyes to voice; however, patient has no response to stimuli in any of his extremities. Pupils are 3 mm, reactive, brisk and equal. Dr. Marks notified of neuro assessment. Will continue to pause propofol and re-assess the neuro status.    With with several episodes of tachy-arrhythmias this evening with a heart rate in the 150-160's. Dr. Schmitt and Dr. Joseph at bedside intermittently and aware of patient's heart rate and rhythm. No change in blood pressure or LVAD numbers when patient in the arrhythmia. EP consulted and discusses patient's plan of care with Dr. Schmitt at the bedside. Patient given digoxin 0.25 mg IV push x 2 doses. Patient also given an amiodarone bolus. Amio infusion maintained at 0.5 mg/min per Dr. Schmitt's directive. Patient given 100 mg lidocaine IV push x2. Lidocaine infusion ordered, awaiting lidocaine infusion from pharmacy. Patient given 2 grams of calcium gluconate and 1 gram of magnesium sulfate per Dr. Marks's orders. Patient remains in a.fib with runs of Savor. Report given to MAURI Castro RN, who will hang lidocaine infusion when it arrives and continue to update MD's as needed.

## 2020-01-25 NOTE — SUBJECTIVE & OBJECTIVE
Interval History:  S/p Chest closure, opening for poor RV function. No acute events overnight. Continued with intermittent tachyarrythmias. Digoxin given 0.25 mg x 2, amio infusion running at 0.5 mg/min. Also given 100 mg lidocaine IV push x2, now running on gtt. UOP poor throughout day yesterday, however after being given diuril UOP responded well. CVP in lower 20's overnight.    Follow-up For: Procedure(s) (LRB):  CLOSURE, WOUND, STERNUM (N/A)  INSERTION-RIGHT VENTRICULAR ASSIST DEVICE (Right)    Post-Operative Day: Day of Surgery    Objective:     Vital Signs (Most Recent):  Temp: (!) 100.4 °F (38 °C) (01/25/20 0000)  Pulse: (!) 128 (01/25/20 0000)  Resp: (!) 27 (01/24/20 1730)  BP: (!) 84/0 (01/24/20 2300)  SpO2: 100 % (01/25/20 0000) Vital Signs (24h Range):  Temp:  [99.9 °F (37.7 °C)-101.3 °F (38.5 °C)] 100.4 °F (38 °C)  Pulse:  [] 128  Resp:  [17-27] 27  SpO2:  [90 %-100 %] 100 %  BP: (84-97)/(0-72) 84/0  Arterial Line BP: ()/(62-82) 92/77     Weight: 110.8 kg (244 lb 4.3 oz)  Body mass index is 35.05 kg/m².      Intake/Output Summary (Last 24 hours) at 1/25/2020 0007  Last data filed at 1/25/2020 0000  Gross per 24 hour   Intake 4550 ml   Output 2912 ml   Net 1638 ml       Physical Exam   Constitutional: He appears well-developed and well-nourished.   HENT:   Head: Normocephalic and atraumatic.   Mouth/Throat: No oropharyngeal exudate.   ETT and OG secured in place.   Eyes: Pupils are equal, round, and reactive to light.   Neck: Normal range of motion. Neck supple.   Left IJ double lumen + Hinton Heena in place, dressing CLD  Right IJ triple lumen, dressing CLD   Cardiovascular:   Irregular tachyarrythmia. LVAD hum.    Pulmonary/Chest: Breath sounds normal. He has no wheezes. He has no rales.   Intubated. Open chest, dressed with Ioban. CTs with SS drainage.   Abdominal: Soft. He exhibits no distension.   Musculoskeletal: He exhibits no edema or deformity.   Neurological:   sedated   Skin: Skin is  warm and dry.     Vents:  Vent Mode: A/C (01/24/20 2322)  Ventilator Initiated: Yes (01/21/20 0905)  Set Rate: 18 bmp (01/24/20 2322)  Vt Set: 500 mL (01/24/20 2322)  PEEP/CPAP: 5 cmH20 (01/24/20 2322)  Oxygen Concentration (%): 60 (01/25/20 0000)  Peak Airway Pressure: 21 cmH2O (01/24/20 2322)  Plateau Pressure: 20 cmH20 (01/24/20 2322)  Total Ve: 13.3 mL (01/24/20 2322)  F/VT Ratio<105 (RSBI): (!) 38.54 (01/24/20 1601)    Lines/Drains/Airways     Central Venous Catheter Line                 Percutaneous Central Line Insertion/Assessment - double lumen  01/23/20 0751 left internal jugular 1 day         Pulmonary Artery Catheter Assessment  01/23/20 0751 1 day         Trialysis (Dialysis) Catheter 01/24/20 1500 right internal jugular less than 1 day          Drain                 Urethral Catheter 01/21/20 0752 Non-latex;Straight-tip;Temperature probe 16 Fr. 3 days         Chest Tube 01/23/20 1230 1 Right Pleural 1 day         Chest Tube 01/23/20 1414 2 Right Mediastinal 1 day         Chest Tube 01/23/20 1415 3 Left Mediastinal 1 day         NG/OG Tube 01/24/20 2130 nasogastric Right nostril less than 1 day          Airway                 Airway - Non-Surgical 01/21/20 0742 Endotracheal Tube 3 days          Arterial Line                 Arterial Line 01/21/20 0730 Left Other (Comment) 3 days         Arterial Line 01/23/20 1335 Right Radial 1 day          Line                 VAD 01/23/20 1148 Left ventricular assist device HeartMate 3 1 day          Peripheral Intravenous Line                 Peripheral IV - Single Lumen 01/22/20 1730 20 G Right Hand 2 days         Peripheral IV - Single Lumen 01/22/20 1800 20 G Left Hand 2 days                Significant Labs:    CBC/Anemia Profile:  Recent Labs   Lab 01/24/20  1037  01/24/20  1545  01/24/20 2010 01/24/20 2117 01/24/20  2219 01/24/20  2325   WBC 18.16*  --  17.87*  --  18.67*  --   --   --   --    HGB 11.2*  --  10.1*  --  10.8*  --   --   --   --    HCT 35.7*    < > 32.7*   < > 33.7*   < > 31* 43 30*     --  171  --  179  --   --   --   --    MCV 90  --  90  --  89  --   --   --   --    RDW 15.0*  --  15.0*  --  14.7*  --   --   --   --     < > = values in this interval not displayed.        Chemistries:  Recent Labs   Lab 01/23/20  0355 01/23/20  1423  01/24/20  0334  01/24/20  1037 01/24/20  1545 01/24/20 2010    137   < >  --    < > 140 141 137   K 4.0 3.4*   < >  --    < > 4.7 4.5 4.2   CL 94* 96   < >  --    < > 102 103 99   CO2 32* 26   < >  --    < > 25 26 24   BUN 36* 38*   < >  --    < > 46* 49* 50*   CREATININE 2.0* 1.9*   < >  --    < > 2.2* 2.4* 2.6*   CALCIUM 9.7 9.4   < >  --    < > 9.3 8.8 9.9   ALBUMIN 3.1* 2.6*  --  3.0*  --   --   --   --    PROT 7.1 6.8  --  6.4  --   --   --   --    BILITOT 1.2* 2.2*  --  1.1*  --   --   --   --    ALKPHOS 46* 45*  --  40*  --   --   --   --    ALT 31 32  --  34  --   --   --   --    AST 16 78*  --  97*  --   --   --   --    MG 2.4 2.7*   < > 2.8*   < > 2.7* 2.6 2.8*   PHOS  --  2.6*   < > 4.2   < > 5.8* 4.9* 4.9*    < > = values in this interval not displayed.       ABGs:   Recent Labs   Lab 01/24/20  2325   PH 7.493*   PCO2 35.3   HCO3 27.1   POCSATURATED 100   BE 4     Coagulation:   Recent Labs   Lab 01/24/20 2010   INR 1.1   APTT 25.1     Lactic Acid:   Recent Labs   Lab 01/23/20  1423   LACTATE 4.1*       Significant Imaging:  I have reviewed all pertinent imaging results/findings within the past 24 hours.

## 2020-01-25 NOTE — ASSESSMENT & PLAN NOTE
BG goal 140-180    Discontinue intensive IV insulin infusion protocol  Convert to transition IV insulin infusion at 1 u/hr  Low dose correction scale  BG monitoring every 4 hours while NPO    Discharge planning: ISABEL

## 2020-01-25 NOTE — ANESTHESIA POSTPROCEDURE EVALUATION
Anesthesia Post Evaluation    Patient: Tae Delgado    Procedure(s) Performed: Procedure(s) (LRB):  EXPLORATION, WOUND (N/A)  CLOSURE, WOUND, STERNUM    Final Anesthesia Type: general    Patient location during evaluation: ICU  Patient participation: No - Unable to Participate, Sedation  Level of consciousness: sedated  Post-procedure vital signs: reviewed and stable  Airway patency: patent      Anesthetic complications: no      Cardiovascular status: hemodynamically stable  Respiratory status: ETT and ventilator  Follow-up not needed.          Vitals Value Taken Time   BP 82/0 1/25/2020  3:11 AM   Temp 37.4 °C (99.3 °F) 1/25/2020  5:00 AM   Pulse 132 1/25/2020  7:04 AM   Resp 25 1/25/2020  1:09 AM   SpO2 99 % 1/25/2020  7:04 AM   Vitals shown include unvalidated device data.      No case tracking events are documented in the log.      Pain/Dequan Score: Pain Rating Prior to Med Admin: 7 (1/25/2020  4:07 AM)  Pain Rating Post Med Admin: 0 (1/25/2020  4:37 AM)

## 2020-01-25 NOTE — PROGRESS NOTES
01/25/2020  Gage Carias    Current provider:  Ino Schimtt MD      I, Gage Carias, rounded on Tae Delgado to ensure all mechanical assist device settings (IABP or VAD) were appropriate and all parameters were within limits.  I was able to ensure all back up equipment was present, the staff had no issues, and the Perfusion Department daily rounding was complete.    5:27 PM

## 2020-01-25 NOTE — ASSESSMENT & PLAN NOTE
Tae Delgado is a 59 y.o. 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 Approved for DT LVAD. Went to OR on 1/21 and found to have DAMON and LV thrombus and case was aborted. Pt was placed on a heparin gtt and thrombus had dissipated on repeat JACINTO on 1/22. Pt underwent LVAD placement on 1/23. Chest closure and re-exploration on 1/24. Returned from OR with chest open on 1/24.    Neuro:  - Patient opens eyes to voice; however, patient has no response to stimuli in any of his extremities. Pupils are 3 mm, reactive, brisk and equal  - Prop gtt with prn Fentanyl    CVS:   - Current LVAD flow @ 5300 with appropriate PIs   - Epi @ 0.09, Nitric @ 20, Vaso @ 0.04  - Amio gtt, Lidocaine gtt for tachyarrythmia  - ICD turned on, EP service consulted for assistance with arrythmia  - Chest closure pending, consent obtained    Pulm:  - Mechanical ventilation while chest open  - ABGs per CTS, prn  - CXR daily  - Monitor CT output, no overt signs of bleeding overnight    GI:  - NPO  - Protonix 40 daily  - Docusate    Endo:   - Insulin gtt   - Endocrine consulted, appreciate their services    Renal:  - Strict I/O  - Trend BUN/Cr  - Lasix gtt, diuril    Lytes:  - Monitor labs  - Replace per protocol    Heme:  - No blood products needed during the OR  - H/H stable overnight    ID:   - Lona op ABX  - Follow WBC  - Follow fever curve    PPx:  - A/C per CTS  - GI ppx    Dispo:  - Cont SICU care   - Chest closure pending. Follow up with EP about tachyarrythmia.

## 2020-01-25 NOTE — PROGRESS NOTES
Patient arrived back to SICU room 04661 from OR. Patient intubated in inhaled NO. Patient on epi, cardene, amio, lasix, propofol and insulin infusions. Patient arrived with chest incision open and ioban dressing over the chest incision. CTS team and SICU team at bedside, including Dr. Schmitt and Dr. Joseph. Labs drawn and sent, ABG performed. Family updated per Dr. Schmitt.

## 2020-01-25 NOTE — PROGRESS NOTES
"[facility]  Endocrinology  Progress Note    Admit Date: 1/9/2020     Reason for Consult: Management of Hyperglycemia     Surgical Procedure and Date: LVAD 1/23/20    Lab Results   Component Value Date    HGBA1C 6.2 (H) 01/15/2020       HPI:   Patient is a 59 y.o. male with a diagnosis of afib, KRISTIN on CKD, and CHF, who is s/p LVAD placement on 1/23/20.  No personal history of DM but slightly elevated A1C upon admission.  Endocrinology consulted for BG management.            Interval HPI:   Overnight events: Remains in ICU, arrhythmias noted overnight.  BG well controlled on intensive insulin protocol, rate steady at 1 u/hr.  Noted creatinine of 2.8.  Febrile, on IV antibiotics.  Eating:   NPO  Nausea: No  Hypoglycemia and intervention: No  Fever: Yes, 101.3  TPN and/or TF: No    BP (!) 84/0 (BP Location: Right arm, Patient Position: Lying)   Pulse (!) 117   Temp 99.5 °F (37.5 °C) (Core ([place]))   Resp (!) 25   Ht 5' 10" (1.778 m)   Wt 109.8 kg (242 lb 1 oz)   SpO2 99%   BMI 34.73 kg/m²      Labs Reviewed and Include    Recent Labs   Lab 01/25/20  0413 01/25/20  0800   * 161*   CALCIUM 9.7 9.7   ALBUMIN 2.8*  --    PROT 6.6  --     141   K 4.3 3.9   CO2 24 26    100   BUN 57* 61*   CREATININE 2.7* 2.8*   ALKPHOS 46*  --    ALT 34  --    AST 64*  --    BILITOT 0.7  --      Lab Results   Component Value Date    WBC 20.92 (H) 01/25/2020    HGB 10.4 (L) 01/25/2020    HCT 34.2 (L) 01/25/2020    MCV 90 01/25/2020     01/25/2020     No results for input(s): TSH, FREET4 in the last 168 hours.  Lab Results   Component Value Date    HGBA1C 6.2 (H) 01/15/2020       Nutritional status:   Body mass index is 34.73 kg/m².  Lab Results   Component Value Date    ALBUMIN 2.8 (L) 01/25/2020    ALBUMIN 3.0 (L) 01/24/2020    ALBUMIN 2.6 (L) 01/23/2020     Lab Results   Component Value Date    PREALBUMIN 11 (L) 01/24/2020    PREALBUMIN 27 01/15/2020       Estimated Creatinine " Clearance: 35.2 mL/min (A) (based on SCr of 2.8 mg/dL (H)).    Accu-Checks  Recent Labs     01/24/20  2210 01/24/20  2304 01/24/20  2356 01/25/20  0107 01/25/20  0206 01/25/20  0316 01/25/20  0412 01/25/20  0503 01/25/20  0557 01/25/20  0718   POCTGLUCOSE 163* 143* 155* 175* 168* 162* 173* 170* 174* 159*       Current Medications and/or Treatments Impacting Glycemic Control  Immunotherapy:    Immunosuppressants     None        Steroids:   Hormones (From admission, onward)    None        Pressors:    Autonomic Drugs (From admission, onward)    Start     Stop Route Frequency Ordered    01/24/20 2300  EPINEPHrine (ADRENALIN) 5 mg in sodium chloride 0.9% 250 mL infusion     Question Answer Comment   Titrate by: (in mcg/kg/min) 0.02    Titrate interval: (in minutes) 10    Titrate to maintain: (SBP or MAP or Cardiac Index) MAP    Greater than: (in mmHg) 60    Maximum dose: (in mcg/kg/min) 1        -- IV Continuous 01/24/20 2151    01/23/20 1430  norepinephrine 4 mg in dextrose 5% 250 mL infusion (premix) (titrating)     Question Answer Comment   Titrate by: (in mcg/kg/min) 0.05    Titrate interval: (in minutes) 5    Titrate to maintain: (MAP or SBP) MAP    Greater than: (in mmHg) 60    Maximum dose: (in mcg/kg/min) 3        -- IV Continuous 01/23/20 1325        Hyperglycemia/Diabetes Medications:   Antihyperglycemics (From admission, onward)    Start     Stop Route Frequency Ordered    01/23/20 1430  insulin regular 100 Units in sodium chloride 0.9% 100 mL infusion     Question:  Insulin Rate Adjustment (DO NOT MODIFY ANSWER)  Answer:  [email or web address]    -- IV Continuous 01/23/20 1325          ASSESSMENT and PLAN    * LVAD (left ventricular assist device) present  S/p LVAD on 1/23/20  Managed per primary team  Avoid hypoglycemia  Optimize BG control for surgical wound healing        Acute hyperglycemia  BG goal 140-180    Discontinue intensive IV insulin  infusion protocol  Convert to transition IV insulin infusion at 1 u/hr  Low dose correction scale  BG monitoring every 4 hours while NPO    Discharge planning: TBD        Acute on chronic combined systolic and diastolic heart failure  Managed per primary team  S/p LVAD        Acute kidney injury superimposed on chronic kidney disease  Titrate insulin slowly to avoid hypoglycemia as the risk of hypoglycemia increases with decreased creatinine clearance.  Caution with insulin stacking  Estimated Creatinine Clearance: 34 mL/min (A) (based on SCr of 2.9 mg/dL (H)).            Jan Moore, JENNIFER  Endocrinology  [facility] [address]

## 2020-01-25 NOTE — HPI
Mr. Tae Delgado is a 58 yo M with PMHx significant for NICMP LVEF 15% (dx 2010), s/p ICD, hx LV thrombus (with prior splenic and renal emboli), pAF, hx embolic CVA, HTN, DLD, and CKD III (baseline ~ 2.0) who was initially [facility] on 1/9 with volume overload and ADHF. Patient diuresed but subsequently transferred to ICU on 1/16. He was started on advanced options pathway and was approved for LVAD as DT. He underwent LVAD placement on 1/23/2020.  He was taken back to the OR for exploration of possible tamponade/RV failure on the 24th as he had a reduction in UOP with elevation in his CVP.  After the chest was re-opened there was noted improvement in LV filling and PI on LVAD so decision made not to pursue RVAD placement and he was transferred back to ICU for closer monitoring.  He was started on lasix infusion with addition of diuril with an improvement in his UOP and HDS.  Nephrology was consulted for evaluation for possible need for RRT.

## 2020-01-25 NOTE — PLAN OF CARE
"      SICU PLAN OF CARE NOTE    Dx: s/p LVAD    Shift Events: Diuril administered this am. Decreased Lasix and Lidocaine. 3 doses Digoxin administered and PO Amio started to try and decrease HR. FiO2 and NO weaned. Insulin transition protocol.     Goals of Care: Frequent I/O calculation. Continue diuresis with urine output goal no less than 100 cc/h. Minimal fluid intake to keep patient fluid balance negative. Trend labs. Cautious K+ replacement due to upward trending creatinine. Decrease NO @ 1800 to 20 ppm.     Neuro: Sedated, Follows Commands and Moves All Extremities with sedation vacations.     Vital Signs: BP (!) 88/0 (BP Location: Right arm, Patient Position: Lying)   Pulse (!) 126   Temp 98.4 °F (36.9 °C) (Core ([place]))   Resp 20   Ht 5' 10" (1.778 m)   Wt 109.8 kg (242 lb 1 oz)   SpO2 99%   BMI 34.73 kg/m²     Respiratory: Ventilator. AC 50% +5 ; NO 20 ppm @ 1800; ABGs q6h    Diet: NPO except meds    Gtts: Propfol, Insulin, Epinephrine, Lidocaine, Lasix and Heparin    Urine Output: Urinary Catheter >1.8 L cc/shift; net negative >800 ml    Drains: Chest tubes 101 ml/shift    VAD RPM 5200. Flow 4s. PI and Power 3s; no alarms      Labs/Accuchecks: Chem/PTT/INR/CBC/accuchecks q4h; ABG with lytes and lactic q6h    Skin: Open Chest; Bilateral heels offloaded with waffle boots. Frequent weight shift, but unable to turn. HOB 15 degrees.        "

## 2020-01-25 NOTE — OP NOTE
DATE OF PROCEDURE:  01/23/2020.    PREOPERATIVE DIAGNOSES:  1.  Acute-on-chronic systolic and diastolic heart failure, NYHA class IV,   INTERMACS 2.  2.  Cardiogenic shock.  3.  Acute kidney injury, superimposed on chronic kidney injury.  4.  Nonsustained ventricular tachycardia.  5.  Severe hepatic congestion.  6.  Right lower lobe pulmonary nodule.  7.  Gout.  8.  Thrombus with questionable thrombus in the left atrial appendage.  9.  Obesity.    POSTOPERATIVE DIAGNOSES:  1.  Acute-on-chronic systolic and diastolic heart failure, NYHA class IV,   INTERMACS 2.  2.  Cardiogenic shock.  3.  Acute kidney injury, superimposed on chronic kidney injury.  4.  Nonsustained ventricular tachycardia.  5.  Severe hepatic congestion.  6.  Right lower lobe pulmonary nodule.  7.  Gout.  8.  Thrombus with questionable thrombus in the left atrial appendage.  9.  Obesity.  10.  Mild aortic insufficiency.  11.  Moderate-to-severe mitral regurgitation.    OPERATIONS:  1.  Aortic valve repair.  2.  Mitral valve repair, [id number] a technique-Samir stitch through the LV   apex.  3.  Implantation of left ventricular assist device, HeartMate III.  4.  Temporary closure of the chest.    STAFF SURGEON:  Ino Schmitt M.D.    FIRST ASSISTANT:  Dr. Terence Gonzalez.    ANESTHESIA:  GETA.    ESTIMATED BLOOD LOSS:  200 mL    KEY FINDINGS OF THE OPERATION:  1.  Moderate-to-severe mitral regurgitation was present, which after repair   reduced to mild.  2.  The patient had mild plus aortic insufficiency in a period in the setting of   placement of continuous flow device, repair of aortic valve is necessary to   prevent further progression, which would cause significant problems for the   patient.  As a result, aortic valve repair was carried out and no aortic   insufficiency seen after aortic valve repair.  3.  Diffuse coagulopathy.  4.  Severe RV dysfunction.    INDICATION OF OPERATION:  This is a 59-year-old gentleman who came in with    cardiogenic shock and acute on chronic systolic and diastolic heart failure with   severely elevated end organs including elevated creatinine.  The patient was   found to be a destination therapy candidate for LVAD.  Risks and benefits were   discussed and different treatment options along with different types of left   ventricular devices being present.  The patient understood all and wanted to   proceed with implantation of left ventricular assist device, HeartMate III.  An   informed consent was obtained.    DESCRIPTION OF OPERATION:  The patient was brought to the Operating Room and   placed in a supine position.  After induction of anesthesia, the area was   prepped and draped in the usual sterile fashion.  An upper midline incision was   made, which was carried all the way down to the sternum.  Median sternotomy was   then performed.  Sternal edges were cauterized.  Chest retractor was placed.    Pericardium was then opened up.  The left hemidiaphragm was taken down.    Preperitoneal space was dissected for creation of pocket.  Once that was done,   systemic heparinization was done.  ACT greater than 450 was obtained.    Cannulation stitches were placed.  Arterial cannula was placed in the ascending   aorta.  Venous cannula was placed in the right atrial appendage.  Retrograde   cardioplegia catheter was placed in the coronary sinus.  The patient was placed   on cardiopulmonary bypass.  CO2 was used to flood the operative field   continuously with 4 liters to decrease chances of air embolism.  Heart was then   cross-clamp was applied.  Retrograde cardioplegia was given.  Low aortotomy was   made transversely.  A handheld cardioplegia catheter was used to obtain arrest   in diastole.  Once that was done, visualization of the aortic valve was done.    There was significant prolapse of the noncoronary cusp, aortic valve repair   initiated.  A 5-0 Prolene pledgeted stitch was passed through node of Arantius    through each leaflet and the sutures were then tied.  The valve was tested.    There continued to be a prolapse of the noncoronary cusp and the left coronary   cusp.  At that time, another pledgeted stitch was placed to plicate at the   sinotubular junction of the noncoronary cusp and the left coronary cusp valve   was again tested.  No leak was noted at this time.  The transverse aortotomy was   closed by 4-0 Prolene stitch in two layers.  Once that was achieved, a dose of   hot shot was delivered through the retrograde cardioplegia catheter and the   crossclamp was removed.  Instant cardiac activity was noted.  Once that was   achieved, heart was then brought to the field by placing multiple lap sponges   behind the heart.  LV apex was identified in multiple views.  LV apex was cored   out and submitted for pathology.  Multiple #2 Ethibond pledgeted stitches were   placed, all circumferentially that helped in opening of the LV apex.  The   malleable retractors were placed in the LV apex of the mitral valve was then   tested and that was analyzed.  No broken cords were noted.  The posterior   leaflet was found to be tethered.  A2 and P2 segments of the anterior and   posterior leaflets were found to be good quality.  Edge to edge repair of the A2   and P2 segments of the anterior and posterior leaflet was initiated by using a   pledgeted 5-0 Ethibond stitch.  The leaflets were then plicated together.  The   sutures were tied down.  The valve was tested and excellent repair was noted.    Once that was done, the LV apical stitches that had been placed around were now   passed through the sewing ring.  Needles were cut and passed off the field.    Sutures were then tied down.  The LVAD was brought to the field.  It was lowered   into the sewing ring and secured with locking mechanism.  Heart was lowered   back into the chest cavity by removing all the lap sponges and the LVAD was   placed in the preperitoneal space.   The tunneling device was used to tunnel the   driveline out at the level of the umbilicus in the right mid clavicular line.    This was connected to the console.  The outflow portion of the LVAD was then   connected to the cardiopulmonary bypass suction to de-air.  Full ventilation was   resumed to further aid in de-airing process.  Outflow was first brought to the   field, was measured to appropriate length and beveled at 45 degrees angle.  A   side-biting clamp was applied on the ascending aorta.  Aortotomy was made using   a 5-0 Prolene stitch, a continuous running anastomosis was performed.  Once that   was done, De-airing of the outflow graft was carried out.  Clamp was placed on   the outflow graft.  Wet to wet connection of the outflow graft to the LVAD was   carried out.  Once that was done, root vent was then placed in the ascending   aorta for de-airing purposes.  The LVAD was then started at 3000 RPMs.    Gradually, the patient was weaned off from cardiopulmonary bypass on inotropic   support.  The patient  on inotropic support and after ensuring absence   of any intracardiac air, the clamp on the outflow graft was removed.  With   volume loading, the speed of the pump was increased from 3000 RPMs all the way   to 5500 RPMs.  At that stage, the septum appeared to be midline.  There was a   left interatrial septum going towards the left side indicating right atrial   pressures.  However, the patient's hemodynamic was excellent and a decision was   made to leave the setting at that speed.  A test dose of protamine was given   followed by full dose of protamine to reverse the effects of systemic heparin.    Midway dose, all the various catheters and cannulas were removed.  Due to   diffuse coagulopathy and severe RV dysfunction, decision was made to leave the   chest open as a standard operating procedure for our practice.  Two mediastinal   and one right pleural drain was placed for drainage  purposes and brought through   separate skin incision.  Temporary closure of the sternum was achieved by using   Esmarch and Ioban to obtain an airtight seal.  The driveline exit site was   sutured with a 2-0 Ethibond stitch.    MEDICARE ATTESTATION:  I was available for all parts of the operation and Dr. Gonzalez acted as my first assistant.      ADRIAN  dd: 01/24/2020 17:44:48 (CST)  td: 01/24/2020 18:22:04 (CST)  Doc ID   #[id number]  Job ID #[id number]    CC:

## 2020-01-25 NOTE — ASSESSMENT & PLAN NOTE
Titrate insulin slowly to avoid hypoglycemia as the risk of hypoglycemia increases with decreased creatinine clearance.  Caution with insulin stacking  Estimated Creatinine Clearance: 34 mL/min (A) (based on SCr of 2.9 mg/dL (H)).

## 2020-01-25 NOTE — ASSESSMENT & PLAN NOTE
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   - CTS primary  - Taken back to OR 1/24 for possible RVAD placement which was not done. Patient remained hemodynamically stable in OR  - Currently hemodynamically stable on Epi, Cardene, Lasix, Lidocaine, and [place] 30  -Would increase Lasix drip and wean Lidocaine off  - Current speed 5300    Procedure: Device Interrogation Including analysis of device parameters  Current Settings: Ventricular Assist Device  Review of device function is stable/unstabe    TXP LVAD INTERROGATIONS 1/25/2020 1/25/2020 1/25/2020 1/25/2020 1/25/2020 1/25/2020 1/25/2020   Type - - - HeartMate3 - - HeartMate3   Flow 4.4 4.4 4.5 4.3 4.3 4.3 4.1   Speed 5200 5200 5200 5200 5200 5200 5200   PI 3.4 3.3 3.1 3.6 3.3 3.3 3.8   Power ([place]) 3.7 3.8 3.8 3.8 3.8 3.8 3.7   LSL - - - 4800 4800 4800 4800   Pulsatility - - - No Pulse - - No Pulse

## 2020-01-25 NOTE — ASSESSMENT & PLAN NOTE
- Incidental finding on CT chest/abd/pelvis on 1/12. Pulmonology consulted, and rec repeat CT of chest in 3 months  - Will need to follow-up in pulmonary clinic.    Excision Wound Care Instructions  I will experience scar, altered skin color, bleeding, swelling, pain, crusting and redness. I may experience altered sensation. Risks are excessive bleeding, infection, muscle weakness, thick (hypertrophic or keloidal) scar, and recurrence,. A second procedure may be recommended to obtain the best cosmetic or functional result.  Possible complications of any surgical procedure are bleeding, infection, scarring, alteration in skin color and sensation, muscle weakness in the area, wound dehiscence or seperation, or recurrence of the lesion or disease. On occasion, after healing, a secondary procedure or revision may be recommended in order to obtain the best cosmetic or functional result.   After your surgery, a pressure bandage will be placed over the area that has sutures. This will help prevent bleeding. Please follow these instructions as they will help you to prevent complications as your wound heals.  For the First 48 hours After Surgery:  1. Leave the pressure bandage on and keep it dry. If it should come loose, you may retape it, but do not take it off.  2. Relax and take it easy. Do not do any vigorous exercise, heavy lifting, or bending forward. This could cause the wound to bleed.  3. Post-operative pain is usually mild. You may take plain or extra strength Tylenol every 4 hours as needed (do not take more than 4,000mg in one day). Do not take any medicine that contains aspirin, ibuprofen or motrin unless you have been recommended these by a doctor.  Avoid alcohol and vitamin E as these may increase your tendency to bleed.  4. You may put an ice pack around the bandaged area for 20 minutes every 2-3 hours. This may help reduce swelling, bruising, and pain. Make sure the ice pack is waterproof so that the pressure bandage does not get wet.   5. You may see a small amount of drainage or blood on your pressure bandage. This is normal. However, if drainage or bleeding continues  or saturates the bandage, you will need to apply firm pressure over the bandage with a washcloth for 15 minutes. If bleeding continues after applying pressure for 15 minutes then go to the nearest emergency room.  48 Hours After Surgery  Carefully remove the bandage and start daily wound care and dressing changes. You may also now shower and get the wound wet. Wash wound with a mild soap and water.  Use caution when washing the wound. Be gentle and do not let the forceful shower stream hit the wound directly.  PAT dry.  Daily Wound Care:  1. Wash wound with a mild soap and water.  Use caution when washing the wound, be gentle and do not let the forceful shower stream hit the wound directly.  2. PAT DRY.  3. Once steri strips fall off apply Vaseline (from a new container or tube) over the suture line with a Q-tip. It is very important to keep the wound continuously moist, as wounds heal best in a moist environment.  4.  Keep the site covered for two weeks, you can cover it with a Telfa (non-stick) dressing and tape or a band-aid.    5. If you are unable to keep wound covered, you must apply Vaseline every 2 - 3 hours (while awake) after steri strips fall off to ensure it is being kept moist for optimal healing. A dressing overnight is recommended to keep the area moist.   Call Us If:  1. You have pain that is not controlled with Tylenol.  2. You have signs or symptoms of an infection, such as: fever over 100 degrees F, redness, warmth, or foul-smelling or yellow/creamy drainage from the wound.  Who should I call with questions?    [facility]: [phone]     [facility]: [phone]    For urgent needs outside of business hours call the [facility] at [phone] and ask for the dermatology resident on call

## 2020-01-25 NOTE — CONSULTS
[facility]  Nephrology  Consult Note    Patient Name: Tae Delgado  MRN: [id number]  Admission Date: 1/9/2020  Hospital Length of Stay: 15 days  Attending Provider: Ino Schmitt MD   Primary Care Physician: Elham Pearson MD  Principal Problem:Acute on chronic combined systolic and diastolic heart failure    Inpatient consult to Nephrology  Consult performed by: Carlos Vera NP  Consult ordered by: Micki Christianson MD  Reason for consult: KRISTIN        Subjective:     HPI: Mr. Tae Delgado is a 58 yo M with PMHx significant for NICMP LVEF 15% (dx 2010), s/p ICD, hx LV thrombus (with prior splenic and renal emboli), pAF, hx embolic CVA, HTN, DLD, and CKD III (baseline ~ 2.0) who was initially admitted from [facility] clinic on 1/9 with volume overload and ADHF. Patient diuresed but subsequently transferred to ICU on 1/16. He was started on advanced options pathway and was approved for LVAD as DT. He underwent LVAD placement on 1/23/2020.  He was taken back to the OR for exploration of possible tamponade/RV failure on the 24th as he had a reduction in UOP with elevation in his CVP.  After the chest was re-opened there was noted improvement in LV filling and PI on LVAD so decision made not to pursue RVAD placement and he was transferred back to ICU for closer monitoring.  He was started on lasix infusion with addition of diuril with an improvement in his UOP and HDS.  Nephrology was consulted for evaluation for possible need for RRT.    Past Medical History:   Diagnosis Date    CHF (congestive heart failure) 1/2010    Dx  1/2010 w/ decreased LV systolic function (EF 15%) by ECHO 1/2015    COCM (congestive cardiomyopathy) 7/20/2016    Hyperlipidemia     Hypertension     Paroxysmal atrial fibrillation     Pulmonary embolus 2008    Stroke     Superficial thrombophlebitis        Past Surgical History:   Procedure Laterality Date    LEFT VENTRICULAR ASSIST DEVICE Left 1/23/2020    Procedure:  INSERTION-LEFT VENTRICULAR ASSIST DEVICE;  Surgeon: Ino Schmitt MD;  [facility];  Service: Cardiovascular;  Laterality: Left;  DT HM3    RIGHT HEART CATHETERIZATION Right 1/16/2020    Procedure: INSERTION, CATHETER, RIGHT HEART;  Surgeon: Isiah Montero MD;  Location: [facility] CATH LAB;  Service: Cardiology;  Laterality: Right;    TONSILLECTOMY      VEIN LIGATION AND STRIPPING         Review of patient's allergies indicates:   Allergen Reactions    Biopatch [chlorhexidine gluconate]      Site burning    Dobutamine in d5w      Tachycardia, tremors, SOB, flushing    Percocet [oxycodone-acetaminophen] Itching    Penicillins Rash     Cefepime given on 1/23/2020 without issue     Current Facility-Administered Medications   Medication Frequency    adenosine (ADENOCARD) 3 mg/mL injection     albumin human 5% bottle 500 mL PRN    albuterol sulfate nebulizer solution 2.5 mg Q4H PRN    albuterol sulfate nebulizer solution 2.5 mg Q4H PRN    amiodarone 360 mg/200 mL (1.8 mg/mL) infusion Continuous    aspirin EC tablet 325 mg Daily    aspirin tablet 325 mg Daily    bisacodyl suppository 10 mg Daily PRN    calcium gluconate 2 g in dextrose 5 % 100 mL IVPB Once    ceFEPIme injection 2 g Q12H    And    vancomycin in dextrose 5 % 1 gram/250 mL IVPB 1,000 mg Q24H    dextrose 10% (D10W) Bolus PRN    dextrose 10% (D10W) Bolus PRN    dextrose 5 % and 0.45 % NaCl with KCl 40 mEq infusion Continuous    docusate sodium capsule 200 mg QHS    docusate sodium capsule 50 mg BID    EPINEPHrine (ADRENALIN) 5 mg in sodium chloride 0.9% 250 mL infusion Continuous    fentaNYL injection 25 mcg Q1H PRN    ferrous gluconate tablet 324 mg Daily with breakfast    furosemide (LASIX) 2 mg/mL in sodium chloride 0.9% 100 mL infusion (conc: 2 mg/mL) Continuous    insulin regular 100 Units in sodium chloride 0.9% 100 mL infusion Continuous    magnesium hydroxide 400 mg/5 ml suspension 2,400 mg Daily PRN     magnesium sulfate 1 g in dextrose 5 % 50 mL IVPB Once    magnesium sulfate 2g in water 50mL IVPB (premix) PRN    mupirocin 2 % ointment BID    niCARdipine 40 mg/200 mL infusion Continuous    nitric oxide gas Gas 30 ppm Continuous    norepinephrine 4 mg in dextrose 5% 250 mL infusion (premix) (titrating) Continuous    oxyCODONE immediate release tablet 5 mg Q4H PRN    oxyCODONE immediate release tablet Tab 10 mg Q4H PRN    pantoprazole EC tablet 40 mg Daily    pantoprazole injection 40 mg Daily    polyethylene glycol packet 17 g BID    potassium chloride 40 mEq in 100 mL IVPB (FOR CENTRAL LINE ADMINISTRATION ONLY) Once    propofol (DIPRIVAN) 10 mg/mL infusion Continuous    sodium chloride 0.9% flush 10 mL PRN    sodium chloride 0.9% flush 3 mL Q8H     Family History     Problem Relation (Age of Onset)    Cancer Mother        Tobacco Use    Smoking status: Never Smoker    Smokeless tobacco: Never Used   Substance and Sexual Activity    Alcohol use: No    Drug use: No    Sexual activity: Not on file     Review of Systems   Unable to perform ROS: Acuity of condition     Objective:     Vital Signs (Most Recent):  Temp: (!) 100.5 °F (38.1 °C) (01/24/20 1700)  Pulse: (!) 146 (01/24/20 1815)  Resp: (!) 27 (01/24/20 1730)  BP: (!) 86/0 (01/24/20 1130)  SpO2: 99 % (01/24/20 1815)  O2 Device (Oxygen Therapy): ventilator (01/24/20 1800) Vital Signs (24h Range):  Temp:  [99.9 °F (37.7 °C)-101.2 °F (38.4 °C)] 100.5 °F (38.1 °C)  Pulse:  [] 146  Resp:  [17-27] 27  SpO2:  [90 %-100 %] 99 %  BP: (84-98)/(0-75) 86/0  Arterial Line BP: ()/(62-81) 87/74     Weight: 110.8 kg (244 lb 4.3 oz) (01/23/20 1530)  Body mass index is 35.05 kg/m².  Body surface area is 2.34 meters squared.    I/O last 3 completed shifts:  In: 5849 [I.V.:5284; Other:415; NG/GT:50; IV Piggyback:100]  Out: 3450 [Urine:3215; Chest Tube:235]    Physical Exam   Constitutional: He appears well-developed. No distress. He is sedated and  intubated.   HENT:   Head: Normocephalic and atraumatic.   Right Ear: External ear normal.   Left Ear: External ear normal.   Cardiovascular: Tachycardia present.   RVAD hum   Pulmonary/Chest: Effort normal and breath sounds normal. He is intubated. He has no rales.   Musculoskeletal: He exhibits edema. He exhibits no deformity.   Skin: Skin is warm and dry. He is not diaphoretic.   Sternal surgical wound, drsg intact       Significant Labs:  CBC:   Recent Labs   Lab 01/24/20  1545  01/24/20  1803   WBC 17.87*  --   --    RBC 3.63*  --   --    HGB 10.1*  --   --    HCT 32.7*   < > 30*     --   --    MCV 90  --   --    MCH 27.8  --   --    MCHC 30.9*  --   --     < > = values in this interval not displayed.     CMP:   Recent Labs   Lab 01/24/20  0334  01/24/20  1545   GLU  --    < > 173*   CALCIUM  --    < > 8.8   ALBUMIN 3.0*  --   --    PROT 6.4  --   --    NA  --    < > 141   K  --    < > 4.5   CO2  --    < > 26   CL  --    < > 103   BUN  --    < > 49*   CREATININE  --    < > 2.4*   ALKPHOS 40*  --   --    ALT 34  --   --    AST 97*  --   --    BILITOT 1.1*  --   --     < > = values in this interval not displayed.           Assessment/Plan:     Acute kidney injury superimposed on chronic kidney disease  KRISTIN on CKD III  Baseline SCr ~ 2.0    60 yo male s/p LVAD placement on 1/23 who was taken back to OR for exploration over concerns for RV failure/tamponade due to elevated CVP and decrease UOP.  Upon opening of chest, he had improved hemodynamics and some improvement in UOP and decision made to leave chest open and transfer back to ICU for closer monitoring on 1/24.  He was administered IV lasix + diuril with improvement in his UOP.      DDx for KRISTIN includes ATN from renal hypoperfusion vs. CRS from RV overload    Plan/recommendations:  -No urgent need for RRT, responded well to diuretics  -continue trending RFP and UOP  -diuretics per primary team  -will follow labs closely, please call if urgent need for  ultrafiltration is warranted.      Carlos Fernández NP  Nephrology  [facility]

## 2020-01-25 NOTE — SUBJECTIVE & OBJECTIVE
**Interval History: CVP elevated post chest closure; taken back to OR for poor RV function not responding to Lasix for possible RVAD placement.  In OR; hemodynamics stable so no RVAD placed.  Increased Lasix drip and started Diuril.  Had tachyarrhythmias; EP consulted and patient started on Digoxin.  Overnight; Lidocaine infusion started and Amiodarone off.  Given Hydralazine prn to decrease use of Cardene.  Patient is intubated and sedated this morning.  He is net negative 238cc in the last 24 hours.  CVP: 21, SVO2: 62, CO: 7.18, CI: 3.08, SVR: 702.    Lines:  Arterial Line: 1/23/20  Left IJ: 1/23/20  PA catheter    Continuous Infusions:   epinephrine 0.08 mcg/kg/min (01/25/20 0940)    furosemide (LASIX) 2 mg/mL infusion (non-titrating) 17.5 mg/hr (01/25/20 0940)    heparin (porcine) in 5 % dex 300 Units/hr (01/25/20 0940)    insulin (HUMAN R) infusion (adults)      lidocaine 1.25 mg/min (01/25/20 0940)    niCARdipine Stopped (01/25/20 0900)    nitric oxide gas      norepinephrine bitartrate-D5W Stopped (01/23/20 1430)    propofol 25 mcg/kg/min (01/25/20 0900)     Scheduled Meds:   amiodarone  400 mg Per NG tube TID    aspirin  325 mg Oral Daily    aspirin  325 mg Oral Daily    vancomycin (VANCOCIN) IVPB  750 mg Intravenous Q24H    And    ceFEPime (MAXIPIME) IVPB  2 g Intravenous Q12H    docusate sodium  200 mg Oral QHS    docusate sodium  50 mg Oral BID    ferrous gluconate  324 mg Oral Daily with breakfast    mupirocin   Nasal BID    pantoprazole  40 mg Oral Daily    pantoprazole  40 mg Intravenous Daily    polyethylene glycol  17 g Oral BID    potassium chloride in water  40 mEq Intravenous Once    sodium chloride 0.9%  3 mL Intravenous Q8H     PRN Meds:albumin human 5%, albuterol sulfate, bisacodyl, Dextrose 10% Bolus, Dextrose 10% Bolus, Dextrose 10% Bolus, Dextrose 10% Bolus, fentaNYL, glucagon (human recombinant), glucose, glucose, hydrALAZINE, insulin aspart U-100, magnesium hydroxide  400 mg/5 ml, magnesium sulfate IVPB, oxyCODONE, oxyCODONE, sodium chloride 0.9%    Review of patient's allergies indicates:   Allergen Reactions    Biopatch [chlorhexidine gluconate]      Site burning    Dobutamine in d5w      Tachycardia, tremors, SOB, flushing    Percocet [oxycodone-acetaminophen] Itching    Penicillins Rash     Cefepime given on 1/23/2020 without issue     Objective:     Vital Signs (Most Recent):  Temp: 99.5 °F (37.5 °C) (01/25/20 0700)  Pulse: (!) 116 (01/25/20 0930)  Resp: (!) 25 (01/25/20 0109)  BP: (!) 84/0 (01/25/20 0700)  SpO2: 99 % (01/25/20 0930) Vital Signs (24h Range):  Temp:  [99.3 °F (37.4 °C)-101.3 °F (38.5 °C)] 99.5 °F (37.5 °C)  Pulse:  [] 116  Resp:  [17-27] 25  SpO2:  [90 %-100 %] 99 %  BP: (82-92)/(0-72) 84/0  Arterial Line BP: ()/(62-82) 84/70     Patient Vitals for the past 72 hrs (Last 3 readings):   Weight   01/25/20 0330 109.8 kg (242 lb 1 oz)   01/23/20 1530 110.8 kg (244 lb 4.3 oz)     Body mass index is 34.73 kg/m².      Intake/Output Summary (Last 24 hours) at 1/25/2020 0958  Last data filed at 1/25/2020 0900  Gross per 24 hour   Intake 3694 ml   Output 4297 ml   Net -603 ml       Hemodynamic Parameters:  PAP: (40-55)/(10-27) 45/15  PAP (Mean):  [27 mmHg-36 mmHg] 29 mmHg  PCWP:  [11 mmHg-27 mmHg] 25 mmHg  CO:  [3.9 L/min-8 L/min] 5.4 L/min  CI:  [1.7 L/min/m2-3.4 L/min/m2] 2.3 L/min/m2    Telemetry: A fib  Physical Exam   Constitutional: Intubated and sedated; chest open    Eyes: Pupils are equal, round, and reactive to light. Conjunctivae are normal.   Neck: Neck supple. No thyromegaly present. Bilateral IJ lines   Cardiovascular: Irregularly irregular, tachycardic, smooth VAD hum. Chest open   Pulmonary/Chest: Effort normal and breath sounds normal. Intubated and sedated   Abdominal: Soft.   Musculoskeletal: He exhibits no edema.   Neurological: Intubated and sedated   Skin: Skin is warm and dry. Capillary refill takes 2 to 3 seconds.        Significant Labs:  CBC:  Recent Labs   Lab 01/25/20  0001  01/25/20  0413  01/25/20  0706 01/25/20  0800 01/25/20  0910   WBC 20.04*  --  19.53*  --   --  20.92*  --    RBC 3.81*  --  3.78*  --   --  3.79*  --    HGB 10.6*  --  10.8*  --   --  10.4*  --    HCT 33.9*   < > 33.4*   < > 31* 34.2* 30*     --  208  --   --  201  --    MCV 89  --  88  --   --  90  --    MCH 27.8  --  28.6  --   --  27.4  --    MCHC 31.3*  --  32.3  --   --  30.4*  --     < > = values in this interval not displayed.     BNP:  Recent Labs   Lab 01/20/20  0300 01/24/20  0334   * 968*     CMP:  Recent Labs   Lab 01/23/20  1423  01/24/20  0334  01/25/20 0001 01/25/20  0413 01/25/20  0800   *   < >  --    < > 162* 169* 161*   CALCIUM 9.4   < >  --    < > 9.8 9.7 9.7   ALBUMIN 2.6*  --  3.0*  --   --  2.8*  --    PROT 6.8  --  6.4  --   --  6.6  --       < >  --    < > 141 140 141   K 3.4*   < >  --    < > 4.2 4.3 3.9   CO2 26   < >  --    < > 25 24 26   CL 96   < >  --    < > 101 100 100   BUN 38*   < >  --    < > 54* 57* 61*   CREATININE 1.9*   < >  --    < > 2.6* 2.7* 2.8*   ALKPHOS 45*  --  40*  --   --  46*  --    ALT 32  --  34  --   --  34  --    AST 78*  --  97*  --   --  64*  --    BILITOT 2.2*  --  1.1*  --   --  0.7  --     < > = values in this interval not displayed.      Coagulation:   Recent Labs   Lab 01/25/20  0001 01/25/20  0413 01/25/20  0800   INR 1.1 1.2 1.1   APTT 26.2 25.0 26.6     LDH:  Recent Labs   Lab 01/23/20  1423 01/24/20  0334 01/25/20  0505   * 570* 525*     Microbiology:  Microbiology Results (last 7 days)     Procedure Component Value Units Date/Time    Blood culture [[id number]] Collected:  01/20/20 1035    Order Status:  Completed Specimen:  Blood from Peripheral, Wrist, Left Updated:  01/24/20 1412     Blood Culture, Routine No Growth to date      No Growth to date      No Growth to date      No Growth to date      No Growth to date    Blood culture [[id number]]  Collected:  01/20/20 1042    Order Status:  Completed Specimen:  Blood from Peripheral, Antecubital, Left Updated:  01/24/20 1412     Blood Culture, Routine No Growth to date      No Growth to date      No Growth to date      No Growth to date      No Growth to date    Blood culture [[id number]]     Order Status:  Canceled Specimen:  Blood     Blood culture [[id number]]     Order Status:  Canceled Specimen:  Blood           I have reviewed all pertinent labs within the past 24 hours.    Estimated Creatinine Clearance: 35.2 mL/min (A) (based on SCr of 2.8 mg/dL (H)).    Diagnostic Results:  I have reviewed all pertinent imaging results/findings within the past 24 hours.

## 2020-01-25 NOTE — ASSESSMENT & PLAN NOTE
- Has been back in A fib since surgery  - AC per CTS  - Was on Amiodarone infusion but now on Lidocaine.  - EP consulted  - Would wean Lidocaine if possible

## 2020-01-25 NOTE — NURSING
Dr. Schmitt notified via telephone of HR still >110. Net negative this shift 50 cc. Urine output continues to be greater than 150 cc/h. CVP down to 17. Orders received to decrease Lidocaine to 1. NO to 20 ppm at 1800. Administer another 0.25 mg of Digoxin.

## 2020-01-25 NOTE — PROGRESS NOTES
[facility]  Heart Transplant  Progress Note    Patient Name: Tae Delgado  MRN: [id number]  Admission Date: 1/9/2020  Hospital Length of Stay: 16 days  Attending Physician: Ino Schmitt MD  Primary Care Provider: Elham Pearson MD  Principal Problem:LVAD (left ventricular assist device) present    Subjective:     **Interval History: CVP elevated post chest closure; taken back to OR for poor RV function not responding to Lasix for possible RVAD placement.  In OR; hemodynamics stable so no RVAD placed.  Increased Lasix drip and started Diuril.  Had tachyarrhythmias; EP consulted and patient started on Digoxin.  Overnight; Lidocaine infusion started and Amiodarone off.  Given Hydralazine prn to decrease use of Cardene.  Patient is intubated and sedated this morning.  He is net negative 238cc in the last 24 hours.  CVP: 21, SVO2: 62, CO: 7.18, CI: 3.08, SVR: 702.    Lines:  Arterial Line: 1/23/20  Left IJ: 1/23/20  PA catheter    Continuous Infusions:   epinephrine 0.08 mcg/kg/min (01/25/20 0940)    furosemide (LASIX) 2 mg/mL infusion (non-titrating) 17.5 mg/hr (01/25/20 0940)    heparin (porcine) in 5 % dex 300 Units/hr (01/25/20 0940)    insulin (HUMAN R) infusion (adults)      lidocaine 1.25 mg/min (01/25/20 0940)    niCARdipine Stopped (01/25/20 0900)    nitric oxide gas      norepinephrine bitartrate-D5W Stopped (01/23/20 1430)    propofol 25 mcg/kg/min (01/25/20 0900)     Scheduled Meds:   amiodarone  400 mg Per NG tube TID    aspirin  325 mg Oral Daily    aspirin  325 mg Oral Daily    vancomycin (VANCOCIN) IVPB  750 mg Intravenous Q24H    And    ceFEPime (MAXIPIME) IVPB  2 g Intravenous Q12H    docusate sodium  200 mg Oral QHS    docusate sodium  50 mg Oral BID    ferrous gluconate  324 mg Oral Daily with breakfast    mupirocin   Nasal BID    pantoprazole  40 mg Oral Daily    pantoprazole  40 mg Intravenous Daily    polyethylene glycol  17 g Oral BID    potassium chloride  in water  40 mEq Intravenous Once    sodium chloride 0.9%  3 mL Intravenous Q8H     PRN Meds:albumin human 5%, albuterol sulfate, bisacodyl, Dextrose 10% Bolus, Dextrose 10% Bolus, Dextrose 10% Bolus, Dextrose 10% Bolus, fentaNYL, glucagon (human recombinant), glucose, glucose, hydrALAZINE, insulin aspart U-100, magnesium hydroxide 400 mg/5 ml, magnesium sulfate IVPB, oxyCODONE, oxyCODONE, sodium chloride 0.9%    Review of patient's allergies indicates:   Allergen Reactions    Biopatch [chlorhexidine gluconate]      Site burning    Dobutamine in d5w      Tachycardia, tremors, SOB, flushing    Percocet [oxycodone-acetaminophen] Itching    Penicillins Rash     Cefepime given on 1/23/2020 without issue     Objective:     Vital Signs (Most Recent):  Temp: 99.5 °F (37.5 °C) (01/25/20 0700)  Pulse: (!) 116 (01/25/20 0930)  Resp: (!) 25 (01/25/20 0109)  BP: (!) 84/0 (01/25/20 0700)  SpO2: 99 % (01/25/20 0930) Vital Signs (24h Range):  Temp:  [99.3 °F (37.4 °C)-101.3 °F (38.5 °C)] 99.5 °F (37.5 °C)  Pulse:  [] 116  Resp:  [17-27] 25  SpO2:  [90 %-100 %] 99 %  BP: (82-92)/(0-72) 84/0  Arterial Line BP: ()/(62-82) 84/70     Patient Vitals for the past 72 hrs (Last 3 readings):   Weight   01/25/20 0330 109.8 kg (242 lb 1 oz)   01/23/20 1530 110.8 kg (244 lb 4.3 oz)     Body mass index is 34.73 kg/m².      Intake/Output Summary (Last 24 hours) at 1/25/2020 0958  Last data filed at 1/25/2020 0900  Gross per 24 hour   Intake 3694 ml   Output 4297 ml   Net -603 ml       Hemodynamic Parameters:  PAP: (40-55)/(10-27) 45/15  PAP (Mean):  [27 mmHg-36 mmHg] 29 mmHg  PCWP:  [11 mmHg-27 mmHg] 25 mmHg  CO:  [3.9 L/min-8 L/min] 5.4 L/min  CI:  [1.7 L/min/m2-3.4 L/min/m2] 2.3 L/min/m2    Telemetry: A fib  Physical Exam   Constitutional: Intubated and sedated; chest open    Eyes: Pupils are equal, round, and reactive to light. Conjunctivae are normal.   Neck: Neck supple. No thyromegaly present. Bilateral IJ lines    Cardiovascular: Irregularly irregular, tachycardic, smooth VAD hum. Chest open   Pulmonary/Chest: Effort normal and breath sounds normal. Intubated and sedated   Abdominal: Soft.   Musculoskeletal: He exhibits no edema.   Neurological: Intubated and sedated   Skin: Skin is warm and dry. Capillary refill takes 2 to 3 seconds.       Significant Labs:  CBC:  Recent Labs   Lab 01/25/20  0001  01/25/20  0413  01/25/20  0706 01/25/20  0800 01/25/20  0910   WBC 20.04*  --  19.53*  --   --  20.92*  --    RBC 3.81*  --  3.78*  --   --  3.79*  --    HGB 10.6*  --  10.8*  --   --  10.4*  --    HCT 33.9*   < > 33.4*   < > 31* 34.2* 30*     --  208  --   --  201  --    MCV 89  --  88  --   --  90  --    MCH 27.8  --  28.6  --   --  27.4  --    MCHC 31.3*  --  32.3  --   --  30.4*  --     < > = values in this interval not displayed.     BNP:  Recent Labs   Lab 01/20/20  0300 01/24/20  0334   * 968*     CMP:  Recent Labs   Lab 01/23/20  1423  01/24/20  0334  01/25/20  0001 01/25/20  0413 01/25/20  0800   *   < >  --    < > 162* 169* 161*   CALCIUM 9.4   < >  --    < > 9.8 9.7 9.7   ALBUMIN 2.6*  --  3.0*  --   --  2.8*  --    PROT 6.8  --  6.4  --   --  6.6  --       < >  --    < > 141 140 141   K 3.4*   < >  --    < > 4.2 4.3 3.9   CO2 26   < >  --    < > 25 24 26   CL 96   < >  --    < > 101 100 100   BUN 38*   < >  --    < > 54* 57* 61*   CREATININE 1.9*   < >  --    < > 2.6* 2.7* 2.8*   ALKPHOS 45*  --  40*  --   --  46*  --    ALT 32  --  34  --   --  34  --    AST 78*  --  97*  --   --  64*  --    BILITOT 2.2*  --  1.1*  --   --  0.7  --     < > = values in this interval not displayed.      Coagulation:   Recent Labs   Lab 01/25/20  0001 01/25/20  0413 01/25/20  0800   INR 1.1 1.2 1.1   APTT 26.2 25.0 26.6     LDH:  Recent Labs   Lab 01/23/20  1423 01/24/20  0334 01/25/20  0505   * 570* 525*     Microbiology:  Microbiology Results (last 7 days)     Procedure Component Value Units Date/Time     Blood culture [[id number]] Collected:  01/20/20 1035    Order Status:  Completed Specimen:  Blood from Peripheral, Wrist, Left Updated:  01/24/20 1412     Blood Culture, Routine No Growth to date      No Growth to date      No Growth to date      No Growth to date      No Growth to date    Blood culture [[id number]] Collected:  01/20/20 1042    Order Status:  Completed Specimen:  Blood from Peripheral, Antecubital, Left Updated:  01/24/20 1412     Blood Culture, Routine No Growth to date      No Growth to date      No Growth to date      No Growth to date      No Growth to date    Blood culture [[id number]]     Order Status:  Canceled Specimen:  Blood     Blood culture [[id number]]     Order Status:  Canceled Specimen:  Blood           I have reviewed all pertinent labs within the past 24 hours.    Estimated Creatinine Clearance: 35.2 mL/min (A) (based on SCr of 2.8 mg/dL (H)).    Diagnostic Results:  I have reviewed all pertinent imaging results/findings within the past 24 hours.    Assessment and Plan:       55 y.o. WM with history of NICMP diagnosed in 2010, ICD, LV thrombus (with prior splenic and renal emboli), Embolic  CVA , paroxysmal atrial fib, HTN, HLP  presents for  F/U today to clinic and he was volume overloaded on exam .  He states he had 3 admission in the last 3 months for volume overload. He started having more SOB on exertion since one month. Also endorses of Orthopnea since last one month. Alble to walk only 150 ft. He also has Bilateral lower extremity edema. He was admitted here in 2017 for ADHD here at [facility] and [facility] during that admission showed PCWP 40 and CVP 17. Currently denies chest pain, lightheadedness     * LVAD (left ventricular assist device) present  - S/P DT HM3 with closure of AV and repair of MV for regurg 1/23/20.   - CTS primary  - Taken back to OR 1/24 for possible RVAD placement which was not done. Patient remained hemodynamically stable in OR  - Currently hemodynamically  stable on Epi, Cardene, Lasix, Lidocaine, and Corby 30  -Would increase Lasix drip and wean Lidocaine off  - Current speed 5300    Procedure: Device Interrogation Including analysis of device parameters  Current Settings: Ventricular Assist Device  Review of device function is stable/unstabe    TXP LVAD INTERROGATIONS 1/25/2020 1/25/2020 1/25/2020 1/25/2020 1/25/2020 1/25/2020 1/25/2020   Type - - - HeartMate3 - - HeartMate3   Flow 4.4 4.4 4.5 4.3 4.3 4.3 4.1   Speed 5200 5200 5200 5200 5200 5200 5200   PI 3.4 3.3 3.1 3.6 3.3 3.3 3.8   Power (Berry) 3.7 3.8 3.8 3.8 3.8 3.8 3.7   LSL - - - 4800 4800 4800 4800   Pulsatility - - - No Pulse - - No Pulse       Acute on chronic combined systolic and diastolic heart failure  - S/P DT HM3 with closure of AV and plication of MV for regurg 1/23/20 (See LVAD)  - [facility] dx'd 2010  - Patient given Diuril this am  - See above; would increase Lasix drip        Paroxysmal atrial fibrillation  - Has been back in A fib since surgery  - AC per CTS  - Was on Amiodarone infusion but now on Lidocaine.  - EP consulted  - Would wean Lidocaine if possible    Acute kidney injury superimposed on chronic kidney disease  - Creatinine on admit 2.6 (baseline ~ 1.8). Creatinine today 2.7  - Nephrology consulted and following    Left ventricular thrombus without MI  - H/O LV thrombus with h/o splenic and renal emboli as well as embolic CVA  - Limited TTE done here 1/13 showed no thrombus  - JACINTO 1/23 intra op showed resolution of DAMON thrombus  - AC per CTS      Right lower lobe pulmonary nodule  - Incidental finding on CT chest/abd/pelvis on 1/12. Pulmonology consulted, and rec repeat CT of chest in 3 months  - Will need to follow-up in pulmonary clinic.     Hepatic congestion  - Liver US on 1/11 unremarkable    ICD (implantable cardioverter-defibrillator) in place  - S/P Biotronik dual chamber ICD    Coagulopathy  - Appreciate Hem/Onc's help. No evidence of underlying coagulopathy (h/o LV thrombus  with splenic and renal emboli, h/o embolic CVA)    NSVT (nonsustained ventricular tachycardia)  - Currently on Amiodarone at 1 mg/hr      Uninterrupted Critical Care/Counseling Time (not including procedures): 50 minutes      MARBELLA Mcfadden  Heart Transplant  [facility]

## 2020-01-25 NOTE — ASSESSMENT & PLAN NOTE
- Creatinine on admit 2.6 (baseline ~ 1.8). Creatinine today 2.7  - Nephrology consulted and following   INTENSIVIST   PROGRESS NOTE     Hospital:  LOS: 3 days   Subjective   Ms. Charisma Cortez, 55 y.o. female is followed for:      Encephalopathy    Hypotension    Type 2 diabetes mellitus    Drug overdose    Acute renal failure superimposed on chronic kidney disease    As an Intensivist, we provide an integrated approach to the ICU patient and family, medical management of comorbid conditions, lead interdisciplinary rounds and coordinate the care with all other services, including those from other specialists.     S     Interval History:  Temperature down.  Diarrhea started yesterday.  Feeling better.  No respiratory distress.     The patient's relevant past medical, surgical and social history were reviewed and updated in Epic as appropriate.      ROS:   Constitutional: Positive for fever.   Respiratory: Negative for dyspnea.   Cardiovascular: Negative for chest pain.   Gastrointestinal: Negative for  nausea, vomiting and diarrhea.     Objective   O     Vitals:  Temp: 98.6 °F (37 °C) Temp  Min: 98.2 °F (36.8 °C)  Max: 99.1 °F (37.3 °C)   BP: 139/94 BP  Min: 94/72  Max: 142/79   Pulse: 91 Pulse  Min: 91  Max: 100   Resp: 22 Resp  Min: 20  Max: 26   SpO2: 95 % SpO2  Min: 89 %  Max: 98 %   Device: nasal cannula with humidification    Flow Rate: 2 Flow (L/min)  Min: 2  Max: 3     Intake/Ouptut 24 hrs (7:00AM - 6:59 AM)  Intake & Output (last 3 days)       01/02 0701 - 01/03 0700 01/03 0701 - 01/04 0700 01/04 0701 - 01/05 0700 01/05 0701 - 01/06 0700    P.O.   250     I.V. (mL/kg) 2486.2 (40.6) 3469.3 (56.7) 1237 (20.2) 150 (2.4)    Other   300     IV Piggyback 2100 497 100 257    Total Intake(mL/kg) 4586.2 (74.9) 3966.3 (64.8) 1887 (30.8) 407 (6.6)    Urine (mL/kg/hr) 2240 4260 (2.9) 1305 (0.9)     Emesis/NG output   0 (0)     Stool  0 (0) 210 (0.1)     Total Output 2240 4260 1515      Net +2346.2 -293.7 +372 +407            Unmeasured Urine Occurrence    1 x    Unmeasured Stool Occurrence  5 x 10 x 4 x     Unmeasured Emesis Occurrence   2 x           Medications (drips):     Physical Examination  Telemetry:  Sinus Rhythm: normal sinus rhythm   Constitutional:  No acute distress.   Cardiovascular: Normal rate, regular and rhythm. Normal heart sounds.  No murmurs, gallop or rub.   Respiratory: No respiratory distress. Normal respiratory effort.  Normal breath sounds  Clear to auscultation and percussion.    Abdominal:  Soft. No masses. Non-tender. No distension. No HSM.   Extremities: No digital cyanosis. No clubbing.  No peripheral edema.   Neurological:   Alert and Oriented to person, place, and time.   Lines/Drains/Airways: [facility] CV      Hematology:    Results from last 7 days  Lab Units 01/05/18  0331 01/04/18  0424 01/03/18  0326   WBC 10*3/mm3 21.55* 18.11* 20.75*   HEMOGLOBIN g/dL 8.2* 7.7* 8.9*   MCV fL 91.5 88.6 91.2   PLATELETS 10*3/mm3 288 291 351     Electrolytes, Magnesium and Phosphorus:    Results from last 7 days  Lab Units 01/05/18  0331 01/04/18  1644 01/04/18  0424  01/03/18  0326   SODIUM mmol/L 147*  --  148*  --  142   POTASSIUM mmol/L 3.1* 2.9* 2.7*  < > 3.4*   CO2 mmol/L 35.0*  --  39.0*  --  17.0*   MAGNESIUM mg/dL 1.9  --  1.1*  --  1.3   PHOSPHORUS mg/dL 1.4*  --  2.1*  2.1*  --  4.6   < > = values in this interval not displayed.  Renal:    Results from last 7 days  Lab Units 01/05/18  0331 01/04/18  0424 01/03/18  0326 01/02/18  1928 01/02/18  1614 01/02/18  1132   CREATININE mg/dL 0.60 1.10 4.90* 7.40* 8.60* 8.40*   BUN mg/dL 15 35* 76* 75* 73* 80*     Estimated Creatinine Clearance: 86.6 mL/min (by C-G formula based on Cr of 0.6).    Hepatic:    Results from last 7 days  Lab Units 01/03/18  0326 01/02/18  1614 01/02/18  1132   ALK PHOS U/L 114* 120* 120*   BILIRUBIN mg/dL 0.2* 0.0* 0.0*   ALT (SGPT) U/L 28 32 27   AST (SGOT) U/L 54* 67* 53*       Results from last 7 days  Lab Units 01/05/18  0331 01/04/18  0424 01/03/18  0326 01/02/18  1614 01/02/18  1132   CK TOTAL U/L 546* 1016* 2255*  3581* 2498*  2593*       Lab Results   Component Value Date    URINECX >100,000 CFU/mL Enterococcus species (A) 01/02/2018    URINECX >100,000 CFU/mL Enterococcus species (A) 01/02/2018    URINECX 10,000-20,000 CFU/mL Enterococcus faecalis (A) 01/02/2018    URINECX >100,000 CFU/mL Enterococcus faecalis (A) 03/23/2017     Lab Results   Component Value Date    CDIFF Detected (A) 01/04/2018     Images:  CXR 1/3/2018 Mild diffuse interstitial abnormality in the mid and lower lung zones.     Echo:  Results for orders placed during the hospital encounter of 01/02/18   Adult Transthoracic Echo Complete W/ Cont if Necessary Per Protocol    Narrative · Left ventricular systolic function is hyperdynamic (EF > 70).  · Mild mitral valve regurgitation is present  · Mild tricuspid valve regurgitation is present.  · abnormal LVOT contour without significant obstruction          Results: Reviewed.  I reviewed the patient's new laboratory and imaging results.  I independently reviewed the patient's new images.    Medications: Reviewed.    Assessment/Plan   A / P     55 y.o.female, admitted on 1/2/2018 with Drug overdose [T50.901A]:     1. Accidental overdose: narcotics.  2. Hypotension: Resolved.  1. R/O Sepsis as PCT was elevated on 1/2/2017. PCT down to 0.48 on 1/4/18. BC neg so far.  2. NOT UTI as CFU <= 20,000. However 2 other cultures done hours later, on the same day, CFU > Enterococcus sp. 10^5. Asymptomatic. Doubt UTI.  3. Diarrhea, started while in hospital, 1/4/2017 and it was positive for C. Diff.  4. Abs: Pip-Tazo -> Augmentin  5. Oral vanco started last night. (1/5/2017)  3. RAGHU: resolved.  1. Rhabo: improved.  2. Metabolic acidosis Resolved.  3. Cr down to 1.1 (1/5/18)  from, 8.6 on admission  4. Substance abuse: Heroin  5. Hypokalemia - Being replaced.  6. Hypomagnesemia - Being replaced.  7. COPD  1. Ongoing tobacco use  8. Seizures - on  Levetiracetam  9. GERD on PPI at home.  10. Overweight. Body mass index is  26.37 kg/(m^2).   11. T2DM on Metformin at home.    Results from last 7 days  Lab Units 01/05/18  1134 01/05/18  0705 01/04/18 2006 01/04/18  1628 01/04/18  1125 01/04/18  0508   GLUCOSE mg/dL 158* 120 187* 125 132* 160*       Lab Results  Lab Value Date/Time   HGBA1C 5.60 01/03/2018 0326   HGBA1C 5.7 03/24/2017 0435   HGBA1C 5.6 11/05/2016 0543        Nutrition:  Diet Soft Texture; Ground; Cardiac, Consistent Carbohydrate   Advance Directives: Full Code       Assessment / Plan:    1. To floor  2. Treatment for C diff started yesterday.  3. Replacing electrolytes.  4. SS: According to their evaluation, it was an accidental overdose. No intention to hurt herself and she is not interested in rehab, thus [facility] was not indicated.    Plan of care and goals reviewed during interdisciplinary rounds.  I discussed the patient's findings and my recommendations with patient and nursing staff    We will follow as needed once out of the Intensive Care Unit.  Hospitalist Team will assist with medical management once on the Floor.    Thank you.   Warner Berger MD, FACP, FCCP, CNSC  Intensive Care Medicine, Nutrition Support and Pulmonary Medicine

## 2020-01-25 NOTE — PLAN OF CARE
Plan of Care Note  Cardiothoracic Surgery    Tae Delgado is a 59 y.o. male with heart failure who underwent LVAD placement (1/23/20) then closure (1/24/20) and repeat chest opening (1/24/20) for poor RV function.    Specific issues: increase heparin gtt to 200/hr non-titrating; vanc 750 (along with cefepime); give diuril if UOP <150/hr for 2 hours; decrease lidocaine gtt to 1 at 12pm; continue to monitor chest tube output and rhythm (continuing Afib-RVR)    Plan of care for patient was discussed with ICU staff including nurses, residents, and faculty and appropriate consulting services.    Will continue to monitor patient's hemodynamics, functionality, neuro status, fluid status and renal function, and labs and will adjust medications and fluids as necessary while monitoring appropriateness for de-escalation of support and monitoring and transport to stepdown unit.    Terence Gonzalez MD  Cardiothoracic Surgery Fellow

## 2020-01-25 NOTE — SUBJECTIVE & OBJECTIVE
"Interval HPI:   Overnight events: Remains in ICU, arrhythmias noted overnight.  BG well controlled on intensive insulin protocol, rate steady at 1 u/hr.  Noted creatinine of 2.8.  Febrile, on IV antibiotics.  Eating:   NPO  Nausea: No  Hypoglycemia and intervention: No  Fever: Yes, 101.3  TPN and/or TF: No    BP (!) 84/0 (BP Location: Right arm, Patient Position: Lying)   Pulse (!) 117   Temp 99.5 °F (37.5 °C) (Core (Hanna-Heena))   Resp (!) 25   Ht 5' 10" (1.778 m)   Wt 109.8 kg (242 lb 1 oz)   SpO2 99%   BMI 34.73 kg/m²     Labs Reviewed and Include    Recent Labs   Lab 01/25/20  0413 01/25/20  0800   * 161*   CALCIUM 9.7 9.7   ALBUMIN 2.8*  --    PROT 6.6  --     141   K 4.3 3.9   CO2 24 26    100   BUN 57* 61*   CREATININE 2.7* 2.8*   ALKPHOS 46*  --    ALT 34  --    AST 64*  --    BILITOT 0.7  --      Lab Results   Component Value Date    WBC 20.92 (H) 01/25/2020    HGB 10.4 (L) 01/25/2020    HCT 34.2 (L) 01/25/2020    MCV 90 01/25/2020     01/25/2020     No results for input(s): TSH, FREET4 in the last 168 hours.  Lab Results   Component Value Date    HGBA1C 6.2 (H) 01/15/2020       Nutritional status:   Body mass index is 34.73 kg/m².  Lab Results   Component Value Date    ALBUMIN 2.8 (L) 01/25/2020    ALBUMIN 3.0 (L) 01/24/2020    ALBUMIN 2.6 (L) 01/23/2020     Lab Results   Component Value Date    PREALBUMIN 11 (L) 01/24/2020    PREALBUMIN 27 01/15/2020       Estimated Creatinine Clearance: 35.2 mL/min (A) (based on SCr of 2.8 mg/dL (H)).    Accu-Checks  Recent Labs     01/24/20  2210 01/24/20  2304 01/24/20  2356 01/25/20  0107 01/25/20  0206 01/25/20  0316 01/25/20  0412 01/25/20  0503 01/25/20  0557 01/25/20  0718   POCTGLUCOSE 163* 143* 155* 175* 168* 162* 173* 170* 174* 159*       Current Medications and/or Treatments Impacting Glycemic Control  Immunotherapy:    Immunosuppressants     None        Steroids:   Hormones (From admission, onward)    None        Pressors:  "   Autonomic Drugs (From admission, onward)    Start     Stop Route Frequency Ordered    01/24/20 2300  EPINEPHrine (ADRENALIN) 5 mg in sodium chloride 0.9% 250 mL infusion     Question Answer Comment   Titrate by: (in mcg/kg/min) 0.02    Titrate interval: (in minutes) 10    Titrate to maintain: (SBP or MAP or Cardiac Index) MAP    Greater than: (in mmHg) 60    Maximum dose: (in mcg/kg/min) 1        -- IV Continuous 01/24/20 2151 01/23/20 1430  norepinephrine 4 mg in dextrose 5% 250 mL infusion (premix) (titrating)     Question Answer Comment   Titrate by: (in mcg/kg/min) 0.05    Titrate interval: (in minutes) 5    Titrate to maintain: (MAP or SBP) MAP    Greater than: (in mmHg) 60    Maximum dose: (in mcg/kg/min) 3        -- IV Continuous 01/23/20 1325        Hyperglycemia/Diabetes Medications:   Antihyperglycemics (From admission, onward)    Start     Stop Route Frequency Ordered    01/23/20 1430  insulin regular 100 Units in sodium chloride 0.9% 100 mL infusion     Question:  Insulin Rate Adjustment (DO NOT MODIFY ANSWER)  Answer:  [email or web address]    -- IV Continuous 01/23/20 1325

## 2020-01-25 NOTE — NURSING
Rounds with Dr. Schmitt. Lidocaine gtt decreased to 1.25; Lasix to 17.5; Heparin increased to 300 units/h. FiO2 decreased to 50% and NO to 25 ppm. Urine parameters changed to 100-150 cc/h; continue to keep I/O net negative. Orders received to administer 0.25 mg Dig x 2 doses and PO Amio started in hopes of decreasing HR.

## 2020-01-25 NOTE — ASSESSMENT & PLAN NOTE
- S/P DT HM3 with closure of AV and plication of MV for regurg 1/23/20 (See LVAD)  - NIDCM dx'd 2010  - Patient given Diuril this am  - See above; would increase Lasix drip

## 2020-01-25 NOTE — PLAN OF CARE
Patient's vital signs stable at this time. Patient continues on vent AC 60% FiO2 and 5 of PEEP. No s/s of respiratory distress noted. Nitric continues at 30ppm. Patient was having  multiple episodes of tachy arrhythmias overnight with rate in the 160s. Lidocaine pushes give twice per order. Lidocaine drip started at 2mg/min. Amio drip stopped per order. patient continues in Afib with rate in the 120s, but no tachy arrhythmias noted in the last 4 hours. Per order lidocaine drip decreased to 1.5mg/min.  CVP max 22 overnight. Down to 19 this morning. Patient negative approximately 1700cc overnight. Patient continues on lasix drip at 20mg/hr. Urine ouput >200cc from majority of night. Diuril held per Dr. Schmitt. Urine output 190cc, 175cc, then 195cc over the last 3 hours. Per Dr. Schmitt will continue to hold diuril if urine output is close to 200cc/hr. Patient weaned from 0.09 to 0.08mcg/kg/min per Dr. Schmitt. PRN hydralazine started overnight to help decrease hourly Cardene volume, able to keep Cardene 2.5mg/hr or less and turn off beverly for a few hours after hydralazine pushes. Tmax 101.3, fans placed and temp down to 99.3 . WBC 19.5 this am. Creatinine 2.7 and random vanc 11.5. Per Dr. Schmitt vanc changed from 1000mg to 750mg. OG noted to be coiled in mouth at beginning of shift.. OG unable to be placed to continued coiling, NG placed, order received ok to use. Dr. Schmitt and Dr. Kendrick herring updated on patient's status and labs overnight. Will continue to monitor.

## 2020-01-25 NOTE — PLAN OF CARE
Plan of Care Note  Cardiothoracic Surgery    Tae Delgado is a 59 y.o. male with heart failure who underwent LVAD placement (1/23/20) then closure (1/24/20) and repeat chest opening (1/24/20) for poor RV function.    Specific issues: monitoring of RV function and optimizing with nitric, epi, higher heart rate; monitoring arrhythmias (Afib-RVR)- amio and dig    Plan of care for patient was discussed with ICU staff including nurses, residents, and faculty and appropriate consulting services.    Will continue to monitor patient's hemodynamics, functionality, neuro status, fluid status and renal function, and labs and will adjust medications and fluids as necessary while monitoring appropriateness for de-escalation of support and monitoring and transport to stepdown unit.    Terence Gonzalez MD  Cardiothoracic Surgery Fellow

## 2020-01-25 NOTE — PROGRESS NOTES
[facility]  Critical Care - Surgery  Progress Note    Patient Name: Tae Delgado  MRN: [id number]  Admission Date: 1/9/2020  Hospital Length of Stay: 16 days  Code Status: Full Code  Attending Provider: Ino Schmitt MD  Primary Care Provider: Elham Pearson MD   Principal Problem: Acute on chronic combined systolic and diastolic heart failure    Subjective:     Hospital/ICU Course:  1/23: Pt arrives from OR intubated, open chest dressed with Ioban, CTs with SS output. Drips on arrival: Epi 0.08, Cardene 5, TXA, Nitric at 30. Pt received 1.5L crystalloid, no blood product, 20 mg Lasix (put out 250cc in response).   Intra Op JACINTO Exam:  PRE:  Severely reduced left ventricular systolic function. Dilated LV. No definitive thrombus noted.  Moderate-to-severely reduced right ventricular systolic function. FAC 12-24%  Mild-to-moderate AI. No AS.  Moderate MR with systolic blunting of the pulmonary veins.  Trace-to-mild TR.  Mild PI. PAAT <90ms.  Small PFO by CF doppler.  SEC in La and DAMON. No clear thrombus noted.  Grade 2 atheromatous disease of the aorta.  No effusions.    POST:  Severely depressed left ventriclar dysfunction.  Moderately depressed right ventricular systolic function.  LVAD inflow and outflow cannula noted with laminar flows.  No AI.  Mild MR, vahe stitch observed. No MS.  Mild-to-moderate TR.  PFO still visible on CF doppler.  Aorta intact, no dissection.  No effusions.     1/24: run of Vtach overnight. Amio bolus given, amio gtt increased to 1, lasix push given. Improved. LVAD hemodynamics appropriate overnight. Going for closure this am.     Interval History:  S/p Chest closure, opening for poor RV function. No acute events overnight. Continued with intermittent tachyarrythmias. Digoxin given 0.25 mg x 2, amio infusion running at 0.5 mg/min. Also given 100 mg lidocaine IV push x2, now running on gtt. UOP poor throughout day yesterday, however after being given diuril UOP responded  well. CVP in lower 20's overnight. Mildly febrile to 100.6.     Follow-up For: Procedure(s) (LRB):  CLOSURE, WOUND, STERNUM (N/A)  INSERTION-RIGHT VENTRICULAR ASSIST DEVICE (Right)    Post-Operative Day: Day of Surgery    Objective:     Vital Signs (Most Recent):  Temp: (!) 100.4 °F (38 °C) (01/25/20 0000)  Pulse: (!) 128 (01/25/20 0000)  Resp: (!) 27 (01/24/20 1730)  BP: (!) 84/0 (01/24/20 2300)  SpO2: 100 % (01/25/20 0000) Vital Signs (24h Range):  Temp:  [99.9 °F (37.7 °C)-101.3 °F (38.5 °C)] 100.4 °F (38 °C)  Pulse:  [] 128  Resp:  [17-27] 27  SpO2:  [90 %-100 %] 100 %  BP: (84-97)/(0-72) 84/0  Arterial Line BP: ()/(62-82) 92/77     Weight: 110.8 kg (244 lb 4.3 oz)  Body mass index is 35.05 kg/m².      Intake/Output Summary (Last 24 hours) at 1/25/2020 0007  Last data filed at 1/25/2020 0000  Gross per 24 hour   Intake 4550 ml   Output 2912 ml   Net 1638 ml       Physical Exam   Constitutional: He appears well-developed and well-nourished.   HENT:   Head: Normocephalic and atraumatic.   Mouth/Throat: No oropharyngeal exudate.   ETT and OG secured in place.   Eyes: Pupils are equal, round, and reactive to light.   Neck: Normal range of motion. Neck supple.   Left IJ double lumen + Carpenter Heena in place, dressing CLD  Right IJ triple lumen, dressing CLD   Cardiovascular:   Irregular tachyarrythmia. LVAD hum.    Pulmonary/Chest: Breath sounds normal. He has no wheezes. He has no rales.   Intubated. Open chest, dressed with Ioban. CTs with SS drainage.   Abdominal: Soft. He exhibits no distension.   Musculoskeletal: He exhibits no edema or deformity.   Neurological:   sedated   Skin: Skin is warm and dry.     Vents:  Vent Mode: A/C (01/24/20 2322)  Ventilator Initiated: Yes (01/21/20 0905)  Set Rate: 18 bmp (01/24/20 2322)  Vt Set: 500 mL (01/24/20 2322)  PEEP/CPAP: 5 cmH20 (01/24/20 2322)  Oxygen Concentration (%): 60 (01/25/20 0000)  Peak Airway Pressure: 21 cmH2O (01/24/20 2322)  Plateau Pressure: 20  cmH20 (01/24/20 2322)  Total Ve: 13.3 mL (01/24/20 2322)  F/VT Ratio<105 (RSBI): (!) 38.54 (01/24/20 1601)    Lines/Drains/Airways     Central Venous Catheter Line                 Percutaneous Central Line Insertion/Assessment - double lumen  01/23/20 0751 left internal jugular 1 day         Pulmonary Artery Catheter Assessment  01/23/20 0751 1 day         Trialysis (Dialysis) Catheter 01/24/20 1500 right internal jugular less than 1 day          Drain                 Urethral Catheter 01/21/20 0752 Non-latex;Straight-tip;Temperature probe 16 Fr. 3 days         Chest Tube 01/23/20 1230 1 Right Pleural 1 day         Chest Tube 01/23/20 1414 2 Right Mediastinal 1 day         Chest Tube 01/23/20 1415 3 Left Mediastinal 1 day         NG/OG Tube 01/24/20 2130 nasogastric Right nostril less than 1 day          Airway                 Airway - Non-Surgical 01/21/20 0742 Endotracheal Tube 3 days          Arterial Line                 Arterial Line 01/21/20 0730 Left Other (Comment) 3 days         Arterial Line 01/23/20 1335 Right Radial 1 day          Line                 VAD 01/23/20 1148 Left ventricular assist device HeartMate 3 1 day          Peripheral Intravenous Line                 Peripheral IV - Single Lumen 01/22/20 1730 20 G Right Hand 2 days         Peripheral IV - Single Lumen 01/22/20 1800 20 G Left Hand 2 days                Significant Labs:    CBC/Anemia Profile:  Recent Labs   Lab 01/24/20  1037  01/24/20  1545  01/24/20 2010 01/24/20  2117 01/24/20  2219 01/24/20  2325   WBC 18.16*  --  17.87*  --  18.67*  --   --   --   --    HGB 11.2*  --  10.1*  --  10.8*  --   --   --   --    HCT 35.7*   < > 32.7*   < > 33.7*   < > 31* 43 30*     --  171  --  179  --   --   --   --    MCV 90  --  90  --  89  --   --   --   --    RDW 15.0*  --  15.0*  --  14.7*  --   --   --   --     < > = values in this interval not displayed.        Chemistries:  Recent Labs   Lab 01/23/20  0355 01/23/20  142   01/24/20  0334  01/24/20  1037 01/24/20  1545 01/24/20 2010    137   < >  --    < > 140 141 137   K 4.0 3.4*   < >  --    < > 4.7 4.5 4.2   CL 94* 96   < >  --    < > 102 103 99   CO2 32* 26   < >  --    < > 25 26 24   BUN 36* 38*   < >  --    < > 46* 49* 50*   CREATININE 2.0* 1.9*   < >  --    < > 2.2* 2.4* 2.6*   CALCIUM 9.7 9.4   < >  --    < > 9.3 8.8 9.9   ALBUMIN 3.1* 2.6*  --  3.0*  --   --   --   --    PROT 7.1 6.8  --  6.4  --   --   --   --    BILITOT 1.2* 2.2*  --  1.1*  --   --   --   --    ALKPHOS 46* 45*  --  40*  --   --   --   --    ALT 31 32  --  34  --   --   --   --    AST 16 78*  --  97*  --   --   --   --    MG 2.4 2.7*   < > 2.8*   < > 2.7* 2.6 2.8*   PHOS  --  2.6*   < > 4.2   < > 5.8* 4.9* 4.9*    < > = values in this interval not displayed.       ABGs:   Recent Labs   Lab 01/24/20  2325   PH 7.493*   PCO2 35.3   HCO3 27.1   POCSATURATED 100   BE 4     Coagulation:   Recent Labs   Lab 01/24/20 2010   INR 1.1   APTT 25.1     Lactic Acid:   Recent Labs   Lab 01/23/20  1423   LACTATE 4.1*       Significant Imaging:  I have reviewed all pertinent imaging results/findings within the past 24 hours.    Assessment/Plan:     * Acute on chronic combined systolic and diastolic heart failure  Tae Delgado is a 59 y.o. 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 Approved for DT LVAD. Went to OR on 1/21 and found to have DAMON and LV thrombus and case was aborted. Pt was placed on a heparin gtt and thrombus had dissipated on repeat JACINTO on 1/22. Pt underwent LVAD placement on 1/23. Chest closure and re-exploration on 1/24. Returned from OR with chest open on 1/24.    Neuro:  - Patient opens eyes to voice; however, patient has  no response to stimuli in any of his extremities. Pupils are 3 mm, reactive, brisk and equal  - Prop gtt with prn Fentanyl    CVS:   - Current LVAD flow @ 5300 with appropriate PIs   - Epi @ 0.09, Nitric @ 20  - Lidocaine gtt for tachyarrythmia  - ICD turned on, EP service consulted for assistance with arrythmia  - Chest closure pending, consent obtained    Pulm:  - Mechanical ventilation while chest open  - ABGs per CTS, prn  - CXR daily  - Monitor CT output, no overt signs of bleeding overnight    GI:  - NPO  - Protonix 40 daily  - Docusate    Endo:   - Insulin gtt   - Endocrine consulted, appreciate their services    Renal:  - Strict I/O  - Trend BUN/Cr  - Lasix gtt, diuril    Lytes:  - Monitor labs  - Replace per protocol    Heme:  - No blood products needed during the OR  - H/H stable overnight    ID:   - Lona op ABX  - Follow WBC  - Follow fever curve    PPx:  - A/C per CTS  - GI ppx    Dispo:  - Cont SICU care   - Chest closure pending. Follow up with EP about tachyarrythmia.       Critical care was time spent personally by me on the following activities: development of treatment plan with patient or surrogate and bedside caregivers, discussions with consultants, evaluation of patient's response to treatment, examination of patient, ordering and performing treatments and interventions, ordering and review of laboratory studies, ordering and review of radiographic studies, pulse oximetry, re-evaluation of patient's condition.  This critical care time did not overlap with that of any other provider or involve time for any procedures.     Micki Christianson MD  Critical Care - Surgery  [facility]

## 2020-01-26 ENCOUNTER — ANESTHESIA EVENT (OUTPATIENT)
Dept: SURGERY | Facility: HOSPITAL | Age: 60
DRG: 001 | End: 2020-01-26
Payer: MEDICARE

## 2020-01-26 LAB
ALBUMIN SERPL BCP-MCNC: 2.5 G/DL (ref 3.5–5.2)
ALLENS TEST: ABNORMAL
ALLENS TEST: NORMAL
ALP SERPL-CCNC: 44 U/L (ref 55–135)
ALT SERPL W/O P-5'-P-CCNC: 29 U/L (ref 10–44)
ANION GAP SERPL CALC-SCNC: 14 MMOL/L (ref 8–16)
ANION GAP SERPL CALC-SCNC: 15 MMOL/L (ref 8–16)
ANION GAP SERPL CALC-SCNC: 17 MMOL/L (ref 8–16)
APTT BLDCRRT: 23.7 SEC (ref 21–32)
APTT BLDCRRT: 24.1 SEC (ref 21–32)
APTT BLDCRRT: 24.3 SEC (ref 21–32)
APTT BLDCRRT: 24.7 SEC (ref 21–32)
APTT BLDCRRT: 25.8 SEC (ref 21–32)
APTT BLDCRRT: 26.2 SEC (ref 21–32)
AST SERPL-CCNC: 39 U/L (ref 10–40)
BASOPHILS # BLD AUTO: 0.07 K/UL (ref 0–0.2)
BASOPHILS # BLD AUTO: 0.08 K/UL (ref 0–0.2)
BASOPHILS # BLD AUTO: 0.1 K/UL (ref 0–0.2)
BASOPHILS NFR BLD: 0.4 % (ref 0–1.9)
BASOPHILS NFR BLD: 0.5 % (ref 0–1.9)
BILIRUB SERPL-MCNC: 0.7 MG/DL (ref 0.1–1)
BUN SERPL-MCNC: 71 MG/DL (ref 6–20)
BUN SERPL-MCNC: 78 MG/DL (ref 6–20)
BUN SERPL-MCNC: 78 MG/DL (ref 6–20)
BUN SERPL-MCNC: 80 MG/DL (ref 6–20)
BUN SERPL-MCNC: 82 MG/DL (ref 6–20)
BUN SERPL-MCNC: 86 MG/DL (ref 6–20)
BUN SERPL-MCNC: 87 MG/DL (ref 6–20)
CALCIUM SERPL-MCNC: 9.6 MG/DL (ref 8.7–10.5)
CALCIUM SERPL-MCNC: 9.7 MG/DL (ref 8.7–10.5)
CALCIUM SERPL-MCNC: 9.8 MG/DL (ref 8.7–10.5)
CHLORIDE SERPL-SCNC: 100 MMOL/L (ref 95–110)
CHLORIDE SERPL-SCNC: 100 MMOL/L (ref 95–110)
CHLORIDE SERPL-SCNC: 101 MMOL/L (ref 95–110)
CHLORIDE SERPL-SCNC: 101 MMOL/L (ref 95–110)
CHLORIDE SERPL-SCNC: 99 MMOL/L (ref 95–110)
CO2 SERPL-SCNC: 26 MMOL/L (ref 23–29)
CO2 SERPL-SCNC: 27 MMOL/L (ref 23–29)
CO2 SERPL-SCNC: 28 MMOL/L (ref 23–29)
CO2 SERPL-SCNC: 28 MMOL/L (ref 23–29)
CREAT SERPL-MCNC: 3.1 MG/DL (ref 0.5–1.4)
CREAT SERPL-MCNC: 3.2 MG/DL (ref 0.5–1.4)
DELSYS: ABNORMAL
DELSYS: NORMAL
DIFFERENTIAL METHOD: ABNORMAL
DIGOXIN SERPL-MCNC: 1.8 NG/ML (ref 0.8–2)
EOSINOPHIL # BLD AUTO: 0 K/UL (ref 0–0.5)
EOSINOPHIL NFR BLD: 0 % (ref 0–8)
EOSINOPHIL NFR BLD: 0.1 % (ref 0–8)
ERYTHROCYTE [DISTWIDTH] IN BLOOD BY AUTOMATED COUNT: 15.1 % (ref 11.5–14.5)
ERYTHROCYTE [DISTWIDTH] IN BLOOD BY AUTOMATED COUNT: 15.2 % (ref 11.5–14.5)
ERYTHROCYTE [DISTWIDTH] IN BLOOD BY AUTOMATED COUNT: 15.3 % (ref 11.5–14.5)
ERYTHROCYTE [DISTWIDTH] IN BLOOD BY AUTOMATED COUNT: 15.3 % (ref 11.5–14.5)
ERYTHROCYTE [DISTWIDTH] IN BLOOD BY AUTOMATED COUNT: 15.4 % (ref 11.5–14.5)
ERYTHROCYTE [DISTWIDTH] IN BLOOD BY AUTOMATED COUNT: 15.4 % (ref 11.5–14.5)
ERYTHROCYTE [SEDIMENTATION RATE] IN BLOOD BY WESTERGREN METHOD: 18 MM/H
EST. GFR  (AFRICAN AMERICAN): 23.2 ML/MIN/1.73 M^2
EST. GFR  (AFRICAN AMERICAN): 24.1 ML/MIN/1.73 M^2
EST. GFR  (NON AFRICAN AMERICAN): 20.1 ML/MIN/1.73 M^2
EST. GFR  (NON AFRICAN AMERICAN): 20.9 ML/MIN/1.73 M^2
FIO2: 50
GLUCOSE SERPL-MCNC: 127 MG/DL (ref 70–110)
GLUCOSE SERPL-MCNC: 127 MG/DL (ref 70–110)
GLUCOSE SERPL-MCNC: 131 MG/DL (ref 70–110)
GLUCOSE SERPL-MCNC: 133 MG/DL (ref 70–110)
GLUCOSE SERPL-MCNC: 143 MG/DL (ref 70–110)
GLUCOSE SERPL-MCNC: 148 MG/DL (ref 70–110)
GLUCOSE SERPL-MCNC: 149 MG/DL (ref 70–110)
GLUCOSE SERPL-MCNC: 153 MG/DL (ref 70–110)
GLUCOSE SERPL-MCNC: 159 MG/DL (ref 70–110)
GLUCOSE SERPL-MCNC: 177 MG/DL (ref 70–110)
GLUCOSE SERPL-MCNC: 222 MG/DL (ref 70–110)
HCO3 UR-SCNC: 29.2 MMOL/L (ref 24–28)
HCO3 UR-SCNC: 30.2 MMOL/L (ref 24–28)
HCO3 UR-SCNC: 30.2 MMOL/L (ref 24–28)
HCO3 UR-SCNC: 30.6 MMOL/L (ref 24–28)
HCO3 UR-SCNC: 32.3 MMOL/L (ref 24–28)
HCO3 UR-SCNC: 32.8 MMOL/L (ref 24–28)
HCO3 UR-SCNC: 33.4 MMOL/L (ref 24–28)
HCO3 UR-SCNC: 34.1 MMOL/L (ref 24–28)
HCO3 UR-SCNC: 35.6 MMOL/L (ref 24–28)
HCT VFR BLD AUTO: 32.7 % (ref 40–54)
HCT VFR BLD AUTO: 32.8 % (ref 40–54)
HCT VFR BLD AUTO: 33.2 % (ref 40–54)
HCT VFR BLD AUTO: 33.3 % (ref 40–54)
HCT VFR BLD AUTO: 33.3 % (ref 40–54)
HCT VFR BLD AUTO: 33.6 % (ref 40–54)
HCT VFR BLD CALC: 28 %PCV (ref 36–54)
HCT VFR BLD CALC: 29 %PCV (ref 36–54)
HCT VFR BLD CALC: 29 %PCV (ref 36–54)
HCT VFR BLD CALC: 30 %PCV (ref 36–54)
HCT VFR BLD CALC: 32 %PCV (ref 36–54)
HCT VFR BLD CALC: 34 %PCV (ref 36–54)
HCT VFR BLD CALC: 37 %PCV (ref 36–54)
HCT VFR BLD CALC: 37 %PCV (ref 36–54)
HGB BLD-MCNC: 10.1 G/DL (ref 14–18)
HGB BLD-MCNC: 10.1 G/DL (ref 14–18)
HGB BLD-MCNC: 10.2 G/DL (ref 14–18)
HGB BLD-MCNC: 10.3 G/DL (ref 14–18)
IMM GRANULOCYTES # BLD AUTO: 0.4 K/UL (ref 0–0.04)
IMM GRANULOCYTES # BLD AUTO: 0.44 K/UL (ref 0–0.04)
IMM GRANULOCYTES # BLD AUTO: 0.46 K/UL (ref 0–0.04)
IMM GRANULOCYTES # BLD AUTO: 0.5 K/UL (ref 0–0.04)
IMM GRANULOCYTES # BLD AUTO: 0.51 K/UL (ref 0–0.04)
IMM GRANULOCYTES # BLD AUTO: 0.51 K/UL (ref 0–0.04)
IMM GRANULOCYTES NFR BLD AUTO: 2.1 % (ref 0–0.5)
IMM GRANULOCYTES NFR BLD AUTO: 2.3 % (ref 0–0.5)
IMM GRANULOCYTES NFR BLD AUTO: 2.4 % (ref 0–0.5)
IMM GRANULOCYTES NFR BLD AUTO: 2.7 % (ref 0–0.5)
IMM GRANULOCYTES NFR BLD AUTO: 2.8 % (ref 0–0.5)
IMM GRANULOCYTES NFR BLD AUTO: 3.1 % (ref 0–0.5)
INR PPP: 1.1 (ref 0.8–1.2)
INR PPP: 1.2 (ref 0.8–1.2)
LDH SERPL L TO P-CCNC: 1.02 MMOL/L (ref 0.36–1.25)
LDH SERPL L TO P-CCNC: 1.05 MMOL/L (ref 0.36–1.25)
LDH SERPL L TO P-CCNC: 1.21 MMOL/L (ref 0.36–1.25)
LDH SERPL L TO P-CCNC: 1.32 MMOL/L (ref 0.36–1.25)
LDH SERPL L TO P-CCNC: 470 U/L (ref 110–260)
LYMPHOCYTES # BLD AUTO: 1.1 K/UL (ref 1–4.8)
LYMPHOCYTES # BLD AUTO: 1.3 K/UL (ref 1–4.8)
LYMPHOCYTES NFR BLD: 5.6 % (ref 18–48)
LYMPHOCYTES NFR BLD: 5.8 % (ref 18–48)
LYMPHOCYTES NFR BLD: 5.8 % (ref 18–48)
LYMPHOCYTES NFR BLD: 6.1 % (ref 18–48)
LYMPHOCYTES NFR BLD: 6.9 % (ref 18–48)
LYMPHOCYTES NFR BLD: 7.1 % (ref 18–48)
MAGNESIUM SERPL-MCNC: 2.7 MG/DL (ref 1.6–2.6)
MAGNESIUM SERPL-MCNC: 2.8 MG/DL (ref 1.6–2.6)
MAGNESIUM SERPL-MCNC: 2.9 MG/DL (ref 1.6–2.6)
MCH RBC QN AUTO: 27.4 PG (ref 27–31)
MCH RBC QN AUTO: 27.6 PG (ref 27–31)
MCH RBC QN AUTO: 27.8 PG (ref 27–31)
MCH RBC QN AUTO: 28 PG (ref 27–31)
MCH RBC QN AUTO: 28 PG (ref 27–31)
MCH RBC QN AUTO: 28.4 PG (ref 27–31)
MCHC RBC AUTO-ENTMCNC: 30.3 G/DL (ref 32–36)
MCHC RBC AUTO-ENTMCNC: 30.4 G/DL (ref 32–36)
MCHC RBC AUTO-ENTMCNC: 30.6 G/DL (ref 32–36)
MCHC RBC AUTO-ENTMCNC: 30.7 G/DL (ref 32–36)
MCHC RBC AUTO-ENTMCNC: 30.8 G/DL (ref 32–36)
MCHC RBC AUTO-ENTMCNC: 31.5 G/DL (ref 32–36)
MCV RBC AUTO: 90 FL (ref 82–98)
MCV RBC AUTO: 90 FL (ref 82–98)
MCV RBC AUTO: 91 FL (ref 82–98)
METHEMOGLOBIN: 0.8 % (ref 0–3)
MODE: ABNORMAL
MODE: NORMAL
MONOCYTES # BLD AUTO: 1.5 K/UL (ref 0.3–1)
MONOCYTES # BLD AUTO: 1.7 K/UL (ref 0.3–1)
MONOCYTES NFR BLD: 8.8 % (ref 4–15)
MONOCYTES NFR BLD: 8.9 % (ref 4–15)
MONOCYTES NFR BLD: 9 % (ref 4–15)
MONOCYTES NFR BLD: 9.4 % (ref 4–15)
MONOCYTES NFR BLD: 9.5 % (ref 4–15)
MONOCYTES NFR BLD: 9.6 % (ref 4–15)
NEUTROPHILS # BLD AUTO: 13.1 K/UL (ref 1.8–7.7)
NEUTROPHILS # BLD AUTO: 14.6 K/UL (ref 1.8–7.7)
NEUTROPHILS # BLD AUTO: 14.6 K/UL (ref 1.8–7.7)
NEUTROPHILS # BLD AUTO: 15.5 K/UL (ref 1.8–7.7)
NEUTROPHILS # BLD AUTO: 16 K/UL (ref 1.8–7.7)
NEUTROPHILS # BLD AUTO: 16.1 K/UL (ref 1.8–7.7)
NEUTROPHILS NFR BLD: 80.2 % (ref 38–73)
NEUTROPHILS NFR BLD: 80.3 % (ref 38–73)
NEUTROPHILS NFR BLD: 81.1 % (ref 38–73)
NEUTROPHILS NFR BLD: 82.4 % (ref 38–73)
NEUTROPHILS NFR BLD: 82.7 % (ref 38–73)
NEUTROPHILS NFR BLD: 82.8 % (ref 38–73)
NRBC BLD-RTO: 0 /100 WBC
NRBC BLD-RTO: 1 /100 WBC
PCO2 BLDA: 36.9 MMHG (ref 35–45)
PCO2 BLDA: 39.1 MMHG (ref 35–45)
PCO2 BLDA: 40.3 MMHG (ref 35–45)
PCO2 BLDA: 41.6 MMHG (ref 35–45)
PCO2 BLDA: 42.4 MMHG (ref 35–45)
PCO2 BLDA: 42.8 MMHG (ref 35–45)
PCO2 BLDA: 44.4 MMHG (ref 35–45)
PCO2 BLDA: 45.1 MMHG (ref 35–45)
PCO2 BLDA: 50.5 MMHG (ref 35–45)
PEEP: 5
PH SMN: 7.43 [PH] (ref 7.35–7.45)
PH SMN: 7.46 [PH] (ref 7.35–7.45)
PH SMN: 7.47 [PH] (ref 7.35–7.45)
PH SMN: 7.48 [PH] (ref 7.35–7.45)
PH SMN: 7.49 [PH] (ref 7.35–7.45)
PH SMN: 7.55 [PH] (ref 7.35–7.45)
PH SMN: 7.56 [PH] (ref 7.35–7.45)
PHOSPHATE SERPL-MCNC: 6.1 MG/DL (ref 2.7–4.5)
PHOSPHATE SERPL-MCNC: 6.3 MG/DL (ref 2.7–4.5)
PHOSPHATE SERPL-MCNC: 6.4 MG/DL (ref 2.7–4.5)
PHOSPHATE SERPL-MCNC: 6.5 MG/DL (ref 2.7–4.5)
PLATELET # BLD AUTO: 221 K/UL (ref 150–350)
PLATELET # BLD AUTO: 223 K/UL (ref 150–350)
PLATELET # BLD AUTO: 226 K/UL (ref 150–350)
PLATELET # BLD AUTO: 237 K/UL (ref 150–350)
PLATELET # BLD AUTO: 243 K/UL (ref 150–350)
PLATELET # BLD AUTO: 249 K/UL (ref 150–350)
PMV BLD AUTO: 11.4 FL (ref 9.2–12.9)
PMV BLD AUTO: 11.5 FL (ref 9.2–12.9)
PMV BLD AUTO: 11.7 FL (ref 9.2–12.9)
PMV BLD AUTO: 11.9 FL (ref 9.2–12.9)
PO2 BLDA: 127 MMHG (ref 80–100)
PO2 BLDA: 132 MMHG (ref 80–100)
PO2 BLDA: 148 MMHG (ref 80–100)
PO2 BLDA: 158 MMHG (ref 80–100)
PO2 BLDA: 158 MMHG (ref 80–100)
PO2 BLDA: 159 MMHG (ref 80–100)
PO2 BLDA: 180 MMHG (ref 80–100)
PO2 BLDA: 32 MMHG (ref 40–60)
PO2 BLDA: 39 MMHG (ref 40–60)
POC BE: 11 MMOL/L
POC BE: 11 MMOL/L
POC BE: 13 MMOL/L
POC BE: 6 MMOL/L
POC BE: 7 MMOL/L
POC BE: 9 MMOL/L
POC BE: 9 MMOL/L
POC IONIZED CALCIUM: 1.04 MMOL/L (ref 1.06–1.42)
POC IONIZED CALCIUM: 1.07 MMOL/L (ref 1.06–1.42)
POC IONIZED CALCIUM: 1.08 MMOL/L (ref 1.06–1.42)
POC IONIZED CALCIUM: 1.15 MMOL/L (ref 1.06–1.42)
POC IONIZED CALCIUM: 1.16 MMOL/L (ref 1.06–1.42)
POC IONIZED CALCIUM: 1.16 MMOL/L (ref 1.06–1.42)
POC IONIZED CALCIUM: 1.17 MMOL/L (ref 1.06–1.42)
POC IONIZED CALCIUM: 1.17 MMOL/L (ref 1.06–1.42)
POC SATURATED O2: 100 % (ref 95–100)
POC SATURATED O2: 62 % (ref 95–100)
POC SATURATED O2: 77 % (ref 95–100)
POC SATURATED O2: 99 % (ref 95–100)
POC TCO2: 30 MMOL/L (ref 23–27)
POC TCO2: 31 MMOL/L (ref 23–27)
POC TCO2: 31 MMOL/L (ref 23–27)
POC TCO2: 32 MMOL/L (ref 23–27)
POC TCO2: 34 MMOL/L (ref 23–27)
POC TCO2: 34 MMOL/L (ref 23–27)
POC TCO2: 35 MMOL/L (ref 24–29)
POC TCO2: 35 MMOL/L (ref 24–29)
POC TCO2: 37 MMOL/L (ref 23–27)
POCT GLUCOSE: 127 MG/DL (ref 70–110)
POCT GLUCOSE: 131 MG/DL (ref 70–110)
POCT GLUCOSE: 134 MG/DL (ref 70–110)
POCT GLUCOSE: 141 MG/DL (ref 70–110)
POCT GLUCOSE: 144 MG/DL (ref 70–110)
POCT GLUCOSE: 149 MG/DL (ref 70–110)
POTASSIUM BLD-SCNC: 3.5 MMOL/L (ref 3.5–5.1)
POTASSIUM BLD-SCNC: 3.5 MMOL/L (ref 3.5–5.1)
POTASSIUM BLD-SCNC: 3.8 MMOL/L (ref 3.5–5.1)
POTASSIUM BLD-SCNC: 3.9 MMOL/L (ref 3.5–5.1)
POTASSIUM BLD-SCNC: 3.9 MMOL/L (ref 3.5–5.1)
POTASSIUM BLD-SCNC: 4 MMOL/L (ref 3.5–5.1)
POTASSIUM SERPL-SCNC: 3.8 MMOL/L (ref 3.5–5.1)
POTASSIUM SERPL-SCNC: 3.9 MMOL/L (ref 3.5–5.1)
POTASSIUM SERPL-SCNC: 3.9 MMOL/L (ref 3.5–5.1)
POTASSIUM SERPL-SCNC: 4 MMOL/L (ref 3.5–5.1)
POTASSIUM SERPL-SCNC: 4.1 MMOL/L (ref 3.5–5.1)
POTASSIUM SERPL-SCNC: 4.1 MMOL/L (ref 3.5–5.1)
POTASSIUM SERPL-SCNC: 4.5 MMOL/L (ref 3.5–5.1)
PROT SERPL-MCNC: 6.5 G/DL (ref 6–8.4)
PROTHROMBIN TIME: 11.4 SEC (ref 9–12.5)
PROTHROMBIN TIME: 11.7 SEC (ref 9–12.5)
PROTHROMBIN TIME: 11.8 SEC (ref 9–12.5)
PROTHROMBIN TIME: 12 SEC (ref 9–12.5)
PROTHROMBIN TIME: 12.1 SEC (ref 9–12.5)
PROTHROMBIN TIME: 12.1 SEC (ref 9–12.5)
RBC # BLD AUTO: 3.61 M/UL (ref 4.6–6.2)
RBC # BLD AUTO: 3.63 M/UL (ref 4.6–6.2)
RBC # BLD AUTO: 3.64 M/UL (ref 4.6–6.2)
RBC # BLD AUTO: 3.67 M/UL (ref 4.6–6.2)
RBC # BLD AUTO: 3.69 M/UL (ref 4.6–6.2)
RBC # BLD AUTO: 3.7 M/UL (ref 4.6–6.2)
SAMPLE: ABNORMAL
SAMPLE: NORMAL
SITE: ABNORMAL
SITE: NORMAL
SODIUM BLD-SCNC: 136 MMOL/L (ref 136–145)
SODIUM BLD-SCNC: 137 MMOL/L (ref 136–145)
SODIUM BLD-SCNC: 139 MMOL/L (ref 136–145)
SODIUM BLD-SCNC: 139 MMOL/L (ref 136–145)
SODIUM BLD-SCNC: 141 MMOL/L (ref 136–145)
SODIUM BLD-SCNC: 141 MMOL/L (ref 136–145)
SODIUM SERPL-SCNC: 142 MMOL/L (ref 136–145)
SODIUM SERPL-SCNC: 143 MMOL/L (ref 136–145)
SODIUM SERPL-SCNC: 144 MMOL/L (ref 136–145)
SODIUM SERPL-SCNC: 145 MMOL/L (ref 136–145)
SP02: 100
VANCOMYCIN SERPL-MCNC: 13.9 UG/ML
VANCOMYCIN TROUGH SERPL-MCNC: 13.1 UG/ML (ref 10–22)
VT: 500
WBC # BLD AUTO: 16.35 K/UL (ref 3.9–12.7)
WBC # BLD AUTO: 17.97 K/UL (ref 3.9–12.7)
WBC # BLD AUTO: 18.2 K/UL (ref 3.9–12.7)
WBC # BLD AUTO: 18.81 K/UL (ref 3.9–12.7)
WBC # BLD AUTO: 19.29 K/UL (ref 3.9–12.7)
WBC # BLD AUTO: 19.37 K/UL (ref 3.9–12.7)

## 2020-01-26 PROCEDURE — 63600367 HC NITRIC OXIDE PER HOUR

## 2020-01-26 PROCEDURE — 94761 N-INVAS EAR/PLS OXIMETRY MLT: CPT

## 2020-01-26 PROCEDURE — 63600175 PHARM REV CODE 636 W HCPCS: Performed by: THORACIC SURGERY (CARDIOTHORACIC VASCULAR SURGERY)

## 2020-01-26 PROCEDURE — 85014 HEMATOCRIT: CPT

## 2020-01-26 PROCEDURE — 99233 PR SUBSEQUENT HOSPITAL CARE,LEVL III: ICD-10-PCS | Mod: ,,, | Performed by: INTERNAL MEDICINE

## 2020-01-26 PROCEDURE — 85610 PROTHROMBIN TIME: CPT | Mod: 91

## 2020-01-26 PROCEDURE — 25000003 PHARM REV CODE 250: Performed by: PHYSICIAN ASSISTANT

## 2020-01-26 PROCEDURE — 25000003 PHARM REV CODE 250: Performed by: SURGERY

## 2020-01-26 PROCEDURE — 27000248 HC VAD-ADDITIONAL DAY

## 2020-01-26 PROCEDURE — 85730 THROMBOPLASTIN TIME PARTIAL: CPT | Mod: 91

## 2020-01-26 PROCEDURE — A4216 STERILE WATER/SALINE, 10 ML: HCPCS | Performed by: SURGERY

## 2020-01-26 PROCEDURE — 84100 ASSAY OF PHOSPHORUS: CPT | Mod: 91

## 2020-01-26 PROCEDURE — 80048 BASIC METABOLIC PNL TOTAL CA: CPT

## 2020-01-26 PROCEDURE — 83735 ASSAY OF MAGNESIUM: CPT | Mod: 91

## 2020-01-26 PROCEDURE — 94003 VENT MGMT INPAT SUBQ DAY: CPT

## 2020-01-26 PROCEDURE — 63600175 PHARM REV CODE 636 W HCPCS: Performed by: SURGERY

## 2020-01-26 PROCEDURE — 80202 ASSAY OF VANCOMYCIN: CPT | Mod: 91

## 2020-01-26 PROCEDURE — 99232 SBSQ HOSP IP/OBS MODERATE 35: CPT | Mod: ,,, | Performed by: NURSE PRACTITIONER

## 2020-01-26 PROCEDURE — 83050 HGB METHEMOGLOBIN QUAN: CPT

## 2020-01-26 PROCEDURE — 80053 COMPREHEN METABOLIC PANEL: CPT

## 2020-01-26 PROCEDURE — 63600175 PHARM REV CODE 636 W HCPCS: Performed by: STUDENT IN AN ORGANIZED HEALTH CARE EDUCATION/TRAINING PROGRAM

## 2020-01-26 PROCEDURE — 99233 SBSQ HOSP IP/OBS HIGH 50: CPT | Mod: ,,, | Performed by: INTERNAL MEDICINE

## 2020-01-26 PROCEDURE — 80202 ASSAY OF VANCOMYCIN: CPT

## 2020-01-26 PROCEDURE — 84295 ASSAY OF SERUM SODIUM: CPT

## 2020-01-26 PROCEDURE — 93750 PR INTERROGATE VENT ASSIST DEV, IN PERSON, W PHYSICIAN ANALYSIS: ICD-10-PCS | Mod: ,,, | Performed by: INTERNAL MEDICINE

## 2020-01-26 PROCEDURE — 93750 INTERROGATION VAD IN PERSON: CPT | Mod: ,,, | Performed by: INTERNAL MEDICINE

## 2020-01-26 PROCEDURE — 25000003 PHARM REV CODE 250: Performed by: THORACIC SURGERY (CARDIOTHORACIC VASCULAR SURGERY)

## 2020-01-26 PROCEDURE — 80048 BASIC METABOLIC PNL TOTAL CA: CPT | Mod: 91

## 2020-01-26 PROCEDURE — C9113 INJ PANTOPRAZOLE SODIUM, VIA: HCPCS | Performed by: SURGERY

## 2020-01-26 PROCEDURE — 99900035 HC TECH TIME PER 15 MIN (STAT)

## 2020-01-26 PROCEDURE — 27000221 HC OXYGEN, UP TO 24 HOURS

## 2020-01-26 PROCEDURE — 85730 THROMBOPLASTIN TIME PARTIAL: CPT

## 2020-01-26 PROCEDURE — 84132 ASSAY OF SERUM POTASSIUM: CPT

## 2020-01-26 PROCEDURE — 82330 ASSAY OF CALCIUM: CPT

## 2020-01-26 PROCEDURE — 25000003 PHARM REV CODE 250: Performed by: STUDENT IN AN ORGANIZED HEALTH CARE EDUCATION/TRAINING PROGRAM

## 2020-01-26 PROCEDURE — 83615 LACTATE (LD) (LDH) ENZYME: CPT

## 2020-01-26 PROCEDURE — 37799 UNLISTED PX VASCULAR SURGERY: CPT

## 2020-01-26 PROCEDURE — 80162 ASSAY OF DIGOXIN TOTAL: CPT

## 2020-01-26 PROCEDURE — 20000000 HC ICU ROOM

## 2020-01-26 PROCEDURE — 83605 ASSAY OF LACTIC ACID: CPT

## 2020-01-26 PROCEDURE — 82803 BLOOD GASES ANY COMBINATION: CPT

## 2020-01-26 PROCEDURE — 99900026 HC AIRWAY MAINTENANCE (STAT)

## 2020-01-26 PROCEDURE — 99232 PR SUBSEQUENT HOSPITAL CARE,LEVL II: ICD-10-PCS | Mod: ,,, | Performed by: NURSE PRACTITIONER

## 2020-01-26 PROCEDURE — 85025 COMPLETE CBC W/AUTO DIFF WBC: CPT | Mod: 91

## 2020-01-26 RX ORDER — DIGOXIN 0.25 MG/ML
0.25 INJECTION INTRAMUSCULAR; INTRAVENOUS
Status: COMPLETED | OUTPATIENT
Start: 2020-01-26 | End: 2020-01-26

## 2020-01-26 RX ORDER — INSULIN ASPART 100 [IU]/ML
0-5 INJECTION, SOLUTION INTRAVENOUS; SUBCUTANEOUS EVERY 4 HOURS PRN
Status: DISCONTINUED | OUTPATIENT
Start: 2020-01-26 | End: 2020-01-30

## 2020-01-26 RX ORDER — HEPARIN SODIUM 10000 [USP'U]/100ML
600 INJECTION, SOLUTION INTRAVENOUS CONTINUOUS
Status: ACTIVE | OUTPATIENT
Start: 2020-01-26 | End: 2020-01-27

## 2020-01-26 RX ORDER — POTASSIUM CHLORIDE 20 MEQ/15ML
20 SOLUTION ORAL ONCE
Status: COMPLETED | OUTPATIENT
Start: 2020-01-26 | End: 2020-01-26

## 2020-01-26 RX ORDER — GLUCAGON 1 MG
1 KIT INJECTION
Status: DISCONTINUED | OUTPATIENT
Start: 2020-01-26 | End: 2020-02-02

## 2020-01-26 RX ORDER — POTASSIUM CHLORIDE 20 MEQ/15ML
40 SOLUTION ORAL ONCE
Status: DISCONTINUED | OUTPATIENT
Start: 2020-01-26 | End: 2020-01-26

## 2020-01-26 RX ADMIN — EPINEPHRINE 0.07 MCG/KG/MIN: 1 INJECTION INTRAMUSCULAR; INTRAVENOUS; SUBCUTANEOUS at 03:01

## 2020-01-26 RX ADMIN — PROPOFOL 20 MCG/KG/MIN: 10 INJECTION, EMULSION INTRAVENOUS at 11:01

## 2020-01-26 RX ADMIN — PANTOPRAZOLE SODIUM 40 MG: 40 INJECTION, POWDER, FOR SOLUTION INTRAVENOUS at 08:01

## 2020-01-26 RX ADMIN — PROPOFOL 15 MCG/KG/MIN: 10 INJECTION, EMULSION INTRAVENOUS at 10:01

## 2020-01-26 RX ADMIN — CEFEPIME 2 G: 2 INJECTION, POWDER, FOR SOLUTION INTRAVENOUS at 03:01

## 2020-01-26 RX ADMIN — FUROSEMIDE 12.5 MG/HR: 10 INJECTION, SOLUTION INTRAMUSCULAR; INTRAVENOUS at 11:01

## 2020-01-26 RX ADMIN — PROPOFOL 10 MCG/KG/MIN: 10 INJECTION, EMULSION INTRAVENOUS at 01:01

## 2020-01-26 RX ADMIN — MUPIROCIN: 20 OINTMENT TOPICAL at 08:01

## 2020-01-26 RX ADMIN — Medication 3 ML: at 01:01

## 2020-01-26 RX ADMIN — PROPOFOL 20 MCG/KG/MIN: 10 INJECTION, EMULSION INTRAVENOUS at 02:01

## 2020-01-26 RX ADMIN — HYDRALAZINE HYDROCHLORIDE 10 MG: 20 INJECTION INTRAMUSCULAR; INTRAVENOUS at 10:01

## 2020-01-26 RX ADMIN — DIGOXIN 0.25 MG: 0.25 INJECTION INTRAMUSCULAR; INTRAVENOUS at 11:01

## 2020-01-26 RX ADMIN — Medication 400 MG: at 08:01

## 2020-01-26 RX ADMIN — POTASSIUM CHLORIDE 20 MEQ: 20 SOLUTION ORAL at 01:01

## 2020-01-26 RX ADMIN — POLYETHYLENE GLYCOL 3350 17 G: 17 POWDER, FOR SOLUTION ORAL at 08:01

## 2020-01-26 RX ADMIN — HYDRALAZINE HYDROCHLORIDE 10 MG: 20 INJECTION INTRAMUSCULAR; INTRAVENOUS at 04:01

## 2020-01-26 RX ADMIN — Medication 400 MG: at 02:01

## 2020-01-26 RX ADMIN — Medication 3 ML: at 10:01

## 2020-01-26 RX ADMIN — Medication 3 ML: at 06:01

## 2020-01-26 RX ADMIN — FUROSEMIDE 15 MG/HR: 10 INJECTION, SOLUTION INTRAMUSCULAR; INTRAVENOUS at 03:01

## 2020-01-26 RX ADMIN — EPINEPHRINE 0.07 MCG/KG/MIN: 1 INJECTION INTRAMUSCULAR; INTRAVENOUS; SUBCUTANEOUS at 02:01

## 2020-01-26 RX ADMIN — ASPIRIN 325 MG ORAL TABLET 325 MG: 325 PILL ORAL at 08:01

## 2020-01-26 RX ADMIN — FERROUS GLUCONATE TAB 324 MG (37.5 MG ELEMENTAL IRON) 324 MG: 324 (37.5 FE) TAB at 07:01

## 2020-01-26 RX ADMIN — AMIODARONE HYDROCHLORIDE 400 MG: 200 TABLET ORAL at 08:01

## 2020-01-26 RX ADMIN — DIGOXIN 0.25 MG: 0.25 INJECTION INTRAMUSCULAR; INTRAVENOUS at 01:01

## 2020-01-26 RX ADMIN — CEFEPIME 2 G: 2 INJECTION, POWDER, FOR SOLUTION INTRAVENOUS at 01:01

## 2020-01-26 RX ADMIN — FENTANYL CITRATE 25 MCG: 50 INJECTION INTRAMUSCULAR; INTRAVENOUS at 03:01

## 2020-01-26 NOTE — PROGRESS NOTES
01/26/2020  Gage Carias    Current provider:  Ino Schmitt MD      I, Gage Carias, rounded on Tae Delgado to ensure all mechanical assist device settings (IABP or VAD) were appropriate and all parameters were within limits.  I was able to ensure all back up equipment was present, the staff had no issues, and the Perfusion Department daily rounding was complete.    5:40 PM

## 2020-01-26 NOTE — ASSESSMENT & PLAN NOTE
BG goal 140-180    Decrease transition IV insulin infusion to 0.5 u/hr  Low dose correction scale  BG monitoring every 4 hours while NPO    ADDENDUM: Discontinue transition IV insulin infusion    Discharge planning: TBD

## 2020-01-26 NOTE — PLAN OF CARE
POC reviewed with pt and son at bedside, pt remains intubated, vent settings AC 50% and PEEP 5, sats 100%, SvO2 62 this am, sedated on propofol gtt, PRN Fentanyl IVP for pain. Pt opens eyes and follows commands on 10 mcg/kg/min Propofol. Epi gtt titrated to 0.07 mcg/kg/min per Dr. Schmitt, MAP high 70s. Nitric oxide @ 15 PPM. CVP flat 14,14 and 13 respectively, pt net negative 1.7 L this am. Lasix gtt @ 15 mg/hr, -150 mL/hr. Lidocaine gtt @ 1 mg/min, Heparin gtt @ 400 Units/hr, Insulin gtt @ 0.8 Units/hr. Pt's chest remains open, HOB elevated <15 degrees, no complications, heel protector boots in place, arms elevated on pillows, no new skin breakdown noted. AM lab results reviewed.All questions answered and addressed, pts sons verbalized understanding and compliance.

## 2020-01-26 NOTE — ASSESSMENT & PLAN NOTE
Titrate insulin slowly to avoid hypoglycemia as the risk of hypoglycemia increases with decreased creatinine clearance.  Caution with insulin stacking  Estimated Creatinine Clearance: 31.7 mL/min (A) (based on SCr of 3.1 mg/dL (H)).

## 2020-01-26 NOTE — ASSESSMENT & PLAN NOTE
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   - CTS primary  - Taken back to OR 1/24 for possible RVAD placement which was not done. Patient remained hemodynamically stable in OR  - Currently hemodynamically stable on Epi, Lasix, Lidocaine, and Corby 15  - Would wean off Lidocaine  - Current speed 5300    Procedure: Device Interrogation Including analysis of device parameters  Current Settings: Ventricular Assist Device  Review of device function is stable/unstabe    TXP LVAD INTERROGATIONS 1/26/2020 1/26/2020 1/26/2020 1/26/2020 1/26/2020 1/26/2020 1/26/2020   Type - HeartMate3 HeartMate3 HeartMate3 HeartMate3 HeartMate3 HeartMate3   Flow 4.2 4.2 4.2 4.2 4 4.1 4.1   Speed 5200 5200 5200 5200 5200 5200 5200   PI 3.6 3.4 3.3 3.5 4.7 3.5 4.1   Power (Berry) 3.8 3.7 3.6 3.6 3.9 3.7 3.7   LSL - 4800 4800 4800 4800 4800 4800   Pulsatility - Intermittent pulse - - - Intermittent pulse -

## 2020-01-26 NOTE — ASSESSMENT & PLAN NOTE
- S/P DT HM3 with closure of AV and plication of MV for regurg 1/23/20 (See LVAD)  - [facility] dx'd 2010  - Patient given Diuril for UOP <100 cc/hr per primary  - See above;

## 2020-01-26 NOTE — PLAN OF CARE
Plan of Care Note  Cardiothoracic Surgery    Tae Delgado is a 59 y.o. male with heart failure who underwent LVAD placement (1/23/20) then closure (1/24/20) and repeat chest opening (1/24/20) for poor RV function.    Specific issues: increase heparin gtt to 600/hr non-titrating; hold vanc for elevated creatinine; decrease lasix to 12.5; amiodarone to elixir enterally; continue lidocaine drip and epi; likely closure this week    Plan of care for patient was discussed with ICU staff including nurses, residents, and faculty and appropriate consulting services.    Will continue to monitor patient's hemodynamics, functionality, neuro status, fluid status and renal function, and labs and will adjust medications and fluids as necessary while monitoring appropriateness for de-escalation of support and monitoring and transport to stepdown unit.    Terence Gonzalez MD  Cardiothoracic Surgery Fellow

## 2020-01-26 NOTE — PLAN OF CARE
"      SICU PLAN OF CARE NOTE    Dx: s/p LVAD; HM III    Shift Events: 2 doses Digoxin for continued tachy arrhythmia 130-140 with poor effectiveness. Heparin increase per Dr. Schmitt's directive. Insulin infusion stopped.     Goals of Care: continue diuresis. Tentative plan for chest closure 1/27    Neuro: Sedated, Arouses to Voice, Follows Commands and Moves All Extremities with q4h sedation vacation.    Vital Signs: BP (!) 86/0   Pulse (!) 133   Temp 98.2 °F (36.8 °C) (Core ([place]))   Resp 18   Ht 5' 10" (1.778 m)   Wt 109 kg (240 lb 4.8 oz)   SpO2 99%   BMI 34.48 kg/m²     Respiratory: Ventilator AC ; +5, 50%; ABGs q6h. Sats remain %    Diet: NPO except meds    Gtts: Propfol, Epinephrine, Lidocaine, Lasix and Heparin    Urine Output: Urinary Catheter  1.2 L cc/shift; Net negative >800ml    Drains: Chest Tube, total output 235 cc /  shift    VAD No VAD alarms; Flow: 4s; RPM 5200; PIs 3.5-4.7; Power 3s     Labs/Accuchecks: q4h    Skin: Open chest. Unable to turn. Bilateral waffle boots in place. No breakdown visible.        "

## 2020-01-26 NOTE — NURSING
Dr. Schmitt notified via telephone of the following: Net negative 880 this shift. UO still >100 cc/h. CVP 16. HR fluctuating between 104-130. Reviewed current gtt rates. No new orders at this time.

## 2020-01-26 NOTE — PROGRESS NOTES
[facility]  Heart Transplant  Progress Note    Patient Name: Tae Delgado  MRN: [id number]  Admission Date: 1/9/2020  Hospital Length of Stay: 17 days  Attending Physician: Ino Schmitt MD  Primary Care Provider: Elham Pearson MD  Principal Problem:LVAD (left ventricular assist device) present    Subjective:     **Interval History: Lidocaine drip decreased to 1 and Dig given yesterday.  Lasix drip down to 15mg/hr and getting Diuril for UOP <100 on an hour period.  Corby down to 15.  Patient is net negative 1.7L in the last 24 hours.  CVP: 13, SVO2: 62, CO: 7.36, CI: 3.174, SVR: 707.  Tentative plan for chest closure tomorrow    Lines:  Arterial Line: 1/23/20  Left IJ: 1/23/20  PA catheter    Continuous Infusions:   epinephrine 0.07 mcg/kg/min (01/26/20 0800)    furosemide (LASIX) 2 mg/mL infusion (non-titrating) 12.5 mg/hr (01/26/20 0800)    heparin (porcine) in 5 % dex 500 Units/hr (01/26/20 0800)    insulin (HUMAN R) infusion (adults) 0.5 Units/hr (01/26/20 0813)    lidocaine 1 mg/min (01/26/20 0800)    niCARdipine Stopped (01/25/20 0900)    nitric oxide gas      norepinephrine bitartrate-D5W Stopped (01/23/20 1430)    propofol 15 mcg/kg/min (01/26/20 0800)     Scheduled Meds:   amiodarone  400 mg Per NG tube TID    aspirin  325 mg Oral Daily    aspirin  325 mg Oral Daily    vancomycin (VANCOCIN) IVPB  750 mg Intravenous Q24H    And    ceFEPime (MAXIPIME) IVPB  2 g Intravenous Q12H    docusate sodium  200 mg Oral QHS    docusate sodium  50 mg Oral BID    ferrous gluconate  324 mg Oral Daily with breakfast    magnesium oxide  400 mg Per NG tube TID    mupirocin   Nasal BID    pantoprazole  40 mg Oral Daily    pantoprazole  40 mg Intravenous Daily    polyethylene glycol  17 g Oral BID    sodium chloride 0.9%  3 mL Intravenous Q8H     PRN Meds:albumin human 5%, albuterol sulfate, bisacodyl, Dextrose 10% Bolus, Dextrose 10% Bolus, Dextrose 10% Bolus, Dextrose 10% Bolus,  fentaNYL, glucagon (human recombinant), glucose, glucose, hydrALAZINE, insulin aspart U-100, magnesium hydroxide 400 mg/5 ml, magnesium sulfate IVPB, oxyCODONE, oxyCODONE, sodium chloride 0.9%    Review of patient's allergies indicates:   Allergen Reactions    Biopatch [chlorhexidine gluconate]      Site burning    Dobutamine in d5w      Tachycardia, tremors, SOB, flushing    Percocet [oxycodone-acetaminophen] Itching    Penicillins Rash     Cefepime given on 1/23/2020 without issue     Objective:     Vital Signs (Most Recent):  Temp: 100.2 °F (37.9 °C) (01/26/20 0700)  Pulse: (!) 118 (01/26/20 0845)  Resp: 19 (01/26/20 0510)  BP: (!) 82/0 (01/26/20 0700)  SpO2: 100 % (01/26/20 0845) Vital Signs (24h Range):  Temp:  [98.1 °F (36.7 °C)-100.2 °F (37.9 °C)] 100.2 °F (37.9 °C)  Pulse:  [114-148] 118  Resp:  [18-20] 19  SpO2:  [99 %-100 %] 100 %  BP: (80-88)/(0) 82/0  Arterial Line BP: (79-97)/(65-82) 85/73     Patient Vitals for the past 72 hrs (Last 3 readings):   Weight   01/26/20 0500 109 kg (240 lb 4.8 oz)   01/25/20 0330 109.8 kg (242 lb 1 oz)   01/23/20 1530 110.8 kg (244 lb 4.3 oz)     Body mass index is 34.48 kg/m².      Intake/Output Summary (Last 24 hours) at 1/26/2020 0848  Last data filed at 1/26/2020 0800  Gross per 24 hour   Intake 2105.3 ml   Output 4186 ml   Net -2080.7 ml       Hemodynamic Parameters:  PAP: (42-52)/(12-22) 46/20  PAP (Mean):  [28 mmHg-36 mmHg] 32 mmHg  PCWP:  [14 mmHg-19 mmHg] 19 mmHg  CO:  [4.2 L/min-7 L/min] 6.5 L/min  CI:  [2.1 L/min/m2-3 L/min/m2] 2.8 L/min/m2    Telemetry: A fib  Physical Exam   Constitutional: Intubated and sedated; chest open    Eyes: Pupils are equal, round, and reactive to light. Conjunctivae are normal.   Neck: Neck supple. No thyromegaly present. Bilateral IJ lines   Cardiovascular: Irregularly irregular, tachycardic, smooth VAD hum. Chest open   Pulmonary/Chest: Effort normal and breath sounds normal. Intubated and sedated   Abdominal: Soft.    Musculoskeletal: He exhibits no edema.   Neurological: Intubated and sedated   Skin: Skin is warm and dry. Capillary refill takes 2 to 3 seconds.       Significant Labs:  CBC:  Recent Labs   Lab 01/26/20  0000  01/26/20  0400 01/26/20  0733 01/26/20  0737   WBC 19.37*  --  17.97* 19.29*  --    RBC 3.69*  --  3.70* 3.67*  --    HGB 10.1*  --  10.2* 10.2*  --    HCT 33.3*   < > 33.6* 33.3* 29*     --  223 226  --    MCV 90  --  91 91  --    MCH 27.4  --  27.6 27.8  --    MCHC 30.3*  --  30.4* 30.6*  --     < > = values in this interval not displayed.     BNP:  Recent Labs   Lab 01/20/20  0300 01/24/20  0334   * 968*     CMP:  Recent Labs   Lab 01/24/20  0334  01/25/20  0413  01/26/20  0000 01/26/20  0400 01/26/20  0733   GLU  --    < > 169*   < > 133* 127*  127* 131*   CALCIUM  --    < > 9.7   < > 9.7 9.8  9.8 9.8   ALBUMIN 3.0*  --  2.8*  --   --  2.5*  --    PROT 6.4  --  6.6  --   --  6.5  --    NA  --    < > 140   < > 142 142  142 143   K  --    < > 4.3   < > 4.5 4.1  4.1 4.0   CO2  --    < > 24   < > 28 26  26 26   CL  --    < > 100   < > 100 99  99 100   BUN  --    < > 57*   < > 71* 78*  78* 80*   CREATININE  --    < > 2.7*   < > 3.2* 3.2*  3.2* 3.2*   ALKPHOS 40*  --  46*  --   --  44*  --    ALT 34  --  34  --   --  29  --    AST 97*  --  64*  --   --  39  --    BILITOT 1.1*  --  0.7  --   --  0.7  --     < > = values in this interval not displayed.      Coagulation:   Recent Labs   Lab 01/26/20  0000 01/26/20  0400 01/26/20  0733   INR 1.2 1.2 1.1   APTT 24.7 25.8 24.1     LDH:  Recent Labs   Lab 01/23/20  1423 01/24/20  0334 01/25/20  0505 01/26/20  0400   * 570* 525* 470*     Microbiology:  Microbiology Results (last 7 days)     Procedure Component Value Units Date/Time    Blood culture [[id number]] Collected:  01/20/20 1042    Order Status:  Completed Specimen:  Blood from Peripheral, Antecubital, Left Updated:  01/25/20 1412     Blood Culture, Routine No growth after 5 days.     Blood culture [[id number]] Collected:  01/20/20 1035    Order Status:  Completed Specimen:  Blood from Peripheral, Wrist, Left Updated:  01/25/20 1412     Blood Culture, Routine No growth after 5 days.          I have reviewed all pertinent labs within the past 24 hours.    Estimated Creatinine Clearance: 30.7 mL/min (A) (based on SCr of 3.2 mg/dL (H)).    Diagnostic Results:  I have reviewed all pertinent imaging results/findings within the past 24 hours.    Assessment and Plan:       55 y.o. WM with history of NICMP diagnosed in 2010, ICD, LV thrombus (with prior splenic and renal emboli), Embolic  CVA , paroxysmal atrial fib, HTN, HLP  presents for  F/U today to clinic and he was volume overloaded on exam .  He states he had 3 admission in the last 3 months for volume overload. He started having more SOB on exertion since one month. Also endorses of Orthopnea since last one month. Alble to walk only 150 ft. He also has Bilateral lower extremity edema. He was admitted here in 2017 for ADHD here at [facility] and [facility] during that admission showed PCWP 40 and CVP 17. Currently denies chest pain, lightheadedness     * LVAD (left ventricular assist device) present  - S/P DT HM3 with closure of AV and repair of MV for regurg 1/23/20.   - CTS primary  - Taken back to OR 1/24 for possible RVAD placement which was not done. Patient remained hemodynamically stable in OR  - Currently hemodynamically stable on Epi, Lasix, Lidocaine, and Corby 15  - Would wean off Lidocaine  - Current speed 5300    Procedure: Device Interrogation Including analysis of device parameters  Current Settings: Ventricular Assist Device  Review of device function is stable/unstabe    TXP LVAD INTERROGATIONS 1/26/2020 1/26/2020 1/26/2020 1/26/2020 1/26/2020 1/26/2020 1/26/2020   Type - HeartMate3 HeartMate3 HeartMate3 HeartMate3 HeartMate3 HeartMate3   Flow 4.2 4.2 4.2 4.2 4 4.1 4.1   Speed 5200 5200 5200 5200 5200 5200 5200   PI 3.6 3.4 3.3 3.5 4.7  3.5 4.1   Power (Berry) 3.8 3.7 3.6 3.6 3.9 3.7 3.7   LSL - 4800 4800 4800 4800 4800 4800   Pulsatility - Intermittent pulse - - - Intermittent pulse -       Acute on chronic combined systolic and diastolic heart failure  - S/P DT HM3 with closure of AV and plication of MV for regurg 1/23/20 (See LVAD)  - NID dx'd 2010  - Patient given Diuril for UOP <100 cc/hr per primary  - See above;         Paroxysmal atrial fibrillation  - Has been back in A fib since surgery  - AC per CTS  - Was on Amiodarone infusion but now on Lidocaine.  - Would wean Lidocaine off and consider restarting Amiodarone    Acute kidney injury superimposed on chronic kidney disease  - Creatinine on admit 2.6 (baseline ~ 1.8). Creatinine today 3.2  - Nephrology consulted and following    Left ventricular thrombus without MI  - H/O LV thrombus with h/o splenic and renal emboli as well as embolic CVA  - Limited TTE done here 1/13 showed no thrombus  - JACINTO 1/23 intra op showed resolution of DAMON thrombus  - AC per CTS      Right lower lobe pulmonary nodule  - Incidental finding on CT chest/abd/pelvis on 1/12. Pulmonology consulted, and rec repeat CT of chest in 3 months  - Will need to follow-up in pulmonary clinic.     Hepatic congestion  - Liver US on 1/11 unremarkable    ICD (implantable cardioverter-defibrillator) in place  - S/P Biotronik dual chamber ICD    Coagulopathy  - Appreciate Hem/Onc's help. No evidence of underlying coagulopathy (h/o LV thrombus with splenic and renal emboli, h/o embolic CVA)    NSVT (nonsustained ventricular tachycardia)  - would consider restarting Amiodarone      MARBELLA Mcfadden  Heart Transplant  [facility]

## 2020-01-26 NOTE — SUBJECTIVE & OBJECTIVE
**Interval History: Lidocaine drip decreased to 1 and Dig given yesterday.  Lasix drip down to 15mg/hr and getting Diuril for UOP <100 on an hour period.  Corby down to 15.  Patient is net negative 1.7L in the last 24 hours.  CVP: 13, SVO2: 62, CO: 7.36, CI: 3.174, SVR: 707.  Tentative plan for chest closure tomorrow    Lines:  Arterial Line: 1/23/20  Left IJ: 1/23/20  PA catheter    Continuous Infusions:   epinephrine 0.07 mcg/kg/min (01/26/20 0800)    furosemide (LASIX) 2 mg/mL infusion (non-titrating) 12.5 mg/hr (01/26/20 0800)    heparin (porcine) in 5 % dex 500 Units/hr (01/26/20 0800)    insulin (HUMAN R) infusion (adults) 0.5 Units/hr (01/26/20 0813)    lidocaine 1 mg/min (01/26/20 0800)    niCARdipine Stopped (01/25/20 0900)    nitric oxide gas      norepinephrine bitartrate-D5W Stopped (01/23/20 1430)    propofol 15 mcg/kg/min (01/26/20 0800)     Scheduled Meds:   amiodarone  400 mg Per NG tube TID    aspirin  325 mg Oral Daily    aspirin  325 mg Oral Daily    vancomycin (VANCOCIN) IVPB  750 mg Intravenous Q24H    And    ceFEPime (MAXIPIME) IVPB  2 g Intravenous Q12H    docusate sodium  200 mg Oral QHS    docusate sodium  50 mg Oral BID    ferrous gluconate  324 mg Oral Daily with breakfast    magnesium oxide  400 mg Per NG tube TID    mupirocin   Nasal BID    pantoprazole  40 mg Oral Daily    pantoprazole  40 mg Intravenous Daily    polyethylene glycol  17 g Oral BID    sodium chloride 0.9%  3 mL Intravenous Q8H     PRN Meds:albumin human 5%, albuterol sulfate, bisacodyl, Dextrose 10% Bolus, Dextrose 10% Bolus, Dextrose 10% Bolus, Dextrose 10% Bolus, fentaNYL, glucagon (human recombinant), glucose, glucose, hydrALAZINE, insulin aspart U-100, magnesium hydroxide 400 mg/5 ml, magnesium sulfate IVPB, oxyCODONE, oxyCODONE, sodium chloride 0.9%    Review of patient's allergies indicates:   Allergen Reactions    Biopatch [chlorhexidine gluconate]      Site burning    Dobutamine in d5w       Tachycardia, tremors, SOB, flushing    Percocet [oxycodone-acetaminophen] Itching    Penicillins Rash     Cefepime given on 1/23/2020 without issue     Objective:     Vital Signs (Most Recent):  Temp: 100.2 °F (37.9 °C) (01/26/20 0700)  Pulse: (!) 118 (01/26/20 0845)  Resp: 19 (01/26/20 0510)  BP: (!) 82/0 (01/26/20 0700)  SpO2: 100 % (01/26/20 0845) Vital Signs (24h Range):  Temp:  [98.1 °F (36.7 °C)-100.2 °F (37.9 °C)] 100.2 °F (37.9 °C)  Pulse:  [114-148] 118  Resp:  [18-20] 19  SpO2:  [99 %-100 %] 100 %  BP: (80-88)/(0) 82/0  Arterial Line BP: (79-97)/(65-82) 85/73     Patient Vitals for the past 72 hrs (Last 3 readings):   Weight   01/26/20 0500 109 kg (240 lb 4.8 oz)   01/25/20 0330 109.8 kg (242 lb 1 oz)   01/23/20 1530 110.8 kg (244 lb 4.3 oz)     Body mass index is 34.48 kg/m².      Intake/Output Summary (Last 24 hours) at 1/26/2020 0848  Last data filed at 1/26/2020 0800  Gross per 24 hour   Intake 2105.3 ml   Output 4186 ml   Net -2080.7 ml       Hemodynamic Parameters:  PAP: (42-52)/(12-22) 46/20  PAP (Mean):  [28 mmHg-36 mmHg] 32 mmHg  PCWP:  [14 mmHg-19 mmHg] 19 mmHg  CO:  [4.2 L/min-7 L/min] 6.5 L/min  CI:  [2.1 L/min/m2-3 L/min/m2] 2.8 L/min/m2    Telemetry: A fib  Physical Exam   Constitutional: Intubated and sedated; chest open    Eyes: Pupils are equal, round, and reactive to light. Conjunctivae are normal.   Neck: Neck supple. No thyromegaly present. Bilateral IJ lines   Cardiovascular: Irregularly irregular, tachycardic, smooth VAD hum. Chest open   Pulmonary/Chest: Effort normal and breath sounds normal. Intubated and sedated   Abdominal: Soft.   Musculoskeletal: He exhibits no edema.   Neurological: Intubated and sedated   Skin: Skin is warm and dry. Capillary refill takes 2 to 3 seconds.       Significant Labs:  CBC:  Recent Labs   Lab 01/26/20  0000  01/26/20  0400 01/26/20  0733 01/26/20  0737   WBC 19.37*  --  17.97* 19.29*  --    RBC 3.69*  --  3.70* 3.67*  --    HGB 10.1*  --  10.2*  10.2*  --    HCT 33.3*   < > 33.6* 33.3* 29*     --  223 226  --    MCV 90  --  91 91  --    MCH 27.4  --  27.6 27.8  --    MCHC 30.3*  --  30.4* 30.6*  --     < > = values in this interval not displayed.     BNP:  Recent Labs   Lab 01/20/20  0300 01/24/20 0334   * 968*     CMP:  Recent Labs   Lab 01/24/20  0334  01/25/20  0413  01/26/20  0000 01/26/20  0400 01/26/20  0733   GLU  --    < > 169*   < > 133* 127*  127* 131*   CALCIUM  --    < > 9.7   < > 9.7 9.8  9.8 9.8   ALBUMIN 3.0*  --  2.8*  --   --  2.5*  --    PROT 6.4  --  6.6  --   --  6.5  --    NA  --    < > 140   < > 142 142  142 143   K  --    < > 4.3   < > 4.5 4.1  4.1 4.0   CO2  --    < > 24   < > 28 26  26 26   CL  --    < > 100   < > 100 99  99 100   BUN  --    < > 57*   < > 71* 78*  78* 80*   CREATININE  --    < > 2.7*   < > 3.2* 3.2*  3.2* 3.2*   ALKPHOS 40*  --  46*  --   --  44*  --    ALT 34  --  34  --   --  29  --    AST 97*  --  64*  --   --  39  --    BILITOT 1.1*  --  0.7  --   --  0.7  --     < > = values in this interval not displayed.      Coagulation:   Recent Labs   Lab 01/26/20  0000 01/26/20  0400 01/26/20  0733   INR 1.2 1.2 1.1   APTT 24.7 25.8 24.1     LDH:  Recent Labs   Lab 01/23/20  1423 01/24/20  0334 01/25/20  0505 01/26/20  0400   * 570* 525* 470*     Microbiology:  Microbiology Results (last 7 days)     Procedure Component Value Units Date/Time    Blood culture [[id number]] Collected:  01/20/20 1042    Order Status:  Completed Specimen:  Blood from Peripheral, Antecubital, Left Updated:  01/25/20 1412     Blood Culture, Routine No growth after 5 days.    Blood culture [[id number]] Collected:  01/20/20 1035    Order Status:  Completed Specimen:  Blood from Peripheral, Wrist, Left Updated:  01/25/20 1412     Blood Culture, Routine No growth after 5 days.          I have reviewed all pertinent labs within the past 24 hours.    Estimated Creatinine Clearance: 30.7 mL/min (A) (based on SCr of 3.2 mg/dL  (H)).    Diagnostic Results:  I have reviewed all pertinent imaging results/findings within the past 24 hours.

## 2020-01-26 NOTE — PROGRESS NOTES
[facility]  Critical Care - Surgery  Progress Note    Patient Name: Tae Delgado  MRN: [id number]  Admission Date: 1/9/2020  Hospital Length of Stay: 17 days  Code Status: Full Code  Attending Provider: Ino Schmitt MD  Primary Care Provider: Elham Pearson MD   Principal Problem: LVAD (left ventricular assist device) present    Subjective:     Hospital/ICU Course:  1/23: Pt arrives from OR intubated, open chest dressed with Ioban, CTs with SS output. Drips on arrival: Epi 0.08, Cardene 5, TXA, Nitric at 30. Pt received 1.5L crystalloid, no blood product, 20 mg Lasix (put out 250cc in response).   Intra Op JACINTO Exam:  PRE:  Severely reduced left ventricular systolic function. Dilated LV. No definitive thrombus noted.  Moderate-to-severely reduced right ventricular systolic function. FAC 12-24%  Mild-to-moderate AI. No AS.  Moderate MR with systolic blunting of the pulmonary veins.  Trace-to-mild TR.  Mild PI. PAAT <90ms.  Small PFO by CF doppler.  SEC in La and DAMON. No clear thrombus noted.  Grade 2 atheromatous disease of the aorta.  No effusions.    POST:  Severely depressed left ventriclar dysfunction.  Moderately depressed right ventricular systolic function.  LVAD inflow and outflow cannula noted with laminar flows.  No AI.  Mild MR, vahe stitch observed. No MS.  Mild-to-moderate TR.  PFO still visible on CF doppler.  Aorta intact, no dissection.  No effusions.     1/24: run of Vtach overnight. Amio bolus given, amio gtt increased to 1, lasix push given. Improved. LVAD hemodynamics appropriate overnight. Going for closure this am.     Interval History:      Remains tachy 100 - 130, given 3 doses digoxin without improvement, remains on lidocaine gtt, decreased to 1  CVP down trending   On lasix gtt  Epi 0.07  Issues with right chest muscle spasm, given robaxin  Amio tablet not absorbing  Afebrile  CTs with 6, 145, 20 output      Follow-up For: Procedure(s) (LRB):  CLOSURE, WOUND, STERNUM  (N/A)  INSERTION-RIGHT VENTRICULAR ASSIST DEVICE (Right)    Post-Operative Day: Day of Surgery    Objective:     Vital Signs (Most Recent):  Temp: 99.8 °F (37.7 °C) (01/26/20 1100)  Pulse: (!) 125 (01/26/20 1400)  Resp: 19 (01/26/20 1400)  BP: (!) 80/0 (01/26/20 1100)  SpO2: 100 % (01/26/20 1400) Vital Signs (24h Range):  Temp:  [98.1 °F (36.7 °C)-100.2 °F (37.9 °C)] 99.8 °F (37.7 °C)  Pulse:  [118-148] 125  Resp:  [18-21] 19  SpO2:  [99 %-100 %] 100 %  BP: (80-88)/(0) 80/0  Arterial Line BP: (81-97)/(65-82) 96/81     Weight: 109 kg (240 lb 4.8 oz)  Body mass index is 34.48 kg/m².      Intake/Output Summary (Last 24 hours) at 1/26/2020 1409  Last data filed at 1/26/2020 1400  Gross per 24 hour   Intake 1526.7 ml   Output 3925 ml   Net -2398.3 ml       Physical Exam   Constitutional: He appears well-developed and well-nourished.   HENT:   Head: Normocephalic and atraumatic.   Mouth/Throat: No oropharyngeal exudate.   ETT and OG secured in place.   Eyes: Pupils are equal, round, and reactive to light.   Neck: Normal range of motion. Neck supple.   Left IJ double lumen + [place] in place, dressing CLD  Right IJ triple lumen, dressing CLD   Cardiovascular:   Irregular tachyarrythmia. LVAD hum.    Pulmonary/Chest: Breath sounds normal. He has no wheezes. He has no rales.   Intubated. Open chest, dressed with Ioban. CTs with SS drainage.   Abdominal: Soft. He exhibits no distension.   Musculoskeletal: He exhibits no edema or deformity.   Neurological:   sedated   Skin: Skin is warm and dry.     Vents:  Vent Mode: A/C (01/26/20 1348)  Ventilator Initiated: Yes (01/21/20 0905)  Set Rate: 18 bmp (01/26/20 1348)  Vt Set: 500 mL (01/26/20 1348)  PEEP/CPAP: 5 cmH20 (01/26/20 1348)  Oxygen Concentration (%): 50 (01/26/20 1400)  Peak Airway Pressure: 22 cmH2O (01/26/20 1348)  Plateau Pressure: 20 cmH20 (01/26/20 1348)  Total Ve: 9.52 mL (01/26/20 1348)  F/VT Ratio<105 (RSBI): (!) 34.88 (01/26/20 1348)    Lines/Drains/Airways      Central Venous Catheter Line                 Percutaneous Central Line Insertion/Assessment - double lumen  01/23/20 0751 left internal jugular 3 days         Pulmonary Artery Catheter Assessment  01/23/20 0751 3 days         Trialysis (Dialysis) Catheter 01/24/20 1500 right internal jugular 1 day          Drain                 Urethral Catheter 01/21/20 0752 Non-latex;Straight-tip;Temperature probe 16 Fr. 5 days         Chest Tube 01/23/20 1230 1 Right Pleural 3 days         Chest Tube 01/23/20 1414 2 Right Mediastinal 2 days         Chest Tube 01/23/20 1415 3 Left Mediastinal 2 days         NG/OG Tube 01/24/20 2130 nasogastric Right nostril 1 day          Airway                 Airway - Non-Surgical 01/21/20 0742 Endotracheal Tube 5 days          Arterial Line                 Arterial Line 01/21/20 0730 Left Other (Comment) 5 days         Arterial Line 01/23/20 1335 Right Radial 3 days          Line                 VAD 01/23/20 1148 Left ventricular assist device HeartMate 3 3 days          Peripheral Intravenous Line                 Peripheral IV - Single Lumen 01/25/20 1018 22 G Right Antecubital 1 day         Peripheral IV - Single Lumen 01/25/20 1133 18 G Left Antecubital 1 day                Significant Labs:    CBC/Anemia Profile:  Recent Labs   Lab 01/26/20  0400 01/26/20  0733 01/26/20  0737 01/26/20  1130 01/26/20  1347   WBC 17.97* 19.29*  --  18.81*  --    HGB 10.2* 10.2*  --  10.2*  --    HCT 33.6* 33.3* 29* 33.2* 30*    226  --  237  --    MCV 91 91  --  91  --    RDW 15.2* 15.3*  --  15.3*  --         Chemistries:  Recent Labs   Lab 01/25/20  0413  01/26/20  0400 01/26/20  0733 01/26/20  1130      < > 142  142 143 142   K 4.3   < > 4.1  4.1 4.0 3.8      < > 99  99 100 99   CO2 24   < > 26  26 26 28   BUN 57*   < > 78*  78* 80* 82*   CREATININE 2.7*   < > 3.2*  3.2* 3.2* 3.1*   CALCIUM 9.7   < > 9.8  9.8 9.8 9.7   ALBUMIN 2.8*  --  2.5*  --   --    PROT 6.6  --  6.5  --    --    BILITOT 0.7  --  0.7  --   --    ALKPHOS 46*  --  44*  --   --    ALT 34  --  29  --   --    AST 64*  --  39  --   --    MG 2.7*   < > 2.8* 2.8* 2.7*   PHOS 6.3*   < > 6.5* 6.1* 6.5*    < > = values in this interval not displayed.       ABGs:   Recent Labs   Lab 01/26/20  1347   PH 7.457*   PCO2 42.8   HCO3 30.2*   POCSATURATED 99   BE 6     Coagulation:   Recent Labs   Lab 01/26/20  1130   INR 1.2   APTT 24.3     Lactic Acid:   No results for input(s): LACTATE in the last 48 hours.    Significant Imaging:  I have reviewed all pertinent imaging results/findings within the past 24 hours.    Assessment/Plan:     Acute on chronic combined systolic and diastolic heart failure  Tae Delgado is a 59 y.o. 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 Approved for DT LVAD. Went to OR on 1/21 and found to have DAMON and LV thrombus and case was aborted. Pt was placed on a heparin gtt and thrombus had dissipated on repeat JACINTO on 1/22. Pt underwent LVAD placement on 1/23. Chest closure and re-exploration on 1/24. Returned from OR with chest open on 1/24.    Neuro:  - Patient opens eyes and follows commands   - Prop gtt with prn Fentanyl    CVS:   - Current LVAD flow @ 5300 with appropriate PIs   - Epi @ 0.07, Nitric @ 15  - Amio elixir, Lidocaine gtt for tachyarrythmia  - ICD turned on, EP service consulted for assistance with arrythmia  - Chest closure pending, consent obtained    Pulm:  - Mechanical ventilation while chest open  - ABGs q6hr  - CXR daily  - Monitor CT output, no overt signs of bleeding     GI:  - NPO  - Protonix 40 daily  - Docusate    Endo:   - Insulin gtt   - Endocrine consulted, appreciate their services    Renal:  - Strict  I/O  - Trend BUN/Cr  - Lasix gtt    Lytes:  - Monitor labs  - Replace per protocol    Heme:  - No blood products needed during the OR  - H/H stable overnight    ID:   - Lona op ABX  - Follow WBC  - Follow fever curve    PPx:  - Hep gtt nontitrating, 600  - GI ppx    Dispo:  - Cont SICU care   - Chest closure pending.          Davion Hensley MD  Critical Care - Surgery  [facility]

## 2020-01-26 NOTE — ASSESSMENT & PLAN NOTE
- Creatinine on admit 2.6 (baseline ~ 1.8). Creatinine today 3.2  - Nephrology consulted and following

## 2020-01-26 NOTE — ASSESSMENT & PLAN NOTE
Tae Delgado is a 59 y.o. 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 Approved for DT LVAD. Went to OR on 1/21 and found to have DAMON and LV thrombus and case was aborted. Pt was placed on a heparin gtt and thrombus had dissipated on repeat JACINTO on 1/22. Pt underwent LVAD placement on 1/23. Chest closure and re-exploration on 1/24. Returned from OR with chest open on 1/24.    Neuro:  - Patient opens eyes and follows commands   - Prop gtt with prn Fentanyl    CVS:   - Current LVAD flow @ 5300 with appropriate PIs   - Epi @ 0.07, Nitric @ 15  - Amio elixir, Lidocaine gtt for tachyarrythmia  - ICD turned on, EP service consulted for assistance with arrythmia  - Chest closure pending, consent obtained    Pulm:  - Mechanical ventilation while chest open  - ABGs q6hr  - CXR daily  - Monitor CT output, no overt signs of bleeding     GI:  - NPO  - Protonix 40 daily  - Docusate    Endo:   - Insulin gtt   - Endocrine consulted, appreciate their services    Renal:  - Strict I/O  - Trend BUN/Cr  - Lasix gtt    Lytes:  - Monitor labs  - Replace per protocol    Heme:  - No blood products needed during the OR  - H/H stable overnight    ID:   - Lona op ABX  - Follow WBC  - Follow fever curve    PPx:  - Hep gtt nontitrating, 600  - GI ppx    Dispo:  - Cont SICU care   - Chest closure pending.

## 2020-01-26 NOTE — ANESTHESIA PREPROCEDURE EVALUATION
[facility]  Anesthesia Pre-Operative Evaluation         Patient Name: Tae Delgado  YOB: 1960  MRN: [id number]    SUBJECTIVE:     01/26/2020    Pre-operative evaluation for Procedure(s) (LRB):  CLOSURE, WOUND, STERNUM (N/A)    Tae Delgado is a 59 y.o. 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 Approved for DT LVAD. Went to OR on 1/21 and found to have DAMON and LV thrombus and case was aborted. Pt was placed on a heparin gtt and thrombus had dissipated on repeat JACINTO on 1/22. Pt underwent LVAD placement on 1/23. Chest closure and then went back re-exploration on 1/24; chest left open.    Patient now presents for the above procedure(s).      LDA:       Pulmonary Artery Catheter Assessment  01/23/20 0751 (Active)   Number of days: 3            Percutaneous Central Line Insertion/Assessment - double lumen  01/23/20 0751 left internal jugular (Active)   Number of days: 3            Trialysis (Dialysis) Catheter 01/24/20 1500 right internal jugular (Active)   Number of days: 2            Peripheral IV - Single Lumen 01/25/20 1018 22 G Right Antecubital (Active)   Number of days: 1            Peripheral IV - Single Lumen 01/25/20 1133 18 G Left Antecubital (Active)   Number of days: 1            Arterial Line 01/21/20 0730 Left Other (Comment) (Active)   Number of days: 5            Arterial Line 01/23/20 1335 Right Radial (Active)   Number of days: 3            VAD 01/23/20 1148 Left ventricular assist device HeartMate 3 (Active)   Number of days: 3            Chest Tube 01/23/20 1230 1 Right Pleural (Active)   Number of days: 3            Chest Tube 01/23/20 1414 2 Right Mediastinal (Active)   Number of days: 3            Chest Tube  01/23/20 1415 3 Left Mediastinal (Active)   Number of days: 3            NG/OG Tube 01/24/20 2130 nasogastric Right nostril (Active)   Number of days: 1            Urethral Catheter 01/21/20 0752 Non-latex;Straight-tip;Temperature probe 16 Fr. (Active)   Number of days: 5       Previous airway:   8.0 ETT in place      Drips:   epinephrine 0.07 mcg/kg/min (01/26/20 1700)    furosemide (LASIX) 2 mg/mL infusion (non-titrating) 12.5 mg/hr (01/26/20 1600)    heparin (porcine) in 5 % dex 600 Units/hr (01/26/20 1700)    lidocaine 1 mg/min (01/26/20 1700)    niCARdipine Stopped (01/26/20 1408)    nitric oxide gas      norepinephrine bitartrate-D5W Stopped (01/23/20 1430)    propofol 20 mcg/kg/min (01/26/20 1700)       Patient Active Problem List   Diagnosis    History of pulmonary embolism    Hyperlipidemia    Gout    Hypertension    Obesity    At risk for amiodarone toxicity with long term use    Left ventricular thrombus without MI    Paroxysmal atrial fibrillation    Chronic systolic congestive heart failure    Chronic anticoagulation    COCM (congestive cardiomyopathy)    Acute on chronic combined systolic and diastolic heart failure    Acute on chronic systolic heart failure    Hypertensive heart disease with heart failure    Long term (current) use of anticoagulants    Acute kidney injury superimposed on chronic kidney disease    Right lower lobe pulmonary nodule    ICD (implantable cardioverter-defibrillator) in place    Hepatic congestion    NSVT (nonsustained ventricular tachycardia)    Pre-transplant evaluation for heart transplant    Acute on chronic heart failure    Heart abnormality    ACP (advance care planning)    Coagulopathy    LVAD (left ventricular assist device) present    Acute hyperglycemia       Review of patient's allergies indicates:   Allergen Reactions    Biopatch [chlorhexidine gluconate]      Site burning    Dobutamine in d5w      Tachycardia, tremors, SOB,  flushing    Percocet [oxycodone-acetaminophen] Itching    Penicillins Rash     Cefepime given on 1/23/2020 without issue       Current Inpatient Medications:   amiodarone  400 mg Per NG tube TID    aspirin  325 mg Oral Daily    aspirin  325 mg Oral Daily    vancomycin (VANCOCIN) IVPB  750 mg Intravenous Q24H    And    ceFEPime (MAXIPIME) IVPB  2 g Intravenous Q12H    docusate sodium  200 mg Oral QHS    docusate sodium  50 mg Oral BID    ferrous gluconate  324 mg Oral Daily with breakfast    magnesium oxide  400 mg Per NG tube TID    mupirocin   Nasal BID    pantoprazole  40 mg Oral Daily    pantoprazole  40 mg Intravenous Daily    polyethylene glycol  17 g Oral BID    sodium chloride 0.9%  3 mL Intravenous Q8H       No current facility-administered medications on file prior to encounter.      Current Outpatient Medications on File Prior to Encounter   Medication Sig Dispense Refill    amiodarone (PACERONE) 200 MG Tab Tale 1 tablet (200mg) by mouth on Monday, Tuesday, Thursday, Friday and Saturday. Only take medication 5 days per week. 20 tablet 11    furosemide (LASIX) 40 MG tablet Take 1 tablet (40 mg total) by mouth 2 (two) times daily. 90 tablet 3    hydrocortisone 2.5 % ointment Apply 1 application topically 2 (two) times daily.      metoprolol succinate (TOPROL-XL) 50 MG 24 hr tablet TAKE ONE TABLET BY MOUTH ONCE DAILY 30 tablet 11    spironolactone (ALDACTONE) 25 MG tablet TAKE 1 TABLET BY MOUTH ONCE DAILY 30 tablet 11    VENTOLIN HFA 90 mcg/actuation inhaler Inhale 1 Inhaler into the lungs every 4 (four) hours as needed.      warfarin (COUMADIN) 3 MG tablet TAKE 1 TABLET BY MOUTH ONCE DAILY EXCEPT  SATURDAY 30 tablet 5       Past Surgical History:   Procedure Laterality Date    LEFT VENTRICULAR ASSIST DEVICE Left 1/23/2020    Procedure: INSERTION-LEFT VENTRICULAR ASSIST DEVICE;  Surgeon: Ino Schmitt MD;  Location: [facility] OR [address] [place] [address];  Service: Cardiovascular;  Laterality: Left;  DT HM3     RIGHT HEART CATHETERIZATION Right 1/16/2020    Procedure: INSERTION, CATHETER, RIGHT HEART;  Surgeon: Isiah Montero MD;  Location: [facility] CATH LAB;  Service: Cardiology;  Laterality: Right;    TONSILLECTOMY      VEIN LIGATION AND STRIPPING         Social History     Socioeconomic History    Marital status:      Spouse name: Not on file    Number of children: Not on file    Years of education: Not on file    Highest education level: Not on file   Occupational History    Not on file   Social Needs    Financial resource strain: Not on file    Food insecurity:     Worry: Not on file     Inability: Not on file    Transportation needs:     Medical: Not on file     Non-medical: Not on file   Tobacco Use    Smoking status: Never Smoker    Smokeless tobacco: Never Used   Substance and Sexual Activity    Alcohol use: No    Drug use: No    Sexual activity: Not on file   Lifestyle    Physical activity:     Days per week: Not on file     Minutes per session: Not on file    Stress: Not on file   Relationships    Social connections:     Talks on phone: Not on file     Gets together: Not on file     Attends Sikh service: Not on file     Active member of club or organization: Not on file     Attends meetings of clubs or organizations: Not on file     Relationship status: Not on file   Other Topics Concern    Not on file   Social History Narrative    Not on file       OBJECTIVE:     Vital Signs Range (Last 24H):  Temp:  [36.7 °C (98.1 °F)-37.9 °C (100.2 °F)]   Pulse:  [118-148]   Resp:  [18-21]   BP: (80-88)/(0)   SpO2:  [99 %-100 %]   Arterial Line BP: (81-99)/(65-83)       Significant Labs:  Lab Results   Component Value Date    WBC 18.20 (H) 01/26/2020    HGB 10.1 (L) 01/26/2020    HCT 32.8 (L) 01/26/2020     01/26/2020    CHOL 177 01/15/2020    TRIG 129 01/15/2020    HDL 46 01/15/2020    ALT 29 01/26/2020    AST 39 01/26/2020     01/26/2020    K 3.9 01/26/2020     01/26/2020  "   CREATININE 3.1 (H) 01/26/2020    BUN 86 (H) 01/26/2020    CO2 26 01/26/2020    TSH 3.268 01/16/2020    PSA 1.7 01/15/2020    INR 1.2 01/26/2020    HGBA1C 6.2 (H) 01/15/2020         Diagnostic Studies:  No relevant studies.      Cardiac Studies:  EKG:   No recent studies available.    JACINTO (1/22/2020):  · Severe left ventricular enlargement.  · No thrombus visualized in the LV apex.  · Severely decreased left ventricular systolic function. The estimated ejection fraction is 15%.  · Moderate right ventricular enlargement.  · Moderately to severely reduced right ventricular systolic function. FAC 20%. RV/LV size ratio 0.58  · The left atrial appendage is normal "chicken wing" appearance. The DAMON emptying velocity is abnormal, 0.2 m/s.  · Light spontaneous echo contrast in the atrial body and in the atrial appendage. No thrombus is present in the appendage which was confirmed with contrast enhancement.  · Mild-to-moderate mitral regurgitation.  · Possible small patent foramen ovale present with left to right shunting indicated by color flow Doppler.  · Grade 1 atheroma present in the descending aorta. The atheroma is layered.      ASSESSMENT/PLAN:     Anesthesia Evaluation    I have reviewed the Patient Summary Reports.    I have reviewed the Nursing Notes.   I have reviewed the Medications.     Review of Systems  Anesthesia Hx:  No problems with previous Anesthesia Denies Hx of Anesthetic complications  History of prior surgery of interest to airway management or planning: Previous anesthesia: General Denies Family Hx of Anesthesia complications.   Denies Personal Hx of Anesthesia complications.   Social:  Non-Smoker, No Alcohol Use    Hematology/Oncology:         -- Anemia: Denies Current/Recent Cancer   EENT/Dental:   denies chronic allergic rhinitis  Denies Otitis Media Denies Chronic Tonsillitis   Cardiovascular:   Denies Pacemaker. Hypertension  Denies MI.  Denies CAD.    Denies CABG/stent.  Denies Dysrhythmias. "   Denies Angina. CHF         hyperlipidemia     ICD Functional Capacity good / => 4 METS    Pulmonary:   Denies Pneumonia Denies COPD.  Denies Asthma.  Denies Shortness of breath.  Denies Recent URI.  Denies Sleep Apnea. Hx of PE   Renal/:   Chronic Renal Disease    Hepatic/GI:   Denies PUD. Denies Hiatal Hernia.  Denies GERD. Liver Disease,  Denies Hepatitis.    Musculoskeletal:  Musculoskeletal Normal Denies Arthritis.      Neurological:   Denies TIA. CVA Denies Neuromuscular Disease.  Denies Headaches. Denies Seizures. C-spine cleared.     Denies Chronic Pain Syndrome  Denies Peripheral Neuropathy    Endocrine:  Endocrine Normal Denies Diabetes. Denies Hypothyroidism.  Denies Hyperthyroidism.    Psych:  Psychiatric Normal  Denies Psychiatric History. denies anxiety denies depression          Physical Exam  General:  Well nourished    Airway/Jaw/Neck:  Airway Findings: Mouth Opening: Small, but > 3cm Tongue: Normal  Pre-Existing Airway Tube(s): Oral Endotracheal tube  Size: 8.0  General Airway Assessment: Adult  Mallampati: IV  Improves to III with phonation.  TM Distance: 4 - 6 cm  Jaw/Neck Findings:  Micrognathia: Negative Mandibular Fracture: Negative    Neck ROM: Normal ROM  Neck Findings:  Girth Increased   R CVC   Eyes/Ears/Nose:  EYES/EARS/NOSE FINDINGS: Normal   Dental:  Dental Findings: In tact   Chest/Lungs:  Chest/Lungs Findings: Clear to auscultation     Heart/Vascular:  Heart Findings: Normal Heart murmur: negative Vascular Findings:  Vascular Access: Peripheral IV(s), Existing Central Line(s), Arterial Line(s), PA Catheter        Mental Status:  Mental Status Findings:  Unconscious         Anesthesia Plan  Type of Anesthesia, risks & benefits discussed:  Anesthesia Type:  general  Patient's Preference:   Intra-op Monitoring Plan: arterial line, standard ASA monitors, central line, [place] and cardiac output  Intra-op Monitoring Plan Comments:   Post Op Pain Control Plan: multimodal analgesia,  IV/PO Opioids PRN and per primary service following discharge from PACU  Post Op Pain Control Plan Comments:   Induction:   Inhalation  Beta Blocker:  Patient is not currently on a Beta-Blocker (No further documentation required).       Informed Consent: Patient representative understands risks and agrees with Anesthesia plan.  Questions answered. Anesthesia consent signed with patient representative.  ASA Score: 4     Day of Surgery Review of History & Physical:    H&P update referred to the surgeon.         Ready For Surgery From Anesthesia Perspective.

## 2020-01-26 NOTE — PROGRESS NOTES
Dr. Schmitt notified of most recent ABGs, lab results, vital signs and hourly UOP, orders to decrease Lasix gtt to 15 mg/hr, Epi ggt to 0.07 mcg/kg/min, gove 20 mEq of KCl X1 dose, decrease Nitric Oxide to 15 PPm @ 0300, give 250 Diuril if UOP <100 mL/hr, and not to administer any vanc unless approved directly by him. Orders carried out.

## 2020-01-26 NOTE — SUBJECTIVE & OBJECTIVE
"Interval HPI:   Overnight events: Remains in ICU, NAEON.  BG below goal overnight on transition IV insulin, rate lowered per protocol.  Noted creatinine of 3.2.  On IV antibiotics.  Eating:   NPO  Nausea: No  Hypoglycemia and intervention: No  Fever: No  TPN and/or TF: No    BP (!) 82/0 (BP Location: Right arm, Patient Position: Lying)   Pulse (!) 139   Temp 100.2 °F (37.9 °C) (Core ([place]))   Resp 19   Ht 5' 10" (1.778 m)   Wt 109 kg (240 lb 4.8 oz)   SpO2 100%   BMI 34.48 kg/m²     Labs Reviewed and Include    Recent Labs   Lab 01/26/20  0400   *  127*   CALCIUM 9.8  9.8   ALBUMIN 2.5*   PROT 6.5     142   K 4.1  4.1   CO2 26  26   CL 99  99   BUN 78*  78*   CREATININE 3.2*  3.2*   ALKPHOS 44*   ALT 29   AST 39   BILITOT 0.7     Lab Results   Component Value Date    WBC 17.97 (H) 01/26/2020    HGB 10.2 (L) 01/26/2020    HCT 29 (L) 01/26/2020    MCV 91 01/26/2020     01/26/2020     No results for input(s): TSH, FREET4 in the last 168 hours.  Lab Results   Component Value Date    HGBA1C 6.2 (H) 01/15/2020       Nutritional status:   Body mass index is 34.48 kg/m².  Lab Results   Component Value Date    ALBUMIN 2.5 (L) 01/26/2020    ALBUMIN 2.8 (L) 01/25/2020    ALBUMIN 3.0 (L) 01/24/2020     Lab Results   Component Value Date    PREALBUMIN 11 (L) 01/24/2020    PREALBUMIN 27 01/15/2020       Estimated Creatinine Clearance: 30.7 mL/min (A) (based on SCr of 3.2 mg/dL (H)).    Accu-Checks  Recent Labs     01/25/20  0557 01/25/20  0718 01/25/20  0815 01/25/20  0909 01/25/20  1138 01/25/20  1511 01/25/20  1950 01/26/20  0018 01/26/20  0414 01/26/20  0738   POCTGLUCOSE 174* 159* 164* 175* 180* 131* 138* 134* 131* 127*       Current Medications and/or Treatments Impacting Glycemic Control  Immunotherapy:    Immunosuppressants     None        Steroids:   Hormones (From admission, onward)    None        Pressors:    Autonomic Drugs (From admission, onward)    Start     Stop Route " Frequency Ordered    01/25/20 2200  EPINEPHrine (ADRENALIN) 5 mg in sodium chloride 0.9% 250 mL infusion     Question Answer Comment   Titrate by: (in mcg/kg/min) 0.02    Titrate interval: (in minutes) 10    Titrate to maintain: (SBP or MAP or Cardiac Index) MAP    Greater than: (in mmHg) 60    Maximum dose: (in mcg/kg/min) 1        -- IV Continuous 01/25/20 2210    01/23/20 1430  norepinephrine 4 mg in dextrose 5% 250 mL infusion (premix) (titrating)     Question Answer Comment   Titrate by: (in mcg/kg/min) 0.05    Titrate interval: (in minutes) 5    Titrate to maintain: (MAP or SBP) MAP    Greater than: (in mmHg) 60    Maximum dose: (in mcg/kg/min) 3        -- IV Continuous 01/23/20 1325        Hyperglycemia/Diabetes Medications:   Antihyperglycemics (From admission, onward)    Start     Stop Route Frequency Ordered    01/25/20 1015  insulin regular 100 Units in sodium chloride 0.9% 100 mL infusion      -- IV Continuous 01/25/20 0913    01/25/20 1013  insulin aspart U-100 pen 0-5 Units      -- SubQ As needed (PRN) 01/25/20 0913

## 2020-01-26 NOTE — ASSESSMENT & PLAN NOTE
- Has been back in A fib since surgery  - AC per CTS  - Was on Amiodarone infusion but now on Lidocaine.  - Would wean Lidocaine off and consider restarting Amiodarone

## 2020-01-26 NOTE — PROGRESS NOTES
"[facility]  Endocrinology  Progress Note    Admit Date: 1/9/2020     Reason for Consult: Management of Hyperglycemia     Surgical Procedure and Date: LVAD 1/23/20    Lab Results   Component Value Date    HGBA1C 6.2 (H) 01/15/2020       HPI:   Patient is a 59 y.o. male with a diagnosis of afib, KRISTIN on CKD, and CHF, who is s/p LVAD placement on 1/23/20.  No personal history of DM but slightly elevated A1C upon admission.  Endocrinology consulted for BG management.            Interval HPI:   Overnight events: Remains in ICU, NAEON.  BG below goal overnight on transition IV insulin, rate lowered per protocol.  Noted creatinine of 3.2.  On IV antibiotics.  Eating:   NPO  Nausea: No  Hypoglycemia and intervention: No  Fever: No  TPN and/or TF: No    BP (!) 82/0 (BP Location: Right arm, Patient Position: Lying)   Pulse (!) 139   Temp 100.2 °F (37.9 °C) (Core ([place]))   Resp 19   Ht 5' 10" (1.778 m)   Wt 109 kg (240 lb 4.8 oz)   SpO2 100%   BMI 34.48 kg/m²      Labs Reviewed and Include    Recent Labs   Lab 01/26/20  0400   *  127*   CALCIUM 9.8  9.8   ALBUMIN 2.5*   PROT 6.5     142   K 4.1  4.1   CO2 26  26   CL 99  99   BUN 78*  78*   CREATININE 3.2*  3.2*   ALKPHOS 44*   ALT 29   AST 39   BILITOT 0.7     Lab Results   Component Value Date    WBC 17.97 (H) 01/26/2020    HGB 10.2 (L) 01/26/2020    HCT 29 (L) 01/26/2020    MCV 91 01/26/2020     01/26/2020     No results for input(s): TSH, FREET4 in the last 168 hours.  Lab Results   Component Value Date    HGBA1C 6.2 (H) 01/15/2020       Nutritional status:   Body mass index is 34.48 kg/m².  Lab Results   Component Value Date    ALBUMIN 2.5 (L) 01/26/2020    ALBUMIN 2.8 (L) 01/25/2020    ALBUMIN 3.0 (L) 01/24/2020     Lab Results   Component Value Date    PREALBUMIN 11 (L) 01/24/2020    PREALBUMIN 27 01/15/2020       Estimated Creatinine Clearance: 30.7 mL/min (A) (based on SCr of 3.2 mg/dL " (H)).    Accu-Checks  Recent Labs     01/25/20  0557 01/25/20  0718 01/25/20  0815 01/25/20  0909 01/25/20  1138 01/25/20  1511 01/25/20  1950 01/26/20  0018 01/26/20  0414 01/26/20  0738   POCTGLUCOSE 174* 159* 164* 175* 180* 131* 138* 134* 131* 127*       Current Medications and/or Treatments Impacting Glycemic Control  Immunotherapy:    Immunosuppressants     None        Steroids:   Hormones (From admission, onward)    None        Pressors:    Autonomic Drugs (From admission, onward)    Start     Stop Route Frequency Ordered    01/25/20 2200  EPINEPHrine (ADRENALIN) 5 mg in sodium chloride 0.9% 250 mL infusion     Question Answer Comment   Titrate by: (in mcg/kg/min) 0.02    Titrate interval: (in minutes) 10    Titrate to maintain: (SBP or MAP or Cardiac Index) MAP    Greater than: (in mmHg) 60    Maximum dose: (in mcg/kg/min) 1        -- IV Continuous 01/25/20 2210    01/23/20 1430  norepinephrine 4 mg in dextrose 5% 250 mL infusion (premix) (titrating)     Question Answer Comment   Titrate by: (in mcg/kg/min) 0.05    Titrate interval: (in minutes) 5    Titrate to maintain: (MAP or SBP) MAP    Greater than: (in mmHg) 60    Maximum dose: (in mcg/kg/min) 3        -- IV Continuous 01/23/20 1325        Hyperglycemia/Diabetes Medications:   Antihyperglycemics (From admission, onward)    Start     Stop Route Frequency Ordered    01/25/20 1015  insulin regular 100 Units in sodium chloride 0.9% 100 mL infusion      -- IV Continuous 01/25/20 0913    01/25/20 1013  insulin aspart U-100 pen 0-5 Units      -- SubQ As needed (PRN) 01/25/20 0913          ASSESSMENT and PLAN    * LVAD (left ventricular assist device) present  S/p LVAD on 1/23/20  Managed per primary team  Avoid hypoglycemia  Optimize BG control for surgical wound healing        Acute hyperglycemia  BG goal 140-180    Decrease transition IV insulin infusion to 0.5 u/hr  Low dose correction scale  BG monitoring every 4 hours while NPO    ADDENDUM: Discontinue  transition IV insulin infusion    Discharge planning: TBD        Acute on chronic combined systolic and diastolic heart failure  Managed per primary team  S/p LVAD    Acute kidney injury superimposed on chronic kidney disease  Titrate insulin slowly to avoid hypoglycemia as the risk of hypoglycemia increases with decreased creatinine clearance.  Caution with insulin stacking  Estimated Creatinine Clearance: 31.7 mL/min (A) (based on SCr of 3.1 mg/dL (H)).            Jan Moore NP  Endocrinology  [facility]

## 2020-01-26 NOTE — NURSING
Spoke with Dr. Schmitt via telephone. Reviewed current gtts, labs, vent/No setting and hemodynamics, UO. Orders received to switch amio to elixir, give two doses of Dig 2 hours apart and increase heparin gtt. RN updated Dr. Sandoval and Dr. Henslye SICU on changes

## 2020-01-26 NOTE — SUBJECTIVE & OBJECTIVE
Interval History:      Remains tachy 100 - 130, given 3 doses digoxin without improvement, remains on lidocaine gtt, decreased to 1  CVP down trending   On lasix gtt  Epi 0.07  Issues with right chest muscle spasm, given robaxin  Amio tablet not absorbing  Afebrile  CTs with 6, 145, 20 output      Follow-up For: Procedure(s) (LRB):  CLOSURE, WOUND, STERNUM (N/A)  INSERTION-RIGHT VENTRICULAR ASSIST DEVICE (Right)    Post-Operative Day: Day of Surgery    Objective:     Vital Signs (Most Recent):  Temp: 99.8 °F (37.7 °C) (01/26/20 1100)  Pulse: (!) 125 (01/26/20 1400)  Resp: 19 (01/26/20 1400)  BP: (!) 80/0 (01/26/20 1100)  SpO2: 100 % (01/26/20 1400) Vital Signs (24h Range):  Temp:  [98.1 °F (36.7 °C)-100.2 °F (37.9 °C)] 99.8 °F (37.7 °C)  Pulse:  [118-148] 125  Resp:  [18-21] 19  SpO2:  [99 %-100 %] 100 %  BP: (80-88)/(0) 80/0  Arterial Line BP: (81-97)/(65-82) 96/81     Weight: 109 kg (240 lb 4.8 oz)  Body mass index is 34.48 kg/m².      Intake/Output Summary (Last 24 hours) at 1/26/2020 1409  Last data filed at 1/26/2020 1400  Gross per 24 hour   Intake 1526.7 ml   Output 3925 ml   Net -2398.3 ml       Physical Exam   Constitutional: He appears well-developed and well-nourished.   HENT:   Head: Normocephalic and atraumatic.   Mouth/Throat: No oropharyngeal exudate.   ETT and OG secured in place.   Eyes: Pupils are equal, round, and reactive to light.   Neck: Normal range of motion. Neck supple.   Left IJ double lumen + [place] Heena in place, dressing CLD  Right IJ triple lumen, dressing CLD   Cardiovascular:   Irregular tachyarrythmia. LVAD hum.    Pulmonary/Chest: Breath sounds normal. He has no wheezes. He has no rales.   Intubated. Open chest, dressed with Ioban. CTs with SS drainage.   Abdominal: Soft. He exhibits no distension.   Musculoskeletal: He exhibits no edema or deformity.   Neurological:   sedated   Skin: Skin is warm and dry.     Vents:  Vent Mode: A/C (01/26/20 1348)  Ventilator Initiated: Yes  (01/21/20 0905)  Set Rate: 18 bmp (01/26/20 1348)  Vt Set: 500 mL (01/26/20 1348)  PEEP/CPAP: 5 cmH20 (01/26/20 1348)  Oxygen Concentration (%): 50 (01/26/20 1400)  Peak Airway Pressure: 22 cmH2O (01/26/20 1348)  Plateau Pressure: 20 cmH20 (01/26/20 1348)  Total Ve: 9.52 mL (01/26/20 1348)  F/VT Ratio<105 (RSBI): (!) 34.88 (01/26/20 1348)    Lines/Drains/Airways     Central Venous Catheter Line                 Percutaneous Central Line Insertion/Assessment - double lumen  01/23/20 0751 left internal jugular 3 days         Pulmonary Artery Catheter Assessment  01/23/20 0751 3 days         Trialysis (Dialysis) Catheter 01/24/20 1500 right internal jugular 1 day          Drain                 Urethral Catheter 01/21/20 0752 Non-latex;Straight-tip;Temperature probe 16 Fr. 5 days         Chest Tube 01/23/20 1230 1 Right Pleural 3 days         Chest Tube 01/23/20 1414 2 Right Mediastinal 2 days         Chest Tube 01/23/20 1415 3 Left Mediastinal 2 days         NG/OG Tube 01/24/20 2130 nasogastric Right nostril 1 day          Airway                 Airway - Non-Surgical 01/21/20 0742 Endotracheal Tube 5 days          Arterial Line                 Arterial Line 01/21/20 0730 Left Other (Comment) 5 days         Arterial Line 01/23/20 1335 Right Radial 3 days          Line                 VAD 01/23/20 1148 Left ventricular assist device HeartMate 3 3 days          Peripheral Intravenous Line                 Peripheral IV - Single Lumen 01/25/20 1018 22 G Right Antecubital 1 day         Peripheral IV - Single Lumen 01/25/20 1133 18 G Left Antecubital 1 day                Significant Labs:    CBC/Anemia Profile:  Recent Labs   Lab 01/26/20  0400 01/26/20  0733 01/26/20  0737 01/26/20  1130 01/26/20  1347   WBC 17.97* 19.29*  --  18.81*  --    HGB 10.2* 10.2*  --  10.2*  --    HCT 33.6* 33.3* 29* 33.2* 30*    226  --  237  --    MCV 91 91  --  91  --    RDW 15.2* 15.3*  --  15.3*  --         Chemistries:  Recent Labs    Lab 01/25/20  0413  01/26/20  0400 01/26/20  0733 01/26/20  1130      < > 142  142 143 142   K 4.3   < > 4.1  4.1 4.0 3.8      < > 99  99 100 99   CO2 24   < > 26  26 26 28   BUN 57*   < > 78*  78* 80* 82*   CREATININE 2.7*   < > 3.2*  3.2* 3.2* 3.1*   CALCIUM 9.7   < > 9.8  9.8 9.8 9.7   ALBUMIN 2.8*  --  2.5*  --   --    PROT 6.6  --  6.5  --   --    BILITOT 0.7  --  0.7  --   --    ALKPHOS 46*  --  44*  --   --    ALT 34  --  29  --   --    AST 64*  --  39  --   --    MG 2.7*   < > 2.8* 2.8* 2.7*   PHOS 6.3*   < > 6.5* 6.1* 6.5*    < > = values in this interval not displayed.       ABGs:   Recent Labs   Lab 01/26/20  1347   PH 7.457*   PCO2 42.8   HCO3 30.2*   POCSATURATED 99   BE 6     Coagulation:   Recent Labs   Lab 01/26/20  1130   INR 1.2   APTT 24.3     Lactic Acid:   No results for input(s): LACTATE in the last 48 hours.    Significant Imaging:  I have reviewed all pertinent imaging results/findings within the past 24 hours.

## 2020-01-27 ENCOUNTER — ANESTHESIA EVENT (OUTPATIENT)
Dept: SURGERY | Facility: HOSPITAL | Age: 60
DRG: 001 | End: 2020-01-27
Payer: MEDICARE

## 2020-01-27 ENCOUNTER — ANESTHESIA (OUTPATIENT)
Dept: SURGERY | Facility: HOSPITAL | Age: 60
DRG: 001 | End: 2020-01-27
Payer: MEDICARE

## 2020-01-27 LAB
ABO + RH BLD: NORMAL
ACANTHOCYTES BLD QL SMEAR: ABNORMAL
ALBUMIN SERPL BCP-MCNC: 2.3 G/DL (ref 3.5–5.2)
ALBUMIN SERPL BCP-MCNC: 2.5 G/DL (ref 3.5–5.2)
ALBUMIN SERPL BCP-MCNC: 2.5 G/DL (ref 3.5–5.2)
ALLENS TEST: ABNORMAL
ALLENS TEST: NORMAL
ALLENS TEST: NORMAL
ALP SERPL-CCNC: 41 U/L (ref 55–135)
ALP SERPL-CCNC: 45 U/L (ref 55–135)
ALP SERPL-CCNC: 45 U/L (ref 55–135)
ALT SERPL W/O P-5'-P-CCNC: 24 U/L (ref 10–44)
ALT SERPL W/O P-5'-P-CCNC: 28 U/L (ref 10–44)
ALT SERPL W/O P-5'-P-CCNC: 28 U/L (ref 10–44)
ANION GAP SERPL CALC-SCNC: 13 MMOL/L (ref 8–16)
ANION GAP SERPL CALC-SCNC: 15 MMOL/L (ref 8–16)
ANION GAP SERPL CALC-SCNC: 15 MMOL/L (ref 8–16)
ANION GAP SERPL CALC-SCNC: 16 MMOL/L (ref 8–16)
ANISOCYTOSIS BLD QL SMEAR: ABNORMAL
ANISOCYTOSIS BLD QL SMEAR: SLIGHT
APTT BLDCRRT: 21.2 SEC (ref 21–32)
APTT BLDCRRT: 23.5 SEC (ref 21–32)
APTT BLDCRRT: 25.2 SEC (ref 21–32)
APTT BLDCRRT: 26.6 SEC (ref 21–32)
AST SERPL-CCNC: 25 U/L (ref 10–40)
AST SERPL-CCNC: 30 U/L (ref 10–40)
AST SERPL-CCNC: 30 U/L (ref 10–40)
AUER BODIES BLD QL SMEAR: ABNORMAL
BASO STIPL BLD QL SMEAR: ABNORMAL
BASOPHILS # BLD AUTO: 0.09 K/UL (ref 0–0.2)
BASOPHILS # BLD AUTO: ABNORMAL K/UL (ref 0–0.2)
BASOPHILS NFR BLD: 0 % (ref 0–1.9)
BASOPHILS NFR BLD: 0 % (ref 0–1.9)
BASOPHILS NFR BLD: 0.6 % (ref 0–1.9)
BASOPHILS NFR BLD: 1 % (ref 0–1.9)
BASOPHILS NFR BLD: ABNORMAL % (ref 0–1.9)
BILIRUB DIRECT SERPL-MCNC: 0.5 MG/DL (ref 0.1–0.3)
BILIRUB SERPL-MCNC: 0.6 MG/DL (ref 0.1–1)
BILIRUB SERPL-MCNC: 0.7 MG/DL (ref 0.1–1)
BILIRUB SERPL-MCNC: 0.7 MG/DL (ref 0.1–1)
BLASTS NFR BLD MANUAL: ABNORMAL %
BLD GP AB SCN CELLS X3 SERPL QL: NORMAL
BLD PROD TYP BPU: NORMAL
BLD PROD TYP BPU: NORMAL
BLOOD UNIT EXPIRATION DATE: NORMAL
BLOOD UNIT EXPIRATION DATE: NORMAL
BLOOD UNIT TYPE CODE: 600
BLOOD UNIT TYPE CODE: 600
BLOOD UNIT TYPE: NORMAL
BLOOD UNIT TYPE: NORMAL
BNP SERPL-MCNC: 1140 PG/ML (ref 0–99)
BUN SERPL-MCNC: 88 MG/DL (ref 6–20)
BUN SERPL-MCNC: 91 MG/DL (ref 6–20)
BUN SERPL-MCNC: 94 MG/DL (ref 6–20)
BUN SERPL-MCNC: 95 MG/DL (ref 6–20)
BURR CELLS BLD QL SMEAR: ABNORMAL
CABOT RINGS BLD QL SMEAR: ABNORMAL
CALCIUM SERPL-MCNC: 8.5 MG/DL (ref 8.7–10.5)
CALCIUM SERPL-MCNC: 9.2 MG/DL (ref 8.7–10.5)
CALCIUM SERPL-MCNC: 9.4 MG/DL (ref 8.7–10.5)
CALCIUM SERPL-MCNC: 9.7 MG/DL (ref 8.7–10.5)
CALCIUM SERPL-MCNC: 9.8 MG/DL (ref 8.7–10.5)
CALCIUM SERPL-MCNC: 9.8 MG/DL (ref 8.7–10.5)
CHLORIDE SERPL-SCNC: 101 MMOL/L (ref 95–110)
CHLORIDE SERPL-SCNC: 102 MMOL/L (ref 95–110)
CHLORIDE SERPL-SCNC: 102 MMOL/L (ref 95–110)
CHLORIDE SERPL-SCNC: 103 MMOL/L (ref 95–110)
CHLORIDE SERPL-SCNC: 103 MMOL/L (ref 95–110)
CHLORIDE SERPL-SCNC: 108 MMOL/L (ref 95–110)
CO2 SERPL-SCNC: 25 MMOL/L (ref 23–29)
CO2 SERPL-SCNC: 26 MMOL/L (ref 23–29)
CO2 SERPL-SCNC: 27 MMOL/L (ref 23–29)
CO2 SERPL-SCNC: 28 MMOL/L (ref 23–29)
CO2 SERPL-SCNC: 28 MMOL/L (ref 23–29)
CO2 SERPL-SCNC: 29 MMOL/L (ref 23–29)
CODING SYSTEM: NORMAL
CODING SYSTEM: NORMAL
CREAT SERPL-MCNC: 2.8 MG/DL (ref 0.5–1.4)
CREAT SERPL-MCNC: 3 MG/DL (ref 0.5–1.4)
CRP SERPL-MCNC: 298.8 MG/L (ref 0–8.2)
DACRYOCYTES BLD QL SMEAR: ABNORMAL
DELSYS: ABNORMAL
DELSYS: ABNORMAL
DIFFERENTIAL METHOD: ABNORMAL
DIGOXIN SERPL-MCNC: 1.9 NG/ML (ref 0.8–2)
DISPENSE STATUS: NORMAL
DISPENSE STATUS: NORMAL
DOHLE BOD BLD QL SMEAR: ABNORMAL
EOSINOPHIL # BLD AUTO: 0 K/UL (ref 0–0.5)
EOSINOPHIL # BLD AUTO: ABNORMAL K/UL (ref 0–0.5)
EOSINOPHIL NFR BLD: 0 % (ref 0–8)
EOSINOPHIL NFR BLD: 0.1 % (ref 0–8)
EOSINOPHIL NFR BLD: ABNORMAL % (ref 0–8)
ERYTHROCYTE [DISTWIDTH] IN BLOOD BY AUTOMATED COUNT: 15.4 % (ref 11.5–14.5)
ERYTHROCYTE [DISTWIDTH] IN BLOOD BY AUTOMATED COUNT: 15.4 % (ref 11.5–14.5)
ERYTHROCYTE [DISTWIDTH] IN BLOOD BY AUTOMATED COUNT: 15.6 % (ref 11.5–14.5)
ERYTHROCYTE [DISTWIDTH] IN BLOOD BY AUTOMATED COUNT: 15.7 % (ref 11.5–14.5)
ERYTHROCYTE [DISTWIDTH] IN BLOOD BY AUTOMATED COUNT: ABNORMAL % (ref 11.5–14.5)
ERYTHROCYTE [SEDIMENTATION RATE] IN BLOOD BY WESTERGREN METHOD: 18 MM/H
EST. GFR  (AFRICAN AMERICAN): 25.1 ML/MIN/1.73 M^2
EST. GFR  (AFRICAN AMERICAN): 27.3 ML/MIN/1.73 M^2
EST. GFR  (NON AFRICAN AMERICAN): 21.7 ML/MIN/1.73 M^2
EST. GFR  (NON AFRICAN AMERICAN): 23.6 ML/MIN/1.73 M^2
FIBRINOGEN PPP-MCNC: 713 MG/DL (ref 182–366)
FIO2: 50
GIANT PLATELETS BLD QL SMEAR: ABNORMAL
GLUCOSE SERPL-MCNC: 131 MG/DL (ref 70–110)
GLUCOSE SERPL-MCNC: 142 MG/DL (ref 70–110)
GLUCOSE SERPL-MCNC: 147 MG/DL (ref 70–110)
GLUCOSE SERPL-MCNC: 150 MG/DL (ref 70–110)
GLUCOSE SERPL-MCNC: 154 MG/DL (ref 70–110)
GLUCOSE SERPL-MCNC: 154 MG/DL (ref 70–110)
HCO3 UR-SCNC: 19.4 MMOL/L (ref 24–28)
HCO3 UR-SCNC: 28.5 MMOL/L (ref 24–28)
HCO3 UR-SCNC: 30.3 MMOL/L (ref 24–28)
HCO3 UR-SCNC: 31.8 MMOL/L (ref 24–28)
HCO3 UR-SCNC: 33.7 MMOL/L (ref 24–28)
HCT VFR BLD AUTO: 32.9 % (ref 40–54)
HCT VFR BLD AUTO: 33.3 % (ref 40–54)
HCT VFR BLD AUTO: 33.7 % (ref 40–54)
HCT VFR BLD AUTO: 34.6 % (ref 40–54)
HCT VFR BLD AUTO: ABNORMAL % (ref 40–54)
HCT VFR BLD CALC: 23 %PCV (ref 36–54)
HCT VFR BLD CALC: 29 %PCV (ref 36–54)
HCT VFR BLD CALC: 33 %PCV (ref 36–54)
HCT VFR BLD CALC: 35 %PCV (ref 36–54)
HEINZ BOD BLD QL SMEAR: ABNORMAL
HGB BLD-MCNC: 10 G/DL (ref 14–18)
HGB BLD-MCNC: 10.1 G/DL (ref 14–18)
HGB BLD-MCNC: 10.4 G/DL (ref 14–18)
HGB BLD-MCNC: 10.5 G/DL (ref 14–18)
HGB BLD-MCNC: ABNORMAL G/DL (ref 14–18)
HGB C CRY RBC QL MICRO: ABNORMAL
HOWELL-JOLLY BOD BLD QL SMEAR: ABNORMAL
HYPOCHROMIA BLD QL SMEAR: ABNORMAL
HYPOCHROMIA BLD QL SMEAR: ABNORMAL
IMM GRANULOCYTES # BLD AUTO: 0.64 K/UL (ref 0–0.04)
IMM GRANULOCYTES # BLD AUTO: ABNORMAL K/UL (ref 0–0.04)
IMM GRANULOCYTES NFR BLD AUTO: 4.1 % (ref 0–0.5)
IMM GRANULOCYTES NFR BLD AUTO: ABNORMAL % (ref 0–0.5)
INR PPP: 1.2 (ref 0.8–1.2)
LDH SERPL L TO P-CCNC: 0.81 MMOL/L (ref 0.36–1.25)
LDH SERPL L TO P-CCNC: 1.07 MMOL/L (ref 0.36–1.25)
LDH SERPL L TO P-CCNC: 1.08 MMOL/L (ref 0.36–1.25)
LDH SERPL L TO P-CCNC: 1.35 MMOL/L (ref 0.36–1.25)
LDH SERPL L TO P-CCNC: 537 U/L (ref 110–260)
LYMPHOCYTES # BLD AUTO: 1.2 K/UL (ref 1–4.8)
LYMPHOCYTES # BLD AUTO: ABNORMAL K/UL (ref 1–4.8)
LYMPHOCYTES NFR BLD: 11 % (ref 18–48)
LYMPHOCYTES NFR BLD: 2 % (ref 18–48)
LYMPHOCYTES NFR BLD: 5 % (ref 18–48)
LYMPHOCYTES NFR BLD: 7.9 % (ref 18–48)
LYMPHOCYTES NFR BLD: ABNORMAL % (ref 18–48)
MAGNESIUM SERPL-MCNC: 2.4 MG/DL (ref 1.6–2.6)
MAGNESIUM SERPL-MCNC: 2.6 MG/DL (ref 1.6–2.6)
MAGNESIUM SERPL-MCNC: 2.7 MG/DL (ref 1.6–2.6)
MCH RBC QN AUTO: 27.6 PG (ref 27–31)
MCH RBC QN AUTO: 27.7 PG (ref 27–31)
MCH RBC QN AUTO: 28.1 PG (ref 27–31)
MCH RBC QN AUTO: 28.4 PG (ref 27–31)
MCH RBC QN AUTO: ABNORMAL PG (ref 27–31)
MCHC RBC AUTO-ENTMCNC: 30 G/DL (ref 32–36)
MCHC RBC AUTO-ENTMCNC: 30.1 G/DL (ref 32–36)
MCHC RBC AUTO-ENTMCNC: 30.7 G/DL (ref 32–36)
MCHC RBC AUTO-ENTMCNC: 31.2 G/DL (ref 32–36)
MCHC RBC AUTO-ENTMCNC: ABNORMAL G/DL (ref 32–36)
MCV RBC AUTO: 91 FL (ref 82–98)
MCV RBC AUTO: 91 FL (ref 82–98)
MCV RBC AUTO: 92 FL (ref 82–98)
MCV RBC AUTO: 92 FL (ref 82–98)
MCV RBC AUTO: ABNORMAL FL (ref 82–98)
MEGAKARYOCYTIC FRAGMENTS: ABNORMAL
METAMYELOCYTES NFR BLD MANUAL: 1 %
METAMYELOCYTES NFR BLD MANUAL: ABNORMAL %
METHEMOGLOBIN: 0.8 % (ref 0–3)
MIN VOL: 9
MODE: ABNORMAL
MODE: ABNORMAL
MONOCYTES # BLD AUTO: 1.6 K/UL (ref 0.3–1)
MONOCYTES # BLD AUTO: ABNORMAL K/UL (ref 0.3–1)
MONOCYTES NFR BLD: 5 % (ref 4–15)
MONOCYTES NFR BLD: 7 % (ref 4–15)
MONOCYTES NFR BLD: 9 % (ref 4–15)
MONOCYTES NFR BLD: 9.9 % (ref 4–15)
MONOCYTES NFR BLD: ABNORMAL % (ref 4–15)
MYELOCYTES NFR BLD MANUAL: 1 %
MYELOCYTES NFR BLD MANUAL: 1 %
MYELOCYTES NFR BLD MANUAL: ABNORMAL %
NEUTROPHILS # BLD AUTO: 12.2 K/UL (ref 1.8–7.7)
NEUTROPHILS # BLD AUTO: ABNORMAL K/UL (ref 1.8–7.7)
NEUTROPHILS NFR BLD: 77.4 % (ref 38–73)
NEUTROPHILS NFR BLD: 79 % (ref 38–73)
NEUTROPHILS NFR BLD: 83 % (ref 38–73)
NEUTROPHILS NFR BLD: 93 % (ref 38–73)
NEUTROPHILS NFR BLD: ABNORMAL % (ref 38–73)
NEUTS BAND NFR BLD MANUAL: 2 %
NEUTS BAND NFR BLD MANUAL: ABNORMAL %
NRBC BLD-RTO: 1 /100 WBC
NRBC BLD-RTO: 2 /100 WBC
NRBC BLD-RTO: ABNORMAL /100 WBC
OVALOCYTES BLD QL SMEAR: ABNORMAL
OVALOCYTES BLD QL SMEAR: ABNORMAL
PAPPENHEIMER BOD BLD QL SMEAR: ABNORMAL
PCO2 BLDA: 30.1 MMHG (ref 35–45)
PCO2 BLDA: 42 MMHG (ref 35–45)
PCO2 BLDA: 42.1 MMHG (ref 35–45)
PCO2 BLDA: 43.7 MMHG (ref 35–45)
PCO2 BLDA: 46.2 MMHG (ref 35–45)
PEEP: 5
PH SMN: 7.42 [PH] (ref 7.35–7.45)
PH SMN: 7.44 [PH] (ref 7.35–7.45)
PH SMN: 7.47 [PH] (ref 7.35–7.45)
PHOSPHATE SERPL-MCNC: 5.1 MG/DL (ref 2.7–4.5)
PHOSPHATE SERPL-MCNC: 5.4 MG/DL (ref 2.7–4.5)
PHOSPHATE SERPL-MCNC: 5.4 MG/DL (ref 2.7–4.5)
PHOSPHATE SERPL-MCNC: 5.7 MG/DL (ref 2.7–4.5)
PIP: 20
PLASMODIUM BLD QL SMEAR: ABNORMAL
PLATELET # BLD AUTO: 251 K/UL (ref 150–350)
PLATELET # BLD AUTO: 259 K/UL (ref 150–350)
PLATELET # BLD AUTO: 264 K/UL (ref 150–350)
PLATELET # BLD AUTO: 290 K/UL (ref 150–350)
PLATELET # BLD AUTO: ABNORMAL K/UL (ref 150–350)
PLATELET BLD QL SMEAR: ABNORMAL
PMV BLD AUTO: 11.3 FL (ref 9.2–12.9)
PMV BLD AUTO: 11.5 FL (ref 9.2–12.9)
PMV BLD AUTO: 11.7 FL (ref 9.2–12.9)
PMV BLD AUTO: 12.1 FL (ref 9.2–12.9)
PMV BLD AUTO: ABNORMAL FL (ref 9.2–12.9)
PO2 BLDA: 137 MMHG (ref 80–100)
PO2 BLDA: 144 MMHG (ref 80–100)
PO2 BLDA: 146 MMHG (ref 80–100)
PO2 BLDA: 158 MMHG (ref 80–100)
PO2 BLDA: 32 MMHG (ref 40–60)
POC BE: -5 MMOL/L
POC BE: 10 MMOL/L
POC BE: 4 MMOL/L
POC BE: 7 MMOL/L
POC BE: 8 MMOL/L
POC IONIZED CALCIUM: 0.96 MMOL/L (ref 1.06–1.42)
POC IONIZED CALCIUM: 1.09 MMOL/L (ref 1.06–1.42)
POC IONIZED CALCIUM: 1.1 MMOL/L (ref 1.06–1.42)
POC IONIZED CALCIUM: 1.16 MMOL/L (ref 1.06–1.42)
POC SATURATED O2: 100 % (ref 95–100)
POC SATURATED O2: 65 % (ref 95–100)
POC SATURATED O2: 99 % (ref 95–100)
POC TCO2: 20 MMOL/L (ref 23–27)
POC TCO2: 30 MMOL/L (ref 23–27)
POC TCO2: 32 MMOL/L (ref 23–27)
POC TCO2: 33 MMOL/L (ref 23–27)
POC TCO2: 35 MMOL/L (ref 24–29)
POCT GLUCOSE: 128 MG/DL (ref 70–110)
POCT GLUCOSE: 136 MG/DL (ref 70–110)
POCT GLUCOSE: 147 MG/DL (ref 70–110)
POCT GLUCOSE: 157 MG/DL (ref 70–110)
POCT GLUCOSE: 167 MG/DL (ref 70–110)
POIKILOCYTOSIS BLD QL SMEAR: ABNORMAL
POIKILOCYTOSIS BLD QL SMEAR: SLIGHT
POLYCHROMASIA BLD QL SMEAR: ABNORMAL
POTASSIUM BLD-SCNC: 2.5 MMOL/L (ref 3.5–5.1)
POTASSIUM BLD-SCNC: 3.5 MMOL/L (ref 3.5–5.1)
POTASSIUM BLD-SCNC: 3.6 MMOL/L (ref 3.5–5.1)
POTASSIUM BLD-SCNC: 3.7 MMOL/L (ref 3.5–5.1)
POTASSIUM SERPL-SCNC: 3.7 MMOL/L (ref 3.5–5.1)
POTASSIUM SERPL-SCNC: 3.8 MMOL/L (ref 3.5–5.1)
POTASSIUM SERPL-SCNC: 3.9 MMOL/L (ref 3.5–5.1)
POTASSIUM SERPL-SCNC: 4 MMOL/L (ref 3.5–5.1)
POTASSIUM SERPL-SCNC: 4 MMOL/L (ref 3.5–5.1)
POTASSIUM SERPL-SCNC: 4.1 MMOL/L (ref 3.5–5.1)
POTASSIUM SERPL-SCNC: 4.1 MMOL/L (ref 3.5–5.1)
PROMYELOCYTES NFR BLD MANUAL: ABNORMAL %
PROT SERPL-MCNC: 6.1 G/DL (ref 6–8.4)
PROT SERPL-MCNC: 6.6 G/DL (ref 6–8.4)
PROT SERPL-MCNC: 6.6 G/DL (ref 6–8.4)
PROTHROMBIN TIME: 12 SEC (ref 9–12.5)
PROTHROMBIN TIME: 12 SEC (ref 9–12.5)
PROTHROMBIN TIME: 12.3 SEC (ref 9–12.5)
PROTHROMBIN TIME: 12.4 SEC (ref 9–12.5)
RBC # BLD AUTO: 3.6 M/UL (ref 4.6–6.2)
RBC # BLD AUTO: 3.62 M/UL (ref 4.6–6.2)
RBC # BLD AUTO: 3.7 M/UL (ref 4.6–6.2)
RBC # BLD AUTO: 3.75 M/UL (ref 4.6–6.2)
RBC # BLD AUTO: ABNORMAL M/UL (ref 4.6–6.2)
RBC AGGLUT BLD QL: ABNORMAL
ROULEAUX BLD QL SMEAR: ABNORMAL
SAMPLE: ABNORMAL
SAMPLE: NORMAL
SAMPLE: NORMAL
SCHISTOCYTES BLD QL SMEAR: ABNORMAL
SCHISTOCYTES BLD QL SMEAR: ABNORMAL
SICKLE CELLS BLD QL SMEAR: ABNORMAL
SITE: ABNORMAL
SITE: NORMAL
SITE: NORMAL
SMUDGE CELLS BLD QL SMEAR: ABNORMAL
SODIUM BLD-SCNC: 142 MMOL/L (ref 136–145)
SODIUM BLD-SCNC: 143 MMOL/L (ref 136–145)
SODIUM BLD-SCNC: 145 MMOL/L (ref 136–145)
SODIUM BLD-SCNC: 147 MMOL/L (ref 136–145)
SODIUM SERPL-SCNC: 145 MMOL/L (ref 136–145)
SODIUM SERPL-SCNC: 146 MMOL/L (ref 136–145)
SP02: 99
SPHEROCYTES BLD QL SMEAR: ABNORMAL
STOMATOCYTES BLD QL SMEAR: ABNORMAL
TARGETS BLD QL SMEAR: ABNORMAL
TOXIC GRANULES BLD QL SMEAR: ABNORMAL
TRANS ERYTHROCYTES VOL PATIENT: NORMAL ML
TRANS ERYTHROCYTES VOL PATIENT: NORMAL ML
VANCOMYCIN SERPL-MCNC: 8.9 UG/ML
VT: 500
WBC # BLD AUTO: 14.66 K/UL (ref 3.9–12.7)
WBC # BLD AUTO: 14.71 K/UL (ref 3.9–12.7)
WBC # BLD AUTO: 15.69 K/UL (ref 3.9–12.7)
WBC # BLD AUTO: 16.37 K/UL (ref 3.9–12.7)
WBC # BLD AUTO: ABNORMAL K/UL (ref 3.9–12.7)
WBC NRBC COR # BLD: ABNORMAL K/UL (ref 3.9–12.7)
WBC OTHER NFR BLD MANUAL: ABNORMAL %
WBC TOXIC VACUOLES BLD QL SMEAR: ABNORMAL

## 2020-01-27 PROCEDURE — 20000000 HC ICU ROOM

## 2020-01-27 PROCEDURE — A4216 STERILE WATER/SALINE, 10 ML: HCPCS | Performed by: SURGERY

## 2020-01-27 PROCEDURE — 63600175 PHARM REV CODE 636 W HCPCS: Performed by: STUDENT IN AN ORGANIZED HEALTH CARE EDUCATION/TRAINING PROGRAM

## 2020-01-27 PROCEDURE — 85014 HEMATOCRIT: CPT

## 2020-01-27 PROCEDURE — 99900035 HC TECH TIME PER 15 MIN (STAT)

## 2020-01-27 PROCEDURE — 83880 ASSAY OF NATRIURETIC PEPTIDE: CPT

## 2020-01-27 PROCEDURE — 84132 ASSAY OF SERUM POTASSIUM: CPT

## 2020-01-27 PROCEDURE — 82803 BLOOD GASES ANY COMBINATION: CPT

## 2020-01-27 PROCEDURE — 85730 THROMBOPLASTIN TIME PARTIAL: CPT | Mod: 91

## 2020-01-27 PROCEDURE — 37000009 HC ANESTHESIA EA ADD 15 MINS: Performed by: THORACIC SURGERY (CARDIOTHORACIC VASCULAR SURGERY)

## 2020-01-27 PROCEDURE — 85025 COMPLETE CBC W/AUTO DIFF WBC: CPT

## 2020-01-27 PROCEDURE — 94003 VENT MGMT INPAT SUBQ DAY: CPT

## 2020-01-27 PROCEDURE — 83735 ASSAY OF MAGNESIUM: CPT | Mod: 91

## 2020-01-27 PROCEDURE — 80053 COMPREHEN METABOLIC PANEL: CPT

## 2020-01-27 PROCEDURE — 94761 N-INVAS EAR/PLS OXIMETRY MLT: CPT

## 2020-01-27 PROCEDURE — 99900026 HC AIRWAY MAINTENANCE (STAT)

## 2020-01-27 PROCEDURE — 82330 ASSAY OF CALCIUM: CPT

## 2020-01-27 PROCEDURE — 84295 ASSAY OF SERUM SODIUM: CPT

## 2020-01-27 PROCEDURE — 37000008 HC ANESTHESIA 1ST 15 MINUTES: Performed by: THORACIC SURGERY (CARDIOTHORACIC VASCULAR SURGERY)

## 2020-01-27 PROCEDURE — 99233 PR SUBSEQUENT HOSPITAL CARE,LEVL III: ICD-10-PCS | Mod: ,,, | Performed by: NURSE PRACTITIONER

## 2020-01-27 PROCEDURE — 85384 FIBRINOGEN ACTIVITY: CPT

## 2020-01-27 PROCEDURE — 27000239 HC STAND-BY BYPASS PUMP

## 2020-01-27 PROCEDURE — 83605 ASSAY OF LACTIC ACID: CPT

## 2020-01-27 PROCEDURE — 80048 BASIC METABOLIC PNL TOTAL CA: CPT

## 2020-01-27 PROCEDURE — 80053 COMPREHEN METABOLIC PANEL: CPT | Mod: 91

## 2020-01-27 PROCEDURE — 63600367 HC NITRIC OXIDE PER HOUR

## 2020-01-27 PROCEDURE — 85007 BL SMEAR W/DIFF WBC COUNT: CPT

## 2020-01-27 PROCEDURE — 99233 SBSQ HOSP IP/OBS HIGH 50: CPT | Mod: ,,, | Performed by: NURSE PRACTITIONER

## 2020-01-27 PROCEDURE — 84100 ASSAY OF PHOSPHORUS: CPT | Mod: 91

## 2020-01-27 PROCEDURE — 85610 PROTHROMBIN TIME: CPT | Mod: 91

## 2020-01-27 PROCEDURE — 80202 ASSAY OF VANCOMYCIN: CPT

## 2020-01-27 PROCEDURE — 86140 C-REACTIVE PROTEIN: CPT

## 2020-01-27 PROCEDURE — 63600175 PHARM REV CODE 636 W HCPCS: Performed by: SURGERY

## 2020-01-27 PROCEDURE — 86920 COMPATIBILITY TEST SPIN: CPT

## 2020-01-27 PROCEDURE — 99233 SBSQ HOSP IP/OBS HIGH 50: CPT | Mod: ,,, | Performed by: INTERNAL MEDICINE

## 2020-01-27 PROCEDURE — 37799 UNLISTED PX VASCULAR SURGERY: CPT

## 2020-01-27 PROCEDURE — 39010 PR MEDIASTINOSCOPY, CHST APPROACH: ICD-10-PCS | Mod: 78,,, | Performed by: THORACIC SURGERY (CARDIOTHORACIC VASCULAR SURGERY)

## 2020-01-27 PROCEDURE — 36000707: Performed by: THORACIC SURGERY (CARDIOTHORACIC VASCULAR SURGERY)

## 2020-01-27 PROCEDURE — 25000003 PHARM REV CODE 250: Performed by: THORACIC SURGERY (CARDIOTHORACIC VASCULAR SURGERY)

## 2020-01-27 PROCEDURE — 93750 PR INTERROGATE VENT ASSIST DEV, IN PERSON, W PHYSICIAN ANALYSIS: ICD-10-PCS | Mod: ,,, | Performed by: INTERNAL MEDICINE

## 2020-01-27 PROCEDURE — 27000248 HC VAD-ADDITIONAL DAY

## 2020-01-27 PROCEDURE — 83615 LACTATE (LD) (LDH) ENZYME: CPT

## 2020-01-27 PROCEDURE — 85027 COMPLETE CBC AUTOMATED: CPT | Mod: 91

## 2020-01-27 PROCEDURE — 97803 MED NUTRITION INDIV SUBSEQ: CPT

## 2020-01-27 PROCEDURE — 63600175 PHARM REV CODE 636 W HCPCS: Performed by: THORACIC SURGERY (CARDIOTHORACIC VASCULAR SURGERY)

## 2020-01-27 PROCEDURE — 80162 ASSAY OF DIGOXIN TOTAL: CPT

## 2020-01-27 PROCEDURE — 93750 INTERROGATION VAD IN PERSON: CPT | Mod: ,,, | Performed by: INTERNAL MEDICINE

## 2020-01-27 PROCEDURE — 25000003 PHARM REV CODE 250: Performed by: SURGERY

## 2020-01-27 PROCEDURE — 27000221 HC OXYGEN, UP TO 24 HOURS

## 2020-01-27 PROCEDURE — D9220A PRA ANESTHESIA: Mod: ,,, | Performed by: ANESTHESIOLOGY

## 2020-01-27 PROCEDURE — C9113 INJ PANTOPRAZOLE SODIUM, VIA: HCPCS | Performed by: SURGERY

## 2020-01-27 PROCEDURE — 85730 THROMBOPLASTIN TIME PARTIAL: CPT

## 2020-01-27 PROCEDURE — 80076 HEPATIC FUNCTION PANEL: CPT

## 2020-01-27 PROCEDURE — 83050 HGB METHEMOGLOBIN QUAN: CPT

## 2020-01-27 PROCEDURE — C1729 CATH, DRAINAGE: HCPCS | Performed by: THORACIC SURGERY (CARDIOTHORACIC VASCULAR SURGERY)

## 2020-01-27 PROCEDURE — D9220A PRA ANESTHESIA: ICD-10-PCS | Mod: ,,, | Performed by: ANESTHESIOLOGY

## 2020-01-27 PROCEDURE — 80048 BASIC METABOLIC PNL TOTAL CA: CPT | Mod: 91

## 2020-01-27 PROCEDURE — 99233 PR SUBSEQUENT HOSPITAL CARE,LEVL III: ICD-10-PCS | Mod: ,,, | Performed by: SURGERY

## 2020-01-27 PROCEDURE — 39010 EXPLORATION OF CHEST: CPT | Mod: 78,,, | Performed by: THORACIC SURGERY (CARDIOTHORACIC VASCULAR SURGERY)

## 2020-01-27 PROCEDURE — 36000706: Performed by: THORACIC SURGERY (CARDIOTHORACIC VASCULAR SURGERY)

## 2020-01-27 PROCEDURE — 25000003 PHARM REV CODE 250: Performed by: STUDENT IN AN ORGANIZED HEALTH CARE EDUCATION/TRAINING PROGRAM

## 2020-01-27 PROCEDURE — 99233 PR SUBSEQUENT HOSPITAL CARE,LEVL III: ICD-10-PCS | Mod: ,,, | Performed by: INTERNAL MEDICINE

## 2020-01-27 PROCEDURE — 99233 SBSQ HOSP IP/OBS HIGH 50: CPT | Mod: ,,, | Performed by: SURGERY

## 2020-01-27 PROCEDURE — 86850 RBC ANTIBODY SCREEN: CPT

## 2020-01-27 RX ORDER — LIDOCAINE HYDROCHLORIDE 20 MG/ML
100 INJECTION INTRAVENOUS ONCE
Status: COMPLETED | OUTPATIENT
Start: 2020-01-27 | End: 2020-01-27

## 2020-01-27 RX ORDER — ROCURONIUM BROMIDE 10 MG/ML
INJECTION, SOLUTION INTRAVENOUS
Status: DISCONTINUED | OUTPATIENT
Start: 2020-01-27 | End: 2020-01-27

## 2020-01-27 RX ORDER — POTASSIUM CHLORIDE 14.9 MG/ML
20 INJECTION INTRAVENOUS ONCE
Status: COMPLETED | OUTPATIENT
Start: 2020-01-27 | End: 2020-01-27

## 2020-01-27 RX ORDER — KETAMINE HCL IN 0.9 % NACL 50 MG/5 ML
SYRINGE (ML) INTRAVENOUS
Status: DISCONTINUED | OUTPATIENT
Start: 2020-01-27 | End: 2020-01-27

## 2020-01-27 RX ORDER — DIGOXIN 0.25 MG/ML
0.25 INJECTION INTRAMUSCULAR; INTRAVENOUS ONCE
Status: COMPLETED | OUTPATIENT
Start: 2020-01-27 | End: 2020-01-27

## 2020-01-27 RX ORDER — FENTANYL CITRATE 50 UG/ML
INJECTION, SOLUTION INTRAMUSCULAR; INTRAVENOUS
Status: DISCONTINUED | OUTPATIENT
Start: 2020-01-27 | End: 2020-01-27

## 2020-01-27 RX ORDER — CEFAZOLIN SODIUM 1 G/3ML
INJECTION, POWDER, FOR SOLUTION INTRAMUSCULAR; INTRAVENOUS
Status: DISCONTINUED | OUTPATIENT
Start: 2020-01-27 | End: 2020-01-27

## 2020-01-27 RX ORDER — BACITRACIN 50000 [IU]/1
INJECTION, POWDER, FOR SOLUTION INTRAMUSCULAR
Status: DISCONTINUED | OUTPATIENT
Start: 2020-01-27 | End: 2020-01-27 | Stop reason: HOSPADM

## 2020-01-27 RX ORDER — MIDAZOLAM HYDROCHLORIDE 5 MG/ML
INJECTION INTRAMUSCULAR; INTRAVENOUS
Status: DISCONTINUED | OUTPATIENT
Start: 2020-01-27 | End: 2020-01-27

## 2020-01-27 RX ORDER — HEPARIN SODIUM 10000 [USP'U]/100ML
500 INJECTION, SOLUTION INTRAVENOUS CONTINUOUS
Status: ACTIVE | OUTPATIENT
Start: 2020-01-27 | End: 2020-01-28

## 2020-01-27 RX ORDER — DIGOXIN 0.25 MG/ML
0.25 INJECTION INTRAMUSCULAR; INTRAVENOUS ONCE
Status: DISCONTINUED | OUTPATIENT
Start: 2020-01-27 | End: 2020-01-27

## 2020-01-27 RX ORDER — HEPARIN SODIUM 10000 [USP'U]/100ML
400 INJECTION, SOLUTION INTRAVENOUS CONTINUOUS
Status: DISCONTINUED | OUTPATIENT
Start: 2020-01-27 | End: 2020-01-27

## 2020-01-27 RX ORDER — FUROSEMIDE 10 MG/ML
10 INJECTION INTRAMUSCULAR; INTRAVENOUS ONCE
Status: DISCONTINUED | OUTPATIENT
Start: 2020-01-27 | End: 2020-01-27

## 2020-01-27 RX ADMIN — FENTANYL CITRATE 25 MCG: 50 INJECTION INTRAMUSCULAR; INTRAVENOUS at 11:01

## 2020-01-27 RX ADMIN — DIGOXIN 0.25 MG: 0.25 INJECTION INTRAMUSCULAR; INTRAVENOUS at 04:01

## 2020-01-27 RX ADMIN — FUROSEMIDE 10 MG/HR: 10 INJECTION, SOLUTION INTRAMUSCULAR; INTRAVENOUS at 04:01

## 2020-01-27 RX ADMIN — EPINEPHRINE 0.07 MCG/KG/MIN: 1 INJECTION INTRAMUSCULAR; INTRAVENOUS; SUBCUTANEOUS at 04:01

## 2020-01-27 RX ADMIN — MIDAZOLAM HYDROCHLORIDE 4 MG: 5 INJECTION, SOLUTION INTRAMUSCULAR; INTRAVENOUS at 11:01

## 2020-01-27 RX ADMIN — HYDRALAZINE HYDROCHLORIDE 10 MG: 20 INJECTION INTRAMUSCULAR; INTRAVENOUS at 01:01

## 2020-01-27 RX ADMIN — FENTANYL CITRATE 150 MCG: 50 INJECTION, SOLUTION INTRAMUSCULAR; INTRAVENOUS at 12:01

## 2020-01-27 RX ADMIN — FENTANYL CITRATE 25 MCG: 50 INJECTION INTRAMUSCULAR; INTRAVENOUS at 10:01

## 2020-01-27 RX ADMIN — LIDOCAINE HYDROCHLORIDE 1 MG/MIN: 8 INJECTION, SOLUTION INTRAVENOUS at 04:01

## 2020-01-27 RX ADMIN — PROPOFOL 20 MCG/KG/MIN: 10 INJECTION, EMULSION INTRAVENOUS at 04:01

## 2020-01-27 RX ADMIN — FENTANYL CITRATE 200 MCG: 50 INJECTION, SOLUTION INTRAMUSCULAR; INTRAVENOUS at 11:01

## 2020-01-27 RX ADMIN — POTASSIUM CHLORIDE 20 MEQ: 14.9 INJECTION, SOLUTION INTRAVENOUS at 01:01

## 2020-01-27 RX ADMIN — FENTANYL CITRATE 25 MCG: 50 INJECTION INTRAMUSCULAR; INTRAVENOUS at 07:01

## 2020-01-27 RX ADMIN — Medication 400 MG: at 03:01

## 2020-01-27 RX ADMIN — Medication 3 ML: at 02:01

## 2020-01-27 RX ADMIN — CEFEPIME 2 G: 2 INJECTION, POWDER, FOR SOLUTION INTRAVENOUS at 02:01

## 2020-01-27 RX ADMIN — NICARDIPINE HYDROCHLORIDE 2.5 MG/HR: 0.2 INJECTION, SOLUTION INTRAVENOUS at 02:01

## 2020-01-27 RX ADMIN — Medication 400 MG: at 08:01

## 2020-01-27 RX ADMIN — POTASSIUM CHLORIDE 20 MEQ: 14.9 INJECTION, SOLUTION INTRAVENOUS at 11:01

## 2020-01-27 RX ADMIN — PROPOFOL 20 MCG/KG/MIN: 10 INJECTION, EMULSION INTRAVENOUS at 11:01

## 2020-01-27 RX ADMIN — HEPARIN SODIUM AND DEXTROSE 400 UNITS/HR: 10000; 5 INJECTION INTRAVENOUS at 04:01

## 2020-01-27 RX ADMIN — ROCURONIUM BROMIDE 50 MG: 10 INJECTION, SOLUTION INTRAVENOUS at 12:01

## 2020-01-27 RX ADMIN — Medication 3 ML: at 06:01

## 2020-01-27 RX ADMIN — MUPIROCIN: 20 OINTMENT TOPICAL at 08:01

## 2020-01-27 RX ADMIN — PANTOPRAZOLE SODIUM 40 MG: 40 INJECTION, POWDER, FOR SOLUTION INTRAVENOUS at 09:01

## 2020-01-27 RX ADMIN — POTASSIUM CHLORIDE 20 MEQ: 14.9 INJECTION, SOLUTION INTRAVENOUS at 03:01

## 2020-01-27 RX ADMIN — LIDOCAINE HYDROCHLORIDE 100 MG: 20 INJECTION, SOLUTION INTRAVENOUS at 08:01

## 2020-01-27 RX ADMIN — FENTANYL CITRATE 25 MCG: 50 INJECTION INTRAMUSCULAR; INTRAVENOUS at 06:01

## 2020-01-27 RX ADMIN — Medication 25 MG: at 12:01

## 2020-01-27 RX ADMIN — ROCURONIUM BROMIDE 50 MG: 10 INJECTION, SOLUTION INTRAVENOUS at 11:01

## 2020-01-27 RX ADMIN — DIGOXIN 0.25 MG: 0.25 INJECTION INTRAMUSCULAR; INTRAVENOUS at 09:01

## 2020-01-27 RX ADMIN — VANCOMYCIN HYDROCHLORIDE 750 MG: 750 INJECTION, POWDER, LYOPHILIZED, FOR SOLUTION INTRAVENOUS at 08:01

## 2020-01-27 RX ADMIN — EPINEPHRINE 0.06 MCG/KG/MIN: 1 INJECTION INTRAMUSCULAR; INTRAVENOUS; SUBCUTANEOUS at 03:01

## 2020-01-27 RX ADMIN — MUPIROCIN: 20 OINTMENT TOPICAL at 09:01

## 2020-01-27 RX ADMIN — SUGAMMADEX 433 MG: 100 INJECTION, SOLUTION INTRAVENOUS at 01:01

## 2020-01-27 RX ADMIN — CEFAZOLIN 2 G: 330 INJECTION, POWDER, FOR SOLUTION INTRAMUSCULAR; INTRAVENOUS at 12:01

## 2020-01-27 NOTE — ASSESSMENT & PLAN NOTE
Tae Delgado is a 59 y.o. 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 Approved for DT LVAD. Went to OR on 1/21 and found to have DAMON and LV thrombus and case was aborted. Pt was placed on a heparin gtt and thrombus had dissipated on repeat JACINTO on 1/22. Pt underwent LVAD placement on 1/23. Chest closure and re-exploration on 1/24. Returned from OR with chest open on 1/24. Diuresed over the weekend, chest closure 1/27.     Neuro:  - Patient opens eyes and follows commands   - Prop gtt with prn Fentanyl    CVS:   - Current LVAD flow @ 5200 with appropriate PIs   - Epi @ 0.07, Nitric @ 15  - Lidocaine gtt for tachyarrythmia  - Will consider returning to IV amio (concern that amio elixir not absorbing)  - ICD turned on, EP service consulted for assistance with arrythmia  - Chest closure 1/27    Pulm:  - Mechanical ventilation, wean as able when chest closed  - ABGs q6hr  - CXR daily  - Monitor CT output, no overt signs of bleeding     GI:  - NPO  - Protonix 40 daily  - Docusate    Endo:   - Insulin off now, SSI  - Endocrine consulted, appreciate their services    Renal:  - Strict I/O  - Trend BUN/Cr  - Lasix gtt @ 12.5    Lytes:  - Monitor labs  - Replace per protocol    Heme:  - No blood products needed during the OR  - H/H stable overnight    ID:   - Lona op ABX  - Follow WBC  - Follow fever curve    PPx:  - Hep gtt nontitrating, 600 - turned off at 0700 for chest closure  - GI ppx    Dispo:  - Cont SICU care   - Chest closure today

## 2020-01-27 NOTE — SUBJECTIVE & OBJECTIVE
Interval History: Patient remains intubated on sedation. sCr continues to improve, 3.0 today from 3.2 on yesterday. Patient making good urine on Lasix drip, now infusing at 10 mg/hr. UOP 2.6 L/24 hrs. Patient net negative 1.6 L. CVP 15.  Plans for chest closure today.    Review of patient's allergies indicates:   Allergen Reactions    Biopatch [chlorhexidine gluconate]      Site burning    Dobutamine in d5w      Tachycardia, tremors, SOB, flushing    Percocet [oxycodone-acetaminophen] Itching    Penicillins Rash     Cefepime given on 1/23/2020 without issue     Current Facility-Administered Medications   Medication Frequency    albumin human 5% bottle 500 mL PRN    albuterol sulfate nebulizer solution 2.5 mg Q4H PRN    amiodarone 5 mg/mL oral suspension TID    aspirin tablet 325 mg Daily    bisacodyl suppository 10 mg Daily PRN    vancomycin 750 mg in dextrose 5 % 250 mL IVPB (ready to mix system) Q24H    And    ceFEPIme injection 2 g Q12H    dextrose 10% (D10W) Bolus PRN    dextrose 10% (D10W) Bolus PRN    docusate sodium capsule 200 mg QHS    EPINEPHrine (ADRENALIN) 5 mg in sodium chloride 0.9% 250 mL infusion Continuous    fentaNYL injection 25 mcg Q1H PRN    ferrous gluconate tablet 324 mg Daily with breakfast    furosemide (LASIX) 2 mg/mL in sodium chloride 0.9% 100 mL infusion (conc: 2 mg/mL) Continuous    glucagon (human recombinant) injection 1 mg PRN    hydrALAZINE injection 10 mg Q6H PRN    insulin aspart U-100 pen 0-5 Units Q4H PRN    lidocaine 2000 mg in dextrose 5% 250 mL infusion Continuous    magnesium hydroxide 400 mg/5 ml suspension 2,400 mg Daily PRN    magnesium oxide tablet 400 mg TID    magnesium sulfate 2g in water 50mL IVPB (premix) PRN    mupirocin 2 % ointment BID    niCARdipine 40 mg/200 mL infusion Continuous    nitric oxide gas Gas 15 ppm Continuous    norepinephrine 4 mg in dextrose 5% 250 mL infusion (premix) (titrating) Continuous    oxyCODONE immediate  release tablet 5 mg Q4H PRN    oxyCODONE immediate release tablet Tab 10 mg Q4H PRN    pantoprazole injection 40 mg Daily    polyethylene glycol packet 17 g BID    propofol (DIPRIVAN) 10 mg/mL infusion Continuous    sodium chloride 0.9% flush 10 mL PRN    sodium chloride 0.9% flush 3 mL Q8H     Facility-Administered Medications Ordered in Other Encounters   Medication Frequency    ceFAZolin injection PRN    fentaNYL injection PRN    midazolam (VERSED) 5 mg/mL injection PRN    rocuronium injection PRN       Objective:     Vital Signs (Most Recent):  Temp: 99.2 °F (37.3 °C) (01/27/20 1100)  Pulse: (!) 122 (01/27/20 1127)  Resp: 18 (01/26/20 1704)  BP: (!) 80/0 (01/27/20 0800)  SpO2: 100 % (01/27/20 1127)  O2 Device (Oxygen Therapy): ventilator (01/27/20 1127) Vital Signs (24h Range):  Temp:  [98.2 °F (36.8 °C)-99.8 °F (37.7 °C)] 99.2 °F (37.3 °C)  Pulse:  [] 122  Resp:  [18-19] 18  SpO2:  [98 %-100 %] 100 %  BP: (80-86)/(0) 80/0  Arterial Line BP: (79-99)/(65-87) 88/77     Weight: 108.3 kg (238 lb 12.1 oz) (01/27/20 0500)  Body mass index is 34.26 kg/m².  Body surface area is 2.31 meters squared.    I/O last 3 completed shifts:  In: 2500.7 [I.V.:2140.7; NG/GT:360]  Out: 4900 [Urine:4140; Drains:410; Chest Tube:350]    Physical Exam   Constitutional: He appears well-developed. No distress. He is sedated and intubated.   HENT:   Head: Normocephalic and atraumatic.   Right Ear: External ear normal.   Left Ear: External ear normal.   Cardiovascular: Tachycardia present.   RVAD hum   Pulmonary/Chest: Effort normal and breath sounds normal. He is intubated. He has no rales.   Musculoskeletal: He exhibits edema. He exhibits no deformity.   Skin: Skin is warm and dry. He is not diaphoretic.   Sternal surgical wound, drsg intact       Significant Labs:  CBC:   Recent Labs   Lab 01/27/20  0900   WBC 14.66*   RBC 3.62*   HGB 10.0*   HCT 33.3*      MCV 92   MCH 27.6   MCHC 30.0*     CMP:   Recent Labs   Lab  01/27/20  0400 01/27/20  0820   *  154* 150*   CALCIUM 9.8  9.8 9.4   ALBUMIN 2.5*  2.5*  --    PROT 6.6  6.6  --    *  146* 145   K 4.0  4.0 3.9   CO2 28  28 27     102 103   BUN 94*  94* 95*   CREATININE 3.0*  3.0* 3.0*   ALKPHOS 45*  45*  --    ALT 28  28  --    AST 30  30  --    BILITOT 0.7  0.7  --

## 2020-01-27 NOTE — PLAN OF CARE
Plan of care reviewed with pt an family. Pt went to Or today for washout. VAD speed increased to 5300rp flows 4.3-4.5. Epi 0.06mcg/kg/min, lasix 10mg/hr uop 100-150cc, cvp 11-15. Pt follows commands and moves all extremtiies. Pain controlled with prn fentanyl. Heparin 500 unit/hr non-titrating. Plan for possible washout tomorrow

## 2020-01-27 NOTE — CARE UPDATE
BG goal 140-180    Remains in ICU, NAEON  BG well controlled overnight without insulin  Noted creatinine of 3.0  Febrile, on IV antibiotics  Possible return to OR today for chest closure  Plan:    Continue low dose correction scale  BG monitoring every 4 hours while NPO    Discharge planning: TBD    Endocrine to continue to follow    ** Please call Endocrine for any BG related issues **

## 2020-01-27 NOTE — PROGRESS NOTES
01/27/2020  Deidra Castro    Current provider:  Ino Schmitt MD      I, Deidra Castro, rounded on Tae ROXANNA Colunganidhi to ensure all mechanical assist device settings (IABP or VAD) were appropriate and all parameters were within limits.  I was able to ensure all back up equipment was present, the staff had no issues, and the Perfusion Department daily rounding was complete. Going to OR for washout today.     9:34 AM

## 2020-01-27 NOTE — PROGRESS NOTES
[facility]  Heart Transplant  Progress Note    Patient Name: Tae Delgado  MRN: [id number]  Admission Date: 1/9/2020  Hospital Length of Stay: 18 days  Attending Physician: Ino Schmitt MD  Primary Care Provider: Elham Pearson MD  Principal Problem:LVAD (left ventricular assist device) present    Subjective:     **Interval History: No acute events overnight.  Heparin on hold and plan for chest closure today.  He is net negative 1.5L in the last 24 hours.  CVP: 15, SVO2: 65, CO: 7.03, CI: 3.04 SVR: 807    Lines:  Arterial Line: 1/23/20  Left IJ: 1/23/20  PA catheter    Continuous Infusions:   epinephrine 0.07 mcg/kg/min (01/27/20 1100)    furosemide (LASIX) 2 mg/mL infusion (non-titrating) 10 mg/hr (01/27/20 1100)    lidocaine 1 mg/min (01/27/20 1100)    niCARdipine Stopped (01/26/20 1408)    nitric oxide gas      norepinephrine bitartrate-D5W Stopped (01/23/20 1430)    propofol 20 mcg/kg/min (01/27/20 1125)     Scheduled Meds:   amiodarone  400 mg Per NG tube TID    aspirin  325 mg Oral Daily    vancomycin (VANCOCIN) IVPB  750 mg Intravenous Q24H    And    ceFEPime (MAXIPIME) IVPB  2 g Intravenous Q12H    docusate sodium  200 mg Oral QHS    ferrous gluconate  324 mg Oral Daily with breakfast    magnesium oxide  400 mg Per NG tube TID    mupirocin   Nasal BID    pantoprazole  40 mg Intravenous Daily    polyethylene glycol  17 g Oral BID    sodium chloride 0.9%  3 mL Intravenous Q8H     PRN Meds:albumin human 5%, albuterol sulfate, bisacodyl, Dextrose 10% Bolus, Dextrose 10% Bolus, fentaNYL, glucagon (human recombinant), hydrALAZINE, insulin aspart U-100, magnesium hydroxide 400 mg/5 ml, magnesium sulfate IVPB, oxyCODONE, oxyCODONE, sodium chloride 0.9%    Review of patient's allergies indicates:   Allergen Reactions    Biopatch [chlorhexidine gluconate]      Site burning    Dobutamine in d5w      Tachycardia, tremors, SOB, flushing    Percocet [oxycodone-acetaminophen] Itching     Penicillins Rash     Cefepime given on 1/23/2020 without issue     Objective:     Vital Signs (Most Recent):  Temp: 99.2 °F (37.3 °C) (01/27/20 1100)  Pulse: (!) 122 (01/27/20 1127)  Resp: 18 (01/26/20 1704)  BP: (!) 80/0 (01/27/20 0800)  SpO2: 100 % (01/27/20 1127) Vital Signs (24h Range):  Temp:  [98.2 °F (36.8 °C)-99.8 °F (37.7 °C)] 99.2 °F (37.3 °C)  Pulse:  [] 122  Resp:  [18-19] 18  SpO2:  [98 %-100 %] 100 %  BP: (80-86)/(0) 80/0  Arterial Line BP: (79-99)/(65-87) 88/77     Patient Vitals for the past 72 hrs (Last 3 readings):   Weight   01/27/20 0500 108.3 kg (238 lb 12.1 oz)   01/26/20 0500 109 kg (240 lb 4.8 oz)   01/25/20 0330 109.8 kg (242 lb 1 oz)     Body mass index is 34.26 kg/m².      Intake/Output Summary (Last 24 hours) at 1/27/2020 1136  Last data filed at 1/27/2020 1100  Gross per 24 hour   Intake 1417 ml   Output 3305 ml   Net -1888 ml       Hemodynamic Parameters:  PAP: (24-50)/(8-29) 34/17  PAP (Mean):  [20 mmHg-35 mmHg] 26 mmHg  PCWP:  [10 mmHg-21 mmHg] 21 mmHg  CO:  [4.2 L/min-6 L/min] 4.6 L/min  CI:  [1.6 L/min/m2-2.6 L/min/m2] 1.9 L/min/m2    Telemetry: A fib  Physical Exam   Constitutional: Intubated and sedated; chest open    Eyes: Pupils are equal, round, and reactive to light. Conjunctivae are normal.   Neck: Neck supple. No thyromegaly present. Bilateral IJ lines   Cardiovascular: Irregularly irregular, tachycardic, smooth VAD hum. Chest open   Pulmonary/Chest: Effort normal and breath sounds normal. Intubated and sedated   Abdominal: Soft.   Musculoskeletal: He exhibits no edema.   Neurological: Intubated and sedated   Skin: Skin is warm and dry. Capillary refill takes 2 to 3 seconds.       Significant Labs:  CBC:  Recent Labs   Lab 01/27/20  0400 01/27/20  0711 01/27/20  0820 01/27/20  0900   WBC 14.71*  --  SEE COMMENT 14.66*   RBC 3.70*  --  SEE COMMENT 3.62*   HGB 10.5*  --  SEE COMMENT 10.0*   HCT 33.7* 23* SEE COMMENT 33.3*     --  SEE COMMENT 259   MCV 91  --   SEE COMMENT 92   MCH 28.4  --  SEE COMMENT 27.6   MCHC 31.2*  --  SEE COMMENT 30.0*     BNP:  Recent Labs   Lab 01/24/20  0334 01/27/20  0400   * 1,140*     CMP:  Recent Labs   Lab 01/25/20  0413  01/26/20  0400  01/27/20  0000 01/27/20  0400 01/27/20  0820   *   < > 127*  127*   < > 142* 154*  154* 150*   CALCIUM 9.7   < > 9.8  9.8   < > 9.7 9.8  9.8 9.4   ALBUMIN 2.8*  --  2.5*  --   --  2.5*  2.5*  --    PROT 6.6  --  6.5  --   --  6.6  6.6  --       < > 142  142   < > 145 146*  146* 145   K 4.3   < > 4.1  4.1   < > 3.7 4.0  4.0 3.9   CO2 24   < > 26  26   < > 29 28  28 27      < > 99  99   < > 101 102  102 103   BUN 57*   < > 78*  78*   < > 88* 94*  94* 95*   CREATININE 2.7*   < > 3.2*  3.2*   < > 3.0* 3.0*  3.0* 3.0*   ALKPHOS 46*  --  44*  --   --  45*  45*  --    ALT 34  --  29  --   --  28  28  --    AST 64*  --  39  --   --  30  30  --    BILITOT 0.7  --  0.7  --   --  0.7  0.7  --     < > = values in this interval not displayed.      Coagulation:   Recent Labs   Lab 01/27/20  0000 01/27/20  0400 01/27/20  0915   INR 1.2 1.2 1.2   APTT 26.6 21.2 25.2     LDH:  Recent Labs   Lab 01/25/20  0505 01/26/20  0400 01/27/20  0400   * 470* 537*     Microbiology:  Microbiology Results (last 7 days)     Procedure Component Value Units Date/Time    Blood culture [[id number]] Collected:  01/20/20 1042    Order Status:  Completed Specimen:  Blood from Peripheral, Antecubital, Left Updated:  01/25/20 1412     Blood Culture, Routine No growth after 5 days.    Blood culture [[id number]] Collected:  01/20/20 1035    Order Status:  Completed Specimen:  Blood from Peripheral, Wrist, Left Updated:  01/25/20 1412     Blood Culture, Routine No growth after 5 days.          I have reviewed all pertinent labs within the past 24 hours.    Estimated Creatinine Clearance: 32.7 mL/min (A) (based on SCr of 3 mg/dL (H)).    Diagnostic Results:  I have reviewed all pertinent imaging  results/findings within the past 24 hours.    Assessment and Plan:       55 y.o. WM with history of NICMP diagnosed in 2010, ICD, LV thrombus (with prior splenic and renal emboli), Embolic  CVA , paroxysmal atrial fib, HTN, HLP  presents for  F/U today to clinic and he was volume overloaded on exam .  He states he had 3 admission in the last 3 months for volume overload. He started having more SOB on exertion since one month. Also endorses of Orthopnea since last one month. Alble to walk only 150 ft. He also has Bilateral lower extremity edema. He was admitted here in 2017 for ADHD here at [facility] during that admission showed PCWP 40 and CVP 17. Currently denies chest pain, lightheadedness     * LVAD (left ventricular assist device) present  - S/P DT HM3 with closure of AV and repair of MV for regurg 1/23/20.   - CTS primary  - Taken back to OR 1/24 for possible RVAD placement which was not done. Patient remained hemodynamically stable in OR  - Currently hemodynamically stable on Epi, Lasix, Lidocaine, and Corby   - Would wean off Lidocaine  - Current speed 5200    Procedure: Device Interrogation Including analysis of device parameters  Current Settings: Ventricular Assist Device  Review of device function is stable/unstabe    TXP LVAD INTERROGATIONS 1/27/2020 1/27/2020 1/27/2020 1/27/2020 1/27/2020 1/27/2020 1/27/2020   Type HeartMate3 HeartMate3 HeartMate3 HeartMate3 HeartMate3 HeartMate3 HeartMate3   Flow 4.1 4.2 4.3 4 4.1 4 4.2   Speed 5300 5300 5300 5200 5200 5200 5200   PI 4.6 3.8 3.6 4.3 4.2 4.2 4.4   Power (Berry) 3.9 3.8 3.8 3.7 3.7 3.6 3.7   LSL 4900 4900 4900 4800 4800 4800 4800   Pulsatility Intermittent pulse Intermittent pulse Intermittent pulse Intermittent pulse Intermittent pulse Intermittent pulse -       Acute on chronic combined systolic and diastolic heart failure  - S/P DT HM3 with closure of AV and plication of MV for regurg 1/23/20 (See LVAD)  - [facility] dx'd 2010  - Patient given  Diuril for UOP <100 cc/hr per primary  - See above;         Paroxysmal atrial fibrillation  - Has been back in A fib since surgery  - AC per CTS  - Was on Amiodarone infusion but now on Lidocaine.  - Would wean Lidocaine off and consider restarting Amiodarone    Acute kidney injury superimposed on chronic kidney disease  - Creatinine on admit 2.6 (baseline ~ 1.8). Creatinine today 3.0  - Nephrology consulted and following    Left ventricular thrombus without MI  - H/O LV thrombus with h/o splenic and renal emboli as well as embolic CVA  - Limited TTE done here 1/13 showed no thrombus  - JACINTO 1/23 intra op showed resolution of DAMON thrombus  - AC per CTS      Right lower lobe pulmonary nodule  - Incidental finding on CT chest/abd/pelvis on 1/12. Pulmonology consulted, and rec repeat CT of chest in 3 months  - Will need to follow-up in pulmonary clinic.     Hepatic congestion  - Liver US on 1/11 unremarkable    ICD (implantable cardioverter-defibrillator) in place  - S/P Biotronik dual chamber ICD    Coagulopathy  - Appreciate Hem/Onc's help. No evidence of underlying coagulopathy (h/o LV thrombus with splenic and renal emboli, h/o embolic CVA)    NSVT (nonsustained ventricular tachycardia)  - would consider restarting Amiodarone      MARBELLA Mcfadden  Heart Transplant  [facility]

## 2020-01-27 NOTE — PROGRESS NOTES
Biotronik AICD tachy therapies disabled for chest closure.  Please page the device clinic @ [phone] to turn ICD back on post op.

## 2020-01-27 NOTE — NURSING
Sign out given to Dr. Schmitt via telephone at 1800. Updated on current hemodynamics, CVP, I/O status, gtt rates, and 1600 labs. No new orders at this time. NOC RN to follow up in am for time on Heparin stop for chest closure.

## 2020-01-27 NOTE — ANESTHESIA POSTPROCEDURE EVALUATION
Anesthesia Post Evaluation    Patient: Tae Delgado    Procedure(s) Performed: Procedure(s) (LRB):  INSERTION-LEFT VENTRICULAR ASSIST DEVICE (Left)    Final Anesthesia Type: general    Patient location during evaluation: ICU  Patient participation: No - Unable to Participate, Intubation  Level of consciousness: responds to stimulation  Post-procedure vital signs: reviewed and stable  Pain management: adequate  Airway patency: patent    PONV status at discharge: No PONV  Anesthetic complications: no      Cardiovascular status: hemodynamically stable  Respiratory status: ventilator and ETT  Hydration status: euvolemic  Follow-up not needed.          Vitals Value Taken Time   BP 80/0 1/27/2020  8:00 AM   Temp 37.4 °C (99.3 °F) 1/27/2020  8:00 AM   Pulse 139 1/27/2020 10:13 AM   Resp 18 1/26/2020  5:04 PM   SpO2 100 % 1/27/2020 10:13 AM   Vitals shown include unvalidated device data.      No case tracking events are documented in the log.      Pain/Dequan Score: Pain Rating Prior to Med Admin: 6 (1/27/2020  7:55 AM)  Pain Rating Post Med Admin: 0 (1/27/2020  8:25 AM)

## 2020-01-27 NOTE — PLAN OF CARE
Plan of Care Note  Cardiothoracic Surgery    Tae Delgado is a 59 y.o. male with heart failure who underwent LVAD placement (1/23/20) then closure (1/24/20) and repeat chest opening (1/24/20) for poor RV function.    Specific issues: heparin held for washout and possible closure; consider amio drip for arrhythmia control and inability to tolerate enteral administration; continue lidocaine drip and epi and nitric    Plan of care for patient was discussed with ICU staff including nurses, residents, and faculty and appropriate consulting services.    Will continue to monitor patient's hemodynamics, functionality, neuro status, fluid status and renal function, and labs and will adjust medications and fluids as necessary while monitoring appropriateness for de-escalation of support and monitoring and transport to stepdown unit.    Terence Gonzalez MD  Cardiothoracic Surgery Fellow

## 2020-01-27 NOTE — SUBJECTIVE & OBJECTIVE
Interval History/Significant Events: NAEON    Follow-up For: Procedure(s) (LRB):  EXPLORATION, WOUND (N/A)  CLOSURE, WOUND, STERNUM    Post-Operative Day: 3 Days Post-Op    Objective:     Vital Signs (Most Recent):  Temp: 99.3 °F (37.4 °C) (01/27/20 0800)  Pulse: (!) 167 (01/27/20 0830)  Resp: 18 (01/26/20 1704)  BP: (!) 80/0 (01/27/20 0800)  SpO2: 99 % (01/27/20 0830) Vital Signs (24h Range):  Temp:  [98.2 °F (36.8 °C)-99.8 °F (37.7 °C)] 99.3 °F (37.4 °C)  Pulse:  [113-167] 167  Resp:  [18-21] 18  SpO2:  [98 %-100 %] 99 %  BP: (80-88)/(0) 80/0  Arterial Line BP: (79-99)/(65-83) 89/73     Weight: 108.3 kg (238 lb 12.1 oz)  Body mass index is 34.26 kg/m².      Intake/Output Summary (Last 24 hours) at 1/27/2020 0845  Last data filed at 1/27/2020 0800  Gross per 24 hour   Intake 1625 ml   Output 3245 ml   Net -1620 ml       Physical Exam   Constitutional: He appears well-developed and well-nourished.   HENT:   Head: Normocephalic and atraumatic.   Mouth/Throat: No oropharyngeal exudate.   ETT and OG secured in place.   Eyes: Pupils are equal, round, and reactive to light.   Neck: Normal range of motion. Neck supple.   Left IJ double lumen + [place] in place, dressing CLD  Right IJ triple lumen, dressing CLD   Cardiovascular:   Irregular tachyarrythmia. LVAD hum.    Pulmonary/Chest: Breath sounds normal. He has no wheezes. He has no rales.   Intubated. Open chest, dressed with [place]. CTs with SS drainage.   Abdominal: Soft. He exhibits no distension.   Musculoskeletal: He exhibits no edema or deformity.   Neurological:   sedated   Skin: Skin is warm and dry.       Vents:  Vent Mode: A/C (01/27/20 0753)  Ventilator Initiated: Yes (01/21/20 0905)  Set Rate: 18 bmp (01/27/20 0753)  Vt Set: 500 mL (01/27/20 0753)  PEEP/CPAP: 5 cmH20 (01/27/20 0753)  Oxygen Concentration (%): 50 (01/27/20 0830)  Peak Airway Pressure: 24 cmH2O (01/27/20 0753)  Plateau Pressure: 18 cmH20 (01/27/20 0753)  Total Ve: 9.23 mL (01/27/20  0753)  F/VT Ratio<105 (RSBI): (!) 35.5 (01/26/20 1704)    Lines/Drains/Airways     Central Venous Catheter Line                 Percutaneous Central Line Insertion/Assessment - double lumen  01/23/20 0751 left internal jugular 4 days         Pulmonary Artery Catheter Assessment  01/23/20 0751 4 days         Trialysis (Dialysis) Catheter 01/24/20 1500 right internal jugular 2 days          Drain                 Urethral Catheter 01/21/20 0752 Non-latex;Straight-tip;Temperature probe 16 Fr. 6 days         Chest Tube 01/23/20 1230 1 Right Pleural 3 days         Chest Tube 01/23/20 1414 2 Right Mediastinal 3 days         Chest Tube 01/23/20 1415 3 Left Mediastinal 3 days         NG/OG Tube 01/24/20 2130 nasogastric Right nostril 2 days          Airway                 Airway - Non-Surgical 01/21/20 0742 Endotracheal Tube 6 days          Arterial Line                 Arterial Line 01/21/20 0730 Left Other (Comment) 6 days         Arterial Line 01/23/20 1335 Right Radial 3 days          Line                 VAD 01/23/20 1148 Left ventricular assist device HeartMate 3 3 days          Peripheral Intravenous Line                 Peripheral IV - Single Lumen 01/25/20 1018 22 G Right Antecubital 1 day         Peripheral IV - Single Lumen 01/25/20 1133 18 G Left Antecubital 1 day                Significant Labs:    CBC/Anemia Profile:  Recent Labs   Lab 01/26/20 1929 01/27/20  0000 01/27/20  0109 01/27/20  0400 01/27/20  0711   WBC 16.35* 15.69*  --  14.71*  --    HGB 10.3* 10.1*  --  10.5*  --    HCT 32.7* 32.9* 29* 33.7* 23*    251  --  264  --    MCV 90 91  --  91  --    RDW 15.4* 15.4*  --  15.4*  --         Chemistries:  Recent Labs   Lab 01/26/20  0400  01/26/20 1929 01/27/20  0000 01/27/20  0400     142   < > 145 145 146*  146*   K 4.1  4.1   < > 3.9 3.7 4.0  4.0   CL 99  99   < > 101 101 102  102   CO2 26  26   < > 27 29 28  28   BUN 78*  78*   < > 87* 88* 94*  94*   CREATININE 3.2*  3.2*   < >  3.1* 3.0* 3.0*  3.0*   CALCIUM 9.8  9.8   < > 9.7 9.7 9.8  9.8   ALBUMIN 2.5*  --   --   --  2.5*  2.5*   PROT 6.5  --   --   --  6.6  6.6   BILITOT 0.7  --   --   --  0.7  0.7   ALKPHOS 44*  --   --   --  45*  45*   ALT 29  --   --   --  28  28   AST 39  --   --   --  30  30   MG 2.8*   < > 2.8* 2.7* 2.7*   PHOS 6.5*   < > 6.3* 5.7* 5.4*    < > = values in this interval not displayed.       ABGs:   Recent Labs   Lab 01/27/20  0711   PH 7.417   PCO2 30.1*   HCO3 19.4*   POCSATURATED 99   BE -5     Coagulation:   Recent Labs   Lab 01/27/20  0400   INR 1.2   APTT 21.2     Lactic Acid: No results for input(s): LACTATE in the last 48 hours.    Significant Imaging:  I have reviewed all pertinent imaging results/findings within the past 24 hours.

## 2020-01-27 NOTE — ASSESSMENT & PLAN NOTE
KRISTIN on CKD III  Baseline SCr ~ 2.0    58 yo male s/p LVAD placement on 1/23 who was taken back to OR for exploration over concerns for RV failure/tamponade due to elevated CVP and decrease UOP.  Upon opening of chest, he had improved hemodynamics and some improvement in UOP and decision made to leave chest open and transfer back to ICU for closer monitoring on 1/24.  He was administered IV lasix + diuril with improvement in his UOP.      DDx for KRISTIN includes ATN from renal hypoperfusion vs. CRS from RV overload    Plan/recommendations:  -no urgent need for RRT, responded well to diuretics. CVP 15. Good urine output.   -recommend discontinuing Lasix drip and transitioning patient to BID dosing of Lasix  mg   -continue trending RFP and UOP  -will follow labs closely  -will discuss with Dr. Gilmore

## 2020-01-27 NOTE — NURSING
Pt transferred to OR per anesthesia for washout/possible chest closure. Will resume care when pt returns to [facility]

## 2020-01-27 NOTE — TRANSFER OF CARE
"Anesthesia Transfer of Care Note    Patient: Tae Delgado    Procedure(s) Performed: Procedure(s) (LRB):  IRRIGATION, MEDIASTINUM (N/A)    Patient location: ICU    Anesthesia Type: general    Transport from OR: Transported from OR intubated on 100% O2 by AMBU with assisted ventilation. Upon arrival to PACU/ICU, patient attached to ventilator and auscultated to confirm bilateral breath sounds and adequate TV. Continuous ECG monitoring in transport. Continuous SpO2 monitoring in transport. Continuos invasive BP monitoring in transport    Post pain: adequate analgesia    Post assessment: no apparent anesthetic complications and tolerated procedure well    Post vital signs: stable    Level of consciousness: sedated    Nausea/Vomiting: no nausea/vomiting    Complications: none    Transfer of care protocol was followed      Last vitals:   Visit Vitals  BP (!) 80/0 (BP Location: Right arm, Patient Position: Lying)   Pulse (!) 120   Temp 37.3 °C (99.2 °F) (Core ([place]))   Resp 18   Ht 5' 10" (1.778 m)   Wt 108.3 kg (238 lb 12.1 oz)   SpO2 100%   BMI 34.26 kg/m²     "

## 2020-01-27 NOTE — PROGRESS NOTES
Wound care consult for DTI to right nare.     PMH: NICMP LVEF 15% (dx 2010), s/p ICD, hx LV thrombus (with prior splenic and renal emboli), pAF, hx embolic CVA, HTN, DLD, and CKD III (baseline ~ 2.0) who was initially admitted from [facility] clinic on 1/9 with volume overload and ADHF      Patient with deep maroon area noted to the right nare. NGT in place but has been moved away from discolored area    Recommend keeping area moisturized with sween cream     Unable to assess backside as pt still has open chest and returning to OR tomorrow.     Wound care to follow PRN.  Farzana Jara RN [facility]   [phone]         01/27/20 0851        Pressure Injury 01/26/20 1900 Right Nare Suspected DTPI   Date First Assessed/Time First Assessed: 01/26/20 1900   Pressure Injury Present on Admission: suspected hospital acquired  Side: Right  Location: Nare  Staging: Suspected DTPI   Staging Suspected DTPI   Dressing Appearance Open to air   Drainage Amount None   Drainage Characteristics/Odor No odor   Appearance Maroon   Tissue loss description Not applicable   Periwound Area Dry   Wound Edges Undefined   Wound Length (cm) 0.3 cm   Wound Width (cm) 0.6 cm   Wound Depth (cm) 0.1 cm   Wound Volume (cm^3) 0.02 cm^3   Wound Surface Area (cm^2) 0.18 cm^2

## 2020-01-27 NOTE — PROGRESS NOTES
[facility]  Nephrology  Progress Note    Patient Name: Tae Delgado  MRN: [id number]  Admission Date: 1/9/2020  Hospital Length of Stay: 18 days  Attending Provider: Ino Schmitt MD   Primary Care Physician: Elham Pearson MD  Principal Problem:LVAD (left ventricular assist device) present    Subjective:     HPI: Mr. Tae Delgado is a 60 yo M with PMHx significant for NICMP LVEF 15% (dx 2010), s/p ICD, hx LV thrombus (with prior splenic and renal emboli), pAF, hx embolic CVA, HTN, DLD, and CKD III (baseline ~ 2.0) who was initially admitted from [facility] clinic on 1/9 with volume overload and ADHF. Patient diuresed but subsequently transferred to ICU on 1/16. He was started on advanced options pathway and was approved for LVAD as DT. He underwent LVAD placement on 1/23/2020.  He was taken back to the OR for exploration of possible tamponade/RV failure on the 24th as he had a reduction in UOP with elevation in his CVP.  After the chest was re-opened there was noted improvement in LV filling and PI on LVAD so decision made not to pursue RVAD placement and he was transferred back to ICU for closer monitoring.  He was started on lasix infusion with addition of diuril with an improvement in his UOP and HDS.  Nephrology was consulted for evaluation for possible need for RRT.    Interval History: Patient remains intubated on sedation. sCr continues to improve, 3.0 today from 3.2 on yesterday. Patient making good urine on Lasix drip, now infusing at 10 mg/hr. UOP 2.6 L/24 hrs. Patient net negative 1.6 L. CVP 15.  Plans for chest closure today.    Review of patient's allergies indicates:   Allergen Reactions    Biopatch [chlorhexidine gluconate]      Site burning    Dobutamine in d5w      Tachycardia, tremors, SOB, flushing    Percocet [oxycodone-acetaminophen] Itching    Penicillins Rash     Cefepime given on 1/23/2020 without issue     Current Facility-Administered Medications   Medication Frequency     albumin human 5% bottle 500 mL PRN    albuterol sulfate nebulizer solution 2.5 mg Q4H PRN    amiodarone 5 mg/mL oral suspension TID    aspirin tablet 325 mg Daily    bisacodyl suppository 10 mg Daily PRN    vancomycin 750 mg in dextrose 5 % 250 mL IVPB (ready to mix system) Q24H    And    ceFEPIme injection 2 g Q12H    dextrose 10% (D10W) Bolus PRN    dextrose 10% (D10W) Bolus PRN    docusate sodium capsule 200 mg QHS    EPINEPHrine (ADRENALIN) 5 mg in sodium chloride 0.9% 250 mL infusion Continuous    fentaNYL injection 25 mcg Q1H PRN    ferrous gluconate tablet 324 mg Daily with breakfast    furosemide (LASIX) 2 mg/mL in sodium chloride 0.9% 100 mL infusion (conc: 2 mg/mL) Continuous    glucagon (human recombinant) injection 1 mg PRN    hydrALAZINE injection 10 mg Q6H PRN    insulin aspart U-100 pen 0-5 Units Q4H PRN    lidocaine 2000 mg in dextrose 5% 250 mL infusion Continuous    magnesium hydroxide 400 mg/5 ml suspension 2,400 mg Daily PRN    magnesium oxide tablet 400 mg TID    magnesium sulfate 2g in water 50mL IVPB (premix) PRN    mupirocin 2 % ointment BID    niCARdipine 40 mg/200 mL infusion Continuous    nitric oxide gas Gas 15 ppm Continuous    norepinephrine 4 mg in dextrose 5% 250 mL infusion (premix) (titrating) Continuous    oxyCODONE immediate release tablet 5 mg Q4H PRN    oxyCODONE immediate release tablet Tab 10 mg Q4H PRN    pantoprazole injection 40 mg Daily    polyethylene glycol packet 17 g BID    propofol (DIPRIVAN) 10 mg/mL infusion Continuous    sodium chloride 0.9% flush 10 mL PRN    sodium chloride 0.9% flush 3 mL Q8H     Facility-Administered Medications Ordered in Other Encounters   Medication Frequency    ceFAZolin injection PRN    fentaNYL injection PRN    midazolam (VERSED) 5 mg/mL injection PRN    rocuronium injection PRN       Objective:     Vital Signs (Most Recent):  Temp: 99.2 °F (37.3 °C) (01/27/20 1100)  Pulse: (!) 122 (01/27/20 1127)  Resp:  18 (01/26/20 1704)  BP: (!) 80/0 (01/27/20 0800)  SpO2: 100 % (01/27/20 1127)  O2 Device (Oxygen Therapy): ventilator (01/27/20 1127) Vital Signs (24h Range):  Temp:  [98.2 °F (36.8 °C)-99.8 °F (37.7 °C)] 99.2 °F (37.3 °C)  Pulse:  [] 122  Resp:  [18-19] 18  SpO2:  [98 %-100 %] 100 %  BP: (80-86)/(0) 80/0  Arterial Line BP: (79-99)/(65-87) 88/77     Weight: 108.3 kg (238 lb 12.1 oz) (01/27/20 0500)  Body mass index is 34.26 kg/m².  Body surface area is 2.31 meters squared.    I/O last 3 completed shifts:  In: 2500.7 [I.V.:2140.7; NG/GT:360]  Out: 4900 [Urine:4140; Drains:410; Chest Tube:350]    Physical Exam   Constitutional: He appears well-developed. No distress. He is sedated and intubated.   HENT:   Head: Normocephalic and atraumatic.   Right Ear: External ear normal.   Left Ear: External ear normal.   Cardiovascular: Tachycardia present.   RVAD hum   Pulmonary/Chest: Effort normal and breath sounds normal. He is intubated. He has no rales.   Musculoskeletal: He exhibits edema. He exhibits no deformity.   Skin: Skin is warm and dry. He is not diaphoretic.   Sternal surgical wound, drsg intact       Significant Labs:  CBC:   Recent Labs   Lab 01/27/20  0900   WBC 14.66*   RBC 3.62*   HGB 10.0*   HCT 33.3*      MCV 92   MCH 27.6   MCHC 30.0*     CMP:   Recent Labs   Lab 01/27/20  0400 01/27/20  0820   *  154* 150*   CALCIUM 9.8  9.8 9.4   ALBUMIN 2.5*  2.5*  --    PROT 6.6  6.6  --    *  146* 145   K 4.0  4.0 3.9   CO2 28  28 27     102 103   BUN 94*  94* 95*   CREATININE 3.0*  3.0* 3.0*   ALKPHOS 45*  45*  --    ALT 28  28  --    AST 30  30  --    BILITOT 0.7  0.7  --             Assessment/Plan:     Acute kidney injury superimposed on chronic kidney disease  KRISTIN on CKD III  Baseline SCr ~ 2.0    58 yo male s/p LVAD placement on 1/23 who was taken back to OR for exploration over concerns for RV failure/tamponade due to elevated CVP and decrease UOP.  Upon opening of  chest, he had improved hemodynamics and some improvement in UOP and decision made to leave chest open and transfer back to ICU for closer monitoring on 1/24.  He was administered IV lasix + diuril with improvement in his UOP.      DDx for KRISTIN includes ATN from renal hypoperfusion vs. CRS from RV overload    Plan/recommendations:  -no urgent need for RRT, responded well to diuretics. CVP 15. Good urine output.   -recommend discontinuing Lasix drip and transitioning patient to BID dosing of Lasix  mg   -continue trending RFP and UOP  -will follow labs closely  -will discuss with Dr. Gilmore       Thank you for your consult. I will follow-up with patient. Please contact us if you have any additional questions.    Jaqueline Buenrostro DNP, FNP-C  Nephrology  [facility]

## 2020-01-27 NOTE — SUBJECTIVE & OBJECTIVE
**Interval History: No acute events overnight.  Heparin on hold and plan for chest closure today.  He is net negative 1.5L in the last 24 hours.  CVP: 15, SVO2: 65, CO: 7.03, CI: 3.04 SVR: 807    Lines:  Arterial Line: 1/23/20  Left IJ: 1/23/20  PA catheter    Continuous Infusions:   epinephrine 0.07 mcg/kg/min (01/27/20 1100)    furosemide (LASIX) 2 mg/mL infusion (non-titrating) 10 mg/hr (01/27/20 1100)    lidocaine 1 mg/min (01/27/20 1100)    niCARdipine Stopped (01/26/20 1408)    nitric oxide gas      norepinephrine bitartrate-D5W Stopped (01/23/20 1430)    propofol 20 mcg/kg/min (01/27/20 1125)     Scheduled Meds:   amiodarone  400 mg Per NG tube TID    aspirin  325 mg Oral Daily    vancomycin (VANCOCIN) IVPB  750 mg Intravenous Q24H    And    ceFEPime (MAXIPIME) IVPB  2 g Intravenous Q12H    docusate sodium  200 mg Oral QHS    ferrous gluconate  324 mg Oral Daily with breakfast    magnesium oxide  400 mg Per NG tube TID    mupirocin   Nasal BID    pantoprazole  40 mg Intravenous Daily    polyethylene glycol  17 g Oral BID    sodium chloride 0.9%  3 mL Intravenous Q8H     PRN Meds:albumin human 5%, albuterol sulfate, bisacodyl, Dextrose 10% Bolus, Dextrose 10% Bolus, fentaNYL, glucagon (human recombinant), hydrALAZINE, insulin aspart U-100, magnesium hydroxide 400 mg/5 ml, magnesium sulfate IVPB, oxyCODONE, oxyCODONE, sodium chloride 0.9%    Review of patient's allergies indicates:   Allergen Reactions    Biopatch [chlorhexidine gluconate]      Site burning    Dobutamine in d5w      Tachycardia, tremors, SOB, flushing    Percocet [oxycodone-acetaminophen] Itching    Penicillins Rash     Cefepime given on 1/23/2020 without issue     Objective:     Vital Signs (Most Recent):  Temp: 99.2 °F (37.3 °C) (01/27/20 1100)  Pulse: (!) 122 (01/27/20 1127)  Resp: 18 (01/26/20 1704)  BP: (!) 80/0 (01/27/20 0800)  SpO2: 100 % (01/27/20 1127) Vital Signs (24h Range):  Temp:  [98.2 °F (36.8 °C)-99.8 °F  (37.7 °C)] 99.2 °F (37.3 °C)  Pulse:  [] 122  Resp:  [18-19] 18  SpO2:  [98 %-100 %] 100 %  BP: (80-86)/(0) 80/0  Arterial Line BP: (79-99)/(65-87) 88/77     Patient Vitals for the past 72 hrs (Last 3 readings):   Weight   01/27/20 0500 108.3 kg (238 lb 12.1 oz)   01/26/20 0500 109 kg (240 lb 4.8 oz)   01/25/20 0330 109.8 kg (242 lb 1 oz)     Body mass index is 34.26 kg/m².      Intake/Output Summary (Last 24 hours) at 1/27/2020 1136  Last data filed at 1/27/2020 1100  Gross per 24 hour   Intake 1417 ml   Output 3305 ml   Net -1888 ml       Hemodynamic Parameters:  PAP: (24-50)/(8-29) 34/17  PAP (Mean):  [20 mmHg-35 mmHg] 26 mmHg  PCWP:  [10 mmHg-21 mmHg] 21 mmHg  CO:  [4.2 L/min-6 L/min] 4.6 L/min  CI:  [1.6 L/min/m2-2.6 L/min/m2] 1.9 L/min/m2    Telemetry: A fib  Physical Exam   Constitutional: Intubated and sedated; chest open    Eyes: Pupils are equal, round, and reactive to light. Conjunctivae are normal.   Neck: Neck supple. No thyromegaly present. Bilateral IJ lines   Cardiovascular: Irregularly irregular, tachycardic, smooth VAD hum. Chest open   Pulmonary/Chest: Effort normal and breath sounds normal. Intubated and sedated   Abdominal: Soft.   Musculoskeletal: He exhibits no edema.   Neurological: Intubated and sedated   Skin: Skin is warm and dry. Capillary refill takes 2 to 3 seconds.       Significant Labs:  CBC:  Recent Labs   Lab 01/27/20  0400 01/27/20  0711 01/27/20  0820 01/27/20  0900   WBC 14.71*  --  SEE COMMENT 14.66*   RBC 3.70*  --  SEE COMMENT 3.62*   HGB 10.5*  --  SEE COMMENT 10.0*   HCT 33.7* 23* SEE COMMENT 33.3*     --  SEE COMMENT 259   MCV 91  --  SEE COMMENT 92   MCH 28.4  --  SEE COMMENT 27.6   MCHC 31.2*  --  SEE COMMENT 30.0*     BNP:  Recent Labs   Lab 01/24/20  0334 01/27/20  0400   * 1,140*     CMP:  Recent Labs   Lab 01/25/20  0413  01/26/20  0400  01/27/20  0000 01/27/20  0400 01/27/20  0820   *   < > 127*  127*   < > 142* 154*  154* 150*   CALCIUM  9.7   < > 9.8  9.8   < > 9.7 9.8  9.8 9.4   ALBUMIN 2.8*  --  2.5*  --   --  2.5*  2.5*  --    PROT 6.6  --  6.5  --   --  6.6  6.6  --       < > 142  142   < > 145 146*  146* 145   K 4.3   < > 4.1  4.1   < > 3.7 4.0  4.0 3.9   CO2 24   < > 26  26   < > 29 28  28 27      < > 99  99   < > 101 102  102 103   BUN 57*   < > 78*  78*   < > 88* 94*  94* 95*   CREATININE 2.7*   < > 3.2*  3.2*   < > 3.0* 3.0*  3.0* 3.0*   ALKPHOS 46*  --  44*  --   --  45*  45*  --    ALT 34  --  29  --   --  28  28  --    AST 64*  --  39  --   --  30  30  --    BILITOT 0.7  --  0.7  --   --  0.7  0.7  --     < > = values in this interval not displayed.      Coagulation:   Recent Labs   Lab 01/27/20  0000 01/27/20  0400 01/27/20  0915   INR 1.2 1.2 1.2   APTT 26.6 21.2 25.2     LDH:  Recent Labs   Lab 01/25/20  0505 01/26/20  0400 01/27/20  0400   * 470* 537*     Microbiology:  Microbiology Results (last 7 days)     Procedure Component Value Units Date/Time    Blood culture [[id number]] Collected:  01/20/20 1042    Order Status:  Completed Specimen:  Blood from Peripheral, Antecubital, Left Updated:  01/25/20 1412     Blood Culture, Routine No growth after 5 days.    Blood culture [[id number]] Collected:  01/20/20 1035    Order Status:  Completed Specimen:  Blood from Peripheral, Wrist, Left Updated:  01/25/20 1412     Blood Culture, Routine No growth after 5 days.          I have reviewed all pertinent labs within the past 24 hours.    Estimated Creatinine Clearance: 32.7 mL/min (A) (based on SCr of 3 mg/dL (H)).    Diagnostic Results:  I have reviewed all pertinent imaging results/findings within the past 24 hours.

## 2020-01-27 NOTE — PROGRESS NOTES
[facility]  Critical Care - Surgery  Progress Note    Patient Name: Tae Delgado  MRN: [id number]  Admission Date: 1/9/2020  Hospital Length of Stay: 18 days  Code Status: Full Code  Attending Provider: Ino Schmitt MD  Primary Care Provider: Elham Pearson MD   Principal Problem: LVAD (left ventricular assist device) present    Subjective:     Hospital/ICU Course:  1/23: Pt arrives from OR intubated, open chest dressed with Ioban, CTs with SS output. Drips on arrival: Epi 0.08, Cardene 5, TXA, Nitric at 30. Pt received 1.5L crystalloid, no blood product, 20 mg Lasix (put out 250cc in response).   Intra Op JACINTO Exam:  PRE:  Severely reduced left ventricular systolic function. Dilated LV. No definitive thrombus noted.  Moderate-to-severely reduced right ventricular systolic function. FAC 12-24%  Mild-to-moderate AI. No AS.  Moderate MR with systolic blunting of the pulmonary veins.  Trace-to-mild TR.  Mild PI. PAAT <90ms.  Small PFO by CF doppler.  SEC in La and DAMON. No clear thrombus noted.  Grade 2 atheromatous disease of the aorta.  No effusions.    POST:  Severely depressed left ventriclar dysfunction.  Moderately depressed right ventricular systolic function.  LVAD inflow and outflow cannula noted with laminar flows.  No AI.  Mild MR, vahe stitch observed. No MS.  Mild-to-moderate TR.  PFO still visible on CF doppler.  Aorta intact, no dissection.  No effusions.     1/24: run of Vtach overnight. Amio bolus given, amio gtt increased to 1, lasix push given. Improved. LVAD hemodynamics appropriate overnight. Going for closure this am. Upon return, developed tamponade physiology and returned to OR. Came back to SICU with chest open.  1/26: Diuresed over the weekend, chest kept open. Lidocaine gtt and digoxin for tachyarrhythmia. Otherwise NAEON.   1/27: Going to chest closure today.    Interval History/Significant Events: NAEON    Follow-up For: Procedure(s) (LRB):  EXPLORATION, WOUND  (N/A)  CLOSURE, WOUND, STERNUM    Post-Operative Day: 3 Days Post-Op    Objective:     Vital Signs (Most Recent):  Temp: 99.3 °F (37.4 °C) (01/27/20 0800)  Pulse: (!) 167 (01/27/20 0830)  Resp: 18 (01/26/20 1704)  BP: (!) 80/0 (01/27/20 0800)  SpO2: 99 % (01/27/20 0830) Vital Signs (24h Range):  Temp:  [98.2 °F (36.8 °C)-99.8 °F (37.7 °C)] 99.3 °F (37.4 °C)  Pulse:  [113-167] 167  Resp:  [18-21] 18  SpO2:  [98 %-100 %] 99 %  BP: (80-88)/(0) 80/0  Arterial Line BP: (79-99)/(65-83) 89/73     Weight: 108.3 kg (238 lb 12.1 oz)  Body mass index is 34.26 kg/m².      Intake/Output Summary (Last 24 hours) at 1/27/2020 0845  Last data filed at 1/27/2020 0800  Gross per 24 hour   Intake 1625 ml   Output 3245 ml   Net -1620 ml       Physical Exam   Constitutional: He appears well-developed and well-nourished.   HENT:   Head: Normocephalic and atraumatic.   Mouth/Throat: No oropharyngeal exudate.   ETT and OG secured in place.   Eyes: Pupils are equal, round, and reactive to light.   Neck: Normal range of motion. Neck supple.   Left IJ double lumen + [place] in place, dressing CLD  Right IJ triple lumen, dressing CLD   Cardiovascular:   Irregular tachyarrythmia. LVAD hum.    Pulmonary/Chest: Breath sounds normal. He has no wheezes. He has no rales.   Intubated. Open chest, dressed with Ioban. CTs with SS drainage.   Abdominal: Soft. He exhibits no distension.   Musculoskeletal: He exhibits no edema or deformity.   Neurological:   sedated   Skin: Skin is warm and dry.       Vents:  Vent Mode: A/C (01/27/20 0753)  Ventilator Initiated: Yes (01/21/20 0905)  Set Rate: 18 bmp (01/27/20 0753)  Vt Set: 500 mL (01/27/20 0753)  PEEP/CPAP: 5 cmH20 (01/27/20 0753)  Oxygen Concentration (%): 50 (01/27/20 0830)  Peak Airway Pressure: 24 cmH2O (01/27/20 0753)  Plateau Pressure: 18 cmH20 (01/27/20 0753)  Total Ve: 9.23 mL (01/27/20 0753)  F/VT Ratio<105 (RSBI): (!) 35.5 (01/26/20 1704)    Lines/Drains/Airways     Central Venous Catheter  Line                 Percutaneous Central Line Insertion/Assessment - double lumen  01/23/20 0751 left internal jugular 4 days         Pulmonary Artery Catheter Assessment  01/23/20 0751 4 days         Trialysis (Dialysis) Catheter 01/24/20 1500 right internal jugular 2 days          Drain                 Urethral Catheter 01/21/20 0752 Non-latex;Straight-tip;Temperature probe 16 Fr. 6 days         Chest Tube 01/23/20 1230 1 Right Pleural 3 days         Chest Tube 01/23/20 1414 2 Right Mediastinal 3 days         Chest Tube 01/23/20 1415 3 Left Mediastinal 3 days         NG/OG Tube 01/24/20 2130 nasogastric Right nostril 2 days          Airway                 Airway - Non-Surgical 01/21/20 0742 Endotracheal Tube 6 days          Arterial Line                 Arterial Line 01/21/20 0730 Left Other (Comment) 6 days         Arterial Line 01/23/20 1335 Right Radial 3 days          Line                 VAD 01/23/20 1148 Left ventricular assist device HeartMate 3 3 days          Peripheral Intravenous Line                 Peripheral IV - Single Lumen 01/25/20 1018 22 G Right Antecubital 1 day         Peripheral IV - Single Lumen 01/25/20 1133 18 G Left Antecubital 1 day                Significant Labs:    CBC/Anemia Profile:  Recent Labs   Lab 01/26/20 1929 01/27/20  0000 01/27/20  0109 01/27/20  0400 01/27/20  0711   WBC 16.35* 15.69*  --  14.71*  --    HGB 10.3* 10.1*  --  10.5*  --    HCT 32.7* 32.9* 29* 33.7* 23*    251  --  264  --    MCV 90 91  --  91  --    RDW 15.4* 15.4*  --  15.4*  --         Chemistries:  Recent Labs   Lab 01/26/20  0400  01/26/20 1929 01/27/20  0000 01/27/20  0400     142   < > 145 145 146*  146*   K 4.1  4.1   < > 3.9 3.7 4.0  4.0   CL 99  99   < > 101 101 102  102   CO2 26  26   < > 27 29 28  28   BUN 78*  78*   < > 87* 88* 94*  94*   CREATININE 3.2*  3.2*   < > 3.1* 3.0* 3.0*  3.0*   CALCIUM 9.8  9.8   < > 9.7 9.7 9.8  9.8   ALBUMIN 2.5*  --   --   --  2.5*  2.5*    PROT 6.5  --   --   --  6.6  6.6   BILITOT 0.7  --   --   --  0.7  0.7   ALKPHOS 44*  --   --   --  45*  45*   ALT 29  --   --   --  28  28   AST 39  --   --   --  30  30   MG 2.8*   < > 2.8* 2.7* 2.7*   PHOS 6.5*   < > 6.3* 5.7* 5.4*    < > = values in this interval not displayed.       ABGs:   Recent Labs   Lab 01/27/20  0711   PH 7.417   PCO2 30.1*   HCO3 19.4*   POCSATURATED 99   BE -5     Coagulation:   Recent Labs   Lab 01/27/20  0400   INR 1.2   APTT 21.2     Lactic Acid: No results for input(s): LACTATE in the last 48 hours.    Significant Imaging:  I have reviewed all pertinent imaging results/findings within the past 24 hours.    Assessment/Plan:     Acute on chronic combined systolic and diastolic heart failure  Tae Delgado is a 59 y.o. 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 Approved for DT LVAD. Went to OR on 1/21 and found to have DAMON and LV thrombus and case was aborted. Pt was placed on a heparin gtt and thrombus had dissipated on repeat JACINTO on 1/22. Pt underwent LVAD placement on 1/23. Chest closure and re-exploration on 1/24. Returned from OR with chest open on 1/24. Diuresed over the weekend, chest closure 1/27.     Neuro:  - Patient opens eyes and follows commands   - Prop gtt with prn Fentanyl    CVS:   - Current LVAD flow @ 5200 with appropriate PIs   - Epi @ 0.07, Nitric @ 15  - Lidocaine gtt for tachyarrythmia  - Will consider returning to IV amio (concern that amio elixir not absorbing)  - ICD turned on, EP service consulted for assistance with arrythmia  - Chest closure 1/27    Pulm:  - Mechanical ventilation, wean as able when chest closed  - ABGs q6hr  - CXR daily  - Monitor CT output, no overt signs  of bleeding     GI:  - NPO  - Protonix 40 daily  - Docusate    Endo:   - Insulin off now, SSI  - Endocrine consulted, appreciate their services    Renal:  - Strict I/O  - Trend BUN/Cr  - Lasix gtt @ 12.5    Lytes:  - Monitor labs  - Replace per protocol    Heme:  - No blood products needed during the OR  - H/H stable overnight    ID:   - Lona op ABX  - Follow WBC  - Follow fever curve    PPx:  - Hep gtt nontitrating, 600 - turned off at 0700 for chest closure  - GI ppx    Dispo:  - Cont SICU care   - Chest closure today    Critical care was time spent personally by me on the following activities: development of treatment plan with patient or surrogate and bedside caregivers, discussions with consultants, evaluation of patient's response to treatment, examination of patient, ordering and performing treatments and interventions, ordering and review of laboratory studies, ordering and review of radiographic studies, pulse oximetry, re-evaluation of patient's condition.  This critical care time did not overlap with that of any other provider or involve time for any procedures.     Marycruz Burnette MD  Critical Care - Surgery  [facility]

## 2020-01-27 NOTE — PROGRESS NOTES
"[facility]  Adult Nutrition  Progress Note    SUMMARY       Recommendations  1. Recommend initiating TF of Peptamen Intense VHP at a goal rate of 50 mL/hr - to provide 1200 kcal/day (1578 kcal w/propofol), 110g protein/day, and 1008mL free fluid/day.    -If no longer on propofol, recommend increasing goal rate to 60 mL/hr.   2. When able to extubate, ADAT to Cardiac with texture per SLP.   RD to monitor.    Goals: Patient to receive nutrition by RD follow-up  Nutrition Goal Status: goal not met  Communication of RD Recs: discussed on rounds    Reason for Assessment  Reason For Assessment: RD follow-up  Diagnosis: surgery/postoperative complications(s/p LVAD 1/23)  Relevant Medical History: HTN, HLD, CHF, afib, stroke  Interdisciplinary Rounds: attended  General Information Comments: S/p LVAD placement 1/23 and chest closure 1/24. Required takeback to OR 1/24 for re-exploration, chest left open. Plan for return to OR later today or tomorrow for chest closure. Patient conitnues to appear nourished with no indicators of malnutrition.  Nutrition Discharge Planning: Unable to determine at this time.    Nutrition Risk Screen  Nutrition Risk Screen: no indicators present    Nutrition/Diet History  Spiritual, Cultural Beliefs, Baptist Practices, Values that Affect Care: no  Factors Affecting Nutritional Intake: NPO, on mechanical ventilation    Anthropometrics  Temp: 99.2 °F (37.3 °C)  Height: 5' 10" (177.8 cm)  Height (inches): 70 in  Weight Method: Bed Scale  Weight: 108.3 kg (238 lb 12.1 oz)  Weight (lb): 238.76 lb  Ideal Body Weight (IBW), Male: 166 lb  % Ideal Body Weight, Male (lb): 158.43 %  BMI (Calculated): 34.3  BMI Grade: 35 - 39.9 - obesity - grade II    Lab/Procedures/Meds  Pertinent Labs Reviewed: reviewed  Pertinent Labs Comments: Na 146, BUN 94, Creat 3.0, Glu 154, Alb 2.5,   Pertinent Medications Reviewed: reviewed  Pertinent Medications Comments: docusate, ferrous gluconate, " pantoprazole, epinephrine, lasix, cardene, levophed, propofol    Estimated/Assessed Needs  Weight Used For Calorie Calculations: 108.5 kg (239 lb 3.2 oz)  Energy Calorie Requirements (kcal): 1591-1320 kcal/day  Energy Need Method: Kcal/kg(11-14)  Protein Requirements: 113-151 g/day(1.5-2.0 g/kg)  Weight Used For Protein Calculations: 75.5 kg (166 lb 7.2 oz)(IBW)  Fluid Requirements (mL): per MD or 1 ml/kcal  Estimated Fluid Requirement Method: RDA Method  RDA Method (mL): 1194    Nutrition Prescription Ordered  Current Diet Order: NPO    Evaluation of Received Nutrient/Fluid Intake  Other Calories (kcal): 378(propofol)  I/O: -1.6L x 24hrs, -16.9L since admit  Comments: LBM 1/20  % Intake of Estimated Energy Needs: 0 - 25 %  % Meal Intake: NPO    Nutrition Risk  Level of Risk/Frequency of Follow-up: high(2x/week)     Assessment and Plan  Nutrition Problem  Inadequate energy intake     Related to (etiology):   Decreased ability to consume sufficient energy     Signs and Symptoms (as evidenced by):   NPO with no alternative means of nutrition at this time     Interventions (treatment strategy):  Collaboration of nutrition care with other providers     Nutrition Diagnosis Status:   Continues    Monitor and Evaluation  Food and Nutrient Intake: energy intake, food and beverage intake  Food and Nutrient Adminstration: diet order  Knowledge/Beliefs/Attitudes: food and nutrition knowledge/skill  Anthropometric Measurements: weight, weight change, body mass index  Biochemical Data, Medical Tests and Procedures: electrolyte and renal panel, gastrointestinal profile, glucose/endocrine profile, inflammatory profile, lipid profile  Nutrition-Focused Physical Findings: overall appearance     Nutrition Follow-Up  RD Follow-up?: Yes

## 2020-01-27 NOTE — PLAN OF CARE
Pt remains intubated, vent settings AC 50% and PEEP 5, sats 100%, SvO2 65 this am, sedated on propofol gtt, PRN Fentanyl IVP for pain. Pt opens eyes and follows commands on 10 mcg/kg/min Propofol. Epi gtt titrated to @0.07, CVP flat 13, pt net negative 1.4 L this am. Lasix gtt @ 12.5 mg/hr, -125 mL/hr. Lidocaine gtt @ 1 mg/min, Heparin gtt @ 600 Units/hr. Pt's chest remains open, HOB elevated <15 degrees, no complications, heel protector boots in place, arms elevated on pillows, no new skin breakdown noted. AM lab results reviewed.All questions answered and addressed, Dr. Schmitt updated, order to D/C Heparin gtt @ 0700.

## 2020-01-27 NOTE — ASSESSMENT & PLAN NOTE
- Creatinine on admit 2.6 (baseline ~ 1.8). Creatinine today 3.0  - Nephrology consulted and following

## 2020-01-27 NOTE — ANESTHESIA PREPROCEDURE EVALUATION
[facility]  Anesthesia Pre-Operative Evaluation         Patient Name: Tae Delgado  YOB: 1960  MRN: [id number]    SUBJECTIVE:     Pre-operative evaluation for Procedure(s) (LRB):  CLOSURE, WOUND, STERNUM (N/A)     01/27/2020    Tae Delgado is a 59 y.o. male w/ NICMP diagnosed in 2010, ICD, LV thrombus (with prior splenic and renal emboli), Embolic CVA (3-5 years ago, deficit = contralateral homonymous hemianopia of the Rt side) , paroxysmal atrial fib, HTN, HLD, who was admitted to cardiology service for acute on chronic heart failure exacerbation. Approved for DT LVAD. Went to OR on 1/21 and found to have DAMON and LV thrombus and case was aborted. Pt was placed on a heparin gtt and thrombus had dissipated on repeat JACINTO on 1/22. Pt underwent LVAD placement on 1/23. Chest closure and then went back re-exploration on 1/24; chest left open. Went to OR 1/27 for wash out.    Patient now presents for the above procedure(s).    Vent Mode: A/C  Oxygen Concentration (%):  [50] 50  Resp Rate Total:  [18 br/min-68 br/min] 19 br/min  Vt Set:  [500 mL] 500 mL  PEEP/CPAP:  [5 cmH20] 5 cmH20  Mean Airway Pressure:  [9.3 bfE77-32 cmH20] 11 cmH20        LDA: None documented.       Pulmonary Artery Catheter Assessment  01/23/20 0751 (Active)   Dressing dressing dry and intact 1/27/2020  7:15 AM   Securement secured w/ sutures 1/27/2020  7:15 AM   Current Insertion Depth (cm) 57 cm 1/27/2020  7:15 AM   Phlebitis 0-->no symptoms 1/27/2020  7:15 AM   Infiltration 0-->no symptoms 1/27/2020  7:15 AM   Waveform normal 1/27/2020  7:15 AM   Pressure Catheter Interventions line leveled/zeroed 1/27/2020  7:15 AM   Prox Injectate Status Infusing 1/27/2020  7:15 AM   Prox Infusate Status Infusing 1/27/2020  7:15 AM   Distal Status Infusing 1/27/2020  7:15 AM   Pressure Catheter Tubing Change Due 01/31/20 1/27/2020  3:00 AM   Prox Injectate Tubing Change Due 01/31/20 1/27/2020  3:00 AM   Prox Infusate Tubing Change  Due 01/31/20 1/27/2020  3:00 AM   Distal Lumen Tubing Change Due 01/31/20 1/27/2020  3:00 AM   Daily Line Review Performed 1/27/2020  7:15 AM   Number of days: 4            Percutaneous Central Line Insertion/Assessment - double lumen  01/23/20 0751 left internal jugular (Active)   Dressing dressing dry and intact 1/27/2020  7:15 AM   Securement secured w/ sutures 1/27/2020  7:15 AM   Additional Site Signs no erythema;no warmth 1/27/2020  7:15 AM   Distal Patency/Care infusing 1/27/2020  7:15 AM   Proximal Patency/Care infusing 1/27/2020  7:15 AM   Waveform other (see comments) 1/27/2020  3:00 AM   Line Interventions line leveled/zeroed 1/26/2020  3:00 PM   Dressing Change Due 02/02/20 1/27/2020  7:15 AM   Daily Line Review Performed 1/27/2020  7:15 AM   Number of days: 4            Trialysis (Dialysis) Catheter 01/24/20 1500 right internal jugular (Active)   IV Device Securement sutures 1/27/2020  7:15 AM   Additional Site Signs no erythema;no warmth 1/27/2020  7:15 AM   Patency/Care infusing 1/27/2020  7:15 AM   Waveform other (see comments) 1/27/2020  3:00 AM   Site Assessment Clean;Dry;Intact 1/27/2020  7:15 AM   Status Clamped 1/27/2020  7:15 AM   Flows Good 1/27/2020  3:00 AM   Dressing Status Clean;Dry;Intact 1/27/2020  7:15 AM   Dressing Change Due 01/31/20 1/27/2020  7:15 AM   Verification by X-ray Yes 1/27/2020  7:15 AM   Site Condition No complications 1/27/2020  7:15 AM   Dressing Occlusive 1/27/2020  7:15 AM   Daily Line Review Performed 1/27/2020  7:15 AM   Number of days: 3            Peripheral IV - Single Lumen 01/25/20 1018 22 G Right Antecubital (Active)   Site Assessment Clean;Dry;Intact 1/27/2020  7:15 AM   Line Status Infusing 1/27/2020  7:15 AM   Dressing Status Clean;Dry;Intact 1/27/2020  7:15 AM   Dressing Intervention New dressing 1/26/2020  3:00 PM   Dressing Change Due 01/29/20 1/27/2020  7:15 AM   Site Change Due 01/29/20 1/27/2020  7:15 AM   Reason Not Rotated Not due 1/27/2020  7:15 AM    Number of days: 2            Peripheral IV - Single Lumen 01/25/20 1133 18 G Left Antecubital (Active)   Site Assessment Clean;Dry;Intact 1/27/2020  7:15 AM   Line Status Flushed;Saline locked 1/27/2020  7:15 AM   Dressing Status Clean;Dry;Intact 1/27/2020  7:15 AM   Dressing Intervention New dressing 1/26/2020  3:00 PM   Dressing Change Due 01/29/20 1/27/2020  7:15 AM   Site Change Due 01/29/20 1/27/2020  7:15 AM   Reason Not Rotated Not due 1/27/2020  7:15 AM   Number of days: 2            Arterial Line 01/21/20 0730 Left Other (Comment) (Active)   Site Assessment Clean;Dry;Intact 1/27/2020  7:15 AM   Line Status Pulsatile blood flow 1/27/2020  7:15 AM   Art Line Waveform Appropriate 1/27/2020  7:15 AM   Arterial Line Interventions Zeroed and calibrated;Leveled 1/27/2020  7:15 AM   Color/Movement/Sensation Capillary refill less than 3 sec 1/27/2020  7:15 AM   Dressing Type Transparent 1/27/2020  7:15 AM   Dressing Status Clean;Dry;Intact 1/27/2020  3:00 AM   Dressing Intervention Dressing changed 1/26/2020  3:00 PM   Dressing Change Due 01/29/20 1/27/2020  7:15 AM   Number of days: 6            VAD 01/23/20 1148 Left ventricular assist device HeartMate 3 (Active)   Site Location Abdomen right 1/27/2020  7:15 AM   Site Assessment Other (Comment) 1/25/2020  3:00 AM   Dressing Status Clean;Dry;Intact 1/27/2020  7:15 AM   Dressing Intervention New dressing 1/24/2020  4:00 PM   Performed By RN 1/27/2020  7:15 AM   Dressing Change Schedule Other (see comment) 1/27/2020  3:00 AM   Dressing Change Due 01/27/20 1/27/2020  3:00 AM   Driveline [place] in use [place] 1/27/2020  7:15 AM   Condition CDI 1/27/2020  7:15 AM   Date changed 01/23/20 1/27/2020  3:00 AM   Power Source External DC 1/27/2020  7:15 AM   Equipment inventory at  Implant 1/23/2020  1:00 PM   Pump type HeartMate3 1/27/2020  3:00 AM   Primary Controller [id number] 1/23/2020  1:00 PM   Extra Controller [id number] 1/23/2020  1:00 PM   Battery 1 [id number]  1/23/2020  1:00 PM   Battery 2 [id number] 1/23/2020  1:00 PM   Battery 3 [id number] 1/23/2020  1:00 PM   Battery 4 [id number] 1/23/2020  1:00 PM   Battery 5 [id number] 1/23/2020  1:00 PM   Battery 6 [id number] 1/23/2020  1:00 PM   Battery 7 [id number] 1/23/2020  1:00 PM   Battery 8 [id number] 1/23/2020  1:00 PM   AC Adapter 1 MPU POWER CORD L/N: [id number] 1/23/2020  1:00 PM   Battery Charger [id number] 1/23/2020  1:00 PM   Mobile Power Unit [id number] 1/23/2020  1:00 PM   Battery Clips [id number] 1/23/2020  1:00 PM   Number of days: 4            Chest Tube 01/23/20 1230 1 Right Pleural (Active)   Chest Tube WDL WDL 1/27/2020  7:15 AM   Function -20 cm H2O 1/27/2020  7:15 AM   Air Leak/Fluctuation air leak not present 1/27/2020  7:15 AM   Safety all tubing connections taped 1/27/2020  7:15 AM   Securement tubing secured to body distal to insertion site w/ tape 1/27/2020  7:15 AM   Patency Intervention Tip/tilt 1/27/2020  7:15 AM   Drainage Description Serosanguineous 1/27/2020  7:15 AM   Dressing Appearance occlusive gauze dressing intact 1/27/2020  7:15 AM   Dressing Care dressing reinforced 1/27/2020  3:00 AM   Left Subcutaneous Emphysema none present 1/27/2020  7:15 AM   Right Subcutaneous Emphysema none present 1/27/2020  7:15 AM   Site Assessment Not assessed 1/27/2020  3:00 AM   Surrounding Skin Unable to view 1/27/2020  7:15 AM   Output (mL) 0 mL 1/27/2020  3:00 PM   Number of days: 4            Chest Tube 01/23/20 1414 2 Right Mediastinal (Active)   Chest Tube WDL ex 1/27/2020  7:15 AM   Function -20 cm H2O 1/27/2020  7:15 AM   Air Leak/Fluctuation air leak present 1/27/2020  7:15 AM   Safety all tubing connections taped 1/27/2020  7:15 AM   Securement tubing secured to body distal to insertion site w/ tape 1/27/2020  7:15 AM   Patency Intervention Tip/tilt 1/27/2020  7:15 AM   Drainage Description Serosanguineous 1/27/2020  7:15 AM   Dressing Appearance occlusive gauze dressing intact 1/27/2020  7:15 AM   Dressing Care  dressing reinforced 1/27/2020  3:00 AM   Left Subcutaneous Emphysema none present 1/27/2020  7:15 AM   Right Subcutaneous Emphysema none present 1/27/2020  7:15 AM   Site Assessment Not assessed 1/27/2020  3:00 AM   Surrounding Skin Unable to view 1/27/2020  7:15 AM   Output (mL) 0 mL 1/27/2020  3:00 PM   Number of days: 4            Chest Tube 01/23/20 1415 3 Left Mediastinal (Active)   Chest Tube WDL WDL 1/27/2020  7:15 AM   Function -20 cm H2O 1/27/2020  7:15 AM   Air Leak/Fluctuation air leak not present 1/27/2020  7:15 AM   Safety all tubing connections taped 1/27/2020  7:15 AM   Securement tubing secured to body distal to insertion site w/ tape 1/27/2020  7:15 AM   Patency Intervention Tip/tilt 1/27/2020  7:15 AM   Drainage Description Serosanguineous 1/27/2020  7:15 AM   Dressing Appearance occlusive gauze dressing intact 1/27/2020  7:15 AM   Dressing Care dressing reinforced 1/27/2020  3:00 AM   Left Subcutaneous Emphysema none present 1/27/2020  7:15 AM   Right Subcutaneous Emphysema none present 1/27/2020  7:15 AM   Site Assessment Not assessed 1/27/2020  3:00 AM   Surrounding Skin Unable to view 1/27/2020  7:15 AM   Output (mL) 0 mL 1/27/2020  3:00 PM   Number of days: 4            NG/OG Tube 01/24/20 2130 nasogastric Right nostril (Active)   Placement Check placement verified by x-ray 1/27/2020  7:15 AM   Tolerance no signs/symptoms of discomfort 1/27/2020  7:15 AM   Securement secured to nostril center 1/27/2020  7:15 AM   Clamp Status/Tolerance unclamped 1/27/2020  7:15 AM   Suction Setting/Drainage Method low;intermittent setting 1/27/2020  7:15 AM   Insertion Site Appearance no redness, warmth, tenderness, skin breakdown, drainage 1/27/2020  7:15 AM   Drainage Clear 1/27/2020  7:15 AM   Flush/Irrigation flushed w/;water;no resistance met 1/27/2020  3:00 AM   Intake (mL) 120 mL 1/26/2020  8:00 PM   Tube Output(mL)(Include Discarded Residual) 100 mL 1/27/2020  5:00 AM   Number of days: 2             "Urethral Catheter 01/21/20 0752 Non-latex;Straight-tip;Temperature probe 16 Fr. (Active)   Site Assessment Clean;Intact 1/27/2020  7:15 AM   Collection Container Urimeter 1/27/2020  7:15 AM   Securement Method secured to top of thigh w/ adhesive device 1/27/2020  7:15 AM   Catheter Care Performed yes 1/27/2020  7:15 AM   Reason for Continuing Urinary Catheterization Post operative;Critically ill in ICU requiring intensive monitoring 1/27/2020  7:15 AM   CAUTI Prevention Bundle StatLock in place w 1" slack 1/27/2020  7:15 AM   Output (mL) 250 mL 1/27/2020  3:00 PM   Number of days: 6       Prev airway:   Present Prior to Hospital Arrival?: No; Placement Date: 01/21/20; Placement Time: 0742; Method of Intubation: Glidescope; Inserted by: Anesthesia Resident; Airway Device: Endotracheal Tube; Mask Ventilation: Easy - oral; Intubated: Postinduction; Blade: Kyler #4; Airway Device Size: 8.0; Style: Cuffed; Cuff Inflation: Minimal occlusive pressure; Placement Verified By: Auscultation, Capnometry, ETT Condensation; Grade: Grade I (Grade III view with DL Shen 2); Complicating Factors: Anterior larynx (Deep); Intubation Findings: Positive EtCO2, Bilateral breath sounds, Atraumatic/Condition of teeth unchanged; Securment: Lips; Complications: None; Breath Sounds: Equal Bilateral; Insertion Attempts: 1    Drips: None documented.   epinephrine 0.07 mcg/kg/min (01/27/20 1500)    furosemide (LASIX) 2 mg/mL infusion (non-titrating) 10 mg/hr (01/27/20 1500)    heparin (porcine) in 5 % dex      lidocaine 1 mg/min (01/27/20 1500)    niCARdipine Stopped (01/26/20 1408)    nitric oxide gas      norepinephrine bitartrate-D5W Stopped (01/23/20 1430)    propofol 20 mcg/kg/min (01/27/20 1500)       Patient Active Problem List   Diagnosis    History of pulmonary embolism    Hyperlipidemia    Gout    Hypertension    Obesity    At risk for amiodarone toxicity with long term use    Left ventricular thrombus without MI "    Paroxysmal atrial fibrillation    Chronic systolic congestive heart failure    Chronic anticoagulation    COCM (congestive cardiomyopathy)    Acute on chronic combined systolic and diastolic heart failure    Acute on chronic systolic heart failure    Hypertensive heart disease with heart failure    Long term (current) use of anticoagulants    Acute kidney injury superimposed on chronic kidney disease    Right lower lobe pulmonary nodule    ICD (implantable cardioverter-defibrillator) in place    Hepatic congestion    NSVT (nonsustained ventricular tachycardia)    Pre-transplant evaluation for heart transplant    Acute on chronic heart failure    Heart abnormality    ACP (advance care planning)    Coagulopathy    LVAD (left ventricular assist device) present    Acute hyperglycemia       Review of patient's allergies indicates:   Allergen Reactions    Biopatch [chlorhexidine gluconate]      Site burning    Dobutamine in d5w      Tachycardia, tremors, SOB, flushing    Percocet [oxycodone-acetaminophen] Itching    Penicillins Rash     Cefepime given on 1/23/2020 without issue       Current Inpatient Medications:   amiodarone  400 mg Per NG tube TID    aspirin  325 mg Oral Daily    vancomycin (VANCOCIN) IVPB  750 mg Intravenous Q24H    And    ceFEPime (MAXIPIME) IVPB  2 g Intravenous Q12H    digoxin  0.25 mg Intravenous Once    docusate sodium  200 mg Oral QHS    ferrous gluconate  324 mg Oral Daily with breakfast    magnesium oxide  400 mg Per NG tube TID    mupirocin   Nasal BID    pantoprazole  40 mg Intravenous Daily    polyethylene glycol  17 g Oral BID    potassium chloride in water  20 mEq Intravenous Once    sodium chloride 0.9%  3 mL Intravenous Q8H       No current facility-administered medications on file prior to encounter.      Current Outpatient Medications on File Prior to Encounter   Medication Sig Dispense Refill    amiodarone (PACERONE) 200 MG Tab Tale 1 tablet  (200mg) by mouth on Monday, Tuesday, Thursday, Friday and Saturday. Only take medication 5 days per week. 20 tablet 11    furosemide (LASIX) 40 MG tablet Take 1 tablet (40 mg total) by mouth 2 (two) times daily. 90 tablet 3    hydrocortisone 2.5 % ointment Apply 1 application topically 2 (two) times daily.      metoprolol succinate (TOPROL-XL) 50 MG 24 hr tablet TAKE ONE TABLET BY MOUTH ONCE DAILY 30 tablet 11    spironolactone (ALDACTONE) 25 MG tablet TAKE 1 TABLET BY MOUTH ONCE DAILY 30 tablet 11    VENTOLIN HFA 90 mcg/actuation inhaler Inhale 1 Inhaler into the lungs every 4 (four) hours as needed.      warfarin (COUMADIN) 3 MG tablet TAKE 1 TABLET BY MOUTH ONCE DAILY EXCEPT  SATURDAY 30 tablet 5       Past Surgical History:   Procedure Laterality Date    DRAINAGE OF PLEURAL EFFUSION Left 1/24/2020    Procedure: DRAINAGE, PLEURAL EFFUSION;  Surgeon: Ino Schmitt MD;  Location: [address];  Service: Cardiovascular;  Laterality: Left;  500 ml drainage    INSERTION OF GRAFT TO PERICARDIUM N/A 1/24/2020    Procedure: INSERTION, GRAFT, PERICARDIUM;  Surgeon: Ino Schmitt MD;  Location: [address];  Service: Cardiovascular;  Laterality: N/A;  Prevena    LEFT VENTRICULAR ASSIST DEVICE Left 1/23/2020    Procedure: INSERTION-LEFT VENTRICULAR ASSIST DEVICE;  Surgeon: Ino Schmitt MD;  Location: [facility] OR [address] [place] [address];  Service: Cardiovascular;  Laterality: Left;  DT HM3    RIGHT HEART CATHETERIZATION Right 1/16/2020    Procedure: INSERTION, CATHETER, RIGHT HEART;  Surgeon: Isiah Montero MD;  Location: [facility] CATH LAB;  Service: Cardiology;  Laterality: Right;    STERNAL WOUND CLOSURE N/A 1/24/2020    Procedure: CLOSURE, WOUND, STERNUM;  Surgeon: Ino Schmitt MD;  [facility];  Service: Cardiovascular;  Laterality: N/A;    STERNAL WOUND CLOSURE  1/24/2020    Procedure: CLOSURE, WOUND, STERNUM;  Surgeon: Ino Schmitt MD;  Location: [facility] OR [address] [place] [address];  Service: Cardiovascular;;  Temporary  closure    TONSILLECTOMY      VEIN LIGATION AND STRIPPING      WOUND EXPLORATION N/A 1/24/2020    Procedure: EXPLORATION, WOUND;  Surgeon: Ino Schmitt MD;  Location: [facility] OR [address] [place] [address];  Service: Cardiovascular;  Laterality: N/A;  Emergency exploration       Social History     Socioeconomic History    Marital status:      Spouse name: Not on file    Number of children: Not on file    Years of education: Not on file    Highest education level: Not on file   Occupational History    Not on file   Social Needs    Financial resource strain: Not on file    Food insecurity:     Worry: Not on file     Inability: Not on file    Transportation needs:     Medical: Not on file     Non-medical: Not on file   Tobacco Use    Smoking status: Never Smoker    Smokeless tobacco: Never Used   Substance and Sexual Activity    Alcohol use: No    Drug use: No    Sexual activity: Not on file   Lifestyle    Physical activity:     Days per week: Not on file     Minutes per session: Not on file    Stress: Not on file   Relationships    Social connections:     Talks on phone: Not on file     Gets together: Not on file     Attends Synagogue service: Not on file     Active member of club or organization: Not on file     Attends meetings of clubs or organizations: Not on file     Relationship status: Not on file   Other Topics Concern    Not on file   Social History Narrative    Not on file       OBJECTIVE:     Vital Signs Range (Last 24H):  Temp:  [36.9 °C (98.4 °F)-37.7 °C (99.8 °F)]   Pulse:  []   Resp:  [18]   BP: (80-88)/(0)   SpO2:  [98 %-100 %]   Arterial Line BP: ()/(65-87)       Significant Labs:  Lab Results   Component Value Date    WBC 16.37 (H) 01/27/2020    HGB 10.4 (L) 01/27/2020    HCT 34.6 (L) 01/27/2020     01/27/2020    CHOL 177 01/15/2020    TRIG 129 01/15/2020    HDL 46 01/15/2020    ALT 28 01/27/2020    ALT 28 01/27/2020    AST 30 01/27/2020    AST 30 01/27/2020      01/27/2020    K 3.8 01/27/2020     01/27/2020    CREATININE 3.0 (H) 01/27/2020    BUN 94 (H) 01/27/2020    CO2 26 01/27/2020    TSH 3.268 01/16/2020    PSA 1.7 01/15/2020    INR 1.2 01/27/2020    HGBA1C 6.2 (H) 01/15/2020       Diagnostic Studies: No relevant studies.    EKG:   Results for orders placed or performed during the hospital encounter of 01/09/20   EKG 12-lead    Collection Time: 01/24/20  3:56 PM    Narrative    Test Reason : I48.0    Vent. Rate : 127 BPM     Atrial Rate : 122 BPM     P-R Int : 000 ms          QRS Dur : 162 ms      QT Int : 454 ms       P-R-T Axes : 000 048 055 degrees     QTc Int : 659 ms    Artifact  REPEAT EKG    Confirmed by Angela ENRIQUEZ, Sandra ([id number]) on 1/25/2020 10:32:56 AM    Referred By: LIVIA SANTIZO           Confirmed By:Sandra Miller MD       2D ECHO:  TTE:  Results for orders placed or performed during the hospital encounter of 01/09/20   Echo Color Flow Doppler? Yes; Bubble Contrast? No   Result Value Ref Range    AV mean gradient 1 mmHg    Ao peak len 0.67 m/s    Ao VTI 9.66 cm    IVS 0.65 0.6 - 1.1 cm    LA size 4.37 cm    Left Atrium Major Axis 6.82 cm    Left Atrium Minor Axis 6.82 cm    LVIDD 6.80 (A) 3.5 - 6.0 cm    LVIDS 6.41 (A) 2.1 - 4.0 cm    LVOT diameter 2.31 cm    LVOT peak VTI 8.69 cm    PW 0.81 0.6 - 1.1 cm    MV Peak E Len 0.87 m/s    PV Peak D Len 0.62 m/s    PV Peak S Len 0.16 m/s    RA Major Axis 6.73 cm    RA Width 6.20 cm    RVDD 4.39 cm    Sinus 3.89 cm    TAPSE 0.97 cm    TR Max Len 2.16 m/s    TDI LATERAL 0.11 m/s    TDI SEPTAL 0.05 m/s    LA WIDTH 4.30 cm    LV Diastolic Volume 233.92 mL    LV Systolic Volume 209.17 mL    LVOT peak len 0.51 m/s    LV LATERAL E/E' RATIO 7.91 m/s    LV SEPTAL E/E' RATIO 17.40 m/s    FS 6 %    LA volume 108.93 cm3    LV mass 207.87 g    Left Ventricle Relative Wall Thickness 0.24 cm    AV valve area 3.77 cm2    AV Velocity Ratio 0.76     AV index (prosthetic) 0.90     Mean e' 0.08 m/s    Pulm vein S/D ratio 0.26   "   LVOT area 4.2 cm2    LVOT stroke volume 36.40 cm3    AV peak gradient 2 mmHg    E/E' ratio 10.88 m/s    LV Systolic Volume Index 89.2 mL/m2    LV Diastolic Volume Index 99.76 mL/m2    LA Volume Index 46.5 mL/m2    LV Mass Index 89 g/m2    Triscuspid Valve Regurgitation Peak Gradient 19 mmHg    BSA 2.43 m2    Right Atrial Pressure (from IVC) 15 mmHg    TV rest pulmonary artery pressure 34 mmHg    Narrative    · Severely decreased left ventricular systolic function. The estimated   ejection fraction is 15%.  · Moderate left atrial enlargement.  · Left ventricular diastolic dysfunction.  · Mild mitral regurgitation.  · Moderate right ventricular enlargement. Moderately to severely reduced   right ventricular systolic function.  · Severe right atrial enlargement.  · Mild tricuspid regurgitation.  · Elevated central venous pressure (15 mmHg).  · The estimated PA systolic pressure is 34 mmHg.     No LV thrombus with echo contrast.           JACINTO:  Results for orders placed or performed during the hospital encounter of 01/09/20   Transesophageal echo (JACINTO)   Result Value Ref Range    BSA 2.43 m2    Sinus 3.30 cm    STJ 3.10 cm    Ascending aorta 3.70 cm    LVIDD 7.20 3.5 - 6.0 cm    RVDD 4.20 cm    Narrative    · Severe left ventricular enlargement.  · No thrombus visualized in the LV apex.  · Severely decreased left ventricular systolic function. The estimated   ejection fraction is 15%.  · Moderate right ventricular enlargement.  · Moderately to severely reduced right ventricular systolic function. FAC   20%. RV/LV size ratio 0.58  · The left atrial appendage is normal "chicken wing" appearance. The DAMON   emptying velocity is abnormal, 0.2 m/s.  · Light spontaneous echo contrast in the atrial body and in the atrial   appendage. No thrombus is present in the appendage which was confirmed   with contrast enhancement.  · Mild-to-moderate mitral regurgitation.  · Possible small patent foramen ovale present with left to " right shunting   indicated by color flow Doppler.  · Grade 1 atheroma present in the descending aorta. The atheroma is   layered.          ASSESSMENT/PLAN:         Anesthesia Evaluation    I have reviewed the Patient Summary Reports.    I have reviewed the Nursing Notes.   I have reviewed the Medications.     Review of Systems  Anesthesia Hx:  No problems with previous Anesthesia Denies Hx of Anesthetic complications  History of prior surgery of interest to airway management or planning: Previous anesthesia: General Denies Family Hx of Anesthesia complications.   Denies Personal Hx of Anesthesia complications.   Social:  Non-Smoker, No Alcohol Use    Hematology/Oncology:         -- Anemia: Denies Current/Recent Cancer   EENT/Dental:   denies chronic allergic rhinitis  Denies Otitis Media Denies Chronic Tonsillitis   Cardiovascular:   Denies Pacemaker. Hypertension  Denies MI.  Denies CAD.    Denies CABG/stent.  Denies Dysrhythmias.   Denies Angina. CHF         hyperlipidemia     ICD Functional Capacity good / => 4 METS    Pulmonary:   Denies Pneumonia Denies COPD.  Denies Asthma.  Denies Shortness of breath.  Denies Recent URI.  Denies Sleep Apnea. Hx of PE   Renal/:   Chronic Renal Disease    Hepatic/GI:   Denies PUD. Denies Hiatal Hernia.  Denies GERD. Liver Disease,  Denies Hepatitis.    Musculoskeletal:  Musculoskeletal Normal Denies Arthritis.      Neurological:   Denies TIA. CVA Denies Neuromuscular Disease.  Denies Headaches. Denies Seizures. C-spine cleared.     Denies Chronic Pain Syndrome  Denies Peripheral Neuropathy    Endocrine:  Endocrine Normal Denies Diabetes. Denies Hypothyroidism.  Denies Hyperthyroidism.    Psych:  Psychiatric Normal  Denies Psychiatric History. denies anxiety denies depression          Physical Exam  General:  Well nourished    Airway/Jaw/Neck:  Airway Findings: Mouth Opening: Small, but > 3cm Tongue: Normal  Pre-Existing Airway Tube(s): Oral Endotracheal tube  Size: 8.0   General Airway Assessment: Adult  Mallampati: IV  Improves to III with phonation.  TM Distance: 4 - 6 cm  Jaw/Neck Findings:  Micrognathia: Negative Mandibular Fracture: Negative    Neck ROM: Normal ROM  Neck Findings:  Girth Increased   R CVC   Eyes/Ears/Nose:  EYES/EARS/NOSE FINDINGS: Normal   Dental:  Dental Findings: In tact   Chest/Lungs:  Chest/Lungs Findings: Clear to auscultation     Heart/Vascular:  Heart Findings: Normal Heart murmur: negative Vascular Findings:  Vascular Access: Peripheral IV(s), Existing Central Line(s), Arterial Line(s), PA Catheter        Mental Status:  Mental Status Findings:  Unconscious         Anesthesia Plan  Type of Anesthesia, risks & benefits discussed:  Anesthesia Type:  general  Patient's Preference:   Intra-op Monitoring Plan: arterial line, standard ASA monitors, central line, [place] and cardiac output  Intra-op Monitoring Plan Comments:   Post Op Pain Control Plan: multimodal analgesia, IV/PO Opioids PRN and per primary service following discharge from PACU  Post Op Pain Control Plan Comments:   Induction:   Inhalation  Beta Blocker:  Patient is not currently on a Beta-Blocker (No further documentation required).       Informed Consent: Patient representative understands risks and agrees with Anesthesia plan.  Questions answered. Anesthesia consent signed with patient representative.  ASA Score: 4     Day of Surgery Review of History & Physical: I have interviewed and examined the patient. I have reviewed the patient's H&P dated:  There are no significant changes.  H&P update referred to the surgeon.         Ready For Surgery From Anesthesia Perspective.

## 2020-01-27 NOTE — PROGRESS NOTES
Update    Pt in room with family, intubated and sedated. Chest to be closed today or tomorrow. Pt's SO Lavern, and sons Epi and Marito also present in room. Epi states last week SW provided him with list of housing options. Advised son that best options would be extended stay hotels, Air B&B, or short-term apt leases. Advised he could use travel websites. Son states understanding. Caregivers present as coping well and deny further needs, questions, concerns at this time. Providing ongoing psychosocial and counseling support, education, resources, assistance and discharge planning as indicated. Following and available.

## 2020-01-27 NOTE — ASSESSMENT & PLAN NOTE
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   - CTS primary  - Taken back to OR 1/24 for possible RVAD placement which was not done. Patient remained hemodynamically stable in OR  - Currently hemodynamically stable on Epi, Lasix, Lidocaine, and [place]   - Would wean off Lidocaine  - Current speed 5200    Procedure: Device Interrogation Including analysis of device parameters  Current Settings: Ventricular Assist Device  Review of device function is stable/unstabe    TXP LVAD INTERROGATIONS 1/27/2020 1/27/2020 1/27/2020 1/27/2020 1/27/2020 1/27/2020 1/27/2020   Type HeartMate3 HeartMate3 HeartMate3 HeartMate3 HeartMate3 HeartMate3 HeartMate3   Flow 4.1 4.2 4.3 4 4.1 4 4.2   Speed 5300 5300 5300 5200 5200 5200 5200   PI 4.6 3.8 3.6 4.3 4.2 4.2 4.4   Power (Berry) 3.9 3.8 3.8 3.7 3.7 3.6 3.7   LSL 4900 4900 4900 4800 4800 4800 4800   Pulsatility Intermittent pulse Intermittent pulse Intermittent pulse Intermittent pulse Intermittent pulse Intermittent pulse -

## 2020-01-27 NOTE — ANESTHESIA POSTPROCEDURE EVALUATION
Anesthesia Post Evaluation    Patient: Tae Delgado    Procedure(s) Performed: Procedure(s) (LRB):  CLOSURE, WOUND, STERNUM (N/A)  INSERTION, GRAFT, PERICARDIUM (N/A)  DRAINAGE, PLEURAL EFFUSION (Left)  WOUND WASHOUT (N/A)    Patient re opened soon after in OR with improvement in R sided pressures    Final Anesthesia Type: general    Patient location during evaluation: ICU  Patient participation: No - Unable to Participate, Intubation  Level of consciousness: sedated  Post-procedure vital signs: reviewed and stable  Pain management: adequate  Airway patency: patent    PONV status at discharge: No PONV  Anesthetic complications: no      Cardiovascular status: hemodynamically stable  Respiratory status: ETT, intubated and ventilator  Hydration status: euvolemic  Follow-up not needed.          Vitals Value Taken Time   BP 82/0 1/27/2020  3:00 AM   Temp 37.7 °C (99.8 °F) 1/27/2020  6:00 AM   Pulse 121 1/27/2020  6:34 AM   Resp 18 1/26/2020  5:04 PM   SpO2 99 % 1/27/2020  6:34 AM   Vitals shown include unvalidated device data.      No case tracking events are documented in the log.      Pain/Dequan Score: No data recorded

## 2020-01-28 ENCOUNTER — ANESTHESIA (OUTPATIENT)
Dept: SURGERY | Facility: HOSPITAL | Age: 60
DRG: 001 | End: 2020-01-28
Payer: MEDICARE

## 2020-01-28 PROBLEM — R23.9 ALTERATION IN SKIN INTEGRITY: Status: ACTIVE | Noted: 2020-01-28

## 2020-01-28 LAB
ALBUMIN SERPL BCP-MCNC: 2.4 G/DL (ref 3.5–5.2)
ALBUMIN SERPL BCP-MCNC: 2.8 G/DL (ref 3.5–5.2)
ALLENS TEST: ABNORMAL
ALLENS TEST: NORMAL
ALP SERPL-CCNC: 41 U/L (ref 55–135)
ALP SERPL-CCNC: 43 U/L (ref 55–135)
ALT SERPL W/O P-5'-P-CCNC: 16 U/L (ref 10–44)
ALT SERPL W/O P-5'-P-CCNC: 20 U/L (ref 10–44)
ANION GAP SERPL CALC-SCNC: 12 MMOL/L (ref 8–16)
ANION GAP SERPL CALC-SCNC: 17 MMOL/L (ref 8–16)
ANISOCYTOSIS BLD QL SMEAR: SLIGHT
ANISOCYTOSIS BLD QL SMEAR: SLIGHT
APTT BLDCRRT: 24.5 SEC (ref 21–32)
APTT BLDCRRT: 25.3 SEC (ref 21–32)
AST SERPL-CCNC: 22 U/L (ref 10–40)
AST SERPL-CCNC: 26 U/L (ref 10–40)
BASO STIPL BLD QL SMEAR: ABNORMAL
BASOPHILS # BLD AUTO: ABNORMAL K/UL (ref 0–0.2)
BASOPHILS NFR BLD: 1 % (ref 0–1.9)
BASOPHILS NFR BLD: 2 % (ref 0–1.9)
BILIRUB SERPL-MCNC: 0.6 MG/DL (ref 0.1–1)
BILIRUB SERPL-MCNC: 0.7 MG/DL (ref 0.1–1)
BLD PROD TYP BPU: NORMAL
BLOOD UNIT EXPIRATION DATE: NORMAL
BLOOD UNIT TYPE CODE: 600
BLOOD UNIT TYPE CODE: 6200
BLOOD UNIT TYPE: NORMAL
BUN SERPL-MCNC: 100 MG/DL (ref 6–20)
BUN SERPL-MCNC: 100 MG/DL (ref 6–20)
CALCIUM SERPL-MCNC: 9 MG/DL (ref 8.7–10.5)
CALCIUM SERPL-MCNC: 9.5 MG/DL (ref 8.7–10.5)
CHLORIDE SERPL-SCNC: 107 MMOL/L (ref 95–110)
CHLORIDE SERPL-SCNC: 107 MMOL/L (ref 95–110)
CO2 SERPL-SCNC: 25 MMOL/L (ref 23–29)
CO2 SERPL-SCNC: 29 MMOL/L (ref 23–29)
CODING SYSTEM: NORMAL
CREAT SERPL-MCNC: 2.9 MG/DL (ref 0.5–1.4)
CREAT SERPL-MCNC: 3.1 MG/DL (ref 0.5–1.4)
DELSYS: ABNORMAL
DELSYS: NORMAL
DIFFERENTIAL METHOD: ABNORMAL
DIFFERENTIAL METHOD: ABNORMAL
DIGOXIN SERPL-MCNC: 2.4 NG/ML (ref 0.8–2)
DISPENSE STATUS: NORMAL
EOSINOPHIL # BLD AUTO: ABNORMAL K/UL (ref 0–0.5)
EOSINOPHIL NFR BLD: 0 % (ref 0–8)
EOSINOPHIL NFR BLD: 1 % (ref 0–8)
ERYTHROCYTE [DISTWIDTH] IN BLOOD BY AUTOMATED COUNT: 15.9 % (ref 11.5–14.5)
ERYTHROCYTE [DISTWIDTH] IN BLOOD BY AUTOMATED COUNT: 15.9 % (ref 11.5–14.5)
ERYTHROCYTE [SEDIMENTATION RATE] IN BLOOD BY WESTERGREN METHOD: 18 MM/H
EST. GFR  (AFRICAN AMERICAN): 24.1 ML/MIN/1.73 M^2
EST. GFR  (AFRICAN AMERICAN): 26.2 ML/MIN/1.73 M^2
EST. GFR  (NON AFRICAN AMERICAN): 20.9 ML/MIN/1.73 M^2
EST. GFR  (NON AFRICAN AMERICAN): 22.6 ML/MIN/1.73 M^2
FIO2: 50
GIANT PLATELETS BLD QL SMEAR: PRESENT
GLUCOSE SERPL-MCNC: 151 MG/DL (ref 70–110)
GLUCOSE SERPL-MCNC: 154 MG/DL (ref 70–110)
HCO3 UR-SCNC: 30.4 MMOL/L (ref 24–28)
HCO3 UR-SCNC: 32 MMOL/L (ref 24–28)
HCO3 UR-SCNC: 32.4 MMOL/L (ref 24–28)
HCT VFR BLD AUTO: 32.9 % (ref 40–54)
HCT VFR BLD AUTO: 36.1 % (ref 40–54)
HCT VFR BLD CALC: 27 %PCV (ref 36–54)
HCT VFR BLD CALC: 27 %PCV (ref 36–54)
HCT VFR BLD CALC: 29 %PCV (ref 36–54)
HCT VFR BLD CALC: 30 %PCV (ref 36–54)
HGB BLD-MCNC: 10.5 G/DL (ref 14–18)
HGB BLD-MCNC: 9.7 G/DL (ref 14–18)
HYPOCHROMIA BLD QL SMEAR: ABNORMAL
HYPOCHROMIA BLD QL SMEAR: ABNORMAL
IMM GRANULOCYTES # BLD AUTO: ABNORMAL K/UL (ref 0–0.04)
IMM GRANULOCYTES # BLD AUTO: ABNORMAL K/UL (ref 0–0.04)
IMM GRANULOCYTES NFR BLD AUTO: ABNORMAL % (ref 0–0.5)
IMM GRANULOCYTES NFR BLD AUTO: ABNORMAL % (ref 0–0.5)
INR PPP: 1.2 (ref 0.8–1.2)
INR PPP: 1.3 (ref 0.8–1.2)
LDH SERPL L TO P-CCNC: 0.9 MMOL/L (ref 0.36–1.25)
LDH SERPL L TO P-CCNC: 0.93 MMOL/L (ref 0.36–1.25)
LDH SERPL L TO P-CCNC: 1.05 MMOL/L (ref 0.36–1.25)
LDH SERPL L TO P-CCNC: 1.07 MMOL/L (ref 0.36–1.25)
LDH SERPL L TO P-CCNC: 458 U/L (ref 110–260)
LYMPHOCYTES # BLD AUTO: ABNORMAL K/UL (ref 1–4.8)
LYMPHOCYTES NFR BLD: 2 % (ref 18–48)
LYMPHOCYTES NFR BLD: 6 % (ref 18–48)
MAGNESIUM SERPL-MCNC: 2.6 MG/DL (ref 1.6–2.6)
MAGNESIUM SERPL-MCNC: 2.7 MG/DL (ref 1.6–2.6)
MAGNESIUM SERPL-MCNC: 2.7 MG/DL (ref 1.6–2.6)
MCH RBC QN AUTO: 27.3 PG (ref 27–31)
MCH RBC QN AUTO: 27.7 PG (ref 27–31)
MCHC RBC AUTO-ENTMCNC: 29.1 G/DL (ref 32–36)
MCHC RBC AUTO-ENTMCNC: 29.5 G/DL (ref 32–36)
MCV RBC AUTO: 94 FL (ref 82–98)
MCV RBC AUTO: 94 FL (ref 82–98)
METAMYELOCYTES NFR BLD MANUAL: 1 %
METHEMOGLOBIN: 0.8 % (ref 0–3)
MIN VOL: 10
MIN VOL: 9.86
MODE: ABNORMAL
MODE: NORMAL
MONOCYTES # BLD AUTO: ABNORMAL K/UL (ref 0.3–1)
MONOCYTES NFR BLD: 7 % (ref 4–15)
MONOCYTES NFR BLD: 8 % (ref 4–15)
MYELOCYTES NFR BLD MANUAL: 1 %
NEUTROPHILS NFR BLD: 83 % (ref 38–73)
NEUTROPHILS NFR BLD: 88 % (ref 38–73)
NRBC BLD-RTO: 3 /100 WBC
NRBC BLD-RTO: 3 /100 WBC
NUM UNITS TRANS FFP: NORMAL
OVALOCYTES BLD QL SMEAR: ABNORMAL
OVALOCYTES BLD QL SMEAR: ABNORMAL
PCO2 BLDA: 39.8 MMHG (ref 35–45)
PCO2 BLDA: 41.7 MMHG (ref 35–45)
PCO2 BLDA: 42.7 MMHG (ref 35–45)
PCO2 BLDA: 42.7 MMHG (ref 35–45)
PCO2 BLDA: 48.8 MMHG (ref 35–45)
PEEP: 5
PH SMN: 7.43 [PH] (ref 7.35–7.45)
PH SMN: 7.46 [PH] (ref 7.35–7.45)
PH SMN: 7.47 [PH] (ref 7.35–7.45)
PH SMN: 7.48 [PH] (ref 7.35–7.45)
PH SMN: 7.49 [PH] (ref 7.35–7.45)
PHOSPHATE SERPL-MCNC: 3.9 MG/DL (ref 2.7–4.5)
PHOSPHATE SERPL-MCNC: 4.3 MG/DL (ref 2.7–4.5)
PIP: 20
PIP: 20
PLATELET # BLD AUTO: 245 K/UL (ref 150–350)
PLATELET # BLD AUTO: 282 K/UL (ref 150–350)
PLATELET BLD QL SMEAR: ABNORMAL
PMV BLD AUTO: 11.5 FL (ref 9.2–12.9)
PMV BLD AUTO: 11.6 FL (ref 9.2–12.9)
PO2 BLDA: 104 MMHG (ref 80–100)
PO2 BLDA: 110 MMHG (ref 80–100)
PO2 BLDA: 112 MMHG (ref 80–100)
PO2 BLDA: 127 MMHG (ref 80–100)
PO2 BLDA: 31 MMHG (ref 40–60)
POC BE: 7 MMOL/L
POC BE: 8 MMOL/L
POC BE: 9 MMOL/L
POC IONIZED CALCIUM: 1.11 MMOL/L (ref 1.06–1.42)
POC IONIZED CALCIUM: 1.11 MMOL/L (ref 1.06–1.42)
POC IONIZED CALCIUM: 1.12 MMOL/L (ref 1.06–1.42)
POC IONIZED CALCIUM: 1.14 MMOL/L (ref 1.06–1.42)
POC SATURATED O2: 61 % (ref 95–100)
POC SATURATED O2: 98 % (ref 95–100)
POC SATURATED O2: 99 % (ref 95–100)
POC TCO2: 32 MMOL/L (ref 23–27)
POC TCO2: 33 MMOL/L (ref 23–27)
POC TCO2: 34 MMOL/L (ref 24–29)
POCT GLUCOSE: 138 MG/DL (ref 70–110)
POCT GLUCOSE: 152 MG/DL (ref 70–110)
POCT GLUCOSE: 163 MG/DL (ref 70–110)
POCT GLUCOSE: 171 MG/DL (ref 70–110)
POCT GLUCOSE: 176 MG/DL (ref 70–110)
POIKILOCYTOSIS BLD QL SMEAR: SLIGHT
POIKILOCYTOSIS BLD QL SMEAR: SLIGHT
POLYCHROMASIA BLD QL SMEAR: ABNORMAL
POLYCHROMASIA BLD QL SMEAR: ABNORMAL
POTASSIUM BLD-SCNC: 3.3 MMOL/L (ref 3.5–5.1)
POTASSIUM BLD-SCNC: 3.4 MMOL/L (ref 3.5–5.1)
POTASSIUM BLD-SCNC: 3.7 MMOL/L (ref 3.5–5.1)
POTASSIUM BLD-SCNC: 3.8 MMOL/L (ref 3.5–5.1)
POTASSIUM SERPL-SCNC: 3.5 MMOL/L (ref 3.5–5.1)
POTASSIUM SERPL-SCNC: 3.5 MMOL/L (ref 3.5–5.1)
POTASSIUM SERPL-SCNC: 3.6 MMOL/L (ref 3.5–5.1)
POTASSIUM SERPL-SCNC: 3.8 MMOL/L (ref 3.5–5.1)
POTASSIUM SERPL-SCNC: 3.8 MMOL/L (ref 3.5–5.1)
POTASSIUM SERPL-SCNC: 3.9 MMOL/L (ref 3.5–5.1)
PROT SERPL-MCNC: 6.5 G/DL (ref 6–8.4)
PROT SERPL-MCNC: 6.5 G/DL (ref 6–8.4)
PROTHROMBIN TIME: 12.4 SEC (ref 9–12.5)
PROTHROMBIN TIME: 12.8 SEC (ref 9–12.5)
RBC # BLD AUTO: 3.5 M/UL (ref 4.6–6.2)
RBC # BLD AUTO: 3.84 M/UL (ref 4.6–6.2)
SAMPLE: ABNORMAL
SAMPLE: NORMAL
SITE: ABNORMAL
SITE: NORMAL
SMUDGE CELLS BLD QL SMEAR: PRESENT
SODIUM BLD-SCNC: 145 MMOL/L (ref 136–145)
SODIUM BLD-SCNC: 146 MMOL/L (ref 136–145)
SODIUM BLD-SCNC: 147 MMOL/L (ref 136–145)
SODIUM BLD-SCNC: 148 MMOL/L (ref 136–145)
SODIUM SERPL-SCNC: 148 MMOL/L (ref 136–145)
SODIUM SERPL-SCNC: 149 MMOL/L (ref 136–145)
SP02: 100
SP02: 98
TRANS ERYTHROCYTES VOL PATIENT: NORMAL ML
VANCOMYCIN SERPL-MCNC: 11.5 UG/ML
VT: 500
WBC # BLD AUTO: 16.11 K/UL (ref 3.9–12.7)
WBC # BLD AUTO: 17.01 K/UL (ref 3.9–12.7)

## 2020-01-28 PROCEDURE — 82803 BLOOD GASES ANY COMBINATION: CPT

## 2020-01-28 PROCEDURE — 80053 COMPREHEN METABOLIC PANEL: CPT | Mod: 91

## 2020-01-28 PROCEDURE — 83735 ASSAY OF MAGNESIUM: CPT | Mod: 91

## 2020-01-28 PROCEDURE — 80202 ASSAY OF VANCOMYCIN: CPT

## 2020-01-28 PROCEDURE — 99233 PR SUBSEQUENT HOSPITAL CARE,LEVL III: ICD-10-PCS | Mod: ,,, | Performed by: INTERNAL MEDICINE

## 2020-01-28 PROCEDURE — 84132 ASSAY OF SERUM POTASSIUM: CPT

## 2020-01-28 PROCEDURE — 63600175 PHARM REV CODE 636 W HCPCS: Performed by: SURGERY

## 2020-01-28 PROCEDURE — 94003 VENT MGMT INPAT SUBQ DAY: CPT

## 2020-01-28 PROCEDURE — 25000003 PHARM REV CODE 250: Performed by: PHYSICIAN ASSISTANT

## 2020-01-28 PROCEDURE — 63600367 HC NITRIC OXIDE PER HOUR

## 2020-01-28 PROCEDURE — 85007 BL SMEAR W/DIFF WBC COUNT: CPT | Mod: 91

## 2020-01-28 PROCEDURE — 99233 PR SUBSEQUENT HOSPITAL CARE,LEVL III: ICD-10-PCS | Mod: ,,, | Performed by: SURGERY

## 2020-01-28 PROCEDURE — 83735 ASSAY OF MAGNESIUM: CPT

## 2020-01-28 PROCEDURE — 20000000 HC ICU ROOM

## 2020-01-28 PROCEDURE — 80053 COMPREHEN METABOLIC PANEL: CPT

## 2020-01-28 PROCEDURE — 80162 ASSAY OF DIGOXIN TOTAL: CPT

## 2020-01-28 PROCEDURE — 25000003 PHARM REV CODE 250: Performed by: STUDENT IN AN ORGANIZED HEALTH CARE EDUCATION/TRAINING PROGRAM

## 2020-01-28 PROCEDURE — 83050 HGB METHEMOGLOBIN QUAN: CPT

## 2020-01-28 PROCEDURE — 63600175 PHARM REV CODE 636 W HCPCS: Performed by: STUDENT IN AN ORGANIZED HEALTH CARE EDUCATION/TRAINING PROGRAM

## 2020-01-28 PROCEDURE — 84132 ASSAY OF SERUM POTASSIUM: CPT | Mod: 91

## 2020-01-28 PROCEDURE — 99900026 HC AIRWAY MAINTENANCE (STAT)

## 2020-01-28 PROCEDURE — 85610 PROTHROMBIN TIME: CPT | Mod: 91

## 2020-01-28 PROCEDURE — 85730 THROMBOPLASTIN TIME PARTIAL: CPT

## 2020-01-28 PROCEDURE — 94761 N-INVAS EAR/PLS OXIMETRY MLT: CPT

## 2020-01-28 PROCEDURE — 27000248 HC VAD-ADDITIONAL DAY

## 2020-01-28 PROCEDURE — 99233 SBSQ HOSP IP/OBS HIGH 50: CPT | Mod: ,,, | Performed by: INTERNAL MEDICINE

## 2020-01-28 PROCEDURE — A4216 STERILE WATER/SALINE, 10 ML: HCPCS | Performed by: SURGERY

## 2020-01-28 PROCEDURE — 83615 LACTATE (LD) (LDH) ENZYME: CPT

## 2020-01-28 PROCEDURE — P9045 ALBUMIN (HUMAN), 5%, 250 ML: HCPCS | Mod: JG | Performed by: THORACIC SURGERY (CARDIOTHORACIC VASCULAR SURGERY)

## 2020-01-28 PROCEDURE — C9113 INJ PANTOPRAZOLE SODIUM, VIA: HCPCS | Performed by: SURGERY

## 2020-01-28 PROCEDURE — 84295 ASSAY OF SERUM SODIUM: CPT

## 2020-01-28 PROCEDURE — 63600175 PHARM REV CODE 636 W HCPCS: Performed by: THORACIC SURGERY (CARDIOTHORACIC VASCULAR SURGERY)

## 2020-01-28 PROCEDURE — 25000003 PHARM REV CODE 250: Performed by: SURGERY

## 2020-01-28 PROCEDURE — 37799 UNLISTED PX VASCULAR SURGERY: CPT

## 2020-01-28 PROCEDURE — 85027 COMPLETE CBC AUTOMATED: CPT | Mod: 91

## 2020-01-28 PROCEDURE — 99233 SBSQ HOSP IP/OBS HIGH 50: CPT | Mod: ,,, | Performed by: SURGERY

## 2020-01-28 PROCEDURE — 85610 PROTHROMBIN TIME: CPT

## 2020-01-28 PROCEDURE — 93750 INTERROGATION VAD IN PERSON: CPT | Mod: ,,, | Performed by: INTERNAL MEDICINE

## 2020-01-28 PROCEDURE — 85730 THROMBOPLASTIN TIME PARTIAL: CPT | Mod: 91

## 2020-01-28 PROCEDURE — P9045 ALBUMIN (HUMAN), 5%, 250 ML: HCPCS | Mod: JG | Performed by: STUDENT IN AN ORGANIZED HEALTH CARE EDUCATION/TRAINING PROGRAM

## 2020-01-28 PROCEDURE — 99233 SBSQ HOSP IP/OBS HIGH 50: CPT | Mod: ,,, | Performed by: NURSE PRACTITIONER

## 2020-01-28 PROCEDURE — 27000221 HC OXYGEN, UP TO 24 HOURS

## 2020-01-28 PROCEDURE — 25000003 PHARM REV CODE 250: Performed by: THORACIC SURGERY (CARDIOTHORACIC VASCULAR SURGERY)

## 2020-01-28 PROCEDURE — 84100 ASSAY OF PHOSPHORUS: CPT

## 2020-01-28 PROCEDURE — 83605 ASSAY OF LACTIC ACID: CPT

## 2020-01-28 PROCEDURE — 82330 ASSAY OF CALCIUM: CPT

## 2020-01-28 PROCEDURE — 99900035 HC TECH TIME PER 15 MIN (STAT)

## 2020-01-28 PROCEDURE — 93750 PR INTERROGATE VENT ASSIST DEV, IN PERSON, W PHYSICIAN ANALYSIS: ICD-10-PCS | Mod: ,,, | Performed by: INTERNAL MEDICINE

## 2020-01-28 PROCEDURE — 85014 HEMATOCRIT: CPT

## 2020-01-28 PROCEDURE — 84100 ASSAY OF PHOSPHORUS: CPT | Mod: 91

## 2020-01-28 PROCEDURE — 99233 PR SUBSEQUENT HOSPITAL CARE,LEVL III: ICD-10-PCS | Mod: ,,, | Performed by: NURSE PRACTITIONER

## 2020-01-28 RX ORDER — POTASSIUM CHLORIDE 14.9 MG/ML
20 INJECTION INTRAVENOUS ONCE
Status: COMPLETED | OUTPATIENT
Start: 2020-01-28 | End: 2020-01-28

## 2020-01-28 RX ORDER — AMLODIPINE BESYLATE 5 MG/1
5 TABLET ORAL DAILY
Status: DISCONTINUED | OUTPATIENT
Start: 2020-01-28 | End: 2020-01-28

## 2020-01-28 RX ORDER — POTASSIUM CHLORIDE 7.45 MG/ML
10 INJECTION INTRAVENOUS ONCE
Status: COMPLETED | OUTPATIENT
Start: 2020-01-28 | End: 2020-01-28

## 2020-01-28 RX ORDER — AMLODIPINE BESYLATE 5 MG/1
5 TABLET ORAL DAILY
Status: COMPLETED | OUTPATIENT
Start: 2020-01-28 | End: 2020-01-28

## 2020-01-28 RX ORDER — HEPARIN SODIUM 10000 [USP'U]/100ML
500 INJECTION, SOLUTION INTRAVENOUS CONTINUOUS
Status: ACTIVE | OUTPATIENT
Start: 2020-01-28 | End: 2020-01-29

## 2020-01-28 RX ORDER — ALBUMIN HUMAN 50 G/1000ML
12.5 SOLUTION INTRAVENOUS ONCE
Status: COMPLETED | OUTPATIENT
Start: 2020-01-28 | End: 2020-01-28

## 2020-01-28 RX ORDER — AMLODIPINE BESYLATE 10 MG/1
10 TABLET ORAL DAILY
Status: DISCONTINUED | OUTPATIENT
Start: 2020-01-29 | End: 2020-02-11

## 2020-01-28 RX ADMIN — POTASSIUM CHLORIDE 10 MEQ: 7.46 INJECTION, SOLUTION INTRAVENOUS at 02:01

## 2020-01-28 RX ADMIN — CEFEPIME 2 G: 2 INJECTION, POWDER, FOR SOLUTION INTRAVENOUS at 02:01

## 2020-01-28 RX ADMIN — Medication 400 MG: at 08:01

## 2020-01-28 RX ADMIN — Medication 400 MG: at 09:01

## 2020-01-28 RX ADMIN — POTASSIUM CHLORIDE 20 MEQ: 14.9 INJECTION, SOLUTION INTRAVENOUS at 05:01

## 2020-01-28 RX ADMIN — AMLODIPINE BESYLATE 5 MG: 5 TABLET ORAL at 02:01

## 2020-01-28 RX ADMIN — ASPIRIN 325 MG ORAL TABLET 325 MG: 325 PILL ORAL at 09:01

## 2020-01-28 RX ADMIN — PROPOFOL 30 MCG/KG/MIN: 10 INJECTION, EMULSION INTRAVENOUS at 08:01

## 2020-01-28 RX ADMIN — ALBUMIN (HUMAN) 12.5 G: 12.5 SOLUTION INTRAVENOUS at 06:01

## 2020-01-28 RX ADMIN — EPINEPHRINE 0.06 MCG/KG/MIN: 1 INJECTION INTRAMUSCULAR; INTRAVENOUS; SUBCUTANEOUS at 08:01

## 2020-01-28 RX ADMIN — MUPIROCIN: 20 OINTMENT TOPICAL at 09:01

## 2020-01-28 RX ADMIN — AMLODIPINE BESYLATE 5 MG: 5 TABLET ORAL at 10:01

## 2020-01-28 RX ADMIN — FENTANYL CITRATE 25 MCG: 50 INJECTION INTRAMUSCULAR; INTRAVENOUS at 02:01

## 2020-01-28 RX ADMIN — PROPOFOL 30 MCG/KG/MIN: 10 INJECTION, EMULSION INTRAVENOUS at 01:01

## 2020-01-28 RX ADMIN — POLYETHYLENE GLYCOL 3350 17 G: 17 POWDER, FOR SOLUTION ORAL at 09:01

## 2020-01-28 RX ADMIN — PROPOFOL 50 MCG/KG/MIN: 10 INJECTION, EMULSION INTRAVENOUS at 08:01

## 2020-01-28 RX ADMIN — PROPOFOL 40 MCG/KG/MIN: 10 INJECTION, EMULSION INTRAVENOUS at 04:01

## 2020-01-28 RX ADMIN — FENTANYL CITRATE 25 MCG: 50 INJECTION INTRAMUSCULAR; INTRAVENOUS at 03:01

## 2020-01-28 RX ADMIN — Medication 3 ML: at 02:01

## 2020-01-28 RX ADMIN — ALBUMIN (HUMAN) 12.5 G: 12.5 SOLUTION INTRAVENOUS at 09:01

## 2020-01-28 RX ADMIN — POTASSIUM CHLORIDE 20 MEQ: 14.9 INJECTION, SOLUTION INTRAVENOUS at 02:01

## 2020-01-28 RX ADMIN — FENTANYL CITRATE 25 MCG: 50 INJECTION INTRAMUSCULAR; INTRAVENOUS at 11:01

## 2020-01-28 RX ADMIN — Medication 3 ML: at 10:01

## 2020-01-28 RX ADMIN — PANTOPRAZOLE SODIUM 40 MG: 40 INJECTION, POWDER, FOR SOLUTION INTRAVENOUS at 09:01

## 2020-01-28 RX ADMIN — EPINEPHRINE 0.06 MCG/KG/MIN: 1 INJECTION INTRAMUSCULAR; INTRAVENOUS; SUBCUTANEOUS at 06:01

## 2020-01-28 RX ADMIN — POTASSIUM CHLORIDE 20 MEQ: 14.9 INJECTION, SOLUTION INTRAVENOUS at 08:01

## 2020-01-28 RX ADMIN — Medication 400 MG: at 02:01

## 2020-01-28 RX ADMIN — NICARDIPINE HYDROCHLORIDE 2 MG/HR: 0.2 INJECTION, SOLUTION INTRAVENOUS at 08:01

## 2020-01-28 RX ADMIN — FENTANYL CITRATE 25 MCG: 50 INJECTION INTRAMUSCULAR; INTRAVENOUS at 09:01

## 2020-01-28 RX ADMIN — PROPOFOL 50 MCG/KG/MIN: 10 INJECTION, EMULSION INTRAVENOUS at 11:01

## 2020-01-28 NOTE — SUBJECTIVE & OBJECTIVE
Interval History: Run of VT overnight, was bolused with lidocaine and ggt increased. Remains CTS primary. Chest open, intubated and sedated. Son at bedside. Plan for closure tomorrow.    Lines:  Arterial Line: 1/23/20  Left IJ: 1/23/20  PA catheter    Continuous Infusions:   epinephrine 0.06 mcg/kg/min (01/28/20 1100)    furosemide (LASIX) 2 mg/mL infusion (non-titrating) 2.5 mg/hr (01/28/20 1100)    heparin (porcine) in 5 % dex 500 Units/hr (01/28/20 1100)    lidocaine 1 mg/min (01/28/20 1100)    niCARdipine Stopped (01/28/20 0900)    nitric oxide gas      propofol 30 mcg/kg/min (01/28/20 1100)     Scheduled Meds:   amiodarone  400 mg Per NG tube TID    amLODIPine  5 mg Per NG tube Daily    aspirin  325 mg Oral Daily    vancomycin (VANCOCIN) IVPB  750 mg Intravenous Q24H    And    ceFEPime (MAXIPIME) IVPB  2 g Intravenous Q12H    docusate sodium  200 mg Oral QHS    ferrous gluconate  324 mg Oral Daily with breakfast    magnesium oxide  400 mg Per NG tube TID    pantoprazole  40 mg Intravenous Daily    polyethylene glycol  17 g Oral BID    sodium chloride 0.9%  3 mL Intravenous Q8H     PRN Meds:albumin human 5%, albuterol sulfate, bisacodyl, Dextrose 10% Bolus, Dextrose 10% Bolus, fentaNYL, glucagon (human recombinant), hydrALAZINE, insulin aspart U-100, magnesium hydroxide 400 mg/5 ml, magnesium sulfate IVPB, oxyCODONE, oxyCODONE, sodium chloride 0.9%    Review of patient's allergies indicates:   Allergen Reactions    Biopatch [chlorhexidine gluconate]      Site burning    Dobutamine in d5w      Tachycardia, tremors, SOB, flushing    Percocet [oxycodone-acetaminophen] Itching    Penicillins Rash     Cefepime given on 1/23/2020 without issue     Objective:     Vital Signs (Most Recent):  Temp: 98.7 °F (37.1 °C) (01/28/20 1100)  Pulse: 90 (01/28/20 1100)  Resp: 18 (01/27/20 2316)  BP: (!) 84/0 (01/28/20 1100)  SpO2: 98 % (01/28/20 1100) Vital Signs (24h Range):  Temp:  [98.4 °F (36.9 °C)-99.8 °F  (37.7 °C)] 98.7 °F (37.1 °C)  Pulse:  [] 90  Resp:  [18] 18  SpO2:  [98 %-100 %] 98 %  BP: (82-88)/(0) 84/0  Arterial Line BP: ()/(61-85) 78/62     Patient Vitals for the past 72 hrs (Last 3 readings):   Weight   01/28/20 0500 110 kg (242 lb 8.1 oz)   01/27/20 0500 108.3 kg (238 lb 12.1 oz)   01/26/20 0500 109 kg (240 lb 4.8 oz)     Body mass index is 34.8 kg/m².      Intake/Output Summary (Last 24 hours) at 1/28/2020 1119  Last data filed at 1/28/2020 1100  Gross per 24 hour   Intake 2022 ml   Output 2610 ml   Net -588 ml       Hemodynamic Parameters:  PAP: (20-46)/(6-27) 32/15  PAP (Mean):  [15 mmHg-34 mmHg] 27 mmHg  PCWP:  [16 mmHg-23 mmHg] 23 mmHg  CO:  [4.2 L/min-7 L/min] 4.4 L/min  CI:  [1.8 L/min/m2-3 L/min/m2] 1.9 L/min/m2    Telemetry: A fib    Physical Exam   Constitutional: Intubated and sedated; chest open    Eyes: Conjunctivae are normal.   Neck: Neck supple. No thyromegaly present. Bilateral IJ lines   Cardiovascular: Irregularly irregular, tachycardic, smooth VAD hum. Chest open   Pulmonary/Chest: Effort normal and breath sounds normal. Intubated and sedated   Abdominal: Soft.   Musculoskeletal: He exhibits no edema.   Neurological: Intubated and sedated   Skin: Skin is warm and dry. Capillary refill takes 2 to 3 seconds.       Significant Labs:  CBC:  Recent Labs   Lab 01/27/20  0900  01/27/20  1336  01/28/20  0115 01/28/20  0402 01/28/20  0710   WBC 14.66*  --  16.37*  --   --  17.01*  --    RBC 3.62*  --  3.75*  --   --  3.84*  --    HGB 10.0*  --  10.4*  --   --  10.5*  --    HCT 33.3*   < > 34.6*   < > 30* 36.1* 29*     --  290  --   --  282  --    MCV 92  --  92  --   --  94  --    MCH 27.6  --  27.7  --   --  27.3  --    MCHC 30.0*  --  30.1*  --   --  29.1*  --     < > = values in this interval not displayed.     BNP:  Recent Labs   Lab 01/24/20  0334 01/27/20  0400   * 1,140*     CMP:  Recent Labs   Lab 01/27/20  0400  01/27/20  1336 01/27/20  1650 01/28/20  0000  01/28/20  0402   *  154*   < > 131* 147*  --  154*   CALCIUM 9.8  9.8   < > 9.2 8.5*  --  9.5   ALBUMIN 2.5*  2.5*  --   --  2.3*  --  2.4*   PROT 6.6  6.6  --   --  6.1  --  6.5   *  146*   < > 145 146*  --  149*   K 4.0  4.0   < > 3.8 4.1  4.1 3.9 3.8  3.8   CO2 28  28   < > 26 25  --  25     102   < > 103 108  --  107   BUN 94*  94*   < > 94* 91*  --  100*   CREATININE 3.0*  3.0*   < > 3.0* 2.8*  --  3.1*   ALKPHOS 45*  45*  --   --  41*  --  43*   ALT 28  28  --   --  24  --  20   AST 30  30  --   --  25  --  26   BILITOT 0.7  0.7  --   --  0.6  --  0.6    < > = values in this interval not displayed.      Coagulation:   Recent Labs   Lab 01/27/20  0915 01/27/20  1336 01/28/20  0402   INR 1.2 1.2 1.2   APTT 25.2 23.5 25.3     LDH:  Recent Labs   Lab 01/26/20  0400 01/27/20  0400 01/28/20  0402   * 537* 458*     Microbiology:  Microbiology Results (last 7 days)     Procedure Component Value Units Date/Time    Blood culture [[id number]] Collected:  01/20/20 1042    Order Status:  Completed Specimen:  Blood from Peripheral, Antecubital, Left Updated:  01/25/20 1412     Blood Culture, Routine No growth after 5 days.    Blood culture [[id number]] Collected:  01/20/20 1035    Order Status:  Completed Specimen:  Blood from Peripheral, Wrist, Left Updated:  01/25/20 1412     Blood Culture, Routine No growth after 5 days.          I have reviewed all pertinent labs within the past 24 hours.    Estimated Creatinine Clearance: 31.9 mL/min (A) (based on SCr of 3.1 mg/dL (H)).    Diagnostic Results:  I have reviewed all pertinent imaging results/findings within the past 24 hours.

## 2020-01-28 NOTE — PLAN OF CARE
[facility] Skin Integrity Evaluation      Subjective    TE[facility] skin integrity champion evaluation of patient as part of the comprehensive skin care team .       Tae Delgado is being evaluated as per the Epic  pressure injury skin report.  He has been admitted to SICU for 19 days .   Skin injury was noted on 1/26/2020.        Limited Physical exam     Lesion 1: Right Nare            Lesion 2: R elbow              Assessment :       Lesion 1:  Suspected Deep Tissue Pressure Injury   Dark/maroon with no skin breakdown. Likely will resolve and be clinically insignificant as NGT has been removed    POA : no    Consults: Wound Care, Physical Therapy, Support surfaces as per WOCN recommendations  and Respiratory Therapy       Lesion 2:  Suspected Deep Tissue Pressure Injury       POA : no    Consults: Wound Care, Physical Therapy, Support surfaces as per WOCN recommendations  and Respiratory Therapy       Follow up:    Patient will be re evaluated weekly.

## 2020-01-28 NOTE — PLAN OF CARE
Pt intubated and sedated. Follows commands and moves all extremities. Pt on FiO2 50% PEEP 5, nitric at 15 ppm, SAT >98%. Pt on propofol, lidocaine, lasix. Low dose Nicardipine required PRN to maintain MAPs 60-85. Heparin at 500 units/hr non titrating. Epi 0.06 mcg/kg/min non titrating. Pain controlled with PRNs. Pt being fully paced at 90 bpm. Pt position maintained due to open chest. Skin free of new breakdown. Will continue to monitor.

## 2020-01-28 NOTE — SUBJECTIVE & OBJECTIVE
Interval History: Patient remains intubated/sedated. FiO2 50%. The patient went for a wash out on yesterday with plans for possible chest closure tomorrow.   Renal function remains stable. sCr 3.1 from 3.0 yesterday. Urine output 2.7 L/24 hrs on Lasix drip.   Na 149. CVP 11. Lasix drip adjusted to 2.5 mg/hr.       Review of patient's allergies indicates:   Allergen Reactions    Biopatch [chlorhexidine gluconate]      Site burning    Dobutamine in d5w      Tachycardia, tremors, SOB, flushing    Percocet [oxycodone-acetaminophen] Itching    Penicillins Rash     Cefepime given on 1/23/2020 without issue     Current Facility-Administered Medications   Medication Frequency    albumin human 5% bottle 500 mL PRN    albuterol sulfate nebulizer solution 2.5 mg Q4H PRN    amiodarone 5 mg/mL oral suspension TID    amLODIPine tablet 5 mg Daily    aspirin tablet 325 mg Daily    bisacodyl suppository 10 mg Daily PRN    vancomycin 750 mg in dextrose 5 % 250 mL IVPB (ready to mix system) Q24H    And    ceFEPIme injection 2 g Q12H    dextrose 10% (D10W) Bolus PRN    dextrose 10% (D10W) Bolus PRN    docusate sodium capsule 200 mg QHS    EPINEPHrine (ADRENALIN) 5 mg in sodium chloride 0.9% 250 mL infusion Continuous    fentaNYL injection 25 mcg Q1H PRN    ferrous gluconate tablet 324 mg Daily with breakfast    furosemide (LASIX) 2 mg/mL in sodium chloride 0.9% 100 mL infusion (conc: 2 mg/mL) Continuous    glucagon (human recombinant) injection 1 mg PRN    heparin 25,000 units in dextrose 5% 250 mL (100 units/mL) infusion (heparin infusion - NO NOMOGRAM) Continuous    hydrALAZINE injection 10 mg Q6H PRN    insulin aspart U-100 pen 0-5 Units Q4H PRN    lidocaine 2000 mg in dextrose 5% 250 mL infusion Continuous    magnesium hydroxide 400 mg/5 ml suspension 2,400 mg Daily PRN    magnesium oxide tablet 400 mg TID    magnesium sulfate 2g in water 50mL IVPB (premix) PRN    niCARdipine 40 mg/200 mL infusion  Continuous    nitric oxide gas Gas 15 ppm Continuous    oxyCODONE immediate release tablet 5 mg Q4H PRN    oxyCODONE immediate release tablet Tab 10 mg Q4H PRN    pantoprazole injection 40 mg Daily    polyethylene glycol packet 17 g BID    propofol (DIPRIVAN) 10 mg/mL infusion Continuous    sodium chloride 0.9% flush 10 mL PRN    sodium chloride 0.9% flush 3 mL Q8H       Objective:     Vital Signs (Most Recent):  Temp: 98.7 °F (37.1 °C) (01/28/20 1100)  Pulse: 90 (01/28/20 1115)  Resp: 18 (01/27/20 2316)  BP: (!) 84/0 (01/28/20 1100)  SpO2: 99 % (01/28/20 1115)  O2 Device (Oxygen Therapy): ventilator (01/28/20 1100) Vital Signs (24h Range):  Temp:  [98.4 °F (36.9 °C)-99.8 °F (37.7 °C)] 98.7 °F (37.1 °C)  Pulse:  [] 90  Resp:  [18] 18  SpO2:  [98 %-100 %] 99 %  BP: (82-88)/(0) 84/0  Arterial Line BP: ()/(61-85) 86/73     Weight: 110 kg (242 lb 8.1 oz) (01/28/20 0500)  Body mass index is 34.8 kg/m².  Body surface area is 2.33 meters squared.    I/O last 3 completed shifts:  In: 2775 [I.V.:2565; NG/GT:210]  Out: 4135 [Urine:3815; Drains:110; Chest Tube:210]    Physical Exam   Constitutional: He appears well-developed. No distress. He is sedated and intubated.   HENT:   Head: Normocephalic and atraumatic.   Right Ear: External ear normal.   Left Ear: External ear normal.   Cardiovascular: Tachycardia present.   RVAD hum   Pulmonary/Chest: Effort normal and breath sounds normal. He is intubated. He has no rales.   Musculoskeletal: He exhibits edema. He exhibits no deformity.   Skin: Skin is warm and dry. He is not diaphoretic.   Sternal surgical wound, drsg intact       Significant Labs:  CBC:   Recent Labs   Lab 01/28/20  0402 01/28/20  0710   WBC 17.01*  --    RBC 3.84*  --    HGB 10.5*  --    HCT 36.1* 29*     --    MCV 94  --    MCH 27.3  --    MCHC 29.1*  --      CMP:   Recent Labs   Lab 01/28/20  0402   *   CALCIUM 9.5   ALBUMIN 2.4*   PROT 6.5   *   K 3.8  3.8   CO2 25   CL  107   *   CREATININE 3.1*   ALKPHOS 43*   ALT 20   AST 26   BILITOT 0.6

## 2020-01-28 NOTE — PROGRESS NOTES
[facility]  Heart Transplant  Progress Note    Patient Name: Tae Delgado  MRN: [id number]  Admission Date: 1/9/2020  Hospital Length of Stay: 19 days  Attending Physician: Ino Schmitt MD  Primary Care Provider: Elham Pearson MD  Principal Problem:LVAD (left ventricular assist device) present    Subjective:     Interval History: Run of VT overnight, was bolused with lidocaine and ggt increased. Remains CTS primary. Chest open, intubated and sedated. Son at bedside. Plan for closure tomorrow.    Lines:  Arterial Line: 1/23/20  Left IJ: 1/23/20  PA catheter    Continuous Infusions:   epinephrine 0.06 mcg/kg/min (01/28/20 1100)    furosemide (LASIX) 2 mg/mL infusion (non-titrating) 2.5 mg/hr (01/28/20 1100)    heparin (porcine) in 5 % dex 500 Units/hr (01/28/20 1100)    lidocaine 1 mg/min (01/28/20 1100)    niCARdipine Stopped (01/28/20 0900)    nitric oxide gas      propofol 30 mcg/kg/min (01/28/20 1100)     Scheduled Meds:   amiodarone  400 mg Per NG tube TID    amLODIPine  5 mg Per NG tube Daily    aspirin  325 mg Oral Daily    vancomycin (VANCOCIN) IVPB  750 mg Intravenous Q24H    And    ceFEPime (MAXIPIME) IVPB  2 g Intravenous Q12H    docusate sodium  200 mg Oral QHS    ferrous gluconate  324 mg Oral Daily with breakfast    magnesium oxide  400 mg Per NG tube TID    pantoprazole  40 mg Intravenous Daily    polyethylene glycol  17 g Oral BID    sodium chloride 0.9%  3 mL Intravenous Q8H     PRN Meds:albumin human 5%, albuterol sulfate, bisacodyl, Dextrose 10% Bolus, Dextrose 10% Bolus, fentaNYL, glucagon (human recombinant), hydrALAZINE, insulin aspart U-100, magnesium hydroxide 400 mg/5 ml, magnesium sulfate IVPB, oxyCODONE, oxyCODONE, sodium chloride 0.9%    Review of patient's allergies indicates:   Allergen Reactions    Biopatch [chlorhexidine gluconate]      Site burning    Dobutamine in d5w      Tachycardia, tremors, SOB, flushing    Percocet [oxycodone-acetaminophen]  Itching    Penicillins Rash     Cefepime given on 1/23/2020 without issue     Objective:     Vital Signs (Most Recent):  Temp: 98.7 °F (37.1 °C) (01/28/20 1100)  Pulse: 90 (01/28/20 1100)  Resp: 18 (01/27/20 2316)  BP: (!) 84/0 (01/28/20 1100)  SpO2: 98 % (01/28/20 1100) Vital Signs (24h Range):  Temp:  [98.4 °F (36.9 °C)-99.8 °F (37.7 °C)] 98.7 °F (37.1 °C)  Pulse:  [] 90  Resp:  [18] 18  SpO2:  [98 %-100 %] 98 %  BP: (82-88)/(0) 84/0  Arterial Line BP: ()/(61-85) 78/62     Patient Vitals for the past 72 hrs (Last 3 readings):   Weight   01/28/20 0500 110 kg (242 lb 8.1 oz)   01/27/20 0500 108.3 kg (238 lb 12.1 oz)   01/26/20 0500 109 kg (240 lb 4.8 oz)     Body mass index is 34.8 kg/m².      Intake/Output Summary (Last 24 hours) at 1/28/2020 1119  Last data filed at 1/28/2020 1100  Gross per 24 hour   Intake 2022 ml   Output 2610 ml   Net -588 ml       Hemodynamic Parameters:  PAP: (20-46)/(6-27) 32/15  PAP (Mean):  [15 mmHg-34 mmHg] 27 mmHg  PCWP:  [16 mmHg-23 mmHg] 23 mmHg  CO:  [4.2 L/min-7 L/min] 4.4 L/min  CI:  [1.8 L/min/m2-3 L/min/m2] 1.9 L/min/m2    Telemetry: A fib    Physical Exam   Constitutional: Intubated and sedated; chest open    Eyes: Conjunctivae are normal.   Neck: Neck supple. No thyromegaly present. Bilateral IJ lines   Cardiovascular: Irregularly irregular, tachycardic, smooth VAD hum. Chest open   Pulmonary/Chest: Effort normal and breath sounds normal. Intubated and sedated   Abdominal: Soft.   Musculoskeletal: He exhibits no edema.   Neurological: Intubated and sedated   Skin: Skin is warm and dry. Capillary refill takes 2 to 3 seconds.       Significant Labs:  CBC:  Recent Labs   Lab 01/27/20  0900  01/27/20  1336  01/28/20  0115 01/28/20  0402 01/28/20  0710   WBC 14.66*  --  16.37*  --   --  17.01*  --    RBC 3.62*  --  3.75*  --   --  3.84*  --    HGB 10.0*  --  10.4*  --   --  10.5*  --    HCT 33.3*   < > 34.6*   < > 30* 36.1* 29*     --  290  --   --  282  --     MCV 92  --  92  --   --  94  --    MCH 27.6  --  27.7  --   --  27.3  --    MCHC 30.0*  --  30.1*  --   --  29.1*  --     < > = values in this interval not displayed.     BNP:  Recent Labs   Lab 01/24/20  0334 01/27/20  0400   * 1,140*     CMP:  Recent Labs   Lab 01/27/20  0400  01/27/20  1336 01/27/20  1650 01/28/20  0000 01/28/20  0402   *  154*   < > 131* 147*  --  154*   CALCIUM 9.8  9.8   < > 9.2 8.5*  --  9.5   ALBUMIN 2.5*  2.5*  --   --  2.3*  --  2.4*   PROT 6.6  6.6  --   --  6.1  --  6.5   *  146*   < > 145 146*  --  149*   K 4.0  4.0   < > 3.8 4.1  4.1 3.9 3.8  3.8   CO2 28  28   < > 26 25  --  25     102   < > 103 108  --  107   BUN 94*  94*   < > 94* 91*  --  100*   CREATININE 3.0*  3.0*   < > 3.0* 2.8*  --  3.1*   ALKPHOS 45*  45*  --   --  41*  --  43*   ALT 28  28  --   --  24  --  20   AST 30  30  --   --  25  --  26   BILITOT 0.7  0.7  --   --  0.6  --  0.6    < > = values in this interval not displayed.      Coagulation:   Recent Labs   Lab 01/27/20  0915 01/27/20  1336 01/28/20  0402   INR 1.2 1.2 1.2   APTT 25.2 23.5 25.3     LDH:  Recent Labs   Lab 01/26/20  0400 01/27/20  0400 01/28/20  0402   * 537* 458*     Microbiology:  Microbiology Results (last 7 days)     Procedure Component Value Units Date/Time    Blood culture [[id number]] Collected:  01/20/20 1042    Order Status:  Completed Specimen:  Blood from Peripheral, Antecubital, Left Updated:  01/25/20 1412     Blood Culture, Routine No growth after 5 days.    Blood culture [[id number]] Collected:  01/20/20 1035    Order Status:  Completed Specimen:  Blood from Peripheral, Wrist, Left Updated:  01/25/20 1412     Blood Culture, Routine No growth after 5 days.          I have reviewed all pertinent labs within the past 24 hours.    Estimated Creatinine Clearance: 31.9 mL/min (A) (based on SCr of 3.1 mg/dL (H)).    Diagnostic Results:  I have reviewed all pertinent imaging results/findings  within the past 24 hours.    Assessment and Plan:       55 y.o. WM with history of NICMP diagnosed in 2010, ICD, LV thrombus (with prior splenic and renal emboli), Embolic  CVA , paroxysmal atrial fib, HTN, HLP  presents for  F/U today to clinic and he was volume overloaded on exam .  He states he had 3 admission in the last 3 months for volume overload. He started having more SOB on exertion since one month. Also endorses of Orthopnea since last one month. Alble to walk only 150 ft. He also has Bilateral lower extremity edema. He was admitted here in 2017 for ADHD here at [facility] and [facility] during that admission showed PCWP 40 and CVP 17. Currently denies chest pain, lightheadedness     * LVAD (left ventricular assist device) present  - S/P DT HM3 with closure of AV and repair of MV for regurg 1/23/20.   - CTS primary  - Taken back to OR 1/24 for possible RVAD placement which was not done. Patient remained hemodynamically stable in OR. Wash out on 1/27. Chest remains open. Remains intubated and sedated.   - CVP 10, SvO2 61  - Currently hemodynamically stable on Epi, Lasix, Lidocaine, and Corby   - Would wean off Lidocaine and start amiodarone  - Current speed 5300    Procedure: Device Interrogation Including analysis of device parameters  Current Settings: Ventricular Assist Device  Review of device function is stable/unstabe    TXP LVAD INTERROGATIONS 1/28/2020 1/28/2020 1/28/2020 1/28/2020 1/28/2020 1/28/2020 1/28/2020   Type HeartMate3 HeartMate3 HeartMate3 HeartMate3 HeartMate3 HeartMate3 HeartMate3   Flow 4.4 4.4 4.4 4.2 4.3 4.2 4.4   Speed 5300 5300 5300 5300 5300 5300 5300   PI 3.1 3.3 3.3 3.4 3.5 3.8 3.1   Power (Berry) 3.9 3.8 3.8 3.7 3.8 3.8 3.7   LSL 4900 4900 4900 4900 4900 4900 4900   Pulsatility Intermittent pulse Intermittent pulse Intermittent pulse Intermittent pulse Intermittent pulse Intermittent pulse Intermittent pulse       Acute on chronic combined systolic and diastolic heart failure  - S/P  DT HM3 with closure of AV and plication of MV for regurg 1/23/20 (See LVAD)  - [facility] dx'd 2010  - currently on lasix 10 mg/hour on EPI .06   - See above        Coagulopathy  - Appreciate Hem/Onc's help. No evidence of underlying coagulopathy (h/o LV thrombus with splenic and renal emboli, h/o embolic CVA)    NSVT (nonsustained ventricular tachycardia)  - would consider restarting Amiodarone    Hepatic congestion  - Liver US on 1/11 unremarkable    ICD (implantable cardioverter-defibrillator) in place  - S/P Biotronik dual chamber ICD    Right lower lobe pulmonary nodule  - Incidental finding on CT chest/abd/pelvis on 1/12. Pulmonology consulted, and rec repeat CT of chest in 3 months  - Will need to follow-up in pulmonary clinic.     Acute kidney injury superimposed on chronic kidney disease  - Creatinine on admit 2.6 (baseline ~ 1.8). BUN/Creatinine today 100/3.1  - Nephrology consulted and following    Paroxysmal atrial fibrillation  - Has been back in A fib since surgery  - AC per CTS  - agree with discontinuing digoxin for rate control- now dig level 2.4 and patient with worsening renal function  - Was on Amiodarone infusion but now on Lidocaine.  - Would wean Lidocaine off and consider restarting Amiodarone    Left ventricular thrombus without MI  - H/O LV thrombus with h/o splenic and renal emboli as well as embolic CVA  - Limited TTE done here 1/13 showed no thrombus  - JACINTO 1/23 intra op showed resolution of DAMON thrombus  - AC per CTS          Jasmina Charles PACYNTHIA  Heart Transplant  [facility]

## 2020-01-28 NOTE — PROGRESS NOTES
Dr. Schmitt notified of ~22 beat run of v-tach, lidocaine bolus of 100mg given, and drip increased to 1 mg/min. Will continue to monitor.

## 2020-01-28 NOTE — ASSESSMENT & PLAN NOTE
- Has been back in A fib since surgery  - AC per CTS  - agree with discontinuing digoxin for rate control- now dig level 2.4 and patient with worsening renal function  - Was on Amiodarone infusion but now on Lidocaine.  - Would wean Lidocaine off and consider restarting Amiodarone

## 2020-01-28 NOTE — OP NOTE
DATE OF PROCEDURE:  01/27/2020    PREOPERATIVE DIAGNOSES:  1.  Status post left ventricular assist device placement.  2.  Severe RV failure.    POSTOPERATIVE DIAGNOSES:  1.  Status post left ventricular assist device placement.  2.  Severe RV failure.    PROCEDURES PERFORMED:  1.  Washout of the mediastinum.  2.  Removal of the sternal strut.  3.  Temporary closure of the chest.    STAFF SURGEON:  Ino Schmitt M.D.    FIRST ASSISTANT:  Shawnee Rodriguez.    ANESTHESIA:  GETA.    ESTIMATED BLOOD LOSS:  20 mL.    KEY FINDINGS OF THE OPERATION:  1.  Significant improvement in RV dysfunction; however, the patient's creatinine   is still 3.  2.  Decision was made to take the sternal strut out to allow the heart to   equilibrate before sternal closure would be carried out.  3.  There is moderate-to-severe RV dysfunction still present.    INDICATIONS OF OPERATION:  This is a 59-year-old gentleman who underwent left   ventricular device placement.  Postoperatively, the chest was left open due to   diffuse coagulopathy and RV dysfunction.  Following a couple of days of therapy,   the patient improved and was taken back for closure, which had to be reopened   again for severe RV dysfunction.  Over the last few days, the patient has   improved significantly.  Last time, the chest had been left open with a sternal   strut and decision was made to take the patient back for a washout and   assessment of RV function.  An informed consent was obtained.    DESCRIPTION OF OPERATION:  The patient was brought to the Operating Room and   placed in a supine position.  After induction of anesthesia, the area was   prepped and draped in the usual sterile fashion.  The previously placed   temporary dressing was taken down.  The sternal strut was removed.  The chest   retractor was placed.  Copious amount of irrigation was used to irrigate the   chest cavity.  The chest tubes were irrigated and repositioned.  JACINTO examination   showed  significant improvement in RV dysfunction.  However, it was still   moderately depressed.  At that time, decision was made to remove the sternal   strut and let the sternum come closer together and see if that would help in   reverberating of the RV.  Once that was done, temporary closure of the chest was   then carried out by using an Esmarch and Ioban to obtain an airtight seal.    Terminal count of needles, sponges and instrument was found to be correct.    MEDICARE ATTESTATION:  Due to unavailability of an adequately trained   Cardiothoracic Surgery resident, I performed all parts of the operation.      AB/IN  dd: 01/28/2020 16:22:04 (CST)  td: 01/28/2020 16:53:49 (CST)  Doc ID   #[id number]  Job ID #[id number]    CC:

## 2020-01-28 NOTE — SUBJECTIVE & OBJECTIVE
Follow-up For: Procedure(s) (LRB):  IRRIGATION, MEDIASTINUM (N/A)    Post-Operative Day: 1 Day Post-Op    Objective:     Vital Signs (Most Recent):  Temp: 98.9 °F (37.2 °C) (01/28/20 0800)  Pulse: 88 (01/28/20 0903)  Resp: 18 (01/27/20 2316)  BP: (!) 86/0 (01/28/20 0800)  SpO2: 99 % (01/28/20 0903) Vital Signs (24h Range):  Temp:  [98.4 °F (36.9 °C)-99.8 °F (37.7 °C)] 98.9 °F (37.2 °C)  Pulse:  [] 88  Resp:  [18] 18  SpO2:  [98 %-100 %] 99 %  BP: (82-88)/(0) 86/0  Arterial Line BP: ()/(64-87) 92/76     Weight: 110 kg (242 lb 8.1 oz)  Body mass index is 34.8 kg/m².      Intake/Output Summary (Last 24 hours) at 1/28/2020 0907  Last data filed at 1/28/2020 0900  Gross per 24 hour   Intake 1916 ml   Output 2685 ml   Net -769 ml       Physical Exam   Constitutional: He appears well-developed and well-nourished.   HENT:   Head: Normocephalic and atraumatic.   Mouth/Throat: No oropharyngeal exudate.   ETT and OG secured in place.   Eyes: Pupils are equal, round, and reactive to light.   Neck: Normal range of motion. Neck supple.   Left IJ double lumen + [place] Heena in place, dressing CLD  Right IJ triple lumen, dressing CLD   Cardiovascular:   Irregular tachyarrythmia. LVAD hum.    Pulmonary/Chest: Breath sounds normal. He has no wheezes. He has no rales.   Intubated. Open chest, dressed with Ioban. CTs with SS drainage.   Abdominal: Soft. He exhibits no distension.   Musculoskeletal: He exhibits no edema or deformity.   Neurological:   sedated   Skin: Skin is warm and dry.       Vents:  Vent Mode: A/C (01/28/20 0903)  Ventilator Initiated: Yes (01/21/20 0905)  Set Rate: 18 bmp (01/28/20 0903)  Vt Set: 500 mL (01/28/20 0903)  PEEP/CPAP: 5 cmH20 (01/28/20 0903)  Oxygen Concentration (%): 50 (01/28/20 0903)  Peak Airway Pressure: 21 cmH2O (01/28/20 0903)  Plateau Pressure: 18 cmH20 (01/28/20 0903)  Total Ve: 9.95 mL (01/28/20 0903)  F/VT Ratio<105 (RSBI): (!) 34.88 (01/27/20 2316)    Lines/Drains/Airways      Central Venous Catheter Line                 Percutaneous Central Line Insertion/Assessment - double lumen  01/23/20 0751 left internal jugular 5 days         Pulmonary Artery Catheter Assessment  01/23/20 0751 5 days         Trialysis (Dialysis) Catheter 01/24/20 1500 right internal jugular 3 days          Drain                 Urethral Catheter 01/21/20 0752 Non-latex;Straight-tip;Temperature probe 16 Fr. 7 days         Chest Tube 01/23/20 1230 1 Right Pleural 4 days         Chest Tube 01/23/20 1414 2 Right Mediastinal 4 days         Chest Tube 01/23/20 1415 3 Left Mediastinal 4 days         NG/OG Tube 01/27/20 1300 Center mouth less than 1 day          Airway                 Airway - Non-Surgical 01/21/20 0742 Endotracheal Tube 7 days          Arterial Line                 Arterial Line 01/21/20 0730 Left Other (Comment) 7 days          Line                 VAD 01/23/20 1148 Left ventricular assist device HeartMate 3 4 days          Peripheral Intravenous Line                 Peripheral IV - Single Lumen 01/25/20 1133 18 G Left Antecubital 2 days                Significant Labs:    CBC/Anemia Profile:  Recent Labs   Lab 01/27/20  0900  01/27/20  1336  01/28/20  0115 01/28/20  0402 01/28/20  0710   WBC 14.66*  --  16.37*  --   --  17.01*  --    HGB 10.0*  --  10.4*  --   --  10.5*  --    HCT 33.3*   < > 34.6*   < > 30* 36.1* 29*     --  290  --   --  282  --    MCV 92  --  92  --   --  94  --    RDW 15.6*  --  15.7*  --   --  15.9*  --     < > = values in this interval not displayed.        Chemistries:  Recent Labs   Lab 01/27/20  0400 01/27/20  0820 01/27/20  1336 01/27/20  1650 01/28/20  0000 01/28/20  0402   *  146* 145 145 146*  --  149*   K 4.0  4.0 3.9 3.8 4.1  4.1 3.9 3.8  3.8     102 103 103 108  --  107   CO2 28  28 27 26 25  --  25   BUN 94*  94* 95* 94* 91*  --  100*   CREATININE 3.0*  3.0* 3.0* 3.0* 2.8*  --  3.1*   CALCIUM 9.8  9.8 9.4 9.2 8.5*  --  9.5   ALBUMIN 2.5*   2.5*  --   --  2.3*  --  2.4*   PROT 6.6  6.6  --   --  6.1  --  6.5   BILITOT 0.7  0.7  --   --  0.6  --  0.6   ALKPHOS 45*  45*  --   --  41*  --  43*   ALT 28  28  --   --  24  --  20   AST 30  30  --   --  25  --  26   MG 2.7* 2.7* 2.6 2.4 2.6 2.6   PHOS 5.4* 5.4* 5.1*  --   --  4.3       ABGs:   Recent Labs   Lab 01/28/20  0710   PH 7.460*   PCO2 42.7   HCO3 30.4*   POCSATURATED 99   BE 7     Coagulation:   Recent Labs   Lab 01/28/20  0402   INR 1.2   APTT 25.3     Lactic Acid: No results for input(s): LACTATE in the last 48 hours.    Significant Imaging:  I have reviewed all pertinent imaging results/findings within the past 24 hours.

## 2020-01-28 NOTE — PLAN OF CARE
Plan of Care Note  Cardiothoracic Surgery    Tae Delgado is a 59 y.o. male with heart failure who underwent LVAD placement (1/23/20) then closure (1/24/20) and repeat chest opening (1/24/20) for poor RV function; chest washout (1/27/20).    Specific issues: continuing renal dysfunction; monitor for need for dialysis; daily vanc levels; beats of vtach prompting lido bolus and increase rate to 1, keep mag>2.5; washout tomorrow and possible closure    Plan of care for patient was discussed with ICU staff including nurses, residents, and faculty and appropriate consulting services.    Will continue to monitor patient's hemodynamics, functionality, neuro status, fluid status and renal function, and labs and will adjust medications and fluids as necessary while monitoring appropriateness for de-escalation of support and monitoring and transport to stepdown unit.    Terence Gonzalez MD  Cardiothoracic Surgery Fellow

## 2020-01-28 NOTE — ANESTHESIA POSTPROCEDURE EVALUATION
Anesthesia Post Evaluation    Patient: Tae Delgado    Procedure(s) Performed: Procedure(s) (LRB):  IRRIGATION, MEDIASTINUM (N/A)    Final Anesthesia Type: general    Patient location during evaluation: ICU  Patient participation: No - Unable to Participate, Intubation  Level of consciousness: responds to stimulation  Post-procedure vital signs: reviewed and stable  Pain management: adequate  Airway patency: patent    PONV status at discharge: No PONV  Anesthetic complications: no      Cardiovascular status: hemodynamically stable  Respiratory status: ventilator and ETT  Hydration status: euvolemic  Follow-up not needed.          Vitals Value Taken Time   BP 86/0 1/28/2020  8:00 AM   Temp 37.1 °C (98.8 °F) 1/28/2020  9:00 AM   Pulse 90 1/28/2020  9:21 AM   Resp 18 1/27/2020 11:16 PM   SpO2 100 % 1/28/2020  9:21 AM   Vitals shown include unvalidated device data.      No case tracking events are documented in the log.      Pain/Dequan Score: Pain Rating Prior to Med Admin: 5 (1/27/2020 11:23 AM)  Pain Rating Post Med Admin: 0 (1/27/2020 11:15 PM)

## 2020-01-28 NOTE — PROGRESS NOTES
[facility]  Critical Care - Surgery  Progress Note    Patient Name: Tae Delgado  MRN: [id number]  Admission Date: 1/9/2020  Hospital Length of Stay: 19 days  Code Status: Full Code  Attending Provider: Ino Schmitt MD  Primary Care Provider: Elham Pearson MD   Principal Problem: LVAD (left ventricular assist device) present    Subjective:     Hospital/ICU Course:  1/23: Pt arrives from OR intubated, open chest dressed with Ioban, CTs with SS output. Drips on arrival: Epi 0.08, Cardene 5, TXA, Nitric at 30. Pt received 1.5L crystalloid, no blood product, 20 mg Lasix (put out 250cc in response).   Intra Op JACINTO Exam:  PRE:  Severely reduced left ventricular systolic function. Dilated LV. No definitive thrombus noted.  Moderate-to-severely reduced right ventricular systolic function. FAC 12-24%  Mild-to-moderate AI. No AS.  Moderate MR with systolic blunting of the pulmonary veins.  Trace-to-mild TR.  Mild PI. PAAT <90ms.  Small PFO by CF doppler.  SEC in La and DAMON. No clear thrombus noted.  Grade 2 atheromatous disease of the aorta.  No effusions.    POST:  Severely depressed left ventriclar dysfunction.  Moderately depressed right ventricular systolic function.  LVAD inflow and outflow cannula noted with laminar flows.  No AI.  Mild MR, vahe stitch observed. No MS.  Mild-to-moderate TR.  PFO still visible on CF doppler.  Aorta intact, no dissection.  No effusions.     1/24: run of Vtach overnight. Amio bolus given, amio gtt increased to 1, lasix push given. Improved. LVAD hemodynamics appropriate overnight. Going for closure this am. Upon return, developed tamponade physiology and returned to OR. Came back to SICU with chest open.  1/26: Diuresed over the weekend, chest kept open. Lidocaine gtt and digoxin for tachyarrhythmia. Otherwise NAEON.   1/27: Went to OR for possible chest closure, decided to do a wash out. Returned to SICU with chest open. One tachyarrhythmia episode overnight,  resolved with 100mg Lidocaine bolus and increasing lidocaine drip from 0.75mg/hr to 1mg/hr.    Follow-up For: Procedure(s) (LRB):  IRRIGATION, MEDIASTINUM (N/A)    Post-Operative Day: 1 Day Post-Op    Objective:     Vital Signs (Most Recent):  Temp: 98.9 °F (37.2 °C) (01/28/20 0800)  Pulse: 88 (01/28/20 0903)  Resp: 18 (01/27/20 2316)  BP: (!) 86/0 (01/28/20 0800)  SpO2: 99 % (01/28/20 0903) Vital Signs (24h Range):  Temp:  [98.4 °F (36.9 °C)-99.8 °F (37.7 °C)] 98.9 °F (37.2 °C)  Pulse:  [] 88  Resp:  [18] 18  SpO2:  [98 %-100 %] 99 %  BP: (82-88)/(0) 86/0  Arterial Line BP: ()/(64-87) 92/76     Weight: 110 kg (242 lb 8.1 oz)  Body mass index is 34.8 kg/m².      Intake/Output Summary (Last 24 hours) at 1/28/2020 0907  Last data filed at 1/28/2020 0900  Gross per 24 hour   Intake 1916 ml   Output 2685 ml   Net -769 ml       Physical Exam   Constitutional: He appears well-developed and well-nourished.   HENT:   Head: Normocephalic and atraumatic.   Mouth/Throat: No oropharyngeal exudate.   ETT and OG secured in place.   Eyes: Pupils are equal, round, and reactive to light.   Neck: Normal range of motion. Neck supple.   Left IJ double lumen + [place] in place, dressing CLD  Right IJ triple lumen, dressing CLD   Cardiovascular:   Irregular tachyarrythmia. LVAD hum.    Pulmonary/Chest: Breath sounds normal. He has no wheezes. He has no rales.   Intubated. Open chest, dressed with Ioban. CTs with SS drainage.   Abdominal: Soft. He exhibits no distension.   Musculoskeletal: He exhibits no edema or deformity.   Neurological:   sedated   Skin: Skin is warm and dry.       Vents:  Vent Mode: A/C (01/28/20 0903)  Ventilator Initiated: Yes (01/21/20 0905)  Set Rate: 18 bmp (01/28/20 0903)  Vt Set: 500 mL (01/28/20 0903)  PEEP/CPAP: 5 cmH20 (01/28/20 0903)  Oxygen Concentration (%): 50 (01/28/20 0903)  Peak Airway Pressure: 21 cmH2O (01/28/20 0903)  Plateau Pressure: 18 cmH20 (01/28/20 0903)  Total Ve: 9.95 mL  (01/28/20 0903)  F/VT Ratio<105 (RSBI): (!) 34.88 (01/27/20 2316)    Lines/Drains/Airways     Central Venous Catheter Line                 Percutaneous Central Line Insertion/Assessment - double lumen  01/23/20 0751 left internal jugular 5 days         Pulmonary Artery Catheter Assessment  01/23/20 0751 5 days         Trialysis (Dialysis) Catheter 01/24/20 1500 right internal jugular 3 days          Drain                 Urethral Catheter 01/21/20 0752 Non-latex;Straight-tip;Temperature probe 16 Fr. 7 days         Chest Tube 01/23/20 1230 1 Right Pleural 4 days         Chest Tube 01/23/20 1414 2 Right Mediastinal 4 days         Chest Tube 01/23/20 1415 3 Left Mediastinal 4 days         NG/OG Tube 01/27/20 1300 Center mouth less than 1 day          Airway                 Airway - Non-Surgical 01/21/20 0742 Endotracheal Tube 7 days          Arterial Line                 Arterial Line 01/21/20 0730 Left Other (Comment) 7 days          Line                 VAD 01/23/20 1148 Left ventricular assist device HeartMate 3 4 days          Peripheral Intravenous Line                 Peripheral IV - Single Lumen 01/25/20 1133 18 G Left Antecubital 2 days                Significant Labs:    CBC/Anemia Profile:  Recent Labs   Lab 01/27/20  0900  01/27/20  1336  01/28/20  0115 01/28/20  0402 01/28/20  0710   WBC 14.66*  --  16.37*  --   --  17.01*  --    HGB 10.0*  --  10.4*  --   --  10.5*  --    HCT 33.3*   < > 34.6*   < > 30* 36.1* 29*     --  290  --   --  282  --    MCV 92  --  92  --   --  94  --    RDW 15.6*  --  15.7*  --   --  15.9*  --     < > = values in this interval not displayed.        Chemistries:  Recent Labs   Lab 01/27/20  0400 01/27/20  0820 01/27/20  1336 01/27/20  1650 01/28/20  0000 01/28/20  0402   *  146* 145 145 146*  --  149*   K 4.0  4.0 3.9 3.8 4.1  4.1 3.9 3.8  3.8     102 103 103 108  --  107   CO2 28  28 27 26 25  --  25   BUN 94*  94* 95* 94* 91*  --  100*   CREATININE 3.0*   3.0* 3.0* 3.0* 2.8*  --  3.1*   CALCIUM 9.8  9.8 9.4 9.2 8.5*  --  9.5   ALBUMIN 2.5*  2.5*  --   --  2.3*  --  2.4*   PROT 6.6  6.6  --   --  6.1  --  6.5   BILITOT 0.7  0.7  --   --  0.6  --  0.6   ALKPHOS 45*  45*  --   --  41*  --  43*   ALT 28  28  --   --  24  --  20   AST 30  30  --   --  25  --  26   MG 2.7* 2.7* 2.6 2.4 2.6 2.6   PHOS 5.4* 5.4* 5.1*  --   --  4.3       ABGs:   Recent Labs   Lab 01/28/20  0710   PH 7.460*   PCO2 42.7   HCO3 30.4*   POCSATURATED 99   BE 7     Coagulation:   Recent Labs   Lab 01/28/20  0402   INR 1.2   APTT 25.3     Lactic Acid: No results for input(s): LACTATE in the last 48 hours.    Significant Imaging:  I have reviewed all pertinent imaging results/findings within the past 24 hours.    Assessment/Plan:     Acute on chronic combined systolic and diastolic heart failure  Tae Delgado is a 59 y.o. 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 Approved for DT LVAD. Went to OR on 1/21 and found to have DAMON and LV thrombus and case was aborted. Pt was placed on a heparin gtt and thrombus had dissipated on repeat JACINTO on 1/22. Pt underwent LVAD placement on 1/23. Chest closure and re-exploration on 1/24. Returned from OR with chest open on 1/24. Attempted chest closure on 1/27, returned to SICU with chest open.     Neuro:  - Patient opens eyes and follows commands when sedation is held  - Prop gtt with prn Fentanyl    CVS:   - Current LVAD flow @ 5300 with appropriate PIs   - Epi @ 0.07, Nitric @ 15  - Lidocaine gtt for tachyarrythmia  - Will consider returning to IV amio (concern that amio elixir not absorbing)  - ICD turned on, EP service consulted for assistance with arrythmia  - Chest closure  1/29    Pulm:  - Mechanical ventilation, wean as able when chest closed  - ABGs q6hr  - CXR daily  - Monitor CT output, no overt signs of bleeding     GI:  - NPO  - Protonix 40 daily  - Docusate    Endo:   - Insulin off now, SSI  - Endocrine consulted, appreciate their services    Renal:  - Strict I/O  - Trend BUN/Cr  - Lasix gtt @ 10 --> 5 today  - 250cc Albumin over 2-5 hrs    Lytes:  - Monitor labs  - Replace per protocol    Heme:  - No blood products needed during the OR  - H/H stable overnight    ID:   - Lona op ABX  - Follow WBC  - Follow fever curve    PPx:  - Hep gtt nontitrating, at 500 u/hr  - GI ppx    Dispo:  - Cont SICU care   - Chest closure tentatively 1/29    Critical care was time spent personally by me on the following activities: development of treatment plan with patient or surrogate and bedside caregivers, discussions with consultants, evaluation of patient's response to treatment, examination of patient, ordering and performing treatments and interventions, ordering and review of laboratory studies, ordering and review of radiographic studies, pulse oximetry, re-evaluation of patient's condition.  This critical care time did not overlap with that of any other provider or involve time for any procedures.     Marycruz Burnette MD  Critical Care - Surgery  [facility]

## 2020-01-28 NOTE — ASSESSMENT & PLAN NOTE
- S/P DT HM3 with closure of AV and plication of MV for regurg 1/23/20 (See LVAD)  - [facility] dx'd 2010  - currently on lasix 10 mg/hour on EPI .06   - See above

## 2020-01-28 NOTE — PROGRESS NOTES
Wound care follow up for right nare.     Area of maroon remains unchanged to the right nare. NGT no longer in nare.     Patient chest remains open and unable to assess the backside. Patient on EHOB overlay with offloading boots in place.     Continue sween cream BID to the right nare.   Farzana Jara RN CWCN CN   [phone]

## 2020-01-28 NOTE — CARE UPDATE
BG goal 140-180    Remains in ICU. Intubated with chest open- tentative closure tomorrow per heart transplant notes. BG well controlled without insulin.     Plan:  BG monitoring every 4 hours and low dose correction scale.     Discharge planning: TBD     Endocrine to continue to follow    ** Please call Endocrine for any BG related issues **

## 2020-01-28 NOTE — PROGRESS NOTES
01/28/2020  Felipe Lim    Current provider:  Ino Schmitt MD      I, Felipe Lim, rounded on Tae Delgado to ensure all mechanical assist device settings (IABP or VAD) were appropriate and all parameters were within limits.  I was able to ensure all back up equipment was present, the staff had no issues, and the Perfusion Department daily rounding was complete.    6:56 AM

## 2020-01-28 NOTE — ASSESSMENT & PLAN NOTE
Tae Delgado is a 59 y.o. 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 Approved for DT LVAD. Went to OR on 1/21 and found to have DAMON and LV thrombus and case was aborted. Pt was placed on a heparin gtt and thrombus had dissipated on repeat JACINTO on 1/22. Pt underwent LVAD placement on 1/23. Chest closure and re-exploration on 1/24. Returned from OR with chest open on 1/24. Attempted chest closure on 1/27, returned to SICU with chest open.     Neuro:  - Patient opens eyes and follows commands when sedation is held  - Prop gtt with prn Fentanyl    CVS:   - Current LVAD flow @ 5300 with appropriate PIs   - Epi @ 0.07, Nitric @ 15  - Lidocaine gtt for tachyarrythmia  - Will consider returning to IV amio (concern that amio elixir not absorbing)  - ICD turned on, EP service consulted for assistance with arrythmia  - Chest closure 1/29    Pulm:  - Mechanical ventilation, wean as able when chest closed  - ABGs q6hr  - CXR daily  - Monitor CT output, no overt signs of bleeding     GI:  - NPO  - Protonix 40 daily  - Docusate    Endo:   - Insulin off now, SSI  - Endocrine consulted, appreciate their services    Renal:  - Strict I/O  - Trend BUN/Cr  - Lasix gtt @ 10 --> 5 today  - 250cc Albumin over 2-5 hrs    Lytes:  - Monitor labs  - Replace per protocol    Heme:  - No blood products needed during the OR  - H/H stable overnight    ID:   - Lona op ABX  - Follow WBC  - Follow fever curve    PPx:  - Hep gtt nontitrating, at 500 u/hr  - GI ppx    Dispo:  - Cont SICU care   - Chest closure tentatively 1/29

## 2020-01-28 NOTE — ASSESSMENT & PLAN NOTE
- Creatinine on admit 2.6 (baseline ~ 1.8). BUN/Creatinine today 100/3.1  - Nephrology consulted and following

## 2020-01-28 NOTE — PROGRESS NOTES
Patient on 50% and 5 of PEEP, A/C ventilation. MAP 60-85 through shift, Cardene gtt restarted in AM to maintain pressure goals. HR 90-100s, irregular, latest CVP 10 flat, T-max 99.9, T-current 99, PA pressures in flow sheet, pain controlled via PRNs, doppler pressures Q assessment. Patient on 1 mg/min, (100 mg bolus given in PM, see note), lasix at 10 mg/h, cardene at 1 mg/h, heparin at 500 u/h, propofol at 30 mcg/kg/min, nitric at 15 ppm. See VAD flow sheet for numbers, not much fluctuation through shift. Flow-track and VAD updated with morning lab results, (HGB, SvO2(61%)). See flow sheet vitals for flow track numbers. Patient followed commands with sedation paused Q assessment, weak effort. Pulses dopplered Q4, all questions answered by RN, will continue to monitor.

## 2020-01-29 LAB
ALBUMIN SERPL BCP-MCNC: 2.5 G/DL (ref 3.5–5.2)
ALBUMIN SERPL BCP-MCNC: 2.6 G/DL (ref 3.5–5.2)
ALBUMIN SERPL BCP-MCNC: 2.7 G/DL (ref 3.5–5.2)
ALBUMIN SERPL BCP-MCNC: 2.7 G/DL (ref 3.5–5.2)
ALLENS TEST: ABNORMAL
ALLENS TEST: NORMAL
ALLENS TEST: NORMAL
ALP SERPL-CCNC: 42 U/L (ref 55–135)
ALP SERPL-CCNC: 43 U/L (ref 55–135)
ALP SERPL-CCNC: 43 U/L (ref 55–135)
ALP SERPL-CCNC: 46 U/L (ref 55–135)
ALT SERPL W/O P-5'-P-CCNC: 12 U/L (ref 10–44)
ALT SERPL W/O P-5'-P-CCNC: 13 U/L (ref 10–44)
ANION GAP SERPL CALC-SCNC: 12 MMOL/L (ref 8–16)
ANION GAP SERPL CALC-SCNC: 12 MMOL/L (ref 8–16)
ANION GAP SERPL CALC-SCNC: 9 MMOL/L (ref 8–16)
ANISOCYTOSIS BLD QL SMEAR: SLIGHT
ANISOCYTOSIS BLD QL SMEAR: SLIGHT
APTT BLDCRRT: 23 SEC (ref 21–32)
APTT BLDCRRT: 24.7 SEC (ref 21–32)
AST SERPL-CCNC: 21 U/L (ref 10–40)
B CELL RESULTS - XM AUTO: NEGATIVE
B MCS AVERAGE - XM AUTO: -41.4
BASO STIPL BLD QL SMEAR: ABNORMAL
BASOPHILS # BLD AUTO: ABNORMAL K/UL (ref 0–0.2)
BASOPHILS # BLD AUTO: ABNORMAL K/UL (ref 0–0.2)
BASOPHILS NFR BLD: 0 % (ref 0–1.9)
BASOPHILS NFR BLD: 0 % (ref 0–1.9)
BASOPHILS NFR BLD: 1 % (ref 0–1.9)
BILIRUB DIRECT SERPL-MCNC: 0.5 MG/DL (ref 0.1–0.3)
BILIRUB SERPL-MCNC: 0.6 MG/DL (ref 0.1–1)
BILIRUB SERPL-MCNC: 0.7 MG/DL (ref 0.1–1)
BNP SERPL-MCNC: 320 PG/ML (ref 0–99)
BUN SERPL-MCNC: 92 MG/DL (ref 6–20)
BUN SERPL-MCNC: 94 MG/DL (ref 6–20)
BUN SERPL-MCNC: 97 MG/DL (ref 6–20)
CALCIUM SERPL-MCNC: 8.9 MG/DL (ref 8.7–10.5)
CALCIUM SERPL-MCNC: 9 MG/DL (ref 8.7–10.5)
CALCIUM SERPL-MCNC: 9.1 MG/DL (ref 8.7–10.5)
CHLORIDE SERPL-SCNC: 111 MMOL/L (ref 95–110)
CHLORIDE SERPL-SCNC: 112 MMOL/L (ref 95–110)
CHLORIDE SERPL-SCNC: 113 MMOL/L (ref 95–110)
CO2 SERPL-SCNC: 25 MMOL/L (ref 23–29)
CO2 SERPL-SCNC: 27 MMOL/L (ref 23–29)
CO2 SERPL-SCNC: 28 MMOL/L (ref 23–29)
CREAT SERPL-MCNC: 2.6 MG/DL (ref 0.5–1.4)
CREAT SERPL-MCNC: 2.7 MG/DL (ref 0.5–1.4)
CREAT SERPL-MCNC: 2.7 MG/DL (ref 0.5–1.4)
CRP SERPL-MCNC: 154.6 MG/L (ref 0–8.2)
DELSYS: ABNORMAL
DELSYS: NORMAL
DIFFERENTIAL METHOD: ABNORMAL
DIGOXIN SERPL-MCNC: 1.9 NG/ML (ref 0.8–2)
EOSINOPHIL # BLD AUTO: ABNORMAL K/UL (ref 0–0.5)
EOSINOPHIL # BLD AUTO: ABNORMAL K/UL (ref 0–0.5)
EOSINOPHIL NFR BLD: 0 % (ref 0–8)
EOSINOPHIL NFR BLD: 0 % (ref 0–8)
EOSINOPHIL NFR BLD: 1 % (ref 0–8)
ERYTHROCYTE [DISTWIDTH] IN BLOOD BY AUTOMATED COUNT: 16.1 % (ref 11.5–14.5)
ERYTHROCYTE [DISTWIDTH] IN BLOOD BY AUTOMATED COUNT: 16.1 % (ref 11.5–14.5)
ERYTHROCYTE [DISTWIDTH] IN BLOOD BY AUTOMATED COUNT: 16.2 % (ref 11.5–14.5)
ERYTHROCYTE [SEDIMENTATION RATE] IN BLOOD BY WESTERGREN METHOD: 16 MM/H
EST. GFR  (AFRICAN AMERICAN): 28.5 ML/MIN/1.73 M^2
EST. GFR  (AFRICAN AMERICAN): 28.5 ML/MIN/1.73 M^2
EST. GFR  (AFRICAN AMERICAN): 29.9 ML/MIN/1.73 M^2
EST. GFR  (NON AFRICAN AMERICAN): 24.7 ML/MIN/1.73 M^2
EST. GFR  (NON AFRICAN AMERICAN): 24.7 ML/MIN/1.73 M^2
EST. GFR  (NON AFRICAN AMERICAN): 25.8 ML/MIN/1.73 M^2
FIBRINOGEN PPP-MCNC: 574 MG/DL (ref 182–366)
FIO2: 50
FXMAU TESTING DATE: NORMAL
GIANT PLATELETS BLD QL SMEAR: PRESENT
GLUCOSE SERPL-MCNC: 150 MG/DL (ref 70–110)
GLUCOSE SERPL-MCNC: 150 MG/DL (ref 70–110)
GLUCOSE SERPL-MCNC: 167 MG/DL (ref 70–110)
HCO3 UR-SCNC: 23.3 MMOL/L (ref 24–28)
HCO3 UR-SCNC: 23.7 MMOL/L (ref 24–28)
HCO3 UR-SCNC: 24.2 MMOL/L (ref 24–28)
HCO3 UR-SCNC: 24.3 MMOL/L (ref 24–28)
HCO3 UR-SCNC: 24.4 MMOL/L (ref 24–28)
HCO3 UR-SCNC: 25.1 MMOL/L (ref 24–28)
HCO3 UR-SCNC: 26.5 MMOL/L (ref 24–28)
HCO3 UR-SCNC: 29.2 MMOL/L (ref 24–28)
HCO3 UR-SCNC: 29.4 MMOL/L (ref 24–28)
HCO3 UR-SCNC: 29.5 MMOL/L (ref 24–28)
HCT VFR BLD AUTO: 31 % (ref 40–54)
HCT VFR BLD AUTO: 32.9 % (ref 40–54)
HCT VFR BLD AUTO: 33.1 % (ref 40–54)
HCT VFR BLD CALC: 25 %PCV (ref 36–54)
HCT VFR BLD CALC: 28 %PCV (ref 36–54)
HCT VFR BLD CALC: 28 %PCV (ref 36–54)
HCT VFR BLD CALC: 34 %PCV (ref 36–54)
HCT VFR BLD CALC: 35 %PCV (ref 36–54)
HCT VFR BLD CALC: 38 %PCV (ref 36–54)
HCT VFR BLD CALC: 41 %PCV (ref 36–54)
HCT VFR BLD CALC: 43 %PCV (ref 36–54)
HGB BLD-MCNC: 9.3 G/DL (ref 14–18)
HGB BLD-MCNC: 9.7 G/DL (ref 14–18)
HGB BLD-MCNC: 9.7 G/DL (ref 14–18)
HLA AB QL: NEGATIVE
HLA AB SERPL: NEGATIVE
HLATY INTERPRETATION: NORMAL
HYPOCHROMIA BLD QL SMEAR: ABNORMAL
HYPOCHROMIA BLD QL SMEAR: ABNORMAL
IMM GRANULOCYTES # BLD AUTO: ABNORMAL K/UL (ref 0–0.04)
IMM GRANULOCYTES NFR BLD AUTO: ABNORMAL % (ref 0–0.5)
INR PPP: 1.2 (ref 0.8–1.2)
INR PPP: 1.2 (ref 0.8–1.2)
LDH SERPL L TO P-CCNC: 0.6 MMOL/L (ref 0.36–1.25)
LDH SERPL L TO P-CCNC: 0.66 MMOL/L (ref 0.36–1.25)
LDH SERPL L TO P-CCNC: 0.76 MMOL/L (ref 0.36–1.25)
LDH SERPL L TO P-CCNC: 0.81 MMOL/L (ref 0.36–1.25)
LDH SERPL L TO P-CCNC: 0.82 MMOL/L (ref 0.36–1.25)
LDH SERPL L TO P-CCNC: 0.83 MMOL/L (ref 0.36–1.25)
LDH SERPL L TO P-CCNC: 0.87 MMOL/L (ref 0.36–1.25)
LDH SERPL L TO P-CCNC: 1.01 MMOL/L (ref 0.36–1.25)
LDH SERPL L TO P-CCNC: 395 U/L (ref 110–260)
LYMPHOCYTES # BLD AUTO: ABNORMAL K/UL (ref 1–4.8)
LYMPHOCYTES # BLD AUTO: ABNORMAL K/UL (ref 1–4.8)
LYMPHOCYTES NFR BLD: 3 % (ref 18–48)
LYMPHOCYTES NFR BLD: 5 % (ref 18–48)
LYMPHOCYTES NFR BLD: 7 % (ref 18–48)
MAGNESIUM SERPL-MCNC: 2.6 MG/DL (ref 1.6–2.6)
MAGNESIUM SERPL-MCNC: 2.6 MG/DL (ref 1.6–2.6)
MAGNESIUM SERPL-MCNC: 2.7 MG/DL (ref 1.6–2.6)
MAGNESIUM SERPL-MCNC: 2.7 MG/DL (ref 1.6–2.6)
MCH RBC QN AUTO: 27.6 PG (ref 27–31)
MCH RBC QN AUTO: 27.8 PG (ref 27–31)
MCH RBC QN AUTO: 28.2 PG (ref 27–31)
MCHC RBC AUTO-ENTMCNC: 29.3 G/DL (ref 32–36)
MCHC RBC AUTO-ENTMCNC: 29.5 G/DL (ref 32–36)
MCHC RBC AUTO-ENTMCNC: 30 G/DL (ref 32–36)
MCV RBC AUTO: 94 FL (ref 82–98)
METAMYELOCYTES NFR BLD MANUAL: 1 %
METAMYELOCYTES NFR BLD MANUAL: 3 %
METHEMOGLOBIN: 0.9 % (ref 0–3)
MIN VOL: 10.1
MIN VOL: 10.7
MIN VOL: 8.36
MIN VOL: 8.77
MIN VOL: 9.3
MODE: ABNORMAL
MODE: NORMAL
MONOCYTES # BLD AUTO: ABNORMAL K/UL (ref 0.3–1)
MONOCYTES # BLD AUTO: ABNORMAL K/UL (ref 0.3–1)
MONOCYTES NFR BLD: 4 % (ref 4–15)
MONOCYTES NFR BLD: 5 % (ref 4–15)
MONOCYTES NFR BLD: 7 % (ref 4–15)
MYELOCYTES NFR BLD MANUAL: 2 %
MYELOCYTES NFR BLD MANUAL: 2 %
NEUTROPHILS NFR BLD: 84 % (ref 38–73)
NEUTROPHILS NFR BLD: 87 % (ref 38–73)
NEUTROPHILS NFR BLD: 88 % (ref 38–73)
NRBC BLD-RTO: 1 /100 WBC
NRBC BLD-RTO: 2 /100 WBC
NRBC BLD-RTO: 3 /100 WBC
OVALOCYTES BLD QL SMEAR: ABNORMAL
PCO2 BLDA: 34.9 MMHG (ref 35–45)
PCO2 BLDA: 35.2 MMHG (ref 35–45)
PCO2 BLDA: 36.3 MMHG (ref 35–45)
PCO2 BLDA: 37.5 MMHG (ref 35–45)
PCO2 BLDA: 39.7 MMHG (ref 35–45)
PCO2 BLDA: 40.1 MMHG (ref 35–45)
PCO2 BLDA: 42.3 MMHG (ref 35–45)
PCO2 BLDA: 42.9 MMHG (ref 35–45)
PCO2 BLDA: 47.8 MMHG (ref 35–45)
PCO2 BLDA: 50 MMHG (ref 35–45)
PEEP: 5
PH SMN: 7.38 [PH] (ref 7.35–7.45)
PH SMN: 7.39 [PH] (ref 7.35–7.45)
PH SMN: 7.39 [PH] (ref 7.35–7.45)
PH SMN: 7.4 [PH] (ref 7.35–7.45)
PH SMN: 7.41 [PH] (ref 7.35–7.45)
PH SMN: 7.42 [PH] (ref 7.35–7.45)
PH SMN: 7.43 [PH] (ref 7.35–7.45)
PH SMN: 7.43 [PH] (ref 7.35–7.45)
PH SMN: 7.44 [PH] (ref 7.35–7.45)
PH SMN: 7.45 [PH] (ref 7.35–7.45)
PHOSPHATE SERPL-MCNC: 3.5 MG/DL (ref 2.7–4.5)
PHOSPHATE SERPL-MCNC: 4.6 MG/DL (ref 2.7–4.5)
PIP: 20
PIP: 21
PIP: 30
PLATELET # BLD AUTO: 213 K/UL (ref 150–350)
PLATELET # BLD AUTO: 215 K/UL (ref 150–350)
PLATELET # BLD AUTO: 238 K/UL (ref 150–350)
PLATELET BLD QL SMEAR: ABNORMAL
PMV BLD AUTO: 11.4 FL (ref 9.2–12.9)
PMV BLD AUTO: 11.4 FL (ref 9.2–12.9)
PMV BLD AUTO: 11.8 FL (ref 9.2–12.9)
PO2 BLDA: 104 MMHG (ref 80–100)
PO2 BLDA: 107 MMHG (ref 80–100)
PO2 BLDA: 117 MMHG (ref 80–100)
PO2 BLDA: 118 MMHG (ref 80–100)
PO2 BLDA: 121 MMHG (ref 80–100)
PO2 BLDA: 148 MMHG (ref 80–100)
PO2 BLDA: 29 MMHG (ref 40–60)
PO2 BLDA: 32 MMHG (ref 40–60)
PO2 BLDA: 95 MMHG (ref 80–100)
PO2 BLDA: 96 MMHG (ref 80–100)
POC BE: -1 MMOL/L
POC BE: 0 MMOL/L
POC BE: 1 MMOL/L
POC BE: 2 MMOL/L
POC BE: 4 MMOL/L
POC BE: 5 MMOL/L
POC BE: 5 MMOL/L
POC IONIZED CALCIUM: 1.08 MMOL/L (ref 1.06–1.42)
POC IONIZED CALCIUM: 1.11 MMOL/L (ref 1.06–1.42)
POC IONIZED CALCIUM: 1.12 MMOL/L (ref 1.06–1.42)
POC IONIZED CALCIUM: 1.13 MMOL/L (ref 1.06–1.42)
POC IONIZED CALCIUM: 1.13 MMOL/L (ref 1.06–1.42)
POC IONIZED CALCIUM: 1.16 MMOL/L (ref 1.06–1.42)
POC IONIZED CALCIUM: 1.17 MMOL/L (ref 1.06–1.42)
POC IONIZED CALCIUM: 1.18 MMOL/L (ref 1.06–1.42)
POC SATURATED O2: 54 % (ref 95–100)
POC SATURATED O2: 59 % (ref 95–100)
POC SATURATED O2: 97 % (ref 95–100)
POC SATURATED O2: 98 % (ref 95–100)
POC SATURATED O2: 99 % (ref 95–100)
POC TCO2: 24 MMOL/L (ref 23–27)
POC TCO2: 25 MMOL/L (ref 23–27)
POC TCO2: 26 MMOL/L (ref 23–27)
POC TCO2: 28 MMOL/L (ref 23–27)
POC TCO2: 30 MMOL/L (ref 23–27)
POC TCO2: 31 MMOL/L (ref 24–29)
POC TCO2: 31 MMOL/L (ref 24–29)
POCT GLUCOSE: 124 MG/DL (ref 70–110)
POCT GLUCOSE: 143 MG/DL (ref 70–110)
POCT GLUCOSE: 143 MG/DL (ref 70–110)
POCT GLUCOSE: 147 MG/DL (ref 70–110)
POCT GLUCOSE: 170 MG/DL (ref 70–110)
POIKILOCYTOSIS BLD QL SMEAR: SLIGHT
POLYCHROMASIA BLD QL SMEAR: ABNORMAL
POTASSIUM BLD-SCNC: 3.3 MMOL/L (ref 3.5–5.1)
POTASSIUM BLD-SCNC: 3.3 MMOL/L (ref 3.5–5.1)
POTASSIUM BLD-SCNC: 3.4 MMOL/L (ref 3.5–5.1)
POTASSIUM BLD-SCNC: 3.4 MMOL/L (ref 3.5–5.1)
POTASSIUM BLD-SCNC: 3.5 MMOL/L (ref 3.5–5.1)
POTASSIUM BLD-SCNC: 3.6 MMOL/L (ref 3.5–5.1)
POTASSIUM BLD-SCNC: 3.6 MMOL/L (ref 3.5–5.1)
POTASSIUM BLD-SCNC: 3.9 MMOL/L (ref 3.5–5.1)
POTASSIUM SERPL-SCNC: 3.7 MMOL/L (ref 3.5–5.1)
POTASSIUM SERPL-SCNC: 4 MMOL/L (ref 3.5–5.1)
POTASSIUM SERPL-SCNC: 4.1 MMOL/L (ref 3.5–5.1)
PROT SERPL-MCNC: 6.1 G/DL (ref 6–8.4)
PROT SERPL-MCNC: 6.4 G/DL (ref 6–8.4)
PROT SERPL-MCNC: 6.4 G/DL (ref 6–8.4)
PROT SERPL-MCNC: 6.5 G/DL (ref 6–8.4)
PROTHROMBIN TIME: 12.1 SEC (ref 9–12.5)
PROTHROMBIN TIME: 12.1 SEC (ref 9–12.5)
RBC # BLD AUTO: 3.3 M/UL (ref 4.6–6.2)
RBC # BLD AUTO: 3.49 M/UL (ref 4.6–6.2)
RBC # BLD AUTO: 3.51 M/UL (ref 4.6–6.2)
SAMPLE: ABNORMAL
SAMPLE: NORMAL
SAMPLE: NORMAL
SCRFL TESTING DATE: NORMAL
SERUM COLLECTION DT - XM AUTO: NORMAL
SERUM COLLECTION DT: NORMAL
SITE: ABNORMAL
SITE: NORMAL
SITE: NORMAL
SODIUM BLD-SCNC: 149 MMOL/L (ref 136–145)
SODIUM BLD-SCNC: 150 MMOL/L (ref 136–145)
SODIUM BLD-SCNC: 151 MMOL/L (ref 136–145)
SODIUM BLD-SCNC: 151 MMOL/L (ref 136–145)
SODIUM SERPL-SCNC: 149 MMOL/L (ref 136–145)
SODIUM SERPL-SCNC: 150 MMOL/L (ref 136–145)
SODIUM SERPL-SCNC: 150 MMOL/L (ref 136–145)
SP02: 100
SP02: 98
SP02: 98
SP02: 99
T CELL RESULTS - XM AUTO: NEGATIVE
T MCS AVERAGE - XM AUTO: -10
VANCOMYCIN SERPL-MCNC: 6.7 UG/ML
VON WILLEBRAND EVAL PPP-IMP: NORMAL
VT: 500
VWF MULTIMERS PPP QL: NORMAL
WBC # BLD AUTO: 15.45 K/UL (ref 3.9–12.7)
WBC # BLD AUTO: 17.09 K/UL (ref 3.9–12.7)
WBC # BLD AUTO: 17.23 K/UL (ref 3.9–12.7)

## 2020-01-29 PROCEDURE — 20000000 HC ICU ROOM

## 2020-01-29 PROCEDURE — 86140 C-REACTIVE PROTEIN: CPT

## 2020-01-29 PROCEDURE — 99900026 HC AIRWAY MAINTENANCE (STAT)

## 2020-01-29 PROCEDURE — 27000221 HC OXYGEN, UP TO 24 HOURS

## 2020-01-29 PROCEDURE — 36000709 HC OR TIME LEV III EA ADD 15 MIN: Performed by: THORACIC SURGERY (CARDIOTHORACIC VASCULAR SURGERY)

## 2020-01-29 PROCEDURE — 63600367 HC NITRIC OXIDE PER HOUR

## 2020-01-29 PROCEDURE — 85014 HEMATOCRIT: CPT

## 2020-01-29 PROCEDURE — 63600175 PHARM REV CODE 636 W HCPCS: Performed by: STUDENT IN AN ORGANIZED HEALTH CARE EDUCATION/TRAINING PROGRAM

## 2020-01-29 PROCEDURE — 37000009 HC ANESTHESIA EA ADD 15 MINS: Performed by: THORACIC SURGERY (CARDIOTHORACIC VASCULAR SURGERY)

## 2020-01-29 PROCEDURE — D9220A PRA ANESTHESIA: ICD-10-PCS | Mod: ,,, | Performed by: ANESTHESIOLOGY

## 2020-01-29 PROCEDURE — 85610 PROTHROMBIN TIME: CPT

## 2020-01-29 PROCEDURE — C9113 INJ PANTOPRAZOLE SODIUM, VIA: HCPCS | Performed by: SURGERY

## 2020-01-29 PROCEDURE — 84100 ASSAY OF PHOSPHORUS: CPT | Mod: 91

## 2020-01-29 PROCEDURE — 21750 PR CLOSE MED STERNOTOMY SEP, W/WO DEBRIDE: ICD-10-PCS | Mod: 58,,, | Performed by: THORACIC SURGERY (CARDIOTHORACIC VASCULAR SURGERY)

## 2020-01-29 PROCEDURE — 94761 N-INVAS EAR/PLS OXIMETRY MLT: CPT

## 2020-01-29 PROCEDURE — 25000003 PHARM REV CODE 250: Performed by: STUDENT IN AN ORGANIZED HEALTH CARE EDUCATION/TRAINING PROGRAM

## 2020-01-29 PROCEDURE — 85384 FIBRINOGEN ACTIVITY: CPT

## 2020-01-29 PROCEDURE — 83605 ASSAY OF LACTIC ACID: CPT

## 2020-01-29 PROCEDURE — 27201423 OPTIME MED/SURG SUP & DEVICES STERILE SUPPLY: Performed by: THORACIC SURGERY (CARDIOTHORACIC VASCULAR SURGERY)

## 2020-01-29 PROCEDURE — 27000239 HC STAND-BY BYPASS PUMP

## 2020-01-29 PROCEDURE — 25000003 PHARM REV CODE 250: Performed by: PHYSICIAN ASSISTANT

## 2020-01-29 PROCEDURE — 37000008 HC ANESTHESIA 1ST 15 MINUTES: Performed by: THORACIC SURGERY (CARDIOTHORACIC VASCULAR SURGERY)

## 2020-01-29 PROCEDURE — 37799 UNLISTED PX VASCULAR SURGERY: CPT

## 2020-01-29 PROCEDURE — 36000708 HC OR TIME LEV III 1ST 15 MIN: Performed by: THORACIC SURGERY (CARDIOTHORACIC VASCULAR SURGERY)

## 2020-01-29 PROCEDURE — 83615 LACTATE (LD) (LDH) ENZYME: CPT

## 2020-01-29 PROCEDURE — 63600175 PHARM REV CODE 636 W HCPCS: Mod: JG | Performed by: THORACIC SURGERY (CARDIOTHORACIC VASCULAR SURGERY)

## 2020-01-29 PROCEDURE — 94003 VENT MGMT INPAT SUBQ DAY: CPT

## 2020-01-29 PROCEDURE — D9220A PRA ANESTHESIA: Mod: ,,, | Performed by: ANESTHESIOLOGY

## 2020-01-29 PROCEDURE — 93750 PR INTERROGATE VENT ASSIST DEV, IN PERSON, W PHYSICIAN ANALYSIS: ICD-10-PCS | Mod: ,,, | Performed by: INTERNAL MEDICINE

## 2020-01-29 PROCEDURE — 82330 ASSAY OF CALCIUM: CPT

## 2020-01-29 PROCEDURE — 99233 SBSQ HOSP IP/OBS HIGH 50: CPT | Mod: ,,, | Performed by: SURGERY

## 2020-01-29 PROCEDURE — 83735 ASSAY OF MAGNESIUM: CPT | Mod: 91

## 2020-01-29 PROCEDURE — 80053 COMPREHEN METABOLIC PANEL: CPT | Mod: 91

## 2020-01-29 PROCEDURE — 27000248 HC VAD-ADDITIONAL DAY

## 2020-01-29 PROCEDURE — A4216 STERILE WATER/SALINE, 10 ML: HCPCS | Performed by: SURGERY

## 2020-01-29 PROCEDURE — P9045 ALBUMIN (HUMAN), 5%, 250 ML: HCPCS | Mod: JG | Performed by: THORACIC SURGERY (CARDIOTHORACIC VASCULAR SURGERY)

## 2020-01-29 PROCEDURE — 82803 BLOOD GASES ANY COMBINATION: CPT

## 2020-01-29 PROCEDURE — 25000003 PHARM REV CODE 250: Performed by: SURGERY

## 2020-01-29 PROCEDURE — 99233 PR SUBSEQUENT HOSPITAL CARE,LEVL III: ICD-10-PCS | Mod: 25,,, | Performed by: INTERNAL MEDICINE

## 2020-01-29 PROCEDURE — 84132 ASSAY OF SERUM POTASSIUM: CPT

## 2020-01-29 PROCEDURE — 83735 ASSAY OF MAGNESIUM: CPT

## 2020-01-29 PROCEDURE — 85730 THROMBOPLASTIN TIME PARTIAL: CPT | Mod: 91

## 2020-01-29 PROCEDURE — 84100 ASSAY OF PHOSPHORUS: CPT

## 2020-01-29 PROCEDURE — 85730 THROMBOPLASTIN TIME PARTIAL: CPT

## 2020-01-29 PROCEDURE — 93312 ECHO TRANSESOPHAGEAL: CPT | Mod: 26,59,, | Performed by: ANESTHESIOLOGY

## 2020-01-29 PROCEDURE — 80162 ASSAY OF DIGOXIN TOTAL: CPT

## 2020-01-29 PROCEDURE — 80053 COMPREHEN METABOLIC PANEL: CPT

## 2020-01-29 PROCEDURE — 85027 COMPLETE CBC AUTOMATED: CPT | Mod: 91

## 2020-01-29 PROCEDURE — 93750 INTERROGATION VAD IN PERSON: CPT | Mod: ,,, | Performed by: INTERNAL MEDICINE

## 2020-01-29 PROCEDURE — 99233 SBSQ HOSP IP/OBS HIGH 50: CPT | Mod: ,,, | Performed by: NURSE PRACTITIONER

## 2020-01-29 PROCEDURE — 85610 PROTHROMBIN TIME: CPT | Mod: 91

## 2020-01-29 PROCEDURE — 80076 HEPATIC FUNCTION PANEL: CPT

## 2020-01-29 PROCEDURE — C1729 CATH, DRAINAGE: HCPCS | Performed by: THORACIC SURGERY (CARDIOTHORACIC VASCULAR SURGERY)

## 2020-01-29 PROCEDURE — 93312 PR ECHO HEART,TRANSESOPHAGEAL: ICD-10-PCS | Mod: 26,59,, | Performed by: ANESTHESIOLOGY

## 2020-01-29 PROCEDURE — 83880 ASSAY OF NATRIURETIC PEPTIDE: CPT

## 2020-01-29 PROCEDURE — 63600175 PHARM REV CODE 636 W HCPCS: Performed by: SURGERY

## 2020-01-29 PROCEDURE — 99233 SBSQ HOSP IP/OBS HIGH 50: CPT | Mod: 25,,, | Performed by: INTERNAL MEDICINE

## 2020-01-29 PROCEDURE — 21750 REPAIR OF STERNUM SEPARATION: CPT | Mod: 58,,, | Performed by: THORACIC SURGERY (CARDIOTHORACIC VASCULAR SURGERY)

## 2020-01-29 PROCEDURE — 80202 ASSAY OF VANCOMYCIN: CPT

## 2020-01-29 PROCEDURE — 99233 PR SUBSEQUENT HOSPITAL CARE,LEVL III: ICD-10-PCS | Mod: ,,, | Performed by: SURGERY

## 2020-01-29 PROCEDURE — 84132 ASSAY OF SERUM POTASSIUM: CPT | Mod: 91

## 2020-01-29 PROCEDURE — 25000003 PHARM REV CODE 250: Performed by: THORACIC SURGERY (CARDIOTHORACIC VASCULAR SURGERY)

## 2020-01-29 PROCEDURE — 99900035 HC TECH TIME PER 15 MIN (STAT)

## 2020-01-29 PROCEDURE — 85007 BL SMEAR W/DIFF WBC COUNT: CPT | Mod: 91

## 2020-01-29 PROCEDURE — 99233 PR SUBSEQUENT HOSPITAL CARE,LEVL III: ICD-10-PCS | Mod: ,,, | Performed by: NURSE PRACTITIONER

## 2020-01-29 PROCEDURE — 84295 ASSAY OF SERUM SODIUM: CPT

## 2020-01-29 RX ORDER — BACITRACIN 50000 [IU]/1
INJECTION, POWDER, FOR SOLUTION INTRAMUSCULAR
Status: DISCONTINUED | OUTPATIENT
Start: 2020-01-29 | End: 2020-01-29 | Stop reason: HOSPADM

## 2020-01-29 RX ORDER — ATORVASTATIN CALCIUM 20 MG/1
80 TABLET, FILM COATED ORAL NIGHTLY
Status: DISCONTINUED | OUTPATIENT
Start: 2020-01-29 | End: 2020-02-17 | Stop reason: HOSPADM

## 2020-01-29 RX ORDER — ALBUMIN HUMAN 50 G/1000ML
12.5 SOLUTION INTRAVENOUS ONCE
Status: COMPLETED | OUTPATIENT
Start: 2020-01-29 | End: 2020-01-29

## 2020-01-29 RX ORDER — GLYCOPYRROLATE 0.2 MG/ML
INJECTION INTRAMUSCULAR; INTRAVENOUS
Status: DISCONTINUED | OUTPATIENT
Start: 2020-01-29 | End: 2020-01-29

## 2020-01-29 RX ORDER — CEFAZOLIN SODIUM 1 G/3ML
INJECTION, POWDER, FOR SOLUTION INTRAMUSCULAR; INTRAVENOUS
Status: DISCONTINUED | OUTPATIENT
Start: 2020-01-29 | End: 2020-01-29

## 2020-01-29 RX ORDER — POTASSIUM CHLORIDE 29.8 MG/ML
40 INJECTION INTRAVENOUS ONCE
Status: COMPLETED | OUTPATIENT
Start: 2020-01-29 | End: 2020-01-29

## 2020-01-29 RX ORDER — HEPARIN SODIUM 10000 [USP'U]/100ML
400 INJECTION, SOLUTION INTRAVENOUS CONTINUOUS
Status: DISCONTINUED | OUTPATIENT
Start: 2020-01-29 | End: 2020-02-05

## 2020-01-29 RX ORDER — ROCURONIUM BROMIDE 10 MG/ML
INJECTION, SOLUTION INTRAVENOUS
Status: DISCONTINUED | OUTPATIENT
Start: 2020-01-29 | End: 2020-01-29

## 2020-01-29 RX ORDER — NEOSTIGMINE METHYLSULFATE 0.5 MG/ML
INJECTION, SOLUTION INTRAVENOUS
Status: DISCONTINUED | OUTPATIENT
Start: 2020-01-29 | End: 2020-01-29

## 2020-01-29 RX ORDER — DIGOXIN 0.25 MG/ML
250 INJECTION INTRAMUSCULAR; INTRAVENOUS ONCE
Status: COMPLETED | OUTPATIENT
Start: 2020-01-29 | End: 2020-01-29

## 2020-01-29 RX ORDER — WARFARIN 2 MG/1
2 TABLET ORAL ONCE
Status: COMPLETED | OUTPATIENT
Start: 2020-01-30 | End: 2020-01-30

## 2020-01-29 RX ORDER — POTASSIUM CHLORIDE 7.45 MG/ML
10 INJECTION INTRAVENOUS
Status: DISCONTINUED | OUTPATIENT
Start: 2020-01-29 | End: 2020-01-29

## 2020-01-29 RX ORDER — DEXMEDETOMIDINE HYDROCHLORIDE 4 UG/ML
0.2 INJECTION, SOLUTION INTRAVENOUS CONTINUOUS PRN
Status: DISCONTINUED | OUTPATIENT
Start: 2020-01-29 | End: 2020-02-03

## 2020-01-29 RX ADMIN — GLYCOPYRROLATE 0.6 MG: 0.2 INJECTION, SOLUTION INTRAMUSCULAR; INTRAVENOUS at 04:01

## 2020-01-29 RX ADMIN — DEXMEDETOMIDINE HYDROCHLORIDE 1 MCG/KG/HR: 100 INJECTION, SOLUTION, CONCENTRATE INTRAVENOUS at 08:01

## 2020-01-29 RX ADMIN — VANCOMYCIN HYDROCHLORIDE 750 MG: 750 INJECTION, POWDER, LYOPHILIZED, FOR SOLUTION INTRAVENOUS at 11:01

## 2020-01-29 RX ADMIN — EPINEPHRINE 0.06 MCG/KG/MIN: 1 INJECTION INTRAMUSCULAR; INTRAVENOUS; SUBCUTANEOUS at 09:01

## 2020-01-29 RX ADMIN — POLYETHYLENE GLYCOL 3350 17 G: 17 POWDER, FOR SOLUTION ORAL at 09:01

## 2020-01-29 RX ADMIN — POTASSIUM CHLORIDE 40 MEQ: 29.8 INJECTION, SOLUTION INTRAVENOUS at 01:01

## 2020-01-29 RX ADMIN — Medication 3 ML: at 10:01

## 2020-01-29 RX ADMIN — PROPOFOL 50 MCG/KG/MIN: 10 INJECTION, EMULSION INTRAVENOUS at 02:01

## 2020-01-29 RX ADMIN — CEFEPIME 2 G: 2 INJECTION, POWDER, FOR SOLUTION INTRAVENOUS at 02:01

## 2020-01-29 RX ADMIN — FENTANYL CITRATE 25 MCG: 50 INJECTION INTRAMUSCULAR; INTRAVENOUS at 01:01

## 2020-01-29 RX ADMIN — CEFAZOLIN 2 G: 330 INJECTION, POWDER, FOR SOLUTION INTRAMUSCULAR; INTRAVENOUS at 03:01

## 2020-01-29 RX ADMIN — FUROSEMIDE 2.5 MG/HR: 10 INJECTION, SOLUTION INTRAMUSCULAR; INTRAVENOUS at 09:01

## 2020-01-29 RX ADMIN — PANTOPRAZOLE SODIUM 40 MG: 40 INJECTION, POWDER, FOR SOLUTION INTRAVENOUS at 09:01

## 2020-01-29 RX ADMIN — PROPOFOL 10 MCG/KG/MIN: 10 INJECTION, EMULSION INTRAVENOUS at 11:01

## 2020-01-29 RX ADMIN — AMLODIPINE BESYLATE 10 MG: 10 TABLET ORAL at 09:01

## 2020-01-29 RX ADMIN — PROPOFOL 40 MCG/KG/MIN: 10 INJECTION, EMULSION INTRAVENOUS at 04:01

## 2020-01-29 RX ADMIN — FENTANYL CITRATE 25 MCG: 50 INJECTION INTRAMUSCULAR; INTRAVENOUS at 09:01

## 2020-01-29 RX ADMIN — Medication 400 MG: at 09:01

## 2020-01-29 RX ADMIN — Medication 3 ML: at 06:01

## 2020-01-29 RX ADMIN — FENTANYL CITRATE 25 MCG: 50 INJECTION INTRAMUSCULAR; INTRAVENOUS at 07:01

## 2020-01-29 RX ADMIN — ASPIRIN 325 MG ORAL TABLET 325 MG: 325 PILL ORAL at 09:01

## 2020-01-29 RX ADMIN — FENTANYL CITRATE 25 MCG: 50 INJECTION INTRAMUSCULAR; INTRAVENOUS at 04:01

## 2020-01-29 RX ADMIN — Medication 3 ML: at 02:01

## 2020-01-29 RX ADMIN — ROCURONIUM BROMIDE 50 MG: 10 INJECTION, SOLUTION INTRAVENOUS at 02:01

## 2020-01-29 RX ADMIN — NEOSTIGMINE METHYLSULFATE 3.5 MG: 0.5 INJECTION INTRAVENOUS at 04:01

## 2020-01-29 RX ADMIN — PROPOFOL 40 MCG/KG/MIN: 10 INJECTION, EMULSION INTRAVENOUS at 09:01

## 2020-01-29 RX ADMIN — ATORVASTATIN CALCIUM 80 MG: 20 TABLET, FILM COATED ORAL at 11:01

## 2020-01-29 RX ADMIN — DIGOXIN 250 MCG: 0.25 INJECTION INTRAMUSCULAR; INTRAVENOUS at 07:01

## 2020-01-29 RX ADMIN — ALBUMIN (HUMAN) 12.5 G: 12.5 SOLUTION INTRAVENOUS at 05:01

## 2020-01-29 RX ADMIN — DEXMEDETOMIDINE HYDROCHLORIDE 0.2 MCG/KG/HR: 100 INJECTION, SOLUTION, CONCENTRATE INTRAVENOUS at 09:01

## 2020-01-29 RX ADMIN — CEFEPIME 2 G: 2 INJECTION, POWDER, FOR SOLUTION INTRAVENOUS at 01:01

## 2020-01-29 NOTE — CARE UPDATE
BG goal 140-180    Remains in ICU. NAEON. Tentative chest closure today. BG reasonably well controlled without insulin.     Plan:  BG monitoring every 4 hours and low dose correction scale.       Discharge planning: TBD     Endocrine to continue to follow    ** Please call Endocrine for any BG related issues **

## 2020-01-29 NOTE — SUBJECTIVE & OBJECTIVE
Interval History: No acute events overnight. Still getting IV dig for rate control though patient is paced at 90 bpm. Remains CTS primary. Chest open, intubated and sedated. Son at bedside. Plan for closure today.    Lines:  Arterial Line: 1/23/20  Left IJ: 1/23/20  PA catheter    Continuous Infusions:   dexmedetomidine (PRECEDEX) infusion 0.4 mcg/kg/hr (01/29/20 1100)    epinephrine 0.06 mcg/kg/min (01/29/20 1100)    furosemide (LASIX) 2 mg/mL infusion (non-titrating) 2.5 mg/hr (01/29/20 1100)    heparin (porcine) in 5 % dex Stopped (01/29/20 0830)    lidocaine 1 mg/min (01/29/20 1100)    niCARdipine Stopped (01/29/20 1100)    nitric oxide gas      propofol 20 mcg/kg/min (01/29/20 1100)     Scheduled Meds:   amiodarone  400 mg Per NG tube TID    amLODIPine  10 mg Per NG tube Daily    aspirin  325 mg Oral Daily    atorvastatin  80 mg Oral QHS    vancomycin (VANCOCIN) IVPB  750 mg Intravenous Q24H    And    ceFEPime (MAXIPIME) IVPB  2 g Intravenous Q12H    docusate sodium  200 mg Oral QHS    ferrous gluconate  324 mg Oral Daily with breakfast    magnesium oxide  400 mg Per NG tube TID    pantoprazole  40 mg Intravenous Daily    polyethylene glycol  17 g Oral BID    sodium chloride 0.9%  3 mL Intravenous Q8H     PRN Meds:albumin human 5%, albuterol sulfate, bisacodyl, dexmedetomidine (PRECEDEX) infusion, Dextrose 10% Bolus, Dextrose 10% Bolus, fentaNYL, glucagon (human recombinant), hydrALAZINE, insulin aspart U-100, magnesium hydroxide 400 mg/5 ml, magnesium sulfate IVPB, oxyCODONE, oxyCODONE, sodium chloride 0.9%    Review of patient's allergies indicates:   Allergen Reactions    Biopatch [chlorhexidine gluconate]      Site burning    Dobutamine in d5w      Tachycardia, tremors, SOB, flushing    Percocet [oxycodone-acetaminophen] Itching    Penicillins Rash     Cefepime given on 1/23/2020 without issue     Objective:     Vital Signs (Most Recent):  Temp: 97.8 °F (36.6 °C) (01/29/20  0900)  Pulse: 90 (01/29/20 1116)  Resp: 17 (01/29/20 0900)  BP: (!) 88/0 (01/29/20 0700)  SpO2: 99 % (01/29/20 1116) Vital Signs (24h Range):  Temp:  [97.8 °F (36.6 °C)-98.5 °F (36.9 °C)] 97.8 °F (36.6 °C)  Pulse:  [90-93] 90  Resp:  [17-19] 17  SpO2:  [98 %-100 %] 99 %  BP: (82-98)/(0) 88/0  Arterial Line BP: ()/() 83/67     Patient Vitals for the past 72 hrs (Last 3 readings):   Weight   01/29/20 0611 105.8 kg (233 lb 3.9 oz)   01/28/20 0500 110 kg (242 lb 8.1 oz)   01/27/20 0500 108.3 kg (238 lb 12.1 oz)     Body mass index is 33.47 kg/m².      Intake/Output Summary (Last 24 hours) at 1/29/2020 1125  Last data filed at 1/29/2020 1100  Gross per 24 hour   Intake 3071 ml   Output 2102 ml   Net 969 ml       Hemodynamic Parameters:  PAP: (31-56)/(9-26) 44/9  PAP (Mean):  [27 mmHg-38 mmHg] 31 mmHg  CO:  [3.7 L/min-6.8 L/min] 5 L/min  CI:  [1.6 L/min/m2-2.9 L/min/m2] 2.1 L/min/m2    Telemetry: A fib    Physical Exam   Constitutional: Intubated and sedated; chest open    Eyes: Conjunctivae are normal.   Neck: Neck supple. No thyromegaly present. Bilateral IJ lines   Cardiovascular: Irregularly irregular, tachycardic, smooth VAD hum. Chest open   Pulmonary/Chest: Effort normal and breath sounds normal. Intubated and sedated   Abdominal: Soft.   Musculoskeletal: He exhibits no edema.   Neurological: Intubated and sedated   Skin: Skin is warm and dry. Capillary refill takes 2 to 3 seconds.       Significant Labs:  CBC:  Recent Labs   Lab 01/28/20  0402  01/28/20  1416  01/29/20  0131 01/29/20  0508 01/29/20  0818   WBC 17.01*  --  16.11*  --   --  17.09*  --    RBC 3.84*  --  3.50*  --   --  3.49*  --    HGB 10.5*  --  9.7*  --   --  9.7*  --    HCT 36.1*   < > 32.9*   < > 28* 32.9* 43     --  245  --   --  238  --    MCV 94  --  94  --   --  94  --    MCH 27.3  --  27.7  --   --  27.8  --    MCHC 29.1*  --  29.5*  --   --  29.5*  --     < > = values in this interval not displayed.     BNP:  Recent Labs    Lab 01/24/20  0334 01/27/20  0400 01/29/20  0508   * 1,140* 320*     CMP:  Recent Labs   Lab 01/28/20  0402  01/28/20  1416 01/28/20  1809 01/29/20  0006 01/29/20  0508   *  --  151*  --   --  150*   CALCIUM 9.5  --  9.0  --   --  9.1   ALBUMIN 2.4*  --  2.8*  --   --  2.7*  2.7*   PROT 6.5  --  6.5  --   --  6.4  6.4   *  --  148*  --   --  150*   K 3.8  3.8   < > 3.5 3.6 3.7 4.0   CO2 25  --  29  --   --  27     --  107  --   --  111*   *  --  100*  --   --  97*   CREATININE 3.1*  --  2.9*  --   --  2.7*   ALKPHOS 43*  --  41*  --   --  43*  43*   ALT 20  --  16  --   --  13  13   AST 26  --  22  --   --  21  21   BILITOT 0.6  --  0.7  --   --  0.7  0.7    < > = values in this interval not displayed.      Coagulation:   Recent Labs   Lab 01/28/20  0402 01/28/20  1416 01/29/20  0508   INR 1.2 1.3* 1.2   APTT 25.3 24.5 24.7     LDH:  Recent Labs   Lab 01/27/20  0400 01/28/20  0402 01/29/20  0508   * 458* 395*     Microbiology:  Microbiology Results (last 7 days)     Procedure Component Value Units Date/Time    Blood culture [[id number]] Collected:  01/20/20 1042    Order Status:  Completed Specimen:  Blood from Peripheral, Antecubital, Left Updated:  01/25/20 1412     Blood Culture, Routine No growth after 5 days.    Blood culture [[id number]] Collected:  01/20/20 1035    Order Status:  Completed Specimen:  Blood from Peripheral, Wrist, Left Updated:  01/25/20 1412     Blood Culture, Routine No growth after 5 days.          I have reviewed all pertinent labs within the past 24 hours.    Estimated Creatinine Clearance: 35.9 mL/min (A) (based on SCr of 2.7 mg/dL (H)).    Diagnostic Results:  I have reviewed all pertinent imaging results/findings within the past 24 hours.

## 2020-01-29 NOTE — ASSESSMENT & PLAN NOTE
Tae Delgado is a 59 y.o. 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 Approved for DT LVAD. Went to OR on 1/21 and found to have DAMON and LV thrombus and case was aborted. Pt was placed on a heparin gtt and thrombus had dissipated on repeat JACINTO on 1/22. Pt underwent LVAD placement on 1/23. Chest closure and re-exploration on 1/24. Returned from OR with chest open on 1/24. Attempted chest closure on 1/27, returned to SICU with chest open.     Neuro:  - Patient opens eyes and follows commands when sedation is held  - Prop gtt with prn Fentanyl  - Will switch sedation to precedex    CVS:   - Current LVAD flow @ 5400 with appropriate PIs   - Epi @ 0.06, Cardene as needed  - Lidocaine gtt for tachyarrythmia  - Will consider returning to IV amio (concern that amio elixir not absorbing)  - ICD turned on, EP service consulted for assistance with arrythmia  - Chest closure today    Pulm:  - Mechanical ventilation, wean as able when chest closed  - Hoping to extubate today  - ABGs q6hr  - CXR daily  - Monitor CT output, no overt signs of bleeding     GI:  - TF at 10cc/hr  - Protonix 40 daily  - Docusate    Endo:   - Insulin off now, SSI  - Endocrine consulted, appreciate their services    Renal:  - Strict I/O  - Trend BUN/Cr, still elevated but stable  - UOP  cc/hr overnight  - Lasix gtt @ 2.5    Lytes:  - Monitor labs  - Replace per protocol  - Switching all drips to D5    Heme:  - No blood products needed during the OR  - H/H stable overnight    ID:   - Cefepime/Vanc  - Follow WBC  - Follow fever curve, AF    PPx:  - Hep gtt nontitrating, at 500 u/hr (paused for OR)  - GI ppx    Dispo:  - Cont SICU care   - Chest closure today

## 2020-01-29 NOTE — SUBJECTIVE & OBJECTIVE
Interval History:   Renal function improved. sCr 2.7 from 2.9. UOP 1.9 L over the last 24 hrs on Lasix drip at 2.5 mg/hr. Lasix bolus given today. CVP 15. Remains intubated on sedation. Plans for chest closure today. On Epi for BP support.     Review of patient's allergies indicates:   Allergen Reactions    Biopatch [chlorhexidine gluconate]      Site burning    Dobutamine in d5w      Tachycardia, tremors, SOB, flushing    Percocet [oxycodone-acetaminophen] Itching    Penicillins Rash     Cefepime given on 1/23/2020 without issue     Current Facility-Administered Medications   Medication Frequency    albumin human 5% bottle 500 mL PRN    albuterol sulfate nebulizer solution 2.5 mg Q4H PRN    amiodarone 5 mg/mL oral suspension TID    amLODIPine tablet 10 mg Daily    aspirin tablet 325 mg Daily    atorvastatin tablet 80 mg QHS    bisacodyl suppository 10 mg Daily PRN    vancomycin 750 mg in dextrose 5 % 250 mL IVPB (ready to mix system) Q24H    And    ceFEPIme injection 2 g Q12H    dexmedetomidine (PRECEDEX) 400mcg/100mL 0.9% NaCL infusion Continuous PRN    dextrose 10% (D10W) Bolus PRN    dextrose 10% (D10W) Bolus PRN    docusate sodium capsule 200 mg QHS    EPINEPHrine (ADRENALIN) 5 mg in dextrose 5 % 250 mL infusion Continuous    fentaNYL injection 25 mcg Q1H PRN    ferrous gluconate tablet 324 mg Daily with breakfast    furosemide (LASIX) 2 mg/mL in dextrose 5 % 100 mL infusion (conc: 2 mg/mL) Continuous    glucagon (human recombinant) injection 1 mg PRN    heparin 25,000 units in dextrose 5% 250 mL (100 units/mL) infusion (heparin infusion - NO NOMOGRAM) Continuous    hydrALAZINE injection 10 mg Q6H PRN    insulin aspart U-100 pen 0-5 Units Q4H PRN    lidocaine 2000 mg in dextrose 5% 250 mL infusion Continuous    magnesium hydroxide 400 mg/5 ml suspension 2,400 mg Daily PRN    magnesium oxide tablet 400 mg TID    magnesium sulfate 2g in water 50mL IVPB (premix) PRN    niCARdipine 40  mg/200 mL infusion Continuous    nitric oxide gas Gas 15 ppm Continuous    oxyCODONE immediate release tablet 5 mg Q4H PRN    oxyCODONE immediate release tablet Tab 10 mg Q4H PRN    pantoprazole injection 40 mg Daily    polyethylene glycol packet 17 g BID    propofol (DIPRIVAN) 10 mg/mL infusion Continuous    sodium chloride 0.9% flush 10 mL PRN    sodium chloride 0.9% flush 3 mL Q8H       Objective:     Vital Signs (Most Recent):  Temp: 97.8 °F (36.6 °C) (01/29/20 0900)  Pulse: 90 (01/29/20 1230)  Resp: 17 (01/29/20 0900)  BP: (!) 88/0 (01/29/20 0700)  SpO2: 99 % (01/29/20 1230)  O2 Device (Oxygen Therapy): ventilator (01/29/20 1200) Vital Signs (24h Range):  Temp:  [97.8 °F (36.6 °C)-98.5 °F (36.9 °C)] 97.8 °F (36.6 °C)  Pulse:  [90-93] 90  Resp:  [17-19] 17  SpO2:  [98 %-100 %] 99 %  BP: (82-98)/(0) 88/0  Arterial Line BP: ()/() 92/76     Weight: 105.8 kg (233 lb 3.9 oz) (01/29/20 0611)  Body mass index is 33.47 kg/m².  Body surface area is 2.29 meters squared.    I/O last 3 completed shifts:  In: 3902 [I.V.:3572; NG/GT:330]  Out: 3522 [Urine:3190; Drains:60; Chest Tube:272]    Physical Exam   Constitutional: He appears well-developed. No distress. He is sedated and intubated.   HENT:   Head: Normocephalic and atraumatic.   Right Ear: External ear normal.   Left Ear: External ear normal.   Cardiovascular: Tachycardia present.   RVAD hum   Pulmonary/Chest: Effort normal and breath sounds normal. He is intubated. He has no rales.   Musculoskeletal: He exhibits edema. He exhibits no deformity.   Skin: Skin is warm and dry. He is not diaphoretic.   Sternal surgical wound, drsg intact       Significant Labs:  CBC:   Recent Labs   Lab 01/29/20  0508 01/29/20  0818   WBC 17.09*  --    RBC 3.49*  --    HGB 9.7*  --    HCT 32.9* 43     --    MCV 94  --    MCH 27.8  --    MCHC 29.5*  --      CMP:   Recent Labs   Lab 01/29/20  1140   *   CALCIUM 8.9   ALBUMIN 2.5*   PROT 6.1   *   K  4.0   CO2 28   *   BUN 92*   CREATININE 2.6*   ALKPHOS 42*   ALT 12   AST 21   BILITOT 0.6

## 2020-01-29 NOTE — ANESTHESIA PROCEDURE NOTES
JACINTO    Diagnosis: chronic systolic heart failure   Patient location during procedure: OR  Procedure start time: 1/29/2020 2:48 PM  Timeout: 1/29/2020 2:47 PM  Procedure end time: 1/29/2020 3:17 PM  Exam type: Baseline  Staffing  Anesthesiologist: MAAME Joseph MD  Performed: anesthesiologist   Preanesthetic Checklist  Completed: patient identified, surgical consent, pre-op evaluation, timeout performed, risks and benefits discussed, monitors and equipment checked, anesthesia consent given, oxygen available, suction available, hand hygiene performed and patient being monitored  Setup & Induction  Patient preparation: bite block inserted  Probe Insertion: easy  Exam: limited      Findings  Impression  Other Findings  LVAD chest closure, with Dilated RV, mild RV dysfunction.  Severe TR by hepatic vein flow reversal.  Small Right to left PFO.      LVAD inflow cannular slightly anterolateral direction.   No AI   Moderate MR.    Left pleural effusion.     All findings discussed with Dr Schmitt   Probe Removal      Exam     Left Heart  Left Atruim: dilated        LVAD:yes  Inflow Cannula and Direction: lateral  Septum:D-shaped  Estimated Ejection Fraction: < 25% severe            Right Heart  Right Ventricle: dilated  Right Ventricle Function: mildly decreased  Right Atria: cm and severly enlarged    Intra Atrial Septum  PFO: yes by color flow doppler  no IAS aneurysm  no lipomatous hypertrophy  no Atrial Septal Defect (Asd)    Right Ventricle  Size: dilated, Free Wall Thickness: RVH >/= 0.5cm    Aortic Valve:  Morphology: sewn shut and trileaflet  Regurgitation: no aortic valve regurgitation     Mitral Valve:  Morphology:normal  Jet Description: mild-to-moderate    Tricuspid Valve:  Morphology: normal  Regurgitation: severe, TV Hepatic Vein flow:systolic reversal    Pulmonic Valve:  Morphology:normal  Regurgitation(color flow): mild    Great Vessels  Ascending Aorta Atherosclerosis: 3=sessile dz (3-5mm)  Aortic Arch  Atherosclerosis: 3=sessile dz (3-5mm)  IABP: no  Descending Aorta Atherosclerosis: 3=sessile dz (3-5mm)  Aorta    Descending aorta IABP: no    Effusions  Effusions: left pleural    Summary  Findings discussed with surgeon.    Other Findings   LVAD chest closure, with Dilated RV, mild RV dysfunction.  Severe TR by hepatic vein flow reversal.  Small Right to left PFO.      LVAD inflow cannular slightly anterolateral direction.   No AI   Moderate MR.    Left pleural effusion.     All findings discussed with Dr Schmitt

## 2020-01-29 NOTE — ASSESSMENT & PLAN NOTE
- S/P DT HM3 with closure of AV and plication of MV for regurg 1/23/20 (See LVAD)  - [facility] dx'd 2010  - currently on lasix 2.5 mg/hour on EPI .06   - See above

## 2020-01-29 NOTE — ASSESSMENT & PLAN NOTE
KRISTIN on CKD III  Baseline SCr ~ 2.0    60 yo male s/p LVAD placement on 1/23 who was taken back to OR for exploration over concerns for RV failure/tamponade due to elevated CVP and decrease UOP.  Upon opening of chest, he had improved hemodynamics and some improvement in UOP and decision made to leave chest open and transfer back to ICU for closer monitoring on 1/24.  He was administered IV lasix + diuril with improvement in his UOP.      DDx for KRISTIN includes ATN from renal hypoperfusion vs. CRS from RV overload    Plan/recommendations:  -renal function improving. sCr now 2.7 (baseline ~ 2). Adequate UOP.   -patient remains hypernatremic, 150 today. FWD of 4.5 L.   -recommend starting free water boluses at 250 mL q4 hr  -recommend transitioning to BID dosing of Lasix   -continue trending RFP and UOP  -will follow labs closely  -will discuss with Dr. Gilmore

## 2020-01-29 NOTE — PROGRESS NOTES
[facility]  Critical Care - Surgery  Progress Note    Patient Name: Tae Delgado  MRN: [id number]  Admission Date: 1/9/2020  Hospital Length of Stay: 20 days  Code Status: Full Code  Attending Provider: Ino Schmitt MD  Primary Care Provider: Elham Pearson MD   Principal Problem: LVAD (left ventricular assist device) present    Subjective:     Hospital/ICU Course:  1/23: Pt arrives from OR intubated, open chest dressed with Ioban, CTs with SS output. Drips on arrival: Epi 0.08, Cardene 5, TXA, Nitric at 30. Pt received 1.5L crystalloid, no blood product, 20 mg Lasix (put out 250cc in response).   Intra Op JACINTO Exam:  PRE:  Severely reduced left ventricular systolic function. Dilated LV. No definitive thrombus noted.  Moderate-to-severely reduced right ventricular systolic function. FAC 12-24%  Mild-to-moderate AI. No AS.  Moderate MR with systolic blunting of the pulmonary veins.  Trace-to-mild TR.  Mild PI. PAAT <90ms.  Small PFO by CF doppler.  SEC in La and DAMON. No clear thrombus noted.  Grade 2 atheromatous disease of the aorta.  No effusions.    POST:  Severely depressed left ventriclar dysfunction.  Moderately depressed right ventricular systolic function.  LVAD inflow and outflow cannula noted with laminar flows.  No AI.  Mild MR, vahe stitch observed. No MS.  Mild-to-moderate TR.  PFO still visible on CF doppler.  Aorta intact, no dissection.  No effusions.     1/24: run of Vtach overnight. Amio bolus given, amio gtt increased to 1, lasix push given. Improved. LVAD hemodynamics appropriate overnight. Going for closure this am. Upon return, developed tamponade physiology and returned to OR. Came back to SICU with chest open.  1/26: Diuresed over the weekend, chest kept open. Lidocaine gtt and digoxin for tachyarrhythmia. Otherwise NAEON.   1/27: Went to OR for possible chest closure, decided to do a wash out. Returned to SICU with chest open. One tachyarrhythmia episode overnight,  resolved with 100mg Lidocaine bolus and increasing lidocaine drip from 0.75mg/hr to 1mg/hr.  1/28: NAEON. Lasix gtt decreased throughout the day with adequate UOP still.     Interval History/Significant Events: NAEON    Follow-up For: Procedure(s) (LRB):  IRRIGATION, MEDIASTINUM (N/A)    Post-Operative Day: 2 Days Post-Op    Objective:     Vital Signs (Most Recent):  Temp: 97.8 °F (36.6 °C) (01/29/20 0900)  Pulse: 90 (01/29/20 1116)  Resp: 17 (01/29/20 0900)  BP: (!) 88/0 (01/29/20 0700)  SpO2: 99 % (01/29/20 1116) Vital Signs (24h Range):  Temp:  [97.8 °F (36.6 °C)-98.5 °F (36.9 °C)] 97.8 °F (36.6 °C)  Pulse:  [90-93] 90  Resp:  [17-19] 17  SpO2:  [98 %-100 %] 99 %  BP: (82-98)/(0) 88/0  Arterial Line BP: ()/() 83/67     Weight: 105.8 kg (233 lb 3.9 oz)  Body mass index is 33.47 kg/m².      Intake/Output Summary (Last 24 hours) at 1/29/2020 1156  Last data filed at 1/29/2020 1100  Gross per 24 hour   Intake 3071 ml   Output 2102 ml   Net 969 ml       Physical Exam   Constitutional: He appears well-developed and well-nourished.   HENT:   Head: Normocephalic and atraumatic.   Mouth/Throat: No oropharyngeal exudate.   ETT and OG secured in place.   Eyes: Pupils are equal, round, and reactive to light.   Neck: Normal range of motion. Neck supple.   Left IJ double lumen + [place] in place, dressing CLD  Right IJ triple lumen, dressing CLD   Cardiovascular:   Irregular tachyarrythmia. LVAD hum.    Pulmonary/Chest: Breath sounds normal. He has no wheezes. He has no rales.   Intubated. Open chest, dressed with Ioban. CTs with SS drainage.   Abdominal: Soft. He exhibits no distension.   Musculoskeletal: He exhibits no edema or deformity.   Neurological:   sedated   Skin: Skin is warm and dry.       Vents:  Vent Mode: A/C (01/29/20 1116)  Ventilator Initiated: Yes (01/21/20 0905)  Set Rate: 16 bmp (01/29/20 1116)  Vt Set: 500 mL (01/29/20 1116)  PEEP/CPAP: 5 cmH20 (01/29/20 1116)  Oxygen Concentration (%): 50  (01/29/20 1116)  Peak Airway Pressure: 19 cmH2O (01/29/20 1116)  Plateau Pressure: 18 cmH20 (01/29/20 1116)  Total Ve: 8.33 mL (01/29/20 1116)  F/VT Ratio<105 (RSBI): (!) 34.88 (01/27/20 2316)    Lines/Drains/Airways     Central Venous Catheter Line                 Percutaneous Central Line Insertion/Assessment - double lumen  01/23/20 0751 left internal jugular 6 days         Pulmonary Artery Catheter Assessment  01/23/20 0751 6 days         Trialysis (Dialysis) Catheter 01/24/20 1500 right internal jugular 4 days          Drain                 Urethral Catheter 01/21/20 0752 Non-latex;Straight-tip;Temperature probe 16 Fr. 8 days         Chest Tube 01/23/20 1230 1 Right Pleural 5 days         Chest Tube 01/23/20 1414 2 Right Mediastinal 5 days         Chest Tube 01/23/20 1415 3 Left Mediastinal 5 days         NG/OG Tube 01/27/20 1300 Center mouth 1 day          Airway                 Airway - Non-Surgical 01/21/20 0742 Endotracheal Tube 8 days          Arterial Line                 Arterial Line 01/21/20 0730 Left Other (Comment) 8 days          Line                 VAD 01/23/20 1148 Left ventricular assist device HeartMate 3 6 days          Peripheral Intravenous Line                 Peripheral IV - Single Lumen 01/25/20 1133 18 G Left Antecubital 4 days                Significant Labs:    CBC/Anemia Profile:  Recent Labs   Lab 01/28/20  0402  01/28/20  1416  01/29/20  0131 01/29/20  0508 01/29/20  0818   WBC 17.01*  --  16.11*  --   --  17.09*  --    HGB 10.5*  --  9.7*  --   --  9.7*  --    HCT 36.1*   < > 32.9*   < > 28* 32.9* 43     --  245  --   --  238  --    MCV 94  --  94  --   --  94  --    RDW 15.9*  --  15.9*  --   --  16.2*  --     < > = values in this interval not displayed.        Chemistries:  Recent Labs   Lab 01/28/20  0402  01/28/20  1416 01/28/20  1809 01/29/20  0006 01/29/20  0508   *  --  148*  --   --  150*   K 3.8  3.8   < > 3.5 3.6 3.7 4.0     --  107  --   --  111*    CO2 25  --  29  --   --  27   *  --  100*  --   --  97*   CREATININE 3.1*  --  2.9*  --   --  2.7*   CALCIUM 9.5  --  9.0  --   --  9.1   ALBUMIN 2.4*  --  2.8*  --   --  2.7*  2.7*   PROT 6.5  --  6.5  --   --  6.4  6.4   BILITOT 0.6  --  0.7  --   --  0.7  0.7   ALKPHOS 43*  --  41*  --   --  43*  43*   ALT 20  --  16  --   --  13  13   AST 26  --  22  --   --  21  21   MG 2.6   < > 2.7* 2.6 2.6 2.7*   PHOS 4.3  --  3.9  --   --  3.5    < > = values in this interval not displayed.       ABGs:   Recent Labs   Lab 01/29/20  0814   PH 7.393   PCO2 39.7   HCO3 24.2   POCSATURATED 99   BE -1     Coagulation:   Recent Labs   Lab 01/29/20  0508   INR 1.2   APTT 24.7     Lactic Acid: No results for input(s): LACTATE in the last 48 hours.  All pertinent labs within the past 24 hours have been reviewed.    Significant Imaging:  I have reviewed all pertinent imaging results/findings within the past 24 hours.    Assessment/Plan:     Acute on chronic combined systolic and diastolic heart failure  Tae Delgado is a 59 y.o. 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 Approved for DT LVAD. Went to OR on 1/21 and found to have DAMON and LV thrombus and case was aborted. Pt was placed on a heparin gtt and thrombus had dissipated on repeat JACINTO on 1/22. Pt underwent LVAD placement on 1/23. Chest closure and re-exploration on 1/24. Returned from OR with chest open on 1/24. Attempted chest closure on 1/27, returned to SICU with chest open.     Neuro:  - Patient opens eyes and follows commands when sedation is held  - Prop gtt with prn Fentanyl  - Will switch sedation to precedex    CVS:   - Current LVAD flow @ 5400 with appropriate PIs   - Epi @  0.06, Cardene as needed  - Lidocaine gtt for tachyarrythmia  - Will consider returning to IV amio (concern that amio elixir not absorbing)  - ICD turned on, EP service consulted for assistance with arrythmia  - Chest closure today    Pulm:  - Mechanical ventilation, wean as able when chest closed  - Hoping to extubate today  - ABGs q6hr  - CXR daily  - Monitor CT output, no overt signs of bleeding     GI:  - TF at 10cc/hr  - Protonix 40 daily  - Docusate    Endo:   - Insulin off now, SSI  - Endocrine consulted, appreciate their services    Renal:  - Strict I/O  - Trend BUN/Cr, still elevated but stable  - UOP  cc/hr overnight  - Lasix gtt @ 2.5    Lytes:  - Monitor labs  - Replace per protocol  - Switching all drips to D5    Heme:  - No blood products needed during the OR  - H/H stable overnight    ID:   - Cefepime/Vanc  - Follow WBC  - Follow fever curve, AF    PPx:  - Hep gtt nontitrating, at 500 u/hr (paused for OR)  - GI ppx    Dispo:  - Cont SICU care   - Chest closure today      Critical care was time spent personally by me on the following activities: development of treatment plan with patient or surrogate and bedside caregivers, discussions with consultants, evaluation of patient's response to treatment, examination of patient, ordering and performing treatments and interventions, ordering and review of laboratory studies, ordering and review of radiographic studies, pulse oximetry, re-evaluation of patient's condition.  This critical care time did not overlap with that of any other provider or involve time for any procedures.     Marycruz Burnette MD  Critical Care - Surgery  [facility]

## 2020-01-29 NOTE — TRANSFER OF CARE
"Anesthesia Transfer of Care Note    Patient: Tae Delgado    Procedure(s) Performed: Procedure(s) (LRB):  CLOSURE, WOUND, STERNUM (N/A)  WASHOUT (N/A)  APPLICATION, WOUND VAC (N/A)    Patient location: ICU    Anesthesia Type: general    Transport from OR: Transported from OR intubated on 100% O2 by AMBU with adequate controlled ventilation. Upon arrival to PACU/ICU, patient attached to ventilator and auscultated to confirm bilateral breath sounds and adequate TV. Continuous SpO2 monitoring in transport. Continuous ECG monitoring in transport. Continuos invasive BP monitoring in transport    Post pain: adequate analgesia    Post assessment: no apparent anesthetic complications    Post vital signs: stable    Level of consciousness: sedated    Nausea/Vomiting: no nausea/vomiting    Complications: none    Transfer of care protocol was followed      Last vitals:   Visit Vitals  BP (!) 88/0   Pulse 90   Temp 36.6 °C (97.8 °F) (Core ([place]))   Resp 18   Ht 5' 10" (1.778 m)   Wt 105.8 kg (233 lb 3.9 oz)   SpO2 99%   BMI 33.47 kg/m²     "

## 2020-01-29 NOTE — PROGRESS NOTES
01/29/2020  Renaldo Miles    Current provider:  Ino Schmitt MD      I, Renaldo Miles, rounded on Tae Delgado to ensure all mechanical assist device settings (IABP or VAD) were appropriate and all parameters were within limits.  I was able to ensure all back up equipment was present, the staff had no issues, and the Perfusion Department daily rounding was complete.    8:40 AM

## 2020-01-29 NOTE — ASSESSMENT & PLAN NOTE
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   - CTS primary  - Taken back to OR 1/24 for possible RVAD placement which was not done. Patient remained hemodynamically stable in OR. Wash out on 1/27. Chest remains open. Remains intubated and sedated.   - CVP 15, SvO2 59  - Currently hemodynamically stable on Epi, Lasix, Lidocaine, and [place]   - Current speed 5400    Procedure: Device Interrogation Including analysis of device parameters  Current Settings: Ventricular Assist Device  Review of device function is stable/unstabe    TXP LVAD INTERROGATIONS 1/29/2020 1/29/2020 1/29/2020 1/29/2020 1/29/2020 1/29/2020 1/29/2020   Type HeartMate3 HeartMate3 HeartMate3 HeartMate3 HeartMate3 HeartMate3 HeartMate3   Flow 4.5 4.5 4.1 4.3 4.1 4.4 4.2   Speed 5300 5300 5300 5300 5300 5300 5300   PI 3.0 3.0 3.8 3.3 4.2 3.3 4   Power (Berry) 3.7 3.8 3.8 3.9 3.9 3.8 3.7   LSL 4900 4900 4900 4900 4900 - -   Pulsatility Intermittent pulse Intermittent pulse Intermittent pulse Intermittent pulse Intermittent pulse - -

## 2020-01-29 NOTE — PLAN OF CARE
Plan of Care Note  Cardiothoracic Surgery    Tae Delgado is a 59 y.o. male with heart failure who underwent LVAD placement (1/23/20) then closure (1/24/20) and repeat chest opening (1/24/20) for poor RV function; chest washout (1/27/20).    Specific issues: renal labs improving; will consider chest closure today; monitor for arrythmias; on epi, lido and cardene    Plan of care for patient was discussed with ICU staff including nurses, residents, and faculty and appropriate consulting services.    Will continue to monitor patient's hemodynamics, functionality, neuro status, fluid status and renal function, and labs and will adjust medications and fluids as necessary while monitoring appropriateness for de-escalation of support and monitoring and transport to stepdown unit.    Terence Gonzalez MD  Cardiothoracic Surgery Fellow

## 2020-01-29 NOTE — PROGRESS NOTES
[facility]  Nephrology  Progress Note    Patient Name: Tae Delgado  MRN: [id number]  Admission Date: 1/9/2020  Hospital Length of Stay: 20 days  Attending Provider: Ino Schmitt MD   Primary Care Physician: Elham Pearson MD  Principal Problem:LVAD (left ventricular assist device) present    Subjective:     HPI: Mr. Tae Delgado is a 58 yo M with PMHx significant for NICMP LVEF 15% (dx 2010), s/p ICD, hx LV thrombus (with prior splenic and renal emboli), pAF, hx embolic CVA, HTN, DLD, and CKD III (baseline ~ 2.0) who was initially admitted from [facility] clinic on 1/9 with volume overload and ADHF. Patient diuresed but subsequently transferred to ICU on 1/16. He was started on advanced options pathway and was approved for LVAD as DT. He underwent LVAD placement on 1/23/2020.  He was taken back to the OR for exploration of possible tamponade/RV failure on the 24th as he had a reduction in UOP with elevation in his CVP.  After the chest was re-opened there was noted improvement in LV filling and PI on LVAD so decision made not to pursue RVAD placement and he was transferred back to ICU for closer monitoring.  He was started on lasix infusion with addition of diuril with an improvement in his UOP and HDS.  Nephrology was consulted for evaluation for possible need for RRT.    Interval History:   Renal function improved. sCr 2.7 from 2.9. UOP 1.9 L over the last 24 hrs on Lasix drip at 2.5 mg/hr. Lasix bolus given today. CVP 15. Remains intubated on sedation. Plans for chest closure today. On Epi for BP support.     Review of patient's allergies indicates:   Allergen Reactions    Biopatch [chlorhexidine gluconate]      Site burning    Dobutamine in d5w      Tachycardia, tremors, SOB, flushing    Percocet [oxycodone-acetaminophen] Itching    Penicillins Rash     Cefepime given on 1/23/2020 without issue     Current Facility-Administered Medications   Medication Frequency    albumin human 5% bottle 500 mL  PRN    albuterol sulfate nebulizer solution 2.5 mg Q4H PRN    amiodarone 5 mg/mL oral suspension TID    amLODIPine tablet 10 mg Daily    aspirin tablet 325 mg Daily    atorvastatin tablet 80 mg QHS    bisacodyl suppository 10 mg Daily PRN    vancomycin 750 mg in dextrose 5 % 250 mL IVPB (ready to mix system) Q24H    And    ceFEPIme injection 2 g Q12H    dexmedetomidine (PRECEDEX) 400mcg/100mL 0.9% NaCL infusion Continuous PRN    dextrose 10% (D10W) Bolus PRN    dextrose 10% (D10W) Bolus PRN    docusate sodium capsule 200 mg QHS    EPINEPHrine (ADRENALIN) 5 mg in dextrose 5 % 250 mL infusion Continuous    fentaNYL injection 25 mcg Q1H PRN    ferrous gluconate tablet 324 mg Daily with breakfast    furosemide (LASIX) 2 mg/mL in dextrose 5 % 100 mL infusion (conc: 2 mg/mL) Continuous    glucagon (human recombinant) injection 1 mg PRN    heparin 25,000 units in dextrose 5% 250 mL (100 units/mL) infusion (heparin infusion - NO NOMOGRAM) Continuous    hydrALAZINE injection 10 mg Q6H PRN    insulin aspart U-100 pen 0-5 Units Q4H PRN    lidocaine 2000 mg in dextrose 5% 250 mL infusion Continuous    magnesium hydroxide 400 mg/5 ml suspension 2,400 mg Daily PRN    magnesium oxide tablet 400 mg TID    magnesium sulfate 2g in water 50mL IVPB (premix) PRN    niCARdipine 40 mg/200 mL infusion Continuous    nitric oxide gas Gas 15 ppm Continuous    oxyCODONE immediate release tablet 5 mg Q4H PRN    oxyCODONE immediate release tablet Tab 10 mg Q4H PRN    pantoprazole injection 40 mg Daily    polyethylene glycol packet 17 g BID    propofol (DIPRIVAN) 10 mg/mL infusion Continuous    sodium chloride 0.9% flush 10 mL PRN    sodium chloride 0.9% flush 3 mL Q8H       Objective:     Vital Signs (Most Recent):  Temp: 97.8 °F (36.6 °C) (01/29/20 0900)  Pulse: 90 (01/29/20 1230)  Resp: 17 (01/29/20 0900)  BP: (!) 88/0 (01/29/20 0700)  SpO2: 99 % (01/29/20 1230)  O2 Device (Oxygen Therapy): ventilator (01/29/20  1200) Vital Signs (24h Range):  Temp:  [97.8 °F (36.6 °C)-98.5 °F (36.9 °C)] 97.8 °F (36.6 °C)  Pulse:  [90-93] 90  Resp:  [17-19] 17  SpO2:  [98 %-100 %] 99 %  BP: (82-98)/(0) 88/0  Arterial Line BP: ()/() 92/76     Weight: 105.8 kg (233 lb 3.9 oz) (01/29/20 0611)  Body mass index is 33.47 kg/m².  Body surface area is 2.29 meters squared.    I/O last 3 completed shifts:  In: 3902 [I.V.:3572; NG/GT:330]  Out: 3522 [Urine:3190; Drains:60; Chest Tube:272]    Physical Exam   Constitutional: He appears well-developed. No distress. He is sedated and intubated.   HENT:   Head: Normocephalic and atraumatic.   Right Ear: External ear normal.   Left Ear: External ear normal.   Cardiovascular: Tachycardia present.   RVAD hum   Pulmonary/Chest: Effort normal and breath sounds normal. He is intubated. He has no rales.   Musculoskeletal: He exhibits edema. He exhibits no deformity.   Skin: Skin is warm and dry. He is not diaphoretic.   Sternal surgical wound, drsg intact       Significant Labs:  CBC:   Recent Labs   Lab 01/29/20  0508 01/29/20  0818   WBC 17.09*  --    RBC 3.49*  --    HGB 9.7*  --    HCT 32.9* 43     --    MCV 94  --    MCH 27.8  --    MCHC 29.5*  --      CMP:   Recent Labs   Lab 01/29/20  1140   *   CALCIUM 8.9   ALBUMIN 2.5*   PROT 6.1   *   K 4.0   CO2 28   *   BUN 92*   CREATININE 2.6*   ALKPHOS 42*   ALT 12   AST 21   BILITOT 0.6            Assessment/Plan:     * LVAD (left ventricular assist device) present  - per primary team     Acute kidney injury superimposed on chronic kidney disease  KRISTIN on CKD III  Baseline SCr ~ 2.0    60 yo male s/p LVAD placement on 1/23 who was taken back to OR for exploration over concerns for RV failure/tamponade due to elevated CVP and decrease UOP.  Upon opening of chest, he had improved hemodynamics and some improvement in UOP and decision made to leave chest open and transfer back to ICU for closer monitoring on 1/24.  He was  administered IV lasix + diuril with improvement in his UOP.      DDx for KRISTIN includes ATN from renal hypoperfusion vs. CRS from RV overload    Plan/recommendations:  -renal function improving. sCr now 2.7 (baseline ~ 2). Adequate UOP.   -patient remains hypernatremic, 150 today. FWD of 4.5 L.   -recommend starting free water boluses at 250 mL q4 hr  -recommend transitioning to BID dosing of Lasix   -continue trending RFP and UOP  -will follow labs closely  -will discuss with Dr. Gilmore       Thank you for your consult. I will follow-up with patient. Please contact us if you have any additional questions.    Jaqueline Buenrostro DNP, FNP-C  Nephrology  [facility]

## 2020-01-29 NOTE — PROGRESS NOTES
[facility]  Heart Transplant  Progress Note    Patient Name: Tae Delgado  MRN: [id number]  Admission Date: 1/9/2020  Hospital Length of Stay: 20 days  Attending Physician: Ino Schmitt MD  Primary Care Provider: Elham Pearson MD  Principal Problem:LVAD (left ventricular assist device) present    Subjective:     Interval History: No acute events overnight. Still getting IV dig for rate control though patient is paced at 90 bpm. Remains CTS primary. Chest open, intubated and sedated. Son at bedside. Plan for closure today.    Lines:  Arterial Line: 1/23/20  Left IJ: 1/23/20  PA catheter    Continuous Infusions:   dexmedetomidine (PRECEDEX) infusion 0.4 mcg/kg/hr (01/29/20 1100)    epinephrine 0.06 mcg/kg/min (01/29/20 1100)    furosemide (LASIX) 2 mg/mL infusion (non-titrating) 2.5 mg/hr (01/29/20 1100)    heparin (porcine) in 5 % dex Stopped (01/29/20 0830)    lidocaine 1 mg/min (01/29/20 1100)    niCARdipine Stopped (01/29/20 1100)    nitric oxide gas      propofol 20 mcg/kg/min (01/29/20 1100)     Scheduled Meds:   amiodarone  400 mg Per NG tube TID    amLODIPine  10 mg Per NG tube Daily    aspirin  325 mg Oral Daily    atorvastatin  80 mg Oral QHS    vancomycin (VANCOCIN) IVPB  750 mg Intravenous Q24H    And    ceFEPime (MAXIPIME) IVPB  2 g Intravenous Q12H    docusate sodium  200 mg Oral QHS    ferrous gluconate  324 mg Oral Daily with breakfast    magnesium oxide  400 mg Per NG tube TID    pantoprazole  40 mg Intravenous Daily    polyethylene glycol  17 g Oral BID    sodium chloride 0.9%  3 mL Intravenous Q8H     PRN Meds:albumin human 5%, albuterol sulfate, bisacodyl, dexmedetomidine (PRECEDEX) infusion, Dextrose 10% Bolus, Dextrose 10% Bolus, fentaNYL, glucagon (human recombinant), hydrALAZINE, insulin aspart U-100, magnesium hydroxide 400 mg/5 ml, magnesium sulfate IVPB, oxyCODONE, oxyCODONE, sodium chloride 0.9%    Review of patient's allergies indicates:   Allergen  Reactions    Biopatch [chlorhexidine gluconate]      Site burning    Dobutamine in d5w      Tachycardia, tremors, SOB, flushing    Percocet [oxycodone-acetaminophen] Itching    Penicillins Rash     Cefepime given on 1/23/2020 without issue     Objective:     Vital Signs (Most Recent):  Temp: 97.8 °F (36.6 °C) (01/29/20 0900)  Pulse: 90 (01/29/20 1116)  Resp: 17 (01/29/20 0900)  BP: (!) 88/0 (01/29/20 0700)  SpO2: 99 % (01/29/20 1116) Vital Signs (24h Range):  Temp:  [97.8 °F (36.6 °C)-98.5 °F (36.9 °C)] 97.8 °F (36.6 °C)  Pulse:  [90-93] 90  Resp:  [17-19] 17  SpO2:  [98 %-100 %] 99 %  BP: (82-98)/(0) 88/0  Arterial Line BP: ()/() 83/67     Patient Vitals for the past 72 hrs (Last 3 readings):   Weight   01/29/20 0611 105.8 kg (233 lb 3.9 oz)   01/28/20 0500 110 kg (242 lb 8.1 oz)   01/27/20 0500 108.3 kg (238 lb 12.1 oz)     Body mass index is 33.47 kg/m².      Intake/Output Summary (Last 24 hours) at 1/29/2020 1125  Last data filed at 1/29/2020 1100  Gross per 24 hour   Intake 3071 ml   Output 2102 ml   Net 969 ml       Hemodynamic Parameters:  PAP: (31-56)/(9-26) 44/9  PAP (Mean):  [27 mmHg-38 mmHg] 31 mmHg  CO:  [3.7 L/min-6.8 L/min] 5 L/min  CI:  [1.6 L/min/m2-2.9 L/min/m2] 2.1 L/min/m2    Telemetry: A fib    Physical Exam   Constitutional: Intubated and sedated; chest open    Eyes: Conjunctivae are normal.   Neck: Neck supple. No thyromegaly present. Bilateral IJ lines   Cardiovascular: Irregularly irregular, tachycardic, smooth VAD hum. Chest open   Pulmonary/Chest: Effort normal and breath sounds normal. Intubated and sedated   Abdominal: Soft.   Musculoskeletal: He exhibits no edema.   Neurological: Intubated and sedated   Skin: Skin is warm and dry. Capillary refill takes 2 to 3 seconds.       Significant Labs:  CBC:  Recent Labs   Lab 01/28/20  0402  01/28/20  1416  01/29/20  0131 01/29/20  0508 01/29/20  0818   WBC 17.01*  --  16.11*  --   --  17.09*  --    RBC 3.84*  --  3.50*  --   --   3.49*  --    HGB 10.5*  --  9.7*  --   --  9.7*  --    HCT 36.1*   < > 32.9*   < > 28* 32.9* 43     --  245  --   --  238  --    MCV 94  --  94  --   --  94  --    MCH 27.3  --  27.7  --   --  27.8  --    MCHC 29.1*  --  29.5*  --   --  29.5*  --     < > = values in this interval not displayed.     BNP:  Recent Labs   Lab 01/24/20  0334 01/27/20  0400 01/29/20  0508   * 1,140* 320*     CMP:  Recent Labs   Lab 01/28/20 0402 01/28/20  1416 01/28/20  1809 01/29/20  0006 01/29/20  0508   *  --  151*  --   --  150*   CALCIUM 9.5  --  9.0  --   --  9.1   ALBUMIN 2.4*  --  2.8*  --   --  2.7*  2.7*   PROT 6.5  --  6.5  --   --  6.4  6.4   *  --  148*  --   --  150*   K 3.8  3.8   < > 3.5 3.6 3.7 4.0   CO2 25  --  29  --   --  27     --  107  --   --  111*   *  --  100*  --   --  97*   CREATININE 3.1*  --  2.9*  --   --  2.7*   ALKPHOS 43*  --  41*  --   --  43*  43*   ALT 20  --  16  --   --  13  13   AST 26  --  22  --   --  21  21   BILITOT 0.6  --  0.7  --   --  0.7  0.7    < > = values in this interval not displayed.      Coagulation:   Recent Labs   Lab 01/28/20 0402 01/28/20 1416 01/29/20  0508   INR 1.2 1.3* 1.2   APTT 25.3 24.5 24.7     LDH:  Recent Labs   Lab 01/27/20  0400 01/28/20  0402 01/29/20  0508   * 458* 395*     Microbiology:  Microbiology Results (last 7 days)     Procedure Component Value Units Date/Time    Blood culture [[id number]] Collected:  01/20/20 1042    Order Status:  Completed Specimen:  Blood from Peripheral, Antecubital, Left Updated:  01/25/20 1412     Blood Culture, Routine No growth after 5 days.    Blood culture [[id number]] Collected:  01/20/20 1035    Order Status:  Completed Specimen:  Blood from Peripheral, Wrist, Left Updated:  01/25/20 1412     Blood Culture, Routine No growth after 5 days.          I have reviewed all pertinent labs within the past 24 hours.    Estimated Creatinine Clearance: 35.9 mL/min (A) (based on SCr of  2.7 mg/dL (H)).    Diagnostic Results:  I have reviewed all pertinent imaging results/findings within the past 24 hours.    Assessment and Plan:       55 y.o. WM with history of NICMP diagnosed in 2010, ICD, LV thrombus (with prior splenic and renal emboli), Embolic  CVA , paroxysmal atrial fib, HTN, HLP  presents for  F/U today to clinic and he was volume overloaded on exam .  He states he had 3 admission in the last 3 months for volume overload. He started having more SOB on exertion since one month. Also endorses of Orthopnea since last one month. Alble to walk only 150 ft. He also has Bilateral lower extremity edema. He was admitted here in 2017 for ADHD here at [facility] and [facility] during that admission showed PCWP 40 and CVP 17. Currently denies chest pain, lightheadedness     * LVAD (left ventricular assist device) present  - S/P DT HM3 with closure of AV and repair of MV for regurg 1/23/20.   - CTS primary  - Taken back to OR 1/24 for possible RVAD placement which was not done. Patient remained hemodynamically stable in OR. Wash out on 1/27. Chest remains open. Remains intubated and sedated.   - CVP 15, SvO2 59  - Currently hemodynamically stable on Epi, Lasix, Lidocaine, and [place]   - Current speed 5400    Procedure: Device Interrogation Including analysis of device parameters  Current Settings: Ventricular Assist Device  Review of device function is stable/unstabe    TXP LVAD INTERROGATIONS 1/29/2020 1/29/2020 1/29/2020 1/29/2020 1/29/2020 1/29/2020 1/29/2020   Type HeartMate3 HeartMate3 HeartMate3 HeartMate3 HeartMate3 HeartMate3 HeartMate3   Flow 4.5 4.5 4.1 4.3 4.1 4.4 4.2   Speed 5300 5300 5300 5300 5300 5300 5300   PI 3.0 3.0 3.8 3.3 4.2 3.3 4   Power (Berry) 3.7 3.8 3.8 3.9 3.9 3.8 3.7   LSL 4900 4900 4900 4900 4900 - -   Pulsatility Intermittent pulse Intermittent pulse Intermittent pulse Intermittent pulse Intermittent pulse - -       Acute on chronic combined systolic and diastolic heart failure  -  S/P DT HM3 with closure of AV and plication of MV for regurg 1/23/20 (See LVAD)  - [facility] dx'd 2010  - currently on lasix 2.5 mg/hour on EPI .06   - See above        Coagulopathy  - Appreciate Hem/Onc's help. No evidence of underlying coagulopathy (h/o LV thrombus with splenic and renal emboli, h/o embolic CVA)    NSVT (nonsustained ventricular tachycardia)  - Avoid digoxin, agree with restarting Amiodarone    Hepatic congestion  - Liver US on 1/11 unremarkable    ICD (implantable cardioverter-defibrillator) in place  - S/P Biotronik dual chamber ICD    Right lower lobe pulmonary nodule  - Incidental finding on CT chest/abd/pelvis on 1/12. Pulmonology consulted, and rec repeat CT of chest in 3 months  - Will need to follow-up in pulmonary clinic.     Acute kidney injury superimposed on chronic kidney disease  - Creatinine on admit 2.6 (baseline ~ 1.8). BUN/Creatinine today 97/2.7  - Nephrology consulted and following    Paroxysmal atrial fibrillation  - Has been back in A fib since surgery  - AC per CTS  - agree with discontinuing digoxin for rate control- now dig level 2.4 and patient with worsening renal function  - Was on Amiodarone infusion but now on Lidocaine.  - Would wean Lidocaine off and consider restarting Amiodarone    Left ventricular thrombus without MI  - H/O LV thrombus with h/o splenic and renal emboli as well as embolic CVA  - Limited TTE done here 1/13 showed no thrombus  - JACINTO 1/23 intra op showed resolution of DAMON thrombus  - AC per CTS          Jasmina Charles PA-C  Heart Transplant  [facility]

## 2020-01-29 NOTE — ASSESSMENT & PLAN NOTE
- Creatinine on admit 2.6 (baseline ~ 1.8). BUN/Creatinine today 97/2.7  - Nephrology consulted and following

## 2020-01-29 NOTE — SUBJECTIVE & OBJECTIVE
Interval History/Significant Events: NAEON    Follow-up For: Procedure(s) (LRB):  IRRIGATION, MEDIASTINUM (N/A)    Post-Operative Day: 2 Days Post-Op    Objective:     Vital Signs (Most Recent):  Temp: 97.8 °F (36.6 °C) (01/29/20 0900)  Pulse: 90 (01/29/20 1116)  Resp: 17 (01/29/20 0900)  BP: (!) 88/0 (01/29/20 0700)  SpO2: 99 % (01/29/20 1116) Vital Signs (24h Range):  Temp:  [97.8 °F (36.6 °C)-98.5 °F (36.9 °C)] 97.8 °F (36.6 °C)  Pulse:  [90-93] 90  Resp:  [17-19] 17  SpO2:  [98 %-100 %] 99 %  BP: (82-98)/(0) 88/0  Arterial Line BP: ()/() 83/67     Weight: 105.8 kg (233 lb 3.9 oz)  Body mass index is 33.47 kg/m².      Intake/Output Summary (Last 24 hours) at 1/29/2020 1156  Last data filed at 1/29/2020 1100  Gross per 24 hour   Intake 3071 ml   Output 2102 ml   Net 969 ml       Physical Exam   Constitutional: He appears well-developed and well-nourished.   HENT:   Head: Normocephalic and atraumatic.   Mouth/Throat: No oropharyngeal exudate.   ETT and OG secured in place.   Eyes: Pupils are equal, round, and reactive to light.   Neck: Normal range of motion. Neck supple.   Left IJ double lumen + [place] in place, dressing CLD  Right IJ triple lumen, dressing CLD   Cardiovascular:   Irregular tachyarrythmia. LVAD hum.    Pulmonary/Chest: Breath sounds normal. He has no wheezes. He has no rales.   Intubated. Open chest, dressed with [place]. CTs with SS drainage.   Abdominal: Soft. He exhibits no distension.   Musculoskeletal: He exhibits no edema or deformity.   Neurological:   sedated   Skin: Skin is warm and dry.       Vents:  Vent Mode: A/C (01/29/20 1116)  Ventilator Initiated: Yes (01/21/20 0905)  Set Rate: 16 bmp (01/29/20 1116)  Vt Set: 500 mL (01/29/20 1116)  PEEP/CPAP: 5 cmH20 (01/29/20 1116)  Oxygen Concentration (%): 50 (01/29/20 1116)  Peak Airway Pressure: 19 cmH2O (01/29/20 1116)  Plateau Pressure: 18 cmH20 (01/29/20 1116)  Total Ve: 8.33 mL (01/29/20 1116)  F/VT Ratio<105 (RSBI): (!)  34.88 (01/27/20 2316)    Lines/Drains/Airways     Central Venous Catheter Line                 Percutaneous Central Line Insertion/Assessment - double lumen  01/23/20 0751 left internal jugular 6 days         Pulmonary Artery Catheter Assessment  01/23/20 0751 6 days         Trialysis (Dialysis) Catheter 01/24/20 1500 right internal jugular 4 days          Drain                 Urethral Catheter 01/21/20 0752 Non-latex;Straight-tip;Temperature probe 16 Fr. 8 days         Chest Tube 01/23/20 1230 1 Right Pleural 5 days         Chest Tube 01/23/20 1414 2 Right Mediastinal 5 days         Chest Tube 01/23/20 1415 3 Left Mediastinal 5 days         NG/OG Tube 01/27/20 1300 Center mouth 1 day          Airway                 Airway - Non-Surgical 01/21/20 0742 Endotracheal Tube 8 days          Arterial Line                 Arterial Line 01/21/20 0730 Left Other (Comment) 8 days          Line                 VAD 01/23/20 1148 Left ventricular assist device HeartMate 3 6 days          Peripheral Intravenous Line                 Peripheral IV - Single Lumen 01/25/20 1133 18 G Left Antecubital 4 days                Significant Labs:    CBC/Anemia Profile:  Recent Labs   Lab 01/28/20  0402  01/28/20  1416  01/29/20  0131 01/29/20  0508 01/29/20  0818   WBC 17.01*  --  16.11*  --   --  17.09*  --    HGB 10.5*  --  9.7*  --   --  9.7*  --    HCT 36.1*   < > 32.9*   < > 28* 32.9* 43     --  245  --   --  238  --    MCV 94  --  94  --   --  94  --    RDW 15.9*  --  15.9*  --   --  16.2*  --     < > = values in this interval not displayed.        Chemistries:  Recent Labs   Lab 01/28/20  0402  01/28/20  1416 01/28/20  1809 01/29/20  0006 01/29/20  0508   *  --  148*  --   --  150*   K 3.8  3.8   < > 3.5 3.6 3.7 4.0     --  107  --   --  111*   CO2 25  --  29  --   --  27   *  --  100*  --   --  97*   CREATININE 3.1*  --  2.9*  --   --  2.7*   CALCIUM 9.5  --  9.0  --   --  9.1   ALBUMIN 2.4*  --  2.8*  --    --  2.7*  2.7*   PROT 6.5  --  6.5  --   --  6.4  6.4   BILITOT 0.6  --  0.7  --   --  0.7  0.7   ALKPHOS 43*  --  41*  --   --  43*  43*   ALT 20  --  16  --   --  13  13   AST 26  --  22  --   --  21  21   MG 2.6   < > 2.7* 2.6 2.6 2.7*   PHOS 4.3  --  3.9  --   --  3.5    < > = values in this interval not displayed.       ABGs:   Recent Labs   Lab 01/29/20  0814   PH 7.393   PCO2 39.7   HCO3 24.2   POCSATURATED 99   BE -1     Coagulation:   Recent Labs   Lab 01/29/20  0508   INR 1.2   APTT 24.7     Lactic Acid: No results for input(s): LACTATE in the last 48 hours.  All pertinent labs within the past 24 hours have been reviewed.    Significant Imaging:  I have reviewed all pertinent imaging results/findings within the past 24 hours.

## 2020-01-30 LAB
ABO + RH BLD: NORMAL
ALBUMIN SERPL BCP-MCNC: 2.5 G/DL (ref 3.5–5.2)
ALBUMIN SERPL BCP-MCNC: 2.6 G/DL (ref 3.5–5.2)
ALBUMIN SERPL BCP-MCNC: 2.8 G/DL (ref 3.5–5.2)
ALLENS TEST: ABNORMAL
ALLENS TEST: NORMAL
ALP SERPL-CCNC: 44 U/L (ref 55–135)
ALP SERPL-CCNC: 45 U/L (ref 55–135)
ALP SERPL-CCNC: 47 U/L (ref 55–135)
ALT SERPL W/O P-5'-P-CCNC: 12 U/L (ref 10–44)
ALT SERPL W/O P-5'-P-CCNC: 13 U/L (ref 10–44)
ALT SERPL W/O P-5'-P-CCNC: 15 U/L (ref 10–44)
ANION GAP SERPL CALC-SCNC: 10 MMOL/L (ref 8–16)
ANION GAP SERPL CALC-SCNC: 10 MMOL/L (ref 8–16)
ANION GAP SERPL CALC-SCNC: 12 MMOL/L (ref 8–16)
ANISOCYTOSIS BLD QL SMEAR: SLIGHT
ANISOCYTOSIS BLD QL SMEAR: SLIGHT
APTT BLDCRRT: 24.2 SEC (ref 21–32)
APTT BLDCRRT: 25.3 SEC (ref 21–32)
APTT BLDCRRT: 26.9 SEC (ref 21–32)
APTT BLDCRRT: 27.6 SEC (ref 21–32)
AST SERPL-CCNC: 24 U/L (ref 10–40)
AST SERPL-CCNC: 29 U/L (ref 10–40)
AST SERPL-CCNC: 37 U/L (ref 10–40)
BASOPHILS # BLD AUTO: ABNORMAL K/UL (ref 0–0.2)
BASOPHILS NFR BLD: 0 % (ref 0–1.9)
BASOPHILS NFR BLD: 0 % (ref 0–1.9)
BILIRUB SERPL-MCNC: 0.8 MG/DL (ref 0.1–1)
BLD GP AB SCN CELLS X3 SERPL QL: NORMAL
BUN SERPL-MCNC: 86 MG/DL (ref 6–20)
BUN SERPL-MCNC: 86 MG/DL (ref 6–20)
BUN SERPL-MCNC: 92 MG/DL (ref 6–20)
CALCIUM SERPL-MCNC: 8.9 MG/DL (ref 8.7–10.5)
CALCIUM SERPL-MCNC: 9.2 MG/DL (ref 8.7–10.5)
CALCIUM SERPL-MCNC: 9.2 MG/DL (ref 8.7–10.5)
CHLORIDE SERPL-SCNC: 111 MMOL/L (ref 95–110)
CHLORIDE SERPL-SCNC: 114 MMOL/L (ref 95–110)
CHLORIDE SERPL-SCNC: 116 MMOL/L (ref 95–110)
CO2 SERPL-SCNC: 25 MMOL/L (ref 23–29)
CO2 SERPL-SCNC: 25 MMOL/L (ref 23–29)
CO2 SERPL-SCNC: 28 MMOL/L (ref 23–29)
CREAT SERPL-MCNC: 2.3 MG/DL (ref 0.5–1.4)
CREAT SERPL-MCNC: 2.4 MG/DL (ref 0.5–1.4)
CREAT SERPL-MCNC: 2.5 MG/DL (ref 0.5–1.4)
DELSYS: ABNORMAL
DELSYS: NORMAL
DIFFERENTIAL METHOD: ABNORMAL
DIFFERENTIAL METHOD: ABNORMAL
DIGOXIN SERPL-MCNC: 2 NG/ML (ref 0.8–2)
EOSINOPHIL # BLD AUTO: ABNORMAL K/UL (ref 0–0.5)
EOSINOPHIL NFR BLD: 0 % (ref 0–8)
EOSINOPHIL NFR BLD: 0 % (ref 0–8)
ERYTHROCYTE [DISTWIDTH] IN BLOOD BY AUTOMATED COUNT: 16.4 % (ref 11.5–14.5)
ERYTHROCYTE [DISTWIDTH] IN BLOOD BY AUTOMATED COUNT: 16.6 % (ref 11.5–14.5)
ERYTHROCYTE [SEDIMENTATION RATE] IN BLOOD BY WESTERGREN METHOD: 16 MM/H
EST. GFR  (AFRICAN AMERICAN): 31.3 ML/MIN/1.73 M^2
EST. GFR  (AFRICAN AMERICAN): 32.9 ML/MIN/1.73 M^2
EST. GFR  (AFRICAN AMERICAN): 34.6 ML/MIN/1.73 M^2
EST. GFR  (NON AFRICAN AMERICAN): 27.1 ML/MIN/1.73 M^2
EST. GFR  (NON AFRICAN AMERICAN): 28.5 ML/MIN/1.73 M^2
EST. GFR  (NON AFRICAN AMERICAN): 30 ML/MIN/1.73 M^2
FIO2: 50
GLUCOSE SERPL-MCNC: 139 MG/DL (ref 70–110)
GLUCOSE SERPL-MCNC: 146 MG/DL (ref 70–110)
GLUCOSE SERPL-MCNC: 153 MG/DL (ref 70–110)
HCO3 UR-SCNC: 15.5 MMOL/L (ref 24–28)
HCO3 UR-SCNC: 20 MMOL/L (ref 24–28)
HCO3 UR-SCNC: 20.3 MMOL/L (ref 24–28)
HCO3 UR-SCNC: 22.2 MMOL/L (ref 24–28)
HCO3 UR-SCNC: 24.1 MMOL/L (ref 24–28)
HCO3 UR-SCNC: 25.3 MMOL/L (ref 24–28)
HCO3 UR-SCNC: 25.6 MMOL/L (ref 24–28)
HCO3 UR-SCNC: 26 MMOL/L (ref 24–28)
HCO3 UR-SCNC: 27.8 MMOL/L (ref 24–28)
HCT VFR BLD AUTO: 32.9 % (ref 40–54)
HCT VFR BLD AUTO: 33.1 % (ref 40–54)
HCT VFR BLD CALC: 26 %PCV (ref 36–54)
HCT VFR BLD CALC: 27 %PCV (ref 36–54)
HCT VFR BLD CALC: 27 %PCV (ref 36–54)
HCT VFR BLD CALC: 28 %PCV (ref 36–54)
HCT VFR BLD CALC: 36 %PCV (ref 36–54)
HCT VFR BLD CALC: 38 %PCV (ref 36–54)
HCT VFR BLD CALC: 44 %PCV (ref 36–54)
HGB BLD-MCNC: 9.7 G/DL (ref 14–18)
HGB BLD-MCNC: 9.8 G/DL (ref 14–18)
HYPOCHROMIA BLD QL SMEAR: ABNORMAL
HYPOCHROMIA BLD QL SMEAR: ABNORMAL
IMM GRANULOCYTES # BLD AUTO: ABNORMAL K/UL (ref 0–0.04)
IMM GRANULOCYTES # BLD AUTO: ABNORMAL K/UL (ref 0–0.04)
IMM GRANULOCYTES NFR BLD AUTO: ABNORMAL % (ref 0–0.5)
IMM GRANULOCYTES NFR BLD AUTO: ABNORMAL % (ref 0–0.5)
INR PPP: 1.2 (ref 0.8–1.2)
INR PPP: 1.2 (ref 0.8–1.2)
LDH SERPL L TO P-CCNC: 0.42 MMOL/L (ref 0.36–1.25)
LDH SERPL L TO P-CCNC: 0.57 MMOL/L (ref 0.36–1.25)
LDH SERPL L TO P-CCNC: 0.63 MMOL/L (ref 0.36–1.25)
LDH SERPL L TO P-CCNC: 0.72 MMOL/L (ref 0.36–1.25)
LDH SERPL L TO P-CCNC: 0.73 MMOL/L (ref 0.36–1.25)
LDH SERPL L TO P-CCNC: 0.76 MMOL/L (ref 0.36–1.25)
LDH SERPL L TO P-CCNC: 0.76 MMOL/L (ref 0.36–1.25)
LDH SERPL L TO P-CCNC: 0.85 MMOL/L (ref 0.36–1.25)
LDH SERPL L TO P-CCNC: 433 U/L (ref 110–260)
LYMPHOCYTES # BLD AUTO: ABNORMAL K/UL (ref 1–4.8)
LYMPHOCYTES NFR BLD: 3 % (ref 18–48)
LYMPHOCYTES NFR BLD: 6 % (ref 18–48)
MAGNESIUM SERPL-MCNC: 2.4 MG/DL (ref 1.6–2.6)
MAGNESIUM SERPL-MCNC: 2.5 MG/DL (ref 1.6–2.6)
MAGNESIUM SERPL-MCNC: 2.6 MG/DL (ref 1.6–2.6)
MCH RBC QN AUTO: 27.5 PG (ref 27–31)
MCH RBC QN AUTO: 28 PG (ref 27–31)
MCHC RBC AUTO-ENTMCNC: 29.3 G/DL (ref 32–36)
MCHC RBC AUTO-ENTMCNC: 29.8 G/DL (ref 32–36)
MCV RBC AUTO: 94 FL (ref 82–98)
MCV RBC AUTO: 94 FL (ref 82–98)
METHEMOGLOBIN: 0.9 % (ref 0–3)
MIN VOL: 10.2
MIN VOL: 9.64
MIN VOL: 9.79
MODE: ABNORMAL
MODE: NORMAL
MONOCYTES # BLD AUTO: ABNORMAL K/UL (ref 0.3–1)
MONOCYTES NFR BLD: 2 % (ref 4–15)
MONOCYTES NFR BLD: 4 % (ref 4–15)
MYELOCYTES NFR BLD MANUAL: 1 %
NEUTROPHILS NFR BLD: 92 % (ref 38–73)
NEUTROPHILS NFR BLD: 92 % (ref 38–73)
NRBC BLD-RTO: 1 /100 WBC
NRBC BLD-RTO: 1 /100 WBC
OVALOCYTES BLD QL SMEAR: ABNORMAL
OVALOCYTES BLD QL SMEAR: ABNORMAL
PCO2 BLDA: 23.6 MMHG (ref 35–45)
PCO2 BLDA: 26.8 MMHG (ref 35–45)
PCO2 BLDA: 29.6 MMHG (ref 35–45)
PCO2 BLDA: 29.6 MMHG (ref 35–45)
PCO2 BLDA: 32.2 MMHG (ref 35–45)
PCO2 BLDA: 37.3 MMHG (ref 35–45)
PCO2 BLDA: 37.5 MMHG (ref 35–45)
PCO2 BLDA: 37.6 MMHG (ref 35–45)
PCO2 BLDA: 42.8 MMHG (ref 35–45)
PEEP: 5
PH SMN: 7.42 [PH] (ref 7.35–7.45)
PH SMN: 7.42 [PH] (ref 7.35–7.45)
PH SMN: 7.44 [PH] (ref 7.35–7.45)
PH SMN: 7.44 [PH] (ref 7.35–7.45)
PH SMN: 7.45 [PH] (ref 7.35–7.45)
PH SMN: 7.45 [PH] (ref 7.35–7.45)
PH SMN: 7.48 [PH] (ref 7.35–7.45)
PH SMN: 7.48 [PH] (ref 7.35–7.45)
PH SMN: 7.49 [PH] (ref 7.35–7.45)
PHOSPHATE SERPL-MCNC: 2.8 MG/DL (ref 2.7–4.5)
PHOSPHATE SERPL-MCNC: 3.7 MG/DL (ref 2.7–4.5)
PIP: 21
PLATELET # BLD AUTO: 202 K/UL (ref 150–350)
PLATELET # BLD AUTO: 216 K/UL (ref 150–350)
PLATELET BLD QL SMEAR: ABNORMAL
PMV BLD AUTO: 11.7 FL (ref 9.2–12.9)
PMV BLD AUTO: 12.1 FL (ref 9.2–12.9)
PO2 BLDA: 100 MMHG (ref 80–100)
PO2 BLDA: 103 MMHG (ref 80–100)
PO2 BLDA: 103 MMHG (ref 80–100)
PO2 BLDA: 110 MMHG (ref 80–100)
PO2 BLDA: 111 MMHG (ref 80–100)
PO2 BLDA: 112 MMHG (ref 80–100)
PO2 BLDA: 120 MMHG (ref 80–100)
PO2 BLDA: 29 MMHG (ref 40–60)
PO2 BLDA: 82 MMHG (ref 80–100)
POC BE: -1 MMOL/L
POC BE: -3 MMOL/L
POC BE: -4 MMOL/L
POC BE: -9 MMOL/L
POC BE: 1 MMOL/L
POC BE: 1 MMOL/L
POC BE: 2 MMOL/L
POC BE: 2 MMOL/L
POC BE: 3 MMOL/L
POC IONIZED CALCIUM: 0.99 MMOL/L (ref 1.06–1.42)
POC IONIZED CALCIUM: 1.09 MMOL/L (ref 1.06–1.42)
POC IONIZED CALCIUM: 1.1 MMOL/L (ref 1.06–1.42)
POC IONIZED CALCIUM: 1.16 MMOL/L (ref 1.06–1.42)
POC IONIZED CALCIUM: 1.21 MMOL/L (ref 1.06–1.42)
POC IONIZED CALCIUM: 1.25 MMOL/L (ref 1.06–1.42)
POC IONIZED CALCIUM: 1.25 MMOL/L (ref 1.06–1.42)
POC SATURATED O2: 55 % (ref 95–100)
POC SATURATED O2: 96 % (ref 95–100)
POC SATURATED O2: 98 % (ref 95–100)
POC SATURATED O2: 99 % (ref 95–100)
POC TCO2: 16 MMOL/L (ref 23–27)
POC TCO2: 21 MMOL/L (ref 23–27)
POC TCO2: 21 MMOL/L (ref 23–27)
POC TCO2: 23 MMOL/L (ref 23–27)
POC TCO2: 25 MMOL/L (ref 23–27)
POC TCO2: 26 MMOL/L (ref 23–27)
POC TCO2: 27 MMOL/L (ref 23–27)
POC TCO2: 27 MMOL/L (ref 23–27)
POC TCO2: 29 MMOL/L (ref 24–29)
POCT GLUCOSE: 105 MG/DL (ref 70–110)
POCT GLUCOSE: 139 MG/DL (ref 70–110)
POCT GLUCOSE: 149 MG/DL (ref 70–110)
POCT GLUCOSE: 155 MG/DL (ref 70–110)
POCT GLUCOSE: 164 MG/DL (ref 70–110)
POCT GLUCOSE: 199 MG/DL (ref 70–110)
POIKILOCYTOSIS BLD QL SMEAR: SLIGHT
POIKILOCYTOSIS BLD QL SMEAR: SLIGHT
POLYCHROMASIA BLD QL SMEAR: ABNORMAL
POLYCHROMASIA BLD QL SMEAR: ABNORMAL
POTASSIUM BLD-SCNC: 3.2 MMOL/L (ref 3.5–5.1)
POTASSIUM BLD-SCNC: 3.2 MMOL/L (ref 3.5–5.1)
POTASSIUM BLD-SCNC: 3.5 MMOL/L (ref 3.5–5.1)
POTASSIUM BLD-SCNC: 3.8 MMOL/L (ref 3.5–5.1)
POTASSIUM BLD-SCNC: 4 MMOL/L (ref 3.5–5.1)
POTASSIUM BLD-SCNC: 4.1 MMOL/L (ref 3.5–5.1)
POTASSIUM BLD-SCNC: 4.1 MMOL/L (ref 3.5–5.1)
POTASSIUM SERPL-SCNC: 3.7 MMOL/L (ref 3.5–5.1)
POTASSIUM SERPL-SCNC: 3.9 MMOL/L (ref 3.5–5.1)
POTASSIUM SERPL-SCNC: 3.9 MMOL/L (ref 3.5–5.1)
POTASSIUM SERPL-SCNC: 4 MMOL/L (ref 3.5–5.1)
POTASSIUM SERPL-SCNC: 4.2 MMOL/L (ref 3.5–5.1)
POTASSIUM SERPL-SCNC: 4.3 MMOL/L (ref 3.5–5.1)
PROT SERPL-MCNC: 6.1 G/DL (ref 6–8.4)
PROT SERPL-MCNC: 6.3 G/DL (ref 6–8.4)
PROT SERPL-MCNC: 6.5 G/DL (ref 6–8.4)
PROTHROMBIN TIME: 11.8 SEC (ref 9–12.5)
PROTHROMBIN TIME: 12.2 SEC (ref 9–12.5)
RBC # BLD AUTO: 3.5 M/UL (ref 4.6–6.2)
RBC # BLD AUTO: 3.53 M/UL (ref 4.6–6.2)
SAMPLE: ABNORMAL
SAMPLE: NORMAL
SITE: ABNORMAL
SITE: NORMAL
SODIUM BLD-SCNC: 149 MMOL/L (ref 136–145)
SODIUM BLD-SCNC: 151 MMOL/L (ref 136–145)
SODIUM BLD-SCNC: 156 MMOL/L (ref 136–145)
SODIUM SERPL-SCNC: 149 MMOL/L (ref 136–145)
SODIUM SERPL-SCNC: 151 MMOL/L (ref 136–145)
SODIUM SERPL-SCNC: 151 MMOL/L (ref 136–145)
SP02: 100
SP02: 97
SP02: 99
VANCOMYCIN SERPL-MCNC: 10.2 UG/ML
VT: 500
WBC # BLD AUTO: 16.91 K/UL (ref 3.9–12.7)
WBC # BLD AUTO: 19.32 K/UL (ref 3.9–12.7)

## 2020-01-30 PROCEDURE — 25000003 PHARM REV CODE 250: Performed by: STUDENT IN AN ORGANIZED HEALTH CARE EDUCATION/TRAINING PROGRAM

## 2020-01-30 PROCEDURE — 83605 ASSAY OF LACTIC ACID: CPT

## 2020-01-30 PROCEDURE — 27000248 HC VAD-ADDITIONAL DAY

## 2020-01-30 PROCEDURE — 85730 THROMBOPLASTIN TIME PARTIAL: CPT | Mod: 91

## 2020-01-30 PROCEDURE — 25000003 PHARM REV CODE 250: Performed by: SURGERY

## 2020-01-30 PROCEDURE — 99232 PR SUBSEQUENT HOSPITAL CARE,LEVL II: ICD-10-PCS | Mod: ,,, | Performed by: NURSE PRACTITIONER

## 2020-01-30 PROCEDURE — 80162 ASSAY OF DIGOXIN TOTAL: CPT

## 2020-01-30 PROCEDURE — 93750 INTERROGATION VAD IN PERSON: CPT | Mod: ,,, | Performed by: INTERNAL MEDICINE

## 2020-01-30 PROCEDURE — 99233 SBSQ HOSP IP/OBS HIGH 50: CPT | Mod: ,,, | Performed by: SURGERY

## 2020-01-30 PROCEDURE — 99900035 HC TECH TIME PER 15 MIN (STAT)

## 2020-01-30 PROCEDURE — 27000221 HC OXYGEN, UP TO 24 HOURS

## 2020-01-30 PROCEDURE — 83615 LACTATE (LD) (LDH) ENZYME: CPT

## 2020-01-30 PROCEDURE — 99900026 HC AIRWAY MAINTENANCE (STAT)

## 2020-01-30 PROCEDURE — 86850 RBC ANTIBODY SCREEN: CPT

## 2020-01-30 PROCEDURE — 99232 SBSQ HOSP IP/OBS MODERATE 35: CPT | Mod: ,,, | Performed by: NURSE PRACTITIONER

## 2020-01-30 PROCEDURE — 80053 COMPREHEN METABOLIC PANEL: CPT | Mod: 91

## 2020-01-30 PROCEDURE — 84132 ASSAY OF SERUM POTASSIUM: CPT

## 2020-01-30 PROCEDURE — 63600175 PHARM REV CODE 636 W HCPCS: Performed by: STUDENT IN AN ORGANIZED HEALTH CARE EDUCATION/TRAINING PROGRAM

## 2020-01-30 PROCEDURE — 99233 SBSQ HOSP IP/OBS HIGH 50: CPT | Mod: ,,, | Performed by: INTERNAL MEDICINE

## 2020-01-30 PROCEDURE — 84100 ASSAY OF PHOSPHORUS: CPT | Mod: 91

## 2020-01-30 PROCEDURE — 84100 ASSAY OF PHOSPHORUS: CPT

## 2020-01-30 PROCEDURE — 83735 ASSAY OF MAGNESIUM: CPT

## 2020-01-30 PROCEDURE — 25000003 PHARM REV CODE 250: Performed by: PHYSICIAN ASSISTANT

## 2020-01-30 PROCEDURE — 80202 ASSAY OF VANCOMYCIN: CPT

## 2020-01-30 PROCEDURE — 85730 THROMBOPLASTIN TIME PARTIAL: CPT

## 2020-01-30 PROCEDURE — 85610 PROTHROMBIN TIME: CPT

## 2020-01-30 PROCEDURE — 25000003 PHARM REV CODE 250: Performed by: THORACIC SURGERY (CARDIOTHORACIC VASCULAR SURGERY)

## 2020-01-30 PROCEDURE — 20000000 HC ICU ROOM

## 2020-01-30 PROCEDURE — 94003 VENT MGMT INPAT SUBQ DAY: CPT

## 2020-01-30 PROCEDURE — 99233 PR SUBSEQUENT HOSPITAL CARE,LEVL III: ICD-10-PCS | Mod: ,,, | Performed by: SURGERY

## 2020-01-30 PROCEDURE — 85007 BL SMEAR W/DIFF WBC COUNT: CPT

## 2020-01-30 PROCEDURE — 94761 N-INVAS EAR/PLS OXIMETRY MLT: CPT

## 2020-01-30 PROCEDURE — 83735 ASSAY OF MAGNESIUM: CPT | Mod: 91

## 2020-01-30 PROCEDURE — 63600175 PHARM REV CODE 636 W HCPCS: Performed by: SURGERY

## 2020-01-30 PROCEDURE — 85014 HEMATOCRIT: CPT

## 2020-01-30 PROCEDURE — 63600367 HC NITRIC OXIDE PER HOUR

## 2020-01-30 PROCEDURE — 84295 ASSAY OF SERUM SODIUM: CPT

## 2020-01-30 PROCEDURE — 82803 BLOOD GASES ANY COMBINATION: CPT

## 2020-01-30 PROCEDURE — 80053 COMPREHEN METABOLIC PANEL: CPT

## 2020-01-30 PROCEDURE — 85027 COMPLETE CBC AUTOMATED: CPT

## 2020-01-30 PROCEDURE — 83050 HGB METHEMOGLOBIN QUAN: CPT

## 2020-01-30 PROCEDURE — A4216 STERILE WATER/SALINE, 10 ML: HCPCS | Performed by: SURGERY

## 2020-01-30 PROCEDURE — 85610 PROTHROMBIN TIME: CPT | Mod: 91

## 2020-01-30 PROCEDURE — 99233 PR SUBSEQUENT HOSPITAL CARE,LEVL III: ICD-10-PCS | Mod: ,,, | Performed by: NURSE PRACTITIONER

## 2020-01-30 PROCEDURE — 99233 PR SUBSEQUENT HOSPITAL CARE,LEVL III: ICD-10-PCS | Mod: ,,, | Performed by: INTERNAL MEDICINE

## 2020-01-30 PROCEDURE — 63600175 PHARM REV CODE 636 W HCPCS: Performed by: THORACIC SURGERY (CARDIOTHORACIC VASCULAR SURGERY)

## 2020-01-30 PROCEDURE — C9113 INJ PANTOPRAZOLE SODIUM, VIA: HCPCS | Performed by: SURGERY

## 2020-01-30 PROCEDURE — 93750 PR INTERROGATE VENT ASSIST DEV, IN PERSON, W PHYSICIAN ANALYSIS: ICD-10-PCS | Mod: ,,, | Performed by: INTERNAL MEDICINE

## 2020-01-30 PROCEDURE — 37799 UNLISTED PX VASCULAR SURGERY: CPT

## 2020-01-30 PROCEDURE — 99233 SBSQ HOSP IP/OBS HIGH 50: CPT | Mod: ,,, | Performed by: NURSE PRACTITIONER

## 2020-01-30 PROCEDURE — 82330 ASSAY OF CALCIUM: CPT

## 2020-01-30 RX ORDER — DIGOXIN 125 MCG
0.12 TABLET ORAL DAILY
Status: DISCONTINUED | OUTPATIENT
Start: 2020-01-30 | End: 2020-02-03

## 2020-01-30 RX ORDER — POTASSIUM CHLORIDE 29.8 MG/ML
40 INJECTION INTRAVENOUS ONCE
Status: COMPLETED | OUTPATIENT
Start: 2020-01-30 | End: 2020-01-30

## 2020-01-30 RX ORDER — WARFARIN 2 MG/1
2 TABLET ORAL ONCE
Status: COMPLETED | OUTPATIENT
Start: 2020-01-30 | End: 2020-01-30

## 2020-01-30 RX ORDER — POLYETHYLENE GLYCOL 3350, SODIUM SULFATE ANHYDROUS, SODIUM BICARBONATE, SODIUM CHLORIDE, POTASSIUM CHLORIDE 236; 22.74; 6.74; 5.86; 2.97 G/4L; G/4L; G/4L; G/4L; G/4L
100 POWDER, FOR SOLUTION ORAL EVERY 4 HOURS
Status: COMPLETED | OUTPATIENT
Start: 2020-01-30 | End: 2020-01-31

## 2020-01-30 RX ORDER — BALSAM PERU/CASTOR OIL
OINTMENT (GRAM) TOPICAL 2 TIMES DAILY
Status: DISCONTINUED | OUTPATIENT
Start: 2020-01-30 | End: 2020-02-17 | Stop reason: HOSPADM

## 2020-01-30 RX ORDER — INSULIN ASPART 100 [IU]/ML
0-5 INJECTION, SOLUTION INTRAVENOUS; SUBCUTANEOUS EVERY 6 HOURS PRN
Status: DISCONTINUED | OUTPATIENT
Start: 2020-01-30 | End: 2020-02-02

## 2020-01-30 RX ORDER — ACETAMINOPHEN 650 MG/20.3ML
1000 LIQUID ORAL ONCE
Status: COMPLETED | OUTPATIENT
Start: 2020-01-30 | End: 2020-01-30

## 2020-01-30 RX ADMIN — Medication 3 ML: at 02:01

## 2020-01-30 RX ADMIN — POLYETHYLENE GLYCOL 3350, SODIUM SULFATE ANHYDROUS, SODIUM BICARBONATE, SODIUM CHLORIDE, POTASSIUM CHLORIDE 100 ML: 236; 22.74; 6.74; 5.86; 2.97 POWDER, FOR SOLUTION ORAL at 06:01

## 2020-01-30 RX ADMIN — Medication 3 ML: at 10:01

## 2020-01-30 RX ADMIN — WARFARIN SODIUM 2 MG: 2 TABLET ORAL at 12:01

## 2020-01-30 RX ADMIN — EPINEPHRINE 0.05 MCG/KG/MIN: 1 INJECTION INTRAMUSCULAR; INTRAVENOUS; SUBCUTANEOUS at 05:01

## 2020-01-30 RX ADMIN — CEFEPIME 2 G: 2 INJECTION, POWDER, FOR SOLUTION INTRAVENOUS at 02:01

## 2020-01-30 RX ADMIN — Medication 3 ML: at 06:01

## 2020-01-30 RX ADMIN — DEXMEDETOMIDINE HYDROCHLORIDE 0.8 MCG/KG/HR: 100 INJECTION, SOLUTION, CONCENTRATE INTRAVENOUS at 06:01

## 2020-01-30 RX ADMIN — Medication 400 MG: at 09:01

## 2020-01-30 RX ADMIN — DEXMEDETOMIDINE HYDROCHLORIDE 0.8 MCG/KG/HR: 100 INJECTION, SOLUTION, CONCENTRATE INTRAVENOUS at 01:01

## 2020-01-30 RX ADMIN — FENTANYL CITRATE 25 MCG: 50 INJECTION INTRAMUSCULAR; INTRAVENOUS at 10:01

## 2020-01-30 RX ADMIN — POLYETHYLENE GLYCOL 3350 17 G: 17 POWDER, FOR SOLUTION ORAL at 10:01

## 2020-01-30 RX ADMIN — DIGOXIN 0.12 MG: 125 TABLET ORAL at 11:01

## 2020-01-30 RX ADMIN — DEXMEDETOMIDINE HYDROCHLORIDE 0.6 MCG/KG/HR: 100 INJECTION, SOLUTION, CONCENTRATE INTRAVENOUS at 12:01

## 2020-01-30 RX ADMIN — CASTOR OIL AND BALSAM, PERU: 788; 87 OINTMENT TOPICAL at 09:01

## 2020-01-30 RX ADMIN — POTASSIUM CHLORIDE 40 MEQ: 29.8 INJECTION, SOLUTION INTRAVENOUS at 01:01

## 2020-01-30 RX ADMIN — EPINEPHRINE 0.06 MCG/KG/MIN: 1 INJECTION INTRAMUSCULAR; INTRAVENOUS; SUBCUTANEOUS at 12:01

## 2020-01-30 RX ADMIN — VANCOMYCIN HYDROCHLORIDE 750 MG: 750 INJECTION, POWDER, LYOPHILIZED, FOR SOLUTION INTRAVENOUS at 09:01

## 2020-01-30 RX ADMIN — AMLODIPINE BESYLATE 10 MG: 10 TABLET ORAL at 09:01

## 2020-01-30 RX ADMIN — WARFARIN SODIUM 2 MG: 2 TABLET ORAL at 05:01

## 2020-01-30 RX ADMIN — POLYETHYLENE GLYCOL 3350, SODIUM SULFATE ANHYDROUS, SODIUM BICARBONATE, SODIUM CHLORIDE, POTASSIUM CHLORIDE 100 ML: 236; 22.74; 6.74; 5.86; 2.97 POWDER, FOR SOLUTION ORAL at 09:01

## 2020-01-30 RX ADMIN — ACETAMINOPHEN 1000 MG: 160 SOLUTION ORAL at 09:01

## 2020-01-30 RX ADMIN — POTASSIUM CHLORIDE 40 MEQ: 29.8 INJECTION, SOLUTION INTRAVENOUS at 10:01

## 2020-01-30 RX ADMIN — POLYETHYLENE GLYCOL 3350, SODIUM SULFATE ANHYDROUS, SODIUM BICARBONATE, SODIUM CHLORIDE, POTASSIUM CHLORIDE 100 ML: 236; 22.74; 6.74; 5.86; 2.97 POWDER, FOR SOLUTION ORAL at 11:01

## 2020-01-30 RX ADMIN — Medication 400 MG: at 03:01

## 2020-01-30 RX ADMIN — FENTANYL CITRATE 25 MCG: 50 INJECTION INTRAMUSCULAR; INTRAVENOUS at 11:01

## 2020-01-30 RX ADMIN — ATORVASTATIN CALCIUM 80 MG: 20 TABLET, FILM COATED ORAL at 09:01

## 2020-01-30 RX ADMIN — ASPIRIN 325 MG ORAL TABLET 325 MG: 325 PILL ORAL at 09:01

## 2020-01-30 RX ADMIN — DEXMEDETOMIDINE HYDROCHLORIDE 0.6 MCG/KG/HR: 100 INJECTION, SOLUTION, CONCENTRATE INTRAVENOUS at 06:01

## 2020-01-30 RX ADMIN — POLYETHYLENE GLYCOL 3350, SODIUM SULFATE ANHYDROUS, SODIUM BICARBONATE, SODIUM CHLORIDE, POTASSIUM CHLORIDE 100 ML: 236; 22.74; 6.74; 5.86; 2.97 POWDER, FOR SOLUTION ORAL at 02:01

## 2020-01-30 RX ADMIN — POLYETHYLENE GLYCOL 3350 17 G: 17 POWDER, FOR SOLUTION ORAL at 09:01

## 2020-01-30 RX ADMIN — HEPARIN SODIUM 200 UNITS/HR: 10000 INJECTION, SOLUTION INTRAVENOUS at 02:01

## 2020-01-30 RX ADMIN — FENTANYL CITRATE 25 MCG: 50 INJECTION INTRAMUSCULAR; INTRAVENOUS at 01:01

## 2020-01-30 RX ADMIN — POTASSIUM CHLORIDE 40 MEQ: 29.8 INJECTION, SOLUTION INTRAVENOUS at 02:01

## 2020-01-30 RX ADMIN — FENTANYL CITRATE 25 MCG: 50 INJECTION INTRAMUSCULAR; INTRAVENOUS at 02:01

## 2020-01-30 RX ADMIN — PANTOPRAZOLE SODIUM 40 MG: 40 INJECTION, POWDER, FOR SOLUTION INTRAVENOUS at 10:01

## 2020-01-30 NOTE — PLAN OF CARE
Plan of care reviewed with patient, his significant other, and his son at the bedside. No acute events overnight. Assessment per flowsheets. Possible extubation tomorrow.

## 2020-01-30 NOTE — SUBJECTIVE & OBJECTIVE
Interval History: NAEON. Patient s/p chest closure on yesterday. Remains intubated on Precedex. Renal function continues to improve, sCr down to 2.3. UOP stable on low dose Lasix drip.  Patient remains hypernatremic, now 151.    Review of patient's allergies indicates:   Allergen Reactions    Biopatch [chlorhexidine gluconate]      Site burning    Dobutamine in d5w      Tachycardia, tremors, SOB, flushing    Percocet [oxycodone-acetaminophen] Itching    Penicillins Rash     Cefepime given on 1/23/2020 without issue     Current Facility-Administered Medications   Medication Frequency    albumin human 5% bottle 500 mL PRN    albuterol sulfate nebulizer solution 2.5 mg Q4H PRN    amiodarone 5 mg/mL oral suspension TID    amLODIPine tablet 10 mg Daily    aspirin tablet 325 mg Daily    atorvastatin tablet 80 mg QHS    bisacodyl suppository 10 mg Daily PRN    dexmedetomidine (PRECEDEX) 400mcg/100mL 0.9% NaCL infusion Continuous PRN    dextrose 10% (D10W) Bolus PRN    dextrose 10% (D10W) Bolus PRN    docusate sodium capsule 200 mg QHS    EPINEPHrine (ADRENALIN) 5 mg in dextrose 5 % 250 mL infusion Continuous    fentaNYL injection 25 mcg Q1H PRN    ferrous gluconate tablet 324 mg Daily with breakfast    furosemide (LASIX) 2 mg/mL in dextrose 5 % 100 mL infusion (conc: 2 mg/mL) Continuous    glucagon (human recombinant) injection 1 mg PRN    heparin 25,000 units in dextrose 5% 250 mL (100 units/mL) infusion (heparin infusion - NO NOMOGRAM) Continuous    hydrALAZINE injection 10 mg Q6H PRN    insulin aspart U-100 pen 0-5 Units Q4H PRN    lidocaine 2000 mg in dextrose 5% 250 mL infusion Continuous    magnesium hydroxide 400 mg/5 ml suspension 2,400 mg Daily PRN    magnesium oxide tablet 400 mg TID    magnesium sulfate 2g in water 50mL IVPB (premix) PRN    niCARdipine 40 mg/200 mL infusion Continuous    nitric oxide gas Gas 20 ppm Continuous    oxyCODONE immediate release tablet 5 mg Q4H PRN     oxyCODONE immediate release tablet Tab 10 mg Q4H PRN    pantoprazole injection 40 mg Daily    polyethylene glycol (GoLYTELY) solution Q4H    polyethylene glycol packet 17 g BID    propofol (DIPRIVAN) 10 mg/mL infusion Continuous    sodium chloride 0.9% flush 10 mL PRN    sodium chloride 0.9% flush 3 mL Q8H    vancomycin 750 mg in dextrose 5 % 250 mL IVPB (ready to mix system) Q24H       Objective:     Vital Signs (Most Recent):  Temp: 99.2 °F (37.3 °C) (01/30/20 0715)  Pulse: 90 (01/30/20 0800)  Resp: 18 (01/29/20 1300)  BP: (!) 80/0 (01/30/20 0715)  SpO2: 99 % (01/30/20 0800)  O2 Device (Oxygen Therapy): ventilator (01/30/20 0800) Vital Signs (24h Range):  Temp:  [97 °F (36.1 °C)-100.6 °F (38.1 °C)] 99.2 °F (37.3 °C)  Pulse:  [90-91] 90  Resp:  [18] 18  SpO2:  [93 %-100 %] 99 %  BP: (80-90)/(0) 80/0  Arterial Line BP: ()/(49-80) 86/72     Weight: 105.8 kg (233 lb 3.9 oz) (01/29/20 0611)  Body mass index is 33.47 kg/m².  Body surface area is 2.29 meters squared.    I/O last 3 completed shifts:  In: 4420 [I.V.:3310; NG/GT:1110]  Out: 3364 [Urine:2745; Drains:190; Blood:40; Chest Tube:389]    Physical Exam   Constitutional: He appears well-developed. No distress. He is sedated and intubated.   HENT:   Head: Normocephalic and atraumatic.   Right Ear: External ear normal.   Left Ear: External ear normal.   Cardiovascular: Normal rate.   RVAD hum   Pulmonary/Chest: Effort normal and breath sounds normal. He is intubated. He has no rales.   Musculoskeletal: He exhibits edema. He exhibits no deformity.   Skin: Skin is warm and dry. He is not diaphoretic.   Sternal surgical wound, drsg intact       Significant Labs:  CBC:   Recent Labs   Lab 01/30/20  0509  01/30/20  0755   WBC 16.91*  --   --    RBC 3.50*  --   --    HGB 9.8*  --   --    HCT 32.9*   < > 44     --   --    MCV 94  --   --    MCH 28.0  --   --    MCHC 29.8*  --   --     < > = values in this interval not displayed.     CMP:   Recent Labs    Lab 01/30/20  0810   *   CALCIUM 8.9   ALBUMIN 2.5*   PROT 6.1   *   K 4.0   CO2 25   *   BUN 86*   CREATININE 2.3*   ALKPHOS 47*   ALT 15   AST 37   BILITOT 0.8

## 2020-01-30 NOTE — PLAN OF CARE
Plan of Care Note  Cardiothoracic Surgery    Tae Delgado is a 59 y.o. male with heart failure who underwent LVAD placement (1/23/20) then closure (1/24/20) and repeat chest opening (1/24/20) for poor RV function; chest washout (1/27/20); chest closure (1/29/20)    Specific issues: renal labs improving; cpap for 1h q4h; glolytely 100cc q4h until BM; nitric to 7.5; dig 0.125; decrease lido 0.5; heparin 500 with ptt 35-45; TF @10;    Plan of care for patient was discussed with ICU staff including nurses, residents, and faculty and appropriate consulting services.    Will continue to monitor patient's hemodynamics, functionality, neuro status, fluid status and renal function, and labs and will adjust medications and fluids as necessary while monitoring appropriateness for de-escalation of support and monitoring and transport to stepdown unit.    Terence Gonzalez MD  Cardiothoracic Surgery Fellow

## 2020-01-30 NOTE — PLAN OF CARE
Pt intubated and sedated. Pt follows commands and moves all extremities. Pt being fully paced at 90 bpm. Pt on precedex, epi, lidocaine, lasix. UOP  mL/hr. Pt on 50% FiO2 5 PEEP. Pt had chest closed today, had a brief episode of hypotension, sedation paused and epi was increased. Episode resolved and epi returned to 0.06 mcg/kg/min. Pt turned Q2 upon return from OR. Will continue to monitor.

## 2020-01-30 NOTE — PROGRESS NOTES
[facility]  Critical Care - Surgery  Progress Note    Patient Name: Tae Delgado  MRN: [id number]  Admission Date: 1/9/2020  Hospital Length of Stay: 21 days  Code Status: Full Code  Attending Provider: Ino Schmitt MD  Primary Care Provider: Elham Pearson MD   Principal Problem: LVAD (left ventricular assist device) present    Subjective:     Hospital/ICU Course:  1/23: Pt arrives from OR intubated, open chest dressed with Ioban, CTs with SS output. Drips on arrival: Epi 0.08, Cardene 5, TXA, Nitric at 30. Pt received 1.5L crystalloid, no blood product, 20 mg Lasix (put out 250cc in response).   Intra Op JACINTO Exam:  PRE:  Severely reduced left ventricular systolic function. Dilated LV. No definitive thrombus noted.  Moderate-to-severely reduced right ventricular systolic function. FAC 12-24%  Mild-to-moderate AI. No AS.  Moderate MR with systolic blunting of the pulmonary veins.  Trace-to-mild TR.  Mild PI. PAAT <90ms.  Small PFO by CF doppler.  SEC in La and DAMON. No clear thrombus noted.  Grade 2 atheromatous disease of the aorta.  No effusions.    POST:  Severely depressed left ventriclar dysfunction.  Moderately depressed right ventricular systolic function.  LVAD inflow and outflow cannula noted with laminar flows.  No AI.  Mild MR, vahe stitch observed. No MS.  Mild-to-moderate TR.  PFO still visible on CF doppler.  Aorta intact, no dissection.  No effusions.     1/24: run of Vtach overnight. Amio bolus given, amio gtt increased to 1, lasix push given. Improved. LVAD hemodynamics appropriate overnight. Going for closure this am. Upon return, developed tamponade physiology and returned to OR. Came back to SICU with chest open.  1/26: Diuresed over the weekend, chest kept open. Lidocaine gtt and digoxin for tachyarrhythmia. Otherwise NAEON.   1/27: Went to OR for possible chest closure, decided to do a wash out. Returned to SICU with chest open. One tachyarrhythmia episode overnight,  resolved with 100mg Lidocaine bolus and increasing lidocaine drip from 0.75mg/hr to 1mg/hr.  1/28: NAEON. Lasix gtt decreased throughout the day with adequate UOP still.   1/29: NAEON    Interval History/Significant Events:     NAEON. Patient s/p chest closure on yesterday.  Hypernatremic.        Follow-up For: Procedure(s) (LRB):  CLOSURE, WOUND, STERNUM (N/A)  WASHOUT (N/A)  APPLICATION, WOUND VAC (N/A)    Post-Operative Day: 1 Day Post-Op    Objective:     Vital Signs (Most Recent):  Temp: 99.2 °F (37.3 °C) (01/30/20 0715)  Pulse: 90 (01/30/20 1115)  Resp: 18 (01/29/20 1300)  BP: (!) 80/0 (01/30/20 0715)  SpO2: 99 % (01/30/20 1115) Vital Signs (24h Range):  Temp:  [97 °F (36.1 °C)-100.6 °F (38.1 °C)] 99.2 °F (37.3 °C)  Pulse:  [90-91] 90  Resp:  [18] 18  SpO2:  [92 %-100 %] 99 %  BP: (80-90)/(0) 80/0  Arterial Line BP: ()/(49-80) 88/76     Weight: 105.8 kg (233 lb 3.9 oz)  Body mass index is 33.47 kg/m².      Intake/Output Summary (Last 24 hours) at 1/30/2020 1123  Last data filed at 1/30/2020 1100  Gross per 24 hour   Intake 2701 ml   Output 2560 ml   Net 141 ml       Physical Exam   Constitutional: He appears well-developed and well-nourished.   HENT:   Head: Normocephalic and atraumatic.   Mouth/Throat: No oropharyngeal exudate.   ETT and OG secured in place.   Eyes: Pupils are equal, round, and reactive to light.   Neck: Normal range of motion. Neck supple.   Left IJ double lumen + [place] in place, dressing CLD  Right IJ triple lumen, dressing CLD   Cardiovascular:   Irregular tachyarrythmia. LVAD hum.  Chest close with dressing in place   Pulmonary/Chest: Breath sounds normal. He has no wheezes. He has no rales.   Intubated.    Abdominal: Soft. He exhibits no distension.   Musculoskeletal: He exhibits no edema or deformity.   Neurological:   Sedated, follows commands   Skin: Skin is warm and dry.       Vents:  Vent Mode: Spont (01/30/20 1115)  Ventilator Initiated: Yes (01/21/20 0905)  Set Rate: 16  BPM (01/30/20 1021)  Vt Set: 500 mL (01/30/20 1021)  PEEP/CPAP: 5 cmH20 (01/30/20 1115)  Oxygen Concentration (%): 50 (01/30/20 1115)  Peak Airway Pressure: 23 cmH2O (01/30/20 1115)  Plateau Pressure: 0 cmH20 (01/30/20 1115)  Total Ve: 10.3 mL (01/30/20 1115)  F/VT Ratio<105 (RSBI): (!) 34.88 (01/27/20 2316)    Lines/Drains/Airways     Central Venous Catheter Line                 Percutaneous Central Line Insertion/Assessment - double lumen  01/23/20 0751 left internal jugular 7 days         Trialysis (Dialysis) Catheter 01/24/20 1500 right internal jugular 5 days          Drain                 Urethral Catheter 01/21/20 0752 Non-latex;Straight-tip;Temperature probe 16 Fr. 9 days         Chest Tube 01/23/20 1230 1 Right Pleural 6 days         Chest Tube 01/23/20 1414 2 Right Mediastinal 6 days         Chest Tube 01/23/20 1415 3 Left Mediastinal 6 days         NG/OG Tube 01/29/20 1430 Left nostril less than 1 day          Airway                 Airway - Non-Surgical 01/21/20 0742 Endotracheal Tube 9 days          Arterial Line                 Arterial Line 01/21/20 0730 Left Other (Comment) 9 days          Line                 VAD 01/23/20 1148 Left ventricular assist device HeartMate 3 6 days          Peripheral Intravenous Line                 Peripheral IV - Single Lumen 01/29/20 1200 20 G Right Antecubital less than 1 day                Significant Labs:    CBC/Anemia Profile:  Recent Labs   Lab 01/29/20  1626  01/30/20  0010  01/30/20  0509 01/30/20  0519 01/30/20  0755 01/30/20  1013   WBC 17.23*  --  19.32*  --  16.91*  --   --   --    HGB 9.7*  --  9.7*  --  9.8*  --   --   --    HCT 33.1*   < > 33.1*   < > 32.9* 28* 44 36     --  202  --  216  --   --   --    MCV 94  --  94  --  94  --   --   --    RDW 16.1*  --  16.6*  --  16.4*  --   --   --     < > = values in this interval not displayed.        Chemistries:  Recent Labs   Lab 01/29/20  1626 01/30/20  0010 01/30/20  0509 01/30/20  0810   *  149* 151* 151*   K 4.1 3.9 4.3 4.0   * 111* 114* 116*   CO2 25 28 25 25   BUN 94* 92* 86* 86*   CREATININE 2.7* 2.5* 2.4* 2.3*   CALCIUM 9.0 9.2 9.2 8.9   ALBUMIN 2.6* 2.8* 2.6* 2.5*   PROT 6.5 6.5 6.3 6.1   BILITOT 0.7 0.8 0.8 0.8   ALKPHOS 46* 45* 44* 47*   ALT 13 12 13 15   AST 21 24 29 37   MG 2.7* 2.6 2.5  --    PHOS 4.6* 3.7 2.8  --        All pertinent labs within the past 24 hours have been reviewed.    Significant Imaging:  I have reviewed all pertinent imaging results/findings within the past 24 hours.    Assessment/Plan:     Acute on chronic combined systolic and diastolic heart failure  Tae Delgado is a 59 y.o. 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 Approved for DT LVAD. Went to OR on 1/21 and found to have DAMON and LV thrombus and case was aborted. Pt was placed on a heparin gtt and thrombus had dissipated on repeat JACINTO on 1/22. Pt underwent LVAD placement on 1/23. Chest closure and re-exploration on 1/24. Returned from OR with chest open on 1/24. Attempted chest closure on 1/27, returned to SICU with chest open.     Neuro:  - Patient opens eyes and follows commands when sedation is held  - Propofol gtt with prn Fentanyl    CVS:   - Current LVAD flow @ 5400 with appropriate PIs   - Wean vasopressors per CTS  - Lidocaine gtt for tachyarrythmia, wean  - ICD turned on, EP service consulted for assistance with arrythmia  - Digoxin 0.125  - Wean Corby    Pulm:  - Mechanical ventilation wean as tolerated  - ABGs q6hr  - CXR daily  - Monitor CT output, no overt signs of bleeding     GI:  - TF at 10cc/hr  - Protonix 40 daily  - Docusate    Endo:   - Insulin off now, SSI  - Endocrine consulted, appreciate their  services    Renal:  - Strict I/O  - Trend BUN/Cr, still elevated but stable  - Lasix gtt @ 2.5    FENGI:  - Monitor labs  - Replace per protocol  - Switching all drips to D5  - Increase free water boluses frequency to q4    Heme:  - No blood products needed during the OR  - H/H stable overnight    ID:   - Cefepime/Vanc  - Follow WBC  - Follow fever curve, AF    PPx:  - Hep gtt nontitrating, at 500 u/hr for aPTT goal of 35-45, starting coumadin  - GI ppx    Dispo:  - Cont SICU care         Critical care was time spent personally by me on the following activities: development of treatment plan with patient or surrogate and bedside caregivers, discussions with consultants, evaluation of patient's response to treatment, examination of patient, ordering and performing treatments and interventions, ordering and review of laboratory studies, ordering and review of radiographic studies, pulse oximetry, re-evaluation of patient's condition.  This critical care time did not overlap with that of any other provider or involve time for any procedures.     Antonio Thomas MD  Critical Care - Surgery  [facility]

## 2020-01-30 NOTE — ASSESSMENT & PLAN NOTE
Tae Delgado is a 59 y.o. 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 Approved for DT LVAD. Went to OR on 1/21 and found to have DAMON and LV thrombus and case was aborted. Pt was placed on a heparin gtt and thrombus had dissipated on repeat JACINTO on 1/22. Pt underwent LVAD placement on 1/23. Chest closure and re-exploration on 1/24. Returned from OR with chest open on 1/24. Attempted chest closure on 1/27, returned to SICU with chest open.     Neuro:  - Patient opens eyes and follows commands when sedation is held  - Propofol gtt with prn Fentanyl    CVS:   - Current LVAD flow @ 5400 with appropriate PIs   - Wean vasopressors per CTS  - Lidocaine gtt for tachyarrythmia, wean  - ICD turned on, EP service consulted for assistance with arrythmia  - Digoxin 0.125  - Wean Corby    Pulm:  - Mechanical ventilation wean as tolerated  - ABGs q6hr  - CXR daily  - Monitor CT output, no overt signs of bleeding     GI:  - TF at 10cc/hr  - Protonix 40 daily  - Docusate    Endo:   - Insulin off now, SSI  - Endocrine consulted, appreciate their services    Renal:  - Strict I/O  - Trend BUN/Cr, still elevated but stable  - Lasix gtt @ 2.5    FENGI:  - Monitor labs  - Replace per protocol  - Switching all drips to D5  - Increase free water boluses frequency to q4    Heme:  - No blood products needed during the OR  - H/H stable overnight    ID:   - Cefepime/Vanc  - Follow WBC  - Follow fever curve, AF    PPx:  - Hep gtt nontitrating, at 500 u/hr for aPTT goal of 35-45, starting coumadin  - GI ppx    Dispo:  - Cont SICU care

## 2020-01-30 NOTE — ASSESSMENT & PLAN NOTE
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   - CTS primary  - Taken back to OR 1/24 for possible RVAD placement which was not done. Patient remained hemodynamically stable in OR. Wash out on 1/27. Chest closed 1/29. Remains intubated and sedated.   - CVP 15, SvO2 55  - Currently hemodynamically stable on Epi, Lasix, Lidocaine, and Corby   - Current speed 5400    Procedure: Device Interrogation Including analysis of device parameters  Current Settings: Ventricular Assist Device  Review of device function is stable/unstabe    TXP LVAD INTERROGATIONS 1/30/2020 1/30/2020 1/30/2020 1/30/2020 1/30/2020 1/30/2020 1/30/2020   Type HeartMate3 HeartMate3 HeartMate3 HeartMate3 HeartMate3 HeartMate3 HeartMate3   Flow 4.3 4.4 4.2 4.2 4.3 4.2 4.2   Speed 5300 5300 5300 5300 5300 5300 5300   PI 3 3.2 3.8 3.6 3.5 3.6 4   Power (Berry) 3.7 3.9 3.8 3.5 3.9 3.8 3.7   LSL 4900 4900 4900 4900 4900 - -   Pulsatility Intermittent pulse Intermittent pulse Intermittent pulse Intermittent pulse Intermittent pulse - -

## 2020-01-30 NOTE — ASSESSMENT & PLAN NOTE
- S/P DT HM3 with closure of AV and plication of MV for regurg 1/23/20 (See LVAD)  - [facility] dx'd 2010  - currently on lasix 2.5, on EPI .06, and NO  - See above

## 2020-01-30 NOTE — SUBJECTIVE & OBJECTIVE
"Interval HPI:   Overnight events: Remains in ICU, intubated. LVAD. NAEON. Chest closure yesterday. BG well controlled without insulin.   Eating:   NPO  Nausea: No  Hypoglycemia and intervention: No  Fever: No  TPN and/or TF: No      BP (!) 82/0 (BP Location: Right arm, Patient Position: Lying)   Pulse 90   Temp 99.2 °F (37.3 °C) (Oral)   Resp 18   Ht 5' 10" (1.778 m)   Wt 105.8 kg (233 lb 3.9 oz)   SpO2 98%   BMI 33.47 kg/m²     Labs Reviewed and Include    Recent Labs   Lab 01/30/20  0810   *   CALCIUM 8.9   ALBUMIN 2.5*   PROT 6.1   *   K 4.0   CO2 25   *   BUN 86*   CREATININE 2.3*   ALKPHOS 47*   ALT 15   AST 37   BILITOT 0.8     Lab Results   Component Value Date    WBC 16.91 (H) 01/30/2020    HGB 9.8 (L) 01/30/2020    HCT 36 01/30/2020    MCV 94 01/30/2020     01/30/2020     No results for input(s): TSH, FREET4 in the last 168 hours.  Lab Results   Component Value Date    HGBA1C 6.2 (H) 01/15/2020       Nutritional status:   Body mass index is 33.47 kg/m².  Lab Results   Component Value Date    ALBUMIN 2.5 (L) 01/30/2020    ALBUMIN 2.6 (L) 01/30/2020    ALBUMIN 2.8 (L) 01/30/2020     Lab Results   Component Value Date    PREALBUMIN 11 (L) 01/24/2020    PREALBUMIN 27 01/15/2020       Estimated Creatinine Clearance: 42.1 mL/min (A) (based on SCr of 2.3 mg/dL (H)).    Accu-Checks  Recent Labs     01/28/20  1139 01/28/20  1600 01/29/20  0006 01/29/20  0506 01/29/20  0821 01/29/20  1134 01/29/20  1630 01/30/20  0009 01/30/20  0505 01/30/20  0752   POCTGLUCOSE 176* 171* 143* 147* 124* 170* 143* 149* 139* 105       Current Medications and/or Treatments Impacting Glycemic Control  Immunotherapy:    Immunosuppressants     None        Steroids:   Hormones (From admission, onward)    None        Pressors:    Autonomic Drugs (From admission, onward)    Start     Stop Route Frequency Ordered    01/29/20 4387  EPINEPHrine (ADRENALIN) 5 mg in dextrose 5 % 250 mL infusion     Question Answer " Comment   Titrate by: (in mcg/kg/min) 0.02    Titrate interval: (in minutes) 5    Titrate to maintain: (SBP or MAP or Cardiac Index) MAP    Greater than: (in mmHg) 60    Maximum dose: (in mcg/kg/min) 2        -- IV Continuous 01/29/20 0739        Hyperglycemia/Diabetes Medications:   Antihyperglycemics (From admission, onward)    Start     Stop Route Frequency Ordered    01/26/20 1536  insulin aspart U-100 pen 0-5 Units      -- SubQ Every 4 hours PRN 01/26/20 1450

## 2020-01-30 NOTE — PROGRESS NOTES
Pt returned from OR w/ anesthesia. Placed on ICU monitor. Family in waiting room aware. Pt remains intubated.      Pt turned to view back. Backside clean intact no injuries noted. Suspected DTPI on right elbow.

## 2020-01-30 NOTE — PROGRESS NOTES
[facility]  Nephrology  Progress Note    Patient Name: Tae Delgado  MRN: [id number]  Admission Date: 1/9/2020  Hospital Length of Stay: 21 days  Attending Provider: Ino Schmitt MD   Primary Care Physician: Elham Pearson MD  Principal Problem:LVAD (left ventricular assist device) present    Subjective:     Interval History: NAEON. Patient s/p chest closure on yesterday. Remains intubated on Precedex. Renal function continues to improve, sCr down to 2.3. UOP stable on low dose Lasix drip.  Patient remains hypernatremic, now 151.    Review of patient's allergies indicates:   Allergen Reactions    Biopatch [chlorhexidine gluconate]      Site burning    Dobutamine in d5w      Tachycardia, tremors, SOB, flushing    Percocet [oxycodone-acetaminophen] Itching    Penicillins Rash     Cefepime given on 1/23/2020 without issue     Current Facility-Administered Medications   Medication Frequency    albumin human 5% bottle 500 mL PRN    albuterol sulfate nebulizer solution 2.5 mg Q4H PRN    amiodarone 5 mg/mL oral suspension TID    amLODIPine tablet 10 mg Daily    aspirin tablet 325 mg Daily    atorvastatin tablet 80 mg QHS    bisacodyl suppository 10 mg Daily PRN    dexmedetomidine (PRECEDEX) 400mcg/100mL 0.9% NaCL infusion Continuous PRN    dextrose 10% (D10W) Bolus PRN    dextrose 10% (D10W) Bolus PRN    docusate sodium capsule 200 mg QHS    EPINEPHrine (ADRENALIN) 5 mg in dextrose 5 % 250 mL infusion Continuous    fentaNYL injection 25 mcg Q1H PRN    ferrous gluconate tablet 324 mg Daily with breakfast    furosemide (LASIX) 2 mg/mL in dextrose 5 % 100 mL infusion (conc: 2 mg/mL) Continuous    glucagon (human recombinant) injection 1 mg PRN    heparin 25,000 units in dextrose 5% 250 mL (100 units/mL) infusion (heparin infusion - NO NOMOGRAM) Continuous    hydrALAZINE injection 10 mg Q6H PRN    insulin aspart U-100 pen 0-5 Units Q4H PRN    lidocaine 2000 mg in dextrose 5% 250 mL  infusion Continuous    magnesium hydroxide 400 mg/5 ml suspension 2,400 mg Daily PRN    magnesium oxide tablet 400 mg TID    magnesium sulfate 2g in water 50mL IVPB (premix) PRN    niCARdipine 40 mg/200 mL infusion Continuous    nitric oxide gas Gas 20 ppm Continuous    oxyCODONE immediate release tablet 5 mg Q4H PRN    oxyCODONE immediate release tablet Tab 10 mg Q4H PRN    pantoprazole injection 40 mg Daily    polyethylene glycol (GoLYTELY) solution Q4H    polyethylene glycol packet 17 g BID    propofol (DIPRIVAN) 10 mg/mL infusion Continuous    sodium chloride 0.9% flush 10 mL PRN    sodium chloride 0.9% flush 3 mL Q8H    vancomycin 750 mg in dextrose 5 % 250 mL IVPB (ready to mix system) Q24H       Objective:     Vital Signs (Most Recent):  Temp: 99.2 °F (37.3 °C) (01/30/20 0715)  Pulse: 90 (01/30/20 0800)  Resp: 18 (01/29/20 1300)  BP: (!) 80/0 (01/30/20 0715)  SpO2: 99 % (01/30/20 0800)  O2 Device (Oxygen Therapy): ventilator (01/30/20 0800) Vital Signs (24h Range):  Temp:  [97 °F (36.1 °C)-100.6 °F (38.1 °C)] 99.2 °F (37.3 °C)  Pulse:  [90-91] 90  Resp:  [18] 18  SpO2:  [93 %-100 %] 99 %  BP: (80-90)/(0) 80/0  Arterial Line BP: ()/(49-80) 86/72     Weight: 105.8 kg (233 lb 3.9 oz) (01/29/20 0611)  Body mass index is 33.47 kg/m².  Body surface area is 2.29 meters squared.    I/O last 3 completed shifts:  In: 4420 [I.V.:3310; NG/GT:1110]  Out: 3364 [Urine:2745; Drains:190; Blood:40; Chest Tube:389]    Physical Exam   Constitutional: He appears well-developed. No distress. He is sedated and intubated.   HENT:   Head: Normocephalic and atraumatic.   Right Ear: External ear normal.   Left Ear: External ear normal.   Cardiovascular: Normal rate.   RVAD hum   Pulmonary/Chest: Effort normal and breath sounds normal. He is intubated. He has no rales.   Musculoskeletal: He exhibits edema. He exhibits no deformity.   Skin: Skin is warm and dry. He is not diaphoretic.   Sternal surgical wound, anthony  intact       Significant Labs:  CBC:   Recent Labs   Lab 01/30/20  0509  01/30/20  0755   WBC 16.91*  --   --    RBC 3.50*  --   --    HGB 9.8*  --   --    HCT 32.9*   < > 44     --   --    MCV 94  --   --    MCH 28.0  --   --    MCHC 29.8*  --   --     < > = values in this interval not displayed.     CMP:   Recent Labs   Lab 01/30/20  0810   *   CALCIUM 8.9   ALBUMIN 2.5*   PROT 6.1   *   K 4.0   CO2 25   *   BUN 86*   CREATININE 2.3*   ALKPHOS 47*   ALT 15   AST 37   BILITOT 0.8          Assessment/Plan:     * LVAD (left ventricular assist device) present  - per primary team     Acute kidney injury superimposed on chronic kidney disease  KRISTIN on CKD III  Baseline SCr ~ 2.0    58 yo male s/p LVAD placement on 1/23 who was taken back to OR for exploration over concerns for RV failure/tamponade due to elevated CVP and decrease UOP.  Upon opening of chest, he had improved hemodynamics and some improvement in UOP and decision made to leave chest open and transfer back to ICU for closer monitoring on 1/24.  He was administered IV lasix + diuril with improvement in his UOP.      DDx for KRISTIN includes ATN from renal hypoperfusion vs. CRS from RV overload    Plan/recommendations:  -renal function improving. sCr now 2.3 (baseline ~ 2). Adequate UOP on low dose Lasix drip.   -patient remains hypernatremic, 151 today.   -started on free water boluses q6hrs on yesterday, consider increasing frequency to 250 mL q4 hr.    -recommend transitioning to BID dosing of Lasix   -continue trending RFP and UOP  -will follow labs closely  -will discuss with Dr. Gilmore         Thank you for your consult. I will follow-up with patient. Please contact us if you have any additional questions.    Jaqueline Buenrostro DNP, FNP-C  Nephrology  [facility]

## 2020-01-30 NOTE — OP NOTE
DATE OF PROCEDURE:  01/29/2020    PREOPERATIVE DIAGNOSES:  1.  Status post left ventricular assist device placement.  2.  Severe RV failure.  3.  Open chest.    POSTOPERATIVE DIAGNOSES:  1.  Status post left ventricular assist device placement.  2.  Severe RV failure.  3.  Open chest.    OPERATIONS PERFORMED:  1.  Washout of the mediastinum.  2.  Sternal closure.  3.  Creation of Prevena wound VAC 25 x 5 cm in size.    STAFF SURGEON:  Ino Schmitt M.D.    FIRST ASSISTANT:  Terence Gonzalez.    SECOND ASSISTANT:  Jose.    ANESTHESIA:  GETA.    ESTIMATED BLOOD LOSS:  50 mL.    KEY FINDINGS OF THE OPERATION:  1.  Significant improvement in RV, still moderate-to-severely depressed.  2.  Good hemostasis.    INDICATION OF OPERATION:  This is a 59-year-old gentleman with severe   biventricular failure.  Postoperatively, the patient had the chest closed,   within few hours needed to be opened because the patient became anuric.  After   significant diuresis and leaving the chest open for last one week, decision was   made to take the patient back for possible closure.  Risks and benefits were   discussed and informed consent obtained.    DESCRIPTION OF OPERATION:  The patient was brought to the Operating Room and   placed in the supine position.  After induction of anesthesia, area was prepped   and draped in the usual sterile fashion.  Previously placed temporary dressing   was taken down.  Chest retractor was placed.  Copious amount of irrigation was   done to irrigate the chest cavity.  All cannulation sites were found to be good   and hemostatic.  After that, the sternum was approximated with #6 stainless   steel wires.  Skin and subcutaneous tissues were closed in multiple layers.  Due   to the patient's chest left open for so long and significant swelling and   presence of edema in the subcutaneous tissue, a decision was made to place a   wound VAC 25 x 5 cm in size to decrease chances of sternal complications as  well   as sternal dehiscence and wound dehiscence.  A 25 x 5 cm Prevena was placed   with excellent seal.  Terminal count of needles, sponges, and instrument was   found to be correct.  The patient was taken back to the Intensive Care Unit in a   stable condition.    MEDICARE ATTESTATION:  I was present for all parts of the operation.      AB/IN  dd: 01/29/2020 17:01:39 (CST)  td: 01/29/2020 18:23:42 (CST)  Doc ID   #[id number]  Job ID #[id number]    CC:

## 2020-01-30 NOTE — PROGRESS NOTES
Update    Pt presents in room intubated and sedated. Chest closed yesterday. Pt's SO Lavern present in room and reports one of pt's sons is still here. Lavern presents aaox4 with open affect, denies needs, questions concerns at this time. Lavern states she is sleeping well and eating regularly. Lavern states understanding of plan of care. .Providing ongoing psychosocial and counseling support, education, resources, assistance and discharge planning as indicated. Following and available.

## 2020-01-30 NOTE — ASSESSMENT & PLAN NOTE
BG goal 140-180      Low dose correction scale  BG monitoring every 6  hours while NPO  No history of DM. If no futher insulin/endocrine needs, will sign off soon.       Discharge planning: TBD

## 2020-01-30 NOTE — PROGRESS NOTES
"[facility]  Endocrinology  Progress Note    Admit Date: 1/9/2020     Reason for Consult: Management of Hyperglycemia     Surgical Procedure and Date: LVAD 1/23/20; chest closure 1/29/20     Lab Results   Component Value Date    HGBA1C 6.2 (H) 01/15/2020       HPI:   Patient is a 59 y.o. male with a diagnosis of afib, KRISTIN on CKD, and CHF, who is s/p LVAD placement on 1/23/20.  No personal history of DM but slightly elevated A1C upon admission.  Endocrinology consulted for BG management.            Interval HPI:   Overnight events: Remains in ICU, intubated. LVAD. NAEON. Chest closure yesterday. BG well controlled without insulin.   Eating:   NPO  Nausea: No  Hypoglycemia and intervention: No  Fever: No  TPN and/or TF: No      BP (!) 82/0 (BP Location: Right arm, Patient Position: Lying)   Pulse 90   Temp 99.2 °F (37.3 °C) (Oral)   Resp 18   Ht 5' 10" (1.778 m)   Wt 105.8 kg (233 lb 3.9 oz)   SpO2 98%   BMI 33.47 kg/m²      Labs Reviewed and Include    Recent Labs   Lab 01/30/20  0810   *   CALCIUM 8.9   ALBUMIN 2.5*   PROT 6.1   *   K 4.0   CO2 25   *   BUN 86*   CREATININE 2.3*   ALKPHOS 47*   ALT 15   AST 37   BILITOT 0.8     Lab Results   Component Value Date    WBC 16.91 (H) 01/30/2020    HGB 9.8 (L) 01/30/2020    HCT 36 01/30/2020    MCV 94 01/30/2020     01/30/2020     No results for input(s): TSH, FREET4 in the last 168 hours.  Lab Results   Component Value Date    HGBA1C 6.2 (H) 01/15/2020       Nutritional status:   Body mass index is 33.47 kg/m².  Lab Results   Component Value Date    ALBUMIN 2.5 (L) 01/30/2020    ALBUMIN 2.6 (L) 01/30/2020    ALBUMIN 2.8 (L) 01/30/2020     Lab Results   Component Value Date    PREALBUMIN 11 (L) 01/24/2020    PREALBUMIN 27 01/15/2020       Estimated Creatinine Clearance: 42.1 mL/min (A) (based on SCr of 2.3 mg/dL (H)).    Accu-Checks  Recent Labs     01/28/20  1139 01/28/20  1600 01/29/20  0006 01/29/20  0506 " 01/29/20  0821 01/29/20  1134 01/29/20  1630 01/30/20  0009 01/30/20  0505 01/30/20  0752   POCTGLUCOSE 176* 171* 143* 147* 124* 170* 143* 149* 139* 105       Current Medications and/or Treatments Impacting Glycemic Control  Immunotherapy:    Immunosuppressants     None        Steroids:   Hormones (From admission, onward)    None        Pressors:    Autonomic Drugs (From admission, onward)    Start     Stop Route Frequency Ordered    01/29/20 0745  EPINEPHrine (ADRENALIN) 5 mg in dextrose 5 % 250 mL infusion     Question Answer Comment   Titrate by: (in mcg/kg/min) 0.02    Titrate interval: (in minutes) 5    Titrate to maintain: (SBP or MAP or Cardiac Index) MAP    Greater than: (in mmHg) 60    Maximum dose: (in mcg/kg/min) 2        -- IV Continuous 01/29/20 0743        Hyperglycemia/Diabetes Medications:   Antihyperglycemics (From admission, onward)    Start     Stop Route Frequency Ordered    01/26/20 1536  insulin aspart U-100 pen 0-5 Units      -- SubQ Every 4 hours PRN 01/26/20 1450          ASSESSMENT and PLAN    * LVAD (left ventricular assist device) present  S/p LVAD on 1/23/20  Managed per primary team  Avoid hypoglycemia  Optimize BG control for surgical wound healing        Acute hyperglycemia  BG goal 140-180      Low dose correction scale  BG monitoring every 6  hours while NPO  No history of DM. If no futher insulin/endocrine needs, will sign off soon.       Discharge planning: TBD        Acute on chronic combined systolic and diastolic heart failure  Managed per primary team  S/p LVAD    Acute kidney injury superimposed on chronic kidney disease  Titrate insulin slowly to avoid hypoglycemia as the risk of hypoglycemia increases with decreased creatinine clearance.  Caution with insulin stacking  Estimated Creatinine Clearance: 42.1 mL/min (A) (based on SCr of 2.3 mg/dL (H)).            Ermelinda Pineda, NP  Endocrinology  [facility]

## 2020-01-30 NOTE — PROGRESS NOTES
Dr. Schmitt and CTS resident @ bedside. Aware of all vitals, UOP, VAD numbers, and labs. See chart for orders.

## 2020-01-30 NOTE — PROGRESS NOTES
Pt intubated with sone and SO at bedside.   Pt family denies any needs at this time. Showed family how to record VAD parameters in binder and showed them contents to start reviewing daily.  Encouraged family to notify nurse if they have any questions, problems or concerns for LVAD coordinator.  Pt family verbalized understanding and in agreement of plan. Will follow up with pt soon.

## 2020-01-30 NOTE — ASSESSMENT & PLAN NOTE
- Creatinine on admit 2.6 (baseline ~ 1.8). BUN/Creatinine today 86/2.4  - Nephrology consulted and following

## 2020-01-30 NOTE — PROGRESS NOTES
Na of ABG reading 156, up from 149 @ 5 AM, K also reading low despite replacement given earlier this AM. Will send CMP per Dr. Christianson.

## 2020-01-30 NOTE — PROGRESS NOTES
[facility]  Heart Transplant  Progress Note    Patient Name: Tae Delgado  MRN: [id number]  Admission Date: 1/9/2020  Hospital Length of Stay: 21 days  Attending Physician: Ino Schmitt MD  Primary Care Provider: Elham Pearson MD  Principal Problem:LVAD (left ventricular assist device) present    Subjective:     Interval History: No acute events overnight. Remains CTS primary. Chest closed yesterday, intubated and sedated. Opens eyes to voice.  No plans to extubate today, likely rest and attempt extubation in the next 48 hours.     Lines:  Arterial Line: 1/23/20  Left IJ: 1/23/20  PA catheter    Continuous Infusions:   dexmedetomidine (PRECEDEX) infusion 0.8 mcg/kg/hr (01/30/20 0800)    epinephrine 0.06 mcg/kg/min (01/30/20 0800)    furosemide (LASIX) 2 mg/mL infusion (non-titrating) 2.5 mg/hr (01/30/20 0800)    heparin (porcine) in 5 % dex 500 Units/hr (01/30/20 0910)    lidocaine 0.5 mg/min (01/30/20 0915)    niCARdipine 2 mg/hr (01/30/20 0800)    nitric oxide gas      propofol Stopped (01/29/20 1700)     Scheduled Meds:   amiodarone  400 mg Per NG tube TID    amLODIPine  10 mg Per NG tube Daily    aspirin  325 mg Oral Daily    atorvastatin  80 mg Oral QHS    digoxin  0.125 mg Oral Daily    docusate sodium  200 mg Oral QHS    ferrous gluconate  324 mg Oral Daily with breakfast    magnesium oxide  400 mg Per NG tube TID    pantoprazole  40 mg Intravenous Daily    polyethylene glycol  100 mL Per NG tube Q4H    polyethylene glycol  17 g Oral BID    sodium chloride 0.9%  3 mL Intravenous Q8H    vancomycin (VANCOCIN) IVPB  750 mg Intravenous Q24H    warfarin  2 mg Oral Once     PRN Meds:albumin human 5%, albuterol sulfate, bisacodyl, dexmedetomidine (PRECEDEX) infusion, Dextrose 10% Bolus, Dextrose 10% Bolus, fentaNYL, glucagon (human recombinant), hydrALAZINE, insulin aspart U-100, magnesium hydroxide 400 mg/5 ml, magnesium sulfate IVPB, oxyCODONE, oxyCODONE, sodium chloride  0.9%    Review of patient's allergies indicates:   Allergen Reactions    Biopatch [chlorhexidine gluconate]      Site burning    Dobutamine in d5w      Tachycardia, tremors, SOB, flushing    Percocet [oxycodone-acetaminophen] Itching    Penicillins Rash     Cefepime given on 1/23/2020 without issue     Objective:     Vital Signs (Most Recent):  Temp: 99.2 °F (37.3 °C) (01/30/20 0715)  Pulse: 90 (01/30/20 1115)  Resp: 18 (01/29/20 1300)  BP: (!) 80/0 (01/30/20 0715)  SpO2: 99 % (01/30/20 1115) Vital Signs (24h Range):  Temp:  [97 °F (36.1 °C)-100.6 °F (38.1 °C)] 99.2 °F (37.3 °C)  Pulse:  [90-91] 90  Resp:  [18] 18  SpO2:  [92 %-100 %] 99 %  BP: (80-90)/(0) 80/0  Arterial Line BP: ()/(49-80) 88/76     Patient Vitals for the past 72 hrs (Last 3 readings):   Weight   01/29/20 0611 105.8 kg (233 lb 3.9 oz)   01/28/20 0500 110 kg (242 lb 8.1 oz)     Body mass index is 33.47 kg/m².      Intake/Output Summary (Last 24 hours) at 1/30/2020 1136  Last data filed at 1/30/2020 1100  Gross per 24 hour   Intake 2701 ml   Output 2560 ml   Net 141 ml       Hemodynamic Parameters:  PAP: (37-54)/(3-15) 50/9  PAP (Mean):  [21 mmHg-33 mmHg] 32 mmHg  CO:  [4.3 L/min-5 L/min] 4.3 L/min  CI:  [1.8 L/min/m2-2.1 L/min/m2] 1.9 L/min/m2    Telemetry: A fib    Physical Exam   Constitutional: Intubated and sedated; chest open    Eyes: Conjunctivae are normal.   Neck: Neck supple. No thyromegaly present. Bilateral IJ lines   Cardiovascular: reguklar paced rhythm @ 90, smooth VAD hum. Chest closed  Pulmonary/Chest: Effort normal and breath sounds normal. Intubated and sedated   Abdominal: Soft.   Musculoskeletal: He exhibits no edema.   Neurological: Intubated and sedated . Opens eyes to voice and intermittently squeezes hands  Skin: Skin is warm and dry. Capillary refill takes 2 to 3 seconds.       Significant Labs:  CBC:  Recent Labs   Lab 01/29/20  1626  01/30/20  0010  01/30/20  0509 01/30/20  0519 01/30/20  0755 01/30/20  1013    WBC 17.23*  --  19.32*  --  16.91*  --   --   --    RBC 3.51*  --  3.53*  --  3.50*  --   --   --    HGB 9.7*  --  9.7*  --  9.8*  --   --   --    HCT 33.1*   < > 33.1*   < > 32.9* 28* 44 36     --  202  --  216  --   --   --    MCV 94  --  94  --  94  --   --   --    MCH 27.6  --  27.5  --  28.0  --   --   --    MCHC 29.3*  --  29.3*  --  29.8*  --   --   --     < > = values in this interval not displayed.     BNP:  Recent Labs   Lab 01/24/20  0334 01/27/20  0400 01/29/20  0508   * 1,140* 320*     CMP:  Recent Labs   Lab 01/30/20  0010 01/30/20  0509 01/30/20  0810   * 146* 139*   CALCIUM 9.2 9.2 8.9   ALBUMIN 2.8* 2.6* 2.5*   PROT 6.5 6.3 6.1   * 151* 151*   K 3.9 4.3 4.0   CO2 28 25 25   * 114* 116*   BUN 92* 86* 86*   CREATININE 2.5* 2.4* 2.3*   ALKPHOS 45* 44* 47*   ALT 12 13 15   AST 24 29 37   BILITOT 0.8 0.8 0.8      Coagulation:   Recent Labs   Lab 01/29/20  1626 01/30/20  0010 01/30/20  0509   INR 1.2 1.2 1.2   APTT 23.0 24.2 25.3     LDH:  Recent Labs   Lab 01/28/20  0402 01/29/20  0508 01/30/20  0509   * 395* 433*     Microbiology:  Microbiology Results (last 7 days)     Procedure Component Value Units Date/Time    Blood culture [[id number]] Collected:  01/20/20 1042    Order Status:  Completed Specimen:  Blood from Peripheral, Antecubital, Left Updated:  01/25/20 1412     Blood Culture, Routine No growth after 5 days.    Blood culture [[id number]] Collected:  01/20/20 1035    Order Status:  Completed Specimen:  Blood from Peripheral, Wrist, Left Updated:  01/25/20 1412     Blood Culture, Routine No growth after 5 days.          I have reviewed all pertinent labs within the past 24 hours.    Estimated Creatinine Clearance: 42.1 mL/min (A) (based on SCr of 2.3 mg/dL (H)).    Diagnostic Results:  I have reviewed all pertinent imaging results/findings within the past 24 hours.    Assessment and Plan:       55 y.o. WM with history of NICMP diagnosed in 2010, ICD, LV  thrombus (with prior splenic and renal emboli), Embolic  CVA , paroxysmal atrial fib, HTN, HLP  presents for  F/U today to clinic and he was volume overloaded on exam .  He states he had 3 admission in the last 3 months for volume overload. He started having more SOB on exertion since one month. Also endorses of Orthopnea since last one month. Alble to walk only 150 ft. He also has Bilateral lower extremity edema. He was admitted here in 2017 for ADHD [facility] at [facility] and [facility] during that admission showed PCWP 40 and CVP 17. Currently denies chest pain, lightheadedness     * LVAD (left ventricular assist device) present  - S/P DT HM3 with closure of AV and repair of MV for regurg 1/23/20.   - CTS primary  - Taken back to OR 1/24 for possible RVAD placement which was not done. Patient remained hemodynamically stable in OR. Wash out on 1/27. Chest closed 1/29. Remains intubated and sedated.   - CVP 15, SvO2 55  - Currently hemodynamically stable on Epi, Lasix, Lidocaine, and Corby   - Current speed 5400    Procedure: Device Interrogation Including analysis of device parameters  Current Settings: Ventricular Assist Device  Review of device function is stable/unstabe    TXP LVAD INTERROGATIONS 1/30/2020 1/30/2020 1/30/2020 1/30/2020 1/30/2020 1/30/2020 1/30/2020   Type HeartMate3 HeartMate3 HeartMate3 HeartMate3 HeartMate3 HeartMate3 HeartMate3   Flow 4.3 4.4 4.2 4.2 4.3 4.2 4.2   Speed 5300 5300 5300 5300 5300 5300 5300   PI 3 3.2 3.8 3.6 3.5 3.6 4   Power (Berry) 3.7 3.9 3.8 3.5 3.9 3.8 3.7   LSL 4900 4900 4900 4900 4900 - -   Pulsatility Intermittent pulse Intermittent pulse Intermittent pulse Intermittent pulse Intermittent pulse - -       Acute on chronic combined systolic and diastolic heart failure  - S/P DT HM3 with closure of AV and plication of MV for regurg 1/23/20 (See LVAD)  - NIDCM dx'd 2010  - currently on lasix 2.5, on EPI .06, and NO  - See above        Coagulopathy  - Appreciate Hem/Onc's help. No  evidence of underlying coagulopathy (h/o LV thrombus with splenic and renal emboli, h/o embolic CVA)    NSVT (nonsustained ventricular tachycardia)  - Avoid digoxin, agree with restarting Amiodarone    Hepatic congestion  - Liver US on 1/11 unremarkable    ICD (implantable cardioverter-defibrillator) in place  - S/P Biotronik dual chamber ICD    Right lower lobe pulmonary nodule  - Incidental finding on CT chest/abd/pelvis on 1/12. Pulmonology consulted, and rec repeat CT of chest in 3 months  - Will need to follow-up in pulmonary clinic.     Acute kidney injury superimposed on chronic kidney disease  - Creatinine on admit 2.6 (baseline ~ 1.8). BUN/Creatinine today 86/2.4  - Nephrology consulted and following    Paroxysmal atrial fibrillation  - Intermittently in A fib since surgery, currently paced at 90   - AC per CTS  - agree with discontinuing digoxin for rate control- now dig level 2.4 and patient with worsening renal function  - Was on Amiodarone infusion but now on Lidocaine.  - Would wean Lidocaine off and continuing PO amio    Left ventricular thrombus without MI  - H/O LV thrombus with h/o splenic and renal emboli as well as embolic CVA  - Limited TTE done here 1/13 showed no thrombus  - JACINTO 1/23 intra op showed resolution of DAMON thrombus  - AC per CTS          Jasmina Charles PA-C  Heart Transplant  [facility]

## 2020-01-30 NOTE — ASSESSMENT & PLAN NOTE
KRISTIN on CKD III  Baseline SCr ~ 2.0    60 yo male s/p LVAD placement on 1/23 who was taken back to OR for exploration over concerns for RV failure/tamponade due to elevated CVP and decrease UOP.  Upon opening of chest, he had improved hemodynamics and some improvement in UOP and decision made to leave chest open and transfer back to ICU for closer monitoring on 1/24.  He was administered IV lasix + diuril with improvement in his UOP.      DDx for KRISTIN includes ATN from renal hypoperfusion vs. CRS from RV overload    Plan/recommendations:  -renal function improving. sCr now 2.3 (baseline ~ 2). Adequate UOP on low dose Lasix drip.   -patient remains hypernatremic, 151 today.   -started on free water boluses q6hrs on yesterday, consider increasing frequency to 250 mL q4 hr.    -recommend transitioning to BID dosing of Lasix   -continue trending RFP and UOP  -will follow labs closely  -will discuss with Dr. Gilmore

## 2020-01-30 NOTE — SUBJECTIVE & OBJECTIVE
Interval History: No acute events overnight. Remains CTS primary. Chest closed yesterday, intubated and sedated. Opens eyes to voice.    Lines:  Arterial Line: 1/23/20  Left IJ: 1/23/20  PA catheter    Continuous Infusions:   dexmedetomidine (PRECEDEX) infusion 0.8 mcg/kg/hr (01/30/20 0800)    epinephrine 0.06 mcg/kg/min (01/30/20 0800)    furosemide (LASIX) 2 mg/mL infusion (non-titrating) 2.5 mg/hr (01/30/20 0800)    heparin (porcine) in 5 % dex 500 Units/hr (01/30/20 0910)    lidocaine 0.5 mg/min (01/30/20 0915)    niCARdipine 2 mg/hr (01/30/20 0800)    nitric oxide gas      propofol Stopped (01/29/20 1700)     Scheduled Meds:   amiodarone  400 mg Per NG tube TID    amLODIPine  10 mg Per NG tube Daily    aspirin  325 mg Oral Daily    atorvastatin  80 mg Oral QHS    digoxin  0.125 mg Oral Daily    docusate sodium  200 mg Oral QHS    ferrous gluconate  324 mg Oral Daily with breakfast    magnesium oxide  400 mg Per NG tube TID    pantoprazole  40 mg Intravenous Daily    polyethylene glycol  100 mL Per NG tube Q4H    polyethylene glycol  17 g Oral BID    sodium chloride 0.9%  3 mL Intravenous Q8H    vancomycin (VANCOCIN) IVPB  750 mg Intravenous Q24H    warfarin  2 mg Oral Once     PRN Meds:albumin human 5%, albuterol sulfate, bisacodyl, dexmedetomidine (PRECEDEX) infusion, Dextrose 10% Bolus, Dextrose 10% Bolus, fentaNYL, glucagon (human recombinant), hydrALAZINE, insulin aspart U-100, magnesium hydroxide 400 mg/5 ml, magnesium sulfate IVPB, oxyCODONE, oxyCODONE, sodium chloride 0.9%    Review of patient's allergies indicates:   Allergen Reactions    Biopatch [chlorhexidine gluconate]      Site burning    Dobutamine in d5w      Tachycardia, tremors, SOB, flushing    Percocet [oxycodone-acetaminophen] Itching    Penicillins Rash     Cefepime given on 1/23/2020 without issue     Objective:     Vital Signs (Most Recent):  Temp: 99.2 °F (37.3 °C) (01/30/20 0715)  Pulse: 90 (01/30/20  1115)  Resp: 18 (01/29/20 1300)  BP: (!) 80/0 (01/30/20 0715)  SpO2: 99 % (01/30/20 1115) Vital Signs (24h Range):  Temp:  [97 °F (36.1 °C)-100.6 °F (38.1 °C)] 99.2 °F (37.3 °C)  Pulse:  [90-91] 90  Resp:  [18] 18  SpO2:  [92 %-100 %] 99 %  BP: (80-90)/(0) 80/0  Arterial Line BP: ()/(49-80) 88/76     Patient Vitals for the past 72 hrs (Last 3 readings):   Weight   01/29/20 0611 105.8 kg (233 lb 3.9 oz)   01/28/20 0500 110 kg (242 lb 8.1 oz)     Body mass index is 33.47 kg/m².      Intake/Output Summary (Last 24 hours) at 1/30/2020 1136  Last data filed at 1/30/2020 1100  Gross per 24 hour   Intake 2701 ml   Output 2560 ml   Net 141 ml       Hemodynamic Parameters:  PAP: (37-54)/(3-15) 50/9  PAP (Mean):  [21 mmHg-33 mmHg] 32 mmHg  CO:  [4.3 L/min-5 L/min] 4.3 L/min  CI:  [1.8 L/min/m2-2.1 L/min/m2] 1.9 L/min/m2    Telemetry: A fib    Physical Exam   Constitutional: Intubated and sedated; chest open    Eyes: Conjunctivae are normal.   Neck: Neck supple. No thyromegaly present. Bilateral IJ lines   Cardiovascular: reguklar paced rhythm @ 90, smooth VAD hum. Chest closed  Pulmonary/Chest: Effort normal and breath sounds normal. Intubated and sedated   Abdominal: Soft.   Musculoskeletal: He exhibits no edema.   Neurological: Intubated and sedated . Opens eyes to voice and intermittently squeezes hands  Skin: Skin is warm and dry. Capillary refill takes 2 to 3 seconds.       Significant Labs:  CBC:  Recent Labs   Lab 01/29/20  1626  01/30/20  0010  01/30/20  0509 01/30/20  0519 01/30/20  0755 01/30/20  1013   WBC 17.23*  --  19.32*  --  16.91*  --   --   --    RBC 3.51*  --  3.53*  --  3.50*  --   --   --    HGB 9.7*  --  9.7*  --  9.8*  --   --   --    HCT 33.1*   < > 33.1*   < > 32.9* 28* 44 36     --  202  --  216  --   --   --    MCV 94  --  94  --  94  --   --   --    MCH 27.6  --  27.5  --  28.0  --   --   --    MCHC 29.3*  --  29.3*  --  29.8*  --   --   --     < > = values in this interval not  displayed.     BNP:  Recent Labs   Lab 01/24/20  0334 01/27/20  0400 01/29/20  0508   * 1,140* 320*     CMP:  Recent Labs   Lab 01/30/20  0010 01/30/20  0509 01/30/20  0810   * 146* 139*   CALCIUM 9.2 9.2 8.9   ALBUMIN 2.8* 2.6* 2.5*   PROT 6.5 6.3 6.1   * 151* 151*   K 3.9 4.3 4.0   CO2 28 25 25   * 114* 116*   BUN 92* 86* 86*   CREATININE 2.5* 2.4* 2.3*   ALKPHOS 45* 44* 47*   ALT 12 13 15   AST 24 29 37   BILITOT 0.8 0.8 0.8      Coagulation:   Recent Labs   Lab 01/29/20  1626 01/30/20  0010 01/30/20  0509   INR 1.2 1.2 1.2   APTT 23.0 24.2 25.3     LDH:  Recent Labs   Lab 01/28/20  0402 01/29/20  0508 01/30/20  0509   * 395* 433*     Microbiology:  Microbiology Results (last 7 days)     Procedure Component Value Units Date/Time    Blood culture [[id number]] Collected:  01/20/20 1042    Order Status:  Completed Specimen:  Blood from Peripheral, Antecubital, Left Updated:  01/25/20 1412     Blood Culture, Routine No growth after 5 days.    Blood culture [[id number]] Collected:  01/20/20 1035    Order Status:  Completed Specimen:  Blood from Peripheral, Wrist, Left Updated:  01/25/20 1412     Blood Culture, Routine No growth after 5 days.          I have reviewed all pertinent labs within the past 24 hours.    Estimated Creatinine Clearance: 42.1 mL/min (A) (based on SCr of 2.3 mg/dL (H)).    Diagnostic Results:  I have reviewed all pertinent imaging results/findings within the past 24 hours.

## 2020-01-30 NOTE — PROGRESS NOTES
01/30/2020  Renaldo Miles    Current provider:  Ino Schmitt MD      I, Renaldo Miles, rounded on Tae Delgado to ensure all mechanical assist device settings (IABP or VAD) were appropriate and all parameters were within limits.  I was able to ensure all back up equipment was present, the staff had no issues, and the Perfusion Department daily rounding was complete.    7:04 AM

## 2020-01-30 NOTE — PROGRESS NOTES
Wound care follow up for low jay jay, prolonged inability to turn patient.     Patient now able to be turned.     Sacral area intact.      Purple area noted to the right elbow and right nare.     Blanchable redness noted to the left elbow.    There is a small faint purple discoloration to the right heel.       Will place order for NAVI mattress now that patient can be moved.   Recommend:  Venelex BID to the heels, elbows and sacral area to increase capillary flow.  q2hour turns  Heels offloaded.     Wound care to follow PRN.  Farzana Jara RN [facility]   [phone]               01/30/20 1001        Pressure Injury 01/26/20 1900 Right Nare Suspected DTPI   Date First Assessed/Time First Assessed: 01/26/20 1900   Pressure Injury Present on Admission: suspected hospital acquired  Side: Right  Location: Nare  Staging: Suspected DTPI   Wound Image    Staging Suspected DTPI   Dressing Appearance Open to air   Drainage Amount Scant   Drainage Characteristics/Odor Serosanguineous   Appearance Maroon   Tissue loss description Full thickness   Periwound Area Intact   Wound Edges Undefined   Wound Length (cm) 0.3 cm   Wound Width (cm) 0.6 cm   Wound Depth (cm) 0.1 cm   Wound Volume (cm^3) 0.02 cm^3   Wound Surface Area (cm^2) 0.18 cm^2        Pressure Injury 01/29/20 1430 Right posterior Elbow Suspected DTPI   Date First Assessed/Time First Assessed: 01/29/20 1430   Pressure Injury Present on Admission: suspected hospital acquired  Side: Right  Orientation: posterior  Location: Elbow  Staging: Suspected DTPI   Wound Image    Staging Suspected DTPI   Dressing Appearance Intact   Drainage Amount None   Appearance Maroon;Purple   Tissue loss description Not applicable   Periwound Area Intact   Wound Edges Undefined   Wound Length (cm) 2 cm   Wound Width (cm) 2 cm   Wound Depth (cm) 0.1 cm   Wound Volume (cm^3) 0.4 cm^3   Wound Surface Area (cm^2) 4 cm^2   Dressing Foam

## 2020-01-30 NOTE — PROGRESS NOTES
Dr. Schmitt @ bedside. Aware of current pt condition, VAD numbers, vitals, UOP and labs. Lidocaine off, weaned nitric to 5, and increased heparin to 700 u/hr per Dr. Schmitt.

## 2020-01-30 NOTE — ASSESSMENT & PLAN NOTE
- Intermittently in A fib since surgery, currently paced at 90   - AC per CTS  - agree with discontinuing digoxin for rate control- now dig level 2.4 and patient with worsening renal function  - Was on Amiodarone infusion but now on Lidocaine.  - Would wean Lidocaine off and continuing PO amio

## 2020-01-30 NOTE — SUBJECTIVE & OBJECTIVE
Interval History/Significant Events:     NAEON. Patient s/p chest closure on yesterday.  Hypernatremic.        Follow-up For: Procedure(s) (LRB):  CLOSURE, WOUND, STERNUM (N/A)  WASHOUT (N/A)  APPLICATION, WOUND VAC (N/A)    Post-Operative Day: 1 Day Post-Op    Objective:     Vital Signs (Most Recent):  Temp: 99.2 °F (37.3 °C) (01/30/20 0715)  Pulse: 90 (01/30/20 1115)  Resp: 18 (01/29/20 1300)  BP: (!) 80/0 (01/30/20 0715)  SpO2: 99 % (01/30/20 1115) Vital Signs (24h Range):  Temp:  [97 °F (36.1 °C)-100.6 °F (38.1 °C)] 99.2 °F (37.3 °C)  Pulse:  [90-91] 90  Resp:  [18] 18  SpO2:  [92 %-100 %] 99 %  BP: (80-90)/(0) 80/0  Arterial Line BP: ()/(49-80) 88/76     Weight: 105.8 kg (233 lb 3.9 oz)  Body mass index is 33.47 kg/m².      Intake/Output Summary (Last 24 hours) at 1/30/2020 1123  Last data filed at 1/30/2020 1100  Gross per 24 hour   Intake 2701 ml   Output 2560 ml   Net 141 ml       Physical Exam   Constitutional: He appears well-developed and well-nourished.   HENT:   Head: Normocephalic and atraumatic.   Mouth/Throat: No oropharyngeal exudate.   ETT and OG secured in place.   Eyes: Pupils are equal, round, and reactive to light.   Neck: Normal range of motion. Neck supple.   Left IJ double lumen + [place] in place, dressing CLD  Right IJ triple lumen, dressing CLD   Cardiovascular:   Irregular tachyarrythmia. LVAD hum.  Chest close with dressing in place   Pulmonary/Chest: Breath sounds normal. He has no wheezes. He has no rales.   Intubated.    Abdominal: Soft. He exhibits no distension.   Musculoskeletal: He exhibits no edema or deformity.   Neurological:   Sedated, follows commands   Skin: Skin is warm and dry.       Vents:  Vent Mode: Spont (01/30/20 1115)  Ventilator Initiated: Yes (01/21/20 0905)  Set Rate: 16 BPM (01/30/20 1021)  Vt Set: 500 mL (01/30/20 1021)  PEEP/CPAP: 5 cmH20 (01/30/20 1115)  Oxygen Concentration (%): 50 (01/30/20 1115)  Peak Airway Pressure: 23 cmH2O (01/30/20  1115)  Plateau Pressure: 0 cmH20 (01/30/20 1115)  Total Ve: 10.3 mL (01/30/20 1115)  F/VT Ratio<105 (RSBI): (!) 34.88 (01/27/20 2316)    Lines/Drains/Airways     Central Venous Catheter Line                 Percutaneous Central Line Insertion/Assessment - double lumen  01/23/20 0751 left internal jugular 7 days         Trialysis (Dialysis) Catheter 01/24/20 1500 right internal jugular 5 days          Drain                 Urethral Catheter 01/21/20 0752 Non-latex;Straight-tip;Temperature probe 16 Fr. 9 days         Chest Tube 01/23/20 1230 1 Right Pleural 6 days         Chest Tube 01/23/20 1414 2 Right Mediastinal 6 days         Chest Tube 01/23/20 1415 3 Left Mediastinal 6 days         NG/OG Tube 01/29/20 1430 Left nostril less than 1 day          Airway                 Airway - Non-Surgical 01/21/20 0742 Endotracheal Tube 9 days          Arterial Line                 Arterial Line 01/21/20 0730 Left Other (Comment) 9 days          Line                 VAD 01/23/20 1148 Left ventricular assist device HeartMate 3 6 days          Peripheral Intravenous Line                 Peripheral IV - Single Lumen 01/29/20 1200 20 G Right Antecubital less than 1 day                Significant Labs:    CBC/Anemia Profile:  Recent Labs   Lab 01/29/20  1626  01/30/20  0010  01/30/20  0509 01/30/20  0519 01/30/20  0755 01/30/20  1013   WBC 17.23*  --  19.32*  --  16.91*  --   --   --    HGB 9.7*  --  9.7*  --  9.8*  --   --   --    HCT 33.1*   < > 33.1*   < > 32.9* 28* 44 36     --  202  --  216  --   --   --    MCV 94  --  94  --  94  --   --   --    RDW 16.1*  --  16.6*  --  16.4*  --   --   --     < > = values in this interval not displayed.        Chemistries:  Recent Labs   Lab 01/29/20  1626 01/30/20  0010 01/30/20  0509 01/30/20  0810   * 149* 151* 151*   K 4.1 3.9 4.3 4.0   * 111* 114* 116*   CO2 25 28 25 25   BUN 94* 92* 86* 86*   CREATININE 2.7* 2.5* 2.4* 2.3*   CALCIUM 9.0 9.2 9.2 8.9   ALBUMIN 2.6* 2.8*  2.6* 2.5*   PROT 6.5 6.5 6.3 6.1   BILITOT 0.7 0.8 0.8 0.8   ALKPHOS 46* 45* 44* 47*   ALT 13 12 13 15   AST 21 24 29 37   MG 2.7* 2.6 2.5  --    PHOS 4.6* 3.7 2.8  --        All pertinent labs within the past 24 hours have been reviewed.    Significant Imaging:  I have reviewed all pertinent imaging results/findings within the past 24 hours.

## 2020-01-30 NOTE — PLAN OF CARE
"     PLAN OF CARE NOTE    Dx: LVAD (left ventricular assist device) present    Shift Events: Weaned epi, nitric, and precedex. Lidocaine off. Trickle feeds started. ROM performed. Wound care saw pt. On CPAP 1 hour on/4 hours off today.     Goals of Care: wean vent to extubate this weekend.     Neuro: Arouses to Voice, Follows Commands and Moves All Extremities.    Cardiac: % paced @ 90. Doppler pressures 80s/0. MAP maintained 70-85, cardene on/off throughout shift. CVP 11,12,12. VAD numbers stable, Speed 5300, Flows 4.3-4.6, PI 2.7-3.8, Power 3.6-3.7.    Vital Signs: BP (!) 84/0 (BP Location: Right arm, Patient Position: Lying)   Pulse 90   Temp 99.6 °F (37.6 °C) (Oral)   Resp (!) 21   Ht 5' 10" (1.778 m)   Wt 105.8 kg (233 lb 3.9 oz)   SpO2 100%   BMI 33.47 kg/m²      Respiratory: Ventilator AC 50% 5PEEP, sating > 92%. Alternating CPAP 1 hour on/4 hours off.     Diet: Tube Feeds @ 10 cc/hr@ goal. Q 4 250 cc free water boluses for increased Na.     Gtts: Epi, heparin, precedex, lasix.    Urine Output: Urinary Catheter  cc/hour. No BM, currently on a 100cc q 4 of GoLytely regimen.     Drains: CT x 3, Plural (1) with minimal serous drainage. Fluctuation noted in Meds drain (2) chamber with noted fluctuation, SS output. Drain 3 with minimal SS output. See chart for full output record.     Labs/Accuchecks: Labs trended, electrolytes replaced.    Skin: Skin CDI. Sacrum wihout breakdown, checked by wound care today. DTI to R elbow and nostril noted, wound care performed. Partial bath given.        "

## 2020-01-30 NOTE — ASSESSMENT & PLAN NOTE
Titrate insulin slowly to avoid hypoglycemia as the risk of hypoglycemia increases with decreased creatinine clearance.  Caution with insulin stacking  Estimated Creatinine Clearance: 42.1 mL/min (A) (based on SCr of 2.3 mg/dL (H)).

## 2020-01-31 PROBLEM — Z78.9 ON ENTERAL NUTRITION: Status: ACTIVE | Noted: 2020-01-31

## 2020-01-31 LAB
ALBUMIN SERPL BCP-MCNC: 2.3 G/DL (ref 3.5–5.2)
ALBUMIN SERPL BCP-MCNC: 2.3 G/DL (ref 3.5–5.2)
ALLENS TEST: ABNORMAL
ALLENS TEST: NORMAL
ALP SERPL-CCNC: 47 U/L (ref 55–135)
ALP SERPL-CCNC: 47 U/L (ref 55–135)
ALT SERPL W/O P-5'-P-CCNC: 14 U/L (ref 10–44)
ALT SERPL W/O P-5'-P-CCNC: 14 U/L (ref 10–44)
ANION GAP SERPL CALC-SCNC: 11 MMOL/L (ref 8–16)
ANISOCYTOSIS BLD QL SMEAR: SLIGHT
APTT BLDCRRT: 26 SEC (ref 21–32)
APTT BLDCRRT: 31.2 SEC (ref 21–32)
APTT BLDCRRT: 35.1 SEC (ref 21–32)
AST SERPL-CCNC: 36 U/L (ref 10–40)
AST SERPL-CCNC: 36 U/L (ref 10–40)
BASOPHILS NFR BLD: 0 % (ref 0–1.9)
BILIRUB DIRECT SERPL-MCNC: 0.6 MG/DL (ref 0.1–0.3)
BILIRUB SERPL-MCNC: 0.8 MG/DL (ref 0.1–1)
BILIRUB SERPL-MCNC: 0.8 MG/DL (ref 0.1–1)
BLD PROD TYP BPU: NORMAL
BLOOD UNIT EXPIRATION DATE: NORMAL
BLOOD UNIT TYPE CODE: 600
BLOOD UNIT TYPE CODE: 600
BLOOD UNIT TYPE CODE: 6200
BLOOD UNIT TYPE: NORMAL
BNP SERPL-MCNC: 420 PG/ML (ref 0–99)
BUN SERPL-MCNC: 77 MG/DL (ref 6–20)
CALCIUM SERPL-MCNC: 8.8 MG/DL (ref 8.7–10.5)
CHLORIDE SERPL-SCNC: 117 MMOL/L (ref 95–110)
CO2 SERPL-SCNC: 23 MMOL/L (ref 23–29)
CODING SYSTEM: NORMAL
CREAT SERPL-MCNC: 2.2 MG/DL (ref 0.5–1.4)
CRP SERPL-MCNC: 169.4 MG/L (ref 0–8.2)
DELSYS: ABNORMAL
DELSYS: NORMAL
DIFFERENTIAL METHOD: ABNORMAL
DIGOXIN SERPL-MCNC: 1.9 NG/ML (ref 0.8–2)
DISPENSE STATUS: NORMAL
EOSINOPHIL NFR BLD: 0 % (ref 0–8)
ERYTHROCYTE [DISTWIDTH] IN BLOOD BY AUTOMATED COUNT: 17 % (ref 11.5–14.5)
ERYTHROCYTE [SEDIMENTATION RATE] IN BLOOD BY WESTERGREN METHOD: 16 MM/H
EST. GFR  (AFRICAN AMERICAN): 36.5 ML/MIN/1.73 M^2
EST. GFR  (NON AFRICAN AMERICAN): 31.6 ML/MIN/1.73 M^2
FIO2: 50
GLUCOSE SERPL-MCNC: 130 MG/DL (ref 70–110)
HCO3 UR-SCNC: 22.1 MMOL/L (ref 24–28)
HCO3 UR-SCNC: 23.1 MMOL/L (ref 24–28)
HCO3 UR-SCNC: 24.1 MMOL/L (ref 24–28)
HCO3 UR-SCNC: 24.8 MMOL/L (ref 24–28)
HCO3 UR-SCNC: 24.9 MMOL/L (ref 24–28)
HCO3 UR-SCNC: 25.4 MMOL/L (ref 24–28)
HCO3 UR-SCNC: 26.2 MMOL/L (ref 24–28)
HCT VFR BLD AUTO: 32.8 % (ref 40–54)
HCT VFR BLD CALC: 26 %PCV (ref 36–54)
HCT VFR BLD CALC: 27 %PCV (ref 36–54)
HCT VFR BLD CALC: 30 %PCV (ref 36–54)
HCT VFR BLD CALC: <15 %PCV (ref 36–54)
HGB BLD-MCNC: 9.6 G/DL (ref 14–18)
IMM GRANULOCYTES # BLD AUTO: ABNORMAL K/UL (ref 0–0.04)
IMM GRANULOCYTES NFR BLD AUTO: ABNORMAL % (ref 0–0.5)
INR PPP: 1.4 (ref 0.8–1.2)
LDH SERPL L TO P-CCNC: 0.55 MMOL/L (ref 0.36–1.25)
LDH SERPL L TO P-CCNC: 0.65 MMOL/L (ref 0.36–1.25)
LDH SERPL L TO P-CCNC: 0.68 MMOL/L (ref 0.36–1.25)
LDH SERPL L TO P-CCNC: 0.7 MMOL/L (ref 0.36–1.25)
LDH SERPL L TO P-CCNC: 0.82 MMOL/L (ref 0.36–1.25)
LDH SERPL L TO P-CCNC: 0.83 MMOL/L (ref 0.36–1.25)
LDH SERPL L TO P-CCNC: 392 U/L (ref 110–260)
LYMPHOCYTES NFR BLD: 5 % (ref 18–48)
MAGNESIUM SERPL-MCNC: 2.3 MG/DL (ref 1.6–2.6)
MAGNESIUM SERPL-MCNC: 2.4 MG/DL (ref 1.6–2.6)
MAGNESIUM SERPL-MCNC: 2.5 MG/DL (ref 1.6–2.6)
MCH RBC QN AUTO: 27.6 PG (ref 27–31)
MCHC RBC AUTO-ENTMCNC: 29.3 G/DL (ref 32–36)
MCV RBC AUTO: 94 FL (ref 82–98)
METAMYELOCYTES NFR BLD MANUAL: 2 %
METHEMOGLOBIN: 0.8 % (ref 0–3)
MIN VOL: 12.5
MIN VOL: 9.61
MODE: ABNORMAL
MODE: NORMAL
MONOCYTES NFR BLD: 2 % (ref 4–15)
MYELOCYTES NFR BLD MANUAL: 2 %
NEUTROPHILS NFR BLD: 89 % (ref 38–73)
NRBC BLD-RTO: 1 /100 WBC
NUM UNITS TRANS FFP: NORMAL
OVALOCYTES BLD QL SMEAR: ABNORMAL
PCO2 BLDA: 32.6 MMHG (ref 35–45)
PCO2 BLDA: 33.3 MMHG (ref 35–45)
PCO2 BLDA: 33.3 MMHG (ref 35–45)
PCO2 BLDA: 34.9 MMHG (ref 35–45)
PCO2 BLDA: 36.4 MMHG (ref 35–45)
PCO2 BLDA: 36.5 MMHG (ref 35–45)
PCO2 BLDA: 39.2 MMHG (ref 35–45)
PEEP: 5
PH SMN: 7.41 [PH] (ref 7.35–7.45)
PH SMN: 7.43 [PH] (ref 7.35–7.45)
PH SMN: 7.43 [PH] (ref 7.35–7.45)
PH SMN: 7.45 [PH] (ref 7.35–7.45)
PH SMN: 7.46 [PH] (ref 7.35–7.45)
PH SMN: 7.48 [PH] (ref 7.35–7.45)
PH SMN: 7.48 [PH] (ref 7.35–7.45)
PHOSPHATE SERPL-MCNC: 2.4 MG/DL (ref 2.7–4.5)
PIP: 22
PIP: 22
PLATELET # BLD AUTO: 255 K/UL (ref 150–350)
PLATELET BLD QL SMEAR: ABNORMAL
PMV BLD AUTO: 11.9 FL (ref 9.2–12.9)
PO2 BLDA: 100 MMHG (ref 80–100)
PO2 BLDA: 112 MMHG (ref 80–100)
PO2 BLDA: 119 MMHG (ref 80–100)
PO2 BLDA: 120 MMHG (ref 80–100)
PO2 BLDA: 124 MMHG (ref 80–100)
PO2 BLDA: 134 MMHG (ref 80–100)
PO2 BLDA: 29 MMHG (ref 40–60)
POC BE: -1 MMOL/L
POC BE: -2 MMOL/L
POC BE: 0 MMOL/L
POC BE: 1 MMOL/L
POC BE: 2 MMOL/L
POC IONIZED CALCIUM: 0.71 MMOL/L (ref 1.06–1.42)
POC IONIZED CALCIUM: 1.2 MMOL/L (ref 1.06–1.42)
POC IONIZED CALCIUM: 1.23 MMOL/L (ref 1.06–1.42)
POC IONIZED CALCIUM: 1.23 MMOL/L (ref 1.06–1.42)
POC IONIZED CALCIUM: 1.25 MMOL/L (ref 1.06–1.42)
POC IONIZED CALCIUM: 1.25 MMOL/L (ref 1.06–1.42)
POC IONIZED CALCIUM: 1.27 MMOL/L (ref 1.06–1.42)
POC SATURATED O2: 55 % (ref 95–100)
POC SATURATED O2: 98 % (ref 95–100)
POC SATURATED O2: 99 % (ref 95–100)
POC TCO2: 23 MMOL/L (ref 23–27)
POC TCO2: 24 MMOL/L (ref 23–27)
POC TCO2: 25 MMOL/L (ref 23–27)
POC TCO2: 26 MMOL/L (ref 23–27)
POC TCO2: 26 MMOL/L (ref 23–27)
POC TCO2: 26 MMOL/L (ref 24–29)
POC TCO2: 27 MMOL/L (ref 23–27)
POCT GLUCOSE: 125 MG/DL (ref 70–110)
POCT GLUCOSE: 134 MG/DL (ref 70–110)
POCT GLUCOSE: 215 MG/DL (ref 70–110)
POIKILOCYTOSIS BLD QL SMEAR: SLIGHT
POLYCHROMASIA BLD QL SMEAR: ABNORMAL
POTASSIUM BLD-SCNC: 3.8 MMOL/L (ref 3.5–5.1)
POTASSIUM BLD-SCNC: 3.9 MMOL/L (ref 3.5–5.1)
POTASSIUM BLD-SCNC: 4 MMOL/L (ref 3.5–5.1)
POTASSIUM BLD-SCNC: 4 MMOL/L (ref 3.5–5.1)
POTASSIUM BLD-SCNC: <2 MMOL/L (ref 3.5–5.1)
POTASSIUM SERPL-SCNC: 3.9 MMOL/L (ref 3.5–5.1)
POTASSIUM SERPL-SCNC: 4 MMOL/L (ref 3.5–5.1)
POTASSIUM SERPL-SCNC: 4.2 MMOL/L (ref 3.5–5.1)
PREALB SERPL-MCNC: 11 MG/DL (ref 20–43)
PROT SERPL-MCNC: 6.1 G/DL (ref 6–8.4)
PROT SERPL-MCNC: 6.1 G/DL (ref 6–8.4)
PROTHROMBIN TIME: 13.9 SEC (ref 9–12.5)
RBC # BLD AUTO: 3.48 M/UL (ref 4.6–6.2)
SAMPLE: ABNORMAL
SAMPLE: NORMAL
SITE: ABNORMAL
SITE: NORMAL
SODIUM BLD-SCNC: 150 MMOL/L (ref 136–145)
SODIUM BLD-SCNC: 150 MMOL/L (ref 136–145)
SODIUM BLD-SCNC: 151 MMOL/L (ref 136–145)
SODIUM BLD-SCNC: 152 MMOL/L (ref 136–145)
SODIUM BLD-SCNC: 157 MMOL/L (ref 136–145)
SODIUM SERPL-SCNC: 151 MMOL/L (ref 136–145)
SP02: 100
SP02: 100
TRANS ERYTHROCYTES VOL PATIENT: NORMAL ML
TRANS ERYTHROCYTES VOL PATIENT: NORMAL ML
VANCOMYCIN SERPL-MCNC: 10.9 UG/ML
VT: 500
WBC # BLD AUTO: 17.56 K/UL (ref 3.9–12.7)

## 2020-01-31 PROCEDURE — 37799 UNLISTED PX VASCULAR SURGERY: CPT

## 2020-01-31 PROCEDURE — 84132 ASSAY OF SERUM POTASSIUM: CPT

## 2020-01-31 PROCEDURE — 99900035 HC TECH TIME PER 15 MIN (STAT)

## 2020-01-31 PROCEDURE — 99233 PR SUBSEQUENT HOSPITAL CARE,LEVL III: ICD-10-PCS | Mod: ,,, | Performed by: NURSE PRACTITIONER

## 2020-01-31 PROCEDURE — 99232 PR SUBSEQUENT HOSPITAL CARE,LEVL II: ICD-10-PCS | Mod: ,,, | Performed by: NURSE PRACTITIONER

## 2020-01-31 PROCEDURE — 85027 COMPLETE CBC AUTOMATED: CPT

## 2020-01-31 PROCEDURE — 82803 BLOOD GASES ANY COMBINATION: CPT

## 2020-01-31 PROCEDURE — 94761 N-INVAS EAR/PLS OXIMETRY MLT: CPT

## 2020-01-31 PROCEDURE — 99233 SBSQ HOSP IP/OBS HIGH 50: CPT | Mod: ,,, | Performed by: INTERNAL MEDICINE

## 2020-01-31 PROCEDURE — 84100 ASSAY OF PHOSPHORUS: CPT

## 2020-01-31 PROCEDURE — 99233 PR SUBSEQUENT HOSPITAL CARE,LEVL III: ICD-10-PCS | Mod: ,,, | Performed by: INTERNAL MEDICINE

## 2020-01-31 PROCEDURE — 83050 HGB METHEMOGLOBIN QUAN: CPT

## 2020-01-31 PROCEDURE — 86140 C-REACTIVE PROTEIN: CPT

## 2020-01-31 PROCEDURE — 63600367 HC NITRIC OXIDE PER HOUR

## 2020-01-31 PROCEDURE — 63600175 PHARM REV CODE 636 W HCPCS: Performed by: SURGERY

## 2020-01-31 PROCEDURE — 83605 ASSAY OF LACTIC ACID: CPT

## 2020-01-31 PROCEDURE — 97803 MED NUTRITION INDIV SUBSEQ: CPT

## 2020-01-31 PROCEDURE — 99233 SBSQ HOSP IP/OBS HIGH 50: CPT | Mod: GC,,, | Performed by: SURGERY

## 2020-01-31 PROCEDURE — 27200966 HC CLOSED SUCTION SYSTEM

## 2020-01-31 PROCEDURE — 80162 ASSAY OF DIGOXIN TOTAL: CPT

## 2020-01-31 PROCEDURE — 83735 ASSAY OF MAGNESIUM: CPT | Mod: 91

## 2020-01-31 PROCEDURE — 87040 BLOOD CULTURE FOR BACTERIA: CPT

## 2020-01-31 PROCEDURE — 84134 ASSAY OF PREALBUMIN: CPT

## 2020-01-31 PROCEDURE — 85730 THROMBOPLASTIN TIME PARTIAL: CPT | Mod: 91

## 2020-01-31 PROCEDURE — 93750 INTERROGATION VAD IN PERSON: CPT | Mod: ,,, | Performed by: INTERNAL MEDICINE

## 2020-01-31 PROCEDURE — 84295 ASSAY OF SERUM SODIUM: CPT

## 2020-01-31 PROCEDURE — 85007 BL SMEAR W/DIFF WBC COUNT: CPT

## 2020-01-31 PROCEDURE — 27200188 HC TRANSDUCER, ART ADULT/PEDS

## 2020-01-31 PROCEDURE — 27000221 HC OXYGEN, UP TO 24 HOURS

## 2020-01-31 PROCEDURE — 27000248 HC VAD-ADDITIONAL DAY

## 2020-01-31 PROCEDURE — 93750 PR INTERROGATE VENT ASSIST DEV, IN PERSON, W PHYSICIAN ANALYSIS: ICD-10-PCS | Mod: ,,, | Performed by: INTERNAL MEDICINE

## 2020-01-31 PROCEDURE — 83880 ASSAY OF NATRIURETIC PEPTIDE: CPT

## 2020-01-31 PROCEDURE — C9113 INJ PANTOPRAZOLE SODIUM, VIA: HCPCS | Performed by: SURGERY

## 2020-01-31 PROCEDURE — A4216 STERILE WATER/SALINE, 10 ML: HCPCS | Performed by: SURGERY

## 2020-01-31 PROCEDURE — 83615 LACTATE (LD) (LDH) ENZYME: CPT

## 2020-01-31 PROCEDURE — 25000003 PHARM REV CODE 250: Performed by: SURGERY

## 2020-01-31 PROCEDURE — 25000003 PHARM REV CODE 250: Performed by: STUDENT IN AN ORGANIZED HEALTH CARE EDUCATION/TRAINING PROGRAM

## 2020-01-31 PROCEDURE — 85730 THROMBOPLASTIN TIME PARTIAL: CPT

## 2020-01-31 PROCEDURE — 80202 ASSAY OF VANCOMYCIN: CPT

## 2020-01-31 PROCEDURE — 99233 SBSQ HOSP IP/OBS HIGH 50: CPT | Mod: ,,, | Performed by: NURSE PRACTITIONER

## 2020-01-31 PROCEDURE — 82330 ASSAY OF CALCIUM: CPT

## 2020-01-31 PROCEDURE — 80076 HEPATIC FUNCTION PANEL: CPT

## 2020-01-31 PROCEDURE — 63600175 PHARM REV CODE 636 W HCPCS: Performed by: THORACIC SURGERY (CARDIOTHORACIC VASCULAR SURGERY)

## 2020-01-31 PROCEDURE — 94003 VENT MGMT INPAT SUBQ DAY: CPT

## 2020-01-31 PROCEDURE — 20000000 HC ICU ROOM

## 2020-01-31 PROCEDURE — 25000003 PHARM REV CODE 250: Performed by: PHYSICIAN ASSISTANT

## 2020-01-31 PROCEDURE — 99232 SBSQ HOSP IP/OBS MODERATE 35: CPT | Mod: ,,, | Performed by: NURSE PRACTITIONER

## 2020-01-31 PROCEDURE — 80053 COMPREHEN METABOLIC PANEL: CPT

## 2020-01-31 PROCEDURE — 63600175 PHARM REV CODE 636 W HCPCS: Performed by: NURSE PRACTITIONER

## 2020-01-31 PROCEDURE — 25000003 PHARM REV CODE 250: Performed by: THORACIC SURGERY (CARDIOTHORACIC VASCULAR SURGERY)

## 2020-01-31 PROCEDURE — 99233 PR SUBSEQUENT HOSPITAL CARE,LEVL III: ICD-10-PCS | Mod: GC,,, | Performed by: SURGERY

## 2020-01-31 PROCEDURE — 63600175 PHARM REV CODE 636 W HCPCS: Performed by: STUDENT IN AN ORGANIZED HEALTH CARE EDUCATION/TRAINING PROGRAM

## 2020-01-31 PROCEDURE — 85610 PROTHROMBIN TIME: CPT

## 2020-01-31 PROCEDURE — 85014 HEMATOCRIT: CPT

## 2020-01-31 PROCEDURE — 84132 ASSAY OF SERUM POTASSIUM: CPT | Mod: 91

## 2020-01-31 PROCEDURE — 99900026 HC AIRWAY MAINTENANCE (STAT)

## 2020-01-31 RX ORDER — POLYETHYLENE GLYCOL 3350, SODIUM SULFATE ANHYDROUS, SODIUM BICARBONATE, SODIUM CHLORIDE, POTASSIUM CHLORIDE 236; 22.74; 6.74; 5.86; 2.97 G/4L; G/4L; G/4L; G/4L; G/4L
100 POWDER, FOR SOLUTION ORAL EVERY 4 HOURS
Status: COMPLETED | OUTPATIENT
Start: 2020-01-31 | End: 2020-02-01

## 2020-01-31 RX ORDER — WARFARIN 2 MG/1
2 TABLET ORAL ONCE
Status: COMPLETED | OUTPATIENT
Start: 2020-01-31 | End: 2020-01-31

## 2020-01-31 RX ORDER — ACETAMINOPHEN 650 MG/20.3ML
650 LIQUID ORAL ONCE
Status: COMPLETED | OUTPATIENT
Start: 2020-01-31 | End: 2020-01-31

## 2020-01-31 RX ADMIN — DEXMEDETOMIDINE HYDROCHLORIDE 0.4 MCG/KG/HR: 100 INJECTION, SOLUTION, CONCENTRATE INTRAVENOUS at 08:01

## 2020-01-31 RX ADMIN — Medication 400 MG: at 02:01

## 2020-01-31 RX ADMIN — ATORVASTATIN CALCIUM 80 MG: 20 TABLET, FILM COATED ORAL at 09:01

## 2020-01-31 RX ADMIN — HEPARIN SODIUM 1200 UNITS/HR: 10000 INJECTION, SOLUTION INTRAVENOUS at 06:01

## 2020-01-31 RX ADMIN — Medication 400 MG: at 09:01

## 2020-01-31 RX ADMIN — CASTOR OIL AND BALSAM, PERU: 788; 87 OINTMENT TOPICAL at 09:01

## 2020-01-31 RX ADMIN — POLYETHYLENE GLYCOL 3350, SODIUM SULFATE ANHYDROUS, SODIUM BICARBONATE, SODIUM CHLORIDE, POTASSIUM CHLORIDE 100 ML: 236; 22.74; 6.74; 5.86; 2.97 POWDER, FOR SOLUTION ORAL at 10:01

## 2020-01-31 RX ADMIN — DEXMEDETOMIDINE HYDROCHLORIDE 0.4 MCG/KG/HR: 100 INJECTION, SOLUTION, CONCENTRATE INTRAVENOUS at 09:01

## 2020-01-31 RX ADMIN — AMLODIPINE BESYLATE 10 MG: 10 TABLET ORAL at 09:01

## 2020-01-31 RX ADMIN — DEXMEDETOMIDINE HYDROCHLORIDE 0.4 MCG/KG/HR: 100 INJECTION, SOLUTION, CONCENTRATE INTRAVENOUS at 11:01

## 2020-01-31 RX ADMIN — POLYETHYLENE GLYCOL 3350, SODIUM SULFATE ANHYDROUS, SODIUM BICARBONATE, SODIUM CHLORIDE, POTASSIUM CHLORIDE 100 ML: 236; 22.74; 6.74; 5.86; 2.97 POWDER, FOR SOLUTION ORAL at 02:01

## 2020-01-31 RX ADMIN — ACETAMINOPHEN ORAL SOLUTION 650 MG: 650 SOLUTION ORAL at 09:01

## 2020-01-31 RX ADMIN — PANTOPRAZOLE SODIUM 40 MG: 40 INJECTION, POWDER, FOR SOLUTION INTRAVENOUS at 09:01

## 2020-01-31 RX ADMIN — DEXMEDETOMIDINE HYDROCHLORIDE 0.8 MCG/KG/HR: 100 INJECTION, SOLUTION, CONCENTRATE INTRAVENOUS at 12:01

## 2020-01-31 RX ADMIN — POLYETHYLENE GLYCOL 3350, SODIUM SULFATE ANHYDROUS, SODIUM BICARBONATE, SODIUM CHLORIDE, POTASSIUM CHLORIDE 100 ML: 236; 22.74; 6.74; 5.86; 2.97 POWDER, FOR SOLUTION ORAL at 05:01

## 2020-01-31 RX ADMIN — Medication 3 ML: at 10:01

## 2020-01-31 RX ADMIN — Medication 3 ML: at 06:01

## 2020-01-31 RX ADMIN — WARFARIN SODIUM 2 MG: 2 TABLET ORAL at 05:01

## 2020-01-31 RX ADMIN — VANCOMYCIN HYDROCHLORIDE 750 MG: 750 INJECTION, POWDER, LYOPHILIZED, FOR SOLUTION INTRAVENOUS at 08:01

## 2020-01-31 RX ADMIN — POLYETHYLENE GLYCOL 3350, SODIUM SULFATE ANHYDROUS, SODIUM BICARBONATE, SODIUM CHLORIDE, POTASSIUM CHLORIDE 100 ML: 236; 22.74; 6.74; 5.86; 2.97 POWDER, FOR SOLUTION ORAL at 06:01

## 2020-01-31 RX ADMIN — POLYETHYLENE GLYCOL 3350, SODIUM SULFATE ANHYDROUS, SODIUM BICARBONATE, SODIUM CHLORIDE, POTASSIUM CHLORIDE 100 ML: 236; 22.74; 6.74; 5.86; 2.97 POWDER, FOR SOLUTION ORAL at 09:01

## 2020-01-31 RX ADMIN — DEXMEDETOMIDINE HYDROCHLORIDE 1 MCG/KG/HR: 100 INJECTION, SOLUTION, CONCENTRATE INTRAVENOUS at 04:01

## 2020-01-31 RX ADMIN — DIGOXIN 0.12 MG: 125 TABLET ORAL at 09:01

## 2020-01-31 RX ADMIN — INSULIN ASPART 2 UNITS: 100 INJECTION, SOLUTION INTRAVENOUS; SUBCUTANEOUS at 11:01

## 2020-01-31 RX ADMIN — ASPIRIN 325 MG ORAL TABLET 325 MG: 325 PILL ORAL at 09:01

## 2020-01-31 NOTE — PROGRESS NOTES
01/31/2020  Mike Julian Jr    Current provider:  Ino Schmitt MD      I, Mike Julian Jr, rounded on Tae Delgado to ensure all mechanical assist device settings (IABP or VAD) were appropriate and all parameters were within limits.  I was able to ensure all back up equipment was present, the staff had no issues, and the Perfusion Department daily rounding was complete.    9:48 AM

## 2020-01-31 NOTE — PLAN OF CARE
"     PLAN OF CARE NOTE    Dx: LVAD (left ventricular assist device) present    Shift Events: Weaned nitric, Changed TF formula to help sodium level. ROM performed. Wound care done per orders. Per Dr. Schmitt, have TF off and begin weaning sedation @ 0400    Goals of Care: wean vent to extubate tomorrow.      Neuro: Arouses to Voice, Follows Commands and Moves All Extremities.    Cardiac: % paced @ 90. Doppler pressures 80s/0. Cardene titrated to maintain MAPs < 85. CVP 10, 8, 10. VAD numbers stable, Speed 5300, Flows 3.9-4.3, PI 3-4.2, Power 3.7-3.9.    Vital Signs: BP (!) 82/0 (BP Location: Right arm, Patient Position: Lying)   Pulse 90   Temp 99.5 °F (37.5 °C) (Oral)   Resp 20   Ht 5' 10" (1.778 m)   Wt 106.2 kg (234 lb 2.1 oz)   SpO2 99%   BMI 33.59 kg/m²     Respiratory: Ventilator Spont PAV+ 40% support and 50% O2 5PEEP, sating > 95%. Plan to switch to 80% Pressure support over nigh and try to extubate in AM..     Diet: Tube Feeds @ 30 cc/hr@ goal. Q 4 250 cc free water boluses for increased Na.     Gtts: Epi, heparin, precedex, lasix, cardene.    Urine Output: Urinary Catheter  cc/hour. No BM, currently on a 100cc q 4 of GoLytely regimen.     Drains: 1 chest tube removed. 2 Remaining tubes with minimal serous output. Fluctuation present on drain # 2, team aware.     Labs/Accuchecks: Labs trended    Skin: Skin CDI. Sacrum wihout breakdown. DTI to R elbow and nostril noted, wound care performed.     PoC reviewed with pt and family. All questions answered/concerns addressed. Dr. Schmitt updated throughout day on pt condition and vitals, VAD numbers, and gtts. See flowsheet for full assessment.    "

## 2020-01-31 NOTE — PROGRESS NOTES
Dr. Schmitt @ bedside, aware of current pt condition, gtts, lab values, UOP and VAD numbers. Heparin increased to 1100 units/hr. Nitric weaned to 2ppm, epi remains @ 0.05 per Dr. Schmitt.

## 2020-01-31 NOTE — SUBJECTIVE & OBJECTIVE
"Interval HPI:   Overnight events: Remains in ICU, intubated. BG well controlled overnight without insulin. Creatinine noted at 2.2. Tricke TF started.   Eating:   NPO  Nausea: No  Hypoglycemia and intervention: No  Fever: No  TPN peptamen intense VHP at 10 cc/hr - increased to 20 cc/hr today (goal 55 cc/hr per RD).     BP (!) 80/0   Pulse 90   Temp 99.5 °F (37.5 °C) (Oral)   Resp (!) 21   Ht 5' 10" (1.778 m)   Wt 106.2 kg (234 lb 2.1 oz)   SpO2 98%   BMI 33.59 kg/m²     Labs Reviewed and Include    Recent Labs   Lab 01/31/20  0435 01/31/20  1140   *  --    CALCIUM 8.8  --    ALBUMIN 2.3*  2.3*  --    PROT 6.1  6.1  --    *  --    K 4.2 3.9   CO2 23  --    *  --    BUN 77*  --    CREATININE 2.2*  --    ALKPHOS 47*  47*  --    ALT 14  14  --    AST 36  36  --    BILITOT 0.8  0.8  --      Lab Results   Component Value Date    WBC 17.56 (H) 01/31/2020    HGB 9.6 (L) 01/31/2020    HCT 26 (L) 01/31/2020    MCV 94 01/31/2020     01/31/2020     No results for input(s): TSH, FREET4 in the last 168 hours.  Lab Results   Component Value Date    HGBA1C 6.2 (H) 01/15/2020       Nutritional status:   Body mass index is 33.59 kg/m².  Lab Results   Component Value Date    ALBUMIN 2.3 (L) 01/31/2020    ALBUMIN 2.3 (L) 01/31/2020    ALBUMIN 2.5 (L) 01/30/2020     Lab Results   Component Value Date    PREALBUMIN 11 (L) 01/31/2020    PREALBUMIN 11 (L) 01/24/2020    PREALBUMIN 27 01/15/2020       Estimated Creatinine Clearance: 44.1 mL/min (A) (based on SCr of 2.2 mg/dL (H)).    Accu-Checks  Recent Labs     01/29/20  1134 01/29/20  1630 01/30/20  0009 01/30/20  0505 01/30/20  0752 01/30/20  1215 01/30/20  1828 01/30/20  2204 01/31/20  0438 01/31/20  1141   POCTGLUCOSE 170* 143* 149* 139* 105 155* 164* 199* 125* 215*       Current Medications and/or Treatments Impacting Glycemic Control  Immunotherapy:    Immunosuppressants     None        Steroids:   Hormones (From admission, onward)    None    "     Pressors:    Autonomic Drugs (From admission, onward)    Start     Stop Route Frequency Ordered    01/29/20 0745  EPINEPHrine (ADRENALIN) 5 mg in dextrose 5 % 250 mL infusion     Question Answer Comment   Titrate by: (in mcg/kg/min) 0.02    Titrate interval: (in minutes) 5    Titrate to maintain: (SBP or MAP or Cardiac Index) MAP    Greater than: (in mmHg) 60    Maximum dose: (in mcg/kg/min) 2        -- IV Continuous 01/29/20 0743        Hyperglycemia/Diabetes Medications:   Antihyperglycemics (From admission, onward)    Start     Stop Route Frequency Ordered    01/30/20 1319  insulin aspart U-100 pen 0-5 Units      -- SubQ Every 6 hours PRN 01/30/20 1219

## 2020-01-31 NOTE — ASSESSMENT & PLAN NOTE
- S/P DT HM3 with closure of AV and plication of MV for regurg 1/23/20 (See LVAD)  - NIDCM dx'd 2010  - hemodynamically stable on current ggts per CTS  - See above

## 2020-01-31 NOTE — PROGRESS NOTES
[facility]  Heart Transplant  Progress Note    Patient Name: Tae Delgado  MRN: [id number]  Admission Date: 1/9/2020  Hospital Length of Stay: 22 days  Attending Physician: Ino Schmitt MD  Primary Care Provider: Elham Pearson MD  Principal Problem:LVAD (left ventricular assist device) present    Subjective:     Interval History: No acute events overnight. Remains CTS primary. Arouses to voice.    Lines:  Arterial Line: 1/23/20  Left IJ: 1/23/20  PA catheter    Continuous Infusions:   dexmedetomidine (PRECEDEX) infusion 0.4 mcg/kg/hr (01/31/20 1000)    epinephrine 0.05 mcg/kg/min (01/31/20 1000)    furosemide (LASIX) 2 mg/mL infusion (non-titrating) 2.5 mg/hr (01/31/20 1000)    heparin (porcine) in 5 % dex Stopped (01/31/20 0935)    lidocaine Stopped (01/30/20 1515)    niCARdipine 2 mg/hr (01/31/20 1000)    nitric oxide gas      propofol Stopped (01/29/20 1700)     Scheduled Meds:   amiodarone  400 mg Per NG tube TID    amLODIPine  10 mg Per NG tube Daily    aspirin  325 mg Oral Daily    atorvastatin  80 mg Oral QHS    balsam peru-castor oil   Topical (Top) BID    digoxin  0.125 mg Oral Daily    docusate sodium  200 mg Oral QHS    ferrous gluconate  324 mg Oral Daily with breakfast    magnesium oxide  400 mg Per NG tube TID    pantoprazole  40 mg Intravenous Daily    polyethylene glycol  100 mL Per NG tube Q4H    sodium chloride 0.9%  3 mL Intravenous Q8H    vancomycin (VANCOCIN) IVPB  750 mg Intravenous Q24H    warfarin  2 mg Oral Once     PRN Meds:albumin human 5%, albuterol sulfate, bisacodyL, dexmedetomidine (PRECEDEX) infusion, Dextrose 10% Bolus, Dextrose 10% Bolus, fentaNYL, glucagon (human recombinant), hydrALAZINE, insulin aspart U-100, magnesium hydroxide 400 mg/5 ml, magnesium sulfate IVPB, oxyCODONE, oxyCODONE, sodium chloride 0.9%    Review of patient's allergies indicates:   Allergen Reactions    Biopatch [chlorhexidine gluconate]      Site burning     Dobutamine in d5w      Tachycardia, tremors, SOB, flushing    Percocet [oxycodone-acetaminophen] Itching    Penicillins Rash     Cefepime given on 1/23/2020 without issue     Objective:     Vital Signs (Most Recent):  Temp: 98.9 °F (37.2 °C) (01/31/20 0715)  Pulse: 90 (01/31/20 1030)  Resp: (!) 21 (01/30/20 1700)  BP: (!) 82/0 (01/31/20 0715)  SpO2: 96 % (01/31/20 1030) Vital Signs (24h Range):  Temp:  [98.9 °F (37.2 °C)-101.8 °F (38.8 °C)] 98.9 °F (37.2 °C)  Pulse:  [88-91] 90  Resp:  [18-21] 21  SpO2:  [95 %-100 %] 96 %  BP: (80-86)/(0) 82/0  Arterial Line BP: (78-93)/(64-80) 87/71     Patient Vitals for the past 72 hrs (Last 3 readings):   Weight   01/31/20 0315 106.2 kg (234 lb 2.1 oz)   01/29/20 0611 105.8 kg (233 lb 3.9 oz)     Body mass index is 33.59 kg/m².      Intake/Output Summary (Last 24 hours) at 1/31/2020 1104  Last data filed at 1/31/2020 1000  Gross per 24 hour   Intake 3603 ml   Output 2320 ml   Net 1283 ml       Hemodynamic Parameters:       Telemetry: A fib    Physical Exam   Constitutional: Intubated and sedated; chest open    Eyes: Conjunctivae are normal.   Neck: Neck supple. No thyromegaly present. Bilateral IJ lines   Cardiovascular: reguklar paced rhythm @ 90, smooth VAD hum. Chest closed  Pulmonary/Chest: Effort normal and breath sounds normal. Intubated and sedated   Abdominal: Soft.   Musculoskeletal: He exhibits no edema.   Neurological: Intubated and sedated . Opens eyes to voice and intermittently squeezes hands  Skin: Skin is warm and dry. Capillary refill takes 2 to 3 seconds.       Significant Labs:  CBC:  Recent Labs   Lab 01/30/20  0010  01/30/20  0509  01/31/20  0153 01/31/20  0435 01/31/20  0557   WBC 19.32*  --  16.91*  --   --  17.56*  --    RBC 3.53*  --  3.50*  --   --  3.48*  --    HGB 9.7*  --  9.8*  --   --  9.6*  --    HCT 33.1*   < > 32.9*   < > 30* 32.8* 26*     --  216  --   --  255  --    MCV 94  --  94  --   --  94  --    MCH 27.5  --  28.0  --   --  27.6   --    MCHC 29.3*  --  29.8*  --   --  29.3*  --     < > = values in this interval not displayed.     BNP:  Recent Labs   Lab 01/27/20  0400 01/29/20  0508 01/31/20 0435   BNP 1,140* 320* 420*     CMP:  Recent Labs   Lab 01/30/20  0509 01/30/20  0810  01/30/20  1700 01/30/20 2201 01/31/20 0435   * 139*  --   --   --  130*   CALCIUM 9.2 8.9  --   --   --  8.8   ALBUMIN 2.6* 2.5*  --   --   --  2.3*  2.3*   PROT 6.3 6.1  --   --   --  6.1  6.1   * 151*  --   --   --  151*   K 4.3 4.0   < > 4.2 3.9 4.2   CO2 25 25  --   --   --  23   * 116*  --   --   --  117*   BUN 86* 86*  --   --   --  77*   CREATININE 2.4* 2.3*  --   --   --  2.2*   ALKPHOS 44* 47*  --   --   --  47*  47*   ALT 13 15  --   --   --  14  14   AST 29 37  --   --   --  36  36   BILITOT 0.8 0.8  --   --   --  0.8  0.8    < > = values in this interval not displayed.      Coagulation:   Recent Labs   Lab 01/30/20  0010 01/30/20  0509 01/30/20  1525 01/30/20 2201 01/31/20 0435   INR 1.2 1.2  --   --  1.4*   APTT 24.2 25.3 26.9 27.6 31.2     LDH:  Recent Labs   Lab 01/29/20  0508 01/30/20  0509 01/31/20 0435   * 433* 392*     Microbiology:  Microbiology Results (last 7 days)     Procedure Component Value Units Date/Time    Blood culture [[id number]] Collected:  01/20/20 1042    Order Status:  Completed Specimen:  Blood from Peripheral, Antecubital, Left Updated:  01/25/20 1412     Blood Culture, Routine No growth after 5 days.    Blood culture [[id number]] Collected:  01/20/20 1035    Order Status:  Completed Specimen:  Blood from Peripheral, Wrist, Left Updated:  01/25/20 1412     Blood Culture, Routine No growth after 5 days.          I have reviewed all pertinent labs within the past 24 hours.    Estimated Creatinine Clearance: 44.1 mL/min (A) (based on SCr of 2.2 mg/dL (H)).    Diagnostic Results:  I have reviewed all pertinent imaging results/findings within the past 24 hours.    Assessment and Plan:       55 y.o. WM  with history of NICMP diagnosed in 2010, ICD, LV thrombus (with prior splenic and renal emboli), Embolic  CVA , paroxysmal atrial fib, HTN, HLP  presents for  F/U today to clinic and he was volume overloaded on exam .  He states he had 3 admission in the last 3 months for volume overload. He started having more SOB on exertion since one month. Also endorses of Orthopnea since last one month. Alble to walk only 150 ft. He also has Bilateral lower extremity edema. He was admitted here in 2017 for ADHD [facility] at [facility] and [facility] during that admission showed PCWP 40 and CVP 17. Currently denies chest pain, lightheadedness     * LVAD (left ventricular assist device) present  - S/P DT HM3 with closure of AV and repair of MV for regurg 1/23/20.   - CTS primary  - Taken back to OR 1/24 for possible RVAD placement which was not done. Patient remained hemodynamically stable in OR. Wash out on 1/27. Chest closed 1/29. Remains intubated and sedated.   - CVP 12, no VBG drawn this AM  - Currently hemodynamically stable on current ggts  - Current speed 5300    Procedure: Device Interrogation Including analysis of device parameters  Current Settings: Ventricular Assist Device  Review of device function is stable/unstabe    TXP LVAD INTERROGATIONS 1/31/2020 1/31/2020 1/31/2020 1/31/2020 1/31/2020 1/31/2020 1/31/2020   Type HeartMate3 HeartMate3 HeartMate3 HeartMate3 HeartMate3 HeartMate3 HeartMate3   Flow 4 4.3 4 3.9 4 4 4.4   Speed 5300 5300 5300 5300 5300 5300 5300   PI 3.4 3 3.8 4.1 4 4.7 3   Power (Berry) 3.8 3.8 3.8 3.8 3.8 3.8 3.8   LSL 4900 4900 4900 4900 - - -   Pulsatility Intermittent pulse Intermittent pulse Intermittent pulse Intermittent pulse - - -       Acute on chronic combined systolic and diastolic heart failure  - S/P DT HM3 with closure of AV and plication of MV for regurg 1/23/20 (See LVAD)  - NIDCM dx'd 2010  - hemodynamically stable on current ggts per CTS  - See above        Coagulopathy  - Appreciate  Hem/Onc's help. No evidence of underlying coagulopathy (h/o LV thrombus with splenic and renal emboli, h/o embolic CVA)    NSVT (nonsustained ventricular tachycardia)  - Avoid digoxin, agree with restarting Amiodarone    Hepatic congestion  - Liver US on 1/11 unremarkable    ICD (implantable cardioverter-defibrillator) in place  - S/P Biotronik dual chamber ICD    Right lower lobe pulmonary nodule  - Incidental finding on CT chest/abd/pelvis on 1/12. Pulmonology consulted, and rec repeat CT of chest in 3 months  - Will need to follow-up in pulmonary clinic.     Acute kidney injury superimposed on chronic kidney disease  - Creatinine on admit 2.6 (baseline ~ 1.8). BUN/Creatinine today 86/2.4  - Nephrology consulted and following    Paroxysmal atrial fibrillation  - Intermittently in A fib since surgery, currently paced at 90   - AC per CTS  - agree with discontinuing digoxin for rate control  - Was on Amiodarone infusion --> lidocaine, now on PO amio    Left ventricular thrombus without MI  - H/O LV thrombus with h/o splenic and renal emboli as well as embolic CVA  - Limited TTE done here 1/13 showed no thrombus  - JACINTO 1/23 intra op showed resolution of DAMON thrombus  - AC per CTS          Jasmina Charles PA-C  Heart Transplant  [facility]

## 2020-01-31 NOTE — PROGRESS NOTES
[facility]  Nephrology  Progress Note    Patient Name: Tae Delgado  MRN: [id number]  Admission Date: 1/9/2020  Hospital Length of Stay: 22 days  Attending Provider: Ino Schmitt MD   Primary Care Physician: Elham Pearson MD  Principal Problem:LVAD (left ventricular assist device) present    Subjective:     Interval History: Renal function continues to improve, sCr down to 2.2 (baseline 2). Patient with adequate urine on Lasix drip. Remains net positive 1.1 L/ hrs (increased input with NG). Intubated, FiO2 50%. CVP 10.   Na 151, FWF increased to 250 mL q 4 hrs.     Review of patient's allergies indicates:   Allergen Reactions    Biopatch [chlorhexidine gluconate]      Site burning    Dobutamine in d5w      Tachycardia, tremors, SOB, flushing    Percocet [oxycodone-acetaminophen] Itching    Penicillins Rash     Cefepime given on 1/23/2020 without issue     Current Facility-Administered Medications   Medication Frequency    albumin human 5% bottle 500 mL PRN    albuterol sulfate nebulizer solution 2.5 mg Q4H PRN    amiodarone 5 mg/mL oral suspension TID    amLODIPine tablet 10 mg Daily    aspirin tablet 325 mg Daily    atorvastatin tablet 80 mg QHS    balsam peru-castor oil Oint BID    bisacodyl suppository 10 mg Daily PRN    dexmedetomidine (PRECEDEX) 400mcg/100mL 0.9% NaCL infusion Continuous PRN    dextrose 10% (D10W) Bolus PRN    dextrose 10% (D10W) Bolus PRN    digoxin tablet 0.125 mg Daily    docusate sodium capsule 200 mg QHS    EPINEPHrine (ADRENALIN) 5 mg in dextrose 5 % 250 mL infusion Continuous    fentaNYL injection 25 mcg Q1H PRN    ferrous gluconate tablet 324 mg Daily with breakfast    furosemide (LASIX) 2 mg/mL in dextrose 5 % 100 mL infusion (conc: 2 mg/mL) Continuous    glucagon (human recombinant) injection 1 mg PRN    heparin 25,000 units in dextrose 5% 250 mL (100 units/mL) infusion (heparin infusion - NO NOMOGRAM) Continuous    hydrALAZINE injection 10  mg Q6H PRN    insulin aspart U-100 pen 0-5 Units Q6H PRN    lidocaine 2000 mg in dextrose 5% 250 mL infusion Continuous    magnesium hydroxide 400 mg/5 ml suspension 2,400 mg Daily PRN    magnesium oxide tablet 400 mg TID    magnesium sulfate 2g in water 50mL IVPB (premix) PRN    niCARdipine 40 mg/200 mL infusion Continuous    nitric oxide gas Gas 7.5 ppm Continuous    oxyCODONE immediate release tablet 5 mg Q4H PRN    oxyCODONE immediate release tablet Tab 10 mg Q4H PRN    pantoprazole injection 40 mg Daily    polyethylene glycol (GoLYTELY) solution Q4H    propofol (DIPRIVAN) 10 mg/mL infusion Continuous    sodium chloride 0.9% flush 10 mL PRN    sodium chloride 0.9% flush 3 mL Q8H    vancomycin 750 mg in dextrose 5 % 250 mL IVPB (ready to mix system) Q24H    warfarin (COUMADIN) tablet 2 mg Once       Objective:     Vital Signs (Most Recent):  Temp: 98.9 °F (37.2 °C) (01/31/20 0715)  Pulse: 90 (01/31/20 0819)  Resp: (!) 21 (01/30/20 1700)  BP: (!) 82/0 (01/31/20 0715)  SpO2: 99 % (01/31/20 0819)  O2 Device (Oxygen Therapy): ventilator (01/31/20 0819) Vital Signs (24h Range):  Temp:  [98.9 °F (37.2 °C)-101.8 °F (38.8 °C)] 98.9 °F (37.2 °C)  Pulse:  [88-90] 90  Resp:  [18-21] 21  SpO2:  [92 %-100 %] 99 %  BP: (80-86)/(0) 82/0  Arterial Line BP: (78-93)/(64-80) 90/78     Weight: 106.2 kg (234 lb 2.1 oz) (01/31/20 0315)  Body mass index is 33.59 kg/m².  Body surface area is 2.29 meters squared.    I/O last 3 completed shifts:  In: 5238 [I.V.:2367; NG/GT:2871]  Out: 3399 [Urine:3050; Chest Tube:349]    Physical Exam   Constitutional: He appears well-developed. No distress. He is sedated and intubated.   HENT:   Head: Normocephalic and atraumatic.   Right Ear: External ear normal.   Left Ear: External ear normal.   Cardiovascular: Normal rate.   RVAD hum   Pulmonary/Chest: Effort normal and breath sounds normal. He is intubated. He has no rales.   Musculoskeletal: He exhibits edema. He exhibits no  deformity.   Skin: Skin is warm and dry. He is not diaphoretic.   Sternal surgical wound, drsg intact       Significant Labs:  CBC:   Recent Labs   Lab 01/31/20  0435 01/31/20  0557   WBC 17.56*  --    RBC 3.48*  --    HGB 9.6*  --    HCT 32.8* 26*     --    MCV 94  --    MCH 27.6  --    MCHC 29.3*  --      CMP:   Recent Labs   Lab 01/31/20  0435   *   CALCIUM 8.8   ALBUMIN 2.3*  2.3*   PROT 6.1  6.1   *   K 4.2   CO2 23   *   BUN 77*   CREATININE 2.2*   ALKPHOS 47*  47*   ALT 14  14   AST 36  36   BILITOT 0.8  0.8            Assessment/Plan:     * LVAD (left ventricular assist device) present  - per primary team     Acute kidney injury superimposed on chronic kidney disease  KRISTIN on CKD III  Baseline SCr ~ 2.0    58 yo male s/p LVAD placement on 1/23 who was taken back to OR for exploration over concerns for RV failure/tamponade due to elevated CVP and decrease UOP.  Upon opening of chest, he had improved hemodynamics and some improvement in UOP and decision made to leave chest open and transfer back to ICU for closer monitoring on 1/24.  He was administered IV lasix + diuril with improvement in his UOP.      DDx for KRISTIN includes ATN from renal hypoperfusion vs. CRS from RV overload    Plan/recommendations:  -renal function improving. sCr now 2.2 (baseline ~ 2). Adequate UOP on low dose Lasix drip.   -patient remains hypernatremic, 151 today. Patient with a free water deficit of 5 L.   -recommend increasing amount of FWF to 350 mL q4 hrs  -defer lasix drip adjustments to primary team   -continue trending RFP and UOP  -will follow labs closely  -will discuss with Dr. Gilmore       Thank you for your consult. I will follow-up with patient. Please contact us if you have any additional questions.    Jaqueline Buenrostro, MARY, FNP-C  Nephrology  [facility]

## 2020-01-31 NOTE — ASSESSMENT & PLAN NOTE
- Intermittently in A fib since surgery, currently paced at 90   - AC per CTS  - agree with discontinuing digoxin for rate control  - Was on Amiodarone infusion --> lidocaine, now on PO amio

## 2020-01-31 NOTE — SUBJECTIVE & OBJECTIVE
Interval History: No acute events overnight. Remains CTS primary. Arouses to voice.    Lines:  Arterial Line: 1/23/20  Left IJ: 1/23/20  PA catheter    Continuous Infusions:   dexmedetomidine (PRECEDEX) infusion 0.4 mcg/kg/hr (01/31/20 1000)    epinephrine 0.05 mcg/kg/min (01/31/20 1000)    furosemide (LASIX) 2 mg/mL infusion (non-titrating) 2.5 mg/hr (01/31/20 1000)    heparin (porcine) in 5 % dex Stopped (01/31/20 0935)    lidocaine Stopped (01/30/20 1515)    niCARdipine 2 mg/hr (01/31/20 1000)    nitric oxide gas      propofol Stopped (01/29/20 1700)     Scheduled Meds:   amiodarone  400 mg Per NG tube TID    amLODIPine  10 mg Per NG tube Daily    aspirin  325 mg Oral Daily    atorvastatin  80 mg Oral QHS    balsam peru-castor oil   Topical (Top) BID    digoxin  0.125 mg Oral Daily    docusate sodium  200 mg Oral QHS    ferrous gluconate  324 mg Oral Daily with breakfast    magnesium oxide  400 mg Per NG tube TID    pantoprazole  40 mg Intravenous Daily    polyethylene glycol  100 mL Per NG tube Q4H    sodium chloride 0.9%  3 mL Intravenous Q8H    vancomycin (VANCOCIN) IVPB  750 mg Intravenous Q24H    warfarin  2 mg Oral Once     PRN Meds:albumin human 5%, albuterol sulfate, bisacodyL, dexmedetomidine (PRECEDEX) infusion, Dextrose 10% Bolus, Dextrose 10% Bolus, fentaNYL, glucagon (human recombinant), hydrALAZINE, insulin aspart U-100, magnesium hydroxide 400 mg/5 ml, magnesium sulfate IVPB, oxyCODONE, oxyCODONE, sodium chloride 0.9%    Review of patient's allergies indicates:   Allergen Reactions    Biopatch [chlorhexidine gluconate]      Site burning    Dobutamine in d5w      Tachycardia, tremors, SOB, flushing    Percocet [oxycodone-acetaminophen] Itching    Penicillins Rash     Cefepime given on 1/23/2020 without issue     Objective:     Vital Signs (Most Recent):  Temp: 98.9 °F (37.2 °C) (01/31/20 0715)  Pulse: 90 (01/31/20 1030)  Resp: (!) 21 (01/30/20 1700)  BP: (!) 82/0 (01/31/20  0715)  SpO2: 96 % (01/31/20 1030) Vital Signs (24h Range):  Temp:  [98.9 °F (37.2 °C)-101.8 °F (38.8 °C)] 98.9 °F (37.2 °C)  Pulse:  [88-91] 90  Resp:  [18-21] 21  SpO2:  [95 %-100 %] 96 %  BP: (80-86)/(0) 82/0  Arterial Line BP: (78-93)/(64-80) 87/71     Patient Vitals for the past 72 hrs (Last 3 readings):   Weight   01/31/20 0315 106.2 kg (234 lb 2.1 oz)   01/29/20 0611 105.8 kg (233 lb 3.9 oz)     Body mass index is 33.59 kg/m².      Intake/Output Summary (Last 24 hours) at 1/31/2020 1104  Last data filed at 1/31/2020 1000  Gross per 24 hour   Intake 3603 ml   Output 2320 ml   Net 1283 ml       Hemodynamic Parameters:       Telemetry: A fib    Physical Exam   Constitutional: Intubated and sedated; chest open    Eyes: Conjunctivae are normal.   Neck: Neck supple. No thyromegaly present. Bilateral IJ lines   Cardiovascular: reguklar paced rhythm @ 90, smooth VAD hum. Chest closed  Pulmonary/Chest: Effort normal and breath sounds normal. Intubated and sedated   Abdominal: Soft.   Musculoskeletal: He exhibits no edema.   Neurological: Intubated and sedated . Opens eyes to voice and intermittently squeezes hands  Skin: Skin is warm and dry. Capillary refill takes 2 to 3 seconds.       Significant Labs:  CBC:  Recent Labs   Lab 01/30/20  0010  01/30/20  0509  01/31/20  0153 01/31/20  0435 01/31/20  0557   WBC 19.32*  --  16.91*  --   --  17.56*  --    RBC 3.53*  --  3.50*  --   --  3.48*  --    HGB 9.7*  --  9.8*  --   --  9.6*  --    HCT 33.1*   < > 32.9*   < > 30* 32.8* 26*     --  216  --   --  255  --    MCV 94  --  94  --   --  94  --    MCH 27.5  --  28.0  --   --  27.6  --    MCHC 29.3*  --  29.8*  --   --  29.3*  --     < > = values in this interval not displayed.     BNP:  Recent Labs   Lab 01/27/20  0400 01/29/20  0508 01/31/20  0435   BNP 1,140* 320* 420*     CMP:  Recent Labs   Lab 01/30/20  0509 01/30/20  0810  01/30/20  1700 01/30/20  2201 01/31/20  0435   * 139*  --   --   --  130*    CALCIUM 9.2 8.9  --   --   --  8.8   ALBUMIN 2.6* 2.5*  --   --   --  2.3*  2.3*   PROT 6.3 6.1  --   --   --  6.1  6.1   * 151*  --   --   --  151*   K 4.3 4.0   < > 4.2 3.9 4.2   CO2 25 25  --   --   --  23   * 116*  --   --   --  117*   BUN 86* 86*  --   --   --  77*   CREATININE 2.4* 2.3*  --   --   --  2.2*   ALKPHOS 44* 47*  --   --   --  47*  47*   ALT 13 15  --   --   --  14  14   AST 29 37  --   --   --  36  36   BILITOT 0.8 0.8  --   --   --  0.8  0.8    < > = values in this interval not displayed.      Coagulation:   Recent Labs   Lab 01/30/20  0010 01/30/20  0509 01/30/20  1525 01/30/20  2201 01/31/20  0435   INR 1.2 1.2  --   --  1.4*   APTT 24.2 25.3 26.9 27.6 31.2     LDH:  Recent Labs   Lab 01/29/20  0508 01/30/20  0509 01/31/20  0435   * 433* 392*     Microbiology:  Microbiology Results (last 7 days)     Procedure Component Value Units Date/Time    Blood culture [[id number]] Collected:  01/20/20 1042    Order Status:  Completed Specimen:  Blood from Peripheral, Antecubital, Left Updated:  01/25/20 1412     Blood Culture, Routine No growth after 5 days.    Blood culture [[id number]] Collected:  01/20/20 1035    Order Status:  Completed Specimen:  Blood from Peripheral, Wrist, Left Updated:  01/25/20 1412     Blood Culture, Routine No growth after 5 days.          I have reviewed all pertinent labs within the past 24 hours.    Estimated Creatinine Clearance: 44.1 mL/min (A) (based on SCr of 2.2 mg/dL (H)).    Diagnostic Results:  I have reviewed all pertinent imaging results/findings within the past 24 hours.

## 2020-01-31 NOTE — NURSING
1140AM Ptt drawn while heparin paused for chest tube discontinuation from 0935  to 1245pm. Heparin restarted @ 1200 units/hr per Dr. Schmitt.

## 2020-01-31 NOTE — PROGRESS NOTES
[facility]  Critical Care - Surgery  Progress Note    Patient Name: Tae Delgado  MRN: [id number]  Admission Date: 1/9/2020  Hospital Length of Stay: 22 days  Code Status: Full Code  Attending Provider: Ino Schmitt MD  Primary Care Provider: Elham Pearson MD   Principal Problem: LVAD (left ventricular assist device) present    Subjective:     Hospital/ICU Course:  1/23: Pt arrives from OR intubated, open chest dressed with Ioban, CTs with SS output. Drips on arrival: Epi 0.08, Cardene 5, TXA, Nitric at 30. Pt received 1.5L crystalloid, no blood product, 20 mg Lasix (put out 250cc in response).   Intra Op JACINTO Exam:  PRE:  Severely reduced left ventricular systolic function. Dilated LV. No definitive thrombus noted.  Moderate-to-severely reduced right ventricular systolic function. FAC 12-24%  Mild-to-moderate AI. No AS.  Moderate MR with systolic blunting of the pulmonary veins.  Trace-to-mild TR.  Mild PI. PAAT <90ms.  Small PFO by CF doppler.  SEC in La and DAMON. No clear thrombus noted.  Grade 2 atheromatous disease of the aorta.  No effusions.    POST:  Severely depressed left ventriclar dysfunction.  Moderately depressed right ventricular systolic function.  LVAD inflow and outflow cannula noted with laminar flows.  No AI.  Mild MR, vahe stitch observed. No MS.  Mild-to-moderate TR.  PFO still visible on CF doppler.  Aorta intact, no dissection.  No effusions.     1/24: run of Vtach overnight. Amio bolus given, amio gtt increased to 1, lasix push given. Improved. LVAD hemodynamics appropriate overnight. Going for closure this am. Upon return, developed tamponade physiology and returned to OR. Came back to SICU with chest open.  1/26: Diuresed over the weekend, chest kept open. Lidocaine gtt and digoxin for tachyarrhythmia. Otherwise NAEON.   1/27: Went to OR for possible chest closure, decided to do a wash out. Returned to SICU with chest open. One tachyarrhythmia episode overnight,  resolved with 100mg Lidocaine bolus and increasing lidocaine drip from 0.75mg/hr to 1mg/hr.  1/28: NAEON. Lasix gtt decreased throughout the day with adequate UOP still.   1/29: NAEON  1/30 NAEON    Interval History/Significant Events:     NAEON.  Patient remains hypernatremic despite increase in free water boluses.  Remains intubated and sedated.  Tolerating PSV    Follow-up For: Procedure(s) (LRB):  CLOSURE, WOUND, STERNUM (N/A)  WASHOUT (N/A)  APPLICATION, WOUND VAC (N/A)    Post-Operative Day: 2 Days Post-Op    Objective:     Vital Signs (Most Recent):  Temp: 98.9 °F (37.2 °C) (01/31/20 0715)  Pulse: 90 (01/31/20 0910)  Resp: (!) 21 (01/30/20 1700)  BP: (!) 82/0 (01/31/20 0715)  SpO2: 99 % (01/31/20 0910) Vital Signs (24h Range):  Temp:  [98.9 °F (37.2 °C)-101.8 °F (38.8 °C)] 98.9 °F (37.2 °C)  Pulse:  [88-90] 90  Resp:  [18-21] 21  SpO2:  [92 %-100 %] 99 %  BP: (80-86)/(0) 82/0  Arterial Line BP: (78-93)/(64-80) 90/78     Weight: 106.2 kg (234 lb 2.1 oz)  Body mass index is 33.59 kg/m².      Intake/Output Summary (Last 24 hours) at 1/31/2020 0958  Last data filed at 1/31/2020 0900  Gross per 24 hour   Intake 3508 ml   Output 2475 ml   Net 1033 ml       Physical Exam   Constitutional: He appears well-developed and well-nourished.   HENT:   Head: Normocephalic and atraumatic.   Mouth/Throat: No oropharyngeal exudate.   ETT and OG secured in place.   Eyes: Pupils are equal, round, and reactive to light.   Neck: Normal range of motion. Neck supple.   Left IJ double lumen + introducer in place, dressing CLD  Right IJ triple lumen, dressing CLD   Cardiovascular:   Irregular tachyarrythmia. Paced, LVAD hum.  Chest close with dressing in place   Pulmonary/Chest: Breath sounds normal. He has no wheezes. He has no rales.   Intubated.    Abdominal: Soft. He exhibits no distension.   Musculoskeletal: He exhibits no edema or deformity.   Neurological:   Sedated, follows commands   Skin: Skin is warm and dry.        Vents:  Vent Mode: Spont (01/31/20 0910)  Ventilator Initiated: Yes (01/21/20 0905)  Set Rate: 16 BPM (01/31/20 0819)  Vt Set: 500 mL (01/31/20 0819)  PEEP/CPAP: 5 cmH20 (01/31/20 0910)  Oxygen Concentration (%): 50 (01/31/20 0910)  Peak Airway Pressure: 17 cmH2O (01/31/20 0910)  Plateau Pressure: 0 cmH20 (01/31/20 0910)  Total Ve: 10.3 mL (01/31/20 0910)  F/VT Ratio<105 (RSBI): (!) 34.88 (01/27/20 2316)    Lines/Drains/Airways     Central Venous Catheter Line                 Percutaneous Central Line Insertion/Assessment - double lumen  01/23/20 0751 left internal jugular 8 days         Trialysis (Dialysis) Catheter 01/24/20 1500 right internal jugular 6 days          Drain                 Urethral Catheter 01/21/20 0752 Non-latex;Straight-tip;Temperature probe 16 Fr. 10 days         Chest Tube 01/23/20 1230 1 Right Pleural 7 days         Chest Tube 01/23/20 1414 2 Right Mediastinal 7 days         Chest Tube 01/23/20 1415 3 Left Mediastinal 7 days         NG/OG Tube 01/29/20 1430 Left nostril 1 day          Airway                 Airway - Non-Surgical 01/21/20 0742 Endotracheal Tube 10 days          Arterial Line                 Arterial Line 01/21/20 0730 Left Other (Comment) 10 days          Line                 VAD 01/23/20 1148 Left ventricular assist device HeartMate 3 7 days          Peripheral Intravenous Line                 Peripheral IV - Single Lumen 01/29/20 1200 20 G Right Antecubital 1 day                Significant Labs:    CBC/Anemia Profile:  Recent Labs   Lab 01/30/20  0010  01/30/20  0509  01/31/20  0153 01/31/20  0435 01/31/20  0557   WBC 19.32*  --  16.91*  --   --  17.56*  --    HGB 9.7*  --  9.8*  --   --  9.6*  --    HCT 33.1*   < > 32.9*   < > 30* 32.8* 26*     --  216  --   --  255  --    MCV 94  --  94  --   --  94  --    RDW 16.6*  --  16.4*  --   --  17.0*  --     < > = values in this interval not displayed.        Chemistries:  Recent Labs   Lab 01/30/20  0010  01/30/20  0509 01/30/20  0810  01/30/20  1700 01/30/20  2201 01/31/20  0435   * 151* 151*  --   --   --  151*   K 3.9 4.3 4.0   < > 4.2 3.9 4.2   * 114* 116*  --   --   --  117*   CO2 28 25 25  --   --   --  23   BUN 92* 86* 86*  --   --   --  77*   CREATININE 2.5* 2.4* 2.3*  --   --   --  2.2*   CALCIUM 9.2 9.2 8.9  --   --   --  8.8   ALBUMIN 2.8* 2.6* 2.5*  --   --   --  2.3*  2.3*   PROT 6.5 6.3 6.1  --   --   --  6.1  6.1   BILITOT 0.8 0.8 0.8  --   --   --  0.8  0.8   ALKPHOS 45* 44* 47*  --   --   --  47*  47*   ALT 12 13 15  --   --   --  14  14   AST 24 29 37  --   --   --  36  36   MG 2.6 2.5  --    < > 2.4 2.5 2.5   PHOS 3.7 2.8  --   --   --   --  2.4*    < > = values in this interval not displayed.       All pertinent labs within the past 24 hours have been reviewed.    Significant Imaging:  CXR  One view: There is a pacer and LVAD.  Tubes and lines are appropriate.  There is cardiomegaly moderate edema/CHF pleural fluid and no change.    Assessment/Plan:     Acute on chronic combined systolic and diastolic heart failure  Tae Delgado is a 59 y.o. 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 Approved for DT LVAD. Went to OR on 1/21 and found to have DAMON and LV thrombus and case was aborted. Pt was placed on a heparin gtt and thrombus had dissipated on repeat JACINTO on 1/22. Pt underwent LVAD placement on 1/23. Chest closure and re-exploration on 1/24. Returned from OR with chest open on 1/24. Attempted chest closure on 1/27, returned to SICU with chest open.     Neuro:  - Patient opens eyes and follows commands when sedation is held  - Propofol gtt with prn Fentanyl    CVS:   - Current LVAD flow @ 5400 with  appropriate PIs   - Wean vasopressors per CTS  - ICD turned on, EP service consulted for assistance with arrythmia  - Digoxin 0.125  - Wean Corby, currently at 5 ppm    Pulm:  - Mechanical ventilation wean as tolerated  - ABGs q6hr  - CXR daily  - Monitor CT output, no overt signs of bleeding  - Removed left mediastinal drain today   - Plan to extubate on 2/1    GI:  - TF formula change to peptamen   - Protonix 40 daily  - Docusate    Endo:   - Insulin off now, SSI  - Endocrine consulted, appreciate their services    Renal:  - Strict I/O  - Trend BUN/Cr, still elevated but stable  - Lasix gtt @ 2.5      FENGI:  - Monitor labs  - Replace per protocol  - Switching all drips to D5  - Increase free water boluses frequency to q4    Heme:  - No blood products needed during the OR  - H/H stable overnight    ID:   - Cefepime/Vanc  - Follow WBC  - Febrile overnight will continue antibiotics    PPx:  - Hep gtt nontitrating, holding to pull chest tube, will restart at 1200u/hr for aPTT goal of 45-54, starting coumadin  - GI ppx    Dispo:  - Cont SICU care            Critical care was time spent personally by me on the following activities: development of treatment plan with patient or surrogate and bedside caregivers, discussions with consultants, evaluation of patient's response to treatment, examination of patient, ordering and performing treatments and interventions, ordering and review of laboratory studies, ordering and review of radiographic studies, pulse oximetry, re-evaluation of patient's condition.  This critical care time did not overlap with that of any other provider or involve time for any procedures.     Antonio Thomas MD  Critical Care - Surgery  [facility]

## 2020-01-31 NOTE — ASSESSMENT & PLAN NOTE
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   - CTS primary  - Taken back to OR 1/24 for possible RVAD placement which was not done. Patient remained hemodynamically stable in OR. Wash out on 1/27. Chest closed 1/29. Remains intubated and sedated.   - CVP 12, no VBG drawn this AM  - Currently hemodynamically stable on current ggts  - Current speed 5300    Procedure: Device Interrogation Including analysis of device parameters  Current Settings: Ventricular Assist Device  Review of device function is stable/unstabe    TXP LVAD INTERROGATIONS 1/31/2020 1/31/2020 1/31/2020 1/31/2020 1/31/2020 1/31/2020 1/31/2020   Type HeartMate3 HeartMate3 HeartMate3 HeartMate3 HeartMate3 HeartMate3 HeartMate3   Flow 4 4.3 4 3.9 4 4 4.4   Speed 5300 5300 5300 5300 5300 5300 5300   PI 3.4 3 3.8 4.1 4 4.7 3   Power (Berry) 3.8 3.8 3.8 3.8 3.8 3.8 3.8   LSL 4900 4900 4900 4900 - - -   Pulsatility Intermittent pulse Intermittent pulse Intermittent pulse Intermittent pulse - - -

## 2020-01-31 NOTE — PROGRESS NOTES
Dr. Schmitt updated on pt condition, vitals and gtts. Dr. Christianson notified of K 3.9, no orders to replace at this time.

## 2020-01-31 NOTE — PLAN OF CARE
Plan of Care Note  Cardiothoracic Surgery    Tae Delgado is a 59 y.o. male with heart failure who underwent LVAD placement (1/23/20) then closure (1/24/20) and repeat chest opening (1/24/20) for poor RV function; chest washout (1/27/20); chest closure (1/29/20)    Specific issues: renal labs improving; cpap for 1h q4h; continue glolytely 100cc q4h until BM; likely wean nitric; consider dig continue heparin 500 with ptt 35-45    Plan of care for patient was discussed with ICU staff including nurses, residents, and faculty and appropriate consulting services.    Will continue to monitor patient's hemodynamics, functionality, neuro status, fluid status and renal function, and labs and will adjust medications and fluids as necessary while monitoring appropriateness for de-escalation of support and monitoring and transport to stepdown unit.    Terence Gonzalez MD  Cardiothoracic Surgery Fellow

## 2020-01-31 NOTE — ASSESSMENT & PLAN NOTE
BG goal 140-180      Low dose correction scale  BG monitoring every 4 hours while on TF; please coordinate with meals during the day.         Discharge planning: TBBRITTANI

## 2020-01-31 NOTE — PROGRESS NOTES
"[facility]  Adult Nutrition  Progress Note    SUMMARY       Recommendations  1. Recommend continuing to increase TF to Peptamen Intense VHP at a goal rate of 55 mL/hr - to provide 1320 kcal/day, 121g protein/day, and 1109mL free fluid/day.   2. When able to extubate, ADAT to Cardiac with texture per SLP.   RD to monitor.    Goals: Patient to receive nutrition by RD follow-up  Nutrition Goal Status: progressing towards goal  Communication of RD Recs: discussed on rounds    Reason for Assessment  Reason For Assessment: RD follow-up  Diagnosis: surgery/postoperative complications(s/p LVAD 1/23)  Relevant Medical History: HTN, HLD, CHF, afib, stroke  Interdisciplinary Rounds: attended  General Information Comments: Intubated, sedated. S/p washout 1/27 and chest closure 1/29. On TF at 20 mL/hr. NFPE completed, patient with mild muscle wasting present. Patient had previously reported that he had fluid weight gain PTA which could explain weight loss since admission (also patient is -15.3L since admit). Patient at risk for acute malnutrition.  Nutrition Discharge Planning: Unable to determine at this time.    Nutrition Risk Screen  Nutrition Risk Screen: tube feeding or parenteral nutrition    Nutrition/Diet History  Spiritual, Cultural Beliefs, Restorationist Practices, Values that Affect Care: no  Factors Affecting Nutritional Intake: NPO, on mechanical ventilation    Anthropometrics  Temp: 99.5 °F (37.5 °C)  Height: 5' 10" (177.8 cm)  Height (inches): 70 in  Weight Method: Bed Scale  Weight: 106.2 kg (234 lb 2.1 oz)  Weight (lb): 234.13 lb  Ideal Body Weight (IBW), Male: 166 lb  % Ideal Body Weight, Male (lb): 158.43 %  BMI (Calculated): 33.6  BMI Grade: 30 - 34.9- obesity - grade I    Lab/Procedures/Meds  Pertinent Labs Reviewed: reviewed  Pertinent Labs Comments: Na 151, Cl 117, BUN 77, Creat 2.2, Glu 130, Alb 2.3, Pab 11,   Pertinent Medications Reviewed: reviewed  Pertinent Medications Comments: " docusate, ferrous gluconate, pantoprazole, coumadin, epinephrine, lasix, cardene    Estimated/Assessed Needs  Weight Used For Calorie Calculations: 108.5 kg (239 lb 3.2 oz)  Energy Calorie Requirements (kcal): 5211-8466 kcal/day  Energy Need Method: Kcal/kg(11-14)  Protein Requirements: 113-151 g/day(1.5-2.0 g/kg)  Weight Used For Protein Calculations: 75.5 kg (166 lb 7.2 oz)(IBW)  Fluid Requirements (mL): per MD or 1 ml/kcal  Estimated Fluid Requirement Method: RDA Method  RDA Method (mL): 1194    Nutrition Prescription Ordered  Current Diet Order: NPO  Current Nutrition Support Formula Ordered: Peptamen Intense VHP  Current Nutrition Support Rate Ordered: 20 (ml)  Current Nutrition Support Frequency Ordered: mL/hr    Evaluation of Received Nutrient/Fluid Intake  Enteral Calories (kcal): 480  Enteral Protein (gm): 44  Enteral (Free Water) Fluid (mL): 403  % Kcal Needs: 40%  % Protein Needs: 39%  I/O: +1.1L x 24hrs, -15.3L since admit  Energy Calories Required: not meeting needs  Protein Required: not meeting needs  Fluid Required: (per MD)  Comments: LBM 1/20  Tolerance: tolerating  % Intake of Estimated Energy Needs: 25 - 50 %  % Meal Intake: NPO    Nutrition Risk  Level of Risk/Frequency of Follow-up: high(2x/week)     Assessment and Plan  Nutrition Problem  Inadequate energy intake     Related to (etiology):   Decreased ability to consume sufficient energy     Signs and Symptoms (as evidenced by):   NPO with no alternative means of nutrition at this time     Interventions (treatment strategy):  Collaboration of nutrition care with other providers     Nutrition Diagnosis Status:   Improving    Monitor and Evaluation  Food and Nutrient Intake: energy intake, food and beverage intake  Food and Nutrient Adminstration: diet order  Knowledge/Beliefs/Attitudes: food and nutrition knowledge/skill  Anthropometric Measurements: weight, weight change, body mass index  Biochemical Data, Medical Tests and Procedures:  electrolyte and renal panel, gastrointestinal profile, glucose/endocrine profile, inflammatory profile, lipid profile  Nutrition-Focused Physical Findings: overall appearance     Malnutrition Assessment  Orbital Region (Subcutaneous Fat Loss): well nourished  Upper Arm Region (Subcutaneous Fat Loss): (DANIE, restrained)  Thoracic and Lumbar Region: well nourished   [place] Region (Muscle Loss): mild depletion  Clavicle Bone Region (Muscle Loss): mild depletion  Clavicle and Acromion Bone Region (Muscle Loss): well nourished  Scapular Bone Region (Muscle Loss): (DANIE)  Dorsal Hand (Muscle Loss): well nourished(edematous)  Patellar Region (Muscle Loss): well nourished(edematous)  Anterior Thigh Region (Muscle Loss): well nourished(edematous)  Posterior Calf Region (Muscle Loss): (DANIE, boots in place)   Edema (Fluid Accumulation): 2-->mild     Nutrition Follow-Up  RD Follow-up?: Yes

## 2020-01-31 NOTE — ASSESSMENT & PLAN NOTE
BG goal 140-180      Low dose correction scale  BG monitoring every 6  hours while NPO        Discharge planning: TBD

## 2020-01-31 NOTE — ASSESSMENT & PLAN NOTE
KRISTIN on CKD III  Baseline SCr ~ 2.0    58 yo male s/p LVAD placement on 1/23 who was taken back to OR for exploration over concerns for RV failure/tamponade due to elevated CVP and decrease UOP.  Upon opening of chest, he had improved hemodynamics and some improvement in UOP and decision made to leave chest open and transfer back to ICU for closer monitoring on 1/24.  He was administered IV lasix + diuril with improvement in his UOP.      DDx for KRISTIN includes ATN from renal hypoperfusion vs. CRS from RV overload    Plan/recommendations:  -renal function improving. sCr now 2.2 (baseline ~ 2). Adequate UOP on low dose Lasix drip.   -patient remains hypernatremic, 151 today. Patient with a free water deficit of 5 L.   -recommend increasing amount of FWF to 350 mL q4 hrs  -defer lasix drip adjustments to primary team   -continue trending RFP and UOP  -will follow labs closely  -will discuss with Dr. Gilmore

## 2020-01-31 NOTE — NURSING
Pt intubated and sedated. Wife at bedside. VAD parameters WNL, no alarms on VAD interrogation. RN informs that MD will try SBT tomorrow.     RN with questions regarding betadine and saline dressing protocol. Reviewed with RN and verified that dressing order was correct.

## 2020-01-31 NOTE — PT/OT/SLP PROGRESS
Physical Therapy      Patient Name:  Tae Delgado   MRN:  [id number]    Patient not seen today secondary to (pt on hold due to being intubated and sedated.). Will follow-up at a later date..    Lucille Rivera, PT   1/31/2020

## 2020-01-31 NOTE — PROGRESS NOTES
"[facility]  Endocrinology  Progress Note    Admit Date: 1/9/2020     Reason for Consult: Management of Hyperglycemia     Surgical Procedure and Date: LVAD 1/23/20; chest closure 1/29/20     Lab Results   Component Value Date    HGBA1C 6.2 (H) 01/15/2020       HPI:   Patient is a 59 y.o. male with a diagnosis of afib, KRISTIN on CKD, and CHF, who is s/p LVAD placement on 1/23/20.  No personal history of DM but slightly elevated A1C upon admission.  Endocrinology consulted for BG management.            Interval HPI:   Overnight events: Remains in ICU, intubated. BG well controlled overnight without insulin. Creatinine noted at 2.2. Tricke TF started.   Eating:   NPO  Nausea: No  Hypoglycemia and intervention: No  Fever: No  TF peptamen intense VHP at 10 cc/hr - increased to 20 cc/hr today (goal 55 cc/hr per RD).     BP (!) 80/0   Pulse 90   Temp 99.5 °F (37.5 °C) (Oral)   Resp (!) 21   Ht 5' 10" (1.778 m)   Wt 106.2 kg (234 lb 2.1 oz)   SpO2 98%   BMI 33.59 kg/m²      Labs Reviewed and Include    Recent Labs   Lab 01/31/20  0435 01/31/20  1140   *  --    CALCIUM 8.8  --    ALBUMIN 2.3*  2.3*  --    PROT 6.1  6.1  --    *  --    K 4.2 3.9   CO2 23  --    *  --    BUN 77*  --    CREATININE 2.2*  --    ALKPHOS 47*  47*  --    ALT 14  14  --    AST 36  36  --    BILITOT 0.8  0.8  --      Lab Results   Component Value Date    WBC 17.56 (H) 01/31/2020    HGB 9.6 (L) 01/31/2020    HCT 26 (L) 01/31/2020    MCV 94 01/31/2020     01/31/2020     No results for input(s): TSH, FREET4 in the last 168 hours.  Lab Results   Component Value Date    HGBA1C 6.2 (H) 01/15/2020       Nutritional status:   Body mass index is 33.59 kg/m².  Lab Results   Component Value Date    ALBUMIN 2.3 (L) 01/31/2020    ALBUMIN 2.3 (L) 01/31/2020    ALBUMIN 2.5 (L) 01/30/2020     Lab Results   Component Value Date    PREALBUMIN 11 (L) 01/31/2020    PREALBUMIN 11 (L) 01/24/2020    PREALBUMIN 27 " 01/15/2020       Estimated Creatinine Clearance: 44.1 mL/min (A) (based on SCr of 2.2 mg/dL (H)).    Accu-Checks  Recent Labs     01/29/20  1134 01/29/20  1630 01/30/20  0009 01/30/20  0505 01/30/20  0752 01/30/20  1215 01/30/20  1828 01/30/20  2204 01/31/20  0438 01/31/20  1141   POCTGLUCOSE 170* 143* 149* 139* 105 155* 164* 199* 125* 215*       Current Medications and/or Treatments Impacting Glycemic Control  Immunotherapy:    Immunosuppressants     None        Steroids:   Hormones (From admission, onward)    None        Pressors:    Autonomic Drugs (From admission, onward)    Start     Stop Route Frequency Ordered    01/29/20 0745  EPINEPHrine (ADRENALIN) 5 mg in dextrose 5 % 250 mL infusion     Question Answer Comment   Titrate by: (in mcg/kg/min) 0.02    Titrate interval: (in minutes) 5    Titrate to maintain: (SBP or MAP or Cardiac Index) MAP    Greater than: (in mmHg) 60    Maximum dose: (in mcg/kg/min) 2        -- IV Continuous 01/29/20 0743        Hyperglycemia/Diabetes Medications:   Antihyperglycemics (From admission, onward)    Start     Stop Route Frequency Ordered    01/30/20 1319  insulin aspart U-100 pen 0-5 Units      -- SubQ Every 6 hours PRN 01/30/20 1219          ASSESSMENT and PLAN    * LVAD (left ventricular assist device) present  S/p LVAD on 1/23/20  Managed per primary team  Avoid hypoglycemia  Optimize BG control for surgical wound healing        Acute hyperglycemia  BG goal 140-180      Low dose correction scale  BG monitoring every 6  hours while NPO        Discharge planning: TBD        Acute on chronic combined systolic and diastolic heart failure  Managed per primary team  S/p LVAD    On enteral nutrition  May elevate BG       Acute kidney injury superimposed on chronic kidney disease  Titrate insulin slowly to avoid hypoglycemia as the risk of hypoglycemia increases with decreased creatinine clearance.  Caution with insulin stacking  Estimated Creatinine Clearance: 44.1 mL/min (A)  (based on SCr of 2.2 mg/dL (H)).            Ermelinda Pineda NP  Endocrinology  [facility]

## 2020-01-31 NOTE — PT/OT/SLP PROGRESS
Occupational Therapy      Patient Name:  Tae Delgado   MRN:  [id number]    Patient not seen today secondary to pt remains intubated with plans for extubation 2/1. Will see over the weekend.  SEBASTIEN Mayberry  1/31/2020

## 2020-01-31 NOTE — ASSESSMENT & PLAN NOTE
Titrate insulin slowly to avoid hypoglycemia as the risk of hypoglycemia increases with decreased creatinine clearance.  Caution with insulin stacking  Estimated Creatinine Clearance: 44.1 mL/min (A) (based on SCr of 2.2 mg/dL (H)).

## 2020-01-31 NOTE — PLAN OF CARE
Plan of care reviewed with patient, his son, and his significant other at the bedside. JOELLE. Assessments per flowsheets. Plan to continue to wean vent as tolerated.

## 2020-01-31 NOTE — ASSESSMENT & PLAN NOTE
Tae Delgado is a 59 y.o. 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 Approved for DT LVAD. Went to OR on 1/21 and found to have DAMON and LV thrombus and case was aborted. Pt was placed on a heparin gtt and thrombus had dissipated on repeat JACINTO on 1/22. Pt underwent LVAD placement on 1/23. Chest closure and re-exploration on 1/24. Returned from OR with chest open on 1/24. Attempted chest closure on 1/27, returned to SICU with chest open.     Neuro:  - Patient opens eyes and follows commands when sedation is held  - Propofol gtt with prn Fentanyl    CVS:   - Current LVAD flow @ 5400 with appropriate PIs   - Wean vasopressors per CTS  - ICD turned on, EP service consulted for assistance with arrythmia  - Digoxin 0.125  - Wean Corby, currently at 5 ppm    Pulm:  - Mechanical ventilation wean as tolerated  - ABGs q6hr  - CXR daily  - Monitor CT output, no overt signs of bleeding  - Removed left mediastinal drain today   - Plan to extubate on 2/1    GI:  - TF formula change to peptamen   - Protonix 40 daily  - Docusate    Endo:   - Insulin off now, SSI  - Endocrine consulted, appreciate their services    Renal:  - Strict I/O  - Trend BUN/Cr, still elevated but stable  - Lasix gtt @ 2.5      FENGI:  - Monitor labs  - Replace per protocol  - Switching all drips to D5  - Increase free water boluses frequency to q4    Heme:  - No blood products needed during the OR  - H/H stable overnight    ID:   - Cefepime/Vanc  - Follow WBC  - Febrile overnight will continue antibiotics    PPx:  - Hep gtt nontitrating, holding to pull chest tube, will restart at 1200u/hr for aPTT goal of 45-54, starting coumadin  - GI ppx    Dispo:  - Cont SICU care

## 2020-01-31 NOTE — SUBJECTIVE & OBJECTIVE
Interval History/Significant Events:     NAEON.  Patient remains hypernatremic despite increase in free water boluses.  Remains intubated and sedated.  Tolerating PSV    Follow-up For: Procedure(s) (LRB):  CLOSURE, WOUND, STERNUM (N/A)  WASHOUT (N/A)  APPLICATION, WOUND VAC (N/A)    Post-Operative Day: 2 Days Post-Op    Objective:     Vital Signs (Most Recent):  Temp: 98.9 °F (37.2 °C) (01/31/20 0715)  Pulse: 90 (01/31/20 0910)  Resp: (!) 21 (01/30/20 1700)  BP: (!) 82/0 (01/31/20 0715)  SpO2: 99 % (01/31/20 0910) Vital Signs (24h Range):  Temp:  [98.9 °F (37.2 °C)-101.8 °F (38.8 °C)] 98.9 °F (37.2 °C)  Pulse:  [88-90] 90  Resp:  [18-21] 21  SpO2:  [92 %-100 %] 99 %  BP: (80-86)/(0) 82/0  Arterial Line BP: (78-93)/(64-80) 90/78     Weight: 106.2 kg (234 lb 2.1 oz)  Body mass index is 33.59 kg/m².      Intake/Output Summary (Last 24 hours) at 1/31/2020 0958  Last data filed at 1/31/2020 0900  Gross per 24 hour   Intake 3508 ml   Output 2475 ml   Net 1033 ml       Physical Exam   Constitutional: He appears well-developed and well-nourished.   HENT:   Head: Normocephalic and atraumatic.   Mouth/Throat: No oropharyngeal exudate.   ETT and OG secured in place.   Eyes: Pupils are equal, round, and reactive to light.   Neck: Normal range of motion. Neck supple.   Left IJ double lumen + introducer in place, dressing CLD  Right IJ triple lumen, dressing CLD   Cardiovascular:   Irregular tachyarrythmia. Paced, LVAD hum.  Chest close with dressing in place   Pulmonary/Chest: Breath sounds normal. He has no wheezes. He has no rales.   Intubated.    Abdominal: Soft. He exhibits no distension.   Musculoskeletal: He exhibits no edema or deformity.   Neurological:   Sedated, follows commands   Skin: Skin is warm and dry.       Vents:  Vent Mode: Spont (01/31/20 0910)  Ventilator Initiated: Yes (01/21/20 0905)  Set Rate: 16 BPM (01/31/20 0819)  Vt Set: 500 mL (01/31/20 0819)  PEEP/CPAP: 5 cmH20 (01/31/20 0910)  Oxygen Concentration  (%): 50 (01/31/20 0910)  Peak Airway Pressure: 17 cmH2O (01/31/20 0910)  Plateau Pressure: 0 cmH20 (01/31/20 0910)  Total Ve: 10.3 mL (01/31/20 0910)  F/VT Ratio<105 (RSBI): (!) 34.88 (01/27/20 2316)    Lines/Drains/Airways     Central Venous Catheter Line                 Percutaneous Central Line Insertion/Assessment - double lumen  01/23/20 0751 left internal jugular 8 days         Trialysis (Dialysis) Catheter 01/24/20 1500 right internal jugular 6 days          Drain                 Urethral Catheter 01/21/20 0752 Non-latex;Straight-tip;Temperature probe 16 Fr. 10 days         Chest Tube 01/23/20 1230 1 Right Pleural 7 days         Chest Tube 01/23/20 1414 2 Right Mediastinal 7 days         Chest Tube 01/23/20 1415 3 Left Mediastinal 7 days         NG/OG Tube 01/29/20 1430 Left nostril 1 day          Airway                 Airway - Non-Surgical 01/21/20 0742 Endotracheal Tube 10 days          Arterial Line                 Arterial Line 01/21/20 0730 Left Other (Comment) 10 days          Line                 VAD 01/23/20 1148 Left ventricular assist device HeartMate 3 7 days          Peripheral Intravenous Line                 Peripheral IV - Single Lumen 01/29/20 1200 20 G Right Antecubital 1 day                Significant Labs:    CBC/Anemia Profile:  Recent Labs   Lab 01/30/20  0010  01/30/20  0509  01/31/20  0153 01/31/20  0435 01/31/20  0557   WBC 19.32*  --  16.91*  --   --  17.56*  --    HGB 9.7*  --  9.8*  --   --  9.6*  --    HCT 33.1*   < > 32.9*   < > 30* 32.8* 26*     --  216  --   --  255  --    MCV 94  --  94  --   --  94  --    RDW 16.6*  --  16.4*  --   --  17.0*  --     < > = values in this interval not displayed.        Chemistries:  Recent Labs   Lab 01/30/20  0010 01/30/20  0509 01/30/20  0810  01/30/20  1700 01/30/20 2201 01/31/20  0435   * 151* 151*  --   --   --  151*   K 3.9 4.3 4.0   < > 4.2 3.9 4.2   * 114* 116*  --   --   --  117*   CO2 28 25 25  --   --   --  23    BUN 92* 86* 86*  --   --   --  77*   CREATININE 2.5* 2.4* 2.3*  --   --   --  2.2*   CALCIUM 9.2 9.2 8.9  --   --   --  8.8   ALBUMIN 2.8* 2.6* 2.5*  --   --   --  2.3*  2.3*   PROT 6.5 6.3 6.1  --   --   --  6.1  6.1   BILITOT 0.8 0.8 0.8  --   --   --  0.8  0.8   ALKPHOS 45* 44* 47*  --   --   --  47*  47*   ALT 12 13 15  --   --   --  14  14   AST 24 29 37  --   --   --  36  36   MG 2.6 2.5  --    < > 2.4 2.5 2.5   PHOS 3.7 2.8  --   --   --   --  2.4*    < > = values in this interval not displayed.       All pertinent labs within the past 24 hours have been reviewed.    Significant Imaging:  CXR  One view: There is a pacer and LVAD.  Tubes and lines are appropriate.  There is cardiomegaly moderate edema/CHF pleural fluid and no change.

## 2020-01-31 NOTE — ANESTHESIA POSTPROCEDURE EVALUATION
Anesthesia Post Evaluation    Patient: Tae Delgado    Procedure(s) Performed: Procedure(s) (LRB):  CLOSURE, WOUND, STERNUM (N/A)  WASHOUT (N/A)  APPLICATION, WOUND VAC (N/A)    Final Anesthesia Type: general    Patient location during evaluation: ICU  Patient participation: No - Unable to Participate, Intubation  Level of consciousness: sedated  Post-procedure vital signs: reviewed and stable  Pain management: adequate  Airway patency: patent    PONV status at discharge: No PONV  Anesthetic complications: no      Cardiovascular status: hemodynamically stable  Respiratory status: room air, intubated and ventilator  Hydration status: euvolemic  Follow-up not needed.          Vitals Value Taken Time   BP 82/0 1/30/2020  7:15 AM   Temp 37.2 °C (98.9 °F) 1/30/2020  7:15 AM   Pulse 90 1/30/2020 10:42 AM   Resp 21 1/30/2020  5:00 AM   SpO2 99 % 1/30/2020 10:42 AM   Vitals shown include unvalidated device data.      No case tracking events are documented in the log.      Pain/Dequan Score: Pain Rating Prior to Med Admin: 0   Pain Rating Post Med Admin: 0

## 2020-02-01 LAB
ALBUMIN SERPL BCP-MCNC: 2.2 G/DL (ref 3.5–5.2)
ALLENS TEST: ABNORMAL
ALLENS TEST: NORMAL
ALP SERPL-CCNC: 44 U/L (ref 55–135)
ALT SERPL W/O P-5'-P-CCNC: 17 U/L (ref 10–44)
ANION GAP SERPL CALC-SCNC: 13 MMOL/L (ref 8–16)
ANISOCYTOSIS BLD QL SMEAR: SLIGHT
APTT BLDCRRT: 43.8 SEC (ref 21–32)
APTT BLDCRRT: 46.6 SEC (ref 21–32)
APTT BLDCRRT: 48.1 SEC (ref 21–32)
APTT BLDCRRT: 49.1 SEC (ref 21–32)
AST SERPL-CCNC: 37 U/L (ref 10–40)
BASOPHILS NFR BLD: 0 % (ref 0–1.9)
BILIRUB SERPL-MCNC: 0.6 MG/DL (ref 0.1–1)
BUN SERPL-MCNC: 70 MG/DL (ref 6–20)
CALCIUM SERPL-MCNC: 8.8 MG/DL (ref 8.7–10.5)
CHLORIDE SERPL-SCNC: 116 MMOL/L (ref 95–110)
CO2 SERPL-SCNC: 22 MMOL/L (ref 23–29)
CREAT SERPL-MCNC: 2.3 MG/DL (ref 0.5–1.4)
DELSYS: ABNORMAL
DELSYS: NORMAL
DIFFERENTIAL METHOD: ABNORMAL
DIGOXIN SERPL-MCNC: 1.9 NG/ML (ref 0.8–2)
EOSINOPHIL NFR BLD: 0 % (ref 0–8)
ERYTHROCYTE [DISTWIDTH] IN BLOOD BY AUTOMATED COUNT: 17.2 % (ref 11.5–14.5)
EST. GFR  (AFRICAN AMERICAN): 34.6 ML/MIN/1.73 M^2
EST. GFR  (NON AFRICAN AMERICAN): 30 ML/MIN/1.73 M^2
FIO2: 40
FLOW: 4
FLOW: 4
FLOW: 5
FLOW: 5
GLUCOSE SERPL-MCNC: 179 MG/DL (ref 70–110)
HCO3 UR-SCNC: 23.6 MMOL/L (ref 24–28)
HCO3 UR-SCNC: 24 MMOL/L (ref 24–28)
HCO3 UR-SCNC: 24.4 MMOL/L (ref 24–28)
HCO3 UR-SCNC: 24.8 MMOL/L (ref 24–28)
HCO3 UR-SCNC: 25.1 MMOL/L (ref 24–28)
HCO3 UR-SCNC: 25.1 MMOL/L (ref 24–28)
HCO3 UR-SCNC: 25.2 MMOL/L (ref 24–28)
HCT VFR BLD AUTO: 30.5 % (ref 40–54)
HCT VFR BLD CALC: 25 %PCV (ref 36–54)
HCT VFR BLD CALC: 25 %PCV (ref 36–54)
HCT VFR BLD CALC: 26 %PCV (ref 36–54)
HCT VFR BLD CALC: 29 %PCV (ref 36–54)
HCT VFR BLD CALC: 29 %PCV (ref 36–54)
HCT VFR BLD CALC: 34 %PCV (ref 36–54)
HGB BLD-MCNC: 9 G/DL (ref 14–18)
IMM GRANULOCYTES # BLD AUTO: ABNORMAL K/UL (ref 0–0.04)
IMM GRANULOCYTES NFR BLD AUTO: ABNORMAL % (ref 0–0.5)
INR PPP: 1.5 (ref 0.8–1.2)
LDH SERPL L TO P-CCNC: 0.64 MMOL/L (ref 0.36–1.25)
LDH SERPL L TO P-CCNC: 0.66 MMOL/L (ref 0.36–1.25)
LDH SERPL L TO P-CCNC: 0.72 MMOL/L (ref 0.36–1.25)
LDH SERPL L TO P-CCNC: 0.73 MMOL/L (ref 0.36–1.25)
LDH SERPL L TO P-CCNC: 0.78 MMOL/L (ref 0.36–1.25)
LDH SERPL L TO P-CCNC: 0.8 MMOL/L (ref 0.36–1.25)
LDH SERPL L TO P-CCNC: 394 U/L (ref 110–260)
LYMPHOCYTES NFR BLD: 8 % (ref 18–48)
MAGNESIUM SERPL-MCNC: 2.1 MG/DL (ref 1.6–2.6)
MAGNESIUM SERPL-MCNC: 2.2 MG/DL (ref 1.6–2.6)
MCH RBC QN AUTO: 28 PG (ref 27–31)
MCHC RBC AUTO-ENTMCNC: 29.5 G/DL (ref 32–36)
MCV RBC AUTO: 95 FL (ref 82–98)
METHEMOGLOBIN: 0.7 % (ref 0–3)
MODE: ABNORMAL
MODE: NORMAL
MONOCYTES NFR BLD: 7 % (ref 4–15)
NEUTROPHILS NFR BLD: 84 % (ref 38–73)
NEUTS BAND NFR BLD MANUAL: 1 %
NRBC BLD-RTO: 1 /100 WBC
PCO2 BLDA: 33.6 MMHG (ref 35–45)
PCO2 BLDA: 33.8 MMHG (ref 35–45)
PCO2 BLDA: 35.3 MMHG (ref 35–45)
PCO2 BLDA: 36 MMHG (ref 35–45)
PCO2 BLDA: 38.1 MMHG (ref 35–45)
PCO2 BLDA: 38.8 MMHG (ref 35–45)
PCO2 BLDA: 41.4 MMHG (ref 35–45)
PH SMN: 7.39 [PH] (ref 7.35–7.45)
PH SMN: 7.42 [PH] (ref 7.35–7.45)
PH SMN: 7.42 [PH] (ref 7.35–7.45)
PH SMN: 7.44 [PH] (ref 7.35–7.45)
PH SMN: 7.44 [PH] (ref 7.35–7.45)
PH SMN: 7.45 [PH] (ref 7.35–7.45)
PH SMN: 7.48 [PH] (ref 7.35–7.45)
PHOSPHATE SERPL-MCNC: 2 MG/DL (ref 2.7–4.5)
PLATELET # BLD AUTO: 241 K/UL (ref 150–350)
PMV BLD AUTO: 11.7 FL (ref 9.2–12.9)
PO2 BLDA: 112 MMHG (ref 80–100)
PO2 BLDA: 116 MMHG (ref 80–100)
PO2 BLDA: 120 MMHG (ref 80–100)
PO2 BLDA: 157 MMHG (ref 80–100)
PO2 BLDA: 160 MMHG (ref 80–100)
PO2 BLDA: 176 MMHG (ref 80–100)
PO2 BLDA: 39 MMHG (ref 40–60)
POC BE: 0 MMOL/L
POC BE: 1 MMOL/L
POC BE: 2 MMOL/L
POC IONIZED CALCIUM: 1.18 MMOL/L (ref 1.06–1.42)
POC IONIZED CALCIUM: 1.2 MMOL/L (ref 1.06–1.42)
POC IONIZED CALCIUM: 1.2 MMOL/L (ref 1.06–1.42)
POC IONIZED CALCIUM: 1.21 MMOL/L (ref 1.06–1.42)
POC IONIZED CALCIUM: 1.22 MMOL/L (ref 1.06–1.42)
POC IONIZED CALCIUM: 1.23 MMOL/L (ref 1.06–1.42)
POC SATURATED O2: 100 % (ref 95–100)
POC SATURATED O2: 100 % (ref 95–100)
POC SATURATED O2: 73 % (ref 95–100)
POC SATURATED O2: 98 % (ref 95–100)
POC SATURATED O2: 99 % (ref 95–100)
POC TCO2: 25 MMOL/L (ref 23–27)
POC TCO2: 26 MMOL/L (ref 23–27)
POC TCO2: 26 MMOL/L (ref 24–29)
POCT GLUCOSE: 152 MG/DL (ref 70–110)
POCT GLUCOSE: 155 MG/DL (ref 70–110)
POCT GLUCOSE: 170 MG/DL (ref 70–110)
POCT GLUCOSE: 172 MG/DL (ref 70–110)
POCT GLUCOSE: 180 MG/DL (ref 70–110)
POLYCHROMASIA BLD QL SMEAR: ABNORMAL
POTASSIUM BLD-SCNC: 3.4 MMOL/L (ref 3.5–5.1)
POTASSIUM BLD-SCNC: 3.6 MMOL/L (ref 3.5–5.1)
POTASSIUM BLD-SCNC: 3.7 MMOL/L (ref 3.5–5.1)
POTASSIUM BLD-SCNC: 3.7 MMOL/L (ref 3.5–5.1)
POTASSIUM BLD-SCNC: 3.9 MMOL/L (ref 3.5–5.1)
POTASSIUM BLD-SCNC: 4 MMOL/L (ref 3.5–5.1)
POTASSIUM SERPL-SCNC: 3.7 MMOL/L (ref 3.5–5.1)
POTASSIUM SERPL-SCNC: 3.7 MMOL/L (ref 3.5–5.1)
POTASSIUM SERPL-SCNC: 3.9 MMOL/L (ref 3.5–5.1)
POTASSIUM SERPL-SCNC: 3.9 MMOL/L (ref 3.5–5.1)
POTASSIUM SERPL-SCNC: 4.1 MMOL/L (ref 3.5–5.1)
PROT SERPL-MCNC: 6 G/DL (ref 6–8.4)
PROTHROMBIN TIME: 14.7 SEC (ref 9–12.5)
RBC # BLD AUTO: 3.22 M/UL (ref 4.6–6.2)
SAMPLE: ABNORMAL
SAMPLE: NORMAL
SITE: ABNORMAL
SITE: NORMAL
SODIUM BLD-SCNC: 147 MMOL/L (ref 136–145)
SODIUM BLD-SCNC: 148 MMOL/L (ref 136–145)
SODIUM BLD-SCNC: 149 MMOL/L (ref 136–145)
SODIUM BLD-SCNC: 150 MMOL/L (ref 136–145)
SODIUM SERPL-SCNC: 151 MMOL/L (ref 136–145)
SP02: 100
SP02: 99
SP02: 99
VANCOMYCIN SERPL-MCNC: 11.2 UG/ML
WBC # BLD AUTO: 17.42 K/UL (ref 3.9–12.7)

## 2020-02-01 PROCEDURE — 37799 UNLISTED PX VASCULAR SURGERY: CPT

## 2020-02-01 PROCEDURE — 63600175 PHARM REV CODE 636 W HCPCS: Performed by: THORACIC SURGERY (CARDIOTHORACIC VASCULAR SURGERY)

## 2020-02-01 PROCEDURE — 80202 ASSAY OF VANCOMYCIN: CPT

## 2020-02-01 PROCEDURE — 63600175 PHARM REV CODE 636 W HCPCS: Performed by: SURGERY

## 2020-02-01 PROCEDURE — 85014 HEMATOCRIT: CPT

## 2020-02-01 PROCEDURE — 85730 THROMBOPLASTIN TIME PARTIAL: CPT

## 2020-02-01 PROCEDURE — 99233 PR SUBSEQUENT HOSPITAL CARE,LEVL III: ICD-10-PCS | Mod: ,,, | Performed by: INTERNAL MEDICINE

## 2020-02-01 PROCEDURE — 99232 PR SUBSEQUENT HOSPITAL CARE,LEVL II: ICD-10-PCS | Mod: GC,,, | Performed by: INTERNAL MEDICINE

## 2020-02-01 PROCEDURE — 83735 ASSAY OF MAGNESIUM: CPT | Mod: 91

## 2020-02-01 PROCEDURE — 25000003 PHARM REV CODE 250: Performed by: PHYSICIAN ASSISTANT

## 2020-02-01 PROCEDURE — 80053 COMPREHEN METABOLIC PANEL: CPT

## 2020-02-01 PROCEDURE — 85730 THROMBOPLASTIN TIME PARTIAL: CPT | Mod: 91

## 2020-02-01 PROCEDURE — 84132 ASSAY OF SERUM POTASSIUM: CPT

## 2020-02-01 PROCEDURE — 84295 ASSAY OF SERUM SODIUM: CPT

## 2020-02-01 PROCEDURE — 82330 ASSAY OF CALCIUM: CPT

## 2020-02-01 PROCEDURE — 25000003 PHARM REV CODE 250: Performed by: STUDENT IN AN ORGANIZED HEALTH CARE EDUCATION/TRAINING PROGRAM

## 2020-02-01 PROCEDURE — 93750 INTERROGATION VAD IN PERSON: CPT | Mod: ,,, | Performed by: INTERNAL MEDICINE

## 2020-02-01 PROCEDURE — 85027 COMPLETE CBC AUTOMATED: CPT

## 2020-02-01 PROCEDURE — 63600367 HC NITRIC OXIDE PER HOUR

## 2020-02-01 PROCEDURE — 80162 ASSAY OF DIGOXIN TOTAL: CPT

## 2020-02-01 PROCEDURE — 85610 PROTHROMBIN TIME: CPT

## 2020-02-01 PROCEDURE — 27000248 HC VAD-ADDITIONAL DAY

## 2020-02-01 PROCEDURE — 84132 ASSAY OF SERUM POTASSIUM: CPT | Mod: 91

## 2020-02-01 PROCEDURE — 83615 LACTATE (LD) (LDH) ENZYME: CPT

## 2020-02-01 PROCEDURE — 99233 SBSQ HOSP IP/OBS HIGH 50: CPT | Mod: ,,, | Performed by: INTERNAL MEDICINE

## 2020-02-01 PROCEDURE — 63600175 PHARM REV CODE 636 W HCPCS: Performed by: STUDENT IN AN ORGANIZED HEALTH CARE EDUCATION/TRAINING PROGRAM

## 2020-02-01 PROCEDURE — 20000000 HC ICU ROOM

## 2020-02-01 PROCEDURE — 99900035 HC TECH TIME PER 15 MIN (STAT)

## 2020-02-01 PROCEDURE — 99900026 HC AIRWAY MAINTENANCE (STAT)

## 2020-02-01 PROCEDURE — 85007 BL SMEAR W/DIFF WBC COUNT: CPT

## 2020-02-01 PROCEDURE — 84100 ASSAY OF PHOSPHORUS: CPT

## 2020-02-01 PROCEDURE — 25000003 PHARM REV CODE 250: Performed by: THORACIC SURGERY (CARDIOTHORACIC VASCULAR SURGERY)

## 2020-02-01 PROCEDURE — 83605 ASSAY OF LACTIC ACID: CPT

## 2020-02-01 PROCEDURE — 27000221 HC OXYGEN, UP TO 24 HOURS

## 2020-02-01 PROCEDURE — 94003 VENT MGMT INPAT SUBQ DAY: CPT

## 2020-02-01 PROCEDURE — 99232 SBSQ HOSP IP/OBS MODERATE 35: CPT | Mod: GC,,, | Performed by: INTERNAL MEDICINE

## 2020-02-01 PROCEDURE — C9113 INJ PANTOPRAZOLE SODIUM, VIA: HCPCS | Performed by: SURGERY

## 2020-02-01 PROCEDURE — 83735 ASSAY OF MAGNESIUM: CPT

## 2020-02-01 PROCEDURE — 94761 N-INVAS EAR/PLS OXIMETRY MLT: CPT

## 2020-02-01 PROCEDURE — 82800 BLOOD PH: CPT

## 2020-02-01 PROCEDURE — 82803 BLOOD GASES ANY COMBINATION: CPT

## 2020-02-01 PROCEDURE — 99233 PR SUBSEQUENT HOSPITAL CARE,LEVL III: ICD-10-PCS | Mod: ,,, | Performed by: SURGERY

## 2020-02-01 PROCEDURE — 93750 PR INTERROGATE VENT ASSIST DEV, IN PERSON, W PHYSICIAN ANALYSIS: ICD-10-PCS | Mod: ,,, | Performed by: INTERNAL MEDICINE

## 2020-02-01 PROCEDURE — 82565 ASSAY OF CREATININE: CPT

## 2020-02-01 PROCEDURE — 25000003 PHARM REV CODE 250: Performed by: SURGERY

## 2020-02-01 PROCEDURE — 83050 HGB METHEMOGLOBIN QUAN: CPT

## 2020-02-01 PROCEDURE — A4216 STERILE WATER/SALINE, 10 ML: HCPCS | Performed by: SURGERY

## 2020-02-01 PROCEDURE — 27200966 HC CLOSED SUCTION SYSTEM

## 2020-02-01 PROCEDURE — 99233 SBSQ HOSP IP/OBS HIGH 50: CPT | Mod: ,,, | Performed by: SURGERY

## 2020-02-01 RX ORDER — POLYETHYLENE GLYCOL 3350, SODIUM SULFATE ANHYDROUS, SODIUM BICARBONATE, SODIUM CHLORIDE, POTASSIUM CHLORIDE 236; 22.74; 6.74; 5.86; 2.97 G/4L; G/4L; G/4L; G/4L; G/4L
100 POWDER, FOR SOLUTION ORAL
Status: DISCONTINUED | OUTPATIENT
Start: 2020-02-01 | End: 2020-02-13

## 2020-02-01 RX ORDER — POTASSIUM CHLORIDE 20 MEQ/15ML
40 SOLUTION ORAL ONCE
Status: DISCONTINUED | OUTPATIENT
Start: 2020-02-01 | End: 2020-02-01

## 2020-02-01 RX ORDER — FUROSEMIDE 10 MG/ML
20 INJECTION INTRAMUSCULAR; INTRAVENOUS ONCE
Status: COMPLETED | OUTPATIENT
Start: 2020-02-01 | End: 2020-02-01

## 2020-02-01 RX ORDER — WARFARIN 1 MG/1
1 TABLET ORAL ONCE
Status: COMPLETED | OUTPATIENT
Start: 2020-02-01 | End: 2020-02-01

## 2020-02-01 RX ORDER — POTASSIUM CHLORIDE 20 MEQ/15ML
40 SOLUTION ORAL ONCE
Status: COMPLETED | OUTPATIENT
Start: 2020-02-01 | End: 2020-02-01

## 2020-02-01 RX ORDER — POTASSIUM CHLORIDE 20 MEQ/15ML
30 SOLUTION ORAL ONCE
Status: COMPLETED | OUTPATIENT
Start: 2020-02-01 | End: 2020-02-01

## 2020-02-01 RX ORDER — POTASSIUM CHLORIDE 29.8 MG/ML
40 INJECTION INTRAVENOUS ONCE
Status: DISCONTINUED | OUTPATIENT
Start: 2020-02-01 | End: 2020-02-01

## 2020-02-01 RX ORDER — ACETAMINOPHEN 650 MG/20.3ML
1000 LIQUID ORAL ONCE
Status: COMPLETED | OUTPATIENT
Start: 2020-02-01 | End: 2020-02-01

## 2020-02-01 RX ADMIN — DEXMEDETOMIDINE HYDROCHLORIDE 0.8 MCG/KG/HR: 100 INJECTION, SOLUTION, CONCENTRATE INTRAVENOUS at 02:02

## 2020-02-01 RX ADMIN — ASPIRIN 325 MG ORAL TABLET 325 MG: 325 PILL ORAL at 08:02

## 2020-02-01 RX ADMIN — AMLODIPINE BESYLATE 10 MG: 10 TABLET ORAL at 08:02

## 2020-02-01 RX ADMIN — POTASSIUM CHLORIDE 30 MEQ: 20 SOLUTION ORAL at 03:02

## 2020-02-01 RX ADMIN — POLYETHYLENE GLYCOL 3350, SODIUM SULFATE ANHYDROUS, SODIUM BICARBONATE, SODIUM CHLORIDE, POTASSIUM CHLORIDE 100 ML: 236; 22.74; 6.74; 5.86; 2.97 POWDER, FOR SOLUTION ORAL at 11:02

## 2020-02-01 RX ADMIN — OXYCODONE HYDROCHLORIDE 10 MG: 10 TABLET ORAL at 06:02

## 2020-02-01 RX ADMIN — EPINEPHRINE 0.05 MCG/KG/MIN: 1 INJECTION INTRAMUSCULAR; INTRAVENOUS; SUBCUTANEOUS at 05:02

## 2020-02-01 RX ADMIN — FUROSEMIDE 2.5 MG/HR: 10 INJECTION, SOLUTION INTRAMUSCULAR; INTRAVENOUS at 02:02

## 2020-02-01 RX ADMIN — HEPARIN SODIUM 1500 UNITS/HR: 10000 INJECTION, SOLUTION INTRAVENOUS at 02:02

## 2020-02-01 RX ADMIN — Medication 3 ML: at 06:02

## 2020-02-01 RX ADMIN — Medication 400 MG: at 08:02

## 2020-02-01 RX ADMIN — Medication 400 MG: at 03:02

## 2020-02-01 RX ADMIN — POLYETHYLENE GLYCOL 3350, SODIUM SULFATE ANHYDROUS, SODIUM BICARBONATE, SODIUM CHLORIDE, POTASSIUM CHLORIDE 100 ML: 236; 22.74; 6.74; 5.86; 2.97 POWDER, FOR SOLUTION ORAL at 02:02

## 2020-02-01 RX ADMIN — Medication 3 ML: at 02:02

## 2020-02-01 RX ADMIN — CASTOR OIL AND BALSAM, PERU: 788; 87 OINTMENT TOPICAL at 09:02

## 2020-02-01 RX ADMIN — POTASSIUM CHLORIDE 40 MEQ: 20 SOLUTION ORAL at 02:02

## 2020-02-01 RX ADMIN — PANTOPRAZOLE SODIUM 40 MG: 40 INJECTION, POWDER, FOR SOLUTION INTRAVENOUS at 08:02

## 2020-02-01 RX ADMIN — ATORVASTATIN CALCIUM 80 MG: 20 TABLET, FILM COATED ORAL at 09:02

## 2020-02-01 RX ADMIN — POLYETHYLENE GLYCOL 3350, SODIUM SULFATE ANHYDROUS, SODIUM BICARBONATE, SODIUM CHLORIDE, POTASSIUM CHLORIDE 100 ML: 236; 22.74; 6.74; 5.86; 2.97 POWDER, FOR SOLUTION ORAL at 05:02

## 2020-02-01 RX ADMIN — DIGOXIN 0.12 MG: 125 TABLET ORAL at 08:02

## 2020-02-01 RX ADMIN — OXYCODONE HYDROCHLORIDE 10 MG: 10 TABLET ORAL at 08:02

## 2020-02-01 RX ADMIN — HYDRALAZINE HYDROCHLORIDE 10 MG: 20 INJECTION INTRAMUSCULAR; INTRAVENOUS at 10:02

## 2020-02-01 RX ADMIN — FUROSEMIDE 20 MG: 10 INJECTION, SOLUTION INTRAMUSCULAR; INTRAVENOUS at 11:02

## 2020-02-01 RX ADMIN — WARFARIN SODIUM 1 MG: 1 TABLET ORAL at 05:02

## 2020-02-01 RX ADMIN — Medication 400 MG: at 02:02

## 2020-02-01 RX ADMIN — Medication 400 MG: at 09:02

## 2020-02-01 RX ADMIN — EPINEPHRINE 0.05 MCG/KG/MIN: 1 INJECTION INTRAMUSCULAR; INTRAVENOUS; SUBCUTANEOUS at 08:02

## 2020-02-01 RX ADMIN — ACETAMINOPHEN 999.01 MG: 160 SOLUTION ORAL at 04:02

## 2020-02-01 RX ADMIN — OXYCODONE HYDROCHLORIDE 10 MG: 10 TABLET ORAL at 11:02

## 2020-02-01 RX ADMIN — POLYETHYLENE GLYCOL 3350, SODIUM SULFATE ANHYDROUS, SODIUM BICARBONATE, SODIUM CHLORIDE, POTASSIUM CHLORIDE 100 ML: 236; 22.74; 6.74; 5.86; 2.97 POWDER, FOR SOLUTION ORAL at 10:02

## 2020-02-01 RX ADMIN — VANCOMYCIN HYDROCHLORIDE 750 MG: 750 INJECTION, POWDER, LYOPHILIZED, FOR SOLUTION INTRAVENOUS at 10:02

## 2020-02-01 NOTE — RESPIRATORY THERAPY
The pt was extubated with parameters per MD , but still on nitric. 5 PPM. Pt tolerating fine. Will continue to monitor.

## 2020-02-01 NOTE — CARE UPDATE
BG goal 140-180    Remains in ICU, NAEON. Extubated this afternoon. TF on hold. BG well controlled without insulin.     Plan:  BG monitoring every 6 hours and low dose correction scale.   No history of DM. If no futher insulin/endocrine needs, will sign off soon.     Endocrine to continue to follow    ** Please call Endocrine for any BG related issues **

## 2020-02-01 NOTE — ASSESSMENT & PLAN NOTE
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   - CTS primary  - Taken back to OR 1/24 for possible RVAD placement which was not done. Patient remained hemodynamically stable in OR. Wash out on 1/27. Chest closed 1/29. Remains intubated and sedated.   - CVP 10  - Currently hemodynamically stable on current ggts  - Current speed 5300    Procedure: Device Interrogation Including analysis of device parameters  Current Settings: Ventricular Assist Device  Review of device function is stable/unstabe    TXP LVAD INTERROGATIONS 2/1/2020 2/1/2020 2/1/2020 2/1/2020 2/1/2020 2/1/2020 2/1/2020   Type HeartMate3 HeartMate3 HeartMate3 HeartMate3 HeartMate3 HeartMate3 HeartMate3   Flow 3.9 3.9 4 4.1 3.9 3.8 3.9   Speed 5300 5300 5300 5300 5300 5300 5300   PI 4.6 4.3 4.3 3.7 4.5 4.7 4.6   Power (Berry) 3.8 3.8 3.8 3.8 3.8 3.7 3.8   LSL 4900 4900 4900 4900 4900 4900 4900   Pulsatility - - Intermittent pulse - - - Intermittent pulse

## 2020-02-01 NOTE — SUBJECTIVE & OBJECTIVE
Interval History: patient extubated, remains on nitric     Continuous Infusions:   dexmedetomidine (PRECEDEX) infusion Stopped (02/01/20 1000)    epinephrine 0.05 mcg/kg/min (02/01/20 1400)    furosemide (LASIX) 2 mg/mL infusion (non-titrating) 2.5 mg/hr (02/01/20 1400)    heparin (porcine) in 5 % dex 1,500 Units/hr (02/01/20 1400)    niCARdipine Stopped (01/31/20 2100)    nitric oxide gas      propofol Stopped (01/29/20 1700)     Scheduled Meds:   amiodarone  400 mg Per NG tube TID    amLODIPine  10 mg Per NG tube Daily    aspirin  325 mg Oral Daily    atorvastatin  80 mg Oral QHS    balsam peru-castor oil   Topical (Top) BID    digoxin  0.125 mg Oral Daily    docusate sodium  200 mg Oral QHS    ferrous gluconate  324 mg Oral Daily with breakfast    magnesium oxide  400 mg Per NG tube TID    pantoprazole  40 mg Intravenous Daily    sodium chloride 0.9%  3 mL Intravenous Q8H    vancomycin (VANCOCIN) IVPB  750 mg Intravenous Q24H    warfarin  1 mg Oral Once     PRN Meds:albumin human 5%, albuterol sulfate, bisacodyL, dexmedetomidine (PRECEDEX) infusion, Dextrose 10% Bolus, Dextrose 10% Bolus, fentaNYL, glucagon (human recombinant), hydrALAZINE, insulin aspart U-100, magnesium hydroxide 400 mg/5 ml, magnesium sulfate IVPB, oxyCODONE, oxyCODONE, sodium chloride 0.9%    Review of patient's allergies indicates:   Allergen Reactions    Biopatch [chlorhexidine gluconate]      Site burning    Dobutamine in d5w      Tachycardia, tremors, SOB, flushing    Percocet [oxycodone-acetaminophen] Itching    Penicillins Rash     Cefepime given on 1/23/2020 without issue     Objective:     Vital Signs (Most Recent):  Temp: 98.3 °F (36.8 °C) (02/01/20 1100)  Pulse: 95 (02/01/20 1430)  Resp: 20 (01/31/20 1700)  BP: (!) 86/0 (02/01/20 1100)  SpO2: 98 % (02/01/20 1430) Vital Signs (24h Range):  Temp:  [98.3 °F (36.8 °C)-101.6 °F (38.7 °C)] 98.3 °F (36.8 °C)  Pulse:  [] 95  Resp:  [20] 20  SpO2:  [93 %-100 %]  98 %  BP: (82-88)/(0) 86/0  Arterial Line BP: (79-98)/(62-81) 95/81     Patient Vitals for the past 72 hrs (Last 3 readings):   Weight   02/01/20 0432 107.3 kg (236 lb 8.9 oz)   01/31/20 0315 106.2 kg (234 lb 2.1 oz)     Body mass index is 33.94 kg/m².      Intake/Output Summary (Last 24 hours) at 2/1/2020 1437  Last data filed at 2/1/2020 1400  Gross per 24 hour   Intake 4032.9 ml   Output 2305 ml   Net 1727.9 ml       Hemodynamic Parameters:           Physical Exam   Constitutional: He appears well-developed and well-nourished.   HENT:   Head: Normocephalic and atraumatic.   Mouth/Throat: No oropharyngeal exudate.   ETT and OG secured in place.   Eyes: Pupils are equal, round, and reactive to light.   Neck: Normal range of motion. Neck supple.   Left IJ double lumen + introducer in place, dressing CLD  Right IJ triple lumen, dressing CLD   Cardiovascular:   Irregular tachyarrythmia. Paced, LVAD hum.  Chest close with dressing in place   Pulmonary/Chest: Breath sounds normal. He has no wheezes. He has no rales.   Intubated.    Abdominal: Soft. He exhibits no distension.   Musculoskeletal: He exhibits no edema or deformity.   Neurological:   Sedated, follows commands   Skin: Skin is warm and dry.       Significant Labs:  CBC:  Recent Labs   Lab 01/30/20  0509  01/31/20  0435  02/01/20  0332 02/01/20  0501 02/01/20  0853 02/01/20  1304   WBC 16.91*  --  17.56*  --  17.42*  --   --   --    RBC 3.50*  --  3.48*  --  3.22*  --   --   --    HGB 9.8*  --  9.6*  --  9.0*  --   --   --    HCT 32.9*   < > 32.8*   < > 30.5* 29* 29* 34*     --  255  --  241  --   --   --    MCV 94  --  94  --  95  --   --   --    MCH 28.0  --  27.6  --  28.0  --   --   --    MCHC 29.8*  --  29.3*  --  29.5*  --   --   --     < > = values in this interval not displayed.     BNP:  Recent Labs   Lab 01/27/20  0400 01/29/20  0508 01/31/20  0435   BNP 1,140* 320* 420*     CMP:  Recent Labs   Lab 01/30/20  0810  01/31/20  0435  02/01/20  0332  02/01/20  0605 02/01/20  1215   *  --  130*  --  179*  --   --    CALCIUM 8.9  --  8.8  --  8.8  --   --    ALBUMIN 2.5*  --  2.3*  2.3*  --  2.2*  --   --    PROT 6.1  --  6.1  6.1  --  6.0  --   --    *  --  151*  --  151*  --   --    K 4.0   < > 4.2   < > 4.1 3.9 3.7   CO2 25  --  23  --  22*  --   --    *  --  117*  --  116*  --   --    BUN 86*  --  77*  --  70*  --   --    CREATININE 2.3*  --  2.2*  --  2.3*  --   --    ALKPHOS 47*  --  47*  47*  --  44*  --   --    ALT 15  --  14  14  --  17  --   --    AST 37  --  36  36  --  37  --   --    BILITOT 0.8  --  0.8  0.8  --  0.6  --   --     < > = values in this interval not displayed.      Coagulation:   Recent Labs   Lab 01/30/20  0509  01/31/20 0435 01/31/20  1830 02/01/20  0332 02/01/20  0930   INR 1.2  --  1.4*  --   --  1.5*  --    APTT 25.3   < > 31.2   < > 35.1* 46.6* 43.8*    < > = values in this interval not displayed.     LDH:  Recent Labs   Lab 01/30/20  0509 01/31/20 0435 02/01/20  0332   * 392* 394*     Microbiology:  Microbiology Results (last 7 days)     Procedure Component Value Units Date/Time    Blood culture [[id number]] Collected:  01/31/20 2220    Order Status:  Completed Specimen:  Blood from Peripheral, Wrist, Left Updated:  02/01/20 1115     Blood Culture, Routine No Growth to date    Blood culture [[id number]] Collected:  01/31/20 2215    Order Status:  Completed Specimen:  Blood from Peripheral, Forearm, Right Updated:  02/01/20 0915     Blood Culture, Routine No Growth to date          I have reviewed all pertinent labs within the past 24 hours.    Estimated Creatinine Clearance: 42.4 mL/min (A) (based on SCr of 2.3 mg/dL (H)).    Diagnostic Results:  I have reviewed and interpreted all pertinent imaging results/findings within the past 24 hours.

## 2020-02-01 NOTE — PROGRESS NOTES
[facility]  Critical Care - Surgery  Progress Note    Patient Name: Tae Delgado  MRN: [id number]  Admission Date: 1/9/2020  Hospital Length of Stay: 23 days  Code Status: Full Code  Attending Provider: Ino Schmitt MD  Primary Care Provider: Elham Pearson MD   Principal Problem: LVAD (left ventricular assist device) present    Subjective:     Hospital/ICU Course:  1/23: Pt arrives from OR intubated, open chest dressed with Ioban, CTs with SS output. Drips on arrival: Epi 0.08, Cardene 5, TXA, Nitric at 30. Pt received 1.5L crystalloid, no blood product, 20 mg Lasix (put out 250cc in response).   Intra Op JACINTO Exam:  PRE:  Severely reduced left ventricular systolic function. Dilated LV. No definitive thrombus noted.  Moderate-to-severely reduced right ventricular systolic function. FAC 12-24%  Mild-to-moderate AI. No AS.  Moderate MR with systolic blunting of the pulmonary veins.  Trace-to-mild TR.  Mild PI. PAAT <90ms.  Small PFO by CF doppler.  SEC in La and DAMON. No clear thrombus noted.  Grade 2 atheromatous disease of the aorta.  No effusions.    POST:  Severely depressed left ventriclar dysfunction.  Moderately depressed right ventricular systolic function.  LVAD inflow and outflow cannula noted with laminar flows.  No AI.  Mild MR, vahe stitch observed. No MS.  Mild-to-moderate TR.  PFO still visible on CF doppler.  Aorta intact, no dissection.  No effusions.     1/24: run of Vtach overnight. Amio bolus given, amio gtt increased to 1, lasix push given. Improved. LVAD hemodynamics appropriate overnight. Going for closure this am. Upon return, developed tamponade physiology and returned to OR. Came back to SICU with chest open.  1/26: Diuresed over the weekend, chest kept open. Lidocaine gtt and digoxin for tachyarrhythmia. Otherwise NAEON.   1/27: Went to OR for possible chest closure, decided to do a wash out. Returned to SICU with chest open. One tachyarrhythmia episode overnight,  resolved with 100mg Lidocaine bolus and increasing lidocaine drip from 0.75mg/hr to 1mg/hr.  1/28: NAEON. Lasix gtt decreased throughout the day with adequate UOP still.   1/29: NAEON    Interval History/Significant Events:     NAEON.  Patient remains hypernatremic despite changes to TF formula.  Remains intubated and sedated.  Tolerating PSV/spontaneous. Febrile overnight; cultured. Received tylenol x2, continues on vancomycin.    Follow-up For: Procedure(s) (LRB):  CLOSURE, WOUND, STERNUM (N/A)  WASHOUT (N/A)  APPLICATION, WOUND VAC (N/A)    Post-Operative Day: 3 Days Post-Op    Objective:     Vital Signs (Most Recent):  Temp: (!) 101.5 °F (38.6 °C) (02/01/20 0300)  Pulse: 90 (02/01/20 0600)  Resp: 20 (01/31/20 1700)  BP: (!) 86/0 (02/01/20 0300)  SpO2: 100 % (02/01/20 0600) Vital Signs (24h Range):  Temp:  [98.9 °F (37.2 °C)-101.6 °F (38.7 °C)] 101.5 °F (38.6 °C)  Pulse:  [90-92] 90  Resp:  [20] 20  SpO2:  [96 %-100 %] 100 %  BP: (80-86)/(0) 86/0  Arterial Line BP: (79-98)/(62-80) 84/72     Weight: 107.3 kg (236 lb 8.9 oz)  Body mass index is 33.94 kg/m².      Intake/Output Summary (Last 24 hours) at 2/1/2020 0638  Last data filed at 2/1/2020 0600  Gross per 24 hour   Intake 4082.9 ml   Output 2380 ml   Net 1702.9 ml       Physical Exam   Constitutional: He appears well-developed and well-nourished.   HENT:   Head: Normocephalic and atraumatic.   Mouth/Throat: No oropharyngeal exudate.   ETT and OG secured in place.   Eyes: Pupils are equal, round, and reactive to light.   Neck: Normal range of motion. Neck supple.   Left IJ double lumen + introducer in place, dressing CLD  Right IJ triple lumen, dressing CLD   Cardiovascular:   Irregular tachyarrythmia. Paced, LVAD hum.  Chest close with dressing in place   Pulmonary/Chest: Breath sounds normal. He has no wheezes. He has no rales.   Intubated.    Abdominal: Soft. He exhibits no distension.   Musculoskeletal: He exhibits no edema or deformity.   Neurological:    Sedated, follows commands   Skin: Skin is warm and dry.       Vents:  Vent Mode: Spont (02/01/20 0501)  Ventilator Initiated: Yes (01/21/20 0905)  Set Rate: 16 BPM (01/31/20 0819)  Vt Set: 500 mL (01/31/20 0819)  PEEP/CPAP: 5 cmH20 (02/01/20 0501)  Oxygen Concentration (%): 50 (02/01/20 0600)  Peak Airway Pressure: 15 cmH2O (02/01/20 0501)  Plateau Pressure: 0 cmH20 (02/01/20 0501)  Total Ve: 9.74 mL (02/01/20 0501)  F/VT Ratio<105 (RSBI): (!) 34.88 (01/27/20 2316)    Lines/Drains/Airways     Central Venous Catheter Line                 Percutaneous Central Line Insertion/Assessment - double lumen  01/23/20 0751 left internal jugular 8 days         Trialysis (Dialysis) Catheter 01/24/20 1500 right internal jugular 7 days          Drain                 Urethral Catheter 01/21/20 0752 Non-latex;Straight-tip;Temperature probe 16 Fr. 10 days         Chest Tube 01/23/20 1230 1 Right Pleural 8 days         Chest Tube 01/23/20 1414 2 Right Mediastinal 8 days         NG/OG Tube 01/29/20 1430 Left nostril 2 days          Airway                 Airway - Non-Surgical 01/21/20 0742 Endotracheal Tube 10 days          Arterial Line                 Arterial Line 01/21/20 0730 Left Other (Comment) 10 days          Line                 VAD 01/23/20 1148 Left ventricular assist device HeartMate 3 8 days          Peripheral Intravenous Line                 Peripheral IV - Single Lumen 01/29/20 1200 20 G Right Antecubital 2 days         Peripheral IV - Single Lumen 02/01/20 0000 20 G Left Wrist less than 1 day                Significant Labs:    CBC/Anemia Profile:  Recent Labs   Lab 01/31/20  0435  02/01/20  0117 02/01/20  0332 02/01/20  0501   WBC 17.56*  --   --  17.42*  --    HGB 9.6*  --   --  9.0*  --    HCT 32.8*   < > 26* 30.5* 29*     --   --  241  --    MCV 94  --   --  95  --    RDW 17.0*  --   --  17.2*  --     < > = values in this interval not displayed.        Chemistries:  Recent Labs   Lab 01/30/20  0848   01/31/20  0435 01/31/20  1140 01/31/20  1830 02/01/20  0045 02/01/20  0332   *  --  151*  --   --   --  151*   K 4.0   < > 4.2 3.9 4.0 3.7 4.1   *  --  117*  --   --   --  116*   CO2 25  --  23  --   --   --  22*   BUN 86*  --  77*  --   --   --  70*   CREATININE 2.3*  --  2.2*  --   --   --  2.3*   CALCIUM 8.9  --  8.8  --   --   --  8.8   ALBUMIN 2.5*  --  2.3*  2.3*  --   --   --  2.2*   PROT 6.1  --  6.1  6.1  --   --   --  6.0   BILITOT 0.8  --  0.8  0.8  --   --   --  0.6   ALKPHOS 47*  --  47*  47*  --   --   --  44*   ALT 15  --  14  14  --   --   --  17   AST 37  --  36  36  --   --   --  37   MG  --    < > 2.5 2.4 2.3 2.2  --    PHOS  --   --  2.4*  --   --   --  2.0*    < > = values in this interval not displayed.       All pertinent labs within the past 24 hours have been reviewed.    Significant Imaging:  CXR  One view: There is a pacer and LVAD.  Tubes and lines are appropriate.  There is cardiomegaly moderate edema/CHF pleural fluid and no change.    Assessment/Plan:     Acute on chronic combined systolic and diastolic heart failure  Tae Delgado is a 59 y.o. 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 Approved for DT LVAD. Went to OR on 1/21 and found to have DAMON and LV thrombus and case was aborted. Pt was placed on a heparin gtt and thrombus had dissipated on repeat JACINTO on 1/22. Pt underwent LVAD placement on 1/23. Chest closure and re-exploration on 1/24. Returned from OR with chest open on 1/24. Attempted chest closure on 1/27, returned to SICU with chest open.     Neuro:  - Patient opens eyes and follows commands when sedation is held  - Precedex gtt with prn Fentanyl  - will attempt to extubate with  precedex, wean if extubated    CVS:   - Current LVAD flow @ 5400 with appropriate PIs   - Wean vasopressors per CTS  - ICD turned on, EP service consulted for assistance with arrythmia  - Digoxin 0.125 daily  - Wean Corby, currently at 5 ppm (will extubate with nitric in place)    Pulm:  - Mechanical ventilation, wean as tolerated  - ABGs q6hr  - CXR daily  - Monitor CT output, no overt signs of bleeding  - Removed left mediastinal drain yesterday  - Plan to extubate on 2/1, extubate to nitric  Vent Mode: Spont  Oxygen Concentration (%):  [50] 50  Resp Rate Total:  [14 br/min-26 br/min] 21 br/min  Vt Set:  [500 mL] 500 mL  PEEP/CPAP:  [5 cmH20] 5 cmH20  Mean Airway Pressure:  [7.4 [id number] cmH20] 7.4 cmH20      GI:  - TF formula change to peptamen (lower sodium formula)  - Protonix 40 daily  - Docusate    Endo:   - Insulin off now, SSI  - Endocrine consulted, appreciate their services    Renal:  - Strict I/O  - Trend BUN/Cr, still elevated but stable  - Lasix gtt @ 2.5      FENGI:  - Monitor labs  - Replace per protocol  - Switching all drips to D5 (elevated Na)  - Increase free water boluses frequency to q4    Heme:  - No blood products needed during the OR  - H/H stable overnight    ID:   - Vanc  - Cultured overnight for continued fevers; follow up cultures  - Follow WBC  - Febrile overnight will continue antibiotics    PPx:  - Hep gtt, aPTT goal of 45-54,   - coumadin 2 given 1/31/2020  - GI ppx    Dispo:  - Cont SICU care, attempt to extubate         Critical care was time spent personally by me on the following activities: development of treatment plan with patient or surrogate and bedside caregivers, discussions with consultants, evaluation of patient's response to treatment, examination of patient, ordering and performing treatments and interventions, ordering and review of laboratory studies, ordering and review of radiographic studies, pulse oximetry, re-evaluation of patient's condition.  This critical care  time did not overlap with that of any other provider or involve time for any procedures.     Micki Christianson MD  Critical Care - Surgery  [facility]

## 2020-02-01 NOTE — PROGRESS NOTES
[facility]  Heart Transplant  Progress Note    Patient Name: Tae Delgado  MRN: [id number]  Admission Date: 1/9/2020  Hospital Length of Stay: 23 days  Attending Physician: Ino Schmitt MD  Primary Care Provider: Elham Pearson MD  Principal Problem:LVAD (left ventricular assist device) present    Subjective:     Interval History: patient extubated, remains on nitric     Continuous Infusions:   dexmedetomidine (PRECEDEX) infusion Stopped (02/01/20 1000)    epinephrine 0.05 mcg/kg/min (02/01/20 1400)    furosemide (LASIX) 2 mg/mL infusion (non-titrating) 2.5 mg/hr (02/01/20 1400)    heparin (porcine) in 5 % dex 1,500 Units/hr (02/01/20 1400)    niCARdipine Stopped (01/31/20 2100)    nitric oxide gas      propofol Stopped (01/29/20 1700)     Scheduled Meds:   amiodarone  400 mg Per NG tube TID    amLODIPine  10 mg Per NG tube Daily    aspirin  325 mg Oral Daily    atorvastatin  80 mg Oral QHS    balsam peru-castor oil   Topical (Top) BID    digoxin  0.125 mg Oral Daily    docusate sodium  200 mg Oral QHS    ferrous gluconate  324 mg Oral Daily with breakfast    magnesium oxide  400 mg Per NG tube TID    pantoprazole  40 mg Intravenous Daily    sodium chloride 0.9%  3 mL Intravenous Q8H    vancomycin (VANCOCIN) IVPB  750 mg Intravenous Q24H    warfarin  1 mg Oral Once     PRN Meds:albumin human 5%, albuterol sulfate, bisacodyL, dexmedetomidine (PRECEDEX) infusion, Dextrose 10% Bolus, Dextrose 10% Bolus, fentaNYL, glucagon (human recombinant), hydrALAZINE, insulin aspart U-100, magnesium hydroxide 400 mg/5 ml, magnesium sulfate IVPB, oxyCODONE, oxyCODONE, sodium chloride 0.9%    Review of patient's allergies indicates:   Allergen Reactions    Biopatch [chlorhexidine gluconate]      Site burning    Dobutamine in d5w      Tachycardia, tremors, SOB, flushing    Percocet [oxycodone-acetaminophen] Itching    Penicillins Rash     Cefepime given on 1/23/2020 without issue      Objective:     Vital Signs (Most Recent):  Temp: 98.3 °F (36.8 °C) (02/01/20 1100)  Pulse: 95 (02/01/20 1430)  Resp: 20 (01/31/20 1700)  BP: (!) 86/0 (02/01/20 1100)  SpO2: 98 % (02/01/20 1430) Vital Signs (24h Range):  Temp:  [98.3 °F (36.8 °C)-101.6 °F (38.7 °C)] 98.3 °F (36.8 °C)  Pulse:  [] 95  Resp:  [20] 20  SpO2:  [93 %-100 %] 98 %  BP: (82-88)/(0) 86/0  Arterial Line BP: (79-98)/(62-81) 95/81     Patient Vitals for the past 72 hrs (Last 3 readings):   Weight   02/01/20 0432 107.3 kg (236 lb 8.9 oz)   01/31/20 0315 106.2 kg (234 lb 2.1 oz)     Body mass index is 33.94 kg/m².      Intake/Output Summary (Last 24 hours) at 2/1/2020 1437  Last data filed at 2/1/2020 1400  Gross per 24 hour   Intake 4032.9 ml   Output 2305 ml   Net 1727.9 ml       Hemodynamic Parameters:           Physical Exam   Constitutional: He appears well-developed and well-nourished.   HENT:   Head: Normocephalic and atraumatic.   Mouth/Throat: No oropharyngeal exudate.   ETT and OG secured in place.   Eyes: Pupils are equal, round, and reactive to light.   Neck: Normal range of motion. Neck supple.   Left IJ double lumen + introducer in place, dressing CLD  Right IJ triple lumen, dressing CLD   Cardiovascular:   Irregular tachyarrythmia. Paced, LVAD hum.  Chest close with dressing in place   Pulmonary/Chest: Breath sounds normal. He has no wheezes. He has no rales.   Intubated.    Abdominal: Soft. He exhibits no distension.   Musculoskeletal: He exhibits no edema or deformity.   Neurological:   Sedated, follows commands   Skin: Skin is warm and dry.       Significant Labs:  CBC:  Recent Labs   Lab 01/30/20  0509  01/31/20  0435  02/01/20  0332 02/01/20  0501 02/01/20  0853 02/01/20  1304   WBC 16.91*  --  17.56*  --  17.42*  --   --   --    RBC 3.50*  --  3.48*  --  3.22*  --   --   --    HGB 9.8*  --  9.6*  --  9.0*  --   --   --    HCT 32.9*   < > 32.8*   < > 30.5* 29* 29* 34*     --  255  --  241  --   --   --    MCV 94   --  94  --  95  --   --   --    MCH 28.0  --  27.6  --  28.0  --   --   --    MCHC 29.8*  --  29.3*  --  29.5*  --   --   --     < > = values in this interval not displayed.     BNP:  Recent Labs   Lab 01/27/20  0400 01/29/20  0508 01/31/20 0435   BNP 1,140* 320* 420*     CMP:  Recent Labs   Lab 01/30/20  0810  01/31/20 0435 02/01/20 0332 02/01/20  0605 02/01/20  1215   *  --  130*  --  179*  --   --    CALCIUM 8.9  --  8.8  --  8.8  --   --    ALBUMIN 2.5*  --  2.3*  2.3*  --  2.2*  --   --    PROT 6.1  --  6.1  6.1  --  6.0  --   --    *  --  151*  --  151*  --   --    K 4.0   < > 4.2   < > 4.1 3.9 3.7   CO2 25  --  23  --  22*  --   --    *  --  117*  --  116*  --   --    BUN 86*  --  77*  --  70*  --   --    CREATININE 2.3*  --  2.2*  --  2.3*  --   --    ALKPHOS 47*  --  47*  47*  --  44*  --   --    ALT 15  --  14  14  --  17  --   --    AST 37  --  36  36  --  37  --   --    BILITOT 0.8  --  0.8  0.8  --  0.6  --   --     < > = values in this interval not displayed.      Coagulation:   Recent Labs   Lab 01/30/20  0509 01/31/20 0435 01/31/20  1830 02/01/20 0332 02/01/20  0930   INR 1.2  --  1.4*  --   --  1.5*  --    APTT 25.3   < > 31.2   < > 35.1* 46.6* 43.8*    < > = values in this interval not displayed.     LDH:  Recent Labs   Lab 01/30/20 0509 01/31/20 0435 02/01/20  0332   * 392* 394*     Microbiology:  Microbiology Results (last 7 days)     Procedure Component Value Units Date/Time    Blood culture [[id number]] Collected:  01/31/20 2220    Order Status:  Completed Specimen:  Blood from Peripheral, Wrist, Left Updated:  02/01/20 1115     Blood Culture, Routine No Growth to date    Blood culture [[id number]] Collected:  01/31/20 2215    Order Status:  Completed Specimen:  Blood from Peripheral, Forearm, Right Updated:  02/01/20 0915     Blood Culture, Routine No Growth to date          I have reviewed all pertinent labs within the past 24 hours.    Estimated  Creatinine Clearance: 42.4 mL/min (A) (based on SCr of 2.3 mg/dL (H)).    Diagnostic Results:  I have reviewed and interpreted all pertinent imaging results/findings within the past 24 hours.    Assessment and Plan:       55 y.o. WM with history of NICMP diagnosed in 2010, ICD, LV thrombus (with prior splenic and renal emboli), Embolic  CVA , paroxysmal atrial fib, HTN, HLP  presents for  F/U today to clinic and he was volume overloaded on exam .  He states he had 3 admission in the last 3 months for volume overload. He started having more SOB on exertion since one month. Also endorses of Orthopnea since last one month. Alble to walk only 150 ft. He also has Bilateral lower extremity edema. He was admitted here in 2017 for ADHD [facility] at [facility] and [facility] during that admission showed PCWP 40 and CVP 17. Currently denies chest pain, lightheadedness     * LVAD (left ventricular assist device) present  - S/P DT HM3 with closure of AV and repair of MV for regurg 1/23/20.   - CTS primary  - Taken back to OR 1/24 for possible RVAD placement which was not done. Patient remained hemodynamically stable in OR. Wash out on 1/27. Chest closed 1/29. Remains intubated and sedated.   - CVP 10  - Currently hemodynamically stable on current ggts  - Current speed 5300    Procedure: Device Interrogation Including analysis of device parameters  Current Settings: Ventricular Assist Device  Review of device function is stable/unstabe    TXP LVAD INTERROGATIONS 2/1/2020 2/1/2020 2/1/2020 2/1/2020 2/1/2020 2/1/2020 2/1/2020   Type HeartMate3 HeartMate3 HeartMate3 HeartMate3 HeartMate3 HeartMate3 HeartMate3   Flow 3.9 3.9 4 4.1 3.9 3.8 3.9   Speed 5300 5300 5300 5300 5300 5300 5300   PI 4.6 4.3 4.3 3.7 4.5 4.7 4.6   Power (Berry) 3.8 3.8 3.8 3.8 3.8 3.7 3.8   LSL 4900 4900 4900 4900 4900 4900 4900   Pulsatility - - Intermittent pulse - - - Intermittent pulse       Coagulopathy  - Appreciate Hem/Onc's help. No evidence of underlying  coagulopathy (h/o LV thrombus with splenic and renal emboli, h/o embolic CVA)    NSVT (nonsustained ventricular tachycardia)  - Avoid digoxin, agree with restarting Amiodarone    Hepatic congestion  - Liver US on 1/11 unremarkable    ICD (implantable cardioverter-defibrillator) in place  - S/P Biotronik dual chamber ICD    Right lower lobe pulmonary nodule  - Incidental finding on CT chest/abd/pelvis on 1/12. Pulmonology consulted, and rec repeat CT of chest in 3 months  - Will need to follow-up in pulmonary clinic.     Acute kidney injury superimposed on chronic kidney disease  - Creatinine on admit 2.6 (baseline ~ 1.8). BUN/Creatinine today 86/2.4  - Nephrology consulted and following    Acute on chronic combined systolic and diastolic heart failure, NYHA class 4  - S/P DT HM3 with closure of AV and plication of MV for regurg 1/23/20 (See LVAD)  - NID dx'd 2010  - hemodynamically stable on current ggts per CTS  - See above        Paroxysmal atrial fibrillation  - Intermittently in A fib since surgery, currently paced at 90   - AC per CTS  - agree with discontinuing digoxin for rate control  - Was on Amiodarone infusion --> lidocaine, now on PO amio    Left ventricular thrombus without MI  - H/O LV thrombus with h/o splenic and renal emboli as well as embolic CVA  - Limited TTE done here 1/13 showed no thrombus  - JACINTO 1/23 intra op showed resolution of DAMON thrombus  - AC per CTS          MARBELLA Horta  Heart Transplant  [facility]

## 2020-02-01 NOTE — PROGRESS NOTES
[facility]  Nephrology  Progress Note    Patient Name: Tae Delgado  MRN: [id number]  Admission Date: 1/9/2020  Hospital Length of Stay: 23 days  Attending Provider: Ino Schmitt MD   Primary Care Physician: Elham Pearson MD  Principal Problem:LVAD (left ventricular assist device) present    Subjective:     HPI: Mr. Tae Delgado is a 60 yo M with PMHx significant for NICMP LVEF 15% (dx 2010), s/p ICD, hx LV thrombus (with prior splenic and renal emboli), pAF, hx embolic CVA, HTN, DLD, and CKD III (baseline ~ 2.0) who was initially admitted from [facility] clinic on 1/9 with volume overload and ADHF. Patient diuresed but subsequently transferred to ICU on 1/16. He was started on advanced options pathway and was approved for LVAD as DT. He underwent LVAD placement on 1/23/2020.  He was taken back to the OR for exploration of possible tamponade/RV failure on the 24th as he had a reduction in UOP with elevation in his CVP.  After the chest was re-opened there was noted improvement in LV filling and PI on LVAD so decision made not to pursue RVAD placement and he was transferred back to ICU for closer monitoring.  He was started on lasix infusion with addition of diuril with an improvement in his UOP and HDS.  Nephrology was consulted for evaluation for possible need for RRT.    Interval History: Patient seen and examined at bedside, still intubated, on lasix infusion and pressor support.     Review of patient's allergies indicates:   Allergen Reactions    Biopatch [chlorhexidine gluconate]      Site burning    Dobutamine in d5w      Tachycardia, tremors, SOB, flushing    Percocet [oxycodone-acetaminophen] Itching    Penicillins Rash     Cefepime given on 1/23/2020 without issue     Current Facility-Administered Medications   Medication Frequency    albumin human 5% bottle 500 mL PRN    albuterol sulfate nebulizer solution 2.5 mg Q4H PRN    amiodarone 5 mg/mL oral suspension TID    amLODIPine tablet 10  mg Daily    aspirin tablet 325 mg Daily    atorvastatin tablet 80 mg QHS    balsam peru-castor oil Oint BID    bisacodyl suppository 10 mg Daily PRN    dexmedetomidine (PRECEDEX) 400mcg/100mL 0.9% NaCL infusion Continuous PRN    dextrose 10% (D10W) Bolus PRN    dextrose 10% (D10W) Bolus PRN    digoxin tablet 0.125 mg Daily    docusate sodium capsule 200 mg QHS    EPINEPHrine (ADRENALIN) 5 mg in dextrose 5 % 250 mL infusion Continuous    fentaNYL injection 25 mcg Q1H PRN    ferrous gluconate tablet 324 mg Daily with breakfast    furosemide (LASIX) 2 mg/mL in dextrose 5 % 100 mL infusion (conc: 2 mg/mL) Continuous    glucagon (human recombinant) injection 1 mg PRN    heparin 25,000 units in dextrose 5% 250 mL (100 units/mL) infusion (heparin infusion - NO NOMOGRAM) Continuous    hydrALAZINE injection 10 mg Q6H PRN    insulin aspart U-100 pen 0-5 Units Q6H PRN    magnesium hydroxide 400 mg/5 ml suspension 2,400 mg Daily PRN    magnesium oxide tablet 400 mg TID    magnesium sulfate 2g in water 50mL IVPB (premix) PRN    niCARdipine 40 mg/200 mL infusion Continuous    nitric oxide gas Gas 2 ppm Continuous    oxyCODONE immediate release tablet 5 mg Q4H PRN    oxyCODONE immediate release tablet Tab 10 mg Q4H PRN    pantoprazole injection 40 mg Daily    polyethylene glycol (GoLYTELY) solution Q4H    propofol (DIPRIVAN) 10 mg/mL infusion Continuous    sodium chloride 0.9% flush 10 mL PRN    sodium chloride 0.9% flush 3 mL Q8H    vancomycin 750 mg in dextrose 5 % 250 mL IVPB (ready to mix system) Q24H       Objective:     Vital Signs (Most Recent):  Temp: 99.6 °F (37.6 °C) (02/01/20 0700)  Pulse: 90(Simultaneous filing. User may not have seen previous data.) (02/01/20 0900)  Resp: 20 (01/31/20 1700)  BP: (!) 88/0 (02/01/20 0700)  SpO2: 100 %(Simultaneous filing. User may not have seen previous data.) (02/01/20 0900)  O2 Device (Oxygen Therapy): ventilator (02/01/20 0900) Vital Signs (24h  Range):  Temp:  [99.5 °F (37.5 °C)-101.6 °F (38.7 °C)] 99.6 °F (37.6 °C)  Pulse:  [90-92] 90  Resp:  [20] 20  SpO2:  [96 %-100 %] 100 %  BP: (80-88)/(0) 88/0  Arterial Line BP: (79-98)/(62-80) 92/67     Weight: 107.3 kg (236 lb 8.9 oz) (02/01/20 0432)  Body mass index is 33.94 kg/m².  Body surface area is 2.3 meters squared.    I/O last 3 completed shifts:  In: 5724.9 [I.V.:2178.9; NG/GT:3546]  Out: 3595 [Urine:3165; Chest Tube:430]    Physical Exam   Constitutional: He appears well-developed and well-nourished.   HENT:   Head: Normocephalic and atraumatic.   Mouth/Throat: No oropharyngeal exudate.   ETT and OG secured in place.   Eyes: Pupils are equal, round, and reactive to light.   Neck: Normal range of motion. Neck supple.   Left IJ double lumen + introducer in place, dressing CLD  Right IJ triple lumen, dressing CLD   Cardiovascular:   Irregular tachyarrythmia. Paced, LVAD hum.  Chest close with dressing in place   Pulmonary/Chest: Breath sounds normal. He has no wheezes. He has no rales.   Intubated.    Abdominal: Soft. He exhibits no distension.   Musculoskeletal: He exhibits no edema or deformity.   Neurological:   Sedated, follows commands   Skin: Skin is warm and dry.       Significant Labs:  CBC:   Recent Labs   Lab 02/01/20 0332 02/01/20  0853   WBC 17.42*  --   --    RBC 3.22*  --   --    HGB 9.0*  --   --    HCT 30.5*   < > 29*     --   --    MCV 95  --   --    MCH 28.0  --   --    MCHC 29.5*  --   --     < > = values in this interval not displayed.     CMP:   Recent Labs   Lab 02/01/20  0332 02/01/20  0605   *  --    CALCIUM 8.8  --    ALBUMIN 2.2*  --    PROT 6.0  --    *  --    K 4.1 3.9   CO2 22*  --    *  --    BUN 70*  --    CREATININE 2.3*  --    ALKPHOS 44*  --    ALT 17  --    AST 37  --    BILITOT 0.6  --      All labs within the past 24 hours have been reviewed.     Significant Imaging:  Labs: Reviewed    Assessment/Plan:     * LVAD (left ventricular assist  device) present  - per primary team     Acute kidney injury superimposed on chronic kidney disease  KRISTIN on CKD III  Baseline SCr ~ 2.0    58 yo male s/p LVAD placement on 1/23 who was taken back to OR for exploration over concerns for RV failure/tamponade due to elevated CVP and decrease UOP.  Upon opening of chest, he had improved hemodynamics and some improvement in UOP and decision made to leave chest open and transfer back to ICU for closer monitoring on 1/24.  He was administered IV lasix + diuril with improvement in his UOP.      DDx for KRISTIN includes ATN from renal hypoperfusion vs. CRS from RV overload    Plan/recommendations:  -renal function improving. sCr now 2.3 (baseline ~ 2). BUN down trending from 87 yo 70, 2.5 Liters of UOP documented on last 24 hour shift  -patient remains hypernatremic, 151 today. Patient with a free water deficit of 5 L.   -recommend increasing amount of FWF to 350 mL q4 hrs  -defer lasix drip adjustments to primary team   -continue trending RFP and UOP  -will follow labs closely  -will discuss with Dr. Carlson         Thank you for your consult. I will follow-up with patient. Please contact us if you have any additional questions.    Mark Garvin MD  Nephrology  [facility]    ATTENDING PHYSICIAN ATTESTATION  I have personally verified the history and examined the patient. I thoroughly reviewed the demographic, clinical, laboratorial and imaging information available in medical records. I agree with the assessment and recommendations provided by the subspecialty resident who was under my supervision.

## 2020-02-01 NOTE — PLAN OF CARE
VSS. Tmax 101.8, tylenol given x2. Blood cultures drawn. HR remains 100% paced at 90. MAPs maintained 60-85. CVPs 9. Sats 100% on vent settings spontaneous PAV+ FiO2 50%, PEEP 5, pressure support 40% (80% overnight to rest, decreased back to 40% at 0400). Nitric remains at 2ppm. VAD numbers stable, see flowsheet for detail. CT output 60cc from pleural and 60cc from meds. U/o ~60-125cc/hr. No BM, golytley continued. 30meq of potassium replacements given. Gtts include heparin, lasix, epi, precedex. Heparin gtt increased from 1200u/hr to 1400u/hr overnight for subtherapeutic aPTT, morning PTT therapeutic. TF continued at 30cc/hr overnight, held this morning for possible extubation; no issues with residual. Bath complete, no new skin breakdown noted. POC reviewed with pt and family. WCTM.       Problem: Adult Inpatient Plan of Care  Goal: Patient-Specific Goal (Individualization)  Description  PmHx: Afib, HLD, HTN, CKD III, LV thrombus (coumadin), AICD, NICM 2010, Embolic CVA     1/9: Admit to floor, volume overload, diuresis  1/10: Nonsustained VT  1/11: liver US unremarkable   1/16: [facility] EF 15%; transfer to SICU for Corby/LVAD workup. Heparin gtt  1/17: NO increased to 15 ppm. Epi started 0.02, pt did not tolerate. Discontinued. Approved for LVAD on 1/21 1/20: IJ triple lumen placed, echo of heart, EF 15%  1/21: LVAD procedure aborted due to clot in L atrial appendage; intubated and sedated; lasix and heparin gtt started  1/22: JACINTO   1/23: LVAD insertion HM 3  1/24: chest closed and re-opened. A.fib/ flutter, amio bolus, lidocaine x3 and dig x2, lidocaine drip started. Diuril   1/27: OR for washout & strut removal unable to close, lido bolus x1  1/28: 750 ml albumin  1//29: Chest closed, 250 albumin  1/31: elevated temp, blood cultures    Nursing:  MAP 60-85          Betadine and water for LVAD dressing changes          Outcome: Ongoing, Progressing

## 2020-02-01 NOTE — SUBJECTIVE & OBJECTIVE
Interval History: Patient seen and examined at bedside, still intubated, on lasix infusion and pressor support.     Review of patient's allergies indicates:   Allergen Reactions    Biopatch [chlorhexidine gluconate]      Site burning    Dobutamine in d5w      Tachycardia, tremors, SOB, flushing    Percocet [oxycodone-acetaminophen] Itching    Penicillins Rash     Cefepime given on 1/23/2020 without issue     Current Facility-Administered Medications   Medication Frequency    albumin human 5% bottle 500 mL PRN    albuterol sulfate nebulizer solution 2.5 mg Q4H PRN    amiodarone 5 mg/mL oral suspension TID    amLODIPine tablet 10 mg Daily    aspirin tablet 325 mg Daily    atorvastatin tablet 80 mg QHS    balsam peru-castor oil Oint BID    bisacodyl suppository 10 mg Daily PRN    dexmedetomidine (PRECEDEX) 400mcg/100mL 0.9% NaCL infusion Continuous PRN    dextrose 10% (D10W) Bolus PRN    dextrose 10% (D10W) Bolus PRN    digoxin tablet 0.125 mg Daily    docusate sodium capsule 200 mg QHS    EPINEPHrine (ADRENALIN) 5 mg in dextrose 5 % 250 mL infusion Continuous    fentaNYL injection 25 mcg Q1H PRN    ferrous gluconate tablet 324 mg Daily with breakfast    furosemide (LASIX) 2 mg/mL in dextrose 5 % 100 mL infusion (conc: 2 mg/mL) Continuous    glucagon (human recombinant) injection 1 mg PRN    heparin 25,000 units in dextrose 5% 250 mL (100 units/mL) infusion (heparin infusion - NO NOMOGRAM) Continuous    hydrALAZINE injection 10 mg Q6H PRN    insulin aspart U-100 pen 0-5 Units Q6H PRN    magnesium hydroxide 400 mg/5 ml suspension 2,400 mg Daily PRN    magnesium oxide tablet 400 mg TID    magnesium sulfate 2g in water 50mL IVPB (premix) PRN    niCARdipine 40 mg/200 mL infusion Continuous    nitric oxide gas Gas 2 ppm Continuous    oxyCODONE immediate release tablet 5 mg Q4H PRN    oxyCODONE immediate release tablet Tab 10 mg Q4H PRN    pantoprazole injection 40 mg Daily    polyethylene  glycol (GoLYTELY) solution Q4H    propofol (DIPRIVAN) 10 mg/mL infusion Continuous    sodium chloride 0.9% flush 10 mL PRN    sodium chloride 0.9% flush 3 mL Q8H    vancomycin 750 mg in dextrose 5 % 250 mL IVPB (ready to mix system) Q24H       Objective:     Vital Signs (Most Recent):  Temp: 99.6 °F (37.6 °C) (02/01/20 0700)  Pulse: 90(Simultaneous filing. User may not have seen previous data.) (02/01/20 0900)  Resp: 20 (01/31/20 1700)  BP: (!) 88/0 (02/01/20 0700)  SpO2: 100 %(Simultaneous filing. User may not have seen previous data.) (02/01/20 0900)  O2 Device (Oxygen Therapy): ventilator (02/01/20 0900) Vital Signs (24h Range):  Temp:  [99.5 °F (37.5 °C)-101.6 °F (38.7 °C)] 99.6 °F (37.6 °C)  Pulse:  [90-92] 90  Resp:  [20] 20  SpO2:  [96 %-100 %] 100 %  BP: (80-88)/(0) 88/0  Arterial Line BP: (79-98)/(62-80) 92/67     Weight: 107.3 kg (236 lb 8.9 oz) (02/01/20 0432)  Body mass index is 33.94 kg/m².  Body surface area is 2.3 meters squared.    I/O last 3 completed shifts:  In: 5724.9 [I.V.:2178.9; NG/GT:3546]  Out: 3595 [Urine:3165; Chest Tube:430]    Physical Exam   Constitutional: He appears well-developed and well-nourished.   HENT:   Head: Normocephalic and atraumatic.   Mouth/Throat: No oropharyngeal exudate.   ETT and OG secured in place.   Eyes: Pupils are equal, round, and reactive to light.   Neck: Normal range of motion. Neck supple.   Left IJ double lumen + introducer in place, dressing CLD  Right IJ triple lumen, dressing CLD   Cardiovascular:   Irregular tachyarrythmia. Paced, LVAD hum.  Chest close with dressing in place   Pulmonary/Chest: Breath sounds normal. He has no wheezes. He has no rales.   Intubated.    Abdominal: Soft. He exhibits no distension.   Musculoskeletal: He exhibits no edema or deformity.   Neurological:   Sedated, follows commands   Skin: Skin is warm and dry.       Significant Labs:  CBC:   Recent Labs   Lab 02/01/20  0332  02/01/20  0853   WBC 17.42*  --   --    RBC 3.22*   --   --    HGB 9.0*  --   --    HCT 30.5*   < > 29*     --   --    MCV 95  --   --    MCH 28.0  --   --    MCHC 29.5*  --   --     < > = values in this interval not displayed.     CMP:   Recent Labs   Lab 02/01/20  0332 02/01/20  0605   *  --    CALCIUM 8.8  --    ALBUMIN 2.2*  --    PROT 6.0  --    *  --    K 4.1 3.9   CO2 22*  --    *  --    BUN 70*  --    CREATININE 2.3*  --    ALKPHOS 44*  --    ALT 17  --    AST 37  --    BILITOT 0.6  --      All labs within the past 24 hours have been reviewed.     Significant Imaging:  Labs: Reviewed

## 2020-02-01 NOTE — CONSULTS
ZAK unable to place picc due to fever and recent blood cultures.  Please re-consult when afebrile and if cultures negative x 48 hour.  RN notified.

## 2020-02-01 NOTE — ASSESSMENT & PLAN NOTE
Tae Delgado is a 59 y.o. 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 Approved for DT LVAD. Went to OR on 1/21 and found to have DAMON and LV thrombus and case was aborted. Pt was placed on a heparin gtt and thrombus had dissipated on repeat AJCINTO on 1/22. Pt underwent LVAD placement on 1/23. Chest closure and re-exploration on 1/24. Returned from OR with chest open on 1/24. Attempted chest closure on 1/27, returned to SICU with chest open.     Neuro:  - Patient opens eyes and follows commands when sedation is held  - Precedex gtt with prn Fentanyl  - will attempt to extubate with precedex, wean if extubated    CVS:   - Current LVAD flow @ 5400 with appropriate PIs   - Wean vasopressors per CTS  - ICD turned on, EP service consulted for assistance with arrythmia  - Digoxin 0.125 daily  - Wean Corby, currently at 5 ppm (will extubate with nitric in place)    Pulm:  - Mechanical ventilation, wean as tolerated  - ABGs q6hr  - CXR daily  - Monitor CT output, no overt signs of bleeding  - Removed left mediastinal drain yesterday  - Plan to extubate on 2/1, extubate to nitric  Vent Mode: Spont  Oxygen Concentration (%):  [50] 50  Resp Rate Total:  [14 br/min-26 br/min] 21 br/min  Vt Set:  [500 mL] 500 mL  PEEP/CPAP:  [5 cmH20] 5 cmH20  Mean Airway Pressure:  [7.4 oqN69-42 cmH20] 7.4 cmH20      GI:  - TF formula change to peptamen (lower sodium formula)  - Protonix 40 daily  - Docusate    Endo:   - Insulin off now, SSI  - Endocrine consulted, appreciate their services    Renal:  - Strict I/O  - Trend BUN/Cr, still elevated but stable  - Lasix gtt @ 2.5      FENGI:  - Monitor labs  - Replace per protocol  - Switching all drips to D5 (elevated Na)  -  Increase free water boluses frequency to q4    Heme:  - No blood products needed during the OR  - H/H stable overnight    ID:   - Vanc  - Cultured overnight for continued fevers; follow up cultures  - Follow WBC  - Febrile overnight will continue antibiotics    PPx:  - Hep gtt, aPTT goal of 45-54,   - coumadin 2 given 1/31/2020  - GI ppx    Dispo:  - Cont SICU care, attempt to extubate

## 2020-02-01 NOTE — ASSESSMENT & PLAN NOTE
KRISTIN on CKD III  Baseline SCr ~ 2.0    60 yo male s/p LVAD placement on 1/23 who was taken back to OR for exploration over concerns for RV failure/tamponade due to elevated CVP and decrease UOP.  Upon opening of chest, he had improved hemodynamics and some improvement in UOP and decision made to leave chest open and transfer back to ICU for closer monitoring on 1/24.  He was administered IV lasix + diuril with improvement in his UOP.      DDx for KRISTIN includes ATN from renal hypoperfusion vs. CRS from RV overload    Plan/recommendations:  -renal function improving. sCr now 2.3 (baseline ~ 2). BUN down trending from 85 yo 70, 2.5 Liters of UOP documented on last 24 hour shift  -patient remains hypernatremic, 151 today. Patient with a free water deficit of 5 L.   -recommend increasing amount of FWF to 350 mL q4 hrs  -defer lasix drip adjustments to primary team   -continue trending RFP and UOP  -will follow labs closely  -will discuss with Dr. Carlson

## 2020-02-01 NOTE — SUBJECTIVE & OBJECTIVE
Interval History/Significant Events:     NAEON.  Patient remains hypernatremic despite changes to TF formula.  Remains intubated and sedated.  Tolerating PSV/spontaneous. Febrile overnight; cultured. Received tylenol x2, continues on vancomycin.    Follow-up For: Procedure(s) (LRB):  CLOSURE, WOUND, STERNUM (N/A)  WASHOUT (N/A)  APPLICATION, WOUND VAC (N/A)    Post-Operative Day: 3 Days Post-Op    Objective:     Vital Signs (Most Recent):  Temp: (!) 101.5 °F (38.6 °C) (02/01/20 0300)  Pulse: 90 (02/01/20 0600)  Resp: 20 (01/31/20 1700)  BP: (!) 86/0 (02/01/20 0300)  SpO2: 100 % (02/01/20 0600) Vital Signs (24h Range):  Temp:  [98.9 °F (37.2 °C)-101.6 °F (38.7 °C)] 101.5 °F (38.6 °C)  Pulse:  [90-92] 90  Resp:  [20] 20  SpO2:  [96 %-100 %] 100 %  BP: (80-86)/(0) 86/0  Arterial Line BP: (79-98)/(62-80) 84/72     Weight: 107.3 kg (236 lb 8.9 oz)  Body mass index is 33.94 kg/m².      Intake/Output Summary (Last 24 hours) at 2/1/2020 0638  Last data filed at 2/1/2020 0600  Gross per 24 hour   Intake 4082.9 ml   Output 2380 ml   Net 1702.9 ml       Physical Exam   Constitutional: He appears well-developed and well-nourished.   HENT:   Head: Normocephalic and atraumatic.   Mouth/Throat: No oropharyngeal exudate.   ETT and OG secured in place.   Eyes: Pupils are equal, round, and reactive to light.   Neck: Normal range of motion. Neck supple.   Left IJ double lumen + introducer in place, dressing CLD  Right IJ triple lumen, dressing CLD   Cardiovascular:   Irregular tachyarrythmia. Paced, LVAD hum.  Chest close with dressing in place   Pulmonary/Chest: Breath sounds normal. He has no wheezes. He has no rales.   Intubated.    Abdominal: Soft. He exhibits no distension.   Musculoskeletal: He exhibits no edema or deformity.   Neurological:   Sedated, follows commands   Skin: Skin is warm and dry.       Vents:  Vent Mode: Spont (02/01/20 0501)  Ventilator Initiated: Yes (01/21/20 0905)  Set Rate: 16 BPM (01/31/20 0819)  Vt Set:  500 mL (01/31/20 0819)  PEEP/CPAP: 5 cmH20 (02/01/20 0501)  Oxygen Concentration (%): 50 (02/01/20 0600)  Peak Airway Pressure: 15 cmH2O (02/01/20 0501)  Plateau Pressure: 0 cmH20 (02/01/20 0501)  Total Ve: 9.74 mL (02/01/20 0501)  F/VT Ratio<105 (RSBI): (!) 34.88 (01/27/20 2316)    Lines/Drains/Airways     Central Venous Catheter Line                 Percutaneous Central Line Insertion/Assessment - double lumen  01/23/20 0751 left internal jugular 8 days         Trialysis (Dialysis) Catheter 01/24/20 1500 right internal jugular 7 days          Drain                 Urethral Catheter 01/21/20 0752 Non-latex;Straight-tip;Temperature probe 16 Fr. 10 days         Chest Tube 01/23/20 1230 1 Right Pleural 8 days         Chest Tube 01/23/20 1414 2 Right Mediastinal 8 days         NG/OG Tube 01/29/20 1430 Left nostril 2 days          Airway                 Airway - Non-Surgical 01/21/20 0742 Endotracheal Tube 10 days          Arterial Line                 Arterial Line 01/21/20 0730 Left Other (Comment) 10 days          Line                 VAD 01/23/20 1148 Left ventricular assist device HeartMate 3 8 days          Peripheral Intravenous Line                 Peripheral IV - Single Lumen 01/29/20 1200 20 G Right Antecubital 2 days         Peripheral IV - Single Lumen 02/01/20 0000 20 G Left Wrist less than 1 day                Significant Labs:    CBC/Anemia Profile:  Recent Labs   Lab 01/31/20  0435  02/01/20  0117 02/01/20  0332 02/01/20  0501   WBC 17.56*  --   --  17.42*  --    HGB 9.6*  --   --  9.0*  --    HCT 32.8*   < > 26* 30.5* 29*     --   --  241  --    MCV 94  --   --  95  --    RDW 17.0*  --   --  17.2*  --     < > = values in this interval not displayed.        Chemistries:  Recent Labs   Lab 01/30/20  0810  01/31/20  0435 01/31/20  1140 01/31/20  1830 02/01/20  0045 02/01/20  0332   *  --  151*  --   --   --  151*   K 4.0   < > 4.2 3.9 4.0 3.7 4.1   *  --  117*  --   --   --  116*   CO2  25  --  23  --   --   --  22*   BUN 86*  --  77*  --   --   --  70*   CREATININE 2.3*  --  2.2*  --   --   --  2.3*   CALCIUM 8.9  --  8.8  --   --   --  8.8   ALBUMIN 2.5*  --  2.3*  2.3*  --   --   --  2.2*   PROT 6.1  --  6.1  6.1  --   --   --  6.0   BILITOT 0.8  --  0.8  0.8  --   --   --  0.6   ALKPHOS 47*  --  47*  47*  --   --   --  44*   ALT 15  --  14  14  --   --   --  17   AST 37  --  36  36  --   --   --  37   MG  --    < > 2.5 2.4 2.3 2.2  --    PHOS  --   --  2.4*  --   --   --  2.0*    < > = values in this interval not displayed.       All pertinent labs within the past 24 hours have been reviewed.    Significant Imaging:  CXR  One view: There is a pacer and LVAD.  Tubes and lines are appropriate.  There is cardiomegaly moderate edema/CHF pleural fluid and no change.

## 2020-02-01 NOTE — PROGRESS NOTES
Pt extubated with parameters to 6L Nasal Cannula and 5 ppm nitric by 2 RTs per MD order. Dr. Christianson, RN, and 2 RTs at bedside. Pt tolerated well. Following extubation, pt is AAO x4, bilateral breath sounds noted. Restraints removed safely w/o injury. Pt's family at bedside. VSS. Will continue to monitor.

## 2020-02-02 LAB
ALBUMIN SERPL BCP-MCNC: 2.3 G/DL (ref 3.5–5.2)
ALLENS TEST: ABNORMAL
ALLENS TEST: NORMAL
ALP SERPL-CCNC: 43 U/L (ref 55–135)
ALT SERPL W/O P-5'-P-CCNC: 19 U/L (ref 10–44)
ANION GAP SERPL CALC-SCNC: 11 MMOL/L (ref 8–16)
APTT BLDCRRT: 37.2 SEC (ref 21–32)
APTT BLDCRRT: 43.4 SEC (ref 21–32)
APTT BLDCRRT: 43.8 SEC (ref 21–32)
APTT BLDCRRT: 48.9 SEC (ref 21–32)
AST SERPL-CCNC: 30 U/L (ref 10–40)
BASOPHILS # BLD AUTO: 0.08 K/UL (ref 0–0.2)
BASOPHILS NFR BLD: 0.4 % (ref 0–1.9)
BILIRUB SERPL-MCNC: 0.6 MG/DL (ref 0.1–1)
BUN SERPL-MCNC: 67 MG/DL (ref 6–20)
CALCIUM SERPL-MCNC: 8.6 MG/DL (ref 8.7–10.5)
CHLORIDE SERPL-SCNC: 113 MMOL/L (ref 95–110)
CO2 SERPL-SCNC: 24 MMOL/L (ref 23–29)
CREAT SERPL-MCNC: 2.3 MG/DL (ref 0.5–1.4)
DELSYS: ABNORMAL
DELSYS: NORMAL
DELSYS: NORMAL
DIFFERENTIAL METHOD: ABNORMAL
DIGOXIN SERPL-MCNC: 1.7 NG/ML (ref 0.8–2)
EOSINOPHIL # BLD AUTO: 0.1 K/UL (ref 0–0.5)
EOSINOPHIL NFR BLD: 0.4 % (ref 0–8)
ERYTHROCYTE [DISTWIDTH] IN BLOOD BY AUTOMATED COUNT: 17.6 % (ref 11.5–14.5)
EST. GFR  (AFRICAN AMERICAN): 34.6 ML/MIN/1.73 M^2
EST. GFR  (NON AFRICAN AMERICAN): 30 ML/MIN/1.73 M^2
FLOW: 4
GLUCOSE SERPL-MCNC: 162 MG/DL (ref 70–110)
HCO3 UR-SCNC: 21.9 MMOL/L (ref 24–28)
HCO3 UR-SCNC: 23.4 MMOL/L (ref 24–28)
HCO3 UR-SCNC: 24.9 MMOL/L (ref 24–28)
HCO3 UR-SCNC: 25.6 MMOL/L (ref 24–28)
HCT VFR BLD AUTO: 30.8 % (ref 40–54)
HCT VFR BLD CALC: 26 %PCV (ref 36–54)
HCT VFR BLD CALC: 31 %PCV (ref 36–54)
HCT VFR BLD CALC: 38 %PCV (ref 36–54)
HCT VFR BLD CALC: 45 %PCV (ref 36–54)
HGB BLD-MCNC: 9 G/DL (ref 14–18)
IMM GRANULOCYTES # BLD AUTO: 0.72 K/UL (ref 0–0.04)
IMM GRANULOCYTES NFR BLD AUTO: 3.5 % (ref 0–0.5)
INR PPP: 1.6 (ref 0.8–1.2)
LDH SERPL L TO P-CCNC: 0.65 MMOL/L (ref 0.36–1.25)
LDH SERPL L TO P-CCNC: 0.67 MMOL/L (ref 0.36–1.25)
LDH SERPL L TO P-CCNC: 0.86 MMOL/L (ref 0.36–1.25)
LDH SERPL L TO P-CCNC: 423 U/L (ref 110–260)
LYMPHOCYTES # BLD AUTO: 1.6 K/UL (ref 1–4.8)
LYMPHOCYTES NFR BLD: 7.8 % (ref 18–48)
MAGNESIUM SERPL-MCNC: 1.9 MG/DL (ref 1.6–2.6)
MAGNESIUM SERPL-MCNC: 1.9 MG/DL (ref 1.6–2.6)
MAGNESIUM SERPL-MCNC: 2 MG/DL (ref 1.6–2.6)
MAGNESIUM SERPL-MCNC: 2 MG/DL (ref 1.6–2.6)
MCH RBC QN AUTO: 27.8 PG (ref 27–31)
MCHC RBC AUTO-ENTMCNC: 29.2 G/DL (ref 32–36)
MCV RBC AUTO: 95 FL (ref 82–98)
METHEMOGLOBIN: 0.8 % (ref 0–3)
MODE: ABNORMAL
MODE: NORMAL
MODE: NORMAL
MONOCYTES # BLD AUTO: 1.3 K/UL (ref 0.3–1)
MONOCYTES NFR BLD: 6.5 % (ref 4–15)
NEUTROPHILS # BLD AUTO: 16.6 K/UL (ref 1.8–7.7)
NEUTROPHILS NFR BLD: 81.4 % (ref 38–73)
NRBC BLD-RTO: 0 /100 WBC
PCO2 BLDA: 32.2 MMHG (ref 35–45)
PCO2 BLDA: 33.7 MMHG (ref 35–45)
PCO2 BLDA: 38.3 MMHG (ref 35–45)
PCO2 BLDA: 43.4 MMHG (ref 35–45)
PH SMN: 7.38 [PH] (ref 7.35–7.45)
PH SMN: 7.42 [PH] (ref 7.35–7.45)
PH SMN: 7.42 [PH] (ref 7.35–7.45)
PH SMN: 7.47 [PH] (ref 7.35–7.45)
PHOSPHATE SERPL-MCNC: 3.1 MG/DL (ref 2.7–4.5)
PLATELET # BLD AUTO: 262 K/UL (ref 150–350)
PMV BLD AUTO: 12.2 FL (ref 9.2–12.9)
PO2 BLDA: 104 MMHG (ref 80–100)
PO2 BLDA: 105 MMHG (ref 80–100)
PO2 BLDA: 114 MMHG (ref 80–100)
PO2 BLDA: 122 MMHG (ref 80–100)
POC BE: -3 MMOL/L
POC BE: 0 MMOL/L
POC IONIZED CALCIUM: 1.17 MMOL/L (ref 1.06–1.42)
POC IONIZED CALCIUM: 1.18 MMOL/L (ref 1.06–1.42)
POC IONIZED CALCIUM: 1.19 MMOL/L (ref 1.06–1.42)
POC IONIZED CALCIUM: 1.23 MMOL/L (ref 1.06–1.42)
POC SATURATED O2: 98 % (ref 95–100)
POC SATURATED O2: 98 % (ref 95–100)
POC SATURATED O2: 99 % (ref 95–100)
POC SATURATED O2: 99 % (ref 95–100)
POC TCO2: 23 MMOL/L (ref 23–27)
POC TCO2: 24 MMOL/L (ref 23–27)
POC TCO2: 26 MMOL/L (ref 23–27)
POC TCO2: 27 MMOL/L (ref 23–27)
POCT GLUCOSE: 132 MG/DL (ref 70–110)
POCT GLUCOSE: 148 MG/DL (ref 70–110)
POCT GLUCOSE: 149 MG/DL (ref 70–110)
POCT GLUCOSE: 164 MG/DL (ref 70–110)
POCT GLUCOSE: 173 MG/DL (ref 70–110)
POTASSIUM BLD-SCNC: 3.5 MMOL/L (ref 3.5–5.1)
POTASSIUM BLD-SCNC: 3.7 MMOL/L (ref 3.5–5.1)
POTASSIUM BLD-SCNC: 3.7 MMOL/L (ref 3.5–5.1)
POTASSIUM BLD-SCNC: 3.9 MMOL/L (ref 3.5–5.1)
POTASSIUM SERPL-SCNC: 3.7 MMOL/L (ref 3.5–5.1)
POTASSIUM SERPL-SCNC: 3.7 MMOL/L (ref 3.5–5.1)
POTASSIUM SERPL-SCNC: 3.9 MMOL/L (ref 3.5–5.1)
POTASSIUM SERPL-SCNC: 3.9 MMOL/L (ref 3.5–5.1)
POTASSIUM SERPL-SCNC: 4.3 MMOL/L (ref 3.5–5.1)
PROT SERPL-MCNC: 5.9 G/DL (ref 6–8.4)
PROTHROMBIN TIME: 15.8 SEC (ref 9–12.5)
RBC # BLD AUTO: 3.24 M/UL (ref 4.6–6.2)
SAMPLE: ABNORMAL
SAMPLE: NORMAL
SITE: ABNORMAL
SITE: NORMAL
SODIUM BLD-SCNC: 145 MMOL/L (ref 136–145)
SODIUM BLD-SCNC: 147 MMOL/L (ref 136–145)
SODIUM SERPL-SCNC: 148 MMOL/L (ref 136–145)
SP02: 100
VANCOMYCIN SERPL-MCNC: 10.8 UG/ML
VANCOMYCIN SERPL-MCNC: 11.8 UG/ML
WBC # BLD AUTO: 20.41 K/UL (ref 3.9–12.7)

## 2020-02-02 PROCEDURE — 84132 ASSAY OF SERUM POTASSIUM: CPT

## 2020-02-02 PROCEDURE — 27000248 HC VAD-ADDITIONAL DAY

## 2020-02-02 PROCEDURE — 97164 PT RE-EVAL EST PLAN CARE: CPT

## 2020-02-02 PROCEDURE — 85014 HEMATOCRIT: CPT

## 2020-02-02 PROCEDURE — 99232 PR SUBSEQUENT HOSPITAL CARE,LEVL II: ICD-10-PCS | Mod: GC,,, | Performed by: INTERNAL MEDICINE

## 2020-02-02 PROCEDURE — 99233 PR SUBSEQUENT HOSPITAL CARE,LEVL III: ICD-10-PCS | Mod: ,,, | Performed by: INTERNAL MEDICINE

## 2020-02-02 PROCEDURE — 97530 THERAPEUTIC ACTIVITIES: CPT

## 2020-02-02 PROCEDURE — 82803 BLOOD GASES ANY COMBINATION: CPT

## 2020-02-02 PROCEDURE — 37799 UNLISTED PX VASCULAR SURGERY: CPT

## 2020-02-02 PROCEDURE — 97110 THERAPEUTIC EXERCISES: CPT

## 2020-02-02 PROCEDURE — 25000003 PHARM REV CODE 250: Performed by: STUDENT IN AN ORGANIZED HEALTH CARE EDUCATION/TRAINING PROGRAM

## 2020-02-02 PROCEDURE — 25000003 PHARM REV CODE 250: Performed by: THORACIC SURGERY (CARDIOTHORACIC VASCULAR SURGERY)

## 2020-02-02 PROCEDURE — 83615 LACTATE (LD) (LDH) ENZYME: CPT

## 2020-02-02 PROCEDURE — 80202 ASSAY OF VANCOMYCIN: CPT | Mod: 91

## 2020-02-02 PROCEDURE — 99900035 HC TECH TIME PER 15 MIN (STAT)

## 2020-02-02 PROCEDURE — 80162 ASSAY OF DIGOXIN TOTAL: CPT

## 2020-02-02 PROCEDURE — 94761 N-INVAS EAR/PLS OXIMETRY MLT: CPT

## 2020-02-02 PROCEDURE — 92610 EVALUATE SWALLOWING FUNCTION: CPT

## 2020-02-02 PROCEDURE — 99232 SBSQ HOSP IP/OBS MODERATE 35: CPT | Mod: ,,, | Performed by: NURSE PRACTITIONER

## 2020-02-02 PROCEDURE — 25000003 PHARM REV CODE 250: Performed by: SURGERY

## 2020-02-02 PROCEDURE — 84100 ASSAY OF PHOSPHORUS: CPT

## 2020-02-02 PROCEDURE — 99233 PR SUBSEQUENT HOSPITAL CARE,LEVL III: ICD-10-PCS | Mod: ,,, | Performed by: SURGERY

## 2020-02-02 PROCEDURE — 85610 PROTHROMBIN TIME: CPT

## 2020-02-02 PROCEDURE — 84132 ASSAY OF SERUM POTASSIUM: CPT | Mod: 91

## 2020-02-02 PROCEDURE — 83735 ASSAY OF MAGNESIUM: CPT | Mod: 91

## 2020-02-02 PROCEDURE — 85730 THROMBOPLASTIN TIME PARTIAL: CPT | Mod: 91

## 2020-02-02 PROCEDURE — 63600175 PHARM REV CODE 636 W HCPCS: Performed by: SURGERY

## 2020-02-02 PROCEDURE — 99233 SBSQ HOSP IP/OBS HIGH 50: CPT | Mod: ,,, | Performed by: INTERNAL MEDICINE

## 2020-02-02 PROCEDURE — 25000003 PHARM REV CODE 250: Performed by: PHYSICIAN ASSISTANT

## 2020-02-02 PROCEDURE — 84295 ASSAY OF SERUM SODIUM: CPT

## 2020-02-02 PROCEDURE — 83050 HGB METHEMOGLOBIN QUAN: CPT

## 2020-02-02 PROCEDURE — 80202 ASSAY OF VANCOMYCIN: CPT

## 2020-02-02 PROCEDURE — 80053 COMPREHEN METABOLIC PANEL: CPT

## 2020-02-02 PROCEDURE — 99232 SBSQ HOSP IP/OBS MODERATE 35: CPT | Mod: GC,,, | Performed by: INTERNAL MEDICINE

## 2020-02-02 PROCEDURE — 99233 SBSQ HOSP IP/OBS HIGH 50: CPT | Mod: ,,, | Performed by: SURGERY

## 2020-02-02 PROCEDURE — 63600175 PHARM REV CODE 636 W HCPCS: Performed by: STUDENT IN AN ORGANIZED HEALTH CARE EDUCATION/TRAINING PROGRAM

## 2020-02-02 PROCEDURE — 20000000 HC ICU ROOM

## 2020-02-02 PROCEDURE — 93750 PR INTERROGATE VENT ASSIST DEV, IN PERSON, W PHYSICIAN ANALYSIS: ICD-10-PCS | Mod: ,,, | Performed by: INTERNAL MEDICINE

## 2020-02-02 PROCEDURE — 85730 THROMBOPLASTIN TIME PARTIAL: CPT

## 2020-02-02 PROCEDURE — 82800 BLOOD PH: CPT

## 2020-02-02 PROCEDURE — 83605 ASSAY OF LACTIC ACID: CPT

## 2020-02-02 PROCEDURE — 63600175 PHARM REV CODE 636 W HCPCS: Performed by: THORACIC SURGERY (CARDIOTHORACIC VASCULAR SURGERY)

## 2020-02-02 PROCEDURE — 99232 PR SUBSEQUENT HOSPITAL CARE,LEVL II: ICD-10-PCS | Mod: ,,, | Performed by: NURSE PRACTITIONER

## 2020-02-02 PROCEDURE — 93750 INTERROGATION VAD IN PERSON: CPT | Mod: ,,, | Performed by: INTERNAL MEDICINE

## 2020-02-02 PROCEDURE — 97168 OT RE-EVAL EST PLAN CARE: CPT

## 2020-02-02 PROCEDURE — C9113 INJ PANTOPRAZOLE SODIUM, VIA: HCPCS | Performed by: SURGERY

## 2020-02-02 PROCEDURE — A4216 STERILE WATER/SALINE, 10 ML: HCPCS | Performed by: SURGERY

## 2020-02-02 PROCEDURE — 27000221 HC OXYGEN, UP TO 24 HOURS

## 2020-02-02 PROCEDURE — 63600367 HC NITRIC OXIDE PER HOUR

## 2020-02-02 PROCEDURE — 97535 SELF CARE MNGMENT TRAINING: CPT

## 2020-02-02 PROCEDURE — 82330 ASSAY OF CALCIUM: CPT

## 2020-02-02 PROCEDURE — 85025 COMPLETE CBC W/AUTO DIFF WBC: CPT

## 2020-02-02 PROCEDURE — 82565 ASSAY OF CREATININE: CPT

## 2020-02-02 RX ORDER — BISACODYL 10 MG
10 SUPPOSITORY, RECTAL RECTAL DAILY PRN
Status: DISCONTINUED | OUTPATIENT
Start: 2020-02-02 | End: 2020-02-17 | Stop reason: HOSPADM

## 2020-02-02 RX ORDER — HYDROMORPHONE HYDROCHLORIDE 1 MG/ML
0.5 INJECTION, SOLUTION INTRAMUSCULAR; INTRAVENOUS; SUBCUTANEOUS EVERY 6 HOURS PRN
Status: DISCONTINUED | OUTPATIENT
Start: 2020-02-02 | End: 2020-02-03

## 2020-02-02 RX ORDER — GLUCAGON 1 MG
1 KIT INJECTION
Status: DISCONTINUED | OUTPATIENT
Start: 2020-02-02 | End: 2020-02-06

## 2020-02-02 RX ORDER — POTASSIUM CHLORIDE 20 MEQ/15ML
30 SOLUTION ORAL ONCE
Status: COMPLETED | OUTPATIENT
Start: 2020-02-02 | End: 2020-02-02

## 2020-02-02 RX ORDER — FUROSEMIDE 10 MG/ML
20 INJECTION INTRAMUSCULAR; INTRAVENOUS ONCE
Status: COMPLETED | OUTPATIENT
Start: 2020-02-02 | End: 2020-02-02

## 2020-02-02 RX ORDER — INSULIN ASPART 100 [IU]/ML
0-5 INJECTION, SOLUTION INTRAVENOUS; SUBCUTANEOUS EVERY 4 HOURS PRN
Status: DISCONTINUED | OUTPATIENT
Start: 2020-02-02 | End: 2020-02-06

## 2020-02-02 RX ORDER — POTASSIUM CHLORIDE 20 MEQ/15ML
40 SOLUTION ORAL ONCE
Status: COMPLETED | OUTPATIENT
Start: 2020-02-02 | End: 2020-02-02

## 2020-02-02 RX ORDER — POTASSIUM CHLORIDE 14.9 MG/ML
20 INJECTION INTRAVENOUS ONCE
Status: COMPLETED | OUTPATIENT
Start: 2020-02-02 | End: 2020-02-02

## 2020-02-02 RX ORDER — WARFARIN 1 MG/1
1 TABLET ORAL ONCE
Status: COMPLETED | OUTPATIENT
Start: 2020-02-02 | End: 2020-02-02

## 2020-02-02 RX ADMIN — POTASSIUM CHLORIDE 20 MEQ: 14.9 INJECTION, SOLUTION INTRAVENOUS at 09:02

## 2020-02-02 RX ADMIN — AMLODIPINE BESYLATE 10 MG: 10 TABLET ORAL at 08:02

## 2020-02-02 RX ADMIN — Medication 400 MG: at 03:02

## 2020-02-02 RX ADMIN — HYDRALAZINE HYDROCHLORIDE 10 MG: 20 INJECTION INTRAMUSCULAR; INTRAVENOUS at 05:02

## 2020-02-02 RX ADMIN — POLYETHYLENE GLYCOL 3350, SODIUM SULFATE ANHYDROUS, SODIUM BICARBONATE, SODIUM CHLORIDE, POTASSIUM CHLORIDE 100 ML: 236; 22.74; 6.74; 5.86; 2.97 POWDER, FOR SOLUTION ORAL at 02:02

## 2020-02-02 RX ADMIN — DIGOXIN 0.12 MG: 125 TABLET ORAL at 08:02

## 2020-02-02 RX ADMIN — CASTOR OIL AND BALSAM, PERU: 788; 87 OINTMENT TOPICAL at 09:02

## 2020-02-02 RX ADMIN — WARFARIN SODIUM 1 MG: 1 TABLET ORAL at 05:02

## 2020-02-02 RX ADMIN — Medication 400 MG: at 09:02

## 2020-02-02 RX ADMIN — DOCUSATE SODIUM 200 MG: 100 CAPSULE, LIQUID FILLED ORAL at 09:02

## 2020-02-02 RX ADMIN — POLYETHYLENE GLYCOL 3350, SODIUM SULFATE ANHYDROUS, SODIUM BICARBONATE, SODIUM CHLORIDE, POTASSIUM CHLORIDE 100 ML: 236; 22.74; 6.74; 5.86; 2.97 POWDER, FOR SOLUTION ORAL at 03:02

## 2020-02-02 RX ADMIN — HEPARIN SODIUM 1500 UNITS/HR: 10000 INJECTION, SOLUTION INTRAVENOUS at 05:02

## 2020-02-02 RX ADMIN — POLYETHYLENE GLYCOL 3350, SODIUM SULFATE ANHYDROUS, SODIUM BICARBONATE, SODIUM CHLORIDE, POTASSIUM CHLORIDE 100 ML: 236; 22.74; 6.74; 5.86; 2.97 POWDER, FOR SOLUTION ORAL at 08:02

## 2020-02-02 RX ADMIN — HYDRALAZINE HYDROCHLORIDE 10 MG: 20 INJECTION INTRAMUSCULAR; INTRAVENOUS at 07:02

## 2020-02-02 RX ADMIN — POTASSIUM CHLORIDE 30 MEQ: 20 SOLUTION ORAL at 08:02

## 2020-02-02 RX ADMIN — VANCOMYCIN HYDROCHLORIDE 750 MG: 750 INJECTION, POWDER, LYOPHILIZED, FOR SOLUTION INTRAVENOUS at 12:02

## 2020-02-02 RX ADMIN — POLYETHYLENE GLYCOL 3350, SODIUM SULFATE ANHYDROUS, SODIUM BICARBONATE, SODIUM CHLORIDE, POTASSIUM CHLORIDE 100 ML: 236; 22.74; 6.74; 5.86; 2.97 POWDER, FOR SOLUTION ORAL at 11:02

## 2020-02-02 RX ADMIN — Medication 3 ML: at 03:02

## 2020-02-02 RX ADMIN — Medication 400 MG: at 08:02

## 2020-02-02 RX ADMIN — OXYCODONE HYDROCHLORIDE 10 MG: 10 TABLET ORAL at 07:02

## 2020-02-02 RX ADMIN — Medication 3 ML: at 10:02

## 2020-02-02 RX ADMIN — OXYCODONE HYDROCHLORIDE 10 MG: 10 TABLET ORAL at 03:02

## 2020-02-02 RX ADMIN — PANTOPRAZOLE SODIUM 40 MG: 40 INJECTION, POWDER, FOR SOLUTION INTRAVENOUS at 10:02

## 2020-02-02 RX ADMIN — POTASSIUM CHLORIDE 40 MEQ: 20 SOLUTION ORAL at 05:02

## 2020-02-02 RX ADMIN — ATORVASTATIN CALCIUM 80 MG: 20 TABLET, FILM COATED ORAL at 09:02

## 2020-02-02 RX ADMIN — EPINEPHRINE 0.05 MCG/KG/MIN: 1 INJECTION INTRAMUSCULAR; INTRAVENOUS; SUBCUTANEOUS at 03:02

## 2020-02-02 RX ADMIN — BISACODYL 10 MG: 10 SUPPOSITORY RECTAL at 10:02

## 2020-02-02 RX ADMIN — POTASSIUM CHLORIDE 40 MEQ: 20 SOLUTION ORAL at 01:02

## 2020-02-02 RX ADMIN — FUROSEMIDE 20 MG: 10 INJECTION, SOLUTION INTRAMUSCULAR; INTRAVENOUS at 04:02

## 2020-02-02 RX ADMIN — ASPIRIN 325 MG ORAL TABLET 325 MG: 325 PILL ORAL at 08:02

## 2020-02-02 NOTE — PLAN OF CARE
Plan of Care Note  Cardiothoracic Surgery    Tae Delgado is a 59 y.o. male with heart failure who underwent LVAD placement (1/23/20) then closure (1/24/20) and repeat chest opening (1/24/20) for poor RV function; chest washout (1/27/20); chest closure (1/29/20)    Specific issues: SBT/extubate, speech consult, coumadin 1 today, goal hep gtt 35-45, increase FWB to 350q4, restart TF this afternoon, D/C cordis    Plan of care for patient was discussed with ICU staff including nurses, residents, and faculty and appropriate consulting services.    Will continue to monitor patient's hemodynamics, functionality, neuro status, fluid status and renal function, and labs and will adjust medications and fluids as necessary while monitoring appropriateness for de-escalation of support and monitoring and transport to stepdown unit.    Karen Oro MD  Cardiac Surgery Resident, PGY7  Cardiothoracic Surgery  [facility]

## 2020-02-02 NOTE — SUBJECTIVE & OBJECTIVE
Interval History: Patient seen and examined at bedside. On nasal canula, 2.3 L of urine output overnight. Hypernatremia improving.     Review of patient's allergies indicates:   Allergen Reactions    Biopatch [chlorhexidine gluconate]      Site burning    Dobutamine in d5w      Tachycardia, tremors, SOB, flushing    Percocet [oxycodone-acetaminophen] Itching    Penicillins Rash     Cefepime given on 1/23/2020 without issue     Current Facility-Administered Medications   Medication Frequency    albumin human 5% bottle 500 mL PRN    albuterol sulfate nebulizer solution 2.5 mg Q4H PRN    amiodarone 5 mg/mL oral suspension TID    amLODIPine tablet 10 mg Daily    aspirin tablet 325 mg Daily    atorvastatin tablet 80 mg QHS    balsam peru-castor oil Oint BID    bisacodyl suppository 10 mg Daily PRN    dexmedetomidine (PRECEDEX) 400mcg/100mL 0.9% NaCL infusion Continuous PRN    dextrose 10% (D10W) Bolus PRN    dextrose 10% (D10W) Bolus PRN    digoxin tablet 0.125 mg Daily    docusate sodium capsule 200 mg QHS    EPINEPHrine (ADRENALIN) 5 mg in dextrose 5 % 250 mL infusion Continuous    ferrous gluconate tablet 324 mg Daily with breakfast    furosemide (LASIX) 2 mg/mL in dextrose 5 % 100 mL infusion (conc: 2 mg/mL) Continuous    glucagon (human recombinant) injection 1 mg PRN    heparin 25,000 units in dextrose 5% 250 mL (100 units/mL) infusion (heparin infusion - NO NOMOGRAM) Continuous    hydrALAZINE injection 10 mg Q6H PRN    HYDROmorphone injection 0.5 mg Q6H PRN    insulin aspart U-100 pen 0-5 Units Q6H PRN    magnesium hydroxide 400 mg/5 ml suspension 2,400 mg Daily PRN    magnesium oxide tablet 400 mg TID    magnesium sulfate 2g in water 50mL IVPB (premix) PRN    niCARdipine 40 mg/200 mL infusion Continuous    nitric oxide gas Gas 5 ppm Continuous    oxyCODONE immediate release tablet 5 mg Q4H PRN    oxyCODONE immediate release tablet Tab 10 mg Q4H PRN    pantoprazole injection 40 mg  Daily    polyethylene glycol (GoLYTELY) solution 6 times per day    propofol (DIPRIVAN) 10 mg/mL infusion Continuous    sodium chloride 0.9% flush 10 mL PRN    sodium chloride 0.9% flush 3 mL Q8H    vancomycin 750 mg in dextrose 5 % 250 mL IVPB (ready to mix system) Q24H       Objective:     Vital Signs (Most Recent):  Temp: 97.8 °F (36.6 °C) (02/02/20 0700)  Pulse: 90 (02/02/20 0930)  Resp: 20 (02/01/20 2015)  BP: (!) 88/0 (02/02/20 0700)  SpO2: 95 % (02/02/20 0930)  O2 Device (Oxygen Therapy): nasal cannula (02/02/20 0900) Vital Signs (24h Range):  Temp:  [97.7 °F (36.5 °C)-98.3 °F (36.8 °C)] 97.8 °F (36.6 °C)  Pulse:  [] 90  Resp:  [20] 20  SpO2:  [93 %-100 %] 95 %  BP: (84-98)/(0) 88/0  Arterial Line BP: ()/(60-81) 88/72     Weight: 107.2 kg (236 lb 5.3 oz) (02/02/20 0521)  Body mass index is 33.91 kg/m².  Body surface area is 2.3 meters squared.    I/O last 3 completed shifts:  In: 6507.5 [I.V.:1342.5; NG/GT:4915; IV Piggyback:250]  Out: 3965 [Urine:3485; Chest Tube:480]    Physical Exam   Constitutional: He is oriented to person, place, and time. He appears well-developed and well-nourished.   HENT:   Head: Normocephalic and atraumatic.   Mouth/Throat: No oropharyngeal exudate.   NGT in place   Eyes: Pupils are equal, round, and reactive to light.   Neck: Normal range of motion. Neck supple.   Left IJ double lumen + introducer in place, dressing CLD  Right IJ triple lumen, dressing CLD   Cardiovascular:   Irregular tachyarrythmia. Paced, LVAD hum.  Chest close with dressing in place   Pulmonary/Chest: Breath sounds normal. He has no wheezes. He has no rales.   Breathing comfortably on 4L NC.   Abdominal: Soft. He exhibits no distension.   Musculoskeletal: He exhibits no edema or deformity.   Neurological: He is alert and oriented to person, place, and time.   Skin: Skin is warm and dry.       Significant Labs:  CBC:   Recent Labs   Lab 02/02/20  0328  02/02/20  0801   WBC 20.41*  --   --     RBC 3.24*  --   --    HGB 9.0*  --   --    HCT 30.8*   < > 38     --   --    MCV 95  --   --    MCH 27.8  --   --    MCHC 29.2*  --   --     < > = values in this interval not displayed.     CMP:   Recent Labs   Lab 02/02/20  0328 02/02/20  0600   *  --    CALCIUM 8.6*  --    ALBUMIN 2.3*  --    PROT 5.9*  --    *  --    K 3.9 3.7   CO2 24  --    *  --    BUN 67*  --    CREATININE 2.3*  --    ALKPHOS 43*  --    ALT 19  --    AST 30  --    BILITOT 0.6  --      All labs within the past 24 hours have been reviewed.     Significant Imaging:  Labs: Reviewed

## 2020-02-02 NOTE — SUBJECTIVE & OBJECTIVE
Interval History/Significant Events:     NAEON.  Hypernatremia slowly improving with changes to TF, free water boluses. Extubated yesterday, now on 4L NC. Afebrile overnight. Elevated CVP to 14; received IV lasix 20 with decrease in CVP to 13. UOP 2.155L.    Follow-up For: Procedure(s) (LRB):  CLOSURE, WOUND, STERNUM (N/A)  WASHOUT (N/A)  APPLICATION, WOUND VAC (N/A)    Post-Operative Day: 4 Days Post-Op    Objective:     Vital Signs (Most Recent):  Temp: 98.1 °F (36.7 °C) (02/02/20 0300)  Pulse: 90 (02/02/20 0600)  Resp: 20 (02/01/20 2015)  BP: (!) 84/0 (02/02/20 0300)  SpO2: 98 % (02/02/20 0600) Vital Signs (24h Range):  Temp:  [97.7 °F (36.5 °C)-99.6 °F (37.6 °C)] 98.1 °F (36.7 °C)  Pulse:  [] 90  Resp:  [20] 20  SpO2:  [93 %-100 %] 98 %  BP: (84-98)/(0) 84/0  Arterial Line BP: ()/(60-81) 95/74     Weight: 107.2 kg (236 lb 5.3 oz)  Body mass index is 33.91 kg/m².      Intake/Output Summary (Last 24 hours) at 2/2/2020 0639  Last data filed at 2/2/2020 0600  Gross per 24 hour   Intake 4446.5 ml   Output 2585 ml   Net 1861.5 ml       Physical Exam   Constitutional: He is oriented to person, place, and time. He appears well-developed and well-nourished.   HENT:   Head: Normocephalic and atraumatic.   Mouth/Throat: No oropharyngeal exudate.   NGT in place   Eyes: Pupils are equal, round, and reactive to light.   Neck: Normal range of motion. Neck supple.   Left IJ double lumen + introducer in place, dressing CLD  Right IJ triple lumen, dressing CLD   Cardiovascular:   Irregular tachyarrythmia. Paced, LVAD hum.  Chest close with dressing in place   Pulmonary/Chest: Breath sounds normal. He has no wheezes. He has no rales.   Breathing comfortably on 4L NC.   Abdominal: Soft. He exhibits no distension.   Musculoskeletal: He exhibits no edema or deformity.   Neurological: He is alert and oriented to person, place, and time.   Skin: Skin is warm and dry.       Vents:  Vent Mode: Spont (02/01/20  0900)  Ventilator Initiated: Yes (01/21/20 0905)  Set Rate: 16 BPM (01/31/20 0819)  Vt Set: 500 mL (01/31/20 0819)  PEEP/CPAP: 5 cmH20 (02/01/20 0900)  Oxygen Concentration (%): 36 (02/01/20 2352)  Peak Airway Pressure: 14 cmH2O (02/01/20 0900)  Plateau Pressure: 0 cmH20 (02/01/20 0900)  Total Ve: 9.53 mL (02/01/20 0900)  F/VT Ratio<105 (RSBI): (!) 34.88 (01/27/20 2316)    Lines/Drains/Airways     Central Venous Catheter Line                 Trialysis (Dialysis) Catheter 01/24/20 1500 right internal jugular 8 days          Drain                 Urethral Catheter 01/21/20 0752 Non-latex;Straight-tip;Temperature probe 16 Fr. 11 days         Chest Tube 01/23/20 1230 1 Right Pleural 9 days         Chest Tube 01/23/20 1414 2 Right Mediastinal 9 days         NG/OG Tube 01/29/20 1430 Left nostril 3 days          Arterial Line                 Arterial Line 01/21/20 0730 Left Other (Comment) 11 days          Line                 VAD 01/23/20 1148 Left ventricular assist device HeartMate 3 9 days          Peripheral Intravenous Line                 Peripheral IV - Single Lumen 02/01/20 0000 20 G Left Wrist 1 day                Significant Labs:    CBC/Anemia Profile:  Recent Labs   Lab 02/01/20 0332 02/01/20 2356 02/02/20  0328 02/02/20  0423   WBC 17.42*  --   --  20.41*  --    HGB 9.0*  --   --  9.0*  --    HCT 30.5*   < > 26* 30.8* 31*     --   --  262  --    MCV 95  --   --  95  --    RDW 17.2*  --   --  17.6*  --     < > = values in this interval not displayed.        Chemistries:  Recent Labs   Lab 02/01/20  0332 02/01/20  1802 02/01/20 2353 02/02/20  0328 02/02/20  0600   *  --   --   --  148*  --    K 4.1   < > 3.9 3.7 3.9 3.7   *  --   --   --  113*  --    CO2 22*  --   --   --  24  --    BUN 70*  --   --   --  67*  --    CREATININE 2.3*  --   --   --  2.3*  --    CALCIUM 8.8  --   --   --  8.6*  --    ALBUMIN 2.2*  --   --   --  2.3*  --    PROT 6.0  --   --   --  5.9*  --    BILITOT 0.6  --    --   --  0.6  --    ALKPHOS 44*  --   --   --  43*  --    ALT 17  --   --   --  19  --    AST 37  --   --   --  30  --    MG  --    < > 2.1 2.0  --  1.9   PHOS 2.0*  --   --   --  3.1  --     < > = values in this interval not displayed.       All pertinent labs within the past 24 hours have been reviewed.    Significant Imaging:  CXR  One view: There is a pacer and LVAD.  Tubes and lines are appropriate.  There is cardiomegaly moderate edema/CHF pleural fluid and no change.

## 2020-02-02 NOTE — PROGRESS NOTES
"[facility]  Endocrinology  Progress Note    Admit Date: 1/9/2020     Reason for Consult: Management of Hyperglycemia     Surgical Procedure and Date: LVAD 1/23/20; chest closure 1/29/20     Lab Results   Component Value Date    HGBA1C 6.2 (H) 01/15/2020       HPI:   Patient is a 59 y.o. male with a diagnosis of afib, KRISTIN on CKD, and CHF, who is s/p LVAD placement on 1/23/20.  No personal history of DM but slightly elevated A1C upon admission.  Endocrinology consulted for BG management.            Interval HPI:   Overnight events: remains in ICU, intubated. NAEON. BG at or slightly above goal with prn correction scale.   Eating:   NPO - diet advancing  Diet Dysphagia Pureed (IDDSI Level 4) [facility]; Thin  Nausea: No  Hypoglycemia and intervention: No  Fever: No  TF: peptamen intense VHP at 35 cc/hr today (goal 55 cc/hr per RD).     BP (!) 82/0 (BP Location: Right arm, Patient Position: Lying)   Pulse 90   Temp 99.5 °F (37.5 °C) (Oral)   Resp (!) 21   Ht 5' 10" (1.778 m)   Wt 106.2 kg (234 lb 2.1 oz)   SpO2 100%   BMI 33.59 kg/m²      Labs Reviewed and Include    Recent Labs   Lab 01/31/20  0435 01/31/20  1140   *  --    CALCIUM 8.8  --    ALBUMIN 2.3*  2.3*  --    PROT 6.1  6.1  --    *  --    K 4.2 3.9   CO2 23  --    *  --    BUN 77*  --    CREATININE 2.2*  --    ALKPHOS 47*  47*  --    ALT 14  14  --    AST 36  36  --    BILITOT 0.8  0.8  --      Lab Results   Component Value Date    WBC 17.56 (H) 01/31/2020    HGB 9.6 (L) 01/31/2020    HCT 26 (L) 01/31/2020    MCV 94 01/31/2020     01/31/2020     No results for input(s): TSH, FREET4 in the last 168 hours.  Lab Results   Component Value Date    HGBA1C 6.2 (H) 01/15/2020       Nutritional status:   Body mass index is 33.59 kg/m².  Lab Results   Component Value Date    ALBUMIN 2.3 (L) 01/31/2020    ALBUMIN 2.3 (L) 01/31/2020    ALBUMIN 2.5 (L) 01/30/2020     Lab Results   Component Value Date    " PREALBUMIN 11 (L) 01/31/2020    PREALBUMIN 11 (L) 01/24/2020    PREALBUMIN 27 01/15/2020       Estimated Creatinine Clearance: 44.1 mL/min (A) (based on SCr of 2.2 mg/dL (H)).    Accu-Checks  Recent Labs     01/29/20  1134 01/29/20  1630 01/30/20  0009 01/30/20  0505 01/30/20  0752 01/30/20  1215 01/30/20  1828 01/30/20  2204 01/31/20  0438 01/31/20  1141   POCTGLUCOSE 170* 143* 149* 139* 105 155* 164* 199* 125* 215*       Current Medications and/or Treatments Impacting Glycemic Control  Immunotherapy:    Immunosuppressants     None        Steroids:   Hormones (From admission, onward)    None        Pressors:    Autonomic Drugs (From admission, onward)    Start     Stop Route Frequency Ordered    01/29/20 0745  EPINEPHrine (ADRENALIN) 5 mg in dextrose 5 % 250 mL infusion     Question Answer Comment   Titrate by: (in mcg/kg/min) 0.02    Titrate interval: (in minutes) 5    Titrate to maintain: (SBP or MAP or Cardiac Index) MAP    Greater than: (in mmHg) 60    Maximum dose: (in mcg/kg/min) 2        -- IV Continuous 01/29/20 0743        Hyperglycemia/Diabetes Medications:   Antihyperglycemics (From admission, onward)    Start     Stop Route Frequency Ordered    01/30/20 1319  insulin aspart U-100 pen 0-5 Units      -- SubQ Every 6 hours PRN 01/30/20 1219          ASSESSMENT and PLAN    * LVAD (left ventricular assist device) present  S/p LVAD on 1/23/20  Managed per primary team  Avoid hypoglycemia  Optimize BG control for surgical wound healing        Acute hyperglycemia  BG goal 140-180      Low dose correction scale  BG monitoring every 4 hours while on TF; please coordinate with meals during the day.         Discharge planning: TBD        Acute on chronic combined systolic and diastolic heart failure, NYHA class 4  Managed per primary team  S/p LVAD    On enteral nutrition  May elevate BG       Acute kidney injury superimposed on chronic kidney disease  Titrate insulin slowly to avoid hypoglycemia as the risk of  hypoglycemia increases with decreased creatinine clearance.  Caution with insulin stacking  Estimated Creatinine Clearance: 44.1 mL/min (A) (based on SCr of 2.2 mg/dL (H)).            Ermelinda Pineda NP  Endocrinology  [facility]

## 2020-02-02 NOTE — PT/OT/SLP RE-EVAL
Occupational Therapy   Re-evaluation and Treatment     Name: Tae Delgado  MRN: [id number]  Admitting Diagnosis:  LVAD (left ventricular assist device) present 4 Days Post-Op    Recommendations:     Discharge Recommendations: rehabilitation facility  Discharge Equipment Recommendations:  (TBD as pt progresses )  Barriers to discharge:  None    Assessment:     Tae Delgado is a 59 y.o. male with a medical diagnosis of LVAD (left ventricular assist device) present.  He presents with performance deficits affecting function are weakness, impaired endurance, gait instability, impaired functional mobilty, impaired self care skills, impaired balance, decreased upper extremity function, decreased lower extremity function, decreased coordination, pain, decreased safety awareness, impaired cardiopulmonary response to activity. Pt requires increased assistance at current functional level. Pt tolerated sitting EOB for ~8 mins with total A with verbal cues to keep head erect and maintain neutral sitting balance. OT recommending IP Rehab at this time due to pt's increased assistance at current functional level. Pt would benefit from continued skilled acute OT services in order to maximize independence and safety with ADLs and functional mobility to ensure safe return to PLOF in the least restrictive environment.    Rehab Prognosis:  good; patient would benefit from acute skilled OT services to address these deficits and reach maximum level of function.       Plan:     Patient to be seen 6 x/week to address the above listed problems via therapeutic activities, therapeutic exercises, self-care/home management, neuromuscular re-education  · Plan of Care Expires: 03/02/20  · Plan of Care Reviewed with: patient, spouse, son    Subjective     Chief Complaint: sternal pain   Patient/Family stated goals: to get better    Pain/Comfort:  · Pain Rating 1: 5/10  · Location - Orientation 1: medial  · Location 1: sternal  · Pain Addressed 1:  Reposition, Distraction  · Pain Rating Post-Intervention 1: 5/10    Objective:     Communicated with: RN prior to session.  Patient found HOB elevated with: blood pressure cuff, central line, giles catheter, LVAD, oxygen, peripheral IV, pulse ox (continuous), telemetry, wound vac, NG tube, SCD, arterial line upon OT entry to room. Pt's s/o and son present.     General Precautions: Standard, fall, LVAD, sternal   Orthopedic Precautions:N/A   Braces: N/A     Occupational Performance:    Bed Mobility:    · Patient completed Scooting/Bridging with total assistance, 2 persons and with bed placed in trendelenburg   · Patient completed Supine to Sit with total assistance and 2 persons  · Patient completed Sit to Supine with total assistance and 2 persons    Functional Mobility/Transfers:  · No sit<>stand transfer performed due to increased assistance for EOB sitting.   · Static sitting balance: total A with left lateral lean and posterior lean. Pt required verbal cues for upright posture and forward gaze. Pt was unable to maintain EOB sitting balance without assistance from therapist.      Activities of Daily Living:  · Lower Body Dressing: total assistance to don B  socks  · Pt's s/o educated to assist pt daily with washing face while providing [place] assistance.     Cognitive/Visual Perceptual:  Cognitive/Psychosocial Skills:     -       Oriented to: Person, Place, and Situation   -       Follows Commands/attention:Follows 2 step commands  -       Communication: low tone  -       Memory: Poor immediate recall  -       Safety awareness/insight to disability: impaired   -       Mood/Affect/Coping skills/emotional control: Appropriate to situation and Flat affect  Visual/Perceptual:      -Intact      Physical Exam:  Balance:    - Static sit: total A   -  Dynamic sit: total A    Postural examination/scapula alignment:    -       Rounded shoulders  -       Forward head  -       Posterior pelvic tilt  Skin integrity: Visible  skin intact  Edema:  None noted to B UEs  Sensation:    -       Intact  Dominant hand:    -       right  Upper Extremity Range of Motion:     -       Right Upper Extremity: WFL for PROM   -       Left Upper Extremity: WFL for PROM   Upper Extremity Strength:    -       Right Upper Extremity: grossly 3+/5   -       Left Upper Extremity: grossly 3+/5    Strength:    -       Right Upper Extremity: 3/5   -       Left Upper Extremity: 3/5     AMPAC 6 Click:  AMPAC Total Score: 8    Treatment & Education:Education:    - Pt educated on role of OT, POC, and goals for therapy.    - Pt educated on sternal precautions   - Basic education of LVAD components provided (contoller, driveline , power cables)   - Time provided for therapeutic counseling and discussion of health disposition.   - Pt's family educated on assisting pt with daily self-care grooming tasks   - Pt completed ADLs and functional mobility for treatment session as noted above   - Pt and family verbalized understanding. Pt and family expressed no further concerns/questions.  - whiteboard updated       Patient left HOB elevated with all lines intact, call button in reach, RN notified and family  present    GOALS:   Multidisciplinary Problems     Occupational Therapy Goals        Problem: Occupational Therapy Goal    Goal Priority Disciplines Outcome Interventions   Occupational Therapy Goal     OT, PT/OT Ongoing, Progressing    Description:  Goals to be met by: 2/16/2020     Patient will increase functional independence with ADLs by performing:    UE Dressing with Minimal Assistance.  LE Dressing with Minimal Assistance.  Grooming while standing with Minimal Assistance.  Toileting from bedside commode with Minimal Assistance for hygiene and clothing management.   Sitting at edge of bed x10 minutes with Minimal Assistance in prep for seated grooming tasks   Supine to sit with min A  Toilet transfer to bedside commode with Minimal Assistance.  Pt will perform  LVAD management independently                 Multidisciplinary Problems (Resolved)        Problem: Occupational Therapy Goal    Goal Priority Disciplines Outcome Interventions   Occupational Therapy Goal   (Resolved)     OT, PT/OT Met    Description:  Skilled OT services not necessary at this time secondary to patient performing ADLs at [facility]. Patient in agreement. Please re-consult OT if change in patient's functional status is noted.                     History:     Past Medical History:   Diagnosis Date    CHF (congestive heart failure) 1/2010    Dx  1/2010 w/ decreased LV systolic function (EF 15%) by ECHO 1/2015    COCM (congestive cardiomyopathy) 7/20/2016    Hyperlipidemia     Hypertension     Paroxysmal atrial fibrillation     Pulmonary embolus 2008    Stroke     Superficial thrombophlebitis        Past Surgical History:   Procedure Laterality Date    APPLICATION OF WOUND VACUUM-ASSISTED CLOSURE DEVICE N/A 1/29/2020    Procedure: APPLICATION, WOUND VAC;  Surgeon: Ino Schmitt MD;  [facility];  Service: Cardiovascular;  Laterality: N/A;    DRAINAGE OF PLEURAL EFFUSION Left 1/24/2020    Procedure: DRAINAGE, PLEURAL EFFUSION;  Surgeon: Ino Schmitt MD;  [facility];  Service: Cardiovascular;  Laterality: Left;  500 ml drainage    INSERTION OF GRAFT TO PERICARDIUM N/A 1/24/2020    Procedure: INSERTION, GRAFT, PERICARDIUM;  Surgeon: Ino Schmitt MD;  [facility]: [facility] OR [facility]LR;  Service: Cardiovascular;  Laterality: N/A;  [facility]    IRRIGATION OF MEDIASTINUM N/A 1/27/2020    Procedure: IRRIGATION, MEDIASTINUM;  Surgeon: nIo Schmitt MD;  [facility];  Service: Cardiovascular;  Laterality: N/A;    LEFT VENTRICULAR ASSIST DEVICE Left 1/23/2020    Procedure: INSERTION-LEFT VENTRICULAR ASSIST DEVICE;  Surgeon: Ino Schmitt MD;  [facility];  Service: Cardiovascular;  Laterality: Left;  DT HM3    RIGHT HEART CATHETERIZATION Right 1/16/2020     Procedure: INSERTION, CATHETER, RIGHT HEART;  Surgeon: Isiah Montero MD;  Location: [facility] CATH LAB;  Service: Cardiology;  Laterality: Right;    STERNAL WOUND CLOSURE N/A 1/24/2020    Procedure: CLOSURE, WOUND, STERNUM;  Surgeon: Ino Schmitt MD;  Location: [facility] OR [facility];  Service: Cardiovascular;  Laterality: N/A;    STERNAL WOUND CLOSURE  1/24/2020    Procedure: CLOSURE, WOUND, STERNUM;  Surgeon: Ino Schmitt MD;  [facility];  Service: Cardiovascular;;  Temporary closure    STERNAL WOUND CLOSURE N/A 1/29/2020    Procedure: CLOSURE, WOUND, STERNUM;  Surgeon: Ino Schmitt MD;  [facility];  Service: Cardiovascular;  Laterality: N/A;    TONSILLECTOMY      VEIN LIGATION AND STRIPPING      WOUND EXPLORATION N/A 1/24/2020    Procedure: EXPLORATION, WOUND;  Surgeon: Ino Schmitt MD;  [facility];  Service: Cardiovascular;  Laterality: N/A;  Emergency exploration       Time Tracking:     OT Date of Treatment: 02/02/20  OT Start Time: 0804  OT Stop Time: 0836  OT Total Time (min): 32 min (co-treat with PT)    Billable Minutes:Evaluation 15  Therapeutic Activity 15    Flora Arredodno OT  2/2/2020

## 2020-02-02 NOTE — PROGRESS NOTES
[facility]  Critical Care - Surgery  Progress Note    Patient Name: Tae Delgado  MRN: [id number]  Admission Date: 1/9/2020  Hospital Length of Stay: 24 days  Code Status: Full Code  Attending Provider: Ino Schmitt MD  Primary Care Provider: Elham Pearson MD   Principal Problem: LVAD (left ventricular assist device) present    Subjective:     Hospital/ICU Course:  1/23: Pt arrives from OR intubated, open chest dressed with Ioban, CTs with SS output. Drips on arrival: Epi 0.08, Cardene 5, TXA, Nitric at 30. Pt received 1.5L crystalloid, no blood product, 20 mg Lasix (put out 250cc in response).   Intra Op JACINTO Exam:  PRE:  Severely reduced left ventricular systolic function. Dilated LV. No definitive thrombus noted.  Moderate-to-severely reduced right ventricular systolic function. FAC 12-24%  Mild-to-moderate AI. No AS.  Moderate MR with systolic blunting of the pulmonary veins.  Trace-to-mild TR.  Mild PI. PAAT <90ms.  Small PFO by CF doppler.  SEC in La and DAMON. No clear thrombus noted.  Grade 2 atheromatous disease of the aorta.  No effusions.    POST:  Severely depressed left ventriclar dysfunction.  Moderately depressed right ventricular systolic function.  LVAD inflow and outflow cannula noted with laminar flows.  No AI.  Mild MR, vahe stitch observed. No MS.  Mild-to-moderate TR.  PFO still visible on CF doppler.  Aorta intact, no dissection.  No effusions.     1/24: run of Vtach overnight. Amio bolus given, amio gtt increased to 1, lasix push given. Improved. LVAD hemodynamics appropriate overnight. Going for closure this am. Upon return, developed tamponade physiology and returned to OR. Came back to SICU with chest open.  1/26: Diuresed over the weekend, chest kept open. Lidocaine gtt and digoxin for tachyarrhythmia. Otherwise NAEON.   1/27: Went to OR for possible chest closure, decided to do a wash out. Returned to SICU with chest open. One tachyarrhythmia episode overnight,  resolved with 100mg Lidocaine bolus and increasing lidocaine drip from 0.75mg/hr to 1mg/hr.  1/28: NAEON. Lasix gtt decreased throughout the day with adequate UOP still.   1/29: NAEON    Interval History/Significant Events:     NAEON.  Hypernatremia slowly improving with changes to TF, free water boluses. Extubated yesterday, now on 4L NC. Afebrile overnight. Elevated CVP to 14; received IV lasix 20 with decrease in CVP to 13. UOP 2.155L.    Follow-up For: Procedure(s) (LRB):  CLOSURE, WOUND, STERNUM (N/A)  WASHOUT (N/A)  APPLICATION, WOUND VAC (N/A)    Post-Operative Day: 4 Days Post-Op    Objective:     Vital Signs (Most Recent):  Temp: 98.1 °F (36.7 °C) (02/02/20 0300)  Pulse: 90 (02/02/20 0600)  Resp: 20 (02/01/20 2015)  BP: (!) 84/0 (02/02/20 0300)  SpO2: 98 % (02/02/20 0600) Vital Signs (24h Range):  Temp:  [97.7 °F (36.5 °C)-99.6 °F (37.6 °C)] 98.1 °F (36.7 °C)  Pulse:  [] 90  Resp:  [20] 20  SpO2:  [93 %-100 %] 98 %  BP: (84-98)/(0) 84/0  Arterial Line BP: ()/(60-81) 95/74     Weight: 107.2 kg (236 lb 5.3 oz)  Body mass index is 33.91 kg/m².      Intake/Output Summary (Last 24 hours) at 2/2/2020 0639  Last data filed at 2/2/2020 0600  Gross per 24 hour   Intake 4446.5 ml   Output 2585 ml   Net 1861.5 ml       Physical Exam   Constitutional: He is oriented to person, place, and time. He appears well-developed and well-nourished.   HENT:   Head: Normocephalic and atraumatic.   Mouth/Throat: No oropharyngeal exudate.   NGT in place   Eyes: Pupils are equal, round, and reactive to light.   Neck: Normal range of motion. Neck supple.   Left IJ double lumen + introducer in place, dressing CLD  Right IJ triple lumen, dressing CLD   Cardiovascular:   Irregular tachyarrythmia. Paced, LVAD hum.  Chest close with dressing in place   Pulmonary/Chest: Breath sounds normal. He has no wheezes. He has no rales.   Breathing comfortably on 4L NC.   Abdominal: Soft. He exhibits no distension.   Musculoskeletal: He  exhibits no edema or deformity.   Neurological: He is alert and oriented to person, place, and time.   Skin: Skin is warm and dry.       Vents:  Vent Mode: Spont (02/01/20 0900)  Ventilator Initiated: Yes (01/21/20 0905)  Set Rate: 16 BPM (01/31/20 0819)  Vt Set: 500 mL (01/31/20 0819)  PEEP/CPAP: 5 cmH20 (02/01/20 0900)  Oxygen Concentration (%): 36 (02/01/20 2352)  Peak Airway Pressure: 14 cmH2O (02/01/20 0900)  Plateau Pressure: 0 cmH20 (02/01/20 0900)  Total Ve: 9.53 mL (02/01/20 0900)  F/VT Ratio<105 (RSBI): (!) 34.88 (01/27/20 2316)    Lines/Drains/Airways     Central Venous Catheter Line                 Trialysis (Dialysis) Catheter 01/24/20 1500 right internal jugular 8 days          Drain                 Urethral Catheter 01/21/20 0752 Non-latex;Straight-tip;Temperature probe 16 Fr. 11 days         Chest Tube 01/23/20 1230 1 Right Pleural 9 days         Chest Tube 01/23/20 1414 2 Right Mediastinal 9 days         NG/OG Tube 01/29/20 1430 Left nostril 3 days          Arterial Line                 Arterial Line 01/21/20 0730 Left Other (Comment) 11 days          Line                 VAD 01/23/20 1148 Left ventricular assist device HeartMate 3 9 days          Peripheral Intravenous Line                 Peripheral IV - Single Lumen 02/01/20 0000 20 G Left Wrist 1 day                Significant Labs:    CBC/Anemia Profile:  Recent Labs   Lab 02/01/20 0332 02/01/20 2356 02/02/20 0328 02/02/20  0423   WBC 17.42*  --   --  20.41*  --    HGB 9.0*  --   --  9.0*  --    HCT 30.5*   < > 26* 30.8* 31*     --   --  262  --    MCV 95  --   --  95  --    RDW 17.2*  --   --  17.6*  --     < > = values in this interval not displayed.        Chemistries:  Recent Labs   Lab 02/01/20  0332  02/01/20  1802 02/01/20 2353 02/02/20  0328 02/02/20  0600   *  --   --   --  148*  --    K 4.1   < > 3.9 3.7 3.9 3.7   *  --   --   --  113*  --    CO2 22*  --   --   --  24  --    BUN 70*  --   --   --  67*  --     CREATININE 2.3*  --   --   --  2.3*  --    CALCIUM 8.8  --   --   --  8.6*  --    ALBUMIN 2.2*  --   --   --  2.3*  --    PROT 6.0  --   --   --  5.9*  --    BILITOT 0.6  --   --   --  0.6  --    ALKPHOS 44*  --   --   --  43*  --    ALT 17  --   --   --  19  --    AST 37  --   --   --  30  --    MG  --    < > 2.1 2.0  --  1.9   PHOS 2.0*  --   --   --  3.1  --     < > = values in this interval not displayed.       All pertinent labs within the past 24 hours have been reviewed.    Significant Imaging:  CXR  One view: There is a pacer and LVAD.  Tubes and lines are appropriate.  There is cardiomegaly moderate edema/CHF pleural fluid and no change.    Assessment/Plan:     Acute on chronic combined systolic and diastolic heart failure, NYHA class 4  Tae Delgado is a 59 y.o. 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 Approved for DT LVAD. Went to OR on 1/21 and found to have DAMON and LV thrombus and case was aborted. Pt was placed on a heparin gtt and thrombus had dissipated on repeat JACINTO on 1/22. Pt underwent LVAD placement on 1/23. Chest closure and re-exploration on 1/24. Returned from OR with chest open on 1/24. Attempted chest closure on 1/27, returned to SICU with chest open.     Neuro:  - Extubated, alert and oriented.  - Prn oxy, dilaudid for pain control    CVS:   - Current LVAD flow @ 5400 with appropriate PIs   - Wean vasopressors per CTS  - ICD turned on, EP service consulted for assistance with arrythmia  - Digoxin 0.125 daily  - Wean Corby, currently at 10/5 ppm (extubated with nitric in place)    Pulm:  - Breathing comfortably on 4L NC; wean as able  - ABGs prn  - CXR daily  - Monitor CT output, no overt signs of  bleeding    GI:  - TF formula change to peptamen (lower sodium formula)  - Protonix 40 daily  - Docusate    Endo:   - Insulin off now, SSI  - Endocrine consulted, appreciate their services    Renal:  - Strict I/O  - Trend BUN/Cr, still elevated but stable  - Lasix gtt @ 2.5      FENGI:  - Monitor labs  - Replace per protocol  - Switching all drips to D5 (elevated Na)  - Increase free water boluses frequency to q4    Heme:  - No blood products needed during the OR  - H/H stable overnight    ID:   - Vanc  - Cultured overnight for continued fevers; follow up cultures  - Follow WBC  - Afebrile overnight    PPx:  - Hep gtt, aPTT goal of 45-54,   - coumadin 2 given 2/1/2020  - GI ppx    Dispo:  - Cont SICU care, wean oxygen         Critical care was time spent personally by me on the following activities: development of treatment plan with patient or surrogate and bedside caregivers, discussions with consultants, evaluation of patient's response to treatment, examination of patient, ordering and performing treatments and interventions, ordering and review of laboratory studies, ordering and review of radiographic studies, pulse oximetry, re-evaluation of patient's condition.  This critical care time did not overlap with that of any other provider or involve time for any procedures.     Micki Christianson MD  Critical Care - Surgery  [facility]

## 2020-02-02 NOTE — PT/OT/SLP RE-EVAL
Physical Therapy Re-evaluation and Treatment    Patient Name:  Tae Delgado   MRN:  [id number]    *co-treatment with OT   Recommendations:     Discharge Recommendations:  rehabilitation facility   Discharge Equipment Recommendations: (TBD)   Barriers to discharge: Decreased caregiver support at current level of function     Assessment:     Tae Delgado is a 59 y.o. male admitted with a medical diagnosis of LVAD (left ventricular assist device) present.  He presents with the following impairments/functional limitations:  weakness, impaired endurance, impaired self care skills, impaired functional mobilty, gait instability, impaired balance, impaired cognition, decreased coordination, pain, impaired cardiopulmonary response to activity. Pt tolerated activity with significant gross weakness, requiring total assistance to transfer to sitting EOB and maintain sitting balance. Pt lethargic throughout session, required frequent cuing to maintain attention to task. Upon discharge, pt would benefit from continued skilled therapy intervention at an inpatient rehabilitation facility to progress toward more independent mobility. At this time, pt would continue to benefit from acute skilled therapy intervention to address deficits and progress toward prior level of function.       Rehab Prognosis:  good; patient would benefit from acute skilled PT services to address these deficits and reach maximum level of function.      Recent Surgery: Procedure(s) (LRB):  CLOSURE, WOUND, STERNUM (N/A)  WASHOUT (N/A)  APPLICATION, WOUND VAC (N/A) 4 Days Post-Op    Plan:     During this hospitalization, patient to be seen 6 x/week to address the above listed problems via gait training, therapeutic activities, therapeutic exercises, neuromuscular re-education  · Plan of Care Expires:  03/02/20   Plan of Care Reviewed with: patient, spouse, family    Subjective     Communicated with RN prior to session.  Patient found HOB elevated with blood  pressure cuff, pulse ox (continuous), telemetry, LVAD, NG tube, arterial line, giles catheter, oxygen, wound vac, chest tube, central line(nitric oxide) upon PT entry to room, agreeable to evaluation.      Chief Complaint: Pt c/o sternal pain, weakness   Patient comments/goals: to get better and return home  Pain/Comfort:  · Pain Rating 1: 5/10  · Location - Orientation 1: medial  · Location 1: sternal  · Pain Addressed 1: Pre-medicate for activity, Reposition, Distraction  · Pain Rating Post-Intervention 1: 5/10    Patients cultural, spiritual, Yarsanism conflicts given the current situation: no      Objective:     Patient found with: blood pressure cuff, pulse ox (continuous), telemetry, LVAD, NG tube, arterial line, giles catheter, oxygen, wound vac, chest tube, central line(nitric oxide)     General Precautions: Standard, fall, LVAD, sternal   Orthopedic Precautions:N/A   Braces: N/A     Exams:  · Cognitive Exam:  Patient is AAOx4, followed all commands, speaks softly, cuing required to articulate speech   · Gross Motor Coordination:  Impaired   · RLE ROM: WFL  · RLE Strength: grossly 3/5  · LLE ROM: WFL  · LLE Strength: grossly 3/5     Functional Mobility:  · Bed Mobility:     · Supine to Sit: total assistance and of 2 persons  · Sit to Supine: total assistance and of 2 persons    AM-PAC 6 CLICK MOBILITY  Total Score:8       Therapeutic Activities and Exercises:   Pt sat EOB for 8 mins with total assistance for static sitting balance. Pt with excessive posterior and L lateral lean with rounded shoulders and posterior pelvic tilt. Pt with excessive cervical flexion, able to raise head slightly, but unable to maintain.   While sitting EOB, pt performed 5x B LAQ, 3x B UE shoulder flexion/elbow extension with active assistance.   Pt returned to bed 2/2 fatigue.   Upon return to supine, PT educated pt and family on bed level exercises to perform including knee to chest, ankle DF/PF, and UE elevation off of bed. Pt  and family demo'd understanding.   Pt with excessive L lean in bed, wedge placed on L side, towel roll placed behind pt neck to promote cervical extension.   Pt educated on role of PT/POC. Pt verbalized understanding.   Pt encouraged to only perform OOB mobility with assistance from nursing/therapy. Pt agreeable.   Pt educated regarding 3/3 sternal precautions. Pt compliant throughout session.  LVAD to wall  Power throughout, no alarms sounded.     Patient left HOB elevated with all lines intact, call button in reach and RN  notified.    GOALS:   Multidisciplinary Problems     Physical Therapy Goals        Problem: Physical Therapy Goal    Goal Priority Disciplines Outcome Goal Variances Interventions   Physical Therapy Goal     PT, PT/OT Ongoing, Progressing     Description:  Goals to be met by: 2020     Patient will increase functional independence with mobility by performin. Supine to sit with MInimal Assistance  2. Rolling to Left and Right with Minimal Assistance.  3. Sit to stand transfer with Moderate Assistance  4. Sitting at edge of bed x8 minutes with Minimal Assistance  5. Lower extremity exercise program x10 reps per handout, with assistance as needed                      History:     Past Medical History:   Diagnosis Date    CHF (congestive heart failure) 2010    Dx  2010 w/ decreased LV systolic function (EF 15%) by ECHO 2015    COCM (congestive cardiomyopathy) 2016    Hyperlipidemia     Hypertension     Paroxysmal atrial fibrillation     Pulmonary embolus     Stroke     Superficial thrombophlebitis        Past Surgical History:   Procedure Laterality Date    APPLICATION OF WOUND VACUUM-ASSISTED CLOSURE DEVICE N/A 2020    Procedure: APPLICATION, WOUND VAC;  Surgeon: Ino Schmitt MD;  Location: [facility] OR [address] [place] [address];  Service: Cardiovascular;  Laterality: N/A;    DRAINAGE OF PLEURAL EFFUSION Left 2020    Procedure: DRAINAGE, PLEURAL EFFUSION;  Surgeon: Ino  MD Oskar;  Location: [facility] OR [facility] FLR;  Service: Cardiovascular;  Laterality: Left;  500 ml drainage    INSERTION OF GRAFT TO PERICARDIUM N/A 1/24/2020    Procedure: INSERTION, GRAFT, PERICARDIUM;  Surgeon: Ino Schmitt MD;  Location: [facility] OR 2ND FLR;  Service: Cardiovascular;  Laterality: N/A;  Prevena    IRRIGATION OF MEDIASTINUM N/A 1/27/2020    Procedure: IRRIGATION, MEDIASTINUM;  Surgeon: Ino Schmitt MD;  Location: [facility] OR [facility] FLR;  Service: Cardiovascular;  Laterality: N/A;    LEFT VENTRICULAR ASSIST DEVICE Left 1/23/2020    Procedure: INSERTION-LEFT VENTRICULAR ASSIST DEVICE;  Surgeon: Ino Schmitt MD;  [facility];  Service: Cardiovascular;  Laterality: Left;  DT HM3    RIGHT HEART CATHETERIZATION Right 1/16/2020    Procedure: INSERTION, CATHETER, RIGHT HEART;  Surgeon: Isiah Montero MD;  Location: [facility] CATH LAB;  Service: Cardiology;  Laterality: Right;    STERNAL WOUND CLOSURE N/A 1/24/2020    Procedure: CLOSURE, WOUND, STERNUM;  Surgeon: Ino Schmitt MD;  [facility];  Service: Cardiovascular;  Laterality: N/A;    STERNAL WOUND CLOSURE  1/24/2020    Procedure: CLOSURE, WOUND, STERNUM;  Surgeon: Ino Schmitt MD;  [address]  Service: Cardiovascular;;  Temporary closure    STERNAL WOUND CLOSURE N/A 1/29/2020    Procedure: CLOSURE, WOUND, STERNUM;  Surgeon: Ino Schmitt MD;  [facility];  Service: Cardiovascular;  Laterality: N/A;    TONSILLECTOMY      VEIN LIGATION AND STRIPPING      WOUND EXPLORATION N/A 1/24/2020    Procedure: EXPLORATION, WOUND;  Surgeon: Ino Schmitt MD;  [facility];  Service: Cardiovascular;  Laterality: N/A;  Emergency exploration       Time Tracking:     PT Received On: 02/02/20  PT Start Time: 0804     PT Stop Time: 0837  PT Total Time (min): 33 min     Billable Minutes: Evaluation 10 mins  and Therapeutic Exercise 23 mins       Ermelinda Clay, PT  02/02/2020

## 2020-02-02 NOTE — PROGRESS NOTES
02/01/2020  Renaldo Miles    Current provider:  Ino Schmitt MD      I, Renaldo Miles, rounded on Tae Delgado to ensure all mechanical assist device settings (IABP or VAD) were appropriate and all parameters were within limits.  I was able to ensure all back up equipment was present, the staff had no issues, and the Perfusion Department daily rounding was complete.    11:43 PM

## 2020-02-02 NOTE — ASSESSMENT & PLAN NOTE
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   - CTS primary  - Taken back to OR 1/24 for possible RVAD placement which was not done. Patient remained hemodynamically stable in OR. Wash out on 1/27. Chest closed 1/29. Remains intubated and sedated.   - CVP 14  - Currently hemodynamically stable on current ggts  - Current speed 5300    Procedure: Device Interrogation Including analysis of device parameters  Current Settings: Ventricular Assist Device  Review of device function is stable/unstabe    TXP LVAD INTERROGATIONS 2/2/2020 2/2/2020 2/2/2020 2/2/2020 2/2/2020 2/2/2020 2/2/2020   Type HeartMate3 HeartMate3 HeartMate3 HeartMate3 HeartMate3 HeartMate3 HeartMate3   Flow 4.2 4.2 4.2 4.5 4.3 4.3 4.0   Speed 5300 5300 5300 5300 5300 5300 5300   PI 3.0 3.0 3.1 3.2 3.3 3.4 3.6   Power (Berry) 3.8 3.8 3.8 3.8 3.8 3.8 3.8   LSL 4900 4900 4900 4900 4900 4900 4900   Pulsatility Intermittent pulse Intermittent pulse Intermittent pulse Intermittent pulse Intermittent pulse Intermittent pulse Intermittent pulse

## 2020-02-02 NOTE — PT/OT/SLP EVAL
Speech Language Pathology Evaluation  Bedside Swallow    Patient Name:  Tae Delgado   MRN:  [id number]  Admitting Diagnosis: LVAD (left ventricular assist device) present    Recommendations:                 General Recommendations:  Dysphagia therapy  Diet recommendations:  Puree, Thin   Aspiration Precautions: 1 bite/sip at a time, Assistance with meals, Eliminate distractions, Feed only when awake/alert, Frequent oral care, HOB to 90 degrees, Meds whole 1 at a time, Monitor for s/s of aspiration, Remain upright 30 minutes post meal, Small bites/sips and Standard aspiration precautions   General Precautions: Standard, fall, aspiration, LVAD  Communication strategies:  provide increased time to answer and go to room if call light pushed    History:     Past Medical History:   Diagnosis Date    CHF (congestive heart failure) 1/2010    Dx  1/2010 w/ decreased LV systolic function (EF 15%) by ECHO 1/2015    COCM (congestive cardiomyopathy) 7/20/2016    Hyperlipidemia     Hypertension     Paroxysmal atrial fibrillation     Pulmonary embolus 2008    Stroke     Superficial thrombophlebitis        Past Surgical History:   Procedure Laterality Date    APPLICATION OF WOUND VACUUM-ASSISTED CLOSURE DEVICE N/A 1/29/2020    Procedure: APPLICATION, WOUND VAC;  Surgeon: Ino Schmitt MD;  Location: [facility] OR [address] [place] [address];  Service: Cardiovascular;  Laterality: N/A;    DRAINAGE OF PLEURAL EFFUSION Left 1/24/2020    Procedure: DRAINAGE, PLEURAL EFFUSION;  Surgeon: Ino Schmitt MD;  [facility];  Service: Cardiovascular;  Laterality: Left;  500 ml drainage    INSERTION OF GRAFT TO PERICARDIUM N/A 1/24/2020    Procedure: INSERTION, GRAFT, PERICARDIUM;  Surgeon: Ino Schmitt MD;  [facility];  Service: Cardiovascular;  Laterality: N/A;  Prevena    IRRIGATION OF MEDIASTINUM N/A 1/27/2020    Procedure: IRRIGATION, MEDIASTINUM;  Surgeon: Ino Schmitt MD;  Location: [facility] OR [address] [place] [address];  Service:  Cardiovascular;  Laterality: N/A;    LEFT VENTRICULAR ASSIST DEVICE Left 1/23/2020    Procedure: INSERTION-LEFT VENTRICULAR ASSIST DEVICE;  Surgeon: Ino Schmitt MD;  [facility];  Service: Cardiovascular;  Laterality: Left;  DT HM3    RIGHT HEART CATHETERIZATION Right 1/16/2020    Procedure: INSERTION, CATHETER, RIGHT HEART;  Surgeon: Isiah Montero MD;  Location: [facility] CATH LAB;  Service: Cardiology;  Laterality: Right;    STERNAL WOUND CLOSURE N/A 1/24/2020    Procedure: CLOSURE, WOUND, STERNUM;  Surgeon: Ino Schmitt MD;  Location: [facility] OR [facility];  Service: Cardiovascular;  Laterality: N/A;    STERNAL WOUND CLOSURE  1/24/2020    Procedure: CLOSURE, WOUND, STERNUM;  Surgeon: Ino Schmitt MD;  [facility];  Service: Cardiovascular;;  Temporary closure    STERNAL WOUND CLOSURE N/A 1/29/2020    Procedure: CLOSURE, WOUND, STERNUM;  Surgeon: Ino Schmitt MD;  [facility];  Service: Cardiovascular;  Laterality: N/A;    TONSILLECTOMY      VEIN LIGATION AND STRIPPING      WOUND EXPLORATION N/A 1/24/2020    Procedure: EXPLORATION, WOUND;  Surgeon: Ino Schmitt MD;  [address]  Service: Cardiovascular;  Laterality: N/A;  Emergency exploration       Prior Intubation HX:  1/21-2/1/2020    Chest X-Rays: 1/31/2020- stable    Prior diet: regular/thin    Subjective     Spoke with RN prior to entering pt's room . Pt seen bedside for session with family present. Pleasantly confused, but alert and agreeable to ST.       Pain/Comfort:  · Pain Rating 1: 3/10  · Location 1: throat  · Pain Addressed 1: Reposition, Distraction  · Pain Rating Post-Intervention 1: 3/10    Objective:     Oral Musculature Evaluation  · Oral Musculature: general weakness  · Dentition: present and adequate  · Secretion Management: adequate  · Mucosal Quality: adequate  · Lingual Strength and Mobility: impaired strength  · Volitional Cough: elicited  · Volitional Swallow:  elicited  · Voice Prior to PO Intake: clear    Bedside Swallow Eval:   Consistencies Assessed:  · Thin liquids water via tsp, cup, and straw sips  · Puree via tsp x5  · Soft solids marion cracker softened in puree x1; pt fearful to trial soft and regular solids     Oral Phase:   · Prolonged mastication  · Lingual residue  · Poor oral acceptance of soft and regular solids    Pharyngeal Phase:   · no overt clinical signs/symptoms of aspiration  · no overt clinical signs/symptoms of pharyngeal dysphagia    Compensatory Strategies  · None    Treatment: Provided education to pt and family re: role of ST, POC, recommendations for puree diet with thin liquids per pt request, swallow precautions, and plan to f/u for diet tolerance and advancement. They verbalized understanding. White board updated. RN and MD notified of results and recs.      Assessment:     Tae Delgado is a 59 y.o. male with an SLP diagnosis of Dysphagia.      Goals:   Multidisciplinary Problems     SLP Goals        Problem: SLP Goal    Goal Priority Disciplines Outcome   SLP Goal     SLP Ongoing, Progressing   Description:  Goals expected to be met by 2/9:  1. Pt will participate in ongoing assessment of swallow function to determine least restrictive diet.                     Plan:     · Patient to be seen:  3 x/week   · Plan of Care expires:  03/02/20  · Plan of Care reviewed with:  patient, family   · SLP Follow-Up:  Yes       Discharge recommendations:  rehabilitation facility   Barriers to Discharge:  Level of Skilled Assistance Needed .    Time Tracking:     SLP Treatment Date:   02/02/20  Speech Start Time:  0732  Speech Stop Time:  0748     Speech Total Time (min):  16 min    Billable Minutes: Eval Swallow and Oral Function 8 minutes and Self Care/Home Management Training 8 minutes    Margaret Goyal CCC-SLP  02/02/2020

## 2020-02-02 NOTE — ASSESSMENT & PLAN NOTE
KRISTIN on CKD III  Baseline SCr ~ 2.0    58 yo male s/p LVAD placement on 1/23 who was taken back to OR for exploration over concerns for RV failure/tamponade due to elevated CVP and decrease UOP.  Upon opening of chest, he had improved hemodynamics and some improvement in UOP and decision made to leave chest open and transfer back to ICU for closer monitoring on 1/24.  He was administered IV lasix + diuril with improvement in his UOP.      DDx for KRISTIN includes ATN from renal hypoperfusion vs. CRS from RV overload    Plan/recommendations:  -renal function improving. sCr 2.3 (baseline ~ 2). BUN down trending , 2.3 Liters of UOP documented on last 24 hour shift  -hypernatremia improving to 148 today.  -defer lasix drip adjustments to primary team, currently on 2.5 mg/hr  -continue trending RFP and UOP  -will follow labs closely  -will discuss with Dr. Carlson

## 2020-02-02 NOTE — PLAN OF CARE
Problem: Physical Therapy Goal  Goal: Physical Therapy Goal  Description  Goals to be met by: 2020     Patient will increase functional independence with mobility by performin. Supine to sit with MInimal Assistance  2. Rolling to Left and Right with Minimal Assistance.  3. Sit to stand transfer with Moderate Assistance  4. Sitting at edge of bed x8 minutes with Minimal Assistance  5. Lower extremity exercise program x10 reps per handout, with assistance as needed     Outcome: Ongoing, Progressing     Pt evaluated and appropriate goals established.     Ermelinda Clay, PT, DPT  2020  [phone]

## 2020-02-02 NOTE — PROGRESS NOTES
Notified MD Sammy of pt's PI trending down, currently fluctuating between 2.8-3.4. MD aware of last CVP 14 and UOP >60. No new orders received. VSS. Will continue to monitor.

## 2020-02-02 NOTE — PROGRESS NOTES
Notified MD Sammy of pt's 15:00 residual 750 cc. Orders to hold TF until 18:00, then restart @ 10 cc/h. Will recheck residuals at that time. Also notified MD of pt's CVP 15 and UOP > 60cc/h. Orders to give 20 mg Lasix IVP. VSS. Will continue to monitor.

## 2020-02-02 NOTE — ASSESSMENT & PLAN NOTE
Titrate insulin slowly to avoid hypoglycemia as the risk of hypoglycemia increases with decreased creatinine clearance.  Caution with insulin stacking  Estimated Creatinine Clearance: 42.4 mL/min (A) (based on SCr of 2.3 mg/dL (H)).

## 2020-02-02 NOTE — PLAN OF CARE
Problem: Occupational Therapy Goal  Goal: Occupational Therapy Goal  Description  Goals to be met by: 2/16/2020     Patient will increase functional independence with ADLs by performing:    UE Dressing with Minimal Assistance.  LE Dressing with Minimal Assistance.  Grooming while standing with Minimal Assistance.  Toileting from bedside commode with Minimal Assistance for hygiene and clothing management.   Sitting at edge of bed x10 minutes with Minimal Assistance in prep for seated grooming tasks   Supine to sit with min A  Toilet transfer to bedside commode with Minimal Assistance.  Pt will perform LVAD management independently      Outcome: Ongoing, Progressing    Flora Arredondo OTR/L  Pager: [phone]  2/2/2020

## 2020-02-02 NOTE — PROGRESS NOTES
[facility]  Nephrology  Progress Note    Patient Name: Tae Delgado  MRN: [id number]  Admission Date: 1/9/2020  Hospital Length of Stay: 24 days  Attending Provider: Ino Schmitt MD   Primary Care Physician: Elham Pearson MD  Principal Problem:LVAD (left ventricular assist device) present    Subjective:     HPI: Mr. Tae Delgado is a 58 yo M with PMHx significant for NICMP LVEF 15% (dx 2010), s/p ICD, hx LV thrombus (with prior splenic and renal emboli), pAF, hx embolic CVA, HTN, DLD, and CKD III (baseline ~ 2.0) who was initially admitted from [facility] clinic on 1/9 with volume overload and ADHF. Patient diuresed but subsequently transferred to ICU on 1/16. He was started on advanced options pathway and was approved for LVAD as DT. He underwent LVAD placement on 1/23/2020.  He was taken back to the OR for exploration of possible tamponade/RV failure on the 24th as he had a reduction in UOP with elevation in his CVP.  After the chest was re-opened there was noted improvement in LV filling and PI on LVAD so decision made not to pursue RVAD placement and he was transferred back to ICU for closer monitoring.  He was started on lasix infusion with addition of diuril with an improvement in his UOP and HDS.  Nephrology was consulted for evaluation for possible need for RRT.    Interval History: Patient seen and examined at bedside. On nasal canula, 2.3 L of urine output overnight. Hypernatremia improving.     Review of patient's allergies indicates:   Allergen Reactions    Biopatch [chlorhexidine gluconate]      Site burning    Dobutamine in d5w      Tachycardia, tremors, SOB, flushing    Percocet [oxycodone-acetaminophen] Itching    Penicillins Rash     Cefepime given on 1/23/2020 without issue     Current Facility-Administered Medications   Medication Frequency    albumin human 5% bottle 500 mL PRN    albuterol sulfate nebulizer solution 2.5 mg Q4H PRN    amiodarone 5 mg/mL oral suspension TID     amLODIPine tablet 10 mg Daily    aspirin tablet 325 mg Daily    atorvastatin tablet 80 mg QHS    balsam peru-castor oil Oint BID    bisacodyl suppository 10 mg Daily PRN    dexmedetomidine (PRECEDEX) 400mcg/100mL 0.9% NaCL infusion Continuous PRN    dextrose 10% (D10W) Bolus PRN    dextrose 10% (D10W) Bolus PRN    digoxin tablet 0.125 mg Daily    docusate sodium capsule 200 mg QHS    EPINEPHrine (ADRENALIN) 5 mg in dextrose 5 % 250 mL infusion Continuous    ferrous gluconate tablet 324 mg Daily with breakfast    furosemide (LASIX) 2 mg/mL in dextrose 5 % 100 mL infusion (conc: 2 mg/mL) Continuous    glucagon (human recombinant) injection 1 mg PRN    heparin 25,000 units in dextrose 5% 250 mL (100 units/mL) infusion (heparin infusion - NO NOMOGRAM) Continuous    hydrALAZINE injection 10 mg Q6H PRN    HYDROmorphone injection 0.5 mg Q6H PRN    insulin aspart U-100 pen 0-5 Units Q6H PRN    magnesium hydroxide 400 mg/5 ml suspension 2,400 mg Daily PRN    magnesium oxide tablet 400 mg TID    magnesium sulfate 2g in water 50mL IVPB (premix) PRN    niCARdipine 40 mg/200 mL infusion Continuous    nitric oxide gas Gas 5 ppm Continuous    oxyCODONE immediate release tablet 5 mg Q4H PRN    oxyCODONE immediate release tablet Tab 10 mg Q4H PRN    pantoprazole injection 40 mg Daily    polyethylene glycol (GoLYTELY) solution 6 times per day    propofol (DIPRIVAN) 10 mg/mL infusion Continuous    sodium chloride 0.9% flush 10 mL PRN    sodium chloride 0.9% flush 3 mL Q8H    vancomycin 750 mg in dextrose 5 % 250 mL IVPB (ready to mix system) Q24H       Objective:     Vital Signs (Most Recent):  Temp: 97.8 °F (36.6 °C) (02/02/20 0700)  Pulse: 90 (02/02/20 0930)  Resp: 20 (02/01/20 2015)  BP: (!) 88/0 (02/02/20 0700)  SpO2: 95 % (02/02/20 0930)  O2 Device (Oxygen Therapy): nasal cannula (02/02/20 0900) Vital Signs (24h Range):  Temp:  [97.7 °F (36.5 °C)-98.3 °F (36.8 °C)] 97.8 °F (36.6 °C)  Pulse:  []  90  Resp:  [20] 20  SpO2:  [93 %-100 %] 95 %  BP: (84-98)/(0) 88/0  Arterial Line BP: ()/(60-81) 88/72     Weight: 107.2 kg (236 lb 5.3 oz) (02/02/20 0521)  Body mass index is 33.91 kg/m².  Body surface area is 2.3 meters squared.    I/O last 3 completed shifts:  In: 6507.5 [I.V.:1342.5; NG/GT:4915; IV Piggyback:250]  Out: 3965 [Urine:3485; Chest Tube:480]    Physical Exam   Constitutional: He is oriented to person, place, and time. He appears well-developed and well-nourished.   HENT:   Head: Normocephalic and atraumatic.   Mouth/Throat: No oropharyngeal exudate.   NGT in place   Eyes: Pupils are equal, round, and reactive to light.   Neck: Normal range of motion. Neck supple.   Left IJ double lumen + introducer in place, dressing CLD  Right IJ triple lumen, dressing CLD   Cardiovascular:   Irregular tachyarrythmia. Paced, LVAD hum.  Chest close with dressing in place   Pulmonary/Chest: Breath sounds normal. He has no wheezes. He has no rales.   Breathing comfortably on 4L NC.   Abdominal: Soft. He exhibits no distension.   Musculoskeletal: He exhibits no edema or deformity.   Neurological: He is alert and oriented to person, place, and time.   Skin: Skin is warm and dry.       Significant Labs:  CBC:   Recent Labs   Lab 02/02/20  0328 02/02/20  0801   WBC 20.41*  --   --    RBC 3.24*  --   --    HGB 9.0*  --   --    HCT 30.8*   < > 38     --   --    MCV 95  --   --    MCH 27.8  --   --    MCHC 29.2*  --   --     < > = values in this interval not displayed.     CMP:   Recent Labs   Lab 02/02/20  0328 02/02/20  0600   *  --    CALCIUM 8.6*  --    ALBUMIN 2.3*  --    PROT 5.9*  --    *  --    K 3.9 3.7   CO2 24  --    *  --    BUN 67*  --    CREATININE 2.3*  --    ALKPHOS 43*  --    ALT 19  --    AST 30  --    BILITOT 0.6  --      All labs within the past 24 hours have been reviewed.     Significant Imaging:  Labs: Reviewed    Assessment/Plan:     * LVAD (left ventricular assist  device) present  - per primary team     Acute kidney injury superimposed on chronic kidney disease  KRISTIN on CKD III  Baseline SCr ~ 2.0    60 yo male s/p LVAD placement on 1/23 who was taken back to OR for exploration over concerns for RV failure/tamponade due to elevated CVP and decrease UOP.  Upon opening of chest, he had improved hemodynamics and some improvement in UOP and decision made to leave chest open and transfer back to ICU for closer monitoring on 1/24.  He was administered IV lasix + diuril with improvement in his UOP.      DDx for KRISTIN includes ATN from renal hypoperfusion vs. CRS from RV overload    Plan/recommendations:  -renal function improving. sCr 2.3 (baseline ~ 2). BUN down trending , 2.3 Liters of UOP documented on last 24 hour shift  -hypernatremia improving to 148 today.  -defer lasix drip adjustments to primary team, currently on 2.5 mg/hr  -continue trending RFP and UOP  -will follow labs closely  -will discuss with Dr. Carlson         Thank you for your consult. I will follow-up with patient. Please contact us if you have any additional questions.    Mark Garvin MD  Nephrology  [facility]    ATTENDING PHYSICIAN ATTESTATION  I have personally verified the history and examined the patient. I thoroughly reviewed the demographic, clinical, laboratorial and imaging information available in medical records. I agree with the assessment and recommendations provided by the subspecialty resident who was under my supervision.

## 2020-02-02 NOTE — SUBJECTIVE & OBJECTIVE
Interval History: extubated cvp 14 on lasix     Continuous Infusions:   dexmedetomidine (PRECEDEX) infusion Stopped (02/01/20 1000)    epinephrine 0.05 mcg/kg/min (02/02/20 1300)    furosemide (LASIX) 2 mg/mL infusion (non-titrating) 2.5 mg/hr (02/02/20 1300)    heparin (porcine) in 5 % dex 1,600 Units/hr (02/02/20 1300)    niCARdipine Stopped (01/31/20 2100)    nitric oxide gas      propofol Stopped (01/29/20 1700)     Scheduled Meds:   amiodarone  400 mg Per NG tube TID    amLODIPine  10 mg Per NG tube Daily    aspirin  325 mg Oral Daily    atorvastatin  80 mg Oral QHS    balsam peru-castor oil   Topical (Top) BID    digoxin  0.125 mg Oral Daily    docusate sodium  200 mg Oral QHS    ferrous gluconate  324 mg Oral Daily with breakfast    magnesium oxide  400 mg Per NG tube TID    pantoprazole  40 mg Intravenous Daily    polyethylene glycol  100 mL Per NG tube 6 times per day    sodium chloride 0.9%  3 mL Intravenous Q8H    vancomycin (VANCOCIN) IVPB  750 mg Intravenous Q24H    warfarin  1 mg Oral Once     PRN Meds:albumin human 5%, albuterol sulfate, bisacodyL, dexmedetomidine (PRECEDEX) infusion, Dextrose 10% Bolus, Dextrose 10% Bolus, Dextrose 10% Bolus, glucagon (human recombinant), hydrALAZINE, HYDROmorphone, insulin aspart U-100, magnesium hydroxide 400 mg/5 ml, magnesium sulfate IVPB, oxyCODONE, oxyCODONE, sodium chloride 0.9%    Review of patient's allergies indicates:   Allergen Reactions    Biopatch [chlorhexidine gluconate]      Site burning    Dobutamine in d5w      Tachycardia, tremors, SOB, flushing    Percocet [oxycodone-acetaminophen] Itching    Penicillins Rash     Cefepime given on 1/23/2020 without issue     Objective:     Vital Signs (Most Recent):  Temp: 97.6 °F (36.4 °C) (02/02/20 1100)  Pulse: 92 (02/02/20 1330)  Resp: 20 (02/01/20 2015)  BP: (!) 78/0 (02/02/20 1100)  SpO2: 100 % (02/02/20 1330) Vital Signs (24h Range):  Temp:  [97.6 °F (36.4 °C)-98.1 °F (36.7 °C)]  97.6 °F (36.4 °C)  Pulse:  [] 92  Resp:  [20] 20  SpO2:  [94 %-100 %] 100 %  BP: (78-98)/(0) 78/0  Arterial Line BP: ()/(60-81) 95/78     Patient Vitals for the past 72 hrs (Last 3 readings):   Weight   02/02/20 0521 107.2 kg (236 lb 5.3 oz)   02/01/20 0432 107.3 kg (236 lb 8.9 oz)   01/31/20 0315 106.2 kg (234 lb 2.1 oz)     Body mass index is 33.91 kg/m².      Intake/Output Summary (Last 24 hours) at 2/2/2020 1348  Last data filed at 2/2/2020 1300  Gross per 24 hour   Intake 5071.5 ml   Output 2745 ml   Net 2326.5 ml       Hemodynamic Parameters:         Physical Exam   Constitutional: He is oriented to person, place, and time. He appears well-developed and well-nourished.   HENT:   Head: Normocephalic and atraumatic.   Mouth/Throat: No oropharyngeal exudate.   NGT in place   Eyes: Pupils are equal, round, and reactive to light.   Neck: Normal range of motion. Neck supple.   Left IJ double lumen + introducer in place, dressing CLD  Right IJ triple lumen, dressing CLD   Cardiovascular:   Irregular tachyarrythmia. Paced, LVAD hum.  Chest close with dressing in place   Pulmonary/Chest: Breath sounds normal. He has no wheezes. He has no rales.   Breathing comfortably on 4L NC.   Abdominal: Soft. He exhibits no distension.   Musculoskeletal: He exhibits no edema or deformity.   Neurological: He is alert and oriented to person, place, and time.   Skin: Skin is warm and dry.       Significant Labs:  CBC:  Recent Labs   Lab 01/31/20  0435  02/01/20  0332  02/02/20  0328 02/02/20  0423 02/02/20  0801   WBC 17.56*  --  17.42*  --  20.41*  --   --    RBC 3.48*  --  3.22*  --  3.24*  --   --    HGB 9.6*  --  9.0*  --  9.0*  --   --    HCT 32.8*   < > 30.5*   < > 30.8* 31* 38     --  241  --  262  --   --    MCV 94  --  95  --  95  --   --    MCH 27.6  --  28.0  --  27.8  --   --    MCHC 29.3*  --  29.5*  --  29.2*  --   --     < > = values in this interval not displayed.     BNP:  Recent Labs   Lab  01/27/20 0400 01/29/20  0508 01/31/20 0435   BNP 1,140* 320* 420*     CMP:  Recent Labs   Lab 01/31/20 0435 02/01/20 0332 02/02/20 0328 02/02/20  0600 02/02/20  1237   *  --  179*  --  162*  --   --    CALCIUM 8.8  --  8.8  --  8.6*  --   --    ALBUMIN 2.3*  2.3*  --  2.2*  --  2.3*  --   --    PROT 6.1  6.1  --  6.0  --  5.9*  --   --    *  --  151*  --  148*  --   --    K 4.2   < > 4.1   < > 3.9 3.7 4.3   CO2 23  --  22*  --  24  --   --    *  --  116*  --  113*  --   --    BUN 77*  --  70*  --  67*  --   --    CREATININE 2.2*  --  2.3*  --  2.3*  --   --    ALKPHOS 47*  47*  --  44*  --  43*  --   --    ALT 14  14  --  17  --  19  --   --    AST 36  36  --  37  --  30  --   --    BILITOT 0.8  0.8  --  0.6  --  0.6  --   --     < > = values in this interval not displayed.      Coagulation:   Recent Labs   Lab 01/31/20 0435 02/01/20 0332 02/01/20  2241 02/02/20 0328 02/02/20  0921   INR 1.4*  --  1.5*  --   --  1.6*  --    APTT 31.2   < > 46.6*   < > 49.1* 48.9* 43.8*    < > = values in this interval not displayed.     LDH:  Recent Labs   Lab 01/31/20 0435 02/01/20 0332 02/02/20 0328   * 394* 423*     Microbiology:  Microbiology Results (last 7 days)     Procedure Component Value Units Date/Time    Blood culture [[id number]] Collected:  01/31/20 2215    Order Status:  Completed Specimen:  Blood from Peripheral, Forearm, Right Updated:  02/02/20 0613     Blood Culture, Routine No Growth to date      No Growth to date    Blood culture [[id number]] Collected:  01/31/20 2220    Order Status:  Completed Specimen:  Blood from Peripheral, Wrist, Left Updated:  02/02/20 0613     Blood Culture, Routine No Growth to date      No Growth to date          I have reviewed all pertinent labs within the past 24 hours.    Estimated Creatinine Clearance: 42.4 mL/min (A) (based on SCr of 2.3 mg/dL (H)).    Diagnostic Results:  I have reviewed and interpreted all pertinent imaging  results/findings within the past 24 hours.

## 2020-02-02 NOTE — PROGRESS NOTES
[facility]  Heart Transplant  Progress Note    Patient Name: Tae Delgado  MRN: [id number]  Admission Date: 1/9/2020  Hospital Length of Stay: 24 days  Attending Physician: Ino Schmitt MD  Primary Care Provider: Elham Pearson MD  Principal Problem:LVAD (left ventricular assist device) present    Subjective:     Interval History: extubated cvp 14 on lasix     Continuous Infusions:   dexmedetomidine (PRECEDEX) infusion Stopped (02/01/20 1000)    epinephrine 0.05 mcg/kg/min (02/02/20 1300)    furosemide (LASIX) 2 mg/mL infusion (non-titrating) 2.5 mg/hr (02/02/20 1300)    heparin (porcine) in 5 % dex 1,600 Units/hr (02/02/20 1300)    niCARdipine Stopped (01/31/20 2100)    nitric oxide gas      propofol Stopped (01/29/20 1700)     Scheduled Meds:   amiodarone  400 mg Per NG tube TID    amLODIPine  10 mg Per NG tube Daily    aspirin  325 mg Oral Daily    atorvastatin  80 mg Oral QHS    balsam peru-castor oil   Topical (Top) BID    digoxin  0.125 mg Oral Daily    docusate sodium  200 mg Oral QHS    ferrous gluconate  324 mg Oral Daily with breakfast    magnesium oxide  400 mg Per NG tube TID    pantoprazole  40 mg Intravenous Daily    polyethylene glycol  100 mL Per NG tube 6 times per day    sodium chloride 0.9%  3 mL Intravenous Q8H    vancomycin (VANCOCIN) IVPB  750 mg Intravenous Q24H    warfarin  1 mg Oral Once     PRN Meds:albumin human 5%, albuterol sulfate, bisacodyL, dexmedetomidine (PRECEDEX) infusion, Dextrose 10% Bolus, Dextrose 10% Bolus, Dextrose 10% Bolus, glucagon (human recombinant), hydrALAZINE, HYDROmorphone, insulin aspart U-100, magnesium hydroxide 400 mg/5 ml, magnesium sulfate IVPB, oxyCODONE, oxyCODONE, sodium chloride 0.9%    Review of patient's allergies indicates:   Allergen Reactions    Biopatch [chlorhexidine gluconate]      Site burning    Dobutamine in d5w      Tachycardia, tremors, SOB, flushing    Percocet [oxycodone-acetaminophen] Itching     Penicillins Rash     Cefepime given on 1/23/2020 without issue     Objective:     Vital Signs (Most Recent):  Temp: 97.6 °F (36.4 °C) (02/02/20 1100)  Pulse: 92 (02/02/20 1330)  Resp: 20 (02/01/20 2015)  BP: (!) 78/0 (02/02/20 1100)  SpO2: 100 % (02/02/20 1330) Vital Signs (24h Range):  Temp:  [97.6 °F (36.4 °C)-98.1 °F (36.7 °C)] 97.6 °F (36.4 °C)  Pulse:  [] 92  Resp:  [20] 20  SpO2:  [94 %-100 %] 100 %  BP: (78-98)/(0) 78/0  Arterial Line BP: ()/(60-81) 95/78     Patient Vitals for the past 72 hrs (Last 3 readings):   Weight   02/02/20 0521 107.2 kg (236 lb 5.3 oz)   02/01/20 0432 107.3 kg (236 lb 8.9 oz)   01/31/20 0315 106.2 kg (234 lb 2.1 oz)     Body mass index is 33.91 kg/m².      Intake/Output Summary (Last 24 hours) at 2/2/2020 1348  Last data filed at 2/2/2020 1300  Gross per 24 hour   Intake 5071.5 ml   Output 2745 ml   Net 2326.5 ml       Hemodynamic Parameters:         Physical Exam   Constitutional: He is oriented to person, place, and time. He appears well-developed and well-nourished.   HENT:   Head: Normocephalic and atraumatic.   Mouth/Throat: No oropharyngeal exudate.   NGT in place   Eyes: Pupils are equal, round, and reactive to light.   Neck: Normal range of motion. Neck supple.   Left IJ double lumen + introducer in place, dressing CLD  Right IJ triple lumen, dressing CLD   Cardiovascular:   Irregular tachyarrythmia. Paced, LVAD hum.  Chest close with dressing in place   Pulmonary/Chest: Breath sounds normal. He has no wheezes. He has no rales.   Breathing comfortably on 4L NC.   Abdominal: Soft. He exhibits no distension.   Musculoskeletal: He exhibits no edema or deformity.   Neurological: He is alert and oriented to person, place, and time.   Skin: Skin is warm and dry.       Significant Labs:  CBC:  Recent Labs   Lab 01/31/20  0435  02/01/20  0332  02/02/20  0328 02/02/20  0423 02/02/20  0801   WBC 17.56*  --  17.42*  --  20.41*  --   --    RBC 3.48*  --  3.22*  --  3.24*  --    --    HGB 9.6*  --  9.0*  --  9.0*  --   --    HCT 32.8*   < > 30.5*   < > 30.8* 31* 38     --  241  --  262  --   --    MCV 94  --  95  --  95  --   --    MCH 27.6  --  28.0  --  27.8  --   --    MCHC 29.3*  --  29.5*  --  29.2*  --   --     < > = values in this interval not displayed.     BNP:  Recent Labs   Lab 01/27/20 0400 01/29/20 0508 01/31/20 0435   BNP 1,140* 320* 420*     CMP:  Recent Labs   Lab 01/31/20 0435 02/01/20 0332 02/02/20 0328 02/02/20  0600 02/02/20  1237   *  --  179*  --  162*  --   --    CALCIUM 8.8  --  8.8  --  8.6*  --   --    ALBUMIN 2.3*  2.3*  --  2.2*  --  2.3*  --   --    PROT 6.1  6.1  --  6.0  --  5.9*  --   --    *  --  151*  --  148*  --   --    K 4.2   < > 4.1   < > 3.9 3.7 4.3   CO2 23  --  22*  --  24  --   --    *  --  116*  --  113*  --   --    BUN 77*  --  70*  --  67*  --   --    CREATININE 2.2*  --  2.3*  --  2.3*  --   --    ALKPHOS 47*  47*  --  44*  --  43*  --   --    ALT 14  14  --  17  --  19  --   --    AST 36  36  --  37  --  30  --   --    BILITOT 0.8  0.8  --  0.6  --  0.6  --   --     < > = values in this interval not displayed.      Coagulation:   Recent Labs   Lab 01/31/20 0435 02/01/20 0332 02/01/20  2241 02/02/20 0328 02/02/20  0921   INR 1.4*  --  1.5*  --   --  1.6*  --    APTT 31.2   < > 46.6*   < > 49.1* 48.9* 43.8*    < > = values in this interval not displayed.     LDH:  Recent Labs   Lab 01/31/20  0435 02/01/20  0332 02/02/20  0328   * 394* 423*     Microbiology:  Microbiology Results (last 7 days)     Procedure Component Value Units Date/Time    Blood culture [[id number]] Collected:  01/31/20 2215    Order Status:  Completed Specimen:  Blood from Peripheral, Forearm, Right Updated:  02/02/20 0613     Blood Culture, Routine No Growth to date      No Growth to date    Blood culture [[id number]] Collected:  01/31/20 2220    Order Status:  Completed Specimen:  Blood from Peripheral, Wrist, Left Updated:   02/02/20 0613     Blood Culture, Routine No Growth to date      No Growth to date          I have reviewed all pertinent labs within the past 24 hours.    Estimated Creatinine Clearance: 42.4 mL/min (A) (based on SCr of 2.3 mg/dL (H)).    Diagnostic Results:  I have reviewed and interpreted all pertinent imaging results/findings within the past 24 hours.    Assessment and Plan:       55 y.o. WM with history of NICMP diagnosed in 2010, ICD, LV thrombus (with prior splenic and renal emboli), Embolic  CVA , paroxysmal atrial fib, HTN, HLP  presents for  F/U today to clinic and he was volume overloaded on exam .  He states he had 3 admission in the last 3 months for volume overload. He started having more SOB on exertion since one month. Also endorses of Orthopnea since last one month. Alble to walk only 150 ft. He also has Bilateral lower extremity edema. He was admitted here in 2017 for ADHD here at [facility] and [facility] during that admission showed PCWP 40 and CVP 17. Currently denies chest pain, lightheadedness     * LVAD (left ventricular assist device) present  - S/P DT HM3 with closure of AV and repair of MV for regurg 1/23/20.   - CTS primary  - Taken back to OR 1/24 for possible RVAD placement which was not done. Patient remained hemodynamically stable in OR. Wash out on 1/27. Chest closed 1/29. Remains intubated and sedated.   - CVP 14  - Currently hemodynamically stable on current ggts  - Current speed 5300    Procedure: Device Interrogation Including analysis of device parameters  Current Settings: Ventricular Assist Device  Review of device function is stable/unstabe    TXP LVAD INTERROGATIONS 2/2/2020 2/2/2020 2/2/2020 2/2/2020 2/2/2020 2/2/2020 2/2/2020   Type HeartMate3 HeartMate3 HeartMate3 HeartMate3 HeartMate3 HeartMate3 HeartMate3   Flow 4.2 4.2 4.2 4.5 4.3 4.3 4.0   Speed 5300 5300 5300 5300 5300 5300 5300   PI 3.0 3.0 3.1 3.2 3.3 3.4 3.6   Power (Berry) 3.8 3.8 3.8 3.8 3.8 3.8 3.8   LSL 4742 4656  4900 4900 4900 4900 4900   Pulsatility Intermittent pulse Intermittent pulse Intermittent pulse Intermittent pulse Intermittent pulse Intermittent pulse Intermittent pulse       Coagulopathy  - Appreciate Hem/Onc's help. No evidence of underlying coagulopathy (h/o LV thrombus with splenic and renal emboli, h/o embolic CVA)    NSVT (nonsustained ventricular tachycardia)  - Avoid digoxin, agree with restarting Amiodarone    Hepatic congestion  - Liver US on 1/11 unremarkable    ICD (implantable cardioverter-defibrillator) in place  - S/P Biotronik dual chamber ICD    Right lower lobe pulmonary nodule  - Incidental finding on CT chest/abd/pelvis on 1/12. Pulmonology consulted, and rec repeat CT of chest in 3 months  - Will need to follow-up in pulmonary clinic.     Acute kidney injury superimposed on chronic kidney disease  - Creatinine on admit 2.6 (baseline ~ 1.8). BUN/Creatinine today 86/2.4  - Nephrology consulted and following    Acute on chronic combined systolic and diastolic heart failure, NYHA class 4  - S/P DT HM3 with closure of AV and plication of MV for regurg 1/23/20 (See LVAD)  - NID dx'd 2010  - hemodynamically stable on current ggts per CTS  - See above        Paroxysmal atrial fibrillation  - Intermittently in A fib since surgery, currently paced at 90   - AC per CTS  - agree with discontinuing digoxin for rate control  - Was on Amiodarone infusion --> lidocaine, now on PO amio    Left ventricular thrombus without MI  - H/O LV thrombus with h/o splenic and renal emboli as well as embolic CVA  - Limited TTE done here 1/13 showed no thrombus  - JACINTO 1/23 intra op showed resolution of DAMON thrombus  - AC per CTS          MARBELLA Horta  Heart Transplant  [facility]

## 2020-02-02 NOTE — PLAN OF CARE
Bedside swallow assessment completed. Recommend puree diet with thin liquids. Verbal order received from team. ST to f/u for diet tolerance and advancement. Margaret Goyal CCC-SLP 2/2/2020 11:41 AM

## 2020-02-02 NOTE — PLAN OF CARE
Problem: Adult Inpatient Plan of Care  Goal: Plan of Care Review  Outcome: Ongoing, Progressing     Pt oriented to self, place, and time, but disoriented to situation. Attempts to reorient throughout day. Tmax 99.6F. Pt on 4L NC and 5 ppm of nitric, O2 sats 99%. CVP 10, 10, and 12. % paced at 90. MAP 60-85. Doppler pressures done Q4h. Lasix gtt @ 2.5 mg/h and epi gtt @ 0.05 mcg/kg/min. Heparin @ 1500 U/h, last PTT therapeutic at 48.1, next PTT at 22:30. TF increased to goal @ 35 cc/h, minimal residuals. Pleural CT with 80 cc serous output. Mediastinal CT with 50 cc serosanguineous output. UOP  cc/h. VAD HM 3 speed 5300, Flows 3.8-4, PI 3.7-5.1, and Power 3.7-3.8. VAD dsg change done by RN, and taught to significant other. Labs monitored, lytes replaced per order. Plan of care reviewed with pt and family. VSS at this time. Will continue to monitor. See flowsheet for full assessment details.

## 2020-02-02 NOTE — ASSESSMENT & PLAN NOTE
Tae Delgado is a 59 y.o. 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 Approved for DT LVAD. Went to OR on 1/21 and found to have DAMON and LV thrombus and case was aborted. Pt was placed on a heparin gtt and thrombus had dissipated on repeat JACINTO on 1/22. Pt underwent LVAD placement on 1/23. Chest closure and re-exploration on 1/24. Returned from OR with chest open on 1/24. Attempted chest closure on 1/27, returned to SICU with chest open.     Neuro:  - Extubated, alert and oriented.  - Prn oxy, dilaudid for pain control    CVS:   - Current LVAD flow @ 5400 with appropriate PIs   - Wean vasopressors per CTS  - ICD turned on, EP service consulted for assistance with arrythmia  - Digoxin 0.125 daily  - Wean [place], currently at 10/5 ppm (extubated with nitric in place)    Pulm:  - Breathing comfortably on 4L NC; wean as able  - ABGs prn  - CXR daily  - Monitor CT output, no overt signs of bleeding    GI:  - TF formula change to peptamen (lower sodium formula)  - Protonix 40 daily  - Docusate    Endo:   - Insulin off now, SSI  - Endocrine consulted, appreciate their services    Renal:  - Strict I/O  - Trend BUN/Cr, still elevated but stable  - Lasix gtt @ 2.5      FENGI:  - Monitor labs  - Replace per protocol  - Switching all drips to D5 (elevated Na)  - Increase free water boluses frequency to q4    Heme:  - No blood products needed during the OR  - H/H stable overnight    ID:   - Vanc  - Cultured overnight for continued fevers; follow up cultures  - Follow WBC  - Afebrile overnight    PPx:  - Hep gtt, aPTT goal of 45-54,   - coumadin 2 given 2/1/2020  - GI ppx    Dispo:  - Cont SICU care, wean oxygen

## 2020-02-02 NOTE — PROGRESS NOTES
Dr. An notifed CVP 14 flat. UO  ml/hr. No vad changes. MAP 86-88, prn hydralazine given. Lasix 2.5, epi 0.05. MD ordered 20 mg lasix IVP. Will continue to monitor.

## 2020-02-02 NOTE — PLAN OF CARE
Pt remains AAO x 4. NC 4L nitric 5 ppm. O2 sat >97%   Afebrile.  HR paced @ 90 bpm.   Map 60-85 mmhg maintained.  .CVP 13 flat..  MAHMOOD; follows commands. Breath sounds  coarse, diminished. Minimal drainage to chest tubes. Fluctuation noted to meds ct. LVAD HM3, speed 5300. No alarms this shift. Abd soft, nontender, hypoactive bowel sounds x4 quadrants. Tolerating tube feeds @ 35 ml/hr. Scant residuals. Passing gas. No bm noted. UO >60 ml/hr. Pulses dopplerable x 4 extremities. Cap refill <3 sec. Remains on  lasix, heparin, epi.  Turned/repositioned q 2 hours. No new skin breakdown noted. linen changed. Pt and family updated on POC. See chart and doc flowsheet for details.

## 2020-02-02 NOTE — PROGRESS NOTES
02/02/2020  Renaldo Miles    Current provider:  Ino Schmitt MD      I, Renaldo Miles, rounded on Tae Delgado to ensure all mechanical assist device settings (IABP or VAD) were appropriate and all parameters were within limits.  I was able to ensure all back up equipment was present, the staff had no issues, and the Perfusion Department daily rounding was complete.    11:26 AM

## 2020-02-02 NOTE — PLAN OF CARE
Problem: Adult Inpatient Plan of Care  Goal: Plan of Care Review  Outcome: Ongoing, Progressing     Pt AAOx4. Afebrile. Pt on 4L NC and 5 ppm of nitric, O2 sats 99%. CVP 13, 14, and 15. 20 mg IVP Lasix given. % paced at 90. MAP maintained 60-85 with PRN hydralazine. Doppler pressures done Q4h. Lasix gtt @ 2.5 mg/h and epi gtt @ 0.04 mcg/kg/min. Heparin increased to 1700 U/h, PTT goal 45-54. TF restarted at trickle feeds. Pt worked with SLP and advanced to pureed dysphagia diet. Pleural CT with 170 cc serous output. Mediastinal CT with 80 cc serosanguineous output, fluctuation noted. UOP  cc/h. VAD HM 3 speed 5300, Flows 3.9-4.5, PI 2.8-4.5, and Power 3.8. VAD dsg change done by RN, and taught to significant other. Pt worked with PT/OT. Labs monitored, lytes replaced per order. Plan of care reviewed with pt and family. VSS at this time. Will continue to monitor. See flowsheet for full assessment details.

## 2020-02-03 LAB
ALBUMIN SERPL BCP-MCNC: 2.2 G/DL (ref 3.5–5.2)
ALBUMIN SERPL BCP-MCNC: 2.2 G/DL (ref 3.5–5.2)
ALLENS TEST: ABNORMAL
ALLENS TEST: NORMAL
ALP SERPL-CCNC: 44 U/L (ref 55–135)
ALP SERPL-CCNC: 44 U/L (ref 55–135)
ALT SERPL W/O P-5'-P-CCNC: 18 U/L (ref 10–44)
ALT SERPL W/O P-5'-P-CCNC: 18 U/L (ref 10–44)
ANION GAP SERPL CALC-SCNC: 11 MMOL/L (ref 8–16)
ANION GAP SERPL CALC-SCNC: 8 MMOL/L (ref 8–16)
APTT BLDCRRT: 39.6 SEC (ref 21–32)
APTT BLDCRRT: 43.9 SEC (ref 21–32)
APTT BLDCRRT: 48.6 SEC (ref 21–32)
APTT BLDCRRT: 59.9 SEC (ref 21–32)
ASCENDING AORTA: 2.73 CM
AST SERPL-CCNC: 24 U/L (ref 10–40)
AST SERPL-CCNC: 24 U/L (ref 10–40)
BASOPHILS # BLD AUTO: 0.05 K/UL (ref 0–0.2)
BASOPHILS NFR BLD: 0.3 % (ref 0–1.9)
BILIRUB DIRECT SERPL-MCNC: 0.4 MG/DL (ref 0.1–0.3)
BILIRUB SERPL-MCNC: 0.6 MG/DL (ref 0.1–1)
BILIRUB SERPL-MCNC: 0.6 MG/DL (ref 0.1–1)
BNP SERPL-MCNC: 470 PG/ML (ref 0–99)
BSA FOR ECHO PROCEDURE: 2.37 M2
BUN SERPL-MCNC: 56 MG/DL (ref 6–20)
BUN SERPL-MCNC: 65 MG/DL (ref 6–20)
CALCIUM SERPL-MCNC: 8.4 MG/DL (ref 8.7–10.5)
CALCIUM SERPL-MCNC: 8.5 MG/DL (ref 8.7–10.5)
CHLORIDE SERPL-SCNC: 111 MMOL/L (ref 95–110)
CHLORIDE SERPL-SCNC: 112 MMOL/L (ref 95–110)
CO2 SERPL-SCNC: 22 MMOL/L (ref 23–29)
CO2 SERPL-SCNC: 23 MMOL/L (ref 23–29)
CREAT SERPL-MCNC: 1.9 MG/DL (ref 0.5–1.4)
CREAT SERPL-MCNC: 2 MG/DL (ref 0.5–1.4)
CRP SERPL-MCNC: 59.9 MG/L (ref 0–8.2)
CV ECHO LV RWT: 0.31 CM
DELSYS: ABNORMAL
DELSYS: NORMAL
DIFFERENTIAL METHOD: ABNORMAL
DIGOXIN SERPL-MCNC: 1.5 NG/ML (ref 0.8–2)
DIGOXIN SERPL-MCNC: 1.9 NG/ML (ref 0.8–2)
DOP CALC LVOT AREA: 3.5 CM2
DOP CALC LVOT DIAMETER: 2.11 CM
ECHO LV POSTERIOR WALL: 0.91 CM (ref 0.6–1.1)
EOSINOPHIL # BLD AUTO: 0.3 K/UL (ref 0–0.5)
EOSINOPHIL NFR BLD: 1.4 % (ref 0–8)
ERYTHROCYTE [DISTWIDTH] IN BLOOD BY AUTOMATED COUNT: 18.4 % (ref 11.5–14.5)
EST. GFR  (AFRICAN AMERICAN): 41 ML/MIN/1.73 M^2
EST. GFR  (AFRICAN AMERICAN): 43.6 ML/MIN/1.73 M^2
EST. GFR  (NON AFRICAN AMERICAN): 35.5 ML/MIN/1.73 M^2
EST. GFR  (NON AFRICAN AMERICAN): 37.7 ML/MIN/1.73 M^2
FIO2: 36
FLOW: 1
FLOW: 1
FLOW: 4
FRACTIONAL SHORTENING: 10 % (ref 28–44)
GLUCOSE SERPL-MCNC: 117 MG/DL (ref 70–110)
GLUCOSE SERPL-MCNC: 135 MG/DL (ref 70–110)
HCO3 UR-SCNC: 23.1 MMOL/L (ref 24–28)
HCO3 UR-SCNC: 23.5 MMOL/L (ref 24–28)
HCO3 UR-SCNC: 23.6 MMOL/L (ref 24–28)
HCO3 UR-SCNC: 25 MMOL/L (ref 24–28)
HCO3 UR-SCNC: 26.5 MMOL/L (ref 24–28)
HCT VFR BLD AUTO: 29.3 % (ref 40–54)
HCT VFR BLD CALC: 24 %PCV (ref 36–54)
HCT VFR BLD CALC: 25 %PCV (ref 36–54)
HGB BLD-MCNC: 8.7 G/DL (ref 14–18)
IMM GRANULOCYTES # BLD AUTO: 0.46 K/UL (ref 0–0.04)
IMM GRANULOCYTES NFR BLD AUTO: 2.4 % (ref 0–0.5)
INR PPP: 1.5 (ref 0.8–1.2)
INTERVENTRICULAR SEPTUM: 0.64 CM (ref 0.6–1.1)
LA MAJOR: 6.98 CM
LA MINOR: 6.25 CM
LA WIDTH: 4.98 CM
LDH SERPL L TO P-CCNC: 0.6 MMOL/L (ref 0.36–1.25)
LDH SERPL L TO P-CCNC: 0.68 MMOL/L (ref 0.36–1.25)
LDH SERPL L TO P-CCNC: 0.68 MMOL/L (ref 0.36–1.25)
LDH SERPL L TO P-CCNC: 0.7 MMOL/L (ref 0.36–1.25)
LDH SERPL L TO P-CCNC: 409 U/L (ref 110–260)
LEFT ATRIUM SIZE: 5.05 CM
LEFT ATRIUM VOLUME INDEX: 61.4 ML/M2
LEFT ATRIUM VOLUME: 140.98 CM3
LEFT INTERNAL DIMENSION IN SYSTOLE: 5.35 CM (ref 2.1–4)
LEFT VENTRICLE DIASTOLIC VOLUME INDEX: 76.83 ML/M2
LEFT VENTRICLE DIASTOLIC VOLUME: 176.32 ML
LEFT VENTRICLE MASS INDEX: 77 G/M2
LEFT VENTRICLE SYSTOLIC VOLUME INDEX: 60.4 ML/M2
LEFT VENTRICLE SYSTOLIC VOLUME: 138.58 ML
LEFT VENTRICULAR INTERNAL DIMENSION IN DIASTOLE: 5.95 CM (ref 3.5–6)
LEFT VENTRICULAR MASS: 176.34 G
LYMPHOCYTES # BLD AUTO: 1.2 K/UL (ref 1–4.8)
LYMPHOCYTES NFR BLD: 6.5 % (ref 18–48)
MAGNESIUM SERPL-MCNC: 1.9 MG/DL (ref 1.6–2.6)
MAGNESIUM SERPL-MCNC: 1.9 MG/DL (ref 1.6–2.6)
MAGNESIUM SERPL-MCNC: 2.3 MG/DL (ref 1.6–2.6)
MAGNESIUM SERPL-MCNC: 2.4 MG/DL (ref 1.6–2.6)
MAGNESIUM SERPL-MCNC: 2.4 MG/DL (ref 1.6–2.6)
MAGNESIUM SERPL-MCNC: 2.7 MG/DL (ref 1.6–2.6)
MCH RBC QN AUTO: 28.1 PG (ref 27–31)
MCHC RBC AUTO-ENTMCNC: 29.7 G/DL (ref 32–36)
MCV RBC AUTO: 95 FL (ref 82–98)
METHEMOGLOBIN: 0.7 % (ref 0–3)
MODE: ABNORMAL
MODE: NORMAL
MONOCYTES # BLD AUTO: 1 K/UL (ref 0.3–1)
MONOCYTES NFR BLD: 5.4 % (ref 4–15)
NEUTROPHILS # BLD AUTO: 15.9 K/UL (ref 1.8–7.7)
NEUTROPHILS NFR BLD: 84 % (ref 38–73)
NRBC BLD-RTO: 0 /100 WBC
PCO2 BLDA: 32.7 MMHG (ref 35–45)
PCO2 BLDA: 33.7 MMHG (ref 35–45)
PCO2 BLDA: 36.9 MMHG (ref 35–45)
PCO2 BLDA: 39.5 MMHG (ref 35–45)
PCO2 BLDA: 42.5 MMHG (ref 35–45)
PH SMN: 7.4 [PH] (ref 7.35–7.45)
PH SMN: 7.41 [PH] (ref 7.35–7.45)
PH SMN: 7.41 [PH] (ref 7.35–7.45)
PH SMN: 7.46 [PH] (ref 7.35–7.45)
PH SMN: 7.46 [PH] (ref 7.35–7.45)
PHOSPHATE SERPL-MCNC: 2.8 MG/DL (ref 2.7–4.5)
PHOSPHATE SERPL-MCNC: 3 MG/DL (ref 2.7–4.5)
PLATELET # BLD AUTO: 268 K/UL (ref 150–350)
PMV BLD AUTO: 13.2 FL (ref 9.2–12.9)
PO2 BLDA: 105 MMHG (ref 80–100)
PO2 BLDA: 110 MMHG (ref 80–100)
PO2 BLDA: 33 MMHG (ref 40–60)
PO2 BLDA: 72 MMHG (ref 80–100)
PO2 BLDA: 94 MMHG (ref 80–100)
POC BE: -1 MMOL/L
POC BE: -1 MMOL/L
POC BE: 0 MMOL/L
POC BE: 0 MMOL/L
POC BE: 2 MMOL/L
POC IONIZED CALCIUM: 1.18 MMOL/L (ref 1.06–1.42)
POC IONIZED CALCIUM: 1.2 MMOL/L (ref 1.06–1.42)
POC IONIZED CALCIUM: 1.21 MMOL/L (ref 1.06–1.42)
POC IONIZED CALCIUM: 1.23 MMOL/L (ref 1.06–1.42)
POC SATURATED O2: 63 % (ref 95–100)
POC SATURATED O2: 95 % (ref 95–100)
POC SATURATED O2: 98 % (ref 95–100)
POC TCO2: 24 MMOL/L (ref 23–27)
POC TCO2: 25 MMOL/L (ref 23–27)
POC TCO2: 25 MMOL/L (ref 23–27)
POC TCO2: 26 MMOL/L (ref 23–27)
POC TCO2: 28 MMOL/L (ref 24–29)
POCT GLUCOSE: 118 MG/DL (ref 70–110)
POCT GLUCOSE: 121 MG/DL (ref 70–110)
POCT GLUCOSE: 125 MG/DL (ref 70–110)
POCT GLUCOSE: 133 MG/DL (ref 70–110)
POCT GLUCOSE: 171 MG/DL (ref 70–110)
POTASSIUM BLD-SCNC: 3.3 MMOL/L (ref 3.5–5.1)
POTASSIUM BLD-SCNC: 3.3 MMOL/L (ref 3.5–5.1)
POTASSIUM BLD-SCNC: 3.5 MMOL/L (ref 3.5–5.1)
POTASSIUM BLD-SCNC: 3.7 MMOL/L (ref 3.5–5.1)
POTASSIUM SERPL-SCNC: 3.4 MMOL/L (ref 3.5–5.1)
POTASSIUM SERPL-SCNC: 3.6 MMOL/L (ref 3.5–5.1)
POTASSIUM SERPL-SCNC: 3.7 MMOL/L (ref 3.5–5.1)
POTASSIUM SERPL-SCNC: 3.8 MMOL/L (ref 3.5–5.1)
POTASSIUM SERPL-SCNC: 3.9 MMOL/L (ref 3.5–5.1)
POTASSIUM SERPL-SCNC: 4.2 MMOL/L (ref 3.5–5.1)
POTASSIUM SERPL-SCNC: 4.2 MMOL/L (ref 3.5–5.1)
PROT SERPL-MCNC: 5.8 G/DL (ref 6–8.4)
PROT SERPL-MCNC: 5.8 G/DL (ref 6–8.4)
PROTHROMBIN TIME: 14.9 SEC (ref 9–12.5)
RA MAJOR: 5.49 CM
RA WIDTH: 4.45 CM
RBC # BLD AUTO: 3.1 M/UL (ref 4.6–6.2)
SAMPLE: ABNORMAL
SAMPLE: NORMAL
SINUS: 3.02 CM
SITE: ABNORMAL
SITE: NORMAL
SODIUM BLD-SCNC: 142 MMOL/L (ref 136–145)
SODIUM BLD-SCNC: 143 MMOL/L (ref 136–145)
SODIUM BLD-SCNC: 143 MMOL/L (ref 136–145)
SODIUM BLD-SCNC: 144 MMOL/L (ref 136–145)
SODIUM SERPL-SCNC: 143 MMOL/L (ref 136–145)
SODIUM SERPL-SCNC: 144 MMOL/L (ref 136–145)
SP02: 100
SP02: 100
SP02: 99
STJ: 2.93 CM
TDI LATERAL: 0.1 M/S
TDI SEPTAL: 0.06 M/S
TDI: 0.08 M/S
VANCOMYCIN SERPL-MCNC: 13.4 UG/ML
WBC # BLD AUTO: 18.88 K/UL (ref 3.9–12.7)

## 2020-02-03 PROCEDURE — 99900035 HC TECH TIME PER 15 MIN (STAT)

## 2020-02-03 PROCEDURE — 84100 ASSAY OF PHOSPHORUS: CPT | Mod: 91

## 2020-02-03 PROCEDURE — 63600175 PHARM REV CODE 636 W HCPCS: Performed by: STUDENT IN AN ORGANIZED HEALTH CARE EDUCATION/TRAINING PROGRAM

## 2020-02-03 PROCEDURE — C9113 INJ PANTOPRAZOLE SODIUM, VIA: HCPCS | Performed by: SURGERY

## 2020-02-03 PROCEDURE — 82803 BLOOD GASES ANY COMBINATION: CPT

## 2020-02-03 PROCEDURE — 93750 INTERROGATION VAD IN PERSON: CPT | Mod: ,,, | Performed by: INTERNAL MEDICINE

## 2020-02-03 PROCEDURE — 99232 PR SUBSEQUENT HOSPITAL CARE,LEVL II: ICD-10-PCS | Mod: ,,, | Performed by: NURSE PRACTITIONER

## 2020-02-03 PROCEDURE — 82565 ASSAY OF CREATININE: CPT

## 2020-02-03 PROCEDURE — 83735 ASSAY OF MAGNESIUM: CPT | Mod: 91

## 2020-02-03 PROCEDURE — C1751 CATH, INF, PER/CENT/MIDLINE: HCPCS

## 2020-02-03 PROCEDURE — 84132 ASSAY OF SERUM POTASSIUM: CPT | Mod: 91

## 2020-02-03 PROCEDURE — 83050 HGB METHEMOGLOBIN QUAN: CPT

## 2020-02-03 PROCEDURE — 25000003 PHARM REV CODE 250: Performed by: STUDENT IN AN ORGANIZED HEALTH CARE EDUCATION/TRAINING PROGRAM

## 2020-02-03 PROCEDURE — 97535 SELF CARE MNGMENT TRAINING: CPT

## 2020-02-03 PROCEDURE — 93750 PR INTERROGATE VENT ASSIST DEV, IN PERSON, W PHYSICIAN ANALYSIS: ICD-10-PCS | Mod: ,,, | Performed by: INTERNAL MEDICINE

## 2020-02-03 PROCEDURE — 83880 ASSAY OF NATRIURETIC PEPTIDE: CPT

## 2020-02-03 PROCEDURE — 25000003 PHARM REV CODE 250: Performed by: SURGERY

## 2020-02-03 PROCEDURE — 99232 SBSQ HOSP IP/OBS MODERATE 35: CPT | Mod: ,,, | Performed by: NURSE PRACTITIONER

## 2020-02-03 PROCEDURE — 80048 BASIC METABOLIC PNL TOTAL CA: CPT

## 2020-02-03 PROCEDURE — A4216 STERILE WATER/SALINE, 10 ML: HCPCS | Performed by: SURGERY

## 2020-02-03 PROCEDURE — 36569 INSJ PICC 5 YR+ W/O IMAGING: CPT

## 2020-02-03 PROCEDURE — 82800 BLOOD PH: CPT

## 2020-02-03 PROCEDURE — 63600367 HC NITRIC OXIDE PER HOUR

## 2020-02-03 PROCEDURE — 80076 HEPATIC FUNCTION PANEL: CPT

## 2020-02-03 PROCEDURE — 76937 US GUIDE VASCULAR ACCESS: CPT

## 2020-02-03 PROCEDURE — 63600175 PHARM REV CODE 636 W HCPCS: Performed by: INTERNAL MEDICINE

## 2020-02-03 PROCEDURE — 92526 ORAL FUNCTION THERAPY: CPT

## 2020-02-03 PROCEDURE — 85014 HEMATOCRIT: CPT

## 2020-02-03 PROCEDURE — 63600175 PHARM REV CODE 636 W HCPCS: Performed by: SURGERY

## 2020-02-03 PROCEDURE — 84132 ASSAY OF SERUM POTASSIUM: CPT

## 2020-02-03 PROCEDURE — 20000000 HC ICU ROOM

## 2020-02-03 PROCEDURE — 86140 C-REACTIVE PROTEIN: CPT

## 2020-02-03 PROCEDURE — 85610 PROTHROMBIN TIME: CPT

## 2020-02-03 PROCEDURE — 27000221 HC OXYGEN, UP TO 24 HOURS

## 2020-02-03 PROCEDURE — 82330 ASSAY OF CALCIUM: CPT

## 2020-02-03 PROCEDURE — 99233 PR SUBSEQUENT HOSPITAL CARE,LEVL III: ICD-10-PCS | Mod: ,,, | Performed by: INTERNAL MEDICINE

## 2020-02-03 PROCEDURE — A4216 STERILE WATER/SALINE, 10 ML: HCPCS | Performed by: THORACIC SURGERY (CARDIOTHORACIC VASCULAR SURGERY)

## 2020-02-03 PROCEDURE — 99291 PR CRITICAL CARE, E/M 30-74 MINUTES: ICD-10-PCS | Mod: GC,,, | Performed by: SURGERY

## 2020-02-03 PROCEDURE — 25000003 PHARM REV CODE 250: Performed by: THORACIC SURGERY (CARDIOTHORACIC VASCULAR SURGERY)

## 2020-02-03 PROCEDURE — 63600175 PHARM REV CODE 636 W HCPCS: Performed by: THORACIC SURGERY (CARDIOTHORACIC VASCULAR SURGERY)

## 2020-02-03 PROCEDURE — 84100 ASSAY OF PHOSPHORUS: CPT

## 2020-02-03 PROCEDURE — 80202 ASSAY OF VANCOMYCIN: CPT

## 2020-02-03 PROCEDURE — 94761 N-INVAS EAR/PLS OXIMETRY MLT: CPT

## 2020-02-03 PROCEDURE — 27000248 HC VAD-ADDITIONAL DAY

## 2020-02-03 PROCEDURE — 99233 SBSQ HOSP IP/OBS HIGH 50: CPT | Mod: ,,, | Performed by: INTERNAL MEDICINE

## 2020-02-03 PROCEDURE — 85025 COMPLETE CBC W/AUTO DIFF WBC: CPT

## 2020-02-03 PROCEDURE — 80162 ASSAY OF DIGOXIN TOTAL: CPT | Mod: 91

## 2020-02-03 PROCEDURE — 97110 THERAPEUTIC EXERCISES: CPT

## 2020-02-03 PROCEDURE — 83615 LACTATE (LD) (LDH) ENZYME: CPT

## 2020-02-03 PROCEDURE — 83605 ASSAY OF LACTIC ACID: CPT

## 2020-02-03 PROCEDURE — 85730 THROMBOPLASTIN TIME PARTIAL: CPT | Mod: 91

## 2020-02-03 PROCEDURE — 99291 CRITICAL CARE FIRST HOUR: CPT | Mod: GC,,, | Performed by: SURGERY

## 2020-02-03 PROCEDURE — 80053 COMPREHEN METABOLIC PANEL: CPT

## 2020-02-03 PROCEDURE — 97530 THERAPEUTIC ACTIVITIES: CPT

## 2020-02-03 PROCEDURE — 84295 ASSAY OF SERUM SODIUM: CPT

## 2020-02-03 PROCEDURE — 37799 UNLISTED PX VASCULAR SURGERY: CPT

## 2020-02-03 PROCEDURE — 25000003 PHARM REV CODE 250: Performed by: PHYSICIAN ASSISTANT

## 2020-02-03 RX ORDER — MAGNESIUM SULFATE HEPTAHYDRATE 40 MG/ML
4 INJECTION, SOLUTION INTRAVENOUS
Status: DISCONTINUED | OUTPATIENT
Start: 2020-02-03 | End: 2020-02-07

## 2020-02-03 RX ORDER — DIGOXIN 125 MCG
0.25 TABLET ORAL ONCE
Status: DISCONTINUED | OUTPATIENT
Start: 2020-02-03 | End: 2020-02-04

## 2020-02-03 RX ORDER — POLYETHYLENE GLYCOL 3350 17 G/17G
17 POWDER, FOR SOLUTION ORAL DAILY
Status: DISCONTINUED | OUTPATIENT
Start: 2020-02-03 | End: 2020-02-13

## 2020-02-03 RX ORDER — WARFARIN 2 MG/1
2 TABLET ORAL ONCE
Status: COMPLETED | OUTPATIENT
Start: 2020-02-03 | End: 2020-02-03

## 2020-02-03 RX ORDER — ACETAMINOPHEN 650 MG/20.3ML
650 LIQUID ORAL EVERY 6 HOURS
Status: DISCONTINUED | OUTPATIENT
Start: 2020-02-03 | End: 2020-02-11

## 2020-02-03 RX ORDER — POTASSIUM CHLORIDE 29.8 MG/ML
40 INJECTION INTRAVENOUS
Status: DISCONTINUED | OUTPATIENT
Start: 2020-02-03 | End: 2020-02-07

## 2020-02-03 RX ORDER — SODIUM CHLORIDE 0.9 % (FLUSH) 0.9 %
10 SYRINGE (ML) INJECTION
Status: DISCONTINUED | OUTPATIENT
Start: 2020-02-03 | End: 2020-02-17 | Stop reason: HOSPADM

## 2020-02-03 RX ORDER — POTASSIUM CHLORIDE 20 MEQ/1
40 TABLET, EXTENDED RELEASE ORAL ONCE
Status: COMPLETED | OUTPATIENT
Start: 2020-02-03 | End: 2020-02-03

## 2020-02-03 RX ORDER — FUROSEMIDE 10 MG/ML
10 INJECTION INTRAMUSCULAR; INTRAVENOUS ONCE
Status: COMPLETED | OUTPATIENT
Start: 2020-02-03 | End: 2020-02-03

## 2020-02-03 RX ORDER — POTASSIUM CHLORIDE 14.9 MG/ML
60 INJECTION INTRAVENOUS
Status: DISCONTINUED | OUTPATIENT
Start: 2020-02-03 | End: 2020-02-07

## 2020-02-03 RX ORDER — SODIUM CHLORIDE 0.9 % (FLUSH) 0.9 %
10 SYRINGE (ML) INJECTION EVERY 6 HOURS
Status: DISCONTINUED | OUTPATIENT
Start: 2020-02-03 | End: 2020-02-17 | Stop reason: HOSPADM

## 2020-02-03 RX ORDER — POTASSIUM CHLORIDE 14.9 MG/ML
20 INJECTION INTRAVENOUS ONCE
Status: COMPLETED | OUTPATIENT
Start: 2020-02-03 | End: 2020-02-03

## 2020-02-03 RX ORDER — DIGOXIN 125 MCG
0.25 TABLET ORAL ONCE
Status: COMPLETED | OUTPATIENT
Start: 2020-02-03 | End: 2020-02-03

## 2020-02-03 RX ORDER — MAGNESIUM SULFATE HEPTAHYDRATE 40 MG/ML
2 INJECTION, SOLUTION INTRAVENOUS
Status: DISCONTINUED | OUTPATIENT
Start: 2020-02-03 | End: 2020-02-07

## 2020-02-03 RX ADMIN — HEPARIN SODIUM 2000 UNITS/HR: 10000 INJECTION, SOLUTION INTRAVENOUS at 10:02

## 2020-02-03 RX ADMIN — Medication 10 ML: at 06:02

## 2020-02-03 RX ADMIN — MAGNESIUM SULFATE IN WATER 2 G: 40 INJECTION, SOLUTION INTRAVENOUS at 02:02

## 2020-02-03 RX ADMIN — POTASSIUM CHLORIDE 40 MEQ: 400 INJECTION, SOLUTION INTRAVENOUS at 11:02

## 2020-02-03 RX ADMIN — Medication 400 MG: at 04:02

## 2020-02-03 RX ADMIN — CASTOR OIL AND BALSAM, PERU: 788; 87 OINTMENT TOPICAL at 09:02

## 2020-02-03 RX ADMIN — Medication 400 MG: at 08:02

## 2020-02-03 RX ADMIN — FUROSEMIDE 5 MG/HR: 10 INJECTION, SOLUTION INTRAMUSCULAR; INTRAVENOUS at 10:02

## 2020-02-03 RX ADMIN — POTASSIUM CHLORIDE 40 MEQ: 1500 TABLET, EXTENDED RELEASE ORAL at 01:02

## 2020-02-03 RX ADMIN — POTASSIUM CHLORIDE 20 MEQ: 14.9 INJECTION, SOLUTION INTRAVENOUS at 04:02

## 2020-02-03 RX ADMIN — Medication 3 ML: at 10:02

## 2020-02-03 RX ADMIN — POLYETHYLENE GLYCOL 3350 17 G: 17 POWDER, FOR SOLUTION ORAL at 11:02

## 2020-02-03 RX ADMIN — DOCUSATE SODIUM 200 MG: 100 CAPSULE, LIQUID FILLED ORAL at 09:02

## 2020-02-03 RX ADMIN — AMLODIPINE BESYLATE 10 MG: 10 TABLET ORAL at 08:02

## 2020-02-03 RX ADMIN — POLYETHYLENE GLYCOL 3350, SODIUM SULFATE ANHYDROUS, SODIUM BICARBONATE, SODIUM CHLORIDE, POTASSIUM CHLORIDE 100 ML: 236; 22.74; 6.74; 5.86; 2.97 POWDER, FOR SOLUTION ORAL at 04:02

## 2020-02-03 RX ADMIN — EPINEPHRINE 0.03 MCG/KG/MIN: 1 INJECTION INTRAMUSCULAR; INTRAVENOUS; SUBCUTANEOUS at 01:02

## 2020-02-03 RX ADMIN — HEPARIN SODIUM 2100 UNITS/HR: 10000 INJECTION, SOLUTION INTRAVENOUS at 01:02

## 2020-02-03 RX ADMIN — Medication 3 ML: at 01:02

## 2020-02-03 RX ADMIN — MAGNESIUM SULFATE IN WATER 2 G: 40 INJECTION, SOLUTION INTRAVENOUS at 08:02

## 2020-02-03 RX ADMIN — POLYETHYLENE GLYCOL 3350, SODIUM SULFATE ANHYDROUS, SODIUM BICARBONATE, SODIUM CHLORIDE, POTASSIUM CHLORIDE 100 ML: 236; 22.74; 6.74; 5.86; 2.97 POWDER, FOR SOLUTION ORAL at 12:02

## 2020-02-03 RX ADMIN — DIGOXIN 0.25 MG: 125 TABLET ORAL at 01:02

## 2020-02-03 RX ADMIN — ASPIRIN 325 MG ORAL TABLET 325 MG: 325 PILL ORAL at 08:02

## 2020-02-03 RX ADMIN — FUROSEMIDE 2.5 MG/HR: 10 INJECTION, SOLUTION INTRAMUSCULAR; INTRAVENOUS at 06:02

## 2020-02-03 RX ADMIN — POLYETHYLENE GLYCOL 3350, SODIUM SULFATE ANHYDROUS, SODIUM BICARBONATE, SODIUM CHLORIDE, POTASSIUM CHLORIDE 100 ML: 236; 22.74; 6.74; 5.86; 2.97 POWDER, FOR SOLUTION ORAL at 11:02

## 2020-02-03 RX ADMIN — Medication 3 ML: at 05:02

## 2020-02-03 RX ADMIN — FUROSEMIDE 10 MG: 10 INJECTION, SOLUTION INTRAMUSCULAR; INTRAVENOUS at 11:02

## 2020-02-03 RX ADMIN — ATORVASTATIN CALCIUM 80 MG: 20 TABLET, FILM COATED ORAL at 08:02

## 2020-02-03 RX ADMIN — ACETAMINOPHEN 650 MG: 160 SOLUTION ORAL at 11:02

## 2020-02-03 RX ADMIN — ACETAMINOPHEN 650 MG: 160 SOLUTION ORAL at 06:02

## 2020-02-03 RX ADMIN — HUMAN ALBUMIN MICROSPHERES AND PERFLUTREN 0.66 MG: 10; .22 INJECTION, SOLUTION INTRAVENOUS at 02:02

## 2020-02-03 RX ADMIN — WARFARIN SODIUM 2 MG: 2 TABLET ORAL at 04:02

## 2020-02-03 RX ADMIN — EPINEPHRINE 0.03 MCG/KG/MIN: 1 INJECTION INTRAMUSCULAR; INTRAVENOUS; SUBCUTANEOUS at 10:02

## 2020-02-03 RX ADMIN — POTASSIUM CHLORIDE 60 MEQ: 14.9 INJECTION, SOLUTION INTRAVENOUS at 01:02

## 2020-02-03 RX ADMIN — PANTOPRAZOLE SODIUM 40 MG: 40 INJECTION, POWDER, FOR SOLUTION INTRAVENOUS at 08:02

## 2020-02-03 RX ADMIN — POLYETHYLENE GLYCOL 3350, SODIUM SULFATE ANHYDROUS, SODIUM BICARBONATE, SODIUM CHLORIDE, POTASSIUM CHLORIDE 100 ML: 236; 22.74; 6.74; 5.86; 2.97 POWDER, FOR SOLUTION ORAL at 08:02

## 2020-02-03 RX ADMIN — Medication 400 MG: at 09:02

## 2020-02-03 RX ADMIN — DIGOXIN 0.12 MG: 125 TABLET ORAL at 08:02

## 2020-02-03 RX ADMIN — POLYETHYLENE GLYCOL 3350, SODIUM SULFATE ANHYDROUS, SODIUM BICARBONATE, SODIUM CHLORIDE, POTASSIUM CHLORIDE 100 ML: 236; 22.74; 6.74; 5.86; 2.97 POWDER, FOR SOLUTION ORAL at 07:02

## 2020-02-03 RX ADMIN — VANCOMYCIN HYDROCHLORIDE 750 MG: 750 INJECTION, POWDER, LYOPHILIZED, FOR SOLUTION INTRAVENOUS at 09:02

## 2020-02-03 NOTE — PROGRESS NOTES
"Pt asleep with significant other and son at bedside.  Pt has been having "his nights and days mixed up" according to family.  Pt family deny any needs, Lavern reports working on VAD binder and reviewing education materials.  Denies any questions at this time. Will follow up with them soon.   "

## 2020-02-03 NOTE — PLAN OF CARE
Problem: Occupational Therapy Goal  Goal: Occupational Therapy Goal  Description  Goals to be met by: 2/16/2020     Patient will increase functional independence with ADLs by performing:    UE Dressing with Minimal Assistance.  LE Dressing with Minimal Assistance.  Grooming while standing with Minimal Assistance.  Toileting from bedside commode with Minimal Assistance for hygiene and clothing management.   Sitting at edge of bed x10 minutes with Minimal Assistance in prep for seated grooming tasks   Supine to sit with min A  Toilet transfer to bedside commode with Minimal Assistance.  Pt will perform LVAD management independently      Outcome: Ongoing, Progressing   The above goals remain appropriate. SEBASTIEN Mayberry  2/3/2020

## 2020-02-03 NOTE — PROGRESS NOTES
[facility]  Heart Transplant  Progress Note    Patient Name: Tae Delgado  MRN: [id number]  Admission Date: 1/9/2020  Hospital Length of Stay: 25 days  Attending Physician: Ino Schmitt MD  Primary Care Provider: Elham Pearson MD  Principal Problem:LVAD (left ventricular assist device) present    Subjective:     Interval History: still not eating much requesting soft foods  cvp 13     Continuous Infusions:   dexmedetomidine (PRECEDEX) infusion Stopped (02/01/20 1000)    epinephrine 0.03 mcg/kg/min (02/03/20 0900)    furosemide (LASIX) 2 mg/mL infusion (non-titrating) 5 mg/hr (02/03/20 0915)    heparin (porcine) in 5 % dex 1,900 Units/hr (02/03/20 0800)    niCARdipine Stopped (01/31/20 2100)    nitric oxide gas       Scheduled Meds:   amiodarone  400 mg Per NG tube TID    amLODIPine  10 mg Per NG tube Daily    aspirin  325 mg Oral Daily    atorvastatin  80 mg Oral QHS    balsam peru-castor oil   Topical (Top) BID    digoxin  0.125 mg Oral Daily    docusate sodium  200 mg Oral QHS    ferrous gluconate  324 mg Oral Daily with breakfast    magnesium oxide  400 mg Per NG tube TID    pantoprazole  40 mg Intravenous Daily    polyethylene glycol  100 mL Per NG tube 6 times per day    sodium chloride 0.9%  3 mL Intravenous Q8H    vancomycin (VANCOCIN) IVPB  750 mg Intravenous Q24H     PRN Meds:albumin human 5%, albuterol sulfate, bisacodyL, bisacodyL, dexmedetomidine (PRECEDEX) infusion, Dextrose 10% Bolus, Dextrose 10% Bolus, Dextrose 10% Bolus, glucagon (human recombinant), hydrALAZINE, HYDROmorphone, insulin aspart U-100, magnesium hydroxide 400 mg/5 ml, magnesium sulfate IVPB, oxyCODONE, oxyCODONE, sodium chloride 0.9%    Review of patient's allergies indicates:   Allergen Reactions    Biopatch [chlorhexidine gluconate]      Site burning    Dobutamine in d5w      Tachycardia, tremors, SOB, flushing    Percocet [oxycodone-acetaminophen] Itching    Penicillins Rash     Cefepime  given on 1/23/2020 without issue     Objective:     Vital Signs (Most Recent):  Temp: 97.2 °F (36.2 °C) (02/03/20 0700)  Pulse: 91 (02/03/20 0800)  Resp: 16 (02/03/20 0739)  BP: (!) 94/0 (02/03/20 0700)  SpO2: 98 % (02/03/20 0800) Vital Signs (24h Range):  Temp:  [96.7 °F (35.9 °C)-98.4 °F (36.9 °C)] 97.2 °F (36.2 °C)  Pulse:  [] 91  Resp:  [16] 16  SpO2:  [94 %-100 %] 98 %  BP: (78-94)/(0) 94/0  Arterial Line BP: ()/(59-85) 88/68     Patient Vitals for the past 72 hrs (Last 3 readings):   Weight   02/03/20 0419 113.6 kg (250 lb 7.1 oz)   02/02/20 0521 107.2 kg (236 lb 5.3 oz)   02/01/20 0432 107.3 kg (236 lb 8.9 oz)     Body mass index is 35.93 kg/m².      Intake/Output Summary (Last 24 hours) at 2/3/2020 0924  Last data filed at 2/3/2020 0900  Gross per 24 hour   Intake 2709 ml   Output 2725 ml   Net -16 ml       Hemodynamic Parameters:           Physical Exam   Constitutional: He is oriented to person, place, and time. He appears well-developed and well-nourished.   HENT:   Head: Normocephalic and atraumatic.   Mouth/Throat: No oropharyngeal exudate.   NGT in place   Eyes: Pupils are equal, round, and reactive to light.   Neck: Normal range of motion. Neck supple.   Left IJ double lumen + introducer in place, dressing CLD  Right IJ triple lumen, dressing CLD   Cardiovascular:   Irregular tachyarrythmia.   Paced, LVAD hum.    Chest close with dressing in place   Pulmonary/Chest: Breath sounds normal. He has no wheezes. He has no rales.   Breathing comfortably on 4L NC.   Abdominal: Soft. He exhibits no distension.   Genitourinary:   Genitourinary Comments: +King   Musculoskeletal: He exhibits no edema or deformity.   Neurological: He is alert and oriented to person, place, and time.   Skin: Skin is warm and dry.       Significant Labs:  CBC:  Recent Labs   Lab 02/01/20  0332  02/02/20  0328  02/03/20  0351 02/03/20  0400 02/03/20  0748   WBC 17.42*  --  20.41*  --   --  18.88*  --    RBC 3.22*  --   3.24*  --   --  3.10*  --    HGB 9.0*  --  9.0*  --   --  8.7*  --    HCT 30.5*   < > 30.8*   < > 24* 29.3* 25*     --  262  --   --  268  --    MCV 95  --  95  --   --  95  --    MCH 28.0  --  27.8  --   --  28.1  --    MCHC 29.5*  --  29.2*  --   --  29.7*  --     < > = values in this interval not displayed.     BNP:  Recent Labs   Lab 01/29/20  0508 01/31/20  0435 02/03/20  0400   * 420* 470*     CMP:  Recent Labs   Lab 02/01/20 0332 02/02/20 0328 02/02/20  1809 02/03/20  0400 02/03/20  0600   *  --  162*  --   --  117*  --    CALCIUM 8.8  --  8.6*  --   --  8.4*  --    ALBUMIN 2.2*  --  2.3*  --   --  2.2*  2.2*  --    PROT 6.0  --  5.9*  --   --  5.8*  5.8*  --    *  --  148*  --   --  144  --    K 4.1   < > 3.9   < > 3.9 3.6 3.8   CO2 22*  --  24  --   --  22*  --    *  --  113*  --   --  111*  --    BUN 70*  --  67*  --   --  65*  --    CREATININE 2.3*  --  2.3*  --   --  2.0*  --    ALKPHOS 44*  --  43*  --   --  44*  44*  --    ALT 17  --  19  --   --  18  18  --    AST 37  --  30  --   --  24  24  --    BILITOT 0.6  --  0.6  --   --  0.6  0.6  --     < > = values in this interval not displayed.      Coagulation:   Recent Labs   Lab 02/01/20 0332 02/02/20 0328 02/02/20  1600 02/02/20  2200 02/03/20  0400   INR 1.5*  --  1.6*  --   --   --  1.5*   APTT 46.6*   < > 48.9*   < > 37.2* 43.4* 39.6*    < > = values in this interval not displayed.     LDH:  Recent Labs   Lab 02/01/20  0332 02/02/20  0328 02/03/20  0400   * 423* 409*     Microbiology:  Microbiology Results (last 7 days)     Procedure Component Value Units Date/Time    Blood culture [[id number]] Collected:  01/31/20 2220    Order Status:  Completed Specimen:  Blood from Peripheral, Wrist, Left Updated:  02/03/20 0612     Blood Culture, Routine No Growth to date      No Growth to date      No Growth to date    Blood culture [[id number]] Collected:  01/31/20 2215    Order Status:  Completed Specimen:   Blood from Peripheral, Forearm, Right Updated:  02/03/20 0612     Blood Culture, Routine No Growth to date      No Growth to date      No Growth to date          I have reviewed all pertinent labs within the past 24 hours.    Estimated Creatinine Clearance: 50.2 mL/min (A) (based on SCr of 2 mg/dL (H)).    Diagnostic Results:  I have reviewed and interpreted all pertinent imaging results/findings within the past 24 hours.    Assessment and Plan:       55 y.o. WM with history of NICMP diagnosed in 2010, ICD, LV thrombus (with prior splenic and renal emboli), Embolic  CVA , paroxysmal atrial fib, HTN, HLP  presents for  F/U today to clinic and he was volume overloaded on exam .  He states he had 3 admission in the last 3 months for volume overload. He started having more SOB on exertion since one month. Also endorses of Orthopnea since last one month. Alble to walk only 150 ft. He also has Bilateral lower extremity edema. He was admitted here in 2017 for ADHD here at [facility] and [facility] during that admission showed PCWP 40 and CVP 17. Currently denies chest pain, lightheadedness     * LVAD (left ventricular assist device) present  - S/P DT HM3 with closure of AV and repair of MV for regurg 1/23/20.   - CTS primary  - Taken back to OR 1/24 for possible RVAD placement which was not done. Patient remained hemodynamically stable in OR. Wash out on 1/27. Chest closed 1/29.   - CVP 13  - Currently hemodynamically stable on current ggts  - Current speed 5300    Procedure: Device Interrogation Including analysis of device parameters  Current Settings: Ventricular Assist Device  Review of device function is stable/unstabe    TXP LVAD INTERROGATIONS 2/3/2020 2/3/2020 2/3/2020 2/3/2020 2/3/2020 2/3/2020 2/3/2020   Type HeartMate3 HeartMate3 HeartMate3 HeartMate3 HeartMate3 HeartMate3 HeartMate3   Flow 4.5 4.1 4.1 4.0 4.0 4.2 4.0   Speed 5300 5300 5300 5300 5300 5300 5300   PI 3.6 4 4.0 4.1 3.4 3.9 3.8   Power (Berry) 3.8 3.9 3.8  3.8 3.8 3.8 3.8   LSL 4900 4900 4900 4900 4900 4900 4900   Pulsatility Intermittent pulse Intermittent pulse Intermittent pulse Intermittent pulse Intermittent pulse Intermittent pulse Intermittent pulse       Coagulopathy  - Appreciate Hem/Onc's help. No evidence of underlying coagulopathy (h/o LV thrombus with splenic and renal emboli, h/o embolic CVA)    NSVT (nonsustained ventricular tachycardia)  - Avoid digoxin, agree with restarting Amiodarone    Hepatic congestion  - Liver US on 1/11 unremarkable    ICD (implantable cardioverter-defibrillator) in place  - S/P Biotronik dual chamber ICD    Right lower lobe pulmonary nodule  - Incidental finding on CT chest/abd/pelvis on 1/12. Pulmonology consulted, and rec repeat CT of chest in 3 months  - Will need to follow-up in pulmonary clinic.     Acute kidney injury superimposed on chronic kidney disease  - Creatinine on admit 2.6 (baseline ~ 1.8). BUN/Creatinine today 86/2.4  - Nephrology consulted and following    Acute on chronic combined systolic and diastolic heart failure, NYHA class 4  - S/P DT HM3 with closure of AV and plication of MV for regurg 1/23/20 (See LVAD)  - NIDCM dx'd 2010  - hemodynamically stable on current ggts per CTS  - See above        Paroxysmal atrial fibrillation  - Intermittently in A fib since surgery, currently paced at 90   - AC per CTS  - agree with discontinuing digoxin for rate control      Left ventricular thrombus without MI  - H/O LV thrombus with h/o splenic and renal emboli as well as embolic CVA  - Limited TTE done here 1/13 showed no thrombus  - JACINTO 1/23 intra op showed resolution of DAMON thrombus  - AC per CTS          MARBELLA Horta  Heart Transplant  [facility]

## 2020-02-03 NOTE — PROGRESS NOTES
[facility]  Critical Care - Surgery  Progress Note    Patient Name: Tae Delgado  MRN: [id number]  Admission Date: 1/9/2020  Hospital Length of Stay: 25 days  Code Status: Full Code  Attending Provider: Ino Schmitt MD  Primary Care Provider: Elham Pearson MD   Principal Problem: LVAD (left ventricular assist device) present    Subjective:     Hospital/ICU Course:  1/23: Pt arrives from OR intubated, open chest dressed with Ioban, CTs with SS output. Drips on arrival: Epi 0.08, Cardene 5, TXA, Nitric at 30. Pt received 1.5L crystalloid, no blood product, 20 mg Lasix (put out 250cc in response).   Intra Op JACINTO Exam:  PRE:  Severely reduced left ventricular systolic function. Dilated LV. No definitive thrombus noted.  Moderate-to-severely reduced right ventricular systolic function. FAC 12-24%  Mild-to-moderate AI. No AS.  Moderate MR with systolic blunting of the pulmonary veins.  Trace-to-mild TR.  Mild PI. PAAT <90ms.  Small PFO by CF doppler.  SEC in La and DAMON. No clear thrombus noted.  Grade 2 atheromatous disease of the aorta.  No effusions.    POST:  Severely depressed left ventriclar dysfunction.  Moderately depressed right ventricular systolic function.  LVAD inflow and outflow cannula noted with laminar flows.  No AI.  Mild MR, vahe stitch observed. No MS.  Mild-to-moderate TR.  PFO still visible on CF doppler.  Aorta intact, no dissection.  No effusions.     1/24: run of Vtach overnight. Amio bolus given, amio gtt increased to 1, lasix push given. Improved. LVAD hemodynamics appropriate overnight. Going for closure this am. Upon return, developed tamponade physiology and returned to OR. Came back to SICU with chest open.  1/26: Diuresed over the weekend, chest kept open. Lidocaine gtt and digoxin for tachyarrhythmia. Otherwise NAEON.   1/27: Went to OR for possible chest closure, decided to do a wash out. Returned to SICU with chest open. One tachyarrhythmia episode overnight,  resolved with 100mg Lidocaine bolus and increasing lidocaine drip from 0.75mg/hr to 1mg/hr.  1/28: NAEON. Lasix gtt decreased throughout the day with adequate UOP still.   1/29: NAEON  2/03: NAEON.  Comfortable on 4 L NC. Hypernatremia resolved with increased free water flushes. Good UOP overnight. Cr improving. NG tube came out overnight, replaced. Restart TF today. Enema overnight with BM x1. Digoxin increased to 0.25 mg. CVC pulled and PICC consult placed. DC'd opioid due to somnolence and scheduled tylenol.     Interval History/Significant Events:     NAEON.  Comfortable on 4 L NC. Hypernatremia resolved with increased free water flushes. Good UOP overnight. Cr improving. NG tube came out overnight, replaced. Restart TF today. Enema overnight with BM x1.     Follow-up For: Procedure(s) (LRB):  CLOSURE, WOUND, STERNUM (N/A)  WASHOUT (N/A)  APPLICATION, WOUND VAC (N/A)    Post-Operative Day: 4 Days Post-Op    Objective:     Vital Signs (Most Recent):  Temp: 96.7 °F (35.9 °C) (02/03/20 0315)  Pulse: 90 (02/03/20 0630)  Resp: 20 (02/01/20 2015)  BP: (!) 84/0 (02/03/20 0300)  SpO2: 100 % (02/03/20 0600) Vital Signs (24h Range):  Temp:  [96.7 °F (35.9 °C)-98.4 °F (36.9 °C)] 96.7 °F (35.9 °C)  Pulse:  [90-97] 90  SpO2:  [94 %-100 %] 100 %  BP: (78-86)/(0) 84/0  Arterial Line BP: ()/(59-85) 87/71     Weight: 113.6 kg (250 lb 7.1 oz)  Body mass index is 35.93 kg/m².      Intake/Output Summary (Last 24 hours) at 2/3/2020 0729  Last data filed at 2/3/2020 0600  Gross per 24 hour   Intake 3299 ml   Output 2580 ml   Net 719 ml       Physical Exam   Constitutional: He is oriented to person, place, and time. He appears well-developed and well-nourished.   HENT:   Head: Normocephalic and atraumatic.   Mouth/Throat: No oropharyngeal exudate.   NGT in place   Eyes: Pupils are equal, round, and reactive to light.   Neck: Normal range of motion. Neck supple.   Left IJ double lumen + introducer in place, dressing CLD  Right IJ  triple lumen, dressing CLD   Cardiovascular:   Irregular tachyarrythmia.   Paced, LVAD hum.    Chest close with dressing in place   Pulmonary/Chest: Breath sounds normal. He has no wheezes. He has no rales.   Breathing comfortably on 4L NC.   Abdominal: Soft. He exhibits no distension.   Genitourinary:   Genitourinary Comments: +King   Musculoskeletal: He exhibits no edema or deformity.   Neurological: He is alert and oriented to person, place, and time.   Skin: Skin is warm. He is diaphoretic.       Vents:  Vent Mode: Spont (02/01/20 0900)  Ventilator Initiated: Yes (01/21/20 0905)  Set Rate: 16 BPM (01/31/20 0819)  Vt Set: 500 mL (01/31/20 0819)  PEEP/CPAP: 5 cmH20 (02/01/20 0900)  Oxygen Concentration (%): 36 (02/03/20 0351)  Peak Airway Pressure: 14 cmH2O (02/01/20 0900)  Plateau Pressure: 0 cmH20 (02/01/20 0900)  Total Ve: 9.53 mL (02/01/20 0900)  F/VT Ratio<105 (RSBI): (!) 34.88 (01/27/20 2316)    Lines/Drains/Airways     Central Venous Catheter Line                 Trialysis (Dialysis) Catheter 01/24/20 1500 right internal jugular 9 days          Drain                 Urethral Catheter 01/21/20 0752 Non-latex;Straight-tip;Temperature probe 16 Fr. 12 days         Chest Tube 01/23/20 1230 1 Right Pleural 10 days         Chest Tube 01/23/20 1414 2 Right Mediastinal 10 days         NG/OG Tube 01/29/20 1430 Left nostril 4 days          Arterial Line                 Arterial Line 01/21/20 0730 Left Other (Comment) 12 days          Line                 VAD 01/23/20 1148 Left ventricular assist device HeartMate 3 10 days          Peripheral Intravenous Line                 Peripheral IV - Single Lumen 02/01/20 0000 20 G Left Wrist 2 days                Significant Labs:    CBC/Anemia Profile:  Recent Labs   Lab 02/02/20  0328  02/02/20  2357 02/03/20  0351 02/03/20  0400   WBC 20.41*  --   --   --  18.88*   HGB 9.0*  --   --   --  8.7*   HCT 30.8*   < > 25* 24* 29.3*     --   --   --  268   MCV 95  --   --    --  95   RDW 17.6*  --   --   --  18.4*    < > = values in this interval not displayed.        Chemistries:  Recent Labs   Lab 02/02/20  0328  02/02/20  1237 02/02/20  1809 02/03/20  0400 02/03/20  0600   *  --   --   --  144  --    K 3.9   < > 4.3 3.9 3.6 3.8   *  --   --   --  111*  --    CO2 24  --   --   --  22*  --    BUN 67*  --   --   --  65*  --    CREATININE 2.3*  --   --   --  2.0*  --    CALCIUM 8.6*  --   --   --  8.4*  --    ALBUMIN 2.3*  --   --   --  2.2*  2.2*  --    PROT 5.9*  --   --   --  5.8*  5.8*  --    BILITOT 0.6  --   --   --  0.6  0.6  --    ALKPHOS 43*  --   --   --  44*  44*  --    ALT 19  --   --   --  18  18  --    AST 30  --   --   --  24  24  --    MG  --    < > 2.0 1.9  --  1.9   PHOS 3.1  --   --   --  3.0  --     < > = values in this interval not displayed.       All pertinent labs within the past 24 hours have been reviewed.    Significant Imaging:  CXR  One view: There is a pacer and LVAD.  Tubes and lines are appropriate.  There is cardiomegaly moderate edema/CHF pleural fluid and no change.    Assessment/Plan:     Acute on chronic combined systolic and diastolic heart failure, NYHA class 4  Tae Delgado is a 59 y.o. 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 Approved for DT LVAD. Went to OR on 1/21 and found to have DAMON and LV thrombus and case was aborted. Pt was placed on a heparin gtt and thrombus had dissipated on repeat JACINTO on 1/22. Pt underwent LVAD placement on 1/23. Chest closure and re-exploration on 1/24. Returned from OR with chest open on 1/24. Attempted chest closure on 1/27, returned to SICU with chest open.     Neuro:  - Extubated, alert and oriented.  - Discontinued  prn oxycodone and dilaudid as pt was increasingly somnolent  - Scheduled tylenol for pain.     CVS:   - Current LVAD flow @ 5400 with appropriate PI  - Wean vasopressors per CTS; currently Epi @ 0.03  - Increased Digoxin to 0.25 mg; f/u on dig level   - Wean Corby, currently at 10/5 ppm. Wean today -- attempt to discontinue if CVPs allow.   - EP had been consulted for tachyarrhythmias earlier in pt's hospital course. Now off lidocaine gtt. ICD turned on. Currently 100% paced.  - Amiodarone 400 mg TID  - Amlodipine 10 mg QD  - Heparin gtt; Warfarin, Aspirin   - Atorvastatin 80 mg QHS  - CVC pulled and PICC consult placed.     Pulm:  - Breathing comfortably on 4L NC; wean as able  - ABGs prn  - CXR daily; stable   - Monitor CT output, no overt signs of bleeding    GI:  - TF formula change to peptamen (lower sodium formula)  - Protonix 40 daily  - Docusate    Endo:   - Insulin off now, SSI  - Endocrine consulted, appreciate their services  - BG well controlled at this time    Renal:  - Strict I/O    Intake/Output Summary (Last 24 hours) at 2/3/2020 0754  Last data filed at 2/3/2020 0700  Gross per 24 hour   Intake 3299 ml   Output 2740 ml   Net 559 ml     - Trend BUN/Cr, still elevated but improving; Cr 2.0 down from 2.3, BUN 65 down from 67  - Lasix gtt @ 2.5  - IV lasix 10 x 1      FENGI:  - Monitor labs  - Replace per protocol  - Switched all drips to D5 due to elevated Na+  - Hypernatremia improved with increased free water boluses frequency to q4h  - Na+ 144    Heme:  - No blood products needed during the OR  - H/H remains stable overnight    ID:   - Vanc 750 mg q24h   - Cultured for fevers; all cx NGTD.   - Follow WBC; improving slowly WBC 18.9 from 20.4   - Afebrile overnight    PPx:  - Hep gtt, aPTT goal of 45-54,   - Warfarin1 mg on given 2/2/2020  - GI ppx    Dispo:  - Cont SICU care, wean oxygen, f/u digoxin level          Critical care was time spent personally by me on the following activities: development  of treatment plan with patient or surrogate and bedside caregivers, discussions with consultants, evaluation of patient's response to treatment, examination of patient, ordering and performing treatments and interventions, ordering and review of laboratory studies, ordering and review of radiographic studies, pulse oximetry, re-evaluation of patient's condition.  This critical care time did not overlap with that of any other provider or involve time for any procedures.     Rowan Roberson,   Critical Care - Surgery  [facility]

## 2020-02-03 NOTE — PT/OT/SLP PROGRESS
Occupational Therapy   Treatment    Name: Tae Delgado  MRN: [id number]  Admitting Diagnosis:  LVAD (left ventricular assist device) present  5 Days Post-Op    Recommendations:     Discharge Recommendations: rehabilitation facility  Discharge Equipment Recommendations:  (TBD)  Barriers to discharge:  None    Assessment:     Tae Delgado is a 59 y.o. male with a medical diagnosis of LVAD (left ventricular assist device) present.  He presents with significantly improved overall strength this session; however, remains weak requiring max - total A for self-care. Performance deficits affecting function are weakness, decreased upper extremity function, decreased ROM, gait instability, impaired cardiopulmonary response to activity, impaired balance, impaired endurance, impaired cognition, impaired self care skills, impaired functional mobilty, edema, pain, decreased safety awareness, impaired coordination.     Rehab Prognosis:  Good; patient would benefit from acute skilled OT services to address these deficits and reach maximum level of function.       Plan:     Patient to be seen 6 x/week to address the above listed problems via self-care/home management, therapeutic exercises, therapeutic activities  · Plan of Care Expires: 03/02/20  · Plan of Care Reviewed with: patient    Subjective     Pain/Comfort:  · Pain Rating 1: 5/10  · Location - Side 1: Bilateral  · Location - Orientation 1: generalized  · Location 1: throat  · Pain Addressed 1: Distraction  · Pain Rating Post-Intervention 1: 5/10    Objective:     Communicated with: RN prior to session.  Patient found supine with arterial line, blood pressure cuff, central line, chest tube, giles catheter, LVAD, NG tube, oxygen, telemetry, pulse ox (continuous), PICC line, wound vac([place]) upon OT entry to room.    General Precautions: Standard, LVAD, fall, sternal   Orthopedic Precautions:N/A   Braces:       Occupational Performance:     Bed Mobility:    · Patient completed  Rolling/Turning to Left with  maximal assistance  · Patient completed Scooting/Bridging with maximal assistance  · Patient completed Supine to Sit with maximal assistance  · Patient completed Sit to Supine with total assistance     Functional Mobility/Transfers:  · Patient completed Sit <> Stand Transfer with maximal assistance and of 2 persons  with  no assistive device from EOB x 2 trials  · Functional Mobility: NT    Activities of Daily Living:  · Grooming: maximal assistance with [employer] A to wash face seated EOB      AMPAC 6 Click ADL: 8    Treatment & Education:  Pt ed on OT POC  Daily orientation provided  Pt sat EOB x 12 min with min A progressing to close CGA for majority of time  Pt demonstrated fair static sitting, swaying forward and backwards, but no LOB  Pt completed B UE AAROM for elbow flexion and hand squeezes  Pt washed face with [employer] A and max A    Patient left supine with all lines intact, call button in reach and RN notifiedEducation:      GOALS:   Multidisciplinary Problems     Occupational Therapy Goals        Problem: Occupational Therapy Goal    Goal Priority Disciplines Outcome Interventions   Occupational Therapy Goal     OT, PT/OT Ongoing, Progressing    Description:  Goals to be met by: 2/16/2020     Patient will increase functional independence with ADLs by performing:    UE Dressing with Minimal Assistance.  LE Dressing with Minimal Assistance.  Grooming while standing with Minimal Assistance.  Toileting from bedside commode with Minimal Assistance for hygiene and clothing management.   Sitting at edge of bed x10 minutes with Minimal Assistance in prep for seated grooming tasks   Supine to sit with min A  Toilet transfer to bedside commode with Minimal Assistance.  Pt will perform LVAD management independently                 Multidisciplinary Problems (Resolved)        Problem: Occupational Therapy Goal    Goal Priority Disciplines Outcome Interventions   Occupational Therapy Goal    (Resolved)     OT, PT/OT Met    Description:  Skilled OT services not necessary at this time secondary to patient performing ADLs at [facility]. Patient in agreement. Please re-consult OT if change in patient's functional status is noted.                     Time Tracking:     OT Date of Treatment: 02/03/20  OT Start Time: 1000  OT Stop Time: 1023  OT Total Time (min): 23 min    Billable Minutes:Self Care/Home Management 8  Therapeutic Exercise 15    SEBASTIEN Mayberry  2/3/2020

## 2020-02-03 NOTE — CONSULTS
PICC placement cleared by nephrology    Placed triple lumen PICC to right brachial vein using u/s guidance.  40 cm in length, 35 cm arm circumference and 0 cm exposed.   Lot # [id number].

## 2020-02-03 NOTE — SUBJECTIVE & OBJECTIVE
Interval History/Significant Events:     NAEON.  Comfortable on 4 L NC. Hypernatremia resolved with increased free water flushes. Good UOP overnight. Cr improving. NG tube came out overnight, replaced. Restart TF today. Enema overnight with BM x1.     Follow-up For: Procedure(s) (LRB):  CLOSURE, WOUND, STERNUM (N/A)  WASHOUT (N/A)  APPLICATION, WOUND VAC (N/A)    Post-Operative Day: 4 Days Post-Op    Objective:     Vital Signs (Most Recent):  Temp: 96.7 °F (35.9 °C) (02/03/20 0315)  Pulse: 90 (02/03/20 0630)  Resp: 20 (02/01/20 2015)  BP: (!) 84/0 (02/03/20 0300)  SpO2: 100 % (02/03/20 0600) Vital Signs (24h Range):  Temp:  [96.7 °F (35.9 °C)-98.4 °F (36.9 °C)] 96.7 °F (35.9 °C)  Pulse:  [90-97] 90  SpO2:  [94 %-100 %] 100 %  BP: (78-86)/(0) 84/0  Arterial Line BP: ()/(59-85) 87/71     Weight: 113.6 kg (250 lb 7.1 oz)  Body mass index is 35.93 kg/m².      Intake/Output Summary (Last 24 hours) at 2/3/2020 0729  Last data filed at 2/3/2020 0600  Gross per 24 hour   Intake 3299 ml   Output 2580 ml   Net 719 ml       Physical Exam   Constitutional: He is oriented to person, place, and time. He appears well-developed and well-nourished.   HENT:   Head: Normocephalic and atraumatic.   Mouth/Throat: No oropharyngeal exudate.   NGT in place   Eyes: Pupils are equal, round, and reactive to light.   Neck: Normal range of motion. Neck supple.   Left IJ double lumen + introducer in place, dressing CLD  Right IJ triple lumen, dressing CLD   Cardiovascular:   Irregular tachyarrythmia.   Paced, LVAD hum.    Chest close with dressing in place   Pulmonary/Chest: Breath sounds normal. He has no wheezes. He has no rales.   Breathing comfortably on 4L NC.   Abdominal: Soft. He exhibits no distension.   Genitourinary:   Genitourinary Comments: +King   Musculoskeletal: He exhibits no edema or deformity.   Neurological: He is alert and oriented to person, place, and time.   Skin: Skin is warm. He is diaphoretic.       Vents:  Vent  Mode: Spont (02/01/20 0900)  Ventilator Initiated: Yes (01/21/20 0905)  Set Rate: 16 BPM (01/31/20 0819)  Vt Set: 500 mL (01/31/20 0819)  PEEP/CPAP: 5 cmH20 (02/01/20 0900)  Oxygen Concentration (%): 36 (02/03/20 0351)  Peak Airway Pressure: 14 cmH2O (02/01/20 0900)  Plateau Pressure: 0 cmH20 (02/01/20 0900)  Total Ve: 9.53 mL (02/01/20 0900)  F/VT Ratio<105 (RSBI): (!) 34.88 (01/27/20 2316)    Lines/Drains/Airways     Central Venous Catheter Line                 Trialysis (Dialysis) Catheter 01/24/20 1500 right internal jugular 9 days          Drain                 Urethral Catheter 01/21/20 0752 Non-latex;Straight-tip;Temperature probe 16 Fr. 12 days         Chest Tube 01/23/20 1230 1 Right Pleural 10 days         Chest Tube 01/23/20 1414 2 Right Mediastinal 10 days         NG/OG Tube 01/29/20 1430 Left nostril 4 days          Arterial Line                 Arterial Line 01/21/20 0730 Left Other (Comment) 12 days          Line                 VAD 01/23/20 1148 Left ventricular assist device HeartMate 3 10 days          Peripheral Intravenous Line                 Peripheral IV - Single Lumen 02/01/20 0000 20 G Left Wrist 2 days                Significant Labs:    CBC/Anemia Profile:  Recent Labs   Lab 02/02/20  0328  02/02/20  2357 02/03/20  0351 02/03/20  0400   WBC 20.41*  --   --   --  18.88*   HGB 9.0*  --   --   --  8.7*   HCT 30.8*   < > 25* 24* 29.3*     --   --   --  268   MCV 95  --   --   --  95   RDW 17.6*  --   --   --  18.4*    < > = values in this interval not displayed.        Chemistries:  Recent Labs   Lab 02/02/20  0328  02/02/20  1237 02/02/20  1809 02/03/20  0400 02/03/20  0600   *  --   --   --  144  --    K 3.9   < > 4.3 3.9 3.6 3.8   *  --   --   --  111*  --    CO2 24  --   --   --  22*  --    BUN 67*  --   --   --  65*  --    CREATININE 2.3*  --   --   --  2.0*  --    CALCIUM 8.6*  --   --   --  8.4*  --    ALBUMIN 2.3*  --   --   --  2.2*  2.2*  --    PROT 5.9*  --   --    --  5.8*  5.8*  --    BILITOT 0.6  --   --   --  0.6  0.6  --    ALKPHOS 43*  --   --   --  44*  44*  --    ALT 19  --   --   --  18  18  --    AST 30  --   --   --  24  24  --    MG  --    < > 2.0 1.9  --  1.9   PHOS 3.1  --   --   --  3.0  --     < > = values in this interval not displayed.       All pertinent labs within the past 24 hours have been reviewed.    Significant Imaging:  CXR  One view: There is a pacer and LVAD.  Tubes and lines are appropriate.  There is cardiomegaly moderate edema/CHF pleural fluid and no change.

## 2020-02-03 NOTE — PLAN OF CARE
Problem: SLP Goal  Goal: SLP Goal  Description  Goals expected to be met by 2/9:  1. Pt will participate in ongoing assessment of swallow function to determine least restrictive diet.    Outcome: Ongoing, Progressing     Goals remain appropriate.   Emily P. Abadie M.S., CCC-SLP  Speech Language Pathologist  [phone]  02/03/2020

## 2020-02-03 NOTE — CARE UPDATE
BG goal 140-180    Remains in ICU, NAEON. NG tube inadvertently pulled overnight and replaced. BG well controlled without insulin. TF resumed but at 10 cc/hr. Noted creatinine of 2.       Plan:BG monitoring every 4 hours and low dose correction scale.     Discharge planning:TBD     Endocrine to continue to follow    ** Please call Endocrine for any BG related issues **

## 2020-02-03 NOTE — PROGRESS NOTES
02/03/2020  Felipe Lim    Current provider:  Ino Schmitt MD      I, Felipe Lim, rounded on Tae Delgado to ensure all mechanical assist device settings (IABP or VAD) were appropriate and all parameters were within limits.  I was able to ensure all back up equipment was present, the staff had no issues, and the Perfusion Department daily rounding was complete.    7:04 AM

## 2020-02-03 NOTE — SUBJECTIVE & OBJECTIVE
Interval History:   Continued improvement in kidney function, sCr down to 2.0 from 2.3.  Other electrolytes stable.  Diuretics being managed by CTs, increased this morning to 5 mg/hr on lasix gtt.      Review of patient's allergies indicates:   Allergen Reactions    Biopatch [chlorhexidine gluconate]      Site burning    Dobutamine in d5w      Tachycardia, tremors, SOB, flushing    Percocet [oxycodone-acetaminophen] Itching    Penicillins Rash     Cefepime given on 1/23/2020 without issue     Current Facility-Administered Medications   Medication Frequency    acetaminophen oral solution 650 mg Q6H    albumin human 5% bottle 500 mL PRN    albuterol sulfate nebulizer solution 2.5 mg Q4H PRN    amiodarone 5 mg/mL oral suspension TID    amLODIPine tablet 10 mg Daily    aspirin tablet 325 mg Daily    atorvastatin tablet 80 mg QHS    balsam peru-castor oil Oint BID    bisacodyl suppository 10 mg Daily PRN    bisacodyL suppository 10 mg Daily PRN    dexmedetomidine (PRECEDEX) 400mcg/100mL 0.9% NaCL infusion Continuous PRN    dextrose 10% (D10W) Bolus PRN    dextrose 10% (D10W) Bolus PRN    dextrose 10% (D10W) Bolus PRN    digoxin tablet 0.25 mg Once    digoxin tablet 0.25 mg Once    docusate sodium capsule 200 mg QHS    EPINEPHrine (ADRENALIN) 5 mg in dextrose 5 % 250 mL infusion Continuous    ferrous gluconate tablet 324 mg Daily with breakfast    furosemide (LASIX) 2 mg/mL in dextrose 5 % 100 mL infusion (conc: 2 mg/mL) Continuous    glucagon (human recombinant) injection 1 mg PRN    heparin 25,000 units in dextrose 5% 250 mL (100 units/mL) infusion (heparin infusion - NO NOMOGRAM) Continuous    hydrALAZINE injection 10 mg Q6H PRN    insulin aspart U-100 pen 0-5 Units Q4H PRN    magnesium hydroxide 400 mg/5 ml suspension 2,400 mg Daily PRN    magnesium oxide tablet 400 mg TID    magnesium sulfate 2g in water 50mL IVPB (premix) PRN    niCARdipine 40 mg/200 mL infusion Continuous    nitric  oxide gas Gas 2 ppm Continuous    pantoprazole injection 40 mg Daily    polyethylene glycol (GoLYTELY) solution 6 times per day    polyethylene glycol packet 17 g Daily    sodium chloride 0.9% flush 10 mL PRN    sodium chloride 0.9% flush 3 mL Q8H    vancomycin 750 mg in dextrose 5 % 250 mL IVPB (ready to mix system) Q24H    warfarin (COUMADIN) tablet 2 mg Once       Objective:     Vital Signs (Most Recent):  Temp: 97.6 °F (36.4 °C) (02/03/20 1100)  Pulse: 91 (02/03/20 1110)  Resp: 18 (02/03/20 1110)  BP: (!) 94/0 (02/03/20 0700)  SpO2: 96 % (02/03/20 1110)  O2 Device (Oxygen Therapy): nasal cannula (02/03/20 1110) Vital Signs (24h Range):  Temp:  [96.7 °F (35.9 °C)-98.4 °F (36.9 °C)] 97.6 °F (36.4 °C)  Pulse:  [] 91  Resp:  [16-18] 18  SpO2:  [84 %-100 %] 96 %  BP: (78-94)/(0) 94/0  Arterial Line BP: ()/(59-85) 86/68     Weight: 113.6 kg (250 lb 7.1 oz) (02/03/20 0419)  Body mass index is 35.93 kg/m².  Body surface area is 2.37 meters squared.    I/O last 3 completed shifts:  In: 5717.5 [P.O.:480; I.V.:1277.5; NG/GT:3710; IV Piggyback:250]  Out: 4300 [Urine:3665; Chest Tube:635]    Physical Exam   Constitutional: He is oriented to person, place, and time. He appears well-developed and well-nourished.   HENT:   Head: Normocephalic and atraumatic.   Mouth/Throat: No oropharyngeal exudate.   NGT in place   Eyes: Pupils are equal, round, and reactive to light.   Neck: Normal range of motion. Neck supple.   Left IJ double lumen + introducer in place, dressing CLD  Right IJ triple lumen, dressing CLD   Cardiovascular:   Irregular tachyarrythmia. Paced, LVAD hum.  Chest close with dressing in place   Pulmonary/Chest: Breath sounds normal. He has no wheezes. He has no rales.   Breathing comfortably on 3L NC.   Abdominal: Soft. He exhibits no distension.   Musculoskeletal: He exhibits no edema or deformity.   Neurological: He is alert and oriented to person, place, and time.   Skin: Skin is warm and dry.        Significant Labs:  CBC:   Recent Labs   Lab 02/03/20  0400 02/03/20  0748   WBC 18.88*  --    RBC 3.10*  --    HGB 8.7*  --    HCT 29.3* 25*     --    MCV 95  --    MCH 28.1  --    MCHC 29.7*  --      CMP:   Recent Labs   Lab 02/03/20  0400 02/03/20  0600   *  --    CALCIUM 8.4*  --    ALBUMIN 2.2*  2.2*  --    PROT 5.8*  5.8*  --      --    K 3.6 3.8   CO2 22*  --    *  --    BUN 65*  --    CREATININE 2.0*  --    ALKPHOS 44*  44*  --    ALT 18  18  --    AST 24  24  --    BILITOT 0.6  0.6  --

## 2020-02-03 NOTE — OP NOTE
Date of service:  01/21/2020  This is a surgical note for cancellation of surgery.  Patient was brought to the operating room placed in a supine position after induction of anesthesia and was prepped and draped in the usual sterile fashion the examination was carried out by anesthesia services.  At that time it was noted that the patient had  fresh clot in the left atrial appendage.  It also appeared that the patient had an LV clot.  Due to the recent nature of these clots and a high risk post for embolization a decision was made to anticoagulate the patient and taken back for further management.  No instruments sutures are sponges were used.  Patient was taken back to the intensive care unit in a stable condition.

## 2020-02-03 NOTE — PLAN OF CARE
Pt AAOX4, follows commands and moves all extremities spontaneously. Nitric @ 5 PPM, 4 L nasal cannula, O2 Sats > 95%. 100% paced @ 90. Epi @ 0.04 mcg/kg/min, titrating to 0.03 mcg/kg/min @ 0600.  MAPs 60-85 throughout shift. Heparin currently @ 1800 units/hr, PTT goal 45-54. Lasix @ 2.5 mg/hr. Urine output adequate throughout shift, 70- 150 cc/hr. Pleural chest tube output 140 cc/shift. Mediastinal chest tube output 30 cc/shift. PRN suppository and soap suds enema administered, BM X 1. CVP 14, 14, 12. VAD HM 3 speed 5300. Flows 3.9-4.3, PI 3.4-4.1, power 3.7-3.8. Accu checks q 4 hr, no coverage needed. Potassium and magnesium replaced PRN, Dr. Christianson notified of labs. TF held due to pt pulling NG tube, awaiting KUB/ok to use. NG currently to intermittent suction. Wound care performed per order. PT to work with pt. Plan of care reviewed with patient and family. All questions and concerns addressed.

## 2020-02-03 NOTE — PT/OT/SLP PROGRESS
Physical Therapy Treatment    Patient Name:  Tae Delgado   MRN:  [id number]    Recommendations:     Discharge Recommendations:  rehabilitation facility   Discharge Equipment Recommendations: (TBD)   Barriers to discharge: Decreased caregiver support at current level of function     Assessment:     Tae Delgado is a 59 y.o. male admitted with a medical diagnosis of LVAD (left ventricular assist device) present.  He presents with the following impairments/functional limitations:  weakness, impaired endurance, impaired functional mobilty, gait instability, impaired balance, impaired self care skills, pain, impaired cardiopulmonary response to activity. Pt tolerated activity with significant improved sitting balance. Pt with 2 attempts to stand at EOB, required maximum assistance of 2 people. Pt continues to demo' motivation to participate with therapy. Pt would continue to benefit from acute skilled therapy intervention to address deficits and progress toward prior level of function.       Rehab Prognosis: Good; patient would benefit from acute skilled PT services to address these deficits and reach maximum level of function.    Recent Surgery: Procedure(s) (LRB):  CLOSURE, WOUND, STERNUM (N/A)  WASHOUT (N/A)  APPLICATION, WOUND VAC (N/A) 5 Days Post-Op    Plan:     During this hospitalization, patient to be seen 6 x/week to address the identified rehab impairments via gait training, therapeutic activities, therapeutic exercises, neuromuscular re-education and progress toward the following goals:    · Plan of Care Expires:  03/02/20    Subjective     Chief Complaint: Pt reports he is happy with his progress with sitting EOB, reports standing was more difficult than he expected.   Patient/Family Comments/goals: to get better and return home  Pain/Comfort:  · Pain Rating 1: 5/10  · Location - Orientation 1: generalized  · Location 1: throat  · Pain Addressed 1: Distraction  · Pain Rating Post-Intervention 1:  5/10      Objective:     Communicated with RN prior to session.  Patient found HOB elevated with blood pressure cuff, telemetry, pulse ox (continuous), arterial line, central line, giles catheter, chest tube, oxygen, LVAD, NG tube, PICC line, wound vac(nitric oxide) upon PT entry to room.     General Precautions: Standard, LVAD, fall, sternal   Orthopedic Precautions:N/A   Braces: N/A     Functional Mobility:  · Bed Mobility:     · Supine to Sit: maximal assistance  · Sit to Supine: maximal assistance  · Transfers:     · Sit to Stand: 2x from EOB with maximal assistance and of 2 persons with hand-held assist, pt with increased flexion in knees and hips, posterior lean, unable to reach full upright standing.       AM-PAC 6 CLICK MOBILITY  Turning over in bed (including adjusting bedclothes, sheets and blankets)?: 2  Sitting down on and standing up from a chair with arms (e.g., wheelchair, bedside commode, etc.): 1  Moving from lying on back to sitting on the side of the bed?: 2  Moving to and from a bed to a chair (including a wheelchair)?: 1  Need to walk in hospital room?: 1  Climbing 3-5 steps with a railing?: 1  Basic Mobility Total Score: 8       Therapeutic Activities and Exercises:   Pt sat EOB for 12 mins with close CGA. Pt participated in self care and UE exercise with OT. Pt performed anterior scooting toward EOB with minimum assistance. Frequent cuing required for pt to maintain sternal precautions.   Pt educated on role of PT/POC. Pt verbalized understanding.   Pt encouraged to only perform OOB mobility with assistance from nursing/therapy. Pt agreeable.   Pt educated regarding 3/3 sternal precautions. Pt compliant throughout session.  LVAD to wall power, no alarms sounded.       Patient left HOB elevated with all lines intact, call button in reach and RN notified..    GOALS:   Multidisciplinary Problems     Physical Therapy Goals        Problem: Physical Therapy Goal    Goal Priority Disciplines Outcome  Goal Variances Interventions   Physical Therapy Goal     PT, PT/OT Ongoing, Progressing     Description:  Goals to be met by: 2020     Patient will increase functional independence with mobility by performin. Supine to sit with MInimal Assistance  2. Rolling to Left and Right with Minimal Assistance.  3. Sit to stand transfer with Moderate Assistance  4. Sitting at edge of bed x8 minutes with Minimal Assistance  5. Lower extremity exercise program x10 reps per handout, with assistance as needed                      Time Tracking:     PT Received On: 20  PT Start Time: 1000     PT Stop Time: 1023  PT Total Time (min): 23 min     Billable Minutes: Therapeutic Activity 23 mins     Treatment Type: Treatment  PT/PTA: PT     PTA Visit Number: 0     Ermelinda Clay, PT  2020

## 2020-02-03 NOTE — PROGRESS NOTES
UPDATE    SW to pt's room for update today.  Pt is s/p LVAD implant on 1/23.  Pt presents as lying in bed with HOB elevated, pt awakes to name but is tired from working with PT this morning and falls asleep easily.  Pt's s/o & son at bedside and present as aao x4, calm, cooperative, and asking and answering questions appropriately.  Family reports pt has been doing well but is very tired after working with PT.  Family reports coping adequately at this time, and denies any needs or concerns to SW.  SW provided contact information to family.  SW providing ongoing psychosocial and counseling support, education, resources, assistance, and discharge planning as indicated.  SW following and remains available.

## 2020-02-03 NOTE — PROGRESS NOTES
Per virgilio jiménez rdcs. optison given ivp via left arm sl for imaging. Denies transfusion rxn. Tolerated well. Sl flushed before and after with 10 cc ns.

## 2020-02-03 NOTE — PROCEDURES
"Tae Delgado is a 59 y.o. male patient.    Temp: 97.2 °F (36.2 °C) (02/03/20 1500)  Pulse: 90 (02/03/20 1600)  Resp: 18 (02/03/20 1110)  BP: (!) 90/0 (02/03/20 1500)  SpO2: 96 % (02/03/20 1600)  Weight: 113.4 kg (250 lb) (02/03/20 1400)  Height: 5' 10" (177.8 cm) (02/03/20 1400)    PICC  Date/Time: 2/3/2020 5:00 PM  Performed by: Kenneth Aldridge RN  Assisting provider: Melissa Packer RN  Consent Done: Yes  Time out: Immediately prior to procedure a time out was called to verify the correct patient, procedure, equipment, support staff and site/side marked as required  Indications: med administration and vascular access  Anesthesia: local infiltration  Local anesthetic: lidocaine 1% without epinephrine  Anesthetic Total (mL): 2  Preparation: skin prepped with Betadine and other (ALCHOHOL)  Skin prep agent dried: skin prep agent completely dried prior to procedure  Sterile barriers: all five maximum sterile barriers used - cap, mask, sterile gown, sterile gloves, and large sterile sheet  Hand hygiene: hand hygiene performed prior to central venous catheter insertion  Location details: right brachial  Catheter type: triple lumen  Catheter size: 5 Fr  Catheter Length: 40cm    Ultrasound guidance: yes  Vessel Caliber: medium and patent, compressibility normal  Vascular Doppler: not done  Needle advanced into vessel with real time Ultrasound guidance.  Guidewire confirmed in vessel.  Image recorded and saved.  Sterile sheath used.  Number of attempts: 1  Post-procedure: blood return through all ports and sterile dressing applied (see allergy)  Technical procedures used: 3CG  Specimens: No  Implants: No  Assessment: placement verified by x-ray  Complications: other          Melissa Packer  2/3/2020  "

## 2020-02-03 NOTE — ASSESSMENT & PLAN NOTE
- Intermittently in A fib since surgery, currently paced at 90   - AC per CTS  - agree with discontinuing digoxin for rate control

## 2020-02-03 NOTE — PT/OT/SLP PROGRESS
Speech Language Pathology Treatment    Patient Name:  Tae Delgado   MRN:  [id number]  Admitting Diagnosis: LVAD (left ventricular assist device) present    Recommendations:                 General Recommendations:  Dysphagia therapy  Diet recommendations:  Puree, Liquid Diet Level: Thin   Aspiration Precautions: 1 bite/sip at a time, Assistance with meals and Standard aspiration precautions   General Precautions: Standard, LVAD, fall, sternal  Communication strategies:  none    Subjective     Per RN, patient to remain NPO per MD though cleared SLP to complete follow up swallow assessment   Patient awake, though appears lethargic. .   Objective:     Has the patient been evaluated by SLP for swallowing?   Yes  Keep patient NPO? No   Current Respiratory Status: nasal cannula      Patient tolerated thin liquids via straw sips over 6oz along with puree trials x5 with no overt signs of airway compromise.  Regular solid trials not assessed given decreased level of alertness/fatigue. Patient appropriate to continue puree diet and thin liquids at this time. Skilled education was provided to patient and family members re: diet recs, standard aspiration precautions of which to follow, and ongoing ST plan of care.      Assessment:     Tae Delgado is a 59 y.o. male with an SLP diagnosis of Dysphagia.  He presents with no diet advancement recommendations at this time.     Goals:   Multidisciplinary Problems     SLP Goals        Problem: SLP Goal    Goal Priority Disciplines Outcome   SLP Goal     SLP Ongoing, Progressing   Description:  Goals expected to be met by 2/9:  1. Pt will participate in ongoing assessment of swallow function to determine least restrictive diet.                     Plan:     · Patient to be seen:  4 x/week   · Plan of Care expires:  03/02/20  · Plan of Care reviewed with:  patient   · SLP Follow-Up:  Yes       Discharge recommendations:  rehabilitation facility   Barriers to Discharge:  None    Time  Tracking:     SLP Treatment Date:   02/03/20  Speech Start Time:  0950  Speech Stop Time:  1006     Speech Total Time (min):  16 min    Billable Minutes: Treatment Swallowing Dysfunction 8 and Seld Care/Home Management Training 8    Emily Abadie, CCC-SLP  02/03/2020

## 2020-02-03 NOTE — PROGRESS NOTES
[facility]  Nephrology  Progress Note    Patient Name: Tae Delgado  MRN: [id number]  Admission Date: 1/9/2020  Hospital Length of Stay: 25 days  Attending Provider: Ino Schmitt MD   Primary Care Physician: Elham Pearson MD  Principal Problem:LVAD (left ventricular assist device) present    Subjective:       Interval History:   Continued improvement in kidney function, sCr down to 2.0 from 2.3.  Other electrolytes stable.  Diuretics being managed by CTs, increased this morning to 5 mg/hr on lasix gtt.      Review of patient's allergies indicates:   Allergen Reactions    Biopatch [chlorhexidine gluconate]      Site burning    Dobutamine in d5w      Tachycardia, tremors, SOB, flushing    Percocet [oxycodone-acetaminophen] Itching    Penicillins Rash     Cefepime given on 1/23/2020 without issue     Current Facility-Administered Medications   Medication Frequency    acetaminophen oral solution 650 mg Q6H    albumin human 5% bottle 500 mL PRN    albuterol sulfate nebulizer solution 2.5 mg Q4H PRN    amiodarone 5 mg/mL oral suspension TID    amLODIPine tablet 10 mg Daily    aspirin tablet 325 mg Daily    atorvastatin tablet 80 mg QHS    balsam peru-castor oil Oint BID    bisacodyl suppository 10 mg Daily PRN    bisacodyL suppository 10 mg Daily PRN    dexmedetomidine (PRECEDEX) 400mcg/100mL 0.9% NaCL infusion Continuous PRN    dextrose 10% (D10W) Bolus PRN    dextrose 10% (D10W) Bolus PRN    dextrose 10% (D10W) Bolus PRN    digoxin tablet 0.25 mg Once    digoxin tablet 0.25 mg Once    docusate sodium capsule 200 mg QHS    EPINEPHrine (ADRENALIN) 5 mg in dextrose 5 % 250 mL infusion Continuous    ferrous gluconate tablet 324 mg Daily with breakfast    furosemide (LASIX) 2 mg/mL in dextrose 5 % 100 mL infusion (conc: 2 mg/mL) Continuous    glucagon (human recombinant) injection 1 mg PRN    heparin 25,000 units in dextrose 5% 250 mL (100 units/mL) infusion (heparin infusion - NO  NOMOGRAM) Continuous    hydrALAZINE injection 10 mg Q6H PRN    insulin aspart U-100 pen 0-5 Units Q4H PRN    magnesium hydroxide 400 mg/5 ml suspension 2,400 mg Daily PRN    magnesium oxide tablet 400 mg TID    magnesium sulfate 2g in water 50mL IVPB (premix) PRN    niCARdipine 40 mg/200 mL infusion Continuous    nitric oxide gas Gas 2 ppm Continuous    pantoprazole injection 40 mg Daily    polyethylene glycol (GoLYTELY) solution 6 times per day    polyethylene glycol packet 17 g Daily    sodium chloride 0.9% flush 10 mL PRN    sodium chloride 0.9% flush 3 mL Q8H    vancomycin 750 mg in dextrose 5 % 250 mL IVPB (ready to mix system) Q24H    warfarin (COUMADIN) tablet 2 mg Once       Objective:     Vital Signs (Most Recent):  Temp: 97.6 °F (36.4 °C) (02/03/20 1100)  Pulse: 91 (02/03/20 1110)  Resp: 18 (02/03/20 1110)  BP: (!) 94/0 (02/03/20 0700)  SpO2: 96 % (02/03/20 1110)  O2 Device (Oxygen Therapy): nasal cannula (02/03/20 1110) Vital Signs (24h Range):  Temp:  [96.7 °F (35.9 °C)-98.4 °F (36.9 °C)] 97.6 °F (36.4 °C)  Pulse:  [] 91  Resp:  [16-18] 18  SpO2:  [84 %-100 %] 96 %  BP: (78-94)/(0) 94/0  Arterial Line BP: ()/(59-85) 86/68     Weight: 113.6 kg (250 lb 7.1 oz) (02/03/20 0419)  Body mass index is 35.93 kg/m².  Body surface area is 2.37 meters squared.    I/O last 3 completed shifts:  In: 5717.5 [P.O.:480; I.V.:1277.5; NG/GT:3710; IV Piggyback:250]  Out: 4300 [Urine:3665; Chest Tube:635]    Physical Exam   Constitutional: He is oriented to person, place, and time. He appears well-developed and well-nourished.   HENT:   Head: Normocephalic and atraumatic.   Mouth/Throat: No oropharyngeal exudate.   NGT in place   Eyes: Pupils are equal, round, and reactive to light.   Neck: Normal range of motion. Neck supple.   Left IJ double lumen + introducer in place, dressing CLD  Right IJ triple lumen, dressing CLD   Cardiovascular:   Irregular tachyarrythmia. Paced, LVAD hum.  Chest close with  dressing in place   Pulmonary/Chest: Breath sounds normal. He has no wheezes. He has no rales.   Breathing comfortably on 3L NC.   Abdominal: Soft. He exhibits no distension.   Musculoskeletal: He exhibits no edema or deformity.   Neurological: He is alert and oriented to person, place, and time.   Skin: Skin is warm and dry.       Significant Labs:  CBC:   Recent Labs   Lab 02/03/20  0400 02/03/20  0748   WBC 18.88*  --    RBC 3.10*  --    HGB 8.7*  --    HCT 29.3* 25*     --    MCV 95  --    MCH 28.1  --    MCHC 29.7*  --      CMP:   Recent Labs   Lab 02/03/20  0400 02/03/20  0600   *  --    CALCIUM 8.4*  --    ALBUMIN 2.2*  2.2*  --    PROT 5.8*  5.8*  --      --    K 3.6 3.8   CO2 22*  --    *  --    BUN 65*  --    CREATININE 2.0*  --    ALKPHOS 44*  44*  --    ALT 18  18  --    AST 24  24  --    BILITOT 0.6  0.6  --             Assessment/Plan:     Acute kidney injury superimposed on chronic kidney disease  KRISTIN on CKD III  Baseline SCr ~ 2.0    58 yo male s/p LVAD placement on 1/23 who was taken back to OR for exploration over concerns for RV failure/tamponade due to elevated CVP and decrease UOP.  Upon opening of chest, he had improved hemodynamics and some improvement in UOP and decision made to leave chest open and transfer back to ICU for closer monitoring on 1/24.  He was administered IV lasix + diuril with improvement in his UOP.      DDx for KRISTIN includes ATN from renal hypoperfusion vs. CRS from RV overload    Plan/recommendations:  -renal function improving. sCr 2.0 (baseline ~ 2). BUN down trending , 2.3 Liters of UOP documented on last 24 hour shift  -hypernatremia improving to 144 today.  -defer lasix drip adjustments to primary team, currently on 5 mg/hr  -continue trending RFP and UOP  -will follow labs closely  -Nephrology will follow from afar.  -will discuss with Dr. Jamar Fernández, JENNIFER  Nephrology  [facility]

## 2020-02-03 NOTE — SUBJECTIVE & OBJECTIVE
Interval History: still not eating much requesting soft foods  cvp 13     Continuous Infusions:   dexmedetomidine (PRECEDEX) infusion Stopped (02/01/20 1000)    epinephrine 0.03 mcg/kg/min (02/03/20 0900)    furosemide (LASIX) 2 mg/mL infusion (non-titrating) 5 mg/hr (02/03/20 0915)    heparin (porcine) in 5 % dex 1,900 Units/hr (02/03/20 0800)    niCARdipine Stopped (01/31/20 2100)    nitric oxide gas       Scheduled Meds:   amiodarone  400 mg Per NG tube TID    amLODIPine  10 mg Per NG tube Daily    aspirin  325 mg Oral Daily    atorvastatin  80 mg Oral QHS    balsam peru-castor oil   Topical (Top) BID    digoxin  0.125 mg Oral Daily    docusate sodium  200 mg Oral QHS    ferrous gluconate  324 mg Oral Daily with breakfast    magnesium oxide  400 mg Per NG tube TID    pantoprazole  40 mg Intravenous Daily    polyethylene glycol  100 mL Per NG tube 6 times per day    sodium chloride 0.9%  3 mL Intravenous Q8H    vancomycin (VANCOCIN) IVPB  750 mg Intravenous Q24H     PRN Meds:albumin human 5%, albuterol sulfate, bisacodyL, bisacodyL, dexmedetomidine (PRECEDEX) infusion, Dextrose 10% Bolus, Dextrose 10% Bolus, Dextrose 10% Bolus, glucagon (human recombinant), hydrALAZINE, HYDROmorphone, insulin aspart U-100, magnesium hydroxide 400 mg/5 ml, magnesium sulfate IVPB, oxyCODONE, oxyCODONE, sodium chloride 0.9%    Review of patient's allergies indicates:   Allergen Reactions    Biopatch [chlorhexidine gluconate]      Site burning    Dobutamine in d5w      Tachycardia, tremors, SOB, flushing    Percocet [oxycodone-acetaminophen] Itching    Penicillins Rash     Cefepime given on 1/23/2020 without issue     Objective:     Vital Signs (Most Recent):  Temp: 97.2 °F (36.2 °C) (02/03/20 0700)  Pulse: 91 (02/03/20 0800)  Resp: 16 (02/03/20 0739)  BP: (!) 94/0 (02/03/20 0700)  SpO2: 98 % (02/03/20 0800) Vital Signs (24h Range):  Temp:  [96.7 °F (35.9 °C)-98.4 °F (36.9 °C)] 97.2 °F (36.2 °C)  Pulse:   [] 91  Resp:  [16] 16  SpO2:  [94 %-100 %] 98 %  BP: (78-94)/(0) 94/0  Arterial Line BP: ()/(59-85) 88/68     Patient Vitals for the past 72 hrs (Last 3 readings):   Weight   02/03/20 0419 113.6 kg (250 lb 7.1 oz)   02/02/20 0521 107.2 kg (236 lb 5.3 oz)   02/01/20 0432 107.3 kg (236 lb 8.9 oz)     Body mass index is 35.93 kg/m².      Intake/Output Summary (Last 24 hours) at 2/3/2020 0924  Last data filed at 2/3/2020 0900  Gross per 24 hour   Intake 2709 ml   Output 2725 ml   Net -16 ml       Hemodynamic Parameters:           Physical Exam   Constitutional: He is oriented to person, place, and time. He appears well-developed and well-nourished.   HENT:   Head: Normocephalic and atraumatic.   Mouth/Throat: No oropharyngeal exudate.   NGT in place   Eyes: Pupils are equal, round, and reactive to light.   Neck: Normal range of motion. Neck supple.   Left IJ double lumen + introducer in place, dressing CLD  Right IJ triple lumen, dressing CLD   Cardiovascular:   Irregular tachyarrythmia.   Paced, LVAD hum.    Chest close with dressing in place   Pulmonary/Chest: Breath sounds normal. He has no wheezes. He has no rales.   Breathing comfortably on 4L NC.   Abdominal: Soft. He exhibits no distension.   Genitourinary:   Genitourinary Comments: +King   Musculoskeletal: He exhibits no edema or deformity.   Neurological: He is alert and oriented to person, place, and time.   Skin: Skin is warm and dry.       Significant Labs:  CBC:  Recent Labs   Lab 02/01/20  0332  02/02/20  0328  02/03/20  0351 02/03/20  0400 02/03/20  0748   WBC 17.42*  --  20.41*  --   --  18.88*  --    RBC 3.22*  --  3.24*  --   --  3.10*  --    HGB 9.0*  --  9.0*  --   --  8.7*  --    HCT 30.5*   < > 30.8*   < > 24* 29.3* 25*     --  262  --   --  268  --    MCV 95  --  95  --   --  95  --    MCH 28.0  --  27.8  --   --  28.1  --    MCHC 29.5*  --  29.2*  --   --  29.7*  --     < > = values in this interval not displayed.      BNP:  Recent Labs   Lab 01/29/20  0508 01/31/20  0435 02/03/20  0400   * 420* 470*     CMP:  Recent Labs   Lab 02/01/20 0332 02/02/20 0328 02/02/20  1809 02/03/20  0400 02/03/20  0600   *  --  162*  --   --  117*  --    CALCIUM 8.8  --  8.6*  --   --  8.4*  --    ALBUMIN 2.2*  --  2.3*  --   --  2.2*  2.2*  --    PROT 6.0  --  5.9*  --   --  5.8*  5.8*  --    *  --  148*  --   --  144  --    K 4.1   < > 3.9   < > 3.9 3.6 3.8   CO2 22*  --  24  --   --  22*  --    *  --  113*  --   --  111*  --    BUN 70*  --  67*  --   --  65*  --    CREATININE 2.3*  --  2.3*  --   --  2.0*  --    ALKPHOS 44*  --  43*  --   --  44*  44*  --    ALT 17  --  19  --   --  18  18  --    AST 37  --  30  --   --  24  24  --    BILITOT 0.6  --  0.6  --   --  0.6  0.6  --     < > = values in this interval not displayed.      Coagulation:   Recent Labs   Lab 02/01/20 0332 02/02/20 0328 02/02/20  1600 02/02/20  2200 02/03/20  0400   INR 1.5*  --  1.6*  --   --   --  1.5*   APTT 46.6*   < > 48.9*   < > 37.2* 43.4* 39.6*    < > = values in this interval not displayed.     LDH:  Recent Labs   Lab 02/01/20 0332 02/02/20 0328 02/03/20  0400   * 423* 409*     Microbiology:  Microbiology Results (last 7 days)     Procedure Component Value Units Date/Time    Blood culture [[id number]] Collected:  01/31/20 2220    Order Status:  Completed Specimen:  Blood from Peripheral, Wrist, Left Updated:  02/03/20 0612     Blood Culture, Routine No Growth to date      No Growth to date      No Growth to date    Blood culture [[id number]] Collected:  01/31/20 2215    Order Status:  Completed Specimen:  Blood from Peripheral, Forearm, Right Updated:  02/03/20 0612     Blood Culture, Routine No Growth to date      No Growth to date      No Growth to date          I have reviewed all pertinent labs within the past 24 hours.    Estimated Creatinine Clearance: 50.2 mL/min (A) (based on SCr of 2 mg/dL (H)).    Diagnostic  Results:  I have reviewed and interpreted all pertinent imaging results/findings within the past 24 hours.

## 2020-02-03 NOTE — ASSESSMENT & PLAN NOTE
KRISTIN on CKD III  Baseline SCr ~ 2.0    60 yo male s/p LVAD placement on 1/23 who was taken back to OR for exploration over concerns for RV failure/tamponade due to elevated CVP and decrease UOP.  Upon opening of chest, he had improved hemodynamics and some improvement in UOP and decision made to leave chest open and transfer back to ICU for closer monitoring on 1/24.  He was administered IV lasix + diuril with improvement in his UOP.      DDx for KRISTIN includes ATN from renal hypoperfusion vs. CRS from RV overload    Plan/recommendations:  -renal function improving. sCr 2.0 (baseline ~ 2). BUN down trending , 2.3 Liters of UOP documented on last 24 hour shift  -hypernatremia improving to 144 today.  -defer lasix drip adjustments to primary team, currently on 5 mg/hr  -continue trending RFP and UOP  -will follow labs closely  -Nephrology will follow from afar.  -will discuss with Dr. Roldan

## 2020-02-03 NOTE — ASSESSMENT & PLAN NOTE
Tae Delgado is a 59 y.o. 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 Approved for DT LVAD. Went to OR on 1/21 and found to have DAMON and LV thrombus and case was aborted. Pt was placed on a heparin gtt and thrombus had dissipated on repeat JACINTO on 1/22. Pt underwent LVAD placement on 1/23. Chest closure and re-exploration on 1/24. Returned from OR with chest open on 1/24. Attempted chest closure on 1/27, returned to SICU with chest open.     Neuro:  - Extubated, alert and oriented.  - Discontinued prn oxycodone and dilaudid as pt was increasingly somnolent  - Scheduled tylenol for pain.     CVS:   - Current LVAD flow @ 5400 with appropriate PI  - Wean vasopressors per CTS; currently Epi @ 0.03  - Increased Digoxin to 0.25 mg; f/u on dig level   - Wean Corby, currently at 10/5 ppm. Wean today -- attempt to discontinue if CVPs allow.   - EP had been consulted for tachyarrhythmias earlier in pt's hospital course. Now off lidocaine gtt. ICD turned on. Currently 100% paced.  - Amiodarone 400 mg TID  - Amlodipine 10 mg QD  - Heparin gtt; Warfarin, Aspirin   - Atorvastatin 80 mg QHS  - CVC pulled and PICC consult placed.     Pulm:  - Breathing comfortably on 4L NC; wean as able  - ABGs prn  - CXR daily; stable   - Monitor CT output, no overt signs of bleeding    GI:  - TF formula change to peptamen (lower sodium formula)  - Protonix 40 daily  - Docusate    Endo:   - Insulin off now, SSI  - Endocrine consulted, appreciate their services  - BG well controlled at this time    Renal:  - Strict I/O    Intake/Output Summary (Last 24 hours) at 2/3/2020 0754  Last data filed at 2/3/2020 0700  Gross per 24 hour   Intake 3299 ml   Output 2740 ml   Net 559 ml      - Trend BUN/Cr, still elevated but improving; Cr 2.0 down from 2.3, BUN 65 down from 67  - Lasix gtt @ 2.5  - IV lasix 10 x 1      FENGI:  - Monitor labs  - Replace per protocol  - Switched all drips to D5 due to elevated Na+  - Hypernatremia improved with increased free water boluses frequency to q4h  - Na+ 144    Heme:  - No blood products needed during the OR  - H/H remains stable overnight    ID:   - Vanc 750 mg q24h   - Cultured for fevers; all cx NGTD.   - Follow WBC; improving slowly WBC 18.9 from 20.4   - Afebrile overnight    PPx:  - Hep gtt, aPTT goal of 45-54,   - Warfarin1 mg on given 2/2/2020  - GI ppx    Dispo:  - Cont SICU care, wean oxygen, f/u digoxin level

## 2020-02-03 NOTE — ASSESSMENT & PLAN NOTE
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   - CTS primary  - Taken back to OR 1/24 for possible RVAD placement which was not done. Patient remained hemodynamically stable in OR. Wash out on 1/27. Chest closed 1/29.   - CVP 13  - Currently hemodynamically stable on current ggts  - Current speed 5300    Procedure: Device Interrogation Including analysis of device parameters  Current Settings: Ventricular Assist Device  Review of device function is stable/unstabe    TXP LVAD INTERROGATIONS 2/3/2020 2/3/2020 2/3/2020 2/3/2020 2/3/2020 2/3/2020 2/3/2020   Type HeartMate3 HeartMate3 HeartMate3 HeartMate3 HeartMate3 HeartMate3 HeartMate3   Flow 4.5 4.1 4.1 4.0 4.0 4.2 4.0   Speed 5300 5300 5300 5300 5300 5300 5300   PI 3.6 4 4.0 4.1 3.4 3.9 3.8   Power (Berry) 3.8 3.9 3.8 3.8 3.8 3.8 3.8   LSL 4900 4900 4900 4900 4900 4900 4900   Pulsatility Intermittent pulse Intermittent pulse Intermittent pulse Intermittent pulse Intermittent pulse Intermittent pulse Intermittent pulse

## 2020-02-03 NOTE — PLAN OF CARE
Problem: Physical Therapy Goal  Goal: Physical Therapy Goal  Description  Goals to be met by: 2020     Patient will increase functional independence with mobility by performin. Supine to sit with MInimal Assistance  2. Rolling to Left and Right with Minimal Assistance.  3. Sit to stand transfer with Moderate Assistance  4. Sitting at edge of bed x8 minutes with Minimal Assistance  5. Lower extremity exercise program x10 reps per handout, with assistance as needed     Outcome: Ongoing, Progressing     Pt is progressing toward goals. All goals remain appropriate.    Ermelinda Clay, PT, DPT  2/3/2020  [phone]

## 2020-02-04 LAB
ALBUMIN SERPL BCP-MCNC: 2.3 G/DL (ref 3.5–5.2)
ALLENS TEST: ABNORMAL
ALLENS TEST: NORMAL
ALP SERPL-CCNC: 48 U/L (ref 55–135)
ALT SERPL W/O P-5'-P-CCNC: 17 U/L (ref 10–44)
ANION GAP SERPL CALC-SCNC: 13 MMOL/L (ref 8–16)
APTT BLDCRRT: 29.3 SEC (ref 21–32)
APTT BLDCRRT: 29.4 SEC (ref 21–32)
APTT BLDCRRT: 31.4 SEC (ref 21–32)
APTT BLDCRRT: 31.5 SEC (ref 21–32)
APTT BLDCRRT: 78.2 SEC (ref 21–32)
AST SERPL-CCNC: 18 U/L (ref 10–40)
BASOPHILS # BLD AUTO: 0.05 K/UL (ref 0–0.2)
BASOPHILS NFR BLD: 0.3 % (ref 0–1.9)
BILIRUB SERPL-MCNC: 0.6 MG/DL (ref 0.1–1)
BUN SERPL-MCNC: 54 MG/DL (ref 6–20)
CALCIUM SERPL-MCNC: 8.4 MG/DL (ref 8.7–10.5)
CHLORIDE SERPL-SCNC: 111 MMOL/L (ref 95–110)
CO2 SERPL-SCNC: 20 MMOL/L (ref 23–29)
CREAT SERPL-MCNC: 1.9 MG/DL (ref 0.5–1.4)
DELSYS: ABNORMAL
DELSYS: NORMAL
DIFFERENTIAL METHOD: ABNORMAL
DIGOXIN SERPL-MCNC: 2 NG/ML (ref 0.8–2)
EOSINOPHIL # BLD AUTO: 0.2 K/UL (ref 0–0.5)
EOSINOPHIL NFR BLD: 1 % (ref 0–8)
ERYTHROCYTE [DISTWIDTH] IN BLOOD BY AUTOMATED COUNT: 18.8 % (ref 11.5–14.5)
EST. GFR  (AFRICAN AMERICAN): 43.6 ML/MIN/1.73 M^2
EST. GFR  (NON AFRICAN AMERICAN): 37.7 ML/MIN/1.73 M^2
FIO2: 36
FLOW: 4
FLOW: 5
FLOW: 5
GLUCOSE SERPL-MCNC: 141 MG/DL (ref 70–110)
HCO3 UR-SCNC: 22.1 MMOL/L (ref 24–28)
HCO3 UR-SCNC: 22.6 MMOL/L (ref 24–28)
HCO3 UR-SCNC: 24.3 MMOL/L (ref 24–28)
HCO3 UR-SCNC: 24.5 MMOL/L (ref 24–28)
HCT VFR BLD AUTO: 29.6 % (ref 40–54)
HCT VFR BLD CALC: 23 %PCV (ref 36–54)
HCT VFR BLD CALC: 26 %PCV (ref 36–54)
HCT VFR BLD CALC: 26 %PCV (ref 36–54)
HGB BLD-MCNC: 9 G/DL (ref 14–18)
IMM GRANULOCYTES # BLD AUTO: 0.4 K/UL (ref 0–0.04)
IMM GRANULOCYTES NFR BLD AUTO: 2.2 % (ref 0–0.5)
INR PPP: 2 (ref 0.8–1.2)
LDH SERPL L TO P-CCNC: 0.5 MMOL/L (ref 0.36–1.25)
LDH SERPL L TO P-CCNC: 0.51 MMOL/L (ref 0.36–1.25)
LDH SERPL L TO P-CCNC: 0.59 MMOL/L (ref 0.36–1.25)
LDH SERPL L TO P-CCNC: 346 U/L (ref 110–260)
LYMPHOCYTES # BLD AUTO: 1.3 K/UL (ref 1–4.8)
LYMPHOCYTES NFR BLD: 6.9 % (ref 18–48)
MAGNESIUM SERPL-MCNC: 1.9 MG/DL (ref 1.6–2.6)
MAGNESIUM SERPL-MCNC: 2 MG/DL (ref 1.6–2.6)
MAGNESIUM SERPL-MCNC: 2.2 MG/DL (ref 1.6–2.6)
MAGNESIUM SERPL-MCNC: 2.2 MG/DL (ref 1.6–2.6)
MCH RBC QN AUTO: 28.5 PG (ref 27–31)
MCHC RBC AUTO-ENTMCNC: 30.4 G/DL (ref 32–36)
MCV RBC AUTO: 94 FL (ref 82–98)
MODE: ABNORMAL
MODE: NORMAL
MONOCYTES # BLD AUTO: 1 K/UL (ref 0.3–1)
MONOCYTES NFR BLD: 5.8 % (ref 4–15)
NEUTROPHILS # BLD AUTO: 15.1 K/UL (ref 1.8–7.7)
NEUTROPHILS NFR BLD: 83.8 % (ref 38–73)
NRBC BLD-RTO: 0 /100 WBC
PCO2 BLDA: 30.5 MMHG (ref 35–45)
PCO2 BLDA: 31.6 MMHG (ref 35–45)
PCO2 BLDA: 32.9 MMHG (ref 35–45)
PCO2 BLDA: 37.1 MMHG (ref 35–45)
PH SMN: 7.43 [PH] (ref 7.35–7.45)
PH SMN: 7.46 [PH] (ref 7.35–7.45)
PH SMN: 7.47 [PH] (ref 7.35–7.45)
PH SMN: 7.48 [PH] (ref 7.35–7.45)
PHOSPHATE SERPL-MCNC: 3 MG/DL (ref 2.7–4.5)
PLATELET # BLD AUTO: 279 K/UL (ref 150–350)
PMV BLD AUTO: 12.3 FL (ref 9.2–12.9)
PO2 BLDA: 29 MMHG (ref 40–60)
PO2 BLDA: 90 MMHG (ref 80–100)
PO2 BLDA: 95 MMHG (ref 80–100)
PO2 BLDA: 98 MMHG (ref 80–100)
POC BE: -1 MMOL/L
POC BE: -2 MMOL/L
POC BE: 0 MMOL/L
POC BE: 1 MMOL/L
POC IONIZED CALCIUM: 1.14 MMOL/L (ref 1.06–1.42)
POC IONIZED CALCIUM: 1.17 MMOL/L (ref 1.06–1.42)
POC IONIZED CALCIUM: 1.21 MMOL/L (ref 1.06–1.42)
POC SATURATED O2: 57 % (ref 95–100)
POC SATURATED O2: 98 % (ref 95–100)
POC TCO2: 23 MMOL/L (ref 23–27)
POC TCO2: 24 MMOL/L (ref 23–27)
POC TCO2: 25 MMOL/L (ref 23–27)
POC TCO2: 26 MMOL/L (ref 24–29)
POCT GLUCOSE: 118 MG/DL (ref 70–110)
POCT GLUCOSE: 128 MG/DL (ref 70–110)
POCT GLUCOSE: 130 MG/DL (ref 70–110)
POCT GLUCOSE: 132 MG/DL (ref 70–110)
POCT GLUCOSE: 137 MG/DL (ref 70–110)
POCT GLUCOSE: 150 MG/DL (ref 70–110)
POTASSIUM BLD-SCNC: 3.7 MMOL/L (ref 3.5–5.1)
POTASSIUM BLD-SCNC: 4 MMOL/L (ref 3.5–5.1)
POTASSIUM BLD-SCNC: 4.2 MMOL/L (ref 3.5–5.1)
POTASSIUM SERPL-SCNC: 3.7 MMOL/L (ref 3.5–5.1)
POTASSIUM SERPL-SCNC: 4 MMOL/L (ref 3.5–5.1)
POTASSIUM SERPL-SCNC: 4.1 MMOL/L (ref 3.5–5.1)
POTASSIUM SERPL-SCNC: 4.1 MMOL/L (ref 3.5–5.1)
PROT SERPL-MCNC: 5.9 G/DL (ref 6–8.4)
PROTHROMBIN TIME: 18.7 SEC (ref 9–12.5)
RBC # BLD AUTO: 3.16 M/UL (ref 4.6–6.2)
SAMPLE: ABNORMAL
SAMPLE: NORMAL
SITE: ABNORMAL
SITE: NORMAL
SODIUM BLD-SCNC: 142 MMOL/L (ref 136–145)
SODIUM BLD-SCNC: 143 MMOL/L (ref 136–145)
SODIUM BLD-SCNC: 143 MMOL/L (ref 136–145)
SODIUM SERPL-SCNC: 144 MMOL/L (ref 136–145)
SP02: 100
SP02: 92
SP02: 98
VANCOMYCIN SERPL-MCNC: 12.4 UG/ML
WBC # BLD AUTO: 18.01 K/UL (ref 3.9–12.7)

## 2020-02-04 PROCEDURE — 63600175 PHARM REV CODE 636 W HCPCS: Performed by: THORACIC SURGERY (CARDIOTHORACIC VASCULAR SURGERY)

## 2020-02-04 PROCEDURE — 99900035 HC TECH TIME PER 15 MIN (STAT)

## 2020-02-04 PROCEDURE — 27000248 HC VAD-ADDITIONAL DAY

## 2020-02-04 PROCEDURE — 83735 ASSAY OF MAGNESIUM: CPT | Mod: 91

## 2020-02-04 PROCEDURE — 97530 THERAPEUTIC ACTIVITIES: CPT

## 2020-02-04 PROCEDURE — 99233 SBSQ HOSP IP/OBS HIGH 50: CPT | Mod: ,,, | Performed by: INTERNAL MEDICINE

## 2020-02-04 PROCEDURE — 63600175 PHARM REV CODE 636 W HCPCS: Performed by: STUDENT IN AN ORGANIZED HEALTH CARE EDUCATION/TRAINING PROGRAM

## 2020-02-04 PROCEDURE — C9113 INJ PANTOPRAZOLE SODIUM, VIA: HCPCS | Performed by: SURGERY

## 2020-02-04 PROCEDURE — 97110 THERAPEUTIC EXERCISES: CPT

## 2020-02-04 PROCEDURE — 25000003 PHARM REV CODE 250: Performed by: STUDENT IN AN ORGANIZED HEALTH CARE EDUCATION/TRAINING PROGRAM

## 2020-02-04 PROCEDURE — 25000003 PHARM REV CODE 250: Performed by: THORACIC SURGERY (CARDIOTHORACIC VASCULAR SURGERY)

## 2020-02-04 PROCEDURE — 85730 THROMBOPLASTIN TIME PARTIAL: CPT | Mod: 91

## 2020-02-04 PROCEDURE — 84100 ASSAY OF PHOSPHORUS: CPT

## 2020-02-04 PROCEDURE — 84132 ASSAY OF SERUM POTASSIUM: CPT | Mod: 91

## 2020-02-04 PROCEDURE — 25000003 PHARM REV CODE 250: Performed by: PHYSICIAN ASSISTANT

## 2020-02-04 PROCEDURE — 80202 ASSAY OF VANCOMYCIN: CPT

## 2020-02-04 PROCEDURE — 80053 COMPREHEN METABOLIC PANEL: CPT

## 2020-02-04 PROCEDURE — 82803 BLOOD GASES ANY COMBINATION: CPT

## 2020-02-04 PROCEDURE — 93750 PR INTERROGATE VENT ASSIST DEV, IN PERSON, W PHYSICIAN ANALYSIS: ICD-10-PCS | Mod: ,,, | Performed by: INTERNAL MEDICINE

## 2020-02-04 PROCEDURE — 99291 CRITICAL CARE FIRST HOUR: CPT | Mod: GC,,, | Performed by: SURGERY

## 2020-02-04 PROCEDURE — 37799 UNLISTED PX VASCULAR SURGERY: CPT

## 2020-02-04 PROCEDURE — 25000003 PHARM REV CODE 250: Performed by: SURGERY

## 2020-02-04 PROCEDURE — A4216 STERILE WATER/SALINE, 10 ML: HCPCS | Performed by: SURGERY

## 2020-02-04 PROCEDURE — 20000000 HC ICU ROOM

## 2020-02-04 PROCEDURE — 63600367 HC NITRIC OXIDE PER HOUR

## 2020-02-04 PROCEDURE — 84295 ASSAY OF SERUM SODIUM: CPT

## 2020-02-04 PROCEDURE — A4216 STERILE WATER/SALINE, 10 ML: HCPCS | Performed by: THORACIC SURGERY (CARDIOTHORACIC VASCULAR SURGERY)

## 2020-02-04 PROCEDURE — 85610 PROTHROMBIN TIME: CPT

## 2020-02-04 PROCEDURE — 85014 HEMATOCRIT: CPT

## 2020-02-04 PROCEDURE — 27000221 HC OXYGEN, UP TO 24 HOURS

## 2020-02-04 PROCEDURE — 85025 COMPLETE CBC W/AUTO DIFF WBC: CPT

## 2020-02-04 PROCEDURE — 97112 NEUROMUSCULAR REEDUCATION: CPT

## 2020-02-04 PROCEDURE — 94761 N-INVAS EAR/PLS OXIMETRY MLT: CPT

## 2020-02-04 PROCEDURE — 97803 MED NUTRITION INDIV SUBSEQ: CPT

## 2020-02-04 PROCEDURE — 83605 ASSAY OF LACTIC ACID: CPT

## 2020-02-04 PROCEDURE — 82330 ASSAY OF CALCIUM: CPT

## 2020-02-04 PROCEDURE — 83615 LACTATE (LD) (LDH) ENZYME: CPT

## 2020-02-04 PROCEDURE — 84132 ASSAY OF SERUM POTASSIUM: CPT

## 2020-02-04 PROCEDURE — 82800 BLOOD PH: CPT

## 2020-02-04 PROCEDURE — 63600175 PHARM REV CODE 636 W HCPCS: Performed by: SURGERY

## 2020-02-04 PROCEDURE — 99291 PR CRITICAL CARE, E/M 30-74 MINUTES: ICD-10-PCS | Mod: GC,,, | Performed by: SURGERY

## 2020-02-04 PROCEDURE — 99233 PR SUBSEQUENT HOSPITAL CARE,LEVL III: ICD-10-PCS | Mod: ,,, | Performed by: INTERNAL MEDICINE

## 2020-02-04 PROCEDURE — 80162 ASSAY OF DIGOXIN TOTAL: CPT

## 2020-02-04 PROCEDURE — 93750 INTERROGATION VAD IN PERSON: CPT | Mod: ,,, | Performed by: INTERNAL MEDICINE

## 2020-02-04 RX ORDER — OXYCODONE HCL 5 MG/5 ML
5 SOLUTION, ORAL ORAL EVERY 6 HOURS
Status: DISCONTINUED | OUTPATIENT
Start: 2020-02-04 | End: 2020-02-11

## 2020-02-04 RX ORDER — PANTOPRAZOLE SODIUM 40 MG/1
40 FOR SUSPENSION ORAL DAILY
Status: DISCONTINUED | OUTPATIENT
Start: 2020-02-05 | End: 2020-02-11

## 2020-02-04 RX ORDER — DIGOXIN 125 MCG
0.12 TABLET ORAL DAILY
Status: DISCONTINUED | OUTPATIENT
Start: 2020-02-04 | End: 2020-02-17 | Stop reason: HOSPADM

## 2020-02-04 RX ORDER — WARFARIN 2 MG/1
2 TABLET ORAL ONCE
Status: COMPLETED | OUTPATIENT
Start: 2020-02-04 | End: 2020-02-04

## 2020-02-04 RX ORDER — OXYCODONE HCL 5 MG/5 ML
5 SOLUTION, ORAL ORAL EVERY 6 HOURS
Status: DISCONTINUED | OUTPATIENT
Start: 2020-02-04 | End: 2020-02-04

## 2020-02-04 RX ADMIN — POLYETHYLENE GLYCOL 3350 17 G: 17 POWDER, FOR SOLUTION ORAL at 09:02

## 2020-02-04 RX ADMIN — CASTOR OIL AND BALSAM, PERU: 788; 87 OINTMENT TOPICAL at 09:02

## 2020-02-04 RX ADMIN — Medication 3 ML: at 10:02

## 2020-02-04 RX ADMIN — ACETAMINOPHEN 650 MG: 160 SOLUTION ORAL at 05:02

## 2020-02-04 RX ADMIN — PANTOPRAZOLE SODIUM 40 MG: 40 INJECTION, POWDER, FOR SOLUTION INTRAVENOUS at 09:02

## 2020-02-04 RX ADMIN — AMLODIPINE BESYLATE 10 MG: 10 TABLET ORAL at 09:02

## 2020-02-04 RX ADMIN — EPINEPHRINE 0.02 MCG/KG/MIN: 1 INJECTION INTRAMUSCULAR; INTRAVENOUS; SUBCUTANEOUS at 08:02

## 2020-02-04 RX ADMIN — Medication 10 ML: at 05:02

## 2020-02-04 RX ADMIN — ASPIRIN 325 MG ORAL TABLET 325 MG: 325 PILL ORAL at 09:02

## 2020-02-04 RX ADMIN — POTASSIUM CHLORIDE 40 MEQ: 400 INJECTION, SOLUTION INTRAVENOUS at 07:02

## 2020-02-04 RX ADMIN — DIGOXIN 0.12 MG: 125 TABLET ORAL at 10:02

## 2020-02-04 RX ADMIN — POLYETHYLENE GLYCOL 3350, SODIUM SULFATE ANHYDROUS, SODIUM BICARBONATE, SODIUM CHLORIDE, POTASSIUM CHLORIDE 100 ML: 236; 22.74; 6.74; 5.86; 2.97 POWDER, FOR SOLUTION ORAL at 11:02

## 2020-02-04 RX ADMIN — POLYETHYLENE GLYCOL 3350, SODIUM SULFATE ANHYDROUS, SODIUM BICARBONATE, SODIUM CHLORIDE, POTASSIUM CHLORIDE 100 ML: 236; 22.74; 6.74; 5.86; 2.97 POWDER, FOR SOLUTION ORAL at 04:02

## 2020-02-04 RX ADMIN — Medication 400 MG: at 09:02

## 2020-02-04 RX ADMIN — POLYETHYLENE GLYCOL 3350, SODIUM SULFATE ANHYDROUS, SODIUM BICARBONATE, SODIUM CHLORIDE, POTASSIUM CHLORIDE 100 ML: 236; 22.74; 6.74; 5.86; 2.97 POWDER, FOR SOLUTION ORAL at 08:02

## 2020-02-04 RX ADMIN — OXYCODONE HYDROCHLORIDE 5 MG: 5 SOLUTION ORAL at 11:02

## 2020-02-04 RX ADMIN — POLYETHYLENE GLYCOL 3350, SODIUM SULFATE ANHYDROUS, SODIUM BICARBONATE, SODIUM CHLORIDE, POTASSIUM CHLORIDE 100 ML: 236; 22.74; 6.74; 5.86; 2.97 POWDER, FOR SOLUTION ORAL at 03:02

## 2020-02-04 RX ADMIN — ACETAMINOPHEN 650 MG: 160 SOLUTION ORAL at 12:02

## 2020-02-04 RX ADMIN — MAGNESIUM SULFATE IN WATER 2 G: 40 INJECTION, SOLUTION INTRAVENOUS at 07:02

## 2020-02-04 RX ADMIN — ATORVASTATIN CALCIUM 80 MG: 20 TABLET, FILM COATED ORAL at 08:02

## 2020-02-04 RX ADMIN — Medication 10 ML: at 12:02

## 2020-02-04 RX ADMIN — HYDRALAZINE HYDROCHLORIDE 10 MG: 20 INJECTION INTRAMUSCULAR; INTRAVENOUS at 12:02

## 2020-02-04 RX ADMIN — POTASSIUM CHLORIDE 40 MEQ: 400 INJECTION, SOLUTION INTRAVENOUS at 06:02

## 2020-02-04 RX ADMIN — Medication 10 ML: at 06:02

## 2020-02-04 RX ADMIN — POLYETHYLENE GLYCOL 3350, SODIUM SULFATE ANHYDROUS, SODIUM BICARBONATE, SODIUM CHLORIDE, POTASSIUM CHLORIDE 100 ML: 236; 22.74; 6.74; 5.86; 2.97 POWDER, FOR SOLUTION ORAL at 07:02

## 2020-02-04 RX ADMIN — VANCOMYCIN HYDROCHLORIDE 750 MG: 750 INJECTION, POWDER, LYOPHILIZED, FOR SOLUTION INTRAVENOUS at 08:02

## 2020-02-04 RX ADMIN — Medication 400 MG: at 02:02

## 2020-02-04 RX ADMIN — Medication 400 MG: at 08:02

## 2020-02-04 RX ADMIN — Medication 10 ML: at 11:02

## 2020-02-04 RX ADMIN — Medication 3 ML: at 02:02

## 2020-02-04 RX ADMIN — WARFARIN SODIUM 2 MG: 2 TABLET ORAL at 05:02

## 2020-02-04 RX ADMIN — ACETAMINOPHEN 650 MG: 160 SOLUTION ORAL at 11:02

## 2020-02-04 RX ADMIN — Medication 3 ML: at 06:02

## 2020-02-04 RX ADMIN — FUROSEMIDE 5 MG/HR: 10 INJECTION, SOLUTION INTRAMUSCULAR; INTRAVENOUS at 08:02

## 2020-02-04 NOTE — ASSESSMENT & PLAN NOTE
- Creatinine on admit 2.6 (baseline ~ 1.8). BUN/Creatinine today 54/1.9  - Nephrology consulted and following

## 2020-02-04 NOTE — PROGRESS NOTES
"[facility]  Adult Nutrition  Progress Note    SUMMARY       Recommendations  1. Recommend modifying TF to Impact Peptide 1.5 at a goal rate of 55 mL/hr - to provide 1980 kcal/day, 124g protein/day, and 1016mL free fluid/day.   2. PO diet per SLP.   RD to monitor.    Goals: Patient to receive nutrition by RD follow-up  Nutrition Goal Status: progressing towards goal  Communication of RD Recs: discussed on rounds    Reason for Assessment  Reason For Assessment: RD follow-up  Diagnosis: surgery/postoperative complications(s/p LVAD 1/23)  Relevant Medical History: HTN, HLD, CHF, afib, stroke  Interdisciplinary Rounds: attended  General Information Comments: Extubated 2/1. Patient pulled NG tube yesterday but replaced and trickle TF restarted. SLP cleared patient for pureed diet yesterday but now NPO again. NFPE completed 1/31, patient continues with mild wasting but does not meet criteria for malnutrition. At risk for acute malnutrition if continues with inconsistent/inadequate nutrition.  Nutrition Discharge Planning: Unable to determine at this time.    Nutrition Risk Screen  Nutrition Risk Screen: tube feeding or parenteral nutrition    Nutrition/Diet History  Spiritual, Cultural Beliefs, [facility] Practices, Values that Affect Care: no  Factors Affecting Nutritional Intake: NPO    Anthropometrics  Temp: 97.7 °F (36.5 °C)  Height: 5' 10" (177.8 cm)  Height (inches): 70 in  Weight Method: Bed Scale  Weight: 113.1 kg (249 lb 5.4 oz)  Weight (lb): 249.34 lb  Ideal Body Weight (IBW), Male: 166 lb  % Ideal Body Weight, Male (lb): 150.6 %  BMI (Calculated): 35.8  BMI Grade: 30 - 34.9- obesity - grade I    Lab/Procedures/Meds  Pertinent Labs Reviewed: reviewed  Pertinent Labs Comments: Cl 111, BUN 54, Creat 1.9, Glu 141, Ca 8.4, Alb 2.3  Pertinent Medications Reviewed: reviewed  Pertinent Medications Comments: docusate, ferrous gluconate, lasix, pantoprazole, coumadin, epinephrine    Estimated/Assessed " Needs  Weight Used For Calorie Calculations: 108.5 kg (239 lb 3.2 oz)  Energy Calorie Requirements (kcal): 1906 kcal/day  Energy Need Method: [place](no AF 2/2 obesity)  Protein Requirements: 113-151 g/day(1.5-2.0 g/kg)  Weight Used For Protein Calculations: 75.5 kg (166 lb 7.2 oz)(IBW)  Fluid Requirements (mL): per MD or 1 ml/kcal  Estimated Fluid Requirement Method: RDA Method  RDA Method (mL): 1906    Nutrition Prescription Ordered  Current Diet Order: NPO  Current Nutrition Support Formula Ordered: Peptamen Intense VHP  Current Nutrition Support Rate Ordered: 10 (ml)  Current Nutrition Support Frequency Ordered: mL/hr    Evaluation of Received Nutrient/Fluid Intake  Enteral Calories (kcal): 240  Enteral Protein (gm): 22  Enteral (Free Water) Fluid (mL): 202  % Kcal Needs: 13%  % Protein Needs: 19%  I/O: -11.7L since admit  Energy Calories Required: not meeting needs  Protein Required: not meeting needs  Fluid Required: (per MD)  Comments: LBM 2/3  Tolerance: tolerating  % Intake of Estimated Energy Needs: 0 - 25 %  % Meal Intake: NPO    Nutrition Risk  Level of Risk/Frequency of Follow-up: high(2x/week)     Assessment and Plan  Nutrition Problem  Inadequate energy intake     Related to (etiology):   Decreased ability to consume sufficient energy     Signs and Symptoms (as evidenced by):   NPO with no alternative means of nutrition at this time     Interventions (treatment strategy):  Collaboration of nutrition care with other providers     Nutrition Diagnosis Status:   Improving    Monitor and Evaluation  Food and Nutrient Intake: energy intake, food and beverage intake, enteral nutrition intake  Food and Nutrient Adminstration: diet order, enteral and parenteral nutrition administration  Knowledge/Beliefs/Attitudes: food and nutrition knowledge/skill  Physical Activity and Function: nutrition-related ADLs and IADLs  Anthropometric Measurements: weight, weight change, body mass index  Biochemical Data,  Medical Tests and Procedures: electrolyte and renal panel, gastrointestinal profile, glucose/endocrine profile, inflammatory profile, lipid profile  Nutrition-Focused Physical Findings: overall appearance     Malnutrition Assessment  Orbital Region (Subcutaneous Fat Loss): well nourished  Upper Arm Region (Subcutaneous Fat Loss): (DANIE, restrained)  Thoracic and Lumbar Region: well nourished   [place] Region (Muscle Loss): mild depletion  Clavicle Bone Region (Muscle Loss): mild depletion  Clavicle and Acromion Bone Region (Muscle Loss): well nourished  Scapular Bone Region (Muscle Loss): (DANIE)  Dorsal Hand (Muscle Loss): well nourished(edematous)  Patellar Region (Muscle Loss): well nourished(edematous)  Anterior Thigh Region (Muscle Loss): well nourished(edematous)  Posterior Calf Region (Muscle Loss): (DANIE, boots in place)   Edema (Fluid Accumulation): 2-->mild     Nutrition Follow-Up  RD Follow-up?: Yes

## 2020-02-04 NOTE — PLAN OF CARE
Problem: Occupational Therapy Goal  Goal: Occupational Therapy Goal  Description  Goals to be met by: 2/16/2020     Patient will increase functional independence with ADLs by performing:    UE Dressing with Minimal Assistance.  LE Dressing with Minimal Assistance.  Grooming while standing with Minimal Assistance.  Toileting from bedside commode with Minimal Assistance for hygiene and clothing management.   Sitting at edge of bed x10 minutes with Minimal Assistance in prep for seated grooming tasks   Supine to sit with min A  Toilet transfer to bedside commode with Minimal Assistance.  Pt will perform LVAD management independently      Outcome: Ongoing, Progressing   The above goals remain appropriate. SEBASTIEN Mayberry  2/4/2020

## 2020-02-04 NOTE — ASSESSMENT & PLAN NOTE
Tae Delgado is a 59 y.o. 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 Approved for DT LVAD. Went to OR on 1/21 and found to have DAMON and LV thrombus and case was aborted. Pt was placed on a heparin gtt and thrombus had dissipated on repeat JACINTO on 1/22. Pt underwent LVAD placement on 1/23. Chest closure and re-exploration on 1/24. Returned from OR with chest open on 1/24. Attempted chest closure on 1/27, returned to SICU with chest open.     Neuro:  - Extubated, alert and oriented.  - Scheduled tylenol for pain.   - Oxycodone 5 mg q6h scheduled for pain.     CVS:   - Current LVAD flow @ 5400 with appropriate PI  - Digoxin level 2.0   - Wean vasopressors per CTS; currently Epi @ 0.03  - Corby currently at 1 ppm. Continue to wean today.  - EP had been consulted for tachyarrhythmias earlier in pt's hospital course. Now off lidocaine gtt. ICD turned on. Currently 100% paced.  - Amiodarone 400 mg TID   - Amlodipine 10 mg QD  - Digoxin 0.125 mg QD   - aPTT was elevated overnight and heparin gtt was held. Repeat labs ok; Heparin gtt restarted at 400, nontitrating.  - Warfarin 2 today. INR 2.0  - ASA   - Atorvastatin 80 mg QHS    Pulm:  - Breathing comfortably on 4L NC; wean as able  - ABGs prn  - CXR showed progression of pulmonary opacity bilaterally likely relates to pulmonary infiltrate/airspace disease and possible component of pleural fluid particularly on the left.  - Encourage IS and good pulmonary hygiene.   - Daily CXR    GI:  - Pureed soft diet per speech  - Continuing with TF due to poor appetite; increase TF to goal as tolerated.   - Protonix 40 daily  - Docusate, Miramax, golytely    Endo:   - Insulin off now, SSI  - Endocrine consulted,  appreciate their services  - BG well controlled at this time    Renal:  - Strict I/O    Intake/Output Summary (Last 24 hours) at 2/4/2020 0610  Last data filed at 2/4/2020 0500  Gross per 24 hour   Intake 2220 ml   Output 2935 ml   Net -715 ml     - Trend BUN/Cr, still elevated but improving; Cr 1.9 down from 2.0, BUN 54 down from 67  - Lasix gtt @ 2.5       FENGI:  - Monitor labs  - Replace per protocol  - Switched all drips to D5 due to elevated Na+  - Hypernatremia improved with increased free water boluses frequency to q4h  - Na+ 144    Heme:  - No blood products needed during the OR  - H/H remains stable overnight    ID:   - Vanc 750 mg q24h   - Cultured for fevers; all cx NGTD.   - Follow WBC; improving slowly WBC 18 from 18.9   - Afebrile overnight    PPx:  - Hep gtt restarted; Warfarin, Aspirin  - Bowel regimen  - GI ppx    Dispo:  - Cont SICU care, wean oxygen as tolerated

## 2020-02-04 NOTE — PT/OT/SLP PROGRESS
Physical Therapy Treatment    Patient Name:  Tae Delgado   MRN:  [id number]  Admitting Diagnosis:  LVAD (left ventricular assist device) present   Recent Surgery: Procedure(s) (LRB):  CLOSURE, WOUND, STERNUM (N/A)  WASHOUT (N/A)  APPLICATION, WOUND VAC (N/A) 6 Days Post-Op  Admit Date: 1/9/2020  Length of Stay: 26 days    Recommendations:     Discharge Recommendations:  rehabilitation facility   Discharge Equipment Recommendations: other (see comments)(tbd)   Barriers to discharge: None    Assessment:     Tae Delgado is a 59 y.o. male admitted with a medical diagnosis of LVAD (left ventricular assist device) present.  He presents with the following impairments/functional limitations:  weakness, impaired endurance, impaired self care skills, impaired functional mobilty, gait instability, impaired balance, impaired skin, impaired fine motor, impaired cardiopulmonary response to activity. Pt tolerated session well today with increased amount of time spent seated EOB with improved overall postural control. Pt only req'd occasional bouts of Min A while EOB on this date to assist with posterior/lateral lean. Pt was avoiding wbing on the Rt throughout session despite optimal positioning of hips and due to this demo'ed a Lt posterior/lateral lean throughout time EOB. Pt could benefit from continued facilitation of postural musculature as well as cervical extensors but pt tells therapists throughout session to not touch him. Pt was able to tolerate therex EOB on this date with postural responses appropriate to maintain sitting balance. Pt is an excellent candidate for inpatient rehabilitation, as pt has a qualifying diagnosis, displays good activity tolerance, has experienced decline from PLOF, has good family support, and is motivated to participate in therapy.     Rehab Prognosis: Good; patient would benefit from acute skilled PT services to address these deficits and reach maximum level of function.    Recent Surgery:  Procedure(s) (LRB):  CLOSURE, WOUND, STERNUM (N/A)  WASHOUT (N/A)  APPLICATION, WOUND VAC (N/A) 6 Days Post-Op    Plan:     During this hospitalization, patient to be seen 6 x/week to address the identified rehab impairments via gait training, therapeutic activities, therapeutic exercises, neuromuscular re-education and progress toward the following goals:    · Plan of Care Expires:  03/02/20    Subjective     RN notified prior to session. Family members present upon PT entrance into room.    Chief Complaint: Pt had no complaints other than fatigue  Patient/Family Comments/goals: get better   Pain/Comfort:  · Pain Rating 1: 0/10  · Pain Rating Post-Intervention 1: 0/10      Objective:     Additional staff present: OT for co-treatment due to pt's impaired cardiopulmonary reserve making him unable to tolerate multiple sessions    Patient found HOB elevated with: blood pressure cuff, central line, giles catheter, pulse ox (continuous), telemetry, NG tube, oxygen, PICC line, LVAD, chest tube, wound vac, arterial line, SCD   Mental Status: Patient is oriented to AxOx4 and follows multistep commands. Patient is Alert and Cooperative during session.    General Precautions: Standard, Cardiac fall, LVAD, sternal   Orthopedic Precautions:N/A   Braces: N/A   Body mass index is 35.78 kg/m².  Oxygen Device: Nasal Cannula 4L    Outcome Measures:  AM-PAC 6 CLICK MOBILITY  Turning over in bed (including adjusting bedclothes, sheets and blankets)?: 2  Sitting down on and standing up from a chair with arms (e.g., wheelchair, bedside commode, etc.): 1  Moving from lying on back to sitting on the side of the bed?: 2  Moving to and from a bed to a chair (including a wheelchair)?: 1  Need to walk in hospital room?: 1  Climbing 3-5 steps with a railing?: 1  Basic Mobility Total Score: 8     Functional Mobility:    Bed Mobility:   · Rolling/Turning to Left: maximal assistance  · Supine to Sit: total assistance x 2; from Lt side of  bed  · Scooting anteriorly to EOB to have both feet planted on floor: maximal assistance  · Sit to Supine: total assistance x 2; to Lt side of bed  · Pt found/returned to bedside chair    Sitting Balance at Edge of Bed:   Assistance Level Required: Contact Guard Assistance and Minimal Assistance   Time: 14 minutes   Postural deviations noted: slouched posture, rounded shoulders and forward head   Comments: Pt able to tolerate 14 minutes EOB. As pt's fatigue increased pt req'd Min A to remain upright due to Lt posterior/lateral lean. Pt able to tolerate therex EOB with good postural responses to internal perturbations. Despite cueing/facilitation for midline posture, pt not wbing through RLE while sitting.     Transfers/Standing Balance:   · Sit <> Stand Transfer:  Pt declined    Therapeutic Exercises:    Seated LAQ, DF, PF: Pt performed 1x 10 repetitions with facilitation for correct performance and sequencing. Exercises performed to develop and maintain pt's  strength and ROM.     Education:   Time provided for education, counseling and discussion of health disposition in regards to patient's current status   All questions answered within PT scope of practice and to patient's satisfaction   PT role in POC to address current functional deficits   Pt educated on proper body mechanics, safety techniques, and energy conservation with PT facilitation and cueing throughout session   Whiteboard updated with pt's current mobility status documented above   LVAD: patient found on wall power / returned on wall power. No alarms sounded.     Patient left with bed in chair position with all lines intact, call button in reach, RN notified and family present.    GOALS:   Multidisciplinary Problems     Physical Therapy Goals        Problem: Physical Therapy Goal    Goal Priority Disciplines Outcome Goal Variances Interventions   Physical Therapy Goal     PT, PT/OT Ongoing, Progressing     Description:  Goals to be met  by: 2020     Patient will increase functional independence with mobility by performin. Supine to sit with MInimal Assistance  2. Rolling to Left and Right with Minimal Assistance.  3. Sit to stand transfer with Moderate Assistance  4. Sitting at edge of bed x8 minutes with Minimal Assistance  5. Lower extremity exercise program x10 reps per handout, with assistance as needed                    Time Tracking:     PT Received On: 20  PT Start Time: 0900     PT Stop Time: 0940  PT Total Time (min): 40 min       Billable Minutes:   · Therapeutic Activity 15, Therapeutic Exercise 8 and Neuromuscular Re-education 15    Treatment Type: Treatment  PT/PTA: PT       Ann Rudd PT, DPT  2020  Pager: [phone]

## 2020-02-04 NOTE — SUBJECTIVE & OBJECTIVE
**Interval History: Patient is eager to eat, but primary service with NPO order.  Speech therapy following.  He has no new complaints this morning; family at bedside.     Continuous Infusions:   epinephrine 0.02 mcg/kg/min (02/04/20 1200)    furosemide (LASIX) 2 mg/mL infusion (non-titrating) 5 mg/hr (02/04/20 1200)    heparin (porcine) in 5 % dex 400 Units/hr (02/04/20 1200)     Scheduled Meds:   acetaminophen  650 mg Per NG tube Q6H    amiodarone  400 mg Per NG tube TID    amLODIPine  10 mg Per NG tube Daily    aspirin  325 mg Oral Daily    atorvastatin  80 mg Oral QHS    balsam peru-castor oil   Topical (Top) BID    digoxin  0.125 mg Oral Daily    docusate sodium  200 mg Oral QHS    ferrous gluconate  324 mg Oral Daily with breakfast    magnesium oxide  400 mg Per NG tube TID    oxyCODONE  5 mg Per NG tube Q6H    [START ON 2/5/2020] pantoprazole  40 mg Per NG tube Daily    polyethylene glycol  100 mL Per NG tube 6 times per day    polyethylene glycol  17 g Oral Daily    sodium chloride 0.9%  10 mL Intravenous Q6H    sodium chloride 0.9%  3 mL Intravenous Q8H    vancomycin (VANCOCIN) IVPB  750 mg Intravenous Q24H    warfarin  2 mg Oral Once     PRN Meds:albumin human 5%, albuterol sulfate, bisacodyL, bisacodyL, calcium gluconate IVPB, calcium gluconate IVPB, calcium gluconate IVPB, Dextrose 10% Bolus, Dextrose 10% Bolus, Dextrose 10% Bolus, glucagon (human recombinant), hydrALAZINE, insulin aspart U-100, magnesium hydroxide 400 mg/5 ml, magnesium sulfate IVPB, magnesium sulfate IVPB, magnesium sulfate IVPB, potassium chloride in water **AND** potassium chloride in water, sodium chloride 0.9%, Flushing PICC Protocol **AND** sodium chloride 0.9% **AND** sodium chloride 0.9%, sodium phosphate IVPB, sodium phosphate IVPB, sodium phosphate IVPB    Review of patient's allergies indicates:   Allergen Reactions    Biopatch [chlorhexidine gluconate]      Site burning    Dobutamine in d5w       Tachycardia, tremors, SOB, flushing    Percocet [oxycodone-acetaminophen] Itching    Penicillins Rash     Cefepime given on 1/23/2020 without issue     Objective:     Vital Signs (Most Recent):  Temp: 97.7 °F (36.5 °C) (02/04/20 1100)  Pulse: 90 (02/04/20 1200)  Resp: 20 (02/04/20 0830)  BP: (!) 80/0 (02/04/20 1100)  SpO2: (!) 92 % (02/04/20 1200) Vital Signs (24h Range):  Temp:  [97.2 °F (36.2 °C)-99 °F (37.2 °C)] 97.7 °F (36.5 °C)  Pulse:  [90-93] 90  Resp:  [18-20] 20  SpO2:  [92 %-100 %] 92 %  BP: (78-90)/(0) 80/0  Arterial Line BP: ()/(67-85) 90/74     Patient Vitals for the past 72 hrs (Last 3 readings):   Weight   02/04/20 0330 113.1 kg (249 lb 5.4 oz)   02/03/20 1400 113.4 kg (250 lb)   02/03/20 0419 113.6 kg (250 lb 7.1 oz)     Body mass index is 35.78 kg/m².      Intake/Output Summary (Last 24 hours) at 2/4/2020 1404  Last data filed at 2/4/2020 1400  Gross per 24 hour   Intake 2262 ml   Output 3435 ml   Net -1173 ml       Hemodynamic Parameters:       Telemetry: A fib  Physical Exam   Constitutional: Intubated and sedated; chest open    Eyes: Pupils are equal, round, and reactive to light. Conjunctivae are normal.   Neck: Neck supple. No thyromegaly present. Bilateral IJ lines   Cardiovascular: Irregularly irregular, tachycardic, smooth VAD hum. Chest open   Pulmonary/Chest: Effort normal and breath sounds normal. Intubated and sedated   Abdominal: Soft.   Musculoskeletal: He exhibits no edema.   Neurological: Intubated and sedated   Skin: Skin is warm and dry. Capillary refill takes 2 to 3 seconds.       Significant Labs:  CBC:  Recent Labs   Lab 02/02/20  0328  02/03/20  0400  02/04/20  0026 02/04/20  0255 02/04/20  0837   WBC 20.41*  --  18.88*  --   --  18.01*  --    RBC 3.24*  --  3.10*  --   --  3.16*  --    HGB 9.0*  --  8.7*  --   --  9.0*  --    HCT 30.8*   < > 29.3*   < > 23* 29.6* 26*     --  268  --   --  279  --    MCV 95  --  95  --   --  94  --    MCH 27.8  --  28.1  --   --   28.5  --    MCHC 29.2*  --  29.7*  --   --  30.4*  --     < > = values in this interval not displayed.     BNP:  Recent Labs   Lab 01/29/20 0508 01/31/20  0435 02/03/20  0400   * 420* 470*     CMP:  Recent Labs   Lab 02/02/20 0328 02/03/20  0400 02/03/20  1800  02/04/20  0255 02/04/20  0550 02/04/20  1210   *  --  117*  --  135*  --  141*  --   --    CALCIUM 8.6*  --  8.4*  --  8.5*  --  8.4*  --   --    ALBUMIN 2.3*  --  2.2*  2.2*  --   --   --  2.3*  --   --    PROT 5.9*  --  5.8*  5.8*  --   --   --  5.9*  --   --    *  --  144  --  143  --  144  --   --    K 3.9   < > 3.6   < > 4.2  4.2   < > 4.1 4.0 4.1   CO2 24  --  22*  --  23  --  20*  --   --    *  --  111*  --  112*  --  111*  --   --    BUN 67*  --  65*  --  56*  --  54*  --   --    CREATININE 2.3*  --  2.0*  --  1.9*  --  1.9*  --   --    ALKPHOS 43*  --  44*  44*  --   --   --  48*  --   --    ALT 19  --  18  18  --   --   --  17  --   --    AST 30  --  24  24  --   --   --  18  --   --    BILITOT 0.6  --  0.6  0.6  --   --   --  0.6  --   --     < > = values in this interval not displayed.      Coagulation:   Recent Labs   Lab 02/02/20 0328 02/03/20  0400 02/04/20 0255 02/04/20  0550 02/04/20  0749   INR 1.6*  --  1.5*  --  2.0*  --   --    APTT 48.9*   < > 39.6*   < > 78.2* 31.5 29.4    < > = values in this interval not displayed.     LDH:  Recent Labs   Lab 02/02/20  0328 02/03/20  0400 02/04/20  0255   * 409* 346*     Microbiology:  Microbiology Results (last 7 days)     Procedure Component Value Units Date/Time    Blood culture [[id number]] Collected:  01/31/20 2220    Order Status:  Completed Specimen:  Blood from Peripheral, Wrist, Left Updated:  02/04/20 0612     Blood Culture, Routine No Growth to date      No Growth to date      No Growth to date      No Growth to date    Blood culture [[id number]] Collected:  01/31/20 2215    Order Status:  Completed Specimen:  Blood from Peripheral, Forearm,  Right Updated:  02/04/20 0612     Blood Culture, Routine No Growth to date      No Growth to date      No Growth to date      No Growth to date          I have reviewed all pertinent labs within the past 24 hours.    Estimated Creatinine Clearance: 52.7 mL/min (A) (based on SCr of 1.9 mg/dL (H)).    Diagnostic Results:  I have reviewed all pertinent imaging results/findings within the past 24 hours.

## 2020-02-04 NOTE — PROGRESS NOTES
Wound care follow up.    Wound to the right nare appears stable and slightly smaller. NGT in place to opposite nare. Continue sween.    Purple area to the right elbow fading to maroon color. No open area noted at this time. Will monitor for evolution of tissue loss. Continue venelex    Wound to the right lower abdomen. VAD dressing has been moved from the area. Recommend venelex BID to the area to promote healing.     Buttocks/heels assessed and intact.     Wound care to follow PRN.  Farzana Jara RN [facility]   [phone]             02/04/20 1033        Wound 02/04/20 0800 Medical adhesive related skin injury lower Abdomen   Date First Assessed/Time First Assessed: 02/04/20 0800   Pre-existing: No  Primary Wound Type: Medical adhesive related skin injury  Side: Right  Orientation: lower  Location: Abdomen   Wound Image    Wound WDL ex   Dressing Appearance Open to air   Drainage Amount None   Drainage Characteristics/Odor No odor   Appearance Pink;Red  (scab)   Tissue loss description Not applicable   Periwound Area Pink   Wound Edges Undefined   Wound Length (cm) 4 cm   Wound Width (cm) 1.2 cm   Wound Depth (cm) 0.1 cm   Wound Volume (cm^3) 0.48 cm^3   Wound Surface Area (cm^2) 4.8 cm^2        Pressure Injury 01/26/20 1900 Right Nare Suspected DTPI   Date First Assessed/Time First Assessed: 01/26/20 1900   Pressure Injury Present on Admission: suspected hospital acquired  Side: Right  Location: Nare  Staging: Suspected DTPI   Wound Image    Staging Suspected DTPI   Dressing Appearance Open to air   Drainage Amount None   Drainage Characteristics/Odor No odor   Appearance Purple   Tissue loss description Full thickness   Wound Length (cm) 0.3 cm   Wound Width (cm) 0.5 cm   Wound Depth (cm) 0.1 cm   Wound Volume (cm^3) 0.02 cm^3   Wound Surface Area (cm^2) 0.15 cm^2        Pressure Injury 01/29/20 1430 Right posterior Elbow Suspected DTPI   Date First Assessed/Time First Assessed: 01/29/20 1430   Pressure Injury  Present on Admission: suspected hospital acquired  Side: Right  Orientation: posterior  Location: Elbow  Staging: Suspected DTPI   Wound Image    Staging Suspected DTPI   Drainage Amount None   Appearance Purple   Wound Edges Undefined   Wound Length (cm) 1.8 cm   Wound Width (cm) 1.8 cm   Wound Depth (cm) 0.1 cm   Wound Volume (cm^3) 0.32 cm^3   Wound Surface Area (cm^2) 3.24 cm^2

## 2020-02-04 NOTE — PROGRESS NOTES
[facility]  Heart Transplant  Progress Note    Patient Name: Tae Delgado  MRN: [id number]  Admission Date: 1/9/2020  Hospital Length of Stay: 26 days  Attending Physician: Ino Schmitt MD  Primary Care Provider: Elham Pearson MD  Principal Problem:LVAD (left ventricular assist device) present    Subjective:     **Interval History: Patient is eager to eat, but primary service with NPO order.  Speech therapy following.  He has no new complaints this morning; family at bedside.     Continuous Infusions:   epinephrine 0.02 mcg/kg/min (02/04/20 1200)    furosemide (LASIX) 2 mg/mL infusion (non-titrating) 5 mg/hr (02/04/20 1200)    heparin (porcine) in 5 % dex 400 Units/hr (02/04/20 1200)     Scheduled Meds:   acetaminophen  650 mg Per NG tube Q6H    amiodarone  400 mg Per NG tube TID    amLODIPine  10 mg Per NG tube Daily    aspirin  325 mg Oral Daily    atorvastatin  80 mg Oral QHS    balsam peru-castor oil   Topical (Top) BID    digoxin  0.125 mg Oral Daily    docusate sodium  200 mg Oral QHS    ferrous gluconate  324 mg Oral Daily with breakfast    magnesium oxide  400 mg Per NG tube TID    oxyCODONE  5 mg Per NG tube Q6H    [START ON 2/5/2020] pantoprazole  40 mg Per NG tube Daily    polyethylene glycol  100 mL Per NG tube 6 times per day    polyethylene glycol  17 g Oral Daily    sodium chloride 0.9%  10 mL Intravenous Q6H    sodium chloride 0.9%  3 mL Intravenous Q8H    vancomycin (VANCOCIN) IVPB  750 mg Intravenous Q24H    warfarin  2 mg Oral Once     PRN Meds:albumin human 5%, albuterol sulfate, bisacodyL, bisacodyL, calcium gluconate IVPB, calcium gluconate IVPB, calcium gluconate IVPB, Dextrose 10% Bolus, Dextrose 10% Bolus, Dextrose 10% Bolus, glucagon (human recombinant), hydrALAZINE, insulin aspart U-100, magnesium hydroxide 400 mg/5 ml, magnesium sulfate IVPB, magnesium sulfate IVPB, magnesium sulfate IVPB, potassium chloride in water **AND** potassium chloride in  water, sodium chloride 0.9%, Flushing PICC Protocol **AND** sodium chloride 0.9% **AND** sodium chloride 0.9%, sodium phosphate IVPB, sodium phosphate IVPB, sodium phosphate IVPB    Review of patient's allergies indicates:   Allergen Reactions    Biopatch [chlorhexidine gluconate]      Site burning    Dobutamine in d5w      Tachycardia, tremors, SOB, flushing    Percocet [oxycodone-acetaminophen] Itching    Penicillins Rash     Cefepime given on 1/23/2020 without issue     Objective:     Vital Signs (Most Recent):  Temp: 97.7 °F (36.5 °C) (02/04/20 1100)  Pulse: 90 (02/04/20 1200)  Resp: 20 (02/04/20 0830)  BP: (!) 80/0 (02/04/20 1100)  SpO2: (!) 92 % (02/04/20 1200) Vital Signs (24h Range):  Temp:  [97.2 °F (36.2 °C)-99 °F (37.2 °C)] 97.7 °F (36.5 °C)  Pulse:  [90-93] 90  Resp:  [18-20] 20  SpO2:  [92 %-100 %] 92 %  BP: (78-90)/(0) 80/0  Arterial Line BP: ()/(67-85) 90/74     Patient Vitals for the past 72 hrs (Last 3 readings):   Weight   02/04/20 0330 113.1 kg (249 lb 5.4 oz)   02/03/20 1400 113.4 kg (250 lb)   02/03/20 0419 113.6 kg (250 lb 7.1 oz)     Body mass index is 35.78 kg/m².      Intake/Output Summary (Last 24 hours) at 2/4/2020 1404  Last data filed at 2/4/2020 1400  Gross per 24 hour   Intake 2262 ml   Output 3435 ml   Net -1173 ml       Hemodynamic Parameters:       Telemetry: A fib  Physical Exam   Constitutional: Intubated and sedated; chest open    Eyes: Pupils are equal, round, and reactive to light. Conjunctivae are normal.   Neck: Neck supple. No thyromegaly present. Bilateral IJ lines   Cardiovascular: Irregularly irregular, tachycardic, smooth VAD hum. Chest open   Pulmonary/Chest: Effort normal and breath sounds normal. Intubated and sedated   Abdominal: Soft.   Musculoskeletal: He exhibits no edema.   Neurological: Intubated and sedated   Skin: Skin is warm and dry. Capillary refill takes 2 to 3 seconds.       Significant Labs:  CBC:  Recent Labs   Lab 02/02/20  0328   02/03/20  0400  02/04/20  0026 02/04/20  0255 02/04/20  0837   WBC 20.41*  --  18.88*  --   --  18.01*  --    RBC 3.24*  --  3.10*  --   --  3.16*  --    HGB 9.0*  --  8.7*  --   --  9.0*  --    HCT 30.8*   < > 29.3*   < > 23* 29.6* 26*     --  268  --   --  279  --    MCV 95  --  95  --   --  94  --    MCH 27.8  --  28.1  --   --  28.5  --    MCHC 29.2*  --  29.7*  --   --  30.4*  --     < > = values in this interval not displayed.     BNP:  Recent Labs   Lab 01/29/20  0508 01/31/20  0435 02/03/20  0400   * 420* 470*     CMP:  Recent Labs   Lab 02/02/20  0328  02/03/20  0400  02/03/20  1800  02/04/20  0255 02/04/20  0550 02/04/20  1210   *  --  117*  --  135*  --  141*  --   --    CALCIUM 8.6*  --  8.4*  --  8.5*  --  8.4*  --   --    ALBUMIN 2.3*  --  2.2*  2.2*  --   --   --  2.3*  --   --    PROT 5.9*  --  5.8*  5.8*  --   --   --  5.9*  --   --    *  --  144  --  143  --  144  --   --    K 3.9   < > 3.6   < > 4.2  4.2   < > 4.1 4.0 4.1   CO2 24  --  22*  --  23  --  20*  --   --    *  --  111*  --  112*  --  111*  --   --    BUN 67*  --  65*  --  56*  --  54*  --   --    CREATININE 2.3*  --  2.0*  --  1.9*  --  1.9*  --   --    ALKPHOS 43*  --  44*  44*  --   --   --  48*  --   --    ALT 19  --  18  18  --   --   --  17  --   --    AST 30  --  24  24  --   --   --  18  --   --    BILITOT 0.6  --  0.6  0.6  --   --   --  0.6  --   --     < > = values in this interval not displayed.      Coagulation:   Recent Labs   Lab 02/02/20 0328 02/03/20 0400 02/04/20  0255 02/04/20  0550 02/04/20  0749   INR 1.6*  --  1.5*  --  2.0*  --   --    APTT 48.9*   < > 39.6*   < > 78.2* 31.5 29.4    < > = values in this interval not displayed.     LDH:  Recent Labs   Lab 02/02/20 0328 02/03/20 0400 02/04/20  0255   * 409* 346*     Microbiology:  Microbiology Results (last 7 days)     Procedure Component Value Units Date/Time    Blood culture [[id number]] Collected:  01/31/20 9789     Order Status:  Completed Specimen:  Blood from Peripheral, Wrist, Left Updated:  02/04/20 0612     Blood Culture, Routine No Growth to date      No Growth to date      No Growth to date      No Growth to date    Blood culture [[id number]] Collected:  01/31/20 2215    Order Status:  Completed Specimen:  Blood from Peripheral, Forearm, Right Updated:  02/04/20 0612     Blood Culture, Routine No Growth to date      No Growth to date      No Growth to date      No Growth to date          I have reviewed all pertinent labs within the past 24 hours.    Estimated Creatinine Clearance: 52.7 mL/min (A) (based on SCr of 1.9 mg/dL (H)).    Diagnostic Results:  I have reviewed all pertinent imaging results/findings within the past 24 hours.    Assessment and Plan:       55 y.o. WM with history of NICMP diagnosed in 2010, ICD, LV thrombus (with prior splenic and renal emboli), Embolic  CVA , paroxysmal atrial fib, HTN, HLP  presents for  F/U today to clinic and he was volume overloaded on exam .  He states he had 3 admission in the last 3 months for volume overload. He started having more SOB on exertion since one month. Also endorses of Orthopnea since last one month. Alble to walk only 150 ft. He also has Bilateral lower extremity edema. He was admitted here in 2017 for ADHD here at [facility] and [facility] during that admission showed PCWP 40 and CVP 17. Currently denies chest pain, lightheadedness     * LVAD (left ventricular assist device) present  - S/P DT HM3 with closure of AV and repair of MV for regurg 1/23/20.   - CTS primary  - Taken back to OR 1/24 for possible RVAD placement which was not done. Patient remained hemodynamically stable in OR. Wash out on 1/27. Chest closed 1/29.   - CVP 13  - Currently hemodynamically stable on current ggts  - Current speed 5300    Procedure: Device Interrogation Including analysis of device parameters  Current Settings: Ventricular Assist Device  Review of device function is  stable/unstabe    TXP LVAD INTERROGATIONS 2/3/2020 2/3/2020 2/3/2020 2/3/2020 2/3/2020 2/3/2020 2/3/2020   Type HeartMate3 HeartMate3 HeartMate3 HeartMate3 HeartMate3 HeartMate3 HeartMate3   Flow 4.5 4.1 4.1 4.0 4.0 4.2 4.0   Speed 5300 5300 5300 5300 5300 5300 5300   PI 3.6 4 4.0 4.1 3.4 3.9 3.8   Power (Berry) 3.8 3.9 3.8 3.8 3.8 3.8 3.8   LSL 4900 4900 4900 4900 4900 4900 4900   Pulsatility Intermittent pulse Intermittent pulse Intermittent pulse Intermittent pulse Intermittent pulse Intermittent pulse Intermittent pulse       Acute on chronic combined systolic and diastolic heart failure, NYHA class 4  - S/P DT HM3 with closure of AV and plication of MV for regurg 1/23/20 (See LVAD)  - [facility] dx'd 2010  - hemodynamically stable on current ggts per CTS  - See above        Paroxysmal atrial fibrillation  - Intermittently in A fib since surgery, currently paced at 90   - AC per CTS  - agree with discontinuing digoxin for rate control      Acute kidney injury superimposed on chronic kidney disease  - Creatinine on admit 2.6 (baseline ~ 1.8). BUN/Creatinine today 54/1.9  - Nephrology consulted and following    Left ventricular thrombus without MI  - H/O LV thrombus with h/o splenic and renal emboli as well as embolic CVA  - Limited TTE done here 1/13 showed no thrombus  - JACINTO 1/23 intra op showed resolution of DAMON thrombus  - AC per CTS      Right lower lobe pulmonary nodule  - Incidental finding on CT chest/abd/pelvis on 1/12. Pulmonology consulted, and rec repeat CT of chest in 3 months  - Will need to follow-up in pulmonary clinic.     Hepatic congestion  - Liver US on 1/11 unremarkable    ICD (implantable cardioverter-defibrillator) in place  - S/P Biotronik dual chamber ICD    Coagulopathy  - Appreciate Hem/Onc's help. No evidence of underlying coagulopathy (h/o LV thrombus with splenic and renal emboli, h/o embolic CVA)    NSVT (nonsustained ventricular tachycardia)  - Avoid digoxin, agree with restarting  Amiodarone      MARBELLA Mcfadden  Heart Transplant  [facility]

## 2020-02-04 NOTE — PROGRESS NOTES
[facility]  Critical Care - Surgery  Progress Note    Patient Name: Tae Delgado  MRN: [id number]  Admission Date: 1/9/2020  Hospital Length of Stay: 26 days  Code Status: Full Code  Attending Provider: Ino Schmitt MD  Primary Care Provider: Elham Pearson MD   Principal Problem: LVAD (left ventricular assist device) present    Subjective:     Hospital/ICU Course:  1/23: Pt arrives from OR intubated, open chest dressed with Ioban, CTs with SS output. Drips on arrival: Epi 0.08, Cardene 5, TXA, Nitric at 30. Pt received 1.5L crystalloid, no blood product, 20 mg Lasix (put out 250cc in response).   Intra Op JACINTO Exam:  PRE:  Severely reduced left ventricular systolic function. Dilated LV. No definitive thrombus noted.  Moderate-to-severely reduced right ventricular systolic function. FAC 12-24%  Mild-to-moderate AI. No AS.  Moderate MR with systolic blunting of the pulmonary veins.  Trace-to-mild TR.  Mild PI. PAAT <90ms.  Small PFO by CF doppler.  SEC in La and DAMON. No clear thrombus noted.  Grade 2 atheromatous disease of the aorta.  No effusions.    POST:  Severely depressed left ventriclar dysfunction.  Moderately depressed right ventricular systolic function.  LVAD inflow and outflow cannula noted with laminar flows.  No AI.  Mild MR, vahe stitch observed. No MS.  Mild-to-moderate TR.  PFO still visible on CF doppler.  Aorta intact, no dissection.  No effusions.     1/24: run of Vtach overnight. Amio bolus given, amio gtt increased to 1, lasix push given. Improved. LVAD hemodynamics appropriate overnight. Going for closure this am. Upon return, developed tamponade physiology and returned to OR. Came back to SICU with chest open.  1/26: Diuresed over the weekend, chest kept open. Lidocaine gtt and digoxin for tachyarrhythmia. Otherwise NAEON.   1/27: Went to OR for possible chest closure, decided to do a wash out. Returned to SICU with chest open. One tachyarrhythmia episode overnight,  resolved with 100mg Lidocaine bolus and increasing lidocaine drip from 0.75mg/hr to 1mg/hr.  1/28: NAEON. Lasix gtt decreased throughout the day with adequate UOP still.   1/29: NAEON  2/03: NAEON.  Comfortable on 4 L NC. Hypernatremia resolved with increased free water flushes. Good UOP overnight. Cr improving. NG tube came out overnight, replaced. Restart TF today. Enema overnight with BM x1. Digoxin increased to 0.25 mg. CVC pulled and PICC placed. DC'd opioid due to somnolence and scheduled tylenol.   02/04: NAEON. Satting well on NC 4L. Heparin gtt held this AM for aPTT 78.2. Rechecking aPTT at 0600. INR 2.0.  Weaning Corby and Epi gtt. Pureed diet with continued TF due to poor appetite. Heparin gtt restarted at 400 non-titrating. Digoxin 0.125 QD. Warfain 2 today. Miralax. Scheduled oxy 5 mg q6h for pain. Increasing TF.       Follow-up For: Procedure(s) (LRB):  CLOSURE, WOUND, STERNUM (N/A)  WASHOUT (N/A)  APPLICATION, WOUND VAC (N/A)    Post-Operative Day: 6 Days Post-Op    Objective:     Vital Signs (Most Recent):  Temp: 97.7 °F (36.5 °C) (02/04/20 0300)  Pulse: 91 (02/04/20 0530)  Resp: 18 (02/03/20 1110)  BP: (!) 84/0 (02/04/20 0300)  SpO2: 95 % (02/04/20 0500) Vital Signs (24h Range):  Temp:  [97.2 °F (36.2 °C)-99 °F (37.2 °C)] 97.7 °F (36.5 °C)  Pulse:  [] 91  Resp:  [16-18] 18  SpO2:  [84 %-100 %] 95 %  BP: (80-94)/(0) 84/0  Arterial Line BP: ()/(59-80) 95/74     Weight: 113.1 kg (249 lb 5.4 oz)  Body mass index is 35.78 kg/m².      Intake/Output Summary (Last 24 hours) at 2/4/2020 0550  Last data filed at 2/4/2020 0500  Gross per 24 hour   Intake 2220 ml   Output 3005 ml   Net -785 ml       Physical Exam   Constitutional: He is oriented to person, place, and time. He appears well-developed and well-nourished.   HENT:   Head: Normocephalic and atraumatic.   Mouth/Throat: No oropharyngeal exudate.   NGT in place   Eyes: Pupils are equal, round, and reactive to light.   Neck: Normal range of  motion. Neck supple.   Cardiovascular:   Irregular tachyarrythmia.   Paced, LVAD hum.    Chest close with dressing in place  PICC line in place   Pulmonary/Chest: Breath sounds normal. He has no wheezes. He has no rales.   Breathing comfortably on 4L NC with Nitric Oxide 1 ppm   Abdominal: Soft. He exhibits no distension.   Genitourinary:   Genitourinary Comments: +King   Musculoskeletal: He exhibits no edema or deformity.   Neurological: He is alert and oriented to person, place, and time.   Skin: Skin is warm.       Vents:  Vent Mode: Spont (02/01/20 0900)  Ventilator Initiated: Yes (01/21/20 0905)  Set Rate: 16 BPM (01/31/20 0819)  Vt Set: 500 mL (01/31/20 0819)  PEEP/CPAP: 5 cmH20 (02/01/20 0900)  Oxygen Concentration (%): 36 (02/04/20 0026)  Peak Airway Pressure: 14 cmH2O (02/01/20 0900)  Plateau Pressure: 0 cmH20 (02/01/20 0900)  Total Ve: 9.53 mL (02/01/20 0900)  F/VT Ratio<105 (RSBI): (!) 34.88 (01/27/20 2316)    Lines/Drains/Airways     Peripherally Inserted Central Catheter Line                 PICC Triple Lumen 02/03/20 1701 right brachial less than 1 day          Drain                 Urethral Catheter 01/21/20 0752 Non-latex;Straight-tip;Temperature probe 16 Fr. 13 days         Chest Tube 01/23/20 1230 1 Right Pleural 11 days         Chest Tube 01/23/20 1414 2 Right Mediastinal 11 days         NG/OG Tube 01/29/20 1430 Left nostril 5 days          Arterial Line                 Arterial Line 01/21/20 0730 Left Other (Comment) 13 days          Line                 VAD 01/23/20 1148 Left ventricular assist device HeartMate 3 11 days          Peripheral Intravenous Line                 Peripheral IV - Single Lumen 02/02/20 0000 20 G Left Wrist 2 days                Significant Labs:    CBC/Anemia Profile:  Recent Labs   Lab 02/03/20  0400  02/03/20  1551 02/04/20  0026 02/04/20  0255   WBC 18.88*  --   --   --  18.01*   HGB 8.7*  --   --   --  9.0*   HCT 29.3*   < > 25* 23* 29.6*     --   --   --   279   MCV 95  --   --   --  94   RDW 18.4*  --   --   --  18.8*    < > = values in this interval not displayed.        Chemistries:  Recent Labs   Lab 02/03/20  0400  02/03/20  1800 02/03/20  2300 02/04/20  0255     --  143  --  144   K 3.6   < > 4.2  4.2 3.9 4.1   *  --  112*  --  111*   CO2 22*  --  23  --  20*   BUN 65*  --  56*  --  54*   CREATININE 2.0*  --  1.9*  --  1.9*   CALCIUM 8.4*  --  8.5*  --  8.4*   ALBUMIN 2.2*  2.2*  --   --   --  2.3*   PROT 5.8*  5.8*  --   --   --  5.9*   BILITOT 0.6  0.6  --   --   --  0.6   ALKPHOS 44*  44*  --   --   --  48*   ALT 18  18  --   --   --  17   AST 24  24  --   --   --  18   MG  --    < > 2.4  2.4 2.3 2.2   PHOS 3.0  --  2.8  --  3.0    < > = values in this interval not displayed.       ABGs:   Recent Labs   Lab 02/04/20  0426   PH 7.427   PCO2 37.1   HCO3 24.5   POCSATURATED 57*   BE 0     Coagulation:   Recent Labs   Lab 02/04/20  0255   INR 2.0*   APTT 78.2*       Significant Imaging:  I have reviewed and interpreted all pertinent imaging results/findings within the past 24 hours.   X-ray Chest 1 View    Result Date: 2/4/2020  Progression of pulmonary opacity bilaterally likely relates to pulmonary infiltrate/airspace disease and possible component of pleural fluid particularly on the left. Electronically signed by: Kenneth Camacho Date:    02/04/2020 Time:    05:34    X-ray Chest 1 View For Picc_central Line    Result Date: 2/3/2020  Right-sided PICC line with tip projecting over the SVC.  Additional findings as detailed above. Electronically signed by: Prosper Carrera MD Date:    02/03/2020 Time:    17:35      Assessment/Plan:     Acute on chronic combined systolic and diastolic heart failure, NYHA class 4  Tae Delgado is a 59 y.o. male w/ a significant PMHx of NICMP diagnosed in 2010, ICD, LV thrombus (with prior splenic and renal emboli), Embolic CVA (3-5 years ago, deficit = contralateral homonymous hemianopia of the Rt side) ,  paroxysmal atrial fib, HTN, HLD, who was admitted to cardiology service for acute on chronic heart failure exacerbation. Approved for DT LVAD. Went to OR on 1/21 and found to have DAMON and LV thrombus and case was aborted. Pt was placed on a heparin gtt and thrombus had dissipated on repeat JACINTO on 1/22. Pt underwent LVAD placement on 1/23. Chest closure and re-exploration on 1/24. Returned from OR with chest open on 1/24. Attempted chest closure on 1/27, returned to SICU with chest open.     Neuro:  - Extubated, alert and oriented.  - Scheduled tylenol for pain.   - Oxycodone 5 mg q6h scheduled for pain.     CVS:   - Current LVAD flow @ 5400 with appropriate PI  - Digoxin level 2.0   - Wean vasopressors per CTS; currently Epi @ 0.03  - Corby currently at 1 ppm. Continue to wean today.  - EP had been consulted for tachyarrhythmias earlier in pt's hospital course. Now off lidocaine gtt. ICD turned on. Currently 100% paced.  - Amiodarone 400 mg TID   - Amlodipine 10 mg QD  - Digoxin 0.125 mg QD   - aPTT was elevated overnight and heparin gtt was held. Repeat labs ok; Heparin gtt restarted at 400, nontitrating.  - Warfarin 2 today. INR 2.0  - ASA   - Atorvastatin 80 mg QHS    Pulm:  - Breathing comfortably on 4L NC; wean as able  - ABGs prn  - CXR showed progression of pulmonary opacity bilaterally likely relates to pulmonary infiltrate/airspace disease and possible component of pleural fluid particularly on the left.  - Encourage IS and good pulmonary hygiene.   - Daily CXR    GI:  - Pureed soft diet per speech  - Continuing with TF due to poor appetite; increase TF to goal as tolerated.   - Protonix 40 daily  - Docusate, Miramax, golytely    Endo:   - Insulin off now, SSI  - Endocrine consulted, appreciate their services  - BG well controlled at this time    Renal:  - Strict I/O    Intake/Output Summary (Last 24 hours) at 2/4/2020 0610  Last data filed at 2/4/2020 0500  Gross per 24 hour   Intake 2220 ml   Output 2935 ml    Net -715 ml     - Trend BUN/Cr, still elevated but improving; Cr 1.9 down from 2.0, BUN 54 down from 67  - Lasix gtt @ 2.5       FENGI:  - Monitor labs  - Replace per protocol  - Switched all drips to D5 due to elevated Na+  - Hypernatremia improved with increased free water boluses frequency to q4h  - Na+ 144    Heme:  - No blood products needed during the OR  - H/H remains stable overnight    ID:   - Vanc 750 mg q24h   - Cultured for fevers; all cx NGTD.   - Follow WBC; improving slowly WBC 18 from 18.9   - Afebrile overnight    PPx:  - Hep gtt restarted; Warfarin, Aspirin  - Bowel regimen  - GI ppx    Dispo:  - Cont SICU care, wean oxygen as tolerated         Critical care was time spent personally by me on the following activities: development of treatment plan with patient or surrogate and bedside caregivers, discussions with consultants, evaluation of patient's response to treatment, examination of patient, ordering and performing treatments and interventions, ordering and review of laboratory studies, ordering and review of radiographic studies, pulse oximetry, re-evaluation of patient's condition.  This critical care time did not overlap with that of any other provider or involve time for any procedures.     Rowan Roberson DO  Critical Care - Surgery  [facility]

## 2020-02-04 NOTE — PROGRESS NOTES
02/04/2020  Felipe Lim    Current provider:  Ino Schmitt MD      I, Felipe Lim, rounded on Tae Delgado to ensure all mechanical assist device settings (IABP or VAD) were appropriate and all parameters were within limits.  I was able to ensure all back up equipment was present, the staff had no issues, and the Perfusion Department daily rounding was complete.    7:00 AM

## 2020-02-04 NOTE — CARE UPDATE
BG goal 140-180    Remains in ICU, NAEON  BG at or below goal overnight without insulin  TF (Peptamen Intense VHP) at 10 cc/hr  On IV antibiotics  Noted creatinine of 1.9  Plan:    Low dose correction scale  BG monitoring every 4 hours while NPO    Endocrine to continue to follow for now but will likely sign off if patient tolerating TF at goal or adequate PO diet with no insulin needs.    ** Please call Endocrine for any BG related issues **

## 2020-02-04 NOTE — PT/OT/SLP PROGRESS
Occupational Therapy   Treatment    Name: Tae Delgado  MRN: [id number]  Admitting Diagnosis:  LVAD (left ventricular assist device) present  6 Days Post-Op    Recommendations:     Discharge Recommendations: rehabilitation facility  Discharge Equipment Recommendations:  (TBD)  Barriers to discharge:  None    Assessment:     Tae Delgado is a 59 y.o. male with a medical diagnosis of LVAD (left ventricular assist device) present.  Performance deficits affecting function are weakness, impaired self care skills, impaired balance, impaired functional mobilty, impaired endurance, gait instability, decreased upper extremity function, decreased safety awareness, impaired cardiopulmonary response to activity, decreased ROM, pain.     Rehab Prognosis:  Good; patient would benefit from acute skilled OT services to address these deficits and reach maximum level of function.       Plan:     Patient to be seen 6 x/week to address the above listed problems via self-care/home management, therapeutic activities, therapeutic exercises  · Plan of Care Expires: 03/02/20  · Plan of Care Reviewed with: patient, family    Subjective     Pain/Comfort:  · Pain Rating 1: 0/10  · Pain Rating Post-Intervention 1: 0/10    Objective:     Communicated with: RN prior to session.  Patient found supine with blood pressure cuff, telemetry, pulse ox (continuous), LVAD, NG tube, wound vac, giles catheter, chest tube, PICC line upon OT entry to room.    General Precautions: Standard, LVAD, fall, sternal   Orthopedic Precautions:N/A   Braces:       Occupational Performance:     Bed Mobility:    · Patient completed Rolling/Turning to Left with  total assistance  · Patient completed Scooting/Bridging with total assistance  · Patient completed Supine to Sit with total assistance  · Patient completed Sit to Supine with total assistance     Functional Mobility/Transfers:  · NT  · Functional Mobility: NT this session    Activities of Daily Living:  · Grooming:  maximal assistance    · Upper Body Dressing: total assistance    · Lower Body Dressing: total assistance        AMPAC 6 Click ADL: 8    Treatment & Education:  Pt ed on OT POC  Pt re-ed on importance of adhering to sternal precautions  Pt sat EOB x 14 min while engaged in strengthening activities and BADL  Pt required close CGA <> Min A for static sitting though with pronounced FFP  Pt completed reaching over ALPHONSE to R 2* L lateral lean  Pt washed face with [place] A and max A  Pt completed B UE AAROM x 10 reps in major planes    Patient left with bed in chair position with all lines intact, call button in reach, RN notified and family presentEducation:      GOALS:   Multidisciplinary Problems     Occupational Therapy Goals        Problem: Occupational Therapy Goal    Goal Priority Disciplines Outcome Interventions   Occupational Therapy Goal     OT, PT/OT Ongoing, Progressing    Description:  Goals to be met by: 2/16/2020     Patient will increase functional independence with ADLs by performing:    UE Dressing with Minimal Assistance.  LE Dressing with Minimal Assistance.  Grooming while standing with Minimal Assistance.  Toileting from bedside commode with Minimal Assistance for hygiene and clothing management.   Sitting at edge of bed x10 minutes with Minimal Assistance in prep for seated grooming tasks   Supine to sit with min A  Toilet transfer to bedside commode with Minimal Assistance.  Pt will perform LVAD management independently                 Multidisciplinary Problems (Resolved)        Problem: Occupational Therapy Goal    Goal Priority Disciplines Outcome Interventions   Occupational Therapy Goal   (Resolved)     OT, PT/OT Met    Description:  Skilled OT services not necessary at this time secondary to patient performing ADLs at PLOF. Patient in agreement. Please re-consult OT if change in patient's functional status is noted.                     Time Tracking:     OT Date of Treatment: 02/04/20  OT Start  Time: 0900  OT Stop Time: 0940  OT Total Time (min): 40 min    Billable Minutes:Therapeutic Activity 15  Therapeutic Exercise 23    SEBASTIEN Maybrery  2/4/2020

## 2020-02-04 NOTE — PLAN OF CARE
Plan of Care Note  Cardiothoracic Surgery    Tae Delgado is a 59 y.o. male with heart failure who underwent LVAD placement (1/23/20) then closure (1/24/20) and repeat chest opening (1/24/20) for poor RV function; chest washout (1/27/20); chest closure (1/29/20)    Specific issues: monitor INR and PTT; monitor renal labs; on epi and lasix; and nasal cannula; likely wean epi and improve mobility/function    Plan of care for patient was discussed with ICU staff including nurses, residents, and faculty and appropriate consulting services.    Will continue to monitor patient's hemodynamics, functionality, neuro status, fluid status and renal function, and labs and will adjust medications and fluids as necessary while monitoring appropriateness for de-escalation of support and monitoring and transport to stepdown unit.    Terence Gonzalez MD  Cardiothoracic Surgery Fellow

## 2020-02-04 NOTE — SUBJECTIVE & OBJECTIVE
Follow-up For: Procedure(s) (LRB):  CLOSURE, WOUND, STERNUM (N/A)  WASHOUT (N/A)  APPLICATION, WOUND VAC (N/A)    Post-Operative Day: 6 Days Post-Op    Objective:     Vital Signs (Most Recent):  Temp: 97.7 °F (36.5 °C) (02/04/20 0300)  Pulse: 91 (02/04/20 0530)  Resp: 18 (02/03/20 1110)  BP: (!) 84/0 (02/04/20 0300)  SpO2: 95 % (02/04/20 0500) Vital Signs (24h Range):  Temp:  [97.2 °F (36.2 °C)-99 °F (37.2 °C)] 97.7 °F (36.5 °C)  Pulse:  [] 91  Resp:  [16-18] 18  SpO2:  [84 %-100 %] 95 %  BP: (80-94)/(0) 84/0  Arterial Line BP: ()/(59-80) 95/74     Weight: 113.1 kg (249 lb 5.4 oz)  Body mass index is 35.78 kg/m².      Intake/Output Summary (Last 24 hours) at 2/4/2020 0550  Last data filed at 2/4/2020 0500  Gross per 24 hour   Intake 2220 ml   Output 3005 ml   Net -785 ml       Physical Exam   Constitutional: He is oriented to person, place, and time. He appears well-developed and well-nourished.   HENT:   Head: Normocephalic and atraumatic.   Mouth/Throat: No oropharyngeal exudate.   NGT in place   Eyes: Pupils are equal, round, and reactive to light.   Neck: Normal range of motion. Neck supple.   Cardiovascular:   Irregular tachyarrythmia.   Paced, LVAD hum.    Chest close with dressing in place  PICC line in place   Pulmonary/Chest: Breath sounds normal. He has no wheezes. He has no rales.   Breathing comfortably on 4L NC with Nitric Oxide 1 ppm   Abdominal: Soft. He exhibits no distension.   Genitourinary:   Genitourinary Comments: +King   Musculoskeletal: He exhibits no edema or deformity.   Neurological: He is alert and oriented to person, place, and time.   Skin: Skin is warm.       Vents:  Vent Mode: Spont (02/01/20 0900)  Ventilator Initiated: Yes (01/21/20 0905)  Set Rate: 16 BPM (01/31/20 0819)  Vt Set: 500 mL (01/31/20 0819)  PEEP/CPAP: 5 cmH20 (02/01/20 0900)  Oxygen Concentration (%): 36 (02/04/20 0026)  Peak Airway Pressure: 14 cmH2O (02/01/20 0900)  Plateau Pressure: 0 cmH20 (02/01/20  0900)  Total Ve: 9.53 mL (02/01/20 0900)  F/VT Ratio<105 (RSBI): (!) 34.88 (01/27/20 2316)    Lines/Drains/Airways     Peripherally Inserted Central Catheter Line                 PICC Triple Lumen 02/03/20 1701 right brachial less than 1 day          Drain                 Urethral Catheter 01/21/20 0752 Non-latex;Straight-tip;Temperature probe 16 Fr. 13 days         Chest Tube 01/23/20 1230 1 Right Pleural 11 days         Chest Tube 01/23/20 1414 2 Right Mediastinal 11 days         NG/OG Tube 01/29/20 1430 Left nostril 5 days          Arterial Line                 Arterial Line 01/21/20 0730 Left Other (Comment) 13 days          Line                 VAD 01/23/20 1148 Left ventricular assist device HeartMate 3 11 days          Peripheral Intravenous Line                 Peripheral IV - Single Lumen 02/02/20 0000 20 G Left Wrist 2 days                Significant Labs:    CBC/Anemia Profile:  Recent Labs   Lab 02/03/20  0400  02/03/20  1551 02/04/20  0026 02/04/20  0255   WBC 18.88*  --   --   --  18.01*   HGB 8.7*  --   --   --  9.0*   HCT 29.3*   < > 25* 23* 29.6*     --   --   --  279   MCV 95  --   --   --  94   RDW 18.4*  --   --   --  18.8*    < > = values in this interval not displayed.        Chemistries:  Recent Labs   Lab 02/03/20  0400  02/03/20  1800 02/03/20  2300 02/04/20  0255     --  143  --  144   K 3.6   < > 4.2  4.2 3.9 4.1   *  --  112*  --  111*   CO2 22*  --  23  --  20*   BUN 65*  --  56*  --  54*   CREATININE 2.0*  --  1.9*  --  1.9*   CALCIUM 8.4*  --  8.5*  --  8.4*   ALBUMIN 2.2*  2.2*  --   --   --  2.3*   PROT 5.8*  5.8*  --   --   --  5.9*   BILITOT 0.6  0.6  --   --   --  0.6   ALKPHOS 44*  44*  --   --   --  48*   ALT 18  18  --   --   --  17   AST 24  24  --   --   --  18   MG  --    < > 2.4  2.4 2.3 2.2   PHOS 3.0  --  2.8  --  3.0    < > = values in this interval not displayed.       ABGs:   Recent Labs   Lab 02/04/20  0426   PH 7.427   PCO2 37.1   HCO3 24.5    POCSATURATED 57*   BE 0     Coagulation:   Recent Labs   Lab 02/04/20  0255   INR 2.0*   APTT 78.2*       Significant Imaging:  I have reviewed and interpreted all pertinent imaging results/findings within the past 24 hours.   X-ray Chest 1 View    Result Date: 2/4/2020  Progression of pulmonary opacity bilaterally likely relates to pulmonary infiltrate/airspace disease and possible component of pleural fluid particularly on the left. Electronically signed by: Kenneth Camacho Date:    02/04/2020 Time:    05:34    X-ray Chest 1 View For Picc_central Line    Result Date: 2/3/2020  Right-sided PICC line with tip projecting over the SVC.  Additional findings as detailed above. Electronically signed by: Prosper Carrera MD Date:    02/03/2020 Time:    17:35

## 2020-02-04 NOTE — PLAN OF CARE
"      SICU PLAN OF CARE NOTE    Dx: LVAD (left ventricular assist device) present    Shift Events: Nitric titrated off, 80 mEq K replaced    Goals of Care: Wean Nitric and Epi    Neuro: AAO x4, Follows Commands and Moves All Extremities    Vital Signs: BP (!) 86/0 (BP Location: Left arm, Patient Position: Lying)   Pulse 91   Temp 99 °F (37.2 °C) (Oral)   Resp 18   Ht 5' 10" (1.778 m)   Wt 113.4 kg (250 lb)   SpO2 99%   BMI 35.87 kg/m²  CVP 11-13, Doppler Pressures 76/0-86/0    Respiratory: Nasal Cannula w/ [place] 4L NC Nitric 1ppm    Diet: NPO and Tube Feeds    Gtts: Epinephrine, Lasix and Heparin    Urine Output: Urinary Catheter 100 cc/hour    Drains: Chest Tube, total output x2 20-50 cc /  4 hours serous    VAD HM3, Intermittently Pulsatile, Driveline to LRQ, Speed 5300, LSL 4900, Flow 4.0, PI 3.7-4.0, Power 3.8-4.0     Labs/Accuchecks: APTT, K, Mag q6h, Accuchecks q4h    Skin: Heels, and sacrum w/o redness or breakdown.  DTI to left elbow w/ Venelex and foam applied; cdi.  DTI to right nare; applied pink top cream.         "

## 2020-02-04 NOTE — PROGRESS NOTES
Notified Dr. Olvera of patient's APTT increasing to 78.2 from 59.9 despite decreasing Heparin gtt to 2000 units/hr.  Also notified of INR increasing to 2.0 from 1.5 in 24hrs.  Orders to hold Heparin gtt for 2 hours and draw new APTT at 0600.

## 2020-02-04 NOTE — PLAN OF CARE
Discharge Recommendation: Rehab.    0 goals met today. PT goals appropriate.    Ann Rudd PT, DPT  2020  Pager: [phone]        Problem: Physical Therapy Goal  Goal: Physical Therapy Goal  Description  Goals to be met by: 2020     Patient will increase functional independence with mobility by performin. Supine to sit with MInimal Assistance  2. Rolling to Left and Right with Minimal Assistance.  3. Sit to stand transfer with Moderate Assistance  4. Sitting at edge of bed x8 minutes with Minimal Assistance  5. Lower extremity exercise program x10 reps per handout, with assistance as needed     Outcome: Ongoing, Progressing

## 2020-02-05 PROBLEM — R50.9 FEVER: Status: ACTIVE | Noted: 2020-02-05

## 2020-02-05 PROBLEM — R50.82 POST-PROCEDURAL FEVER: Status: ACTIVE | Noted: 2020-02-05

## 2020-02-05 LAB
ALBUMIN SERPL BCP-MCNC: 2.3 G/DL (ref 3.5–5.2)
ALBUMIN SERPL BCP-MCNC: 2.3 G/DL (ref 3.5–5.2)
ALLENS TEST: ABNORMAL
ALLENS TEST: ABNORMAL
ALLENS TEST: NORMAL
ALP SERPL-CCNC: 52 U/L (ref 55–135)
ALP SERPL-CCNC: 52 U/L (ref 55–135)
ALT SERPL W/O P-5'-P-CCNC: 18 U/L (ref 10–44)
ALT SERPL W/O P-5'-P-CCNC: 18 U/L (ref 10–44)
ANION GAP SERPL CALC-SCNC: 10 MMOL/L (ref 8–16)
APTT BLDCRRT: 31.2 SEC (ref 21–32)
APTT BLDCRRT: 33 SEC (ref 21–32)
APTT BLDCRRT: 33.9 SEC (ref 21–32)
AST SERPL-CCNC: 19 U/L (ref 10–40)
AST SERPL-CCNC: 19 U/L (ref 10–40)
BASOPHILS # BLD AUTO: 0.07 K/UL (ref 0–0.2)
BASOPHILS NFR BLD: 0.4 % (ref 0–1.9)
BILIRUB DIRECT SERPL-MCNC: 0.4 MG/DL (ref 0.1–0.3)
BILIRUB SERPL-MCNC: 0.6 MG/DL (ref 0.1–1)
BILIRUB SERPL-MCNC: 0.6 MG/DL (ref 0.1–1)
BNP SERPL-MCNC: 796 PG/ML (ref 0–99)
BUN SERPL-MCNC: 44 MG/DL (ref 6–20)
CA-I BLDV-SCNC: 1.11 MMOL/L (ref 1.06–1.42)
CALCIUM SERPL-MCNC: 8.4 MG/DL (ref 8.7–10.5)
CHLORIDE SERPL-SCNC: 109 MMOL/L (ref 95–110)
CO2 SERPL-SCNC: 24 MMOL/L (ref 23–29)
CREAT SERPL-MCNC: 1.7 MG/DL (ref 0.5–1.4)
CRP SERPL-MCNC: 90.7 MG/L (ref 0–8.2)
DELSYS: ABNORMAL
DELSYS: ABNORMAL
DELSYS: NORMAL
DIFFERENTIAL METHOD: ABNORMAL
DIGOXIN SERPL-MCNC: 1.9 NG/ML (ref 0.8–2)
EOSINOPHIL # BLD AUTO: 0.1 K/UL (ref 0–0.5)
EOSINOPHIL NFR BLD: 0.7 % (ref 0–8)
ERYTHROCYTE [DISTWIDTH] IN BLOOD BY AUTOMATED COUNT: 19.5 % (ref 11.5–14.5)
EST. GFR  (AFRICAN AMERICAN): 49.9 ML/MIN/1.73 M^2
EST. GFR  (NON AFRICAN AMERICAN): 43.2 ML/MIN/1.73 M^2
FIO2: 24
FIO2: 28
FIO2: 28
FLOW: 1
FLOW: 2
FLOW: 2
GLUCOSE SERPL-MCNC: 127 MG/DL (ref 70–110)
HCO3 UR-SCNC: 24.6 MMOL/L (ref 24–28)
HCO3 UR-SCNC: 24.8 MMOL/L (ref 24–28)
HCT VFR BLD AUTO: 31.3 % (ref 40–54)
HCT VFR BLD CALC: 25 %PCV (ref 36–54)
HGB BLD-MCNC: 9.3 G/DL (ref 14–18)
IMM GRANULOCYTES # BLD AUTO: 0.43 K/UL (ref 0–0.04)
IMM GRANULOCYTES NFR BLD AUTO: 2.5 % (ref 0–0.5)
INR PPP: 2.3 (ref 0.8–1.2)
LDH SERPL L TO P-CCNC: 0.48 MMOL/L (ref 0.36–1.25)
LDH SERPL L TO P-CCNC: 333 U/L (ref 110–260)
LYMPHOCYTES # BLD AUTO: 1.1 K/UL (ref 1–4.8)
LYMPHOCYTES NFR BLD: 6.2 % (ref 18–48)
MAGNESIUM SERPL-MCNC: 1.8 MG/DL (ref 1.6–2.6)
MAGNESIUM SERPL-MCNC: 2.1 MG/DL (ref 1.6–2.6)
MAGNESIUM SERPL-MCNC: 2.2 MG/DL (ref 1.6–2.6)
MAGNESIUM SERPL-MCNC: 2.2 MG/DL (ref 1.6–2.6)
MAGNESIUM SERPL-MCNC: 2.4 MG/DL (ref 1.6–2.6)
MCH RBC QN AUTO: 28.1 PG (ref 27–31)
MCHC RBC AUTO-ENTMCNC: 29.7 G/DL (ref 32–36)
MCV RBC AUTO: 95 FL (ref 82–98)
MODE: ABNORMAL
MODE: ABNORMAL
MODE: NORMAL
MONOCYTES # BLD AUTO: 0.9 K/UL (ref 0.3–1)
MONOCYTES NFR BLD: 5.2 % (ref 4–15)
NEUTROPHILS # BLD AUTO: 14.8 K/UL (ref 1.8–7.7)
NEUTROPHILS NFR BLD: 85 % (ref 38–73)
NRBC BLD-RTO: 0 /100 WBC
PCO2 BLDA: 32.3 MMHG (ref 35–45)
PCO2 BLDA: 38.7 MMHG (ref 35–45)
PH SMN: 7.41 [PH] (ref 7.35–7.45)
PH SMN: 7.49 [PH] (ref 7.35–7.45)
PHOSPHATE SERPL-MCNC: 3.3 MG/DL (ref 2.7–4.5)
PLATELET # BLD AUTO: 262 K/UL (ref 150–350)
PMV BLD AUTO: 12.3 FL (ref 9.2–12.9)
PO2 BLDA: 33 MMHG (ref 40–60)
PO2 BLDA: 81 MMHG (ref 80–100)
POC BE: 0 MMOL/L
POC BE: 1 MMOL/L
POC IONIZED CALCIUM: 1.15 MMOL/L (ref 1.06–1.42)
POC SATURATED O2: 65 % (ref 95–100)
POC SATURATED O2: 97 % (ref 95–100)
POC TCO2: 26 MMOL/L (ref 23–27)
POC TCO2: 26 MMOL/L (ref 24–29)
POCT GLUCOSE: 129 MG/DL (ref 70–110)
POCT GLUCOSE: 136 MG/DL (ref 70–110)
POCT GLUCOSE: 137 MG/DL (ref 70–110)
POCT GLUCOSE: 138 MG/DL (ref 70–110)
POTASSIUM BLD-SCNC: 3.8 MMOL/L (ref 3.5–5.1)
POTASSIUM SERPL-SCNC: 3.9 MMOL/L (ref 3.5–5.1)
POTASSIUM SERPL-SCNC: 4.1 MMOL/L (ref 3.5–5.1)
POTASSIUM SERPL-SCNC: 4.1 MMOL/L (ref 3.5–5.1)
POTASSIUM SERPL-SCNC: 4.2 MMOL/L (ref 3.5–5.1)
POTASSIUM SERPL-SCNC: 4.5 MMOL/L (ref 3.5–5.1)
PROT SERPL-MCNC: 6.1 G/DL (ref 6–8.4)
PROT SERPL-MCNC: 6.1 G/DL (ref 6–8.4)
PROTHROMBIN TIME: 21.8 SEC (ref 9–12.5)
RBC # BLD AUTO: 3.31 M/UL (ref 4.6–6.2)
SAMPLE: ABNORMAL
SAMPLE: ABNORMAL
SAMPLE: NORMAL
SITE: ABNORMAL
SITE: ABNORMAL
SITE: NORMAL
SODIUM BLD-SCNC: 142 MMOL/L (ref 136–145)
SODIUM SERPL-SCNC: 143 MMOL/L (ref 136–145)
SP02: 98
SP02: 99
SP02: 99
VANCOMYCIN SERPL-MCNC: 11.7 UG/ML
WBC # BLD AUTO: 17.36 K/UL (ref 3.9–12.7)

## 2020-02-05 PROCEDURE — 27000221 HC OXYGEN, UP TO 24 HOURS

## 2020-02-05 PROCEDURE — 99291 PR CRITICAL CARE, E/M 30-74 MINUTES: ICD-10-PCS | Mod: GC,,, | Performed by: SURGERY

## 2020-02-05 PROCEDURE — 84295 ASSAY OF SERUM SODIUM: CPT

## 2020-02-05 PROCEDURE — 80053 COMPREHEN METABOLIC PANEL: CPT

## 2020-02-05 PROCEDURE — 82330 ASSAY OF CALCIUM: CPT

## 2020-02-05 PROCEDURE — 97535 SELF CARE MNGMENT TRAINING: CPT

## 2020-02-05 PROCEDURE — 25000003 PHARM REV CODE 250: Performed by: PHYSICIAN ASSISTANT

## 2020-02-05 PROCEDURE — 25000003 PHARM REV CODE 250: Performed by: STUDENT IN AN ORGANIZED HEALTH CARE EDUCATION/TRAINING PROGRAM

## 2020-02-05 PROCEDURE — 84132 ASSAY OF SERUM POTASSIUM: CPT

## 2020-02-05 PROCEDURE — 99223 PR INITIAL HOSPITAL CARE,LEVL III: ICD-10-PCS | Mod: ,,, | Performed by: INTERNAL MEDICINE

## 2020-02-05 PROCEDURE — 37799 UNLISTED PX VASCULAR SURGERY: CPT

## 2020-02-05 PROCEDURE — 82803 BLOOD GASES ANY COMBINATION: CPT

## 2020-02-05 PROCEDURE — 25000003 PHARM REV CODE 250: Performed by: THORACIC SURGERY (CARDIOTHORACIC VASCULAR SURGERY)

## 2020-02-05 PROCEDURE — 99233 SBSQ HOSP IP/OBS HIGH 50: CPT | Mod: ,,, | Performed by: INTERNAL MEDICINE

## 2020-02-05 PROCEDURE — 63600175 PHARM REV CODE 636 W HCPCS: Performed by: STUDENT IN AN ORGANIZED HEALTH CARE EDUCATION/TRAINING PROGRAM

## 2020-02-05 PROCEDURE — 63600175 PHARM REV CODE 636 W HCPCS: Performed by: THORACIC SURGERY (CARDIOTHORACIC VASCULAR SURGERY)

## 2020-02-05 PROCEDURE — 99233 PR SUBSEQUENT HOSPITAL CARE,LEVL III: ICD-10-PCS | Mod: ,,, | Performed by: INTERNAL MEDICINE

## 2020-02-05 PROCEDURE — 85014 HEMATOCRIT: CPT

## 2020-02-05 PROCEDURE — 84132 ASSAY OF SERUM POTASSIUM: CPT | Mod: 91

## 2020-02-05 PROCEDURE — 84100 ASSAY OF PHOSPHORUS: CPT

## 2020-02-05 PROCEDURE — 83880 ASSAY OF NATRIURETIC PEPTIDE: CPT

## 2020-02-05 PROCEDURE — 83735 ASSAY OF MAGNESIUM: CPT | Mod: 91

## 2020-02-05 PROCEDURE — 99223 1ST HOSP IP/OBS HIGH 75: CPT | Mod: ,,, | Performed by: INTERNAL MEDICINE

## 2020-02-05 PROCEDURE — 93750 PR INTERROGATE VENT ASSIST DEV, IN PERSON, W PHYSICIAN ANALYSIS: ICD-10-PCS | Mod: ,,, | Performed by: INTERNAL MEDICINE

## 2020-02-05 PROCEDURE — 85025 COMPLETE CBC W/AUTO DIFF WBC: CPT

## 2020-02-05 PROCEDURE — 92526 ORAL FUNCTION THERAPY: CPT

## 2020-02-05 PROCEDURE — 83605 ASSAY OF LACTIC ACID: CPT

## 2020-02-05 PROCEDURE — 80076 HEPATIC FUNCTION PANEL: CPT

## 2020-02-05 PROCEDURE — 20000000 HC ICU ROOM

## 2020-02-05 PROCEDURE — 86140 C-REACTIVE PROTEIN: CPT

## 2020-02-05 PROCEDURE — 80162 ASSAY OF DIGOXIN TOTAL: CPT

## 2020-02-05 PROCEDURE — 85610 PROTHROMBIN TIME: CPT

## 2020-02-05 PROCEDURE — 97110 THERAPEUTIC EXERCISES: CPT

## 2020-02-05 PROCEDURE — 85730 THROMBOPLASTIN TIME PARTIAL: CPT

## 2020-02-05 PROCEDURE — 99900035 HC TECH TIME PER 15 MIN (STAT)

## 2020-02-05 PROCEDURE — 83735 ASSAY OF MAGNESIUM: CPT

## 2020-02-05 PROCEDURE — A4216 STERILE WATER/SALINE, 10 ML: HCPCS | Performed by: SURGERY

## 2020-02-05 PROCEDURE — 94761 N-INVAS EAR/PLS OXIMETRY MLT: CPT

## 2020-02-05 PROCEDURE — 93750 INTERROGATION VAD IN PERSON: CPT | Mod: ,,, | Performed by: INTERNAL MEDICINE

## 2020-02-05 PROCEDURE — 97530 THERAPEUTIC ACTIVITIES: CPT

## 2020-02-05 PROCEDURE — 80202 ASSAY OF VANCOMYCIN: CPT

## 2020-02-05 PROCEDURE — 82800 BLOOD PH: CPT

## 2020-02-05 PROCEDURE — A4216 STERILE WATER/SALINE, 10 ML: HCPCS | Performed by: THORACIC SURGERY (CARDIOTHORACIC VASCULAR SURGERY)

## 2020-02-05 PROCEDURE — 99291 CRITICAL CARE FIRST HOUR: CPT | Mod: GC,,, | Performed by: SURGERY

## 2020-02-05 PROCEDURE — 85730 THROMBOPLASTIN TIME PARTIAL: CPT | Mod: 91

## 2020-02-05 PROCEDURE — 83615 LACTATE (LD) (LDH) ENZYME: CPT

## 2020-02-05 PROCEDURE — 25000003 PHARM REV CODE 250: Performed by: SURGERY

## 2020-02-05 RX ORDER — METHOCARBAMOL 500 MG/1
500 TABLET, FILM COATED ORAL 4 TIMES DAILY
Status: DISCONTINUED | OUTPATIENT
Start: 2020-02-05 | End: 2020-02-11

## 2020-02-05 RX ORDER — WARFARIN 2 MG/1
2 TABLET ORAL ONCE
Status: COMPLETED | OUTPATIENT
Start: 2020-02-05 | End: 2020-02-05

## 2020-02-05 RX ORDER — FUROSEMIDE 10 MG/ML
10 INJECTION INTRAMUSCULAR; INTRAVENOUS ONCE
Status: COMPLETED | OUTPATIENT
Start: 2020-02-05 | End: 2020-02-05

## 2020-02-05 RX ADMIN — Medication 400 MG: at 02:02

## 2020-02-05 RX ADMIN — Medication 400 MG: at 08:02

## 2020-02-05 RX ADMIN — POLYETHYLENE GLYCOL 3350, SODIUM SULFATE ANHYDROUS, SODIUM BICARBONATE, SODIUM CHLORIDE, POTASSIUM CHLORIDE 100 ML: 236; 22.74; 6.74; 5.86; 2.97 POWDER, FOR SOLUTION ORAL at 11:02

## 2020-02-05 RX ADMIN — CALCIUM GLUCONATE 1 G: 94 INJECTION, SOLUTION INTRAVENOUS at 05:02

## 2020-02-05 RX ADMIN — FUROSEMIDE 10 MG: 10 INJECTION, SOLUTION INTRAMUSCULAR; INTRAVENOUS at 11:02

## 2020-02-05 RX ADMIN — ACETAMINOPHEN 650 MG: 160 SOLUTION ORAL at 11:02

## 2020-02-05 RX ADMIN — Medication 10 ML: at 12:02

## 2020-02-05 RX ADMIN — Medication 400 MG: at 09:02

## 2020-02-05 RX ADMIN — POLYETHYLENE GLYCOL 3350, SODIUM SULFATE ANHYDROUS, SODIUM BICARBONATE, SODIUM CHLORIDE, POTASSIUM CHLORIDE 100 ML: 236; 22.74; 6.74; 5.86; 2.97 POWDER, FOR SOLUTION ORAL at 08:02

## 2020-02-05 RX ADMIN — POTASSIUM CHLORIDE 40 MEQ: 400 INJECTION, SOLUTION INTRAVENOUS at 02:02

## 2020-02-05 RX ADMIN — VANCOMYCIN HYDROCHLORIDE 750 MG: 750 INJECTION, POWDER, LYOPHILIZED, FOR SOLUTION INTRAVENOUS at 08:02

## 2020-02-05 RX ADMIN — ACETAMINOPHEN 650 MG: 160 SOLUTION ORAL at 12:02

## 2020-02-05 RX ADMIN — AMLODIPINE BESYLATE 10 MG: 10 TABLET ORAL at 09:02

## 2020-02-05 RX ADMIN — Medication 10 ML: at 05:02

## 2020-02-05 RX ADMIN — ASPIRIN 325 MG ORAL TABLET 325 MG: 325 PILL ORAL at 09:02

## 2020-02-05 RX ADMIN — ACETAMINOPHEN 650 MG: 160 SOLUTION ORAL at 05:02

## 2020-02-05 RX ADMIN — Medication 3 ML: at 06:02

## 2020-02-05 RX ADMIN — HYDRALAZINE HYDROCHLORIDE 10 MG: 20 INJECTION INTRAMUSCULAR; INTRAVENOUS at 03:02

## 2020-02-05 RX ADMIN — Medication 10 ML: at 11:02

## 2020-02-05 RX ADMIN — POLYETHYLENE GLYCOL 3350, SODIUM SULFATE ANHYDROUS, SODIUM BICARBONATE, SODIUM CHLORIDE, POTASSIUM CHLORIDE 100 ML: 236; 22.74; 6.74; 5.86; 2.97 POWDER, FOR SOLUTION ORAL at 04:02

## 2020-02-05 RX ADMIN — CASTOR OIL AND BALSAM, PERU: 788; 87 OINTMENT TOPICAL at 09:02

## 2020-02-05 RX ADMIN — Medication 3 ML: at 02:02

## 2020-02-05 RX ADMIN — DIGOXIN 0.12 MG: 125 TABLET ORAL at 09:02

## 2020-02-05 RX ADMIN — METHOCARBAMOL TABLETS 500 MG: 500 TABLET, COATED ORAL at 08:02

## 2020-02-05 RX ADMIN — OXYCODONE HYDROCHLORIDE 5 MG: 5 SOLUTION ORAL at 05:02

## 2020-02-05 RX ADMIN — OXYCODONE HYDROCHLORIDE 5 MG: 5 SOLUTION ORAL at 11:02

## 2020-02-05 RX ADMIN — WARFARIN SODIUM 2 MG: 2 TABLET ORAL at 05:02

## 2020-02-05 RX ADMIN — CASTOR OIL AND BALSAM, PERU: 788; 87 OINTMENT TOPICAL at 08:02

## 2020-02-05 RX ADMIN — ATORVASTATIN CALCIUM 80 MG: 20 TABLET, FILM COATED ORAL at 08:02

## 2020-02-05 RX ADMIN — PANTOPRAZOLE SODIUM 40 MG: 40 GRANULE, DELAYED RELEASE ORAL at 09:02

## 2020-02-05 RX ADMIN — METHOCARBAMOL TABLETS 500 MG: 500 TABLET, COATED ORAL at 04:02

## 2020-02-05 RX ADMIN — Medication 10 ML: at 06:02

## 2020-02-05 RX ADMIN — OXYCODONE HYDROCHLORIDE 5 MG: 5 SOLUTION ORAL at 12:02

## 2020-02-05 RX ADMIN — FUROSEMIDE 10 MG/HR: 10 INJECTION, SOLUTION INTRAMUSCULAR; INTRAVENOUS at 12:02

## 2020-02-05 RX ADMIN — EPINEPHRINE 0.01 MCG/KG/MIN: 1 INJECTION INTRAMUSCULAR; INTRAVENOUS; SUBCUTANEOUS at 12:02

## 2020-02-05 RX ADMIN — POLYETHYLENE GLYCOL 3350, SODIUM SULFATE ANHYDROUS, SODIUM BICARBONATE, SODIUM CHLORIDE, POTASSIUM CHLORIDE 100 ML: 236; 22.74; 6.74; 5.86; 2.97 POWDER, FOR SOLUTION ORAL at 03:02

## 2020-02-05 RX ADMIN — METHOCARBAMOL TABLETS 500 MG: 500 TABLET, COATED ORAL at 01:02

## 2020-02-05 NOTE — PROGRESS NOTES
[facility]  Heart Transplant  Progress Note    Patient Name: Tae Delgado  MRN: [id number]  Admission Date: 1/9/2020  Hospital Length of Stay: 27 days  Attending Physician: Ino Schmitt MD  Primary Care Provider: Elham Pearson MD  Principal Problem:LVAD (left ventricular assist device) present    Subjective:     **Interval History: Patient frustrated about diet.  He wants NG tube removed.  He was given prn Hydralazine overnight for HTN.  He is net negative 590cc over the last 24 hours.  CVP: 11, SVO2: 65, CO: 9.87, CI: 4.18 and SVR: 583 today.  Plan per primary to to wean Epi       Continuous Infusions:   epinephrine      furosemide (LASIX) 2 mg/mL infusion (non-titrating)       Scheduled Meds:   acetaminophen  650 mg Per NG tube Q6H    amiodarone  400 mg Per NG tube TID    amLODIPine  10 mg Per NG tube Daily    aspirin  325 mg Oral Daily    atorvastatin  80 mg Oral QHS    balsam peru-castor oil   Topical (Top) BID    digoxin  0.125 mg Oral Daily    docusate sodium  200 mg Oral QHS    ferrous gluconate  324 mg Oral Daily with breakfast    magnesium oxide  400 mg Per NG tube TID    methocarbamol  500 mg Oral QID    oxyCODONE  5 mg Per NG tube Q6H    pantoprazole  40 mg Per NG tube Daily    polyethylene glycol  100 mL Per NG tube 6 times per day    polyethylene glycol  17 g Oral Daily    sodium chloride 0.9%  10 mL Intravenous Q6H    sodium chloride 0.9%  3 mL Intravenous Q8H    vancomycin (VANCOCIN) IVPB  750 mg Intravenous Q24H    warfarin  2 mg Oral Once     PRN Meds:albumin human 5%, albuterol sulfate, bisacodyL, bisacodyL, calcium gluconate IVPB, calcium gluconate IVPB, calcium gluconate IVPB, Dextrose 10% Bolus, Dextrose 10% Bolus, Dextrose 10% Bolus, glucagon (human recombinant), hydrALAZINE, insulin aspart U-100, magnesium hydroxide 400 mg/5 ml, magnesium sulfate IVPB, magnesium sulfate IVPB, magnesium sulfate IVPB, potassium chloride in water **AND** potassium chloride  in water, sodium chloride 0.9%, Flushing PICC Protocol **AND** sodium chloride 0.9% **AND** sodium chloride 0.9%, sodium phosphate IVPB, sodium phosphate IVPB, sodium phosphate IVPB    Review of patient's allergies indicates:   Allergen Reactions    Biopatch [chlorhexidine gluconate]      Site burning    Dobutamine in d5w      Tachycardia, tremors, SOB, flushing    Percocet [oxycodone-acetaminophen] Itching    Penicillins Rash     Cefepime given on 1/23/2020 without issue     Objective:     Vital Signs (Most Recent):  Temp: 98 °F (36.7 °C) (02/05/20 1100)  Pulse: 90 (02/05/20 1115)  Resp: 20 (02/04/20 0830)  BP: (!) 78/0 (02/05/20 0700)  SpO2: 97 % (02/05/20 1000) Vital Signs (24h Range):  Temp:  [97.7 °F (36.5 °C)-98.2 °F (36.8 °C)] 98 °F (36.7 °C)  Pulse:  [90-93] 90  SpO2:  [91 %-99 %] 97 %  BP: (78-88)/(0) 78/0  Arterial Line BP: ()/(65-93) 88/70     Patient Vitals for the past 72 hrs (Last 3 readings):   Weight   02/05/20 0315 112.5 kg (248 lb 0.3 oz)   02/04/20 0330 113.1 kg (249 lb 5.4 oz)   02/03/20 1400 113.4 kg (250 lb)     Body mass index is 35.59 kg/m².      Intake/Output Summary (Last 24 hours) at 2/5/2020 1133  Last data filed at 2/5/2020 1100  Gross per 24 hour   Intake 3435 ml   Output 3895 ml   Net -460 ml       Hemodynamic Parameters:       Telemetry: A fib  Physical Exam   Constitutional: Intubated and sedated; chest open    Eyes: Pupils are equal, round, and reactive to light. Conjunctivae are normal.   Neck: Neck supple. No thyromegaly present. Bilateral IJ lines   Cardiovascular: Irregularly irregular, tachycardic, smooth VAD hum.   Pulmonary/Chest: Effort normal and breath sounds normal. Intubated and sedated   Abdominal: Soft.   Musculoskeletal: He exhibits no edema.   Neurological: Intubated and sedated   Skin: Skin is warm and dry. Capillary refill takes 2 to 3 seconds.       Significant Labs:  CBC:  Recent Labs   Lab 02/03/20  0400  02/04/20  0255  02/04/20  1609 02/05/20  0026  02/05/20 0300   WBC 18.88*  --  18.01*  --   --   --  17.36*   RBC 3.10*  --  3.16*  --   --   --  3.31*   HGB 8.7*  --  9.0*  --   --   --  9.3*   HCT 29.3*   < > 29.6*   < > 26* 25* 31.3*     --  279  --   --   --  262   MCV 95  --  94  --   --   --  95   MCH 28.1  --  28.5  --   --   --  28.1   MCHC 29.7*  --  30.4*  --   --   --  29.7*    < > = values in this interval not displayed.     BNP:  Recent Labs   Lab 01/31/20 0435 02/03/20 0400 02/05/20 0300   * 470* 796*     CMP:  Recent Labs   Lab 02/03/20 0400 02/03/20  1800  02/04/20  0255 02/04/20 2319 02/05/20 0300 02/05/20 0515   *  --  135*  --  141*  --   --  127*  --    CALCIUM 8.4*  --  8.5*  --  8.4*  --   --  8.4*  --    ALBUMIN 2.2*  2.2*  --   --   --  2.3*  --   --  2.3*  2.3*  --    PROT 5.8*  5.8*  --   --   --  5.9*  --   --  6.1  6.1  --      --  143  --  144  --   --  143  --    K 3.6   < > 4.2  4.2   < > 4.1   < > 4.1 4.5 4.2   CO2 22*  --  23  --  20*  --   --  24  --    *  --  112*  --  111*  --   --  109  --    BUN 65*  --  56*  --  54*  --   --  44*  --    CREATININE 2.0*  --  1.9*  --  1.9*  --   --  1.7*  --    ALKPHOS 44*  44*  --   --   --  48*  --   --  52*  52*  --    ALT 18  18  --   --   --  17  --   --  18  18  --    AST 24  24  --   --   --  18  --   --  19  19  --    BILITOT 0.6  0.6  --   --   --  0.6  --   --  0.6  0.6  --     < > = values in this interval not displayed.      Coagulation:   Recent Labs   Lab 02/03/20  0400  02/04/20  0255  02/04/20  1756 02/04/20  1950 02/05/20  0300   INR 1.5*  --  2.0*  --   --   --  2.3*   APTT 39.6*   < > 78.2*   < > 31.4 29.3 33.9*    < > = values in this interval not displayed.     LDH:  Recent Labs   Lab 02/03/20  0400 02/04/20  0255 02/05/20  0300   * 346* 333*     Microbiology:  Microbiology Results (last 7 days)     Procedure Component Value Units Date/Time    Blood culture [[id number]] Collected:  01/31/20 2215    Order  Status:  Completed Specimen:  Blood from Peripheral, Forearm, Right Updated:  02/05/20 0612     Blood Culture, Routine No Growth to date      No Growth to date      No Growth to date      No Growth to date      No Growth to date    Blood culture [[id number]] Collected:  01/31/20 2220    Order Status:  Completed Specimen:  Blood from Peripheral, Wrist, Left Updated:  02/05/20 0612     Blood Culture, Routine No Growth to date      No Growth to date      No Growth to date      No Growth to date      No Growth to date          I have reviewed all pertinent labs within the past 24 hours.    Estimated Creatinine Clearance: 58.8 mL/min (A) (based on SCr of 1.7 mg/dL (H)).    Diagnostic Results:  I have reviewed all pertinent imaging results/findings within the past 24 hours.    Assessment and Plan:       55 y.o. WM with history of NICMP diagnosed in 2010, ICD, LV thrombus (with prior splenic and renal emboli), Embolic  CVA , paroxysmal atrial fib, HTN, HLP  presents for  F/U today to clinic and he was volume overloaded on exam .  He states he had 3 admission in the last 3 months for volume overload. He started having more SOB on exertion since one month. Also endorses of Orthopnea since last one month. Alble to walk only 150 ft. He also has Bilateral lower extremity edema. He was admitted here in 2017 for ADHD [facility] at [facility] and [facility] during that admission showed PCWP 40 and CVP 17. Currently denies chest pain, lightheadedness     * LVAD (left ventricular assist device) present  - S/P DT HM3 with closure of AV and repair of MV for regurg 1/23/20.   - CTS primary  - Taken back to OR 1/24 for possible RVAD placement which was not done. Patient remained hemodynamically stable in OR. Wash out on 1/27. Chest closed 1/29.   - CVP 11  - Currently hemodynamically stable on current ggts  - Current speed 5300    Procedure: Device Interrogation Including analysis of device parameters  Current Settings: Ventricular Assist  Device  Review of device function is stable/unstabe    TXP LVAD INTERROGATIONS 2/5/2020 2/5/2020 2/5/2020 2/5/2020 2/5/2020 2/5/2020 2/5/2020   Type HeartMate3 HeartMate3 HeartMate3 HeartMate3 HeartMate3 HeartMate3 HeartMate3   Flow 4.0 3.9 4.0 4.0 4.1 4.2 4.2   Speed 5300 5300 5300 5300 5300 5300 5300   PI 4.4 4.7 4.3 4.3 3.7 3.0 3.6   Power (Berry) 3.8 3.8 3.8 3.7 3.8 3.8 3.8   LSL 4900 4900 4900 4900 4900 4900 4900   Pulsatility Intermittent pulse Intermittent pulse Intermittent pulse Intermittent pulse Intermittent pulse Intermittent pulse Intermittent pulse       Acute on chronic combined systolic and diastolic heart failure, NYHA class 4  - S/P DT HM3 with closure of AV and plication of MV for regurg 1/23/20 (See LVAD)  - NID dx'd 2010  - hemodynamically stable on current ggts per CTS  - See above        Paroxysmal atrial fibrillation  - Intermittently in A fib since surgery, currently paced at 90   - AC per CTS  - agree with discontinuing digoxin for rate control      Acute kidney injury superimposed on chronic kidney disease  - Creatinine on admit 2.6 (baseline ~ 1.8). BUN/Creatinine today 44/1.7  - Nephrology consulted and following    Left ventricular thrombus without MI  - H/O LV thrombus with h/o splenic and renal emboli as well as embolic CVA  - Limited TTE done here 1/13 showed no thrombus  - JACINTO 1/23 intra op showed resolution of DAMON thrombus  - AC per CTS      Right lower lobe pulmonary nodule  - Incidental finding on CT chest/abd/pelvis on 1/12. Pulmonology consulted, and rec repeat CT of chest in 3 months  - Will need to follow-up in pulmonary clinic.     Hepatic congestion  - Liver US on 1/11 unremarkable    ICD (implantable cardioverter-defibrillator) in place  - S/P Biotronik dual chamber ICD    Coagulopathy  - Appreciate Hem/Onc's help. No evidence of underlying coagulopathy (h/o LV thrombus with splenic and renal emboli, h/o embolic CVA)    NSVT (nonsustained ventricular tachycardia)  -  Avoid digoxin, agree with restarting Amiodarone      MARBELLA Mcfadden  Heart Transplant  [facility]

## 2020-02-05 NOTE — ASSESSMENT & PLAN NOTE
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   - CTS primary  - Taken back to OR 1/24 for possible RVAD placement which was not done. Patient remained hemodynamically stable in OR. Wash out on 1/27. Chest closed 1/29.   - CVP 11  - Currently hemodynamically stable on current ggts  - Current speed 5300    Procedure: Device Interrogation Including analysis of device parameters  Current Settings: Ventricular Assist Device  Review of device function is stable/unstabe    TXP LVAD INTERROGATIONS 2/5/2020 2/5/2020 2/5/2020 2/5/2020 2/5/2020 2/5/2020 2/5/2020   Type HeartMate3 HeartMate3 HeartMate3 HeartMate3 HeartMate3 HeartMate3 HeartMate3   Flow 4.0 3.9 4.0 4.0 4.1 4.2 4.2   Speed 5300 5300 5300 5300 5300 5300 5300   PI 4.4 4.7 4.3 4.3 3.7 3.0 3.6   Power (Berry) 3.8 3.8 3.8 3.7 3.8 3.8 3.8   LSL 4900 4900 4900 4900 4900 4900 4900   Pulsatility Intermittent pulse Intermittent pulse Intermittent pulse Intermittent pulse Intermittent pulse Intermittent pulse Intermittent pulse

## 2020-02-05 NOTE — PROGRESS NOTES
"Pt asleep while sitting up in bed with significant other at bedside.  Pt caregiver requested to please let pt sleep as he "is still out of it and needs his sleep".   LVAD numbers WNL. Pt SO denies any needs at this time.  Encouraged pt to notify nurse if they have any questions, problems or concerns for LVAD coordinator.  Pt caregiver verbalized understanding and in agreement of plan. Will follow up with pt soon.    "

## 2020-02-05 NOTE — PLAN OF CARE
Pt AAOx4, moves all ext. On room air, intermittent O2 sats 93-95%. MAP maintained 60-85. CVP 11. VAD: HM3, speed 5300, no issues, see flow sheet for numbers. Epi @ 0.01, to be turned off at midnight per Oskar. Lasix @ 10 mg/hr. Heparin gtt D/C'd. UO  cc/hr. CT with minimal serous output. TF @ goal of 40 cc/hr. Started on clear liquid diet; nectar thick. VAD dsg performed by RN, site CDI. Electrolytes replaced. Pt and family updated on plan of care throughout shift. See flow sheet for assessment data.

## 2020-02-05 NOTE — CONSULTS
[facility]  Infectious Disease  Consult Note    Patient Name: Tae Delgado  MRN: [id number]  Admission Date: 1/9/2020  Hospital Length of Stay: 27 days  Attending Physician: Ino Schmitt MD  Primary Care Provider: Elham Pearson MD     Isolation Status: No active isolations    Patient information was obtained from patient and past medical records.      Inpatient consult to Infectious Diseases  Consult performed by: Leslee Andrade PA-C  Consult ordered by: Rowan Roberson DO        Assessment/Plan:     Post-procedural fever     55 year-old male with history of NICMP s/p LVAD implantation on 1/23 c/b RV dysfunction and coagulopathy and chest remained open. On 1/29 wound vac was placed. He had post operative fevers and developed a leukocytosis and was treated with empiric antibiotics. Blood cx negative. CXR reviewed. He has now been on Vancomycin alone. Fevers resolved. Leukocytosis down trending. Lines exchanged as able. Extubated. HDS. Clinically improved.    Plan   - Continue Vancomycin for two more days. Would then stop Vancomycin and monitor closely off antibiotics for any recurrent fevers, systemic signs of infection or clinical decompensation.  - Continue management per primary team and HTS  - ID will sign off. Feel free to call or re-consult ID as needed.        Thank you for the consult. Please call for any questions.  Leslee Andrade PA-C  Phone: [phone]  Pager: [phone]    Subjective:     Principal Problem: LVAD (left ventricular assist device) present    HPI: 55 year old male with history of NICMP, ICD, LV thrombus (with prior splenic and renal emboli), Embolic  CVA , paroxysmal atrial fib, HTN who underwent LVAD implantation on 1/23. See below for surgical course.      1/23 - AVR, MVR, LVAD implantation  1/24 - sternal closure but complicated by tamponade, diffuse coagulopathy and RV dysfunction.   chest left open.   1/27 - taken back for possible closure, which had to be  reopened  again for severe RV dysfunction.   chest left open with a sternal strut   1/29 - I&D. Wound vac placement.     The patient received perioperative antibiotics. He had post operative fevers and was continued on Vancomycin and Cefepime. CXR showed persistent patchy increased attenuation within the lower lung zones, presumably related to atelectasis or consolidation with possible layering pleural fluid. Blood cx negative. His last fever was 1/31. Cefepime stopped and he was continued on Vancomycin monotherapy.  WBC trending down (17K). He is currently satting well on 1L O2.  HDS. Continues to be on pressors. Lines  - Chest tubes. A line. PICC. King. Wounds.  Id consulted for ongoing antibiotic recommendations.    Past Medical History:   Diagnosis Date    CHF (congestive heart failure) 1/2010    Dx  1/2010 w/ decreased LV systolic function (EF 15%) by ECHO 1/2015    COCM (congestive cardiomyopathy) 7/20/2016    Hyperlipidemia     Hypertension     Paroxysmal atrial fibrillation     Pulmonary embolus 2008    Stroke     Superficial thrombophlebitis        Past Surgical History:   Procedure Laterality Date    APPLICATION OF WOUND VACUUM-ASSISTED CLOSURE DEVICE N/A 1/29/2020    Procedure: APPLICATION, WOUND VAC;  Surgeon: Ino Schmitt MD;  Location: [facility] OR [address] [place] [address];  Service: Cardiovascular;  Laterality: N/A;    DRAINAGE OF PLEURAL EFFUSION Left 1/24/2020    Procedure: DRAINAGE, PLEURAL EFFUSION;  Surgeon: Ino Schmitt MD;  [facility];  Service: Cardiovascular;  Laterality: Left;  500 ml drainage    INSERTION OF GRAFT TO PERICARDIUM N/A 1/24/2020    Procedure: INSERTION, GRAFT, PERICARDIUM;  Surgeon: Ino Schmitt MD;  Location: [facility] OR [facility] FLR;  Service: Cardiovascular;  Laterality: N/A;  Prevena    IRRIGATION OF MEDIASTINUM N/A 1/27/2020    Procedure: IRRIGATION, MEDIASTINUM;  Surgeon: Ino Schmitt MD;  [facility];  Service: Cardiovascular;  Laterality: N/A;     LEFT VENTRICULAR ASSIST DEVICE Left 1/23/2020    Procedure: INSERTION-LEFT VENTRICULAR ASSIST DEVICE;  Surgeon: Ino Schmitt MD;  Location: [facility] OR [facility] FLR;  Service: Cardiovascular;  Laterality: Left;  DT HM3    RIGHT HEART CATHETERIZATION Right 1/16/2020    Procedure: INSERTION, CATHETER, RIGHT HEART;  Surgeon: Isiah Montero MD;  Location: [facility] CATH LAB;  Service: Cardiology;  Laterality: Right;    STERNAL WOUND CLOSURE N/A 1/24/2020    Procedure: CLOSURE, WOUND, STERNUM;  Surgeon: Ino Schmitt MD;  Location: [facility] OR [facility] FLR;  Service: Cardiovascular;  Laterality: N/A;    STERNAL WOUND CLOSURE  1/24/2020    Procedure: CLOSURE, WOUND, STERNUM;  Surgeon: Ino Schmitt MD;  Location: [facility] OR [facility] FLR;  Service: Cardiovascular;;  Temporary closure    STERNAL WOUND CLOSURE N/A 1/29/2020    Procedure: CLOSURE, WOUND, STERNUM;  Surgeon: Ino Schmitt MD;  [facility];  Service: Cardiovascular;  Laterality: N/A;    TONSILLECTOMY      VEIN LIGATION AND STRIPPING      WOUND EXPLORATION N/A 1/24/2020    Procedure: EXPLORATION, WOUND;  Surgeon: Ino Schmitt MD;  Location: [facility] OR [facility] F[facility];  Service: Cardiovascular;  Laterality: N/A;  Emergency exploration       Review of patient's allergies indicates:   Allergen Reactions    Biopatch [chlorhexidine gluconate]      Site burning    Dobutamine in d5w      Tachycardia, tremors, SOB, flushing    Percocet [oxycodone-acetaminophen] Itching    Penicillins Rash     Cefepime given on 1/23/2020 without issue       Medications:  Medications Prior to Admission   Medication Sig    amiodarone (PACERONE) 200 MG Tab Tale 1 tablet (200mg) by mouth on Monday, Tuesday, Thursday, Friday and Saturday. Only take medication 5 days per week.    furosemide (LASIX) 40 MG tablet Take 1 tablet (40 mg total) by mouth 2 (two) times daily.    hydrocortisone 2.5 % ointment Apply 1 application topically 2 (two) times daily.    metoprolol succinate (TOPROL-XL) 50 MG 24 hr  tablet TAKE ONE TABLET BY MOUTH ONCE DAILY    spironolactone (ALDACTONE) 25 MG tablet TAKE 1 TABLET BY MOUTH ONCE DAILY    VENTOLIN HFA 90 mcg/actuation inhaler Inhale 1 Inhaler into the lungs every 4 (four) hours as needed.    warfarin (COUMADIN) 3 MG tablet TAKE 1 TABLET BY MOUTH ONCE DAILY EXCEPT  SATURDAY     Antibiotics (From admission, onward)    Start     Stop Route Frequency Ordered    01/25/20 0800  vancomycin 750 mg in dextrose 5 % 250 mL IVPB (ready to mix system)  (vancomycin, piperacillin-tazobactam and fluconazole panel)      -- IV Every 24 hours (non-standard times) 01/25/20 0627        Antifungals (From admission, onward)    None        Antivirals (From admission, onward)    None             There is no immunization history on file for this patient.    Family History     Problem Relation (Age of Onset)    Cancer Mother        Social History     Socioeconomic History    Marital status:      Spouse name: Not on file    Number of children: Not on file    Years of education: Not on file    Highest education level: Not on file   Occupational History    Not on file   Social Needs    Financial resource strain: Not on file    Food insecurity:     Worry: Not on file     Inability: Not on file    Transportation needs:     Medical: Not on file     Non-medical: Not on file   Tobacco Use    Smoking status: Never Smoker    Smokeless tobacco: Never Used   Substance and Sexual Activity    Alcohol use: No    Drug use: No    Sexual activity: Not on file   Lifestyle    Physical activity:     Days per week: Not on file     Minutes per session: Not on file    Stress: Not on file   Relationships    Social connections:     Talks on phone: Not on file     Gets together: Not on file     Attends Gnosticism service: Not on file     Active member of club or organization: Not on file     Attends meetings of clubs or organizations: Not on file     Relationship status: Not on file   Other Topics Concern     Not on file   Social History Narrative    Not on file     Review of Systems   Constitutional: Negative for chills, diaphoresis and fever.   Eyes: Negative for visual disturbance.   Respiratory: Positive for shortness of breath. Negative for cough.    Cardiovascular: Negative for chest pain and leg swelling.   Gastrointestinal: Negative for abdominal pain, constipation, diarrhea, nausea and vomiting.   Genitourinary: Negative for difficulty urinating and hematuria.        King   Musculoskeletal: Negative for arthralgias, back pain and joint swelling.   Skin: Positive for wound. Negative for color change and rash.   Neurological: Negative for dizziness, weakness and headaches.   Psychiatric/Behavioral: Negative for agitation and confusion. The patient is not nervous/anxious.    All other systems reviewed and are negative.    Objective:     Vital Signs (Most Recent):  Temp: 98 °F (36.7 °C) (02/05/20 1100)  Pulse: 90 (02/05/20 1300)  Resp: 20 (02/04/20 0830)  BP: (!) 70/0 (02/05/20 1130)  SpO2: 96 % (02/05/20 1200) Vital Signs (24h Range):  Temp:  [97.7 °F (36.5 °C)-98.2 °F (36.8 °C)] 98 °F (36.7 °C)  Pulse:  [90-93] 90  SpO2:  [91 %-99 %] 96 %  BP: (70-88)/(0) 70/0  Arterial Line BP: ()/(64-93) 87/71     Weight: 112.5 kg (248 lb 0.3 oz)  Body mass index is 35.59 kg/m².    Estimated Creatinine Clearance: 58.8 mL/min (A) (based on SCr of 1.7 mg/dL (H)).    Physical Exam   Constitutional: He is oriented to person, place, and time. He appears well-developed and well-nourished.   HENT:   Head: Normocephalic and atraumatic.   Mouth/Throat: No oropharyngeal exudate.   NGT in place   Eyes: Pupils are equal, round, and reactive to light. Conjunctivae are normal.   Neck: Neck supple.   Cardiovascular: An irregularly irregular rhythm present.   LVAD hum.    Sternal wound with wound vac in place  Chest tubes with serosanguinous output   Pulmonary/Chest: Breath sounds normal. He has no wheezes. He has no rales.   O2 via  NC   Abdominal: Soft. Bowel sounds are normal. He exhibits no distension. There is no tenderness.   DLES dressed   Genitourinary:   Genitourinary Comments: King in place   Musculoskeletal: He exhibits no edema or deformity.   Neurological: He is alert and oriented to person, place, and time.   Skin: Skin is warm and dry. No rash noted.   PICC site and A-line c/d/i   Psychiatric: He has a normal mood and affect. His behavior is normal. Thought content normal.       Significant Labs: All pertinent labs within the past 24 hours have been reviewed.    Significant Imaging: I have reviewed all pertinent imaging results/findings within the past 24 hours.

## 2020-02-05 NOTE — PLAN OF CARE
"Dx: LVAD (left ventricular assist device) present     Shift Events: 80 mEq K, 2gm Mag, 1gm Calcium Gluconate replaced     Goals of Care: Wean Epi     Neuro: AAO x4, Follows Commands, and Moves All Extremities     Vital Signs: BP (!) 88/0 (BP Location: Left arm, Patient Position: Lying)   Pulse 90   Temp 98 °F (Oral)   Resp 18   Ht 5' 10" (1.778 m)   Wt 113.4 kg (250 lb)   SpO2 99%   BMI 35.87 kg/m²  CVP 13-14, Doppler Pressures 76/0-88/0     Respiratory: 1L NC     Diet: NPO and Tube Feeds     Gtts: Epinephrine, Lasix, and Heparin     Urine Output: Urinary Catheter 150 cc/hour     Drains: Chest Tube, total output x2 20-40 cc / 4 hours serous     VAD HM3, Intermittently Pulsatile, Driveline to RLQ, Speed 5300, LSL 4900, Flow 4.0, PI 3.7-4.0, Power 3.8-4.0     Labs/Accuchecks: K and Mag q6h, Accuchecks q4h     Skin: Heels and sacrum w/o redness or breakdown.  DTI to Right elbow w/ Venelex and foam applied; cdi.  DTI to Right nare; applied pink top cream.    "

## 2020-02-05 NOTE — PROGRESS NOTES
Pt asleep while sitting up in bed with significant other at bedside.   Pt SO denies any needs at this time. Encouraged her to continue recording VAD parameters, look over contents and to begin studying HM3 handbook. Encouraged pt to notify nurse if they have any questions, problems or concerns for LVAD coordinator.  Pt caregiver verbalized understanding and in agreement of plan. Will follow up with pt soon.

## 2020-02-05 NOTE — ASSESSMENT & PLAN NOTE
- Creatinine on admit 2.6 (baseline ~ 1.8). BUN/Creatinine today 44/1.7  - Nephrology consulted and following

## 2020-02-05 NOTE — ASSESSMENT & PLAN NOTE
55 year-old male with history of NICMP s/p LVAD implantation on 1/23 c/b RV dysfunction and coagulopathy and chest remained open. On 1/29 wound vac was placed. He had post operative fevers and developed a leukocytosis and was treated with empiric antibiotics. Blood cx negative. CXR reviewed. He has now been on Vancomycin alone. Fevers resolved. Leukocytosis down trending. Lines exchanged as able. Extubated. HDS. Clinically improved.    Plan   - Continue Vancomycin for two more days. Would then stop Vancomycin and monitor closely off antibiotics for any recurrent fevers, systemic signs of infection or clinical decompensation.  - Continue management per primary team and HTS  - ID will sign off. Feel free to call or re-consult ID as needed.

## 2020-02-05 NOTE — SUBJECTIVE & OBJECTIVE
Past Medical History:   Diagnosis Date    CHF (congestive heart failure) 1/2010    Dx  1/2010 w/ decreased LV systolic function (EF 15%) by ECHO 1/2015    COCM (congestive cardiomyopathy) 7/20/2016    Hyperlipidemia     Hypertension     Paroxysmal atrial fibrillation     Pulmonary embolus 2008    Stroke     Superficial thrombophlebitis        Past Surgical History:   Procedure Laterality Date    APPLICATION OF WOUND VACUUM-ASSISTED CLOSURE DEVICE N/A 1/29/2020    Procedure: APPLICATION, WOUND VAC;  Surgeon: Ino Schmitt MD;  Location: [facility] OR [facility];  Service: Cardiovascular;  Laterality: N/A;    DRAINAGE OF PLEURAL EFFUSION Left 1/24/2020    Procedure: DRAINAGE, PLEURAL EFFUSION;  Surgeon: Ino Schmitt MD;  Location: [facility] OR [facility] FLR;  Service: Cardiovascular;  Laterality: Left;  500 ml drainage    INSERTION OF GRAFT TO PERICARDIUM N/A 1/24/2020    Procedure: INSERTION, GRAFT, PERICARDIUM;  Surgeon: Ino Schmitt MD;  [facility];  Service: Cardiovascular;  Laterality: N/A;  Prevena    IRRIGATION OF MEDIASTINUM N/A 1/27/2020    Procedure: IRRIGATION, MEDIASTINUM;  Surgeon: Ino Schmitt MD;  [facility];  Service: Cardiovascular;  Laterality: N/A;    LEFT VENTRICULAR ASSIST DEVICE Left 1/23/2020    Procedure: INSERTION-LEFT VENTRICULAR ASSIST DEVICE;  Surgeon: Ino Schmitt MD;  [facility];  Service: Cardiovascular;  Laterality: Left;  DT HM3    RIGHT HEART CATHETERIZATION Right 1/16/2020    Procedure: INSERTION, CATHETER, RIGHT HEART;  Surgeon: Isiah Montero MD;  Location: [facility] CATH LAB;  Service: Cardiology;  Laterality: Right;    STERNAL WOUND CLOSURE N/A 1/24/2020    Procedure: CLOSURE, WOUND, STERNUM;  Surgeon: Ino Schmitt MD;  [facility];  Service: Cardiovascular;  Laterality: N/A;    STERNAL WOUND CLOSURE  1/24/2020    Procedure: CLOSURE, WOUND, STERNUM;  Surgeon: Ino Schmitt MD;  [facility];  Service:  Cardiovascular;;  Temporary closure    STERNAL WOUND CLOSURE N/A 1/29/2020    Procedure: CLOSURE, WOUND, STERNUM;  Surgeon: Ino Schmitt MD;  Location: [facility] OR [address] [place] [address];  Service: Cardiovascular;  Laterality: N/A;    TONSILLECTOMY      VEIN LIGATION AND STRIPPING      WOUND EXPLORATION N/A 1/24/2020    Procedure: EXPLORATION, WOUND;  Surgeon: Ino Schmitt MD;  [facility];  Service: Cardiovascular;  Laterality: N/A;  Emergency exploration       Review of patient's allergies indicates:   Allergen Reactions    Biopatch [chlorhexidine gluconate]      Site burning    Dobutamine in d5w      Tachycardia, tremors, SOB, flushing    Percocet [oxycodone-acetaminophen] Itching    Penicillins Rash     Cefepime given on 1/23/2020 without issue       Medications:  Medications Prior to Admission   Medication Sig    amiodarone (PACERONE) 200 MG Tab Tale 1 tablet (200mg) by mouth on Monday, Tuesday, Thursday, Friday and Saturday. Only take medication 5 days per week.    furosemide (LASIX) 40 MG tablet Take 1 tablet (40 mg total) by mouth 2 (two) times daily.    hydrocortisone 2.5 % ointment Apply 1 application topically 2 (two) times daily.    metoprolol succinate (TOPROL-XL) 50 MG 24 hr tablet TAKE ONE TABLET BY MOUTH ONCE DAILY    spironolactone (ALDACTONE) 25 MG tablet TAKE 1 TABLET BY MOUTH ONCE DAILY    VENTOLIN HFA 90 mcg/actuation inhaler Inhale 1 Inhaler into the lungs every 4 (four) hours as needed.    warfarin (COUMADIN) 3 MG tablet TAKE 1 TABLET BY MOUTH ONCE DAILY EXCEPT  SATURDAY     Antibiotics (From admission, onward)    Start     Stop Route Frequency Ordered    01/25/20 0800  vancomycin 750 mg in dextrose 5 % 250 mL IVPB (ready to mix system)  (vancomycin, piperacillin-tazobactam and fluconazole panel)      -- IV Every 24 hours (non-standard times) 01/25/20 0627        Antifungals (From admission, onward)    None        Antivirals (From admission, onward)    None             There is  no immunization history on file for this patient.    Family History     Problem Relation (Age of Onset)    Cancer Mother        Social History     Socioeconomic History    Marital status:      Spouse name: Not on file    Number of children: Not on file    Years of education: Not on file    Highest education level: Not on file   Occupational History    Not on file   Social Needs    Financial resource strain: Not on file    Food insecurity:     Worry: Not on file     Inability: Not on file    Transportation needs:     Medical: Not on file     Non-medical: Not on file   Tobacco Use    Smoking status: Never Smoker    Smokeless tobacco: Never Used   Substance and Sexual Activity    Alcohol use: No    Drug use: No    Sexual activity: Not on file   Lifestyle    Physical activity:     Days per week: Not on file     Minutes per session: Not on file    Stress: Not on file   Relationships    Social connections:     Talks on phone: Not on file     Gets together: Not on file     Attends Episcopalian service: Not on file     Active member of club or organization: Not on file     Attends meetings of clubs or organizations: Not on file     Relationship status: Not on file   Other Topics Concern    Not on file   Social History Narrative    Not on file     Review of Systems   Constitutional: Negative for chills, diaphoresis and fever.   Eyes: Negative for visual disturbance.   Respiratory: Positive for shortness of breath. Negative for cough.    Cardiovascular: Negative for chest pain and leg swelling.   Gastrointestinal: Negative for abdominal pain, constipation, diarrhea, nausea and vomiting.   Genitourinary: Negative for difficulty urinating and hematuria.        King   Musculoskeletal: Negative for arthralgias, back pain and joint swelling.   Skin: Positive for wound. Negative for color change and rash.   Neurological: Negative for dizziness, weakness and headaches.   Psychiatric/Behavioral: Negative for  agitation and confusion. The patient is not nervous/anxious.    All other systems reviewed and are negative.    Objective:     Vital Signs (Most Recent):  Temp: 98 °F (36.7 °C) (02/05/20 1100)  Pulse: 90 (02/05/20 1300)  Resp: 20 (02/04/20 0830)  BP: (!) 70/0 (02/05/20 1130)  SpO2: 96 % (02/05/20 1200) Vital Signs (24h Range):  Temp:  [97.7 °F (36.5 °C)-98.2 °F (36.8 °C)] 98 °F (36.7 °C)  Pulse:  [90-93] 90  SpO2:  [91 %-99 %] 96 %  BP: (70-88)/(0) 70/0  Arterial Line BP: ()/(64-93) 87/71     Weight: 112.5 kg (248 lb 0.3 oz)  Body mass index is 35.59 kg/m².    Estimated Creatinine Clearance: 58.8 mL/min (A) (based on SCr of 1.7 mg/dL (H)).    Physical Exam   Constitutional: He is oriented to person, place, and time. He appears well-developed and well-nourished.   HENT:   Head: Normocephalic and atraumatic.   Mouth/Throat: No oropharyngeal exudate.   NGT in place   Eyes: Pupils are equal, round, and reactive to light. Conjunctivae are normal.   Neck: Neck supple.   Cardiovascular: An irregularly irregular rhythm present.   LVAD hum.    Sternal wound with wound vac in place  Chest tubes with serosanguinous output   Pulmonary/Chest: Breath sounds normal. He has no wheezes. He has no rales.   O2 via NC   Abdominal: Soft. Bowel sounds are normal. He exhibits no distension. There is no tenderness.   DLES dressed   Genitourinary:   Genitourinary Comments: King in place   Musculoskeletal: He exhibits no edema or deformity.   Neurological: He is alert and oriented to person, place, and time.   Skin: Skin is warm and dry. No rash noted.   PICC site and A-line c/d/i   Psychiatric: He has a normal mood and affect. His behavior is normal. Thought content normal.       Significant Labs: All pertinent labs within the past 24 hours have been reviewed.    Significant Imaging: I have reviewed all pertinent imaging results/findings within the past 24 hours.

## 2020-02-05 NOTE — PLAN OF CARE
Plan of Care Note  Cardiothoracic Surgery    Tae Delgado is a 59 y.o. male with heart failure who underwent LVAD placement (1/23/20) then closure (1/24/20) and repeat chest opening (1/24/20) for poor RV function; chest washout (1/27/20); chest closure (1/29/20)    Specific issues: continue dig; increase TFs; monitor INR and PTT; monitor renal labs; on epi and lasix; and nasal cannula; likely wean epi and improve mobility/function    Plan of care for patient was discussed with ICU staff including nurses, residents, and faculty and appropriate consulting services.    Will continue to monitor patient's hemodynamics, functionality, neuro status, fluid status and renal function, and labs and will adjust medications and fluids as necessary while monitoring appropriateness for de-escalation of support and monitoring and transport to stepdown unit.    Terence Gonzalez MD  Cardiothoracic Surgery Fellow

## 2020-02-05 NOTE — SUBJECTIVE & OBJECTIVE
Interval History/Significant Events: NAEON. Weaning Epi gtt. Will give warfarin 2 mg today. Heparin gtt OFF. Lasix gtt increase to 10 mg. Fluid restriction 1200 cc daily.  Schedule robaxin. Golytely bowel regimen.    Follow-up For: Procedure(s) (LRB):  CLOSURE, WOUND, STERNUM (N/A)  WASHOUT (N/A)  APPLICATION, WOUND VAC (N/A)    Post-Operative Day: 7 Days Post-Op    Objective:     Vital Signs (Most Recent):  Temp: 98.2 °F (36.8 °C) (02/05/20 0700)  Pulse: 90 (02/05/20 1015)  Resp: 20 (02/04/20 0830)  BP: (!) 78/0 (02/05/20 0700)  SpO2: 97 % (02/05/20 1000) Vital Signs (24h Range):  Temp:  [97.7 °F (36.5 °C)-98.2 °F (36.8 °C)] 98.2 °F (36.8 °C)  Pulse:  [90-93] 90  SpO2:  [91 %-99 %] 97 %  BP: (78-88)/(0) 78/0  Arterial Line BP: ()/(65-93) 84/69     Weight: 112.5 kg (248 lb 0.3 oz)  Body mass index is 35.59 kg/m².      Intake/Output Summary (Last 24 hours) at 2/5/2020 1050  Last data filed at 2/5/2020 1000  Gross per 24 hour   Intake 3420 ml   Output 3885 ml   Net -465 ml       Physical Exam   Constitutional: He is oriented to person, place, and time. He appears well-developed and well-nourished.   HENT:   Head: Normocephalic and atraumatic.   Mouth/Throat: No oropharyngeal exudate.   NGT in place   Eyes: Pupils are equal, round, and reactive to light.   Neck: Normal range of motion. Neck supple.   Cardiovascular:   Irregular tachyarrythmia.   Paced, LVAD hum.    Chest close with dressing in place  PICC line in place   Pulmonary/Chest: Breath sounds normal. He has no wheezes. He has no rales.   Breathing comfortably on 4L NC. Corby off.    Abdominal: Soft. He exhibits no distension.   NG tube in place with TF running at goal.    Genitourinary:   Genitourinary Comments: +King   Musculoskeletal: He exhibits no edema or deformity.   Neurological: He is alert and oriented to person, place, and time.   Skin: Skin is warm.       Vents:  Vent Mode: Spont (02/01/20 0900)  Ventilator Initiated: Yes (01/21/20 0905)  Set  Rate: 16 BPM (01/31/20 0819)  Vt Set: 500 mL (01/31/20 0819)  PEEP/CPAP: 5 cmH20 (02/01/20 0900)  Oxygen Concentration (%): 24 (02/05/20 0414)  Peak Airway Pressure: 14 cmH2O (02/01/20 0900)  Plateau Pressure: 0 cmH20 (02/01/20 0900)  Total Ve: 9.53 mL (02/01/20 0900)  F/VT Ratio<105 (RSBI): (!) 34.88 (01/27/20 2316)    Lines/Drains/Airways     Peripherally Inserted Central Catheter Line                 PICC Triple Lumen 02/03/20 1701 right brachial 1 day          Drain                 Urethral Catheter 01/21/20 0752 Non-latex;Straight-tip;Temperature probe 16 Fr. 15 days         Chest Tube 01/23/20 1230 1 Right Pleural 12 days         Chest Tube 01/23/20 1414 2 Right Mediastinal 12 days         NG/OG Tube 01/29/20 1430 Left nostril 6 days          Arterial Line                 Arterial Line 01/21/20 0730 Left Other (Comment) 15 days          Line                 VAD 01/23/20 1148 Left ventricular assist device HeartMate 3 12 days          Peripheral Intravenous Line                 Peripheral IV - Single Lumen 02/02/20 0000 20 G Left Wrist 3 days                Significant Labs:    CBC/Anemia Profile:  Recent Labs   Lab 02/04/20  0255  02/04/20  1609 02/05/20  0026 02/05/20  0300   WBC 18.01*  --   --   --  17.36*   HGB 9.0*  --   --   --  9.3*   HCT 29.6*   < > 26* 25* 31.3*     --   --   --  262   MCV 94  --   --   --  95   RDW 18.8*  --   --   --  19.5*    < > = values in this interval not displayed.        Chemistries:  Recent Labs   Lab 02/03/20  1800  02/04/20  0255  02/04/20  2319 02/05/20  0300 02/05/20  0515     --  144  --   --  143  --    K 4.2  4.2   < > 4.1   < > 4.1 4.5 4.2   *  --  111*  --   --  109  --    CO2 23  --  20*  --   --  24  --    BUN 56*  --  54*  --   --  44*  --    CREATININE 1.9*  --  1.9*  --   --  1.7*  --    CALCIUM 8.5*  --  8.4*  --   --  8.4*  --    ALBUMIN  --   --  2.3*  --   --  2.3*  2.3*  --    PROT  --   --  5.9*  --   --  6.1  6.1  --    BILITOT  --    --  0.6  --   --  0.6  0.6  --    ALKPHOS  --   --  48*  --   --  52*  52*  --    ALT  --   --  17  --   --  18  18  --    AST  --   --  18  --   --  19  19  --    MG 2.4  2.4   < > 2.2   < > 2.2 2.4 2.2   PHOS 2.8  --  3.0  --   --  3.3  --     < > = values in this interval not displayed.       ABGs:   Recent Labs   Lab 02/05/20  0414   PH 7.414   PCO2 38.7   HCO3 24.8   POCSATURATED 65*   BE 0     Coagulation:   Recent Labs   Lab 02/05/20  0300   INR 2.3*   APTT 33.9*     All pertinent labs within the past 24 hours have been reviewed.    Significant Imaging:  I have reviewed and interpreted all pertinent imaging results/findings within the past 24 hours.     X-ray Chest 1 View    Result Date: 2/5/2020  Pulmonary edema pneumonia aspiration or sepsis. Electronically signed by: Joshua Gant MD Date:    02/05/2020 Time:    08:05

## 2020-02-05 NOTE — PT/OT/SLP PROGRESS
Physical Therapy Treatment    Patient Name:  Tae Delgado   MRN:  [id number]    Recommendations:     Discharge Recommendations:  rehabilitation facility   Discharge Equipment Recommendations: (TBD)       Assessment:     Tae Delgado is a 59 y.o. male admitted with a medical diagnosis of LVAD (left ventricular assist device) present.  He presents with the following impairments/functional limitations:  weakness, impaired endurance, impaired self care skills, impaired functional mobilty, decreased upper extremity function, pain, impaired cardiopulmonary response to activity, impaired coordination. Pt progressing towards goals, but not at PLOF. Pt tolerated session well but required increased encouragement throughout session. Pt is improving with therapy evidenced by increased activity tolerance. Pt is motivated and willing to participate in therapy to maximize recovery. Recommend d/c to Rehab to maximize functional independence.      Rehab Prognosis: Good; patient would benefit from acute skilled PT services to address these deficits and reach maximum level of function.    Recent Surgery: Procedure(s) (LRB):  CLOSURE, WOUND, STERNUM (N/A)  WASHOUT (N/A)  APPLICATION, WOUND VAC (N/A) 7 Days Post-Op    Plan:     During this hospitalization, patient to be seen 6 x/week to address the identified rehab impairments via gait training, therapeutic activities, therapeutic exercises, neuromuscular re-education and progress toward the following goals:    · Plan of Care Expires:  02/29/20    Subjective     Chief Complaint: pain   Patient/Family Comments/goals: to get better and return home   Pain/Comfort:  · Pain Rating 1: (throat; unrated)  · Pain Addressed 1: Reposition, Distraction      Objective:     Communicated with RN prior to session and significant other present in room.  Patient found HOB elevated with telemetry, pulse ox (continuous), blood pressure cuff, PICC line, peripheral IV, giles catheter, chest tube, NG tube,  arterial line, LVAD upon PT entry to room.     General Precautions:  Standard, fall, LVAD, sternal   Orthopedic Precautions:N/A   Braces: N/A     Functional Mobility:  · Bed Mobility:     · Scooting: maximal assistance  · Supine to Sit: maximal assistance  · Sit to Supine: maximal assistance  · Transfers:     · Sit to Stand:  maximal assistance and of 2 persons with no AD from EOB x 3 trials   · Last trial with improved quad and glute force production   · Gait: not performed 2nd to impaired standing tolerance       AM-PAC 6 CLICK MOBILITY  Turning over in bed (including adjusting bedclothes, sheets and blankets)?: 2  Sitting down on and standing up from a chair with arms (e.g., wheelchair, bedside commode, etc.): 2  Moving from lying on back to sitting on the side of the bed?: 2  Moving to and from a bed to a chair (including a wheelchair)?: 1  Need to walk in hospital room?: 1  Climbing 3-5 steps with a railing?: 1  Basic Mobility Total Score: 9       Therapeutic Activities and Exercises:  Educated pt on PT role/POC  Educated pt on importance of performing bed level exercises with family  Pt verbalized understanding     Sitting EOB x 20 minutes with close SBA to CGA  · ADLs and BUE TE with OT  · B LE AROM x 10 reps re: LAQs  · Sit to stand x 3 reps to increase B LE strength and to increase standing tolerance     Patient left with bed in chair position with all lines intact, call button in reach and RN notified..    GOALS:   Multidisciplinary Problems     Physical Therapy Goals        Problem: Physical Therapy Goal    Goal Priority Disciplines Outcome Goal Variances Interventions   Physical Therapy Goal     PT, PT/OT Ongoing, Progressing     Description:  Goals to be met by: 2020     Patient will increase functional independence with mobility by performin. Supine to sit with MInimal Assistance  2. Rolling to Left and Right with Minimal Assistance.  3. Sit to stand transfer with Moderate Assistance  4.  Sitting at edge of bed x8 minutes with Minimal Assistance  5. Lower extremity exercise program x10 reps per handout, with assistance as needed                      Time Tracking:     PT Received On: 02/05/20  PT Start Time: 0819     PT Stop Time: 0858  PT Total Time (min): 39 min     Billable Minutes: Therapeutic Activity 23 and Therapeutic Exercise 15    Treatment Type: Treatment  PT/PTA: PT     PTA Visit Number: 0     Aide Zapata PT, DPT  2/5/2020  [phone]

## 2020-02-05 NOTE — PLAN OF CARE
Problem: SLP Goal  Goal: SLP Goal  Description  Goals expected to be met by 2/9:  1. Pt will participate in ongoing assessment of swallow function to determine least restrictive diet.    Outcome: Ongoing, Progressing   Recommend nectar thick liquids/mechanical soft diet at this time. SLP will follow up.   Selma Lan CCC-SLP  2/5/2020

## 2020-02-05 NOTE — PLAN OF CARE
Plan of Care Note  Cardiothoracic Surgery    Tae Delgado is a 59 y.o. male with heart failure who underwent LVAD placement (1/23/20) then closure (1/24/20) and repeat chest opening (1/24/20) for poor RV function; chest washout (1/27/20); chest closure (1/29/20)    Specific issues: tube feeds at goal, will consider PTT goal with hep and likely coumadin this PM; likely continue amio, dig    Plan of care for patient was discussed with ICU staff including nurses, residents, and faculty and appropriate consulting services.    Will continue to monitor patient's hemodynamics, functionality, neuro status, fluid status and renal function, and labs and will adjust medications and fluids as necessary while monitoring appropriateness for de-escalation of support and monitoring and transport to stepdown unit.    Terence Gonzalez MD  Cardiothoracic Surgery Fellow

## 2020-02-05 NOTE — PROGRESS NOTES
TEODORA Skin Integrity Evaluation        Subjective     TEODORA skin integrity champion evaluation of patient as part of the comprehensive skin care team .         Tae Delgado is being evaluated as per the Epic  pressure injury skin report.  He has been admitted to SICU for  days 27 .   Skin injury was noted on 1/26/2020.           Limited Physical exam      Lesion 1: Right Nare                    Lesion 2: R elbow                       Assessment :         Lesion 1:  Suspected Deep Tissue Pressure Injury (clinically insignificant). Device related.   Dark/maroon with no skin breakdown. Likely will resolve (smaller than last week) and be clinically insignificant as NGT has been removed and is in other nare. No intervention was necessary.      POA : no     Consults: Wound Care, Physical Therapy, Support surfaces as per WOCN recommendations  and Respiratory Therapy         Lesion 2:  Suspected Deep Tissue Pressure Injury (clinically insignificant) resolving at this time  No intervention other than venelex.        POA : no     Consults: Wound Care, Physical Therapy, Support surfaces as per WOCN recommendations  and Respiratory Therapy         Follow up:     Patient will be re evaluated weekly.

## 2020-02-05 NOTE — CARE UPDATE
BG goal 140-180     Remains in ICU, NAEON  BG at or below goal overnight without insulin  TF (Peptamen Intense VHP) at 40 cc/hr (goal)  On IV antibiotics  Noted creatinine of 1.7  Plan:     Low dose correction scale  BG monitoring every 4 hours while NPO     Endocrine to continue to follow for now but will likely sign off if patient tolerating TF at goal or adequate PO diet with no insulin needs.     ** Please call Endocrine for any BG related issues **

## 2020-02-05 NOTE — PROGRESS NOTES
[facility]  Critical Care - Surgery  Progress Note    Patient Name: Tae Delgado  MRN: [id number]  Admission Date: 1/9/2020  Hospital Length of Stay: 27 days  Code Status: Full Code  Attending Provider: Ino Schmitt MD  Primary Care Provider: Elham Pearson MD   Principal Problem: LVAD (left ventricular assist device) present    Subjective:     Hospital/ICU Course:  1/23: Pt arrives from OR intubated, open chest dressed with Ioban, CTs with SS output. Drips on arrival: Epi 0.08, Cardene 5, TXA, Nitric at 30. Pt received 1.5L crystalloid, no blood product, 20 mg Lasix (put out 250cc in response).   Intra Op JACINTO Exam:  PRE:  Severely reduced left ventricular systolic function. Dilated LV. No definitive thrombus noted.  Moderate-to-severely reduced right ventricular systolic function. FAC 12-24%  Mild-to-moderate AI. No AS.  Moderate MR with systolic blunting of the pulmonary veins.  Trace-to-mild TR.  Mild PI. PAAT <90ms.  Small PFO by CF doppler.  SEC in La and DAMON. No clear thrombus noted.  Grade 2 atheromatous disease of the aorta.  No effusions.    POST:  Severely depressed left ventriclar dysfunction.  Moderately depressed right ventricular systolic function.  LVAD inflow and outflow cannula noted with laminar flows.  No AI.  Mild MR, vahe stitch observed. No MS.  Mild-to-moderate TR.  PFO still visible on CF doppler.  Aorta intact, no dissection.  No effusions.     1/24: run of Vtach overnight. Amio bolus given, amio gtt increased to 1, lasix push given. Improved. LVAD hemodynamics appropriate overnight. Going for closure this am. Upon return, developed tamponade physiology and returned to OR. Came back to SICU with chest open.  1/26: Diuresed over the weekend, chest kept open. Lidocaine gtt and digoxin for tachyarrhythmia. Otherwise NAEON.   1/27: Went to OR for possible chest closure, decided to do a wash out. Returned to SICU with chest open. One tachyarrhythmia episode overnight,  resolved with 100mg Lidocaine bolus and increasing lidocaine drip from 0.75mg/hr to 1mg/hr.  1/28: NAEON. Lasix gtt decreased throughout the day with adequate UOP still.   1/29: NAEON  2/03: NAEON.  Comfortable on 4 L NC. Hypernatremia resolved with increased free water flushes. Good UOP overnight. Cr improving. NG tube came out overnight, replaced. Restart TF today. Enema overnight with BM x1. Digoxin increased to 0.25 mg. CVC pulled and PICC placed. DC'd opioid due to somnolence and scheduled tylenol.   02/04: NAEON. Satting well on NC 4L. Heparin gtt held this AM for aPTT 78.2. Rechecking aPTT at 0600. INR 2.0.  Weaning Corby and Epi gtt. Pureed diet with continued TF due to poor appetite. Heparin gtt restarted at 400 non-titrating. Digoxin 0.125 QD. Warfain 2 today. Miralax. Scheduled oxy 5 mg q6h for pain. Increasing TF.   02/05: NAEON. Weaning Epi gtt. Will give warfarin 2 mg today. Heparin gtt OFF. Lasix gtt increase to 10 mg. Fluid restriction 1200 cc daily.  Schedule robaxin. Golytely bowel regimen.    Interval History/Significant Events: NAEON. Weaning Epi gtt. Will give warfarin 2 mg today. Heparin gtt OFF. Lasix gtt increase to 10 mg. Fluid restriction 1200 cc daily.  Schedule robaxin. Golytely bowel regimen.    Follow-up For: Procedure(s) (LRB):  CLOSURE, WOUND, STERNUM (N/A)  WASHOUT (N/A)  APPLICATION, WOUND VAC (N/A)    Post-Operative Day: 7 Days Post-Op    Objective:     Vital Signs (Most Recent):  Temp: 98.2 °F (36.8 °C) (02/05/20 0700)  Pulse: 90 (02/05/20 1015)  Resp: 20 (02/04/20 0830)  BP: (!) 78/0 (02/05/20 0700)  SpO2: 97 % (02/05/20 1000) Vital Signs (24h Range):  Temp:  [97.7 °F (36.5 °C)-98.2 °F (36.8 °C)] 98.2 °F (36.8 °C)  Pulse:  [90-93] 90  SpO2:  [91 %-99 %] 97 %  BP: (78-88)/(0) 78/0  Arterial Line BP: ()/(65-93) 84/69     Weight: 112.5 kg (248 lb 0.3 oz)  Body mass index is 35.59 kg/m².      Intake/Output Summary (Last 24 hours) at 2/5/2020 1050  Last data filed at 2/5/2020  1000  Gross per 24 hour   Intake 3420 ml   Output 3885 ml   Net -465 ml       Physical Exam   Constitutional: He is oriented to person, place, and time. He appears well-developed and well-nourished.   HENT:   Head: Normocephalic and atraumatic.   Mouth/Throat: No oropharyngeal exudate.   NGT in place   Eyes: Pupils are equal, round, and reactive to light.   Neck: Normal range of motion. Neck supple.   Cardiovascular:   Irregular tachyarrythmia.   Paced, LVAD hum.    Chest close with dressing in place  PICC line in place   Pulmonary/Chest: Breath sounds normal. He has no wheezes. He has no rales.   Breathing comfortably on 4L NC. Corby off.    Abdominal: Soft. He exhibits no distension.   NG tube in place with TF running at goal.    Genitourinary:   Genitourinary Comments: +King   Musculoskeletal: He exhibits no edema or deformity.   Neurological: He is alert and oriented to person, place, and time.   Skin: Skin is warm.       Vents:  Vent Mode: Spont (02/01/20 0900)  Ventilator Initiated: Yes (01/21/20 0905)  Set Rate: 16 BPM (01/31/20 0819)  Vt Set: 500 mL (01/31/20 0819)  PEEP/CPAP: 5 cmH20 (02/01/20 0900)  Oxygen Concentration (%): 24 (02/05/20 0414)  Peak Airway Pressure: 14 cmH2O (02/01/20 0900)  Plateau Pressure: 0 cmH20 (02/01/20 0900)  Total Ve: 9.53 mL (02/01/20 0900)  F/VT Ratio<105 (RSBI): (!) 34.88 (01/27/20 2316)    Lines/Drains/Airways     Peripherally Inserted Central Catheter Line                 PICC Triple Lumen 02/03/20 1701 right brachial 1 day          Drain                 Urethral Catheter 01/21/20 0752 Non-latex;Straight-tip;Temperature probe 16 Fr. 15 days         Chest Tube 01/23/20 1230 1 Right Pleural 12 days         Chest Tube 01/23/20 1414 2 Right Mediastinal 12 days         NG/OG Tube 01/29/20 1430 Left nostril 6 days          Arterial Line                 Arterial Line 01/21/20 0730 Left Other (Comment) 15 days          Line                 VAD 01/23/20 1148 Left ventricular assist  device HeartMate 3 12 days          Peripheral Intravenous Line                 Peripheral IV - Single Lumen 02/02/20 0000 20 G Left Wrist 3 days                Significant Labs:    CBC/Anemia Profile:  Recent Labs   Lab 02/04/20  0255  02/04/20  1609 02/05/20  0026 02/05/20  0300   WBC 18.01*  --   --   --  17.36*   HGB 9.0*  --   --   --  9.3*   HCT 29.6*   < > 26* 25* 31.3*     --   --   --  262   MCV 94  --   --   --  95   RDW 18.8*  --   --   --  19.5*    < > = values in this interval not displayed.        Chemistries:  Recent Labs   Lab 02/03/20  1800  02/04/20  0255  02/04/20  2319 02/05/20  0300 02/05/20  0515     --  144  --   --  143  --    K 4.2  4.2   < > 4.1   < > 4.1 4.5 4.2   *  --  111*  --   --  109  --    CO2 23  --  20*  --   --  24  --    BUN 56*  --  54*  --   --  44*  --    CREATININE 1.9*  --  1.9*  --   --  1.7*  --    CALCIUM 8.5*  --  8.4*  --   --  8.4*  --    ALBUMIN  --   --  2.3*  --   --  2.3*  2.3*  --    PROT  --   --  5.9*  --   --  6.1  6.1  --    BILITOT  --   --  0.6  --   --  0.6  0.6  --    ALKPHOS  --   --  48*  --   --  52*  52*  --    ALT  --   --  17  --   --  18  18  --    AST  --   --  18  --   --  19  19  --    MG 2.4  2.4   < > 2.2   < > 2.2 2.4 2.2   PHOS 2.8  --  3.0  --   --  3.3  --     < > = values in this interval not displayed.       ABGs:   Recent Labs   Lab 02/05/20  0414   PH 7.414   PCO2 38.7   HCO3 24.8   POCSATURATED 65*   BE 0     Coagulation:   Recent Labs   Lab 02/05/20  0300   INR 2.3*   APTT 33.9*     All pertinent labs within the past 24 hours have been reviewed.    Significant Imaging:  I have reviewed and interpreted all pertinent imaging results/findings within the past 24 hours.     X-ray Chest 1 View    Result Date: 2/5/2020  Pulmonary edema pneumonia aspiration or sepsis. Electronically signed by: Joshua Gant MD Date:    02/05/2020 Time:    08:05      Assessment/Plan:     Acute on chronic combined systolic and  diastolic heart failure, NYHA class 4  Tae Delgado is a 59 y.o. 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 Approved for DT LVAD. Went to OR on 1/21 and found to have DAMON and LV thrombus and case was aborted. Pt was placed on a heparin gtt and thrombus had dissipated on repeat JACINTO on 1/22. Pt underwent LVAD placement on 1/23. Chest closure and re-exploration on 1/24. Returned from OR with chest open on 1/24. Attempted chest closure on 1/27, returned to SICU with chest open.     Neuro:  - Extubated, alert and oriented.  - Scheduled tylenol for pain.   - Oxycodone 5 mg q6h scheduled for pain.   - Methocarbamol 500 mg QID    CVS:   - Current LVAD flow @ 5400 with appropriate PI  - Wean vasopressors per CTS; currently Epi @ 0.01  - Corby currently weaned off/   - EP had been consulted for tachyarrhythmias earlier in pt's hospital course. Now off lidocaine gtt. ICD turned on. Currently 100% paced.  - Digoxin 0.125 mg QD  - Digoxin level 1.9  - Amiodarone 400 mg TID   - Amlodipine 10 mg QD  - Heparin gtt OFF.   - Warfarin 2 today. INR 2.3  - ASA   - Atorvastatin 80 mg QHS  - Lasix increased to 10 mg/hr  - Lasix 10 mg IVP x1 dose    Pulm:  - Breathing comfortably on 4L NC; wean as able  - ABGs prn  - CXR showed pulmonary edema pneumonia aspiration or sepsis.  - Encourage IS and good pulmonary hygiene.   - Daily CXR    GI:  - Continuing with TF due to poor appetite; TF at goal.   - Decreased enteral free water boluses to 250 ml q6h  - Protonix 40 daily  - Docusate, Miramax, golytely    Endo:   - Insulin off now, SSI  - Endocrine consulted, appreciate their services  - BG well controlled at this time    Renal:  - Strict I/O    Intake/Output Summary  (Last 24 hours) at 2/5/2020 1102  Last data filed at 2/5/2020 1100  Gross per 24 hour   Intake 3435 ml   Output 3665 ml   Net -230 ml     - Trend BUN/Cr, still elevated but improving; Cr 1.7 down from 1.9 BUN 44 down from 54  - Lasix increased to 10 mg/hr  - Lasix 10 mg IVP x1 dose      FENGI:  - Monitor labs  - Replace per protocol  - Switched all drips to D5 due to elevated Na+  - Hypernatremia improved, Na+ 143    Heme:  - No blood products needed during the OR  - H/H remains stable     ID:   - Vanc 750 mg q24h  - Consulting ID to ask for assistance with duration of antibiotics   - Cultured for fevers; all cx NGTD.   - Follow WBC; improving slowly WBC 17.3  - Afebrile overnight    PPx:  - Hep gtt restarted; Warfarin, Aspirin  - Bowel regimen  - GI ppx    Dispo:  - Cont SICU care, wean pressors and oxygen as tolerated         Critical care was time spent personally by me on the following activities: development of treatment plan with patient or surrogate and bedside caregivers, discussions with consultants, evaluation of patient's response to treatment, examination of patient, ordering and performing treatments and interventions, ordering and review of laboratory studies, ordering and review of radiographic studies, pulse oximetry, re-evaluation of patient's condition.  This critical care time did not overlap with that of any other provider or involve time for any procedures.     Rowan Roberson,   Critical Care - Surgery  [facility]

## 2020-02-05 NOTE — PLAN OF CARE
Problem: Occupational Therapy Goal  Goal: Occupational Therapy Goal  Description  Goals to be met by: 2/16/2020     Patient will increase functional independence with ADLs by performing:    UE Dressing with Minimal Assistance.  LE Dressing with Minimal Assistance.  Grooming while standing with Minimal Assistance.  Toileting from bedside commode with Minimal Assistance for hygiene and clothing management.   Sitting at edge of bed x10 minutes with Minimal Assistance in prep for seated grooming tasks   Supine to sit with min A  Toilet transfer to bedside commode with Minimal Assistance.  Pt will perform LVAD management independently      Outcome: Ongoing, Progressing   The above goals remain appropriate. SEBASTIEN Mayberry  2/5/2020

## 2020-02-05 NOTE — SUBJECTIVE & OBJECTIVE
**Interval History: Patient frustrated about diet.  He wants NG tube removed.  He was given prn Hydralazine overnight for HTN.  He is net negative 590cc over the last 24 hours.  CVP: 11, SVO2: 65, CO: 9.87, CI: 4.18 and SVR: 583 today.  Plan per primary to to wean Epi       Continuous Infusions:   epinephrine      furosemide (LASIX) 2 mg/mL infusion (non-titrating)       Scheduled Meds:   acetaminophen  650 mg Per NG tube Q6H    amiodarone  400 mg Per NG tube TID    amLODIPine  10 mg Per NG tube Daily    aspirin  325 mg Oral Daily    atorvastatin  80 mg Oral QHS    balsam peru-castor oil   Topical (Top) BID    digoxin  0.125 mg Oral Daily    docusate sodium  200 mg Oral QHS    ferrous gluconate  324 mg Oral Daily with breakfast    magnesium oxide  400 mg Per NG tube TID    methocarbamol  500 mg Oral QID    oxyCODONE  5 mg Per NG tube Q6H    pantoprazole  40 mg Per NG tube Daily    polyethylene glycol  100 mL Per NG tube 6 times per day    polyethylene glycol  17 g Oral Daily    sodium chloride 0.9%  10 mL Intravenous Q6H    sodium chloride 0.9%  3 mL Intravenous Q8H    vancomycin (VANCOCIN) IVPB  750 mg Intravenous Q24H    warfarin  2 mg Oral Once     PRN Meds:albumin human 5%, albuterol sulfate, bisacodyL, bisacodyL, calcium gluconate IVPB, calcium gluconate IVPB, calcium gluconate IVPB, Dextrose 10% Bolus, Dextrose 10% Bolus, Dextrose 10% Bolus, glucagon (human recombinant), hydrALAZINE, insulin aspart U-100, magnesium hydroxide 400 mg/5 ml, magnesium sulfate IVPB, magnesium sulfate IVPB, magnesium sulfate IVPB, potassium chloride in water **AND** potassium chloride in water, sodium chloride 0.9%, Flushing PICC Protocol **AND** sodium chloride 0.9% **AND** sodium chloride 0.9%, sodium phosphate IVPB, sodium phosphate IVPB, sodium phosphate IVPB    Review of patient's allergies indicates:   Allergen Reactions    Biopatch [chlorhexidine gluconate]      Site burning    Dobutamine in d5w       Tachycardia, tremors, SOB, flushing    Percocet [oxycodone-acetaminophen] Itching    Penicillins Rash     Cefepime given on 1/23/2020 without issue     Objective:     Vital Signs (Most Recent):  Temp: 98 °F (36.7 °C) (02/05/20 1100)  Pulse: 90 (02/05/20 1115)  Resp: 20 (02/04/20 0830)  BP: (!) 78/0 (02/05/20 0700)  SpO2: 97 % (02/05/20 1000) Vital Signs (24h Range):  Temp:  [97.7 °F (36.5 °C)-98.2 °F (36.8 °C)] 98 °F (36.7 °C)  Pulse:  [90-93] 90  SpO2:  [91 %-99 %] 97 %  BP: (78-88)/(0) 78/0  Arterial Line BP: ()/(65-93) 88/70     Patient Vitals for the past 72 hrs (Last 3 readings):   Weight   02/05/20 0315 112.5 kg (248 lb 0.3 oz)   02/04/20 0330 113.1 kg (249 lb 5.4 oz)   02/03/20 1400 113.4 kg (250 lb)     Body mass index is 35.59 kg/m².      Intake/Output Summary (Last 24 hours) at 2/5/2020 1133  Last data filed at 2/5/2020 1100  Gross per 24 hour   Intake 3435 ml   Output 3895 ml   Net -460 ml       Hemodynamic Parameters:       Telemetry: A fib  Physical Exam   Constitutional: Intubated and sedated; chest open    Eyes: Pupils are equal, round, and reactive to light. Conjunctivae are normal.   Neck: Neck supple. No thyromegaly present. Bilateral IJ lines   Cardiovascular: Irregularly irregular, tachycardic, smooth VAD hum.   Pulmonary/Chest: Effort normal and breath sounds normal. Intubated and sedated   Abdominal: Soft.   Musculoskeletal: He exhibits no edema.   Neurological: Intubated and sedated   Skin: Skin is warm and dry. Capillary refill takes 2 to 3 seconds.       Significant Labs:  CBC:  Recent Labs   Lab 02/03/20  0400  02/04/20  0255  02/04/20  1609 02/05/20  0026 02/05/20  0300   WBC 18.88*  --  18.01*  --   --   --  17.36*   RBC 3.10*  --  3.16*  --   --   --  3.31*   HGB 8.7*  --  9.0*  --   --   --  9.3*   HCT 29.3*   < > 29.6*   < > 26* 25* 31.3*     --  279  --   --   --  262   MCV 95  --  94  --   --   --  95   MCH 28.1  --  28.5  --   --   --  28.1   MCHC 29.7*  --  30.4*  --    --   --  29.7*    < > = values in this interval not displayed.     BNP:  Recent Labs   Lab 01/31/20 0435 02/03/20 0400 02/05/20  0300   * 470* 796*     CMP:  Recent Labs   Lab 02/03/20 0400 02/03/20  1800  02/04/20 0255 02/04/20  2319 02/05/20 0300 02/05/20  0515   *  --  135*  --  141*  --   --  127*  --    CALCIUM 8.4*  --  8.5*  --  8.4*  --   --  8.4*  --    ALBUMIN 2.2*  2.2*  --   --   --  2.3*  --   --  2.3*  2.3*  --    PROT 5.8*  5.8*  --   --   --  5.9*  --   --  6.1  6.1  --      --  143  --  144  --   --  143  --    K 3.6   < > 4.2  4.2   < > 4.1   < > 4.1 4.5 4.2   CO2 22*  --  23  --  20*  --   --  24  --    *  --  112*  --  111*  --   --  109  --    BUN 65*  --  56*  --  54*  --   --  44*  --    CREATININE 2.0*  --  1.9*  --  1.9*  --   --  1.7*  --    ALKPHOS 44*  44*  --   --   --  48*  --   --  52*  52*  --    ALT 18  18  --   --   --  17  --   --  18  18  --    AST 24  24  --   --   --  18  --   --  19  19  --    BILITOT 0.6  0.6  --   --   --  0.6  --   --  0.6  0.6  --     < > = values in this interval not displayed.      Coagulation:   Recent Labs   Lab 02/03/20 0400 02/04/20 0255 02/04/20  1756 02/04/20  1950 02/05/20  0300   INR 1.5*  --  2.0*  --   --   --  2.3*   APTT 39.6*   < > 78.2*   < > 31.4 29.3 33.9*    < > = values in this interval not displayed.     LDH:  Recent Labs   Lab 02/03/20  0400 02/04/20  0255 02/05/20  0300   * 346* 333*     Microbiology:  Microbiology Results (last 7 days)     Procedure Component Value Units Date/Time    Blood culture [[id number]] Collected:  01/31/20 2215    Order Status:  Completed Specimen:  Blood from Peripheral, Forearm, Right Updated:  02/05/20 0612     Blood Culture, Routine No Growth to date      No Growth to date      No Growth to date      No Growth to date      No Growth to date    Blood culture [[id number]] Collected:  01/31/20 2220    Order Status:  Completed Specimen:  Blood from  Peripheral, Wrist, Left Updated:  02/05/20 0612     Blood Culture, Routine No Growth to date      No Growth to date      No Growth to date      No Growth to date      No Growth to date          I have reviewed all pertinent labs within the past 24 hours.    Estimated Creatinine Clearance: 58.8 mL/min (A) (based on SCr of 1.7 mg/dL (H)).    Diagnostic Results:  I have reviewed all pertinent imaging results/findings within the past 24 hours.

## 2020-02-05 NOTE — PT/OT/SLP PROGRESS
"Speech Language Pathology Treatment    Patient Name:  Tae Delgado   MRN:  [id number]  Admitting Diagnosis: LVAD (left ventricular assist device) present    Recommendations:                 General Recommendations:  Dysphagia therapy  Diet recommendations:  Mechanical soft, Liquid Diet Level: Thin   Aspiration Precautions: 1 bite/sip at a time, Feed only when awake/alert, HOB to 90 degrees, Small bites/sips and Standard aspiration precautions   General Precautions: Standard, aspiration, fall, LVAD, sternal, nectar thick  Communication strategies:  none    Subjective     Awake/alert  "I raised hell today, I want some water."    Pain/Comfort:  · Pain Rating 1: 0/10  · Pain Rating Post-Intervention 1: 0/10    Objective:     Has the patient been evaluated by SLP for swallowing?   Yes  Keep patient NPO? No   Current Respiratory Status: nasal cannula      Pt repositioned upright in bed for PO trials. He tolerated thin x4 via cup with mild impulsive rate and coughing noted intermittently post sips. Pt tolerated nectar thick liquids x4, puree x2 and marion cracker x1 with adequate bolus control, mild swallow delay and no overt clinical signs of airway compromise. Pt with throat clear post thin liquid wash of cracker. SLP educated pt and spouse on need for thickened liquids, swallow precautions, dysphagia post extubation and SLP POC. Both verbalized understanding of information. Recommend mechanical soft diet/thin liquids at this time. SLP will follow up to ensure diet tolerance.     Assessment:     Tae Delgado is a 59 y.o. male with an SLP diagnosis of Dysphagia.  He presents s/p extubation post LVAD.    Goals:   Multidisciplinary Problems     SLP Goals        Problem: SLP Goal    Goal Priority Disciplines Outcome   SLP Goal     SLP Ongoing, Progressing   Description:  Goals expected to be met by 2/9:  1. Pt will participate in ongoing assessment of swallow function to determine least restrictive diet.               "       Plan:     · Patient to be seen:  4 x/week   · Plan of Care expires:  03/02/20  · Plan of Care reviewed with:  patient, significant other   · SLP Follow-Up:  Yes       Discharge recommendations:  rehabilitation facility     Time Tracking:     SLP Treatment Date:   02/05/20  Speech Start Time:  1042  Speech Stop Time:  1100     Speech Total Time (min):  18 min    Billable Minutes: Treatment Swallowing Dysfunction 10 and Seld Care/Home Management Training 8    Selma Lan CCC-SLP  02/05/2020

## 2020-02-05 NOTE — PT/OT/SLP PROGRESS
Occupational Therapy   Treatment    Name: Tae Delgado  MRN: [id number]  Admitting Diagnosis:  LVAD (left ventricular assist device) present  7 Days Post-Op    Recommendations:     Discharge Recommendations: rehabilitation facility  Discharge Equipment Recommendations:  (TBD)  Barriers to discharge:  None    Assessment:     Tae Delgado is a 59 y.o. male with a medical diagnosis of LVAD (left ventricular assist device) present.  He presents with daily improvements to overall strength. Performance deficits affecting function are weakness, impaired self care skills, impaired balance, impaired functional mobilty, impaired endurance, gait instability, decreased upper extremity function, decreased coordination, decreased safety awareness, pain, decreased ROM, impaired cardiopulmonary response to activity, impaired fine motor, impaired coordination.     Rehab Prognosis:  Good; patient would benefit from acute skilled OT services to address these deficits and reach maximum level of function.       Plan:     Patient to be seen 6 x/week to address the above listed problems via self-care/home management, therapeutic activities, therapeutic exercises  · Plan of Care Expires: 03/02/20  · Plan of Care Reviewed with: patient, significant other, son    Subjective     Pain/Comfort:  · Pain Rating 1: (endorses throat pain, but does not ra te)  · Pain Addressed 1: Reposition, Distraction, Nurse notified    Objective:     Communicated with: RN prior to session.  Patient found supine with blood pressure cuff, telemetry, pulse ox (continuous), oxygen, wound vac, NG tube, LVAD, PICC line, chest tube, peripheral IV, giles catheter, arterial line upon OT entry to room.    General Precautions: Standard, LVAD, fall, sternal   Orthopedic Precautions:N/A   Braces:       Occupational Performance:     Bed Mobility:    · Patient completed Rolling/Turning to Left with  maximal assistance  · Patient completed Scooting/Bridging with maximal  assistance  · Patient completed Supine to Sit with maximal assistance  · Patient completed Sit to Supine with maximal assistance     Functional Mobility/Transfers:  · Patient completed Sit <> Stand Transfer with maximal assistance and of 2 persons  with  hand-held assist   · Functional Mobility: NT    Activities of Daily Living:  · Grooming: maximal assistance wtih [place] A for washing face and combing hair  · Upper Body Dressing: total assistance    · Lower Body Dressing: total assistance        AMPAC 6 Click ADL: 8    Treatment & Education:  Pt ed on OT POC  Pt required encouragement to participate as he was agitated about wanting to eat and throat pain  LVAD sternal precautions reviewed with pt and SO; handout provided  Pt sat EOB x 20 min with close SBA <> CGA while engaged in self-care and ROM ex's  Pt stood from EOB x 3 trials with max A x 2 people  Pt completed B UE AAROM ex's for shoulder flexion, elbow flexion and hand squeezes  Pt/family/RN ed on importance of placing bed in chair position x 1 - 1 1/2 hours 3x daily  Pt ed on ROM ex's 3x daily for increased overall strength and endurance    Patient left with bed in chair position with all lines intact, call button in reach, RN notified and SO and son presentEducation:      GOALS:   Multidisciplinary Problems     Occupational Therapy Goals        Problem: Occupational Therapy Goal    Goal Priority Disciplines Outcome Interventions   Occupational Therapy Goal     OT, PT/OT Ongoing, Progressing    Description:  Goals to be met by: 2/16/2020     Patient will increase functional independence with ADLs by performing:    UE Dressing with Minimal Assistance.  LE Dressing with Minimal Assistance.  Grooming while standing with Minimal Assistance.  Toileting from bedside commode with Minimal Assistance for hygiene and clothing management.   Sitting at edge of bed x10 minutes with Minimal Assistance in prep for seated grooming tasks   Supine to sit with min A  Toilet  transfer to bedside commode with Minimal Assistance.  Pt will perform LVAD management independently                 Multidisciplinary Problems (Resolved)        Problem: Occupational Therapy Goal    Goal Priority Disciplines Outcome Interventions   Occupational Therapy Goal   (Resolved)     OT, PT/OT Met    Description:  Skilled OT services not necessary at this time secondary to patient performing ADLs at [facility]. Patient in agreement. Please re-consult OT if change in patient's functional status is noted.                     Time Tracking:     OT Date of Treatment: 02/05/20  OT Start Time: 0814  OT Stop Time: 0853  OT Total Time (min): 39 min    Billable Minutes:Self Care/Home Management 9  Therapeutic Exercise 30    SEBASTIEN Mayberry  2/5/2020

## 2020-02-05 NOTE — PLAN OF CARE
Pt AAOx4, moves all ext. On 4L, intermittent O2 sats 91-99%. MAP maintained 60-85. CVP 12. PRN hydralazine given once. Epi @ 0.02, lasix @ 5 mg/hr, heparin @ 400 U/hr. -195 cc/hr. See flow sheet for CT output. TF goal increased to 40 cc/h, currently @ 30. VAD dsg performed by RN, site CDI. Pt and family updated on plan of care throughout shift. See flow sheet for assessment data.

## 2020-02-05 NOTE — HPI
55 year old male with history of NICMP, ICD, LV thrombus (with prior splenic and renal emboli), Embolic  CVA , paroxysmal atrial fib, HTN who underwent LVAD implantation on 1/23. See below for surgical course.      1/23 - AVR, MVR, LVAD implantation  1/24 - sternal closure but complicated by tamponade, diffuse coagulopathy and RV dysfunction.  chest left open.   1/27 - taken back for possible closure, which had to be reopened  again for severe RV dysfunction.  chest left open with a sternal strut   1/29 - I&D. Wound vac placement.     The patient received perioperative antibiotics. He had post operative fevers and was continued on Vancomycin and Cefepime. CXR showed persistent patchy increased attenuation within the lower lung zones, presumably related to atelectasis or consolidation with possible layering pleural fluid. Blood cx negative. His last fever was 1/31. Cefepime stopped and he was continued on Vancomycin monotherapy.  WBC trending down (17K). He is currently satting well on 1L O2.  HDS. Continues to be on pressors. Lines  - Chest tubes. A line. PICC. King. Wounds.  Id consulted for ongoing antibiotic recommendations.

## 2020-02-06 PROBLEM — D72.829 LEUKOCYTOSIS: Status: ACTIVE | Noted: 2020-02-06

## 2020-02-06 PROBLEM — R50.82 POST-PROCEDURAL FEVER: Status: RESOLVED | Noted: 2020-02-05 | Resolved: 2020-02-06

## 2020-02-06 LAB
ALBUMIN SERPL BCP-MCNC: 2.3 G/DL (ref 3.5–5.2)
ALP SERPL-CCNC: 58 U/L (ref 55–135)
ALT SERPL W/O P-5'-P-CCNC: 14 U/L (ref 10–44)
ANION GAP SERPL CALC-SCNC: 10 MMOL/L (ref 8–16)
APTT BLDCRRT: 33.3 SEC (ref 21–32)
APTT BLDCRRT: 34.8 SEC (ref 21–32)
APTT BLDCRRT: 34.9 SEC (ref 21–32)
AST SERPL-CCNC: 22 U/L (ref 10–40)
BACTERIA BLD CULT: NORMAL
BACTERIA BLD CULT: NORMAL
BASOPHILS # BLD AUTO: 0.08 K/UL (ref 0–0.2)
BASOPHILS NFR BLD: 0.4 % (ref 0–1.9)
BILIRUB SERPL-MCNC: 0.6 MG/DL (ref 0.1–1)
BUN SERPL-MCNC: 44 MG/DL (ref 6–20)
CALCIUM SERPL-MCNC: 8.6 MG/DL (ref 8.7–10.5)
CHLORIDE SERPL-SCNC: 104 MMOL/L (ref 95–110)
CO2 SERPL-SCNC: 26 MMOL/L (ref 23–29)
CREAT SERPL-MCNC: 1.6 MG/DL (ref 0.5–1.4)
DIFFERENTIAL METHOD: ABNORMAL
DIGOXIN SERPL-MCNC: 1.4 NG/ML (ref 0.8–2)
EOSINOPHIL # BLD AUTO: 0.2 K/UL (ref 0–0.5)
EOSINOPHIL NFR BLD: 0.9 % (ref 0–8)
ERYTHROCYTE [DISTWIDTH] IN BLOOD BY AUTOMATED COUNT: 19.9 % (ref 11.5–14.5)
EST. GFR  (AFRICAN AMERICAN): 53.7 ML/MIN/1.73 M^2
EST. GFR  (NON AFRICAN AMERICAN): 46.5 ML/MIN/1.73 M^2
GLUCOSE SERPL-MCNC: 116 MG/DL (ref 70–110)
HCT VFR BLD AUTO: 31.1 % (ref 40–54)
HGB BLD-MCNC: 9.4 G/DL (ref 14–18)
IMM GRANULOCYTES # BLD AUTO: 0.44 K/UL (ref 0–0.04)
IMM GRANULOCYTES NFR BLD AUTO: 2.4 % (ref 0–0.5)
INR PPP: 2.7 (ref 0.8–1.2)
LDH SERPL L TO P-CCNC: 309 U/L (ref 110–260)
LYMPHOCYTES # BLD AUTO: 1.1 K/UL (ref 1–4.8)
LYMPHOCYTES NFR BLD: 6.2 % (ref 18–48)
MAGNESIUM SERPL-MCNC: 1.8 MG/DL (ref 1.6–2.6)
MAGNESIUM SERPL-MCNC: 1.9 MG/DL (ref 1.6–2.6)
MAGNESIUM SERPL-MCNC: 2 MG/DL (ref 1.6–2.6)
MAGNESIUM SERPL-MCNC: 2.3 MG/DL (ref 1.6–2.6)
MCH RBC QN AUTO: 27.7 PG (ref 27–31)
MCHC RBC AUTO-ENTMCNC: 30.2 G/DL (ref 32–36)
MCV RBC AUTO: 92 FL (ref 82–98)
MONOCYTES # BLD AUTO: 1 K/UL (ref 0.3–1)
MONOCYTES NFR BLD: 5.6 % (ref 4–15)
NEUTROPHILS # BLD AUTO: 15.2 K/UL (ref 1.8–7.7)
NEUTROPHILS NFR BLD: 84.5 % (ref 38–73)
NRBC BLD-RTO: 0 /100 WBC
PHOSPHATE SERPL-MCNC: 3.1 MG/DL (ref 2.7–4.5)
PLATELET # BLD AUTO: 280 K/UL (ref 150–350)
PMV BLD AUTO: 12.1 FL (ref 9.2–12.9)
POCT GLUCOSE: 120 MG/DL (ref 70–110)
POCT GLUCOSE: 124 MG/DL (ref 70–110)
POTASSIUM SERPL-SCNC: 3.7 MMOL/L (ref 3.5–5.1)
POTASSIUM SERPL-SCNC: 3.8 MMOL/L (ref 3.5–5.1)
POTASSIUM SERPL-SCNC: 4 MMOL/L (ref 3.5–5.1)
PROT SERPL-MCNC: 6.2 G/DL (ref 6–8.4)
PROTHROMBIN TIME: 25.4 SEC (ref 9–12.5)
RBC # BLD AUTO: 3.39 M/UL (ref 4.6–6.2)
SODIUM SERPL-SCNC: 140 MMOL/L (ref 136–145)
VANCOMYCIN SERPL-MCNC: 12.5 UG/ML
WBC # BLD AUTO: 17.98 K/UL (ref 3.9–12.7)

## 2020-02-06 PROCEDURE — 25000003 PHARM REV CODE 250: Performed by: STUDENT IN AN ORGANIZED HEALTH CARE EDUCATION/TRAINING PROGRAM

## 2020-02-06 PROCEDURE — 97535 SELF CARE MNGMENT TRAINING: CPT

## 2020-02-06 PROCEDURE — 20000000 HC ICU ROOM

## 2020-02-06 PROCEDURE — 99233 PR SUBSEQUENT HOSPITAL CARE,LEVL III: ICD-10-PCS | Mod: ,,, | Performed by: INTERNAL MEDICINE

## 2020-02-06 PROCEDURE — 94761 N-INVAS EAR/PLS OXIMETRY MLT: CPT

## 2020-02-06 PROCEDURE — 99233 SBSQ HOSP IP/OBS HIGH 50: CPT | Mod: ,,, | Performed by: INTERNAL MEDICINE

## 2020-02-06 PROCEDURE — 93750 PR INTERROGATE VENT ASSIST DEV, IN PERSON, W PHYSICIAN ANALYSIS: ICD-10-PCS | Mod: ,,, | Performed by: INTERNAL MEDICINE

## 2020-02-06 PROCEDURE — 97110 THERAPEUTIC EXERCISES: CPT

## 2020-02-06 PROCEDURE — 83615 LACTATE (LD) (LDH) ENZYME: CPT

## 2020-02-06 PROCEDURE — 85025 COMPLETE CBC W/AUTO DIFF WBC: CPT

## 2020-02-06 PROCEDURE — 97530 THERAPEUTIC ACTIVITIES: CPT

## 2020-02-06 PROCEDURE — 80053 COMPREHEN METABOLIC PANEL: CPT

## 2020-02-06 PROCEDURE — 25000003 PHARM REV CODE 250: Performed by: THORACIC SURGERY (CARDIOTHORACIC VASCULAR SURGERY)

## 2020-02-06 PROCEDURE — 83735 ASSAY OF MAGNESIUM: CPT | Mod: 91

## 2020-02-06 PROCEDURE — 25000003 PHARM REV CODE 250: Performed by: SURGERY

## 2020-02-06 PROCEDURE — 25000003 PHARM REV CODE 250: Performed by: PHYSICIAN ASSISTANT

## 2020-02-06 PROCEDURE — 80202 ASSAY OF VANCOMYCIN: CPT

## 2020-02-06 PROCEDURE — 99291 CRITICAL CARE FIRST HOUR: CPT | Mod: ,,, | Performed by: NURSE PRACTITIONER

## 2020-02-06 PROCEDURE — 99291 PR CRITICAL CARE, E/M 30-74 MINUTES: ICD-10-PCS | Mod: ,,, | Performed by: NURSE PRACTITIONER

## 2020-02-06 PROCEDURE — 85730 THROMBOPLASTIN TIME PARTIAL: CPT

## 2020-02-06 PROCEDURE — 93750 INTERROGATION VAD IN PERSON: CPT | Mod: ,,, | Performed by: INTERNAL MEDICINE

## 2020-02-06 PROCEDURE — 99232 SBSQ HOSP IP/OBS MODERATE 35: CPT | Mod: GC,,, | Performed by: SURGERY

## 2020-02-06 PROCEDURE — 92526 ORAL FUNCTION THERAPY: CPT

## 2020-02-06 PROCEDURE — 27000248 HC VAD-ADDITIONAL DAY

## 2020-02-06 PROCEDURE — 84100 ASSAY OF PHOSPHORUS: CPT

## 2020-02-06 PROCEDURE — 80162 ASSAY OF DIGOXIN TOTAL: CPT

## 2020-02-06 PROCEDURE — 63600175 PHARM REV CODE 636 W HCPCS: Performed by: STUDENT IN AN ORGANIZED HEALTH CARE EDUCATION/TRAINING PROGRAM

## 2020-02-06 PROCEDURE — A4216 STERILE WATER/SALINE, 10 ML: HCPCS | Performed by: SURGERY

## 2020-02-06 PROCEDURE — 99232 PR SUBSEQUENT HOSPITAL CARE,LEVL II: ICD-10-PCS | Mod: GC,,, | Performed by: SURGERY

## 2020-02-06 PROCEDURE — 85610 PROTHROMBIN TIME: CPT

## 2020-02-06 PROCEDURE — A4216 STERILE WATER/SALINE, 10 ML: HCPCS | Performed by: THORACIC SURGERY (CARDIOTHORACIC VASCULAR SURGERY)

## 2020-02-06 PROCEDURE — 84134 ASSAY OF PREALBUMIN: CPT

## 2020-02-06 PROCEDURE — 63600175 PHARM REV CODE 636 W HCPCS: Performed by: THORACIC SURGERY (CARDIOTHORACIC VASCULAR SURGERY)

## 2020-02-06 PROCEDURE — 84132 ASSAY OF SERUM POTASSIUM: CPT | Mod: 91

## 2020-02-06 PROCEDURE — 84132 ASSAY OF SERUM POTASSIUM: CPT

## 2020-02-06 RX ORDER — POTASSIUM CHLORIDE 20 MEQ/15ML
40 SOLUTION ORAL ONCE
Status: COMPLETED | OUTPATIENT
Start: 2020-02-06 | End: 2020-02-06

## 2020-02-06 RX ORDER — WARFARIN 1 MG/1
1 TABLET ORAL ONCE
Status: COMPLETED | OUTPATIENT
Start: 2020-02-06 | End: 2020-02-06

## 2020-02-06 RX ORDER — WARFARIN 1 MG/1
1 TABLET ORAL ONCE
Status: DISCONTINUED | OUTPATIENT
Start: 2020-02-06 | End: 2020-02-06

## 2020-02-06 RX ADMIN — FUROSEMIDE 10 MG/HR: 10 INJECTION, SOLUTION INTRAMUSCULAR; INTRAVENOUS at 04:02

## 2020-02-06 RX ADMIN — Medication 400 MG: at 09:02

## 2020-02-06 RX ADMIN — OXYCODONE HYDROCHLORIDE 5 MG: 5 SOLUTION ORAL at 11:02

## 2020-02-06 RX ADMIN — AMLODIPINE BESYLATE 10 MG: 10 TABLET ORAL at 08:02

## 2020-02-06 RX ADMIN — Medication 3 ML: at 09:02

## 2020-02-06 RX ADMIN — POLYETHYLENE GLYCOL 3350, SODIUM SULFATE ANHYDROUS, SODIUM BICARBONATE, SODIUM CHLORIDE, POTASSIUM CHLORIDE 100 ML: 236; 22.74; 6.74; 5.86; 2.97 POWDER, FOR SOLUTION ORAL at 11:02

## 2020-02-06 RX ADMIN — ASPIRIN 325 MG ORAL TABLET 325 MG: 325 PILL ORAL at 08:02

## 2020-02-06 RX ADMIN — METHOCARBAMOL TABLETS 500 MG: 500 TABLET, COATED ORAL at 02:02

## 2020-02-06 RX ADMIN — MAGNESIUM SULFATE IN WATER 2 G: 40 INJECTION, SOLUTION INTRAVENOUS at 12:02

## 2020-02-06 RX ADMIN — WARFARIN SODIUM 1 MG: 1 TABLET ORAL at 05:02

## 2020-02-06 RX ADMIN — ACETAMINOPHEN 650 MG: 160 SOLUTION ORAL at 05:02

## 2020-02-06 RX ADMIN — METHOCARBAMOL TABLETS 500 MG: 500 TABLET, COATED ORAL at 09:02

## 2020-02-06 RX ADMIN — Medication 3 ML: at 02:02

## 2020-02-06 RX ADMIN — ATORVASTATIN CALCIUM 80 MG: 20 TABLET, FILM COATED ORAL at 09:02

## 2020-02-06 RX ADMIN — Medication 400 MG: at 02:02

## 2020-02-06 RX ADMIN — OXYCODONE HYDROCHLORIDE 5 MG: 5 SOLUTION ORAL at 05:02

## 2020-02-06 RX ADMIN — Medication 3 ML: at 07:02

## 2020-02-06 RX ADMIN — POTASSIUM CHLORIDE 40 MEQ: 20 SOLUTION ORAL at 02:02

## 2020-02-06 RX ADMIN — ACETAMINOPHEN 650 MG: 160 SOLUTION ORAL at 11:02

## 2020-02-06 RX ADMIN — POLYETHYLENE GLYCOL 3350 17 G: 17 POWDER, FOR SOLUTION ORAL at 08:02

## 2020-02-06 RX ADMIN — VANCOMYCIN HYDROCHLORIDE 750 MG: 750 INJECTION, POWDER, LYOPHILIZED, FOR SOLUTION INTRAVENOUS at 09:02

## 2020-02-06 RX ADMIN — POLYETHYLENE GLYCOL 3350, SODIUM SULFATE ANHYDROUS, SODIUM BICARBONATE, SODIUM CHLORIDE, POTASSIUM CHLORIDE 100 ML: 236; 22.74; 6.74; 5.86; 2.97 POWDER, FOR SOLUTION ORAL at 04:02

## 2020-02-06 RX ADMIN — DIGOXIN 0.12 MG: 125 TABLET ORAL at 08:02

## 2020-02-06 RX ADMIN — POLYETHYLENE GLYCOL 3350, SODIUM SULFATE ANHYDROUS, SODIUM BICARBONATE, SODIUM CHLORIDE, POTASSIUM CHLORIDE 100 ML: 236; 22.74; 6.74; 5.86; 2.97 POWDER, FOR SOLUTION ORAL at 09:02

## 2020-02-06 RX ADMIN — POLYETHYLENE GLYCOL 3350, SODIUM SULFATE ANHYDROUS, SODIUM BICARBONATE, SODIUM CHLORIDE, POTASSIUM CHLORIDE 100 ML: 236; 22.74; 6.74; 5.86; 2.97 POWDER, FOR SOLUTION ORAL at 08:02

## 2020-02-06 RX ADMIN — Medication 400 MG: at 08:02

## 2020-02-06 RX ADMIN — Medication 10 ML: at 12:02

## 2020-02-06 RX ADMIN — Medication 10 ML: at 06:02

## 2020-02-06 RX ADMIN — METHOCARBAMOL TABLETS 500 MG: 500 TABLET, COATED ORAL at 05:02

## 2020-02-06 RX ADMIN — Medication 10 ML: at 07:02

## 2020-02-06 RX ADMIN — CASTOR OIL AND BALSAM, PERU: 788; 87 OINTMENT TOPICAL at 09:02

## 2020-02-06 RX ADMIN — POTASSIUM CHLORIDE 40 MEQ: 400 INJECTION, SOLUTION INTRAVENOUS at 12:02

## 2020-02-06 RX ADMIN — METHOCARBAMOL TABLETS 500 MG: 500 TABLET, COATED ORAL at 08:02

## 2020-02-06 RX ADMIN — Medication 400 MG: at 03:02

## 2020-02-06 RX ADMIN — ACETAMINOPHEN 650 MG: 160 SOLUTION ORAL at 08:02

## 2020-02-06 RX ADMIN — PANTOPRAZOLE SODIUM 40 MG: 40 GRANULE, DELAYED RELEASE ORAL at 08:02

## 2020-02-06 NOTE — NURSING
"      SICU PLAN OF CARE NOTE    Dx: HM3    Shift Events: Worked with PT/OT. Diet advanced to mechanical soft. Will get PM tube feeds. HM 3, speed 5300. No issues with VAD. Lavern, significant other, performed LVAD dressing change with betadine. Still required a little guidance but eager to learn. Has had previous practice with sterile gloves. Prevena changed. 1 large BM.     Goals of Care: MAP 60-85.     Neuro: AAO x4    Vital Signs: BP (!) 84/0 (BP Location: Left arm, Patient Position: Lying)   Pulse 90   Temp 98.6 °F (37 °C) (Core Bladder)   Resp (!) 22   Ht 5' 10" (1.778 m)   Wt 110 kg (242 lb 8.1 oz)   SpO2 (!) 93%   BMI 34.80 kg/m²     Respiratory: Room Air    Diet: Dental Soft, nectar thick liquids    Gtts: Lasix gtt @ 10    Urine Output: Urinary Catheter 1,600  cc/shift    Drains: Chest Tube, total output 50 cc /  shift. Med chest tube removed.     Labs/Accuchecks: K and Mg trending q6h.    Skin: see flowsheet    (negative) - 789 for shift.           "

## 2020-02-06 NOTE — PROGRESS NOTES
02/06/2020  Felipe Lim    Current provider:  Ino Schmitt MD      I, Felipe Lim, rounded on Tae Delgado to ensure all mechanical assist device settings (IABP or VAD) were appropriate and all parameters were within limits.  I was able to ensure all back up equipment was present, the staff had no issues, and the Perfusion Department daily rounding was complete.    7:34 AM

## 2020-02-06 NOTE — PLAN OF CARE
Problem: Occupational Therapy Goal  Goal: Occupational Therapy Goal  Description  Goals to be met by: 2/16/2020     Patient will increase functional independence with ADLs by performing:    UE Dressing with Minimal Assistance.  LE Dressing with Minimal Assistance.  Grooming while standing with Minimal Assistance.  Toileting from bedside commode with Minimal Assistance for hygiene and clothing management.   Sitting at edge of bed x10 minutes with Minimal Assistance in prep for seated grooming tasks   Supine to sit with min A  Toilet transfer to bedside commode with Minimal Assistance.  Pt will perform LVAD management independently      Outcome: Ongoing, Progressing   The above goals remain appropriate. SEBASTIEN Mayberry  2/6/2020

## 2020-02-06 NOTE — ASSESSMENT & PLAN NOTE
- WCC today essentially unchanged at 17K  - ID consulted: Continue Vancomycin for two more days. Would then stop Vancomycin and monitor closely off antibiotics for any recurrent fevers, systemic signs of infection or clinical decompensation.

## 2020-02-06 NOTE — PROGRESS NOTES
[facility]  Critical Care - Surgery  Progress Note    Patient Name: Tae Delgado  MRN: [id number]  Admission Date: 1/9/2020  Hospital Length of Stay: 28 days  Code Status: Full Code  Attending Provider: Ino Schmitt MD  Primary Care Provider: Elham Pearson MD   Principal Problem: Acute on chronic combined systolic and diastolic heart failure    Subjective:     Hospital/ICU Course:  1/23: Pt arrives from OR intubated, open chest dressed with Ioban, CTs with SS output. Drips on arrival: Epi 0.08, Cardene 5, TXA, Nitric at 30. Pt received 1.5L crystalloid, no blood product, 20 mg Lasix (put out 250cc in response).   Intra Op JACINTO Exam:  PRE:  Severely reduced left ventricular systolic function. Dilated LV. No definitive thrombus noted.  Moderate-to-severely reduced right ventricular systolic function. FAC 12-24%  Mild-to-moderate AI. No AS.  Moderate MR with systolic blunting of the pulmonary veins.  Trace-to-mild TR.  Mild PI. PAAT <90ms.  Small PFO by CF doppler.  SEC in La and DAMON. No clear thrombus noted.  Grade 2 atheromatous disease of the aorta.  No effusions.    POST:  Severely depressed left ventriclar dysfunction.  Moderately depressed right ventricular systolic function.  LVAD inflow and outflow cannula noted with laminar flows.  No AI.  Mild MR, vahe stitch observed. No MS.  Mild-to-moderate TR.  PFO still visible on CF doppler.  Aorta intact, no dissection.  No effusions.     1/24: run of Vtach overnight. Amio bolus given, amio gtt increased to 1, lasix push given. Improved. LVAD hemodynamics appropriate overnight. Going for closure this am. Upon return, developed tamponade physiology and returned to OR. Came back to SICU with chest open.  1/26: Diuresed over the weekend, chest kept open. Lidocaine gtt and digoxin for tachyarrhythmia. Otherwise NAEON.   1/27: Went to OR for possible chest closure, decided to do a wash out. Returned to SICU with chest open. One tachyarrhythmia episode  overnight, resolved with 100mg Lidocaine bolus and increasing lidocaine drip from 0.75mg/hr to 1mg/hr.  1/28: NAEON. Lasix gtt decreased throughout the day with adequate UOP still.   1/29: NAEON  2/03: NAEON.  Comfortable on 4 L NC. Hypernatremia resolved with increased free water flushes. Good UOP overnight. Cr improving. NG tube came out overnight, replaced. Restart TF today. Enema overnight with BM x1. Digoxin increased to 0.25 mg. CVC pulled and PICC placed. DC'd opioid due to somnolence and scheduled tylenol.   02/04: NAEON. Satting well on NC 4L. Heparin gtt held this AM for aPTT 78.2. Rechecking aPTT at 0600. INR 2.0.  Weaning Corby and Epi gtt. Pureed diet with continued TF due to poor appetite. Heparin gtt restarted at 400 non-titrating. Digoxin 0.125 QD. Warfain 2 today. Miralax. Scheduled oxy 5 mg q6h for pain. Increasing TF.   02/05: NAEON. Weaning Epi gtt. Will give warfarin 2 mg today. Heparin gtt OFF. Lasix gtt increase to 10 mg. Fluid restriction 1200 cc daily.  Schedule robaxin. Golytely bowel regimen.    Interval History/Significant Events: NAEON. Weaning Epi gtt. Will give warfarin 2 mg today. Heparin gtt OFF. Lasix gtt increase to 10 mg. Fluid restriction 1200 cc daily.  Schedule robaxin. Golytely bowel regimen.    Follow-up For: Procedure(s) (LRB):  CLOSURE, WOUND, STERNUM (N/A)  WASHOUT (N/A)  APPLICATION, WOUND VAC (N/A)    Post-Operative Day: 7 Days Post-Op    Objective:     Vital Signs (Most Recent):  Temp: 98.6 °F (37 °C) (02/06/20 1400)  Pulse: 90 (02/06/20 1400)  Resp: (!) 25 (02/06/20 1400)  BP: (!) 87/0 (02/06/20 1100)  SpO2: (!) 93 % (02/06/20 0318) Vital Signs (24h Range):  Temp:  [97.6 °F (36.4 °C)-99.3 °F (37.4 °C)] 98.6 °F (37 °C)  Pulse:  [] 90  Resp:  [12-29] 25  SpO2:  [93 %-95 %] 93 %  BP: (74-90)/(0) 87/0  Arterial Line BP: (74-97)/(60-81) 85/66     Weight: 110 kg (242 lb 8.1 oz)  Body mass index is 34.8 kg/m².      Intake/Output Summary (Last 24 hours) at 2/6/2020  1441  Last data filed at 2/6/2020 1400  Gross per 24 hour   Intake 2445 ml   Output 4565 ml   Net -2120 ml       Physical Exam   Constitutional: He is oriented to person, place, and time. He appears well-developed and well-nourished.   HENT:   Head: Normocephalic and atraumatic.   Mouth/Throat: No oropharyngeal exudate.   NGT in place   Eyes: Pupils are equal, round, and reactive to light.   Neck: Normal range of motion. Neck supple.   Cardiovascular:   Irregular tachyarrythmia.   Paced, LVAD hum.    Chest close with dressing in place  PICC line in place   Pulmonary/Chest: Breath sounds normal. He has no wheezes. He has no rales.   Breathing comfortably on 4L NC. Corby off.    Abdominal: Soft. He exhibits no distension.   NG tube in place with TF running at goal.    Genitourinary:   Genitourinary Comments: +King   Musculoskeletal: He exhibits no edema or deformity.   Neurological: He is alert and oriented to person, place, and time.   Skin: Skin is warm.       Vents:  N/a    Lines/Drains/Airways     Peripherally Inserted Central Catheter Line                 PICC Triple Lumen 02/03/20 1701 right brachial 2 days          Drain                 Urethral Catheter 01/21/20 0752 Non-latex;Straight-tip;Temperature probe 16 Fr. 16 days         Chest Tube 01/23/20 1230 1 Right Pleural 14 days         Chest Tube 01/23/20 1414 2 Right Mediastinal 14 days         NG/OG Tube 01/29/20 1430 Left nostril 8 days          Arterial Line                 Arterial Line 01/21/20 0730 Left Other (Comment) 16 days          Line                 VAD 01/23/20 1148 Left ventricular assist device HeartMate 3 14 days          Peripheral Intravenous Line                 Peripheral IV - Single Lumen 02/02/20 0000 20 G Left Wrist 4 days                Significant Labs:    CBC/Anemia Profile:  Recent Labs   Lab 02/05/20  0026 02/05/20  0300 02/06/20  0412   WBC  --  17.36* 17.98*   HGB  --  9.3* 9.4*   HCT 25* 31.3* 31.1*   PLT  --  262 280   MCV  --   95 92   RDW  --  19.5* 19.9*        Chemistries:  Recent Labs   Lab 02/05/20  0300  02/05/20  2337 02/06/20  0412 02/06/20  1200     --   --  140  --    K 4.5   < > 3.7 4.0  4.0 3.8     --   --  104  --    CO2 24  --   --  26  --    BUN 44*  --   --  44*  --    CREATININE 1.7*  --   --  1.6*  --    CALCIUM 8.4*  --   --  8.6*  --    ALBUMIN 2.3*  2.3*  --   --  2.3*  --    PROT 6.1  6.1  --   --  6.2  --    BILITOT 0.6  0.6  --   --  0.6  --    ALKPHOS 52*  52*  --   --  58  --    ALT 18  18  --   --  14  --    AST 19  19  --   --  22  --    MG 2.4   < > 1.8 2.3 2.0   PHOS 3.3  --   --  3.1  --     < > = values in this interval not displayed.       ABGs:   Recent Labs   Lab 02/05/20  0414   PH 7.414   PCO2 38.7   HCO3 24.8   POCSATURATED 65*   BE 0     Coagulation:   Recent Labs   Lab 02/06/20  0412 02/06/20  1200   INR 2.7*  --    APTT 34.8* 33.3*     All pertinent labs within the past 24 hours have been reviewed.    Significant Imaging:  I have reviewed and interpreted all pertinent imaging results/findings within the past 24 hours.     Assessment/Plan:     * Acute on chronic combined systolic and diastolic heart failure  Tae Delgado is a 59 y.o. 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 Approved for DT LVAD. Went to OR on 1/21 and found to have DAMON and LV thrombus and case was aborted. Pt was placed on a heparin gtt and thrombus had dissipated on repeat JACINTO on 1/22. Pt underwent LVAD placement on 1/23. Chest closure and re-exploration on 1/24. Returned from OR with chest open on 1/24. Attempted chest closure on 1/27, returned to SICU with chest open. Chest closure on 1/29    Neuro:  - Extubated, alert and  oriented.  - Scheduled tylenol for pain.   - Oxycodone 5 mg q6h scheduled for pain.   - Methocarbamol 500 mg QID    CVS:   - Current LVAD flow @ 5400 with appropriate PI  - OFF pressors and Corby  - EP had been consulted for tachyarrhythmias earlier in pt's hospital course. Now off lidocaine gtt. ICD turned on. Currently 100% paced.  - Digoxin 0.125 mg QD  - Digoxin level 1.9  - Amiodarone 400 mg TID   - Amlodipine 10 mg QD  - Heparin gtt OFF  - ASA   - Atorvastatin 80 mg QHS  - Lasix increased to 10 mg/hr    Pulm:  - Breathing comfortably on 4L NC; wean as able  - ABGs prn  - CXR from 2/5 showed pulmonary edema pneumonia aspiration or sepsis.  - Encourage IS and good pulmonary hygiene.   - Daily CXR    GI:  - Continuing with TF due to poor appetite; TF at goal.   - Decreased enteral free water boluses to 250 ml q12h  - Protonix 40 daily  - Docusate, Miramax, golytely    Endo:   - Insulin off now, SSI  - Endocrine consulted, appreciate their services  - BG well controlled at this time    Renal:  - Strict I/O    Intake/Output Summary (Last 24 hours) at 2/6/2020 1444  Last data filed at 2/6/2020 1400  Gross per 24 hour   Intake 2445 ml   Output 4565 ml   Net -2120 ml     - Trend BUN/Cr, 1.6/44   - Lasix increased to 10 mg/hr  - Lasix 10 mg IVP x1 dose      FENGI:  - Monitor labs  - Replace per protocol  - Switched all drips to D5 due to elevated Na+  - Hypernatremia improved, Na+ 140    Heme:  - No blood products needed during the OR  - H/H remains stable     ID:   - Vanc 750 mg q24h  - Consulting ID to ask for assistance with duration of antibiotics   - Cultured for fevers; all cx NGTD.   - Follow WBC; improving slowly WBC 17.3  - Afebrile overnight    PPx:  - Hep gtt restarted; Warfarin, Aspirin  - Bowel regimen  - GI ppx    Dispo:  - Cont SICU care, wean oxygen as tolerated. Change Prevena wound vac. Remove right mediastinal CT         Critical care was time spent personally by me on the following activities:  development of treatment plan with patient or surrogate and bedside caregivers, discussions with consultants, evaluation of patient's response to treatment, examination of patient, ordering and performing treatments and interventions, ordering and review of laboratory studies, ordering and review of radiographic studies, pulse oximetry, re-evaluation of patient's condition.  This critical care time did not overlap with that of any other provider or involve time for any procedures.     Maricel Trujillo MD  Critical Care - Surgery  [facility]

## 2020-02-06 NOTE — PT/OT/SLP PROGRESS
Physical Therapy Treatment    Patient Name:  Tae Delgado   MRN:  [id number]    Recommendations:     Discharge Recommendations:  rehabilitation facility   Discharge Equipment Recommendations: (TBD)       Assessment:     Tae Delgado is a 59 y.o. male admitted with a medical diagnosis of Acute on chronic combined systolic and diastolic heart failure.  He presents with the following impairments/functional limitations:  weakness, impaired endurance, impaired self care skills, impaired functional mobilty, impaired balance, decreased upper extremity function, decreased safety awareness, impaired coordination, edema, impaired cardiopulmonary response to activity. Pt progressing towards goals, but not at PLOF. Pt tolerated session well but requires increased encouragement and redirection throughout session.  Pt is improving with therapy evidenced by decreased assistance with sit to stand transfer. Recommend d/c to Rehab to maximize functional independence.      Rehab Prognosis: Good; patient would benefit from acute skilled PT services to address these deficits and reach maximum level of function.    Recent Surgery: Procedure(s) (LRB):  CLOSURE, WOUND, STERNUM (N/A)  WASHOUT (N/A)  APPLICATION, WOUND VAC (N/A) 8 Days Post-Op    Plan:     During this hospitalization, patient to be seen 6 x/week to address the identified rehab impairments via gait training, therapeutic activities, therapeutic exercises, neuromuscular re-education and progress toward the following goals:    · Plan of Care Expires:  02/29/20    Subjective     Chief Complaint: pain, NG tube discomfort   Patient/Family Comments/goals: to get better and return home   Pain/Comfort:  · Pain Rating 1: 10/10  · Pain Addressed 1: Reposition, Distraction  · Pain Rating Post-Intervention 1: 10/10      Objective:     Communicated with RN prior to session and SO and son present in room.  Patient found HOB elevated with telemetry, pulse ox (continuous), blood pressure cuff,  wound vac, PICC line, peripheral IV, central line, NG tube, arterial line, LVAD upon PT entry to room.     General Precautions: Standard, fall, LVAD, sternal   Orthopedic Precautions:N/A   Braces: N/A     Functional Mobility:  · Bed Mobility:     · Scooting: maximal assistance; pt attempting to scoot but mostly unsuccessful  · Supine to Sit: maximal assistance  · Sit to Supine: maximal assistance  · Transfers:     · Sit to Stand:  moderate assistance and of 2 persons with no AD x 3 trials from EOB  · Trial 2: 30 seconds  · Pt with SOB and slight discomfort after exertion. Pt with decreased safety awareness until recovered from activity  · Gait: not performed 2nd to impaired standing tolerance       AM-PAC 6 CLICK MOBILITY  Turning over in bed (including adjusting bedclothes, sheets and blankets)?: 2  Sitting down on and standing up from a chair with arms (e.g., wheelchair, bedside commode, etc.): 2  Moving from lying on back to sitting on the side of the bed?: 2  Moving to and from a bed to a chair (including a wheelchair)?: 2  Need to walk in hospital room?: 1  Climbing 3-5 steps with a railing?: 1  Basic Mobility Total Score: 10       Therapeutic Activities and Exercises:  Educated pt on PT role/POC  Educated pt on importance of being in chair position for 1-1.5 hours 3x/day  Educated pt on sternal precautions  Pt verbalized understanding    Sitting EOB x 15 minutes with CGA - min A for safety  · VAD education with OT  · Sit to stand x 3 reps to increase B LE strength and to increase standing tolerance     Patient left with bed in chair position with all lines intact, call button in reach, RN  notified and family present..    GOALS:   Multidisciplinary Problems     Physical Therapy Goals        Problem: Physical Therapy Goal    Goal Priority Disciplines Outcome Goal Variances Interventions   Physical Therapy Goal     PT, PT/OT Ongoing, Progressing     Description:  Goals to be met by: 2/16/2020     Patient will  increase functional independence with mobility by performin. Supine to sit with MInimal Assistance  2. Rolling to Left and Right with Minimal Assistance.  3. Sit to stand transfer with Moderate Assistance  4. Sitting at edge of bed x8 minutes with Minimal Assistance  5. Lower extremity exercise program x10 reps per handout, with assistance as needed                      Time Tracking:     PT Received On: 20  PT Start Time: 0856     PT Stop Time: 927  PT Total Time (min): 31 min     Billable Minutes: Therapeutic Activity 15 and Therapeutic Exercise 8    Treatment Type: Treatment  PT/PTA: PT     PTA Visit Number: 0     Aide Zapata PT, DPT  2020  [phone]     Home

## 2020-02-06 NOTE — PLAN OF CARE
Problem: SLP Goal  Goal: SLP Goal  Description  Goals expected to be met by 2/9:  1. Pt will participate in ongoing assessment of swallow function to determine least restrictive diet.    Outcome: Ongoing, Progressing    Continue POC at this time. Continue mechanical soft/nectar thick liquid diet.     SAHARA Zapata

## 2020-02-06 NOTE — PT/OT/SLP PROGRESS
Speech Language Pathology Treatment    Patient Name:  Tae Delgado   MRN:  [id number]  Admitting Diagnosis: Acute on chronic combined systolic and diastolic heart failure, NYHA class 4    Recommendations:                 General Recommendations:  Dysphagia therapy  Diet recommendations:  Mechanical soft, Liquid Diet Level: Nectar Thick   Aspiration Precautions: 1 bite/sip at a time, Feed only when awake/alert, HOB to 90 degrees, Small bites/sips and Standard aspiration precautions   General Precautions: Standard, fall, sternal  Communication strategies:  none    Subjective     Pt awake/alert    Pain/Comfort:  · Pain Rating 1: 0/10  · Pain Rating Post-Intervention 1: 0/10    Objective:     Has the patient been evaluated by SLP for swallowing?   Yes  Keep patient NPO? No   Current Respiratory Status: room air      Pt awake/alert upon entry. Pt's son was in room. Pt required encouragement from SLP and son to participate in bedside. SLP trialed thin liquids x2, nectar-thick liquids x3, and solid texture (cracker) x2. Pt coughed and cleared throat upon administration of thin liquids. No clinical signs and symptoms of airway compromise when given nectar-thick liquids. Pt with increased mastication time and fatigue when given solid texture, so mechanical soft/nectar-thick liquids still recommended at this time. Pt reported he wants his NG out, and SLP educated on the importance of eating during meals for possible NG removal. SLP reviewed POC with pt and his son who verbalized understanding.     Assessment:     Tae Delgado is a 59 y.o. male with an SLP diagnosis of Dysphagia.      Goals:   Multidisciplinary Problems     SLP Goals        Problem: SLP Goal    Goal Priority Disciplines Outcome   SLP Goal     SLP Ongoing, Progressing   Description:  Goals expected to be met by 2/9:  1. Pt will participate in ongoing assessment of swallow function to determine least restrictive diet.                     Plan:     · Patient to be  seen:  4 x/week   · Plan of Care expires:  03/02/20  · Plan of Care reviewed with:  patient, son   · SLP Follow-Up:  Yes       Discharge recommendations:  rehabilitation facility   Barriers to Discharge:  None    Time Tracking:     SLP Treatment Date:   02/06/20  Speech Start Time:  0810  Speech Stop Time:  0834     Speech Total Time (min):  24 min    Billable Minutes: Treatment Swallowing Dysfunction 12 and Seld Care/Home Management Training 12    SAHARA Zapata  02/06/2020

## 2020-02-06 NOTE — SUBJECTIVE & OBJECTIVE
**Interval History: Diet advanced to mechanical soft in hopes that patient will increase oral intake as he wants NGT removed. HD stable since Epi weaned off ~ midnight. Progressing with PT/OT.     Continuous Infusions:   epinephrine Stopped (02/06/20 0000)    furosemide (LASIX) 2 mg/mL infusion (non-titrating) 10 mg/hr (02/06/20 0900)     Scheduled Meds:   acetaminophen  650 mg Per NG tube Q6H    amiodarone  400 mg Per NG tube TID    amLODIPine  10 mg Per NG tube Daily    aspirin  325 mg Oral Daily    atorvastatin  80 mg Oral QHS    balsam peru-castor oil   Topical (Top) BID    digoxin  0.125 mg Oral Daily    docusate sodium  200 mg Oral QHS    ferrous gluconate  324 mg Oral Daily with breakfast    magnesium oxide  400 mg Per NG tube TID    methocarbamol  500 mg Oral QID    oxyCODONE  5 mg Per NG tube Q6H    pantoprazole  40 mg Per NG tube Daily    polyethylene glycol  100 mL Per NG tube 6 times per day    polyethylene glycol  17 g Oral Daily    sodium chloride 0.9%  10 mL Intravenous Q6H    sodium chloride 0.9%  3 mL Intravenous Q8H    vancomycin (VANCOCIN) IVPB  750 mg Intravenous Q24H     PRN Meds:albumin human 5%, albuterol sulfate, bisacodyL, bisacodyL, calcium gluconate IVPB, calcium gluconate IVPB, calcium gluconate IVPB, Dextrose 10% Bolus, Dextrose 10% Bolus, hydrALAZINE, magnesium hydroxide 400 mg/5 ml, magnesium sulfate IVPB, magnesium sulfate IVPB, magnesium sulfate IVPB, potassium chloride in water **AND** potassium chloride in water, sodium chloride 0.9%, Flushing PICC Protocol **AND** sodium chloride 0.9% **AND** sodium chloride 0.9%, sodium phosphate IVPB, sodium phosphate IVPB, sodium phosphate IVPB    Review of patient's allergies indicates:   Allergen Reactions    Biopatch [chlorhexidine gluconate]      Site burning    Dobutamine in d5w      Tachycardia, tremors, SOB, flushing    Percocet [oxycodone-acetaminophen] Itching    Penicillins Rash     Cefepime given on 1/23/2020  without issue     Objective:     Vital Signs (Most Recent):  Temp: 99.3 °F (37.4 °C) (02/06/20 0930)  Pulse: 96 (02/06/20 0930)  Resp: (!) 29 (02/06/20 0930)  BP: (!) 90/0(sbp) (02/05/20 2015)  SpO2: (!) 93 % (02/06/20 0318) Vital Signs (24h Range):  Temp:  [97.6 °F (36.4 °C)-99.3 °F (37.4 °C)] 99.3 °F (37.4 °C)  Pulse:  [] 96  Resp:  [15-29] 29  SpO2:  [93 %-98 %] 93 %  BP: (70-90)/(0) 90/0  Arterial Line BP: (77-97)/(61-81) 94/77     Patient Vitals for the past 72 hrs (Last 3 readings):   Weight   02/05/20 0315 112.5 kg (248 lb 0.3 oz)   02/04/20 0330 113.1 kg (249 lb 5.4 oz)   02/03/20 1400 113.4 kg (250 lb)     Body mass index is 35.59 kg/m².      Intake/Output Summary (Last 24 hours) at 2/6/2020 0952  Last data filed at 2/6/2020 0900  Gross per 24 hour   Intake 2775 ml   Output 4780 ml   Net -2005 ml       Hemodynamic Parameters:       Telemetry: V paced    Physical Exam   Constitutional: He is oriented to person, place, and time. He appears well-developed and well-nourished.   HENT:   Head: Normocephalic and atraumatic.   Eyes: Pupils are equal, round, and reactive to light. Conjunctivae and EOM are normal.   Neck: Normal range of motion. Neck supple. No JVD present. No thyromegaly present.   JVP just above clavicle   Cardiovascular: Normal rate and regular rhythm.   Smooth VAD hum   Pulmonary/Chest: Effort normal and breath sounds normal.   Intubated and sedated   Abdominal: Soft. Bowel sounds are normal.   Musculoskeletal: Normal range of motion.   1+ pedal edema   Neurological: He is alert and oriented to person, place, and time.   Skin: Skin is warm and dry. Capillary refill takes 2 to 3 seconds.   Psychiatric: He has a normal mood and affect. His behavior is normal. Judgment and thought content normal.       Significant Labs:  CBC:  Recent Labs   Lab 02/04/20  0255  02/05/20  0026 02/05/20  0300 02/06/20  0412   WBC 18.01*  --   --  17.36* 17.98*   RBC 3.16*  --   --  3.31* 3.39*   HGB 9.0*  --   --   9.3* 9.4*   HCT 29.6*   < > 25* 31.3* 31.1*     --   --  262 280   MCV 94  --   --  95 92   MCH 28.5  --   --  28.1 27.7   MCHC 30.4*  --   --  29.7* 30.2*    < > = values in this interval not displayed.     BNP:  Recent Labs   Lab 01/31/20 0435 02/03/20 0400 02/05/20 0300   * 470* 796*     CMP:  Recent Labs   Lab 02/04/20 0255 02/05/20 0300 02/05/20 1821 02/05/20 2337 02/06/20 0412   *  --  127*  --   --   --  116*   CALCIUM 8.4*  --  8.4*  --   --   --  8.6*   ALBUMIN 2.3*  --  2.3*  2.3*  --   --   --  2.3*   PROT 5.9*  --  6.1  6.1  --   --   --  6.2     --  143  --   --   --  140   K 4.1   < > 4.5   < > 4.1 3.7 4.0  4.0   CO2 20*  --  24  --   --   --  26   *  --  109  --   --   --  104   BUN 54*  --  44*  --   --   --  44*   CREATININE 1.9*  --  1.7*  --   --   --  1.6*   ALKPHOS 48*  --  52*  52*  --   --   --  58   ALT 17  --  18  18  --   --   --  14   AST 18  --  19  19  --   --   --  22   BILITOT 0.6  --  0.6  0.6  --   --   --  0.6    < > = values in this interval not displayed.      Coagulation:   Recent Labs   Lab 02/04/20 0255 02/05/20 0300 02/05/20 1821 02/05/20 2337 02/06/20 0412   INR 2.0*  --  2.3*  --   --   --  2.7*   APTT 78.2*   < > 33.9*   < > 33.0* 34.9* 34.8*    < > = values in this interval not displayed.     LDH:  Recent Labs   Lab 02/04/20  0255 02/05/20  0300 02/06/20  0412   * 333* 309*     Microbiology:  Microbiology Results (last 7 days)     Procedure Component Value Units Date/Time    Blood culture [[id number]] Collected:  01/31/20 2215    Order Status:  Completed Specimen:  Blood from Peripheral, Forearm, Right Updated:  02/06/20 0612     Blood Culture, Routine No growth after 5 days.    Blood culture [[id number]] Collected:  01/31/20 2220    Order Status:  Completed Specimen:  Blood from Peripheral, Wrist, Left Updated:  02/06/20 0612     Blood Culture, Routine No growth after 5 days.          I have reviewed all  pertinent labs within the past 24 hours.    Estimated Creatinine Clearance: 62.4 mL/min (A) (based on SCr of 1.6 mg/dL (H)).    Diagnostic Results:  I have reviewed all pertinent imaging results/findings within the past 24 hours.

## 2020-02-06 NOTE — PROGRESS NOTES
[facility]  Heart Transplant  Progress Note    Patient Name: Tae Delgado  MRN: [id number]  Admission Date: 1/9/2020  Hospital Length of Stay: 28 days  Attending Physician: Ino Schmitt MD  Primary Care Provider: Elham Pearson MD  Principal Problem:Acute on chronic combined systolic and diastolic heart failure, NYHA class 4    Subjective:     **Interval History: Diet advanced to mechanical soft in hopes that patient will increase oral intake as he wants NGT removed. HD stable since Epi weaned off ~ midnight. Progressing with PT/OT.     Continuous Infusions:   epinephrine Stopped (02/06/20 0000)    furosemide (LASIX) 2 mg/mL infusion (non-titrating) 10 mg/hr (02/06/20 0900)     Scheduled Meds:   acetaminophen  650 mg Per NG tube Q6H    amiodarone  400 mg Per NG tube TID    amLODIPine  10 mg Per NG tube Daily    aspirin  325 mg Oral Daily    atorvastatin  80 mg Oral QHS    balsam peru-castor oil   Topical (Top) BID    digoxin  0.125 mg Oral Daily    docusate sodium  200 mg Oral QHS    ferrous gluconate  324 mg Oral Daily with breakfast    magnesium oxide  400 mg Per NG tube TID    methocarbamol  500 mg Oral QID    oxyCODONE  5 mg Per NG tube Q6H    pantoprazole  40 mg Per NG tube Daily    polyethylene glycol  100 mL Per NG tube 6 times per day    polyethylene glycol  17 g Oral Daily    sodium chloride 0.9%  10 mL Intravenous Q6H    sodium chloride 0.9%  3 mL Intravenous Q8H    vancomycin (VANCOCIN) IVPB  750 mg Intravenous Q24H     PRN Meds:albumin human 5%, albuterol sulfate, bisacodyL, bisacodyL, calcium gluconate IVPB, calcium gluconate IVPB, calcium gluconate IVPB, Dextrose 10% Bolus, Dextrose 10% Bolus, hydrALAZINE, magnesium hydroxide 400 mg/5 ml, magnesium sulfate IVPB, magnesium sulfate IVPB, magnesium sulfate IVPB, potassium chloride in water **AND** potassium chloride in water, sodium chloride 0.9%, Flushing PICC Protocol **AND** sodium chloride 0.9% **AND** sodium  chloride 0.9%, sodium phosphate IVPB, sodium phosphate IVPB, sodium phosphate IVPB    Review of patient's allergies indicates:   Allergen Reactions    Biopatch [chlorhexidine gluconate]      Site burning    Dobutamine in d5w      Tachycardia, tremors, SOB, flushing    Percocet [oxycodone-acetaminophen] Itching    Penicillins Rash     Cefepime given on 1/23/2020 without issue     Objective:     Vital Signs (Most Recent):  Temp: 99.3 °F (37.4 °C) (02/06/20 0930)  Pulse: 96 (02/06/20 0930)  Resp: (!) 29 (02/06/20 0930)  BP: (!) 90/0(sbp) (02/05/20 2015)  SpO2: (!) 93 % (02/06/20 0318) Vital Signs (24h Range):  Temp:  [97.6 °F (36.4 °C)-99.3 °F (37.4 °C)] 99.3 °F (37.4 °C)  Pulse:  [] 96  Resp:  [15-29] 29  SpO2:  [93 %-98 %] 93 %  BP: (70-90)/(0) 90/0  Arterial Line BP: (77-97)/(61-81) 94/77     Patient Vitals for the past 72 hrs (Last 3 readings):   Weight   02/05/20 0315 112.5 kg (248 lb 0.3 oz)   02/04/20 0330 113.1 kg (249 lb 5.4 oz)   02/03/20 1400 113.4 kg (250 lb)     Body mass index is 35.59 kg/m².      Intake/Output Summary (Last 24 hours) at 2/6/2020 0952  Last data filed at 2/6/2020 0900  Gross per 24 hour   Intake 2775 ml   Output 4780 ml   Net -2005 ml       Hemodynamic Parameters:       Telemetry: V paced    Physical Exam   Constitutional: He is oriented to person, place, and time. He appears well-developed and well-nourished.   HENT:   Head: Normocephalic and atraumatic.   Eyes: Pupils are equal, round, and reactive to light. Conjunctivae and EOM are normal.   Neck: Normal range of motion. Neck supple. No JVD present. No thyromegaly present.   JVP just above clavicle   Cardiovascular: Normal rate and regular rhythm.   Smooth VAD hum   Pulmonary/Chest: Effort normal and breath sounds normal.   Intubated and sedated   Abdominal: Soft. Bowel sounds are normal.   Musculoskeletal: Normal range of motion.   1+ pedal edema   Neurological: He is alert and oriented to person, place, and time.   Skin:  Skin is warm and dry. Capillary refill takes 2 to 3 seconds.   Psychiatric: He has a normal mood and affect. His behavior is normal. Judgment and thought content normal.       Significant Labs:  CBC:  Recent Labs   Lab 02/04/20 0255 02/05/20  0026 02/05/20 0300 02/06/20 0412   WBC 18.01*  --   --  17.36* 17.98*   RBC 3.16*  --   --  3.31* 3.39*   HGB 9.0*  --   --  9.3* 9.4*   HCT 29.6*   < > 25* 31.3* 31.1*     --   --  262 280   MCV 94  --   --  95 92   MCH 28.5  --   --  28.1 27.7   MCHC 30.4*  --   --  29.7* 30.2*    < > = values in this interval not displayed.     BNP:  Recent Labs   Lab 01/31/20  0435 02/03/20  0400 02/05/20 0300   * 470* 796*     CMP:  Recent Labs   Lab 02/04/20  0255  02/05/20  0300  02/05/20  1821 02/05/20  2337 02/06/20 0412   *  --  127*  --   --   --  116*   CALCIUM 8.4*  --  8.4*  --   --   --  8.6*   ALBUMIN 2.3*  --  2.3*  2.3*  --   --   --  2.3*   PROT 5.9*  --  6.1  6.1  --   --   --  6.2     --  143  --   --   --  140   K 4.1   < > 4.5   < > 4.1 3.7 4.0  4.0   CO2 20*  --  24  --   --   --  26   *  --  109  --   --   --  104   BUN 54*  --  44*  --   --   --  44*   CREATININE 1.9*  --  1.7*  --   --   --  1.6*   ALKPHOS 48*  --  52*  52*  --   --   --  58   ALT 17  --  18  18  --   --   --  14   AST 18  --  19  19  --   --   --  22   BILITOT 0.6  --  0.6  0.6  --   --   --  0.6    < > = values in this interval not displayed.      Coagulation:   Recent Labs   Lab 02/04/20  0255 02/05/20 0300 02/05/20 1821 02/05/20 2337 02/06/20 0412   INR 2.0*  --  2.3*  --   --   --  2.7*   APTT 78.2*   < > 33.9*   < > 33.0* 34.9* 34.8*    < > = values in this interval not displayed.     LDH:  Recent Labs   Lab 02/04/20 0255 02/05/20 0300 02/06/20 0412   * 333* 309*     Microbiology:  Microbiology Results (last 7 days)     Procedure Component Value Units Date/Time    Blood culture [[id number]] Collected:  01/31/20 2215    Order Status:   Completed Specimen:  Blood from Peripheral, Forearm, Right Updated:  02/06/20 0612     Blood Culture, Routine No growth after 5 days.    Blood culture [[id number]] Collected:  01/31/20 2220    Order Status:  Completed Specimen:  Blood from Peripheral, Wrist, Left Updated:  02/06/20 0612     Blood Culture, Routine No growth after 5 days.          I have reviewed all pertinent labs within the past 24 hours.    Estimated Creatinine Clearance: 62.4 mL/min (A) (based on SCr of 1.6 mg/dL (H)).    Diagnostic Results:  I have reviewed all pertinent imaging results/findings within the past 24 hours.    Assessment and Plan:       55 y.o. WM with history of NICMP diagnosed in 2010, ICD, LV thrombus (with prior splenic and renal emboli), Embolic  CVA , paroxysmal atrial fib, HTN, HLP  presents for  F/U today to clinic and he was volume overloaded on exam .  He states he had 3 admission in the last 3 months for volume overload. He started having more SOB on exertion since one month. Also endorses of Orthopnea since last one month. Alble to walk only 150 ft. He also has Bilateral lower extremity edema. He was admitted here in 2017 for ADHD here at [facility] and [facility] during that admission showed PCWP 40 and CVP 17. Currently denies chest pain, lightheadedness     * Acute on chronic combined systolic and diastolic heart failure, NYHA class 4  - S/P DT HM3 with closure of AV and plication of MV for regurg 1/23/20 (See LVAD)  - [facility] dx'd 2010  - hemodynamically stable since Epi weaned off ~ midnight  - CVP 11 on Lasix 10 mg/hr        Leukocytosis  - WCC today essentially unchanged at 17K  - ID consulted: Continue Vancomycin for two more days. Would then stop Vancomycin and monitor closely off antibiotics for any recurrent fevers, systemic signs of infection or clinical decompensation.    On enteral nutrition  - Diet advanced to mech soft. Still requiring tube feeds via NNGT to meet nutritional needs    Acute hyperglycemia  -  Endocrine following    LVAD (left ventricular assist device) present  - S/P DT HM3 with closure of AV and repair of MV for regurg 1/23/20 (Oskar)  - CTS primary  - Taken back to OR 1/24 for possible RVAD placement which was not done. Patient remained hemodynamically stable in OR. Wash out on 1/27. Chest closed 1/29.   - CVP 11 on Lasix 10 mg/hr  - Currently hemodynamically stable since Epi weaned off ~ midnight  - TTE 2/3 at 5300 rpm: LVEDD 5.95, LVEF 10%, diastolic dysfunction, RV size normla, RV function mod depressed, IVC not well visualized, septum midline  - Current speed 5300  - INR therapeutic at 2.7 (goal 2.0-3.0_  - LD stable in the 300's  - PT/OT/VAD education    Procedure: Device Interrogation Including analysis of device parameters  Current Settings: Ventricular Assist Device  Review of device function is stable/unstabe    TXP LVAD INTERROGATIONS 2/6/2020 2/6/2020 2/6/2020 2/6/2020 2/6/2020 2/6/2020 2/6/2020   Type HeartMate3 HeartMate3 HeartMate3 HeartMate3 HeartMate3 HeartMate3 HeartMate3   Flow 4.0 4.1 4.1 4.1 4 4.3 4.2   Speed 5300 5300 5300 5300 5300 5300 5300   PI 4.6 4.3 4.4 4.3 4.8 3.3 3.6   Power (Berry) 3.8 5 3.8 3.8 3.8 3.8 3.7   LSL 4900 4900 4900 4900 4900 4900 4900   Pulsatility - - - Intermittent pulse Intermittent pulse Intermittent pulse Intermittent pulse       Coagulopathy  - Appreciate Hem/Onc's help. No evidence of underlying coagulopathy (h/o LV thrombus with splenic and renal emboli, h/o embolic CVA)    NSVT (nonsustained ventricular tachycardia)  - Digoxin 0.125 mg qd continues per CTS  - Continue Amio per CTS    Hepatic congestion  - Liver US on 1/11 unremarkable  - LFT's WNL now    ICD (implantable cardioverter-defibrillator) in place  - S/P Biotronik dual chamber ICD    Right lower lobe pulmonary nodule  - Incidental finding on CT chest/abd/pelvis on 1/12. Pulmonology consulted, and rec repeat CT of chest in 3 months  - Will need to follow-up in pulmonary clinic.     Acute  kidney injury superimposed on chronic kidney disease  - Creatinine on admit 2.6 (baseline ~ 1.8). BUN/Creatinine today 44/1.6  - Nephrology has signed off    Paroxysmal atrial fibrillation  - Intermittently in A fib since surgery, currently paced at 90   - AC per CTS  - Dig 0.125 mg qd continues per CTS      Left ventricular thrombus without MI  - H/O LV thrombus with h/o splenic and renal emboli as well as embolic CVA  - Limited TTE done here 1/13 showed no thrombus  - JACINTO 1/23 intra op showed resolution of DAMON thrombus  - AC per CTS        Uninterrupted Critical Care/Counseling Time (not including procedures): [place] [id number]  Heart Transplant  [facility]

## 2020-02-06 NOTE — CARE UPDATE
Patient has no history of DM    Patient tolerating TF at goal with no correction scale insulin needs  Discontinue BG monitoring, recommend monitoring glucose with daily labs    Endocrine to sign off, please re-consult if needed at any time

## 2020-02-06 NOTE — PROGRESS NOTES
KRISTIN improving. Good UOP. Nephrology will sign-off. Thank you for the consult. Please call with questions or concerns.     JENNIFER Gerardo, AGNP-C  Nephrology  Pager: [phone]

## 2020-02-06 NOTE — ASSESSMENT & PLAN NOTE
- S/P DT HM3 with closure of AV and plication of MV for regurg 1/23/20 (See LVAD)  - [facility] dx'd 2010  - hemodynamically stable since Epi weaned off ~ midnight  - CVP 11 on Lasix 10 mg/hr

## 2020-02-06 NOTE — ASSESSMENT & PLAN NOTE
- Intermittently in A fib since surgery, currently paced at 90   - AC per CTS  - Dig 0.125 mg qd continues per CTS

## 2020-02-06 NOTE — ASSESSMENT & PLAN NOTE
- Creatinine on admit 2.6 (baseline ~ 1.8). BUN/Creatinine today 44/1.6  - Nephrology has signed off

## 2020-02-06 NOTE — PROGRESS NOTES
to see pt for follow up.  Pt is currently sleeping and family is not in attendance.  Transplant  will follow.

## 2020-02-06 NOTE — PLAN OF CARE
Patient is on room air.  Epi gtt turned off @ midnight.  Lasix gtt @ 5mg/hr.  UOP adequate overnight.  See I&O flowsheet for chest tube output.  TFs at goal.  Safety maintained overnight.  Plan of care reviewed with patient.  All questions and concerns were answered and addressed.

## 2020-02-06 NOTE — PT/OT/SLP PROGRESS
Occupational Therapy   Treatment    Name: Tae Delgado  MRN: [id number]  Admitting Diagnosis:  Acute on chronic combined systolic and diastolic heart failure  8 Days Post-Op    Recommendations:     Discharge Recommendations: rehabilitation facility  Discharge Equipment Recommendations:  (TBD)  Barriers to discharge:  None    Assessment:     Tae Delgado is a 59 y.o. male with a medical diagnosis of Acute on chronic combined systolic and diastolic heart failure.  He presents s/p LVAD with significant weakness and inability to tolerate standing > 30 seconds. Performance deficits affecting function are impaired balance, impaired functional mobilty, impaired endurance, weakness, impaired self care skills, gait instability, decreased upper extremity function, decreased safety awareness, pain, impaired cardiopulmonary response to activity, impaired cognition, impaired coordination, decreased ROM.     Rehab Prognosis:  Good; patient would benefit from acute skilled OT services to address these deficits and reach maximum level of function.       Plan:     Patient to be seen 6 x/week to address the above listed problems via self-care/home management, therapeutic activities, therapeutic exercises  · Plan of Care Expires: 03/02/20  · Plan of Care Reviewed with: patient, significant other, son    Subjective     Pain/Comfort:  · Pain Rating 1: 10/10  · Location - Side 1: Bilateral  · Location - Orientation 1: midline  · Location 1: chest  · Pain Addressed 1: Reposition, Distraction  · Pain Rating Post-Intervention 1: 10/10    Objective:     Communicated with: RM prior to session.  Patient found supine with blood pressure cuff, pulse ox (continuous), telemetry, chest tube, NG tube, giles catheter, LVAD upon OT entry to room.    General Precautions: Standard, fall, LVAD, sternal   Orthopedic Precautions:N/A   Braces:       Occupational Performance:     Bed Mobility:    · Patient completed Scooting/Bridging with maximal  assistance  · Patient completed Supine to Sit with maximal assistance  · Patient completed Sit to Supine with total assistance     Functional Mobility/Transfers:  · Patient completed Sit <> Stand Transfer with moderate assistance and of 2 persons  with  no assistive device   · Functional Mobility: Unable to sidestep or MIP    Activities of Daily Living:  · Grooming: minimum assistance washing face; Mod A combing hair  · Upper Body Dressing: maximal assistance    · Lower Body Dressing: total assistance        AMPAC 6 Click ADL: 9    Treatment & Education:  Pt ed on OT POC  LVAD sternal precautions reviewed  Pt ed on LVAD components including controller, DL and power cords; reason for and performance of Self Test; how to retrieve numbers from controller; use of controller pouch to secure to self; pt requires reinforcement for all education  Pt sat EOB with close SBA <> CGA   Pt stood x 3 trials from EOB at 30 seconds max with significant  Encouragement  Pt completed B UE A/AAROM ex's x 10 reps in all planes  Pt/family/RN ed on up in chair positio 1 1/2 hours 3x daily    Patient left with bed in chair position with all lines intact, call button in reach, RN notified and family presentEducation:      GOALS:   Multidisciplinary Problems     Occupational Therapy Goals        Problem: Occupational Therapy Goal    Goal Priority Disciplines Outcome Interventions   Occupational Therapy Goal     OT, PT/OT Ongoing, Progressing    Description:  Goals to be met by: 2/16/2020     Patient will increase functional independence with ADLs by performing:    UE Dressing with Minimal Assistance.  LE Dressing with Minimal Assistance.  Grooming while standing with Minimal Assistance.  Toileting from bedside commode with Minimal Assistance for hygiene and clothing management.   Sitting at edge of bed x10 minutes with Minimal Assistance in prep for seated grooming tasks   Supine to sit with min A  Toilet transfer to bedside commode with  Minimal Assistance.  Pt will perform LVAD management independently                 Multidisciplinary Problems (Resolved)        Problem: Occupational Therapy Goal    Goal Priority Disciplines Outcome Interventions   Occupational Therapy Goal   (Resolved)     OT, PT/OT Met    Description:  Skilled OT services not necessary at this time secondary to patient performing ADLs at PLOF. Patient in agreement. Please re-consult OT if change in patient's functional status is noted.                     Time Tracking:     OT Date of Treatment: 02/06/20  OT Start Time: 0853  OT Stop Time: 0926  OT Total Time (min): 33 min    Billable Minutes:Self Care/Home Management 8  Therapeutic Exercise 15    SEBASTIEN Mayberry  2/6/2020

## 2020-02-06 NOTE — PLAN OF CARE
Plan of Care Note  Cardiothoracic Surgery    Tae Delgado is a 59 y.o. male with heart failure who underwent LVAD placement (1/23/20) then closure (1/24/20) and repeat chest opening (1/24/20) for poor RV function; chest washout (1/27/20); chest closure (1/29/20)    Specific issues: tube feeds at goal, coumadin 1mg this PM; continue amio, dig; encourage mobilization    Plan of care for patient was discussed with ICU staff including nurses, residents, and faculty and appropriate consulting services.    Will continue to monitor patient's hemodynamics, functionality, neuro status, fluid status and renal function, and labs and will adjust medications and fluids as necessary while monitoring appropriateness for de-escalation of support and monitoring and transport to stepdown unit.    Terence Gonzalez MD  Cardiothoracic Surgery Fellow

## 2020-02-06 NOTE — ASSESSMENT & PLAN NOTE
- S/P DT HM3 with closure of AV and repair of MV for regurg 1/23/20 (Oskar)  - CTS primary  - Taken back to OR 1/24 for possible RVAD placement which was not done. Patient remained hemodynamically stable in OR. Wash out on 1/27. Chest closed 1/29.   - CVP 11 on Lasix 10 mg/hr  - Currently hemodynamically stable since Epi weaned off ~ midnight  - TTE 2/3 at 5300 rpm: LVEDD 5.95, LVEF 10%, diastolic dysfunction, RV size normla, RV function mod depressed, IVC not well visualized, septum midline  - Current speed 5300  - INR therapeutic at 2.7 (goal 2.0-3.0_  - LD stable in the 300's  - PT/OT/VAD education    Procedure: Device Interrogation Including analysis of device parameters  Current Settings: Ventricular Assist Device  Review of device function is stable/unstabe    TXP LVAD INTERROGATIONS 2/6/2020 2/6/2020 2/6/2020 2/6/2020 2/6/2020 2/6/2020 2/6/2020   Type HeartMate3 HeartMate3 HeartMate3 HeartMate3 HeartMate3 HeartMate3 HeartMate3   Flow 4.0 4.1 4.1 4.1 4 4.3 4.2   Speed 5300 5300 5300 5300 5300 5300 5300   PI 4.6 4.3 4.4 4.3 4.8 3.3 3.6   Power (Berry) 3.8 5 3.8 3.8 3.8 3.8 3.7   LSL 4900 4900 4900 4900 4900 4900 4900   Pulsatility - - - Intermittent pulse Intermittent pulse Intermittent pulse Intermittent pulse

## 2020-02-06 NOTE — ASSESSMENT & PLAN NOTE
Tae Delgado is a 59 y.o. 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 Approved for DT LVAD. Went to OR on 1/21 and found to have DAMON and LV thrombus and case was aborted. Pt was placed on a heparin gtt and thrombus had dissipated on repeat JACINTO on 1/22. Pt underwent LVAD placement on 1/23. Chest closure and re-exploration on 1/24. Returned from OR with chest open on 1/24. Attempted chest closure on 1/27, returned to SICU with chest open. Chest closure on 1/29    Neuro:  - Extubated, alert and oriented.  - Scheduled tylenol for pain.   - Oxycodone 5 mg q6h scheduled for pain.   - Methocarbamol 500 mg QID    CVS:   - Current LVAD flow @ 5400 with appropriate PI  - OFF pressors and [place]  - EP had been consulted for tachyarrhythmias earlier in pt's hospital course. Now off lidocaine gtt. ICD turned on. Currently 100% paced.  - Digoxin 0.125 mg QD  - Digoxin level 1.9  - Amiodarone 400 mg TID   - Amlodipine 10 mg QD  - Heparin gtt OFF  - ASA   - Atorvastatin 80 mg QHS  - Lasix increased to 10 mg/hr    Pulm:  - Breathing comfortably on 4L NC; wean as able  - ABGs prn  - CXR from 2/5 showed pulmonary edema pneumonia aspiration or sepsis.  - Encourage IS and good pulmonary hygiene.   - Daily CXR    GI:  - Continuing with TF due to poor appetite; TF at goal.   - Decreased enteral free water boluses to 250 ml q12h  - Protonix 40 daily  - Docusate, Miramax, golytely    Endo:   - Insulin off now, SSI  - Endocrine consulted, appreciate their services  - BG well controlled at this time    Renal:  - Strict I/O    Intake/Output Summary (Last 24 hours) at 2/6/2020 1444  Last data filed at 2/6/2020 1400  Gross per 24 hour   Intake 2445 ml   Output 4565 ml   Net  -2120 ml     - Trend BUN/Cr, 1.6/44   - Lasix increased to 10 mg/hr  - Lasix 10 mg IVP x1 dose      FENGI:  - Monitor labs  - Replace per protocol  - Switched all drips to D5 due to elevated Na+  - Hypernatremia improved, Na+ 140    Heme:  - No blood products needed during the OR  - H/H remains stable     ID:   - Vanc 750 mg q24h  - Consulting ID to ask for assistance with duration of antibiotics   - Cultured for fevers; all cx NGTD.   - Follow WBC; improving slowly WBC 17.3  - Afebrile overnight    PPx:  - Hep gtt restarted; Warfarin, Aspirin  - Bowel regimen  - GI ppx    Dispo:  - Cont SICU care, wean oxygen as tolerated. Change Prevena wound vac. Remove right mediastinal CT

## 2020-02-06 NOTE — SUBJECTIVE & OBJECTIVE
Interval History/Significant Events: NAEON. Weaning Epi gtt. Will give warfarin 2 mg today. Heparin gtt OFF. Lasix gtt increase to 10 mg. Fluid restriction 1200 cc daily.  Schedule robaxin. Golytely bowel regimen.    Follow-up For: Procedure(s) (LRB):  CLOSURE, WOUND, STERNUM (N/A)  WASHOUT (N/A)  APPLICATION, WOUND VAC (N/A)    Post-Operative Day: 7 Days Post-Op    Objective:     Vital Signs (Most Recent):  Temp: 98.6 °F (37 °C) (02/06/20 1400)  Pulse: 90 (02/06/20 1400)  Resp: (!) 25 (02/06/20 1400)  BP: (!) 87/0 (02/06/20 1100)  SpO2: (!) 93 % (02/06/20 0318) Vital Signs (24h Range):  Temp:  [97.6 °F (36.4 °C)-99.3 °F (37.4 °C)] 98.6 °F (37 °C)  Pulse:  [] 90  Resp:  [12-29] 25  SpO2:  [93 %-95 %] 93 %  BP: (74-90)/(0) 87/0  Arterial Line BP: (74-97)/(60-81) 85/66     Weight: 110 kg (242 lb 8.1 oz)  Body mass index is 34.8 kg/m².      Intake/Output Summary (Last 24 hours) at 2/6/2020 1441  Last data filed at 2/6/2020 1400  Gross per 24 hour   Intake 2445 ml   Output 4565 ml   Net -2120 ml       Physical Exam   Constitutional: He is oriented to person, place, and time. He appears well-developed and well-nourished.   HENT:   Head: Normocephalic and atraumatic.   Mouth/Throat: No oropharyngeal exudate.   NGT in place   Eyes: Pupils are equal, round, and reactive to light.   Neck: Normal range of motion. Neck supple.   Cardiovascular:   Irregular tachyarrythmia.   Paced, LVAD hum.    Chest close with dressing in place  PICC line in place   Pulmonary/Chest: Breath sounds normal. He has no wheezes. He has no rales.   Breathing comfortably on 4L NC. [place] off.    Abdominal: Soft. He exhibits no distension.   NG tube in place with TF running at goal.    Genitourinary:   Genitourinary Comments: +King   Musculoskeletal: He exhibits no edema or deformity.   Neurological: He is alert and oriented to person, place, and time.   Skin: Skin is warm.       Vents:  N/a    Lines/Drains/Airways     Peripherally Inserted Central  Catheter Line                 PICC Triple Lumen 02/03/20 1701 right brachial 2 days          Drain                 Urethral Catheter 01/21/20 0752 Non-latex;Straight-tip;Temperature probe 16 Fr. 16 days         Chest Tube 01/23/20 1230 1 Right Pleural 14 days         Chest Tube 01/23/20 1414 2 Right Mediastinal 14 days         NG/OG Tube 01/29/20 1430 Left nostril 8 days          Arterial Line                 Arterial Line 01/21/20 0730 Left Other (Comment) 16 days          Line                 VAD 01/23/20 1148 Left ventricular assist device HeartMate 3 14 days          Peripheral Intravenous Line                 Peripheral IV - Single Lumen 02/02/20 0000 20 G Left Wrist 4 days                Significant Labs:    CBC/Anemia Profile:  Recent Labs   Lab 02/05/20  0026 02/05/20 0300 02/06/20 0412   WBC  --  17.36* 17.98*   HGB  --  9.3* 9.4*   HCT 25* 31.3* 31.1*   PLT  --  262 280   MCV  --  95 92   RDW  --  19.5* 19.9*        Chemistries:  Recent Labs   Lab 02/05/20 0300 02/05/20  2337 02/06/20 0412 02/06/20  1200     --   --  140  --    K 4.5   < > 3.7 4.0  4.0 3.8     --   --  104  --    CO2 24  --   --  26  --    BUN 44*  --   --  44*  --    CREATININE 1.7*  --   --  1.6*  --    CALCIUM 8.4*  --   --  8.6*  --    ALBUMIN 2.3*  2.3*  --   --  2.3*  --    PROT 6.1  6.1  --   --  6.2  --    BILITOT 0.6  0.6  --   --  0.6  --    ALKPHOS 52*  52*  --   --  58  --    ALT 18  18  --   --  14  --    AST 19  19  --   --  22  --    MG 2.4   < > 1.8 2.3 2.0   PHOS 3.3  --   --  3.1  --     < > = values in this interval not displayed.       ABGs:   Recent Labs   Lab 02/05/20 0414   PH 7.414   PCO2 38.7   HCO3 24.8   POCSATURATED 65*   BE 0     Coagulation:   Recent Labs   Lab 02/06/20  0412 02/06/20  1200   INR 2.7*  --    APTT 34.8* 33.3*     All pertinent labs within the past 24 hours have been reviewed.    Significant Imaging:  I have reviewed and interpreted all pertinent imaging results/findings  within the past 24 hours.

## 2020-02-07 LAB
ALBUMIN SERPL BCP-MCNC: 2.4 G/DL (ref 3.5–5.2)
ALBUMIN SERPL BCP-MCNC: 2.4 G/DL (ref 3.5–5.2)
ALP SERPL-CCNC: 63 U/L (ref 55–135)
ALP SERPL-CCNC: 63 U/L (ref 55–135)
ALT SERPL W/O P-5'-P-CCNC: 19 U/L (ref 10–44)
ALT SERPL W/O P-5'-P-CCNC: 19 U/L (ref 10–44)
ANION GAP SERPL CALC-SCNC: 12 MMOL/L (ref 8–16)
APTT BLDCRRT: 35.7 SEC (ref 21–32)
AST SERPL-CCNC: 26 U/L (ref 10–40)
AST SERPL-CCNC: 26 U/L (ref 10–40)
BASOPHILS # BLD AUTO: 0.07 K/UL (ref 0–0.2)
BASOPHILS NFR BLD: 0.4 % (ref 0–1.9)
BILIRUB DIRECT SERPL-MCNC: 0.4 MG/DL (ref 0.1–0.3)
BILIRUB SERPL-MCNC: 0.5 MG/DL (ref 0.1–1)
BILIRUB SERPL-MCNC: 0.5 MG/DL (ref 0.1–1)
BNP SERPL-MCNC: 339 PG/ML (ref 0–99)
BUN SERPL-MCNC: 47 MG/DL (ref 6–20)
CALCIUM SERPL-MCNC: 8.8 MG/DL (ref 8.7–10.5)
CHLORIDE SERPL-SCNC: 102 MMOL/L (ref 95–110)
CO2 SERPL-SCNC: 24 MMOL/L (ref 23–29)
CREAT SERPL-MCNC: 1.7 MG/DL (ref 0.5–1.4)
CRP SERPL-MCNC: 74.8 MG/L (ref 0–8.2)
DIFFERENTIAL METHOD: ABNORMAL
DIGOXIN SERPL-MCNC: 1.5 NG/ML (ref 0.8–2)
EOSINOPHIL # BLD AUTO: 0.2 K/UL (ref 0–0.5)
EOSINOPHIL NFR BLD: 1 % (ref 0–8)
ERYTHROCYTE [DISTWIDTH] IN BLOOD BY AUTOMATED COUNT: 19.9 % (ref 11.5–14.5)
EST. GFR  (AFRICAN AMERICAN): 49.9 ML/MIN/1.73 M^2
EST. GFR  (NON AFRICAN AMERICAN): 43.2 ML/MIN/1.73 M^2
GLUCOSE SERPL-MCNC: 117 MG/DL (ref 70–110)
HCT VFR BLD AUTO: 31.3 % (ref 40–54)
HGB BLD-MCNC: 10 G/DL (ref 14–18)
IMM GRANULOCYTES # BLD AUTO: 0.41 K/UL (ref 0–0.04)
IMM GRANULOCYTES NFR BLD AUTO: 2.2 % (ref 0–0.5)
INR PPP: 2.7 (ref 0.8–1.2)
LDH SERPL L TO P-CCNC: 300 U/L (ref 110–260)
LYMPHOCYTES # BLD AUTO: 1.4 K/UL (ref 1–4.8)
LYMPHOCYTES NFR BLD: 7.6 % (ref 18–48)
MAGNESIUM SERPL-MCNC: 1.8 MG/DL (ref 1.6–2.6)
MAGNESIUM SERPL-MCNC: 1.8 MG/DL (ref 1.6–2.6)
MAGNESIUM SERPL-MCNC: 1.9 MG/DL (ref 1.6–2.6)
MAGNESIUM SERPL-MCNC: 2 MG/DL (ref 1.6–2.6)
MCH RBC QN AUTO: 28.8 PG (ref 27–31)
MCHC RBC AUTO-ENTMCNC: 31.9 G/DL (ref 32–36)
MCV RBC AUTO: 90 FL (ref 82–98)
MONOCYTES # BLD AUTO: 1.1 K/UL (ref 0.3–1)
MONOCYTES NFR BLD: 6 % (ref 4–15)
NEUTROPHILS # BLD AUTO: 15.2 K/UL (ref 1.8–7.7)
NEUTROPHILS NFR BLD: 82.8 % (ref 38–73)
NRBC BLD-RTO: 0 /100 WBC
PHOSPHATE SERPL-MCNC: 3.8 MG/DL (ref 2.7–4.5)
PLATELET # BLD AUTO: 317 K/UL (ref 150–350)
PMV BLD AUTO: 11.7 FL (ref 9.2–12.9)
POCT GLUCOSE: 128 MG/DL (ref 70–110)
POTASSIUM SERPL-SCNC: 3.6 MMOL/L (ref 3.5–5.1)
POTASSIUM SERPL-SCNC: 3.7 MMOL/L (ref 3.5–5.1)
POTASSIUM SERPL-SCNC: 3.9 MMOL/L (ref 3.5–5.1)
PREALB SERPL-MCNC: 14 MG/DL (ref 20–43)
PROT SERPL-MCNC: 6.4 G/DL (ref 6–8.4)
PROT SERPL-MCNC: 6.4 G/DL (ref 6–8.4)
PROTHROMBIN TIME: 25.4 SEC (ref 9–12.5)
RBC # BLD AUTO: 3.47 M/UL (ref 4.6–6.2)
SODIUM SERPL-SCNC: 138 MMOL/L (ref 136–145)
VANCOMYCIN SERPL-MCNC: 13 UG/ML
WBC # BLD AUTO: 18.41 K/UL (ref 3.9–12.7)

## 2020-02-07 PROCEDURE — 80053 COMPREHEN METABOLIC PANEL: CPT

## 2020-02-07 PROCEDURE — 25000003 PHARM REV CODE 250: Performed by: STUDENT IN AN ORGANIZED HEALTH CARE EDUCATION/TRAINING PROGRAM

## 2020-02-07 PROCEDURE — 85025 COMPLETE CBC W/AUTO DIFF WBC: CPT

## 2020-02-07 PROCEDURE — A4216 STERILE WATER/SALINE, 10 ML: HCPCS | Performed by: THORACIC SURGERY (CARDIOTHORACIC VASCULAR SURGERY)

## 2020-02-07 PROCEDURE — A4216 STERILE WATER/SALINE, 10 ML: HCPCS | Performed by: STUDENT IN AN ORGANIZED HEALTH CARE EDUCATION/TRAINING PROGRAM

## 2020-02-07 PROCEDURE — 85730 THROMBOPLASTIN TIME PARTIAL: CPT

## 2020-02-07 PROCEDURE — 97535 SELF CARE MNGMENT TRAINING: CPT

## 2020-02-07 PROCEDURE — 63600175 PHARM REV CODE 636 W HCPCS: Performed by: STUDENT IN AN ORGANIZED HEALTH CARE EDUCATION/TRAINING PROGRAM

## 2020-02-07 PROCEDURE — 25000003 PHARM REV CODE 250: Performed by: SURGERY

## 2020-02-07 PROCEDURE — 84100 ASSAY OF PHOSPHORUS: CPT

## 2020-02-07 PROCEDURE — 99232 PR SUBSEQUENT HOSPITAL CARE,LEVL II: ICD-10-PCS | Mod: ,,, | Performed by: INTERNAL MEDICINE

## 2020-02-07 PROCEDURE — 84132 ASSAY OF SERUM POTASSIUM: CPT | Mod: 91

## 2020-02-07 PROCEDURE — 20600001 HC STEP DOWN PRIVATE ROOM

## 2020-02-07 PROCEDURE — 99232 SBSQ HOSP IP/OBS MODERATE 35: CPT | Mod: GC,,, | Performed by: SURGERY

## 2020-02-07 PROCEDURE — 80162 ASSAY OF DIGOXIN TOTAL: CPT

## 2020-02-07 PROCEDURE — 27000248 HC VAD-ADDITIONAL DAY

## 2020-02-07 PROCEDURE — 83735 ASSAY OF MAGNESIUM: CPT | Mod: 91

## 2020-02-07 PROCEDURE — 85610 PROTHROMBIN TIME: CPT

## 2020-02-07 PROCEDURE — A4216 STERILE WATER/SALINE, 10 ML: HCPCS | Performed by: SURGERY

## 2020-02-07 PROCEDURE — 92526 ORAL FUNCTION THERAPY: CPT

## 2020-02-07 PROCEDURE — 93750 INTERROGATION VAD IN PERSON: CPT | Mod: ,,, | Performed by: INTERNAL MEDICINE

## 2020-02-07 PROCEDURE — 93750 PR INTERROGATE VENT ASSIST DEV, IN PERSON, W PHYSICIAN ANALYSIS: ICD-10-PCS | Mod: ,,, | Performed by: INTERNAL MEDICINE

## 2020-02-07 PROCEDURE — 25000003 PHARM REV CODE 250: Performed by: THORACIC SURGERY (CARDIOTHORACIC VASCULAR SURGERY)

## 2020-02-07 PROCEDURE — 80076 HEPATIC FUNCTION PANEL: CPT

## 2020-02-07 PROCEDURE — 97110 THERAPEUTIC EXERCISES: CPT

## 2020-02-07 PROCEDURE — 99232 PR SUBSEQUENT HOSPITAL CARE,LEVL II: ICD-10-PCS | Mod: GC,,, | Performed by: SURGERY

## 2020-02-07 PROCEDURE — 83615 LACTATE (LD) (LDH) ENZYME: CPT

## 2020-02-07 PROCEDURE — 80202 ASSAY OF VANCOMYCIN: CPT

## 2020-02-07 PROCEDURE — 25000003 PHARM REV CODE 250: Performed by: PHYSICIAN ASSISTANT

## 2020-02-07 PROCEDURE — 99232 SBSQ HOSP IP/OBS MODERATE 35: CPT | Mod: ,,, | Performed by: INTERNAL MEDICINE

## 2020-02-07 PROCEDURE — 97803 MED NUTRITION INDIV SUBSEQ: CPT

## 2020-02-07 PROCEDURE — 83880 ASSAY OF NATRIURETIC PEPTIDE: CPT

## 2020-02-07 PROCEDURE — 36415 COLL VENOUS BLD VENIPUNCTURE: CPT

## 2020-02-07 PROCEDURE — 86140 C-REACTIVE PROTEIN: CPT

## 2020-02-07 RX ORDER — WARFARIN 1 MG/1
1 TABLET ORAL ONCE
Status: COMPLETED | OUTPATIENT
Start: 2020-02-07 | End: 2020-02-07

## 2020-02-07 RX ORDER — POTASSIUM CHLORIDE 20 MEQ/15ML
40 SOLUTION ORAL ONCE
Status: COMPLETED | OUTPATIENT
Start: 2020-02-07 | End: 2020-02-07

## 2020-02-07 RX ORDER — LANOLIN ALCOHOL/MO/W.PET/CERES
400 CREAM (GRAM) TOPICAL ONCE
Status: COMPLETED | OUTPATIENT
Start: 2020-02-07 | End: 2020-02-07

## 2020-02-07 RX ADMIN — OXYCODONE HYDROCHLORIDE 5 MG: 5 SOLUTION ORAL at 06:02

## 2020-02-07 RX ADMIN — POLYETHYLENE GLYCOL 3350, SODIUM SULFATE ANHYDROUS, SODIUM BICARBONATE, SODIUM CHLORIDE, POTASSIUM CHLORIDE 100 ML: 236; 22.74; 6.74; 5.86; 2.97 POWDER, FOR SOLUTION ORAL at 10:02

## 2020-02-07 RX ADMIN — ASPIRIN 325 MG ORAL TABLET 325 MG: 325 PILL ORAL at 08:02

## 2020-02-07 RX ADMIN — Medication 10 ML: at 06:02

## 2020-02-07 RX ADMIN — Medication 400 MG: at 01:02

## 2020-02-07 RX ADMIN — POTASSIUM CHLORIDE 40 MEQ: 20 SOLUTION ORAL at 01:02

## 2020-02-07 RX ADMIN — Medication 400 MG: at 10:02

## 2020-02-07 RX ADMIN — POLYETHYLENE GLYCOL 3350 17 G: 17 POWDER, FOR SOLUTION ORAL at 08:02

## 2020-02-07 RX ADMIN — METHOCARBAMOL TABLETS 500 MG: 500 TABLET, COATED ORAL at 08:02

## 2020-02-07 RX ADMIN — POLYETHYLENE GLYCOL 3350, SODIUM SULFATE ANHYDROUS, SODIUM BICARBONATE, SODIUM CHLORIDE, POTASSIUM CHLORIDE 100 ML: 236; 22.74; 6.74; 5.86; 2.97 POWDER, FOR SOLUTION ORAL at 12:02

## 2020-02-07 RX ADMIN — Medication 400 MG: at 03:02

## 2020-02-07 RX ADMIN — POLYETHYLENE GLYCOL 3350, SODIUM SULFATE ANHYDROUS, SODIUM BICARBONATE, SODIUM CHLORIDE, POTASSIUM CHLORIDE 100 ML: 236; 22.74; 6.74; 5.86; 2.97 POWDER, FOR SOLUTION ORAL at 04:02

## 2020-02-07 RX ADMIN — ACETAMINOPHEN 650 MG: 160 SOLUTION ORAL at 12:02

## 2020-02-07 RX ADMIN — METHOCARBAMOL TABLETS 500 MG: 500 TABLET, COATED ORAL at 10:02

## 2020-02-07 RX ADMIN — POLYETHYLENE GLYCOL 3350, SODIUM SULFATE ANHYDROUS, SODIUM BICARBONATE, SODIUM CHLORIDE, POTASSIUM CHLORIDE 100 ML: 236; 22.74; 6.74; 5.86; 2.97 POWDER, FOR SOLUTION ORAL at 03:02

## 2020-02-07 RX ADMIN — ATORVASTATIN CALCIUM 80 MG: 20 TABLET, FILM COATED ORAL at 10:02

## 2020-02-07 RX ADMIN — PANTOPRAZOLE SODIUM 40 MG: 40 GRANULE, DELAYED RELEASE ORAL at 08:02

## 2020-02-07 RX ADMIN — VANCOMYCIN HYDROCHLORIDE 750 MG: 750 INJECTION, POWDER, LYOPHILIZED, FOR SOLUTION INTRAVENOUS at 08:02

## 2020-02-07 RX ADMIN — ACETAMINOPHEN 650 MG: 160 SOLUTION ORAL at 01:02

## 2020-02-07 RX ADMIN — POLYETHYLENE GLYCOL 3350, SODIUM SULFATE ANHYDROUS, SODIUM BICARBONATE, SODIUM CHLORIDE, POTASSIUM CHLORIDE 100 ML: 236; 22.74; 6.74; 5.86; 2.97 POWDER, FOR SOLUTION ORAL at 08:02

## 2020-02-07 RX ADMIN — OXYCODONE HYDROCHLORIDE 5 MG: 5 SOLUTION ORAL at 05:02

## 2020-02-07 RX ADMIN — Medication 10 ML: at 12:02

## 2020-02-07 RX ADMIN — Medication 3 ML: at 02:02

## 2020-02-07 RX ADMIN — OXYCODONE HYDROCHLORIDE 5 MG: 5 SOLUTION ORAL at 11:02

## 2020-02-07 RX ADMIN — WARFARIN SODIUM 1 MG: 1 TABLET ORAL at 05:02

## 2020-02-07 RX ADMIN — Medication 400 MG: at 08:02

## 2020-02-07 RX ADMIN — AMLODIPINE BESYLATE 10 MG: 10 TABLET ORAL at 08:02

## 2020-02-07 RX ADMIN — METHOCARBAMOL TABLETS 500 MG: 500 TABLET, COATED ORAL at 03:02

## 2020-02-07 RX ADMIN — ACETAMINOPHEN 650 MG: 160 SOLUTION ORAL at 05:02

## 2020-02-07 RX ADMIN — DIGOXIN 0.12 MG: 125 TABLET ORAL at 08:02

## 2020-02-07 RX ADMIN — POTASSIUM CHLORIDE 40 MEQ: 20 SOLUTION ORAL at 06:02

## 2020-02-07 RX ADMIN — CASTOR OIL AND BALSAM, PERU: 788; 87 OINTMENT TOPICAL at 08:02

## 2020-02-07 RX ADMIN — FUROSEMIDE 10 MG/HR: 10 INJECTION, SOLUTION INTRAMUSCULAR; INTRAVENOUS at 05:02

## 2020-02-07 RX ADMIN — OXYCODONE HYDROCHLORIDE 5 MG: 5 SOLUTION ORAL at 12:02

## 2020-02-07 RX ADMIN — FERROUS GLUCONATE TAB 324 MG (37.5 MG ELEMENTAL IRON) 324 MG: 324 (37.5 FE) TAB at 08:02

## 2020-02-07 RX ADMIN — Medication 3 ML: at 06:02

## 2020-02-07 RX ADMIN — ACETAMINOPHEN 650 MG: 160 SOLUTION ORAL at 06:02

## 2020-02-07 NOTE — PROGRESS NOTES
Dr. Schmitt at bedside. Updated on current gtts, vitals, labs, net I/Os. Pt currently also working with PT/OT and speech therapy; vitals stable at this time. Orders received to d/c free water flushes.  Plan to transfer to floor today.

## 2020-02-07 NOTE — PLAN OF CARE
Plan of Care Note  Cardiothoracic Surgery    Tae Delgado is a 59 y.o. male with heart failure who underwent LVAD placement (1/23/20) then closure (1/24/20) and repeat chest opening (1/24/20) for poor RV function; chest washout (1/27/20); chest closure (1/29/20)    Specific issues: tube feeds at goal, coumadin 1mg this PM; continue amio, dig; encourage mobilization; transfer to stepdown    Plan of care for patient was discussed with ICU staff including nurses, residents, and faculty and appropriate consulting services.    Will continue to monitor patient's hemodynamics, functionality, neuro status, fluid status and renal function, and labs and will adjust medications and fluids as necessary while monitoring appropriateness for de-escalation of support and monitoring and transport to stepdown unit.    Terence Gonzalez MD  Cardiothoracic Surgery Fellow

## 2020-02-07 NOTE — PROGRESS NOTES
Pt aaox3 while sitting up in bed with son and significant other at bedside. Pt denies any needs at this time.  Pt still seems slightly off baseline for mentation.  Pt also very sleepy.  Pt and caregivers encouraged to continue studying [facility] handbook, begin reviewing with patient and instructed to start watching the videos.   Encouraged pt to notify nurse if they have any questions, problems or concerns for LVAD coordinator.  Pt verbalized understanding and in agreement of plan. Will follow up with pt soon.

## 2020-02-07 NOTE — PT/OT/SLP PROGRESS
"Occupational Therapy   Treatment    Name: Tae Delgado  MRN: [id number]  Admitting Diagnosis:  Acute on chronic combined systolic and diastolic heart failure  9 Days Post-Op    Recommendations:     Discharge Recommendations: rehabilitation facility  Discharge Equipment Recommendations:  (TBD)  Barriers to discharge:  None    Assessment:     Tae Delgado is a 59 y.o. male with a medical diagnosis of Acute on chronic combined systolic and diastolic heart failure.  He presents s/p LVAD with daily improvements in overall strength. Performance deficits affecting function are gait instability, decreased upper extremity function, weakness, impaired cardiopulmonary response to activity, impaired endurance, impaired balance, impaired self care skills, impaired functional mobilty, pain, decreased safety awareness.     Rehab Prognosis:  Good; patient would benefit from acute skilled OT services to address these deficits and reach maximum level of function.       Plan:     Patient to be seen 6 x/week to address the above listed problems via self-care/home management, therapeutic activities, therapeutic exercises  · Plan of Care Expires: 03/02/20  · Plan of Care Reviewed with: patient    Subjective     Pain/Comfort:  · Pain Rating 1: ("all over" pain, not rated)  · Pain Addressed 1: Reposition, Distraction    Objective:     Communicated with: RN prior to session.  Patient found supine with blood pressure cuff, pulse ox (continuous), telemetry, PICC line, LVAD, NG tube, giles catheter, wound vac upon OT entry to room.    General Precautions: Standard, fall, LVAD, sternal   Orthopedic Precautions:N/A   Braces:       Occupational Performance:     Bed Mobility:    · Patient completed Rolling/Turning to Left with  moderate assistance  · Patient completed Supine to Sit with moderate assistance for trunk elevation    Functional Mobility/Transfers:  · Patient completed Sit <> Stand Transfer with minimum assistance x 2 with  no assistive " device   · Functional Mobility: NT    Activities of Daily Living:  · Feeding:  set-up A for opening containers    · Grooming: minimum assistance for combing back of hair; pt able to wash face and comb front/sides with SBA  · Lower Body Dressing: maximal assistance donning socks in Figure Four fashion seated EOB      [facility] 6 Click ADL: 9    Treatment & Education:  Pt ed on OT POC  Pt sat EOB with close SBA <> CGA x 20 min  Pt stood x 3 trials from EOB with increasing duration and minimal SOB   Pt performed B UE A/AAROM ex's in all planes  [employer] set-up in prep for SPT, however charge nurse reported that pt was going to be transferred to floor very soon    Patient left with bed in chair position with all lines intact, call button in reach and RN notifiedEducation:      GOALS:   Multidisciplinary Problems     Occupational Therapy Goals        Problem: Occupational Therapy Goal    Goal Priority Disciplines Outcome Interventions   Occupational Therapy Goal     OT, PT/OT Ongoing, Progressing    Description:  Goals to be met by: 2/16/2020     Patient will increase functional independence with ADLs by performing:    UE Dressing with Minimal Assistance.  LE Dressing with Minimal Assistance.  Grooming while standing with Minimal Assistance.  Toileting from bedside commode with Minimal Assistance for hygiene and clothing management.   Sitting at edge of bed x10 minutes with Minimal Assistance in prep for seated grooming tasks   Supine to sit with min A  Toilet transfer to bedside commode with Minimal Assistance.  Pt will perform LVAD management independently                 Multidisciplinary Problems (Resolved)        Problem: Occupational Therapy Goal    Goal Priority Disciplines Outcome Interventions   Occupational Therapy Goal   (Resolved)     OT, PT/OT Met    Description:  Skilled OT services not necessary at this time secondary to patient performing ADLs at OF. Patient in agreement. Please re-consult OT if change in  patient's functional status is noted.                     Time Tracking:     OT Date of Treatment: 02/07/20  OT Start Time: 0814  OT Stop Time: 0841  OT Total Time (min): 27 min    Billable Minutes:Self Care/Home Management 15  Therapeutic Exercise 10    SEBASTIEN Mayberry  2/7/2020

## 2020-02-07 NOTE — PLAN OF CARE
Problem: SLP Goal  Goal: SLP Goal  Description  Goals expected to be met by 2/9:  1. Pt will participate in ongoing assessment of swallow function to determine least restrictive diet.    Outcome: Ongoing, Progressing   Continue POC at this time, all goals remain appropriate.   Selma Lan, MARKUS-SLP  2/7/2020

## 2020-02-07 NOTE — ASSESSMENT & PLAN NOTE
- WCC today essentially unchanged at 18K  - ID consulted: Continue Vancomycin for two more days. Would then stop Vancomycin and monitor closely off antibiotics for any recurrent fevers, systemic signs of infection or clinical decompensation.

## 2020-02-07 NOTE — PROGRESS NOTES
Follow up note:   to see pt and family for follow up.  Worker spoke briefly with the pt who appears oriented, with the pt's significant other and the pt's son.  The pt's son reports he's returning home to [place] today, however pt's significant other will stay in attendance.   The pt and his significant other live 3.5 hours from [facility], however the pt's son has rented an apartment  in [place] for them so the pt/significant other can be closer to [facility] when discharge.   The pt's son reports the pt has the tendency to push himself and may over do it if allowed.  Pt's son also reports the pt has a history of anxiety (which possibly started from  service), however the pt has declined to take any medication for anxiety in the past.   Worker provided support to family and explained role of  regarding follow up support and discharge planning.   Pt's family reports no additional needs at this time.   Transplant social workers will follow as needed.

## 2020-02-07 NOTE — ASSESSMENT & PLAN NOTE
Tae Delgado is a 59 y.o. 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 Approved for DT LVAD. Went to OR on 1/21 and found to have DAMON and LV thrombus and case was aborted. Pt was placed on a heparin gtt and thrombus had dissipated on repeat JACINTO on 1/22. Pt underwent LVAD placement on 1/23. Chest closure and re-exploration on 1/24. Returned from OR with chest open on 1/24. Attempted chest closure on 1/27, returned to SICU with chest open. Chest closure on 1/29    Neuro:  - Alert and oriented to person and place  - Scheduled tylenol for pain.   - Oxycodone 5 mg q6h scheduled for pain.   - Methocarbamol 500 mg QID    CVS:   - Heartmate II at 5300  - EP had been consulted for tachyarrhythmias earlier in pt's hospital course. Now off lidocaine gtt. ICD turned on. Currently 100% paced.  - Digoxin 0.125 mg QD  - Digoxin level 1.5  - Amiodarone 400 mg TID   - Amlodipine 10 mg QD  - Heparin gtt OFF  - Epi OFF  - ASA   - Atorvastatin 80 mg QHS  - Lasix 10 mg/hr    Pulm:  - Breathing comfortably on RA  - ABGs prn  - Encourage IS and good pulmonary hygiene.   - Daily CXR    GI:  - Continuing with TF due to poor appetite; TF at goal of 50/hr  - Discontinue free water flushes  - Protonix 40 daily  - Docusate, Miramax, golytely    Endo:   - Insulin off now, SSI  - Endocrine consulted, appreciate their services  - BG well controlled at this time    Renal:  - Strict I/O    Intake/Output Summary (Last 24 hours) at 2/7/2020 1340  Last data filed at 2/7/2020 1200  Gross per 24 hour   Intake 1918.5 ml   Output 3305 ml   Net -1386.5 ml     - Trend BUN/Cr, 47/1.7  - Lasix 10 mg/hr    FENGI:  - Monitor labs  - Replace per protocol  - Switched all drips to D5 due to  elevated Na+  - Hypernatremia improved, Na+ 138    Heme:  - No blood products needed during the OR  - H/H remains stable     ID:   - Vanc 750 mg q24h  - Consulting ID to ask for assistance with duration of antibiotics   - Cultured for fevers; all cx NGTD.   - WBC 18.4 from 17.9  - Afebrile overnight    PPx:  - Warfarin, Aspirin  - Bowel regimen  - GI ppx    Dispo:  - Stepdown to CTSU. Coumadin 1 mg today at 5pm.

## 2020-02-07 NOTE — SUBJECTIVE & OBJECTIVE
Interval History/Significant Events: NAEON. Weaned off Epi and HDS. Will give Warfarin 1 mg today. CVP 14. Lasix 10 mg/hr. Slowly working more with PT.    Follow-up For: Procedure(s) (LRB):  CLOSURE, WOUND, STERNUM (N/A)  WASHOUT (N/A)  APPLICATION, WOUND VAC (N/A)    Post-Operative Day: 9 Days Post-Op    Objective:     Vital Signs (Most Recent):  Temp: 98.2 °F (36.8 °C) (02/07/20 1200)  Pulse: 90 (02/07/20 1300)  Resp: 18 (02/07/20 1300)  BP: (!) 82/0 (02/07/20 1318)  SpO2: 95 % (02/07/20 0715) Vital Signs (24h Range):  Temp:  [97.9 °F (36.6 °C)-98.8 °F (37.1 °C)] 98.2 °F (36.8 °C)  Pulse:  [90-96] 90  Resp:  [12-32] 18  SpO2:  [93 %-95 %] 95 %  BP: (78-89)/(0-69) 82/0  Arterial Line BP: (74-97)/(53-82) 86/72     Weight: 110 kg (242 lb 8.1 oz)  Body mass index is 34.8 kg/m².      Intake/Output Summary (Last 24 hours) at 2/7/2020 1330  Last data filed at 2/7/2020 1200  Gross per 24 hour   Intake 1918.5 ml   Output 3305 ml   Net -1386.5 ml       Physical Exam   Constitutional: He is oriented to person, place, and time. He appears well-developed and well-nourished.   HENT:   Head: Normocephalic and atraumatic.   Eyes: EOM are normal.   Neck: Normal range of motion.   Cardiovascular: Normal rate.   Irregular tachyarrythmia.   Chest close with dressing in place   Pulmonary/Chest: Effort normal.   Abdominal:   Soft, nondistended, NTTP   Genitourinary:   Genitourinary Comments: King in place   Neurological: He is alert and oriented to person, place, and time.     Vents: Room air    Lines/Drains/Airways     Peripherally Inserted Central Catheter Line                 PICC Triple Lumen 02/03/20 1701 right brachial 3 days          Drain                 Urethral Catheter 01/21/20 0752 Non-latex;Straight-tip;Temperature probe 16 Fr. 17 days         Chest Tube 01/23/20 1230 1 Right Pleural 15 days         NG/OG Tube 01/29/20 1430 Left nostril 8 days          Line                 VAD 01/23/20 1148 Left ventricular assist device  HeartMate 3 15 days                Significant Labs:    CBC/Anemia Profile:  Recent Labs   Lab 02/06/20 0412 02/07/20  0400   WBC 17.98* 18.41*   HGB 9.4* 10.0*   HCT 31.1* 31.3*    317   MCV 92 90   RDW 19.9* 19.9*        Chemistries:  Recent Labs   Lab 02/06/20 0412 02/06/20  2359 02/07/20  0400 02/07/20  1135     --   --  138  --    K 4.0  4.0   < > 3.6 3.9  3.9 3.9     --   --  102  --    CO2 26  --   --  24  --    BUN 44*  --   --  47*  --    CREATININE 1.6*  --   --  1.7*  --    CALCIUM 8.6*  --   --  8.8  --    ALBUMIN 2.3*  --   --  2.4*  2.4*  --    PROT 6.2  --   --  6.4  6.4  --    BILITOT 0.6  --   --  0.5  0.5  --    ALKPHOS 58  --   --  63  63  --    ALT 14  --   --  19  19  --    AST 22  --   --  26  26  --    MG 2.3   < > 1.8 2.0 1.9   PHOS 3.1  --   --  3.8  --     < > = values in this interval not displayed.       Significant Imaging:  None

## 2020-02-07 NOTE — PLAN OF CARE
Problem: Occupational Therapy Goal  Goal: Occupational Therapy Goal  Description  Goals to be met by: 2/16/2020     Patient will increase functional independence with ADLs by performing:    UE Dressing with Minimal Assistance.  LE Dressing with Minimal Assistance.  Grooming while standing with Minimal Assistance.  Toileting from bedside commode with Minimal Assistance for hygiene and clothing management.   Sitting at edge of bed x10 minutes with Minimal Assistance in prep for seated grooming tasks   Supine to sit with min A  Toilet transfer to bedside commode with Minimal Assistance.  Pt will perform LVAD management independently      Outcome: Ongoing, Progressing   The above goals remain appropriate. SEBASTIEN Mayberry  2/7/2020

## 2020-02-07 NOTE — PROGRESS NOTES
[facility]  Critical Care - Surgery  Progress Note    Patient Name: Tae Delgado  MRN: [id number]  Admission Date: 1/9/2020  Hospital Length of Stay: 29 days  Code Status: Full Code  Attending Provider: Ino Schmitt MD  Primary Care Provider: Elham Pearson MD   Principal Problem: Acute on chronic combined systolic and diastolic heart failure    Subjective:     Hospital/ICU Course:  1/23: Pt arrives from OR intubated, open chest dressed with Ioban, CTs with SS output. Drips on arrival: Epi 0.08, Cardene 5, TXA, Nitric at 30. Pt received 1.5L crystalloid, no blood product, 20 mg Lasix (put out 250cc in response).   Intra Op JACINTO Exam:  PRE:  Severely reduced left ventricular systolic function. Dilated LV. No definitive thrombus noted.  Moderate-to-severely reduced right ventricular systolic function. FAC 12-24%  Mild-to-moderate AI. No AS.  Moderate MR with systolic blunting of the pulmonary veins.  Trace-to-mild TR.  Mild PI. PAAT <90ms.  Small PFO by CF doppler.  SEC in La and DAMON. No clear thrombus noted.  Grade 2 atheromatous disease of the aorta.  No effusions.    POST:  Severely depressed left ventriclar dysfunction.  Moderately depressed right ventricular systolic function.  LVAD inflow and outflow cannula noted with laminar flows.  No AI.  Mild MR, vahe stitch observed. No MS.  Mild-to-moderate TR.  PFO still visible on CF doppler.  Aorta intact, no dissection.  No effusions.     1/24: run of Vtach overnight. Amio bolus given, amio gtt increased to 1, lasix push given. Improved. LVAD hemodynamics appropriate overnight. Going for closure this am. Upon return, developed tamponade physiology and returned to OR. Came back to SICU with chest open.  1/26: Diuresed over the weekend, chest kept open. Lidocaine gtt and digoxin for tachyarrhythmia. Otherwise NAEON.   1/27: Went to OR for possible chest closure, decided to do a wash out. Returned to SICU with chest open. One tachyarrhythmia episode  overnight, resolved with 100mg Lidocaine bolus and increasing lidocaine drip from 0.75mg/hr to 1mg/hr.  1/28: NAEON. Lasix gtt decreased throughout the day with adequate UOP still.   1/29: NAEON  2/03: NAEON.  Comfortable on 4 L NC. Hypernatremia resolved with increased free water flushes. Good UOP overnight. Cr improving. NG tube came out overnight, replaced. Restart TF today. Enema overnight with BM x1. Digoxin increased to 0.25 mg. CVC pulled and PICC placed. DC'd opioid due to somnolence and scheduled tylenol.   02/04: NAEON. Satting well on NC 4L. Heparin gtt held this AM for aPTT 78.2. Rechecking aPTT at 0600. INR 2.0.  Weaning Corby and Epi gtt. Pureed diet with continued TF due to poor appetite. Heparin gtt restarted at 400 non-titrating. Digoxin 0.125 QD. Warfain 2 today. Miralax. Scheduled oxy 5 mg q6h for pain. Increasing TF.   02/05: NAEON. Weaning Epi gtt. Will give warfarin 2 mg today. Heparin gtt OFF. Lasix gtt increase to 10 mg. Fluid restriction 1200 cc daily.  Schedule robaxin. Golytely bowel regimen.  02/06: NAEON. Weaned off Epi and HDS. Will give Warfarin 1 mg today. CVP 14. Lasix 10 mg/hr. Slowly working more with PT.    Interval History/Significant Events: NAEON. Weaned off Epi and HDS. Will give Warfarin 1 mg today. CVP 14. Lasix 10 mg/hr. Slowly working more with PT.    Follow-up For: Procedure(s) (LRB):  CLOSURE, WOUND, STERNUM (N/A)  WASHOUT (N/A)  APPLICATION, WOUND VAC (N/A)    Post-Operative Day: 9 Days Post-Op    Objective:     Vital Signs (Most Recent):  Temp: 98.2 °F (36.8 °C) (02/07/20 1200)  Pulse: 90 (02/07/20 1300)  Resp: 18 (02/07/20 1300)  BP: (!) 82/0 (02/07/20 1318)  SpO2: 95 % (02/07/20 0715) Vital Signs (24h Range):  Temp:  [97.9 °F (36.6 °C)-98.8 °F (37.1 °C)] 98.2 °F (36.8 °C)  Pulse:  [90-96] 90  Resp:  [12-32] 18  SpO2:  [93 %-95 %] 95 %  BP: (78-89)/(0-69) 82/0  Arterial Line BP: (74-97)/(53-82) 86/72     Weight: 110 kg (242 lb 8.1 oz)  Body mass index is 34.8  kg/m².      Intake/Output Summary (Last 24 hours) at 2/7/2020 1330  Last data filed at 2/7/2020 1200  Gross per 24 hour   Intake 1918.5 ml   Output 3305 ml   Net -1386.5 ml       Physical Exam   Constitutional: He is oriented to person, place, and time. He appears well-developed and well-nourished.   HENT:   Head: Normocephalic and atraumatic.   Eyes: EOM are normal.   Neck: Normal range of motion.   Cardiovascular: Normal rate.   Irregular tachyarrythmia.   Chest close with dressing in place   Pulmonary/Chest: Effort normal.   Abdominal:   Soft, nondistended, NTTP   Genitourinary:   Genitourinary Comments: King in place   Neurological: He is alert and oriented to person, place, and time.     Vents: Room air    Lines/Drains/Airways     Peripherally Inserted Central Catheter Line                 PICC Triple Lumen 02/03/20 1701 right brachial 3 days          Drain                 Urethral Catheter 01/21/20 0752 Non-latex;Straight-tip;Temperature probe 16 Fr. 17 days         Chest Tube 01/23/20 1230 1 Right Pleural 15 days         NG/OG Tube 01/29/20 1430 Left nostril 8 days          Line                 VAD 01/23/20 1148 Left ventricular assist device HeartMate 3 15 days                Significant Labs:    CBC/Anemia Profile:  Recent Labs   Lab 02/06/20  0412 02/07/20  0400   WBC 17.98* 18.41*   HGB 9.4* 10.0*   HCT 31.1* 31.3*    317   MCV 92 90   RDW 19.9* 19.9*        Chemistries:  Recent Labs   Lab 02/06/20  0412  02/06/20  2359 02/07/20  0400 02/07/20  1135     --   --  138  --    K 4.0  4.0   < > 3.6 3.9  3.9 3.9     --   --  102  --    CO2 26  --   --  24  --    BUN 44*  --   --  47*  --    CREATININE 1.6*  --   --  1.7*  --    CALCIUM 8.6*  --   --  8.8  --    ALBUMIN 2.3*  --   --  2.4*  2.4*  --    PROT 6.2  --   --  6.4  6.4  --    BILITOT 0.6  --   --  0.5  0.5  --    ALKPHOS 58  --   --  63  63  --    ALT 14  --   --  19  19  --    AST 22  --   --  26  26  --    MG 2.3   < >  1.8 2.0 1.9   PHOS 3.1  --   --  3.8  --     < > = values in this interval not displayed.       Significant Imaging:  None    Assessment/Plan:     * Acute on chronic combined systolic and diastolic heart failure  Tae Delgado is a 59 y.o. 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 Approved for DT LVAD. Went to OR on 1/21 and found to have DAMON and LV thrombus and case was aborted. Pt was placed on a heparin gtt and thrombus had dissipated on repeat JACINTO on 1/22. Pt underwent LVAD placement on 1/23. Chest closure and re-exploration on 1/24. Returned from OR with chest open on 1/24. Attempted chest closure on 1/27, returned to SICU with chest open. Chest closure on 1/29    Neuro:  - Alert and oriented to person and place  - Scheduled tylenol for pain.   - Oxycodone 5 mg q6h scheduled for pain.   - Methocarbamol 500 mg QID    CVS:   - Heartmate II at 5300  - EP had been consulted for tachyarrhythmias earlier in pt's hospital course. Now off lidocaine gtt. ICD turned on. Currently 100% paced.  - Digoxin 0.125 mg QD  - Digoxin level 1.5  - Amiodarone 400 mg TID   - Amlodipine 10 mg QD  - Heparin gtt OFF  - Epi OFF  - ASA   - Atorvastatin 80 mg QHS  - Lasix 10 mg/hr    Pulm:  - Breathing comfortably on RA  - ABGs prn  - Encourage IS and good pulmonary hygiene.   - Daily CXR    GI:  - Continuing with TF due to poor appetite; TF at goal of 50/hr  - Discontinue free water flushes  - Protonix 40 daily  - Docusate, Miramax, golytely    Endo:   - Insulin off now, SSI  - Endocrine consulted, appreciate their services  - BG well controlled at this time    Renal:  - Strict I/O    Intake/Output Summary (Last 24 hours) at 2/7/2020 1340  Last data filed at  2/7/2020 1200  Gross per 24 hour   Intake 1918.5 ml   Output 3305 ml   Net -1386.5 ml     - Trend BUN/Cr, 47/1.7  - Lasix 10 mg/hr    FENGI:  - Monitor labs  - Replace per protocol  - Switched all drips to D5 due to elevated Na+  - Hypernatremia improved, Na+ 138    Heme:  - No blood products needed during the OR  - H/H remains stable     ID:   - Vanc 750 mg q24h  - Consulting ID to ask for assistance with duration of antibiotics   - Cultured for fevers; all cx NGTD.   - WBC 18.4 from 17.9  - Afebrile overnight    PPx:  - Warfarin, Aspirin  - Bowel regimen  - GI ppx    Dispo:  - Stepdown to CTSU. Coumadin 1 mg today at 5pm.      Critical secondary to Patient has a condition that poses threat to life and bodily function: s/p LVAD     Critical care was time spent personally by me on the following activities: development of treatment plan with patient or surrogate and bedside caregivers, discussions with consultants, evaluation of patient's response to treatment, examination of patient, ordering and performing treatments and interventions, ordering and review of laboratory studies, ordering and review of radiographic studies, pulse oximetry, re-evaluation of patient's condition.  This critical care time did not overlap with that of any other provider or involve time for any procedures.     Tee Soria MD  Critical Care - Surgery  [facility]     low salt, low cholesterol diet

## 2020-02-07 NOTE — PROGRESS NOTES
[facility]  Heart Transplant  Progress Note    Patient Name: Tae Delgado  MRN: [id number]  Admission Date: 1/9/2020  Hospital Length of Stay: 29 days  Attending Physician: Ino Schmitt MD  Primary Care Provider: Elham Pearson MD  Principal Problem:Acute on chronic combined systolic and diastolic heart failure    Subjective:     **Interval History: Diet advanced to mechanical soft in hopes that patient will increase oral intake as he wants NGT removed. HD stable since Epi weaned off. Progressing with PT/OT.     Continuous Infusions:   furosemide (LASIX) 2 mg/mL infusion (non-titrating) 10 mg/hr (02/07/20 1100)     Scheduled Meds:   acetaminophen  650 mg Per NG tube Q6H    amiodarone  400 mg Per NG tube TID    amLODIPine  10 mg Per NG tube Daily    aspirin  325 mg Oral Daily    atorvastatin  80 mg Oral QHS    balsam peru-castor oil   Topical (Top) BID    digoxin  0.125 mg Oral Daily    docusate sodium  200 mg Oral QHS    ferrous gluconate  324 mg Oral Daily with breakfast    magnesium oxide  400 mg Per NG tube TID    methocarbamol  500 mg Oral QID    oxyCODONE  5 mg Per NG tube Q6H    pantoprazole  40 mg Per NG tube Daily    polyethylene glycol  100 mL Per NG tube 6 times per day    polyethylene glycol  17 g Oral Daily    sodium chloride 0.9%  10 mL Intravenous Q6H    sodium chloride 0.9%  3 mL Intravenous Q8H    vancomycin (VANCOCIN) IVPB  750 mg Intravenous Q24H    warfarin  1 mg Oral Once     PRN Meds:albumin human 5%, albuterol sulfate, bisacodyL, bisacodyL, calcium gluconate IVPB, calcium gluconate IVPB, calcium gluconate IVPB, Dextrose 10% Bolus, Dextrose 10% Bolus, hydrALAZINE, magnesium hydroxide 400 mg/5 ml, magnesium sulfate IVPB, magnesium sulfate IVPB, magnesium sulfate IVPB, potassium chloride in water **AND** potassium chloride in water, sodium chloride 0.9%, Flushing PICC Protocol **AND** sodium chloride 0.9% **AND** sodium chloride 0.9%, sodium phosphate IVPB,  sodium phosphate IVPB, sodium phosphate IVPB    Review of patient's allergies indicates:   Allergen Reactions    Biopatch [chlorhexidine gluconate]      Site burning    Dobutamine in d5w      Tachycardia, tremors, SOB, flushing    Percocet [oxycodone-acetaminophen] Itching    Penicillins Rash     Cefepime given on 1/23/2020 without issue     Objective:     Vital Signs (Most Recent):  Temp: 98.2 °F (36.8 °C) (02/07/20 1130)  Pulse: 90 (02/07/20 1130)  Resp: (!) 23 (02/07/20 1130)  BP: (!) 84/0 (02/07/20 0715)  SpO2: 95 % (02/07/20 0715) Vital Signs (24h Range):  Temp:  [97.9 °F (36.6 °C)-98.8 °F (37.1 °C)] 98.2 °F (36.8 °C)  Pulse:  [90-96] 90  Resp:  [12-32] 23  SpO2:  [93 %-95 %] 95 %  BP: (78-84)/(0) 84/0  Arterial Line BP: (74-97)/(53-82) 86/68     Patient Vitals for the past 72 hrs (Last 3 readings):   Weight   02/07/20 0500 110 kg (242 lb 8.1 oz)   02/06/20 1100 110 kg (242 lb 8.1 oz)   02/05/20 0315 112.5 kg (248 lb 0.3 oz)     Body mass index is 34.8 kg/m².      Intake/Output Summary (Last 24 hours) at 2/7/2020 1136  Last data filed at 2/7/2020 1100  Gross per 24 hour   Intake 1848.5 ml   Output 3355 ml   Net -1506.5 ml       Hemodynamic Parameters:       Telemetry: V paced    Physical Exam   Constitutional: He is oriented to person, place, and time. He appears well-developed and well-nourished.   HENT:   Head: Normocephalic and atraumatic.   Eyes: Pupils are equal, round, and reactive to light. Conjunctivae and EOM are normal.   Neck: Normal range of motion. Neck supple. No JVD present. No thyromegaly present.   JVP just above clavicle   Cardiovascular: Normal rate and regular rhythm.   Smooth VAD hum   Pulmonary/Chest: Effort normal and breath sounds normal.   Intubated and sedated   Abdominal: Soft. Bowel sounds are normal.   Musculoskeletal: Normal range of motion.   1+ pedal edema   Neurological: He is alert and oriented to person, place, and time.   Skin: Skin is warm and dry. Capillary refill takes  2 to 3 seconds.   Psychiatric: He has a normal mood and affect. His behavior is normal. Judgment and thought content normal.       Significant Labs:  CBC:  Recent Labs   Lab 02/05/20 0300 02/06/20 0412 02/07/20  0400   WBC 17.36* 17.98* 18.41*   RBC 3.31* 3.39* 3.47*   HGB 9.3* 9.4* 10.0*   HCT 31.3* 31.1* 31.3*    280 317   MCV 95 92 90   MCH 28.1 27.7 28.8   MCHC 29.7* 30.2* 31.9*     BNP:  Recent Labs   Lab 02/03/20 0400 02/05/20 0300 02/07/20  0400   * 796* 339*     CMP:  Recent Labs   Lab 02/05/20 0300 02/06/20 0412 02/06/20  1745 02/06/20  2359 02/07/20  0400   *  --  116*  --   --   --  117*   CALCIUM 8.4*  --  8.6*  --   --   --  8.8   ALBUMIN 2.3*  2.3*  --  2.3*  --   --   --  2.4*  2.4*   PROT 6.1  6.1  --  6.2  --   --   --  6.4  6.4     --  140  --   --   --  138   K 4.5   < > 4.0  4.0   < > 4.0 3.6 3.9  3.9   CO2 24  --  26  --   --   --  24     --  104  --   --   --  102   BUN 44*  --  44*  --   --   --  47*   CREATININE 1.7*  --  1.6*  --   --   --  1.7*   ALKPHOS 52*  52*  --  58  --   --   --  63  63   ALT 18  18  --  14  --   --   --  19  19   AST 19  19  --  22  --   --   --  26  26   BILITOT 0.6  0.6  --  0.6  --   --   --  0.5  0.5    < > = values in this interval not displayed.      Coagulation:   Recent Labs   Lab 02/05/20 0300 02/06/20 0412 02/06/20  1200 02/07/20  0400   INR 2.3*  --  2.7*  --  2.7*   APTT 33.9*   < > 34.8* 33.3* 35.7*    < > = values in this interval not displayed.     LDH:  Recent Labs   Lab 02/05/20  0300 02/06/20  0412 02/07/20  0400   * 309* 300*     Microbiology:  Microbiology Results (last 7 days)     Procedure Component Value Units Date/Time    Blood culture [[id number]] Collected:  01/31/20 2215    Order Status:  Completed Specimen:  Blood from Peripheral, Forearm, Right Updated:  02/06/20 0612     Blood Culture, Routine No growth after 5 days.    Blood culture [[id number]] Collected:  01/31/20 2220     Order Status:  Completed Specimen:  Blood from Peripheral, Wrist, Left Updated:  02/06/20 0612     Blood Culture, Routine No growth after 5 days.          I have reviewed all pertinent labs within the past 24 hours.    Estimated Creatinine Clearance: 58.1 mL/min (A) (based on SCr of 1.7 mg/dL (H)).    Diagnostic Results:  I have reviewed all pertinent imaging results/findings within the past 24 hours.    Assessment and Plan:       55 y.o. WM with history of NICMP diagnosed in 2010, ICD, LV thrombus (with prior splenic and renal emboli), Embolic  CVA , paroxysmal atrial fib, HTN, HLP  presents for  F/U today to clinic and he was volume overloaded on exam .  He states he had 3 admission in the last 3 months for volume overload. He started having more SOB on exertion since one month. Also endorses of Orthopnea since last one month. Alble to walk only 150 ft. He also has Bilateral lower extremity edema. He was admitted here in 2017 for ADHD [facility] at [facility] and [facility] during that admission showed PCWP 40 and CVP 17. Currently denies chest pain, lightheadedness     * Acute on chronic combined systolic and diastolic heart failure  - S/P DT HM3 with closure of AV and plication of MV for regurg 1/23/20 (See LVAD)  - NIDCM dx'd 2010  - hemodynamically stable since Epi weaned off ~ midnight  - CVP 14 on Lasix 10 mg/hr        LVAD (left ventricular assist device) present  - S/P DT HM3 with closure of AV and repair of MV for regurg 1/23/20 (Oskar)  - CTS primary  - Taken back to OR 1/24 for possible RVAD placement which was not done. Patient remained hemodynamically stable in OR. Wash out on 1/27. Chest closed 1/29.   - CVP 14 on Lasix 10 mg/hr  - Currently hemodynamically stable since Epi weaned off ~ midnight  - TTE 2/3 at 5300 rpm: LVEDD 5.95, LVEF 10%, diastolic dysfunction, RV size normla, RV function mod depressed, IVC not well visualized, septum midline  - Current speed 5300  - INR therapeutic at 2.7 (goal 2.0-3.0_  -  LD stable in the 300's  - PT/OT/VAD education    Procedure: Device Interrogation Including analysis of device parameters  Current Settings: Ventricular Assist Device  Review of device function is stable/unstabe    TXP LVAD INTERROGATIONS 2/7/2020 2/7/2020 2/7/2020 2/7/2020 2/7/2020 2/7/2020 2/7/2020   Type HeartMate3 HeartMate3 HeartMate3 HeartMate3 HeartMate3 HeartMate3 HeartMate3   Flow 4.1 4.3 4.0 4.0 4.0 4.2 3.9   Speed 5300 5300 5300 5300 5300 5300 5300   PI 4.4 4.4 5.2 4.7 4.6 3.9 4.6   Power (Berry) 3.8 3.8 3.8 3.8 3.8 3.8 3.6   LSL 4900 4900 4900 4900 4900 4900 4900   Pulsatility Intermittent pulse - - - Intermittent pulse Intermittent pulse Intermittent pulse       Paroxysmal atrial fibrillation  - Intermittently in A fib since surgery, currently paced at 90   - AC per CTS  - Dig 0.125 mg qd continues per CTS      Acute kidney injury superimposed on chronic kidney disease  - Creatinine on admit 2.6 (baseline ~ 1.8). BUN/Creatinine today 47/1.7  - Nephrology has signed off    Left ventricular thrombus without MI  - H/O LV thrombus with h/o splenic and renal emboli as well as embolic CVA  - Limited TTE done here 1/13 showed no thrombus  - JACINTO 1/23 intra op showed resolution of DAMON thrombus  - AC per CTS      Right lower lobe pulmonary nodule  - Incidental finding on CT chest/abd/pelvis on 1/12. Pulmonology consulted, and rec repeat CT of chest in 3 months  - Will need to follow-up in pulmonary clinic.     Hepatic congestion  - Liver US on 1/11 unremarkable  - LFT's WNL now    ICD (implantable cardioverter-defibrillator) in place  - S/P Biotronik dual chamber ICD    Leukocytosis  - WCC today essentially unchanged at 18K  - ID consulted: Continue Vancomycin for two more days. Would then stop Vancomycin and monitor closely off antibiotics for any recurrent fevers, systemic signs of infection or clinical decompensation.    On enteral nutrition  - Diet advanced to mech soft. Still requiring tube feeds via NNGT to  meet nutritional needs    Acute hyperglycemia  - Endocrine following    Coagulopathy  - Appreciate Hem/Onc's help. No evidence of underlying coagulopathy (h/o LV thrombus with splenic and renal emboli, h/o embolic CVA)    NSVT (nonsustained ventricular tachycardia)  - Digoxin 0.125 mg qd continues per CTS  - Continue Amio per CTS      MARBELLA Mcfadden  Heart Transplant  [facility]

## 2020-02-07 NOTE — PT/OT/SLP PROGRESS
Speech Language Pathology Treatment    Patient Name:  Tae Delgado   MRN:  [id number]  Admitting Diagnosis: Acute on chronic combined systolic and diastolic heart failure    Recommendations:                 General Recommendations:  ENT evaluation and Dysphagia therapy  Diet recommendations:  Mechanical soft, Liquid Diet Level: Nectar Thick   Aspiration Precautions: 1 bite/sip at a time, Feed only when awake/alert, HOB to 90 degrees, Small bites/sips and Standard aspiration precautions   General Precautions: Standard, aspiration, fall, sternal, LVAD  Communication strategies:  none    Subjective     Awake/alert  Pt upright at EOB with PT/OT for session    Pain/Comfort:  · Pain Rating 1: 0/10  · Pain Rating Post-Intervention 1: 0/10    Objective:     Has the patient been evaluated by SLP for swallowing?   Yes  Keep patient NPO? No   Current Respiratory Status: room air      Pt eager to have thin liquids, but is compliant with nectar thick liquids at this time. He tolerated thin trials x1 while up at EOB with PT/OT with delayed swallow initiation noted and coughing immediately post swallow. Nectar thick liquids tolerated via straw x4 with ongoing delayed swallow noted, but no overt clinical signs of airway compromise seen. SLP provided ongoing education to pt on need for thickened liquids and risk of aspiration. Pt verbalized understanding and is eager to have NG removed. Pt's voice remains hoarse with little improvement. Recommend ENT evaluation for voice. Continue mechanical soft diet/nectar thick liquids at this time.     Assessment:     Tae Delgado is a 59 y.o. male with an SLP diagnosis of Dysphagia.      Goals:   Multidisciplinary Problems     SLP Goals        Problem: SLP Goal    Goal Priority Disciplines Outcome   SLP Goal     SLP Ongoing, Progressing   Description:  Goals expected to be met by 2/9:  1. Pt will participate in ongoing assessment of swallow function to determine least restrictive diet.                      Plan:     · Patient to be seen:  4 x/week   · Plan of Care expires:  03/02/20  · Plan of Care reviewed with:  patient   · SLP Follow-Up:  Yes       Discharge recommendations:  rehabilitation facility     Time Tracking:     SLP Treatment Date:   02/07/20  Speech Start Time:  0815  Speech Stop Time:  0832     Speech Total Time (min):  17 min    Billable Minutes: Treatment Swallowing Dysfunction 9 and Seld Care/Home Management Training 8    Selma Lan CCC-SLP  02/07/2020

## 2020-02-07 NOTE — ASSESSMENT & PLAN NOTE
- Creatinine on admit 2.6 (baseline ~ 1.8). BUN/Creatinine today 47/1.7  - Nephrology has signed off

## 2020-02-07 NOTE — SUBJECTIVE & OBJECTIVE
**Interval History: Diet advanced to mechanical soft in hopes that patient will increase oral intake as he wants NGT removed. HD stable since Epi weaned off. Progressing with PT/OT.     Continuous Infusions:   furosemide (LASIX) 2 mg/mL infusion (non-titrating) 10 mg/hr (02/07/20 1100)     Scheduled Meds:   acetaminophen  650 mg Per NG tube Q6H    amiodarone  400 mg Per NG tube TID    amLODIPine  10 mg Per NG tube Daily    aspirin  325 mg Oral Daily    atorvastatin  80 mg Oral QHS    balsam peru-castor oil   Topical (Top) BID    digoxin  0.125 mg Oral Daily    docusate sodium  200 mg Oral QHS    ferrous gluconate  324 mg Oral Daily with breakfast    magnesium oxide  400 mg Per NG tube TID    methocarbamol  500 mg Oral QID    oxyCODONE  5 mg Per NG tube Q6H    pantoprazole  40 mg Per NG tube Daily    polyethylene glycol  100 mL Per NG tube 6 times per day    polyethylene glycol  17 g Oral Daily    sodium chloride 0.9%  10 mL Intravenous Q6H    sodium chloride 0.9%  3 mL Intravenous Q8H    vancomycin (VANCOCIN) IVPB  750 mg Intravenous Q24H    warfarin  1 mg Oral Once     PRN Meds:albumin human 5%, albuterol sulfate, bisacodyL, bisacodyL, calcium gluconate IVPB, calcium gluconate IVPB, calcium gluconate IVPB, Dextrose 10% Bolus, Dextrose 10% Bolus, hydrALAZINE, magnesium hydroxide 400 mg/5 ml, magnesium sulfate IVPB, magnesium sulfate IVPB, magnesium sulfate IVPB, potassium chloride in water **AND** potassium chloride in water, sodium chloride 0.9%, Flushing PICC Protocol **AND** sodium chloride 0.9% **AND** sodium chloride 0.9%, sodium phosphate IVPB, sodium phosphate IVPB, sodium phosphate IVPB    Review of patient's allergies indicates:   Allergen Reactions    Biopatch [chlorhexidine gluconate]      Site burning    Dobutamine in d5w      Tachycardia, tremors, SOB, flushing    Percocet [oxycodone-acetaminophen] Itching    Penicillins Rash     Cefepime given on 1/23/2020 without issue      Objective:     Vital Signs (Most Recent):  Temp: 98.2 °F (36.8 °C) (02/07/20 1130)  Pulse: 90 (02/07/20 1130)  Resp: (!) 23 (02/07/20 1130)  BP: (!) 84/0 (02/07/20 0715)  SpO2: 95 % (02/07/20 0715) Vital Signs (24h Range):  Temp:  [97.9 °F (36.6 °C)-98.8 °F (37.1 °C)] 98.2 °F (36.8 °C)  Pulse:  [90-96] 90  Resp:  [12-32] 23  SpO2:  [93 %-95 %] 95 %  BP: (78-84)/(0) 84/0  Arterial Line BP: (74-97)/(53-82) 86/68     Patient Vitals for the past 72 hrs (Last 3 readings):   Weight   02/07/20 0500 110 kg (242 lb 8.1 oz)   02/06/20 1100 110 kg (242 lb 8.1 oz)   02/05/20 0315 112.5 kg (248 lb 0.3 oz)     Body mass index is 34.8 kg/m².      Intake/Output Summary (Last 24 hours) at 2/7/2020 1136  Last data filed at 2/7/2020 1100  Gross per 24 hour   Intake 1848.5 ml   Output 3355 ml   Net -1506.5 ml       Hemodynamic Parameters:       Telemetry: V paced    Physical Exam   Constitutional: He is oriented to person, place, and time. He appears well-developed and well-nourished.   HENT:   Head: Normocephalic and atraumatic.   Eyes: Pupils are equal, round, and reactive to light. Conjunctivae and EOM are normal.   Neck: Normal range of motion. Neck supple. No JVD present. No thyromegaly present.   JVP just above clavicle   Cardiovascular: Normal rate and regular rhythm.   Smooth VAD hum   Pulmonary/Chest: Effort normal and breath sounds normal.   Intubated and sedated   Abdominal: Soft. Bowel sounds are normal.   Musculoskeletal: Normal range of motion.   1+ pedal edema   Neurological: He is alert and oriented to person, place, and time.   Skin: Skin is warm and dry. Capillary refill takes 2 to 3 seconds.   Psychiatric: He has a normal mood and affect. His behavior is normal. Judgment and thought content normal.       Significant Labs:  CBC:  Recent Labs   Lab 02/05/20  0300 02/06/20  0412 02/07/20  0400   WBC 17.36* 17.98* 18.41*   RBC 3.31* 3.39* 3.47*   HGB 9.3* 9.4* 10.0*   HCT 31.3* 31.1* 31.3*    280 317   MCV 95  92 90   MCH 28.1 27.7 28.8   MCHC 29.7* 30.2* 31.9*     BNP:  Recent Labs   Lab 02/03/20  0400 02/05/20  0300 02/07/20  0400   * 796* 339*     CMP:  Recent Labs   Lab 02/05/20  0300 02/06/20  0412  02/06/20  1745 02/06/20  2359 02/07/20  0400   *  --  116*  --   --   --  117*   CALCIUM 8.4*  --  8.6*  --   --   --  8.8   ALBUMIN 2.3*  2.3*  --  2.3*  --   --   --  2.4*  2.4*   PROT 6.1  6.1  --  6.2  --   --   --  6.4  6.4     --  140  --   --   --  138   K 4.5   < > 4.0  4.0   < > 4.0 3.6 3.9  3.9   CO2 24  --  26  --   --   --  24     --  104  --   --   --  102   BUN 44*  --  44*  --   --   --  47*   CREATININE 1.7*  --  1.6*  --   --   --  1.7*   ALKPHOS 52*  52*  --  58  --   --   --  63  63   ALT 18  18  --  14  --   --   --  19  19   AST 19  19  --  22  --   --   --  26  26   BILITOT 0.6  0.6  --  0.6  --   --   --  0.5  0.5    < > = values in this interval not displayed.      Coagulation:   Recent Labs   Lab 02/05/20  0300 02/06/20 0412 02/06/20  1200 02/07/20  0400   INR 2.3*  --  2.7*  --  2.7*   APTT 33.9*   < > 34.8* 33.3* 35.7*    < > = values in this interval not displayed.     LDH:  Recent Labs   Lab 02/05/20 0300 02/06/20  0412 02/07/20  0400   * 309* 300*     Microbiology:  Microbiology Results (last 7 days)     Procedure Component Value Units Date/Time    Blood culture [[id number]] Collected:  01/31/20 2215    Order Status:  Completed Specimen:  Blood from Peripheral, Forearm, Right Updated:  02/06/20 0612     Blood Culture, Routine No growth after 5 days.    Blood culture [[id number]] Collected:  01/31/20 2220    Order Status:  Completed Specimen:  Blood from Peripheral, Wrist, Left Updated:  02/06/20 0612     Blood Culture, Routine No growth after 5 days.          I have reviewed all pertinent labs within the past 24 hours.    Estimated Creatinine Clearance: 58.1 mL/min (A) (based on SCr of 1.7 mg/dL (H)).    Diagnostic Results:  I have reviewed all  pertinent imaging results/findings within the past 24 hours.

## 2020-02-07 NOTE — PLAN OF CARE
No acute events overnight.  Pt AAOx4, VSS on RA.  CVP 10-14, MD aware. HM3 speed at 5300 (see flowsheets). Pt remains on Lasix gtt @ 10 mg/hr.  CT with minimal serous output. UOP adequate overnight. Go lytely q4h, 1 BM.  TF restarted at 2000 at goal of 50 ml/hr, w/ minimal residuals.  Prevena vac in place with no issues. Skin barriers applied to wounds (see flowsheets for I/O and assessment).  Labs trended, K and Mag replaced with PO PRNs.  Plan is to step down when appropriate.  POC reviewed with pt and family at the bedside, all questions and concerns addressed.  Will continue to monitor.

## 2020-02-07 NOTE — ASSESSMENT & PLAN NOTE
- S/P DT HM3 with closure of AV and repair of MV for regurg 1/23/20 (Oskar)  - CTS primary  - Taken back to OR 1/24 for possible RVAD placement which was not done. Patient remained hemodynamically stable in OR. Wash out on 1/27. Chest closed 1/29.   - CVP 14 on Lasix 10 mg/hr  - Currently hemodynamically stable since Epi weaned off ~ midnight  - TTE 2/3 at 5300 rpm: LVEDD 5.95, LVEF 10%, diastolic dysfunction, RV size normla, RV function mod depressed, IVC not well visualized, septum midline  - Current speed 5300  - INR therapeutic at 2.7 (goal 2.0-3.0_  - LD stable in the 300's  - PT/OT/VAD education    Procedure: Device Interrogation Including analysis of device parameters  Current Settings: Ventricular Assist Device  Review of device function is stable/unstabe    TXP LVAD INTERROGATIONS 2/7/2020 2/7/2020 2/7/2020 2/7/2020 2/7/2020 2/7/2020 2/7/2020   Type HeartMate3 HeartMate3 HeartMate3 HeartMate3 HeartMate3 HeartMate3 HeartMate3   Flow 4.1 4.3 4.0 4.0 4.0 4.2 3.9   Speed 5300 5300 5300 5300 5300 5300 5300   PI 4.4 4.4 5.2 4.7 4.6 3.9 4.6   Power (Berry) 3.8 3.8 3.8 3.8 3.8 3.8 3.6   LSL 4900 4900 4900 4900 4900 4900 4900   Pulsatility Intermittent pulse - - - Intermittent pulse Intermittent pulse Intermittent pulse

## 2020-02-07 NOTE — ASSESSMENT & PLAN NOTE
- S/P DT HM3 with closure of AV and plication of MV for regurg 1/23/20 (See LVAD)  - NID dx'd 2010  - hemodynamically stable since Epi weaned off ~ midnight  - CVP 14 on Lasix 10 mg/hr

## 2020-02-07 NOTE — PROGRESS NOTES
MD notified of K:3.6 and Ma.8, requested PRN replacements changed to PO.  Also aware that CVP increased from 10 to 14 between assessments.  UOP > 100ml/hr on Lasix gtt @ 10mg/hr .New orders for PO replacements.  Will implement orders and continue to monitor.

## 2020-02-07 NOTE — PROGRESS NOTES
"[facility]  Adult Nutrition  Progress Note    SUMMARY       Recommendations  1. Encourage increased PO intake as tolerated.   2. Recommend adding Optisource OS to all meals.   3. Until PO intake increases, recommend continuing nocturnal TF of Peptamen Intense VHP at 50 from 8p-8a.   RD to monitor.    Goals: Patient to receive nutrition by RD follow-up  Nutrition Goal Status: goal met  Communication of RD Recs: discussed on rounds    Reason for Assessment  Reason For Assessment: RD follow-up  Diagnosis: surgery/postoperative complications(s/p LVAD 1/23)  Relevant Medical History: HTN, HLD, CHF, afib, stroke  Interdisciplinary Rounds: attended  General Information Comments: Patient's diet advanced yesterday. TF regimen transitioned to nocturnal. NFPE completed 1/31, patient continues with mild wasting but does not meet criteria for malnutrition.  Nutrition Discharge Planning: Adequate nutrition via PO intake.    Nutrition Risk Screen  Nutrition Risk Screen: tube feeding or parenteral nutrition    Nutrition/Diet History  Spiritual, Cultural Beliefs, Buddhist Practices, Values that Affect Care: no  Factors Affecting Nutritional Intake: None identified at this time    Anthropometrics  Temp: 98.2 °F (36.8 °C)  Height: 5' 10" (177.8 cm)  Height (inches): 70 in  Weight Method: Bed Scale  Weight: 110 kg (242 lb 8.1 oz)  Weight (lb): 242.51 lb  Ideal Body Weight (IBW), Male: 166 lb  % Ideal Body Weight, Male (lb): 150.6 %  BMI (Calculated): 34.8  BMI Grade: 30 - 34.9- obesity - grade I    Lab/Procedures/Meds  Pertinent Labs Reviewed: reviewed  Pertinent Labs Comments: BUN 47, Creat 1.7, Glu 117, Alb 2.4, CRP 75  Pertinent Medications Reviewed: reviewed  Pertinent Medications Comments: docusate, ferrous gluconate, pantoprazole, coumadin, lasix    Estimated/Assessed Needs  Weight Used For Calorie Calculations: 108.5 kg (239 lb 3.2 oz)  Energy Calorie Requirements (kcal): 1906 kcal/day  Energy Need Method: " [place](no AF 2/2 obesity)  Protein Requirements: 113-151 g/day(1.5-2.0 g/kg)  Weight Used For Protein Calculations: 75.5 kg (166 lb 7.2 oz)(IBW)  Fluid Requirements (mL): per MD or 1 ml/kcal  Estimated Fluid Requirement Method: RDA Method  RDA Method (mL): 1906    Nutrition Prescription Ordered  Current Diet Order: NPO  Current Nutrition Support Formula Ordered: Peptamen Intense VHP  Current Nutrition Support Rate Ordered: 50 (ml)  Current Nutrition Support Frequency Ordered: mL/hr from 8p-8a    Evaluation of Received Nutrient/Fluid Intake  Enteral Calories (kcal): 600  Enteral Protein (gm): 55  Enteral (Free Water) Fluid (mL): 504  % Kcal Needs: 31%  % Protein Needs: 49%  I/O: -1.6L x 24hrs, -15.6L since admit  Energy Calories Required: not meeting needs  Protein Required: not meeting needs  Fluid Required: (per MD)  Comments: LBM 2/7  Tolerance: tolerating  % Intake of Estimated Energy Needs: 75 - 100 %  % Meal Intake: 25 - 50 %    Nutrition Risk  Level of Risk/Frequency of Follow-up: low(1x/week)     Assessment and Plan  Nutrition Problem  Inadequate energy intake     Related to (etiology):   Decreased ability to consume sufficient energy     Signs and Symptoms (as evidenced by):   NPO with no alternative means of nutrition at this time     Interventions (treatment strategy):  Collaboration of nutrition care with other providers     Nutrition Diagnosis Status:   Resolved    Monitor and Evaluation  Food and Nutrient Intake: energy intake, food and beverage intake, enteral nutrition intake  Food and Nutrient Adminstration: diet order, enteral and parenteral nutrition administration  Knowledge/Beliefs/Attitudes: food and nutrition knowledge/skill  Physical Activity and Function: nutrition-related ADLs and IADLs  Anthropometric Measurements: weight, weight change, body mass index  Biochemical Data, Medical Tests and Procedures: electrolyte and renal panel, gastrointestinal profile, glucose/endocrine profile,  inflammatory profile, lipid profile  Nutrition-Focused Physical Findings: overall appearance     Malnutrition Assessment  Orbital Region (Subcutaneous Fat Loss): well nourished  Upper Arm Region (Subcutaneous Fat Loss): (DANIE, restrained)  Thoracic and Lumbar Region: well nourished   [place] Region (Muscle Loss): mild depletion  Clavicle Bone Region (Muscle Loss): mild depletion  Clavicle and Acromion Bone Region (Muscle Loss): well nourished  Scapular Bone Region (Muscle Loss): (DANIE)  Dorsal Hand (Muscle Loss): well nourished(edematous)  Patellar Region (Muscle Loss): well nourished(edematous)  Anterior Thigh Region (Muscle Loss): well nourished(edematous)  Posterior Calf Region (Muscle Loss): (DANIE, boots in place)   Edema (Fluid Accumulation): 2-->mild     Nutrition Follow-Up  RD Follow-up?: Yes

## 2020-02-07 NOTE — PROGRESS NOTES
Pt transferred to CTSU 3093 via bed and on portable monitor. Lasix gtt infusing at 10mg/hr. Prevena wound vac intact and battery with pt. Personal belongings, chart, and meds sent with pt. Receiving RN at bedside upon arrival to room. Connected to continuous tele box and chest tube connected to continuous wall suction. Spouse and pt's son at bedside.

## 2020-02-07 NOTE — PT/OT/SLP PROGRESS
Physical Therapy Treatment    Patient Name:  Tae Delgado   MRN:  [id number]    Recommendations:     Discharge Recommendations:  rehabilitation facility   Discharge Equipment Recommendations: (TBD)     Assessment:     Tae Delgado is a 59 y.o. male admitted with a medical diagnosis of Acute on chronic combined systolic and diastolic heart failure.  He presents with the following impairments/functional limitations:  weakness, impaired endurance, decreased upper extremity function, decreased safety awareness, impaired cardiopulmonary response to activity, impaired fine motor. Pt progressing towards goals, but not at PLOF. Pt tolerated session well but requires encouragement during session. Pt is improving with therapy evidenced by increase standing tolerance and decreased assistance with sit to stand transfer. Recommend d/c to Rehab to maximize functional independence.      Rehab Prognosis: Good; patient would benefit from acute skilled PT services to address these deficits and reach maximum level of function.    Recent Surgery: Procedure(s) (LRB):  CLOSURE, WOUND, STERNUM (N/A)  WASHOUT (N/A)  APPLICATION, WOUND VAC (N/A) 9 Days Post-Op    Plan:     During this hospitalization, patient to be seen 6 x/week to address the identified rehab impairments via gait training, therapeutic activities, therapeutic exercises, neuromuscular re-education and progress toward the following goals:    · Plan of Care Expires:  02/29/20    Subjective     Chief Complaint: difficulty accepting his situation   Patient/Family Comments/goals: to get better and return home   Pain/Comfort:  · Pain Rating 1: (generalized; urnated)  · Pain Addressed 1: Reposition, Distraction      Objective:     Communicated with RN prior to session.  Patient found HOB elevated with telemetry, pulse ox (continuous), blood pressure cuff, LVAD, PICC line, giles catheter, chest tube, NG tube, arterial line upon PT entry to room.     General Precautions: Standard, fall,  sternal, LVAD   Orthopedic Precautions:N/A   Braces: N/A     Functional Mobility:  · Bed Mobility:     · Scooting to place B feet on floor: moderate assistance  · Supine to Sit: moderate assistance  · Sit to Supine: moderate assistance  · Transfers:     · Sit to Stand:  minimum assistance and of 2 persons with no AD x 3 trials   · Trial 1: 30 seconds  · Trial 2: 45 seconds  · Trial 3: 1 minute  · Gait: not performed 2nd to pt working on standing tolerance       AM-PAC 6 CLICK MOBILITY  Turning over in bed (including adjusting bedclothes, sheets and blankets)?: 2  Sitting down on and standing up from a chair with arms (e.g., wheelchair, bedside commode, etc.): 2  Moving from lying on back to sitting on the side of the bed?: 2  Moving to and from a bed to a chair (including a wheelchair)?: 2  Need to walk in hospital room?: 1  Climbing 3-5 steps with a railing?: 1  Basic Mobility Total Score: 10       Therapeutic Activities and Exercises:  Educated pt on PT role/POC  Educated pt on importance daily bed level exercise and OOB activity  Educated pt on sternal precautions  Pt verbalized understanding    Sitting EOB x 20 minutes with CGA  · Sit to stand x 3 trials for time to increase B LE strength and to increase standing tolerance   · ADLs with OT    Chair deferred on this date 2nd to pt planning to transfer to a step down unit     Patient left HOB elevated with all lines intact, call button in reach and RN present..    GOALS:   Multidisciplinary Problems     Physical Therapy Goals        Problem: Physical Therapy Goal    Goal Priority Disciplines Outcome Goal Variances Interventions   Physical Therapy Goal     PT, PT/OT Ongoing, Progressing     Description:  Goals to be met by: 2020     Patient will increase functional independence with mobility by performin. Supine to sit with MInimal Assistance  2. Rolling to Left and Right with Minimal Assistance.  3. Sit to stand transfer with Moderate Assistance  4.  Sitting at edge of bed x8 minutes with Minimal Assistance  5. Lower extremity exercise program x10 reps per handout, with assistance as needed                      Time Tracking:     PT Received On: 02/07/20  PT Start Time: 0817     PT Stop Time: 0845  PT Total Time (min): 28 min     Billable Minutes: Therapeutic Exercise 23    Treatment Type: Treatment  PT/PTA: PT     PTA Visit Number: 0     Aide Zapata PT, DPT  2/7/2020  [phone]

## 2020-02-08 LAB
ALBUMIN SERPL BCP-MCNC: 2.4 G/DL (ref 3.5–5.2)
ALP SERPL-CCNC: 64 U/L (ref 55–135)
ALT SERPL W/O P-5'-P-CCNC: 19 U/L (ref 10–44)
ANION GAP SERPL CALC-SCNC: 11 MMOL/L (ref 8–16)
APTT BLDCRRT: 34.8 SEC (ref 21–32)
AST SERPL-CCNC: 23 U/L (ref 10–40)
BASOPHILS # BLD AUTO: 0.05 K/UL (ref 0–0.2)
BASOPHILS NFR BLD: 0.3 % (ref 0–1.9)
BILIRUB SERPL-MCNC: 0.6 MG/DL (ref 0.1–1)
BUN SERPL-MCNC: 45 MG/DL (ref 6–20)
CALCIUM SERPL-MCNC: 8.7 MG/DL (ref 8.7–10.5)
CHLORIDE SERPL-SCNC: 99 MMOL/L (ref 95–110)
CO2 SERPL-SCNC: 29 MMOL/L (ref 23–29)
CREAT SERPL-MCNC: 1.8 MG/DL (ref 0.5–1.4)
DIFFERENTIAL METHOD: ABNORMAL
DIGOXIN SERPL-MCNC: 1.5 NG/ML (ref 0.8–2)
EOSINOPHIL # BLD AUTO: 0.1 K/UL (ref 0–0.5)
EOSINOPHIL NFR BLD: 0.9 % (ref 0–8)
ERYTHROCYTE [DISTWIDTH] IN BLOOD BY AUTOMATED COUNT: 19.9 % (ref 11.5–14.5)
EST. GFR  (AFRICAN AMERICAN): 46.6 ML/MIN/1.73 M^2
EST. GFR  (NON AFRICAN AMERICAN): 40.3 ML/MIN/1.73 M^2
GLUCOSE SERPL-MCNC: 119 MG/DL (ref 70–110)
HCT VFR BLD AUTO: 31.2 % (ref 40–54)
HGB BLD-MCNC: 9.5 G/DL (ref 14–18)
IMM GRANULOCYTES # BLD AUTO: 0.28 K/UL (ref 0–0.04)
IMM GRANULOCYTES NFR BLD AUTO: 1.9 % (ref 0–0.5)
INR PPP: 2.5 (ref 0.8–1.2)
LDH SERPL L TO P-CCNC: 276 U/L (ref 110–260)
LYMPHOCYTES # BLD AUTO: 1.3 K/UL (ref 1–4.8)
LYMPHOCYTES NFR BLD: 8.9 % (ref 18–48)
MAGNESIUM SERPL-MCNC: 1.8 MG/DL (ref 1.6–2.6)
MAGNESIUM SERPL-MCNC: 1.9 MG/DL (ref 1.6–2.6)
MCH RBC QN AUTO: 27.9 PG (ref 27–31)
MCHC RBC AUTO-ENTMCNC: 30.4 G/DL (ref 32–36)
MCV RBC AUTO: 92 FL (ref 82–98)
MONOCYTES # BLD AUTO: 0.9 K/UL (ref 0.3–1)
MONOCYTES NFR BLD: 6.4 % (ref 4–15)
NEUTROPHILS # BLD AUTO: 12 K/UL (ref 1.8–7.7)
NEUTROPHILS NFR BLD: 81.6 % (ref 38–73)
NRBC BLD-RTO: 0 /100 WBC
PHOSPHATE SERPL-MCNC: 4.4 MG/DL (ref 2.7–4.5)
PLATELET # BLD AUTO: 349 K/UL (ref 150–350)
PMV BLD AUTO: 11.4 FL (ref 9.2–12.9)
POTASSIUM SERPL-SCNC: 3.4 MMOL/L (ref 3.5–5.1)
POTASSIUM SERPL-SCNC: 3.5 MMOL/L (ref 3.5–5.1)
PROT SERPL-MCNC: 6.3 G/DL (ref 6–8.4)
PROTHROMBIN TIME: 23.4 SEC (ref 9–12.5)
RBC # BLD AUTO: 3.41 M/UL (ref 4.6–6.2)
SODIUM SERPL-SCNC: 139 MMOL/L (ref 136–145)
WBC # BLD AUTO: 14.75 K/UL (ref 3.9–12.7)

## 2020-02-08 PROCEDURE — 25000003 PHARM REV CODE 250: Performed by: STUDENT IN AN ORGANIZED HEALTH CARE EDUCATION/TRAINING PROGRAM

## 2020-02-08 PROCEDURE — 36415 COLL VENOUS BLD VENIPUNCTURE: CPT

## 2020-02-08 PROCEDURE — A4216 STERILE WATER/SALINE, 10 ML: HCPCS | Performed by: STUDENT IN AN ORGANIZED HEALTH CARE EDUCATION/TRAINING PROGRAM

## 2020-02-08 PROCEDURE — 84132 ASSAY OF SERUM POTASSIUM: CPT

## 2020-02-08 PROCEDURE — 63600175 PHARM REV CODE 636 W HCPCS: Performed by: SURGERY

## 2020-02-08 PROCEDURE — 85730 THROMBOPLASTIN TIME PARTIAL: CPT

## 2020-02-08 PROCEDURE — 27000248 HC VAD-ADDITIONAL DAY

## 2020-02-08 PROCEDURE — 85610 PROTHROMBIN TIME: CPT

## 2020-02-08 PROCEDURE — 84100 ASSAY OF PHOSPHORUS: CPT

## 2020-02-08 PROCEDURE — 93750 PR INTERROGATE VENT ASSIST DEV, IN PERSON, W PHYSICIAN ANALYSIS: ICD-10-PCS | Mod: ,,, | Performed by: INTERNAL MEDICINE

## 2020-02-08 PROCEDURE — 80053 COMPREHEN METABOLIC PANEL: CPT

## 2020-02-08 PROCEDURE — 63600175 PHARM REV CODE 636 W HCPCS: Performed by: STUDENT IN AN ORGANIZED HEALTH CARE EDUCATION/TRAINING PROGRAM

## 2020-02-08 PROCEDURE — 83735 ASSAY OF MAGNESIUM: CPT | Mod: 91

## 2020-02-08 PROCEDURE — 83615 LACTATE (LD) (LDH) ENZYME: CPT

## 2020-02-08 PROCEDURE — 85025 COMPLETE CBC W/AUTO DIFF WBC: CPT

## 2020-02-08 PROCEDURE — 80162 ASSAY OF DIGOXIN TOTAL: CPT

## 2020-02-08 PROCEDURE — 99233 PR SUBSEQUENT HOSPITAL CARE,LEVL III: ICD-10-PCS | Mod: ,,, | Performed by: INTERNAL MEDICINE

## 2020-02-08 PROCEDURE — 93750 INTERROGATION VAD IN PERSON: CPT | Mod: ,,, | Performed by: INTERNAL MEDICINE

## 2020-02-08 PROCEDURE — 20600001 HC STEP DOWN PRIVATE ROOM

## 2020-02-08 PROCEDURE — 25000003 PHARM REV CODE 250: Performed by: SURGERY

## 2020-02-08 PROCEDURE — 99233 SBSQ HOSP IP/OBS HIGH 50: CPT | Mod: ,,, | Performed by: INTERNAL MEDICINE

## 2020-02-08 RX ORDER — WARFARIN 1 MG/1
2 TABLET ORAL ONCE
Status: COMPLETED | OUTPATIENT
Start: 2020-02-08 | End: 2020-02-08

## 2020-02-08 RX ORDER — LANOLIN ALCOHOL/MO/W.PET/CERES
800 CREAM (GRAM) TOPICAL 3 TIMES DAILY
Status: DISCONTINUED | OUTPATIENT
Start: 2020-02-08 | End: 2020-02-11

## 2020-02-08 RX ORDER — LANOLIN ALCOHOL/MO/W.PET/CERES
400 CREAM (GRAM) TOPICAL ONCE
Status: COMPLETED | OUTPATIENT
Start: 2020-02-08 | End: 2020-02-08

## 2020-02-08 RX ORDER — POTASSIUM CHLORIDE 20 MEQ/1
40 TABLET, EXTENDED RELEASE ORAL 2 TIMES DAILY
Status: DISCONTINUED | OUTPATIENT
Start: 2020-02-08 | End: 2020-02-08

## 2020-02-08 RX ORDER — POTASSIUM CHLORIDE 20 MEQ/1
40 TABLET, EXTENDED RELEASE ORAL DAILY
Status: DISCONTINUED | OUTPATIENT
Start: 2020-02-08 | End: 2020-02-08

## 2020-02-08 RX ORDER — POTASSIUM CHLORIDE 20 MEQ/15ML
40 SOLUTION ORAL 2 TIMES DAILY
Status: DISCONTINUED | OUTPATIENT
Start: 2020-02-08 | End: 2020-02-09

## 2020-02-08 RX ADMIN — Medication 400 MG: at 06:02

## 2020-02-08 RX ADMIN — METHOCARBAMOL TABLETS 500 MG: 500 TABLET, COATED ORAL at 09:02

## 2020-02-08 RX ADMIN — WARFARIN SODIUM 2 MG: 1 TABLET ORAL at 04:02

## 2020-02-08 RX ADMIN — AMLODIPINE BESYLATE 10 MG: 10 TABLET ORAL at 09:02

## 2020-02-08 RX ADMIN — OXYCODONE HYDROCHLORIDE 5 MG: 5 SOLUTION ORAL at 06:02

## 2020-02-08 RX ADMIN — Medication 3 ML: at 02:02

## 2020-02-08 RX ADMIN — DIGOXIN 0.12 MG: 125 TABLET ORAL at 09:02

## 2020-02-08 RX ADMIN — Medication 400 MG: at 02:02

## 2020-02-08 RX ADMIN — Medication 10 ML: at 12:02

## 2020-02-08 RX ADMIN — CASTOR OIL AND BALSAM, PERU: 788; 87 OINTMENT TOPICAL at 09:02

## 2020-02-08 RX ADMIN — POTASSIUM CHLORIDE 40 MEQ: 1500 TABLET, EXTENDED RELEASE ORAL at 02:02

## 2020-02-08 RX ADMIN — PANTOPRAZOLE SODIUM 40 MG: 40 GRANULE, DELAYED RELEASE ORAL at 09:02

## 2020-02-08 RX ADMIN — ASPIRIN 325 MG ORAL TABLET 325 MG: 325 PILL ORAL at 09:02

## 2020-02-08 RX ADMIN — FUROSEMIDE 7.5 MG/HR: 10 INJECTION, SOLUTION INTRAMUSCULAR; INTRAVENOUS at 04:02

## 2020-02-08 RX ADMIN — METHOCARBAMOL TABLETS 500 MG: 500 TABLET, COATED ORAL at 04:02

## 2020-02-08 RX ADMIN — Medication 10 ML: at 06:02

## 2020-02-08 RX ADMIN — Medication 400 MG: at 09:02

## 2020-02-08 RX ADMIN — FERROUS GLUCONATE TAB 324 MG (37.5 MG ELEMENTAL IRON) 324 MG: 324 (37.5 FE) TAB at 09:02

## 2020-02-08 RX ADMIN — OXYCODONE HYDROCHLORIDE 5 MG: 5 SOLUTION ORAL at 12:02

## 2020-02-08 NOTE — NURSING
On assessment patient's NG tube to L nare is not secure. Marked line is not at passed nare and when checking residual no gastric content was noted; Only clear thin fluid. On call for CTS notified and orders placed for STAT KUB to verify placement. On assessment of KUB, NGT needed to be slightly advanced. After NGT was advanced, KUB was taken again. MD notified that images uploaded. MD stated nursing communication would be place once reviewed. Will cont to monitor patient.

## 2020-02-08 NOTE — PROGRESS NOTES
[facility]  Cardiothoracic Surgery  Progress Note    Patient Name: Tae Delgado  MRN: [id number]  Admission Date: 1/9/2020  Hospital Length of Stay: 30 days  Code Status: Full Code   Attending Physician: Ino Schmitt MD   Referring Provider: Elham Pearson MD  Principal Problem:Acute on chronic combined systolic and diastolic heart failure            Subjective:     Post-Op Info:  Procedure(s) (LRB):  CLOSURE, WOUND, STERNUM (N/A)  WASHOUT (N/A)  APPLICATION, WOUND VAC (N/A)   10 Days Post-Op     Subjective:     Post-Op Info:  Procedure(s) (LRB):  CLOSURE, WOUND, STERNUM (N/A)  WASHOUT (N/A)  APPLICATION, WOUND VAC (N/A)   10 Days Post-Op      Tae Delgado is a 59 y.o. male with heart failure who underwent LVAD placement (1/23/20) then closure (1/24/20) and repeat chest opening (1/24/20) for poor RV function; chest washout (1/27/20); chest closure (1/29/20)    [facility] tolerating TF as well as diet; OOBTC,     Medications:  Continuous Infusions:   furosemide (LASIX) 2 mg/mL infusion (non-titrating) 10 mg/hr (02/07/20 1744)     Scheduled Meds:   acetaminophen  650 mg Per NG tube Q6H    amiodarone  400 mg Per NG tube TID    amLODIPine  10 mg Per NG tube Daily    aspirin  325 mg Oral Daily    atorvastatin  80 mg Oral QHS    balsam peru-castor oil   Topical (Top) BID    digoxin  0.125 mg Oral Daily    docusate sodium  200 mg Oral QHS    ferrous gluconate  324 mg Oral Daily with breakfast    magnesium oxide  400 mg Per NG tube TID    methocarbamol  500 mg Oral QID    oxyCODONE  5 mg Per NG tube Q6H    pantoprazole  40 mg Per NG tube Daily    polyethylene glycol  100 mL Per NG tube 6 times per day    polyethylene glycol  17 g Oral Daily    sodium chloride 0.9%  10 mL Intravenous Q6H    sodium chloride 0.9%  3 mL Intravenous Q8H     PRN Meds:albumin human 5%, albuterol sulfate, bisacodyL, Dextrose 10% Bolus, Dextrose 10% Bolus, hydrALAZINE,  magnesium hydroxide 400 mg/5 ml, sodium chloride 0.9%, Flushing PICC Protocol **AND** sodium chloride 0.9% **AND** sodium chloride 0.9%     Objective:     Vital Signs (Most Recent):  Temp: 98.4 °F (36.9 °C) (02/08/20 0832)  Pulse: 90 (02/08/20 1152)  Resp: 18 (02/08/20 0832)  BP: (!) 72/0 (02/08/20 0834)  SpO2: (!) 93 % (02/08/20 0832) Vital Signs (24h Range):  Temp:  [97.4 °F (36.3 °C)-98.6 °F (37 °C)] 98.4 °F (36.9 °C)  Pulse:  [84-92] 90  Resp:  [17-20] 18  SpO2:  [91 %-98 %] 93 %  BP: (72-95)/(0-75) 72/0       Intake/Output Summary (Last 24 hours) at 2/8/2020 1220  Last data filed at 2/8/2020 1000  Gross per 24 hour   Intake 1465 ml   Output 2250 ml   Net -785 ml       Physical Exam  Incision c/d/i; CT: 70;     Significant Labs:  BMP:   Recent Labs   Lab 02/08/20  0454   *      K 3.5   CL 99   CO2 29   BUN 45*   CREATININE 1.8*   CALCIUM 8.7   MG 1.9     CBC:   Recent Labs   Lab 02/08/20 0454   WBC 14.75*   RBC 3.41*   HGB 9.5*   HCT 31.2*      MCV 92   MCH 27.9   MCHC 30.4*     Coagulation:   Recent Labs   Lab 02/08/20  0454   INR 2.5*   APTT 34.8*     LFTs:   Recent Labs   Lab 02/08/20  0454   ALT 19   AST 23   ALKPHOS 64   BILITOT 0.6   PROT 6.3   ALBUMIN 2.4*       Significant Diagnostics:  CXR: unchanged    Procedure: Device Interrogation Including analysis of device parameters  Current Settings: Ventricular Assist Device  Review of device function is stable  TXP LVAD INTERROGATIONS 2/8/2020 2/8/2020 2/8/2020 2/7/2020 2/7/2020 2/7/2020 2/7/2020   Type HeartMate3 HeartMate3 HeartMate3 HeartMate3 HeartMate3 HeartMate3 HeartMate3   Flow 4.1 4.2 4.2 4.1 4.1 4.1 4.3   Speed 5300 5300 5300 5300 5350 5300 5300   PI 4.1 3.9 3.8 4.8 4.4 4.4 4.4   Power (Berry) 3.7 3.7 3.8 3.8 3.8 3.8 3.8   LSL 4900 - 4900 4900 4900 4900 4900   Pulsatility Intermittent pulse Intermittent pulse Intermittent pulse Intermittent pulse Intermittent pulse Intermittent pulse -     Assessment/Plan:     * Acute on chronic  combined systolic and diastolic heart failure  Tae Delgado is a 59 y.o. male with heart failure who underwent LVAD placement (1/23/20) then closure (1/24/20) and repeat chest opening (1/24/20) for poor RV function; chest washout (1/27/20); chest closure (1/29/20); completed 2 wks of vanc.  --continue amio, norvasc, asa, statin and dig  --coumadin 2, decrease lasix to 7.5  --continue to monitor renal labs and leukocytosis        Terence Gonzalez MD  Cardiothoracic Surgery  [facility]

## 2020-02-08 NOTE — SUBJECTIVE & OBJECTIVE
Interval History: No events overnight. CVP 14     Continuous Infusions:   furosemide (LASIX) 2 mg/mL infusion (non-titrating) 10 mg/hr (02/07/20 1744)     Scheduled Meds:   acetaminophen  650 mg Per NG tube Q6H    amiodarone  400 mg Per NG tube TID    amLODIPine  10 mg Per NG tube Daily    aspirin  325 mg Oral Daily    atorvastatin  80 mg Oral QHS    balsam peru-castor oil   Topical (Top) BID    digoxin  0.125 mg Oral Daily    docusate sodium  200 mg Oral QHS    ferrous gluconate  324 mg Oral Daily with breakfast    magnesium oxide  400 mg Per NG tube TID    methocarbamol  500 mg Oral QID    oxyCODONE  5 mg Per NG tube Q6H    pantoprazole  40 mg Per NG tube Daily    polyethylene glycol  100 mL Per NG tube 6 times per day    polyethylene glycol  17 g Oral Daily    sodium chloride 0.9%  10 mL Intravenous Q6H    sodium chloride 0.9%  3 mL Intravenous Q8H     PRN Meds:albumin human 5%, albuterol sulfate, bisacodyL, Dextrose 10% Bolus, Dextrose 10% Bolus, hydrALAZINE, magnesium hydroxide 400 mg/5 ml, sodium chloride 0.9%, Flushing PICC Protocol **AND** sodium chloride 0.9% **AND** sodium chloride 0.9%    Review of patient's allergies indicates:   Allergen Reactions    Biopatch [chlorhexidine gluconate]      Site burning    Dobutamine in d5w      Tachycardia, tremors, SOB, flushing    Percocet [oxycodone-acetaminophen] Itching    Penicillins Rash     Cefepime given on 1/23/2020 without issue     Objective:     Vital Signs (Most Recent):  Temp: 98.4 °F (36.9 °C) (02/08/20 0832)  Pulse: 90 (02/08/20 1152)  Resp: 18 (02/08/20 0832)  BP: (!) 72/0 (02/08/20 0834)  SpO2: (!) 93 % (02/08/20 0832) Vital Signs (24h Range):  Temp:  [97.4 °F (36.3 °C)-98.6 °F (37 °C)] 98.4 °F (36.9 °C)  Pulse:  [84-92] 90  Resp:  [17-20] 18  SpO2:  [91 %-98 %] 93 %  BP: (72-95)/(0-75) 72/0     Patient Vitals for the past 72 hrs (Last 3 readings):   Weight   02/07/20 0500 110 kg (242 lb 8.1 oz)   02/06/20 1100 110 kg (242 lb 8.1  oz)     Body mass index is 34.8 kg/m².      Intake/Output Summary (Last 24 hours) at 2/8/2020 1210  Last data filed at 2/8/2020 1000  Gross per 24 hour   Intake 1465 ml   Output 2250 ml   Net -785 ml       Hemodynamic Parameters:  CVP:  [14 mmHg] 14 mmHg    Telemetry: NSR     Physical Exam  Constitutional: He is oriented to person, place, and time. He appears well-developed and well-nourished.   HENT:   Head: Normocephalic and atraumatic.   Eyes: Pupils are equal, round, and reactive to light. Conjunctivae and EOM are normal.   Neck: Normal range of motion. Neck supple. JVD present. No thyromegaly present.    Cardiovascular: Normal rate and regular rhythm.   Smooth VAD hum   Pulmonary/Chest: Effort normal and breath sounds normal.   Abdominal: Soft. Bowel sounds are normal.   Musculoskeletal: Normal range of motion.    Neurological: He is alert and oriented to person, place, and time.   Skin: Skin is warm and dry. Capillary refill takes 2 to 3 seconds.   Psychiatric: He has a normal mood and affect. His behavior is normal. Judgment and thought content normal.   Significant Labs:  CBC:  Recent Labs   Lab 02/06/20  0412 02/07/20  0400 02/08/20  0454   WBC 17.98* 18.41* 14.75*   RBC 3.39* 3.47* 3.41*   HGB 9.4* 10.0* 9.5*   HCT 31.1* 31.3* 31.2*    317 349   MCV 92 90 92   MCH 27.7 28.8 27.9   MCHC 30.2* 31.9* 30.4*     BNP:  Recent Labs   Lab 02/03/20  0400 02/05/20  0300 02/07/20  0400   * 796* 339*     CMP:  Recent Labs   Lab 02/06/20  0412  02/07/20  0400 02/07/20  1135 02/07/20  1822 02/08/20  0454   *  --  117*  --   --  119*   CALCIUM 8.6*  --  8.8  --   --  8.7   ALBUMIN 2.3*  --  2.4*  2.4*  --   --  2.4*   PROT 6.2  --  6.4  6.4  --   --  6.3     --  138  --   --  139   K 4.0  4.0   < > 3.9  3.9 3.9 3.7 3.5   CO2 26  --  24  --   --  29     --  102  --   --  99   BUN 44*  --  47*  --   --  45*   CREATININE 1.6*  --  1.7*  --   --  1.8*   ALKPHOS 58  --  63  63  --   --   64   ALT 14  --  19  19  --   --  19   AST 22  --  26  26  --   --  23   BILITOT 0.6  --  0.5  0.5  --   --  0.6    < > = values in this interval not displayed.      Coagulation:   Recent Labs   Lab 02/06/20  0412 02/06/20  1200 02/07/20  0400 02/08/20  0454   INR 2.7*  --  2.7* 2.5*   APTT 34.8* 33.3* 35.7* 34.8*     LDH:  Recent Labs   Lab 02/06/20  0412 02/07/20  0400 02/08/20  0454   * 300* 276*     Microbiology:  Microbiology Results (last 7 days)     Procedure Component Value Units Date/Time    Blood culture [[id number]] Collected:  01/31/20 2215    Order Status:  Completed Specimen:  Blood from Peripheral, Forearm, Right Updated:  02/06/20 0612     Blood Culture, Routine No growth after 5 days.    Blood culture [[id number]] Collected:  01/31/20 2220    Order Status:  Completed Specimen:  Blood from Peripheral, Wrist, Left Updated:  02/06/20 0612     Blood Culture, Routine No growth after 5 days.          I have reviewed all pertinent labs within the past 24 hours.    Estimated Creatinine Clearance: 54.9 mL/min (A) (based on SCr of 1.8 mg/dL (H)).    Diagnostic Results:  I have reviewed and interpreted all pertinent imaging results/findings within the past 24 hours.

## 2020-02-08 NOTE — SUBJECTIVE & OBJECTIVE
Subjective:     Post-Op Info:  Procedure(s) (LRB):  CLOSURE, WOUND, STERNUM (N/A)  WASHOUT (N/A)  APPLICATION, WOUND VAC (N/A)   10 Days Post-Op      Tae Delgado is a 59 y.o. male with heart failure who underwent LVAD placement (1/23/20) then closure (1/24/20) and repeat chest opening (1/24/20) for poor RV function; chest washout (1/27/20); chest closure (1/29/20)    Interval History: transferred to [facility] yesterday; tolerating TF as well as diet; OOBTC,     Medications:  Continuous Infusions:   furosemide (LASIX) 2 mg/mL infusion (non-titrating) 10 mg/hr (02/07/20 1744)     Scheduled Meds:   acetaminophen  650 mg Per NG tube Q6H    amiodarone  400 mg Per NG tube TID    amLODIPine  10 mg Per NG tube Daily    aspirin  325 mg Oral Daily    atorvastatin  80 mg Oral QHS    balsam peru-castor oil   Topical (Top) BID    digoxin  0.125 mg Oral Daily    docusate sodium  200 mg Oral QHS    ferrous gluconate  324 mg Oral Daily with breakfast    magnesium oxide  400 mg Per NG tube TID    methocarbamol  500 mg Oral QID    oxyCODONE  5 mg Per NG tube Q6H    pantoprazole  40 mg Per NG tube Daily    polyethylene glycol  100 mL Per NG tube 6 times per day    polyethylene glycol  17 g Oral Daily    sodium chloride 0.9%  10 mL Intravenous Q6H    sodium chloride 0.9%  3 mL Intravenous Q8H     PRN Meds:albumin human 5%, albuterol sulfate, bisacodyL, Dextrose 10% Bolus, Dextrose 10% Bolus, hydrALAZINE, magnesium hydroxide 400 mg/5 ml, sodium chloride 0.9%, Flushing PICC Protocol **AND** sodium chloride 0.9% **AND** sodium chloride 0.9%     Objective:     Vital Signs (Most Recent):  Temp: 98.4 °F (36.9 °C) (02/08/20 0832)  Pulse: 90 (02/08/20 1152)  Resp: 18 (02/08/20 0832)  BP: (!) 72/0 (02/08/20 0834)  SpO2: (!) 93 % (02/08/20 0832) Vital Signs (24h Range):  Temp:  [97.4 °F (36.3 °C)-98.6 °F (37 °C)] 98.4 °F (36.9 °C)  Pulse:  [84-92] 90  Resp:  [17-20] 18  SpO2:  [91 %-98 %] 93 %  BP: (72-95)/(0-75) 72/0        Intake/Output Summary (Last 24 hours) at 2/8/2020 1220  Last data filed at 2/8/2020 1000  Gross per 24 hour   Intake 1465 ml   Output 2250 ml   Net -785 ml       Physical Exam  Incision c/d/i; CT: 70;     Significant Labs:  BMP:   Recent Labs   Lab 02/08/20 0454   *      K 3.5   CL 99   CO2 29   BUN 45*   CREATININE 1.8*   CALCIUM 8.7   MG 1.9     CBC:   Recent Labs   Lab 02/08/20 0454   WBC 14.75*   RBC 3.41*   HGB 9.5*   HCT 31.2*      MCV 92   MCH 27.9   MCHC 30.4*     Coagulation:   Recent Labs   Lab 02/08/20 0454   INR 2.5*   APTT 34.8*     LFTs:   Recent Labs   Lab 02/08/20 0454   ALT 19   AST 23   ALKPHOS 64   BILITOT 0.6   PROT 6.3   ALBUMIN 2.4*       Significant Diagnostics:  CXR: unchanged    Procedure: Device Interrogation Including analysis of device parameters  Current Settings: Ventricular Assist Device  Review of device function is stable  TXP LVAD INTERROGATIONS 2/8/2020 2/8/2020 2/8/2020 2/7/2020 2/7/2020 2/7/2020 2/7/2020   Type HeartMate3 HeartMate3 HeartMate3 HeartMate3 HeartMate3 HeartMate3 HeartMate3   Flow 4.1 4.2 4.2 4.1 4.1 4.1 4.3   Speed 5300 5300 5300 5300 5350 5300 5300   PI 4.1 3.9 3.8 4.8 4.4 4.4 4.4   Power (Berry) 3.7 3.7 3.8 3.8 3.8 3.8 3.8   LSL 4900 - 4900 4900 4900 4900 4900   Pulsatility Intermittent pulse Intermittent pulse Intermittent pulse Intermittent pulse Intermittent pulse Intermittent pulse -

## 2020-02-08 NOTE — PLAN OF CARE
Problem: Adult Inpatient Plan of Care  Goal: Plan of Care Review  Outcome: Ongoing, Progressing  Goal: Patient-Specific Goal (Individualization)  Description  PmHx: Afib, HLD, HTN, CKD III, LV thrombus (coumadin), AICD, NICM 2010, Embolic CVA     1/9: Admit to floor, volume overload, diuresis  1/10: Nonsustained VT  1/11: liver US unremarkable   1/16: LHC EF 15%; transfer to SICU for [place]/LVAD workup. Heparin gtt  1/17: NO increased to 15 ppm. Epi started 0.02, pt did not tolerate. Discontinued. Approved for LVAD on 1/21 1/20: IJ triple lumen placed, echo of heart, EF 15%  1/21: LVAD procedure aborted due to clot in L atrial appendage; intubated and sedated; lasix and heparin gtt started  1/22: JACINTO  1/23: HM 3  1/24: chest closed and re-opened. A.fib/ flutter, amio bolus, lidocaine x3 and dig x2, lidocaine drip started. Diuril   1/27: OR for washout & strut removal unable to close, lido bolus x1  1/28: 750 ml albumin  1//29: Chest closed, 250 albumin  1/31: elevated temp, blood cultures  2/1: Extubated. L cordis removed. TF restarted  2/4: Nitric off    Nursing:  MAP 60-85    Q6 K, Mg  Accu Q4h  Betadine and water for LVAD dressing changes               Outcome: Ongoing, Progressing  Goal: Absence of Hospital-Acquired Illness or Injury  Outcome: Ongoing, Progressing  Goal: Optimal Comfort and Wellbeing  Outcome: Ongoing, Progressing  Goal: Readiness for Transition of Care  Outcome: Ongoing, Progressing  Goal: Rounds/Family Conference  Outcome: Ongoing, Progressing     Problem: Fall Injury Risk  Goal: Absence of Fall and Fall-Related Injury  Outcome: Ongoing, Progressing     Problem: Fluid Volume Excess  Goal: Fluid Balance  Outcome: Ongoing, Progressing     Problem: Infection  Goal: Infection Symptom Resolution  Outcome: Ongoing, Progressing     Problem: Adjustment to Illness (Heart Failure)  Goal: Optimal Coping  Outcome: Ongoing, Progressing     Problem: Arrhythmia/Dysrhythmia (Heart Failure)  Goal: Stable Heart  Rate and Rhythm  Outcome: Ongoing, Progressing     Problem: Cardiac Output Decreased (Heart Failure)  Goal: Optimal Cardiac Output  Outcome: Ongoing, Progressing     Problem: Fluid Imbalance (Heart Failure)  Goal: Fluid Balance  Outcome: Ongoing, Progressing     Problem: Functional Ability Impaired (Heart Failure)  Goal: Optimal Functional Ability  Outcome: Ongoing, Progressing     Problem: Oral Intake Inadequate (Heart Failure)  Goal: Optimal Nutrition Intake  Outcome: Ongoing, Progressing     Problem: Respiratory Compromise (Heart Failure)  Goal: Effective Oxygenation and Ventilation  Outcome: Ongoing, Progressing     Problem: Sleep Disordered Breathing (Heart Failure)  Goal: Effective Breathing Pattern During Sleep  Outcome: Ongoing, Progressing     Problem: Cardiac Output Decreased  Goal: Effective Cardiac Output  Outcome: Ongoing, Progressing     Problem: Coping Ineffective  Goal: Effective Coping  Outcome: Ongoing, Progressing     Problem: Skin Injury Risk Increased  Goal: Skin Health and Integrity  Outcome: Ongoing, Progressing     Problem: Adjustment to Device (Ventricular Assist Device)  Goal: Optimal Adjustment to Device  Outcome: Ongoing, Progressing     Problem: Bleeding (Ventricular Assist Device)  Goal: Absence of Bleeding  Outcome: Ongoing, Progressing     Problem: Embolism (Ventricular Assist Device)  Goal: Absence of Embolism Signs/Symptoms  Outcome: Ongoing, Progressing     Problem: Hemodynamic Instability (Ventricular Assist Device)  Goal: Optimal Blood Flow  Outcome: Ongoing, Progressing     Problem: Infection (Ventricular Assist Device)  Goal: Absence of Infection Signs/Symptoms  Outcome: Ongoing, Progressing     Problem: Right-Sided Heart Compromise (Ventricular Assist Device)  Goal: Effective Right-Sided Heart Function  Outcome: Ongoing, Progressing     Problem: Wound  Goal: Optimal Wound Healing  Outcome: Ongoing, Progressing     Problem: Aspiration (Enteral Nutrition)  Goal: Absence of  Aspiration Signs/Symptoms  Outcome: Ongoing, Progressing     Problem: Device-Related Complication Risk (Enteral Nutrition)  Goal: Safe, Effective Therapy Delivery  Outcome: Ongoing, Progressing     Problem: Feeding Intolerance (Enteral Nutrition)  Goal: Feeding Tolerance  Outcome: Ongoing, Progressing           Plan of care updated with patient. Pt stepped down to CTSU today. No falls during shift. DTI to R nare and R elbow. LVAD hum smooth, dressing CDI, no alarms throughout shift. Dressing to be changed daily with betadine and water - due to be changed tomorrow. Addressed all issues throughout shift.

## 2020-02-08 NOTE — PROGRESS NOTES
02/08/2020  Deidra Castro    Current provider:  Ino Schmitt MD      I, Deidra Castro, rounded on Tae ROXANNA Colunganidhi to ensure all mechanical assist device settings (IABP or VAD) were appropriate and all parameters were within limits.  I was able to ensure all back up equipment was present, the staff had no issues, and the Perfusion Department daily rounding was complete.    9:03 AM

## 2020-02-08 NOTE — CONSULTS
Consult received re: nutritional assessment. RD is following pt, last assessed yesterday. Noted pt on oral diet and nocturnal TF. Pt currently does not meet criteria for malnutrition. Will continue to follow-up with pt as previously scheduled.     Recommendations  1. Encourage increased PO intake as tolerated.   2. Recommend adding Optisource OS to all meals.   3. Until PO intake increases, recommend continuing nocturnal TF of Peptamen Intense VHP at 50 from 8p-8a.   RD to monitor.     Goals: Patient to receive nutrition by RD follow-up

## 2020-02-08 NOTE — ASSESSMENT & PLAN NOTE
Tae Delgado is a 59 y.o. male with heart failure who underwent LVAD placement (1/23/20) then closure (1/24/20) and repeat chest opening (1/24/20) for poor RV function; chest washout (1/27/20); chest closure (1/29/20); completed 2 wks of vanc.  --continue amio, norvasc, asa, statin and dig  --coumadin 2, decrease lasix to 7.5  --continue to monitor renal labs and leukocytosis

## 2020-02-08 NOTE — PROGRESS NOTES
[facility]  Heart Transplant  Progress Note    Patient Name: Tae Delgado  MRN: [id number]  Admission Date: 1/9/2020  Hospital Length of Stay: 30 days  Attending Physician: Ino Schmitt MD  Primary Care Provider: Elham Pearson MD  Principal Problem:Acute on chronic combined systolic and diastolic heart failure    Subjective:     Interval History: No events overnight. CVP 14     Continuous Infusions:   furosemide (LASIX) 2 mg/mL infusion (non-titrating) 10 mg/hr (02/07/20 1744)     Scheduled Meds:   acetaminophen  650 mg Per NG tube Q6H    amiodarone  400 mg Per NG tube TID    amLODIPine  10 mg Per NG tube Daily    aspirin  325 mg Oral Daily    atorvastatin  80 mg Oral QHS    balsam peru-castor oil   Topical (Top) BID    digoxin  0.125 mg Oral Daily    docusate sodium  200 mg Oral QHS    ferrous gluconate  324 mg Oral Daily with breakfast    magnesium oxide  400 mg Per NG tube TID    methocarbamol  500 mg Oral QID    oxyCODONE  5 mg Per NG tube Q6H    pantoprazole  40 mg Per NG tube Daily    polyethylene glycol  100 mL Per NG tube 6 times per day    polyethylene glycol  17 g Oral Daily    sodium chloride 0.9%  10 mL Intravenous Q6H    sodium chloride 0.9%  3 mL Intravenous Q8H     PRN Meds:albumin human 5%, albuterol sulfate, bisacodyL, Dextrose 10% Bolus, Dextrose 10% Bolus, hydrALAZINE, magnesium hydroxide 400 mg/5 ml, sodium chloride 0.9%, Flushing PICC Protocol **AND** sodium chloride 0.9% **AND** sodium chloride 0.9%    Review of patient's allergies indicates:   Allergen Reactions    Biopatch [chlorhexidine gluconate]      Site burning    Dobutamine in d5w      Tachycardia, tremors, SOB, flushing    Percocet [oxycodone-acetaminophen] Itching    Penicillins Rash     Cefepime given on 1/23/2020 without issue     Objective:     Vital Signs (Most Recent):  Temp: 98.4 °F (36.9 °C) (02/08/20 0832)  Pulse: 90 (02/08/20 1152)  Resp: 18 (02/08/20 0832)  BP: (!) 72/0 (02/08/20  0834)  SpO2: (!) 93 % (02/08/20 0832) Vital Signs (24h Range):  Temp:  [97.4 °F (36.3 °C)-98.6 °F (37 °C)] 98.4 °F (36.9 °C)  Pulse:  [84-92] 90  Resp:  [17-20] 18  SpO2:  [91 %-98 %] 93 %  BP: (72-95)/(0-75) 72/0     Patient Vitals for the past 72 hrs (Last 3 readings):   Weight   02/07/20 0500 110 kg (242 lb 8.1 oz)   02/06/20 1100 110 kg (242 lb 8.1 oz)     Body mass index is 34.8 kg/m².      Intake/Output Summary (Last 24 hours) at 2/8/2020 1210  Last data filed at 2/8/2020 1000  Gross per 24 hour   Intake 1465 ml   Output 2250 ml   Net -785 ml       Hemodynamic Parameters:  CVP:  [14 mmHg] 14 mmHg    Telemetry: NSR     Physical Exam  Constitutional: He is oriented to person, place, and time. He appears well-developed and well-nourished.   HENT:   Head: Normocephalic and atraumatic.   Eyes: Pupils are equal, round, and reactive to light. Conjunctivae and EOM are normal.   Neck: Normal range of motion. Neck supple. JVD present. No thyromegaly present.    Cardiovascular: Normal rate and regular rhythm.   Smooth VAD hum   Pulmonary/Chest: Effort normal and breath sounds normal.   Abdominal: Soft. Bowel sounds are normal.   Musculoskeletal: Normal range of motion.    Neurological: He is alert and oriented to person, place, and time.   Skin: Skin is warm and dry. Capillary refill takes 2 to 3 seconds.   Psychiatric: He has a normal mood and affect. His behavior is normal. Judgment and thought content normal.    Significant Labs:  CBC:  Recent Labs   Lab 02/06/20  0412 02/07/20  0400 02/08/20  0454   WBC 17.98* 18.41* 14.75*   RBC 3.39* 3.47* 3.41*   HGB 9.4* 10.0* 9.5*   HCT 31.1* 31.3* 31.2*    317 349   MCV 92 90 92   MCH 27.7 28.8 27.9   MCHC 30.2* 31.9* 30.4*     BNP:  Recent Labs   Lab 02/03/20  0400 02/05/20  0300 02/07/20  0400   * 796* 339*     CMP:  Recent Labs   Lab 02/06/20  0412  02/07/20  0400 02/07/20  1135 02/07/20  1822 02/08/20  0454   *  --  117*  --   --  119*   CALCIUM 8.6*   --  8.8  --   --  8.7   ALBUMIN 2.3*  --  2.4*  2.4*  --   --  2.4*   PROT 6.2  --  6.4  6.4  --   --  6.3     --  138  --   --  139   K 4.0  4.0   < > 3.9  3.9 3.9 3.7 3.5   CO2 26  --  24  --   --  29     --  102  --   --  99   BUN 44*  --  47*  --   --  45*   CREATININE 1.6*  --  1.7*  --   --  1.8*   ALKPHOS 58  --  63  63  --   --  64   ALT 14  --  19  19  --   --  19   AST 22  --  26  26  --   --  23   BILITOT 0.6  --  0.5  0.5  --   --  0.6    < > = values in this interval not displayed.      Coagulation:   Recent Labs   Lab 02/06/20  0412 02/06/20  1200 02/07/20  0400 02/08/20  0454   INR 2.7*  --  2.7* 2.5*   APTT 34.8* 33.3* 35.7* 34.8*     LDH:  Recent Labs   Lab 02/06/20  0412 02/07/20  0400 02/08/20  0454   * 300* 276*     Microbiology:  Microbiology Results (last 7 days)     Procedure Component Value Units Date/Time    Blood culture [[id number]] Collected:  01/31/20 2215    Order Status:  Completed Specimen:  Blood from Peripheral, Forearm, Right Updated:  02/06/20 0612     Blood Culture, Routine No growth after 5 days.    Blood culture [[id number]] Collected:  01/31/20 2220    Order Status:  Completed Specimen:  Blood from Peripheral, Wrist, Left Updated:  02/06/20 0612     Blood Culture, Routine No growth after 5 days.          I have reviewed all pertinent labs within the past 24 hours.    Estimated Creatinine Clearance: 54.9 mL/min (A) (based on SCr of 1.8 mg/dL (H)).    Diagnostic Results:  I have reviewed and interpreted all pertinent imaging results/findings within the past 24 hours.    Assessment and Plan:       55 y.o. WM with history of NICMP diagnosed in 2010, ICD, LV thrombus (with prior splenic and renal emboli), Embolic  CVA , paroxysmal atrial fib, HTN, HLP  presents for  F/U today to clinic and he was volume overloaded on exam .  He states he had 3 admission in the last 3 months for volume overload. He started having more SOB on exertion since one month. Also  endorses of Orthopnea since last one month. Alble to walk only 150 ft. He also has Bilateral lower extremity edema. He was admitted here in 2017 for ADHD [facility] at [facility] and [facility] during that admission showed PCWP 40 and CVP 17. Currently denies chest pain, lightheadedness     * Acute on chronic combined systolic and diastolic heart failure  - S/P DT HM3 with closure of AV and plication of MV for regurg 1/23/20 (See LVAD)  - NIDCM dx'd 2010  - hemodynamically stable since Epi weaned off ~ midnight  - CVP 14 on Lasix 10 mg/hr        Leukocytosis  - WCC today essentially unchanged at 18K  - ID consulted: Continue Vancomycin for two more days. Would then stop Vancomycin and monitor closely off antibiotics for any recurrent fevers, systemic signs of infection or clinical decompensation.    On enteral nutrition  - Diet advanced to mech soft. Still requiring tube feeds via NNGT to meet nutritional needs    Acute hyperglycemia  - Endocrine following    LVAD (left ventricular assist device) present  - S/P DT HM3 with closure of AV and repair of MV for regurg 1/23/20 (Oskar)  - CTS primary  - Taken back to OR 1/24 for possible RVAD placement which was not done. Patient remained hemodynamically stable in OR. Wash out on 1/27. Chest closed 1/29.   - CVP 14 on Lasix 10 mg/hr  - Currently hemodynamically stable since Epi weaned off ~ midnight  - TTE 2/3 at 5300 rpm: LVEDD 5.95, LVEF 10%, diastolic dysfunction, RV size normla, RV function mod depressed, IVC not well visualized, septum midline  - Current speed 5300  - INR therapeutic at 2.5 (goal 2.0-3.0)  - LD stable in the 300's  - PT/OT/VAD education    Procedure: Device Interrogation Including analysis of device parameters  Current Settings: Ventricular Assist Device  Review of device function is stable/unstabe    TXP LVAD INTERROGATIONS 2/8/2020 2/8/2020 2/8/2020 2/7/2020 2/7/2020 2/7/2020 2/7/2020   Type HeartMate3 HeartMate3 HeartMate3 HeartMate3 HeartMate3 HeartMate3  HeartMate3   Flow 4.1 4.2 4.2 4.1 4.1 4.1 4.3   Speed 5300 5300 5300 5300 5350 5300 5300   PI 4.1 3.9 3.8 4.8 4.4 4.4 4.4   Power (Berry) 3.7 3.7 3.8 3.8 3.8 3.8 3.8   LSL 4900 - 4900 4900 4900 4900 4900   Pulsatility Intermittent pulse Intermittent pulse Intermittent pulse Intermittent pulse Intermittent pulse Intermittent pulse -       Coagulopathy  - Appreciate Hem/Onc's help. No evidence of underlying coagulopathy (h/o LV thrombus with splenic and renal emboli, h/o embolic CVA)    NSVT (nonsustained ventricular tachycardia)  - Digoxin 0.125 mg qd continues per CTS  - Continue Amio per CTS    Hepatic congestion  - Liver US on 1/11 unremarkable  - LFT's WNL now    ICD (implantable cardioverter-defibrillator) in place  - S/P Biotronik dual chamber ICD    Right lower lobe pulmonary nodule  - Incidental finding on CT chest/abd/pelvis on 1/12. Pulmonology consulted, and rec repeat CT of chest in 3 months  - Will need to follow-up in pulmonary clinic.     Acute kidney injury superimposed on chronic kidney disease  - Creatinine on admit 2.6 (baseline ~ 1.8). BUN/Creatinine today 47/1.7  - Nephrology has signed off    Paroxysmal atrial fibrillation  - Intermittently in A fib since surgery, currently paced at 90   - AC per CTS  - Dig 0.125 mg qd continues per CTS      Left ventricular thrombus without MI  - H/O LV thrombus with h/o splenic and renal emboli as well as embolic CVA  - Limited TTE done here 1/13 showed no thrombus  - JACINTO 1/23 intra op showed resolution of DAMON thrombus  - AC per CTS          Andriy Perkins MD  Heart Transplant  [facility]

## 2020-02-08 NOTE — PLAN OF CARE
Patient remains free from falls and injuries through out shift. Patient has been intermittently drowsy but easily arouses to voice. Patient oriented x4.  VSS. Pt denies chest pain and SOB. LVAD (HM3) is functioning WNL, w/o any acute events or alarms thus far on shift. LVAD #'s, dopplers and MAPs WNL. Patient is daily betaine and NS dressing changes. Patient's significant other still learning dressing change and needs check off. Current LVAD dressing is CDI. MSI has Prevena Woundvac in place with seal intact. Velenax placed to DTI.  Lasix gtt @ 10mg/hr infusing. UOP adequate on shift (see flowsheets). King in place to be d/c'd this morning. Golytely schedule to be given q4hr; midnight dose held and MD on call for CTS held. Patient had large liquid BM on shift. NG tube to L nare advanced during shift and MD on call notified. STAT KUB ordered and MD verified placement. Intermittent tube feeding from 8p-8a currently infusing @ goal rate of 50cc/hr. Last INR 2.7. SLP, PT/OT are following. CVP pending this morning. Small amount of thin liquid given on shift and patient tolerated well. Pleural CT in place to suction with minimal amount of serous drainage noted. PO electrolyte replacement given, will f/u with morning labs. Patient's family at bedside. Plan of care reviewed with patient. Call bell in reach. Patient verbalizes understanding of plan.  Will continue to monitor. Plan of care discussed with patient.

## 2020-02-08 NOTE — HOSPITAL COURSE
Tae Delgado is a 59 y.o. male with heart failure who underwent LVAD placement (1/23/20) then closure (1/24/20) and repeat chest opening (1/24/20) for poor RV function; chest washout (1/27/20); chest closure (1/29/20)

## 2020-02-08 NOTE — ASSESSMENT & PLAN NOTE
- S/P DT HM3 with closure of AV and repair of MV for regurg 1/23/20 (Oskar)  - CTS primary  - Taken back to OR 1/24 for possible RVAD placement which was not done. Patient remained hemodynamically stable in OR. Wash out on 1/27. Chest closed 1/29.   - CVP 14 on Lasix 10 mg/hr  - Currently hemodynamically stable since Epi weaned off ~ midnight  - TTE 2/3 at 5300 rpm: LVEDD 5.95, LVEF 10%, diastolic dysfunction, RV size normla, RV function mod depressed, IVC not well visualized, septum midline  - Current speed 5300  - INR therapeutic at 2.7 (goal 2.0-3.0)  - LD stable in the 300's  - PT/OT/VAD education    Procedure: Device Interrogation Including analysis of device parameters  Current Settings: Ventricular Assist Device  Review of device function is stable/unstabe    TXP LVAD INTERROGATIONS 2/8/2020 2/8/2020 2/8/2020 2/7/2020 2/7/2020 2/7/2020 2/7/2020   Type HeartMate3 HeartMate3 HeartMate3 HeartMate3 HeartMate3 HeartMate3 HeartMate3   Flow 4.1 4.2 4.2 4.1 4.1 4.1 4.3   Speed 5300 5300 5300 5300 5350 5300 5300   PI 4.1 3.9 3.8 4.8 4.4 4.4 4.4   Power (Berry) 3.7 3.7 3.8 3.8 3.8 3.8 3.8   LSL 4900 - 4900 4900 4900 4900 4900   Pulsatility Intermittent pulse Intermittent pulse Intermittent pulse Intermittent pulse Intermittent pulse Intermittent pulse -

## 2020-02-08 NOTE — NURSING
King d/c'd at 0635 this morning. Patient tolerated well. Patient is due to void q6hr at 1235 this afternoon. Will cont to monitor patient.

## 2020-02-09 LAB
ALBUMIN SERPL BCP-MCNC: 2.4 G/DL (ref 3.5–5.2)
ALBUMIN SERPL BCP-MCNC: 2.4 G/DL (ref 3.5–5.2)
ALBUMIN SERPL BCP-MCNC: 2.5 G/DL (ref 3.5–5.2)
ALP SERPL-CCNC: 63 U/L (ref 55–135)
ALP SERPL-CCNC: 64 U/L (ref 55–135)
ALP SERPL-CCNC: 65 U/L (ref 55–135)
ALT SERPL W/O P-5'-P-CCNC: 16 U/L (ref 10–44)
ALT SERPL W/O P-5'-P-CCNC: 18 U/L (ref 10–44)
ALT SERPL W/O P-5'-P-CCNC: 19 U/L (ref 10–44)
ANION GAP SERPL CALC-SCNC: 10 MMOL/L (ref 8–16)
ANION GAP SERPL CALC-SCNC: 11 MMOL/L (ref 8–16)
APTT BLDCRRT: 30.7 SEC (ref 21–32)
AST SERPL-CCNC: 23 U/L (ref 10–40)
AST SERPL-CCNC: 23 U/L (ref 10–40)
AST SERPL-CCNC: 25 U/L (ref 10–40)
BASOPHILS # BLD AUTO: 0.05 K/UL (ref 0–0.2)
BASOPHILS # BLD AUTO: 0.05 K/UL (ref 0–0.2)
BASOPHILS NFR BLD: 0.4 % (ref 0–1.9)
BASOPHILS NFR BLD: 0.4 % (ref 0–1.9)
BILIRUB DIRECT SERPL-MCNC: 0.4 MG/DL (ref 0.1–0.3)
BILIRUB SERPL-MCNC: 0.5 MG/DL (ref 0.1–1)
BILIRUB SERPL-MCNC: 0.7 MG/DL (ref 0.1–1)
BILIRUB SERPL-MCNC: 0.7 MG/DL (ref 0.1–1)
BNP SERPL-MCNC: 275 PG/ML (ref 0–99)
BUN SERPL-MCNC: 42 MG/DL (ref 6–20)
BUN SERPL-MCNC: 44 MG/DL (ref 6–20)
CALCIUM SERPL-MCNC: 8.3 MG/DL (ref 8.7–10.5)
CALCIUM SERPL-MCNC: 8.5 MG/DL (ref 8.7–10.5)
CHLORIDE SERPL-SCNC: 97 MMOL/L (ref 95–110)
CHLORIDE SERPL-SCNC: 98 MMOL/L (ref 95–110)
CO2 SERPL-SCNC: 31 MMOL/L (ref 23–29)
CREAT SERPL-MCNC: 1.7 MG/DL (ref 0.5–1.4)
DIFFERENTIAL METHOD: ABNORMAL
DIFFERENTIAL METHOD: ABNORMAL
DIGOXIN SERPL-MCNC: 1.6 NG/ML (ref 0.8–2)
EOSINOPHIL # BLD AUTO: 0.2 K/UL (ref 0–0.5)
EOSINOPHIL # BLD AUTO: 0.2 K/UL (ref 0–0.5)
EOSINOPHIL NFR BLD: 1.6 % (ref 0–8)
EOSINOPHIL NFR BLD: 1.6 % (ref 0–8)
ERYTHROCYTE [DISTWIDTH] IN BLOOD BY AUTOMATED COUNT: 19.7 % (ref 11.5–14.5)
ERYTHROCYTE [DISTWIDTH] IN BLOOD BY AUTOMATED COUNT: 19.7 % (ref 11.5–14.5)
EST. GFR  (AFRICAN AMERICAN): 49.9 ML/MIN/1.73 M^2
EST. GFR  (NON AFRICAN AMERICAN): 43.2 ML/MIN/1.73 M^2
GLUCOSE SERPL-MCNC: 102 MG/DL (ref 70–110)
GLUCOSE SERPL-MCNC: 124 MG/DL (ref 70–110)
HCT VFR BLD AUTO: 30.8 % (ref 40–54)
HCT VFR BLD AUTO: 30.8 % (ref 40–54)
HGB BLD-MCNC: 9.2 G/DL (ref 14–18)
HGB BLD-MCNC: 9.2 G/DL (ref 14–18)
IMM GRANULOCYTES # BLD AUTO: 0.21 K/UL (ref 0–0.04)
IMM GRANULOCYTES # BLD AUTO: 0.21 K/UL (ref 0–0.04)
IMM GRANULOCYTES NFR BLD AUTO: 1.7 % (ref 0–0.5)
IMM GRANULOCYTES NFR BLD AUTO: 1.7 % (ref 0–0.5)
INR PPP: 1.8 (ref 0.8–1.2)
LDH SERPL L TO P-CCNC: 260 U/L (ref 110–260)
LDH SERPL L TO P-CCNC: 260 U/L (ref 110–260)
LDH SERPL L TO P-CCNC: 263 U/L (ref 110–260)
LYMPHOCYTES # BLD AUTO: 1.3 K/UL (ref 1–4.8)
LYMPHOCYTES # BLD AUTO: 1.3 K/UL (ref 1–4.8)
LYMPHOCYTES NFR BLD: 10.6 % (ref 18–48)
LYMPHOCYTES NFR BLD: 10.6 % (ref 18–48)
MAGNESIUM SERPL-MCNC: 1.8 MG/DL (ref 1.6–2.6)
MAGNESIUM SERPL-MCNC: 2.3 MG/DL (ref 1.6–2.6)
MCH RBC QN AUTO: 27.4 PG (ref 27–31)
MCH RBC QN AUTO: 27.4 PG (ref 27–31)
MCHC RBC AUTO-ENTMCNC: 29.9 G/DL (ref 32–36)
MCHC RBC AUTO-ENTMCNC: 29.9 G/DL (ref 32–36)
MCV RBC AUTO: 92 FL (ref 82–98)
MCV RBC AUTO: 92 FL (ref 82–98)
MONOCYTES # BLD AUTO: 0.8 K/UL (ref 0.3–1)
MONOCYTES # BLD AUTO: 0.8 K/UL (ref 0.3–1)
MONOCYTES NFR BLD: 6.6 % (ref 4–15)
MONOCYTES NFR BLD: 6.6 % (ref 4–15)
NEUTROPHILS # BLD AUTO: 9.8 K/UL (ref 1.8–7.7)
NEUTROPHILS # BLD AUTO: 9.8 K/UL (ref 1.8–7.7)
NEUTROPHILS NFR BLD: 79.1 % (ref 38–73)
NEUTROPHILS NFR BLD: 79.1 % (ref 38–73)
NRBC BLD-RTO: 0 /100 WBC
NRBC BLD-RTO: 0 /100 WBC
PHOSPHATE SERPL-MCNC: 3.9 MG/DL (ref 2.7–4.5)
PLATELET # BLD AUTO: 292 K/UL (ref 150–350)
PLATELET # BLD AUTO: 292 K/UL (ref 150–350)
PMV BLD AUTO: 11.1 FL (ref 9.2–12.9)
PMV BLD AUTO: 11.1 FL (ref 9.2–12.9)
POTASSIUM SERPL-SCNC: 3.2 MMOL/L (ref 3.5–5.1)
POTASSIUM SERPL-SCNC: 3.6 MMOL/L (ref 3.5–5.1)
POTASSIUM SERPL-SCNC: 3.7 MMOL/L (ref 3.5–5.1)
PROT SERPL-MCNC: 6.1 G/DL (ref 6–8.4)
PROT SERPL-MCNC: 6.1 G/DL (ref 6–8.4)
PROT SERPL-MCNC: 6.2 G/DL (ref 6–8.4)
PROTHROMBIN TIME: 17.3 SEC (ref 9–12.5)
RBC # BLD AUTO: 3.36 M/UL (ref 4.6–6.2)
RBC # BLD AUTO: 3.36 M/UL (ref 4.6–6.2)
SODIUM SERPL-SCNC: 139 MMOL/L (ref 136–145)
WBC # BLD AUTO: 12.34 K/UL (ref 3.9–12.7)
WBC # BLD AUTO: 12.34 K/UL (ref 3.9–12.7)

## 2020-02-09 PROCEDURE — 97530 THERAPEUTIC ACTIVITIES: CPT

## 2020-02-09 PROCEDURE — 63600175 PHARM REV CODE 636 W HCPCS: Performed by: SURGERY

## 2020-02-09 PROCEDURE — 80053 COMPREHEN METABOLIC PANEL: CPT

## 2020-02-09 PROCEDURE — 83615 LACTATE (LD) (LDH) ENZYME: CPT

## 2020-02-09 PROCEDURE — 25000003 PHARM REV CODE 250: Performed by: STUDENT IN AN ORGANIZED HEALTH CARE EDUCATION/TRAINING PROGRAM

## 2020-02-09 PROCEDURE — 85610 PROTHROMBIN TIME: CPT

## 2020-02-09 PROCEDURE — A4216 STERILE WATER/SALINE, 10 ML: HCPCS | Performed by: STUDENT IN AN ORGANIZED HEALTH CARE EDUCATION/TRAINING PROGRAM

## 2020-02-09 PROCEDURE — 93750 INTERROGATION VAD IN PERSON: CPT | Mod: ,,, | Performed by: INTERNAL MEDICINE

## 2020-02-09 PROCEDURE — 63600175 PHARM REV CODE 636 W HCPCS: Performed by: STUDENT IN AN ORGANIZED HEALTH CARE EDUCATION/TRAINING PROGRAM

## 2020-02-09 PROCEDURE — 83615 LACTATE (LD) (LDH) ENZYME: CPT | Mod: 91

## 2020-02-09 PROCEDURE — 27000248 HC VAD-ADDITIONAL DAY

## 2020-02-09 PROCEDURE — 93750 PR INTERROGATE VENT ASSIST DEV, IN PERSON, W PHYSICIAN ANALYSIS: ICD-10-PCS | Mod: ,,, | Performed by: INTERNAL MEDICINE

## 2020-02-09 PROCEDURE — 80076 HEPATIC FUNCTION PANEL: CPT

## 2020-02-09 PROCEDURE — 25000003 PHARM REV CODE 250: Performed by: SURGERY

## 2020-02-09 PROCEDURE — 99233 SBSQ HOSP IP/OBS HIGH 50: CPT | Mod: ,,, | Performed by: INTERNAL MEDICINE

## 2020-02-09 PROCEDURE — 83880 ASSAY OF NATRIURETIC PEPTIDE: CPT

## 2020-02-09 PROCEDURE — 83735 ASSAY OF MAGNESIUM: CPT | Mod: 91

## 2020-02-09 PROCEDURE — 97535 SELF CARE MNGMENT TRAINING: CPT

## 2020-02-09 PROCEDURE — 20600001 HC STEP DOWN PRIVATE ROOM

## 2020-02-09 PROCEDURE — 83735 ASSAY OF MAGNESIUM: CPT

## 2020-02-09 PROCEDURE — 84100 ASSAY OF PHOSPHORUS: CPT

## 2020-02-09 PROCEDURE — 84132 ASSAY OF SERUM POTASSIUM: CPT

## 2020-02-09 PROCEDURE — 97112 NEUROMUSCULAR REEDUCATION: CPT

## 2020-02-09 PROCEDURE — 99233 PR SUBSEQUENT HOSPITAL CARE,LEVL III: ICD-10-PCS | Mod: ,,, | Performed by: INTERNAL MEDICINE

## 2020-02-09 PROCEDURE — 80162 ASSAY OF DIGOXIN TOTAL: CPT

## 2020-02-09 PROCEDURE — 25000003 PHARM REV CODE 250: Performed by: INTERNAL MEDICINE

## 2020-02-09 PROCEDURE — 85730 THROMBOPLASTIN TIME PARTIAL: CPT

## 2020-02-09 PROCEDURE — 85025 COMPLETE CBC W/AUTO DIFF WBC: CPT

## 2020-02-09 PROCEDURE — 80053 COMPREHEN METABOLIC PANEL: CPT | Mod: 91

## 2020-02-09 RX ORDER — FUROSEMIDE 10 MG/ML
40 INJECTION INTRAMUSCULAR; INTRAVENOUS 3 TIMES DAILY
Status: DISCONTINUED | OUTPATIENT
Start: 2020-02-09 | End: 2020-02-10

## 2020-02-09 RX ORDER — POTASSIUM CHLORIDE 14.9 MG/ML
20 INJECTION INTRAVENOUS
Status: DISPENSED | OUTPATIENT
Start: 2020-02-09 | End: 2020-02-09

## 2020-02-09 RX ORDER — WARFARIN 4 MG/1
4 TABLET ORAL ONCE
Status: COMPLETED | OUTPATIENT
Start: 2020-02-09 | End: 2020-02-09

## 2020-02-09 RX ORDER — POTASSIUM CHLORIDE 20 MEQ/15ML
40 SOLUTION ORAL ONCE
Status: COMPLETED | OUTPATIENT
Start: 2020-02-09 | End: 2020-02-09

## 2020-02-09 RX ORDER — POTASSIUM CHLORIDE 20 MEQ/15ML
60 SOLUTION ORAL 2 TIMES DAILY
Status: DISCONTINUED | OUTPATIENT
Start: 2020-02-09 | End: 2020-02-11

## 2020-02-09 RX ORDER — MAGNESIUM SULFATE HEPTAHYDRATE 40 MG/ML
4 INJECTION, SOLUTION INTRAVENOUS ONCE
Status: COMPLETED | OUTPATIENT
Start: 2020-02-09 | End: 2020-02-09

## 2020-02-09 RX ADMIN — Medication 3 ML: at 02:02

## 2020-02-09 RX ADMIN — ATORVASTATIN CALCIUM 80 MG: 20 TABLET, FILM COATED ORAL at 10:02

## 2020-02-09 RX ADMIN — METHOCARBAMOL TABLETS 500 MG: 500 TABLET, COATED ORAL at 10:02

## 2020-02-09 RX ADMIN — PANTOPRAZOLE SODIUM 40 MG: 40 GRANULE, DELAYED RELEASE ORAL at 10:02

## 2020-02-09 RX ADMIN — AMLODIPINE BESYLATE 10 MG: 10 TABLET ORAL at 10:02

## 2020-02-09 RX ADMIN — POTASSIUM CHLORIDE 60 MEQ: 20 SOLUTION ORAL at 10:02

## 2020-02-09 RX ADMIN — Medication 10 ML: at 12:02

## 2020-02-09 RX ADMIN — WARFARIN SODIUM 4 MG: 4 TABLET ORAL at 04:02

## 2020-02-09 RX ADMIN — Medication 10 ML: at 06:02

## 2020-02-09 RX ADMIN — OXYCODONE HYDROCHLORIDE 5 MG: 5 SOLUTION ORAL at 10:02

## 2020-02-09 RX ADMIN — ASPIRIN 325 MG ORAL TABLET 325 MG: 325 PILL ORAL at 10:02

## 2020-02-09 RX ADMIN — Medication 3 ML: at 12:02

## 2020-02-09 RX ADMIN — DIGOXIN 0.12 MG: 125 TABLET ORAL at 10:02

## 2020-02-09 RX ADMIN — Medication 3 ML: at 10:02

## 2020-02-09 RX ADMIN — FERROUS GLUCONATE TAB 324 MG (37.5 MG ELEMENTAL IRON) 324 MG: 324 (37.5 FE) TAB at 09:02

## 2020-02-09 RX ADMIN — Medication 800 MG: at 10:02

## 2020-02-09 RX ADMIN — Medication 400 MG: at 04:02

## 2020-02-09 RX ADMIN — Medication 800 MG: at 02:02

## 2020-02-09 RX ADMIN — Medication 400 MG: at 10:02

## 2020-02-09 RX ADMIN — OXYCODONE HYDROCHLORIDE 5 MG: 5 SOLUTION ORAL at 12:02

## 2020-02-09 RX ADMIN — CASTOR OIL AND BALSAM, PERU: 788; 87 OINTMENT TOPICAL at 02:02

## 2020-02-09 RX ADMIN — ACETAMINOPHEN 650 MG: 160 SOLUTION ORAL at 07:02

## 2020-02-09 RX ADMIN — POTASSIUM CHLORIDE 40 MEQ: 20 SOLUTION ORAL at 02:02

## 2020-02-09 RX ADMIN — METHOCARBAMOL TABLETS 500 MG: 500 TABLET, COATED ORAL at 04:02

## 2020-02-09 RX ADMIN — Medication 400 MG: at 12:02

## 2020-02-09 RX ADMIN — Medication 800 MG: at 04:02

## 2020-02-09 RX ADMIN — CASTOR OIL AND BALSAM, PERU: 788; 87 OINTMENT TOPICAL at 10:02

## 2020-02-09 RX ADMIN — MAGNESIUM SULFATE IN WATER 2 G: 40 INJECTION, SOLUTION INTRAVENOUS at 06:02

## 2020-02-09 RX ADMIN — FUROSEMIDE 40 MG: 10 INJECTION, SOLUTION INTRAMUSCULAR; INTRAVENOUS at 04:02

## 2020-02-09 RX ADMIN — FUROSEMIDE 40 MG: 10 INJECTION, SOLUTION INTRAMUSCULAR; INTRAVENOUS at 10:02

## 2020-02-09 RX ADMIN — POTASSIUM CHLORIDE 40 MEQ: 20 SOLUTION ORAL at 10:02

## 2020-02-09 RX ADMIN — ACETAMINOPHEN 650 MG: 160 SOLUTION ORAL at 01:02

## 2020-02-09 RX ADMIN — METHOCARBAMOL TABLETS 500 MG: 500 TABLET, COATED ORAL at 07:02

## 2020-02-09 RX ADMIN — ATORVASTATIN CALCIUM 80 MG: 20 TABLET, FILM COATED ORAL at 02:02

## 2020-02-09 RX ADMIN — OXYCODONE HYDROCHLORIDE 5 MG: 5 SOLUTION ORAL at 07:02

## 2020-02-09 NOTE — CARE UPDATE
"RAPID RESPONSE NURSE PROACTIVE ROUNDING NOTE     Time of Visit: 0200    Admit Date: 2020  LOS: 31  Code Status: Full Code   Date of Visit: 2020  : 1960  Age: 59 y.o.  Sex: male  Race: White  Bed: [facility] 3093/[facility] 3093 A:   MRN: [id number]  Was the patient discharged from an ICU this admission? yes   Was the patient discharged from a PACU within last 24 hours?  no  Did the patient receive conscious sedation/general anesthesia in last 24 hours?  no  Was the patient in the ED within the past 24 hours?  no  Was the patient started on NIPPV within the past 24 hours?  no  Attending Physician: Ino Schmitt MD  Primary Service: Networked reference to record PCT     ASSESSMENT     Diagnosis: Acute on chronic combined systolic and diastolic heart failure    Abnormal Vital Signs: BP 90/72 (BP Location: Left arm, Patient Position: Lying)   Pulse 90   Temp 98.1 °F (36.7 °C) (Axillary)   Resp 18   Ht 5' 10" (1.778 m)   Wt 110 kg (242 lb 8.1 oz)   SpO2 95%   BMI 34.80 kg/m²      Clinical Issues: Respiratory    Patient  has a past medical history of CHF (congestive heart failure), COCM (congestive cardiomyopathy), Hyperlipidemia, Hypertension, Paroxysmal atrial fibrillation, Pulmonary embolus, Stroke, and Superficial thrombophlebitis.    Called to bedside by Nithya Patel RN. Patient complaining of discomfort to throat related to NG tube. Tube recently dislodged and advanced. Chest X-Ray and Abdominal x-ray obtained, confirming NG tube placement. Patient appears is no respiratory distress, O2 Sats 95-96% on room air. Dr. Hensley at bedside.      INTERVENTIONS/ RECOMMENDATIONS     Continue current plan of care.    Discussed plan of care with Lorrie PACHECO and Charge RN Jorge    PHYSICIAN ESCALATION     Yes/No  yes    Orders received and case discussed with Dr. Hensley.    Disposition: Remain in room 3093.    FOLLOW-UP     Call back the Rapid Response Nurse, Antonio Saxena RN at [phone] for additional questions or " concerns.

## 2020-02-09 NOTE — PLAN OF CARE
Discharge Recommendation: Rehab.    1 goal met today. PT goals appropriate.    Ann Rudd PT, DPT  2020  Pager: [phone]        Problem: Physical Therapy Goal  Goal: Physical Therapy Goal  Description  Goals to be met by: 2020     Patient will increase functional independence with mobility by performin. Supine to sit with MInimal Assistance - not met  2. Rolling to Left and Right with Minimal Assistance.- not met  3. Sit to stand transfer with Moderate Assistance- not met  4. Sitting at edge of bed x8 minutes with Minimal Assistance - met 2020  4a. Sitting at EOB x 10 minutes with SBA- not met  5. Lower extremity exercise program x10 reps per handout, with assistance as needed- not met  6. Standing with no AD x 30 seconds CGA to assist with ADLs and self care- not met      Outcome: Ongoing, Progressing

## 2020-02-09 NOTE — PROGRESS NOTES
02/09/2020  Deidra Castro    Current provider:  Ino Schmitt MD      I, Deidra Castro, rounded on Tae MCKNIGHT Denidhi to ensure all mechanical assist device settings (IABP or VAD) were appropriate and all parameters were within limits.  I was able to ensure all back up equipment was present, the staff had no issues, and the Perfusion Department daily rounding was complete.    2:27 PM

## 2020-02-09 NOTE — PROGRESS NOTES
[facility]  Heart Transplant  Progress Note    Patient Name: Tae Delgado  MRN: [id number]  Admission Date: 1/9/2020  Hospital Length of Stay: 31 days  Attending Physician: Ino Schmitt MD  Primary Care Provider: Elham Pearson MD  Principal Problem:Acute on chronic combined systolic and diastolic heart failure    Subjective:     Interval History: Patient is not comfortable with NG tube.He wants it to be out. INR dropped to 1.8 from 2.5 today     Continuous Infusions:  Scheduled Meds:   acetaminophen  650 mg Per NG tube Q6H    amiodarone  400 mg Per NG tube TID    amLODIPine  10 mg Per NG tube Daily    aspirin  325 mg Oral Daily    atorvastatin  80 mg Oral QHS    balsam peru-castor oil   Topical (Top) BID    digoxin  0.125 mg Oral Daily    docusate sodium  200 mg Oral QHS    ferrous gluconate  324 mg Oral Daily with breakfast    furosemide  40 mg Intravenous TID    magnesium oxide  800 mg Per NG tube TID    methocarbamol  500 mg Oral QID    oxyCODONE  5 mg Per NG tube Q6H    pantoprazole  40 mg Per NG tube Daily    polyethylene glycol  100 mL Per NG tube 6 times per day    polyethylene glycol  17 g Oral Daily    potassium chloride 10%  60 mEq Oral BID    sodium chloride 0.9%  10 mL Intravenous Q6H    sodium chloride 0.9%  3 mL Intravenous Q8H    warfarin  4 mg Oral Once     PRN Meds:albumin human 5%, albuterol sulfate, bisacodyL, Dextrose 10% Bolus, Dextrose 10% Bolus, hydrALAZINE, magnesium hydroxide 400 mg/5 ml, sodium chloride 0.9%, Flushing PICC Protocol **AND** sodium chloride 0.9% **AND** sodium chloride 0.9%    Review of patient's allergies indicates:   Allergen Reactions    Biopatch [chlorhexidine gluconate]      Site burning    Dobutamine in d5w      Tachycardia, tremors, SOB, flushing    Percocet [oxycodone-acetaminophen] Itching    Penicillins Rash     Cefepime given on 1/23/2020 without issue     Objective:     Vital Signs (Most Recent):  Temp: 98.1 °F (36.7 °C)  (02/09/20 1152)  Pulse: 90 (02/09/20 1204)  Resp: 18 (02/09/20 1152)  BP: (!) 79/54 (02/09/20 1152)  SpO2: 95 % (02/09/20 1152) Vital Signs (24h Range):  Temp:  [97.5 °F (36.4 °C)-98.2 °F (36.8 °C)] 98.1 °F (36.7 °C)  Pulse:  [64-90] 90  Resp:  [16-18] 18  SpO2:  [93 %-97 %] 95 %  BP: (72-92)/(0-72) 79/54     Patient Vitals for the past 72 hrs (Last 3 readings):   Weight   02/07/20 0500 110 kg (242 lb 8.1 oz)     Body mass index is 34.8 kg/m².      Intake/Output Summary (Last 24 hours) at 2/9/2020 1257  Last data filed at 2/9/2020 1100  Gross per 24 hour   Intake 540 ml   Output 1900 ml   Net -1360 ml       Hemodynamic Parameters:       Telemetry: NSR    Physical Exam  Constitutional: He is oriented to person, place, and time. He appears well-developed and well-nourished.   HENT:   Head: Normocephalic and atraumatic.   Eyes: Pupils are equal, round, and reactive to light. Conjunctivae and EOM are normal.   Neck: Normal range of motion. Neck supple. JVD present. No thyromegaly present.    Cardiovascular: Normal rate and regular rhythm.   Smooth VAD hum   Pulmonary/Chest: Effort normal and breath sounds normal.   Abdominal: Soft. Bowel sounds are normal.   Musculoskeletal: Normal range of motion.    Neurological: He is alert and oriented to person, place, and time.   Skin: Skin is warm and dry. Capillary refill takes 2 to 3 seconds.   Psychiatric: He has a normal mood and affect. His behavior is normal. Judgment and thought content normal.  Significant Labs:  CBC:  Recent Labs   Lab 02/07/20  0400 02/08/20  0454 02/09/20  0400   WBC 18.41* 14.75* 12.34  12.34   RBC 3.47* 3.41* 3.36*  3.36*   HGB 10.0* 9.5* 9.2*  9.2*   HCT 31.3* 31.2* 30.8*  30.8*    349 292  292   MCV 90 92 92  92   MCH 28.8 27.9 27.4  27.4   MCHC 31.9* 30.4* 29.9*  29.9*     BNP:  Recent Labs   Lab 02/05/20  0300 02/07/20  0400 02/09/20  0400   * 339* 275*     CMP:  Recent Labs   Lab 02/07/20  0400  02/08/20  0454  02/08/20  1243 02/09/20  0013 02/09/20  0400   *  --  119*  --   --  102  102  102   CALCIUM 8.8  --  8.7  --   --  8.5*  8.5*  8.5*   ALBUMIN 2.4*  2.4*  --  2.4*  --   --  2.4*  2.4*   PROT 6.4  6.4  --  6.3  --   --  6.1  6.1     --  139  --   --  139  139  139   K 3.9  3.9   < > 3.5 3.4* 3.2* 3.7  3.7  3.7  3.7  3.7  3.7  3.7   CO2 24  --  29  --   --  31*  31*  31*     --  99  --   --  97  97  97   BUN 47*  --  45*  --   --  44*  44*  44*   CREATININE 1.7*  --  1.8*  --   --  1.7*  1.7*  1.7*   ALKPHOS 63  63  --  64  --   --  64  63   ALT 19  19  --  19  --   --  19  16   AST 26  26  --  23  --   --  25  23   BILITOT 0.5  0.5  --  0.6  --   --  0.7  0.7    < > = values in this interval not displayed.      Coagulation:   Recent Labs   Lab 02/07/20  0400 02/08/20  0454 02/09/20  0400   INR 2.7* 2.5* 1.8*  1.8*  1.8*   APTT 35.7* 34.8* 30.7     LDH:  Recent Labs   Lab 02/07/20  0400 02/08/20  0454 02/09/20  0400   * 276* 260  260     Microbiology:  Microbiology Results (last 7 days)     Procedure Component Value Units Date/Time    Blood culture [[id number]] Collected:  01/31/20 2215    Order Status:  Completed Specimen:  Blood from Peripheral, Forearm, Right Updated:  02/06/20 0612     Blood Culture, Routine No growth after 5 days.    Blood culture [[id number]] Collected:  01/31/20 2220    Order Status:  Completed Specimen:  Blood from Peripheral, Wrist, Left Updated:  02/06/20 0612     Blood Culture, Routine No growth after 5 days.          I have reviewed all pertinent labs within the past 24 hours.    Estimated Creatinine Clearance: 58.1 mL/min (A) (based on SCr of 1.7 mg/dL (H)).    Diagnostic Results:  I have reviewed all pertinent imaging results/findings within the past 24 hours.    Assessment and Plan:       55 y.o. WM with history of NICMP diagnosed in 2010, ICD, LV thrombus (with prior splenic and renal emboli), Embolic  CVA , paroxysmal atrial  fib, HTN, HLP  presents for  F/U today to clinic and he was volume overloaded on exam .  He states he had 3 admission in the last 3 months for volume overload. He started having more SOB on exertion since one month. Also endorses of Orthopnea since last one month. Alble to walk only 150 ft. He also has Bilateral lower extremity edema. He was admitted here in 2017 for ADHD here at [facility] and [facility] during that admission showed PCWP 40 and CVP 17. Currently denies chest pain, lightheadedness     * Acute on chronic combined systolic and diastolic heart failure  - S/P DT HM3 with closure of AV and plication of MV for regurg 1/23/20 (See LVAD)  - [facility] dx'd 2010  - hemodynamically stable since Epi weaned off ~ midnight  - CVP 14 on Lasix 10 mg/hr        Leukocytosis  - WCC today essentially unchanged at 18K  - ID consulted: Continue Vancomycin for two more days. Would then stop Vancomycin and monitor closely off antibiotics for any recurrent fevers, systemic signs of infection or clinical decompensation.    On enteral nutrition  - Diet advanced to mech soft. Still requiring tube feeds via NNGT to meet nutritional needs    Acute hyperglycemia  - Endocrine following    LVAD (left ventricular assist device) present  - S/P DT HM3 with closure of AV and repair of MV for regurg 1/23/20 (Oskar)  - CTS primary  - Taken back to OR 1/24 for possible RVAD placement which was not done. Patient remained hemodynamically stable in OR. Wash out on 1/27. Chest closed 1/29.   - CVP 14 on Lasix 10 mg/hr  - Currently hemodynamically stable since Epi weaned off ~ midnight  - TTE 2/3 at 5300 rpm: LVEDD 5.95, LVEF 10%, diastolic dysfunction, RV size normla, RV function mod depressed, IVC not well visualized, septum midline  - Current speed 5300  - INR therapeutic at 1.8. Consider bridging with heparin while on  coumadin  (goal 2.0-3.0)  - LD stable in the 300's  - PT/OT/VAD education    Procedure: Device Interrogation Including analysis of  device parameters  Current Settings: Ventricular Assist Device  Review of device function is stable/unstabe    TXP LVAD INTERROGATIONS 2/9/2020 2/9/2020 2/9/2020 2/8/2020 2/8/2020 2/8/2020 2/8/2020   Type HeartMate3 HeartMate3 HeartMate3 HeartMate3 HeartMate3 HeartMate3 HeartMate3   Flow 3.9 4.3 4.0 4.1 4.1 4.1 4.1   Speed 5300 5300 5300 5300 5300 5300 5300   PI 4.4 3.4 4.3 3.7 4.0 4.8 4.1   Power (Berry) 3.8 3.8 3.8 3.7 3.7 3.8 3.7   LSL - 4900 - 4900 4900 4900 4900   Pulsatility - Pulse Intermittent pulse Pulse Intermittent pulse Intermittent pulse Intermittent pulse       Coagulopathy  - Appreciate Hem/Onc's help. No evidence of underlying coagulopathy (h/o LV thrombus with splenic and renal emboli, h/o embolic CVA)    NSVT (nonsustained ventricular tachycardia)  - Digoxin 0.125 mg qd continues per CTS  - Continue Amio per CTS    Hepatic congestion  - Liver US on 1/11 unremarkable  - LFT's WNL now    ICD (implantable cardioverter-defibrillator) in place  - S/P Biotronik dual chamber ICD    Right lower lobe pulmonary nodule  - Incidental finding on CT chest/abd/pelvis on 1/12. Pulmonology consulted, and rec repeat CT of chest in 3 months  - Will need to follow-up in pulmonary clinic.     Acute kidney injury superimposed on chronic kidney disease  - Creatinine on admit 2.6 (baseline ~ 1.8). BUN/Creatinine today 47/1.7  - Nephrology has signed off    Paroxysmal atrial fibrillation  - Intermittently in A fib since surgery, currently paced at 90   - AC per CTS  - Dig 0.125 mg qd continues per CTS      Left ventricular thrombus without MI  - H/O LV thrombus with h/o splenic and renal emboli as well as embolic CVA  - Limited TTE done here 1/13 showed no thrombus  - JACINTO 1/23 intra op showed resolution of DAMON thrombus  - AC per CTS          Andriy Perkins MD  Heart Transplant  [facility]

## 2020-02-09 NOTE — PT/OT/SLP PROGRESS
"Occupational Therapy   Treatment    Name: Tae Delgado  MRN: [id number]  Admitting Diagnosis:  Acute on chronic combined systolic and diastolic heart failure  11 Days Post-Op    Recommendations:     Discharge Recommendations: rehabilitation facility  Discharge Equipment Recommendations:  (TBD)  Barriers to discharge:  None    Assessment:     Tae Delgado is a 59 y.o. male with a medical diagnosis of Acute on chronic combined systolic and diastolic heart failure.  He presents with performance deficits affecting function are weakness, impaired endurance, impaired self care skills, impaired functional mobilty, gait instability, decreased upper extremity function, decreased lower extremity function, pain, impaired balance, impaired cardiopulmonary response to activity. Pt tolerated session fair this date. Pt immediately reported his frustration to PT/OT in regards to being "stuck" UIC from 7AM to 5PM. Charge RN present and address pt's complaint. Pt agreeable to sit UIC for 2 hours this date with RN and charge RN notified of plan. Pt was assisted to sitting EOB and MD Schmitt entered room. NG tube was removed by Oskar at bedside with pt sitting EOB requiring CGA for EOB balance due to discomfort from NG tube being removed. Pt performed x 3 sit<>stand trials this date with improved body mechanics but continues to demo impaired standing tolerance sitting down back on EOB without warning to therapist. OT provided LVAD education in regards to completing daily self-test and logging all numbers within binder and battery rotation. Pt would benefit from continued skilled acute OT services in order to maximize independence and safety with ADLs and functional mobility to ensure safe return to PLOF in the least restrictive environment.    Rehab Prognosis:  Good; patient would benefit from acute skilled OT services to address these deficits and reach maximum level of function.       Plan:     Patient to be seen 6 x/week to address the " above listed problems via self-care/home management, therapeutic activities, therapeutic exercises, neuromuscular re-education  · Plan of Care Expires: 03/02/20  · Plan of Care Reviewed with: patient, spouse    Subjective     Pain/Comfort:  · Pain Rating 1: 0/10  · Pain Addressed 1: Pre-medicate for activity  · Pain Rating Post-Intervention 1: 0/10    Objective:     Communicated with: RN prior to session.  Patient found HOB elevated with telemetry, NG tube, PICC line, oxygen, LVAD, wound vac upon OT entry to room. Pt agreeable to therapy session after mod encouragement from therapist. Co-treat with PT. LVAD remained on wall power throughout session. Wife present     General Precautions: Standard, fall, LVAD, sternal   Orthopedic Precautions:N/A   Braces: N/A     Occupational Performance:     Bed Mobility:    · Patient completed Scooting/Bridging with moderate <>max  assistance and for anterior scooting towards EOB   · Patient completed Supine to Sit with maximal assistance to L side of bed.   · Assistance for trunk elevation    Functional Mobility/Transfers:  · Patient completed x 3 reps Sit <> Stand Transfer from EOB with moderate assistance x 2 persons for 1st trial, min A x 2 persons for 2nd trial, and max A for 3rd trial  with  no assistive device   · Attempted to completed steps from EOB to bedside chair pt pt with increased forward flexion and poor standing tolerance requiring to sit down back on EOB.   · Patient completed Bed <> Chair Transfer (3rd trial) using Stand Pivot technique with maximal assistance with no assistive device  · PT in front of pt and OT ion posterior of pt assisting with hip guidance.     Activities of Daily Living:  · Upper Body Dressing: maximal assistance donning LVAD controller over shoulder while sitting EOB   · Lower Body Dressing: total assistance for donning B  socks while sitting supported in bed       [facility] 6 Click ADL: 9    Treatment & Education:  - Pt educated on role of  OT, POC, and goals for therapy.    - LVAD education provided in regards to continues performing daily self-test and logging numbers within binder once waking up in the morning  - pt and pt's wife educated on battery rotation. ** Correct batteries (1&2) placed out for next OT treatment.**   - Educated pt on being appropriate to transfer with nsg and PCT with max A x 2 persons for safety   - EOB sitting balance: CGA<> SBA for ~15-20 mins; forward flexion and slouched posturing   - Static standing balance: min <>  Mod A with B HHA maintaining static standing for ~10-20 seconds   - Importance of OOB ax's with staff member assistance and sitting OOB majority of day.   - Pt agreeable to sitting UIC for 2 hours this date and increased OOB sitting tolerance by an hour each day. RN and Charge RN notified to assist pt back to bed at 1300.    - Pt completed ADLs and functional mobility for treatment session as noted above   - Pt and wife verbalized understanding. Pt and wife expressed no further concerns/questions.  - whiteboard updated     Patient left up in chair with all lines intact, call button in reach, RN notified and wife  presentEducation:      GOALS:   Multidisciplinary Problems     Occupational Therapy Goals        Problem: Occupational Therapy Goal    Goal Priority Disciplines Outcome Interventions   Occupational Therapy Goal     OT, PT/OT Ongoing, Progressing    Description:  Goals to be met by: 2/16/2020     Patient will increase functional independence with ADLs by performing:    UE Dressing with Minimal Assistance.  LE Dressing with Minimal Assistance.  Grooming while standing with Minimal Assistance.  Toileting from bedside commode with Minimal Assistance for hygiene and clothing management.   Sitting at edge of bed x10 minutes with Minimal Assistance in prep for seated grooming tasks   Supine to sit with min A  Toilet transfer to bedside commode with Minimal Assistance.  Pt will perform LVAD management  independently                 Multidisciplinary Problems (Resolved)        Problem: Occupational Therapy Goal    Goal Priority Disciplines Outcome Interventions   Occupational Therapy Goal   (Resolved)     OT, PT/OT Met    Description:  Skilled OT services not necessary at this time secondary to patient performing ADLs at OF. Patient in agreement. Please re-consult OT if change in patient's functional status is noted.                     Time Tracking:     OT Date of Treatment: 02/09/20  OT Start Time: 1022  OT Stop Time: 1106  OT Total Time (min): 44 min ( co-treat with PT)    Billable Minutes:Self Care/Home Management 15  Therapeutic Activity 12  Neuromuscular Re-education 11    Flora Arredondo OT  2/9/2020

## 2020-02-09 NOTE — PROGRESS NOTES
[facility]  Cardiothoracic Surgery  Progress Note    Patient Name: Tae Delgado  MRN: [id number]  Admission Date: 1/9/2020  Hospital Length of Stay: 31 days  Code Status: Full Code   Attending Physician: Ino Schmitt MD   Referring Provider: Elham Pearson MD  Principal Problem:Acute on chronic combined systolic and diastolic heart failure      Subjective:     Post-Op Info:  Procedure(s) (LRB):  CLOSURE, WOUND, STERNUM (N/A)  WASHOUT (N/A)  APPLICATION, WOUND VAC (N/A)   11 Days Post-Op     Subjective:     Post-Op Info:  Procedure(s) (LRB):  CLOSURE, WOUND, STERNUM (N/A)  WASHOUT (N/A)  APPLICATION, WOUND VAC (N/A)   11 Days Post-Op      Tae Delgado is a 59 y.o. male with heart failure who underwent LVAD placement (1/23/20) then closure (1/24/20) and repeat chest opening (1/24/20) for poor RV function; chest washout (1/27/20); chest closure (1/29/20)     Interval History: really wants NGT out; OOBTC;         Medications:  Continuous Infusions:  Scheduled Meds:   acetaminophen  650 mg Per NG tube Q6H    amiodarone  400 mg Per NG tube TID    amLODIPine  10 mg Per NG tube Daily    aspirin  325 mg Oral Daily    atorvastatin  80 mg Oral QHS    balsam peru-castor oil   Topical (Top) BID    digoxin  0.125 mg Oral Daily    docusate sodium  200 mg Oral QHS    ferrous gluconate  324 mg Oral Daily with breakfast    furosemide  40 mg Intravenous TID    magnesium oxide  800 mg Per NG tube TID    methocarbamol  500 mg Oral QID    oxyCODONE  5 mg Per NG tube Q6H    pantoprazole  40 mg Per NG tube Daily    polyethylene glycol  100 mL Per NG tube 6 times per day    polyethylene glycol  17 g Oral Daily    potassium chloride 10%  60 mEq Oral BID    sodium chloride 0.9%  10 mL Intravenous Q6H    sodium chloride 0.9%  3 mL Intravenous Q8H    warfarin  4 mg Oral Once     PRN Meds:albumin human 5%, albuterol sulfate, bisacodyL, Dextrose 10% Bolus, Dextrose 10% Bolus, hydrALAZINE, magnesium  hydroxide 400 mg/5 ml, sodium chloride 0.9%, Flushing PICC Protocol **AND** sodium chloride 0.9% **AND** sodium chloride 0.9%     Objective:     Vital Signs (Most Recent):  Temp: 98.1 °F (36.7 °C) (02/09/20 1152)  Pulse: 90 (02/09/20 1204)  Resp: 18 (02/09/20 1152)  BP: (!) 75/51 (02/09/20 1300)  SpO2: 95 % (02/09/20 1152) Vital Signs (24h Range):  Temp:  [97.5 °F (36.4 °C)-98.2 °F (36.8 °C)] 98.1 °F (36.7 °C)  Pulse:  [64-90] 90  Resp:  [16-18] 18  SpO2:  [93 %-97 %] 95 %  BP: (72-92)/(0-72) 75/51       Intake/Output Summary (Last 24 hours) at 2/9/2020 1353  Last data filed at 2/9/2020 1300  Gross per 24 hour   Intake 360 ml   Output 1800 ml   Net -1440 ml       Physical Exam   Constitutional: He appears well-developed and well-nourished.   HENT:   Head: Normocephalic and atraumatic.   Cardiovascular: Normal rate.   Pulmonary/Chest: Effort normal.   Abdominal: Soft.       Significant Labs:  BMP:   Recent Labs   Lab 02/09/20  0400     102  102     139  139   K 3.7  3.7  3.7  3.7  3.7  3.7  3.7   CL 97  97  97   CO2 31*  31*  31*   BUN 44*  44*  44*   CREATININE 1.7*  1.7*  1.7*   CALCIUM 8.5*  8.5*  8.5*   MG 1.8  1.8  1.8     CBC:   Recent Labs   Lab 02/09/20  0400   WBC 12.34  12.34   RBC 3.36*  3.36*   HGB 9.2*  9.2*   HCT 30.8*  30.8*     292   MCV 92  92   MCH 27.4  27.4   MCHC 29.9*  29.9*     LFTs:   Recent Labs   Lab 02/09/20  0400   ALT 19  16   AST 25  23   ALKPHOS 64  63   BILITOT 0.7  0.7   PROT 6.1  6.1   ALBUMIN 2.4*  2.4*       Significant Diagnostics:      Procedure: Device Interrogation Including analysis of device parameters  Current Settings: Ventricular Assist Device  Review of device function is stable  TXP LVAD INTERROGATIONS 2/9/2020 2/9/2020 2/9/2020 2/9/2020 2/8/2020 2/8/2020 2/8/2020   Type HeartMate3 HeartMate3 HeartMate3 HeartMate3 HeartMate3 HeartMate3 HeartMate3   Flow 4.1 3.9 4.3 4.0 4.1 4.1 4.1   Speed 5300 5300 5300 5300 5300  5300 5300   PI 4.7 4.4 3.4 4.3 3.7 4.0 4.8   Power (Berry) 3.8 3.8 3.8 3.8 3.7 3.7 3.8   LSL - - 4900 - 4900 4900 4900   Pulsatility - - Pulse Intermittent pulse Pulse Intermittent pulse Intermittent pulse     Assessment/Plan:     * Acute on chronic combined systolic and diastolic heart failure  Tae Delgado is a 59 y.o. male with heart failure who underwent LVAD placement (1/23/20) then closure (1/24/20) and repeat chest opening (1/24/20) for poor RV function; chest washout (1/27/20); chest closure (1/29/20); completed 2 wks of vanc.  --continue amio, norvasc, asa, statin and dig  --d/c lasix gtt and start lasix 40 tid  --coumadin 4, given drop in INR  --continue to monitor renal labs and leukocytosis        Terence Gonzalez MD  Cardiothoracic Surgery  [facility]

## 2020-02-09 NOTE — PLAN OF CARE
Problem: Occupational Therapy Goal  Goal: Occupational Therapy Goal  Description  Goals to be met by: 2/16/2020     Patient will increase functional independence with ADLs by performing:    UE Dressing with Minimal Assistance.  LE Dressing with Minimal Assistance.  Grooming while standing with Minimal Assistance.  Toileting from bedside commode with Minimal Assistance for hygiene and clothing management.   Sitting at edge of bed x10 minutes with Minimal Assistance in prep for seated grooming tasks   Supine to sit with min A  Toilet transfer to bedside commode with Minimal Assistance.  Pt will perform LVAD management independently      Outcome: Ongoing, Progressing    Flora Arredondo OTR/L  Pager: [phone]  2/9/2020

## 2020-02-09 NOTE — ASSESSMENT & PLAN NOTE
- S/P DT HM3 with closure of AV and repair of MV for regurg 1/23/20 ([facility])  - CTS primary  - Taken back to OR 1/24 for possible RVAD placement which was not done. Patient remained hemodynamically stable in OR. Wash out on 1/27. Chest closed 1/29.   - CVP 14 on Lasix 10 mg/hr  - Currently hemodynamically stable since Epi weaned off ~ midnight  - TTE 2/3 at 5300 rpm: LVEDD 5.95, LVEF 10%, diastolic dysfunction, RV size normla, RV function mod depressed, IVC not well visualized, septum midline  - Current speed 5300  - INR therapeutic at 1.8. Consider bridging with heparin while on  coumadin  (goal 2.0-3.0)  - LD stable in the 300's  - PT/OT/VAD education    Procedure: Device Interrogation Including analysis of device parameters  Current Settings: Ventricular Assist Device  Review of device function is stable/unstabe    TXP LVAD INTERROGATIONS 2/9/2020 2/9/2020 2/9/2020 2/8/2020 2/8/2020 2/8/2020 2/8/2020   Type HeartMate3 HeartMate3 HeartMate3 HeartMate3 HeartMate3 HeartMate3 HeartMate3   Flow 3.9 4.3 4.0 4.1 4.1 4.1 4.1   Speed 5300 5300 5300 5300 5300 5300 5300   PI 4.4 3.4 4.3 3.7 4.0 4.8 4.1   Power (Berry) 3.8 3.8 3.8 3.7 3.7 3.8 3.7   [facility] - 4900 - 4900 4900 4900 4900   Pulsatility - Pulse Intermittent pulse Pulse Intermittent pulse Intermittent pulse Intermittent pulse

## 2020-02-09 NOTE — PT/OT/SLP PROGRESS
"Physical Therapy Treatment    Patient Name:  Tae Delgado   MRN:  [id number]  Admitting Diagnosis:  Acute on chronic combined systolic and diastolic heart failure   Recent Surgery: Procedure(s) (LRB):  CLOSURE, WOUND, STERNUM (N/A)  WASHOUT (N/A)  APPLICATION, WOUND VAC (N/A) 11 Days Post-Op  Admit Date: 1/9/2020  Length of Stay: 31 days    Recommendations:     Discharge Recommendations:  rehabilitation facility   Discharge Equipment Recommendations: other (see comments)(tbd)   Barriers to discharge: None    Assessment:     Tae Delgado is a 59 y.o. male admitted with a medical diagnosis of Acute on chronic combined systolic and diastolic heart failure.  He presents with the following impairments/functional limitations:  weakness, impaired endurance, impaired self care skills, impaired functional mobilty, gait instability, impaired balance, decreased lower extremity function, decreased upper extremity function, decreased safety awareness, pain, impaired cardiopulmonary response to activity. Pt tolerated session fairly today. Pt reports dissatisfaction with care yesterday stating his "butt went numb" and he was "Stuck" in bedside chair all day yesterday. Charge RN present to assist with management of complaint. After max encouragement from RN, significant other, charge RN, and PT/OT pt amenable to performing functional mobility on this date and transferring to bedside chair x 2 hours. NG tube removed by MD while pt seated EOB and pt req'd CGA to remain upright through process due to discomfort. Pt otherwise SBA for dynamic sitting balance. Pt demo's improved mechanics with sit <> stand transfers on this date but has poor motivation to perform standing balance tasks and attempts to sit down. Pt req's frequent redirection and cueing throughout standing balance in order to attend to task at hand. Pt was able to transfer to bedside chair with assist x 1 on this date but req'd max vc's and cueing to maintain sternal " "precautions as well as cueing for hip placement.    Rehab Prognosis: Good; patient would benefit from acute skilled PT services to address these deficits and reach maximum level of function.    Recent Surgery: Procedure(s) (LRB):  CLOSURE, WOUND, STERNUM (N/A)  WASHOUT (N/A)  APPLICATION, WOUND VAC (N/A) 11 Days Post-Op    Plan:     During this hospitalization, patient to be seen 6 x/week to address the identified rehab impairments via gait training, therapeutic activities, therapeutic exercises, neuromuscular re-education and progress toward the following goals:    · Plan of Care Expires:  02/29/20    Subjective     RN notified prior to session. Significant other present upon PT entrance into room.    Chief Complaint: "I am a man of my word. I will do it later. Not now." - pt referring [place] [facility]  Patient/Family Comments/goals: leave hospital  Pain/Comfort:  · Pain Rating 1: other (see comments)(did not rate or report location)  · Pain Addressed 1: Reposition, Distraction  · Pain Rating Post-Intervention 1: other (see comments)(same)      Objective:     Additional staff present: OT for co-treatment due to pt's safety concerns as well as extremely limited tolerance to activity requiring co-treatment    Patient found HOB elevated with: telemetry, oxygen, PICC line, LVAD, wound vac, NG tube   Mental Status: Patient is oriented to AxOx4 and follows single step commands. Patient is Alert and Cooperative during session.    General Precautions: Standard, Cardiac fall, LVAD, sternal   Orthopedic Precautions:N/A   Braces: N/A   Body mass index is 34.8 kg/m².  Oxygen Device: Nasal Cannula 3L    Outcome Measures:  AM-PAC 6 CLICK MOBILITY  Turning over in bed (including adjusting bedclothes, sheets and blankets)?: 2  Sitting down on and standing up from a chair with arms (e.g., wheelchair, bedside commode, etc.): 2  Moving from lying on back to sitting on the side of the bed?: 2  Moving to and from a bed to a chair " (including a wheelchair)?: 2  Need to walk in hospital room?: 2  Climbing 3-5 steps with a railing?: 1  Basic Mobility Total Score: 11     Functional Mobility:    Bed Mobility:   · Rolling/Turning to Left: minimum assistance  · Supine to Sit: maximal assistance; from Lt side of bed  · Assist for trunk to ascend to full upright  · Scooting anteriorly to EOB to have both feet planted on floor: maximal assistance    Sitting Balance at Edge of Bed:   Assistance Level Required: Stand-by Assistance and Contact Guard Assistance   Time: 15 min   Postural deviations noted: slouched posture, rounded shoulders and forward head    Transfers:   · Sit <> Stand Transfer: moderate assistance and of 2 persons with no assistive device   · Stand <> Sit Transfer: moderate assistance and of 2 persons with no assistive device   · [id number] from EOB   · Sit <> Stand Transfer: minimum assistance and of 2 persons with no assistive device   · Stand <> Sit Transfer: minimum assistance and of 2 persons with no assistive device   · [id number] from EOB  · Sit <> Stand Transfer: maximal assistance with no assistive device   · Stand <> Sit Transfer: maximal assistance with no assistive device   · [id number] from EOB  · Bed <> Chair Transfer: Stand Pivot technique with maximal assistance and contact guard assistance with no assistive device  · Chair on patient's Rt  · Max A x 1 for standing balance as well as assist with performing transfer  · CGA for cueing for pivot as well as guiding hips/maintaining sternal precautions    Standing Balance:   Assistance Level Required: Contact Guard Assistance and Minimal Assistance   Patient used: hand-held assist    Time: pt able to stand for approx 10-15 seconds   Comments: Pt appears to self-limit amount of time spent in standing    Education:   Time provided for education, counseling and discussion of health disposition in regards to patient's current status   All questions answered within PT scope of  practice and to patient's satisfaction   PT role in POC to address current functional deficits   Pt educated on proper body mechanics, safety techniques, and energy conservation with PT facilitation and cueing throughout session   Call nursing/pct to transfer to chair/use bathroom. Pt stated understanding.   Whiteboard updated with pt's current mobility status documented above   Safe to perform stand pivot transfer to/from chair/bedside commode assist x 2 and no AD w/ nursing/PCT present   Importance of OOB tolerance 2-4 hrs/day to improve lung ventilation and expansion as well as strengthen postural musculature    Pt and significant other developed mobility plan for pt with PT. Agreeable to progress time spent in chair by 1 hours each day, starting with 2 hours on this date. RN, Charge RN, and PCT all notified of plan and that pt is to return to bed at 1300. All parties in agreement.    Patient left up in chair with all lines intact, call button in reach, RN notified and significant other present.    GOALS:   Multidisciplinary Problems     Physical Therapy Goals        Problem: Physical Therapy Goal    Goal Priority Disciplines Outcome Goal Variances Interventions   Physical Therapy Goal     PT, PT/OT Ongoing, Progressing     Description:  Goals to be met by: 2020     Patient will increase functional independence with mobility by performin. Supine to sit with MInimal Assistance - not met  2. Rolling to Left and Right with Minimal Assistance.- not met  3. Sit to stand transfer with Moderate Assistance- not met  4. Sitting at edge of bed x8 minutes with Minimal Assistance - met 2020  4a. Sitting at EOB x 10 minutes with SBA- not met  5. Lower extremity exercise program x10 reps per handout, with assistance as needed- not met  6. Standing with no AD x 30 seconds CGA to assist with ADLs and self care- not met                     Time Tracking:     PT Received On: 20  PT Start Time: 5769      PT Stop Time: 1111  PT Total Time (min): 48 min       Billable Minutes:   · Therapeutic Activity 23 and Neuromuscular Re-education 15    Treatment Type: Treatment  PT/PTA: PT       Ann Rudd PT, DPT  2/9/2020  Pager: [phone]

## 2020-02-09 NOTE — SUBJECTIVE & OBJECTIVE
Interval History: Patient is not comfortable with NG tube.He wants it to be out     Continuous Infusions:  Scheduled Meds:   acetaminophen  650 mg Per NG tube Q6H    amiodarone  400 mg Per NG tube TID    amLODIPine  10 mg Per NG tube Daily    aspirin  325 mg Oral Daily    atorvastatin  80 mg Oral QHS    balsam peru-castor oil   Topical (Top) BID    digoxin  0.125 mg Oral Daily    docusate sodium  200 mg Oral QHS    ferrous gluconate  324 mg Oral Daily with breakfast    furosemide  40 mg Intravenous TID    magnesium oxide  800 mg Per NG tube TID    methocarbamol  500 mg Oral QID    oxyCODONE  5 mg Per NG tube Q6H    pantoprazole  40 mg Per NG tube Daily    polyethylene glycol  100 mL Per NG tube 6 times per day    polyethylene glycol  17 g Oral Daily    potassium chloride 10%  60 mEq Oral BID    sodium chloride 0.9%  10 mL Intravenous Q6H    sodium chloride 0.9%  3 mL Intravenous Q8H    warfarin  4 mg Oral Once     PRN Meds:albumin human 5%, albuterol sulfate, bisacodyL, Dextrose 10% Bolus, Dextrose 10% Bolus, hydrALAZINE, magnesium hydroxide 400 mg/5 ml, sodium chloride 0.9%, Flushing PICC Protocol **AND** sodium chloride 0.9% **AND** sodium chloride 0.9%    Review of patient's allergies indicates:   Allergen Reactions    Biopatch [chlorhexidine gluconate]      Site burning    Dobutamine in d5w      Tachycardia, tremors, SOB, flushing    Percocet [oxycodone-acetaminophen] Itching    Penicillins Rash     Cefepime given on 1/23/2020 without issue     Objective:     Vital Signs (Most Recent):  Temp: 98.1 °F (36.7 °C) (02/09/20 1152)  Pulse: 90 (02/09/20 1204)  Resp: 18 (02/09/20 1152)  BP: (!) 79/54 (02/09/20 1152)  SpO2: 95 % (02/09/20 1152) Vital Signs (24h Range):  Temp:  [97.5 °F (36.4 °C)-98.2 °F (36.8 °C)] 98.1 °F (36.7 °C)  Pulse:  [64-90] 90  Resp:  [16-18] 18  SpO2:  [93 %-97 %] 95 %  BP: (72-92)/(0-72) 79/54     Patient Vitals for the past 72 hrs (Last 3 readings):   Weight   02/07/20  0500 110 kg (242 lb 8.1 oz)     Body mass index is 34.8 kg/m².      Intake/Output Summary (Last 24 hours) at 2/9/2020 1257  Last data filed at 2/9/2020 1100  Gross per 24 hour   Intake 540 ml   Output 1900 ml   Net -1360 ml       Hemodynamic Parameters:       Telemetry: NSR    Physical Exam  Constitutional: He is oriented to person, place, and time. He appears well-developed and well-nourished.   HENT:   Head: Normocephalic and atraumatic.   Eyes: Pupils are equal, round, and reactive to light. Conjunctivae and EOM are normal.   Neck: Normal range of motion. Neck supple. JVD present. No thyromegaly present.    Cardiovascular: Normal rate and regular rhythm.   Smooth VAD hum   Pulmonary/Chest: Effort normal and breath sounds normal.   Abdominal: Soft. Bowel sounds are normal.   Musculoskeletal: Normal range of motion.    Neurological: He is alert and oriented to person, place, and time.   Skin: Skin is warm and dry. Capillary refill takes 2 to 3 seconds.   Psychiatric: He has a normal mood and affect. His behavior is normal. Judgment and thought content normal.  Significant Labs:  CBC:  Recent Labs   Lab 02/07/20  0400 02/08/20  0454 02/09/20  0400   WBC 18.41* 14.75* 12.34  12.34   RBC 3.47* 3.41* 3.36*  3.36*   HGB 10.0* 9.5* 9.2*  9.2*   HCT 31.3* 31.2* 30.8*  30.8*    349 292  292   MCV 90 92 92  92   MCH 28.8 27.9 27.4  27.4   MCHC 31.9* 30.4* 29.9*  29.9*     BNP:  Recent Labs   Lab 02/05/20  0300 02/07/20  0400 02/09/20  0400   * 339* 275*     CMP:  Recent Labs   Lab 02/07/20  0400  02/08/20  0454 02/08/20  1243 02/09/20  0013 02/09/20  0400   *  --  119*  --   --  102  102  102   CALCIUM 8.8  --  8.7  --   --  8.5*  8.5*  8.5*   ALBUMIN 2.4*  2.4*  --  2.4*  --   --  2.4*  2.4*   PROT 6.4  6.4  --  6.3  --   --  6.1  6.1     --  139  --   --  139  139  139   K 3.9  3.9   < > 3.5 3.4* 3.2* 3.7  3.7  3.7  3.7  3.7  3.7  3.7   CO2 24  --  29  --   --  31*  31*   31*     --  99  --   --  97  97  97   BUN 47*  --  45*  --   --  44*  44*  44*   CREATININE 1.7*  --  1.8*  --   --  1.7*  1.7*  1.7*   ALKPHOS 63  63  --  64  --   --  64  63   ALT 19  19  --  19  --   --  19  16   AST 26  26  --  23  --   --  25  23   BILITOT 0.5  0.5  --  0.6  --   --  0.7  0.7    < > = values in this interval not displayed.      Coagulation:   Recent Labs   Lab 02/07/20  0400 02/08/20  0454 02/09/20  0400   INR 2.7* 2.5* 1.8*  1.8*  1.8*   APTT 35.7* 34.8* 30.7     LDH:  Recent Labs   Lab 02/07/20  0400 02/08/20  0454 02/09/20 0400   * 276* 260  260     Microbiology:  Microbiology Results (last 7 days)     Procedure Component Value Units Date/Time    Blood culture [[id number]] Collected:  01/31/20 2215    Order Status:  Completed Specimen:  Blood from Peripheral, Forearm, Right Updated:  02/06/20 0612     Blood Culture, Routine No growth after 5 days.    Blood culture [[id number]] Collected:  01/31/20 2220    Order Status:  Completed Specimen:  Blood from Peripheral, Wrist, Left Updated:  02/06/20 0612     Blood Culture, Routine No growth after 5 days.          I have reviewed all pertinent labs within the past 24 hours.    Estimated Creatinine Clearance: 58.1 mL/min (A) (based on SCr of 1.7 mg/dL (H)).    Diagnostic Results:  I have reviewed all pertinent imaging results/findings within the past 24 hours.

## 2020-02-09 NOTE — PLAN OF CARE
Problem: Adult Inpatient Plan of Care  Goal: Plan of Care Review  Outcome: Ongoing, Progressing  Goal: Patient-Specific Goal (Individualization)  Description  PmHx: Afib, HLD, HTN, CKD III, LV thrombus (coumadin), AICD, NICM 2010, Embolic CVA     1/9: Admit to floor, volume overload, diuresis  1/10: Nonsustained VT  1/11: liver US unremarkable   1/16: LHC EF 15%; transfer to SICU for [place]/LVAD workup. Heparin gtt  1/17: NO increased to 15 ppm. Epi started 0.02, pt did not tolerate. Discontinued. Approved for LVAD on 1/21 1/20: IJ triple lumen placed, echo of heart, EF 15%  1/21: LVAD procedure aborted due to clot in L atrial appendage; intubated and sedated; lasix and heparin gtt started  1/22: JACINTO  1/23: HM 3  1/24: chest closed and re-opened. A.fib/ flutter, amio bolus, lidocaine x3 and dig x2, lidocaine drip started. Diuril   1/27: OR for washout & strut removal unable to close, lido bolus x1  1/28: 750 ml albumin  1//29: Chest closed, 250 albumin  1/31: elevated temp, blood cultures  2/1: Extubated. L cordis removed. TF restarted  2/4: Nitric off    Nursing:  MAP 60-85    Q6 K, Mg  Accu Q4h  Betadine and water for LVAD dressing changes               Outcome: Ongoing, Progressing  Goal: Absence of Hospital-Acquired Illness or Injury  Outcome: Ongoing, Progressing  Goal: Optimal Comfort and Wellbeing  Outcome: Ongoing, Progressing  Goal: Readiness for Transition of Care  Outcome: Ongoing, Progressing  Goal: Rounds/Family Conference  Outcome: Ongoing, Progressing     Problem: Fall Injury Risk  Goal: Absence of Fall and Fall-Related Injury  Outcome: Ongoing, Progressing     Problem: Fluid Volume Excess  Goal: Fluid Balance  Outcome: Ongoing, Progressing     Problem: Infection  Goal: Infection Symptom Resolution  Outcome: Ongoing, Progressing     Problem: Adjustment to Illness (Heart Failure)  Goal: Optimal Coping  Outcome: Ongoing, Progressing     Problem: Arrhythmia/Dysrhythmia (Heart Failure)  Goal: Stable Heart  Rate and Rhythm  Outcome: Ongoing, Progressing     Problem: Cardiac Output Decreased (Heart Failure)  Goal: Optimal Cardiac Output  Outcome: Ongoing, Progressing     Problem: Fluid Imbalance (Heart Failure)  Goal: Fluid Balance  Outcome: Ongoing, Progressing     Problem: Functional Ability Impaired (Heart Failure)  Goal: Optimal Functional Ability  Outcome: Ongoing, Progressing     Problem: Oral Intake Inadequate (Heart Failure)  Goal: Optimal Nutrition Intake  Outcome: Ongoing, Progressing     Problem: Respiratory Compromise (Heart Failure)  Goal: Effective Oxygenation and Ventilation  Outcome: Ongoing, Progressing     Problem: Sleep Disordered Breathing (Heart Failure)  Goal: Effective Breathing Pattern During Sleep  Outcome: Ongoing, Progressing     Problem: Cardiac Output Decreased  Goal: Effective Cardiac Output  Outcome: Ongoing, Progressing     Problem: Coping Ineffective  Goal: Effective Coping  Outcome: Ongoing, Progressing     Problem: Skin Injury Risk Increased  Goal: Skin Health and Integrity  Outcome: Ongoing, Progressing     Problem: Adjustment to Device (Ventricular Assist Device)  Goal: Optimal Adjustment to Device  Outcome: Ongoing, Progressing     Problem: Bleeding (Ventricular Assist Device)  Goal: Absence of Bleeding  Outcome: Ongoing, Progressing     Problem: Embolism (Ventricular Assist Device)  Goal: Absence of Embolism Signs/Symptoms  Outcome: Ongoing, Progressing     Problem: Hemodynamic Instability (Ventricular Assist Device)  Goal: Optimal Blood Flow  Outcome: Ongoing, Progressing     Problem: Infection (Ventricular Assist Device)  Goal: Absence of Infection Signs/Symptoms  Outcome: Ongoing, Progressing     Problem: Right-Sided Heart Compromise (Ventricular Assist Device)  Goal: Effective Right-Sided Heart Function  Outcome: Ongoing, Progressing     Problem: Wound  Goal: Optimal Wound Healing  Outcome: Ongoing, Progressing     Problem: Aspiration (Enteral Nutrition)  Goal: Absence of  Aspiration Signs/Symptoms  Outcome: Ongoing, Progressing     Problem: Device-Related Complication Risk (Enteral Nutrition)  Goal: Safe, Effective Therapy Delivery  Outcome: Ongoing, Progressing     Problem: Feeding Intolerance (Enteral Nutrition)  Goal: Feeding Tolerance  Outcome: Ongoing, Progressing            Plan of care updated with patient and spouse. No falls during shift. DTI to R nare and R elbow - venelex applied. LVAD hum smooth, dressing CDI, no alarms throughout shift. Dressing to be changed daily with betadine and water - night shift RN to change. Addressed all issues throughout shift.

## 2020-02-09 NOTE — ASSESSMENT & PLAN NOTE
Tae Delgado is a 59 y.o. male with heart failure who underwent LVAD placement (1/23/20) then closure (1/24/20) and repeat chest opening (1/24/20) for poor RV function; chest washout (1/27/20); chest closure (1/29/20); completed 2 wks of vanc.  --continue amio, norvasc, asa, statin and dig  --d/c lasix gtt and start lasix 40 tid  --coumadin 4, given drop in INR  --continue to monitor renal labs and leukocytosis

## 2020-02-09 NOTE — SUBJECTIVE & OBJECTIVE
Subjective:     Post-Op Info:  Procedure(s) (LRB):  CLOSURE, WOUND, STERNUM (N/A)  WASHOUT (N/A)  APPLICATION, WOUND VAC (N/A)   11 Days Post-Op      Tae Delgado is a 59 y.o. male with heart failure who underwent LVAD placement (1/23/20) then closure (1/24/20) and repeat chest opening (1/24/20) for poor RV function; chest washout (1/27/20); chest closure (1/29/20)     Interval History: really wants NGT out; OOBTC;         Medications:  Continuous Infusions:  Scheduled Meds:   acetaminophen  650 mg Per NG tube Q6H    amiodarone  400 mg Per NG tube TID    amLODIPine  10 mg Per NG tube Daily    aspirin  325 mg Oral Daily    atorvastatin  80 mg Oral QHS    balsam peru-castor oil   Topical (Top) BID    digoxin  0.125 mg Oral Daily    docusate sodium  200 mg Oral QHS    ferrous gluconate  324 mg Oral Daily with breakfast    furosemide  40 mg Intravenous TID    magnesium oxide  800 mg Per NG tube TID    methocarbamol  500 mg Oral QID    oxyCODONE  5 mg Per NG tube Q6H    pantoprazole  40 mg Per NG tube Daily    polyethylene glycol  100 mL Per NG tube 6 times per day    polyethylene glycol  17 g Oral Daily    potassium chloride 10%  60 mEq Oral BID    sodium chloride 0.9%  10 mL Intravenous Q6H    sodium chloride 0.9%  3 mL Intravenous Q8H    warfarin  4 mg Oral Once     PRN Meds:albumin human 5%, albuterol sulfate, bisacodyL, Dextrose 10% Bolus, Dextrose 10% Bolus, hydrALAZINE, magnesium hydroxide 400 mg/5 ml, sodium chloride 0.9%, Flushing PICC Protocol **AND** sodium chloride 0.9% **AND** sodium chloride 0.9%     Objective:     Vital Signs (Most Recent):  Temp: 98.1 °F (36.7 °C) (02/09/20 1152)  Pulse: 90 (02/09/20 1204)  Resp: 18 (02/09/20 1152)  BP: (!) 75/51 (02/09/20 1300)  SpO2: 95 % (02/09/20 1152) Vital Signs (24h Range):  Temp:  [97.5 °F (36.4 °C)-98.2 °F (36.8 °C)] 98.1 °F (36.7 °C)  Pulse:  [64-90] 90  Resp:  [16-18] 18  SpO2:  [93 %-97 %] 95 %  BP: (72-92)/(0-72) 75/51       Intake/Output  Summary (Last 24 hours) at 2/9/2020 1353  Last data filed at 2/9/2020 1300  Gross per 24 hour   Intake 360 ml   Output 1800 ml   Net -1440 ml       Physical Exam   Constitutional: He appears well-developed and well-nourished.   HENT:   Head: Normocephalic and atraumatic.   Cardiovascular: Normal rate.   Pulmonary/Chest: Effort normal.   Abdominal: Soft.       Significant Labs:  BMP:   Recent Labs   Lab 02/09/20  0400     102  102     139  139   K 3.7  3.7  3.7  3.7  3.7  3.7  3.7   CL 97  97  97   CO2 31*  31*  31*   BUN 44*  44*  44*   CREATININE 1.7*  1.7*  1.7*   CALCIUM 8.5*  8.5*  8.5*   MG 1.8  1.8  1.8     CBC:   Recent Labs   Lab 02/09/20  0400   WBC 12.34  12.34   RBC 3.36*  3.36*   HGB 9.2*  9.2*   HCT 30.8*  30.8*     292   MCV 92  92   MCH 27.4  27.4   MCHC 29.9*  29.9*     LFTs:   Recent Labs   Lab 02/09/20  0400   ALT 19  16   AST 25  23   ALKPHOS 64  63   BILITOT 0.7  0.7   PROT 6.1  6.1   ALBUMIN 2.4*  2.4*       Significant Diagnostics:      Procedure: Device Interrogation Including analysis of device parameters  Current Settings: Ventricular Assist Device  Review of device function is stable  TXP LVAD INTERROGATIONS 2/9/2020 2/9/2020 2/9/2020 2/9/2020 2/8/2020 2/8/2020 2/8/2020   Type HeartMate3 HeartMate3 HeartMate3 HeartMate3 HeartMate3 HeartMate3 HeartMate3   Flow 4.1 3.9 4.3 4.0 4.1 4.1 4.1   Speed 5300 5300 5300 5300 5300 5300 5300   PI 4.7 4.4 3.4 4.3 3.7 4.0 4.8   Power (Berry) 3.8 3.8 3.8 3.8 3.7 3.7 3.8   LSL - - 4900 - 4900 4900 4900   Pulsatility - - Pulse Intermittent pulse Pulse Intermittent pulse Intermittent pulse

## 2020-02-10 LAB
ALBUMIN SERPL BCP-MCNC: 2.5 G/DL (ref 3.5–5.2)
ALBUMIN SERPL BCP-MCNC: 2.6 G/DL (ref 3.5–5.2)
ALBUMIN SERPL BCP-MCNC: 2.6 G/DL (ref 3.5–5.2)
ALP SERPL-CCNC: 67 U/L (ref 55–135)
ALP SERPL-CCNC: 69 U/L (ref 55–135)
ALP SERPL-CCNC: 71 U/L (ref 55–135)
ALT SERPL W/O P-5'-P-CCNC: 19 U/L (ref 10–44)
ALT SERPL W/O P-5'-P-CCNC: 19 U/L (ref 10–44)
ALT SERPL W/O P-5'-P-CCNC: 20 U/L (ref 10–44)
ANION GAP SERPL CALC-SCNC: 10 MMOL/L (ref 8–16)
ANION GAP SERPL CALC-SCNC: 10 MMOL/L (ref 8–16)
ASCENDING AORTA: 4.23 CM
AST SERPL-CCNC: 24 U/L (ref 10–40)
AST SERPL-CCNC: 26 U/L (ref 10–40)
AST SERPL-CCNC: 29 U/L (ref 10–40)
BASOPHILS # BLD AUTO: 0.04 K/UL (ref 0–0.2)
BASOPHILS NFR BLD: 0.4 % (ref 0–1.9)
BILIRUB DIRECT SERPL-MCNC: 0.4 MG/DL (ref 0.1–0.3)
BILIRUB SERPL-MCNC: 0.6 MG/DL (ref 0.1–1)
BILIRUB SERPL-MCNC: 0.7 MG/DL (ref 0.1–1)
BILIRUB SERPL-MCNC: 0.7 MG/DL (ref 0.1–1)
BNP SERPL-MCNC: 317 PG/ML (ref 0–99)
BSA FOR ECHO PROCEDURE: 2.33 M2
BUN SERPL-MCNC: 37 MG/DL (ref 6–20)
BUN SERPL-MCNC: 39 MG/DL (ref 6–20)
CALCIUM SERPL-MCNC: 8.5 MG/DL (ref 8.7–10.5)
CALCIUM SERPL-MCNC: 8.5 MG/DL (ref 8.7–10.5)
CHLORIDE SERPL-SCNC: 95 MMOL/L (ref 95–110)
CHLORIDE SERPL-SCNC: 98 MMOL/L (ref 95–110)
CO2 SERPL-SCNC: 30 MMOL/L (ref 23–29)
CO2 SERPL-SCNC: 30 MMOL/L (ref 23–29)
COMMENT: NORMAL
CREAT SERPL-MCNC: 1.6 MG/DL (ref 0.5–1.4)
CREAT SERPL-MCNC: 1.7 MG/DL (ref 0.5–1.4)
CRP SERPL-MCNC: 39.7 MG/L (ref 0–8.2)
CV ECHO LV RWT: 0.38 CM
DIFFERENTIAL METHOD: ABNORMAL
DOP CALC LVOT AREA: 3.5 CM2
DOP CALC LVOT DIAMETER: 2.12 CM
ECHO LV POSTERIOR WALL: 0.95 CM (ref 0.6–1.1)
EOSINOPHIL # BLD AUTO: 0.2 K/UL (ref 0–0.5)
EOSINOPHIL NFR BLD: 1.5 % (ref 0–8)
ERYTHROCYTE [DISTWIDTH] IN BLOOD BY AUTOMATED COUNT: 19.3 % (ref 11.5–14.5)
EST. GFR  (AFRICAN AMERICAN): 49.9 ML/MIN/1.73 M^2
EST. GFR  (AFRICAN AMERICAN): 53.7 ML/MIN/1.73 M^2
EST. GFR  (NON AFRICAN AMERICAN): 43.2 ML/MIN/1.73 M^2
EST. GFR  (NON AFRICAN AMERICAN): 46.5 ML/MIN/1.73 M^2
FINAL PATHOLOGIC DIAGNOSIS: NORMAL
FRACTIONAL SHORTENING: 8 % (ref 28–44)
GLUCOSE SERPL-MCNC: 109 MG/DL (ref 70–110)
GLUCOSE SERPL-MCNC: 111 MG/DL (ref 70–110)
GROSS: NORMAL
HCT VFR BLD AUTO: 29.6 % (ref 40–54)
HGB BLD-MCNC: 8.9 G/DL (ref 14–18)
IMM GRANULOCYTES # BLD AUTO: 0.18 K/UL (ref 0–0.04)
IMM GRANULOCYTES NFR BLD AUTO: 1.7 % (ref 0–0.5)
INR PPP: 2.3 (ref 0.8–1.2)
INTERVENTRICULAR SEPTUM: 0.77 CM (ref 0.6–1.1)
LA MAJOR: 5.71 CM
LA MINOR: 4.93 CM
LA WIDTH: 4.27 CM
LDH SERPL L TO P-CCNC: 258 U/L (ref 110–260)
LEFT ATRIUM SIZE: 4.86 CM
LEFT ATRIUM VOLUME INDEX: 41.2 ML/M2
LEFT ATRIUM VOLUME: 93.34 CM3
LEFT INTERNAL DIMENSION IN SYSTOLE: 4.62 CM (ref 2.1–4)
LEFT VENTRICLE DIASTOLIC VOLUME INDEX: 52.5 ML/M2
LEFT VENTRICLE DIASTOLIC VOLUME: 118.84 ML
LEFT VENTRICLE MASS INDEX: 66 G/M2
LEFT VENTRICLE SYSTOLIC VOLUME INDEX: 43.5 ML/M2
LEFT VENTRICLE SYSTOLIC VOLUME: 98.43 ML
LEFT VENTRICULAR INTERNAL DIMENSION IN DIASTOLE: 5.01 CM (ref 3.5–6)
LEFT VENTRICULAR MASS: 149.59 G
LYMPHOCYTES # BLD AUTO: 1.3 K/UL (ref 1–4.8)
LYMPHOCYTES NFR BLD: 12.2 % (ref 18–48)
MAGNESIUM SERPL-MCNC: 2.3 MG/DL (ref 1.6–2.6)
MAGNESIUM SERPL-MCNC: 2.3 MG/DL (ref 1.6–2.6)
MCH RBC QN AUTO: 27.7 PG (ref 27–31)
MCHC RBC AUTO-ENTMCNC: 30.1 G/DL (ref 32–36)
MCV RBC AUTO: 92 FL (ref 82–98)
MONOCYTES # BLD AUTO: 0.8 K/UL (ref 0.3–1)
MONOCYTES NFR BLD: 7.8 % (ref 4–15)
NEUTROPHILS # BLD AUTO: 8 K/UL (ref 1.8–7.7)
NEUTROPHILS NFR BLD: 76.4 % (ref 38–73)
NRBC BLD-RTO: 0 /100 WBC
PHOSPHATE SERPL-MCNC: 3 MG/DL (ref 2.7–4.5)
PISA TR MAX VEL: 2.29 M/S
PLATELET # BLD AUTO: 281 K/UL (ref 150–350)
PMV BLD AUTO: 11 FL (ref 9.2–12.9)
POTASSIUM SERPL-SCNC: 3.7 MMOL/L (ref 3.5–5.1)
POTASSIUM SERPL-SCNC: 4.5 MMOL/L (ref 3.5–5.1)
PREALB SERPL-MCNC: 19 MG/DL (ref 20–43)
PROT SERPL-MCNC: 6 G/DL (ref 6–8.4)
PROT SERPL-MCNC: 6.3 G/DL (ref 6–8.4)
PROT SERPL-MCNC: 6.4 G/DL (ref 6–8.4)
PROTHROMBIN TIME: 21.6 SEC (ref 9–12.5)
RA MAJOR: 5.54 CM
RA PRESSURE: 15 MMHG
RA WIDTH: 3.58 CM
RBC # BLD AUTO: 3.21 M/UL (ref 4.6–6.2)
RIGHT VENTRICULAR END-DIASTOLIC DIMENSION: 3.88 CM
SINUS: 3.7 CM
SODIUM SERPL-SCNC: 135 MMOL/L (ref 136–145)
SODIUM SERPL-SCNC: 138 MMOL/L (ref 136–145)
STJ: 3.7 CM
TDI LATERAL: 0.1 M/S
TDI SEPTAL: 0.06 M/S
TDI: 0.08 M/S
TR MAX PG: 21 MMHG
TRICUSPID ANNULAR PLANE SYSTOLIC EXCURSION: 1.51 CM
TV REST PULMONARY ARTERY PRESSURE: 36 MMHG
WBC # BLD AUTO: 10.45 K/UL (ref 3.9–12.7)

## 2020-02-10 PROCEDURE — 80053 COMPREHEN METABOLIC PANEL: CPT

## 2020-02-10 PROCEDURE — 63600175 PHARM REV CODE 636 W HCPCS: Performed by: PHYSICIAN ASSISTANT

## 2020-02-10 PROCEDURE — 83880 ASSAY OF NATRIURETIC PEPTIDE: CPT

## 2020-02-10 PROCEDURE — 85025 COMPLETE CBC W/AUTO DIFF WBC: CPT

## 2020-02-10 PROCEDURE — 97112 NEUROMUSCULAR REEDUCATION: CPT

## 2020-02-10 PROCEDURE — 25000003 PHARM REV CODE 250: Performed by: PHYSICIAN ASSISTANT

## 2020-02-10 PROCEDURE — 27000248 HC VAD-ADDITIONAL DAY

## 2020-02-10 PROCEDURE — 99233 PR SUBSEQUENT HOSPITAL CARE,LEVL III: ICD-10-PCS | Mod: ,,, | Performed by: INTERNAL MEDICINE

## 2020-02-10 PROCEDURE — 84100 ASSAY OF PHOSPHORUS: CPT

## 2020-02-10 PROCEDURE — 25000003 PHARM REV CODE 250: Performed by: STUDENT IN AN ORGANIZED HEALTH CARE EDUCATION/TRAINING PROGRAM

## 2020-02-10 PROCEDURE — 86140 C-REACTIVE PROTEIN: CPT

## 2020-02-10 PROCEDURE — 85610 PROTHROMBIN TIME: CPT

## 2020-02-10 PROCEDURE — 99233 SBSQ HOSP IP/OBS HIGH 50: CPT | Mod: ,,, | Performed by: INTERNAL MEDICINE

## 2020-02-10 PROCEDURE — A4216 STERILE WATER/SALINE, 10 ML: HCPCS | Performed by: STUDENT IN AN ORGANIZED HEALTH CARE EDUCATION/TRAINING PROGRAM

## 2020-02-10 PROCEDURE — 63700000 PHARM REV CODE 250 ALT 637 W/O HCPCS: Performed by: STUDENT IN AN ORGANIZED HEALTH CARE EDUCATION/TRAINING PROGRAM

## 2020-02-10 PROCEDURE — 97535 SELF CARE MNGMENT TRAINING: CPT

## 2020-02-10 PROCEDURE — 92526 ORAL FUNCTION THERAPY: CPT

## 2020-02-10 PROCEDURE — 83735 ASSAY OF MAGNESIUM: CPT | Mod: 91

## 2020-02-10 PROCEDURE — 93750 PR INTERROGATE VENT ASSIST DEV, IN PERSON, W PHYSICIAN ANALYSIS: ICD-10-PCS | Mod: ,,, | Performed by: INTERNAL MEDICINE

## 2020-02-10 PROCEDURE — 97530 THERAPEUTIC ACTIVITIES: CPT

## 2020-02-10 PROCEDURE — 25000003 PHARM REV CODE 250: Performed by: SURGERY

## 2020-02-10 PROCEDURE — 80076 HEPATIC FUNCTION PANEL: CPT

## 2020-02-10 PROCEDURE — 84134 ASSAY OF PREALBUMIN: CPT

## 2020-02-10 PROCEDURE — 83615 LACTATE (LD) (LDH) ENZYME: CPT

## 2020-02-10 PROCEDURE — 93750 INTERROGATION VAD IN PERSON: CPT | Mod: ,,, | Performed by: INTERNAL MEDICINE

## 2020-02-10 PROCEDURE — 20600001 HC STEP DOWN PRIVATE ROOM

## 2020-02-10 PROCEDURE — 63600175 PHARM REV CODE 636 W HCPCS: Performed by: SURGERY

## 2020-02-10 PROCEDURE — 63600175 PHARM REV CODE 636 W HCPCS: Performed by: STUDENT IN AN ORGANIZED HEALTH CARE EDUCATION/TRAINING PROGRAM

## 2020-02-10 RX ORDER — WARFARIN 3 MG/1
3 TABLET ORAL ONCE
Status: COMPLETED | OUTPATIENT
Start: 2020-02-10 | End: 2020-02-10

## 2020-02-10 RX ORDER — FUROSEMIDE 10 MG/ML
80 INJECTION INTRAMUSCULAR; INTRAVENOUS 3 TIMES DAILY
Status: DISCONTINUED | OUTPATIENT
Start: 2020-02-10 | End: 2020-02-14

## 2020-02-10 RX ADMIN — POTASSIUM CHLORIDE 60 MEQ: 20 SOLUTION ORAL at 09:02

## 2020-02-10 RX ADMIN — Medication 10 ML: at 06:02

## 2020-02-10 RX ADMIN — ASPIRIN 325 MG ORAL TABLET 325 MG: 325 PILL ORAL at 09:02

## 2020-02-10 RX ADMIN — Medication 3 ML: at 06:02

## 2020-02-10 RX ADMIN — FUROSEMIDE 40 MG: 10 INJECTION, SOLUTION INTRAMUSCULAR; INTRAVENOUS at 09:02

## 2020-02-10 RX ADMIN — Medication 10 ML: at 12:02

## 2020-02-10 RX ADMIN — METHOCARBAMOL TABLETS 500 MG: 500 TABLET, COATED ORAL at 05:02

## 2020-02-10 RX ADMIN — Medication 800 MG: at 10:02

## 2020-02-10 RX ADMIN — Medication 400 MG: at 09:02

## 2020-02-10 RX ADMIN — Medication 400 MG: at 10:02

## 2020-02-10 RX ADMIN — FUROSEMIDE 80 MG: 10 INJECTION, SOLUTION INTRAMUSCULAR; INTRAVENOUS at 03:02

## 2020-02-10 RX ADMIN — METHOCARBAMOL TABLETS 500 MG: 500 TABLET, COATED ORAL at 10:02

## 2020-02-10 RX ADMIN — METHOCARBAMOL TABLETS 500 MG: 500 TABLET, COATED ORAL at 09:02

## 2020-02-10 RX ADMIN — POTASSIUM CHLORIDE 60 MEQ: 20 SOLUTION ORAL at 10:02

## 2020-02-10 RX ADMIN — CASTOR OIL AND BALSAM, PERU: 788; 87 OINTMENT TOPICAL at 09:02

## 2020-02-10 RX ADMIN — Medication 800 MG: at 09:02

## 2020-02-10 RX ADMIN — Medication 400 MG: at 04:02

## 2020-02-10 RX ADMIN — ACETAMINOPHEN 650 MG: 160 SOLUTION ORAL at 10:02

## 2020-02-10 RX ADMIN — AMLODIPINE BESYLATE 10 MG: 10 TABLET ORAL at 09:02

## 2020-02-10 RX ADMIN — ATORVASTATIN CALCIUM 80 MG: 20 TABLET, FILM COATED ORAL at 10:02

## 2020-02-10 RX ADMIN — Medication 800 MG: at 05:02

## 2020-02-10 RX ADMIN — Medication 3 ML: at 02:02

## 2020-02-10 RX ADMIN — CASTOR OIL AND BALSAM, PERU: 788; 87 OINTMENT TOPICAL at 10:02

## 2020-02-10 RX ADMIN — OXYCODONE HYDROCHLORIDE 5 MG: 5 SOLUTION ORAL at 11:02

## 2020-02-10 RX ADMIN — PANTOPRAZOLE SODIUM 40 MG: 40 GRANULE, DELAYED RELEASE ORAL at 09:02

## 2020-02-10 RX ADMIN — FUROSEMIDE 80 MG: 10 INJECTION, SOLUTION INTRAMUSCULAR; INTRAVENOUS at 10:02

## 2020-02-10 RX ADMIN — FERROUS GLUCONATE TAB 324 MG (37.5 MG ELEMENTAL IRON) 324 MG: 324 (37.5 FE) TAB at 09:02

## 2020-02-10 RX ADMIN — OXYCODONE HYDROCHLORIDE 5 MG: 5 SOLUTION ORAL at 05:02

## 2020-02-10 RX ADMIN — ACETAMINOPHEN 650 MG: 160 SOLUTION ORAL at 11:02

## 2020-02-10 RX ADMIN — ACETAMINOPHEN 650 MG: 160 SOLUTION ORAL at 05:02

## 2020-02-10 RX ADMIN — OXYCODONE HYDROCHLORIDE 5 MG: 5 SOLUTION ORAL at 10:02

## 2020-02-10 RX ADMIN — DIGOXIN 0.12 MG: 125 TABLET ORAL at 09:02

## 2020-02-10 RX ADMIN — METHOCARBAMOL TABLETS 500 MG: 500 TABLET, COATED ORAL at 12:02

## 2020-02-10 RX ADMIN — WARFARIN SODIUM 3 MG: 3 TABLET ORAL at 05:02

## 2020-02-10 NOTE — PLAN OF CARE
Goals reviewed and patient has met 1 goal at this time.  Leslee Terrell OTR/L  Pager #: [phone]  2/10/2020    Problem: Occupational Therapy Goal  Goal: Occupational Therapy Goal  Description  Goals to be met by: 2/16/2020     Patient will increase functional independence with ADLs by performing:    UE Dressing with Minimal Assistance.  LE Dressing with Minimal Assistance.  Grooming while standing with Minimal Assistance.  Toileting from bedside commode with Minimal Assistance for hygiene and clothing management.   Sitting at edge of bed x10 minutes with Minimal Assistance in prep for seated grooming tasks- GOAL MET 2/10  Supine to sit with min A  Toilet transfer to bedside commode with Minimal Assistance.  Pt will perform LVAD management independently       Outcome: Ongoing, Progressing

## 2020-02-10 NOTE — PLAN OF CARE
"Patient remains free from falls and injuries through out shift. Patient is AAOx4 and  VSS. Pt denies chest pain and SOB, but did c/o HA that he rated "4/10" and MD notified, see notes. LVAD (HM3) is functioning WNL, w/o any acute events or alarms thus far on shift. LVAD #'s, dopplers and MAPs WNL. Patient is daily betaine and NS dressing changes, next dressing change is due 2/10. Current LVAD dressing is CDI. Pt and spouse still need check off on alarms and dressing changes. MSI has Prevena Woundvac in place with seal intact. Velenax placed to DTI.  Lasix gtt, NG tube and CT d/c'd 2/9. Transition to Lasix IVP 40mg TID, UOP adequate on shift (see flowsheets). Golytely schedule to be given q4hr, but patient has been refusing. SLP, PT/OT are following. CVP 15mmHg this morning with good waveform. Pt has been tolerating thin liquids well. PO electrolyte replacement given, will f/u with morning labs. MG and CMP to be collected BID. Patient's family at bedside. POC is to cont LVAD education and working with PT/OT. Plan of care reviewed with patient. Call bell in reach. Patient verbalizes understanding of plan.  Will continue to monitor. Plan of care discussed with patient.           "

## 2020-02-10 NOTE — PT/OT/SLP PROGRESS
Physical Therapy Treatment    Patient Name:  Tae Delgado   MRN:  [id number]  Admitting Diagnosis:  LVAD (left ventricular assist device) present   Recent Surgery: Procedure(s) (LRB):  CLOSURE, WOUND, STERNUM (N/A)  WASHOUT (N/A)  APPLICATION, WOUND VAC (N/A) 12 Days Post-Op  Admit Date: 1/9/2020  Length of Stay: 32 days    Recommendations:     Discharge Recommendations:  rehabilitation facility   Discharge Equipment Recommendations: other (see comments)(tbd)   Barriers to discharge: None    Assessment:     Tae Delgado is a 59 y.o. male admitted with a medical diagnosis of LVAD (left ventricular assist device) present.  He presents with the following impairments/functional limitations:  weakness, impaired endurance, impaired functional mobilty, gait instability, impaired self care skills, impaired balance, decreased coordination, decreased upper extremity function, decreased lower extremity function, decreased safety awareness, impaired coordination, impaired fine motor, impaired cardiopulmonary response to activity. Pt tolerated session well today. At start of session pt and PT developed plan for the day and expectations for time spent OOB on this date. Pt was agreeable with plan set forth. Pt demo's continued improvements with sitting balance and is able to now perform dual tasks while EOB, as seen by ability to switch from wall power to battery power while pt sat EOB. Pt still demo's significant LE weakness as well as safety concerns as seen by level of assist required for all transfers at this time. Pt req's max cueing to not use arms and demo's poor safety awareness as pt attempts to sit down without alerting PT prior, causing severe safety risks. Pt is an excellent candidate for inpatient rehabilitation, as pt has a qualifying diagnosis, displays good activity tolerance, has experienced decline from PLOF, has good family support, and is motivated to participate in therapy.     Rehab Prognosis: Good; patient  "would benefit from acute skilled PT services to address these deficits and reach maximum level of function.    Recent Surgery: Procedure(s) (LRB):  CLOSURE, WOUND, STERNUM (N/A)  WASHOUT (N/A)  APPLICATION, WOUND VAC (N/A) 12 Days Post-Op    Plan:     During this hospitalization, patient to be seen 6 x/week to address the identified rehab impairments via gait training, therapeutic activities, therapeutic exercises, neuromuscular re-education and progress toward the following goals:    · Plan of Care Expires:  02/29/20    Subjective     RN notified prior to session. Significant other present upon PT entrance into room.    Chief Complaint: "Give me a minute"  Patient/Family Comments/goals: get stronger  Pain/Comfort:  · Pain Rating 1: 0/10  · Pain Addressed 1: Pre-medicate for activity  · Pain Rating Post-Intervention 1: 0/10      Objective:     Additional staff present: OT arrived after start of session for co-treat due to pt's continued safety concerns with mobility as well as extremely poor tolerance to activity making a co-treat necessary    Patient found HOB elevated with: wound vac, telemetry, LVAD   Mental Status: Patient is oriented to AxOx4 and follows single step commands. Patient is Alert and Cooperative during session.    General Precautions: Standard, Cardiac fall, sternal   Orthopedic Precautions:N/A   Braces: N/A   Body mass index is 34.72 kg/m².  Oxygen Device: Room Air    Outcome Measures:  AM-PAC 6 CLICK MOBILITY  Turning over in bed (including adjusting bedclothes, sheets and blankets)?: 2  Sitting down on and standing up from a chair with arms (e.g., wheelchair, bedside commode, etc.): 2  Moving from lying on back to sitting on the side of the bed?: 2  Moving to and from a bed to a chair (including a wheelchair)?: 2  Need to walk in hospital room?: 1  Climbing 3-5 steps with a railing?: 1  Basic Mobility Total Score: 10     Functional Mobility:    Bed Mobility:   · Supine to Sit: moderate " assistance; from Lt side of bed  · Scooting anteriorly to EOB to have both feet planted on floor: moderate assistance and maximal assistance    Sitting Balance at Edge of Bed:   Assistance Level Required: Stand-by Assistance   Time: 15 minutes   Postural deviations noted: slouched posture and rounded shoulders   Comments: Pt was able to perform sitting balance task EOB which included dynamic reaching outside of ALPHONSE for various LVAD items in multiple directions. Pt was able to perform dual task while EOB with good posture while switching from wall to battery power.    Transfers:   · Sit <> Stand Transfer: moderate assistance and of 2 persons with no assistive device   · Stand <> Sit Transfer: moderate assistance and of 2 persons with no assistive device   · [id number] from EOB  · Bed <> Commode Transfer: Stand Pivot technique with moderate assistance and of 2 persons with no assistive device  · Commode on patient's Rt  · Sit <> Stand Transfer: maximal assistance with no assistive device   · Stand <> Sit Transfer: maximal assistance with no assistive device   · [id number] from bedside commode  · Commode <> Chair Transfer: Stand Pivot technique with maximal assistance and of 2 persons with hand-held assist  · Chair on patient's Rt  · Pt req'd assist x 2 to perform transfer with one person guiding hips and one in front for balance. Pt with poor safety awareness    Standing Balance:   Assistance Level Required: Maximum Assistance   Patient used: hand-held assist    Time: 30 seconds   Postural deviations noted: slouched posture, rounded shoulders, forward head and increased trunk  flexion   Encouraged: standing up tall   Comments: pt made no attempt to stand upright and stood with fwd flexion and wide ALPHONSE throughout standing task. Pt req'd Max A to remain upright during standing balance task while OT performed dependent travis-care after toileting.        Education:   Time provided for education, counseling and  discussion of health disposition in regards to patient's current status   All questions answered within PT scope of practice and to patient's satisfaction   PT role in POC to address current functional deficits   Pt educated on proper body mechanics, safety techniques, and energy conservation with PT facilitation and cueing throughout session   Call nursing/pct to transfer to chair/use bathroom. Pt stated understanding.   Whiteboard updated with pt's current mobility status documented above   Safe to perform stand pivot transfer to/from chair/bedside commode assist x 2 and HHA w/ nursing/PCT present   Sternal Precautions: patient able to voice 3/3 precautions without assistance. Patient able to maintain precautions throughout session with min verbal cues.   LVAD: patient found on wall power / returned on batter power. No alarms sounded.    LVAD: Pt able to perform self-test with SBA assistance from therapist   LVAD: Pt able to  switch wall <> battery source with stand-by assistance from therapist after demonstration from OT.   Pt developed plan for session at start and was amenable to plan throughout session with no complaints. Pt agreeable to remain in chair for 3 hours till 1400.     Patient left up in chair with all lines intact, call button in reach, RN notified and significant other present.    GOALS:   Multidisciplinary Problems     Physical Therapy Goals        Problem: Physical Therapy Goal    Goal Priority Disciplines Outcome Goal Variances Interventions   Physical Therapy Goal     PT, PT/OT Ongoing, Progressing     Description:  Goals to be met by: 2020     Patient will increase functional independence with mobility by performin. Supine to sit with MInimal Assistance - not met  2. Rolling to Left and Right with Minimal Assistance.- not met  3. Sit to stand transfer with Moderate Assistance- not met  4. Sitting at edge of bed x8 minutes with Minimal Assistance - met 2020  4a.  Sitting at EOB x 10 minutes with SBA- met 2/10/2020  5. Lower extremity exercise program x10 reps per handout, with assistance as needed- not met  6. Standing with no AD x 30 seconds CGA to assist with ADLs and self care- not met                      Time Tracking:     PT Received On: 02/10/20  PT Start Time: 0957     PT Stop Time: 1050  PT Total Time (min): 53 min       Billable Minutes:   · Therapeutic Activity 15 and Neuromuscular Re-education 38    Treatment Type: Treatment  PT/PTA: PT       Ann Rudd PT, DPT  2/10/2020  Pager: [phone]

## 2020-02-10 NOTE — ASSESSMENT & PLAN NOTE
Tae Delgado is a 59 y.o. male with heart failure who underwent LVAD placement (1/23/20) then closure (1/24/20) and repeat chest opening (1/24/20) for poor RV function; chest washout (1/27/20); chest closure (1/29/20); completed 2 wks of vanc.  --continue amio, norvasc, asa, statin and dig  --d/c lasix gtt and start lasix 40 tid  --coumadin 3. INR 2.3  --continue to monitor renal labs and leukocytosis  --transfer to [facility]   --Recommend getting echo

## 2020-02-10 NOTE — PT/OT/SLP PROGRESS
Speech Language Pathology Treatment    Patient Name:  Tae Delgado   MRN:  [id number]  Admitting Diagnosis: LVAD (left ventricular assist device) present    Recommendations:                 General Recommendations:  Dysphagia therapy  Diet recommendations:  Regular, Liquid Diet Level: Thin   Aspiration Precautions: 1 bite/sip at a time, Alternating bites/sips and Feed only when awake/alert   General Precautions: Standard, fall, sternal  Communication strategies:  none    Subjective     Pt awake/alert    Pain/Comfort:  · Pain Rating 1: 0/10  · Pain Rating Post-Intervention 1: 0/10    Objective:     Has the patient been evaluated by SLP for swallowing?   Yes  Keep patient NPO? No   Current Respiratory Status: room air      Pt upright in chair upon entry. Significant other in pt's room. Pt reported to SLP that he has been drinking thin liquids despite his nectar-thick recommendations. SLP trialed thin liquid via cup x2, thin liquid via straw x5, and solid texture (cracker) x5. No signs and symptoms of airway compromise noted. SLP upgraded pt to regular/thin liquid diet and reviewed swallow precautions with pt and SO. Pt's voice remains hoarse and breathy, recommend ENT consult. SLP reviewed POC with pt and SO who both verbalized understanding.     Assessment:     Tae Delgado is a 59 y.o. male with an SLP diagnosis of Dysphagia.    Goals:   Multidisciplinary Problems     SLP Goals        Problem: SLP Goal    Goal Priority Disciplines Outcome   SLP Goal     SLP Ongoing, Progressing   Description:  Goals expected to be met by 2/9:  1. Pt will tolerate regular/thin liquid diet with no signs of airway compromise.                     Plan:     · Patient to be seen:  4 x/week   · Plan of Care expires:  03/02/20  · Plan of Care reviewed with:  patient, significant other   · SLP Follow-Up:  Yes       Discharge recommendations:  rehabilitation facility   Barriers to Discharge:  None    Time Tracking:     SLP Treatment Date:    02/10/20  Speech Start Time:  1100  Speech Stop Time:  1117     Speech Total Time (min):  17 min    Billable Minutes: Treatment Swallowing Dysfunction 9 and Seld Care/Home Management Training 8    SAHARA Zapata  02/10/2020

## 2020-02-10 NOTE — PLAN OF CARE
Discharge Recommendation: Rehab.    1 goal met today. PT goals appropriate.    Ann Rudd PT, DPT  2/10/2020  Pager: [phone]        Problem: Physical Therapy Goal  Goal: Physical Therapy Goal  Description  Goals to be met by: 2020     Patient will increase functional independence with mobility by performin. Supine to sit with MInimal Assistance - not met  2. Rolling to Left and Right with Minimal Assistance.- not met  3. Sit to stand transfer with Moderate Assistance- not met  4. Sitting at edge of bed x8 minutes with Minimal Assistance - met 2020  4a. Sitting at EOB x 10 minutes with SBA- met 2/10/2020  5. Lower extremity exercise program x10 reps per handout, with assistance as needed- not met  6. Standing with no AD x 30 seconds CGA to assist with ADLs and self care- not met       Outcome: Ongoing, Progressing

## 2020-02-10 NOTE — ASSESSMENT & PLAN NOTE
- S/P DT HM3 with closure of AV and plication of MV for regurg 1/23/20 (See LVAD)  - [facility] dx'd 2010  - hemodynamically stable since Epi weaned off   - CVP 15 on Lasix 40 mg IV TID.  Will increase to 80mg today

## 2020-02-10 NOTE — NURSING
"Patient states that he is feels that NG tube is affecting his breathing and that throat pain is a "10/10". O2 sats 95%. 2L of NC placed for comfort measures. Patient is not speaking. When assessing patient's throat no inflammation noted. NG tube resdiual clear thin liquids with particles noted in it, like water with medication to was not flushed completely. No gatric content noted. Rapid Nurse and on call CTS resident called to bedside. Orders for STAT KUB ordered. After placement verifited, MD stated okay to give medication and start medications. Will cont to monitor patient.  "

## 2020-02-10 NOTE — NURSING
"Per TARA Romo, LVAD dressing change completed using sterile technique with betanine and NS. DLES is a "1" no drainage noted on drain sponge or at DLES, per TARA Rmoo. Tolerated without any complication. Sutures remain intact, no redness, or tenderness noted.      "

## 2020-02-10 NOTE — SUBJECTIVE & OBJECTIVE
**Interval History: Transferred to hospitals service today.  NG tube removed: he is on mechanical soft diet. CVP 15 this morning.  Labs are pending.     Continuous Infusions:    Scheduled Meds:   acetaminophen  650 mg Per NG tube Q6H    amiodarone  400 mg Per NG tube TID    amLODIPine  10 mg Per NG tube Daily    aspirin  325 mg Oral Daily    atorvastatin  80 mg Oral QHS    balsam peru-castor oil   Topical (Top) BID    digoxin  0.125 mg Oral Daily    docusate sodium  200 mg Oral QHS    ferrous gluconate  324 mg Oral Daily with breakfast    furosemide  80 mg Intravenous TID    magnesium oxide  800 mg Per NG tube TID    methocarbamol  500 mg Oral QID    oxyCODONE  5 mg Per NG tube Q6H    pantoprazole  40 mg Per NG tube Daily    polyethylene glycol  100 mL Per NG tube 6 times per day    polyethylene glycol  17 g Oral Daily    potassium chloride 10%  60 mEq Oral BID    sodium chloride 0.9%  10 mL Intravenous Q6H    sodium chloride 0.9%  3 mL Intravenous Q8H    warfarin  3 mg Oral Once     PRN Meds:albumin human 5%, albuterol sulfate, bisacodyL, Dextrose 10% Bolus, Dextrose 10% Bolus, hydrALAZINE, magnesium hydroxide 400 mg/5 ml, sodium chloride 0.9%, Flushing PICC Protocol **AND** sodium chloride 0.9% **AND** sodium chloride 0.9%    Review of patient's allergies indicates:   Allergen Reactions    Biopatch [chlorhexidine gluconate]      Site burning    Dobutamine in d5w      Tachycardia, tremors, SOB, flushing    Percocet [oxycodone-acetaminophen] Itching    Penicillins Rash     Cefepime given on 1/23/2020 without issue     Objective:     Vital Signs (Most Recent):  Temp: 97.7 °F (36.5 °C) (02/10/20 1100)  Pulse: (!) 59 (02/10/20 1123)  Resp: 18 (02/10/20 1100)  BP: 97/68 (02/10/20 1100)  SpO2: 95 % (02/10/20 1100) Vital Signs (24h Range):  Temp:  [97.4 °F (36.3 °C)-98.5 °F (36.9 °C)] 97.7 °F (36.5 °C)  Pulse:  [45-90] 59  Resp:  [16-18] 18  SpO2:  [92 %-96 %] 95 %  BP: ()/(0-69) 97/68     Patient  Vitals for the past 72 hrs (Last 3 readings):   Weight   02/10/20 0740 109.8 kg (242 lb)     Body mass index is 34.72 kg/m².      Intake/Output Summary (Last 24 hours) at 2/10/2020 1134  Last data filed at 2/10/2020 0900  Gross per 24 hour   Intake 1265 ml   Output 1500 ml   Net -235 ml       Hemodynamic Parameters:  CVP:  [15 mmHg] 15 mmHg    Telemetry: V paced  Physical Exam   Constitutional: He is oriented to person, place, and time. He appears well-developed and well-nourished.   HENT:   Head: Normocephalic and atraumatic.   Eyes: Pupils are equal, round, and reactive to light. Conjunctivae and EOM are normal.   Neck: Normal range of motion. Neck supple. No JVD present. No thyromegaly present.   JVP mid neck  Cardiovascular: Normal rate and regular rhythm. Smooth VAD hum   Pulmonary/Chest: Effort normal and breath sounds normal. Intubated and sedated   Abdominal: Soft. Bowel sounds are normal.   Musculoskeletal: Normal range of motion. 1+ pedal edema   Neurological: He is alert and oriented to person, place, and time.   Skin: Skin is warm and dry. Capillary refill takes 2 to 3 seconds.   Psychiatric: He has a normal mood and affect. His behavior is normal. Judgment and thought content normal.       Significant Labs:  CBC:  Recent Labs   Lab 02/08/20  0454 02/09/20  0400 02/10/20  0602   WBC 14.75* 12.34  12.34 10.45   RBC 3.41* 3.36*  3.36* 3.21*   HGB 9.5* 9.2*  9.2* 8.9*   HCT 31.2* 30.8*  30.8* 29.6*    292  292 281   MCV 92 92  92 92   MCH 27.9 27.4  27.4 27.7   MCHC 30.4* 29.9*  29.9* 30.1*     BNP:  Recent Labs   Lab 02/07/20 0400 02/09/20  0400 02/10/20  0602   * 275* 317*     CMP:  Recent Labs   Lab 02/08/20 0454 02/09/20  0013 02/09/20 0400 02/09/20  1924 02/10/20  0602   *  --   --  102  102  102 124*  --    CALCIUM 8.7  --   --  8.5*  8.5*  8.5* 8.3*  --    ALBUMIN 2.4*  --   --  2.4*  2.4* 2.5* 2.5*   PROT 6.3  --   --  6.1  6.1 6.2 6.0     --   --  139   139  139 139  --    K 3.5   < > 3.2* 3.7  3.7  3.7  3.7  3.7  3.7  3.7 3.6  --    CO2 29  --   --  31*  31*  31* 31*  --    CL 99  --   --  97  97  97 98  --    BUN 45*  --   --  44*  44*  44* 42*  --    CREATININE 1.8*  --   --  1.7*  1.7*  1.7* 1.7*  --    ALKPHOS 64  --   --  64  63 65 67   ALT 19  --   --  19  16 18 19   AST 23  --   --  25  23 23 29   BILITOT 0.6  --   --  0.7  0.7 0.5 0.7    < > = values in this interval not displayed.      Coagulation:   Recent Labs   Lab 02/07/20  0400 02/08/20  0454 02/09/20  0400 02/10/20  0602   INR 2.7* 2.5* 1.8*  1.8*  1.8* 2.3*   APTT 35.7* 34.8* 30.7  --      LDH:  Recent Labs   Lab 02/08/20  0454 02/09/20  0400 02/09/20  1924 02/10/20  0602   * 260  260 263* 258     Microbiology:  Microbiology Results (last 7 days)     Procedure Component Value Units Date/Time    Blood culture [[id number]] Collected:  01/31/20 2215    Order Status:  Completed Specimen:  Blood from Peripheral, Forearm, Right Updated:  02/06/20 0612     Blood Culture, Routine No growth after 5 days.    Blood culture [[id number]] Collected:  01/31/20 2220    Order Status:  Completed Specimen:  Blood from Peripheral, Wrist, Left Updated:  02/06/20 0612     Blood Culture, Routine No growth after 5 days.          I have reviewed all pertinent labs within the past 24 hours.    Estimated Creatinine Clearance: 58 mL/min (A) (based on SCr of 1.7 mg/dL (H)).    Diagnostic Results:  I have reviewed all pertinent imaging results/findings within the past 24 hours.

## 2020-02-10 NOTE — PT/OT/SLP PROGRESS
Occupational Therapy   Treatment    Name: Tae Delgado  MRN: [id number]  Admitting Diagnosis:  LVAD (left ventricular assist device) present     12 Days Post-Op  Pre-op Diagnosis: Acute on chronic combined systolic and diastolic heart failure [I50.43]    Procedure(s):  CLOSURE, WOUND, STERNUM  WASHOUT  APPLICATION, WOUND VAC     Recommendations:     Discharge Recommendations: rehabilitation facility  Discharge Equipment Recommendations:  (TBD)  Barriers to discharge:  (Patient requires skilled level of assist )    Assessment:     Tae Delgado is a 59 y.o. male with a medical diagnosis of LVAD (left ventricular assist device) present. He presents with the following performance deficits affecting function are weakness, impaired endurance, impaired self care skills, impaired functional mobilty, gait instability, impaired balance, decreased safety awareness, decreased upper extremity function, impaired cardiopulmonary response to activity. Patient agreed to participate with therapy and tolerated session fair this date. Patient tolerated sitting EOB ~15 min with SBA and continues to require assist of 2 for functional transfers. At this time, patient will continue to benefit from acute skilled therapy intervention to address deficits/underlying impairments and progress towards prior level of function. After discharge, patient will benefit from continuing therapy at rehab facility to increase independence in ADLs and functional mobility through skilled balance training and skilled therapeutic exercise to promote safety at home.    Rehab Prognosis:  Good; patient would benefit from acute skilled OT services to address these deficits and reach maximum level of function.       Plan:     Patient to be seen 6 x/week to address the above listed problems via self-care/home management, therapeutic activities, therapeutic exercises  · Plan of Care Expires: 03/02/20  · Plan of Care Reviewed with: patient, significant  "other    Subjective     Patient stated "give me a minute" several times during session.     Pain/Comfort:  · Pain Rating 1: (Patient did not rate pain.)    Objective:     Communicated with: RN prior to session. Patient found HOB elevated with wound vac, LVAD, telemetry, and significant other present upon OT entry to room. PT present for co-treatment as appropriate for patient.    General Precautions: Standard, fall, sternal   Orthopedic Precautions:N/A   Braces: N/A     Occupational Performance:     Bed Mobility:    · Patient completed Scootingwith mod to max assist sitting EOB and in bedside chair  · Patient completed Supine to Sit with moderate assistance and HOB elevated   · Patient able to manage B LEs off of bed and required assist for trunk elevation    Functional Mobility/Transfers:  · Patient completed Sit <> Stand Transfer 2x with no AD  · 1st trial from EOB: mod assist of 2  · 2nd trial from BSC: max assist of 1  · Patient completed Bed > BSC Transfer using Stand Pivot technique with moderate assistance of 2 with no AD  · Patient completed BSC > Chair Transfer using Stand Pivot technique with max assist of 2 with no AD  · Patient performed static standing with max assist.    Activities of Daily Living:  · Lower Body Dressing: total assistance to anitha socks sitting EOB  · Toileting: total assistance for hygiene following BM in standing    LVAD Management:  · Patient on wall power at start of session and completed self test with SBA while significant other logged numbers into LVAD binder.  · Patient able to name controller, drive line, and batteries but unable to name power cables and battery clips at this time.  · Instructed patient on how to switch from wall power to battery power and how to arrange items into consolidation bag. Patient able to demonstrate understanding following therapist's demonstration and instruction but needs further education.   · Educated patient on correct positioning of " consolidation bag onto body, including shoulder strap, prior to performing sit <> stand and SPT.    [facility] 6 Click ADL: 9    Treatment & Education:   Reviewed sternal precautions with patient at start of session.   Therapist provided facilitation and instruction of proper body mechanics, energy conservation, and fall prevention strategies during tasks listed above.   Educated patient on importance of sitting in bedside chair throughout the day to increase activity tolerance.   Educated patient on OT POC and answered all questions within OT scope of practice.   Whiteboard updated    Patient left up in chair with all lines intact, call button in reach, RN notified and significant other presentEducation:      GOALS:   Multidisciplinary Problems     Occupational Therapy Goals        Problem: Occupational Therapy Goal    Goal Priority Disciplines Outcome Interventions   Occupational Therapy Goal     OT, PT/OT Ongoing, Progressing    Description:  Goals to be met by: 2/16/2020     Patient will increase functional independence with ADLs by performing:    UE Dressing with Minimal Assistance.  LE Dressing with Minimal Assistance.  Grooming while standing with Minimal Assistance.  Toileting from bedside commode with Minimal Assistance for hygiene and clothing management.   Sitting at edge of bed x10 minutes with Minimal Assistance in prep for seated grooming tasks- GOAL MET 2/10  Supine to sit with min A  Toilet transfer to bedside commode with Minimal Assistance.  Pt will perform LVAD management independently                  Multidisciplinary Problems (Resolved)        Problem: Occupational Therapy Goal    Goal Priority Disciplines Outcome Interventions   Occupational Therapy Goal   (Resolved)     OT, PT/OT Met    Description:  Skilled OT services not necessary at this time secondary to patient performing ADLs at [facility]. Patient in agreement. Please re-consult OT if change in patient's functional status is noted.                      Time Tracking:     OT Date of Treatment: 02/10/20  OT Start Time: 1004  OT Stop Time: 1050  OT Total Time (min): 46 min    Billable Minutes:Self Care/Home Management 46    Leslee Terrell OT  2/10/2020

## 2020-02-10 NOTE — PROGRESS NOTES
[facility]  Heart Transplant  Progress Note    Patient Name: Tae Delgado  MRN: [id number]  Admission Date: 1/9/2020  Hospital Length of Stay: 32 days  Attending Physician: Isiah Montero MD  Primary Care Provider: Elham Pearson MD  Principal Problem:LVAD (left ventricular assist device) present    Subjective:     **Interval History: Transferred to [facility] service today.  NG tube removed: he is on mechanical soft diet. CVP 15 this morning.  Labs are pending.     Continuous Infusions:    Scheduled Meds:   acetaminophen  650 mg Per NG tube Q6H    amiodarone  400 mg Per NG tube TID    amLODIPine  10 mg Per NG tube Daily    aspirin  325 mg Oral Daily    atorvastatin  80 mg Oral QHS    balsam peru-castor oil   Topical (Top) BID    digoxin  0.125 mg Oral Daily    docusate sodium  200 mg Oral QHS    ferrous gluconate  324 mg Oral Daily with breakfast    furosemide  80 mg Intravenous TID    magnesium oxide  800 mg Per NG tube TID    methocarbamol  500 mg Oral QID    oxyCODONE  5 mg Per NG tube Q6H    pantoprazole  40 mg Per NG tube Daily    polyethylene glycol  100 mL Per NG tube 6 times per day    polyethylene glycol  17 g Oral Daily    potassium chloride 10%  60 mEq Oral BID    sodium chloride 0.9%  10 mL Intravenous Q6H    sodium chloride 0.9%  3 mL Intravenous Q8H    warfarin  3 mg Oral Once     PRN Meds:albumin human 5%, albuterol sulfate, bisacodyL, Dextrose 10% Bolus, Dextrose 10% Bolus, hydrALAZINE, magnesium hydroxide 400 mg/5 ml, sodium chloride 0.9%, Flushing PICC Protocol **AND** sodium chloride 0.9% **AND** sodium chloride 0.9%    Review of patient's allergies indicates:   Allergen Reactions    Biopatch [chlorhexidine gluconate]      Site burning    Dobutamine in d5w      Tachycardia, tremors, SOB, flushing    Percocet [oxycodone-acetaminophen] Itching    Penicillins Rash     Cefepime given on 1/23/2020 without issue     Objective:     Vital Signs (Most Recent):  Temp:  97.7 °F (36.5 °C) (02/10/20 1100)  Pulse: (!) 59 (02/10/20 1123)  Resp: 18 (02/10/20 1100)  BP: 97/68 (02/10/20 1100)  SpO2: 95 % (02/10/20 1100) Vital Signs (24h Range):  Temp:  [97.4 °F (36.3 °C)-98.5 °F (36.9 °C)] 97.7 °F (36.5 °C)  Pulse:  [45-90] 59  Resp:  [16-18] 18  SpO2:  [92 %-96 %] 95 %  BP: ()/(0-69) 97/68     Patient Vitals for the past 72 hrs (Last 3 readings):   Weight   02/10/20 0740 109.8 kg (242 lb)     Body mass index is 34.72 kg/m².      Intake/Output Summary (Last 24 hours) at 2/10/2020 1134  Last data filed at 2/10/2020 0900  Gross per 24 hour   Intake 1265 ml   Output 1500 ml   Net -235 ml       Hemodynamic Parameters:  CVP:  [15 mmHg] 15 mmHg    Telemetry: V paced  Physical Exam   Constitutional: He is oriented to person, place, and time. He appears well-developed and well-nourished.   HENT:   Head: Normocephalic and atraumatic.   Eyes: Pupils are equal, round, and reactive to light. Conjunctivae and EOM are normal.   Neck: Normal range of motion. Neck supple. No JVD present. No thyromegaly present.   JVP mid neck  Cardiovascular: Normal rate and regular rhythm. Smooth VAD hum   Pulmonary/Chest: Effort normal and breath sounds normal. Intubated and sedated   Abdominal: Soft. Bowel sounds are normal.   Musculoskeletal: Normal range of motion. 1+ pedal edema   Neurological: He is alert and oriented to person, place, and time.   Skin: Skin is warm and dry. Capillary refill takes 2 to 3 seconds.   Psychiatric: He has a normal mood and affect. His behavior is normal. Judgment and thought content normal.       Significant Labs:  CBC:  Recent Labs   Lab 02/08/20  0454 02/09/20  0400 02/10/20  0602   WBC 14.75* 12.34  12.34 10.45   RBC 3.41* 3.36*  3.36* 3.21*   HGB 9.5* 9.2*  9.2* 8.9*   HCT 31.2* 30.8*  30.8* 29.6*    292  292 281   MCV 92 92  92 92   MCH 27.9 27.4  27.4 27.7   MCHC 30.4* 29.9*  29.9* 30.1*     BNP:  Recent Labs   Lab 02/07/20  0400 02/09/20  0400  02/10/20  0602   * 275* 317*     CMP:  Recent Labs   Lab 02/08/20 0454  02/09/20  0013 02/09/20 0400 02/09/20  1924 02/10/20  0602   *  --   --  102  102  102 124*  --    CALCIUM 8.7  --   --  8.5*  8.5*  8.5* 8.3*  --    ALBUMIN 2.4*  --   --  2.4*  2.4* 2.5* 2.5*   PROT 6.3  --   --  6.1  6.1 6.2 6.0     --   --  139  139  139 139  --    K 3.5   < > 3.2* 3.7  3.7  3.7  3.7  3.7  3.7  3.7 3.6  --    CO2 29  --   --  31*  31*  31* 31*  --    CL 99  --   --  97  97  97 98  --    BUN 45*  --   --  44*  44*  44* 42*  --    CREATININE 1.8*  --   --  1.7*  1.7*  1.7* 1.7*  --    ALKPHOS 64  --   --  64  63 65 67   ALT 19  --   --  19  16 18 19   AST 23  --   --  25  23 23 29   BILITOT 0.6  --   --  0.7  0.7 0.5 0.7    < > = values in this interval not displayed.      Coagulation:   Recent Labs   Lab 02/07/20  0400 02/08/20  0454 02/09/20  0400 02/10/20  0602   INR 2.7* 2.5* 1.8*  1.8*  1.8* 2.3*   APTT 35.7* 34.8* 30.7  --      LDH:  Recent Labs   Lab 02/08/20  0454 02/09/20  0400 02/09/20  1924 02/10/20  0602   * 260  260 263* 258     Microbiology:  Microbiology Results (last 7 days)     Procedure Component Value Units Date/Time    Blood culture [[id number]] Collected:  01/31/20 2218    Order Status:  Completed Specimen:  Blood from Peripheral, Forearm, Right Updated:  02/06/20 0612     Blood Culture, Routine No growth after 5 days.    Blood culture [[id number]] Collected:  01/31/20 2220    Order Status:  Completed Specimen:  Blood from Peripheral, Wrist, Left Updated:  02/06/20 0612     Blood Culture, Routine No growth after 5 days.          I have reviewed all pertinent labs within the past 24 hours.    Estimated Creatinine Clearance: 58 mL/min (A) (based on SCr of 1.7 mg/dL (H)).    Diagnostic Results:  I have reviewed all pertinent imaging results/findings within the past 24 hours.    Assessment and Plan:       55 y.o. WM with history of NICMP diagnosed in  2010, ICD, LV thrombus (with prior splenic and renal emboli), Embolic  CVA , paroxysmal atrial fib, HTN, HLP  presents for  F/U today to clinic and he was volume overloaded on exam .  He states he had 3 admission in the last 3 months for volume overload. He started having more SOB on exertion since one month. Also endorses of Orthopnea since last one month. Alble to walk only 150 ft. He also has Bilateral lower extremity edema. He was admitted here in 2017 for ADHD here at [facility] and [facility] during that admission showed PCWP 40 and CVP 17. Currently denies chest pain, lightheadedness     * LVAD (left ventricular assist device) present  - S/P DT HM3 with closure of AV and repair of MV for regurg 1/23/20 (Oskar)  - HTS primary  - Taken back to OR 1/24 for possible RVAD placement which was not done. Patient remained hemodynamically stable in OR. Wash out on 1/27. Chest closed 1/29.   - CVP 15. On lasix 40mg IV TID.  Will increase to 80mg TID today   - Currently hemodynamically stable since Epi weaned off   - TTE 2/3 at 5300 rpm: LVEDD 5.95, LVEF 10%, diastolic dysfunction, RV size normla, RV function mod depressed, IVC not well visualized, septum midline.  Will repeat echo today at request of CTS  - Current speed 5300  - INR therapeutic at 2.3    - LD stable  - PT/OT/VAD education    Procedure: Device Interrogation Including analysis of device parameters  Current Settings: Ventricular Assist Device  Review of device function is stable/unstabe    TXP LVAD INTERROGATIONS 2/10/2020 2/10/2020 2/10/2020 2/9/2020 2/9/2020 2/9/2020 2/9/2020   Type HeartMate3 HeartMate3 HeartMate3 HeartMate3 HeartMate3 HeartMate3 HeartMate3   Flow 4.0 4.2 4.1 4.2 4.4 4.1 3.9   Speed 5300 5300 5300 5300 5300 5300 5300   PI 4.5 3.8 3.7 3.8 3.4 4.7 4.4   Power (Berry) 3.5 3.7 3.7 3.8 3.8 3.8 3.8   LSL - - - 4900 - - -   Pulsatility - Pulse - Pulse - - -       Acute on chronic combined systolic and diastolic heart failure  - S/P DT HM3 with  closure of AV and plication of MV for regurg 1/23/20 (See LVAD)  - [facility] dx'd 2010  - hemodynamically stable since Epi weaned off   - CVP 15 on Lasix 40 mg IV TID.  Will increase to 80mg today      Paroxysmal atrial fibrillation  - Intermittently in A fib since surgery  - AC on Coumadin  - Dig 0.125 mg qd       Acute kidney injury superimposed on chronic kidney disease  - Creatinine on admit 2.6 (baseline ~ 1.8). BUN/Creatinine pending  - Nephrology has signed off    Left ventricular thrombus without MI  - H/O LV thrombus with h/o splenic and renal emboli as well as embolic CVA  - Limited TTE done here 1/13 showed no thrombus  - [facility]AC[facility] 1/23 intra op showed resolution of DAMON thrombus  - AC per CTS      Right lower lobe pulmonary nodule  - Incidental finding on CT chest/abd/pelvis on 1/12. Pulmonology consulted, and rec repeat CT of chest in 3 months  - Will need to follow-up in pulmonary clinic.     Hepatic congestion  - Liver US on 1/11 unremarkable  - LFT's WNL now    ICD (implantable cardioverter-defibrillator) in place  - S/P Biotronik dual chamber ICD    Leukocytosis  - WCC improving  - ID consulted: completed Vanc treatment.    On enteral nutrition  - Diet advanced to mech soft. Still requiring tube feeds via NNGT to meet nutritional needs    Acute hyperglycemia  - Endocrine following    Coagulopathy  - Appreciate Hem/Onc's help. No evidence of underlying coagulopathy (h/o LV thrombus with splenic and renal emboli, h/o embolic CVA)    NSVT (nonsustained ventricular tachycardia)  - Continue MARBELLA Asif  Heart Transplant  [facility]

## 2020-02-10 NOTE — ASSESSMENT & PLAN NOTE
- S/P DT HM3 with closure of AV and repair of MV for regurg 1/23/20 (Oskar)  - HTS primary  - Taken back to OR 1/24 for possible RVAD placement which was not done. Patient remained hemodynamically stable in OR. Wash out on 1/27. Chest closed 1/29.   - CVP 15. On lasix 40mg IV TID.  Will increase to 80mg TID today   - Currently hemodynamically stable since Epi weaned off   - TTE 2/3 at 5300 rpm: LVEDD 5.95, LVEF 10%, diastolic dysfunction, RV size normla, RV function mod depressed, IVC not well visualized, septum midline.  Will repeat echo today at request of CTS  - Current speed 5300  - INR therapeutic at 2.3    - LD stable  - PT/OT/VAD education    Procedure: Device Interrogation Including analysis of device parameters  Current Settings: Ventricular Assist Device  Review of device function is stable/unstabe    TXP LVAD INTERROGATIONS 2/10/2020 2/10/2020 2/10/2020 2/9/2020 2/9/2020 2/9/2020 2/9/2020   Type HeartMate3 HeartMate3 HeartMate3 HeartMate3 HeartMate3 HeartMate3 HeartMate3   Flow 4.0 4.2 4.1 4.2 4.4 4.1 3.9   Speed 5300 5300 5300 5300 5300 5300 5300   PI 4.5 3.8 3.7 3.8 3.4 4.7 4.4   Power (Berry) 3.5 3.7 3.7 3.8 3.8 3.8 3.8   LSL - - - 4900 - - -   Pulsatility - Pulse - Pulse - - -

## 2020-02-10 NOTE — PLAN OF CARE
Patient remains free from falls and injuries through out shift. Patient is AAOx4 and  VSS. Pt denies chest pain and SOB. LVAD (HM3) is functioning WNL, w/o any acute events or alarms thus far on shift. LVAD #'s, dopplers and MAPs WNL. Patient is daily betaine and NS dressing changes. Dressing change completed on shift, see LVAD dressing note. Current LVAD dressing is CDI. Pt and spouse still need check off on alarms and dressing changes. MSI has Prevena Woundvac in place with seal intact. Velenax placed to DTI.  Lasix gtt @ 7.5mg/hr infusing. UOP adequate on shift (see flowsheets). Golytely schedule to be given q4hr, but patient has been refusing. BM x2 on shift. NG tube to L jyotie servely tender. Patient medication given late d/t pt having c/o SOB and NG tube interfering with breathing, MD and Rapid nurse to bedside to assess. STAT KUB ordered and MD reverified placement, see note. Intermittent tube feeding currently infusing @ goal rate of 50cc/hr.  SLP, PT/OT are following. CVP 13mmHg this morning. Small amount of thin liquid given this AM and patient tolerated well. Pleural CT in place to suction with minimal amount of serous drainage noted. PO electrolyte replacement given, will f/u with morning labs. MG and CMP to be collected BID. Patient's family at bedside. Plan of care reviewed with patient. Call bell in reach. Patient verbalizes understanding of plan.  Will continue to monitor. Plan of care discussed with patient.

## 2020-02-10 NOTE — PROGRESS NOTES
Pt aaox3 while sitting up in chair with caregiver at bedside.  Spent about 60 minutes reviewing components, LVAD parameters, workbook and powering the system.  Pt reports watching videos.  Pt has not started LVAD workbook yet and pt marshall has only done dressing once.  Pt instructed to begin working on workbook today.  Also spoke to Ashley PACHECO about making sure pt caregiver does dressing daily.   Pt denies any needs at this time.  Encouraged pt to notify nurse if they have any questions, problems or concerns for LVAD coordinator.  Pt verbalized understanding and in agreement of plan. Will follow up with pt soon.    Patient is not checked off on LVAD     Caregiver is not checked off on dressing.

## 2020-02-10 NOTE — SUBJECTIVE & OBJECTIVE
Subjective:     Post-Op Info:  Procedure(s) (LRB):  CLOSURE, WOUND, STERNUM (N/A)  WASHOUT (N/A)  APPLICATION, WOUND VAC (N/A)   12 Days Post-Op      Reason for Visit:  Patient is seen in follow up management of heart failure s/p LVAD placement (1/23/20) then closure (1/24/20) and repeat chest opening (1/24/20) for poor RV function; chest washout (1/27/20); chest closure (1/29/20)    Interval History: NAEON. Will transfer to [facility].     Implant 1/23/2020    Medications:  Continuous Infusions:  Scheduled Meds:   acetaminophen  650 mg Per NG tube Q6H    amiodarone  400 mg Per NG tube TID    amLODIPine  10 mg Per NG tube Daily    aspirin  325 mg Oral Daily    atorvastatin  80 mg Oral QHS    balsam peru-castor oil   Topical (Top) BID    digoxin  0.125 mg Oral Daily    docusate sodium  200 mg Oral QHS    ferrous gluconate  324 mg Oral Daily with breakfast    furosemide  40 mg Intravenous TID    magnesium oxide  800 mg Per NG tube TID    methocarbamol  500 mg Oral QID    oxyCODONE  5 mg Per NG tube Q6H    pantoprazole  40 mg Per NG tube Daily    polyethylene glycol  100 mL Per NG tube 6 times per day    polyethylene glycol  17 g Oral Daily    potassium chloride 10%  60 mEq Oral BID    sodium chloride 0.9%  10 mL Intravenous Q6H    sodium chloride 0.9%  3 mL Intravenous Q8H    warfarin  3 mg Oral Once     PRN Meds:albumin human 5%, albuterol sulfate, bisacodyL, Dextrose 10% Bolus, Dextrose 10% Bolus, hydrALAZINE, magnesium hydroxide 400 mg/5 ml, sodium chloride 0.9%, Flushing PICC Protocol **AND** sodium chloride 0.9% **AND** sodium chloride 0.9%     Objective:     Vital Signs (Most Recent):  Temp: 98.5 °F (36.9 °C) (02/10/20 0933)  Pulse: (!) 45 (02/10/20 0740)  Resp: 16 (02/10/20 0517)  BP: 100/69 (02/10/20 0933)  SpO2: (!) 94 % (02/10/20 0144) Vital Signs (24h Range):  Temp:  [97.4 °F (36.3 °C)-98.5 °F (36.9 °C)] 98.5 °F (36.9 °C)  Pulse:  [45-90] 45  Resp:  [16-18] 16  SpO2:  [92 %-96 %] 94 %  BP:  ()/(0-69) 100/69       Intake/Output Summary (Last 24 hours) at 2/10/2020 0959  Last data filed at 2/10/2020 0900  Gross per 24 hour   Intake 1265 ml   Output 1700 ml   Net -435 ml       Physical Exam   Constitutional: He is oriented to person, place, and time. He appears well-developed and well-nourished.   HENT:   Head: Normocephalic and atraumatic.   Eyes: Pupils are equal, round, and reactive to light.   Cardiovascular: Normal rate and regular rhythm.   vad hum   Pulmonary/Chest: Effort normal. No respiratory distress.   Musculoskeletal: Normal range of motion.   Neurological: He is alert and oriented to person, place, and time.   Skin: Skin is warm and dry.   Psychiatric: He has a normal mood and affect.   Vitals reviewed.      Significant Labs:  ABGs: No results for input(s): PH, PCO2, PO2, HCO3, POCSATURATED, BE in the last 48 hours.  Amylase: No results for input(s): AMYLASE in the last 48 hours.  BMP:   Recent Labs   Lab 02/09/20 1924   *      K 3.6   CL 98   CO2 31*   BUN 42*   CREATININE 1.7*   CALCIUM 8.3*   MG 2.3     Cardiac markers: No results for input(s): CKMB, CPKMB, TROPONINT, TROPONINI, MYOGLOBIN in the last 48 hours.  CBC:   Recent Labs   Lab 02/10/20  0602   WBC 10.45   RBC 3.21*   HGB 8.9*   HCT 29.6*      MCV 92   MCH 27.7   MCHC 30.1*     CMP:   Recent Labs   Lab 02/09/20  1924 02/10/20  0602   *  --    CALCIUM 8.3*  --    ALBUMIN 2.5* 2.5*   PROT 6.2 6.0     --    K 3.6  --    CO2 31*  --    CL 98  --    BUN 42*  --    CREATININE 1.7*  --    ALKPHOS 65 67   ALT 18 19   AST 23 29   BILITOT 0.5 0.7     Coagulation:   Recent Labs   Lab 02/09/20  0400 02/10/20  0602   INR 1.8*  1.8*  1.8* 2.3*   APTT 30.7  --      Lactic Acid: No results for input(s): LACTATE in the last 48 hours.  LFTs:   Recent Labs   Lab 02/10/20  0602   ALT 19   AST 29   ALKPHOS 67   BILITOT 0.7   PROT 6.0   ALBUMIN 2.5*     Lipase: No results for input(s): LIPASE in the last 48  hours.    Significant Diagnostics:  I have reviewed all pertinent imaging results/findings within the past 24 hours.    Procedure: Device Interrogation Including analysis of device parameters  Current Settings: Ventricular Assist Device  Review of device function is stable  TXP LVAD INTERROGATIONS 2/10/2020 2/10/2020 2/9/2020 2/9/2020 2/9/2020 2/9/2020 2/9/2020   Type HeartMate3 HeartMate3 HeartMate3 HeartMate3 HeartMate3 HeartMate3 HeartMate3   Flow 4.2 4.1 4.2 4.4 4.1 3.9 4.3   Speed 5300 5300 5300 5300 5300 5300 5300   PI 3.8 3.7 3.8 3.4 4.7 4.4 3.4   Power (Berry) 3.7 3.7 3.8 3.8 3.8 3.8 3.8   LSL - - 4900 - - - 4900   Pulsatility Pulse - Pulse - - - Pulse

## 2020-02-10 NOTE — PLAN OF CARE
Plan of care discussed with patient. NG tube, and CT removed today, lasix gtt discontinued patient now on 40 mg IVP TID. fall precautions in place.Continuing to encourage sternal precautions, IS, and patient sitting up in chair. LVAD DP and numbers WNL, smooth LVAD hum. Patient has no complaints of pain. Discussed medications and care. Encouraged workbook, reviewed education, filled in binder. Patient has no questions at this time. Will continue to monitor.

## 2020-02-10 NOTE — PLAN OF CARE
Problem: SLP Goal  Goal: SLP Goal  Description  Goals expected to be met by 2/9:  1. Pt will tolerate regular/thin liquid diet with no signs of airway compromise.    2/10/2020 1203 by SAHARA Zapata  Outcome: Ongoing, Progressing    Pt upgraded to regular/thin liquid diet. Goals updated appropriately. Continue POC at this time.    SAHARA Zapata

## 2020-02-10 NOTE — PROGRESS NOTES
"   02/10/20 0517   Vital Signs   BP (!) 76/0   BP Method Doppler   Patient Position Lying     Patient having c/o HA this morning that he rates "4/10". Patient states that HA has been occurring since last night, but patient did not notifiy staff. Patient educated on importance of notifiy if HA is occurring and patient verbalize understanding. MD Erica and on call for CTS notified. Orders give scheduled acetaminophen and oxy this morning and reassess in 30min. Willl cont to monitor patient.   "

## 2020-02-10 NOTE — PROGRESS NOTES
02/10/2020  Felipe Lim    Current provider:  Isiah Montero MD      I, Felipe iLm, rounded on Tae Delgado to ensure all mechanical assist device settings (IABP or VAD) were appropriate and all parameters were within limits.  I was able to ensure all back up equipment was present, the staff had no issues, and the Perfusion Department daily rounding was complete.    12:13 PM

## 2020-02-10 NOTE — PROGRESS NOTES
[facility]  Cardiothoracic Surgery  Progress Note    Patient Name: Tae Delgado  MRN: [id number]  Admission Date: 1/9/2020  Hospital Length of Stay: 32 days  Code Status: Full Code   Attending Physician: Ino Schmitt MD   Referring Provider: Elham Pearson MD  Principal Problem:Acute on chronic combined systolic and diastolic heart failure            Subjective:     Post-Op Info:  Procedure(s) (LRB):  CLOSURE, WOUND, STERNUM (N/A)  WASHOUT (N/A)  APPLICATION, WOUND VAC (N/A)   12 Days Post-Op     Subjective:     Post-Op Info:  Procedure(s) (LRB):  CLOSURE, WOUND, STERNUM (N/A)  WASHOUT (N/A)  APPLICATION, WOUND VAC (N/A)   12 Days Post-Op      Reason for Visit:  Patient is seen in follow up management of heart failure s/p LVAD placement (1/23/20) then closure (1/24/20) and repeat chest opening (1/24/20) for poor RV function; chest washout (1/27/20); chest closure (1/29/20)    Interval History: NAEON. Will transfer to [facility].     Implant 1/23/2020    Medications:  Continuous Infusions:  Scheduled Meds:   acetaminophen  650 mg Per NG tube Q6H    amiodarone  400 mg Per NG tube TID    amLODIPine  10 mg Per NG tube Daily    aspirin  325 mg Oral Daily    atorvastatin  80 mg Oral QHS    balsam peru-castor oil   Topical (Top) BID    digoxin  0.125 mg Oral Daily    docusate sodium  200 mg Oral QHS    ferrous gluconate  324 mg Oral Daily with breakfast    furosemide  40 mg Intravenous TID    magnesium oxide  800 mg Per NG tube TID    methocarbamol  500 mg Oral QID    oxyCODONE  5 mg Per NG tube Q6H    pantoprazole  40 mg Per NG tube Daily    polyethylene glycol  100 mL Per NG tube 6 times per day    polyethylene glycol  17 g Oral Daily    potassium chloride 10%  60 mEq Oral BID    sodium chloride 0.9%  10 mL Intravenous Q6H    sodium chloride 0.9%  3 mL Intravenous Q8H    warfarin  3 mg Oral Once     PRN Meds:albumin human 5%, albuterol sulfate, bisacodyL, Dextrose 10% Bolus, Dextrose 10%  Bolus, hydrALAZINE, magnesium hydroxide 400 mg/5 ml, sodium chloride 0.9%, Flushing PICC Protocol **AND** sodium chloride 0.9% **AND** sodium chloride 0.9%     Objective:     Vital Signs (Most Recent):  Temp: 98.5 °F (36.9 °C) (02/10/20 0933)  Pulse: (!) 45 (02/10/20 0740)  Resp: 16 (02/10/20 0517)  BP: 100/69 (02/10/20 0933)  SpO2: (!) 94 % (02/10/20 0144) Vital Signs (24h Range):  Temp:  [97.4 °F (36.3 °C)-98.5 °F (36.9 °C)] 98.5 °F (36.9 °C)  Pulse:  [45-90] 45  Resp:  [16-18] 16  SpO2:  [92 %-96 %] 94 %  BP: ()/(0-69) 100/69       Intake/Output Summary (Last 24 hours) at 2/10/2020 0959  Last data filed at 2/10/2020 0900  Gross per 24 hour   Intake 1265 ml   Output 1700 ml   Net -435 ml       Physical Exam   Constitutional: He is oriented to person, place, and time. He appears well-developed and well-nourished.   HENT:   Head: Normocephalic and atraumatic.   Eyes: Pupils are equal, round, and reactive to light.   Cardiovascular: Normal rate and regular rhythm.   vad hum   Pulmonary/Chest: Effort normal. No respiratory distress.   Musculoskeletal: Normal range of motion.   Neurological: He is alert and oriented to person, place, and time.   Skin: Skin is warm and dry.   Psychiatric: He has a normal mood and affect.   Vitals reviewed.      Significant Labs:  ABGs: No results for input(s): PH, PCO2, PO2, HCO3, POCSATURATED, BE in the last 48 hours.  Amylase: No results for input(s): AMYLASE in the last 48 hours.  BMP:   Recent Labs   Lab 02/09/20 1924   *      K 3.6   CL 98   CO2 31*   BUN 42*   CREATININE 1.7*   CALCIUM 8.3*   MG 2.3     Cardiac markers: No results for input(s): CKMB, CPKMB, TROPONINT, TROPONINI, MYOGLOBIN in the last 48 hours.  CBC:   Recent Labs   Lab 02/10/20  0602   WBC 10.45   RBC 3.21*   HGB 8.9*   HCT 29.6*      MCV 92   MCH 27.7   MCHC 30.1*     CMP:   Recent Labs   Lab 02/09/20  1924 02/10/20  0602   *  --    CALCIUM 8.3*  --    ALBUMIN 2.5* 2.5*   PROT  6.2 6.0     --    K 3.6  --    CO2 31*  --    CL 98  --    BUN 42*  --    CREATININE 1.7*  --    ALKPHOS 65 67   ALT 18 19   AST 23 29   BILITOT 0.5 0.7     Coagulation:   Recent Labs   Lab 02/09/20  0400 02/10/20  0602   INR 1.8*  1.8*  1.8* 2.3*   APTT 30.7  --      Lactic Acid: No results for input(s): LACTATE in the last 48 hours.  LFTs:   Recent Labs   Lab 02/10/20  0602   ALT 19   AST 29   ALKPHOS 67   BILITOT 0.7   PROT 6.0   ALBUMIN 2.5*     Lipase: No results for input(s): LIPASE in the last 48 hours.    Significant Diagnostics:  I have reviewed all pertinent imaging results/findings within the past 24 hours.    Procedure: Device Interrogation Including analysis of device parameters  Current Settings: Ventricular Assist Device  Review of device function is stable  TXP LVAD INTERROGATIONS 2/10/2020 2/10/2020 2/9/2020 2/9/2020 2/9/2020 2/9/2020 2/9/2020   Type HeartMate3 HeartMate3 HeartMate3 HeartMate3 HeartMate3 HeartMate3 HeartMate3   Flow 4.2 4.1 4.2 4.4 4.1 3.9 4.3   Speed 5300 5300 5300 5300 5300 5300 5300   PI 3.8 3.7 3.8 3.4 4.7 4.4 3.4   Power (Berry) 3.7 3.7 3.8 3.8 3.8 3.8 3.8   LSL - - 4900 - - - 4900   Pulsatility Pulse - Pulse - - - Pulse     Assessment/Plan:       LVAD (left ventricular assist device) present  Tae Delgado is a 59 y.o. male with heart failure who underwent LVAD placement (1/23/20) then closure (1/24/20) and repeat chest opening (1/24/20) for poor RV function; chest washout (1/27/20); chest closure (1/29/20); completed 2 wks of vanc.  --continue amio, norvasc, asa, statin and dig  --d/c lasix gtt and start lasix 40 tid  --coumadin 3. INR 2.3  --continue to monitor renal labs and leukocytosis (WBC 10 today)  --transfer to [facility]   --Recommend getting echo         Willow Crystal PA-C  Cardiothoracic Surgery  [facility]

## 2020-02-11 LAB
ALBUMIN SERPL BCP-MCNC: 2.7 G/DL (ref 3.5–5.2)
ALBUMIN SERPL BCP-MCNC: 2.8 G/DL (ref 3.5–5.2)
ALP SERPL-CCNC: 73 U/L (ref 55–135)
ALP SERPL-CCNC: 77 U/L (ref 55–135)
ALT SERPL W/O P-5'-P-CCNC: 20 U/L (ref 10–44)
ALT SERPL W/O P-5'-P-CCNC: 21 U/L (ref 10–44)
ANION GAP SERPL CALC-SCNC: 11 MMOL/L (ref 8–16)
ANION GAP SERPL CALC-SCNC: 9 MMOL/L (ref 8–16)
AST SERPL-CCNC: 23 U/L (ref 10–40)
AST SERPL-CCNC: 26 U/L (ref 10–40)
BASOPHILS # BLD AUTO: 0.06 K/UL (ref 0–0.2)
BASOPHILS NFR BLD: 0.7 % (ref 0–1.9)
BILIRUB SERPL-MCNC: 0.7 MG/DL (ref 0.1–1)
BILIRUB SERPL-MCNC: 0.7 MG/DL (ref 0.1–1)
BUN SERPL-MCNC: 34 MG/DL (ref 6–20)
BUN SERPL-MCNC: 35 MG/DL (ref 6–20)
CALCIUM SERPL-MCNC: 8.6 MG/DL (ref 8.7–10.5)
CALCIUM SERPL-MCNC: 8.6 MG/DL (ref 8.7–10.5)
CHLORIDE SERPL-SCNC: 96 MMOL/L (ref 95–110)
CHLORIDE SERPL-SCNC: 96 MMOL/L (ref 95–110)
CO2 SERPL-SCNC: 30 MMOL/L (ref 23–29)
CO2 SERPL-SCNC: 30 MMOL/L (ref 23–29)
CREAT SERPL-MCNC: 1.9 MG/DL (ref 0.5–1.4)
CREAT SERPL-MCNC: 2.2 MG/DL (ref 0.5–1.4)
DIFFERENTIAL METHOD: ABNORMAL
DIGOXIN SERPL-MCNC: 1.7 NG/ML (ref 0.8–2)
EOSINOPHIL # BLD AUTO: 0.1 K/UL (ref 0–0.5)
EOSINOPHIL NFR BLD: 1.2 % (ref 0–8)
ERYTHROCYTE [DISTWIDTH] IN BLOOD BY AUTOMATED COUNT: 19 % (ref 11.5–14.5)
EST. GFR  (AFRICAN AMERICAN): 36.5 ML/MIN/1.73 M^2
EST. GFR  (AFRICAN AMERICAN): 43.6 ML/MIN/1.73 M^2
EST. GFR  (NON AFRICAN AMERICAN): 31.6 ML/MIN/1.73 M^2
EST. GFR  (NON AFRICAN AMERICAN): 37.7 ML/MIN/1.73 M^2
GLUCOSE SERPL-MCNC: 101 MG/DL (ref 70–110)
GLUCOSE SERPL-MCNC: 119 MG/DL (ref 70–110)
HCT VFR BLD AUTO: 29.5 % (ref 40–54)
HGB BLD-MCNC: 8.8 G/DL (ref 14–18)
IMM GRANULOCYTES # BLD AUTO: 0.12 K/UL (ref 0–0.04)
IMM GRANULOCYTES NFR BLD AUTO: 1.3 % (ref 0–0.5)
INR PPP: 3.7 (ref 0.8–1.2)
LDH SERPL L TO P-CCNC: 279 U/L (ref 110–260)
LYMPHOCYTES # BLD AUTO: 1.1 K/UL (ref 1–4.8)
LYMPHOCYTES NFR BLD: 12.7 % (ref 18–48)
MAGNESIUM SERPL-MCNC: 2.2 MG/DL (ref 1.6–2.6)
MAGNESIUM SERPL-MCNC: 2.4 MG/DL (ref 1.6–2.6)
MCH RBC QN AUTO: 27.4 PG (ref 27–31)
MCHC RBC AUTO-ENTMCNC: 29.8 G/DL (ref 32–36)
MCV RBC AUTO: 92 FL (ref 82–98)
MONOCYTES # BLD AUTO: 0.7 K/UL (ref 0.3–1)
MONOCYTES NFR BLD: 7.8 % (ref 4–15)
NEUTROPHILS # BLD AUTO: 6.9 K/UL (ref 1.8–7.7)
NEUTROPHILS NFR BLD: 76.3 % (ref 38–73)
NRBC BLD-RTO: 0 /100 WBC
PHOSPHATE SERPL-MCNC: 3.7 MG/DL (ref 2.7–4.5)
PLATELET # BLD AUTO: 288 K/UL (ref 150–350)
PMV BLD AUTO: 11.1 FL (ref 9.2–12.9)
POTASSIUM SERPL-SCNC: 4 MMOL/L (ref 3.5–5.1)
POTASSIUM SERPL-SCNC: 4.4 MMOL/L (ref 3.5–5.1)
PROT SERPL-MCNC: 6.5 G/DL (ref 6–8.4)
PROT SERPL-MCNC: 6.5 G/DL (ref 6–8.4)
PROTHROMBIN TIME: 35.4 SEC (ref 9–12.5)
RBC # BLD AUTO: 3.21 M/UL (ref 4.6–6.2)
SODIUM SERPL-SCNC: 135 MMOL/L (ref 136–145)
SODIUM SERPL-SCNC: 137 MMOL/L (ref 136–145)
WBC # BLD AUTO: 9 K/UL (ref 3.9–12.7)

## 2020-02-11 PROCEDURE — 99232 SBSQ HOSP IP/OBS MODERATE 35: CPT | Mod: ,,, | Performed by: INTERNAL MEDICINE

## 2020-02-11 PROCEDURE — 93750 INTERROGATION VAD IN PERSON: CPT | Mod: ,,, | Performed by: INTERNAL MEDICINE

## 2020-02-11 PROCEDURE — 84100 ASSAY OF PHOSPHORUS: CPT

## 2020-02-11 PROCEDURE — 97530 THERAPEUTIC ACTIVITIES: CPT

## 2020-02-11 PROCEDURE — A4216 STERILE WATER/SALINE, 10 ML: HCPCS | Performed by: STUDENT IN AN ORGANIZED HEALTH CARE EDUCATION/TRAINING PROGRAM

## 2020-02-11 PROCEDURE — 25000003 PHARM REV CODE 250: Performed by: STUDENT IN AN ORGANIZED HEALTH CARE EDUCATION/TRAINING PROGRAM

## 2020-02-11 PROCEDURE — 97535 SELF CARE MNGMENT TRAINING: CPT

## 2020-02-11 PROCEDURE — 27000248 HC VAD-ADDITIONAL DAY

## 2020-02-11 PROCEDURE — 97112 NEUROMUSCULAR REEDUCATION: CPT

## 2020-02-11 PROCEDURE — 63600175 PHARM REV CODE 636 W HCPCS: Performed by: PHYSICIAN ASSISTANT

## 2020-02-11 PROCEDURE — 80053 COMPREHEN METABOLIC PANEL: CPT | Mod: 91

## 2020-02-11 PROCEDURE — 83615 LACTATE (LD) (LDH) ENZYME: CPT

## 2020-02-11 PROCEDURE — 93750 PR INTERROGATE VENT ASSIST DEV, IN PERSON, W PHYSICIAN ANALYSIS: ICD-10-PCS | Mod: ,,, | Performed by: INTERNAL MEDICINE

## 2020-02-11 PROCEDURE — 99232 PR SUBSEQUENT HOSPITAL CARE,LEVL II: ICD-10-PCS | Mod: ,,, | Performed by: INTERNAL MEDICINE

## 2020-02-11 PROCEDURE — 85025 COMPLETE CBC W/AUTO DIFF WBC: CPT

## 2020-02-11 PROCEDURE — 92526 ORAL FUNCTION THERAPY: CPT

## 2020-02-11 PROCEDURE — 20600001 HC STEP DOWN PRIVATE ROOM

## 2020-02-11 PROCEDURE — 85610 PROTHROMBIN TIME: CPT

## 2020-02-11 PROCEDURE — 83735 ASSAY OF MAGNESIUM: CPT

## 2020-02-11 PROCEDURE — 80162 ASSAY OF DIGOXIN TOTAL: CPT

## 2020-02-11 RX ORDER — PANTOPRAZOLE SODIUM 40 MG/1
40 TABLET, DELAYED RELEASE ORAL DAILY
Status: DISCONTINUED | OUTPATIENT
Start: 2020-02-11 | End: 2020-02-17 | Stop reason: HOSPADM

## 2020-02-11 RX ORDER — OXYCODONE HYDROCHLORIDE 5 MG/1
5 TABLET ORAL EVERY 6 HOURS
Status: DISCONTINUED | OUTPATIENT
Start: 2020-02-11 | End: 2020-02-11

## 2020-02-11 RX ORDER — AMLODIPINE BESYLATE 10 MG/1
10 TABLET ORAL DAILY
Status: DISCONTINUED | OUTPATIENT
Start: 2020-02-11 | End: 2020-02-17 | Stop reason: HOSPADM

## 2020-02-11 RX ORDER — METHOCARBAMOL 500 MG/1
500 TABLET, FILM COATED ORAL NIGHTLY PRN
Status: DISCONTINUED | OUTPATIENT
Start: 2020-02-11 | End: 2020-02-17 | Stop reason: HOSPADM

## 2020-02-11 RX ORDER — OXYCODONE HCL 5 MG/5 ML
5 SOLUTION, ORAL ORAL EVERY 6 HOURS
Status: DISCONTINUED | OUTPATIENT
Start: 2020-02-11 | End: 2020-02-11

## 2020-02-11 RX ORDER — ACETAMINOPHEN 650 MG/20.3ML
650 LIQUID ORAL EVERY 6 HOURS
Status: DISCONTINUED | OUTPATIENT
Start: 2020-02-11 | End: 2020-02-11

## 2020-02-11 RX ORDER — AMIODARONE HYDROCHLORIDE 200 MG/1
400 TABLET ORAL 3 TIMES DAILY
Status: DISCONTINUED | OUTPATIENT
Start: 2020-02-11 | End: 2020-02-12

## 2020-02-11 RX ORDER — OXYCODONE HYDROCHLORIDE 5 MG/1
5 TABLET ORAL EVERY 6 HOURS PRN
Status: DISCONTINUED | OUTPATIENT
Start: 2020-02-11 | End: 2020-02-17 | Stop reason: HOSPADM

## 2020-02-11 RX ORDER — LANOLIN ALCOHOL/MO/W.PET/CERES
800 CREAM (GRAM) TOPICAL 3 TIMES DAILY
Status: DISCONTINUED | OUTPATIENT
Start: 2020-02-11 | End: 2020-02-17

## 2020-02-11 RX ORDER — ACETAMINOPHEN 325 MG/1
650 TABLET ORAL EVERY 6 HOURS
Status: DISCONTINUED | OUTPATIENT
Start: 2020-02-11 | End: 2020-02-12

## 2020-02-11 RX ORDER — POTASSIUM CHLORIDE 20 MEQ/1
60 TABLET, EXTENDED RELEASE ORAL 2 TIMES DAILY
Status: DISCONTINUED | OUTPATIENT
Start: 2020-02-11 | End: 2020-02-12

## 2020-02-11 RX ORDER — HYDROCODONE BITARTRATE AND ACETAMINOPHEN 5; 325 MG/1; MG/1
1 TABLET ORAL EVERY 6 HOURS PRN
Status: DISCONTINUED | OUTPATIENT
Start: 2020-02-11 | End: 2020-02-17 | Stop reason: HOSPADM

## 2020-02-11 RX ADMIN — CASTOR OIL AND BALSAM, PERU: 788; 87 OINTMENT TOPICAL at 10:02

## 2020-02-11 RX ADMIN — ASPIRIN 325 MG ORAL TABLET 325 MG: 325 PILL ORAL at 10:02

## 2020-02-11 RX ADMIN — AMLODIPINE BESYLATE 10 MG: 10 TABLET ORAL at 10:02

## 2020-02-11 RX ADMIN — OXYCODONE HYDROCHLORIDE 5 MG: 5 TABLET ORAL at 06:02

## 2020-02-11 RX ADMIN — ACETAMINOPHEN 650 MG: 325 TABLET ORAL at 06:02

## 2020-02-11 RX ADMIN — Medication 3 ML: at 10:02

## 2020-02-11 RX ADMIN — FUROSEMIDE 80 MG: 10 INJECTION, SOLUTION INTRAMUSCULAR; INTRAVENOUS at 09:02

## 2020-02-11 RX ADMIN — AMIODARONE HYDROCHLORIDE 400 MG: 200 TABLET ORAL at 04:02

## 2020-02-11 RX ADMIN — Medication 10 ML: at 06:02

## 2020-02-11 RX ADMIN — FUROSEMIDE 80 MG: 10 INJECTION, SOLUTION INTRAMUSCULAR; INTRAVENOUS at 10:02

## 2020-02-11 RX ADMIN — FUROSEMIDE 80 MG: 10 INJECTION, SOLUTION INTRAMUSCULAR; INTRAVENOUS at 04:02

## 2020-02-11 RX ADMIN — POTASSIUM CHLORIDE 60 MEQ: 1500 TABLET, EXTENDED RELEASE ORAL at 10:02

## 2020-02-11 RX ADMIN — FERROUS GLUCONATE TAB 324 MG (37.5 MG ELEMENTAL IRON) 324 MG: 324 (37.5 FE) TAB at 11:02

## 2020-02-11 RX ADMIN — Medication 800 MG: at 04:02

## 2020-02-11 RX ADMIN — POTASSIUM CHLORIDE 60 MEQ: 1500 TABLET, EXTENDED RELEASE ORAL at 09:02

## 2020-02-11 RX ADMIN — Medication 3 ML: at 02:02

## 2020-02-11 RX ADMIN — PANTOPRAZOLE SODIUM 40 MG: 40 TABLET, DELAYED RELEASE ORAL at 10:02

## 2020-02-11 RX ADMIN — Medication 10 ML: at 12:02

## 2020-02-11 RX ADMIN — Medication 800 MG: at 10:02

## 2020-02-11 RX ADMIN — AMIODARONE HYDROCHLORIDE 400 MG: 200 TABLET ORAL at 09:02

## 2020-02-11 RX ADMIN — CASTOR OIL AND BALSAM, PERU: 788; 87 OINTMENT TOPICAL at 09:02

## 2020-02-11 RX ADMIN — AMIODARONE HYDROCHLORIDE 400 MG: 200 TABLET ORAL at 10:02

## 2020-02-11 RX ADMIN — ATORVASTATIN CALCIUM 80 MG: 20 TABLET, FILM COATED ORAL at 09:02

## 2020-02-11 RX ADMIN — DIGOXIN 0.12 MG: 125 TABLET ORAL at 10:02

## 2020-02-11 RX ADMIN — Medication 800 MG: at 09:02

## 2020-02-11 RX ADMIN — ACETAMINOPHEN 650 MG: 325 TABLET ORAL at 11:02

## 2020-02-11 NOTE — PROGRESS NOTES
Pt aaox3 while sitting up in chair with caregiver at bedside.  Pt denies any needs at this time.  Encouraged pt to notify nurse if they have any questions, problems or concerns for LVAD coordinator.  Pt almost complete workbook.  Pt instructed to continue studying completed portions.  Pt still unsure of alarms and how to address them.. Plan to follow up with pt tomorrow. Pt verbalized understanding and in agreement of plan. Will follow up with pt soon.    Patient is not checked off on VAD alarms.     Patient caregiver is not checked off dressing.

## 2020-02-11 NOTE — PROGRESS NOTES
02/11/2020  Felipe Lim    Current provider:  Isiah Montero MD      I, Felipe Lim, rounded on Tae Delgado to ensure all mechanical assist device settings (IABP or VAD) were appropriate and all parameters were within limits.  I was able to ensure all back up equipment was present, the staff had no issues, and the Perfusion Department daily rounding was complete.    12:43 PM

## 2020-02-11 NOTE — PLAN OF CARE
Problem: Occupational Therapy Goal  Goal: Occupational Therapy Goal  Description  Goals to be met by: 2/16/2020     Patient will increase functional independence with ADLs by performing:    UE Dressing with Minimal Assistance.  LE Dressing with Minimal Assistance.  Grooming while standing with Minimal Assistance.  Toileting from bedside commode with Minimal Assistance for hygiene and clothing management.   Sitting at edge of bed x10 minutes with Minimal Assistance in prep for seated grooming tasks- GOAL MET 2/10  Supine to sit with min A  Toilet transfer to bedside commode with Minimal Assistance.  Pt will perform LVAD management independently       Outcome: Ongoing, Progressing     Pt progressing well towards OT goals. OT POC remains appropriate for patient on this date. Pt will continue to benefit from skilled OT to address the deficits affecting his occupational performance.    Jessica Lenz OTR/L  2/11/20

## 2020-02-11 NOTE — PT/OT/SLP PROGRESS
Occupational Therapy   Treatment    Name: Tae Delgado  MRN: [id number]  Admitting Diagnosis:  LVAD (left ventricular assist device) present  13 Days Post-Op    Recommendations:     Discharge Recommendations: rehabilitation facility  Discharge Equipment Recommendations:  (TBD)  Barriers to discharge:  (Increased level of skilled assistance for functional tasks)    Assessment:     Tae Delgado is a 59 y.o. male with a medical diagnosis of LVAD (left ventricular assist device) present.  He presents with the following performance deficits affecting function: weakness, impaired endurance, impaired self care skills, decreased lower extremity function, decreased coordination, impaired balance, impaired cardiopulmonary response to activity, impaired fine motor. Pt tolerated session well this date. Increased time provided to perform all functional tasks due to fatigue and increased verbalizations about being left in chair yesterday for 5 hours. Pt continues to require skilled assistance for functional tasks due to the deficits listed above. Pt remains good rehab potential based on his progress, current level of function in comparison to baseline, and family support. Wife present and active in POC providing encouragement throughout session. OT POC remains appropriate for patient on this date. Pt will continue to benefit from skilled OT to address the deficits affecting his occupational performance.     Rehab Prognosis:  Good; patient would benefit from acute skilled OT services to address these deficits and reach maximum level of function.       Plan:     Patient to be seen 6 x/week to address the above listed problems via self-care/home management, therapeutic activities, therapeutic exercises  · Plan of Care Expires: 03/02/20  · Plan of Care Reviewed with: patient, spouse    Subjective     Pain/Comfort:  · Pain Rating 1: 0/10  · Pain Rating Post-Intervention 1: (pt did not rate)    Objective:     Communicated with: RN prior  to session.  Patient found HOB elevated with telemetry, wound vac, LVAD upon OT entry to room.    General Precautions: Standard, fall, sternal   Orthopedic Precautions:N/A   Braces: N/A     Occupational Performance:     Bed Mobility:    · Patient completed Rolling/Turning to Left with  stand by assistance  · Patient completed Scooting/Bridging with stand by assistance  · Patient completed Supine to Sit with stand by assistance     Functional Mobility/Transfers:  · Patient completed Sit <> Stand Transfer from bed with minimum assistance and of 2 persons  with  no assistive device  · 2 trials completed both lasting 30 seconds  · Forward flexed posture with the inability to extend hips for upright standing    · Patient completed Bed <> Chair Transfer using Stand Pivot technique with minimum assistance and of 2 persons with no assistive device  · PT provided anterior facilitation; OT located posteriorly for hip guidance into chair for a safe descent   · Functional Mobility: NT this date     Activities of Daily Living:  · Grooming: stand by assistance sitting EOB to complete dental hygiene   · Lower Body Dressing: maximal assistance to don LB socks due to increasing SOB with forward leaning; demo'd figure 4 technique      [facility] 6 Click ADL: 15    Treatment & Education:  - Role of OT/ OT POC  - Self care safety/ independence  - Functional transfer/ mobility safety  - Bed mobility safety  - Importance of sitting UIC throughout the day as tolerated   - LVAD: patient found on battery power / returned on battery power. no alarms sounded.   - LVAD: Pt with emergency bag present & able to name/identify 2/3 (cueing for battery clips req'd) correct items in bag.     Patient left up in chair with all lines intact, call button in reach, RN notified and wife presentEducation:      GOALS:   Multidisciplinary Problems     Occupational Therapy Goals        Problem: Occupational Therapy Goal    Goal Priority Disciplines Outcome  Interventions   Occupational Therapy Goal     OT, PT/OT Ongoing, Progressing    Description:  Goals to be met by: 2/16/2020     Patient will increase functional independence with ADLs by performing:    UE Dressing with Minimal Assistance.  LE Dressing with Minimal Assistance.  Grooming while standing with Minimal Assistance.  Toileting from bedside commode with Minimal Assistance for hygiene and clothing management.   Sitting at edge of bed x10 minutes with Minimal Assistance in prep for seated grooming tasks- GOAL MET 2/10  Supine to sit with min A  Toilet transfer to bedside commode with Minimal Assistance.  Pt will perform LVAD management independently                  Multidisciplinary Problems (Resolved)        Problem: Occupational Therapy Goal    Goal Priority Disciplines Outcome Interventions   Occupational Therapy Goal   (Resolved)     OT, PT/OT Met    Description:  Skilled OT services not necessary at this time secondary to patient performing ADLs at OF. Patient in agreement. Please re-consult OT if change in patient's functional status is noted.                     Time Tracking:     OT Date of Treatment: 02/11/20  OT Start Time: 1350  OT Stop Time: 1432  OT Total Time (min): 42 min Co tx with PT     Billable Minutes:Self Care/Home Management 20  Therapeutic Activity 10    Jessica Lenz OT  2/11/2020

## 2020-02-11 NOTE — ASSESSMENT & PLAN NOTE
- S/P DT HM3 with closure of AV and repair of MV for regurg 1/23/20 ([facility])  - HTS primary  - Taken back to OR 1/24 for possible RVAD placement which was not done. Patient remained hemodynamically stable in OR. Wash out on 1/27. Chest closed 1/29.   - CVP 16.  Lasix increased to 80mg TID from 40mg TID yesterday.  Will keep at this dose for now and consider optimizing if CVP is up tomorrow.   - Currently hemodynamically stable since Epi weaned off   - TTE 2/10 at 5300 rpm: LVEDD 5.01, LVEF 10%, RV function mod depressed,   - Current speed 5300  - INR supratherapeutic at 3.7  - LD stable  - PT/OT/VAD education    Procedure: Device Interrogation Including analysis of device parameters  Current Settings: Ventricular Assist Device  Review of device function is stable/unstabe    TXP LVAD INTERROGATIONS 2/11/2020 2/11/2020 2/11/2020 2/10/2020 2/10/2020 2/10/2020 2/10/2020   Type HeartMate3 HeartMate3 HeartMate3 HeartMate3 - HeartMate3 HeartMate3   Flow 4.1 4.2 4.4 4.3 4.2 4.0 4.2   Speed 5300 5300 5300 5300 5300 5300 5300   PI 3.74 3.7 3.2 3.1 3.8 4.5 3.8   Power (Berry) 3.8 3.8 3.8 3.8 3.7 3.5 3.7   LSL - - - - - - -   Pulsatility - Pulse Pulse Pulse - - Pulse

## 2020-02-11 NOTE — PT/OT/SLP PROGRESS
"Physical Therapy Treatment    Patient Name:  Tae Delgado   MRN:  [id number]  Admitting Diagnosis:  LVAD (left ventricular assist device) present   Recent Surgery: Procedure(s) (LRB):  CLOSURE, WOUND, STERNUM (N/A)  WASHOUT (N/A)  APPLICATION, WOUND VAC (N/A) 13 Days Post-Op  Admit Date: 1/9/2020  Length of Stay: 33 days    Recommendations:     Discharge Recommendations:  rehabilitation facility   Discharge Equipment Recommendations: other (see comments)(tbd)   Barriers to discharge: None    Assessment:     Tae Delgado is a 59 y.o. male admitted with a medical diagnosis of LVAD (left ventricular assist device) present.  He presents with the following impairments/functional limitations:  weakness, impaired endurance, impaired self care skills, impaired functional mobilty, impaired balance, decreased lower extremity function, decreased upper extremity function, decreased safety awareness, impaired cardiopulmonary response to activity, impaired fine motor, impaired coordination, decreased coordination, gait instability. Pt tolerated session fair today. Pt still req'ing max encouragement to perform mobility outside of daily activities. Pt eventually agreeable to session after ensuring him that he will be able to get back to bed after 3 hours in bedside chair. Pt continues to threaten to not mobilize with therapy if he is left in chair "A minute longer" than discussed with pt. Pt demo'ed improved transfer ability today with increased foot clearance to take steps to pivot to c. Pt still demo's posterior chain weakness and due to this is unable to stand upright despite facilitation/cueing. Pt is an excellent candidate for inpatient rehabilitation, as pt has a qualifying diagnosis, displays good activity tolerance, has experienced decline from PLOF, has good family support, and is motivated to participate in therapy.     Rehab Prognosis: Fair; patient would benefit from acute skilled PT services to address these deficits " and reach maximum level of function.    Recent Surgery: Procedure(s) (LRB):  CLOSURE, WOUND, STERNUM (N/A)  WASHOUT (N/A)  APPLICATION, WOUND VAC (N/A) 13 Days Post-Op    Plan:     During this hospitalization, patient to be seen 6 x/week to address the identified rehab impairments via gait training, therapeutic exercises, therapeutic activities, neuromuscular re-education and progress toward the following goals:    · Plan of Care Expires:  02/29/20    Subjective     RN notified prior to session. Wife present upon PT entrance into room.    Chief Complaint: Pt expressing dissatisfaction about mobilizing with PT as he sat EOB twice for meals on this date. Requested PT sessions in AM.  Patient/Family Comments/goals: get better  Pain/Comfort:  · Pain Rating 1: 0/10  · Pain Rating Post-Intervention 1: 0/10      Objective:     Additional staff present: OT for co-treatment due to pt's limited participation with therapy as well as extremely impaired cardiopulmonary reserve    Patient found HOB elevated with: telemetry, wound vac, LVAD   Mental Status: Patient is oriented to AxOx4 and follows multistep commands. Patient is Alert and Cooperative during session.    General Precautions: Standard, Cardiac fall, sternal, LVAD   Orthopedic Precautions:N/A   Braces: N/A   Body mass index is 34.1 kg/m².  Oxygen Device: Room Air    Outcome Measures:  AM-PAC 6 CLICK MOBILITY  Turning over in bed (including adjusting bedclothes, sheets and blankets)?: 3  Sitting down on and standing up from a chair with arms (e.g., wheelchair, bedside commode, etc.): 2  Moving from lying on back to sitting on the side of the bed?: 3  Moving to and from a bed to a chair (including a wheelchair)?: 2  Need to walk in hospital room?: 1  Climbing 3-5 steps with a railing?: 1  Basic Mobility Total Score: 12     Functional Mobility:    Bed Mobility:   · Supine to Sit: stand by assistance; from Lt side of bed  · Scooting anteriorly to EOB to have both feet  "planted on floor: stand by assistance    Sitting Balance at Edge of Bed:   Assistance Level Required: Supervision   Time: 15 mins   Postural deviations noted: slouched posture, rounded shoulders, forward head and posterior pelvic tilt   Comments: Pt able to perform sitting balance activity performing dynamic reaches outside of ALPHONSE while participating in self-care task with OT. Pt demo's good recovery strategies for trunk.    Transfers:   · Sit <> Stand Transfer: minimum assistance and of 2 persons with no assistive device   · Stand <> Sit Transfer: minimum assistance and of 2 persons with no assistive device   · f1wyrwee from EOB   · Bed raised well above chair height to assist with stand and building pt confidence  · Bed <> Chair Transfer: Stand Pivot technique with minimum assistance and of 2 persons with no assistive device  · Chair on patient's Rt  · 1 person in front to assist with transfer as well as balance and 1 person posteriorly to guide hips  · Pt req'd Pt/OT to develop "plan" before with step by step instructions which assisted patient with performing pivot portion of tfer    Standing Balance:   Assistance Level Required: Minimal Assistance and of 2 persons   Patient used: no assistive device    Time: 2 x 30 seconds   Postural deviations noted: slouched posture, forward head, poor midline awareness, increased cervical flexion and increased trunk flexion   Comments: Pt initially able to stand with upright posture but quickly begins with fwd flexed posture/positioning. Facilitation at sacrum as well as vc's provided but pt unable to correct. Once fatigued pt progresses to bilateral knee flexion due to overall weakness      Gait: Deferred due to pt's performance with above listed functional mobility    Education:   Time provided for education, counseling and discussion of health disposition in regards to patient's current status   All questions answered within PT scope of practice and to " patient's satisfaction   PT role in POC to address current functional deficits   Pt educated on proper body mechanics, safety techniques, and energy conservation with PT facilitation and cueing throughout session   Call nursing/pct to transfer to chair/use bathroom. Pt stated understanding.   Whiteboard updated with pt's current mobility status documented above   Safe to perform stand pivot transfer to/from chair/bedside commode assist x 2 and no AD w/ nursing/PCT present   Importance of OOB tolerance 3 hrs/day to improve lung ventilation and expansion as well as strengthen postural musculature   LVAD: patient found on battery power / returned on battery power. no alarms sounded.    LVAD: Pt with emergency bag present & able to name/identify 2/3 (cueing for battery clips req'd) correct items in bag.    Charge RN and PCT informed that pt is to get back to bed at 1700.    Patient left up in chair with all lines intact, call button in reach, RN notified and wife present.    GOALS:   Multidisciplinary Problems     Physical Therapy Goals        Problem: Physical Therapy Goal    Goal Priority Disciplines Outcome Goal Variances Interventions   Physical Therapy Goal     PT, PT/OT Ongoing, Progressing     Description:  Goals to be met by: 2020     Patient will increase functional independence with mobility by performin. Supine to sit with MInimal Assistance - not met  2. Rolling to Left and Right with Minimal Assistance.- not met  3. Sit to stand transfer with Moderate Assistance- not met  4. Sitting at edge of bed x8 minutes with Minimal Assistance - met 2020  4a. Sitting at EOB x 10 minutes with SBA- met 2/10/2020  5. Lower extremity exercise program x10 reps per handout, with assistance as needed- not met  6. Standing with no AD x 30 seconds CGA to assist with ADLs and self care- not met                      Time Tracking:     PT Received On: 20  PT Start Time: 1350     PT Stop Time:  1437  PT Total Time (min): 47 min       Billable Minutes:   · Therapeutic Activity 10 and Neuromuscular Re-education 30    Treatment Type: Treatment  PT/PTA: PT       Ann Rudd PT, DPT  2/11/2020  Pager: [phone]

## 2020-02-11 NOTE — SUBJECTIVE & OBJECTIVE
Interval History: Patient reports last bowel movement was yesterday.  He is not walking without assistance.  He is working on education.  He is net negative 822 in the last 24 hours. CVP: 16.  He has not been taking the muscle relaxer or scheduled pain meds.  Changing them to prn.  Holding Coumadin today as INR was 3.7    Continuous Infusions:    Scheduled Meds:   acetaminophen  650 mg Oral Q6H    amiodarone  400 mg Oral TID    amLODIPine  10 mg Oral Daily    aspirin  325 mg Oral Daily    atorvastatin  80 mg Oral QHS    balsam peru-castor oil   Topical (Top) BID    digoxin  0.125 mg Oral Daily    docusate sodium  200 mg Oral QHS    ferrous gluconate  324 mg Oral Daily with breakfast    furosemide  80 mg Intravenous TID    magnesium oxide  800 mg Oral TID    pantoprazole  40 mg Oral Daily    polyethylene glycol  100 mL Per NG tube 6 times per day    polyethylene glycol  17 g Oral Daily    potassium chloride  60 mEq Oral BID    sodium chloride 0.9%  10 mL Intravenous Q6H    sodium chloride 0.9%  3 mL Intravenous Q8H     PRN Meds:albumin human 5%, albuterol sulfate, bisacodyL, Dextrose 10% Bolus, Dextrose 10% Bolus, hydrALAZINE, HYDROcodone-acetaminophen, magnesium hydroxide 400 mg/5 ml, methocarbamol, oxyCODONE, sodium chloride 0.9%, Flushing PICC Protocol **AND** sodium chloride 0.9% **AND** sodium chloride 0.9%    Review of patient's allergies indicates:   Allergen Reactions    Biopatch [chlorhexidine gluconate]      Site burning    Dobutamine in d5w      Tachycardia, tremors, SOB, flushing    Percocet [oxycodone-acetaminophen] Itching    Penicillins Rash     Cefepime given on 1/23/2020 without issue     Objective:     Vital Signs (Most Recent):  Temp: 97.8 °F (36.6 °C) (02/11/20 0900)  Pulse: 90 (02/11/20 0547)  Resp: 16 (02/11/20 0900)  BP: (!) 82/0 (02/11/20 0901)  SpO2: 95 % (02/11/20 0900) Vital Signs (24h Range):  Temp:  [97.4 °F (36.3 °C)-98.5 °F (36.9 °C)] 97.8 °F (36.6 °C)  Pulse:   [48-90] 90  Resp:  [16-18] 16  SpO2:  [90 %-95 %] 95 %  BP: ()/(0-74) 82/0     Patient Vitals for the past 72 hrs (Last 3 readings):   Weight   02/11/20 0700 107.8 kg (237 lb 10.5 oz)   02/10/20 0740 109.8 kg (242 lb)     Body mass index is 34.1 kg/m².      Intake/Output Summary (Last 24 hours) at 2/11/2020 1104  Last data filed at 2/11/2020 0621  Gross per 24 hour   Intake 963 ml   Output 2125 ml   Net -1162 ml       Hemodynamic Parameters:  CVP:  [16 mmHg] 16 mmHg    Telemetry: V paced  Physical Exam   Constitutional: He is oriented to person, place, and time. He appears well-developed and well-nourished.   HENT:   Head: Normocephalic and atraumatic.   Eyes: Pupils are equal, round, and reactive to light. Conjunctivae and EOM are normal.   Neck: Normal range of motion. Neck supple. No JVD present. No thyromegaly present.   JVP mid neck  Cardiovascular: Normal rate and regular rhythm. Smooth VAD hum   Pulmonary/Chest: Effort normal and breath sounds normal. Intubated and sedated   Abdominal: Soft. Bowel sounds are normal.   Musculoskeletal: Normal range of motion. 1+ pedal edema   Neurological: He is alert and oriented to person, place, and time.   Skin: Skin is warm and dry. Capillary refill takes 2 to 3 seconds.   Psychiatric: He has a normal mood and affect. His behavior is normal. Judgment and thought content normal.       Significant Labs:  CBC:  Recent Labs   Lab 02/09/20  0400 02/10/20  0602 02/11/20  0606   WBC 12.34  12.34 10.45 9.00   RBC 3.36*  3.36* 3.21* 3.21*   HGB 9.2*  9.2* 8.9* 8.8*   HCT 30.8*  30.8* 29.6* 29.5*     292 281 288   MCV 92  92 92 92   MCH 27.4  27.4 27.7 27.4   MCHC 29.9*  29.9* 30.1* 29.8*     BNP:  Recent Labs   Lab 02/07/20  0400 02/09/20  0400 02/10/20  0602   * 275* 317*     CMP:  Recent Labs   Lab 02/09/20  1924 02/10/20  0602 02/10/20  1146 02/10/20  1918   *  --  111* 109   CALCIUM 8.3*  --  8.5* 8.5*   ALBUMIN 2.5* 2.5* 2.6* 2.6*   PROT 6.2  6.0 6.4 6.3     --  138 135*   K 3.6  --  4.5 3.7   CO2 31*  --  30* 30*   CL 98  --  98 95   BUN 42*  --  39* 37*   CREATININE 1.7*  --  1.7* 1.6*   ALKPHOS 65 67 71 69   ALT 18 19 19 20   AST 23 29 26 24   BILITOT 0.5 0.7 0.7 0.6      Coagulation:   Recent Labs   Lab 02/07/20  0400 02/08/20  0454 02/09/20  0400 02/10/20  0602 02/11/20  0606   INR 2.7* 2.5* 1.8*  1.8*  1.8* 2.3* 3.7*   APTT 35.7* 34.8* 30.7  --   --      LDH:  Recent Labs   Lab 02/09/20  0400 02/09/20  1924 02/10/20  0602 02/11/20  0606     260 263* 258 279*     Microbiology:  Microbiology Results (last 7 days)     Procedure Component Value Units Date/Time    Blood culture [[id number]] Collected:  01/31/20 2215    Order Status:  Completed Specimen:  Blood from Peripheral, Forearm, Right Updated:  02/06/20 0612     Blood Culture, Routine No growth after 5 days.    Blood culture [[id number]] Collected:  01/31/20 2220    Order Status:  Completed Specimen:  Blood from Peripheral, Wrist, Left Updated:  02/06/20 0612     Blood Culture, Routine No growth after 5 days.          I have reviewed all pertinent labs within the past 24 hours.    Estimated Creatinine Clearance: 61.1 mL/min (A) (based on SCr of 1.6 mg/dL (H)).    Diagnostic Results:  I have reviewed all pertinent imaging results/findings within the past 24 hours.

## 2020-02-11 NOTE — PLAN OF CARE
Plan of care discussed with patient. Wife completed sterile dressing change today; patient still needs to be checked off on alarms and dressing change. Electrolytes replaced. Patient tolerating swallowing medication without difficulty. PICC line dressing change completed next time dressing change due 2/17. fall precautions in place.Continuing to encourage sternal precautions, IS, and ambulation. LVAD DP and numbers WNL, smooth LVAD hum. Patient has no complaints of pain. Discussed medications and care. Encouraged workbook, reviewed education, filled in binder. Patient has no questions at this time. Will continue to monitor.

## 2020-02-11 NOTE — PROGRESS NOTES
"Patients wife completed LVAD dressing change using sterile technique with Betadine and water. DLES is a "1" with minimal drainage noted on the DLES. Tolerated without any complication. Sutures remain intact, no redness, or tenderness noted.      "

## 2020-02-11 NOTE — PLAN OF CARE
Problem: SLP Goal  Goal: SLP Goal  Description  Goals expected to be met by 2/9:  1. Pt will tolerate regular/thin liquid diet with no signs of airway compromise.    Outcome: Met  Patient tolerating a regular diet and thin liquids with aspiration precautions in place No further skilled acute ST services warranted at this time. Please re-consult as needed.   NADEGE Bell., CCC-SLP  02/11/2020

## 2020-02-11 NOTE — PROGRESS NOTES
[facility]  Heart Transplant  Progress Note    Patient Name: Tae Delgado  MRN: [id number]  Admission Date: 1/9/2020  Hospital Length of Stay: 33 days  Attending Physician: Isiah Montero MD  Primary Care Provider: lEham Pearson MD  Principal Problem:LVAD (left ventricular assist device) present    Subjective:     Interval History: Patient reports last bowel movement was yesterday.  He is not walking without assistance.  He is working on education.  He is net negative 822 in the last 24 hours. CVP: 16.  He has not been taking the muscle relaxer or scheduled pain meds.  Changing them to prn.  Holding Coumadin today as INR was 3.7.  Echo done yesterday with speed at 5300; EF: 10%, LVEDD: 5.01    Continuous Infusions:    Scheduled Meds:   acetaminophen  650 mg Oral Q6H    amiodarone  400 mg Oral TID    amLODIPine  10 mg Oral Daily    aspirin  325 mg Oral Daily    atorvastatin  80 mg Oral QHS    balsam peru-castor oil   Topical (Top) BID    digoxin  0.125 mg Oral Daily    docusate sodium  200 mg Oral QHS    ferrous gluconate  324 mg Oral Daily with breakfast    furosemide  80 mg Intravenous TID    magnesium oxide  800 mg Oral TID    pantoprazole  40 mg Oral Daily    polyethylene glycol  100 mL Per NG tube 6 times per day    polyethylene glycol  17 g Oral Daily    potassium chloride  60 mEq Oral BID    sodium chloride 0.9%  10 mL Intravenous Q6H    sodium chloride 0.9%  3 mL Intravenous Q8H     PRN Meds:albumin human 5%, albuterol sulfate, bisacodyL, Dextrose 10% Bolus, Dextrose 10% Bolus, hydrALAZINE, HYDROcodone-acetaminophen, magnesium hydroxide 400 mg/5 ml, methocarbamol, oxyCODONE, sodium chloride 0.9%, Flushing PICC Protocol **AND** sodium chloride 0.9% **AND** sodium chloride 0.9%    Review of patient's allergies indicates:   Allergen Reactions    Biopatch [chlorhexidine gluconate]      Site burning    Dobutamine in d5w      Tachycardia, tremors, SOB, flushing    Percocet  [oxycodone-acetaminophen] Itching    Penicillins Rash     Cefepime given on 1/23/2020 without issue     Objective:     Vital Signs (Most Recent):  Temp: 97.8 °F (36.6 °C) (02/11/20 0900)  Pulse: 90 (02/11/20 0547)  Resp: 16 (02/11/20 0900)  BP: (!) 82/0 (02/11/20 0901)  SpO2: 95 % (02/11/20 0900) Vital Signs (24h Range):  Temp:  [97.4 °F (36.3 °C)-98.5 °F (36.9 °C)] 97.8 °F (36.6 °C)  Pulse:  [48-90] 90  Resp:  [16-18] 16  SpO2:  [90 %-95 %] 95 %  BP: ()/(0-74) 82/0     Patient Vitals for the past 72 hrs (Last 3 readings):   Weight   02/11/20 0700 107.8 kg (237 lb 10.5 oz)   02/10/20 0740 109.8 kg (242 lb)     Body mass index is 34.1 kg/m².      Intake/Output Summary (Last 24 hours) at 2/11/2020 1104  Last data filed at 2/11/2020 0621  Gross per 24 hour   Intake 963 ml   Output 2125 ml   Net -1162 ml       Hemodynamic Parameters:  CVP:  [16 mmHg] 16 mmHg    Telemetry: V paced  Physical Exam   Constitutional: He is oriented to person, place, and time. He appears well-developed and well-nourished.   HENT:   Head: Normocephalic and atraumatic.   Eyes: Pupils are equal, round, and reactive to light. Conjunctivae and EOM are normal.   Neck: Normal range of motion. Neck supple. No JVD present. No thyromegaly present.   JVP mid neck  Cardiovascular: Normal rate and regular rhythm. Smooth VAD hum   Pulmonary/Chest: Effort normal and breath sounds normal. Intubated and sedated   Abdominal: Soft. Bowel sounds are normal.   Musculoskeletal: Normal range of motion. 1+ pedal edema   Neurological: He is alert and oriented to person, place, and time.   Skin: Skin is warm and dry. Capillary refill takes 2 to 3 seconds.   Psychiatric: He has a normal mood and affect. His behavior is normal. Judgment and thought content normal.       Significant Labs:  CBC:  Recent Labs   Lab 02/09/20  0400 02/10/20  0602 02/11/20  0606   WBC 12.34  12.34 10.45 9.00   RBC 3.36*  3.36* 3.21* 3.21*   HGB 9.2*  9.2* 8.9* 8.8*   HCT 30.8*   30.8* 29.6* 29.5*     292 281 288   MCV 92  92 92 92   MCH 27.4  27.4 27.7 27.4   MCHC 29.9*  29.9* 30.1* 29.8*     BNP:  Recent Labs   Lab 02/07/20  0400 02/09/20  0400 02/10/20  0602   * 275* 317*     CMP:  Recent Labs   Lab 02/09/20  1924 02/10/20  0602 02/10/20  1146 02/10/20  1918   *  --  111* 109   CALCIUM 8.3*  --  8.5* 8.5*   ALBUMIN 2.5* 2.5* 2.6* 2.6*   PROT 6.2 6.0 6.4 6.3     --  138 135*   K 3.6  --  4.5 3.7   CO2 31*  --  30* 30*   CL 98  --  98 95   BUN 42*  --  39* 37*   CREATININE 1.7*  --  1.7* 1.6*   ALKPHOS 65 67 71 69   ALT 18 19 19 20   AST 23 29 26 24   BILITOT 0.5 0.7 0.7 0.6      Coagulation:   Recent Labs   Lab 02/07/20  0400 02/08/20  0454 02/09/20  0400 02/10/20  0602 02/11/20  0606   INR 2.7* 2.5* 1.8*  1.8*  1.8* 2.3* 3.7*   APTT 35.7* 34.8* 30.7  --   --      LDH:  Recent Labs   Lab 02/09/20 0400 02/09/20  1924 02/10/20  0602 02/11/20  0606     260 263* 258 279*     Microbiology:  Microbiology Results (last 7 days)     Procedure Component Value Units Date/Time    Blood culture [[id number]] Collected:  01/31/20 2215    Order Status:  Completed Specimen:  Blood from Peripheral, Forearm, Right Updated:  02/06/20 0612     Blood Culture, Routine No growth after 5 days.    Blood culture [[id number]] Collected:  01/31/20 2220    Order Status:  Completed Specimen:  Blood from Peripheral, Wrist, Left Updated:  02/06/20 0612     Blood Culture, Routine No growth after 5 days.          I have reviewed all pertinent labs within the past 24 hours.    Estimated Creatinine Clearance: 61.1 mL/min (A) (based on SCr of 1.6 mg/dL (H)).    Diagnostic Results:  I have reviewed all pertinent imaging results/findings within the past 24 hours.    Assessment and Plan:       55 y.o. WM with history of NICMP diagnosed in 2010, ICD, LV thrombus (with prior splenic and renal emboli), Embolic  CVA , paroxysmal atrial fib, HTN, HLP  presents for  F/U today to clinic and he was  volume overloaded on exam .  He states he had 3 admission in the last 3 months for volume overload. He started having more SOB on exertion since one month. Also endorses of Orthopnea since last one month. Alble to walk only 150 ft. He also has Bilateral lower extremity edema. He was admitted here in 2017 for ADHD here at [facility] and [facility] during that admission showed PCWP 40 and CVP 17. Currently denies chest pain, lightheadedness     * LVAD (left ventricular assist device) present  - S/P DT HM3 with closure of AV and repair of MV for regurg 1/23/20 (Oskar)  - HTS primary  - Taken back to OR 1/24 for possible RVAD placement which was not done. Patient remained hemodynamically stable in OR. Wash out on 1/27. Chest closed 1/29.   - CVP 16.  Lasix increased to 80mg TID from 40mg TID yesterday.  Will keep at this dose for now and consider optimizing if CVP is up tomorrow.   - Currently hemodynamically stable since Epi weaned off   - TTE 2/10 at 5300 rpm: LVEDD 5.01, LVEF 10%, RV function mod depressed,   - Current speed 5300  - INR supratherapeutic at 3.7  - LD stable  - PT/OT/VAD education    Procedure: Device Interrogation Including analysis of device parameters  Current Settings: Ventricular Assist Device  Review of device function is stable/unstabe    TXP LVAD INTERROGATIONS 2/11/2020 2/11/2020 2/11/2020 2/10/2020 2/10/2020 2/10/2020 2/10/2020   Type HeartMate3 HeartMate3 HeartMate3 HeartMate3 - HeartMate3 HeartMate3   Flow 4.1 4.2 4.4 4.3 4.2 4.0 4.2   Speed 5300 5300 5300 5300 5300 5300 5300   PI 3.74 3.7 3.2 3.1 3.8 4.5 3.8   Power (Berry) 3.8 3.8 3.8 3.8 3.7 3.5 3.7   LSL - - - - - - -   Pulsatility - Pulse Pulse Pulse - - Pulse       Acute on chronic combined systolic and diastolic heart failure  - S/P DT HM3 with closure of AV and plication of MV for regurg 1/23/20 (See LVAD)  - [facility] dx'd 2010  - hemodynamically stable since Epi weaned off   - CVP 16 on Lasix 80 mg IV TID.      Paroxysmal atrial  fibrillation  - Intermittently in A fib since surgery  - AC on Coumadin  - Dig 0.125 mg qd       Acute kidney injury superimposed on chronic kidney disease  - Creatinine on admit 2.6 (baseline ~ 1.8). BUN/Creatinine 37/1.6  - Nephrology has signed off    Left ventricular thrombus without MI  - H/O LV thrombus with h/o splenic and renal emboli as well as embolic CVA  - Limited TTE done here 1/13 showed no thrombus  - JACINTO 1/23 intra op showed resolution of DAMON thrombus  - AC per CTS      Right lower lobe pulmonary nodule  - Incidental finding on CT chest/abd/pelvis on 1/12. Pulmonology consulted, and rec repeat CT of chest in 3 months  - Will need to follow-up in pulmonary clinic.     Hepatic congestion  - Liver US on 1/11 unremarkable  - LFT's WNL now    ICD (implantable cardioverter-defibrillator) in place  - S/P Biotronik dual chamber ICD    Leukocytosis  - WCC improving  - ID consulted: completed Vanc treatment.    On enteral nutrition  - Diet advanced to mech soft. Still requiring tube feeds via NNGT to meet nutritional needs    Acute hyperglycemia  - Endocrine following    Coagulopathy  - Appreciate Hem/Onc's help. No evidence of underlying coagulopathy (h/o LV thrombus with splenic and renal emboli, h/o embolic CVA)    NSVT (nonsustained ventricular tachycardia)  - Continue MARBELLA Asif  Heart Transplant  [facility]

## 2020-02-11 NOTE — PLAN OF CARE
Discharge Recommendation: Rehab.    0 goals met today. PT goals appropriate.    Ann Rudd PT, DPT  2020  Pager: [phone]        Problem: Physical Therapy Goal  Goal: Physical Therapy Goal  Description  Goals to be met by: 2020     Patient will increase functional independence with mobility by performin. Supine to sit with MInimal Assistance - not met  2. Rolling to Left and Right with Minimal Assistance.- not met  3. Sit to stand transfer with Moderate Assistance- not met  4. Sitting at edge of bed x8 minutes with Minimal Assistance - met 2020  4a. Sitting at EOB x 10 minutes with SBA- met 2/10/2020  5. Lower extremity exercise program x10 reps per handout, with assistance as needed- not met  6. Standing with no AD x 30 seconds CGA to assist with ADLs and self care- not met       Outcome: Ongoing, Progressing

## 2020-02-11 NOTE — PT/OT/SLP PROGRESS
"Speech Language Pathology Treatment  Discharge    Patient Name:  Tae Delgado   MRN:  [id number]   CTSU 3093/CTSU 3093 A    Admitting Diagnosis: LVAD (left ventricular assist device) present    Recommendations:                 General Recommendations:  Follow-up not indicated; consider ENT consult should hoarse vocal quality persist for possible voice therapy  Diet recommendations:  Regular, Liquid Diet Level: Thin   Aspiration Precautions: 1 bite/sip at a time, Frequent oral care, HOB to 90 degrees, Meds whole 1 at a time, Monitor for s/s of aspiration and Remain upright 30 minutes post meal   General Precautions: Standard, fall, sternal  Communication strategies:  none    Subjective     Patient found awake and eating a regular breakfast tray. Wife present in room. Both patient and wife report improved vocal quality this date, however, still not at baseline.   Patient states, "This is at least a little better. That chopped sausage they gave me before was like- here is some sand paper. You have to eat all of it."  Patient with multiple complaints regarding meal trays/previous recommendations.     Objective:     Has the patient been evaluated by SLP for swallowing?   Yes  Keep patient NPO? No   Current Respiratory Status: room air      Patient seen with regular breakfast tray including bites of grits, eggs, sausage and sips of juice via straw. He presents with no overt s/s of aspiration or difficulties swallowing. Patient denies difficulties, and patient's wife reports no noted s/s of aspiration during meals. SLP provided education on possible ENT consult should hoarse vocal quality persist, anatomy and swallowing, SLP recommendations, SLP role, s/s and risks of aspiration, safe swallow precautions, recommendations for vocal hygiene, and d/c from ST services at this time. Patient and patient's wife verbalized understanding of all discussed and are in agreement with d/c from ST services.     Assessment:     Tea Delgado " is a 59 y.o. male tolerating regular solids and thin liquids. No further skilled acute ST services warranted at this time. Please re-consult as needed. Patient may benefit from an ENT consult for possible therapy should dysphonia persist.     Goals:   Multidisciplinary Problems     SLP Goals     Not on file          Multidisciplinary Problems (Resolved)        Problem: SLP Goal    Goal Priority Disciplines Outcome   SLP Goal   (Resolved)     SLP Met   Description:  Goals expected to be met by 2/9:  1. Pt will tolerate regular/thin liquid diet with no signs of airway compromise.                     Plan:     · Plan of Care reviewed with:  patient, spouse   · SLP Follow-Up:  No       Discharge recommendations:  rehabilitation facility   Barriers to Discharge:  None    Time Tracking:     SLP Treatment Date:   02/11/20  Speech Start Time:  0747  Speech Stop Time:  0805     Speech Total Time (min):  18 min    Billable Minutes: Treatment Swallowing Dysfunction 10 and Self Care/Home Management Training 8    KLEVER Chester, CCC-SLP  02/11/2020

## 2020-02-11 NOTE — PLAN OF CARE
Patient remains free from falls and injuries through out shift. Patient is AAOx4 and  VSS. Pt denies chest pain and SOB. No HA reported overnight. LVAD (HM3) is functioning WNL, w/o any acute events or alarms thus far on shift. LVAD #'s, dopplers and MAPs WNL. Patient is daily betaine and NS dressing changes, next dressing change is due 2/10. Current LVAD dressing is CDI. Pt and spouse still need check off on alarms and dressing changes. MSI has Prevena Woundvac in place with seal intact. Velenax placed to DTI.  Lasix IVP increased to 80mg TID, UOP adequate on shift (see flowsheets). CVP 16mmHg this morning with good waveform. Pt has been tolerating thin liquids and whole PO meds well. PO electrolyte replacement given, will f/u with morning labs. Patient's family at bedside. POC is to cont LVAD education and working with PT/OT. Plan of care reviewed with patient. Call bell in reach. Patient verbalizes understanding of plan.  Will continue to monitor. Plan of care discussed with patient.

## 2020-02-11 NOTE — PLAN OF CARE
Plan of care discussed with patient. Patient got up in chair with PT remained in chair for 4 hours, fall precautions in place. INR 3.7; held coumadin today. CVP 16, lasix 80 mg IVP TID. Tylenol q 6 hours for pain. Wife checked off on LVAD dressing change. Continuing to encourage sternal precautions, IS, and ambulation. LVAD DP and numbers WNL, smooth LVAD hum. Patient has no complaints of pain. Discussed medications and care. Encouraged workbook, reviewed education, filled in binder. Patient has no questions at this time. Will continue to monitor.

## 2020-02-11 NOTE — ASSESSMENT & PLAN NOTE
- S/P DT HM3 with closure of AV and plication of MV for regurg 1/23/20 (See LVAD)  - [facility] dx'd 2010  - hemodynamically stable since Epi weaned off   - CVP 16 on Lasix 80 mg IV TID.

## 2020-02-12 DIAGNOSIS — Z95.811 HEART REPLACED BY HEART ASSIST DEVICE: Primary | ICD-10-CM

## 2020-02-12 DIAGNOSIS — Z79.899 LONG TERM CURRENT USE OF AMIODARONE: ICD-10-CM

## 2020-02-12 PROBLEM — Z78.9 ON ENTERAL NUTRITION: Status: RESOLVED | Noted: 2020-01-31 | Resolved: 2020-02-12

## 2020-02-12 PROBLEM — D72.829 LEUKOCYTOSIS: Status: RESOLVED | Noted: 2020-02-06 | Resolved: 2020-02-12

## 2020-02-12 LAB
ALBUMIN SERPL BCP-MCNC: 2.7 G/DL (ref 3.5–5.2)
ALBUMIN SERPL BCP-MCNC: 2.8 G/DL (ref 3.5–5.2)
ALP SERPL-CCNC: 73 U/L (ref 55–135)
ALP SERPL-CCNC: 77 U/L (ref 55–135)
ALT SERPL W/O P-5'-P-CCNC: 20 U/L (ref 10–44)
ALT SERPL W/O P-5'-P-CCNC: 21 U/L (ref 10–44)
ANION GAP SERPL CALC-SCNC: 10 MMOL/L (ref 8–16)
AST SERPL-CCNC: 23 U/L (ref 10–40)
AST SERPL-CCNC: 26 U/L (ref 10–40)
BASOPHILS # BLD AUTO: 0.04 K/UL (ref 0–0.2)
BASOPHILS NFR BLD: 0.5 % (ref 0–1.9)
BILIRUB DIRECT SERPL-MCNC: 0.4 MG/DL (ref 0.1–0.3)
BILIRUB SERPL-MCNC: 0.7 MG/DL (ref 0.1–1)
BILIRUB SERPL-MCNC: 0.7 MG/DL (ref 0.1–1)
BNP SERPL-MCNC: 300 PG/ML (ref 0–99)
BUN SERPL-MCNC: 34 MG/DL (ref 6–20)
CALCIUM SERPL-MCNC: 8.6 MG/DL (ref 8.7–10.5)
CHLORIDE SERPL-SCNC: 96 MMOL/L (ref 95–110)
CO2 SERPL-SCNC: 30 MMOL/L (ref 23–29)
CREAT SERPL-MCNC: 2 MG/DL (ref 0.5–1.4)
CRP SERPL-MCNC: 27.7 MG/L (ref 0–8.2)
DIFFERENTIAL METHOD: ABNORMAL
EOSINOPHIL # BLD AUTO: 0.1 K/UL (ref 0–0.5)
EOSINOPHIL NFR BLD: 1.3 % (ref 0–8)
ERYTHROCYTE [DISTWIDTH] IN BLOOD BY AUTOMATED COUNT: 18.6 % (ref 11.5–14.5)
EST. GFR  (AFRICAN AMERICAN): 41 ML/MIN/1.73 M^2
EST. GFR  (NON AFRICAN AMERICAN): 35.5 ML/MIN/1.73 M^2
GLUCOSE SERPL-MCNC: 125 MG/DL (ref 70–110)
HCT VFR BLD AUTO: 28.9 % (ref 40–54)
HGB BLD-MCNC: 8.6 G/DL (ref 14–18)
IMM GRANULOCYTES # BLD AUTO: 0.09 K/UL (ref 0–0.04)
IMM GRANULOCYTES NFR BLD AUTO: 1.2 % (ref 0–0.5)
INR PPP: 2.6 (ref 0.8–1.2)
LDH SERPL L TO P-CCNC: 291 U/L (ref 110–260)
LYMPHOCYTES # BLD AUTO: 1.1 K/UL (ref 1–4.8)
LYMPHOCYTES NFR BLD: 14.4 % (ref 18–48)
MAGNESIUM SERPL-MCNC: 2.2 MG/DL (ref 1.6–2.6)
MCH RBC QN AUTO: 27.5 PG (ref 27–31)
MCHC RBC AUTO-ENTMCNC: 29.8 G/DL (ref 32–36)
MCV RBC AUTO: 92 FL (ref 82–98)
MONOCYTES # BLD AUTO: 0.7 K/UL (ref 0.3–1)
MONOCYTES NFR BLD: 8.3 % (ref 4–15)
NEUTROPHILS # BLD AUTO: 5.8 K/UL (ref 1.8–7.7)
NEUTROPHILS NFR BLD: 74.3 % (ref 38–73)
NRBC BLD-RTO: 0 /100 WBC
PHOSPHATE SERPL-MCNC: 3.4 MG/DL (ref 2.7–4.5)
PLATELET # BLD AUTO: 261 K/UL (ref 150–350)
PMV BLD AUTO: 10.9 FL (ref 9.2–12.9)
POTASSIUM SERPL-SCNC: 4.2 MMOL/L (ref 3.5–5.1)
PREALB SERPL-MCNC: 20 MG/DL (ref 20–43)
PROT SERPL-MCNC: 6.1 G/DL (ref 6–8.4)
PROT SERPL-MCNC: 6.3 G/DL (ref 6–8.4)
PROTHROMBIN TIME: 25.1 SEC (ref 9–12.5)
RBC # BLD AUTO: 3.13 M/UL (ref 4.6–6.2)
SODIUM SERPL-SCNC: 136 MMOL/L (ref 136–145)
WBC # BLD AUTO: 7.8 K/UL (ref 3.9–12.7)

## 2020-02-12 PROCEDURE — 97535 SELF CARE MNGMENT TRAINING: CPT

## 2020-02-12 PROCEDURE — 25000003 PHARM REV CODE 250: Performed by: INTERNAL MEDICINE

## 2020-02-12 PROCEDURE — 85610 PROTHROMBIN TIME: CPT

## 2020-02-12 PROCEDURE — 63600175 PHARM REV CODE 636 W HCPCS: Performed by: PHYSICIAN ASSISTANT

## 2020-02-12 PROCEDURE — 86140 C-REACTIVE PROTEIN: CPT

## 2020-02-12 PROCEDURE — 97110 THERAPEUTIC EXERCISES: CPT

## 2020-02-12 PROCEDURE — 99232 SBSQ HOSP IP/OBS MODERATE 35: CPT | Mod: ,,, | Performed by: INTERNAL MEDICINE

## 2020-02-12 PROCEDURE — 84100 ASSAY OF PHOSPHORUS: CPT

## 2020-02-12 PROCEDURE — 25000003 PHARM REV CODE 250: Performed by: STUDENT IN AN ORGANIZED HEALTH CARE EDUCATION/TRAINING PROGRAM

## 2020-02-12 PROCEDURE — 27000248 HC VAD-ADDITIONAL DAY

## 2020-02-12 PROCEDURE — 84134 ASSAY OF PREALBUMIN: CPT

## 2020-02-12 PROCEDURE — 97530 THERAPEUTIC ACTIVITIES: CPT

## 2020-02-12 PROCEDURE — A4216 STERILE WATER/SALINE, 10 ML: HCPCS | Performed by: STUDENT IN AN ORGANIZED HEALTH CARE EDUCATION/TRAINING PROGRAM

## 2020-02-12 PROCEDURE — 99499 UNLISTED E&M SERVICE: CPT | Mod: ,,, | Performed by: OTOLARYNGOLOGY

## 2020-02-12 PROCEDURE — 99232 PR SUBSEQUENT HOSPITAL CARE,LEVL II: ICD-10-PCS | Mod: ,,, | Performed by: INTERNAL MEDICINE

## 2020-02-12 PROCEDURE — 93750 INTERROGATION VAD IN PERSON: CPT | Mod: ,,, | Performed by: INTERNAL MEDICINE

## 2020-02-12 PROCEDURE — 25000003 PHARM REV CODE 250: Performed by: NURSE PRACTITIONER

## 2020-02-12 PROCEDURE — 83735 ASSAY OF MAGNESIUM: CPT

## 2020-02-12 PROCEDURE — 93750 PR INTERROGATE VENT ASSIST DEV, IN PERSON, W PHYSICIAN ANALYSIS: ICD-10-PCS | Mod: ,,, | Performed by: INTERNAL MEDICINE

## 2020-02-12 PROCEDURE — 83880 ASSAY OF NATRIURETIC PEPTIDE: CPT

## 2020-02-12 PROCEDURE — 99499 NO LOS: ICD-10-PCS | Mod: ,,, | Performed by: OTOLARYNGOLOGY

## 2020-02-12 PROCEDURE — 83615 LACTATE (LD) (LDH) ENZYME: CPT

## 2020-02-12 PROCEDURE — 80076 HEPATIC FUNCTION PANEL: CPT

## 2020-02-12 PROCEDURE — 36415 COLL VENOUS BLD VENIPUNCTURE: CPT

## 2020-02-12 PROCEDURE — 20600001 HC STEP DOWN PRIVATE ROOM

## 2020-02-12 PROCEDURE — 85025 COMPLETE CBC W/AUTO DIFF WBC: CPT

## 2020-02-12 PROCEDURE — 80053 COMPREHEN METABOLIC PANEL: CPT

## 2020-02-12 RX ORDER — POTASSIUM CHLORIDE 750 MG/1
50 CAPSULE, EXTENDED RELEASE ORAL 2 TIMES DAILY
Status: DISCONTINUED | OUTPATIENT
Start: 2020-02-12 | End: 2020-02-13

## 2020-02-12 RX ORDER — AMIODARONE HYDROCHLORIDE 200 MG/1
400 TABLET ORAL DAILY
Status: DISCONTINUED | OUTPATIENT
Start: 2020-02-12 | End: 2020-02-17 | Stop reason: HOSPADM

## 2020-02-12 RX ORDER — ACETAMINOPHEN 325 MG/1
650 TABLET ORAL EVERY 6 HOURS PRN
Status: DISCONTINUED | OUTPATIENT
Start: 2020-02-12 | End: 2020-02-17 | Stop reason: HOSPADM

## 2020-02-12 RX ORDER — METOLAZONE 5 MG/1
5 TABLET ORAL ONCE
Status: COMPLETED | OUTPATIENT
Start: 2020-02-12 | End: 2020-02-12

## 2020-02-12 RX ADMIN — POTASSIUM CHLORIDE 20 MEQ: 750 CAPSULE, EXTENDED RELEASE ORAL at 09:02

## 2020-02-12 RX ADMIN — ASPIRIN 325 MG ORAL TABLET 325 MG: 325 PILL ORAL at 08:02

## 2020-02-12 RX ADMIN — POTASSIUM CHLORIDE 50 MEQ: 750 CAPSULE, EXTENDED RELEASE ORAL at 08:02

## 2020-02-12 RX ADMIN — ATORVASTATIN CALCIUM 80 MG: 20 TABLET, FILM COATED ORAL at 09:02

## 2020-02-12 RX ADMIN — PANTOPRAZOLE SODIUM 40 MG: 40 TABLET, DELAYED RELEASE ORAL at 08:02

## 2020-02-12 RX ADMIN — METOLAZONE 5 MG: 5 TABLET ORAL at 12:02

## 2020-02-12 RX ADMIN — Medication 800 MG: at 08:02

## 2020-02-12 RX ADMIN — Medication 10 ML: at 12:02

## 2020-02-12 RX ADMIN — AMIODARONE HYDROCHLORIDE 400 MG: 200 TABLET ORAL at 08:02

## 2020-02-12 RX ADMIN — Medication 800 MG: at 02:02

## 2020-02-12 RX ADMIN — FERROUS GLUCONATE TAB 324 MG (37.5 MG ELEMENTAL IRON) 324 MG: 324 (37.5 FE) TAB at 08:02

## 2020-02-12 RX ADMIN — FUROSEMIDE 80 MG: 10 INJECTION, SOLUTION INTRAMUSCULAR; INTRAVENOUS at 02:02

## 2020-02-12 RX ADMIN — Medication 800 MG: at 09:02

## 2020-02-12 RX ADMIN — CASTOR OIL AND BALSAM, PERU: 788; 87 OINTMENT TOPICAL at 09:02

## 2020-02-12 RX ADMIN — FUROSEMIDE 80 MG: 10 INJECTION, SOLUTION INTRAMUSCULAR; INTRAVENOUS at 09:02

## 2020-02-12 RX ADMIN — DOCUSATE SODIUM 200 MG: 100 CAPSULE, LIQUID FILLED ORAL at 09:02

## 2020-02-12 RX ADMIN — FUROSEMIDE 80 MG: 10 INJECTION, SOLUTION INTRAMUSCULAR; INTRAVENOUS at 08:02

## 2020-02-12 RX ADMIN — AMLODIPINE BESYLATE 10 MG: 10 TABLET ORAL at 08:02

## 2020-02-12 RX ADMIN — WARFARIN SODIUM 2.5 MG: 1 TABLET ORAL at 04:02

## 2020-02-12 RX ADMIN — DIGOXIN 0.12 MG: 125 TABLET ORAL at 08:02

## 2020-02-12 RX ADMIN — ACETAMINOPHEN 650 MG: 325 TABLET ORAL at 12:02

## 2020-02-12 NOTE — PROGRESS NOTES
[facility]  Heart Transplant  Progress Note    Patient Name: Tae Delgado  MRN: [id number]  Admission Date: 1/9/2020  Hospital Length of Stay: 34 days  Attending Physician: Isiah Montero MD  Primary Care Provider: Elham Pearson MD  Principal Problem:LVAD (left ventricular assist device) present    Subjective:     Interval History: No issues overnight or complaints this am.  :   Scheduled Meds:   acetaminophen  650 mg Oral Q6H    amiodarone  400 mg Oral TID    amLODIPine  10 mg Oral Daily    aspirin  325 mg Oral Daily    atorvastatin  80 mg Oral QHS    balsam peru-castor oil   Topical (Top) BID    digoxin  0.125 mg Oral Daily    docusate sodium  200 mg Oral QHS    ferrous gluconate  324 mg Oral Daily with breakfast    furosemide  80 mg Intravenous TID    magnesium oxide  800 mg Oral TID    pantoprazole  40 mg Oral Daily    polyethylene glycol  100 mL Per NG tube 6 times per day    polyethylene glycol  17 g Oral Daily    potassium chloride  50 mEq Oral BID    sodium chloride 0.9%  10 mL Intravenous Q6H    sodium chloride 0.9%  3 mL Intravenous Q8H     PRN Meds:albumin human 5%, albuterol sulfate, bisacodyL, Dextrose 10% Bolus, Dextrose 10% Bolus, hydrALAZINE, HYDROcodone-acetaminophen, magnesium hydroxide 400 mg/5 ml, methocarbamol, oxyCODONE, sodium chloride 0.9%, Flushing PICC Protocol **AND** sodium chloride 0.9% **AND** sodium chloride 0.9%    Review of patient's allergies indicates:   Allergen Reactions    Biopatch [chlorhexidine gluconate]      Site burning    Dobutamine in d5w      Tachycardia, tremors, SOB, flushing    Percocet [oxycodone-acetaminophen] Itching    Penicillins Rash     Cefepime given on 1/23/2020 without issue     Objective:     Vital Signs (Most Recent):  Temp: 98.3 °F (36.8 °C) (02/12/20 0344)  Pulse: 91 (02/12/20 0736)  Resp: 18 (02/12/20 0344)  BP: 99/76 (02/12/20 0344)  SpO2: (!) 92 % (02/12/20 0344) Vital Signs (24h Range):  Temp:  [96.9 °F (36.1  °C)-98.3 °F (36.8 °C)] 98.3 °F (36.8 °C)  Pulse:  [89-91] 91  Resp:  [16-18] 18  SpO2:  [92 %-95 %] 92 %  BP: ()/(0-76) 99/76     Patient Vitals for the past 72 hrs (Last 3 readings):   Weight   02/12/20 0700 101.1 kg (222 lb 13.1 oz)   02/11/20 0700 107.8 kg (237 lb 10.5 oz)   02/10/20 0740 109.8 kg (242 lb)     Body mass index is 31.97 kg/m².      Intake/Output Summary (Last 24 hours) at 2/12/2020 0810  Last data filed at 2/12/2020 0700  Gross per 24 hour   Intake 1820 ml   Output 2105 ml   Net -285 ml     Hemodynamic Parameters: CVP 15       Telemetry: Vpcd    Physical Exam   Constitutional: He is oriented to person, place, and time. He appears well-developed and well-nourished.   HENT:   Head: Normocephalic.   Eyes: Pupils are equal, round, and reactive to light.   Neck: Normal range of motion. Neck supple. JVD present.   Cardiovascular: Normal rate and regular rhythm.   VAD hum.   Pulmonary/Chest: Effort normal and breath sounds normal.   Abdominal: Soft. Bowel sounds are normal.   Musculoskeletal: Normal range of motion. He exhibits edema.   Neurological: He is alert and oriented to person, place, and time.   Skin: Skin is warm and dry.   Psychiatric: He has a normal mood and affect. His behavior is normal.   Nursing note and vitals reviewed.    Significant Labs:  CBC:  Recent Labs   Lab 02/10/20  0602 02/11/20  0606 02/12/20  0523   WBC 10.45 9.00 7.80   RBC 3.21* 3.21* 3.13*   HGB 8.9* 8.8* 8.6*   HCT 29.6* 29.5* 28.9*    288 261   MCV 92 92 92   MCH 27.7 27.4 27.5   MCHC 30.1* 29.8* 29.8*     BNP:  Recent Labs   Lab 02/09/20  0400 02/10/20  0602 02/12/20  0523   * 317* 300*     CMP:  Recent Labs   Lab 02/10/20  1918 02/11/20  1116 02/11/20 1930 02/12/20  0523    119* 101  --    CALCIUM 8.5* 8.6* 8.6*  --    ALBUMIN 2.6* 2.7* 2.8* 2.7*   PROT 6.3 6.5 6.5 6.1   * 135* 137  --    K 3.7 4.4 4.0  --    CO2 30* 30* 30*  --    CL 95 96 96  --    BUN 37* 35* 34*  --    CREATININE  1.6* 1.9* 2.2*  --    ALKPHOS 69 73 77 73   ALT 20 20 21 20   AST 24 23 26 23   BILITOT 0.6 0.7 0.7 0.7      Coagulation:   Recent Labs   Lab 02/07/20  0400 02/08/20  0454 02/09/20  0400 02/10/20  0602 02/11/20  0606 02/12/20  0523   INR 2.7* 2.5* 1.8*  1.8*  1.8* 2.3* 3.7* 2.6*   APTT 35.7* 34.8* 30.7  --   --   --      LDH:  Recent Labs   Lab 02/09/20  1924 02/10/20  0602 02/11/20  0606 02/12/20  0523   * 258 279* 291*     Microbiology:  Microbiology Results (last 7 days)     Procedure Component Value Units Date/Time    Blood culture [[id number]] Collected:  01/31/20 2215    Order Status:  Completed Specimen:  Blood from Peripheral, Forearm, Right Updated:  02/06/20 0612     Blood Culture, Routine No growth after 5 days.    Blood culture [[id number]] Collected:  01/31/20 2220    Order Status:  Completed Specimen:  Blood from Peripheral, Wrist, Left Updated:  02/06/20 0612     Blood Culture, Routine No growth after 5 days.          I have reviewed all pertinent labs within the past 24 hours.    Estimated Creatinine Clearance: 43.1 mL/min (A) (based on SCr of 2.2 mg/dL (H)).    Diagnostic Results:  I have reviewed all pertinent imaging results/findings within the past 24 hours.    Assessment and Plan:       55 y.o. WM with history of NICMP diagnosed in 2010, ICD, LV thrombus (with prior splenic and renal emboli), Embolic  CVA , paroxysmal atrial fib, HTN, HLP  presents for  F/U today to clinic and he was volume overloaded on exam .  He states he had 3 admission in the last 3 months for volume overload. He started having more SOB on exertion since one month. Also endorses of Orthopnea since last one month. Alble to walk only 150 ft. He also has Bilateral lower extremity edema. He was admitted here in 2017 for ADHD here at [facility] and [facility] during that admission showed PCWP 40 and CVP 17. Currently denies chest pain, lightheadedness     * LVAD (left ventricular assist device) present  - S/P DT HM3 with  closure of AV and repair of MV for regurg 1/23/20 ([facility]).  - HTS primary.  - INR Therapeutic. Will adjust warfarin.   - ASA 325mg.   - LDH stable.   - Taken back to OR 1/24 for possible RVAD placement which was not done. Patient remained hemodynamically stable in OR. Wash out on 1/27. Chest closed 1/29.   - CVP 15.  Lasix 80mg TID. Will add dose of metolazone today.   - Off inotropes.  - TTE 2/10 at 5300 rpm: LVEDD 5.01, LVEF 10%, RV function mod depressed,   - Current speed 5300  - BP controlled. Continue Amlodipine 10mg daily.   - PT/OT/VAD education  - Sternal wound vac in place. Will remove when all lights are out/therapy complete.     Procedure: Device Interrogation Including analysis of device parameters  Current Settings: Ventricular Assist Device  Review of device function is stable/unstabe    TXP LVAD INTERROGATIONS 2/12/2020 2/12/2020 2/11/2020 2/11/2020 2/11/2020 2/11/2020 2/11/2020   Type HeartMate3 HeartMate3 HeartMate3 HeartMate3 HeartMate3 HeartMate3 HeartMate3   Flow 4.2 4.4 4.2 4.4 4.1 4.2 4.4   Speed 5300 5300 5300 5300 5300 5300 5300   PI 3.7 2.6 3.2 3.5 3.74 3.7 3.2   Power (Berry) 3.8 3.8 3.8 3.9 3.8 3.8 3.8   LSL - - - - - - -   Pulsatility Pulse Pulse Pulse - - Pulse Pulse       Acute kidney injury superimposed on chronic kidney disease  - Creatinine improving(baseline ~ 1.8).   - Nephrology has signed off.    Acute hyperglycemia  - Endocrine following    Coagulopathy  - Appreciate Hem/Onc's help. No evidence of underlying coagulopathy (h/o LV thrombus with splenic and renal emboli, h/o embolic CVA)    NSVT (nonsustained ventricular tachycardia)  - Continue Amio     ICD (implantable cardioverter-defibrillator) in place  - S/P Biotronik dual chamber ICD    Right lower lobe pulmonary nodule  - Incidental finding on CT chest/abd/pelvis on 1/12. Pulmonology consulted, and rec repeat CT of chest in 3 months  - Will need to follow-up in pulmonary clinic.     Acute on chronic combined systolic and  diastolic heart failure  - S/P DT HM3 with closure of AV and plication of MV for regurg 1/23/20 (See LVAD)  - [facility] dx'd 2010    Paroxysmal atrial fibrillation  - Intermittently in A fib since surgery  - AC on Coumadin  - Dig 0.125 mg qd       Left ventricular thrombus without MI  - H/O LV thrombus with h/o splenic and renal emboli as well as embolic CVA  - Limited TTE done here 1/13 showed no thrombus  - JACINTO 1/23 intra op showed resolution of DAMON thrombus  - AC as above.      Abdon Jackson, NP  Heart Transplant  [facility]

## 2020-02-12 NOTE — ASSESSMENT & PLAN NOTE
- H/O LV thrombus with h/o splenic and renal emboli as well as embolic CVA  - Limited TTE done here 1/13 showed no thrombus  - JACINTO 1/23 intra op showed resolution of DAMON thrombus  - AC as above.

## 2020-02-12 NOTE — SUBJECTIVE & OBJECTIVE
Interval History: No issues overnight or complaints this am.  :   Scheduled Meds:   acetaminophen  650 mg Oral Q6H    amiodarone  400 mg Oral TID    amLODIPine  10 mg Oral Daily    aspirin  325 mg Oral Daily    atorvastatin  80 mg Oral QHS    balsam peru-castor oil   Topical (Top) BID    digoxin  0.125 mg Oral Daily    docusate sodium  200 mg Oral QHS    ferrous gluconate  324 mg Oral Daily with breakfast    furosemide  80 mg Intravenous TID    magnesium oxide  800 mg Oral TID    pantoprazole  40 mg Oral Daily    polyethylene glycol  100 mL Per NG tube 6 times per day    polyethylene glycol  17 g Oral Daily    potassium chloride  50 mEq Oral BID    sodium chloride 0.9%  10 mL Intravenous Q6H    sodium chloride 0.9%  3 mL Intravenous Q8H     PRN Meds:albumin human 5%, albuterol sulfate, bisacodyL, Dextrose 10% Bolus, Dextrose 10% Bolus, hydrALAZINE, HYDROcodone-acetaminophen, magnesium hydroxide 400 mg/5 ml, methocarbamol, oxyCODONE, sodium chloride 0.9%, Flushing PICC Protocol **AND** sodium chloride 0.9% **AND** sodium chloride 0.9%    Review of patient's allergies indicates:   Allergen Reactions    Biopatch [chlorhexidine gluconate]      Site burning    Dobutamine in d5w      Tachycardia, tremors, SOB, flushing    Percocet [oxycodone-acetaminophen] Itching    Penicillins Rash     Cefepime given on 1/23/2020 without issue     Objective:     Vital Signs (Most Recent):  Temp: 98.3 °F (36.8 °C) (02/12/20 0344)  Pulse: 91 (02/12/20 0736)  Resp: 18 (02/12/20 0344)  BP: 99/76 (02/12/20 0344)  SpO2: (!) 92 % (02/12/20 0344) Vital Signs (24h Range):  Temp:  [96.9 °F (36.1 °C)-98.3 °F (36.8 °C)] 98.3 °F (36.8 °C)  Pulse:  [89-91] 91  Resp:  [16-18] 18  SpO2:  [92 %-95 %] 92 %  BP: ()/(0-76) 99/76     Patient Vitals for the past 72 hrs (Last 3 readings):   Weight   02/12/20 0700 101.1 kg (222 lb 13.1 oz)   02/11/20 0700 107.8 kg (237 lb 10.5 oz)   02/10/20 0740 109.8 kg (242 lb)     Body mass index  is 31.97 kg/m².      Intake/Output Summary (Last 24 hours) at 2/12/2020 0810  Last data filed at 2/12/2020 0700  Gross per 24 hour   Intake 1820 ml   Output 2105 ml   Net -285 ml     Hemodynamic Parameters: CVP 15       Telemetry: Vpcd    Physical Exam   Constitutional: He is oriented to person, place, and time. He appears well-developed and well-nourished.   HENT:   Head: Normocephalic.   Eyes: Pupils are equal, round, and reactive to light.   Neck: Normal range of motion. Neck supple. JVD present.   Cardiovascular: Normal rate and regular rhythm.   VAD hum.   Pulmonary/Chest: Effort normal and breath sounds normal.   Abdominal: Soft. Bowel sounds are normal.   Musculoskeletal: Normal range of motion. He exhibits edema.   Neurological: He is alert and oriented to person, place, and time.   Skin: Skin is warm and dry.   Psychiatric: He has a normal mood and affect. His behavior is normal.   Nursing note and vitals reviewed.    Significant Labs:  CBC:  Recent Labs   Lab 02/10/20  0602 02/11/20  0606 02/12/20  0523   WBC 10.45 9.00 7.80   RBC 3.21* 3.21* 3.13*   HGB 8.9* 8.8* 8.6*   HCT 29.6* 29.5* 28.9*    288 261   MCV 92 92 92   MCH 27.7 27.4 27.5   MCHC 30.1* 29.8* 29.8*     BNP:  Recent Labs   Lab 02/09/20  0400 02/10/20  0602 02/12/20  0523   * 317* 300*     CMP:  Recent Labs   Lab 02/10/20  1918 02/11/20  1116 02/11/20  1930 02/12/20  0523    119* 101  --    CALCIUM 8.5* 8.6* 8.6*  --    ALBUMIN 2.6* 2.7* 2.8* 2.7*   PROT 6.3 6.5 6.5 6.1   * 135* 137  --    K 3.7 4.4 4.0  --    CO2 30* 30* 30*  --    CL 95 96 96  --    BUN 37* 35* 34*  --    CREATININE 1.6* 1.9* 2.2*  --    ALKPHOS 69 73 77 73   ALT 20 20 21 20   AST 24 23 26 23   BILITOT 0.6 0.7 0.7 0.7      Coagulation:   Recent Labs   Lab 02/07/20  0400 02/08/20  0454 02/09/20  0400 02/10/20  0602 02/11/20  0606 02/12/20  0523   INR 2.7* 2.5* 1.8*  1.8*  1.8* 2.3* 3.7* 2.6*   APTT 35.7* 34.8* 30.7  --   --   --      LDH:  Recent  Labs   Lab 02/09/20  1924 02/10/20  0602 02/11/20  0606 02/12/20  0523   * 258 279* 291*     Microbiology:  Microbiology Results (last 7 days)     Procedure Component Value Units Date/Time    Blood culture [[id number]] Collected:  01/31/20 2215    Order Status:  Completed Specimen:  Blood from Peripheral, Forearm, Right Updated:  02/06/20 0612     Blood Culture, Routine No growth after 5 days.    Blood culture [[id number]] Collected:  01/31/20 2220    Order Status:  Completed Specimen:  Blood from Peripheral, Wrist, Left Updated:  02/06/20 0612     Blood Culture, Routine No growth after 5 days.          I have reviewed all pertinent labs within the past 24 hours.    Estimated Creatinine Clearance: 43.1 mL/min (A) (based on SCr of 2.2 mg/dL (H)).    Diagnostic Results:  I have reviewed all pertinent imaging results/findings within the past 24 hours.

## 2020-02-12 NOTE — PLAN OF CARE
Goals reviewed and remain appropriate. Pt progressing towards goals.    Micki Priest, PT, DPT   2020  [phone]    Problem: Physical Therapy Goal  Goal: Physical Therapy Goal  Description  Goals to be met by: 2020     Patient will increase functional independence with mobility by performin. Supine to sit with MInimal Assistance - met   1a. Supine to sit with SBA with HOB flat   2. Rolling to Left and Right with Minimal Assistance.- not met  3. Sit to stand transfer with Moderate Assistance- not met  4. Sitting at edge of bed x8 minutes with Minimal Assistance - met 2020  4a. Sitting at EOB x 10 minutes with SBA- met 2/10/2020  5. Lower extremity exercise program x10 reps per handout, with assistance as needed- not met  6. Standing with no AD x 30 seconds CGA to assist with ADLs and self care- not met  7. Gait x50 ft with [place] without AD         Outcome: Ongoing, Progressing

## 2020-02-12 NOTE — ASSESSMENT & PLAN NOTE
- S/P DT HM3 with closure of AV and repair of MV for regurg 1/23/20 ([facility]).  - HTS primary.  - INR Therapeutic. Will adjust warfarin.   - ASA 325mg.   - LDH stable.   - Taken back to OR 1/24 for possible RVAD placement which was not done. Patient remained hemodynamically stable in OR. Wash out on 1/27. Chest closed 1/29.   - CVP 15.  Lasix 80mg TID. Will add dose of metolazone today.   - Off inotropes.  - TTE 2/10 at 5300 rpm: LVEDD 5.01, LVEF 10%, RV function mod depressed,   - Current speed 5300  - BP controlled. Continue Amlodipine 10mg daily.   - PT/OT/VAD education    Procedure: Device Interrogation Including analysis of device parameters  Current Settings: Ventricular Assist Device  Review of device function is stable/unstabe    TXP LVAD INTERROGATIONS 2/12/2020 2/12/2020 2/11/2020 2/11/2020 2/11/2020 2/11/2020 2/11/2020   Type HeartMate3 HeartMate3 HeartMate3 HeartMate3 HeartMate3 HeartMate3 HeartMate3   Flow 4.2 4.4 4.2 4.4 4.1 4.2 4.4   Speed 5300 5300 5300 5300 5300 5300 5300   PI 3.7 2.6 3.2 3.5 3.74 3.7 3.2   Power (Berry) 3.8 3.8 3.8 3.9 3.8 3.8 3.8   LSL - - - - - - -   Pulsatility Pulse Pulse Pulse - - Pulse Pulse

## 2020-02-12 NOTE — PLAN OF CARE
Plan of care discussed with patient.  Patient ambulating very few steps before sitting in chair/getting back in bed, fall precautions in place.Continuing to encourage sternal precautions, IS, and ambulation. LVAD DP and numbers WNL, smooth LVAD hum. Patient has no complaints of pain. Lasix 80mg IVPush TID. Discussed medications and care. Encouraged workbook, reviewed education, filled in binder. Patient has no questions at this time. Will continue to monitor.

## 2020-02-12 NOTE — PROGRESS NOTES
Patient AAO while sitting up in chair. Spent 120 mins educating patient.       Educational HM 3 DVD provided by company has been reviewed by patient.  VAD binder reviewed with patient including what to document and the meanings of all the VAD parameters (Speed, Flow, PI and Power). Additionally, it was pointed out to the patient where to find the proper phone numbers to call each of the individual VAD coordinators. We spoke about when to page the VAD coordinator vs when to call during normal business hours. We discussed possible medication the patient may encounter with a VAD were discussed including not changing prescriptions without talking to their VAD team. Explained that the pharmacist will further discuss before discharge and create them a purple card. Patient was able to successfully page the VAD coordinator and point out the proper places to find the Coordinator on calls phone number/emergency number. Patient was able to explain what the VAD is and how it works. Patient was able to successfully explained that if [place] are greater then 10w they will page the VAD coordinator. Proper severe weather plans were discussed including proper generator usage and creating an evacuation plan. Patient explains the are not to use the mobile power unit if it is plugged into a generatorPatient was able to successfully explain the items that must be at bedside at all times including flashlight, batteries, and clips. Patient was able to verbalize understanding that he must sleep on the module power unit and not batteries. Patient was able to verbalize the alarms and all their meanings for the mobile power unit. Patient slept at least one night on the mobile power unit while staying in the hospital. We discussed the battery charger. Patient was able to explain that battery lifespan, charge length, and how to rotate batteries. Patient was able to explain how to calibrate batteries and when the proper time to do so. Patient was  able to explain how to tell when the pump is running and what to do if it is not. Patient was not able to verbalize  alarms hazard vs advisory alarms.    Patient require futher education on alarms and changing the controller.  Pt was able to page VAD coordinator on call and call VAD coordinator appropriately.       Patient is not checked off on alarms per VAD coordinator.

## 2020-02-12 NOTE — PT/OT/SLP PROGRESS
Physical Therapy Treatment    Patient Name:  Tae Delgado   MRN:  [id number]    Recommendations:     Discharge Recommendations:  rehabilitation facility   Discharge Equipment Recommendations: (TBD)   Barriers to discharge: Decreased caregiver support at current functional level     Assessment:     Tae Delgado is a 59 y.o. male admitted with a medical diagnosis of LVAD (left ventricular assist device) present.  He presents with the following impairments/functional limitations:  weakness, impaired functional mobilty, impaired endurance, gait instability, impaired balance, impaired self care skills, impaired cardiopulmonary response to activity, decreased safety awareness, decreased coordination, decreased lower extremity function, decreased upper extremity function. Pt gradually progressing functional mobility, with pt appearing to demo improvement in standing postural control and transfer techniques this date. Pt appearing more confident in mobility at end of session, stating that feels more comfortable with BUE support during standing tasks. However, mobility remains limited with pt demo'ing impaired endurance and overall weakness, unable to tolerate additional standing trial this date. Pt would continue to benefit from skilled acute PT in order to address current deficits and progress functional mobility.     Rehab Prognosis: Good; patient would benefit from acute skilled PT services to address these deficits and reach maximum level of function.    Recent Surgery: Procedure(s) (LRB):  CLOSURE, WOUND, STERNUM (N/A)  WASHOUT (N/A)  APPLICATION, WOUND VAC (N/A) 14 Days Post-Op    Plan:     During this hospitalization, patient to be seen 6 x/week to address the identified rehab impairments via gait training, therapeutic activities, therapeutic exercises, neuromuscular re-education and progress toward the following goals:    · Plan of Care Expires:  02/29/20    Subjective     Chief Complaint: weakness   Patient/Family  "Comments/goals: "No one has ever done that for me before." re: HHA  Pain/Comfort:  · Pain Rating 1: 0/10      Objective:     Communicated with RN prior to session.  Patient found supine with telemetry, wound vac, LVAD upon PT entry to room.     General Precautions: Standard, fall, LVAD, sternal   Orthopedic Precautions:N/A   Braces: N/A     Functional Mobility:  · Bed Mobility:     · Supine to Sit: minimum assistance with HOB elevated (managing trunk)  · Transfers:     · Sit to Stand:  minimum assistance and of 2 persons with no AD and x2 reps from elevated EOB  · Max encouragement provided for additional standing trial from bedside chair; however, pt declining 2* fatigue   · Bed to Chair: minimum assistance with assist of 2 persosn with  hand-held assist  using  Stand Pivot  · Gait: ~2 ft. bed>chair with minAx2 and HHAx2  · demo'd decreased step length, impaired weight-shifting ability, and instability   · Balance:   · static standing: Schuyler    · dynamic standing: minAx2      AM-PAC 6 CLICK MOBILITY  Turning over in bed (including adjusting bedclothes, sheets and blankets)?: 3  Sitting down on and standing up from a chair with arms (e.g., wheelchair, bedside commode, etc.): 2  Moving from lying on back to sitting on the side of the bed?: 3  Moving to and from a bed to a chair (including a wheelchair)?: 2  Need to walk in hospital room?: 2  Climbing 3-5 steps with a railing?: 1  Basic Mobility Total Score: 13       Therapeutic Activities and Exercises:  Pt found on LVAD wall power. Transferred to sit EOB for VAD management and maintained sitting balance with SBA. Performed self-test and recorded numbers in binder with max cueing. Switched from wall>battery power with Schuyler for initiating cable disconnections and with v/c for proper hand placement and maintaining integrity of connections. Organized consolidation bag with increased time. Donned consolidation bag with assist provided for adjusting waist strap for proper " pt fit. Remained on battery power at end of session.  Completed standing trials x2. During 1st standing trial, pt almost immediately returned himself to sit without regard to safety or environmental surroundings. Education provided on importance of safety during transfers. During 2nd standing trial, pt able to maintain static standing x~60-90 sec with Schuyler and HHAx2 to fit waist strap, then performed stand pivot to bedside chair with minAx2 and HHAx2.   Educated on importance of OOB activity and encouraged pt to sit UIC for 3 hours this date. Pt v/u.   Time provided for encouragement, reinforcement of education, goals of acute therapy, and review of pt performance/progression.   RN and PCT notified of pt mobility level     Patient left up in chair with all lines intact, call button in reach and RN and PCT notified..    GOALS:   Multidisciplinary Problems     Physical Therapy Goals        Problem: Physical Therapy Goal    Goal Priority Disciplines Outcome Goal Variances Interventions   Physical Therapy Goal     PT, PT/OT Ongoing, Progressing     Description:  Goals to be met by: 2020     Patient will increase functional independence with mobility by performin. Supine to sit with MInimal Assistance - met   1a. Supine to sit with SBA with HOB flat   2. Rolling to Left and Right with Minimal Assistance.- not met  3. Sit to stand transfer with Moderate Assistance- not met  4. Sitting at edge of bed x8 minutes with Minimal Assistance - met 2020  4a. Sitting at EOB x 10 minutes with SBA- met 2/10/2020  5. Lower extremity exercise program x10 reps per handout, with assistance as needed- not met  6. Standing with no AD x 30 seconds CGA to assist with ADLs and self care- not met  7. Gait x50 ft with Schuyler without AD                          Time Tracking:     PT Received On: 20  PT Start Time: 1018     PT Stop Time: 1057  PT Total Time (min): 39 min     Billable Minutes: Therapeutic Activity 26 and  Therapeutic Exercise 13   (co-tx with OT)    Treatment Type: Treatment  PT/PTA: PT     PTA Visit Number: 0     Micki Priest PT, DPT   2/12/2020  [phone]

## 2020-02-12 NOTE — PROGRESS NOTES
02/12/2020  Gage Carias    Current provider:  Isiah Montero MD      I, Gage Carias, rounded on Tae Delgado to ensure all mechanical assist device settings (IABP or VAD) were appropriate and all parameters were within limits.  I was able to ensure all back up equipment was present, the staff had no issues, and the Perfusion Department daily rounding was complete.    10:57 AM

## 2020-02-12 NOTE — CONSULTS
Otolaryngology - Head and Neck Surgery  Consultation Report      Consultation from: Heart transplant  Reason for Consultation:  pronglonged vocal card weakness after intubation      Subjective:      CC: hoarseness    HPI       55M c h/o cardiomyopathy now s/p LVAD 1/23 presents to ENT for evaluation of persistent hoarseness after extubation. Reports that his voice is soft, people have trouble hearing him, it is best in the morning and deteriorates throughout the day. He denies throat pain. Denies neck surgery.     Intubated: 1/21  Extubated: 2/1      SLP recommendations:       General Recommendations:  Follow-up not indicated; consider ENT consult should hoarse vocal quality persist for possible voice therapy  Diet recommendations:  Regular, Liquid Diet Level: Thin   Aspiration Precautions: 1 bite/sip at a time, Frequent oral care, HOB to 90 degrees, Meds whole 1 at a time, Monitor for s/s of aspiration and Remain upright 30 minutes post meal         ROS: ENT-focused ROS completed and is negative unless otherwise stated in the HPI.     Past Medical History:   Diagnosis Date    CHF (congestive heart failure) 1/2010    Dx  1/2010 w/ decreased LV systolic function (EF 15%) by ECHO 1/2015    COCM (congestive cardiomyopathy) 7/20/2016    Hyperlipidemia     Hypertension     Paroxysmal atrial fibrillation     Pulmonary embolus 2008    Stroke     Superficial thrombophlebitis      Past Surgical History:   Procedure Laterality Date    APPLICATION OF WOUND VACUUM-ASSISTED CLOSURE DEVICE N/A 1/29/2020    Procedure: APPLICATION, WOUND VAC;  Surgeon: Ino Schmitt MD;  Location: [address];  Service: Cardiovascular;  Laterality: N/A;    DRAINAGE OF PLEURAL EFFUSION Left 1/24/2020    Procedure: DRAINAGE, PLEURAL EFFUSION;  Surgeon: Ino Schmitt MD;  Location: [facility] OR [address] [address];  Service: Cardiovascular;  Laterality: Left;  500 ml drainage    INSERTION OF GRAFT TO PERICARDIUM N/A 1/24/2020    Procedure: INSERTION,  GRAFT, PERICARDIUM;  Surgeon: Ino Schmitt MD;  Location: [facility] OR [facility] FLR;  Service: Cardiovascular;  Laterality: N/A;  Prevena    IRRIGATION OF MEDIASTINUM N/A 1/27/2020    Procedure: IRRIGATION, MEDIASTINUM;  Surgeon: Ino Schmitt MD;  Location: [facility] OR 2ND FLR;  Service: Cardiovascular;  Laterality: N/A;    LEFT VENTRICULAR ASSIST DEVICE Left 1/23/2020    Procedure: INSERTION-LEFT VENTRICULAR ASSIST DEVICE;  Surgeon: Ino Schmitt MD;  [facility];  Service: Cardiovascular;  Laterality: Left;  DT HM3    RIGHT HEART CATHETERIZATION Right 1/16/2020    Procedure: INSERTION, CATHETER, RIGHT HEART;  Surgeon: Isiah Montero MD;  Location: [facility] CATH LAB;  Service: Cardiology;  Laterality: Right;    STERNAL WOUND CLOSURE N/A 1/24/2020    Procedure: CLOSURE, WOUND, STERNUM;  Surgeon: Ino Schmitt MD;  Location: [facility] OR [facility] FLR;  Service: Cardiovascular;  Laterality: N/A;    STERNAL WOUND CLOSURE  1/24/2020    Procedure: CLOSURE, WOUND, STERNUM;  Surgeon: Ino Schmitt MD;  [facility];  Service: Cardiovascular;;  Temporary closure    STERNAL WOUND CLOSURE N/A 1/29/2020    Procedure: CLOSURE, WOUND, STERNUM;  Surgeon: Ino Schmitt MD;  [facility];  Service: Cardiovascular;  Laterality: N/A;    TONSILLECTOMY      VEIN LIGATION AND STRIPPING      WOUND EXPLORATION N/A 1/24/2020    Procedure: EXPLORATION, WOUND;  Surgeon: Ino Schmitt MD;  [facility];  Service: Cardiovascular;  Laterality: N/A;  Emergency exploration     Social History     Tobacco Use    Smoking status: Never Smoker    Smokeless tobacco: Never Used   Substance Use Topics    Alcohol use: No     Family History   Problem Relation Age of Onset    Cancer Mother        Current Facility-Administered Medications:     acetaminophen tablet 650 mg, 650 mg, Oral, Q6H PRN, Abdon Jackson NP    albumin human 5% bottle 500 mL, 500 mL, Intravenous, PRN, Tee Soria MD    albuterol  sulfate nebulizer solution 2.5 mg, 2.5 mg, Nebulization, Q4H PRN, Tee Soria MD    amiodarone tablet 400 mg, 400 mg, Oral, Daily, Abdon Jackson NP, 400 mg at 02/12/20 0851    amLODIPine tablet 10 mg, 10 mg, Oral, Daily, Evaristo Kraft MD, 10 mg at 02/12/20 0851    aspirin tablet 325 mg, 325 mg, Oral, Daily, Tee Soria MD, 325 mg at 02/12/20 0851    atorvastatin tablet 80 mg, 80 mg, Oral, QHS, Tee Soria MD, 80 mg at 02/11/20 2117    balsam peru-castor oil Oint, , Topical (Top), BID, Tee Soria MD    bisacodyL suppository 10 mg, 10 mg, Rectal, Daily PRN, Tee Soria MD    dextrose 10% (D10W) Bolus, 25 g, Intravenous, PRN, Tee Soria MD    dextrose 10% (D10W) Bolus, 12.5 g, Intravenous, PRN, Tee Soria MD    digoxin tablet 0.125 mg, 0.125 mg, Oral, Daily, Tee Soria MD, 0.125 mg at 02/12/20 0851    docusate sodium capsule 200 mg, 200 mg, Oral, QHS, Tee Soria MD, Stopped at 02/05/20 2100    ferrous gluconate tablet 324 mg, 324 mg, Oral, Daily with breakfast, Tee Soria MD, 324 mg at 02/12/20 0851    furosemide injection 80 mg, 80 mg, Intravenous, TID, MARBELLA Mcfadden, 80 mg at 02/12/20 0852    hydrALAZINE injection 10 mg, 10 mg, Intravenous, Q6H PRN, Tee Soria MD, 10 mg at 02/05/20 0300    HYDROcodone-acetaminophen 5-325 mg per tablet 1 tablet, 1 tablet, Oral, Q6H PRN, Isiah Montero MD    magnesium hydroxide 400 mg/5 ml suspension 2,400 mg, 30 mL, Oral, Daily PRN, Tee Soria MD    magnesium oxide tablet 800 mg, 800 mg, Oral, TID, Evaristo Kraft MD, 800 mg at 02/12/20 0851    methocarbamol tablet 500 mg, 500 mg, Oral, Nightly PRN, Isiah Montero MD    metOLazone tablet 5 mg, 5 mg, Oral, Once, Abdon Jackson, NP    oxyCODONE immediate release tablet 5 mg, 5 mg, Oral, Q6H PRN, Isiah Montero MD    pantoprazole EC tablet 40 mg, 40 mg, Oral, Daily, Evaristo Kraft MD, 40 mg at 02/12/20 0851    polyethylene glycol (GoLYTELY)  solution, 100 mL, Per NG tube, 6 times per day, Tee Soria MD, Stopped at 02/08/20 0000    polyethylene glycol packet 17 g, 17 g, Oral, Daily, Tee Soria MD, 17 g at 02/07/20 0829    potassium chloride CR capsule 50 mEq, 50 mEq, Oral, BID, Abdon Jackson, NP, 50 mEq at 02/12/20 0851    sodium chloride 0.9% flush 10 mL, 10 mL, Intravenous, PRN, Tee Soria MD    Flushing PICC Protocol, , , Until Discontinued **AND** sodium chloride 0.9% flush 10 mL, 10 mL, Intravenous, Q6H, 10 mL at 02/12/20 0000 **AND** sodium chloride 0.9% flush 10 mL, 10 mL, Intravenous, PRN, Tee Soria MD    sodium chloride 0.9% flush 3 mL, 3 mL, Intravenous, Q8H, Tee Soria MD, 3 mL at 02/11/20 2200  Biopatch [chlorhexidine gluconate]; Dobutamine in d5w; Percocet [oxycodone-acetaminophen]; and Penicillins      Objective:     Temp:  [96.9 °F (36.1 °C)-98.3 °F (36.8 °C)] 97.3 °F (36.3 °C)  Pulse:  [89-91] 90  Resp:  [18] 18  SpO2:  [92 %-95 %] 95 %  BP: (66-99)/(0-76) 66/0    General Appearance:   Awake, Alert and Oriented. NAD. Appropriate affect and appearance     Neuro:   Spontaneous eye opening, appropriate verbal responses, follows commands   EOMI, no proptosis, no spontaneous nystagmus  Face is symmetric, HB I, non-edematous and SILT bilaterally  Vision grossly intact, Hearing grossly intact  IRENE, normal tone    Head and Face:   Normocephalic, atraumatic, No facial deformities, skin is intact and non-erythematous    Ears:  Periauricular regions non-erythematous, non-fluctuanct non-tender  Pinna normal bilaterally, no skin lesions    Nose:   External nose is symmetric, no skin lesions  No inferior turbinate hypertropghy    OC/OP:  Tongue midline on extension, non-edematous, soft  Soft palate symmetric, midline and without lesions or masses, tonsils symmetric  No masses or lesions of the visualized oropharynx    Neck:  Neck is symmetric, non-edematous, non-erythematous  Trachea is midline and easily palpable,  No palpable  adenopathy or masses in levels I-VI    Glands:  Parotid and submandibular glands are symmetric and non-tender.   No thyroid nodules or masses, non-tender     Respiratory:  Normal work of breathing, no accessory muscle use, no stridor    Voice:  breathy       Flexible Fiberoptic Nasopharyngolaryngoscopy  Date: 02/12/2020   Indication: hoarseness  Consent: Verbal consent obtained.   Surgeon: Colin Perez  Anesthesia: topical lidocaine with afrin  Procedure: Scope inserted into nares, through nasal passage and nasopharynx with adequate visualization of larynx during phonation and repose.  Findings:  LEFT TVF IMMOBILITY  No pooled secretions or e/o gross aspiration      Assessment/Plan:        Left TVF immobility. No e/o aspiration per SLP or on endoscopy. Recommend follow up with Dr. Qureshi in voice clinic 2-3 weeks after discharge.       Colin Perez,   Otorhinolaryngology-Head & Neck Surgery  [facility]  02/12/2020

## 2020-02-12 NOTE — PLAN OF CARE
Patient remains free from falls and injuries through out shift. Patient is AAOx4 and  VSS. Pt denies chest pain and SOB. LVAD (HM3) is functioning WNL, w/o any acute events or alarms thus far on shift. LVAD #'s, dopplers and MAPs WNL. Patient is daily betaine and NS dressing changes, next dressing change is due 2/12. Current LVAD dressing is CDI. Patient's spouse is now checked off on dressing changes but not alarms. MSI has Prevena Woundvac in place with seal intact. Velenax placed to DTI.  Lasix IVP increased to 80mg TID, UOP adequate on shift (see flowsheets). CVP 16mmHg again this morning with good waveform. PO electrolyte replacement given. POC is to cont LVAD education and working with PT/OT. Plan of care reviewed with patient. Call bell in reach. Patient verbalizes understanding of plan.  Will continue to monitor. Plan of care discussed with patient.

## 2020-02-12 NOTE — ASSESSMENT & PLAN NOTE
- S/P DT HM3 with closure of AV and plication of MV for regurg 1/23/20 (See LVAD)  - [facility] dx'd 2010

## 2020-02-12 NOTE — ASSESSMENT & PLAN NOTE
- S/P DT HM3 with closure of AV and repair of MV for regurg 1/23/20 (Oskar).  - HTS primary.  - INR Therapeutic. Will adjust warfarin.   - ASA 325mg.   - LDH stable.   - Taken back to OR 1/24 for possible RVAD placement which was not done. Patient remained hemodynamically stable in OR. Wash out on 1/27. Chest closed 1/29.   - CVP 14.  Lasix 80mg TID. Will add dose of metolazone today.   - Off inotropes.  - TTE 2/10 at 5300 rpm: LVEDD 5.01, LVEF 10%, RV function mod depressed,   - Current speed 5300  - BP controlled. Continue Amlodipine 10mg daily.   - PT/OT/VAD education  - Sternal wound vac in place. Will remove when all lights are out/therapy complete.     Procedure: Device Interrogation Including analysis of device parameters  Current Settings: Ventricular Assist Device  Review of device function is stable/unstabe    TXP LVAD INTERROGATIONS 2/12/2020 2/12/2020 2/12/2020 2/11/2020 2/11/2020 2/11/2020 2/11/2020   Type HeartMate3 HeartMate3 HeartMate3 HeartMate3 HeartMate3 HeartMate3 HeartMate3   Flow 4.3 4.2 4.4 4.2 4.4 4.1 4.2   Speed 5300 5300 5300 5300 5300 5300 5300   PI 3.9 3.7 2.6 3.2 3.5 3.74 3.7   Power (Berry) 3.7 3.8 3.8 3.8 3.9 3.8 3.8   LSL 4900 - - - - - -   Pulsatility Pulse Pulse Pulse Pulse - - Pulse

## 2020-02-12 NOTE — PT/OT/SLP PROGRESS
Occupational Therapy   Treatment    Name: Tae Delgado  MRN: [id number]  Admitting Diagnosis:  LVAD (left ventricular assist device) present  14 Days Post-Op    Recommendations:     Discharge Recommendations: rehabilitation facility  Discharge Equipment Recommendations:  (TBD)  Barriers to discharge:  (Increased level of skilled assistance for functional tasks)    Assessment:     Tae Delgado is a 59 y.o. male with a medical diagnosis of LVAD (left ventricular assist device) present.  He presents with the following performance deficits affecting function: weakness, impaired endurance, impaired self care skills, impaired cardiopulmonary response to activity, decreased safety awareness, impaired functional mobilty, orthopedic precautions, impaired balance, decreased coordination. Upon OT's entry, pt with LVAD on 3nder. Pt completed self test during session requiring verbal cuing to press and hold power button to perform test; all alarms intact. Pt required tactile and verbal cuing to write the correct numbers into the designated location in the binder. Pt switched to battery during session requiring assistance to line up power cords for a successful switch over. Pt verbalized 5/5 items stored in consolidation bag. Pt reported that his wife performs all aspects of LVAD management. OT encouraged pt to start performing LVAD management with SPV from wife; wife not present during session. Pt progressing well towards OT goals noted in his improved transferring ability to bedside chair. OT POC remains appropriate for patient on this date. Pt will continue to benefit from skilled OT to address the deficits affecting his occupational performance.     Rehab Prognosis:  Good; patient would benefit from acute skilled OT services to address these deficits and reach maximum level of function.       Plan:     Patient to be seen 6 x/week to address the above listed problems via self-care/home management, therapeutic activities,  "therapeutic exercises  · Plan of Care Expires: 03/02/20  · Plan of Care Reviewed with: patient    Subjective     Pain/Comfort: "No one has ever done what you guys done today." (pt's response in bed<>chair transfer)  · Pain Rating 1: 0/10  · Pain Rating Post-Intervention 1: 0/10    Objective:     Communicated with: RN prior to session.  Patient found HOB elevated with telemetry, wound vac, LVAD upon OT entry to room.    General Precautions: Standard, fall, sternal, LVAD   Orthopedic Precautions:N/A   Braces: N/A     Occupational Performance:     Bed Mobility:    · Patient completed Rolling/Turning to Right with stand by assistance   · Patient completed Scooting/Bridging with stand by assistance  · Patient completed Supine to Sit with minimum assistance   · Cuing provided to maintain sternal precautions     Functional Mobility/Transfers:  · Patient completed Sit <> Stand Transfer from bed with minimum assistance and of 2 persons  with  BUE HHA   · x2 trials completed  · During 1st trial, pt made an unpredicted descent due to feeling his legs were going to give out; education provided on the importance of a safe descent    · During 2nd trial, pt stood upright for several seconds before transferring to bedside chair  · Upright standing noted on both trials; decreased verbal and tactile cuing for hip extension required this date   · Patient completed Bed <> Chair Transfer using Step Transfer technique with minimum assistance and of 2 persons with BUE HHA  · Functional Mobility: Pt ambulated 4 steps toward bedside chair with     Activities of Daily Living:  · Upper Body Dressing: minimum assistance to don/doff frontward gown due to lines      AMPAC 6 Click ADL: 16    Treatment & Education:  - Role of OT/ OT POC  - Self care safety/ independence  - Functional transfer/ mobility safety  - Bed mobility safety  - Importance of sitting UIC as long as tolerated  - Progress thus far  - Functional performance this date  - Pt " switched from wall to battery power with assistance  - Pt tolerated ~25 minutes EOB with SBA while completing self care and LVAD management. Pt performed 2 sit<>stand transfers this date with functional assist level listed above. During the session, pt's wound vac was noted to be detatched from device with a low battery. RN alerted; PCT re-attached battery source. Pt left seated in chair with all needs met. RN and PCT aware of pt's functional assist level.     Patient left up in chair with all lines intact, call button in reach and RN notifiedEducation:      GOALS:   Multidisciplinary Problems     Occupational Therapy Goals        Problem: Occupational Therapy Goal    Goal Priority Disciplines Outcome Interventions   Occupational Therapy Goal     OT, PT/OT Ongoing, Progressing    Description:  Goals to be met by: 2/16/2020     Patient will increase functional independence with ADLs by performing:    UE Dressing with Minimal Assistance.  LE Dressing with Minimal Assistance.  Grooming while standing with Minimal Assistance.  Toileting from bedside commode with Minimal Assistance for hygiene and clothing management.   Sitting at edge of bed x10 minutes with Minimal Assistance in prep for seated grooming tasks- GOAL MET 2/10  Supine to sit with min A. -progressing    Toilet transfer to bedside commode with Minimal Assistance.  Pt will perform LVAD management independently - progressing                   Multidisciplinary Problems (Resolved)        Problem: Occupational Therapy Goal    Goal Priority Disciplines Outcome Interventions   Occupational Therapy Goal   (Resolved)     OT, PT/OT Met    Description:  Skilled OT services not necessary at this time secondary to patient performing ADLs at PLOF. Patient in agreement. Please re-consult OT if change in patient's functional status is noted.                     Time Tracking:     OT Date of Treatment: 02/12/20  OT Start Time: 1019  OT Stop Time: 1057  OT Total Time (min):  38 min Co tx with PT     Billable Minutes:Self Care/Home Management 25  Therapeutic Activity 13    Jessica Lenz, OT  2/12/2020

## 2020-02-12 NOTE — PLAN OF CARE
Problem: Occupational Therapy Goal  Goal: Occupational Therapy Goal  Description  Goals to be met by: 2/16/2020     Patient will increase functional independence with ADLs by performing:    UE Dressing with Minimal Assistance.  LE Dressing with Minimal Assistance.  Grooming while standing with Minimal Assistance.  Toileting from bedside commode with Minimal Assistance for hygiene and clothing management.   Sitting at edge of bed x10 minutes with Minimal Assistance in prep for seated grooming tasks- GOAL MET 2/10  Supine to sit with min A. -progressing    Toilet transfer to bedside commode with Minimal Assistance.  Pt will perform LVAD management independently - progressing        Outcome: Ongoing, Progressing     Pt progressing well towards OT goals. OT POC remains appropriate for patient on this date. Pt will continue to benefit from skilled OT to address the deficits affecting his occupational performance.    Jessica Lenz OTR/L  2/12/20

## 2020-02-12 NOTE — PROGRESS NOTES
TEODORA Skin Integrity Evaluation      Subjective    TEODORA skin integrity champion evaluation of patient as part of the comprehensive skin care team .       Tae Delgado is being evaluated as per the Epic  pressure injury skin report.  He has been [facility] admitted 1/9/20 .   Skin injury was noted on 1/29/20        Limited Physical exam     Lesion 1: Nose          Lesion 2: Right Elbow             Assessment :       Lesion 1:  Skin intact Healed no skin breakdown     POA : no    Consults: Nutrition and Physical Therapy         Lesion 2:  Skin intact healed no skin breakdown     POA : no    Consults:Nutrition and Physical Therapy    Follow up:  No follow up needed

## 2020-02-12 NOTE — PROGRESS NOTES
"Wife completed LVAD dressing change using sterile technique with bedadine and water. DLES is a "1" with minimal drainage noted on the DLES. Tolerated without any complication. Sutures remain intact, slight redness noted around sutures, no tenderness noted.   Wife checked off on LVAD dressing change.  "

## 2020-02-13 DIAGNOSIS — J38.00 VOCAL CORD PARALYSIS: Primary | ICD-10-CM

## 2020-02-13 LAB
ALBUMIN SERPL BCP-MCNC: 2.8 G/DL (ref 3.5–5.2)
ALP SERPL-CCNC: 78 U/L (ref 55–135)
ALT SERPL W/O P-5'-P-CCNC: 23 U/L (ref 10–44)
ANION GAP SERPL CALC-SCNC: 12 MMOL/L (ref 8–16)
AST SERPL-CCNC: 30 U/L (ref 10–40)
BASOPHILS # BLD AUTO: 0.04 K/UL (ref 0–0.2)
BASOPHILS NFR BLD: 0.5 % (ref 0–1.9)
BILIRUB SERPL-MCNC: 0.8 MG/DL (ref 0.1–1)
BUN SERPL-MCNC: 33 MG/DL (ref 6–20)
CALCIUM SERPL-MCNC: 8.9 MG/DL (ref 8.7–10.5)
CHLORIDE SERPL-SCNC: 91 MMOL/L (ref 95–110)
CO2 SERPL-SCNC: 33 MMOL/L (ref 23–29)
CREAT SERPL-MCNC: 1.8 MG/DL (ref 0.5–1.4)
DIFFERENTIAL METHOD: ABNORMAL
EOSINOPHIL # BLD AUTO: 0.1 K/UL (ref 0–0.5)
EOSINOPHIL NFR BLD: 0.9 % (ref 0–8)
ERYTHROCYTE [DISTWIDTH] IN BLOOD BY AUTOMATED COUNT: 18.6 % (ref 11.5–14.5)
EST. GFR  (AFRICAN AMERICAN): 46.6 ML/MIN/1.73 M^2
EST. GFR  (NON AFRICAN AMERICAN): 40.3 ML/MIN/1.73 M^2
GLUCOSE SERPL-MCNC: 89 MG/DL (ref 70–110)
HCT VFR BLD AUTO: 30 % (ref 40–54)
HGB BLD-MCNC: 9.1 G/DL (ref 14–18)
IMM GRANULOCYTES # BLD AUTO: 0.09 K/UL (ref 0–0.04)
IMM GRANULOCYTES NFR BLD AUTO: 1.1 % (ref 0–0.5)
INR PPP: 2 (ref 0.8–1.2)
LDH SERPL L TO P-CCNC: 278 U/L (ref 110–260)
LYMPHOCYTES # BLD AUTO: 1.6 K/UL (ref 1–4.8)
LYMPHOCYTES NFR BLD: 19.8 % (ref 18–48)
MAGNESIUM SERPL-MCNC: 2.3 MG/DL (ref 1.6–2.6)
MCH RBC QN AUTO: 27.5 PG (ref 27–31)
MCHC RBC AUTO-ENTMCNC: 30.3 G/DL (ref 32–36)
MCV RBC AUTO: 91 FL (ref 82–98)
MONOCYTES # BLD AUTO: 0.7 K/UL (ref 0.3–1)
MONOCYTES NFR BLD: 8.3 % (ref 4–15)
NEUTROPHILS # BLD AUTO: 5.5 K/UL (ref 1.8–7.7)
NEUTROPHILS NFR BLD: 69.4 % (ref 38–73)
NRBC BLD-RTO: 0 /100 WBC
PHOSPHATE SERPL-MCNC: 3.8 MG/DL (ref 2.7–4.5)
PLATELET # BLD AUTO: 265 K/UL (ref 150–350)
PMV BLD AUTO: 11.1 FL (ref 9.2–12.9)
POTASSIUM SERPL-SCNC: 3.4 MMOL/L (ref 3.5–5.1)
PROT SERPL-MCNC: 6.3 G/DL (ref 6–8.4)
PROTHROMBIN TIME: 19.2 SEC (ref 9–12.5)
RBC # BLD AUTO: 3.31 M/UL (ref 4.6–6.2)
SODIUM SERPL-SCNC: 136 MMOL/L (ref 136–145)
WBC # BLD AUTO: 7.97 K/UL (ref 3.9–12.7)

## 2020-02-13 PROCEDURE — 63600175 PHARM REV CODE 636 W HCPCS: Performed by: PHYSICIAN ASSISTANT

## 2020-02-13 PROCEDURE — 85025 COMPLETE CBC W/AUTO DIFF WBC: CPT

## 2020-02-13 PROCEDURE — 25000003 PHARM REV CODE 250: Performed by: STUDENT IN AN ORGANIZED HEALTH CARE EDUCATION/TRAINING PROGRAM

## 2020-02-13 PROCEDURE — 20600001 HC STEP DOWN PRIVATE ROOM

## 2020-02-13 PROCEDURE — 83615 LACTATE (LD) (LDH) ENZYME: CPT

## 2020-02-13 PROCEDURE — 25000003 PHARM REV CODE 250: Performed by: INTERNAL MEDICINE

## 2020-02-13 PROCEDURE — 85610 PROTHROMBIN TIME: CPT

## 2020-02-13 PROCEDURE — 97535 SELF CARE MNGMENT TRAINING: CPT

## 2020-02-13 PROCEDURE — 99232 SBSQ HOSP IP/OBS MODERATE 35: CPT | Mod: ,,, | Performed by: INTERNAL MEDICINE

## 2020-02-13 PROCEDURE — 99232 PR SUBSEQUENT HOSPITAL CARE,LEVL II: ICD-10-PCS | Mod: ,,, | Performed by: INTERNAL MEDICINE

## 2020-02-13 PROCEDURE — 25000003 PHARM REV CODE 250: Performed by: NURSE PRACTITIONER

## 2020-02-13 PROCEDURE — 83735 ASSAY OF MAGNESIUM: CPT

## 2020-02-13 PROCEDURE — 97530 THERAPEUTIC ACTIVITIES: CPT

## 2020-02-13 PROCEDURE — 25000003 PHARM REV CODE 250: Performed by: PHYSICIAN ASSISTANT

## 2020-02-13 PROCEDURE — 80053 COMPREHEN METABOLIC PANEL: CPT

## 2020-02-13 PROCEDURE — 84100 ASSAY OF PHOSPHORUS: CPT

## 2020-02-13 PROCEDURE — 27000248 HC VAD-ADDITIONAL DAY

## 2020-02-13 PROCEDURE — 93750 INTERROGATION VAD IN PERSON: CPT | Mod: ,,, | Performed by: INTERNAL MEDICINE

## 2020-02-13 PROCEDURE — 93750 PR INTERROGATE VENT ASSIST DEV, IN PERSON, W PHYSICIAN ANALYSIS: ICD-10-PCS | Mod: ,,, | Performed by: INTERNAL MEDICINE

## 2020-02-13 RX ORDER — METOLAZONE 5 MG/1
5 TABLET ORAL ONCE
Status: COMPLETED | OUTPATIENT
Start: 2020-02-13 | End: 2020-02-13

## 2020-02-13 RX ORDER — POTASSIUM CHLORIDE 20 MEQ/15ML
50 SOLUTION ORAL 2 TIMES DAILY
Status: DISCONTINUED | OUTPATIENT
Start: 2020-02-13 | End: 2020-02-17 | Stop reason: HOSPADM

## 2020-02-13 RX ADMIN — POTASSIUM CHLORIDE 50 MEQ: 20 SOLUTION ORAL at 09:02

## 2020-02-13 RX ADMIN — FUROSEMIDE 80 MG: 10 INJECTION, SOLUTION INTRAMUSCULAR; INTRAVENOUS at 09:02

## 2020-02-13 RX ADMIN — ASPIRIN 325 MG ORAL TABLET 325 MG: 325 PILL ORAL at 08:02

## 2020-02-13 RX ADMIN — Medication 800 MG: at 08:02

## 2020-02-13 RX ADMIN — CASTOR OIL AND BALSAM, PERU: 788; 87 OINTMENT TOPICAL at 09:02

## 2020-02-13 RX ADMIN — WARFARIN SODIUM 2.5 MG: 1 TABLET ORAL at 04:02

## 2020-02-13 RX ADMIN — PANTOPRAZOLE SODIUM 40 MG: 40 TABLET, DELAYED RELEASE ORAL at 08:02

## 2020-02-13 RX ADMIN — METOLAZONE 5 MG: 5 TABLET ORAL at 01:02

## 2020-02-13 RX ADMIN — DIGOXIN 0.12 MG: 125 TABLET ORAL at 08:02

## 2020-02-13 RX ADMIN — Medication 800 MG: at 09:02

## 2020-02-13 RX ADMIN — POTASSIUM CHLORIDE 50 MEQ: 20 SOLUTION ORAL at 08:02

## 2020-02-13 RX ADMIN — ATORVASTATIN CALCIUM 80 MG: 20 TABLET, FILM COATED ORAL at 09:02

## 2020-02-13 RX ADMIN — POTASSIUM CHLORIDE 30 MEQ: 20 SOLUTION ORAL at 12:02

## 2020-02-13 RX ADMIN — FUROSEMIDE 80 MG: 10 INJECTION, SOLUTION INTRAMUSCULAR; INTRAVENOUS at 08:02

## 2020-02-13 RX ADMIN — FUROSEMIDE 80 MG: 10 INJECTION, SOLUTION INTRAMUSCULAR; INTRAVENOUS at 02:02

## 2020-02-13 RX ADMIN — FERROUS GLUCONATE TAB 324 MG (37.5 MG ELEMENTAL IRON) 324 MG: 324 (37.5 FE) TAB at 08:02

## 2020-02-13 RX ADMIN — CASTOR OIL AND BALSAM, PERU: 788; 87 OINTMENT TOPICAL at 08:02

## 2020-02-13 RX ADMIN — AMIODARONE HYDROCHLORIDE 400 MG: 200 TABLET ORAL at 08:02

## 2020-02-13 RX ADMIN — Medication 800 MG: at 02:02

## 2020-02-13 RX ADMIN — AMLODIPINE BESYLATE 10 MG: 10 TABLET ORAL at 08:02

## 2020-02-13 RX ADMIN — DOCUSATE SODIUM 200 MG: 100 CAPSULE, LIQUID FILLED ORAL at 09:02

## 2020-02-13 NOTE — PLAN OF CARE
Goals reviewed and remain appropriate. Pt progressing towards goals.    Micki Priest, PT, DPT   2020  [phone]    Problem: Physical Therapy Goal  Goal: Physical Therapy Goal  Description  Goals to be met by: 2020     Patient will increase functional independence with mobility by performin. Supine to sit with MInimal Assistance - met   1a. Supine to sit with SBA with HOB flat   2. Rolling to Left and Right with Minimal Assistance.- not met  3. Sit to stand transfer with Moderate Assistance- not met  4. Sitting at edge of bed x8 minutes with Minimal Assistance - met 2020  4a. Sitting at EOB x 10 minutes with SBA- met 2/10/2020  5. Lower extremity exercise program x10 reps per handout, with assistance as needed- not met  6. Standing with no AD x 30 seconds CGA to assist with ADLs and self care- not met  7. Gait x50 ft with [place] without AD         Outcome: Ongoing, Progressing

## 2020-02-13 NOTE — PROGRESS NOTES
[facility]  Heart Transplant  Progress Note    Patient Name: Tae Delgado  MRN: [id number]  Admission Date: 1/9/2020  Hospital Length of Stay: 35 days  Attending Physician: Isiah Montero MD  Primary Care Provider: Elham Pearson MD  Principal Problem:LVAD (left ventricular assist device) present    Subjective:     Interval History: Patient reports he is frustrated with how weak he is.  Otherwise, no new complaints.  He is net negative 450cc in the last 24 hours.  CVP: 14    Continuous Infusions:    Scheduled Meds:   amiodarone  400 mg Oral Daily    amLODIPine  10 mg Oral Daily    aspirin  325 mg Oral Daily    atorvastatin  80 mg Oral QHS    balsam peru-castor oil   Topical (Top) BID    digoxin  0.125 mg Oral Daily    docusate sodium  200 mg Oral QHS    ferrous gluconate  324 mg Oral Daily with breakfast    furosemide  80 mg Intravenous TID    magnesium oxide  800 mg Oral TID    pantoprazole  40 mg Oral Daily    potassium chloride 10%  50 mEq Oral BID    sodium chloride 0.9%  10 mL Intravenous Q6H    warfarin  2.5 mg Oral Daily     PRN Meds:acetaminophen, albumin human 5%, albuterol sulfate, bisacodyL, Dextrose 10% Bolus, Dextrose 10% Bolus, HYDROcodone-acetaminophen, magnesium hydroxide 400 mg/5 ml, methocarbamol, oxyCODONE, sodium chloride 0.9%, Flushing PICC Protocol **AND** sodium chloride 0.9% **AND** sodium chloride 0.9%    Review of patient's allergies indicates:   Allergen Reactions    Biopatch [chlorhexidine gluconate]      Site burning    Dobutamine in d5w      Tachycardia, tremors, SOB, flushing    Percocet [oxycodone-acetaminophen] Itching    Penicillins Rash     Cefepime given on 1/23/2020 without issue     Objective:     Vital Signs (Most Recent):  Temp: 97.9 °F (36.6 °C) (02/13/20 0752)  Pulse: 98 (02/13/20 1100)  Resp: 20 (02/13/20 0752)  BP: (!) 84/0 (02/13/20 0752)  SpO2: 100 % (02/13/20 0752) Vital Signs (24h Range):  Temp:  [97.2 °F (36.2 °C)-98.4 °F (36.9 °C)]  97.9 °F (36.6 °C)  Pulse:  [82-98] 98  Resp:  [17-20] 20  SpO2:  [92 %-100 %] 100 %  BP: ()/(0-71) 84/0     Patient Vitals for the past 72 hrs (Last 3 readings):   Weight   02/13/20 1030 99.2 kg (218 lb 11.1 oz)   02/12/20 0700 101.1 kg (222 lb 13.1 oz)   02/11/20 0700 107.8 kg (237 lb 10.5 oz)     Body mass index is 31.38 kg/m².      Intake/Output Summary (Last 24 hours) at 2/13/2020 1206  Last data filed at 2/13/2020 1030  Gross per 24 hour   Intake 2290 ml   Output 2250 ml   Net 40 ml       Hemodynamic Parameters:  CVP:  [14 mmHg] 14 mmHg    Telemetry: V paced  Physical Exam   Constitutional: He is oriented to person, place, and time. He appears well-developed and well-nourished.   HENT:   Head: Normocephalic and atraumatic.   Eyes: Pupils are equal, round, and reactive to light. Conjunctivae and EOM are normal.   Neck: Normal range of motion. Neck supple. No JVD present. No thyromegaly present.   JVP mid neck  Cardiovascular: Normal rate and regular rhythm. Smooth VAD hum   Pulmonary/Chest: Effort normal and breath sounds normal. Intubated and sedated   Abdominal: Soft. Bowel sounds are normal.   Musculoskeletal: Normal range of motion. 1+ pedal edema   Neurological: He is alert and oriented to person, place, and time.   Skin: Skin is warm and dry. Capillary refill takes 2 to 3 seconds.   Psychiatric: He has a normal mood and affect. His behavior is normal. Judgment and thought content normal.       Significant Labs:  CBC:  Recent Labs   Lab 02/11/20  0606 02/12/20  0523 02/13/20  0500   WBC 9.00 7.80 7.97   RBC 3.21* 3.13* 3.31*   HGB 8.8* 8.6* 9.1*   HCT 29.5* 28.9* 30.0*    261 265   MCV 92 92 91   MCH 27.4 27.5 27.5   MCHC 29.8* 29.8* 30.3*     BNP:  Recent Labs   Lab 02/09/20  0400 02/10/20  0602 02/12/20  0523   * 317* 300*     CMP:  Recent Labs   Lab 02/11/20  1930 02/12/20  0523 02/12/20  0851 02/13/20  0500     --  125* 89   CALCIUM 8.6*  --  8.6* 8.9   ALBUMIN 2.8* 2.7* 2.8*  2.8*   PROT 6.5 6.1 6.3 6.3     --  136 136   K 4.0  --  4.2 3.4*   CO2 30*  --  30* 33*   CL 96  --  96 91*   BUN 34*  --  34* 33*   CREATININE 2.2*  --  2.0* 1.8*   ALKPHOS 77 73 77 78   ALT 21 20 21 23   AST 26 23 26 30   BILITOT 0.7 0.7 0.7 0.8      Coagulation:   Recent Labs   Lab 02/07/20  0400 02/08/20  0454 02/09/20  0400  02/11/20  0606 02/12/20  0523 02/13/20  0500   INR 2.7* 2.5* 1.8*  1.8*  1.8*   < > 3.7* 2.6* 2.0*   APTT 35.7* 34.8* 30.7  --   --   --   --     < > = values in this interval not displayed.     LDH:  Recent Labs   Lab 02/11/20  0606 02/12/20  0523 02/13/20  0500   * 291* 278*     Microbiology:  Microbiology Results (last 7 days)     ** No results found for the last 168 hours. **          I have reviewed all pertinent labs within the past 24 hours.    Estimated Creatinine Clearance: 52.2 mL/min (A) (based on SCr of 1.8 mg/dL (H)).    Diagnostic Results:  I have reviewed all pertinent imaging results/findings within the past 24 hours.    Assessment and Plan:       55 y.o. WM with history of NICMP diagnosed in 2010, ICD, LV thrombus (with prior splenic and renal emboli), Embolic  CVA , paroxysmal atrial fib, HTN, HLP  presents for  F/U today to clinic and he was volume overloaded on exam .  He states he had 3 admission in the last 3 months for volume overload. He started having more SOB on exertion since one month. Also endorses of Orthopnea since last one month. Alble to walk only 150 ft. He also has Bilateral lower extremity edema. He was admitted here in 2017 for ADHD here at [facility] and [facility] during that admission showed PCWP 40 and CVP 17. Currently denies chest pain, lightheadedness     * LVAD (left ventricular assist device) present  - S/P DT HM3 with closure of AV and repair of MV for regurg 1/23/20 ([facility]).  - HTS primary.  - INR Therapeutic. Will adjust warfarin.   - ASA 325mg.   - LDH stable.   - Taken back to OR 1/24 for possible RVAD placement which was not  done. Patient remained hemodynamically stable in OR. Wash out on 1/27. Chest closed 1/29.   - CVP 14.  Lasix 80mg TID. Will add dose of metolazone today.   - Off inotropes.  - TTE 2/10 at 5300 rpm: LVEDD 5.01, LVEF 10%, RV function mod depressed,   - Current speed 5300  - BP controlled. Continue Amlodipine 10mg daily.   - PT/OT/VAD education  - Sternal wound vac in place. Will remove when all lights are out/therapy complete.     Procedure: Device Interrogation Including analysis of device parameters  Current Settings: Ventricular Assist Device  Review of device function is stable/unstabe    TXP LVAD INTERROGATIONS 2/12/2020 2/12/2020 2/12/2020 2/11/2020 2/11/2020 2/11/2020 2/11/2020   Type HeartMate3 HeartMate3 HeartMate3 HeartMate3 HeartMate3 HeartMate3 HeartMate3   Flow 4.3 4.2 4.4 4.2 4.4 4.1 4.2   Speed 5300 5300 5300 5300 5300 5300 5300   PI 3.9 3.7 2.6 3.2 3.5 3.74 3.7   Power (Berry) 3.7 3.8 3.8 3.8 3.9 3.8 3.8   LSL 4900 - - - - - -   Pulsatility Pulse Pulse Pulse Pulse - - Pulse       Acute on chronic combined systolic and diastolic heart failure  - S/P DT HM3 with closure of AV and plication of MV for regurg 1/23/20 (See LVAD)  - NIDCM dx'd 2010    Paroxysmal atrial fibrillation  - Intermittently in A fib since surgery  - AC on Coumadin  - Dig 0.125 mg qd       Acute kidney injury superimposed on chronic kidney disease  - Creatinine improving(baseline ~ 1.8).   - Nephrology has signed off.    Left ventricular thrombus without MI  - H/O LV thrombus with h/o splenic and renal emboli as well as embolic CVA  - Limited TTE done here 1/13 showed no thrombus  - JACINTO 1/23 intra op showed resolution of DAMON thrombus  - AC as above.      Right lower lobe pulmonary nodule  - Incidental finding on CT chest/abd/pelvis on 1/12. Pulmonology consulted, and rec repeat CT of chest in 3 months  - Will need to follow-up in pulmonary clinic.     ICD (implantable cardioverter-defibrillator) in place  - S/P Biotronik dual chamber  ICD    Acute hyperglycemia  - Endocrine following    Coagulopathy  - Appreciate Hem/Onc's help. No evidence of underlying coagulopathy (h/o LV thrombus with splenic and renal emboli, h/o embolic CVA)    NSVT (nonsustained ventricular tachycardia)  - Continue MARBELLA Asif  Heart Transplant  [facility]

## 2020-02-13 NOTE — PLAN OF CARE
Plan of care discussed with patient.  Patient ambulating independently, fall precautions in place.Continuing to encourage sternal precautions, IS, and ambulation. LVAD DP and numbers WNL, smooth LVAD hum. Patient has no complaints of pain. Discussed medications and care. Patient has c/o a hoarse voice. Encouraged workbook, reviewed education, filled in binder. Patient has no questions at this time. Will continue to monitor.

## 2020-02-13 NOTE — PROGRESS NOTES
Patient AAO while sitting up in chair. Spent 120 mins educating patient.       Educational HM 3 DVD provided by company has been reviewed by patient.  VAD binder reviewed with patient including what to document and the meanings of all the VAD parameters (Speed, Flow, PI and Power). Additionally, it was pointed out to the patient where to find the proper phone numbers to call each of the individual VAD coordinators. We spoke about when to page the VAD coordinator vs when to call during normal business hours. We discussed possible medication the patient may encounter with a VAD were discussed including not changing prescriptions without talking to their VAD team. Explained that the pharmacist will further discuss before discharge and create them a purple card. Patient was able to successfully page the VAD coordinator and point out the proper places to find the Coordinator on calls phone number/emergency number. Patient was able to explain what the VAD is and how it works. Patient was able to successfully explained that if [place] are greater then 10w they will page the VAD coordinator. Proper severe weather plans were discussed including proper generator usage and creating an evacuation plan. Patient explains the are not to use the mobile power unit if it is plugged into a generator. Patient was able to successfully change controller and verbalize to page VAD coordinator prior to initiating controller change. Pt did need a good bit of redirection and prompting to complete. Patient understands the locking mechanism for the driveline, sleep mode, and how to jump start the controller as needed. Patient was able to successfully explain the items that must be at bedside at all times including flashlight, batteries, and clips. Patient was able to verbalize understanding that he must sleep on the module power unit and not batteries. Patient was able to verbalize the alarms and all their meanings for the mobile power unit.  Patient slept at least one night on the mobile power unit while staying in the hospital. We discussed the battery charger. Patient was able to explain that battery lifespan, charge length, and how to rotate batteries. Patient was able to explain how to calibrate batteries and when the proper time to do so. Patient was able to explain how to tell when the pump is running and what to do if it is not. Patient was able to verbalize  alarms hazard vs advisory alarms.    Patient is not checked off on alarms per VAD coordinator.   PT needs further practice with changing controllers.

## 2020-02-13 NOTE — PT/OT/SLP PROGRESS
"Physical Therapy Treatment    Patient Name:  Tae Delgado   MRN:  [id number]    Recommendations:     Discharge Recommendations:  rehabilitation facility   Discharge Equipment Recommendations: (TBD)   Barriers to discharge: Decreased caregiver support at current functional level    Assessment:     Tae Delgado is a 59 y.o. male admitted with a medical diagnosis of LVAD (left ventricular assist device) present.  He presents with the following impairments/functional limitations:  weakness, impaired self care skills, impaired endurance, impaired functional mobilty, gait instability, impaired balance, decreased coordination, decreased upper extremity function, decreased lower extremity function, decreased safety awareness. Pt continuing to progress functional mobility with gradual improvement in standing balance and tolerance noted. However, he continues to require assist of 2 to safely complete transfers while maintaining sternal precautions. Gait remains limited at this time due to impaired endurance, balance impairments, and weakness. Pt would continue to benefit from skilled acute PT in order to address current deficits and progress functional mobility.     Rehab Prognosis: Good; patient would benefit from acute skilled PT services to address these deficits and reach maximum level of function.    Recent Surgery: Procedure(s) (LRB):  CLOSURE, WOUND, STERNUM (N/A)  WASHOUT (N/A)  APPLICATION, WOUND VAC (N/A) 15 Days Post-Op    Plan:     During this hospitalization, patient to be seen 6 x/week to address the identified rehab impairments via gait training, therapeutic activities, therapeutic exercises, neuromuscular re-education and progress toward the following goals:    · Plan of Care Expires:  02/29/20    Subjective     Chief Complaint: none noted   Patient/Family Comments/goals: "That deserves a high five." re: pt happy with transfer to chair   Pain/Comfort:  · Pain Rating 1: 0/10      Objective:     Communicated with " RN prior to session.  Patient found supine with telemetry, wound vac, LVAD upon PT entry to room.     General Precautions: Standard, fall, LVAD, sternal   Orthopedic Precautions:N/A   Braces: N/A     Functional Mobility:  · Bed Mobility:     ? Supine to Sit: minimum assistance with HOB elevated (managing trunk in order to maintain sternal precautions)  · Transfers:      ? Sit to Stand:  minimum assistance and of 2 persons with no AD from  EOB; maxAx2 with no AD from bedside chair   ? With cues for technique, foot placement, and use of momentum to assist with ease of transfer   ? Bed to Chair: minimum assistance with assist of 2 persosn with  hand-held assist  using  Stand Pivot  · Gait: ~3 ft. bed>chair with minAx2 and HHAx2 + 2 steps forward/backward onto scale (~2 in step) with minAx2 and HHAx2  ? demo'd decreased step length, impaired weight-shifting ability, inconsistent foot placement, and instability   ? Cues provided for sequencing, appropriate weight-shifts, and safe technique   · Balance:   ? static standing: Schuyler    ? dynamic standing: minAx2       AM-PAC 6 CLICK MOBILITY  Turning over in bed (including adjusting bedclothes, sheets and blankets)?: 3  Sitting down on and standing up from a chair with arms (e.g., wheelchair, bedside commode, etc.): 2  Moving from lying on back to sitting on the side of the bed?: 3  Moving to and from a bed to a chair (including a wheelchair)?: 2  Need to walk in hospital room?: 2  Climbing 3-5 steps with a railing?: 1  Basic Mobility Total Score: 13       Therapeutic Activities and Exercises:   Pt found on LVAD wall power. Performed self-test and recorded numbers in binder with cues to do so. Switched from wall>battery power without assist but with increased timing and cueing for initiating cable disconnections, proper hand placement, maintaining integrity of connections, and controller cable to battery. Organized consolidation bag with cues for maintaining integrity of  cables. Donned consolidation bag with cues for driveline facing forward and assist provided for adjusting waist strap for proper pt fit. Remained on battery power at end of session.  Educated on importance of OOB activity and encouraged to sit UIC for majority of day as tolerated.   PT assisted RN with obtaining standing weight  Time provided for encouragement, reinforcement of education, goals of acute therapy, and review of pt performance/progression.   PT returned later in PM to gay VAD for increased ease of transitioning wall<>battery power     Patient left up in chair with all lines intact, call button in reach and RN notified..    GOALS:   Multidisciplinary Problems     Physical Therapy Goals        Problem: Physical Therapy Goal    Goal Priority Disciplines Outcome Goal Variances Interventions   Physical Therapy Goal     PT, PT/OT Ongoing, Progressing     Description:  Goals to be met by: 2020     Patient will increase functional independence with mobility by performin. Supine to sit with MInimal Assistance - met   1a. Supine to sit with SBA with HOB flat   2. Rolling to Left and Right with Minimal Assistance.- not met  3. Sit to stand transfer with Moderate Assistance- not met  4. Sitting at edge of bed x8 minutes with Minimal Assistance - met 2020  4a. Sitting at EOB x 10 minutes with SBA- met 2/10/2020  5. Lower extremity exercise program x10 reps per handout, with assistance as needed- not met  6. Standing with no AD x 30 seconds CGA to assist with ADLs and self care- not met  7. Gait x50 ft with Schuyler without AD                          Time Tracking:     PT Received On: 20  PT Start Time: 1000     PT Stop Time: 1032  PT Total Time (min): 32 min   + PT returned from 15:51-16:00 (9 min) in order to gay LVAD    Billable Minutes: Therapeutic Activity 41   (co-tx with OT)    Treatment Type: Treatment  PT/PTA: PT     PTA Visit Number: 0     Micki Priest, PT, DPT    2/13/2020  [phone]

## 2020-02-13 NOTE — PLAN OF CARE
Plan of care discussed with patient. Patient is free of fall/trauma/injury. Denies CP, SOB, or pain/discomfort. Telemetry monitor showing V paced.-90. CVP this am-14. Remains on lasix 80mg iv BID. Changed potassium from capsules to liquid per pt's request. Uneventful night shift. LVAD dressing changed on day shift per RN. LVAD numbers WNL. All questions addressed.

## 2020-02-13 NOTE — PT/OT/SLP PROGRESS
Occupational Therapy   Treatment    Name: Tae Delgado  MRN: [id number]  Admitting Diagnosis:  LVAD (left ventricular assist device) present  15 Days Post-Op    Recommendations:     Discharge Recommendations: rehabilitation facility  Discharge Equipment Recommendations:  (TBD)  Barriers to discharge:  (Patient requires skilled level of assist)    Assessment:     Tae Delgado is a 59 y.o. male with a medical diagnosis of LVAD (left ventricular assist device) present. He presents with the following performance deficits affecting function: weakness, impaired endurance, impaired self care skills, impaired functional mobilty, gait instability, impaired balance, impaired cardiopulmonary response to activity, decreased safety awareness. Patient tolerated session well but continues to require assist of 2 for safety completion of functional mobility including sit <> stand and transfers. At this time, patient will continue to benefit from acute skilled therapy intervention to address deficits/underlying impairments and progress towards prior level of function. After discharge, patient will benefit from continuing therapy at rehab facility to increase independence in ADLs and functional mobility through skilled balance training and skilled therapeutic exercise to promote safety at home.    Rehab Prognosis:  Good; patient would benefit from acute skilled OT services to address these deficits and reach maximum level of function.       Plan:     Patient to be seen 6 x/week to address the above listed problems via self-care/home management, therapeutic activities, therapeutic exercises  · Plan of Care Expires: 03/02/20  · Plan of Care Reviewed with: patient    Subjective     Pain/Comfort:  · Pain Rating 1: 0/10    Objective:     Communicated with: RN prior to session. Patient found HOB elevated with telemetry, wound vac, LVAD upon OT entry to room.    PT present for co-treatment as appropriate for patient 2* decreased activity  tolerance, standing balance, and safety awareness.    General Precautions: Standard, sternal, LVAD, fall   Orthopedic Precautions:N/A   Braces: N/A     Occupational Performance:     Bed Mobility:    · Patient completed Supine to Sit with minimum assistance and HOB elevated   · Patient completed posterior scooting sitting EOB with minimum assistance  · Cues provided for maintaining sternal precautions and patient provided heart pillow to hold to prevent use of B UEs    Functional Mobility/Transfers:  · Patient completed Sit <> Stand Transfer from EOB with minimum assistance and of 2 persons with no assistive device   · Patient completed Bed > Chair Transfer using Step Transfer technique with minimum assistance and of 2 persons with hand-held assist  · Functional Mobility: Patient able to take a few steps towards bedside chair with min assist of 2 with hand-held assist    Activities of Daily Living:  · Lower Body Dressing: moderate assistance to anitha R sock and SBA to anitha L sock sitting EOB  · Toileting: stand by assistance using urinal sitting EOB    LVAD management:   · Patient on wall power and start of session and had yet to complete morning self-test.  · Patient completed self-test and recorded numbers in binders with SBA.  · Patient required SBA to switch from wall to battery power.  · Patient required min assist with placement for batteries and controller into consolidation bag and mod assist for avoiding zipping cables and education on importance of maintaining integrity of cables   · OT checked content of emergency bag   · LVAD remaining on battery power at end of session      [facility] 6 Click ADL: 16    Treatment & Education:   Reviewed sternal precautions with patient at start of session.   Therapist provided facilitation and instruction of proper body mechanics, energy conservation, and fall prevention strategies during tasks listed above.   Educated patient on importance of sitting in bedside chair  throughout the day to increase activity tolerance.    Educated patient on OT POC and answered all questions within OT scope of practice.   Whiteboard updated    Patient left up in chair with all lines intact and PT presentEducation:   to finish therapy session    GOALS:   Multidisciplinary Problems     Occupational Therapy Goals        Problem: Occupational Therapy Goal    Goal Priority Disciplines Outcome Interventions   Occupational Therapy Goal     OT, PT/OT Ongoing, Progressing    Description:  Goals to be met by: 2/16/2020     Patient will increase functional independence with ADLs by performing:    UE Dressing with Minimal Assistance.  LE Dressing with Minimal Assistance.  Grooming while standing with Minimal Assistance.  Toileting from bedside commode with Minimal Assistance for hygiene and clothing management.   Sitting at edge of bed x10 minutes with Minimal Assistance in prep for seated grooming tasks- GOAL MET 2/10  Supine to sit with min A. -progressing    Toilet transfer to bedside commode with Minimal Assistance.  Pt will perform LVAD management independently - progressing                   Multidisciplinary Problems (Resolved)        Problem: Occupational Therapy Goal    Goal Priority Disciplines Outcome Interventions   Occupational Therapy Goal   (Resolved)     OT, PT/OT Met    Description:  Skilled OT services not necessary at this time secondary to patient performing ADLs at [facility]. Patient in agreement. Please re-consult OT if change in patient's functional status is noted.                     Time Tracking:     OT Date of Treatment: 02/13/20  OT Start Time: 1000  OT Stop Time: 1023  OT Total Time (min): 23 min    Billable Minutes:Self Care/Home Management 23    Leslee Terrell OT  2/13/2020

## 2020-02-13 NOTE — PROGRESS NOTES
SW to pt's room x2 yesterday for update.  On first attempt, pt asleep in room.  On second attempt, PT at bedside working with pt.    SW returned x2 today for update.  On first attempt, LVAD catie Bruner at bedside for VAD education with pt.  On second attempt, pt asleep in room.    Per VAD rounds this morning, PT is recommending IPR at this time.  SW informed  MAAME Jarrett, who sent referral to [facility].  Per multidisciplinary rounds this afternoon, pt has not been checked off on VAD education yet, but may be checked off by Monday.  HTS team anticipates pt may be able to txfer to IPR on Monday if VAD education is completed & pt remains medically stable.  SW will f/u to discuss IPR level of care with pt.  SW following and remains available.

## 2020-02-13 NOTE — PROGRESS NOTES
RN spoke with MARBELLA Cheung about potassium 3.4 this am and that patient has 50 oral potassium scheduled BID. MARBELLA Cheung stated that this is fine, no extra orders of potassium. RN also informed MARBELLA Cheung that patient refuses polyethylene glycol and golytely every time, and that patient is having BM well on his own without them. OK given to discontinue these 2 medication orders. Will continue to monitor.

## 2020-02-13 NOTE — PLAN OF CARE
Goals reviewed and remain appropriate.   Leslee Terrell OTR/L  Pager #: [phone]  2/13/2020    Problem: Occupational Therapy Goal  Goal: Occupational Therapy Goal  Description  Goals to be met by: 2/16/2020     Patient will increase functional independence with ADLs by performing:    UE Dressing with Minimal Assistance.  LE Dressing with Minimal Assistance.  Grooming while standing with Minimal Assistance.  Toileting from bedside commode with Minimal Assistance for hygiene and clothing management.   Sitting at edge of bed x10 minutes with Minimal Assistance in prep for seated grooming tasks- GOAL MET 2/10  Supine to sit with min A. -progressing    Toilet transfer to bedside commode with Minimal Assistance.  Pt will perform LVAD management independently - progressing        Outcome: Ongoing, Progressing

## 2020-02-13 NOTE — SUBJECTIVE & OBJECTIVE
Interval History: Patient reports he is frustrated with how weak he is.  Otherwise, no new complaints.  He is net negative 450cc in the last 24 hours.  CVP: 14    Continuous Infusions:    Scheduled Meds:   amiodarone  400 mg Oral Daily    amLODIPine  10 mg Oral Daily    aspirin  325 mg Oral Daily    atorvastatin  80 mg Oral QHS    balsam peru-castor oil   Topical (Top) BID    digoxin  0.125 mg Oral Daily    docusate sodium  200 mg Oral QHS    ferrous gluconate  324 mg Oral Daily with breakfast    furosemide  80 mg Intravenous TID    magnesium oxide  800 mg Oral TID    pantoprazole  40 mg Oral Daily    potassium chloride 10%  50 mEq Oral BID    sodium chloride 0.9%  10 mL Intravenous Q6H    warfarin  2.5 mg Oral Daily     PRN Meds:acetaminophen, albumin human 5%, albuterol sulfate, bisacodyL, Dextrose 10% Bolus, Dextrose 10% Bolus, HYDROcodone-acetaminophen, magnesium hydroxide 400 mg/5 ml, methocarbamol, oxyCODONE, sodium chloride 0.9%, Flushing PICC Protocol **AND** sodium chloride 0.9% **AND** sodium chloride 0.9%    Review of patient's allergies indicates:   Allergen Reactions    Biopatch [chlorhexidine gluconate]      Site burning    Dobutamine in d5w      Tachycardia, tremors, SOB, flushing    Percocet [oxycodone-acetaminophen] Itching    Penicillins Rash     Cefepime given on 1/23/2020 without issue     Objective:     Vital Signs (Most Recent):  Temp: 97.9 °F (36.6 °C) (02/13/20 0752)  Pulse: 98 (02/13/20 1100)  Resp: 20 (02/13/20 0752)  BP: (!) 84/0 (02/13/20 0752)  SpO2: 100 % (02/13/20 0752) Vital Signs (24h Range):  Temp:  [97.2 °F (36.2 °C)-98.4 °F (36.9 °C)] 97.9 °F (36.6 °C)  Pulse:  [82-98] 98  Resp:  [17-20] 20  SpO2:  [92 %-100 %] 100 %  BP: ()/(0-71) 84/0     Patient Vitals for the past 72 hrs (Last 3 readings):   Weight   02/13/20 1030 99.2 kg (218 lb 11.1 oz)   02/12/20 0700 101.1 kg (222 lb 13.1 oz)   02/11/20 0700 107.8 kg (237 lb 10.5 oz)     Body mass index is 31.38  kg/m².      Intake/Output Summary (Last 24 hours) at 2/13/2020 1206  Last data filed at 2/13/2020 1030  Gross per 24 hour   Intake 2290 ml   Output 2250 ml   Net 40 ml       Hemodynamic Parameters:  CVP:  [14 mmHg] 14 mmHg    Telemetry: V paced  Physical Exam   Constitutional: He is oriented to person, place, and time. He appears well-developed and well-nourished.   HENT:   Head: Normocephalic and atraumatic.   Eyes: Pupils are equal, round, and reactive to light. Conjunctivae and EOM are normal.   Neck: Normal range of motion. Neck supple. No JVD present. No thyromegaly present.   JVP mid neck  Cardiovascular: Normal rate and regular rhythm. Smooth VAD hum   Pulmonary/Chest: Effort normal and breath sounds normal. Intubated and sedated   Abdominal: Soft. Bowel sounds are normal.   Musculoskeletal: Normal range of motion. 1+ pedal edema   Neurological: He is alert and oriented to person, place, and time.   Skin: Skin is warm and dry. Capillary refill takes 2 to 3 seconds.   Psychiatric: He has a normal mood and affect. His behavior is normal. Judgment and thought content normal.       Significant Labs:  CBC:  Recent Labs   Lab 02/11/20  0606 02/12/20  0523 02/13/20  0500   WBC 9.00 7.80 7.97   RBC 3.21* 3.13* 3.31*   HGB 8.8* 8.6* 9.1*   HCT 29.5* 28.9* 30.0*    261 265   MCV 92 92 91   MCH 27.4 27.5 27.5   MCHC 29.8* 29.8* 30.3*     BNP:  Recent Labs   Lab 02/09/20  0400 02/10/20  0602 02/12/20  0523   * 317* 300*     CMP:  Recent Labs   Lab 02/11/20  1930 02/12/20  0523 02/12/20  0851 02/13/20  0500     --  125* 89   CALCIUM 8.6*  --  8.6* 8.9   ALBUMIN 2.8* 2.7* 2.8* 2.8*   PROT 6.5 6.1 6.3 6.3     --  136 136   K 4.0  --  4.2 3.4*   CO2 30*  --  30* 33*   CL 96  --  96 91*   BUN 34*  --  34* 33*   CREATININE 2.2*  --  2.0* 1.8*   ALKPHOS 77 73 77 78   ALT 21 20 21 23   AST 26 23 26 30   BILITOT 0.7 0.7 0.7 0.8      Coagulation:   Recent Labs   Lab 02/07/20  0400 02/08/20  0457  02/09/20  0400  02/11/20  0606 02/12/20  0523 02/13/20  0500   INR 2.7* 2.5* 1.8*  1.8*  1.8*   < > 3.7* 2.6* 2.0*   APTT 35.7* 34.8* 30.7  --   --   --   --     < > = values in this interval not displayed.     LDH:  Recent Labs   Lab 02/11/20  0606 02/12/20  0523 02/13/20  0500   * 291* 278*     Microbiology:  Microbiology Results (last 7 days)     ** No results found for the last 168 hours. **          I have reviewed all pertinent labs within the past 24 hours.    Estimated Creatinine Clearance: 52.2 mL/min (A) (based on SCr of 1.8 mg/dL (H)).    Diagnostic Results:  I have reviewed all pertinent imaging results/findings within the past 24 hours.

## 2020-02-13 NOTE — PROGRESS NOTES
02/13/2020  Felipe Lim    Current provider:  Isiah Montero MD      I, Felipe Lim, rounded on Tae Delgado to ensure all mechanical assist device settings (IABP or VAD) were appropriate and all parameters were within limits.  I was able to ensure all back up equipment was present, the staff had no issues, and the Perfusion Department daily rounding was complete.    12:42 PM

## 2020-02-13 NOTE — PROGRESS NOTES
"LVAD dressing change completed using sterile technique with soap and water. DLES is a "1" with no drainage noted on the drain sponge. Tolerated without any complication. Sutures remain intact, no redness. Slight  tenderness noted when cleaning around sutures.     "

## 2020-02-14 LAB
ALBUMIN SERPL BCP-MCNC: 2.9 G/DL (ref 3.5–5.2)
ALBUMIN SERPL BCP-MCNC: 2.9 G/DL (ref 3.5–5.2)
ALP SERPL-CCNC: 86 U/L (ref 55–135)
ALP SERPL-CCNC: 86 U/L (ref 55–135)
ALT SERPL W/O P-5'-P-CCNC: 25 U/L (ref 10–44)
ALT SERPL W/O P-5'-P-CCNC: 25 U/L (ref 10–44)
ANION GAP SERPL CALC-SCNC: 11 MMOL/L (ref 8–16)
AST SERPL-CCNC: 31 U/L (ref 10–40)
AST SERPL-CCNC: 31 U/L (ref 10–40)
BASOPHILS # BLD AUTO: 0.03 K/UL (ref 0–0.2)
BASOPHILS NFR BLD: 0.4 % (ref 0–1.9)
BILIRUB DIRECT SERPL-MCNC: 0.5 MG/DL (ref 0.1–0.3)
BILIRUB SERPL-MCNC: 0.9 MG/DL (ref 0.1–1)
BILIRUB SERPL-MCNC: 0.9 MG/DL (ref 0.1–1)
BNP SERPL-MCNC: 258 PG/ML (ref 0–99)
BUN SERPL-MCNC: 32 MG/DL (ref 6–20)
CALCIUM SERPL-MCNC: 8.8 MG/DL (ref 8.7–10.5)
CHLORIDE SERPL-SCNC: 88 MMOL/L (ref 95–110)
CO2 SERPL-SCNC: 38 MMOL/L (ref 23–29)
CREAT SERPL-MCNC: 1.9 MG/DL (ref 0.5–1.4)
CRP SERPL-MCNC: 19.9 MG/L (ref 0–8.2)
DIFFERENTIAL METHOD: ABNORMAL
EOSINOPHIL # BLD AUTO: 0.1 K/UL (ref 0–0.5)
EOSINOPHIL NFR BLD: 1.8 % (ref 0–8)
ERYTHROCYTE [DISTWIDTH] IN BLOOD BY AUTOMATED COUNT: 18.2 % (ref 11.5–14.5)
EST. GFR  (AFRICAN AMERICAN): 43.6 ML/MIN/1.73 M^2
EST. GFR  (NON AFRICAN AMERICAN): 37.7 ML/MIN/1.73 M^2
GLUCOSE SERPL-MCNC: 100 MG/DL (ref 70–110)
HCT VFR BLD AUTO: 31.7 % (ref 40–54)
HGB BLD-MCNC: 9.8 G/DL (ref 14–18)
IMM GRANULOCYTES # BLD AUTO: 0.07 K/UL (ref 0–0.04)
IMM GRANULOCYTES NFR BLD AUTO: 0.9 % (ref 0–0.5)
INR PPP: 2.2 (ref 0.8–1.2)
LDH SERPL L TO P-CCNC: 315 U/L (ref 110–260)
LYMPHOCYTES # BLD AUTO: 1.3 K/UL (ref 1–4.8)
LYMPHOCYTES NFR BLD: 16.2 % (ref 18–48)
MAGNESIUM SERPL-MCNC: 2.3 MG/DL (ref 1.6–2.6)
MCH RBC QN AUTO: 27.8 PG (ref 27–31)
MCHC RBC AUTO-ENTMCNC: 30.9 G/DL (ref 32–36)
MCV RBC AUTO: 90 FL (ref 82–98)
MONOCYTES # BLD AUTO: 0.7 K/UL (ref 0.3–1)
MONOCYTES NFR BLD: 9.5 % (ref 4–15)
NEUTROPHILS # BLD AUTO: 5.5 K/UL (ref 1.8–7.7)
NEUTROPHILS NFR BLD: 71.2 % (ref 38–73)
NRBC BLD-RTO: 0 /100 WBC
PHOSPHATE SERPL-MCNC: 3.8 MG/DL (ref 2.7–4.5)
PLATELET # BLD AUTO: 273 K/UL (ref 150–350)
PMV BLD AUTO: 10.4 FL (ref 9.2–12.9)
POTASSIUM SERPL-SCNC: 3.1 MMOL/L (ref 3.5–5.1)
PREALB SERPL-MCNC: 23 MG/DL (ref 20–43)
PROT SERPL-MCNC: 6.4 G/DL (ref 6–8.4)
PROT SERPL-MCNC: 6.4 G/DL (ref 6–8.4)
PROTHROMBIN TIME: 20.9 SEC (ref 9–12.5)
RBC # BLD AUTO: 3.53 M/UL (ref 4.6–6.2)
SODIUM SERPL-SCNC: 137 MMOL/L (ref 136–145)
WBC # BLD AUTO: 7.76 K/UL (ref 3.9–12.7)

## 2020-02-14 PROCEDURE — 80053 COMPREHEN METABOLIC PANEL: CPT

## 2020-02-14 PROCEDURE — 94761 N-INVAS EAR/PLS OXIMETRY MLT: CPT

## 2020-02-14 PROCEDURE — 83880 ASSAY OF NATRIURETIC PEPTIDE: CPT

## 2020-02-14 PROCEDURE — 97530 THERAPEUTIC ACTIVITIES: CPT

## 2020-02-14 PROCEDURE — 25000003 PHARM REV CODE 250: Performed by: NURSE PRACTITIONER

## 2020-02-14 PROCEDURE — 99232 PR SUBSEQUENT HOSPITAL CARE,LEVL II: ICD-10-PCS | Mod: ,,, | Performed by: INTERNAL MEDICINE

## 2020-02-14 PROCEDURE — A4216 STERILE WATER/SALINE, 10 ML: HCPCS | Performed by: STUDENT IN AN ORGANIZED HEALTH CARE EDUCATION/TRAINING PROGRAM

## 2020-02-14 PROCEDURE — 93750 INTERROGATION VAD IN PERSON: CPT | Mod: ,,, | Performed by: INTERNAL MEDICINE

## 2020-02-14 PROCEDURE — 25000003 PHARM REV CODE 250: Performed by: INTERNAL MEDICINE

## 2020-02-14 PROCEDURE — 97535 SELF CARE MNGMENT TRAINING: CPT

## 2020-02-14 PROCEDURE — 25000003 PHARM REV CODE 250: Performed by: STUDENT IN AN ORGANIZED HEALTH CARE EDUCATION/TRAINING PROGRAM

## 2020-02-14 PROCEDURE — 27000248 HC VAD-ADDITIONAL DAY

## 2020-02-14 PROCEDURE — 80076 HEPATIC FUNCTION PANEL: CPT

## 2020-02-14 PROCEDURE — 85610 PROTHROMBIN TIME: CPT

## 2020-02-14 PROCEDURE — 99232 SBSQ HOSP IP/OBS MODERATE 35: CPT | Mod: ,,, | Performed by: INTERNAL MEDICINE

## 2020-02-14 PROCEDURE — 84134 ASSAY OF PREALBUMIN: CPT

## 2020-02-14 PROCEDURE — 86140 C-REACTIVE PROTEIN: CPT

## 2020-02-14 PROCEDURE — 83615 LACTATE (LD) (LDH) ENZYME: CPT

## 2020-02-14 PROCEDURE — 85025 COMPLETE CBC W/AUTO DIFF WBC: CPT

## 2020-02-14 PROCEDURE — 83735 ASSAY OF MAGNESIUM: CPT

## 2020-02-14 PROCEDURE — 84100 ASSAY OF PHOSPHORUS: CPT

## 2020-02-14 PROCEDURE — 93750 PR INTERROGATE VENT ASSIST DEV, IN PERSON, W PHYSICIAN ANALYSIS: ICD-10-PCS | Mod: ,,, | Performed by: INTERNAL MEDICINE

## 2020-02-14 PROCEDURE — 20600001 HC STEP DOWN PRIVATE ROOM

## 2020-02-14 RX ORDER — FUROSEMIDE 40 MG/1
40 TABLET ORAL DAILY
Status: DISCONTINUED | OUTPATIENT
Start: 2020-02-15 | End: 2020-02-17 | Stop reason: HOSPADM

## 2020-02-14 RX ORDER — POTASSIUM CHLORIDE 20 MEQ/15ML
50 SOLUTION ORAL ONCE
Status: COMPLETED | OUTPATIENT
Start: 2020-02-14 | End: 2020-02-14

## 2020-02-14 RX ORDER — POTASSIUM CHLORIDE 20 MEQ/15ML
50 SOLUTION ORAL ONCE
Status: DISCONTINUED | OUTPATIENT
Start: 2020-02-14 | End: 2020-02-14

## 2020-02-14 RX ADMIN — CASTOR OIL AND BALSAM, PERU: 788; 87 OINTMENT TOPICAL at 08:02

## 2020-02-14 RX ADMIN — ASPIRIN 325 MG ORAL TABLET 325 MG: 325 PILL ORAL at 08:02

## 2020-02-14 RX ADMIN — AMIODARONE HYDROCHLORIDE 400 MG: 200 TABLET ORAL at 08:02

## 2020-02-14 RX ADMIN — ATORVASTATIN CALCIUM 80 MG: 20 TABLET, FILM COATED ORAL at 08:02

## 2020-02-14 RX ADMIN — Medication 800 MG: at 08:02

## 2020-02-14 RX ADMIN — AMLODIPINE BESYLATE 10 MG: 10 TABLET ORAL at 08:02

## 2020-02-14 RX ADMIN — FERROUS GLUCONATE TAB 324 MG (37.5 MG ELEMENTAL IRON) 324 MG: 324 (37.5 FE) TAB at 08:02

## 2020-02-14 RX ADMIN — Medication 10 ML: at 06:02

## 2020-02-14 RX ADMIN — POTASSIUM CHLORIDE 50 MEQ: 20 SOLUTION ORAL at 10:02

## 2020-02-14 RX ADMIN — POTASSIUM CHLORIDE 50 MEQ: 20 SOLUTION ORAL at 08:02

## 2020-02-14 RX ADMIN — WARFARIN SODIUM 2.5 MG: 1 TABLET ORAL at 04:02

## 2020-02-14 RX ADMIN — Medication 800 MG: at 04:02

## 2020-02-14 RX ADMIN — Medication 10 ML: at 01:02

## 2020-02-14 RX ADMIN — PANTOPRAZOLE SODIUM 40 MG: 40 TABLET, DELAYED RELEASE ORAL at 08:02

## 2020-02-14 RX ADMIN — Medication 10 ML: at 05:02

## 2020-02-14 RX ADMIN — DIGOXIN 0.12 MG: 125 TABLET ORAL at 08:02

## 2020-02-14 NOTE — PLAN OF CARE
Goals reviewed and remain appropriate. Pt progressing towards goals.    Micki Priest, PT, DPT   2020  [phone]    Problem: Physical Therapy Goal  Goal: Physical Therapy Goal  Description  Goals to be met by: 2020     Patient will increase functional independence with mobility by performin. Supine to sit with MInimal Assistance - met   1a. Supine to sit with SBA with HOB flat   2. Rolling to Left and Right with Minimal Assistance.- not met  3. Sit to stand transfer with Moderate Assistance- not met  4. Sitting at edge of bed x8 minutes with Minimal Assistance - met 2020  4a. Sitting at EOB x 10 minutes with SBA- met 2/10/2020  5. Lower extremity exercise program x10 reps per handout, with assistance as needed- not met  6. Standing with no AD x 30 seconds CGA to assist with ADLs and self care- not met  7. Gait x50 ft with Schuyler without AD         Outcome: Ongoing, Progressing

## 2020-02-14 NOTE — PT/OT/SLP PROGRESS
"Physical Therapy Treatment    Patient Name:  Tae Delgado   MRN:  [id number]    Recommendations:     Discharge Recommendations:  rehabilitation facility   Discharge Equipment Recommendations: (TBD)   Barriers to discharge: Decreased caregiver support at current functional level    Assessment:     Tae Delgado is a 59 y.o. male admitted with a medical diagnosis of LVAD (left ventricular assist device) present.  He presents with the following impairments/functional limitations:  weakness, gait instability, impaired endurance, impaired balance, decreased safety awareness, impaired self care skills, impaired functional mobilty, impaired cardiopulmonary response to activity, decreased coordination, decreased lower extremity function. Pt continuing to progress functional mobility and activity tolerance. He was able to maintain standing for longer periods of time and with improved balance noted throughout. Also demo'ing improvement in steps during stand pivot transfers. Pt will likely be able to progress gait training next session and may benefit from trial with platform RW. Pt would continue to benefit from skilled acute PT in order to address current deficits and progress functional mobility.       Rehab Prognosis: Good; patient would benefit from acute skilled PT services to address these deficits and reach maximum level of function.    Recent Surgery: Procedure(s) (LRB):  CLOSURE, WOUND, STERNUM (N/A)  WASHOUT (N/A)  APPLICATION, WOUND VAC (N/A) 16 Days Post-Op    Plan:     During this hospitalization, patient to be seen 6 x/week to address the identified rehab impairments via gait training, therapeutic activities, therapeutic exercises, neuromuscular re-education and progress toward the following goals:    · Plan of Care Expires:  02/29/20    Subjective     Chief Complaint: weakness   Patient/Family Comments/goals: "That was hard." re: standing for increased time  Pain/Comfort:  · Pain Rating 1: 0/10      Objective: "     Communicated with RN prior to session.  Patient found supine with telemetry, wound vac, LVAD upon PT entry to room.     General Precautions: Standard, fall, sternal, LVAD   Orthopedic Precautions:N/A   Braces: N/A     Functional Mobility:  · Bed Mobility:     ? Supine to Sit: standby assistance with HOB elevated  · Transfers:      ? Sit to Stand:  minimum assistance and of 2 persons with no AD from  EOB; modAx2 with no AD from bedside chair   ? With cues for technique, foot placement, and use of momentum to assist with ease of transfer   ? Bed to Chair: minimum assistance with assist of 2 persosn with  hand-held assist  using  Stand Pivot  · Gait: ~3 ft. stand pivot bed>chair with minAx2 and HHAx2   ? demo'd decreased step length, impaired weight-shifting ability, inconsistent foot placement, decreased coordination of LE, and instability   ? Cues provided for sequencing, appropriate weight-shifts, and safe technique   · Balance:   ? static standing: Schuyler    ? dynamic standing: minAx2      AM-PAC 6 CLICK MOBILITY  Turning over in bed (including adjusting bedclothes, sheets and blankets)?: 3  Sitting down on and standing up from a chair with arms (e.g., wheelchair, bedside commode, etc.): 2  Moving from lying on back to sitting on the side of the bed?: 3  Moving to and from a bed to a chair (including a wheelchair)?: 2  Need to walk in hospital room?: 2  Climbing 3-5 steps with a railing?: 1  Basic Mobility Total Score: 13       Therapeutic Activities and Exercises:   Pt found on LVAD wall power. Transferred to sit EOB for VAD management and maintained sitting balance with SBA-supervision. Pt reported he already performed self-test this date. Pt able to correctly identify what to look for when completing self-test. Pt read LVAD numbers aloud while PT recorded in binder. Reviewed battery rotation with pt and wife with both verbalizing understanding. Switched from wall>battery power without assist but with increased  timing and cues for untangling cables from driveline. LVAD technician Rolando to bedside during session to assist with donning vest. Assist provided for donning vest 2* first time wearing. Pt maintained static standing x~3 min with HHAx2 and Schuyler while vest donned. Education provided throughout for correct organization. Remained on battery power at end of session. Educated on importance of securing controller to self prior to standing and maintaining integrity of driveline. Pt v/u.   Performed self-care tasks in standing with Schuyler and at least unilateral UE support throughout, x~3 min. Increased L lateral lean noted and increased support required compared to previous standing trials. Cues provided for upright postural control with tactile facilitations provided. Increased fatigue noted following.   Educated on importance of OOB activity and encouraged to sit UIC for majority of day as tolerated.   Time provided for encouragement, reinforcement of education, goals of acute therapy, and review of pt performance/progression.     Patient left up in chair with all lines intact, call button in reach, VAD coordinator notified and pt's SO present..    GOALS:   Multidisciplinary Problems     Physical Therapy Goals        Problem: Physical Therapy Goal    Goal Priority Disciplines Outcome Goal Variances Interventions   Physical Therapy Goal     PT, PT/OT Ongoing, Progressing     Description:  Goals to be met by: 2020     Patient will increase functional independence with mobility by performin. Supine to sit with MInimal Assistance - met   1a. Supine to sit with SBA with HOB flat   2. Rolling to Left and Right with Minimal Assistance.- not met  3. Sit to stand transfer with Moderate Assistance- not met  4. Sitting at edge of bed x8 minutes with Minimal Assistance - met 2020  4a. Sitting at EOB x 10 minutes with SBA- met 2/10/2020  5. Lower extremity exercise program x10 reps per handout, with assistance as  needed- not met  6. Standing with no AD x 30 seconds CGA to assist with ADLs and self care- not met  7. Gait x50 ft with Schuyler without AD                          Time Tracking:     PT Received On: 02/14/20  PT Start Time: 0912     PT Stop Time: 1000  PT Total Time (min): 48 min     Billable Minutes: Therapeutic Activity 48   (co-tx with OT)    Treatment Type: Treatment  PT/PTA: PT     PTA Visit Number: 0     Micki Priest PT, DPT   2/14/2020  [phone]

## 2020-02-14 NOTE — ASSESSMENT & PLAN NOTE
- S/P DT HM3 with closure of AV and repair of MV for regurg 1/23/20 ([facility]).  - HTS primary.  - INR Therapeutic. Will adjust warfarin.   - ASA 325mg.   - LDH stable.   - Taken back to OR 1/24 for possible RVAD placement which was not done. Patient remained hemodynamically stable in OR. Wash out on 1/27. Chest closed 1/29.   - CVP 11.  Stopping IV lasix and transitioning to po (will start with Lasix 40mg QDaily)  - Off inotropes.  - TTE 2/10 at 5300 rpm: LVEDD 5.01, LVEF 10%, RV function mod depressed,   - Current speed 5300  - BP controlled. Continue Amlodipine 10mg daily.   - PT/OT/VAD education  - Sternal wound vac in place. Will remove when all lights are out/therapy complete.     Procedure: Device Interrogation Including analysis of device parameters  Current Settings: Ventricular Assist Device  Review of device function is stable/unstabe    TXP LVAD INTERROGATIONS 2/14/2020 2/14/2020 2/14/2020 2/13/2020 2/13/2020 2/13/2020 2/13/2020   Type HeartMate3 HeartMate3 HeartMate3 HeartMate3 HeartMate3 HeartMate3 HeartMate3   Flow 4.4 4.3 4.3 4.4 4.4 4.2 4.1   Speed 5300 5300 5300 5300 5300 5300 5300   PI 4.0 3.6 3.6 3.5 3.7 5.3 4.5   Power (Berry) 3.9 3.8 3.8 3.8 3.8 3.9 3.8   LSL 4900 4900 4900 4900 - - 4900   Pulsatility Intermittent pulse - - - Intermittent pulse Intermittent pulse Pulse

## 2020-02-14 NOTE — PROGRESS NOTES
02/14/2020  Renaldo Miles    Current provider:  Isiah Montero MD      I, Renaldo Miles, rounded on Tae Delgado to ensure all mechanical assist device settings (IABP or VAD) were appropriate and all parameters were within limits.  I was able to ensure all back up equipment was present, the staff had no issues, and the Perfusion Department daily rounding was complete.    8:13 AM

## 2020-02-14 NOTE — PROGRESS NOTES
Patient stated that wife is on her way and will do dressing change when she gets to the bedside. Patient stated that wife is caught in traffic at the moment.

## 2020-02-14 NOTE — PROGRESS NOTES
Patient AAO with caregiver at bedside. Spent 120 mins educating patient.       Educational HM 3 DVD provided by company has been reviewed by patient.  VAD binder reviewed with patient including what to document and the meanings of all the VAD parameters (Speed, Flow, PI and Power). Additionally, it was pointed out to the patient where to find the proper phone numbers to call each of the individual VAD coordinators. We spoke about when to page the VAD coordinator vs when to call during normal business hours. We discussed possible medication the patient may encounter with a VAD were discussed including not changing prescriptions without talking to their VAD team. Explained that the pharmacist will further discuss before discharge and create them a purple card. Patient was able to successfully page the VAD coordinator and point out the proper places to find the Coordinator on calls phone number/emergency number. Patient was able to explain what the VAD is and how it works. Patient was able to successfully explained that if [place] are greater then 10w they will page the VAD coordinator. Proper severe weather plans were discussed including proper generator usage and creating an evacuation plan. Patient explains the are not to use the mobile power unit if it is plugged into a generator. Patient was able to successfully change controller and verbalize to page VAD coordinator prior to initiating controller change. Patient understands the locking mechanism for the driveline, sleep mode, and how to jump start the controller as needed. Patient was able to successfully explain the items that must be at bedside at all times including flashlight, batteries, and clips. Patient was able to verbalize understanding that he must sleep on the module power unit and not batteries. Patient was able to verbalize the alarms and all their meanings for the mobile power unit. Patient slept at least one night on the mobile power unit while  staying in the hospital. We discussed the battery charger. Patient was able to explain that battery lifespan, charge length, and how to rotate batteries. Patient was able to explain how to calibrate batteries and when the proper time to do so. Patient was able to explain how to tell when the pump is running and what to do if it is not. Patient was able to verbalize  alarms hazard vs advisory alarms.    Patient is checked off on alarms per VAD coordinator.

## 2020-02-14 NOTE — SUBJECTIVE & OBJECTIVE
Interval History: Patient reports he is frustrated with how weak he is.  Stopping IV Lasix this morning as creat elevated.  CVP is 11 and patient appears euvolemic.  BNP is down to 258 today; will repeat tomorrow    Continuous Infusions:    Scheduled Meds:   amiodarone  400 mg Oral Daily    amLODIPine  10 mg Oral Daily    aspirin  325 mg Oral Daily    atorvastatin  80 mg Oral QHS    balsam peru-castor oil   Topical (Top) BID    digoxin  0.125 mg Oral Daily    docusate sodium  200 mg Oral QHS    ferrous gluconate  324 mg Oral Daily with breakfast    magnesium oxide  800 mg Oral TID    pantoprazole  40 mg Oral Daily    potassium chloride 10%  50 mEq Oral BID    sodium chloride 0.9%  10 mL Intravenous Q6H    warfarin  2.5 mg Oral Daily     PRN Meds:acetaminophen, albumin human 5%, albuterol sulfate, bisacodyL, Dextrose 10% Bolus, Dextrose 10% Bolus, HYDROcodone-acetaminophen, magnesium hydroxide 400 mg/5 ml, methocarbamol, oxyCODONE, sodium chloride 0.9%, Flushing PICC Protocol **AND** sodium chloride 0.9% **AND** sodium chloride 0.9%    Review of patient's allergies indicates:   Allergen Reactions    Biopatch [chlorhexidine gluconate]      Site burning    Dobutamine in d5w      Tachycardia, tremors, SOB, flushing    Percocet [oxycodone-acetaminophen] Itching    Penicillins Rash     Cefepime given on 1/23/2020 without issue     Objective:     Vital Signs (Most Recent):  Temp: 97.4 °F (36.3 °C) (02/14/20 1123)  Pulse: 106 (02/14/20 1123)  Resp: 18 (02/14/20 1123)  BP: (!) 84/68 (02/14/20 1123)  SpO2: 98 % (02/14/20 1123) Vital Signs (24h Range):  Temp:  [97.4 °F (36.3 °C)-98.6 °F (37 °C)] 97.4 °F (36.3 °C)  Pulse:  [] 106  Resp:  [18] 18  SpO2:  [93 %-99 %] 98 %  BP: ()/(0-77) 84/68     Patient Vitals for the past 72 hrs (Last 3 readings):   Weight   02/14/20 1100 96 kg (211 lb 10.3 oz)   02/14/20 0545 96 kg (211 lb 10.3 oz)   02/13/20 1030 99.2 kg (218 lb 11.1 oz)     Body mass index is  30.37 kg/m².      Intake/Output Summary (Last 24 hours) at 2/14/2020 1222  Last data filed at 2/14/2020 1000  Gross per 24 hour   Intake 1200 ml   Output 2201 ml   Net -1001 ml       Hemodynamic Parameters:  CVP:  [11 mmHg] 11 mmHg    Telemetry: V paced  Physical Exam   Constitutional: He is oriented to person, place, and time. He appears well-developed and well-nourished.   HENT:   Head: Normocephalic and atraumatic.   Eyes: Pupils are equal, round, and reactive to light. Conjunctivae and EOM are normal.   Neck: Normal range of motion. Neck supple. No JVD present. No thyromegaly present.   JVP mid neck  Cardiovascular: Normal rate and regular rhythm. Smooth VAD hum   Pulmonary/Chest: Effort normal and breath sounds normal. Intubated and sedated   Abdominal: Soft. Bowel sounds are normal.   Musculoskeletal: Normal range of motion. 1+ pedal edema   Neurological: He is alert and oriented to person, place, and time.   Skin: Skin is warm and dry. Capillary refill takes 2 to 3 seconds.   Psychiatric: He has a normal mood and affect. His behavior is normal. Judgment and thought content normal.       Significant Labs:  CBC:  Recent Labs   Lab 02/12/20  0523 02/13/20  0500 02/14/20  0500   WBC 7.80 7.97 7.76   RBC 3.13* 3.31* 3.53*   HGB 8.6* 9.1* 9.8*   HCT 28.9* 30.0* 31.7*    265 273   MCV 92 91 90   MCH 27.5 27.5 27.8   MCHC 29.8* 30.3* 30.9*     BNP:  Recent Labs   Lab 02/10/20  0602 02/12/20  0523 02/14/20  0500   * 300* 258*     CMP:  Recent Labs   Lab 02/12/20  0851 02/13/20  0500 02/14/20  0500   * 89 100   CALCIUM 8.6* 8.9 8.8   ALBUMIN 2.8* 2.8* 2.9*  2.9*   PROT 6.3 6.3 6.4  6.4    136 137   K 4.2 3.4* 3.1*   CO2 30* 33* 38*   CL 96 91* 88*   BUN 34* 33* 32*   CREATININE 2.0* 1.8* 1.9*   ALKPHOS 77 78 86  86   ALT 21 23 25  25   AST 26 30 31  31   BILITOT 0.7 0.8 0.9  0.9      Coagulation:   Recent Labs   Lab 02/08/20  0454 02/09/20  0400  02/12/20  0523 02/13/20  0500  02/14/20  0500   INR 2.5* 1.8*  1.8*  1.8*   < > 2.6* 2.0* 2.2*   APTT 34.8* 30.7  --   --   --   --     < > = values in this interval not displayed.     LDH:  Recent Labs   Lab 02/12/20  0523 02/13/20  0500 02/14/20  0500   * 278* 315*     Microbiology:  Microbiology Results (last 7 days)     ** No results found for the last 168 hours. **          I have reviewed all pertinent labs within the past 24 hours.    Estimated Creatinine Clearance: 48.7 mL/min (A) (based on SCr of 1.9 mg/dL (H)).    Diagnostic Results:  I have reviewed all pertinent imaging results/findings within the past 24 hours.

## 2020-02-14 NOTE — PLAN OF CARE
Plan of care discussed with patient and spouse. Patient is free of fall/trauma/injury. Denies CP, SOB, or pain/discomfort. Telemetry monitor showing SR/V paced. LVAD dressing changed per wife. [place] g[place] lea applied. LVAD numbers WNL. CVP-11 this am. Uneventful night shift. All questions addressed.

## 2020-02-14 NOTE — PT/OT/SLP PROGRESS
Occupational Therapy   Treatment    Name: Tae Delgado  MRN: [id number]  Admitting Diagnosis:  LVAD (left ventricular assist device) present  16 Days Post-Op    Recommendations:     Discharge Recommendations: rehabilitation facility  Discharge Equipment Recommendations:  (TBD)  Barriers to discharge:  (Patient requires skilled level of assist)    Assessment:     Tae Delgado is a 59 y.o. male with a medical diagnosis of LVAD (left ventricular assist device) present. He presents with the following performance deficits affecting function: weakness, impaired endurance, impaired self care skills, impaired functional mobilty, gait instability, impaired balance, decreased upper extremity function, impaired cardiopulmonary response to activity. Patient continues to present with decreased strength and activity tolerance but made improvements with standing tolerance this session; patient was able to stand for 3 min to complete grooming tasks. Patient also showed improvement with LVAD management requiring less cues when switching from wall power to batter power. At this time, patient will continue to benefit from acute skilled therapy intervention to address deficits/underlying impairments and progress towards prior level of function. After discharge, patient will benefit from continuing therapy at rehab facility to increase independence in ADLs and functional mobility through skilled balance training and skilled therapeutic exercise to promote safety at home.    Rehab Prognosis:  Good; patient would benefit from acute skilled OT services to address these deficits and reach maximum level of function.       Plan:     Patient to be seen 6 x/week to address the above listed problems via self-care/home management, therapeutic activities, therapeutic exercises  · Plan of Care Expires: 03/02/20  · Plan of Care Reviewed with: patient, significant other    Subjective     Pain/Comfort:  · Pain Rating 1: 0/10    Objective:      Communicated with: RN prior to session. Patient found HOB elevated with telemetry, wound vac, LVAD and significant other upon OT entry to room. PT present for co-treatment session as appropriate for patient, which allowed for patient to complete ADLs in standing while PT addressed standing balance.    General Precautions: Standard, fall, sternal, LVAD   Orthopedic Precautions:N/A   Braces: N/A     Occupational Performance:     Bed Mobility:    · Patient completed Supine to Sit with stand by assistance and HOB elevated     Functional Mobility/Transfers:  · Patient completed Sit <> Stand Transfer  · 1x from EOB with min assist of 2 with hand-held assist  · 1x from chair with mod assist of 2 with hand-held assist   · Patient completed Bed > Chair Transfer using Step Transfer technique with minimum assistance and of 2 persons with hand-held assist  · Functional Mobility: Patient able to take steps from EOB to chair during transfer with min assist of 2 with hand-held assist  · Patient performed static standing 2x for ~3 min with min assist    Activities of Daily Living:  · Grooming: minimum assistance standing to complete oral hygiene and wash face  · Upper Body Dressing: minimum assistance to anitha gown  · Lower Body Dressing: minimum assistance to anitha R sock and SBA to anitha L sock   · Bathing: SBA washing underarms and upper thighs sitting EOB    LVAD Management:  · Patient completed AM self-test prior to therapy session; however, patient did not log numbers. Therapist educated on purpose of logging numbers in binder following completion of self-test.  · Education provided on naming of LVAD components: controller, power module, power cables, driveline, emergency bag, and consolidation pouch/vest.  · Patient performed transition from LVAD wall power > battery power with SBA. Noted improvements with switching after PT marked LVAD with tape and marker yesterday.  · Patient educated on battery rotations and found to  have correct batteries ready for use prior to therapy session. Patient's significant other reported she rotates batteries and sets out the correct batteries for the following day at night.  · LVAD technician present to fit vest on patient.  · Patient left on battery power at end of session.    [facility] 6 Click ADL: 15    Treatment & Education:   Reviewed sternal precautions with patient at start of session.   Therapist provided facilitation and instruction of proper body mechanics, energy conservation, and fall prevention strategies during tasks listed above.   Educated patient on importance of sitting in bedside chair throughout the day and performing B UE AROM exercises within pain free range to increase activity tolerance.   Educated patient on OT POC and answered all questions within OT scope of practice.   Whiteboard updated    Patient left up in chair with all lines intact, call button in reach and significant other presentEducation:      GOALS:   Multidisciplinary Problems     Occupational Therapy Goals        Problem: Occupational Therapy Goal    Goal Priority Disciplines Outcome Interventions   Occupational Therapy Goal     OT, PT/OT Ongoing, Progressing    Description:  Goals to be met by: 2/21/2020     Patient will increase functional independence with ADLs by performing:    UE Dressing with Minimal Assistance.  LE Dressing with Minimal Assistance.  Grooming while standing with Minimal Assistance.  Toileting from bedside commode with Minimal Assistance for hygiene and clothing management.   Sitting at edge of bed x10 minutes with Minimal Assistance in prep for seated grooming tasks -GOAL MET 2/10  Supine to sit with min A. -GOAL MET 2/14  Toilet transfer to bedside commode with Minimal Assistance.  Pt will perform LVAD management independently - progressing                    Multidisciplinary Problems (Resolved)        Problem: Occupational Therapy Goal    Goal Priority Disciplines Outcome  Interventions   Occupational Therapy Goal   (Resolved)     OT, PT/OT Met    Description:  Skilled OT services not necessary at this time secondary to patient performing ADLs at OF. Patient in agreement. Please re-consult OT if change in patient's functional status is noted.                     Time Tracking:     OT Date of Treatment: 02/14/20  OT Start Time: 0914  OT Stop Time: 1000  OT Total Time (min): 46 min    Billable Minutes:Self Care/Home Management 46    Leslee Terrell OT  2/14/2020

## 2020-02-14 NOTE — PROGRESS NOTES
[facility]  Heart Transplant  Progress Note    Patient Name: Tae Delgado  MRN: [id number]  Admission Date: 1/9/2020  Hospital Length of Stay: 36 days  Attending Physician: Isiah Montero MD  Primary Care Provider: Elham Pearson MD  Principal Problem:LVAD (left ventricular assist device) present    Subjective:     Interval History: Patient reports he is frustrated with how weak he is.  Stopping IV Lasix this morning as creat elevated.  CVP is 11 and patient appears euvolemic.  BNP is down to 258 today; will repeat tomorrow    Continuous Infusions:    Scheduled Meds:   amiodarone  400 mg Oral Daily    amLODIPine  10 mg Oral Daily    aspirin  325 mg Oral Daily    atorvastatin  80 mg Oral QHS    balsam peru-castor oil   Topical (Top) BID    digoxin  0.125 mg Oral Daily    docusate sodium  200 mg Oral QHS    ferrous gluconate  324 mg Oral Daily with breakfast    magnesium oxide  800 mg Oral TID    pantoprazole  40 mg Oral Daily    potassium chloride 10%  50 mEq Oral BID    sodium chloride 0.9%  10 mL Intravenous Q6H    warfarin  2.5 mg Oral Daily     PRN Meds:acetaminophen, albumin human 5%, albuterol sulfate, bisacodyL, Dextrose 10% Bolus, Dextrose 10% Bolus, HYDROcodone-acetaminophen, magnesium hydroxide 400 mg/5 ml, methocarbamol, oxyCODONE, sodium chloride 0.9%, Flushing PICC Protocol **AND** sodium chloride 0.9% **AND** sodium chloride 0.9%    Review of patient's allergies indicates:   Allergen Reactions    Biopatch [chlorhexidine gluconate]      Site burning    Dobutamine in d5w      Tachycardia, tremors, SOB, flushing    Percocet [oxycodone-acetaminophen] Itching    Penicillins Rash     Cefepime given on 1/23/2020 without issue     Objective:     Vital Signs (Most Recent):  Temp: 97.4 °F (36.3 °C) (02/14/20 1123)  Pulse: 106 (02/14/20 1123)  Resp: 18 (02/14/20 1123)  BP: (!) 84/68 (02/14/20 1123)  SpO2: 98 % (02/14/20 1123) Vital Signs (24h Range):  Temp:  [97.4 °F (36.3  °C)-98.6 °F (37 °C)] 97.4 °F (36.3 °C)  Pulse:  [] 106  Resp:  [18] 18  SpO2:  [93 %-99 %] 98 %  BP: ()/(0-77) 84/68     Patient Vitals for the past 72 hrs (Last 3 readings):   Weight   02/14/20 1100 96 kg (211 lb 10.3 oz)   02/14/20 0545 96 kg (211 lb 10.3 oz)   02/13/20 1030 99.2 kg (218 lb 11.1 oz)     Body mass index is 30.37 kg/m².      Intake/Output Summary (Last 24 hours) at 2/14/2020 1222  Last data filed at 2/14/2020 1000  Gross per 24 hour   Intake 1200 ml   Output 2201 ml   Net -1001 ml       Hemodynamic Parameters:  CVP:  [11 mmHg] 11 mmHg    Telemetry: V paced  Physical Exam   Constitutional: He is oriented to person, place, and time. He appears well-developed and well-nourished.   HENT:   Head: Normocephalic and atraumatic.   Eyes: Pupils are equal, round, and reactive to light. Conjunctivae and EOM are normal.   Neck: Normal range of motion. Neck supple. No JVD present. No thyromegaly present.   JVP mid neck  Cardiovascular: Normal rate and regular rhythm. Smooth VAD hum   Pulmonary/Chest: Effort normal and breath sounds normal. Intubated and sedated   Abdominal: Soft. Bowel sounds are normal.   Musculoskeletal: Normal range of motion. 1+ pedal edema   Neurological: He is alert and oriented to person, place, and time.   Skin: Skin is warm and dry. Capillary refill takes 2 to 3 seconds.   Psychiatric: He has a normal mood and affect. His behavior is normal. Judgment and thought content normal.       Significant Labs:  CBC:  Recent Labs   Lab 02/12/20  0523 02/13/20  0500 02/14/20  0500   WBC 7.80 7.97 7.76   RBC 3.13* 3.31* 3.53*   HGB 8.6* 9.1* 9.8*   HCT 28.9* 30.0* 31.7*    265 273   MCV 92 91 90   MCH 27.5 27.5 27.8   MCHC 29.8* 30.3* 30.9*     BNP:  Recent Labs   Lab 02/10/20  0602 02/12/20  0523 02/14/20  0500   * 300* 258*     CMP:  Recent Labs   Lab 02/12/20  0851 02/13/20  0500 02/14/20  0500   * 89 100   CALCIUM 8.6* 8.9 8.8   ALBUMIN 2.8* 2.8* 2.9*  2.9*    PROT 6.3 6.3 6.4  6.4    136 137   K 4.2 3.4* 3.1*   CO2 30* 33* 38*   CL 96 91* 88*   BUN 34* 33* 32*   CREATININE 2.0* 1.8* 1.9*   ALKPHOS 77 78 86  86   ALT 21 23 25  25   AST 26 30 31  31   BILITOT 0.7 0.8 0.9  0.9      Coagulation:   Recent Labs   Lab 02/08/20  0454 02/09/20  0400  02/12/20  0523 02/13/20  0500 02/14/20  0500   INR 2.5* 1.8*  1.8*  1.8*   < > 2.6* 2.0* 2.2*   APTT 34.8* 30.7  --   --   --   --     < > = values in this interval not displayed.     LDH:  Recent Labs   Lab 02/12/20  0523 02/13/20  0500 02/14/20  0500   * 278* 315*     Microbiology:  Microbiology Results (last 7 days)     ** No results found for the last 168 hours. **          I have reviewed all pertinent labs within the past 24 hours.    Estimated Creatinine Clearance: 48.7 mL/min (A) (based on SCr of 1.9 mg/dL (H)).    Diagnostic Results:  I have reviewed all pertinent imaging results/findings within the past 24 hours.    Assessment and Plan:       55 y.o. WM with history of NICMP diagnosed in 2010, ICD, LV thrombus (with prior splenic and renal emboli), Embolic  CVA , paroxysmal atrial fib, HTN, HLP  presents for  F/U today to clinic and he was volume overloaded on exam .  He states he had 3 admission in the last 3 months for volume overload. He started having more SOB on exertion since one month. Also endorses of Orthopnea since last one month. Alble to walk only 150 ft. He also has Bilateral lower extremity edema. He was admitted here in 2017 for ADHD [facility] at [facility] during that admission showed PCWP 40 and CVP 17. Currently denies chest pain, lightheadedness     * LVAD (left ventricular assist device) present  - S/P DT HM3 with closure of AV and repair of MV for regurg 1/23/20 (Oskar).  - HTS primary.  - INR Therapeutic. Will adjust warfarin.   - ASA 325mg.   - LDH stable.   - Taken back to OR 1/24 for possible RVAD placement which was not done. Patient remained hemodynamically stable in OR.  Wash out on 1/27. Chest closed 1/29.   - CVP 11.  Stopping IV lasix and transitioning to po (will start with Lasix 40mg QDaily)  - Off inotropes.  - TTE 2/10 at 5300 rpm: LVEDD 5.01, LVEF 10%, RV function mod depressed,   - Current speed 5300  - BP controlled. Continue Amlodipine 10mg daily.   - PT/OT/VAD education  - Sternal wound vac in place. Will remove when all lights are out/therapy complete.     Procedure: Device Interrogation Including analysis of device parameters  Current Settings: Ventricular Assist Device  Review of device function is stable/unstabe    TXP LVAD INTERROGATIONS 2/14/2020 2/14/2020 2/14/2020 2/13/2020 2/13/2020 2/13/2020 2/13/2020   Type HeartMate3 HeartMate3 HeartMate3 HeartMate3 HeartMate3 HeartMate3 HeartMate3   Flow 4.4 4.3 4.3 4.4 4.4 4.2 4.1   Speed 5300 5300 5300 5300 5300 5300 5300   PI 4.0 3.6 3.6 3.5 3.7 5.3 4.5   Power (Berry) 3.9 3.8 3.8 3.8 3.8 3.9 3.8   LSL 4900 4900 4900 4900 - - 4900   Pulsatility Intermittent pulse - - - Intermittent pulse Intermittent pulse Pulse       Acute on chronic combined systolic and diastolic heart failure  - S/P DT HM3 with closure of AV and plication of MV for regurg 1/23/20 (See LVAD)  - NID dx'd 2010    Paroxysmal atrial fibrillation  - Intermittently in A fib since surgery  - AC on Coumadin  - Dig 0.125 mg qd       Acute kidney injury superimposed on chronic kidney disease  - Creatinine improving(baseline ~ 1.8).   - Nephrology has signed off.    Left ventricular thrombus without MI  - H/O LV thrombus with h/o splenic and renal emboli as well as embolic CVA  - Limited TTE done here 1/13 showed no thrombus  - JACINTO 1/23 intra op showed resolution of DAMON thrombus  - AC as above.      Right lower lobe pulmonary nodule  - Incidental finding on CT chest/abd/pelvis on 1/12. Pulmonology consulted, and rec repeat CT of chest in 3 months  - Will need to follow-up in pulmonary clinic.     ICD (implantable cardioverter-defibrillator) in place  - S/P  Biotronik dual chamber ICD    Acute hyperglycemia  - Endocrine following    Coagulopathy  - Appreciate Hem/Onc's help. No evidence of underlying coagulopathy (h/o LV thrombus with splenic and renal emboli, h/o embolic CVA)    NSVT (nonsustained ventricular tachycardia)  - Continue Donnao       MARBELLA Mcfadden  Heart Transplant  [facility]

## 2020-02-14 NOTE — PROGRESS NOTES
"  [facility]  Adult Nutrition  Consult Note    SUMMARY     Recommendations    1. Continue current Regular diet, consider adding Low sodium diet restrictions. Add Optisource ONS if PO intake declines.   2. Low sodium & Coumadin education reviewed w/ patient & pt's wife.  3. RD to monitor & follow-up.    Goals: Patient to receive nutrition by RD follow-up  Nutrition Goal Status: goal met  Communication of RD Recs: discussed on rounds    Reason for Assessment    Reason For Assessment: RD follow-up  Diagnosis: surgery/postoperative complications(s/p LVAD 1/23)  Relevant Medical History: HTN, HLD, CHF, afib, stroke  Interdisciplinary Rounds: attended    General Information Comments: Pt tolerating diet, improving PO intake, consuming % of meals. TF discontinued. NFPE completed 1/31, patient continues with mild wasting but does not meet criteria for malnutrition. Reviewed Vitamin K/Coumadin interaction & Low sodium diet education w/ pt & pt's wife, verbalized understanding.  Nutrition Discharge Planning: Adequate nutrition via PO intake.    Nutrition/Diet History    Spiritual, Cultural Beliefs, Worship Practices, Values that Affect Care: no  Factors Affecting Nutritional Intake: None identified at this time    Anthropometrics    Temp: 97.5 °F (36.4 °C)  Height: 5' 10" (177.8 cm)  Height (inches): 70 in  Weight Method: Standard Scale  Weight: 96 kg (211 lb 10.3 oz)  Weight (lb): 211.64 lb  Ideal Body Weight (IBW), Male: 166 lb  % Ideal Body Weight, Male (lb): 127.49 %  BMI (Calculated): 30.4  BMI Grade: 30 - 34.9- obesity - grade I    Lab/Procedures/Meds    Pertinent Labs Reviewed: reviewed  Pertinent Labs Comments: BUN 32, Creat 1.9, GFR 37.7  Pertinent Medications Reviewed: reviewed  Pertinent Medications Comments: Warfarin    Estimated/Assessed Needs    Weight Used For Calorie Calculations: 96 kg (211 lb 10.3 oz)     Energy Calorie Requirements (kcal): 2226 kcal/d   Energy Need Method: " Beatrice Sanchez(1.25 PAL)     Protein Requirements:  g/d (1-1.2 g/kg)  Weight Used For Protein Calculations: 96 kg (211 lb 10.3 oz)     Fluid Requirements (mL): per MD or 1 ml/kcal    Nutrition Prescription Ordered    Current Diet Order: Regular  Current Nutrition Support Formula Ordered: Other (Comment)(Discontinued)    Evaluation of Received Nutrient/Fluid Intake    Comments: LBM: 2/13    Tolerance: tolerating    Nutrition Risk    Level of Risk/Frequency of Follow-up: (1x/week)     Assessment and Plan    Nutrition Problem  Inadequate energy intake     Related to (etiology):   Decreased ability to consume sufficient energy     Signs and Symptoms (as evidenced by):   NPO with no alternative means of nutrition at this time     Interventions (treatment strategy):  Collaboration of nutrition care with other providers     Nutrition Diagnosis Status:   Resolved     Monitor and Evaluation    Food and Nutrient Intake: energy intake, food and beverage intake, enteral nutrition intake  Food and Nutrient Adminstration: diet order, enteral and parenteral nutrition administration  Knowledge/Beliefs/Attitudes: food and nutrition knowledge/skill  Physical Activity and Function: nutrition-related ADLs and IADLs  Anthropometric Measurements: weight, weight change, body mass index  Biochemical Data, Medical Tests and Procedures: electrolyte and renal panel, gastrointestinal profile, glucose/endocrine profile, inflammatory profile, lipid profile  Nutrition-Focused Physical Findings: overall appearance     Malnutrition Assessment    Orbital Region (Subcutaneous Fat Loss): well nourished  Upper Arm Region (Subcutaneous Fat Loss): (DANIE, restrained)  Thoracic and Lumbar Region: well nourished   [place] Region (Muscle Loss): mild depletion  Clavicle Bone Region (Muscle Loss): mild depletion  Clavicle and Acromion Bone Region (Muscle Loss): well nourished  Scapular Bone Region (Muscle Loss): (DANIE)  Dorsal Hand (Muscle Loss): well  nourished(edematous)  Patellar Region (Muscle Loss): well nourished(edematous)  Anterior Thigh Region (Muscle Loss): well nourished(edematous)  Posterior Calf Region (Muscle Loss): (DANIE, boots in place)   Edema (Fluid Accumulation): 2-->mild     Nutrition Follow-Up    RD Follow-up?: Yes

## 2020-02-14 NOTE — PLAN OF CARE
Goals reviewed and patient has met 2/8 goals at this time.  Leslee Terrell OTR/L  Pager #: [phone]  2/14/2020    Problem: Occupational Therapy Goal  Goal: Occupational Therapy Goal  Description  Goals to be met by: 2/21/2020     Patient will increase functional independence with ADLs by performing:    UE Dressing with Minimal Assistance.  LE Dressing with Minimal Assistance.  Grooming while standing with Minimal Assistance.  Toileting from bedside commode with Minimal Assistance for hygiene and clothing management.   Sitting at edge of bed x10 minutes with Minimal Assistance in prep for seated grooming tasks -GOAL MET 2/10  Supine to sit with min A. -GOAL MET 2/14  Toilet transfer to bedside commode with Minimal Assistance.  Pt will perform LVAD management independently - progressing         Outcome: Ongoing, Progressing

## 2020-02-14 NOTE — PROGRESS NOTES
Wound care follow up.    Discolored area to right elbow resolved.       Wound to the right lower abdomen below VAD site is resolved .     Wound to the right nare is improving, less than 0.2 x 0.2 cm discolored area remains.       Sacral area is intact. Patient does state the coccyx is tender at times, venelex applied.     Patient on immerse NAVI mattress and much more alert.    Recommend:  Continue venelex BID to coccyx  q2hour turns  Float heels    Wound care to follow PRN.  Farzana Jara RN [facility]   [phone]

## 2020-02-14 NOTE — PROGRESS NOTES
UPDATE    SW to pt's room for update multiple times today.  Pt is s/p LVAD implant on 1/23.  On first attempt, PT at bedside working with pt.  On second attempt, LVAD catie Bruner at bedside for education with pt & s/o.  On third attempt, pt asleep in bed but s/o Lavern at bedside and presents as aao x4, calm, cooperative, and asking and answering questions appropriately.  S/o reports pt has been doing well.  S/o verbalizes understanding of PT/OT recommendations for IPR and verbalizes understanding of IPR level of care.  S/o is aware that if pt continues to do well medically over the weekend, he may be stable for txfer to IPR on Monday.  S/o is aware that txfer to IPR will also be dependent on bed availability.  S/o reports coping adequately at this time, and denies any needs or concerns to SW.  SW providing ongoing psychosocial and counseling support, education, resources, assistance, and discharge planning as indicated.  SW following and remains available.

## 2020-02-14 NOTE — PLAN OF CARE
IV push Lasix d/c. Lasix 40mg PO initiated. K 3.1. 100 mEq of Potassium administered PO during shift. Chest X-ray revealed improvement. Wound vac removed. MSI cleansed with betadine and covered with island dressing. Strict intake and output measured. Activity encouraged. Sterile LVAD dressing change to be done by wife at bedside. HM 3 SP 5300 F 4's. H/H 31.7/9.8. VSS, AOX4, no complaints of pain or SOB. Pt in bed at lowest position with call light in reach. Will continue to monitor.

## 2020-02-15 LAB
ALBUMIN SERPL BCP-MCNC: 2.9 G/DL (ref 3.5–5.2)
ALP SERPL-CCNC: 86 U/L (ref 55–135)
ALT SERPL W/O P-5'-P-CCNC: 24 U/L (ref 10–44)
ANION GAP SERPL CALC-SCNC: 7 MMOL/L (ref 8–16)
AST SERPL-CCNC: 27 U/L (ref 10–40)
BASOPHILS # BLD AUTO: 0.05 K/UL (ref 0–0.2)
BASOPHILS NFR BLD: 0.6 % (ref 0–1.9)
BILIRUB SERPL-MCNC: 0.8 MG/DL (ref 0.1–1)
BUN SERPL-MCNC: 28 MG/DL (ref 6–20)
CALCIUM SERPL-MCNC: 8.7 MG/DL (ref 8.7–10.5)
CHLORIDE SERPL-SCNC: 90 MMOL/L (ref 95–110)
CO2 SERPL-SCNC: 36 MMOL/L (ref 23–29)
CREAT SERPL-MCNC: 1.9 MG/DL (ref 0.5–1.4)
DIFFERENTIAL METHOD: ABNORMAL
EOSINOPHIL # BLD AUTO: 0.1 K/UL (ref 0–0.5)
EOSINOPHIL NFR BLD: 1.3 % (ref 0–8)
ERYTHROCYTE [DISTWIDTH] IN BLOOD BY AUTOMATED COUNT: 18.2 % (ref 11.5–14.5)
EST. GFR  (AFRICAN AMERICAN): 43.6 ML/MIN/1.73 M^2
EST. GFR  (NON AFRICAN AMERICAN): 37.7 ML/MIN/1.73 M^2
GLUCOSE SERPL-MCNC: 99 MG/DL (ref 70–110)
HCT VFR BLD AUTO: 30.7 % (ref 40–54)
HGB BLD-MCNC: 9.2 G/DL (ref 14–18)
IMM GRANULOCYTES # BLD AUTO: 0.08 K/UL (ref 0–0.04)
IMM GRANULOCYTES NFR BLD AUTO: 1 % (ref 0–0.5)
INR PPP: 2.4 (ref 0.8–1.2)
LDH SERPL L TO P-CCNC: 262 U/L (ref 110–260)
LYMPHOCYTES # BLD AUTO: 1.6 K/UL (ref 1–4.8)
LYMPHOCYTES NFR BLD: 20 % (ref 18–48)
MAGNESIUM SERPL-MCNC: 2.5 MG/DL (ref 1.6–2.6)
MCH RBC QN AUTO: 27.4 PG (ref 27–31)
MCHC RBC AUTO-ENTMCNC: 30 G/DL (ref 32–36)
MCV RBC AUTO: 91 FL (ref 82–98)
MONOCYTES # BLD AUTO: 0.7 K/UL (ref 0.3–1)
MONOCYTES NFR BLD: 9.4 % (ref 4–15)
NEUTROPHILS # BLD AUTO: 5.3 K/UL (ref 1.8–7.7)
NEUTROPHILS NFR BLD: 67.7 % (ref 38–73)
NRBC BLD-RTO: 0 /100 WBC
PHOSPHATE SERPL-MCNC: 3.3 MG/DL (ref 2.7–4.5)
PLATELET # BLD AUTO: 255 K/UL (ref 150–350)
PMV BLD AUTO: 10.7 FL (ref 9.2–12.9)
POTASSIUM SERPL-SCNC: 3.5 MMOL/L (ref 3.5–5.1)
PROT SERPL-MCNC: 6.4 G/DL (ref 6–8.4)
PROTHROMBIN TIME: 22.9 SEC (ref 9–12.5)
RBC # BLD AUTO: 3.36 M/UL (ref 4.6–6.2)
SODIUM SERPL-SCNC: 133 MMOL/L (ref 136–145)
WBC # BLD AUTO: 7.76 K/UL (ref 3.9–12.7)

## 2020-02-15 PROCEDURE — A4216 STERILE WATER/SALINE, 10 ML: HCPCS | Performed by: STUDENT IN AN ORGANIZED HEALTH CARE EDUCATION/TRAINING PROGRAM

## 2020-02-15 PROCEDURE — 25000003 PHARM REV CODE 250: Performed by: PHYSICIAN ASSISTANT

## 2020-02-15 PROCEDURE — 83615 LACTATE (LD) (LDH) ENZYME: CPT

## 2020-02-15 PROCEDURE — 94761 N-INVAS EAR/PLS OXIMETRY MLT: CPT

## 2020-02-15 PROCEDURE — 85610 PROTHROMBIN TIME: CPT

## 2020-02-15 PROCEDURE — 84100 ASSAY OF PHOSPHORUS: CPT

## 2020-02-15 PROCEDURE — 80053 COMPREHEN METABOLIC PANEL: CPT

## 2020-02-15 PROCEDURE — 25000003 PHARM REV CODE 250: Performed by: STUDENT IN AN ORGANIZED HEALTH CARE EDUCATION/TRAINING PROGRAM

## 2020-02-15 PROCEDURE — 93750 INTERROGATION VAD IN PERSON: CPT | Mod: ,,, | Performed by: INTERNAL MEDICINE

## 2020-02-15 PROCEDURE — 25000003 PHARM REV CODE 250: Performed by: NURSE PRACTITIONER

## 2020-02-15 PROCEDURE — 97530 THERAPEUTIC ACTIVITIES: CPT | Mod: CQ

## 2020-02-15 PROCEDURE — 27000248 HC VAD-ADDITIONAL DAY

## 2020-02-15 PROCEDURE — 97116 GAIT TRAINING THERAPY: CPT | Mod: CQ

## 2020-02-15 PROCEDURE — 25000003 PHARM REV CODE 250: Performed by: INTERNAL MEDICINE

## 2020-02-15 PROCEDURE — 93750 PR INTERROGATE VENT ASSIST DEV, IN PERSON, W PHYSICIAN ANALYSIS: ICD-10-PCS | Mod: ,,, | Performed by: INTERNAL MEDICINE

## 2020-02-15 PROCEDURE — 99232 SBSQ HOSP IP/OBS MODERATE 35: CPT | Mod: ,,, | Performed by: INTERNAL MEDICINE

## 2020-02-15 PROCEDURE — 83735 ASSAY OF MAGNESIUM: CPT

## 2020-02-15 PROCEDURE — 20600001 HC STEP DOWN PRIVATE ROOM

## 2020-02-15 PROCEDURE — 99232 PR SUBSEQUENT HOSPITAL CARE,LEVL II: ICD-10-PCS | Mod: ,,, | Performed by: INTERNAL MEDICINE

## 2020-02-15 PROCEDURE — 85025 COMPLETE CBC W/AUTO DIFF WBC: CPT

## 2020-02-15 RX ADMIN — FERROUS GLUCONATE TAB 324 MG (37.5 MG ELEMENTAL IRON) 324 MG: 324 (37.5 FE) TAB at 08:02

## 2020-02-15 RX ADMIN — ATORVASTATIN CALCIUM 80 MG: 20 TABLET, FILM COATED ORAL at 08:02

## 2020-02-15 RX ADMIN — DIGOXIN 0.12 MG: 125 TABLET ORAL at 09:02

## 2020-02-15 RX ADMIN — AMLODIPINE BESYLATE 10 MG: 10 TABLET ORAL at 09:02

## 2020-02-15 RX ADMIN — FUROSEMIDE 40 MG: 40 TABLET ORAL at 09:02

## 2020-02-15 RX ADMIN — PANTOPRAZOLE SODIUM 40 MG: 40 TABLET, DELAYED RELEASE ORAL at 09:02

## 2020-02-15 RX ADMIN — Medication 10 ML: at 12:02

## 2020-02-15 RX ADMIN — POTASSIUM CHLORIDE 50 MEQ: 20 SOLUTION ORAL at 08:02

## 2020-02-15 RX ADMIN — AMIODARONE HYDROCHLORIDE 400 MG: 200 TABLET ORAL at 09:02

## 2020-02-15 RX ADMIN — HYDROCODONE BITARTRATE AND ACETAMINOPHEN 1 TABLET: 5; 325 TABLET ORAL at 12:02

## 2020-02-15 RX ADMIN — CASTOR OIL AND BALSAM, PERU: 788; 87 OINTMENT TOPICAL at 08:02

## 2020-02-15 RX ADMIN — WARFARIN SODIUM 2.5 MG: 1 TABLET ORAL at 05:02

## 2020-02-15 RX ADMIN — HYDROCODONE BITARTRATE AND ACETAMINOPHEN 1 TABLET: 5; 325 TABLET ORAL at 08:02

## 2020-02-15 RX ADMIN — CASTOR OIL AND BALSAM, PERU: 788; 87 OINTMENT TOPICAL at 09:02

## 2020-02-15 RX ADMIN — POTASSIUM CHLORIDE 50 MEQ: 20 SOLUTION ORAL at 09:02

## 2020-02-15 RX ADMIN — Medication 800 MG: at 09:02

## 2020-02-15 RX ADMIN — ASPIRIN 325 MG ORAL TABLET 325 MG: 325 PILL ORAL at 09:02

## 2020-02-15 RX ADMIN — Medication 800 MG: at 03:02

## 2020-02-15 RX ADMIN — Medication 800 MG: at 08:02

## 2020-02-15 RX ADMIN — Medication 10 ML: at 06:02

## 2020-02-15 NOTE — PT/OT/SLP PROGRESS
"Physical Therapy Treatment    Patient Name:  Tae Delgado   MRN:  [id number]    Recommendations:     Discharge Recommendations:  rehabilitation facility   Discharge Equipment Recommendations: (TBD)     Assessment:     Tae Delgado is a 59 y.o. male admitted with a medical diagnosis of LVAD (left ventricular assist device) present.  He presents with the following impairments/functional limitations:  weakness, impaired endurance, impaired self care skills, impaired functional mobilty, gait instability, impaired balance, decreased lower extremity function, impaired cardiopulmonary response to activity. Pt tolerates session well with focus on bed mobility, transfers, and gait training. Pt also demonstrates continued improvement in LVAD system, functions, and daily pt tasks. Pt requires Minimal A for assistance with donning LVAD battery harness prior to OOB mobility. Pt ambulates with Min A for HHA and close guarding with cues for improved B knee stabilization and lateral weight shift. Pt safe to mobilize in room with Min A from [facility] staff. Pt will continue to benefit from therapy services to address impairments listed above.     Rehab Prognosis: Good; patient would benefit from acute skilled PT services to address these deficits and reach maximum level of function.    Recent Surgery: Procedure(s) (LRB):  CLOSURE, WOUND, STERNUM (N/A)  WASHOUT (N/A)  APPLICATION, WOUND VAC (N/A) 17 Days Post-Op    Plan:     During this hospitalization, patient to be seen 6 x/week to address the identified rehab impairments via gait training, therapeutic activities, therapeutic exercises, neuromuscular re-education and progress toward the following goals:    · Plan of Care Expires:  02/29/20    Subjective     Chief Complaint: no c/o  Patient/Family Comments/goals: "Man this made me feel better. I'm doing better."  Pain/Comfort:  · Pain Rating 1: 0/10  · Pain Rating Post-Intervention 1: 0/10      Objective:     Communicated with [facility] prior to " session.  Patient found HOB elevated with telemetry, LVAD upon PTA entry to room.     General Precautions: Standard, fall, sternal, LVAD   Orthopedic Precautions:N/A   Braces: N/A     Functional Mobility:  · Bed Mobility:     · Scooting: stand by assistance  · Supine to Sit: stand by assistance  · Transfers:     · Sit to Stand:  contact guard assistance with hand-held assist  · Gait: Pt ambulates ~14 ft and ~8 ft with HHA and Min A for balance and assist facilitating normal weight shift. Pt cued for improved stabilization of B knees with mild buckling noted on initial stand and opening steps of gait trial. No LOB or severe buckling noted. Seated rest/toileting between trials.       AM-PAC 6 CLICK MOBILITY  Turning over in bed (including adjusting bedclothes, sheets and blankets)?: 3  Sitting down on and standing up from a chair with arms (e.g., wheelchair, bedside commode, etc.): 3  Moving from lying on back to sitting on the side of the bed?: 3  Moving to and from a bed to a chair (including a wheelchair)?: 3  Need to walk in hospital room?: 3  Climbing 3-5 steps with a railing?: 1  Basic Mobility Total Score: 16       Therapeutic Activities and Exercises:  Pt assisted with functional mobility as noted above.   Pt demonstrates good understanding of core LVAD components and daily tasks. Pt provided increased time for switching to battery power and donning of battery harness. Pt requires Min A for donning harness properly.   Pt recalls sternal precautions and follows restrictions with minimal cues during functional mobility.   Pt assisted with transfer to toilet with bedside commode over top for increased seat height. Pt performs perianal hygiene with set-up assist.   Pt assisted to bedside chair after toileting and remains on battery power. NSG notified.       Patient left up in chair with all lines intact, call button in reach, NSG and PCT notified and family present.    GOALS:   Multidisciplinary Problems      Physical Therapy Goals        Problem: Physical Therapy Goal    Goal Priority Disciplines Outcome Goal Variances Interventions   Physical Therapy Goal     PT, PT/OT Ongoing, Progressing     Description:  Goals to be met by: 2020     Patient will increase functional independence with mobility by performin. Supine to sit with MInimal Assistance - met   1a. Supine to sit with SBA with HOB flat   2. Rolling to Left and Right with Minimal Assistance.- not met  3. Sit to stand transfer with Moderate Assistance- not met  4. Sitting at edge of bed x8 minutes with Minimal Assistance - met 2020  4a. Sitting at EOB x 10 minutes with SBA- met 2/10/2020  5. Lower extremity exercise program x10 reps per handout, with assistance as needed- not met  6. Standing with no AD x 30 seconds CGA to assist with ADLs and self care- not met  7. Gait x50 ft with Schuyler without AD                          Time Tracking:     PT Received On: 02/15/20  PT Start Time: 08     PT Stop Time: 921  PT Total Time (min): 53 min     Billable Minutes: Gait Training 24 and Therapeutic Activity 29    Treatment Type: Treatment  PT/PTA: PTA     PTA Visit Number: 1     Roger Ortega, SMITH  02/15/2020

## 2020-02-15 NOTE — ASSESSMENT & PLAN NOTE
- H/O LV thrombus with h/o splenic and renal emboli as well as embolic CVA  - Limited TTE done here 1/13 showed no thrombus  - JACINTO 1/23 intra op showed resolution of DAMON thrombus  - AC as above

## 2020-02-15 NOTE — SUBJECTIVE & OBJECTIVE
Interval History: Patient is doing well this am. No acute events overnight.     Continuous Infusions:  Scheduled Meds:   amiodarone  400 mg Oral Daily    amLODIPine  10 mg Oral Daily    aspirin  325 mg Oral Daily    atorvastatin  80 mg Oral QHS    balsam peru-castor oil   Topical (Top) BID    digoxin  0.125 mg Oral Daily    docusate sodium  200 mg Oral QHS    ferrous gluconate  324 mg Oral Daily with breakfast    furosemide  40 mg Oral Daily    magnesium oxide  800 mg Oral TID    pantoprazole  40 mg Oral Daily    potassium chloride 10%  50 mEq Oral BID    sodium chloride 0.9%  10 mL Intravenous Q6H    warfarin  2.5 mg Oral Daily     PRN Meds:acetaminophen, albumin human 5%, albuterol sulfate, bisacodyL, Dextrose 10% Bolus, Dextrose 10% Bolus, HYDROcodone-acetaminophen, magnesium hydroxide 400 mg/5 ml, methocarbamol, oxyCODONE, sodium chloride 0.9%, Flushing PICC Protocol **AND** sodium chloride 0.9% **AND** sodium chloride 0.9%    Review of patient's allergies indicates:   Allergen Reactions    Biopatch [chlorhexidine gluconate]      Site burning    Dobutamine in d5w      Tachycardia, tremors, SOB, flushing    Percocet [oxycodone-acetaminophen] Itching    Penicillins Rash     Cefepime given on 1/23/2020 without issue     Objective:     Vital Signs (Most Recent):  Temp: 98.1 °F (36.7 °C) (02/15/20 0739)  Pulse: 60 (02/15/20 0739)  Resp: 16 (02/15/20 0739)  BP: (!) 78/0 (02/15/20 0759)  SpO2: 96 % (02/15/20 0739) Vital Signs (24h Range):  Temp:  [97.4 °F (36.3 °C)-98.6 °F (37 °C)] 98.1 °F (36.7 °C)  Pulse:  [] 60  Resp:  [16-18] 16  SpO2:  [9 %-98 %] 96 %  BP: (70-94)/(0-68) 78/0     Patient Vitals for the past 72 hrs (Last 3 readings):   Weight   02/15/20 0437 98.2 kg (216 lb 7.9 oz)   02/14/20 1100 96 kg (211 lb 10.3 oz)   02/14/20 0545 96 kg (211 lb 10.3 oz)     Body mass index is 31.06 kg/m².      Intake/Output Summary (Last 24 hours) at 2/15/2020 1100  Last data filed at 2/15/2020  1009  Gross per 24 hour   Intake 960 ml   Output 1225 ml   Net -265 ml       Hemodynamic Parameters:  CVP:  [10 mmHg] 10 mmHg    Telemetry: No significant events.     Physical Exam  Constitutional: He is oriented to person, place, and time. He appears well-developed and well-nourished.   HENT:   Head: Normocephalic and atraumatic.   Eyes: Pupils are equal, round, and reactive to light. Conjunctivae and EOM are normal.   Neck: Normal range of motion. Neck supple. No JVD present. No thyromegaly present.   JVP mid neck  Cardiovascular: Normal rate and regular rhythm. Smooth VAD hum   Pulmonary/Chest: Effort normal and breath sounds normal. Intubated and sedated   Abdominal: Soft. Bowel sounds are normal.   Musculoskeletal: Normal range of motion. 1+ pedal edema   Neurological: He is alert and oriented to person, place, and time.   Skin: Skin is warm and dry. Capillary refill takes 2 to 3 seconds.   Psychiatric: He has a normal mood and affect. His behavior is normal. Judgment and thought content normal.  Significant Labs:  CBC:  Recent Labs   Lab 02/13/20  0500 02/14/20  0500 02/15/20  0449   WBC 7.97 7.76 7.76   RBC 3.31* 3.53* 3.36*   HGB 9.1* 9.8* 9.2*   HCT 30.0* 31.7* 30.7*    273 255   MCV 91 90 91   MCH 27.5 27.8 27.4   MCHC 30.3* 30.9* 30.0*     BNP:  Recent Labs   Lab 02/10/20  0602 02/12/20  0523 02/14/20  0500   * 300* 258*     CMP:  Recent Labs   Lab 02/13/20  0500 02/14/20  0500 02/15/20  0449   GLU 89 100 99   CALCIUM 8.9 8.8 8.7   ALBUMIN 2.8* 2.9*  2.9* 2.9*   PROT 6.3 6.4  6.4 6.4    137 133*   K 3.4* 3.1* 3.5   CO2 33* 38* 36*   CL 91* 88* 90*   BUN 33* 32* 28*   CREATININE 1.8* 1.9* 1.9*   ALKPHOS 78 86  86 86   ALT 23 25  25 24   AST 30 31  31 27   BILITOT 0.8 0.9  0.9 0.8      Coagulation:   Recent Labs   Lab 02/09/20  0400  02/13/20  0500 02/14/20  0500 02/15/20  0449   INR 1.8*  1.8*  1.8*   < > 2.0* 2.2* 2.4*   APTT 30.7  --   --   --   --     < > = values in this  interval not displayed.     LDH:  Recent Labs   Lab 02/13/20  0500 02/14/20  0500 02/15/20  0449   * 315* 262*     Microbiology:  Microbiology Results (last 7 days)     ** No results found for the last 168 hours. **          I have reviewed all pertinent labs within the past 24 hours.    Estimated Creatinine Clearance: 49.2 mL/min (A) (based on SCr of 1.9 mg/dL (H)).    Diagnostic Results:  I have reviewed and interpreted all pertinent imaging results/findings within the past 24 hours.

## 2020-02-15 NOTE — ASSESSMENT & PLAN NOTE
Appreciate Hem/Onc's help. No evidence of underlying coagulopathy (h/o LV thrombus with splenic and renal emboli, h/o embolic CVA)

## 2020-02-15 NOTE — ASSESSMENT & PLAN NOTE
- S/P DT HM3 with closure of AV and plication of MV for regurg 1/23/20 (See LVAD)  - [facility] dx'd 2010

## 2020-02-15 NOTE — ASSESSMENT & PLAN NOTE
Incidental finding on CT chest/abd/pelvis on 1/12. Pulmonology consulted, and rec repeat CT of chest in 3 months  - Will need to follow-up in pulmonary clinic.

## 2020-02-15 NOTE — PLAN OF CARE
Plan of care discussed with patient. Patient ambulating with assistance x1, fall precautions in place. Continuing to encourage sternal precautions, IS, and ambulation. LVAD DP and numbers WNL, smooth LVAD hum. MD notified of patient's complaint of pain in big toe on right foot; patient states that he has gout and has taken allopurinol in the past; MD directed to treat pain with prn norco 5mg as allopurinol will not help with an acute gout flare up and steroids will not be appropriate for patient at this time; patient verbalized understanding. Patient is a daily betadine and water DLES dressing change; dressing to be changed by spouse later today. MSI cleaned with betadine and dressed with island dressing. Discussed medications and care. I's and O's monitored. Comfort and safety measures in place. Patient has no questions at this time. Will continue to monitor.

## 2020-02-15 NOTE — SUBJECTIVE & OBJECTIVE
Interval History: Doing well this morning. No acute events overnight. Planned to start lasix 40 PO daily today.     Continuous Infusions:  Scheduled Meds:   amiodarone  400 mg Oral Daily    amLODIPine  10 mg Oral Daily    aspirin  325 mg Oral Daily    atorvastatin  80 mg Oral QHS    balsam peru-castor oil   Topical (Top) BID    digoxin  0.125 mg Oral Daily    docusate sodium  200 mg Oral QHS    ferrous gluconate  324 mg Oral Daily with breakfast    furosemide  40 mg Oral Daily    magnesium oxide  800 mg Oral TID    pantoprazole  40 mg Oral Daily    potassium chloride 10%  50 mEq Oral BID    sodium chloride 0.9%  10 mL Intravenous Q6H    warfarin  2.5 mg Oral Daily     PRN Meds:acetaminophen, albumin human 5%, albuterol sulfate, bisacodyL, Dextrose 10% Bolus, Dextrose 10% Bolus, HYDROcodone-acetaminophen, magnesium hydroxide 400 mg/5 ml, methocarbamol, oxyCODONE, sodium chloride 0.9%, Flushing PICC Protocol **AND** sodium chloride 0.9% **AND** sodium chloride 0.9%    Review of patient's allergies indicates:   Allergen Reactions    Biopatch [chlorhexidine gluconate]      Site burning    Dobutamine in d5w      Tachycardia, tremors, SOB, flushing    Percocet [oxycodone-acetaminophen] Itching    Penicillins Rash     Cefepime given on 1/23/2020 without issue     Objective:     Vital Signs (Most Recent):  Temp: 98.1 °F (36.7 °C) (02/15/20 0739)  Pulse: 60 (02/15/20 0739)  Resp: 16 (02/15/20 0739)  BP: (!) 78/0 (02/15/20 0759)  SpO2: 96 % (02/15/20 0739) Vital Signs (24h Range):  Temp:  [97.4 °F (36.3 °C)-98.6 °F (37 °C)] 98.1 °F (36.7 °C)  Pulse:  [] 60  Resp:  [16-18] 16  SpO2:  [9 %-98 %] 96 %  BP: (70-94)/(0-68) 78/0     Patient Vitals for the past 72 hrs (Last 3 readings):   Weight   02/15/20 0437 98.2 kg (216 lb 7.9 oz)   02/14/20 1100 96 kg (211 lb 10.3 oz)   02/14/20 0545 96 kg (211 lb 10.3 oz)     Body mass index is 31.06 kg/m².      Intake/Output Summary (Last 24 hours) at 2/15/2020  1025  Last data filed at 2/15/2020 1009  Gross per 24 hour   Intake 960 ml   Output 1225 ml   Net -265 ml       Hemodynamic Parameters:  CVP:  [10 mmHg] 10 mmHg    Telemetry: No significant events     Physical Exam    Constitutional: He is oriented to person, place, and time. He appears well-developed and well-nourished.   HENT:   Head: Normocephalic and atraumatic.   Eyes: Pupils are equal, round, and reactive to light. Conjunctivae and EOM are normal.   Neck: Normal range of motion. Neck supple. No JVD present. No thyromegaly present.   JVP mid neck  Cardiovascular: Normal rate and regular rhythm. Smooth VAD hum   Pulmonary/Chest: Effort normal and breath sounds normal. Intubated and sedated   Abdominal: Soft. Bowel sounds are normal.   Musculoskeletal: Normal range of motion. 1+ pedal edema   Neurological: He is alert and oriented to person, place, and time.   Skin: Skin is warm and dry. Capillary refill takes 2 to 3 seconds.   Psychiatric: He has a normal mood and affect. His behavior is normal. Judgment and thought content normal.     Significant Labs:  CBC:  Recent Labs   Lab 02/13/20  0500 02/14/20  0500 02/15/20  0449   WBC 7.97 7.76 7.76   RBC 3.31* 3.53* 3.36*   HGB 9.1* 9.8* 9.2*   HCT 30.0* 31.7* 30.7*    273 255   MCV 91 90 91   MCH 27.5 27.8 27.4   MCHC 30.3* 30.9* 30.0*     BNP:  Recent Labs   Lab 02/10/20  0602 02/12/20  0523 02/14/20  0500   * 300* 258*     CMP:  Recent Labs   Lab 02/13/20  0500 02/14/20  0500 02/15/20  0449   GLU 89 100 99   CALCIUM 8.9 8.8 8.7   ALBUMIN 2.8* 2.9*  2.9* 2.9*   PROT 6.3 6.4  6.4 6.4    137 133*   K 3.4* 3.1* 3.5   CO2 33* 38* 36*   CL 91* 88* 90*   BUN 33* 32* 28*   CREATININE 1.8* 1.9* 1.9*   ALKPHOS 78 86  86 86   ALT 23 25  25 24   AST 30 31  31 27   BILITOT 0.8 0.9  0.9 0.8      Coagulation:   Recent Labs   Lab 02/09/20  0400  02/13/20  0500 02/14/20  0500 02/15/20  0449   INR 1.8*  1.8*  1.8*   < > 2.0* 2.2* 2.4*   APTT 30.7  --   --    --   --     < > = values in this interval not displayed.     LDH:  Recent Labs   Lab 02/13/20  0500 02/14/20  0500 02/15/20  0449   * 315* 262*     Microbiology:  Microbiology Results (last 7 days)     ** No results found for the last 168 hours. **          I have reviewed all pertinent labs within the past 24 hours.    Estimated Creatinine Clearance: 49.2 mL/min (A) (based on SCr of 1.9 mg/dL (H)).    Diagnostic Results:  I have reviewed and interpreted all pertinent imaging results/findings within the past 24 hours.

## 2020-02-15 NOTE — PROGRESS NOTES
[facility]  Heart Transplant/Heart Failure   Progress Note    Patient Name: Tae Delgado  MRN: [id number]  Admission Date: 1/9/2020  Hospital Length of Stay: 37 days  Attending Physician: Isiah Montero MD  Primary Care Provider: Elham Pearson MD  Principal Problem:LVAD (left ventricular assist device) present      Subjective:     Interval History: Doing well this morning. No acute events overnight. Planned to start lasix 40 PO daily today.     Continuous Infusions:  Scheduled Meds:   amiodarone  400 mg Oral Daily    amLODIPine  10 mg Oral Daily    aspirin  325 mg Oral Daily    atorvastatin  80 mg Oral QHS    balsam peru-castor oil   Topical (Top) BID    digoxin  0.125 mg Oral Daily    docusate sodium  200 mg Oral QHS    ferrous gluconate  324 mg Oral Daily with breakfast    furosemide  40 mg Oral Daily    magnesium oxide  800 mg Oral TID    pantoprazole  40 mg Oral Daily    potassium chloride 10%  50 mEq Oral BID    sodium chloride 0.9%  10 mL Intravenous Q6H    warfarin  2.5 mg Oral Daily     PRN Meds:acetaminophen, albumin human 5%, albuterol sulfate, bisacodyL, Dextrose 10% Bolus, Dextrose 10% Bolus, HYDROcodone-acetaminophen, magnesium hydroxide 400 mg/5 ml, methocarbamol, oxyCODONE, sodium chloride 0.9%, Flushing PICC Protocol **AND** sodium chloride 0.9% **AND** sodium chloride 0.9%    Review of patient's allergies indicates:   Allergen Reactions    Biopatch [chlorhexidine gluconate]      Site burning    Dobutamine in d5w      Tachycardia, tremors, SOB, flushing    Percocet [oxycodone-acetaminophen] Itching    Penicillins Rash     Cefepime given on 1/23/2020 without issue     Objective:     Vital Signs (Most Recent):  Temp: 98.1 °F (36.7 °C) (02/15/20 0739)  Pulse: 60 (02/15/20 0739)  Resp: 16 (02/15/20 0739)  BP: (!) 78/0 (02/15/20 0759)  SpO2: 96 % (02/15/20 0739) Vital Signs (24h Range):  Temp:  [97.4 °F (36.3 °C)-98.6 °F (37 °C)] 98.1 °F (36.7 °C)  Pulse:  []  60  Resp:  [16-18] 16  SpO2:  [9 %-98 %] 96 %  BP: (70-94)/(0-68) 78/0     Patient Vitals for the past 72 hrs (Last 3 readings):   Weight   02/15/20 0437 98.2 kg (216 lb 7.9 oz)   02/14/20 1100 96 kg (211 lb 10.3 oz)   02/14/20 0545 96 kg (211 lb 10.3 oz)     Body mass index is 31.06 kg/m².      Intake/Output Summary (Last 24 hours) at 2/15/2020 1025  Last data filed at 2/15/2020 1009  Gross per 24 hour   Intake 960 ml   Output 1225 ml   Net -265 ml       Hemodynamic Parameters:  CVP:  [10 mmHg] 10 mmHg    Telemetry: No significant events     Physical Exam    Constitutional: He is oriented to person, place, and time. He appears well-developed and well-nourished.   HENT:   Head: Normocephalic and atraumatic.   Eyes: Pupils are equal, round, and reactive to light. Conjunctivae and EOM are normal.   Neck: Normal range of motion. Neck supple. No JVD present. No thyromegaly present.   JVP mid neck  Cardiovascular: Normal rate and regular rhythm. Smooth VAD hum   Pulmonary/Chest: Effort normal and breath sounds normal. Intubated and sedated   Abdominal: Soft. Bowel sounds are normal.   Musculoskeletal: Normal range of motion. 1+ pedal edema   Neurological: He is alert and oriented to person, place, and time.   Skin: Skin is warm and dry. Capillary refill takes 2 to 3 seconds.   Psychiatric: He has a normal mood and affect. His behavior is normal. Judgment and thought content normal.     Significant Labs:  CBC:  Recent Labs   Lab 02/13/20  0500 02/14/20  0500 02/15/20  0449   WBC 7.97 7.76 7.76   RBC 3.31* 3.53* 3.36*   HGB 9.1* 9.8* 9.2*   HCT 30.0* 31.7* 30.7*    273 255   MCV 91 90 91   MCH 27.5 27.8 27.4   MCHC 30.3* 30.9* 30.0*     BNP:  Recent Labs   Lab 02/10/20  0602 02/12/20  0523 02/14/20  0500   * 300* 258*     CMP:  Recent Labs   Lab 02/13/20  0500 02/14/20  0500 02/15/20  0449   GLU 89 100 99   CALCIUM 8.9 8.8 8.7   ALBUMIN 2.8* 2.9*  2.9* 2.9*   PROT 6.3 6.4  6.4 6.4    137 133*   K 3.4*  3.1* 3.5   CO2 33* 38* 36*   CL 91* 88* 90*   BUN 33* 32* 28*   CREATININE 1.8* 1.9* 1.9*   ALKPHOS 78 86  86 86   ALT 23 25  25 24   AST 30 31  31 27   BILITOT 0.8 0.9  0.9 0.8      Coagulation:   Recent Labs   Lab 02/09/20  0400  02/13/20  0500 02/14/20  0500 02/15/20  0449   INR 1.8*  1.8*  1.8*   < > 2.0* 2.2* 2.4*   APTT 30.7  --   --   --   --     < > = values in this interval not displayed.     LDH:  Recent Labs   Lab 02/13/20  0500 02/14/20  0500 02/15/20  0449   * 315* 262*     Microbiology:  Microbiology Results (last 7 days)     ** No results found for the last 168 hours. **          I have reviewed all pertinent labs within the past 24 hours.    Estimated Creatinine Clearance: 49.2 mL/min (A) (based on SCr of 1.9 mg/dL (H)).    Diagnostic Results:  I have reviewed and interpreted all pertinent imaging results/findings within the past 24 hours.    Assessment and Plan:     No notes on file    * LVAD (left ventricular assist device) present  - S/P DT HM3 with closure of AV and repair of MV for regurg 1/23/20 ([facility]).  - HTS primary.  - INR Therapeutic. Will adjust warfarin.   - ASA 325mg.   - LDH stable.   - Taken back to OR 1/24 for possible RVAD placement which was not done. Patient remained hemodynamically stable in OR. Wash out on 1/27. Chest closed 1/29.   - CVP 10.  Had stopped lasix drip yesterday. Planned to start PO lasix daily today. Will monitor UOP.   - Off inotropes.  - TTE 2/10 at 5300 rpm: LVEDD 5.01, LVEF 10%, RV function mod depressed,   - Current speed 5300  - BP controlled. Continue Amlodipine 10mg daily.   - PT/OT/VAD education  - Sternal wound vac in place. Will remove when all lights are out/therapy complete    Procedure: Device Interrogation Including analysis of device parameters  Current Settings: Ventricular Assist Device  Review of device function is stable/unstable stable    TXP LVAD INTERROGATIONS 2/15/2020 2/15/2020 2/14/2020 2/14/2020 2/14/2020 2/14/2020  2/14/2020   Type HeartMate3 HeartMate3 HeartMate3 HeartMate3 HeartMate3 HeartMate3 HeartMate3   Flow 4.3 4.1 4.2 4.3 4.2 4.1 4.4   Speed 5300 5300 5300 5300 5350 5350 5300   PI 3.9 3.9 4.4 3.8 4.4 5.1 4.0   Power (Berry) 3.8 3.8 3.8 3.8 3.8 3.8 3.9   LSL 4900 4900 4900 4900 4950 4950 4900   Pulsatility No Pulse No Pulse No Pulse No Pulse - - Intermittent pulse       Acute hyperglycemia  Endocrine following.     Coagulopathy  Appreciate Hem/Onc's help. 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 Pulmonology consulted, and rec repeat CT of chest in 3 months  - Will need to follow-up in pulmonary clinic.     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

## 2020-02-15 NOTE — ASSESSMENT & PLAN NOTE
- S/P DT HM3 with closure of AV and repair of MV for regurg 1/23/20 ([facility]).  - HTS primary.  - INR Therapeutic. Will adjust warfarin.   - ASA 325mg.   - LDH stable.   - Taken back to OR 1/24 for possible RVAD placement which was not done. Patient remained hemodynamically stable in OR. Wash out on 1/27. Chest closed 1/29.   - CVP 10.  Had stopped lasix drip yesterday. Planned to start PO lasix daily today. Will monitor UOP.   - Off inotropes.  - TTE 2/10 at 5300 rpm: LVEDD 5.01, LVEF 10%, RV function mod depressed,   - Current speed 5300  - BP controlled. Continue Amlodipine 10mg daily.   - PT/OT/VAD education  - Sternal wound vac in place. Will remove when all lights are out/therapy complete    Procedure: Device Interrogation Including analysis of device parameters  Current Settings: Ventricular Assist Device  Review of device function is stable/unstable stable    TXP LVAD INTERROGATIONS 2/15/2020 2/15/2020 2/14/2020 2/14/2020 2/14/2020 2/14/2020 2/14/2020   Type HeartMate3 HeartMate3 HeartMate3 HeartMate3 HeartMate3 HeartMate3 HeartMate3   Flow 4.3 4.1 4.2 4.3 4.2 4.1 4.4   Speed 5300 5300 5300 5300 5350 5350 5300   PI 3.9 3.9 4.4 3.8 4.4 5.1 4.0   Power (Berry) 3.8 3.8 3.8 3.8 3.8 3.8 3.9   LSL 4900 4900 4900 4900 4950 4950 4900   Pulsatility No Pulse No Pulse No Pulse No Pulse - - Intermittent pulse

## 2020-02-15 NOTE — PLAN OF CARE
Patient remains free from falls and injuries through out shift. Patient AAO and VSS. Pt denies chest pain and SOB. Patient's LVAD (HM III) is functioning WNL, w/o any acute events or alarms thus far on shift. LVAD #'s, dopplers and MAPs WNL. Patient is everyday betadine and water dressing changes. Dressing changed performed during shift by  wife; next 2/15/20. Last INR 2.2. Lasix 40mg given daily. PO electrolyte replacement given, will f/u with morning labs. CVP daily. Daily weight. Pt will go to rehab when medically ready and placement is available. Patient's family at bedside. Plan of care reviewed with patient. Patient verbalizes understanding of plan.  Will continue to monitor. Plan of care discussed with patient.

## 2020-02-15 NOTE — PROGRESS NOTES
02/15/2020  Felipe Lim    Current provider:  Isiah Montero MD      I, Felipe Lim, rounded on Tae Delgado to ensure all mechanical assist device settings (IABP or VAD) were appropriate and all parameters were within limits.  I was able to ensure all back up equipment was present, the staff had no issues, and the Perfusion Department daily rounding was complete.    12:26 AM

## 2020-02-16 LAB
ALBUMIN SERPL BCP-MCNC: 2.9 G/DL (ref 3.5–5.2)
ALP SERPL-CCNC: 89 U/L (ref 55–135)
ALT SERPL W/O P-5'-P-CCNC: 25 U/L (ref 10–44)
ANION GAP SERPL CALC-SCNC: 9 MMOL/L (ref 8–16)
AST SERPL-CCNC: 24 U/L (ref 10–40)
BASOPHILS # BLD AUTO: 0.05 K/UL (ref 0–0.2)
BASOPHILS NFR BLD: 0.7 % (ref 0–1.9)
BILIRUB SERPL-MCNC: 0.6 MG/DL (ref 0.1–1)
BNP SERPL-MCNC: 212 PG/ML (ref 0–99)
BUN SERPL-MCNC: 27 MG/DL (ref 6–20)
CALCIUM SERPL-MCNC: 8.5 MG/DL (ref 8.7–10.5)
CHLORIDE SERPL-SCNC: 92 MMOL/L (ref 95–110)
CO2 SERPL-SCNC: 34 MMOL/L (ref 23–29)
CREAT SERPL-MCNC: 1.8 MG/DL (ref 0.5–1.4)
DIFFERENTIAL METHOD: ABNORMAL
EOSINOPHIL # BLD AUTO: 0.2 K/UL (ref 0–0.5)
EOSINOPHIL NFR BLD: 2.3 % (ref 0–8)
ERYTHROCYTE [DISTWIDTH] IN BLOOD BY AUTOMATED COUNT: 18.1 % (ref 11.5–14.5)
EST. GFR  (AFRICAN AMERICAN): 46.3 ML/MIN/1.73 M^2
EST. GFR  (NON AFRICAN AMERICAN): 40 ML/MIN/1.73 M^2
GLUCOSE SERPL-MCNC: 121 MG/DL (ref 70–110)
HCT VFR BLD AUTO: 30.4 % (ref 40–54)
HGB BLD-MCNC: 9.4 G/DL (ref 14–18)
IMM GRANULOCYTES # BLD AUTO: 0.07 K/UL (ref 0–0.04)
IMM GRANULOCYTES NFR BLD AUTO: 1 % (ref 0–0.5)
INR PPP: 2.5 (ref 0.8–1.2)
LDH SERPL L TO P-CCNC: 261 U/L (ref 110–260)
LYMPHOCYTES # BLD AUTO: 1.4 K/UL (ref 1–4.8)
LYMPHOCYTES NFR BLD: 19.6 % (ref 18–48)
MAGNESIUM SERPL-MCNC: 2.5 MG/DL (ref 1.6–2.6)
MCH RBC QN AUTO: 28 PG (ref 27–31)
MCHC RBC AUTO-ENTMCNC: 30.9 G/DL (ref 32–36)
MCV RBC AUTO: 91 FL (ref 82–98)
MONOCYTES # BLD AUTO: 0.6 K/UL (ref 0.3–1)
MONOCYTES NFR BLD: 9.3 % (ref 4–15)
NEUTROPHILS # BLD AUTO: 4.6 K/UL (ref 1.8–7.7)
NEUTROPHILS NFR BLD: 67.1 % (ref 38–73)
NRBC BLD-RTO: 0 /100 WBC
PHOSPHATE SERPL-MCNC: 3.4 MG/DL (ref 2.7–4.5)
PLATELET # BLD AUTO: 269 K/UL (ref 150–350)
PMV BLD AUTO: 10.5 FL (ref 9.2–12.9)
POTASSIUM SERPL-SCNC: 3.8 MMOL/L (ref 3.5–5.1)
PROT SERPL-MCNC: 6.3 G/DL (ref 6–8.4)
PROTHROMBIN TIME: 24.1 SEC (ref 9–12.5)
RBC # BLD AUTO: 3.36 M/UL (ref 4.6–6.2)
SODIUM SERPL-SCNC: 135 MMOL/L (ref 136–145)
WBC # BLD AUTO: 6.9 K/UL (ref 3.9–12.7)

## 2020-02-16 PROCEDURE — 25000003 PHARM REV CODE 250: Performed by: STUDENT IN AN ORGANIZED HEALTH CARE EDUCATION/TRAINING PROGRAM

## 2020-02-16 PROCEDURE — 85025 COMPLETE CBC W/AUTO DIFF WBC: CPT

## 2020-02-16 PROCEDURE — A4216 STERILE WATER/SALINE, 10 ML: HCPCS | Performed by: STUDENT IN AN ORGANIZED HEALTH CARE EDUCATION/TRAINING PROGRAM

## 2020-02-16 PROCEDURE — 83735 ASSAY OF MAGNESIUM: CPT

## 2020-02-16 PROCEDURE — 99232 SBSQ HOSP IP/OBS MODERATE 35: CPT | Mod: ,,, | Performed by: INTERNAL MEDICINE

## 2020-02-16 PROCEDURE — 93750 PR INTERROGATE VENT ASSIST DEV, IN PERSON, W PHYSICIAN ANALYSIS: ICD-10-PCS | Mod: ,,, | Performed by: INTERNAL MEDICINE

## 2020-02-16 PROCEDURE — 97530 THERAPEUTIC ACTIVITIES: CPT

## 2020-02-16 PROCEDURE — 99232 PR SUBSEQUENT HOSPITAL CARE,LEVL II: ICD-10-PCS | Mod: ,,, | Performed by: INTERNAL MEDICINE

## 2020-02-16 PROCEDURE — 83880 ASSAY OF NATRIURETIC PEPTIDE: CPT

## 2020-02-16 PROCEDURE — 97535 SELF CARE MNGMENT TRAINING: CPT

## 2020-02-16 PROCEDURE — 93750 INTERROGATION VAD IN PERSON: CPT | Mod: ,,, | Performed by: INTERNAL MEDICINE

## 2020-02-16 PROCEDURE — 25000003 PHARM REV CODE 250: Performed by: NURSE PRACTITIONER

## 2020-02-16 PROCEDURE — 85610 PROTHROMBIN TIME: CPT

## 2020-02-16 PROCEDURE — 83615 LACTATE (LD) (LDH) ENZYME: CPT

## 2020-02-16 PROCEDURE — 84100 ASSAY OF PHOSPHORUS: CPT

## 2020-02-16 PROCEDURE — 27000248 HC VAD-ADDITIONAL DAY

## 2020-02-16 PROCEDURE — 80053 COMPREHEN METABOLIC PANEL: CPT

## 2020-02-16 PROCEDURE — 36415 COLL VENOUS BLD VENIPUNCTURE: CPT

## 2020-02-16 PROCEDURE — 25000003 PHARM REV CODE 250: Performed by: PHYSICIAN ASSISTANT

## 2020-02-16 PROCEDURE — 25000003 PHARM REV CODE 250: Performed by: INTERNAL MEDICINE

## 2020-02-16 PROCEDURE — 20600001 HC STEP DOWN PRIVATE ROOM

## 2020-02-16 RX ADMIN — POTASSIUM CHLORIDE 50 MEQ: 20 SOLUTION ORAL at 08:02

## 2020-02-16 RX ADMIN — PANTOPRAZOLE SODIUM 40 MG: 40 TABLET, DELAYED RELEASE ORAL at 08:02

## 2020-02-16 RX ADMIN — AMIODARONE HYDROCHLORIDE 400 MG: 200 TABLET ORAL at 08:02

## 2020-02-16 RX ADMIN — HYDROCODONE BITARTRATE AND ACETAMINOPHEN 1 TABLET: 5; 325 TABLET ORAL at 11:02

## 2020-02-16 RX ADMIN — WARFARIN SODIUM 2.5 MG: 1 TABLET ORAL at 04:02

## 2020-02-16 RX ADMIN — CASTOR OIL AND BALSAM, PERU: 788; 87 OINTMENT TOPICAL at 08:02

## 2020-02-16 RX ADMIN — Medication 800 MG: at 08:02

## 2020-02-16 RX ADMIN — DOCUSATE SODIUM 200 MG: 100 CAPSULE, LIQUID FILLED ORAL at 08:02

## 2020-02-16 RX ADMIN — ATORVASTATIN CALCIUM 80 MG: 20 TABLET, FILM COATED ORAL at 08:02

## 2020-02-16 RX ADMIN — ASPIRIN 325 MG ORAL TABLET 325 MG: 325 PILL ORAL at 08:02

## 2020-02-16 RX ADMIN — Medication 10 ML: at 12:02

## 2020-02-16 RX ADMIN — FERROUS GLUCONATE TAB 324 MG (37.5 MG ELEMENTAL IRON) 324 MG: 324 (37.5 FE) TAB at 08:02

## 2020-02-16 RX ADMIN — FUROSEMIDE 40 MG: 40 TABLET ORAL at 08:02

## 2020-02-16 RX ADMIN — DIGOXIN 0.12 MG: 125 TABLET ORAL at 08:02

## 2020-02-16 RX ADMIN — Medication 800 MG: at 03:02

## 2020-02-16 RX ADMIN — AMLODIPINE BESYLATE 10 MG: 10 TABLET ORAL at 08:02

## 2020-02-16 NOTE — PLAN OF CARE
Goals reviewed and remain appropriate.   Leslee Terrell OTR/L  Pager #: [phone]  2/16/2020    Problem: Occupational Therapy Goal  Goal: Occupational Therapy Goal  Description  Goals to be met by: 2/21/2020     Patient will increase functional independence with ADLs by performing:    UE Dressing with Minimal Assistance.  LE Dressing with Minimal Assistance.  Grooming while standing with Minimal Assistance.  Toileting from bedside commode with Minimal Assistance for hygiene and clothing management.   Sitting at edge of bed x10 minutes with Minimal Assistance in prep for seated grooming tasks -GOAL MET 2/10  Supine to sit with min A. -GOAL MET 2/14  Toilet transfer to bedside commode with Minimal Assistance.  Pt will perform LVAD management independently - progressing         Outcome: Ongoing, Progressing

## 2020-02-16 NOTE — ASSESSMENT & PLAN NOTE
-Incidental finding on CT chest/abd/pelvis on 1/12. Pulmonology consulted, and rec repeat CT of chest in 3 months  - Will need to follow-up in pulmonary clinic.

## 2020-02-16 NOTE — ASSESSMENT & PLAN NOTE
- S/P DT HM3 with closure of AV and plication of MV for regurg 1/23/20 (See LVAD)  - [facility] dx'd 2010

## 2020-02-16 NOTE — ASSESSMENT & PLAN NOTE
- S/P DT HM3 with closure of AV and repair of MV for regurg 1/23/20 ([facility]).  - HTS primary.  - INR Therapeutic. Will adjust warfarin as needed.   - ASA 325mg.   - LDH stable.   - Taken back to OR 1/24 for possible RVAD placement which was not done. Patient remained hemodynamically stable in OR. Wash out on 1/27. Chest closed 1/29.   - CVP 9. Started on PO lasix 40 mg daily . Checking BNP. If more than 400 then can change to lasix 40 mg BID.   - Off inotropes.   - TTE 2/10 at 5300 rpm: LVEDD 5.01, LVEF 10%, RV function mod depressed,   - Current speed 5300  - BP controlled. Continue Amlodipine 10mg daily.   - PT/OT/VAD education  - Sternal wound vac in place. Will remove when all lights are out/therapy complete     Procedure: Device Interrogation Including analysis of device parameters  Current Settings: Ventricular Assist Device  Review of device function is stable/unstable stable    TXP LVAD INTERROGATIONS 2/16/2020 2/16/2020 2/16/2020 2/15/2020 2/15/2020 2/15/2020 2/15/2020   Type - HeartMate3 HeartMate3 HeartMate3 - - HeartMate3   Flow 4.0 4.3 4.3 4.2 4.2 4.3 4.3   Speed 5300 5300 5300 5300 5300 5300 5300   PI 4.3 3.1 3.2 4.5 4.2 3.9 3.9   Power (Berry) 3.8 3.4 3.8 3.8 3.8 3.9 3.8   LSL 4900 4900 4900 4900 - - 4900   Pulsatility Intermittent pulse No Pulse No Pulse No Pulse - Intermittent pulse No Pulse

## 2020-02-16 NOTE — PLAN OF CARE
Plan of care discussed with patient. Patient ambulating with assistance x1, fall precautions in place. Continuing to encourage sternal precautions, IS, and ambulation. LVAD DP and numbers WNL, smooth LVAD hum. Patient is a daily betadine and water DLES dressing change; dressing to be changed by spouse later today. MSI cleaned with betadine and dressed with island dressing. Last INR 2.5.Discussed medications and care. I's and O's monitored. Comfort and safety measures in place. Patient has no questions at this time. Will continue to monitor.

## 2020-02-16 NOTE — PT/OT/SLP PROGRESS
Occupational Therapy   Treatment    Name: Tae Delgado  MRN: [id number]  Admitting Diagnosis:  LVAD (left ventricular assist device) present  18 Days Post-Op    Recommendations:     Discharge Recommendations: rehabilitation facility  Discharge Equipment Recommendations:  (TBD)  Barriers to discharge:  None    Assessment:     Tae Delgado is a 60 y.o. male with a medical diagnosis of LVAD (left ventricular assist device) present. He presents with the following performance deficits affecting function: weakness, impaired endurance, impaired self care skills, impaired functional mobilty, gait instability, impaired balance, impaired cardiopulmonary response to activity, decreased upper extremity function. Patient tolerated session well with improvement in sit <> stand, SPTs, and ambulation noted this session. At this time, patient will continue to benefit from acute skilled therapy intervention to address deficits/underlying impairments and progress towards prior level of function. After discharge, patient will benefit from continuing therapy at rehab facility to increase independence in ADLs and functional mobility through skilled balance training and skilled therapeutic exercise to promote safety at home.    Rehab Prognosis:  Good; patient would benefit from acute skilled OT services to address these deficits and reach maximum level of function.       Plan:     Patient to be seen 6 x/week to address the above listed problems via self-care/home management, therapeutic activities, therapeutic exercises  · Plan of Care Expires: 03/02/20  · Plan of Care Reviewed with: patient, significant other    Subjective     Pain/Comfort:  · Pain Rating 1: 0/10    Objective:     Communicated with: RN prior to session. Patient found up in chair with telemetry, LVAD and RN and significant other present upon OT entry to room.    General Precautions: Standard, fall, LVAD, sternal   Orthopedic Precautions:N/A   Braces: N/A     Occupational  Performance:     Bed Mobility:    · Not assessed; patient up in chair at start of session and returned to chair at end of session    Functional Mobility/Transfers:  · Patient completed Sit <> Stand Transfer from bedside chair with minimum assistance with no assistive device   · Patient completed Toilet Transfer (BSC frame placed over toilet) Step Transfer technique with contact guard assistance with no AD  · Functional Mobility: Patient ambulated 10 ft x2 with min assist and hand-held assist.    Activities of Daily Living:  · Grooming: contact guard assistance standing at sink 2 min; patient c/o SOB after 2 min and requested to sit on toilet to rest before walking back to chair  · Lower Body Dressing: stand by assistance doffing and donning B socks using figure 4 technique sitting in bedside chair    LVAD Management:  · Patient found on battery power with vest donned at start of session. Patient reported his significant other assisted with switching from wall power to battery power and with donning vest.  · Patient left on battery power at end of session and had no questions regarding his LVAD at this time.    [facility] 6 Click ADL: 17    Treatment & Education:   Reviewed sternal precautions with patient at start of session.   Therapist provided facilitation and instruction of proper body mechanics, energy conservation, and fall prevention strategies during tasks listed above.   Educated patient on importance of sitting in bedside chair throughout the day to increase activity tolerance.   Educated patient on OT POC and answered all questions within OT scope of practice.   Whiteboard updated    Patient left up in chair with all lines intact, call button in reach, significant other presenEducation:  t, and RN notified     GOALS:   Multidisciplinary Problems     Occupational Therapy Goals        Problem: Occupational Therapy Goal    Goal Priority Disciplines Outcome Interventions   Occupational Therapy Goal     OT,  PT/OT Ongoing, Progressing    Description:  Goals to be met by: 2/21/2020     Patient will increase functional independence with ADLs by performing:    UE Dressing with Minimal Assistance.  LE Dressing with Minimal Assistance.  Grooming while standing with Minimal Assistance.  Toileting from bedside commode with Minimal Assistance for hygiene and clothing management.   Sitting at edge of bed x10 minutes with Minimal Assistance in prep for seated grooming tasks -GOAL MET 2/10  Supine to sit with min A. -GOAL MET 2/14  Toilet transfer to bedside commode with Minimal Assistance.  Pt will perform LVAD management independently - progressing                    Multidisciplinary Problems (Resolved)        Problem: Occupational Therapy Goal    Goal Priority Disciplines Outcome Interventions   Occupational Therapy Goal   (Resolved)     OT, PT/OT Met    Description:  Skilled OT services not necessary at this time secondary to patient performing ADLs at OF. Patient in agreement. Please re-consult OT if change in patient's functional status is noted.                     Time Tracking:     OT Date of Treatment: 02/16/20  OT Start Time: 0933  OT Stop Time: 0958  OT Total Time (min): 25 min    Billable Minutes:Self Care/Home Management 12  Therapeutic Activity 13    Leslee Terrell OT  2/16/2020

## 2020-02-16 NOTE — PROGRESS NOTES
[facility]  Heart Transplant/Heart Failure   Progress Note    Patient Name: Tae Delgado  MRN: [id number]  Admission Date: 1/9/2020  Hospital Length of Stay: 38 days  Attending Physician: Isiah Montero MD  Primary Care Provider: Elham Pearson MD  Principal Problem:LVAD (left ventricular assist device) present      Subjective:     Interval History: Patient is doing well this am. No acute events overnight.     Continuous Infusions:  Scheduled Meds:   amiodarone  400 mg Oral Daily    amLODIPine  10 mg Oral Daily    aspirin  325 mg Oral Daily    atorvastatin  80 mg Oral QHS    balsam peru-castor oil   Topical (Top) BID    digoxin  0.125 mg Oral Daily    docusate sodium  200 mg Oral QHS    ferrous gluconate  324 mg Oral Daily with breakfast    furosemide  40 mg Oral Daily    magnesium oxide  800 mg Oral TID    pantoprazole  40 mg Oral Daily    potassium chloride 10%  50 mEq Oral BID    sodium chloride 0.9%  10 mL Intravenous Q6H    warfarin  2.5 mg Oral Daily     PRN Meds:acetaminophen, albumin human 5%, albuterol sulfate, bisacodyL, Dextrose 10% Bolus, Dextrose 10% Bolus, HYDROcodone-acetaminophen, magnesium hydroxide 400 mg/5 ml, methocarbamol, oxyCODONE, sodium chloride 0.9%, Flushing PICC Protocol **AND** sodium chloride 0.9% **AND** sodium chloride 0.9%    Review of patient's allergies indicates:   Allergen Reactions    Biopatch [chlorhexidine gluconate]      Site burning    Dobutamine in d5w      Tachycardia, tremors, SOB, flushing    Percocet [oxycodone-acetaminophen] Itching    Penicillins Rash     Cefepime given on 1/23/2020 without issue     Objective:     Vital Signs (Most Recent):  Temp: 98.1 °F (36.7 °C) (02/15/20 0739)  Pulse: 60 (02/15/20 0739)  Resp: 16 (02/15/20 0739)  BP: (!) 78/0 (02/15/20 0759)  SpO2: 96 % (02/15/20 0739) Vital Signs (24h Range):  Temp:  [97.4 °F (36.3 °C)-98.6 °F (37 °C)] 98.1 °F (36.7 °C)  Pulse:  [] 60  Resp:  [16-18] 16  SpO2:  [9 %-98  %] 96 %  BP: (70-94)/(0-68) 78/0     Patient Vitals for the past 72 hrs (Last 3 readings):   Weight   02/15/20 0437 98.2 kg (216 lb 7.9 oz)   02/14/20 1100 96 kg (211 lb 10.3 oz)   02/14/20 0545 96 kg (211 lb 10.3 oz)     Body mass index is 31.06 kg/m².      Intake/Output Summary (Last 24 hours) at 2/15/2020 1100  Last data filed at 2/15/2020 1009  Gross per 24 hour   Intake 960 ml   Output 1225 ml   Net -265 ml       Hemodynamic Parameters:  CVP:  [10 mmHg] 10 mmHg    Telemetry: No significant events.     Physical Exam  Constitutional: He is oriented to person, place, and time. He appears well-developed and well-nourished.   HENT:   Head: Normocephalic and atraumatic.   Eyes: Pupils are equal, round, and reactive to light. Conjunctivae and EOM are normal.   Neck: Normal range of motion. Neck supple. No JVD present. No thyromegaly present.   JVP mid neck  Cardiovascular: Normal rate and regular rhythm. Smooth VAD hum   Pulmonary/Chest: Effort normal and breath sounds normal. Intubated and sedated   Abdominal: Soft. Bowel sounds are normal.   Musculoskeletal: Normal range of motion. 1+ pedal edema   Neurological: He is alert and oriented to person, place, and time.   Skin: Skin is warm and dry. Capillary refill takes 2 to 3 seconds.   Psychiatric: He has a normal mood and affect. His behavior is normal. Judgment and thought content normal.  Significant Labs:  CBC:  Recent Labs   Lab 02/13/20  0500 02/14/20  0500 02/15/20  0449   WBC 7.97 7.76 7.76   RBC 3.31* 3.53* 3.36*   HGB 9.1* 9.8* 9.2*   HCT 30.0* 31.7* 30.7*    273 255   MCV 91 90 91   MCH 27.5 27.8 27.4   MCHC 30.3* 30.9* 30.0*     BNP:  Recent Labs   Lab 02/10/20  0602 02/12/20  0523 02/14/20  0500   * 300* 258*     CMP:  Recent Labs   Lab 02/13/20  0500 02/14/20  0500 02/15/20  0449   GLU 89 100 99   CALCIUM 8.9 8.8 8.7   ALBUMIN 2.8* 2.9*  2.9* 2.9*   PROT 6.3 6.4  6.4 6.4    137 133*   K 3.4* 3.1* 3.5   CO2 33* 38* 36*   CL 91* 88*  90*   BUN 33* 32* 28*   CREATININE 1.8* 1.9* 1.9*   ALKPHOS 78 86  86 86   ALT 23 25  25 24   AST 30 31  31 27   BILITOT 0.8 0.9  0.9 0.8      Coagulation:   Recent Labs   Lab 02/09/20  0400  02/13/20  0500 02/14/20  0500 02/15/20  0449   INR 1.8*  1.8*  1.8*   < > 2.0* 2.2* 2.4*   APTT 30.7  --   --   --   --     < > = values in this interval not displayed.     LDH:  Recent Labs   Lab 02/13/20  0500 02/14/20  0500 02/15/20  0449   * 315* 262*     Microbiology:  Microbiology Results (last 7 days)     ** No results found for the last 168 hours. **          I have reviewed all pertinent labs within the past 24 hours.    Estimated Creatinine Clearance: 49.2 mL/min (A) (based on SCr of 1.9 mg/dL (H)).    Diagnostic Results:  I have reviewed and interpreted all pertinent imaging results/findings within the past 24 hours.    Assessment and Plan:       * LVAD (left ventricular assist device) present  - S/P DT HM3 with closure of AV and repair of MV for regurg 1/23/20 ([facility]).  - HTS primary.  - INR Therapeutic. Will adjust warfarin as needed.   - ASA 325mg.   - LDH stable.   - Taken back to OR 1/24 for possible RVAD placement which was not done. Patient remained hemodynamically stable in OR. Wash out on 1/27. Chest closed 1/29.   - CVP 9. Started on PO lasix 40 mg daily . Checking BNP. If more than 400 then can change to lasix 40 mg BID.   - Off inotropes.   - TTE 2/10 at 5300 rpm: LVEDD 5.01, LVEF 10%, RV function mod depressed,   - Current speed 5300  - BP controlled. Continue Amlodipine 10mg daily.   - PT/OT/VAD education  - Sternal wound vac in place. Will remove when all lights are out/therapy complete     Procedure: Device Interrogation Including analysis of device parameters  Current Settings: Ventricular Assist Device  Review of device function is stable/unstable stable    TXP LVAD INTERROGATIONS 2/16/2020 2/16/2020 2/16/2020 2/15/2020 2/15/2020 2/15/2020 2/15/2020   Type - HeartMate3 HeartMate3  HeartMate3 - - HeartMate3   Flow 4.0 4.3 4.3 4.2 4.2 4.3 4.3   Speed 5300 5300 5300 5300 5300 5300 5300   PI 4.3 3.1 3.2 4.5 4.2 3.9 3.9   Power (Berry) 3.8 3.4 3.8 3.8 3.8 3.9 3.8   LSL 4900 4900 4900 4900 - - 4900   Pulsatility Intermittent pulse No Pulse No Pulse No Pulse - Intermittent pulse No Pulse       Acute hyperglycemia  Endocrine following.      Coagulopathy  Appreciate Hem/Onc's help. 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 Pulmonology consulted, and rec repeat CT of chest in 3 months  - Will need to follow-up in pulmonary clinic.      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

## 2020-02-16 NOTE — PLAN OF CARE
Patient remains free from falls and injuries through out shift. Patient AAO and VSS. Pt denies chest pain and SOB. Patient's LVAD (HM III) is functioning WNL, w/o any acute events or alarms thus far on shift. LVAD #'s, dopplers and MAPs WNL. Patient is everyday betadine and water dressing changes. Dressing changed performed during shift by  wife; next 2/16/20. Last INR 2.4. Lasix 40mg given daily. CVP daily. Daily weight. Pt will go to rehab when medically ready and placement is available. Patient's family at bedside. Plan of care reviewed with patient. Patient verbalizes understanding of plan.  Will continue to monitor. Plan of care discussed with patient.

## 2020-02-17 ENCOUNTER — ANTI-COAG VISIT (OUTPATIENT)
Dept: CARDIOLOGY | Facility: CLINIC | Age: 60
End: 2020-02-17

## 2020-02-17 VITALS
BODY MASS INDEX: 31.23 KG/M2 | HEART RATE: 90 BPM | WEIGHT: 218.13 LBS | SYSTOLIC BLOOD PRESSURE: 84 MMHG | HEIGHT: 70 IN | RESPIRATION RATE: 20 BRPM | OXYGEN SATURATION: 97 % | TEMPERATURE: 98 F

## 2020-02-17 DIAGNOSIS — Z79.01 LONG TERM (CURRENT) USE OF ANTICOAGULANTS: ICD-10-CM

## 2020-02-17 DIAGNOSIS — I48.0 PAROXYSMAL ATRIAL FIBRILLATION: ICD-10-CM

## 2020-02-17 DIAGNOSIS — Z95.811 LVAD (LEFT VENTRICULAR ASSIST DEVICE) PRESENT: ICD-10-CM

## 2020-02-17 PROBLEM — R53.81 DEBILITY: Status: ACTIVE | Noted: 2020-02-17

## 2020-02-17 LAB
ALBUMIN SERPL BCP-MCNC: 2.8 G/DL (ref 3.5–5.2)
ALBUMIN SERPL BCP-MCNC: 2.8 G/DL (ref 3.5–5.2)
ALP SERPL-CCNC: 91 U/L (ref 55–135)
ALP SERPL-CCNC: 91 U/L (ref 55–135)
ALT SERPL W/O P-5'-P-CCNC: 26 U/L (ref 10–44)
ALT SERPL W/O P-5'-P-CCNC: 26 U/L (ref 10–44)
ANION GAP SERPL CALC-SCNC: 7 MMOL/L (ref 8–16)
AST SERPL-CCNC: 24 U/L (ref 10–40)
AST SERPL-CCNC: 24 U/L (ref 10–40)
BASOPHILS # BLD AUTO: 0.06 K/UL (ref 0–0.2)
BASOPHILS NFR BLD: 0.9 % (ref 0–1.9)
BILIRUB DIRECT SERPL-MCNC: 0.4 MG/DL (ref 0.1–0.3)
BILIRUB SERPL-MCNC: 0.7 MG/DL (ref 0.1–1)
BILIRUB SERPL-MCNC: 0.7 MG/DL (ref 0.1–1)
BNP SERPL-MCNC: 226 PG/ML (ref 0–99)
BUN SERPL-MCNC: 25 MG/DL (ref 6–20)
CALCIUM SERPL-MCNC: 8.4 MG/DL (ref 8.7–10.5)
CHLORIDE SERPL-SCNC: 93 MMOL/L (ref 95–110)
CO2 SERPL-SCNC: 35 MMOL/L (ref 23–29)
CREAT SERPL-MCNC: 1.8 MG/DL (ref 0.5–1.4)
CRP SERPL-MCNC: 8.8 MG/L (ref 0–8.2)
DIFFERENTIAL METHOD: ABNORMAL
EOSINOPHIL # BLD AUTO: 0.2 K/UL (ref 0–0.5)
EOSINOPHIL NFR BLD: 2.2 % (ref 0–8)
ERYTHROCYTE [DISTWIDTH] IN BLOOD BY AUTOMATED COUNT: 17.7 % (ref 11.5–14.5)
EST. GFR  (AFRICAN AMERICAN): 46.3 ML/MIN/1.73 M^2
EST. GFR  (NON AFRICAN AMERICAN): 40 ML/MIN/1.73 M^2
GLUCOSE SERPL-MCNC: 89 MG/DL (ref 70–110)
HCT VFR BLD AUTO: 29.7 % (ref 40–54)
HGB BLD-MCNC: 8.8 G/DL (ref 14–18)
IMM GRANULOCYTES # BLD AUTO: 0.07 K/UL (ref 0–0.04)
IMM GRANULOCYTES NFR BLD AUTO: 1 % (ref 0–0.5)
INR PPP: 2.4 (ref 0.8–1.2)
LDH SERPL L TO P-CCNC: 242 U/L (ref 110–260)
LYMPHOCYTES # BLD AUTO: 1.7 K/UL (ref 1–4.8)
LYMPHOCYTES NFR BLD: 25.5 % (ref 18–48)
MAGNESIUM SERPL-MCNC: 2.5 MG/DL (ref 1.6–2.6)
MCH RBC QN AUTO: 27.1 PG (ref 27–31)
MCHC RBC AUTO-ENTMCNC: 29.6 G/DL (ref 32–36)
MCV RBC AUTO: 91 FL (ref 82–98)
MONOCYTES # BLD AUTO: 0.6 K/UL (ref 0.3–1)
MONOCYTES NFR BLD: 8.9 % (ref 4–15)
NEUTROPHILS # BLD AUTO: 4.1 K/UL (ref 1.8–7.7)
NEUTROPHILS NFR BLD: 61.5 % (ref 38–73)
NRBC BLD-RTO: 0 /100 WBC
PHOSPHATE SERPL-MCNC: 3.5 MG/DL (ref 2.7–4.5)
PLATELET # BLD AUTO: 242 K/UL (ref 150–350)
PMV BLD AUTO: 10.5 FL (ref 9.2–12.9)
POTASSIUM SERPL-SCNC: 3.6 MMOL/L (ref 3.5–5.1)
PREALB SERPL-MCNC: 23 MG/DL (ref 20–43)
PROT SERPL-MCNC: 6.1 G/DL (ref 6–8.4)
PROT SERPL-MCNC: 6.1 G/DL (ref 6–8.4)
PROTHROMBIN TIME: 22.9 SEC (ref 9–12.5)
RBC # BLD AUTO: 3.25 M/UL (ref 4.6–6.2)
SODIUM SERPL-SCNC: 135 MMOL/L (ref 136–145)
WBC # BLD AUTO: 6.74 K/UL (ref 3.9–12.7)

## 2020-02-17 PROCEDURE — 85025 COMPLETE CBC W/AUTO DIFF WBC: CPT

## 2020-02-17 PROCEDURE — 99222 PR INITIAL HOSPITAL CARE,LEVL II: ICD-10-PCS | Mod: ,,, | Performed by: NURSE PRACTITIONER

## 2020-02-17 PROCEDURE — 97110 THERAPEUTIC EXERCISES: CPT

## 2020-02-17 PROCEDURE — 80053 COMPREHEN METABOLIC PANEL: CPT

## 2020-02-17 PROCEDURE — 93750 PR INTERROGATE VENT ASSIST DEV, IN PERSON, W PHYSICIAN ANALYSIS: ICD-10-PCS | Mod: ,,, | Performed by: INTERNAL MEDICINE

## 2020-02-17 PROCEDURE — 99222 1ST HOSP IP/OBS MODERATE 55: CPT | Mod: ,,, | Performed by: NURSE PRACTITIONER

## 2020-02-17 PROCEDURE — 25000003 PHARM REV CODE 250: Performed by: PHYSICIAN ASSISTANT

## 2020-02-17 PROCEDURE — 83615 LACTATE (LD) (LDH) ENZYME: CPT

## 2020-02-17 PROCEDURE — 99239 HOSP IP/OBS DSCHRG MGMT >30: CPT | Mod: ,,, | Performed by: INTERNAL MEDICINE

## 2020-02-17 PROCEDURE — 25000003 PHARM REV CODE 250: Performed by: STUDENT IN AN ORGANIZED HEALTH CARE EDUCATION/TRAINING PROGRAM

## 2020-02-17 PROCEDURE — 80076 HEPATIC FUNCTION PANEL: CPT

## 2020-02-17 PROCEDURE — 86140 C-REACTIVE PROTEIN: CPT

## 2020-02-17 PROCEDURE — 84134 ASSAY OF PREALBUMIN: CPT

## 2020-02-17 PROCEDURE — 84100 ASSAY OF PHOSPHORUS: CPT

## 2020-02-17 PROCEDURE — 85610 PROTHROMBIN TIME: CPT

## 2020-02-17 PROCEDURE — 97535 SELF CARE MNGMENT TRAINING: CPT

## 2020-02-17 PROCEDURE — 25000003 PHARM REV CODE 250: Performed by: INTERNAL MEDICINE

## 2020-02-17 PROCEDURE — 83735 ASSAY OF MAGNESIUM: CPT

## 2020-02-17 PROCEDURE — 83880 ASSAY OF NATRIURETIC PEPTIDE: CPT

## 2020-02-17 PROCEDURE — 97530 THERAPEUTIC ACTIVITIES: CPT

## 2020-02-17 PROCEDURE — 27000248 HC VAD-ADDITIONAL DAY

## 2020-02-17 PROCEDURE — 99239 PR HOSPITAL DISCHARGE DAY,>30 MIN: ICD-10-PCS | Mod: ,,, | Performed by: INTERNAL MEDICINE

## 2020-02-17 PROCEDURE — 93750 INTERROGATION VAD IN PERSON: CPT | Mod: ,,, | Performed by: INTERNAL MEDICINE

## 2020-02-17 PROCEDURE — 25000003 PHARM REV CODE 250: Performed by: NURSE PRACTITIONER

## 2020-02-17 RX ORDER — FERROUS GLUCONATE 324(37.5)
324 TABLET ORAL
Qty: 30 TABLET | Refills: 11 | COMMUNITY
Start: 2020-02-18 | End: 2020-03-01

## 2020-02-17 RX ORDER — BALSAM PERU/CASTOR OIL
OINTMENT (GRAM) TOPICAL 2 TIMES DAILY
Start: 2020-02-17 | End: 2020-03-04 | Stop reason: SDUPTHER

## 2020-02-17 RX ORDER — WARFARIN 2.5 MG/1
2.5 TABLET ORAL DAILY
Qty: 30 TABLET | Refills: 11 | Status: SHIPPED | OUTPATIENT
Start: 2020-02-17 | End: 2020-02-28

## 2020-02-17 RX ORDER — ATORVASTATIN CALCIUM 80 MG/1
80 TABLET, FILM COATED ORAL NIGHTLY
Qty: 90 TABLET | Refills: 3 | Status: SHIPPED | OUTPATIENT
Start: 2020-02-17 | End: 2020-03-04 | Stop reason: SDUPTHER

## 2020-02-17 RX ORDER — FUROSEMIDE 40 MG/1
40 TABLET ORAL DAILY
Qty: 30 TABLET | Refills: 11 | Status: SHIPPED | OUTPATIENT
Start: 2020-02-18 | End: 2020-03-04

## 2020-02-17 RX ORDER — ASPIRIN 325 MG
325 TABLET ORAL DAILY
Qty: 30 TABLET | Refills: 11 | Status: SHIPPED | OUTPATIENT
Start: 2020-02-18 | End: 2020-03-04 | Stop reason: SDUPTHER

## 2020-02-17 RX ORDER — METHOCARBAMOL 500 MG/1
500 TABLET, FILM COATED ORAL NIGHTLY PRN
Qty: 30 TABLET | Refills: 11
Start: 2020-02-17 | End: 2020-02-27

## 2020-02-17 RX ORDER — HYDROCODONE BITARTRATE AND ACETAMINOPHEN 5; 325 MG/1; MG/1
1 TABLET ORAL EVERY 6 HOURS PRN
Refills: 0
Start: 2020-02-17 | End: 2020-03-01

## 2020-02-17 RX ORDER — PANTOPRAZOLE SODIUM 40 MG/1
40 TABLET, DELAYED RELEASE ORAL DAILY
Qty: 30 TABLET | Refills: 11 | Status: SHIPPED | OUTPATIENT
Start: 2020-02-18 | End: 2020-03-04 | Stop reason: SDUPTHER

## 2020-02-17 RX ORDER — DOCUSATE SODIUM 100 MG/1
200 CAPSULE, LIQUID FILLED ORAL 2 TIMES DAILY PRN
Refills: 0
Start: 2020-02-17 | End: 2020-03-01

## 2020-02-17 RX ORDER — AMLODIPINE BESYLATE 10 MG/1
10 TABLET ORAL DAILY
Qty: 30 TABLET | Refills: 11 | Status: SHIPPED | OUTPATIENT
Start: 2020-02-18 | End: 2020-02-28 | Stop reason: SINTOL

## 2020-02-17 RX ORDER — POTASSIUM CHLORIDE 20 MEQ/15ML
50 SOLUTION ORAL 2 TIMES DAILY
Refills: 0
Start: 2020-02-17 | End: 2020-03-01

## 2020-02-17 RX ORDER — DIGOXIN 125 MCG
0.12 TABLET ORAL DAILY
Qty: 30 TABLET | Refills: 11 | Status: SHIPPED | OUTPATIENT
Start: 2020-02-18 | End: 2020-03-04 | Stop reason: SDUPTHER

## 2020-02-17 RX ORDER — AMIODARONE HYDROCHLORIDE 400 MG/1
400 TABLET ORAL DAILY
Qty: 30 TABLET | Refills: 11 | Status: SHIPPED | OUTPATIENT
Start: 2020-02-18 | End: 2020-03-04 | Stop reason: SDUPTHER

## 2020-02-17 RX ADMIN — AMLODIPINE BESYLATE 10 MG: 10 TABLET ORAL at 08:02

## 2020-02-17 RX ADMIN — FUROSEMIDE 40 MG: 40 TABLET ORAL at 08:02

## 2020-02-17 RX ADMIN — AMIODARONE HYDROCHLORIDE 400 MG: 200 TABLET ORAL at 08:02

## 2020-02-17 RX ADMIN — ASPIRIN 325 MG ORAL TABLET 325 MG: 325 PILL ORAL at 08:02

## 2020-02-17 RX ADMIN — WARFARIN SODIUM 2.5 MG: 1 TABLET ORAL at 04:02

## 2020-02-17 RX ADMIN — FERROUS GLUCONATE TAB 324 MG (37.5 MG ELEMENTAL IRON) 324 MG: 324 (37.5 FE) TAB at 08:02

## 2020-02-17 RX ADMIN — PANTOPRAZOLE SODIUM 40 MG: 40 TABLET, DELAYED RELEASE ORAL at 08:02

## 2020-02-17 RX ADMIN — DIGOXIN 0.12 MG: 125 TABLET ORAL at 08:02

## 2020-02-17 RX ADMIN — POTASSIUM CHLORIDE 50 MEQ: 20 SOLUTION ORAL at 08:02

## 2020-02-17 NOTE — PLAN OF CARE
[facility]     Department of Hospital Medicine     [address] [place] [address]     [phone] after hours  [phone] fax                                        FACILITY TRANSFER ORDERS     Patient Name: Tae Delgado  YOB: [birth year]    Admit to:  [facility]    Diagnoses:  Active Hospital Problems    Diagnosis  POA    *LVAD (left ventricular assist device) present [Z95.811]  Not Applicable     Priority: 1 - High    Acute on chronic combined systolic and diastolic heart failure [I50.43]  Yes     Priority: 2     Paroxysmal atrial fibrillation [I48.0]  Yes     Priority: 3     Acute kidney injury superimposed on chronic kidney disease [N17.9, N18.9]  Yes     Priority: 4     Left ventricular thrombus without MI [I51.3]  Yes     Priority: 4      With h/o splenic and renal emboli as well as h/o embolic CVA      Right lower lobe pulmonary nodule [R91.1]  Yes     Priority: 6     ICD (implantable cardioverter-defibrillator) in place [Z95.810]  Unknown     Priority: 8     Debility [R53.81]  Unknown    Acute postoperative respiratory insufficiency [J95.89]  No    Alteration in skin integrity [R23.9]  Yes    Acute hyperglycemia [R73.9]  No    Coagulopathy [D68.9]  Yes    ACP (advance care planning) [Z71.89]  Not Applicable     Advanced Directives::  Living Will: Yes. Copy on chart: No  LaPOST: No  Do Not Resuscitate Status: Yes  Medical Power of : Yes. Agent's Name: Epi Delgado-youngest son. Agent's Contact Number: [phone]  Second agent is Jose Elias Delgado [phone] son Third agent: Lavern Carbajal-girlfriend of 6 years.  Decision-Making Capacity: Patient answered questions, Lavern was present. He has documents at home in the [facility] safe so asked to replicate the documents which was done today.  He would not want to be maintained on life support if he was unable to participate in his own life in a meaningful way. He has  always been active and that is his goal. He would like fixable things fixed and temporary ventilatory or JODIE support if he was able to recover. He would want all stopped if he was never going to get better. He would want fluids if he was thirsty but we talked about not providing fluids if it would prolong or cause burdens.         NSVT (nonsustained ventricular tachycardia) [I47.2]  Yes      Resolved Hospital Problems    Diagnosis Date Resolved POA    Thrombus of left atrial appendage [I51.3] 01/25/2020 Yes     Priority: 3     Transaminitis [R74.0] 01/24/2020 Yes     Priority: 5     Hepatic congestion [K76.1] 02/12/2020 Yes     Priority: 7     Leukocytosis [D72.829] 02/12/2020 Yes    Post-procedural fever [R50.82] 02/06/2020 Unknown    Hypernatremia [E87.0] 02/06/2020 No    On enteral nutrition [Z78.9] 02/12/2020 Yes    Abnormal EKG [R94.31] 01/24/2020 Unknown    Acute on chronic heart failure [I50.9] 02/06/2020 Unknown    Heart abnormality [Q24.9] 02/06/2020 Not Applicable    Heart transplant candidate [Z76.82] 01/18/2020 Not Applicable    Pre-transplant evaluation for heart transplant [Z01.818] 02/06/2020 Not Applicable    Thrombus [I82.90] 01/24/2020 Yes    CHF (congestive heart failure) [I50.9] 01/13/2020 Yes       Vital Signs: Routine.    Allergies:  Review of patient's allergies indicates:   Allergen Reactions    Biopatch [chlorhexidine gluconate]      Site burning    Dobutamine in d5w      Tachycardia, tremors, SOB, flushing    Percocet [oxycodone-acetaminophen] Itching    Penicillins Rash     Cefepime given on 1/23/2020 without issue       Diet: cardiac diet     Acitivities: activity as tolerated    Nursing: per unit protocol     Labs: BMP, CBC daily for 3 days    CONSULTS:        Physical Therapy to evaluate and treat     Occupational Therapy to evaluate and treat     Speech Therapy  to evaluate and treat    Medications:       Tae Delgado   Home Medication Instructions DAHIANA:[id number]     Printed on:02/17/20 6840   Medication Information                      amiodarone (PACERONE) 400 MG tablet  Take 1 tablet (400 mg total) by mouth once daily.             amLODIPine (NORVASC) 10 MG tablet  Take 1 tablet (10 mg total) by mouth once daily.             aspirin 325 MG tablet  Take 1 tablet (325 mg total) by mouth once daily.             atorvastatin (LIPITOR) 80 MG tablet  Take 1 tablet (80 mg total) by mouth every evening.             balsam peru-castor oil Oint  Apply topically 2 (two) times daily.             digoxin (LANOXIN) 125 mcg tablet  Take 1 tablet (0.125 mg total) by mouth once daily.             docusate sodium (COLACE) 100 MG capsule  Take 2 capsules (200 mg total) by mouth 2 (two) times daily as needed for Constipation.             ferrous gluconate 324 mg (37.5 mg iron) Tab tablet  Take 1 tablet (324 mg total) by mouth daily with breakfast.             furosemide (LASIX) 40 MG tablet  Take 1 tablet (40 mg total) by mouth once daily.             HYDROcodone-acetaminophen (NORCO) 5-325 mg per tablet  Take 1 tablet by mouth every 6 (six) hours as needed.             methocarbamol (ROBAXIN) 500 MG Tab  Take 1 tablet (500 mg total) by mouth nightly as needed.             pantoprazole (PROTONIX) 40 MG tablet  Take 1 tablet (40 mg total) by mouth once daily.             potassium chloride 10% (KAYCIEL) 20 mEq/15 mL oral solution  Take 37.5 mLs (50 mEq total) by mouth 2 (two) times daily.             VENTOLIN HFA 90 mcg/actuation inhaler  Inhale 1 Inhaler into the lungs every 4 (four) hours as needed.             warfarin (COUMADIN) 2.5 MG tablet  Take 1 tablet (2.5 mg total) by mouth Daily.                       _________________________________  MARBELLA Mcfadden  02/17/2020

## 2020-02-17 NOTE — PLAN OF CARE
Patient to discharge to [facility] today   Transport scheduled for 230 pm.     Call report to [phone] or [phone]  [facility] [id number]

## 2020-02-17 NOTE — PROGRESS NOTES
Pt to be discharged to [facility] per MD orders.  Tele discontinued and returned to station.  FRANDY PICC discontinued and pressure held for 5 minutes; catheter tip intact x1.  Wife at the bedside.  Medication list and prescriptions reviewed.  LVAD equipment reconciled.  Pt and fmakily verbalize understanding of all written and verbal discharge instructions.  Pt awaiting transport arrival.  All personal belongings sent with wife.  Heart pillow and LVAD emergency bag to be sent with pt once transport arrives. Will continue to monitor.

## 2020-02-17 NOTE — PROGRESS NOTES
MAP 61 with AM vitals and pt scheduled to receive PO Norvasc 10mg and Lasix 40mg.  SDannielle Hedricket notified; okay to administer AM doses.  Orders implemented as instructed. Will continue to monitor.        02/17/20 0730 02/17/20 0731   Vital Signs   Temp 98 °F (36.7 °C)  --    Temp src Oral  --    Pulse 90  --    Heart Rate Source Monitor  --    Resp 20  --    SpO2 95 %  --    O2 Device (Oxygen Therapy) room air  --    BP (!) 68/0 (!) 89/42   MAP (mmHg)  --  61   BP Location Left arm Left arm   BP Method Doppler Automatic   Patient Position Lying Lying

## 2020-02-17 NOTE — PROGRESS NOTES
SEEN PT IN HOSPITAL, INVENTORIED PT'S EQUIPMENT FOR DISCHARGE, ALL EQUIPMENT IS ACCOUNTED FOR. PT AND CAREGIVER VERBALIZED UNDERSTANDING OF LVAD SYSTEM AND COMPONENTS. WILL FOLLOW UP IN CLINIC.

## 2020-02-17 NOTE — DISCHARGE SUMMARY
[facility]  Heart Transplant  Discharge Summary      Patient Name: Tae Delgado  MRN: [id number]  Admission Date: 1/9/2020  Hospital Length of Stay: 39 days  Discharge Date and Time: 02/17/2020 3:30 PM  Attending Physician: Isiah Montero MD   Discharging Provider: MARBELLA Mcfadden  Primary Care Provider: Elham Pearson MD     HPI: 55 y.o. WM with history of NICMP diagnosed in 2010, ICD, LV thrombus (with prior splenic and renal emboli), Embolic  CVA , paroxysmal atrial fib, HTN, HLP  presents for  F/U today to clinic and he was volume overloaded on exam .  He states he had 3 admission in the last 3 months for volume overload. He started having more SOB on exertion since one month. Also endorses of Orthopnea since last one month. Alble to walk only 150 ft. He also has Bilateral lower extremity edema. He was admitted here in 2017 for ADHD here at [facility] and [facility] during that admission showed PCWP 40 and CVP 17. Currently denies chest pain, lightheadedness     Procedure(s) (LRB):  CLOSURE, WOUND, STERNUM (N/A)  WASHOUT (N/A)  APPLICATION, WOUND VAC (N/A)     Hospital Course: patient admitted to HTS service and placed on 24 hour telemetry   Patient admitted with HTS and started on the pathway for workup for advanced options.  He was presented at [facility] on 1/17/20 and approved for VAD as DT; declined for transplant due to elevated PA pressure.  On 1/23/20 he undewent HM 3 implant with closure of AV and repair of MV for regurg ([facility]).  His post op coarse was complicated by RV failure.  On 1/24 he was taken back to the OR for possible RVAD placement; however, RVAD not placed and patient remained hemodynamically stable in the OR.  He went for wash out on 1/27 and his chest was closed on 1/29. Patient was weaned off of inotropes and transitioned to po Lasix at 40mg QDaily.  He has done well on this dose and has been euvolemic.  He is negative 11L throughout hospital stay; VAD speed set at 5300  and weight on day of discharge was 218lbs   Postoperatively he had paroxsymal atrial fib; he was started on Digoxin and anticoagulated with Coumadin.   Renal function neptali acutely in the post op period but improved during his hospital stay.  Creatinine on day of discharge was 1.8   Right lower lobe pulm nodule found incidentally on CT: recommend repeat chest CT in 3 months and follow up in pulmonary clinic   Patient was discharged to rehab facility for further therapy.  Will have scheduled follow up in HTS clinic.     Consults (From admission, onward)        Status Ordering Provider     Inpatient consult to Cardiothoracic Surgery  Once     Provider:  (Not yet assigned)    Completed LISA SAWANT     Inpatient consult to Endocrinology  Once     Provider:  (Not yet assigned)    Completed FEDERICA TATE     Inpatient consult to ENT  Once     Provider:  (Not yet assigned)    Completed JORDAN NEWMAN     Inpatient consult to Hematology/Oncology  Once     Provider:  (Not yet assigned)    Completed LISA SAWANT     Inpatient consult to Infectious Diseases  Once     Provider:  (Not yet assigned)    Completed DONY ALCAZAR     Inpatient consult to Infectious Diseases  Once     Provider:  (Not yet assigned)    Completed OCTAVIANO ARZOLA     Inpatient consult to Nephrology  Once     Provider:  (Not yet assigned)    Completed OMAR ABRAMS     Inpatient consult to Palliative Care  Once     Provider:  (Not yet assigned)    Completed ROSAURA ANN     Inpatient consult to Physical Medicine Rehab  Once     Provider:  (Not yet assigned)    Completed ROSAURA ANN     Inpatient [facility]  Once     Provider:  (Not yet assigned)    Completed LISE SNEED     Inpatient consult to PICC team ([facility])  Once     Provider:  (Not yet assigned)    Completed OMAR ABRAMS     Inpatient consult to PICC team ([facility])  Once     Provider:  (Not yet assigned)    Completed JOSE ALBERTO  MOAR     Inpatient Consult to Pre-VAD Coordinator  Once     Provider:  (Not yet assigned)    Completed LISE SNEED     Inpatient consult to Pulmonology  Once     Provider:  (Not yet assigned)    Completed LISA SAWANT     Inpatient consult to Registered Dietitian/Nutritionist  Once     Provider:  (Not yet assigned)    Completed LISE SNEED     Inpatient consult to Registered Dietitian/Nutritionist  Once     Provider:  (Not yet assigned)    Completed FEDERICA TATE     Inpatient consult to Registered Dietitian/Nutritionist  Once     Provider:  (Not yet assigned)    Completed MARCO CHAVES     Inpatient consult to Spiritual Care  Once     Provider:  (Not yet assigned)    Completed ROSAURA ANN     Inpatient Consult to Transplant Coordinator  Once     Provider:  (Not yet assigned)    Completed LISE SNEED          Significant Diagnostic Studies: see chart for full details    Pending Diagnostic Studies:     Procedure Component Value Units Date/Time    APTT [[id number]] Collected:  02/04/20 2330    Order Status:  Sent Lab Status:  No result     Specimen:  Blood     APTT [[id number]] Collected:  02/04/20 2330    Order Status:  Sent Lab Status:  No result     Specimen:  Blood     APTT [[id number]] Collected:  02/04/20 2330    Order Status:  Sent Lab Status:  No result     Specimen:  Blood     APTT [[id number]] Collected:  01/18/20 0352    Order Status:  Sent Lab Status:  In process Updated:  01/18/20 0352    Specimen:  Blood     Brain natriuretic peptide [[id number]] Collected:  02/09/20 0400    Order Status:  Sent Lab Status:  No result     Specimen:  Blood     C-reactive protein [[id number]] Collected:  02/09/20 0400    Order Status:  Sent Lab Status:  No result     Specimen:  Blood     Comprehensive metabolic panel [[id number]] Collected:  02/04/20 0134    Order Status:  Sent Lab Status:  In process Updated:  02/04/20 0135    Specimen:  Blood     Comprehensive metabolic panel  [[id number]] Collected:  01/30/20 1600    Order Status:  Sent Lab Status:  In process Updated:  01/30/20 1605    Specimen:  Blood     Comprehensive metabolic panel [[id number]] Collected:  01/24/20 0334    Order Status:  Sent Lab Status:  In process Updated:  01/24/20 0334    Specimen:  Blood     Digoxin level [[id number]] Collected:  02/09/20 0400    Order Status:  Sent Lab Status:  No result     Specimen:  Blood     Vancomycin, random [[id number]] Collected:  02/04/20 0134    Order Status:  Sent Lab Status:  In process Updated:  02/04/20 0135    Specimen:  Blood         Final Active Diagnoses:    Diagnosis Date Noted POA    PRINCIPAL PROBLEM:  LVAD (left ventricular assist device) present [Z95.811] 01/24/2020 Not Applicable    Acute on chronic combined systolic and diastolic heart failure [I50.43] 07/10/2017 Yes    Paroxysmal atrial fibrillation [I48.0] 01/18/2016 Yes    Acute kidney injury superimposed on chronic kidney disease [N17.9, N18.9] 01/09/2020 Yes    Left ventricular thrombus without MI [I51.3] 08/31/2015 Yes    Right lower lobe pulmonary nodule [R91.1] 01/13/2020 Yes    ICD (implantable cardioverter-defibrillator) in place [Z95.810] 01/13/2020 Unknown    Debility [R53.81] 02/17/2020 Unknown    Acute postoperative respiratory insufficiency [J95.89]  No    Alteration in skin integrity [R23.9] 01/28/2020 Yes    Acute hyperglycemia [R73.9] 01/24/2020 No    Coagulopathy [D68.9] 01/19/2020 Yes    ACP (advance care planning) [Z71.89] 01/18/2020 Not Applicable    NSVT (nonsustained ventricular tachycardia) [I47.2]  Yes      Problems Resolved During this Admission:    Diagnosis Date Noted Date Resolved POA    Thrombus of left atrial appendage [I51.3] 01/21/2020 01/25/2020 Yes    Transaminitis [R74.0]  01/24/2020 Yes    Hepatic congestion [K76.1]  02/12/2020 Yes    Leukocytosis [D72.829] 02/06/2020 02/12/2020 Yes    Post-procedural fever [R50.82] 02/05/2020 02/06/2020 Unknown    Hypernatremia  [E87.0]  02/06/2020 No    On enteral nutrition [Z78.9] 01/31/2020 02/12/2020 Yes    Abnormal EKG [R94.31]  01/24/2020 Unknown    Acute on chronic heart failure [I50.9]  02/06/2020 Unknown    Heart abnormality [Q24.9]  02/06/2020 Not Applicable    Heart transplant candidate [Z76.82]  01/18/2020 Not Applicable    Pre-transplant evaluation for heart transplant [Z01.818]  02/06/2020 Not Applicable    Thrombus [I82.90]  01/24/2020 Yes    CHF (congestive heart failure) [I50.9] 01/09/2020 01/13/2020 Yes      Discharged Condition: stable    Disposition: Rehab Facility    Follow Up:    Patient Instructions:      Ambulatory referral/consult to Anticoagulation Monitoring   Standing Status: Future   Referral Priority: Routine Referral Type: Consultation   Referral Reason: Specialty Services Required   Requested Specialty: Cardiology   Number of Visits Requested: 1     Diet Cardiac     Notify your health care provider if you experience any of the following:  redness, tenderness, or signs of infection (pain, swelling, redness, odor or green/yellow discharge around incision site)     Notify your health care provider if you experience any of the following:  temperature >100.4     Activity as tolerated     Medications:  Reconciled Home Medications:      Medication List      START taking these medications    amLODIPine 10 MG tablet  Commonly known as:  NORVASC  Take 1 tablet (10 mg total) by mouth once daily.  Start taking on:  February 18, 2020     aspirin 325 MG tablet  Take 1 tablet (325 mg total) by mouth once daily.  Start taking on:  February 18, 2020     atorvastatin 80 MG tablet  Commonly known as:  LIPITOR  Take 1 tablet (80 mg total) by mouth every evening.     balsam peru-castor oil Oint  Apply topically 2 (two) times daily.  Notes to patient:  Venelex ointment to sensitive areas--elbows, heels, and sacrum     digoxin 125 mcg tablet  Commonly known as:  LANOXIN  Take 1 tablet (0.125 mg total) by mouth once  daily.  Start taking on:  February 18, 2020     docusate sodium 100 MG capsule  Commonly known as:  COLACE  Take 2 capsules (200 mg total) by mouth 2 (two) times daily as needed for Constipation.     ferrous gluconate 324 mg (37.5 mg iron) Tab tablet  Take 1 tablet (324 mg total) by mouth daily with breakfast.  Start taking on:  February 18, 2020     HYDROcodone-acetaminophen 5-325 mg per tablet  Commonly known as:  NORCO  Take 1 tablet by mouth every 6 (six) hours as needed.     methocarbamol 500 MG Tab  Commonly known as:  ROBAXIN  Take 1 tablet (500 mg total) by mouth nightly as needed.     pantoprazole 40 MG tablet  Commonly known as:  PROTONIX  Take 1 tablet (40 mg total) by mouth once daily.  Start taking on:  February 18, 2020     potassium chloride 10% 20 mEq/15 mL oral solution  Commonly known as:  KAYCIEL  Take 37.5 mLs (50 mEq total) by mouth 2 (two) times daily.        CHANGE how you take these medications    amiodarone 400 MG tablet  Commonly known as:  PACERONE  Take 1 tablet (400 mg total) by mouth once daily.  Start taking on:  February 18, 2020  What changed:    · medication strength  · how much to take  · how to take this  · when to take this  · additional instructions     furosemide 40 MG tablet  Commonly known as:  LASIX  Take 1 tablet (40 mg total) by mouth once daily.  Start taking on:  February 18, 2020  What changed:  when to take this     warfarin 2.5 MG tablet  Commonly known as:  COUMADIN  Take 1 tablet (2.5 mg total) by mouth Daily.  What changed:    · medication strength  · how much to take  · how to take this  · when to take this  · additional instructions        CONTINUE taking these medications    Ventolin HFA 90 mcg/actuation inhaler  Generic drug:  albuterol  Inhale 1 Inhaler into the lungs every 4 (four) hours as needed.        STOP taking these medications    hydrocortisone 2.5 % ointment     metoprolol succinate 50 MG 24 hr tablet  Commonly known as:  TOPROL-XL     spironolactone  25 MG tablet  Commonly known as:  ALDACTONE            MARBELLA Mcfadden  Heart Transplant  [facility]

## 2020-02-17 NOTE — ASSESSMENT & PLAN NOTE
- S/P DT HM3 with closure of AV and repair of MV for regurg 1/23/20 ([facility]).  - HTS primary.  - INR Therapeutic.   - ASA 325mg.   - LDH stable.   - Taken back to OR 1/24 for possible RVAD placement which was not done. Patient remained hemodynamically stable in OR. Wash out on 1/27. Chest closed 1/29.   - CVP 11.  Stopping IV lasix and transitioning to po (will start with Lasix 40mg QDaily)  - Off inotropes.  - TTE 2/10 at 5300 rpm: LVEDD 5.01, LVEF 10%, RV function mod depressed,   - Current speed 5300  - BP controlled. Continue Amlodipine 10mg daily.   - PT/OT/VAD education  - Sternal wound vac in place. Will remove when all lights are out/therapy complete.     Procedure: Device Interrogation Including analysis of device parameters  Current Settings: Ventricular Assist Device  Review of device function is stable/unstabe    TXP LVAD INTERROGATIONS 2/17/2020 2/17/2020 2/17/2020 2/17/2020 2/16/2020 2/16/2020 2/16/2020   Type HeartMate3 HeartMate3 HeartMate3 HeartMate3 - - -   Flow 4.1 4.2 4.2 4.0 4.3 4.2 4.0   Speed 5300 5350 5300 5300 5300 5300 5300   PI 5.2 4.0 3.5 4.4 4.0 4.5 4.3   Power (Berry) 3.8 3.7 3.8 3.9 3.9 3.8 3.8   LSL - 4900 - - - - 4900   Pulsatility - Intermittent pulse Intermittent pulse Intermittent pulse Intermittent pulse Intermittent pulse Intermittent pulse

## 2020-02-17 NOTE — PROGRESS NOTES
02/17/2020  Renaldo Miles    Current provider:  Isiah Montero MD      I, Renaldo Miles, rounded on Tae Delgado to ensure all mechanical assist device settings (IABP or VAD) were appropriate and all parameters were within limits.  I was able to ensure all back up equipment was present, the staff had no issues, and the Perfusion Department daily rounding was complete.    2:25 PM

## 2020-02-17 NOTE — PT/OT/SLP PROGRESS
Occupational Therapy   Treatment    Name: Tae Delgado  MRN: [id number]  Admitting Diagnosis:  LVAD (left ventricular assist device) present  19 Days Post-Op    Recommendations:     Discharge Recommendations: rehabilitation facility  Discharge Equipment Recommendations:  (TBD)  Barriers to discharge:  None    Assessment:     Tae Delgado is a 60 y.o. male with a medical diagnosis of LVAD (left ventricular assist device) present. He presents with the following performance deficits affecting function: weakness, impaired endurance, impaired self care skills, impaired functional mobilty, gait instability, impaired balance, impaired cardiopulmonary response to activity. Patient continues to make progress towards OT goals, LVAD management, and ambulation but is limited by decreased activity tolerance. At this time, patient will continue to benefit from acute skilled therapy intervention to address deficits/underlying impairments and progress towards prior level of function. After discharge, patient will benefit from continuing therapy at rehab facility to increase independence in ADLs and functional mobility through skilled balance training and skilled therapeutic exercise to promote safety at home.    Rehab Prognosis:  Good; patient would benefit from acute skilled OT services to address these deficits and reach maximum level of function.       Plan:     Patient to be seen 6 x/week to address the above listed problems via self-care/home management, therapeutic activities, therapeutic exercises  · Plan of Care Expires: 03/02/20  · Plan of Care Reviewed with: patient, significant other    Subjective     Pain/Comfort:  · Pain Rating 1: 0/10    Objective:     Communicated with: RN prior to session. Patient found HOB elevated with telemetry, LVAD and significant other upon OT entry to room.    General Precautions: Standard, fall, sternal, LVAD   Orthopedic Precautions:N/A   Braces: N/A     Occupational Performance:     Bed  Mobility:    · Patient completed Supine to Sit with stand by assistance and HOB elevated     Functional Mobility/Transfers:  · Patient completed Sit <> Stand Transfer with minimum assistance  with no assistive device   · 1x from EOB and 1x from BSC frame over toilet  · Patient completed Toilet Transfer Step Transfer technique with contact guard assistance with  no AD  · Functional Mobility: Patient ambulated EOB > bathroom with CGA and hand-held assist and ambulated bathroom > bedside chair with min assist and hand-held assist.    Activities of Daily Living:  · Grooming: contact guard assistance standing at sink for 2 min  · Toileting: contact guard assistance for balanace when standing to perform hygiene and min assist for clothing management    LVAD management:  · Patient on battery power at start of session with all items in consolidation bag. Therapist provided assist donning consolidation bag and waist strap.  · Patient's significant other reported that self test completed this morning and numbers logged into binder.   · Patient left on battery power at end of session and had no questions regarding LVAD management.     [facility] 6 Click ADL: 18    Treatment & Education:   Reviewed sternal precautions with patient at start of session.   Therapist provided facilitation and instruction of proper body mechanics, energy conservation, and fall prevention strategies during tasks listed above.   Patient performed 1x10 B UE shoulder flexion and forward punches. Therapist provided visual and verbal cues for correct technique.   Educated patient on importance of sitting in bedside chair throughout the day and performing B UE AROM exercises within pain free range to increase activity tolerance.   Educated patient on OT POC and answered all questions within OT scope of practice.   Whiteboard updated    Patient left up in chair with all lines intact, call button in reach and significant other presentEducation:      GOALS:    Multidisciplinary Problems     Occupational Therapy Goals        Problem: Occupational Therapy Goal    Goal Priority Disciplines Outcome Interventions   Occupational Therapy Goal     OT, PT/OT Ongoing, Progressing    Description:  Goals to be met by: 2020     Patient will increase functional independence with ADLs by performin.  UE Dressing with Minimal Assistance.  2.  LE Dressing with Minimal Assistance.  3.  Grooming while standing with Minimal Assistance. -GOAL MET    REVISED: Grooming while standing with supervision.  4.  Toileting from bedside commode with Minimal Assistance for hygiene and clothing management. -GOAL MET    REVISED: Toileting from bedside commode with supervision.  5.  Sitting at edge of bed x10 minutes with Minimal Assistance in prep for seated grooming tasks -GOAL MET 2/10  6.  Supine to sit with min A. -GOAL MET   7.  Toilet transfer to bedside commode with Minimal Assistance.- GOAL MET    REVISED: Toilet transfer with SBA.  8.  Pt will perform LVAD management independently - progressing                     Multidisciplinary Problems (Resolved)        Problem: Occupational Therapy Goal    Goal Priority Disciplines Outcome Interventions   Occupational Therapy Goal   (Resolved)     OT, PT/OT Met    Description:  Skilled OT services not necessary at this time secondary to patient performing ADLs at [facility]. Patient in agreement. Please re-consult OT if change in patient's functional status is noted.                     Time Tracking:     OT Date of Treatment: 20  OT Start Time: 851  OT Stop Time: 918  OT Total Time (min): 27 min    Billable Minutes:Self Care/Home Management 13  Therapeutic Activity 14    Leslee Terrell OT  2020

## 2020-02-17 NOTE — CONSULTS
Inpatient consult to Physical Medicine Rehab  Consult performed by: Kelley Beaulieu NP  Consult ordered by: Isiah Montero MD  Reason for consult: Assess rehab needs      Reviewed patient history and current admission.  Rehab team following.  Full consult to follow.    JENNIFER Evans, FNP-C  Physical Medicine & Rehabilitation   02/17/2020  Spectralink: [id number]

## 2020-02-17 NOTE — PROGRESS NOTES
Pt to be discharged to [facility] re[facility] per MD orders.  Attempted to call report called to [facility], but no answer.  Pt still awaiting transport. Will attempt again. Will continue to monitor.     1540  Attempted to call report to [facility] again, but sent to voicemail.      1625  Attempted to call report.  [facility] requested to call back in 5 minutes.  Transport arrived, but had to leave to get specific discharge papers and will return.  Will call [facility] back.     1640  Reported off to [facility] of [facility].  Pt still awaiting transport return for transfer to rehab facility.  Pt and family tolerating plan of care. Will continue to monitor.     1715   Pt escorted off unit via wheelchair per transport.  LVAD emergency equipment with pt.  Wife ambulated alongside.

## 2020-02-17 NOTE — PLAN OF CARE
Goals reviewed and revised as appropriate for patient.  Leslee Terrell OTR/L  Pager #: [phone]  2020    Problem: Occupational Therapy Goal  Goal: Occupational Therapy Goal  Description  Goals to be met by: 2020     Patient will increase functional independence with ADLs by performin.  UE Dressing with Minimal Assistance.  2.  LE Dressing with Minimal Assistance.  3.  Grooming while standing with Minimal Assistance. -GOAL MET    REVISED: Grooming while standing with supervision.  4.  Toileting from bedside commode with Minimal Assistance for hygiene and clothing management. -GOAL MET    REVISED: Toileting from bedside commode with supervision.  5.  Sitting at edge of bed x10 minutes with Minimal Assistance in prep for seated grooming tasks -GOAL MET 2/10  6.  Supine to sit with min A. -GOAL MET   7.  Toilet transfer to bedside commode with Minimal Assistance.- GOAL MET    REVISED: Toilet transfer with SBA.  8.  Pt will perform LVAD management independently - progressing          Outcome: Ongoing, Progressing

## 2020-02-17 NOTE — HPI
Miguel Delgado is a 60-year-old male with PMHx of ICD, LV thrombus, embolic CVA, A fib, and HTN. Patient presented to [facility] on 1/9 for volume overload and SOB on exertion. Now S/P LVAD 1/23 complicated by RV dysfunction and coagulopathy and chest remained open. On 1/29 wound vac was placed. Hospital course complicated by post procedural fever (ID consulted was on Vanc and fevers and Leukocytosis have improved), right lower lobe pulmonary nodule (CT chest/abd/pelvis on 1/12, Pulmonology consulted, and recommend repeat CT of chest in 3 months), LV thrombus (TTE 1/13 showed no thrombus), HTN (continue Amlodipine, now controlled), hoarseness (ENT consulted, Left TVF immobility rec follow up out pt in voice clinic).     Functional History: Patient lives with wife and will be going to an apartment in [place] for a coupe months per wife after discharge before going back to home in [place].

## 2020-02-17 NOTE — PROGRESS NOTES
"FRANDY TLC PICC in place at this time.  Pt no longer requiring strict hemodynamic monitoring or access for IV inotropes/ABX.  Dressing due to be changed as well.  Pt with allergy to chlorhexidine so Biopatch not in use.  Per notes, "Please do NOT use dressings with the white border - patient has an intolerance".  Can utilize betadine/NS for dressing change with plain Tegaderm for dressing.  Pt expected to be discharged soon to rehab, but unknown if D/C will happen today or later this week.  Spoke with LY Cheung about continued need for PICC.  Will keep PICC in place for now in case pt is discharged today.  Consult placed to midline team for PIV in case pt is not discharged today, so that PICC can be removed.  Will continue to monitor.   "

## 2020-02-17 NOTE — PLAN OF CARE
Discharge pLanning:    Per Chart review-intake    Additional Transportation Information (last 72 hours)     Scheduled  Date/Time     Row Name  02/17/20  4922           Scheduled  Date/Time   Scheduled  Date  --           Scheduled  Time  5319

## 2020-02-17 NOTE — CONSULTS
[facility]  Physical Medicine & Rehab  Consult Note    Patient Name: Tae Delgado  MRN: [id number]  Admission Date: 1/9/2020  Hospital Length of Stay: 39 days  Attending Physician: Isiah Montero MD     Inpatient consult to Physical Medicine & Rehabilitation  Consult performed by: Kelley Beaulieu NP  Consult requested by:  Isiah Montero MD    Collaborating Physician: Joel Beavers MD  Reason for Consult:  Assess rehabilitation needs     Consults  Subjective:     Principal Problem: LVAD (left ventricular assist device) present    HPI: Miguel Delgado is a 60-year-old male with PMHx of ICD, LV thrombus, embolic CVA, A fib, and HTN. Patient presented to [facility] on 1/9 for volume overload and SOB on exertion. Now S/P LVAD 1/23 complicated by RV dysfunction and coagulopathy and chest remained open. On 1/29 wound vac was placed. Hospital course complicated by post procedural fever (ID consulted was on Vanc and fevers and Leukocytosis have improved), right lower lobe pulmonary nodule (CT chest/abd/pelvis on 1/12, Pulmonology consulted, and recommend repeat CT of chest in 3 months), LV thrombus (TTE 1/13 showed no thrombus), HTN (continue Amlodipine, now controlled), hoarseness (ENT consulted, Left TVF immobility rec follow up out pt in voice clinic).     Functional History: Patient lives with wife and will be going to an apartment in [place] for a coupe months per wife after discharge before going back to home in [place].     Hospital Course:   2/15/20: Participated w/ PT. Bed mobility SBA. Sit to stand CGA- HHA. Ambulates 14 ft and 8 ft with HHA- Min A for balance and assist facilitating normal weight shift.   2/16/20: Participated w/ OT. Sit to stand Schuyler. Grooming CGA. LBD SBA.     Past Medical History:   Diagnosis Date    CHF (congestive heart failure) 1/2010    Dx  1/2010 w/ decreased LV systolic function (EF 15%) by ECHO 1/2015    COCM (congestive cardiomyopathy) 7/20/2016    Hyperlipidemia      Hypertension     Paroxysmal atrial fibrillation     Pulmonary embolus 2008    Stroke     Superficial thrombophlebitis      Past Surgical History:   Procedure Laterality Date    APPLICATION OF WOUND VACUUM-ASSISTED CLOSURE DEVICE N/A 1/29/2020    Procedure: APPLICATION, WOUND VAC;  Surgeon: Ino Schmitt MD;  [facility];  Service: Cardiovascular;  Laterality: N/A;    DRAINAGE OF PLEURAL EFFUSION Left 1/24/2020    Procedure: DRAINAGE, PLEURAL EFFUSION;  Surgeon: Ino Schmitt MD;  [facility];  Service: Cardiovascular;  Laterality: Left;  500 ml drainage    INSERTION OF GRAFT TO PERICARDIUM N/A 1/24/2020    Procedure: INSERTION, GRAFT, PERICARDIUM;  Surgeon: Ino Schmitt MD;  [facility];  Service: Cardiovascular;  Laterality: N/A;  Prevena    IRRIGATION OF MEDIASTINUM N/A 1/27/2020    Procedure: IRRIGATION, MEDIASTINUM;  Surgeon: Ino Schmitt MD;  [facility];  Service: Cardiovascular;  Laterality: N/A;    LEFT VENTRICULAR ASSIST DEVICE Left 1/23/2020    Procedure: INSERTION-LEFT VENTRICULAR ASSIST DEVICE;  Surgeon: Ino Schmtit MD;  Location: [address];  Service: Cardiovascular;  Laterality: Left;  DT HM3    RIGHT HEART CATHETERIZATION Right 1/16/2020    Procedure: INSERTION, CATHETER, RIGHT HEART;  Surgeon: Isiah Montero MD;  Location: [facility] CATH LAB;  Service: Cardiology;  Laterality: Right;    STERNAL WOUND CLOSURE N/A 1/24/2020    Procedure: CLOSURE, WOUND, STERNUM;  Surgeon: Ino Schmitt MD;  Location: [address];  Service: Cardiovascular;  Laterality: N/A;    STERNAL WOUND CLOSURE  1/24/2020    Procedure: CLOSURE, WOUND, STERNUM;  Surgeon: Ino Schmitt MD;  Location: [facility] OR [address] [place] [address];  Service: Cardiovascular;;  Temporary closure    STERNAL WOUND CLOSURE N/A 1/29/2020    Procedure: CLOSURE, WOUND, STERNUM;  Surgeon: Ino Schmitt MD;  [facility];  Service: Cardiovascular;  Laterality: N/A;     TONSILLECTOMY      VEIN LIGATION AND STRIPPING      WOUND EXPLORATION N/A 1/24/2020    Procedure: EXPLORATION, WOUND;  Surgeon: Ino Schmitt MD;  Location: [facility] OR [address] [place] [address];  Service: Cardiovascular;  Laterality: N/A;  Emergency exploration     Review of patient's allergies indicates:   Allergen Reactions    Biopatch [chlorhexidine gluconate]      Site burning    Dobutamine in d5w      Tachycardia, tremors, SOB, flushing    Percocet [oxycodone-acetaminophen] Itching    Penicillins Rash     Cefepime given on 1/23/2020 without issue       Scheduled Medications:    amiodarone  400 mg Oral Daily    amLODIPine  10 mg Oral Daily    aspirin  325 mg Oral Daily    atorvastatin  80 mg Oral QHS    balsam peru-castor oil   Topical (Top) BID    digoxin  0.125 mg Oral Daily    docusate sodium  200 mg Oral QHS    ferrous gluconate  324 mg Oral Daily with breakfast    furosemide  40 mg Oral Daily    pantoprazole  40 mg Oral Daily    potassium chloride 10%  50 mEq Oral BID    sodium chloride 0.9%  10 mL Intravenous Q6H    warfarin  2.5 mg Oral Daily       PRN Medications: acetaminophen, albumin human 5%, albuterol sulfate, bisacodyL, Dextrose 10% Bolus, Dextrose 10% Bolus, HYDROcodone-acetaminophen, magnesium hydroxide 400 mg/5 ml, methocarbamol, oxyCODONE, sodium chloride 0.9%, Flushing PICC Protocol **AND** sodium chloride 0.9% **AND** sodium chloride 0.9%    Family History     Problem Relation (Age of Onset)    Cancer Mother        Tobacco Use    Smoking status: Never Smoker    Smokeless tobacco: Never Used   Substance and Sexual Activity    Alcohol use: No    Drug use: No    Sexual activity: Not on file     Review of Systems   Constitutional: Positive for activity change. Negative for fatigue and fever.   HENT: Negative for trouble swallowing and voice change.    Respiratory: Negative for cough and shortness of breath.    Cardiovascular: Negative for chest pain and leg swelling.   Gastrointestinal:  Negative for abdominal distention and abdominal pain.   Genitourinary: Negative for difficulty urinating and flank pain.   Musculoskeletal: Positive for gait problem. Negative for back pain.   Skin: Positive for wound. Negative for color change and rash.   Neurological: Positive for weakness. Negative for speech difficulty and numbness.   Psychiatric/Behavioral: Negative for agitation, behavioral problems and confusion.     Objective:     Vital Signs (Most Recent):  Temp: 98 °F (36.7 °C) (02/17/20 0730)  Pulse: 90 (02/17/20 0730)  Resp: 20 (02/17/20 0730)  BP: (!) 89/42 (02/17/20 0731)  SpO2: 95 % (02/17/20 0730)    Vital Signs (24h Range):  Temp:  [97.9 °F (36.6 °C)-98.8 °F (37.1 °C)] 98 °F (36.7 °C)  Pulse:  [90-91] 90  Resp:  [16-20] 20  SpO2:  [93 %-96 %] 95 %  BP: ()/(0-76) 89/42     Body mass index is 31.3 kg/m².    Physical Exam   Constitutional: He is oriented to person, place, and time. He appears well-developed and well-nourished.   HENT:   Head: Normocephalic and atraumatic.   Eyes: Pupils are equal, round, and reactive to light. EOM are normal. Right eye exhibits no discharge. Left eye exhibits no discharge.   Neck: Neck supple.   Cardiovascular:   LVAD present   Pulmonary/Chest: Effort normal. No respiratory distress.   Musculoskeletal: He exhibits no edema or deformity.   RUE: 5/5.  LUE: 5/5.  RLE: 5/5.  LLE: 5/5.  Decreased endurance present   Neurological: He is alert and oriented to person, place, and time. No sensory deficit.   Skin: Skin is warm and dry.   Psychiatric: He has a normal mood and affect. His behavior is normal. Thought content normal.   Nursing note and vitals reviewed.    Diagnostic Results:   Labs: Reviewed  ECG: Reviewed  CT: Reviewed    Assessment/Plan:     * LVAD (left ventricular assist device) present  - S/P LVAD 1/23 complicated by RV dysfunction and coagulopathy and chest remained open.   - On 1/29 wound vac was placed.     Debility  - Related to prolonged/acute  hospital course.     Recommendations  -  Encourage mobility, OOB in chair at least 3 hours per day, and early ambulation as appropriate  -  PT/OT evaluate and treat  -  Pain management  -  Monitor for and prevent skin breakdown and pressure ulcers  · Early mobility, repositioning/weight shifting every 20-30 minutes when sitting, turn patient every 2 hours, proper mattress/overlay and chair cushioning, pressure relief/heel protector boots  -  DVT prophylaxis    -  Reviewed discharge options (IP rehab, SNF, HH therapy, and OP therapy)    Right lower lobe pulmonary nodule  - CT chest/abd/pelvis on 1/12  - Pulmonology consulted and recommend repeat CT of chest in 3 months    Left ventricular thrombus without MI  - TTE 1/13 showed no thrombus    Recommend Inpatient Rehab.      Thank you for your consult.     Kelley Beaulieu NP  Department of Physical Medicine & Rehab  [facility]

## 2020-02-17 NOTE — PLAN OF CARE
Pt free of falls/trauma/injuries.  Denies c/o SOB, CP, or discomfort.  Generalized skin remains CDI; trace edema noted to BLEs.  Pt being diuresed with Lasix 40mg PO daily; diuresing well.   Wt remains stable. LVAD working properly this shift without any complications.  LVAD dressing to be changed daily with Betadine/NS per family; dressing remains CDI.   All discharge dressing change supplies at the bedside.  PT/OT following.  Pt to be discharged to rehab today.  Family at the bedside.  Pt and family tolerating plan of care.

## 2020-02-17 NOTE — PLAN OF CARE
Goals reviewed and remain appropriate. Pt progressing towards goals.    Micki Priest, PT, DPT   2020  [phone]    Problem: Physical Therapy Goal  Goal: Physical Therapy Goal  Description  Goals to be met by: 2020     Patient will increase functional independence with mobility by performin. Supine to sit with MInimal Assistance - met   1a. Supine to sit with SBA with HOB flat   2. Rolling to Left and Right with Minimal Assistance.- not met  3. Sit to stand transfer with Moderate Assistance- met   3a. Sit to stand with CGA  4. Sitting at edge of bed x8 minutes with Minimal Assistance - met 2020  4a. Sitting at EOB x 10 minutes with SBA- met 2/10/2020  5. Lower extremity exercise program x10 reps per handout, with assistance as needed- not met  6. Standing with no AD x 30 seconds CGA to assist with ADLs and self care- not met  7. Gait x50 ft with Schuyler without AD          Outcome: Ongoing, Progressing

## 2020-02-17 NOTE — PROGRESS NOTES
DISCHARGE    SW to pt's room for discharge today.  Pt presents as sitting up in bed with significant other/primary caregiver Lavern at bedside.  Pt and s/o present as aao x4, calm, cooperative, and asking and answering questions appropriately.  Pt is s/p LVAD implant on 1/23.  Pt's primary caregiver trained on LVAD dressing change is pt's s/o Lavern Carbajal (ph: [phone]).  Pt verbalizes understanding and agreement with plan for d/c to inpatient rehab today.  Pt verbalizes understanding that hospital will provide transportation to [facility].  Pt reports s/o and son will transport him to clinic f/u appts.  Pt and family have rented an apartment in [facility] for two months so that they can stay closer to the hospital while pt has weekly f/u appts.    Pt reports coping adequately with support from family at this time.  Pt and s/o deny any needs or concerns to SW at this time.  Patient and s/o verbalize understanding of how to contact SW or LVAD team after d/c as needed.  SW providing ongoing psychosocial and counseling support, education, resources, assistance, and discharge planning as indicated.  SW remains available.

## 2020-02-17 NOTE — SUBJECTIVE & OBJECTIVE
Past Medical History:   Diagnosis Date    CHF (congestive heart failure) 1/2010    Dx  1/2010 w/ decreased LV systolic function (EF 15%) by ECHO 1/2015    COCM (congestive cardiomyopathy) 7/20/2016    Hyperlipidemia     Hypertension     Paroxysmal atrial fibrillation     Pulmonary embolus 2008    Stroke     Superficial thrombophlebitis      Past Surgical History:   Procedure Laterality Date    APPLICATION OF WOUND VACUUM-ASSISTED CLOSURE DEVICE N/A 1/29/2020    Procedure: APPLICATION, WOUND VAC;  Surgeon: Ino Schmitt MD;  [facility];  Service: Cardiovascular;  Laterality: N/A;    DRAINAGE OF PLEURAL EFFUSION Left 1/24/2020    Procedure: DRAINAGE, PLEURAL EFFUSION;  Surgeon: Ino Schmitt MD;  [facility]: [facility];  Service: Cardiovascular;  Laterality: Left;  500 ml drainage    INSERTION OF GRAFT TO PERICARDIUM N/A 1/24/2020    Procedure: INSERTION, GRAFT, PERICARDIUM;  Surgeon: Ino Schmitt MD;  [facility];  Service: Cardiovascular;  Laterality: N/A;  Prevena    IRRIGATION OF MEDIASTINUM N/A 1/27/2020    Procedure: IRRIGATION, MEDIASTINUM;  Surgeon: Ino Schmitt MD;  [facility];  Service: Cardiovascular;  Laterality: N/A;    LEFT VENTRICULAR ASSIST DEVICE Left 1/23/2020    Procedure: INSERTION-LEFT VENTRICULAR ASSIST DEVICE;  Surgeon: Ino Schmitt MD;  [facility];  Service: Cardiovascular;  Laterality: Left;  DT HM3    RIGHT HEART CATHETERIZATION Right 1/16/2020    Procedure: INSERTION, CATHETER, RIGHT HEART;  Surgeon: Isiah Montero MD;  Location: [facility] CATH LAB;  Service: Cardiology;  Laterality: Right;    STERNAL WOUND CLOSURE N/A 1/24/2020    Procedure: CLOSURE, WOUND, STERNUM;  Surgeon: Ino Schmitt MD;  [facility];  Service: Cardiovascular;  Laterality: N/A;    STERNAL WOUND CLOSURE  1/24/2020    Procedure: CLOSURE, WOUND, STERNUM;  Surgeon: Ino Schmitt MD;  [facility];  Service:  Cardiovascular;;  Temporary closure    STERNAL WOUND CLOSURE N/A 1/29/2020    Procedure: CLOSURE, WOUND, STERNUM;  Surgeon: Ino Schmitt MD;  [facility];  Service: Cardiovascular;  Laterality: N/A;    TONSILLECTOMY      VEIN LIGATION AND STRIPPING      WOUND EXPLORATION N/A 1/24/2020    Procedure: EXPLORATION, WOUND;  Surgeon: Ino Schmitt MD;  [facility];  Service: Cardiovascular;  Laterality: N/A;  Emergency exploration     Review of patient's allergies indicates:   Allergen Reactions    Biopatch [chlorhexidine gluconate]      Site burning    Dobutamine in d5w      Tachycardia, tremors, SOB, flushing    Percocet [oxycodone-acetaminophen] Itching    Penicillins Rash     Cefepime given on 1/23/2020 without issue       Scheduled Medications:    amiodarone  400 mg Oral Daily    amLODIPine  10 mg Oral Daily    aspirin  325 mg Oral Daily    atorvastatin  80 mg Oral QHS    balsam peru-castor oil   Topical (Top) BID    digoxin  0.125 mg Oral Daily    docusate sodium  200 mg Oral QHS    ferrous gluconate  324 mg Oral Daily with breakfast    furosemide  40 mg Oral Daily    pantoprazole  40 mg Oral Daily    potassium chloride 10%  50 mEq Oral BID    sodium chloride 0.9%  10 mL Intravenous Q6H    warfarin  2.5 mg Oral Daily       PRN Medications: acetaminophen, albumin human 5%, albuterol sulfate, bisacodyL, Dextrose 10% Bolus, Dextrose 10% Bolus, HYDROcodone-acetaminophen, magnesium hydroxide 400 mg/5 ml, methocarbamol, oxyCODONE, sodium chloride 0.9%, Flushing PICC Protocol **AND** sodium chloride 0.9% **AND** sodium chloride 0.9%    Family History     Problem Relation (Age of Onset)    Cancer Mother        Tobacco Use    Smoking status: Never Smoker    Smokeless tobacco: Never Used   Substance and Sexual Activity    Alcohol use: No    Drug use: No    Sexual activity: Not on file     Review of Systems   Constitutional: Positive for activity change. Negative for  fatigue and fever.   HENT: Negative for trouble swallowing and voice change.    Respiratory: Negative for cough and shortness of breath.    Cardiovascular: Negative for chest pain and leg swelling.   Gastrointestinal: Negative for abdominal distention and abdominal pain.   Genitourinary: Negative for difficulty urinating and flank pain.   Musculoskeletal: Positive for gait problem. Negative for back pain.   Skin: Positive for wound. Negative for color change and rash.   Neurological: Positive for weakness. Negative for speech difficulty and numbness.   Psychiatric/Behavioral: Negative for agitation, behavioral problems and confusion.     Objective:     Vital Signs (Most Recent):  Temp: 98 °F (36.7 °C) (02/17/20 0730)  Pulse: 90 (02/17/20 0730)  Resp: 20 (02/17/20 0730)  BP: (!) 89/42 (02/17/20 0731)  SpO2: 95 % (02/17/20 0730)    Vital Signs (24h Range):  Temp:  [97.9 °F (36.6 °C)-98.8 °F (37.1 °C)] 98 °F (36.7 °C)  Pulse:  [90-91] 90  Resp:  [16-20] 20  SpO2:  [93 %-96 %] 95 %  BP: ()/(0-76) 89/42     Body mass index is 31.3 kg/m².    Physical Exam   Constitutional: He is oriented to person, place, and time. He appears well-developed and well-nourished.   HENT:   Head: Normocephalic and atraumatic.   Eyes: Pupils are equal, round, and reactive to light. EOM are normal. Right eye exhibits no discharge. Left eye exhibits no discharge.   Neck: Neck supple.   Cardiovascular:   LVAD present   Pulmonary/Chest: Effort normal. No respiratory distress.   Musculoskeletal: He exhibits no edema or deformity.   RUE: 5/5.  LUE: 5/5.  RLE: 5/5.  LLE: 5/5.  Decreased endurance present   Neurological: He is alert and oriented to person, place, and time. No sensory deficit.   Skin: Skin is warm and dry.   Psychiatric: He has a normal mood and affect. His behavior is normal. Thought content normal.   Nursing note and vitals reviewed.    NEUROLOGICAL EXAMINATION:     MENTAL STATUS   Oriented to person, place, and time.      CRANIAL NERVES     CN III, IV, VI   Pupils are equal, round, and reactive to light.  Extraocular motions are normal.       Diagnostic Results:   Labs: Reviewed  ECG: Reviewed  CT: Reviewed

## 2020-02-17 NOTE — PLAN OF CARE
Educated patient on plan of care, safety while in hospital, medications, pain medications, how to use call light, and fall precautions. Patient stated understanding. Patient is in no distress and no complaints at this time. Wife at bedside. Wife completed VAD dressing changed at 8pm tonight. No falls during shift. Vitals stable. Patient is sinus rhythm on on telemetry. Bed in lowest position, call light within reach and urinal within reach, and side rails up x2. Will continue to monitor.

## 2020-02-17 NOTE — ASSESSMENT & PLAN NOTE
- CT chest/abd/pelvis on 1/12  - Pulmonology consulted and recommend repeat CT of chest in 3 months

## 2020-02-17 NOTE — PROGRESS NOTES
[facility]  Heart Transplant  Progress Note    Patient Name: Tae Delgado  MRN: [id number]  Admission Date: 1/9/2020  Hospital Length of Stay: 39 days  Attending Physician: Isiah Montero MD  Primary Care Provider: Elham Pearson MD  Principal Problem:LVAD (left ventricular assist device) present    Subjective:     Interval History: Patient reports no new complaints this morning.  He is eager to go to rehab.  Planning to discharge to rehab today  He is diuresing well and euvolemic on current dose of Lasix (40mg QDaily)    Continuous Infusions:    Scheduled Meds:   amiodarone  400 mg Oral Daily    amLODIPine  10 mg Oral Daily    aspirin  325 mg Oral Daily    atorvastatin  80 mg Oral QHS    balsam peru-castor oil   Topical (Top) BID    digoxin  0.125 mg Oral Daily    docusate sodium  200 mg Oral QHS    ferrous gluconate  324 mg Oral Daily with breakfast    furosemide  40 mg Oral Daily    pantoprazole  40 mg Oral Daily    potassium chloride 10%  50 mEq Oral BID    sodium chloride 0.9%  10 mL Intravenous Q6H    warfarin  2.5 mg Oral Daily     PRN Meds:acetaminophen, albumin human 5%, albuterol sulfate, bisacodyL, Dextrose 10% Bolus, Dextrose 10% Bolus, HYDROcodone-acetaminophen, magnesium hydroxide 400 mg/5 ml, methocarbamol, oxyCODONE, sodium chloride 0.9%, Flushing PICC Protocol **AND** sodium chloride 0.9% **AND** sodium chloride 0.9%    Review of patient's allergies indicates:   Allergen Reactions    Biopatch [chlorhexidine gluconate]      Site burning    Dobutamine in d5w      Tachycardia, tremors, SOB, flushing    Percocet [oxycodone-acetaminophen] Itching    Penicillins Rash     Cefepime given on 1/23/2020 without issue     Objective:     Vital Signs (Most Recent):  Temp: 97.6 °F (36.4 °C) (02/17/20 1105)  Pulse: 90 (02/17/20 1105)  Resp: 20 (02/17/20 1105)  BP: (!) 87/60 (02/17/20 1106)  SpO2: 95 % (02/17/20 1105) Vital Signs (24h Range):  Temp:  [97.6 °F (36.4 °C)-98.8 °F  (37.1 °C)] 97.6 °F (36.4 °C)  Pulse:  [90-91] 90  Resp:  [16-20] 20  SpO2:  [93 %-96 %] 95 %  BP: ()/(0-76) 87/60     Patient Vitals for the past 72 hrs (Last 3 readings):   Weight   02/17/20 0445 98.9 kg (218 lb 2.3 oz)   02/16/20 0400 98.8 kg (217 lb 13 oz)   02/15/20 0437 98.2 kg (216 lb 7.9 oz)     Body mass index is 31.3 kg/m².      Intake/Output Summary (Last 24 hours) at 2/17/2020 1308  Last data filed at 2/17/2020 1200  Gross per 24 hour   Intake 1376 ml   Output 2000 ml   Net -624 ml       Hemodynamic Parameters:  CVP:  [7 mmHg] 7 mmHg    Telemetry: V paced  Physical Exam   Constitutional: He is oriented to person, place, and time. He appears well-developed and well-nourished.   HENT:   Head: Normocephalic and atraumatic.   Eyes: Pupils are equal, round, and reactive to light. Conjunctivae and EOM are normal.   Neck: Normal range of motion. Neck supple. No JVD present. No thyromegaly present.   JVP mid neck  Cardiovascular: Normal rate and regular rhythm. Smooth VAD hum   Pulmonary/Chest: Effort normal and breath sounds normal. Intubated and sedated   Abdominal: Soft. Bowel sounds are normal.   Musculoskeletal: Normal range of motion. 1+ pedal edema   Neurological: He is alert and oriented to person, place, and time.   Skin: Skin is warm and dry. Capillary refill takes 2 to 3 seconds.   Psychiatric: He has a normal mood and affect. His behavior is normal. Judgment and thought content normal.       Significant Labs:  CBC:  Recent Labs   Lab 02/15/20  0449 02/16/20  0404 02/17/20 0441   WBC 7.76 6.90 6.74   RBC 3.36* 3.36* 3.25*   HGB 9.2* 9.4* 8.8*   HCT 30.7* 30.4* 29.7*    269 242   MCV 91 91 91   MCH 27.4 28.0 27.1   MCHC 30.0* 30.9* 29.6*     BNP:  Recent Labs   Lab 02/14/20  0500 02/16/20  0828 02/17/20  0441   * 212* 226*     CMP:  Recent Labs   Lab 02/15/20  0449 02/16/20  0404 02/17/20  0441   GLU 99 121* 89   CALCIUM 8.7 8.5* 8.4*   ALBUMIN 2.9* 2.9* 2.8*  2.8*   PROT 6.4 6.3  6.1  6.1   * 135* 135*   K 3.5 3.8 3.6   CO2 36* 34* 35*   CL 90* 92* 93*   BUN 28* 27* 25*   CREATININE 1.9* 1.8* 1.8*   ALKPHOS 86 89 91  91   ALT 24 25 26  26   AST 27 24 24  24   BILITOT 0.8 0.6 0.7  0.7      Coagulation:   Recent Labs   Lab 02/15/20  0449 02/16/20  0404 02/17/20 0441   INR 2.4* 2.5* 2.4*     LDH:  Recent Labs   Lab 02/15/20  0449 02/16/20  0404 02/17/20 0441   * 261* 242     Microbiology:  Microbiology Results (last 7 days)     ** No results found for the last 168 hours. **          I have reviewed all pertinent labs within the past 24 hours.    Estimated Creatinine Clearance: 51.5 mL/min (A) (based on SCr of 1.8 mg/dL (H)).    Diagnostic Results:  I have reviewed all pertinent imaging results/findings within the past 24 hours.    Assessment and Plan:       55 y.o. WM with history of NICMP diagnosed in 2010, ICD, LV thrombus (with prior splenic and renal emboli), Embolic  CVA , paroxysmal atrial fib, HTN, HLP  presents for  F/U today to clinic and he was volume overloaded on exam .  He states he had 3 admission in the last 3 months for volume overload. He started having more SOB on exertion since one month. Also endorses of Orthopnea since last one month. Alble to walk only 150 ft. He also has Bilateral lower extremity edema. He was admitted here in 2017 for ADHD here at [facility] and RHC during that admission showed PCWP 40 and CVP 17. Currently denies chest pain, lightheadedness     * LVAD (left ventricular assist device) present  - S/P DT HM3 with closure of AV and repair of MV for regurg 1/23/20 ([facility]).  - HTS primary.  - INR Therapeutic.   - ASA 325mg.   - LDH stable.   - Taken back to OR 1/24 for possible RVAD placement which was not done. Patient remained hemodynamically stable in OR. Wash out on 1/27. Chest closed 1/29.   - CVP 11.  Stopping IV lasix and transitioning to po (will start with Lasix 40mg QDaily)  - Off inotropes.  - TTE 2/10 at 5300 rpm: LVEDD 5.01,  LVEF 10%, RV function mod depressed,   - Current speed 5300  - BP controlled. Continue Amlodipine 10mg daily.   - PT/OT/VAD education  - Sternal wound vac in place. Will remove when all lights are out/therapy complete.     Procedure: Device Interrogation Including analysis of device parameters  Current Settings: Ventricular Assist Device  Review of device function is stable/unstabe    TXP LVAD INTERROGATIONS 2/17/2020 2/17/2020 2/17/2020 2/17/2020 2/16/2020 2/16/2020 2/16/2020   Type HeartMate3 HeartMate3 HeartMate3 HeartMate3 - - -   Flow 4.1 4.2 4.2 4.0 4.3 4.2 4.0   Speed 5300 5350 5300 5300 5300 5300 5300   PI 5.2 4.0 3.5 4.4 4.0 4.5 4.3   Power (Berry) 3.8 3.7 3.8 3.9 3.9 3.8 3.8   LSL - 4900 - - - - 4900   Pulsatility - Intermittent pulse Intermittent pulse Intermittent pulse Intermittent pulse Intermittent pulse Intermittent pulse       Acute on chronic combined systolic and diastolic heart failure  - S/P DT HM3 with closure of AV and plication of MV for regurg 1/23/20 (See LVAD)  - NIDCM dx'd 2010    Paroxysmal atrial fibrillation  - Intermittently in A fib since surgery  - AC on Coumadin  - Dig 0.125 mg qd       Acute kidney injury superimposed on chronic kidney disease  - Creatinine improving(baseline ~ 1.8).   - Nephrology has signed off.    Left ventricular thrombus without MI  - H/O LV thrombus with h/o splenic and renal emboli as well as embolic CVA  - Limited TTE done here 1/13 showed no thrombus  - JACINTO 1/23 intra op showed resolution of DAMON thrombus  - AC as above.      Right lower lobe pulmonary nodule  - Incidental finding on CT chest/abd/pelvis on 1/12. Pulmonology consulted, and rec repeat CT of chest in 3 months  - Will need to follow-up in pulmonary clinic.     ICD (implantable cardioverter-defibrillator) in place  - S/P Biotronik dual chamber ICD    Acute hyperglycemia  - Endocrine following    Coagulopathy  - Appreciate Hem/Onc's help. No evidence of underlying coagulopathy (h/o LV thrombus  with splenic and renal emboli, h/o embolic CVA)    NSVT (nonsustained ventricular tachycardia)  - Continue Amio       MARBELLA Mcfadden  Heart Transplant  [facility]

## 2020-02-17 NOTE — ASSESSMENT & PLAN NOTE
- S/P LVAD 1/23 complicated by RV dysfunction and coagulopathy and chest remained open.   - On 1/29 wound vac was placed.

## 2020-02-17 NOTE — HOSPITAL COURSE
2/15/20: Participated w/ PT. Bed mobility SBA. Sit to stand CGA- HHA. Ambulates 14 ft and 8 ft with HHA- Min A for balance and assist facilitating normal weight shift.   2/16/20: Participated w/ OT. Sit to stand Schuyler. Grooming CGA. LBD SBA.

## 2020-02-17 NOTE — SUBJECTIVE & OBJECTIVE
Interval History: Patient reports no new complaints this morning.  He is eager to go to rehab.  Planning to discharge to rehab today  He is diuresing well and euvolemic on current dose of Lasix (40mg QDaily)    Continuous Infusions:    Scheduled Meds:   amiodarone  400 mg Oral Daily    amLODIPine  10 mg Oral Daily    aspirin  325 mg Oral Daily    atorvastatin  80 mg Oral QHS    balsam peru-castor oil   Topical (Top) BID    digoxin  0.125 mg Oral Daily    docusate sodium  200 mg Oral QHS    ferrous gluconate  324 mg Oral Daily with breakfast    furosemide  40 mg Oral Daily    pantoprazole  40 mg Oral Daily    potassium chloride 10%  50 mEq Oral BID    sodium chloride 0.9%  10 mL Intravenous Q6H    warfarin  2.5 mg Oral Daily     PRN Meds:acetaminophen, albumin human 5%, albuterol sulfate, bisacodyL, Dextrose 10% Bolus, Dextrose 10% Bolus, HYDROcodone-acetaminophen, magnesium hydroxide 400 mg/5 ml, methocarbamol, oxyCODONE, sodium chloride 0.9%, Flushing PICC Protocol **AND** sodium chloride 0.9% **AND** sodium chloride 0.9%    Review of patient's allergies indicates:   Allergen Reactions    Biopatch [chlorhexidine gluconate]      Site burning    Dobutamine in d5w      Tachycardia, tremors, SOB, flushing    Percocet [oxycodone-acetaminophen] Itching    Penicillins Rash     Cefepime given on 1/23/2020 without issue     Objective:     Vital Signs (Most Recent):  Temp: 97.6 °F (36.4 °C) (02/17/20 1105)  Pulse: 90 (02/17/20 1105)  Resp: 20 (02/17/20 1105)  BP: (!) 87/60 (02/17/20 1106)  SpO2: 95 % (02/17/20 1105) Vital Signs (24h Range):  Temp:  [97.6 °F (36.4 °C)-98.8 °F (37.1 °C)] 97.6 °F (36.4 °C)  Pulse:  [90-91] 90  Resp:  [16-20] 20  SpO2:  [93 %-96 %] 95 %  BP: ()/(0-76) 87/60     Patient Vitals for the past 72 hrs (Last 3 readings):   Weight   02/17/20 0445 98.9 kg (218 lb 2.3 oz)   02/16/20 0400 98.8 kg (217 lb 13 oz)   02/15/20 0437 98.2 kg (216 lb 7.9 oz)     Body mass index is 31.3  kg/m².      Intake/Output Summary (Last 24 hours) at 2/17/2020 1308  Last data filed at 2/17/2020 1200  Gross per 24 hour   Intake 1376 ml   Output 2000 ml   Net -624 ml       Hemodynamic Parameters:  CVP:  [7 mmHg] 7 mmHg    Telemetry: V paced  Physical Exam   Constitutional: He is oriented to person, place, and time. He appears well-developed and well-nourished.   HENT:   Head: Normocephalic and atraumatic.   Eyes: Pupils are equal, round, and reactive to light. Conjunctivae and EOM are normal.   Neck: Normal range of motion. Neck supple. No JVD present. No thyromegaly present.   JVP mid neck  Cardiovascular: Normal rate and regular rhythm. Smooth VAD hum   Pulmonary/Chest: Effort normal and breath sounds normal. Intubated and sedated   Abdominal: Soft. Bowel sounds are normal.   Musculoskeletal: Normal range of motion. 1+ pedal edema   Neurological: He is alert and oriented to person, place, and time.   Skin: Skin is warm and dry. Capillary refill takes 2 to 3 seconds.   Psychiatric: He has a normal mood and affect. His behavior is normal. Judgment and thought content normal.       Significant Labs:  CBC:  Recent Labs   Lab 02/15/20  0449 02/16/20  0404 02/17/20  0441   WBC 7.76 6.90 6.74   RBC 3.36* 3.36* 3.25*   HGB 9.2* 9.4* 8.8*   HCT 30.7* 30.4* 29.7*    269 242   MCV 91 91 91   MCH 27.4 28.0 27.1   MCHC 30.0* 30.9* 29.6*     BNP:  Recent Labs   Lab 02/14/20  0500 02/16/20  0828 02/17/20  0441   * 212* 226*     CMP:  Recent Labs   Lab 02/15/20  0449 02/16/20  0404 02/17/20  0441   GLU 99 121* 89   CALCIUM 8.7 8.5* 8.4*   ALBUMIN 2.9* 2.9* 2.8*  2.8*   PROT 6.4 6.3 6.1  6.1   * 135* 135*   K 3.5 3.8 3.6   CO2 36* 34* 35*   CL 90* 92* 93*   BUN 28* 27* 25*   CREATININE 1.9* 1.8* 1.8*   ALKPHOS 86 89 91  91   ALT 24 25 26  26   AST 27 24 24  24   BILITOT 0.8 0.6 0.7  0.7      Coagulation:   Recent Labs   Lab 02/15/20  0449 02/16/20  0404 02/17/20  0441   INR 2.4* 2.5* 2.4*      LDH:  Recent Labs   Lab 02/15/20  0449 02/16/20  0404 02/17/20  0441   * 261* 242     Microbiology:  Microbiology Results (last 7 days)     ** No results found for the last 168 hours. **          I have reviewed all pertinent labs within the past 24 hours.    Estimated Creatinine Clearance: 51.5 mL/min (A) (based on SCr of 1.8 mg/dL (H)).    Diagnostic Results:  I have reviewed all pertinent imaging results/findings within the past 24 hours.

## 2020-02-17 NOTE — PT/OT/SLP PROGRESS
Physical Therapy Treatment    Patient Name:  Tae Delgado   MRN:  [id number]    Recommendations:     Discharge Recommendations:  rehabilitation facility   Discharge Equipment Recommendations: (TBD)   Barriers to discharge: Decreased caregiver support at current functional level    Assessment:     Tae Delgado is a 60 y.o. male admitted with a medical diagnosis of LVAD (left ventricular assist device) present.  He presents with the following impairments/functional limitations:  weakness, impaired functional mobilty, impaired endurance, gait instability, impaired balance, impaired self care skills, impaired cardiopulmonary response to activity. Pt progressing functional mobility, as he was able to increase gait distance this date. Also requiring less assist to complete transfers and ambulation. However, continues to demo standing balance impairments and requiring assist throughout gait for improved stability. Further progression of ambulation distance remains limited by impaired endurance and overall deconditioning. Pt would continue to benefit from skilled acute PT in order to address current deficits and progress functional mobility. PT POC decreased to 5x/week 2* progression of mobility.     Rehab Prognosis: Good; patient would benefit from acute skilled PT services to address these deficits and reach maximum level of function.    Recent Surgery: Procedure(s) (LRB):  CLOSURE, WOUND, STERNUM (N/A)  WASHOUT (N/A)  APPLICATION, WOUND VAC (N/A) 19 Days Post-Op    Plan:     During this hospitalization, patient to be seen 5 x/week to address the identified rehab impairments via gait training, therapeutic activities, therapeutic exercises, neuromuscular re-education and progress toward the following goals:    · Plan of Care Expires:  02/29/20    Subjective     Chief Complaint: none noted   Patient/Family Comments/goals: Pt excited to d/c to rehab today.  Pain/Comfort:  · Pain Rating 1: 0/10      Objective:     Communicated  with RN prior to session.  Patient found supine with telemetry, LVAD upon PT entry to room.     General Precautions: Standard, fall, LVAD, sternal   Orthopedic Precautions:N/A   Braces: N/A     Functional Mobility:  · Bed Mobility:     · Scooting: stand by assistance, supine to HOB with bed in trendelenberg  · Supine to Sit: stand by assistance with HOB elevated  · Sit to Supine: stand by assistance with HOB elevated  · Transfers:     · Sit to Stand:  minimum assistance with no AD, x1 rep from EOB and x1 rep from bedside chair   · With cues for use of forward momentum to assist with ease of transfer   · Bed to Chair: minimum assistance with  hand-held assist  using  Stand Pivot  · Gait: 38 ft. x2 with Schuyler and B HHA  · W/c follow throughout. Seated rest break between gait trials  · demo'd decreased neva, decreased step length, increased trunk flex, decreased toe-floor clearance, inconsistent foot placement, and instability  · Cues for upright posture, forward gaze, appropriate weight-shifts, self-pacing, breathing technique, and safety awareness  · Mild SOB noted upon exertion which improved with seated rest      AM-PAC 6 CLICK MOBILITY  Turning over in bed (including adjusting bedclothes, sheets and blankets)?: 3  Sitting down on and standing up from a chair with arms (e.g., wheelchair, bedside commode, etc.): 3  Moving from lying on back to sitting on the side of the bed?: 3  Moving to and from a bed to a chair (including a wheelchair)?: 3  Need to walk in hospital room?: 3  Climbing 3-5 steps with a railing?: 2  Basic Mobility Total Score: 17       Therapeutic Activities and Exercises:   Pt found on LVAD battery power with consolidation bag organized. Donned shoulder strap (I). Required assist to don waist strap. Remained on battery power at end of session.  Reviewed sternal precautions. Pt required min v/c to maintain throughout session.     Patient left supine with all lines intact, call button in reach and  pt's SO present..    GOALS:   Multidisciplinary Problems     Physical Therapy Goals        Problem: Physical Therapy Goal    Goal Priority Disciplines Outcome Goal Variances Interventions   Physical Therapy Goal     PT, PT/OT Ongoing, Progressing     Description:  Goals to be met by: 2020     Patient will increase functional independence with mobility by performin. Supine to sit with MInimal Assistance - met   1a. Supine to sit with SBA with HOB flat   2. Rolling to Left and Right with Minimal Assistance.- not met  3. Sit to stand transfer with Moderate Assistance- met   3a. Sit to stand with CGA  4. Sitting at edge of bed x8 minutes with Minimal Assistance - met 2020  4a. Sitting at EOB x 10 minutes with SBA- met 2/10/2020  5. Lower extremity exercise program x10 reps per handout, with assistance as needed- not met  6. Standing with no AD x 30 seconds CGA to assist with ADLs and self care- not met  7. Gait x50 ft with Schuyler without AD                           Time Tracking:     PT Received On: 20  PT Start Time: 1324     PT Stop Time: 1344  PT Total Time (min): 20 min     Billable Minutes: Therapeutic Exercise 20    Treatment Type: Treatment  PT/PTA: PT     PTA Visit Number: 0     Micki Priest, PT, DPT   2020  [phone]

## 2020-02-17 NOTE — PROGRESS NOTES
DISCHARGE NOTE:    Tae Delgado is a 60 y.o. male s/p HM3 device implantation on 1.3.20 preparing for discharge. PMH significant for CVA, pAF and LV thrombus.     Past Medical History:   Diagnosis Date    CHF (congestive heart failure) 1/2010    Dx  1/2010 w/ decreased LV systolic function (EF 15%) by ECHO 1/2015    COCM (congestive cardiomyopathy) 7/20/2016    Hyperlipidemia     Hypertension     Paroxysmal atrial fibrillation     Pulmonary embolus 2008    Stroke     Superficial thrombophlebitis        Hospital Course: During his hospital stay Mr. Delgado was weaned from RVAD support. Plan for discharge to rehab today.     Allergies:   Review of patient's allergies indicates:   Allergen Reactions    Biopatch [chlorhexidine gluconate]      Site burning    Dobutamine in d5w      Tachycardia, tremors, SOB, flushing    Percocet [oxycodone-acetaminophen] Itching    Penicillins Rash     Cefepime given on 1/23/2020 without issue       Pharmacy Interventions/Recommendations:  1) INR Goal: 2-3    2) Antiplatelet Agents: ASA 325mg     3) Heparin Bridging:  UFH    4) Patient Counseling/Education:  Plan to provide medication administration card and education on discharge from rehab     5) INR Follow-Up/Discharge Needs:  Pending discharge from rehab. Coumadin clinic enrollment complete.      6) Enrollment in Coumadin Clinic Complete: Yes     See list of discharge medication for dosing instructions.      Tae Delgado and his caregiver verbalized their understanding and had the opportunity to ask questions.    Warfarin Education:     This patient was educated on the proper use of warfarin prior to discharge.  Various aspects of warfarin therapy were discussed with the patient, including the goal inr range for anticoagulation.    The counseling session also included directions for use, the importance of monitoring lab work and proper follow-up, how to handle missed doses, and side effects.   Medication and dietary  interactions were also discussed.  Patient was instructed to on which medications must be avoided.      Patient was encouraged to take precautions to reduce the likelihood of self-inflicted injury.  Patient was also instructed to report any unusual bleeding issues.  Patient verbalized understanding and had the opportunity to ask questions.    Discharge Medications:   Tae Delgado   Home Medication Instructions DAHIANA:[id number]    Printed on:02/17/20 0141   Medication Information                      amiodarone (PACERONE) 400 MG tablet  Take 1 tablet (400 mg total) by mouth once daily.             amLODIPine (NORVASC) 10 MG tablet  Take 1 tablet (10 mg total) by mouth once daily.             aspirin 325 MG tablet  Take 1 tablet (325 mg total) by mouth once daily.             atorvastatin (LIPITOR) 80 MG tablet  Take 1 tablet (80 mg total) by mouth every evening.             balsam peru-castor oil Oint  Apply topically 2 (two) times daily.             digoxin (LANOXIN) 125 mcg tablet  Take 1 tablet (0.125 mg total) by mouth once daily.             docusate sodium (COLACE) 100 MG capsule  Take 2 capsules (200 mg total) by mouth 2 (two) times daily as needed for Constipation.             ferrous gluconate 324 mg (37.5 mg iron) Tab tablet  Take 1 tablet (324 mg total) by mouth daily with breakfast.             furosemide (LASIX) 40 MG tablet  Take 1 tablet (40 mg total) by mouth once daily.             HYDROcodone-acetaminophen (NORCO) 5-325 mg per tablet  Take 1 tablet by mouth every 6 (six) hours as needed.             methocarbamol (ROBAXIN) 500 MG Tab  Take 1 tablet (500 mg total) by mouth nightly as needed.             pantoprazole (PROTONIX) 40 MG tablet  Take 1 tablet (40 mg total) by mouth once daily.             potassium chloride 10% (KAYCIEL) 20 mEq/15 mL oral solution  Take 37.5 mLs (50 mEq total) by mouth 2 (two) times daily.             VENTOLIN HFA 90 mcg/actuation inhaler  Inhale 1 Inhaler into the lungs  every 4 (four) hours as needed.             warfarin (COUMADIN) 2.5 MG tablet  Take 1 tablet (2.5 mg total) by mouth Daily.

## 2020-02-17 NOTE — PROGRESS NOTES
Pt aaox3 while sitting up in bed with significant other at bedside. Pt denies any needs at this time. Pt to d/c to [facility] today. Reports having sufficient dressing supplies.  Pt was not given appointments for follow up yet as they are still being scheduled.  Pt aware and notified we will call once scheduled. Pt driveline assessed today.  See image below.  Noted to be 1-2 with scant serous drainage and sutures noted. Continue current betadine dressing change.   Encouraged pt to notify nurse if they have any questions, problems or concerns for LVAD coordinator.  Pt verbalized understanding and in agreement of plan. Will follow up with pt soon.

## 2020-02-18 ENCOUNTER — HOSPITAL ENCOUNTER (OUTPATIENT)
Dept: RADIOLOGY | Facility: HOSPITAL | Age: 60
Discharge: HOME | End: 2020-02-18
Attending: PHYSICAL MEDICINE & REHABILITATION
Payer: MEDICARE

## 2020-02-18 PROCEDURE — 71045 X-RAY EXAM CHEST 1 VIEW: CPT | Mod: 26,,, | Performed by: RADIOLOGY

## 2020-02-18 PROCEDURE — 71045 XR CHEST 1 VIEW: ICD-10-PCS | Mod: 26,,, | Performed by: RADIOLOGY

## 2020-02-18 NOTE — PROGRESS NOTES
Tae Delgado is a 60 y.o. male s/p HM3 device implantation on 1.3.20 preparing for discharge. PMH significant for CVA, pAF and LV thrombus. During his hospital stay Mr. Delgado was weaned from RVAD support. Plan for discharge to rehab today. Will f/u once d/c.

## 2020-02-19 ENCOUNTER — EPISODE CHANGES (OUTPATIENT)
Dept: TRANSPLANT | Facility: CLINIC | Age: 60
End: 2020-02-19

## 2020-02-19 ENCOUNTER — TELEPHONE (OUTPATIENT)
Dept: TRANSPLANT | Facility: CLINIC | Age: 60
End: 2020-02-19

## 2020-02-19 ENCOUNTER — TELEPHONE (OUTPATIENT)
Dept: CARDIOTHORACIC SURGERY | Facility: CLINIC | Age: 60
End: 2020-02-19

## 2020-02-19 DIAGNOSIS — R07.82 INTERCOSTAL PAIN: ICD-10-CM

## 2020-02-19 DIAGNOSIS — Z95.811 LVAD (LEFT VENTRICULAR ASSIST DEVICE) PRESENT: Primary | ICD-10-CM

## 2020-02-19 NOTE — TELEPHONE ENCOUNTER
Called pt's rehab , [facility], to inform her that pt has been scheduled for a CXR, CT chest, and follow-up visit with Dr. Schmitt on Monday, February 24.  No answer; left VM informing her of this and asked her to call me to let me know that she received my message.  Will follow-up with Allison tomorrow.

## 2020-02-19 NOTE — TELEPHONE ENCOUNTER
VAD coordinator received call from Sulma ARAIZA of [facility].  PT had reported pain to rib area while using a gait belt.  Chest xray done and impression shows concern for potential disruption of sternal wire.  Reviewed with Dr. Doshi and she is discussing with Dr. Schmitt.

## 2020-02-20 ENCOUNTER — TELEPHONE (OUTPATIENT)
Dept: CARDIOTHORACIC SURGERY | Facility: CLINIC | Age: 60
End: 2020-02-20

## 2020-02-20 NOTE — TELEPHONE ENCOUNTER
1600 - Spoke with Jolanta Christian, per Dr. Shcmitt, pt will be seen Monday 2/24/20 with chest CT/xray.  She will coordinate with the  Allison at [facility] to arrange transport to and from visit.      Called and spoke to pt caregiver Lavern.  Explained the plan of care and timing of first VAD follow up appointment.  She reports [facility] and will call with any questions.

## 2020-02-20 NOTE — TELEPHONE ENCOUNTER
Received a VM from Allison Walker ([phone]), pt's rehab , stating that she is aware of pt's appts for Monday, February 24, and will arrange transportation for pt for these appts.

## 2020-02-21 ENCOUNTER — TELEPHONE (OUTPATIENT)
Dept: NEPHROLOGY | Facility: CLINIC | Age: 60
End: 2020-02-21

## 2020-02-21 ENCOUNTER — HOSPITAL ENCOUNTER (EMERGENCY)
Facility: HOSPITAL | Age: 60
Discharge: NURSING, REHAB OR LONG-TERM CARE | End: 2020-02-21
Attending: EMERGENCY MEDICINE
Payer: MEDICARE

## 2020-02-21 VITALS
HEIGHT: 70 IN | TEMPERATURE: 98 F | HEART RATE: 90 BPM | BODY MASS INDEX: 29.98 KG/M2 | WEIGHT: 209.44 LBS | RESPIRATION RATE: 15 BRPM | OXYGEN SATURATION: 94 % | SYSTOLIC BLOOD PRESSURE: 94 MMHG

## 2020-02-21 DIAGNOSIS — R07.9 CHEST PAIN, UNSPECIFIED TYPE: Primary | ICD-10-CM

## 2020-02-21 DIAGNOSIS — R07.9 CHEST PAIN: ICD-10-CM

## 2020-02-21 LAB
ALBUMIN SERPL BCP-MCNC: 3.3 G/DL (ref 3.5–5.2)
ALP SERPL-CCNC: 105 U/L (ref 55–135)
ALT SERPL W/O P-5'-P-CCNC: 29 U/L (ref 10–44)
ANION GAP SERPL CALC-SCNC: 10 MMOL/L (ref 8–16)
AST SERPL-CCNC: 26 U/L (ref 10–40)
BASOPHILS # BLD AUTO: 0.04 K/UL (ref 0–0.2)
BASOPHILS NFR BLD: 0.6 % (ref 0–1.9)
BILIRUB SERPL-MCNC: 0.6 MG/DL (ref 0.1–1)
BNP SERPL-MCNC: 273 PG/ML (ref 0–99)
BUN SERPL-MCNC: 25 MG/DL (ref 6–20)
CALCIUM SERPL-MCNC: 9.1 MG/DL (ref 8.7–10.5)
CHLORIDE SERPL-SCNC: 97 MMOL/L (ref 95–110)
CO2 SERPL-SCNC: 28 MMOL/L (ref 23–29)
CREAT SERPL-MCNC: 1.8 MG/DL (ref 0.5–1.4)
DIFFERENTIAL METHOD: ABNORMAL
EOSINOPHIL # BLD AUTO: 0.2 K/UL (ref 0–0.5)
EOSINOPHIL NFR BLD: 2.1 % (ref 0–8)
ERYTHROCYTE [DISTWIDTH] IN BLOOD BY AUTOMATED COUNT: 17 % (ref 11.5–14.5)
EST. GFR  (AFRICAN AMERICAN): 46.3 ML/MIN/1.73 M^2
EST. GFR  (NON AFRICAN AMERICAN): 40 ML/MIN/1.73 M^2
GLUCOSE SERPL-MCNC: 100 MG/DL (ref 70–110)
HCT VFR BLD AUTO: 32.2 % (ref 40–54)
HGB BLD-MCNC: 9.9 G/DL (ref 14–18)
IMM GRANULOCYTES # BLD AUTO: 0.07 K/UL (ref 0–0.04)
IMM GRANULOCYTES NFR BLD AUTO: 1 % (ref 0–0.5)
INR PPP: 3.8 (ref 0.8–1.2)
LDH SERPL L TO P-CCNC: 251 U/L (ref 110–260)
LYMPHOCYTES # BLD AUTO: 1.5 K/UL (ref 1–4.8)
LYMPHOCYTES NFR BLD: 20.8 % (ref 18–48)
MCH RBC QN AUTO: 27.6 PG (ref 27–31)
MCHC RBC AUTO-ENTMCNC: 30.7 G/DL (ref 32–36)
MCV RBC AUTO: 90 FL (ref 82–98)
MONOCYTES # BLD AUTO: 0.8 K/UL (ref 0.3–1)
MONOCYTES NFR BLD: 11.9 % (ref 4–15)
NEUTROPHILS # BLD AUTO: 4.5 K/UL (ref 1.8–7.7)
NEUTROPHILS NFR BLD: 63.6 % (ref 38–73)
NRBC BLD-RTO: 0 /100 WBC
PLATELET # BLD AUTO: 218 K/UL (ref 150–350)
PMV BLD AUTO: 10.2 FL (ref 9.2–12.9)
POTASSIUM SERPL-SCNC: 4.5 MMOL/L (ref 3.5–5.1)
PROT SERPL-MCNC: 6.8 G/DL (ref 6–8.4)
PROTHROMBIN TIME: 36.1 SEC (ref 9–12.5)
RBC # BLD AUTO: 3.59 M/UL (ref 4.6–6.2)
SODIUM SERPL-SCNC: 135 MMOL/L (ref 136–145)
TROPONIN I SERPL DL<=0.01 NG/ML-MCNC: 0.11 NG/ML (ref 0–0.03)
WBC # BLD AUTO: 7.06 K/UL (ref 3.9–12.7)

## 2020-02-21 PROCEDURE — 85025 COMPLETE CBC W/AUTO DIFF WBC: CPT

## 2020-02-21 PROCEDURE — 83615 LACTATE (LD) (LDH) ENZYME: CPT

## 2020-02-21 PROCEDURE — 99231 SBSQ HOSP IP/OBS SF/LOW 25: CPT | Mod: ,,, | Performed by: PHYSICAL MEDICINE & REHABILITATION

## 2020-02-21 PROCEDURE — 80053 COMPREHEN METABOLIC PANEL: CPT

## 2020-02-21 PROCEDURE — 93010 ELECTROCARDIOGRAM REPORT: CPT | Mod: ,,, | Performed by: INTERNAL MEDICINE

## 2020-02-21 PROCEDURE — 96374 THER/PROPH/DIAG INJ IV PUSH: CPT

## 2020-02-21 PROCEDURE — 99231 PR SUBSEQUENT HOSPITAL CARE,LEVL I: ICD-10-PCS | Mod: ,,, | Performed by: PHYSICAL MEDICINE & REHABILITATION

## 2020-02-21 PROCEDURE — 99285 EMERGENCY DEPT VISIT HI MDM: CPT | Mod: 25

## 2020-02-21 PROCEDURE — 93005 ELECTROCARDIOGRAM TRACING: CPT

## 2020-02-21 PROCEDURE — 85610 PROTHROMBIN TIME: CPT

## 2020-02-21 PROCEDURE — 99285 PR EMERGENCY DEPT VISIT,LEVEL V: ICD-10-PCS | Mod: GC,,, | Performed by: EMERGENCY MEDICINE

## 2020-02-21 PROCEDURE — 63600175 PHARM REV CODE 636 W HCPCS: Performed by: STUDENT IN AN ORGANIZED HEALTH CARE EDUCATION/TRAINING PROGRAM

## 2020-02-21 PROCEDURE — 83880 ASSAY OF NATRIURETIC PEPTIDE: CPT

## 2020-02-21 PROCEDURE — 99285 EMERGENCY DEPT VISIT HI MDM: CPT | Mod: GC,,, | Performed by: EMERGENCY MEDICINE

## 2020-02-21 PROCEDURE — 84484 ASSAY OF TROPONIN QUANT: CPT

## 2020-02-21 PROCEDURE — 93010 EKG 12-LEAD: ICD-10-PCS | Mod: ,,, | Performed by: INTERNAL MEDICINE

## 2020-02-21 RX ORDER — MORPHINE SULFATE 4 MG/ML
4 INJECTION, SOLUTION INTRAMUSCULAR; INTRAVENOUS
Status: COMPLETED | OUTPATIENT
Start: 2020-02-21 | End: 2020-02-21

## 2020-02-21 RX ADMIN — MORPHINE SULFATE 4 MG: 4 INJECTION, SOLUTION INTRAMUSCULAR; INTRAVENOUS at 04:02

## 2020-02-21 NOTE — ED PROVIDER NOTES
"Encounter Date: 2/21/2020       History     Chief Complaint   Patient presents with    Chest Pain     pt from [facility].  recent LVAD.  reports midsternal cp x2 days.  worse today.  reports PT had strap around chest yesterday     HPI   60M with PMHx NICMP s/p ICD and LVAD, LV thrombus with CVA/spleen/renal emboli, paroxysmal afib who presents with chest pain.  Patient states that 2 hours PTA he had acute onset of dull tertrosternal chest pain that woke him from sleep.  Pain progressively worsened and became sharp, without any radiation.      He denies dyspnea, nausea, emesis, diaphoresis, abdominal pain, and previous episodes of similar pain.    He states that on Tuesday, he was working with PT when they used a safety strap located across his chest to lift him.  At that time, he felt some discomfort and believes that his ribs had "shifted."      Review of patient's allergies indicates:   Allergen Reactions    Biopatch [chlorhexidine gluconate]      Site burning    Dobutamine in d5w      Tachycardia, tremors, SOB, flushing    Percocet [oxycodone-acetaminophen] Itching    Penicillins Rash     Cefepime given on 1/23/2020 without issue     Past Medical History:   Diagnosis Date    CHF (congestive heart failure) 1/2010    Dx  1/2010 w/ decreased LV systolic function (EF 15%) by ECHO 1/2015    COCM (congestive cardiomyopathy) 7/20/2016    Hyperlipidemia     Hypertension     Paroxysmal atrial fibrillation     Pulmonary embolus 2008    Stroke     Superficial thrombophlebitis      Past Surgical History:   Procedure Laterality Date    APPLICATION OF WOUND VACUUM-ASSISTED CLOSURE DEVICE N/A 1/29/2020    Procedure: APPLICATION, WOUND VAC;  Surgeon: Ino Schmitt MD;  Location: [facility] OR [address] [place] [address];  Service: Cardiovascular;  Laterality: N/A;    DRAINAGE OF PLEURAL EFFUSION Left 1/24/2020    Procedure: DRAINAGE, PLEURAL EFFUSION;  Surgeon: Ino Schmitt MD;  Location: [facility] OR [address] [place] [address];  Service: Cardiovascular;  " Laterality: Left;  500 ml drainage    INSERTION OF GRAFT TO PERICARDIUM N/A 1/24/2020    Procedure: INSERTION, GRAFT, PERICARDIUM;  Surgeon: Ino Schmitt MD;  Location: [facility] OR 2ND FLR;  Service: Cardiovascular;  Laterality: N/A;  Prevena    IRRIGATION OF MEDIASTINUM N/A 1/27/2020    Procedure: IRRIGATION, MEDIASTINUM;  Surgeon: Ino Schmitt MD;  Location: [facility] OR [facility] FLR;  Service: Cardiovascular;  Laterality: N/A;    LEFT VENTRICULAR ASSIST DEVICE Left 1/23/2020    Procedure: INSERTION-LEFT VENTRICULAR ASSIST DEVICE;  Surgeon: Ino Schmitt MD;  Location: [facility] OR [facility] FLR;  Service: Cardiovascular;  Laterality: Left;  DT HM3    RIGHT HEART CATHETERIZATION Right 1/16/2020    Procedure: INSERTION, CATHETER, RIGHT HEART;  Surgeon: Isiah Montero MD;  Location: [facility] CATH LAB;  Service: Cardiology;  Laterality: Right;    STERNAL WOUND CLOSURE N/A 1/24/2020    Procedure: CLOSURE, WOUND, STERNUM;  Surgeon: Ino Schmitt MD;  Location: [facility] OR [facility] FLR;  Service: Cardiovascular;  Laterality: N/A;    STERNAL WOUND CLOSURE  1/24/2020    Procedure: CLOSURE, WOUND, STERNUM;  Surgeon: Ino Schmitt MD;  [facility];  Service: Cardiovascular;;  Temporary closure    STERNAL WOUND CLOSURE N/A 1/29/2020    Procedure: CLOSURE, WOUND, STERNUM;  Surgeon: Ino Schmitt MD;  [facility];  Service: Cardiovascular;  Laterality: N/A;    TONSILLECTOMY      VEIN LIGATION AND STRIPPING      WOUND EXPLORATION N/A 1/24/2020    Procedure: EXPLORATION, WOUND;  Surgeon: Ino Schmitt MD;  [facility];  Service: Cardiovascular;  Laterality: N/A;  Emergency exploration     Family History   Problem Relation Age of Onset    Cancer Mother      Social History     Tobacco Use    Smoking status: Never Smoker    Smokeless tobacco: Never Used   Substance Use Topics    Alcohol use: No    Drug use: No     Review of Systems   Constitutional: Negative for chills and fever.   HENT: Negative for  hearing loss, sore throat and trouble swallowing.    Eyes: Negative for visual disturbance.   Respiratory: Negative for shortness of breath.    Cardiovascular: Positive for chest pain and leg swelling (unchanged from baseline).   Gastrointestinal: Negative for abdominal pain, constipation, diarrhea, nausea and vomiting.   Genitourinary: Negative for dysuria.   Musculoskeletal: Negative for back pain.   Skin: Negative for rash.   Neurological: Negative for weakness.   Hematological: Does not bruise/bleed easily.       Physical Exam     Initial Vitals   BP Pulse Resp Temp SpO2   02/21/20 0256 02/21/20 0158 02/21/20 0158 02/21/20 0158 02/21/20 0158   (!) 94/0 76 20 97.7 °F (36.5 °C) 100 %      MAP       --                Physical Exam    Constitutional: He appears well-developed and well-nourished.   HENT:   Head: Normocephalic and atraumatic.   Mouth/Throat: Oropharynx is clear and moist. No oropharyngeal exudate.   Eyes: EOM are normal. Pupils are equal, round, and reactive to light. No scleral icterus.   Neck: Normal range of motion. Neck supple.   Cardiovascular:   Intermittent mechanical sounds consistent with LVAD; unable to auscultate native cardiac sounds   Pulmonary/Chest:   Right sided basilar crackles without wheezing; other lung fields CTA   Abdominal: Soft. He exhibits no distension. There is no tenderness. There is no rebound and no guarding.   Musculoskeletal: Normal range of motion. He exhibits edema (+1 pitting edema BLE to mid tibia).   Neurological: He is alert and oriented to person, place, and time. He has normal strength. No cranial nerve deficit.   Skin: Skin is warm and dry.   Vertical midline incision overlying anterior chest c/d/i with sutures   Psychiatric: He has a normal mood and affect.         ED Course   Procedures  Labs Reviewed   B-TYPE NATRIURETIC PEPTIDE - Abnormal; Notable for the following components:       Result Value     (*)     All other components within normal limits    CBC W/ AUTO DIFFERENTIAL - Abnormal; Notable for the following components:    RBC 3.59 (*)     Hemoglobin 9.9 (*)     Hematocrit 32.2 (*)     Mean Corpuscular Hemoglobin Conc 30.7 (*)     RDW 17.0 (*)     Immature Granulocytes 1.0 (*)     Immature Grans (Abs) 0.07 (*)     All other components within normal limits   COMPREHENSIVE METABOLIC PANEL - Abnormal; Notable for the following components:    Sodium 135 (*)     BUN, Bld 25 (*)     Creatinine 1.8 (*)     Albumin 3.3 (*)     eGFR if  46.3 (*)     eGFR if non  40.0 (*)     All other components within normal limits   TROPONIN I - Abnormal; Notable for the following components:    Troponin I 0.111 (*)     All other components within normal limits   PROTIME-INR - Abnormal; Notable for the following components:    Prothrombin Time 36.1 (*)     INR 3.8 (*)     All other components within normal limits    Narrative:     ADD ON PT PER DR VASU CARY/ORDER# [id number] @ 4:32AM 2/21/2020   PROTIME-INR   LACTATE DEHYDROGENASE          Imaging Results          X-Ray Chest AP Portable (Final result)  Result time 02/21/20 02:44:24    Final result by Kenneth Camacho MD (02/21/20 02:44:24)                 Impression:      Mild elevation of the right hemidiaphragm with suspected scarring and or atelectasis at the right lung base.      Electronically signed by: Kenneth Camacho  Date:    02/21/2020  Time:    02:44             Narrative:    EXAMINATION:  XR CHEST AP PORTABLE    CLINICAL HISTORY:  chest pain, hx VAD;    TECHNIQUE:  Single frontal view of the chest was performed.    COMPARISON:  February 18, 2020    FINDINGS:  Single chest view is submitted.  Postoperative change, pacemaker, and left ventricular assist device again noted.  The cardiomediastinal silhouette appears stable when accounting for difference in patient positioning.  There is mild elevation the right hemidiaphragm there is appearance of the right lung base that may relate to  scarring and atelectatic change.  There is no evidence for confluent infiltrate or consolidation, significant pleural effusion or pneumothorax.  The osseous structures appear intact.                              MDM:  60M with PMHx as above who presents with acute onset progressive retrosternal chest pain.  DDx ACS, PTX, PNA, PE, avulsion of surgical hardware.  Will obtain basic and cardiac labs, CXR, and EKG.  Dispo pending workup.    Ramón See  [employer] PGY-1  Emergency Medicine  2:46 AM 2/21/2020     HO1 Update:  4mg morphine for pain control.  Discussed patient with cardiology on call who recommend obtaining LDH and PT INR.  Pending those results.  Care transitioned to Dr. Jesus Alberto Landon, please see his note for further updates.    Ramón See  [employer] PGY-1  Emergency Medicine  4:53 AM 2/21/2020                              ED Course as of Feb 21 0509   Fri Feb 21, 2020   0313 H/H improved from baseline   CBC auto differential(!) [CC]   0313 Mild elevation of the right hemidiaphragm with suspected scarring and or atelectasis at the right lung base   X-Ray Chest AP Portable [CC]      ED Course User Index  [CC] Ramón See MD                Clinical Impression:       ICD-10-CM ICD-9-CM   1. Chest pain R07.9 786.50                             Ramón See MD  Resident  02/21/20 0506

## 2020-02-21 NOTE — PROVIDER PROGRESS NOTES - EMERGENCY DEPT.
Signed out to me from previous attending pending cardiology recommendations.  They recommend checking an INR an LDH.  INR is somewhat supratherapeutic, LDH within normal limits.  I called back to discussed the case with Cardiology who recommends that he be discharged home.  They do not feel that a formal consult is necessary.  Patient's vital signs remain normal.  Troponin slightly elevated, consistent with LVAD.  Creatinine at baseline.  Suspect musculoskeletal etiology.  Stable for discharge home.

## 2020-02-21 NOTE — ED TRIAGE NOTES
Pt from [facility], recent LVAD placement on 1/23, woke up from sleep tonight with midsternal CP described as sharp 7/10. Denies SOB, N/V    Patient Identifiers for Tae Delgado checked and correct  LOC: The patient is awake, alert and aware of environment with an appropriate affect, the patient is oriented x 3 and speaking appropriate.  APPEARANCE: Patient resting comfortably and in no acute distress, patient is clean and well groomed, patient's clothing is properly fastened.  SKIN: The skin is warm and dry, patient has normal skin turgor and moist mucus membranes,no rashes or lesions. Surgery incision to sternum, site is C/D/I, no redness/swelling/drainage noted  Musculoskeletal :  Normal range of motion noted. Moves all extremeties well, No swelling or tenderness noted  RESPIRATORY: Airway is open and patent, respirations are spontaneous, patient has a normal effort and rate.  CARDIAC: Patient has a normal rate and rhythm, no periphreal edema noted, capillary refill < 3 seconds.   ABDOMEN: Soft and non tender to palpation, no distention noted. LVAD dressing to RLQ, C/D/I, no drainage noted  PULSES: 2+  And symmetrical in all extremeties  NEUROLOGIC: PERRL. facial expression is symmetrical, patient moving all extremities, normal sensation in all extremities when touched with a finger.The patient is awake, alert and cooperative with a calm affect, patient is aware of environment.    Will continue to monitor

## 2020-02-24 ENCOUNTER — HOSPITAL ENCOUNTER (OUTPATIENT)
Dept: RADIOLOGY | Facility: HOSPITAL | Age: 60
Discharge: HOME | End: 2020-02-24
Attending: THORACIC SURGERY (CARDIOTHORACIC VASCULAR SURGERY)
Payer: MEDICARE

## 2020-02-24 ENCOUNTER — OFFICE VISIT (OUTPATIENT)
Dept: CARDIOTHORACIC SURGERY | Facility: CLINIC | Age: 60
End: 2020-02-24
Payer: MEDICARE

## 2020-02-24 ENCOUNTER — HOSPITAL ENCOUNTER (OUTPATIENT)
Dept: RADIOLOGY | Facility: HOSPITAL | Age: 60
Discharge: HOME | End: 2020-02-24
Attending: INTERNAL MEDICINE
Payer: MEDICARE

## 2020-02-24 VITALS — OXYGEN SATURATION: 100 % | TEMPERATURE: 97 F | WEIGHT: 209 LBS | HEART RATE: 90 BPM | BODY MASS INDEX: 29.99 KG/M2

## 2020-02-24 DIAGNOSIS — Z95.811 LVAD (LEFT VENTRICULAR ASSIST DEVICE) PRESENT: ICD-10-CM

## 2020-02-24 DIAGNOSIS — R07.82 INTERCOSTAL PAIN: ICD-10-CM

## 2020-02-24 DIAGNOSIS — Z95.811 LVAD (LEFT VENTRICULAR ASSIST DEVICE) PRESENT: Primary | ICD-10-CM

## 2020-02-24 DIAGNOSIS — R07.82 INTERCOSTAL PAIN: Primary | ICD-10-CM

## 2020-02-24 PROCEDURE — 99024 PR POST-OP FOLLOW-UP VISIT: ICD-10-PCS | Mod: POP,,, | Performed by: THORACIC SURGERY (CARDIOTHORACIC VASCULAR SURGERY)

## 2020-02-24 PROCEDURE — 71250 CT THORAX DX C-: CPT | Mod: TC

## 2020-02-24 PROCEDURE — 99999 PR PBB SHADOW E&M-EST. PATIENT-LVL III: CPT | Mod: PBBFAC,,, | Performed by: THORACIC SURGERY (CARDIOTHORACIC VASCULAR SURGERY)

## 2020-02-24 PROCEDURE — 71250 CT CHEST WITHOUT CONTRAST: ICD-10-PCS | Mod: 26,,, | Performed by: RADIOLOGY

## 2020-02-24 PROCEDURE — 71046 X-RAY EXAM CHEST 2 VIEWS: CPT | Mod: TC,FY

## 2020-02-24 PROCEDURE — 71046 XR CHEST PA AND LATERAL: ICD-10-PCS | Mod: 26,,, | Performed by: RADIOLOGY

## 2020-02-24 PROCEDURE — 71046 X-RAY EXAM CHEST 2 VIEWS: CPT | Mod: 26,,, | Performed by: RADIOLOGY

## 2020-02-24 PROCEDURE — 71250 CT THORAX DX C-: CPT | Mod: 26,,, | Performed by: RADIOLOGY

## 2020-02-24 PROCEDURE — 99213 OFFICE O/P EST LOW 20 MIN: CPT | Mod: PBBFAC,25 | Performed by: THORACIC SURGERY (CARDIOTHORACIC VASCULAR SURGERY)

## 2020-02-24 PROCEDURE — 99999 PR PBB SHADOW E&M-EST. PATIENT-LVL III: ICD-10-PCS | Mod: PBBFAC,,, | Performed by: THORACIC SURGERY (CARDIOTHORACIC VASCULAR SURGERY)

## 2020-02-24 PROCEDURE — 99024 POSTOP FOLLOW-UP VISIT: CPT | Mod: POP,,, | Performed by: THORACIC SURGERY (CARDIOTHORACIC VASCULAR SURGERY)

## 2020-02-24 RX ORDER — SALMETEROL XINAFOATE 50 UG/1
POWDER, METERED ORAL; RESPIRATORY (INHALATION)
COMMUNITY
Start: 2020-01-09 | End: 2020-07-01

## 2020-02-24 NOTE — PROGRESS NOTES
"Patient seen and examined. Patient is progressively increasing activity. Significant complaints include knot to top of incision and a feeling that "my chest is moving around when I shift in bed". Complaints started after pt started PT.      Sternum: stable, incision CDI  Chest xray: Acceptable post op chest    CT chest reviewed.     Suture removal performed to chest and abdomen.      Assessment  S/P LVAD Surgery     Plan:      Follow up with HTS.  "

## 2020-02-27 ENCOUNTER — TELEPHONE (OUTPATIENT)
Dept: TRANSPLANT | Facility: CLINIC | Age: 60
End: 2020-02-27

## 2020-02-27 ENCOUNTER — DOCUMENTATION ONLY (OUTPATIENT)
Dept: TRANSPLANT | Facility: HOSPITAL | Age: 60
End: 2020-02-27

## 2020-02-27 NOTE — PROGRESS NOTES
[facility]   Heart Transplant/VAD Clinic   [address] [place] [address]   [phone] after hours          [phone] fax     VAD HOME  HEALTH ORDERS      Admit to Home Health    Diagnosis:   Patient Active Problem List   Diagnosis    History of pulmonary embolism    Hyperlipidemia    Gout    Hypertension    Obesity    At risk for amiodarone toxicity with long term use    Left ventricular thrombus without MI    Paroxysmal atrial fibrillation    Chronic systolic congestive heart failure    Chronic anticoagulation    COCM (congestive cardiomyopathy)    Acute on chronic combined systolic and diastolic heart failure    Acute on chronic systolic heart failure    Hypertensive heart disease with heart failure    Long term (current) use of anticoagulants    Acute kidney injury superimposed on chronic kidney disease    Right lower lobe pulmonary nodule    ICD (implantable cardioverter-defibrillator) in place    NSVT (nonsustained ventricular tachycardia)    ACP (advance care planning)    Coagulopathy    LVAD (left ventricular assist device) present    Acute hyperglycemia    Alteration in skin integrity    Acute postoperative respiratory insufficiency    Debility       Patient is homebound due to:      Diet: Low Fat, Low cholesterol, 2Gm Na, Coumadin restrictions.    Acitivities: No Swimming, bathing, vacuuming, contact sports.    Fresh implants= Sternal Precautions    Nursing:   SN to complete comprehensive assessment including routine vital signs. Instruct on disease process and s/s of complications to report to MD. Review/verify medication list sent home with the patient at time of discharge  and instruct patient/caregiver as needed. Frequency may be adjusted depending on start of care date.    **LVAD driveline exit site dressing change is to be completed per LVAD patient/caregiver only**.    Notify MD if SBP > 160 or < 90; DBP > 90 or <  50; HR > 120 or < 50; Temp > 101; Weight gain >3lbs in 1 day or 5lbs in 1 week.  Other:         LABS:  SN to perform labs: PT/INR per Coumadin clinic [phone].   Follow up INR date: 3/2/2020  No Finger Sticks  CBC, CMP, BNP, Mag weekly Q Monday    CONSULTS:     Physical Therapy to evaluate and treat. Evaluate for home safety and equipment needs; Establish/upgrade home exercise program. Perform / instruct on therapeutic exercises, gait training, transfer training, and Range of Motion.    Occupational Therapy to evaluate and treat. Evaluate home environment for safety and equipment needs. Perform/Instruct on transfers, ADL training, ROM, and therapeutic exercises.    Send initial Home Health orders to [facility] attending physician on call.  Send follow up questions to VAD clinic MD [phone] or [phone].

## 2020-02-27 NOTE — TELEPHONE ENCOUNTER
NP called to report there is a very small amount of light yellow drainage noted at the LVAD DLES.  His wife is doing the dressing.  I asked to have a nurse watch her tonight incase she forgot a step.  He has a clinic visit tomorrow with us.

## 2020-02-28 ENCOUNTER — CLINICAL SUPPORT (OUTPATIENT)
Dept: TRANSPLANT | Facility: CLINIC | Age: 60
End: 2020-02-28
Payer: MEDICARE

## 2020-02-28 ENCOUNTER — OFFICE VISIT (OUTPATIENT)
Dept: TRANSPLANT | Facility: CLINIC | Age: 60
End: 2020-02-28
Payer: MEDICARE

## 2020-02-28 ENCOUNTER — HOSPITAL ENCOUNTER (OUTPATIENT)
Dept: RADIOLOGY | Facility: HOSPITAL | Age: 60
Discharge: HOME | End: 2020-02-28
Attending: INTERNAL MEDICINE
Payer: MEDICARE

## 2020-02-28 VITALS — SYSTOLIC BLOOD PRESSURE: 85 MMHG | BODY MASS INDEX: 31.78 KG/M2 | WEIGHT: 222 LBS | HEIGHT: 70 IN

## 2020-02-28 DIAGNOSIS — Z95.811 HEART REPLACED BY HEART ASSIST DEVICE: ICD-10-CM

## 2020-02-28 DIAGNOSIS — R79.1 SUBTHERAPEUTIC INTERNATIONAL NORMALIZED RATIO (INR): ICD-10-CM

## 2020-02-28 DIAGNOSIS — I50.23 ACUTE ON CHRONIC SYSTOLIC HEART FAILURE: ICD-10-CM

## 2020-02-28 DIAGNOSIS — I50.22 CHRONIC SYSTOLIC CONGESTIVE HEART FAILURE: ICD-10-CM

## 2020-02-28 DIAGNOSIS — Z79.899 LONG TERM CURRENT USE OF AMIODARONE: ICD-10-CM

## 2020-02-28 DIAGNOSIS — I50.43 ACUTE ON CHRONIC COMBINED SYSTOLIC AND DIASTOLIC HEART FAILURE: ICD-10-CM

## 2020-02-28 DIAGNOSIS — I48.0 PAROXYSMAL ATRIAL FIBRILLATION: ICD-10-CM

## 2020-02-28 DIAGNOSIS — Z95.811 LVAD (LEFT VENTRICULAR ASSIST DEVICE) PRESENT: Primary | ICD-10-CM

## 2020-02-28 PROCEDURE — 71046 X-RAY EXAM CHEST 2 VIEWS: CPT | Mod: TC,FY

## 2020-02-28 PROCEDURE — 71046 XR CHEST PA AND LATERAL: ICD-10-PCS | Mod: 26,,, | Performed by: RADIOLOGY

## 2020-02-28 PROCEDURE — 99214 PR OFFICE/OUTPT VISIT, EST, LEVL IV, 30-39 MIN: ICD-10-PCS | Mod: S$PBB,,, | Performed by: PHYSICIAN ASSISTANT

## 2020-02-28 PROCEDURE — 99999 PR PBB SHADOW E&M-EST. PATIENT-LVL IV: CPT | Mod: PBBFAC,,, | Performed by: PHYSICIAN ASSISTANT

## 2020-02-28 PROCEDURE — 99999 PR PBB SHADOW E&M-EST. PATIENT-LVL IV: ICD-10-PCS | Mod: PBBFAC,,, | Performed by: PHYSICIAN ASSISTANT

## 2020-02-28 PROCEDURE — 99214 OFFICE O/P EST MOD 30 MIN: CPT | Mod: S$PBB,,, | Performed by: PHYSICIAN ASSISTANT

## 2020-02-28 PROCEDURE — 71046 X-RAY EXAM CHEST 2 VIEWS: CPT | Mod: 26,,, | Performed by: RADIOLOGY

## 2020-02-28 PROCEDURE — 99214 OFFICE O/P EST MOD 30 MIN: CPT | Mod: PBBFAC,25 | Performed by: PHYSICIAN ASSISTANT

## 2020-02-28 RX ORDER — WARFARIN 2.5 MG/1
2.5 TABLET ORAL DAILY
Qty: 30 TABLET | Refills: 11
Start: 2020-02-28 | End: 2020-02-28

## 2020-02-28 RX ORDER — WARFARIN 2.5 MG/1
2.5 TABLET ORAL DAILY
Qty: 30 TABLET | Refills: 11 | Status: ON HOLD
Start: 2020-02-28 | End: 2020-03-20 | Stop reason: HOSPADM

## 2020-02-28 NOTE — PROGRESS NOTES
Date of Implant with Heartmate 3 LVAD: 2020    PATIENT ARRIVED IN CLINIC:  Ambulatory   Accompanied by: Wife    Vitals  Temperature, oral:   Temp Readings from Last 1 Encounters:   20 97.2 °F (36.2 °C)     Blood Pressure:   BP Readings from Last 3 Encounters:   20 (!) 85/0   20 (!) 94/0   20 (!) 84/0        VAD Interrogation:  TXP KLEVER INTERROGATIONS 2020   Type HeartMate3 - -   Flow 4.3 - -   Speed 5300 - -   PI 3.9 - -   Power (Berry) 3.8 - -   LSL 4900 - -   Pulsatility Pulse Intermittent pulse Intermittent pulse       History Lo.c3e  Problems / Issues / Alarms with VAD if any: None noted  VAD Sounds: HM3 Smooth  Heartmate 3 Module Cable:  No yellow exposed    HCT:   Lab Results   Component Value Date    HCT 32.0 (L) 2020    HCT 25 (L) 2020       Complaints/reason for visit today: routine  Emergency Equipment With Patient: yes   VAD Binder With Patient: no   Reviewed VAD Numbers In Binder: no    Any Equipment Issues: None noted (Refer to  note for complete details)    DLES Assessment:  Appearance Of Driveline: 2 with sutures and small scab  Antibiotics: NO  Velour: no  Manual & Visual Inspection Of Driveline: No kinks or tears noted  Stabilization Device In Use: yes, giles securement device      Patient MyChart Questionnaire: No flowsheet data found.     Assessment:   PAIN: NO  Complaints Of Nausea / Vomiting: None noted    Appearance and Frequency Of Stools: normal and formed without blood & daily  Color Of Urine: clear/yellow  Coping/Depression/Anxiety: coping okay  Sleep Habits: 4 hrs /night  Sleep Aids: None noted  Showering: No  Activity/Exercise: 3 hours Rehab   Driving: No.    DLES Dressing Care:   Frequency of Dressing Changes: daily & Betadine and water  Pt In Need Of Management Kits?:yes -   1 Box of betadine and water  It is medically necessary to have VAD management kits in order to prevent infection or to  assist in the healing of an infected DLES.    Labs:    Chemistry        Component Value Date/Time     (L) 02/28/2020 1406    K 3.7 02/28/2020 1406    CL 97 02/28/2020 1406    CO2 27 02/28/2020 1406    BUN 19 02/28/2020 1406    CREATININE 1.7 (H) 02/28/2020 1406     02/28/2020 1406        Component Value Date/Time    CALCIUM 9.3 02/28/2020 1406    ALKPHOS 106 02/28/2020 1406    AST 18 02/28/2020 1406    ALT 19 02/28/2020 1406    BILITOT 0.6 02/28/2020 1406    ESTGFRAFRICA 49.6 (A) 02/28/2020 1406    EGFRNONAA 42.9 (A) 02/28/2020 1406            Magnesium   Date Value Ref Range Status   02/28/2020 2.0 1.6 - 2.6 mg/dL Final       Lab Results   Component Value Date    WBC 7.27 02/28/2020    HGB 9.8 (L) 02/28/2020    HCT 32.0 (L) 02/28/2020    MCV 88 02/28/2020     02/28/2020       Lab Results   Component Value Date    INR 1.7 (H) 02/28/2020    INR 3.8 (H) 02/21/2020    INR 2.4 (H) 02/17/2020       BNP   Date Value Ref Range Status   02/21/2020 273 (H) 0 - 99 pg/mL Final     Comment:     Values of less than 100 pg/ml are consistent with non-CHF populations.   02/17/2020 226 (H) 0 - 99 pg/mL Final     Comment:     Values of less than 100 pg/ml are consistent with non-CHF populations.   02/16/2020 212 (H) 0 - 99 pg/mL Final     Comment:     Values of less than 100 pg/ml are consistent with non-CHF populations.       LD   Date Value Ref Range Status   02/28/2020 254 110 - 260 U/L Final     Comment:     Results are increased in hemolyzed samples.   02/21/2020 251 110 - 260 U/L Final     Comment:     Results are increased in hemolyzed samples.   02/17/2020 242 110 - 260 U/L Final     Comment:     Results are increased in hemolyzed samples.       Labs reviewed with patient: YES      Patient Satisfaction Survey completed per patient: Yes- Outpatient  (explained about signature and box to check)  Medication reconciliation: per MA.  Medication Detail updated today: yes  Coumadin Managed by: [facility]    Education: Reviewed driveline care, emergency procedures, how to change the controller, alarms with patient, as well as discussed how to page the VAD coordinator in case of an emergency.     Plans/Needs: Patient seen in clinic for first appointment. Patient has had subtherapeutic INR x4 days, PA ordered coumadin boost for today and recheck INR Monday. Chest Xray completed and reviewed with patient by MARBELLA Anderson. Patient complained of swelling in feet but reports wearing gini hose and elevating feet, PA to stop Amlodipine and follow up at next appointment. Patient is receiving an getting an US after his visit. Patient is to RTC in 1 week for next appointment, see provider note.     Hurricane Season: No

## 2020-02-28 NOTE — LETTER
February 28, 2020        Dipesh De La Torre  [address] [facility] [address] [place] [address]  Phone: [phone]  Fax: [phone]             [facility] [address] [place] [address]  Phone: [phone]   Patient: Tae Delgado   MR Number: [id number]   YOB: 1960   Date of Visit: 2/28/2020       Dear Dr. Dipesh De La Torre    Thank you for referring aTe Delgado to me for evaluation. Attached you will find relevant portions of my assessment and plan of care.    If you have questions, please do not hesitate to call me. I look forward to following Tae Delgado along with you.    Sincerely,    SINDI Rodriguezosure    If you would like to receive this communication electronically, please contact [email or web address] or [phone] to request [employer] access.    [employer] is a tool which provides read-only access to select patient information with whom you have a relationship. Its easy to use and provides real time access to review your patients record including encounter summaries, notes, results, and demographic information.    If you feel you have received this communication in error or would no longer like to receive these types of communications, please e-mail [email or web address]

## 2020-02-28 NOTE — PROGRESS NOTES
Subjective:   Patient ID:  Tae Delgado is a 60 y.o. male who presents for LVAD followup visit.    Implant Date:1/23/2020  Initials:RCD     Heartmate 3 RPM 5300     INR goal: 2-3   Bridge with Heparin   Antiplatelets:      TXP KLEVER INTERROGATIONS 2/28/2020   Type HeartMate3   Flow 4.3   Speed 5300   PI 3.9   Power (Berry) 3.8   LSL 4900   Pulsatility Pulse       HPI      55 y.o. WM with history of NICMP diagnosed in 2010, ICD, LV thrombus (with prior splenic and renal emboli), Embolic CVA , paroxysmal atrial fib, HTN, HLP, s/p HM 3 1/17/2020 as DT with closure of AV and MV repair with post-op course complicated by RV failure. On 1/24/20 he was taken back to the OR for possible RVAD placement; however, RVAD not placed and patient remained hemodynamically stable in the OR.  He went for wash out on 1/27/20 and his chest was closed on 1/29/20. Postoperatively he had paroxsymal atrial fib; he was started on Digoxin and anticoagulated with Coumadin. Renal function neptali acutely in the post op period but improved during his hospital stay.  Creatinine on day of discharge was 1.8. Right lower lobe pulm nodule found incidentally on CT: recommend repeat chest CT in 3 months and follow up in pulmonary clinic  Patient was discharged to rehab facility for further therapy.      He is here for his first LVAD clinic visit. Patient has been doing well in rehab with the exception of lower extremity edema. He states that he wakes up and his legs are minimally improved, but within an hour they are back to being extremely swollen. He does not feel that he is retaining fluid. He also complaints of insomnia and was given something at rehab but he is unsure of the name. He ambulated 40 ft at rehab for the first time yesterday and feels very encouraged.     Review of Systems   Constitution: Negative.   HENT: Negative.    Eyes: Negative.    Cardiovascular: Positive for leg swelling. Negative for chest pain, claudication, cyanosis,  "dyspnea on exertion, irregular heartbeat, near-syncope, orthopnea and palpitations.   Respiratory: Negative for cough, hemoptysis, shortness of breath and sleep disturbances due to breathing.    Endocrine: Negative.  Negative for cold intolerance, heat intolerance and polydipsia.   Hematologic/Lymphatic: Negative.    Skin: Negative.    Musculoskeletal: Negative.    Gastrointestinal: Negative.  Negative for bloating, abdominal pain, anorexia, change in bowel habit, bowel incontinence and constipation.   Genitourinary: Negative.    Neurological: Negative.        Objective:   Blood pressure (!) 85/0, height 5' 10" (1.778 m), weight 100.7 kg (222 lb).body mass index is 31.85 kg/m².    Doppler: 85/0 pulsatile    Physical Exam   Constitutional: He is oriented to person, place, and time. He appears well-developed and well-nourished.   HENT:   Head: Normocephalic and atraumatic.   Neck: Normal range of motion. Neck supple. No JVD (at clavicle) present.   Cardiovascular: Normal rate and regular rhythm.   vad hum smooth. Sternal incision well approximated   Pulmonary/Chest: Effort normal and breath sounds normal. No respiratory distress. He has no wheezes. He has no rales.   Abdominal: Soft. Bowel sounds are normal. He exhibits no distension. There is no tenderness.   Musculoskeletal: He exhibits edema (3+ bilateral LE edema).   Neurological: He is alert and oriented to person, place, and time.   Skin: Skin is warm and dry. No erythema.   Psychiatric: He has a normal mood and affect. His behavior is normal. Judgment and thought content normal.       Lab Results   Component Value Date    WBC 7.27 02/28/2020    HGB 9.8 (L) 02/28/2020    HCT 32.0 (L) 02/28/2020    MCV 88 02/28/2020     02/28/2020    CO2 27 02/28/2020    CREATININE 1.7 (H) 02/28/2020    CALCIUM 9.3 02/28/2020    ALBUMIN 3.4 (L) 02/28/2020    AST 18 02/28/2020     (H) 02/21/2020    ALT 19 02/28/2020     02/28/2020       Lab Results   Component " Value Date    INR 1.7 (H) 02/28/2020    INR 3.8 (H) 02/21/2020    INR 2.4 (H) 02/17/2020       BNP   Date Value Ref Range Status   02/21/2020 273 (H) 0 - 99 pg/mL Final     Comment:     Values of less than 100 pg/ml are consistent with non-CHF populations.   02/17/2020 226 (H) 0 - 99 pg/mL Final     Comment:     Values of less than 100 pg/ml are consistent with non-CHF populations.   02/16/2020 212 (H) 0 - 99 pg/mL Final     Comment:     Values of less than 100 pg/ml are consistent with non-CHF populations.       LD   Date Value Ref Range Status   02/28/2020 254 110 - 260 U/L Final     Comment:     Results are increased in hemolyzed samples.   02/21/2020 251 110 - 260 U/L Final     Comment:     Results are increased in hemolyzed samples.   02/17/2020 242 110 - 260 U/L Final     Comment:     Results are increased in hemolyzed samples.       Labs were reviewed with the patient.    No results found for this or any previous visit.  No results found for this or any previous visit.    Assessment:      1. LVAD (left ventricular assist device) present    2. Heart replaced by heart assist device    3. Long term current use of amiodarone    4. Acute on chronic systolic heart failure    5. Acute on chronic combined systolic and diastolic heart failure    6. Chronic systolic congestive heart failure    7. Paroxysmal atrial fibrillation    8. Subtherapeutic international normalized ratio (INR)        Plan:     -STOP amlodipine as it is the most likely culprit of his lower extremity edema  -Boost coumadin with 2.5 mg QD through the weekend. Recheck INR Monday (rehab is monitoring). If subtherapeutic Monday will need admission.    Patient is now NYHA III  Recommend 2 gram sodium restriction and 1500cc fluid restriction.  Encourage physical activity with graded exercise program.  Requested patient to weigh themselves daily, and to notify us if their weight increases by more than 3 lbs in 1 day or 5 lbs in 1 week.     Listed for  transplant: No    RTC 1 week

## 2020-03-01 RX ORDER — FERROUS SULFATE 325(65) MG
325 TABLET ORAL
COMMUNITY
Start: 2020-03-02 | End: 2020-03-04 | Stop reason: SDUPTHER

## 2020-03-01 RX ORDER — ACETAMINOPHEN 325 MG/1
650 TABLET ORAL EVERY 6 HOURS PRN
COMMUNITY
Start: 2020-03-01

## 2020-03-01 RX ORDER — AMOXICILLIN 250 MG
2 CAPSULE ORAL 2 TIMES DAILY PRN
COMMUNITY
Start: 2020-03-01 | End: 2020-07-01

## 2020-03-01 RX ORDER — HYDROCODONE BITARTRATE AND ACETAMINOPHEN 5; 325 MG/1; MG/1
1 TABLET ORAL 2 TIMES DAILY PRN
COMMUNITY
Start: 2020-03-01 | End: 2020-03-04 | Stop reason: SDUPTHER

## 2020-03-01 RX ORDER — COLCHICINE 0.6 MG/1
0.6 TABLET ORAL DAILY PRN
COMMUNITY
Start: 2020-03-01 | End: 2020-03-04 | Stop reason: SDUPTHER

## 2020-03-02 ENCOUNTER — HOSPITAL ENCOUNTER (OUTPATIENT)
Dept: RADIOLOGY | Facility: HOSPITAL | Age: 60
Discharge: HOME | End: 2020-03-02
Attending: PHYSICIAN ASSISTANT
Payer: MEDICARE

## 2020-03-02 ENCOUNTER — HOSPITAL ENCOUNTER (OUTPATIENT)
Dept: RADIOLOGY | Facility: HOSPITAL | Age: 60
Discharge: HOME | End: 2020-03-02
Attending: STUDENT IN AN ORGANIZED HEALTH CARE EDUCATION/TRAINING PROGRAM
Payer: MEDICARE

## 2020-03-02 DIAGNOSIS — R07.82 INTERCOSTAL PAIN: ICD-10-CM

## 2020-03-02 DIAGNOSIS — I10 HYPERTENSION, UNSPECIFIED TYPE: ICD-10-CM

## 2020-03-02 DIAGNOSIS — I50.22 CHRONIC SYSTOLIC HEART FAILURE: ICD-10-CM

## 2020-03-02 DIAGNOSIS — N18.30 CHRONIC KIDNEY DISEASE, STAGE III (MODERATE): ICD-10-CM

## 2020-03-02 PROCEDURE — 93975 VASCULAR STUDY: CPT | Mod: 26,,, | Performed by: RADIOLOGY

## 2020-03-02 PROCEDURE — 93975 VASCULAR STUDY: CPT | Mod: TC

## 2020-03-02 PROCEDURE — 76705 ECHO EXAM OF ABDOMEN: CPT | Mod: TC

## 2020-03-02 PROCEDURE — 93975 US DOPPLER RENAL ARTERY AND VEIN (XPD): ICD-10-PCS | Mod: 26,,, | Performed by: RADIOLOGY

## 2020-03-02 PROCEDURE — 76705 ECHO EXAM OF ABDOMEN: CPT | Mod: 26,59,, | Performed by: RADIOLOGY

## 2020-03-02 PROCEDURE — 76705 US ABDOMEN LIMITED: ICD-10-PCS | Mod: 26,59,, | Performed by: RADIOLOGY

## 2020-03-03 PROCEDURE — G0180 PR HOME HEALTH MD CERTIFICATION: ICD-10-PCS | Mod: ,,, | Performed by: PHYSICAL MEDICINE & REHABILITATION

## 2020-03-03 PROCEDURE — G0180 MD CERTIFICATION HHA PATIENT: HCPCS | Mod: ,,, | Performed by: PHYSICAL MEDICINE & REHABILITATION

## 2020-03-03 NOTE — PROGRESS NOTES
Per note: Mr. Delgado's INR has been trending low, It looks like a little over a week ago, he had been 3.7 and then he slowly dropped to sub therapeutic range and has been for about 7 days. Today he is 1.5 but we saw him on Friday he was 1.7, and decided to wait and recheck Monday after a booster dose and admit to the hospital if subtherapeutic. Saturday, his INR jumped up to 3.1 so I have a theory that possibly in Rehab instead of dropping to a lower dose of Coumadin that maybe it is being held it instead. He is a new implant so I know we normally ere on the side of caution but Mr. Delgado is suppose to discharge from Rehab today so I wanted to see if we still want to admit him for the INR or maybe try and manage and recheck later this week?  Per Dr Doshi boost dose.

## 2020-03-04 ENCOUNTER — ANTI-COAG VISIT (OUTPATIENT)
Dept: CARDIOLOGY | Facility: CLINIC | Age: 60
End: 2020-03-04
Payer: MEDICARE

## 2020-03-04 ENCOUNTER — OFFICE VISIT (OUTPATIENT)
Dept: TRANSPLANT | Facility: CLINIC | Age: 60
End: 2020-03-04
Payer: MEDICARE

## 2020-03-04 ENCOUNTER — CLINICAL SUPPORT (OUTPATIENT)
Dept: TRANSPLANT | Facility: CLINIC | Age: 60
End: 2020-03-04
Payer: MEDICARE

## 2020-03-04 ENCOUNTER — LAB VISIT (OUTPATIENT)
Dept: LAB | Facility: HOSPITAL | Age: 60
End: 2020-03-04
Attending: INTERNAL MEDICINE
Payer: MEDICARE

## 2020-03-04 VITALS — SYSTOLIC BLOOD PRESSURE: 85 MMHG

## 2020-03-04 DIAGNOSIS — Z95.811 HEART REPLACED BY HEART ASSIST DEVICE: ICD-10-CM

## 2020-03-04 DIAGNOSIS — I11.0 HYPERTENSIVE HEART DISEASE WITH HEART FAILURE: ICD-10-CM

## 2020-03-04 DIAGNOSIS — I48.0 PAROXYSMAL ATRIAL FIBRILLATION: ICD-10-CM

## 2020-03-04 DIAGNOSIS — Z95.811 LVAD (LEFT VENTRICULAR ASSIST DEVICE) PRESENT: ICD-10-CM

## 2020-03-04 DIAGNOSIS — Z79.899 LONG TERM CURRENT USE OF AMIODARONE: ICD-10-CM

## 2020-03-04 DIAGNOSIS — I50.23 ACUTE ON CHRONIC SYSTOLIC HEART FAILURE: ICD-10-CM

## 2020-03-04 DIAGNOSIS — Z79.01 LONG TERM (CURRENT) USE OF ANTICOAGULANTS: ICD-10-CM

## 2020-03-04 DIAGNOSIS — Z95.811 LVAD (LEFT VENTRICULAR ASSIST DEVICE) PRESENT: Primary | ICD-10-CM

## 2020-03-04 DIAGNOSIS — I42.0 COCM (CONGESTIVE CARDIOMYOPATHY): ICD-10-CM

## 2020-03-04 DIAGNOSIS — I50.22 CHRONIC SYSTOLIC CONGESTIVE HEART FAILURE: ICD-10-CM

## 2020-03-04 LAB
ALBUMIN SERPL BCP-MCNC: 3.4 G/DL (ref 3.5–5.2)
ALP SERPL-CCNC: 96 U/L (ref 55–135)
ALT SERPL W/O P-5'-P-CCNC: 22 U/L (ref 10–44)
ANION GAP SERPL CALC-SCNC: 12 MMOL/L (ref 8–16)
AST SERPL-CCNC: 21 U/L (ref 10–40)
BASOPHILS # BLD AUTO: 0.05 K/UL (ref 0–0.2)
BASOPHILS NFR BLD: 0.6 % (ref 0–1.9)
BILIRUB DIRECT SERPL-MCNC: 0.3 MG/DL (ref 0.1–0.3)
BILIRUB SERPL-MCNC: 0.6 MG/DL (ref 0.1–1)
BNP SERPL-MCNC: 324 PG/ML (ref 0–99)
BUN SERPL-MCNC: 15 MG/DL (ref 6–20)
CALCIUM SERPL-MCNC: 8.9 MG/DL (ref 8.7–10.5)
CHLORIDE SERPL-SCNC: 99 MMOL/L (ref 95–110)
CO2 SERPL-SCNC: 26 MMOL/L (ref 23–29)
CREAT SERPL-MCNC: 1.7 MG/DL (ref 0.5–1.4)
CRP SERPL-MCNC: 2.6 MG/L (ref 0–8.2)
DIFFERENTIAL METHOD: ABNORMAL
EOSINOPHIL # BLD AUTO: 0 K/UL (ref 0–0.5)
EOSINOPHIL NFR BLD: 0.5 % (ref 0–8)
ERYTHROCYTE [DISTWIDTH] IN BLOOD BY AUTOMATED COUNT: 14.8 % (ref 11.5–14.5)
EST. GFR  (AFRICAN AMERICAN): 49.6 ML/MIN/1.73 M^2
EST. GFR  (NON AFRICAN AMERICAN): 42.9 ML/MIN/1.73 M^2
GLUCOSE SERPL-MCNC: 105 MG/DL (ref 70–110)
HCT VFR BLD AUTO: 33.4 % (ref 40–54)
HGB BLD-MCNC: 10.6 G/DL (ref 14–18)
IMM GRANULOCYTES # BLD AUTO: 0.07 K/UL (ref 0–0.04)
IMM GRANULOCYTES NFR BLD AUTO: 0.9 % (ref 0–0.5)
INR PPP: 1.8 (ref 0.8–1.2)
LDH SERPL L TO P-CCNC: 224 U/L (ref 110–260)
LYMPHOCYTES # BLD AUTO: 1.4 K/UL (ref 1–4.8)
LYMPHOCYTES NFR BLD: 17.6 % (ref 18–48)
MAGNESIUM SERPL-MCNC: 1.6 MG/DL (ref 1.6–2.6)
MCH RBC QN AUTO: 27.8 PG (ref 27–31)
MCHC RBC AUTO-ENTMCNC: 31.7 G/DL (ref 32–36)
MCV RBC AUTO: 88 FL (ref 82–98)
MONOCYTES # BLD AUTO: 0.6 K/UL (ref 0.3–1)
MONOCYTES NFR BLD: 8.2 % (ref 4–15)
NEUTROPHILS # BLD AUTO: 5.7 K/UL (ref 1.8–7.7)
NEUTROPHILS NFR BLD: 72.2 % (ref 38–73)
NRBC BLD-RTO: 0 /100 WBC
PHOSPHATE SERPL-MCNC: 2.6 MG/DL (ref 2.7–4.5)
PLATELET # BLD AUTO: 280 K/UL (ref 150–350)
PMV BLD AUTO: 9.7 FL (ref 9.2–12.9)
POTASSIUM SERPL-SCNC: 2.8 MMOL/L (ref 3.5–5.1)
PREALB SERPL-MCNC: 21 MG/DL (ref 20–43)
PROT SERPL-MCNC: 6.8 G/DL (ref 6–8.4)
PROTHROMBIN TIME: 17.7 SEC (ref 9–12.5)
RBC # BLD AUTO: 3.81 M/UL (ref 4.6–6.2)
SODIUM SERPL-SCNC: 137 MMOL/L (ref 136–145)
WBC # BLD AUTO: 7.85 K/UL (ref 3.9–12.7)

## 2020-03-04 PROCEDURE — 93750 INTERROGATION VAD IN PERSON: CPT | Mod: ,,, | Performed by: INTERNAL MEDICINE

## 2020-03-04 PROCEDURE — 85025 COMPLETE CBC W/AUTO DIFF WBC: CPT

## 2020-03-04 PROCEDURE — 99999 PR PBB SHADOW E&M-EST. PATIENT-LVL II: CPT | Mod: PBBFAC,,, | Performed by: INTERNAL MEDICINE

## 2020-03-04 PROCEDURE — 83880 ASSAY OF NATRIURETIC PEPTIDE: CPT

## 2020-03-04 PROCEDURE — 82248 BILIRUBIN DIRECT: CPT

## 2020-03-04 PROCEDURE — 80053 COMPREHEN METABOLIC PANEL: CPT

## 2020-03-04 PROCEDURE — 83735 ASSAY OF MAGNESIUM: CPT

## 2020-03-04 PROCEDURE — 84134 ASSAY OF PREALBUMIN: CPT

## 2020-03-04 PROCEDURE — 85610 PROTHROMBIN TIME: CPT

## 2020-03-04 PROCEDURE — 86140 C-REACTIVE PROTEIN: CPT

## 2020-03-04 PROCEDURE — 99214 PR OFFICE/OUTPT VISIT, EST, LEVL IV, 30-39 MIN: ICD-10-PCS | Mod: S$PBB,,, | Performed by: INTERNAL MEDICINE

## 2020-03-04 PROCEDURE — 99999 PR PBB SHADOW E&M-EST. PATIENT-LVL I: ICD-10-PCS | Mod: PBBFAC,,,

## 2020-03-04 PROCEDURE — 99999 PR PBB SHADOW E&M-EST. PATIENT-LVL I: CPT | Mod: PBBFAC,,,

## 2020-03-04 PROCEDURE — 99214 OFFICE O/P EST MOD 30 MIN: CPT | Mod: S$PBB,,, | Performed by: INTERNAL MEDICINE

## 2020-03-04 PROCEDURE — 99211 OFF/OP EST MAY X REQ PHY/QHP: CPT | Mod: PBBFAC

## 2020-03-04 PROCEDURE — 36415 COLL VENOUS BLD VENIPUNCTURE: CPT

## 2020-03-04 PROCEDURE — 93750 OP LVAD INTERROGATION: ICD-10-PCS | Mod: ,,, | Performed by: INTERNAL MEDICINE

## 2020-03-04 PROCEDURE — 84100 ASSAY OF PHOSPHORUS: CPT

## 2020-03-04 PROCEDURE — 99212 OFFICE O/P EST SF 10 MIN: CPT | Mod: PBBFAC,27 | Performed by: INTERNAL MEDICINE

## 2020-03-04 PROCEDURE — 83615 LACTATE (LD) (LDH) ENZYME: CPT

## 2020-03-04 PROCEDURE — 99999 PR PBB SHADOW E&M-EST. PATIENT-LVL II: ICD-10-PCS | Mod: PBBFAC,,, | Performed by: INTERNAL MEDICINE

## 2020-03-04 RX ORDER — FUROSEMIDE 40 MG/1
TABLET ORAL
Qty: 60 TABLET | Refills: 0 | Status: ON HOLD | OUTPATIENT
Start: 2020-03-04 | End: 2020-03-20 | Stop reason: SDUPTHER

## 2020-03-04 RX ORDER — FUROSEMIDE 40 MG/1
TABLET ORAL
Qty: 60 TABLET | Refills: 11 | Status: SHIPPED | OUTPATIENT
Start: 2020-03-04 | End: 2020-03-04 | Stop reason: SDUPTHER

## 2020-03-04 NOTE — PROGRESS NOTES
SW followed up with pt, pt s/o, Lavern and pt son, Epi. All were alert/oriented x4 and pleasant.    Pt and s/o have rented an apt locally as they live 5hrs away and wanted to be closer for a while. Pt was in inpatient rehab for 2 weeks and is now home in the apt. Pt states he is feeling much better and stronger and is excited about his progress.    Pt's son is going home to [place] in the next few days.     Pt has HH from [facility]. Pt stated he had asked for a w/c when he left rehab and hasn't received it, but is not sure he even needs it now. He has home PT and will talk to them tomorrow and let us know if he needs one or not. Pt is walking 1200 ft on his own.    Pt coping adequately and feels well physically and emotionally. SW provided support, education and resources. SW remains available.

## 2020-03-04 NOTE — PROGRESS NOTES
Date of Implant with Heartmate 3 LVAD: 2020    PATIENT ARRIVED IN CLINIC:  Ambulatory and wheelchair  Accompanied by: wife and son    Vitals  Temperature, oral:   Temp Readings from Last 1 Encounters:   20 97.2 °F (36.2 °C)     Blood Pressure:   BP Readings from Last 3 Encounters:   20 (!) 85/0   20 (!) 85/0   20 (!) 94/0        VAD Interrogation:  TXP KLEVER INTERROGATIONS 3/4/2020 2020 2020   Type HeartMate3 HeartMate3 -   Flow 3.9 4.3 -   Speed 5300 5300 -   PI 5.4 3.9 -   Power (Berry) 3.8 3.8 -   LSL 4900 4900 -   Pulsatility Pulse Pulse Intermittent pulse       History Lo.[id number]  Problems / Issues / Alarms with VAD if any: None noted  VAD Sounds: HM3 Smooth  Heartmate 3 Module Cable:  No yellow exposed and Attempted to unscrew modular cable to ensure it will be able to come lose in the event we ever need to change the modular cable while patient held the driveline in place so it would not move. Modular cable connection able to be unscrewed and re-tightened. Instructed pt to perform this weekly.    HCT:   Lab Results   Component Value Date    HCT 33.4 (L) 2020    HCT 25 (L) 2020       Complaints/reason for visit today: routine  Emergency Equipment With Patient: yes   VAD Binder With Patient: yes   Reviewed VAD Numbers In Binder: yes    Any Equipment Issues: None noted (Refer to  note for complete details)    DLES Assessment:  Appearance Of Driveline: 2 with scab and suture        Antibiotics: NO  Velour: no  Manual & Visual Inspection Of Driveline: No kinks or tears noted  Stabilization Device In Use: yes, giles securement device      Patient MyChart Questionnaire: No flowsheet data found.     Assessment:   PAIN: YES Pain Ratin, Location of Pain: sternum, Description of Pain:  dull, Pain Medication/How Often: n/a, Does this relieve the Pain? n/a  Complaints Of Nausea / Vomiting: None noted    Appearance and Frequency Of Stools:  normal and formed without blood & daily  Color Of Urine: clear/yellow  Coping/Depression/Anxiety: coping okay  Sleep Habits: 8 hrs /night  Sleep Aids: None noted  Showering: No  Activity/Exercise: PT/OT at home   Driving: No.    DLES Dressing Care:   Frequency of Dressing Changes: daily & betadine and water  Pt In Need Of Management Kits?:no   It is medically necessary to have VAD management kits in order to prevent infection or to assist in the healing of an infected DLES.    Labs:    Chemistry        Component Value Date/Time     (L) 02/28/2020 1406    K 3.7 02/28/2020 1406    CL 97 02/28/2020 1406    CO2 27 02/28/2020 1406    BUN 19 02/28/2020 1406    CREATININE 1.7 (H) 02/28/2020 1406     02/28/2020 1406        Component Value Date/Time    CALCIUM 9.3 02/28/2020 1406    ALKPHOS 106 02/28/2020 1406    AST 18 02/28/2020 1406    ALT 19 02/28/2020 1406    BILITOT 0.6 02/28/2020 1406    ESTGFRAFRICA 49.6 (A) 02/28/2020 1406    EGFRNONAA 42.9 (A) 02/28/2020 1406            Magnesium   Date Value Ref Range Status   02/28/2020 2.0 1.6 - 2.6 mg/dL Final       Lab Results   Component Value Date    WBC 7.85 03/04/2020    HGB 10.6 (L) 03/04/2020    HCT 33.4 (L) 03/04/2020    MCV 88 03/04/2020     03/04/2020       Lab Results   Component Value Date    INR 1.8 (H) 03/04/2020    INR 1.7 (H) 02/28/2020    INR 3.8 (H) 02/21/2020       BNP   Date Value Ref Range Status   02/28/2020 322 (H) 0 - 99 pg/mL Final     Comment:     Values of less than 100 pg/ml are consistent with non-CHF populations.   02/21/2020 273 (H) 0 - 99 pg/mL Final     Comment:     Values of less than 100 pg/ml are consistent with non-CHF populations.   02/17/2020 226 (H) 0 - 99 pg/mL Final     Comment:     Values of less than 100 pg/ml are consistent with non-CHF populations.       LD   Date Value Ref Range Status   02/28/2020 254 110 - 260 U/L Final     Comment:     Results are increased in hemolyzed samples.   02/21/2020 251 110 - 260 U/L  Final     Comment:     Results are increased in hemolyzed samples.   02/17/2020 242 110 - 260 U/L Final     Comment:     Results are increased in hemolyzed samples.       Labs reviewed with patient: YES      Patient Satisfaction Survey completed per patient: No  (explained about signature and box to check)  Medication reconciliation: per MA.  Medication Detail updated today: no  Coumadin Managed by: [facility]    Education: Reviewed driveline care, emergency procedures, how to change the controller, alarms with patient, as well as discussed how to page the VAD coordinator in case of an emergency.     Plans/Needs: 2nd VAD visit. Pt has started with home PT/OT. Pt feels that he is progressing well. Pt is not wearing compression stocking, educated on wearing stockings and elevating legs. Swelling has reduced since stopping Amlodipine. Increasing lasix d/t fluid to 60mg, potassium started. F/U in 1 week.     Hurricane Season: No

## 2020-03-04 NOTE — LETTER
March 10, 2020        Dipesh De La Torre  [address] [place] [address]  Phone: [phone]  Fax: [phone]             [facility] [address] [place] [address]  Phone: [phone]   Patient: Tae Delgado   MR Number: [id number]   YOB: 1960   Date of Visit: 3/4/2020       Dear Dr. Dipesh De La Torre    Thank you for referring Tae Delgado to me for evaluation. Attached you will find relevant portions of my assessment and plan of care.    If you have questions, please do not hesitate to call me. I look forward to following Tae Delgado along with you.    Sincerely,    Jamaica Doshi MD    Enclosure    If you would like to receive this communication electronically, please contact [email or web address] or [phone] to request [email or web address] Link access.    [email or web address] Link is a tool which provides read-only access to select patient information with whom you have a relationship. Its easy to use and provides real time access to review your patients record including encounter summaries, notes, results, and demographic information.    If you feel you have received this communication in error or would no longer like to receive these types of communications, please e-mail [email or web address]

## 2020-03-05 ENCOUNTER — TELEPHONE (OUTPATIENT)
Dept: TRANSPLANT | Facility: CLINIC | Age: 60
End: 2020-03-05

## 2020-03-05 RX ORDER — POTASSIUM CHLORIDE 20 MEQ/1
20 TABLET, EXTENDED RELEASE ORAL 2 TIMES DAILY
Qty: 60 TABLET | Refills: 3 | Status: ON HOLD | OUTPATIENT
Start: 2020-03-05 | End: 2020-03-20 | Stop reason: SDUPTHER

## 2020-03-05 NOTE — TELEPHONE ENCOUNTER
Patient's lab results showed a K of 3.1. Dr. Doshi notified and ordered Potassium 20 meq 2 times a day. Patient's son notified of adjustment, verbalized understanding. BMP/BNP to be rechecked on Monday. Will follow-up

## 2020-03-05 NOTE — PROGRESS NOTES
Spoken with patient wife she reports patient has 1 mg tablets and not 3 mg tablets . He taken 4 mg 3/4/2020.

## 2020-03-06 ENCOUNTER — ANTI-COAG VISIT (OUTPATIENT)
Dept: CARDIOLOGY | Facility: CLINIC | Age: 60
End: 2020-03-06
Payer: MEDICARE

## 2020-03-06 ENCOUNTER — LAB VISIT (OUTPATIENT)
Dept: LAB | Facility: HOSPITAL | Age: 60
End: 2020-03-06
Attending: INTERNAL MEDICINE
Payer: MEDICARE

## 2020-03-06 DIAGNOSIS — Z79.01 LONG TERM (CURRENT) USE OF ANTICOAGULANTS: ICD-10-CM

## 2020-03-06 DIAGNOSIS — I48.0 PAROXYSMAL ATRIAL FIBRILLATION: ICD-10-CM

## 2020-03-06 LAB
INR PPP: 2 (ref 0.8–1.2)
PROTHROMBIN TIME: 18.8 SEC (ref 9–12.5)

## 2020-03-06 PROCEDURE — 93793 PR ANTICOAGULANT MGMT FOR PT TAKING WARFARIN: ICD-10-PCS | Mod: S$PBB,,,

## 2020-03-06 PROCEDURE — 85610 PROTHROMBIN TIME: CPT

## 2020-03-06 PROCEDURE — 93793 ANTICOAG MGMT PT WARFARIN: CPT | Mod: S$PBB,,,

## 2020-03-06 PROCEDURE — 36415 COLL VENOUS BLD VENIPUNCTURE: CPT

## 2020-03-09 ENCOUNTER — ANTI-COAG VISIT (OUTPATIENT)
Dept: CARDIOLOGY | Facility: CLINIC | Age: 60
End: 2020-03-09
Payer: MEDICARE

## 2020-03-09 ENCOUNTER — LAB VISIT (OUTPATIENT)
Dept: LAB | Facility: HOSPITAL | Age: 60
End: 2020-03-09
Attending: INTERNAL MEDICINE
Payer: MEDICARE

## 2020-03-09 DIAGNOSIS — Z79.01 LONG TERM (CURRENT) USE OF ANTICOAGULANTS: ICD-10-CM

## 2020-03-09 DIAGNOSIS — Z79.899 LONG TERM CURRENT USE OF AMIODARONE: ICD-10-CM

## 2020-03-09 DIAGNOSIS — Z95.811 HEART REPLACED BY HEART ASSIST DEVICE: ICD-10-CM

## 2020-03-09 DIAGNOSIS — I50.22 CHRONIC SYSTOLIC CONGESTIVE HEART FAILURE: ICD-10-CM

## 2020-03-09 DIAGNOSIS — I48.0 PAROXYSMAL ATRIAL FIBRILLATION: ICD-10-CM

## 2020-03-09 LAB
ALBUMIN SERPL BCP-MCNC: 3.3 G/DL (ref 3.5–5.2)
ALP SERPL-CCNC: 92 U/L (ref 55–135)
ALT SERPL W/O P-5'-P-CCNC: 21 U/L (ref 10–44)
ANION GAP SERPL CALC-SCNC: 13 MMOL/L (ref 8–16)
AST SERPL-CCNC: 20 U/L (ref 10–40)
BILIRUB SERPL-MCNC: 0.5 MG/DL (ref 0.1–1)
BNP SERPL-MCNC: 276 PG/ML (ref 0–99)
BUN SERPL-MCNC: 11 MG/DL (ref 6–20)
CALCIUM SERPL-MCNC: 9 MG/DL (ref 8.7–10.5)
CHLORIDE SERPL-SCNC: 98 MMOL/L (ref 95–110)
CO2 SERPL-SCNC: 31 MMOL/L (ref 23–29)
CREAT SERPL-MCNC: 1.4 MG/DL (ref 0.5–1.4)
EST. GFR  (AFRICAN AMERICAN): >60 ML/MIN/1.73 M^2
EST. GFR  (NON AFRICAN AMERICAN): 54.2 ML/MIN/1.73 M^2
GLUCOSE SERPL-MCNC: 135 MG/DL (ref 70–110)
INR PPP: 2.8 (ref 0.8–1.2)
POTASSIUM SERPL-SCNC: 3.1 MMOL/L (ref 3.5–5.1)
PROT SERPL-MCNC: 6.6 G/DL (ref 6–8.4)
PROTHROMBIN TIME: 26.1 SEC (ref 9–12.5)
SODIUM SERPL-SCNC: 142 MMOL/L (ref 136–145)

## 2020-03-09 PROCEDURE — 85610 PROTHROMBIN TIME: CPT

## 2020-03-09 PROCEDURE — 80053 COMPREHEN METABOLIC PANEL: CPT

## 2020-03-09 PROCEDURE — 83880 ASSAY OF NATRIURETIC PEPTIDE: CPT

## 2020-03-09 PROCEDURE — 93793 PR ANTICOAGULANT MGMT FOR PT TAKING WARFARIN: ICD-10-PCS | Mod: ,,,

## 2020-03-09 PROCEDURE — 93793 ANTICOAG MGMT PT WARFARIN: CPT | Mod: ,,,

## 2020-03-09 NOTE — PROGRESS NOTES
INR at goal. Medications and chart reviewed. No changes noted to necessitate adjustment of warfarin or follow-up plan. See calendar.  INR reviewed with Lavern who reports no changes and she did confirm that he will be at his appointments with LVAD this Thursday, we will repeat INR at that time.

## 2020-03-10 ENCOUNTER — TELEPHONE (OUTPATIENT)
Dept: TRANSPLANT | Facility: CLINIC | Age: 60
End: 2020-03-10

## 2020-03-10 PROBLEM — R73.9 ACUTE HYPERGLYCEMIA: Status: RESOLVED | Noted: 2020-01-24 | Resolved: 2020-03-10

## 2020-03-10 PROBLEM — I50.23 ACUTE ON CHRONIC SYSTOLIC HEART FAILURE: Status: RESOLVED | Noted: 2017-07-10 | Resolved: 2020-03-10

## 2020-03-10 PROBLEM — Z71.89 ACP (ADVANCE CARE PLANNING): Status: RESOLVED | Noted: 2020-01-18 | Resolved: 2020-03-10

## 2020-03-10 PROBLEM — I50.43 ACUTE ON CHRONIC COMBINED SYSTOLIC AND DIASTOLIC HEART FAILURE: Status: RESOLVED | Noted: 2017-07-10 | Resolved: 2020-03-10

## 2020-03-10 NOTE — TELEPHONE ENCOUNTER
Reviewed with patient's SO, Lavern, via p/c. Will check labs at pt's clinic appt on Thursday, 3/12/2020.    ----- Message from Jamaica Doshi MD sent at 3/9/2020  9:55 PM CDT -----  Henidhi Freedman, let's have him increase to 40 bid potassium, and repeat wed or Thursday.   ----- Message -----  From: Jasmina Burnette RN  Sent: 3/9/2020  11:14 AM CDT  To: Jamaica Doshi MD, Vaishali Bruner RN, #    Hey Dr. Doshi, last week we started the patient on K 20 meq BID after we had previously increased his lasix and checked his labs, it is still 3.1 today after starting the K Friday, his creatinine actually improved but he was the one who had KRISTIN issues inpatient, would you like to increase his scheduled K or just have him take additional today and then recheck? Elizabeth Lopes

## 2020-03-10 NOTE — PROGRESS NOTES
Subjective:   Patient ID:  Tae Delgado is a 60 y.o. male who presents for LVAD followup visit.    Implant Date:1/23/2020  Initials:RCD     Heartmate 3 RPM 5300     INR goal: 2-3   Bridge with Heparin   Antiplatelets:      TXP KLEVER INTERROGATIONS 3/12/2020   Type HeartMate3   Flow 4.1   Speed 5300   PI 6.7   Power (Berry) 3.9   LSL 4900   Pulsatility No Pulse       HPI      60 y.o. WM with history of NICMP diagnosed in 2010, ICD, LV thrombus (with prior splenic and renal emboli), Embolic CVA , paroxysmal atrial fib, HTN, HLP, s/p HM 3 1/17/2020 as DT with closure of AV and MV repair with post-op course complicated by RV failure. On 1/24/20 he was taken back to the OR for possible RVAD placement; however, RVAD not placed and patient remained hemodynamically stable in the OR.  He went for wash out on 1/27/20 and his chest was closed on 1/29/20. Postoperatively he had paroxsymal atrial fib; he was started on Digoxin and anticoagulated with Coumadin. Renal function neptali acutely in the post op period but improved during his hospital stay.  Creatinine on day of discharge was 1.8. Right lower lobe pulm nodule found incidentally on CT: recommend repeat chest CT in 3 months and follow up in pulmonary clinic  Patient was discharged to rehab facility for further therapy.      He is here for his third LVAD clinic visit.     Since last visit, pt pulled his DL- was previously incorporated but is not now- has alittle bloody drainage  He is upset as the physical therapist used a safety belt at rehab and he felt something pop- was still swollen when he saw Dr aldana and had imaging done- the bottom he thinks popped back in place but the top once still loves when he lays down and hes worried about his sternum healing. Also concerned that his shoulder blades are rubbing up against his ribs and that hasnt happened before. He has lost a lot of wt.  His SOb is getting better, as is the swelling his feet (though he still has alittle  of both)  Appetite is good, wt has been steady around 222-4- lasix is 40mg in am, 20mg in pm with good UOP.  Walking three times a day, has a trail that is at least 100yd- some days can walk to the end without stopping (300ft in each direction) goes up 10 steps 3x as well.  BM normal, no blood, urine clear, no alarms.     DLES is grade 1-2 (see photo below)    Interrogation of device data reveals no alarms, normal flows and power (see VAD interrogation report for full details.)           Review of Systems   Constitution: Negative.   HENT: Negative.    Eyes: Negative.    Cardiovascular: Positive for leg swelling. Negative for chest pain, claudication, cyanosis, dyspnea on exertion, irregular heartbeat, near-syncope, orthopnea and palpitations.   Respiratory: Negative for cough, hemoptysis, shortness of breath and sleep disturbances due to breathing.    Endocrine: Negative.  Negative for cold intolerance, heat intolerance and polydipsia.   Hematologic/Lymphatic: Negative.    Skin: Negative.    Musculoskeletal: Negative.    Gastrointestinal: Negative.  Negative for bloating, abdominal pain, anorexia, change in bowel habit, bowel incontinence and constipation.   Genitourinary: Negative.    Neurological: Negative.        Objective:   Blood pressure (!) 90/0.body mass index is unknown because there is no height or weight on file.    Doppler: 90 no cuff  nonpulsatile    Physical Exam   Constitutional: He is oriented to person, place, and time. He appears well-developed and well-nourished.   HENT:   Head: Normocephalic and atraumatic.   Neck: Normal range of motion. Neck supple. No JVD (at clavicle) present.   Cardiovascular: Normal rate and regular rhythm.   vad hum smooth. Sternal incision well approximated   Pulmonary/Chest: Effort normal and breath sounds normal. No respiratory distress. He has no wheezes. He has no rales.   Abdominal: Soft. Bowel sounds are normal. He exhibits no distension. There is no tenderness.    Musculoskeletal: He exhibits edema (1-2+ bilateral LE edema).   Neurological: He is alert and oriented to person, place, and time.   Skin: Skin is warm and dry. No erythema.   Psychiatric: He has a normal mood and affect. His behavior is normal. Judgment and thought content normal.           Lab Results   Component Value Date    WBC 6.99 03/12/2020    HGB 10.5 (L) 03/12/2020    HCT 35.6 (L) 03/12/2020    MCV 90 03/12/2020     03/12/2020    CO2 30 (H) 03/12/2020    CREATININE 1.5 (H) 03/12/2020    CALCIUM 9.6 03/12/2020    ALBUMIN 3.6 03/12/2020    AST 20 03/12/2020     (H) 03/12/2020    ALT 24 03/12/2020     03/12/2020       Lab Results   Component Value Date    INR 3.9 (H) 03/12/2020    INR 2.8 (H) 03/09/2020    INR 2.0 (H) 03/06/2020       BNP   Date Value Ref Range Status   03/12/2020 279 (H) 0 - 99 pg/mL Final     Comment:     Values of less than 100 pg/ml are consistent with non-CHF populations.   03/09/2020 276 (H) 0 - 99 pg/mL Final     Comment:     Values of less than 100 pg/ml are consistent with non-CHF populations.   03/04/2020 340 (H) 0 - 99 pg/mL Final     Comment:     Values of less than 100 pg/ml are consistent with non-CHF populations.       LD   Date Value Ref Range Status   03/12/2020 225 110 - 260 U/L Final     Comment:     Results are increased in hemolyzed samples.   03/04/2020 230 110 - 260 U/L Final     Comment:     Results are increased in hemolyzed samples.   03/04/2020 224 110 - 260 U/L Final     Comment:     Results are increased in hemolyzed samples.       Labs were reviewed with the patient.    No results found for this or any previous visit.  No results found for this or any previous visit.    Assessment:      1. LVAD (left ventricular assist device) present    2. Long term (current) use of anticoagulants    3. Chronic systolic congestive heart failure    4. COCM (congestive cardiomyopathy)    5. At risk for amiodarone toxicity with long term use    6. Acute  kidney injury superimposed on chronic kidney disease    7. Class 2 severe obesity due to excess calories with serious comorbidity and body mass index (BMI) of 35.0 to 35.9 in adult    8. Paroxysmal atrial fibrillation    9. Essential hypertension    10. Heart replaced by heart assist device    11. Long term current use of amiodarone        Plan:   INR supratherapeutic- coumadin clinic to adjust    BP controlled, still has swelling but improving, FC improving    Will need to keep close eye on his DLES- reminded him to call us if he pulls it or drops his controller.     Repeat CXR and will ask Dr Schmitt to take a look and pt concerned he popped a sternal wire and that his upper sternum is still moving alot    Patient is now NYHA III  Recommend 2 gram sodium restriction and 1500cc fluid restriction.  Encourage physical activity with graded exercise program.  Requested patient to weigh themselves daily, and to notify us if their weight increases by more than 3 lbs in 1 day or 5 lbs in 1 week.     Listed for transplant: No    RTC 1 week given appearance of DL- if looking better next week and go to every other wk visits

## 2020-03-11 NOTE — PROGRESS NOTES
Subjective:   Patient ID:  Tae Delgado is a 60 y.o. male who presents for LVAD followup visit.    Implant Date:1/23/2020  Initials:RCD     Heartmate 3 RPM 5300     INR goal: 2-3   Bridge with Heparin   Antiplatelets:      TXP KLEVER INTERROGATIONS 3/4/2020   Type HeartMate3   Flow 3.9   Speed 5300   PI 5.4   Power (Berry) 3.8   LSL 4900   Pulsatility Pulse       HPI      55 y.o. WM with history of NICMP diagnosed in 2010, ICD, LV thrombus (with prior splenic and renal emboli), Embolic CVA , paroxysmal atrial fib, HTN, HLP, s/p HM 3 1/17/2020 as DT with closure of AV and MV repair with post-op course complicated by RV failure. On 1/24/20 he was taken back to the OR for possible RVAD placement; however, RVAD not placed and patient remained hemodynamically stable in the OR.  He went for wash out on 1/27/20 and his chest was closed on 1/29/20. Postoperatively he had paroxsymal atrial fib; he was started on Digoxin and anticoagulated with Coumadin. Renal function neptali acutely in the post op period but improved during his hospital stay.  Creatinine on day of discharge was 1.8. Right lower lobe pulm nodule found incidentally on CT: recommend repeat chest CT in 3 months and follow up in pulmonary clinic  Patient was discharged to rehab facility for further therapy.      He is here for his second LVAD clinic visit. Last visit patient had leg swelling that was much worse, however was on amlodipine; discontinued amlodipine which seemed to help quite a bit, however still with leg swelling. Does feel he has volume, appears by exam to have increased fluid compared to what he had last week despite legs being better. Patient denies N/V/F/C, lightheadedness, dizziness, PND, orthopnea, LE edema, abdominal pain, abdominal pressure, chest pain, chest pressure, syncope, pre-syncope. Still in wheelchair, working on getting stronger.     Review of Systems   Constitution: Negative. Negative for chills, decreased appetite, diaphoresis,  fever, malaise/fatigue and weight loss.   HENT: Negative.  Negative for congestion.    Eyes: Negative.  Negative for blurred vision and visual disturbance.   Cardiovascular: Positive for leg swelling and paroxysmal nocturnal dyspnea. Negative for chest pain, claudication, cyanosis, dyspnea on exertion, irregular heartbeat, near-syncope, orthopnea, palpitations and syncope.   Respiratory: Negative for cough, hemoptysis, shortness of breath, sleep disturbances due to breathing, snoring and wheezing.    Endocrine: Negative.  Negative for cold intolerance, heat intolerance and polydipsia.   Hematologic/Lymphatic: Negative.  Negative for bleeding problem. Does not bruise/bleed easily.   Skin: Negative.  Negative for poor wound healing and rash.   Musculoskeletal: Negative.  Negative for arthritis, joint pain and muscle weakness.   Gastrointestinal: Negative.  Negative for bloating, abdominal pain, anorexia, change in bowel habit, bowel incontinence, constipation, diarrhea, hematemesis, hematochezia, melena, nausea and vomiting.   Genitourinary: Negative.  Negative for frequency and urgency.   Neurological: Negative.  Negative for difficulty with concentration, excessive daytime sleepiness, dizziness, headaches, light-headedness and weakness.   Psychiatric/Behavioral: Negative for depression. The patient does not have insomnia and is not nervous/anxious.        Objective:   Blood pressure (!) 85/0.body mass index is unknown because there is no height or weight on file.    Physical Exam   Constitutional: He is oriented to person, place, and time. He appears well-developed and well-nourished. No distress.   HENT:   Head: Normocephalic and atraumatic.   Nose: Nose normal.   Mouth/Throat: Oropharynx is clear and moist. No oropharyngeal exudate.   Eyes: Pupils are equal, round, and reactive to light. Conjunctivae and EOM are normal. No scleral icterus.   Neck: Normal range of motion. Neck supple. JVD (14cm H2O) present. No  tracheal deviation present. No thyromegaly present.   Cardiovascular: Normal rate, regular rhythm, S1 normal, S2 normal and normal heart sounds. Exam reveals no gallop and no friction rub.   No murmur heard.  vad hum smooth. Sternal incision well approximated   Pulmonary/Chest: Effort normal and breath sounds normal. No respiratory distress. He has no wheezes. He has no rales. He exhibits no tenderness.   Abdominal: Soft. Bowel sounds are normal. He exhibits no distension and no mass. There is no tenderness. There is no rebound and no guarding.   Musculoskeletal: Normal range of motion. He exhibits edema (2+ bilateral LE edema). He exhibits no tenderness.   Neurological: He is alert and oriented to person, place, and time. Coordination normal.   Skin: Skin is warm and dry. No rash noted. He is not diaphoretic. No erythema. No pallor.   Psychiatric: He has a normal mood and affect. His behavior is normal. Judgment and thought content normal.   Nursing note and vitals reviewed.      Lab Results   Component Value Date    WBC 7.85 03/04/2020    HGB 10.6 (L) 03/04/2020    HCT 33.4 (L) 03/04/2020    MCV 88 03/04/2020     03/04/2020    CO2 31 (H) 03/09/2020    CREATININE 1.4 03/09/2020    CALCIUM 9.0 03/09/2020    ALBUMIN 3.3 (L) 03/09/2020    AST 20 03/09/2020     (H) 03/09/2020    ALT 21 03/09/2020     03/04/2020       Lab Results   Component Value Date    INR 2.8 (H) 03/09/2020    INR 2.0 (H) 03/06/2020    INR 1.8 (H) 03/04/2020       BNP   Date Value Ref Range Status   03/09/2020 276 (H) 0 - 99 pg/mL Final     Comment:     Values of less than 100 pg/ml are consistent with non-CHF populations.   03/04/2020 340 (H) 0 - 99 pg/mL Final     Comment:     Values of less than 100 pg/ml are consistent with non-CHF populations.   03/04/2020 324 (H) 0 - 99 pg/mL Final     Comment:     Values of less than 100 pg/ml are consistent with non-CHF populations.       LD   Date Value Ref Range Status   03/04/2020 230  110 - 260 U/L Final     Comment:     Results are increased in hemolyzed samples.   03/04/2020 224 110 - 260 U/L Final     Comment:     Results are increased in hemolyzed samples.   02/28/2020 254 110 - 260 U/L Final     Comment:     Results are increased in hemolyzed samples.       Labs were reviewed with the patient.    Assessment:      1. LVAD (left ventricular assist device) present    2. Heart replaced by heart assist device    3. Long term current use of amiodarone    4. Hypertensive heart disease with heart failure    5. COCM (congestive cardiomyopathy)    6. Chronic systolic congestive heart failure    7. Acute on chronic systolic heart failure        Plan:     Looks like volume is up today, although stopping amlodipine did help some.   Will add extra 20mg po lasix in the afternoon, with additional potassium dose, to plan for repeat labs Monday given the increased diuretic and K issues. Had some chrystal in the hospital as well. Will do a check in Monday as well to see how the diuretic is doing.     Patient is now NYHA III  Recommend 2 gram sodium restriction and 1500cc fluid restriction.  Encourage physical activity with graded exercise program.  Requested patient to weigh themselves daily, and to notify us if their weight increases by more than 3 lbs in 1 day or 5 lbs in 1 week.     Listed for transplant: No    RTC 1 week as routine.     Advance Care Planning     HCPOA and Living will are received, we did not go over this again today in clinic except to confirm there were no changes from previous.

## 2020-03-11 NOTE — PROCEDURES
TXP KLEVER INTERROGATIONS 3/4/2020 2/28/2020 2/17/2020 2/17/2020 2/17/2020 2/16/2020 2/16/2020   Type HeartMate3 HeartMate3 - - - - -   Flow 3.9 4.3 - - - - -   Speed 5300 5300 - - - - -   PI 5.4 3.9 - - - - -   Power (Berry) 3.8 3.8 - - - - -   LSL 4900 4900 - - - - -   Pulsatility Pulse Pulse Intermittent pulse Intermittent pulse Intermittent pulse Intermittent pulse Intermittent pulse   }

## 2020-03-12 ENCOUNTER — ANTI-COAG VISIT (OUTPATIENT)
Dept: CARDIOLOGY | Facility: CLINIC | Age: 60
End: 2020-03-12
Payer: MEDICARE

## 2020-03-12 ENCOUNTER — HOSPITAL ENCOUNTER (OUTPATIENT)
Dept: RADIOLOGY | Facility: HOSPITAL | Age: 60
Discharge: HOME | End: 2020-03-12
Attending: INTERNAL MEDICINE
Payer: MEDICARE

## 2020-03-12 ENCOUNTER — OFFICE VISIT (OUTPATIENT)
Dept: TRANSPLANT | Facility: CLINIC | Age: 60
End: 2020-03-12
Payer: MEDICARE

## 2020-03-12 ENCOUNTER — CLINICAL SUPPORT (OUTPATIENT)
Dept: TRANSPLANT | Facility: CLINIC | Age: 60
End: 2020-03-12
Payer: MEDICARE

## 2020-03-12 VITALS — SYSTOLIC BLOOD PRESSURE: 90 MMHG

## 2020-03-12 DIAGNOSIS — Z79.899 AT RISK FOR AMIODARONE TOXICITY WITH LONG TERM USE: ICD-10-CM

## 2020-03-12 DIAGNOSIS — N18.9 ACUTE KIDNEY INJURY SUPERIMPOSED ON CHRONIC KIDNEY DISEASE: ICD-10-CM

## 2020-03-12 DIAGNOSIS — I48.0 PAROXYSMAL ATRIAL FIBRILLATION: ICD-10-CM

## 2020-03-12 DIAGNOSIS — Z95.811 HEART REPLACED BY HEART ASSIST DEVICE: ICD-10-CM

## 2020-03-12 DIAGNOSIS — Z95.811 LVAD (LEFT VENTRICULAR ASSIST DEVICE) PRESENT: ICD-10-CM

## 2020-03-12 DIAGNOSIS — Z79.899 LONG TERM CURRENT USE OF AMIODARONE: ICD-10-CM

## 2020-03-12 DIAGNOSIS — I10 ESSENTIAL HYPERTENSION: ICD-10-CM

## 2020-03-12 DIAGNOSIS — Z79.01 LONG TERM (CURRENT) USE OF ANTICOAGULANTS: ICD-10-CM

## 2020-03-12 DIAGNOSIS — I50.22 CHRONIC SYSTOLIC CONGESTIVE HEART FAILURE: ICD-10-CM

## 2020-03-12 DIAGNOSIS — Z91.89 AT RISK FOR AMIODARONE TOXICITY WITH LONG TERM USE: ICD-10-CM

## 2020-03-12 DIAGNOSIS — I42.0 COCM (CONGESTIVE CARDIOMYOPATHY): ICD-10-CM

## 2020-03-12 DIAGNOSIS — E66.01 CLASS 2 SEVERE OBESITY DUE TO EXCESS CALORIES WITH SERIOUS COMORBIDITY AND BODY MASS INDEX (BMI) OF 35.0 TO 35.9 IN ADULT: ICD-10-CM

## 2020-03-12 DIAGNOSIS — Z95.811 LVAD (LEFT VENTRICULAR ASSIST DEVICE) PRESENT: Primary | ICD-10-CM

## 2020-03-12 DIAGNOSIS — N17.9 ACUTE KIDNEY INJURY SUPERIMPOSED ON CHRONIC KIDNEY DISEASE: ICD-10-CM

## 2020-03-12 PROCEDURE — 99214 PR OFFICE/OUTPT VISIT, EST, LEVL IV, 30-39 MIN: ICD-10-PCS | Mod: S$PBB,,, | Performed by: INTERNAL MEDICINE

## 2020-03-12 PROCEDURE — 93750 OP LVAD INTERROGATION: ICD-10-PCS | Mod: ,,, | Performed by: INTERNAL MEDICINE

## 2020-03-12 PROCEDURE — 99999 PR PBB SHADOW E&M-EST. PATIENT-LVL III: ICD-10-PCS | Mod: PBBFAC,,, | Performed by: INTERNAL MEDICINE

## 2020-03-12 PROCEDURE — 99999 PR PBB SHADOW E&M-EST. PATIENT-LVL III: CPT | Mod: PBBFAC,,, | Performed by: INTERNAL MEDICINE

## 2020-03-12 PROCEDURE — 71046 X-RAY EXAM CHEST 2 VIEWS: CPT | Mod: TC,FY

## 2020-03-12 PROCEDURE — 71046 XR CHEST PA AND LATERAL: ICD-10-PCS | Mod: 26,,, | Performed by: RADIOLOGY

## 2020-03-12 PROCEDURE — 71046 X-RAY EXAM CHEST 2 VIEWS: CPT | Mod: 26,,, | Performed by: RADIOLOGY

## 2020-03-12 PROCEDURE — 93750 INTERROGATION VAD IN PERSON: CPT | Mod: ,,, | Performed by: INTERNAL MEDICINE

## 2020-03-12 PROCEDURE — 99214 OFFICE O/P EST MOD 30 MIN: CPT | Mod: S$PBB,,, | Performed by: INTERNAL MEDICINE

## 2020-03-12 PROCEDURE — 99213 OFFICE O/P EST LOW 20 MIN: CPT | Mod: PBBFAC,25 | Performed by: INTERNAL MEDICINE

## 2020-03-12 NOTE — PROGRESS NOTES
INR not at goal. Medications, chart, and patient findings reviewed. See calendar for adjustments to dose and follow up plan.     Per MA: Wife verified correct dose of coumadin

## 2020-03-12 NOTE — PROGRESS NOTES
"Date of Implant with Heartmate 3 LVAD: 20    PATIENT ARRIVED IN CLINIC:  Ambulatory   Accompanied by: Wife    Vitals  Temperature, oral:   Temp Readings from Last 1 Encounters:   20 97.2 °F (36.2 °C)     Blood Pressure:   BP Readings from Last 3 Encounters:   20 (!) 90/0   20 (!) 85/0   20 (!) 85/0        VAD Interrogation:  TXP KLEVER INTERROGATIONS 3/12/2020 3/4/2020 2020   Type HeartMate3 HeartMate3 HeartMate3   Flow 4.1 3.9 4.3   Speed 5300 5300 5300   PI 6.7 5.4 3.9   Power (Berry) 3.9 3.8 3.8   LSL 4900 4900 4900   Pulsatility No Pulse Pulse Pulse       History Log: [id number]  Problems / Issues / Alarms with VAD if any: None noted  VAD Sounds: HM3 Smooth  Heartmate 3 Module Cable:  No yellow exposed and Attempted to unscrew modular cable to ensure it will be able to come lose in the event we ever need to change the modular cable while patient held the driveline in place so it would not move. Modular cable connection able to be unscrewed and re-tightened. Instructed pt to perform this weekly.    HCT:   Lab Results   Component Value Date    HCT 35.6 (L) 2020    HCT 25 (L) 2020       Complaints/reason for visit today: routine  Emergency Equipment With Patient: yes   VAD Binder With Patient: yes  Reviewed VAD Numbers In Binder: yes    Any Equipment Issues: None noted (Refer to  note for complete details)    DLES Assessment:  Appearance Of Driveline: "2" with bleeding and suture in place. INR 3.9, patient states he pulled driveline on bathroom door. Patient instructed to call us if something like this happens again or if he experiences any s/s of infection, increased drainage.         Antibiotics: NO  Velour: no  Manual & Visual Inspection Of Driveline: No kinks or tears noted  Stabilization Device In Use: yes, giles securement device      Patient MyChart Questionnaire: No flowsheet data found.     Assessment:   PAIN: YES Pain Ratin  Complaints " Of Nausea / Vomiting: None noted    Appearance and Frequency Of Stools: normal and formed without blood & daily  Color Of Urine: clear/yellow  Coping/Depression/Anxiety: coping okay and anxious  Sleep Habits: 6 hrs /night  Sleep Aids: None noted  Showering: No  Activity/Exercise: therapy   Driving: No.    DLES Dressing Care:   Frequency of Dressing Changes: daily & soap and water dressing  Pt In Need Of Management Kits?:no   It is medically necessary to have VAD management kits in order to prevent infection or to assist in the healing of an infected DLES.    Labs:    Chemistry        Component Value Date/Time     03/12/2020 0829    K 3.8 03/12/2020 0829    CL 99 03/12/2020 0829    CO2 30 (H) 03/12/2020 0829    BUN 14 03/12/2020 0829    CREATININE 1.5 (H) 03/12/2020 0829     (H) 03/12/2020 0829        Component Value Date/Time    CALCIUM 9.6 03/12/2020 0829    ALKPHOS 95 03/12/2020 0829    AST 20 03/12/2020 0829    ALT 24 03/12/2020 0829    BILITOT 0.5 03/12/2020 0829    ESTGFRAFRICA 57.7 (A) 03/12/2020 0829    EGFRNONAA 49.9 (A) 03/12/2020 0829            Magnesium   Date Value Ref Range Status   03/12/2020 1.8 1.6 - 2.6 mg/dL Final       Lab Results   Component Value Date    WBC 6.99 03/12/2020    HGB 10.5 (L) 03/12/2020    HCT 35.6 (L) 03/12/2020    MCV 90 03/12/2020     03/12/2020       Lab Results   Component Value Date    INR 3.9 (H) 03/12/2020    INR 2.8 (H) 03/09/2020    INR 2.0 (H) 03/06/2020       BNP   Date Value Ref Range Status   03/12/2020 279 (H) 0 - 99 pg/mL Final     Comment:     Values of less than 100 pg/ml are consistent with non-CHF populations.   03/09/2020 276 (H) 0 - 99 pg/mL Final     Comment:     Values of less than 100 pg/ml are consistent with non-CHF populations.   03/04/2020 340 (H) 0 - 99 pg/mL Final     Comment:     Values of less than 100 pg/ml are consistent with non-CHF populations.       LD   Date Value Ref Range Status   03/12/2020 225 110 - 260 U/L Final      Comment:     Results are increased in hemolyzed samples.   03/04/2020 230 110 - 260 U/L Final     Comment:     Results are increased in hemolyzed samples.   03/04/2020 224 110 - 260 U/L Final     Comment:     Results are increased in hemolyzed samples.       Labs reviewed with patient: YES      Patient Satisfaction Survey completed per patient: No  (explained about signature and box to check)  Medication reconciliation: per MA.  Medication Detail updated today: yes  Coumadin Managed by: [facility]    Education: Reviewed driveline care, emergency procedures, how to change the controller, alarms with patient, as well as discussed how to page the VAD coordinator in case of an emergency.     Plans/Needs: Patient presents today for routine followup. Patient continuing to complain of sternal pain post PT session. Patient will got for CXR post clinic visit, results will be sent to Dr. Schmitt for review per Dr. Zhao. Potassium WNL today after dosage increase earlier this week. Patient to RTC 1 week to keep an eye on driveline. Refer to MD note.     Hurricane Season: No

## 2020-03-12 NOTE — LETTER
March 12, 2020        Dipesh De La Torre  [address] [place] [address]  Phone: [phone]  Fax: [phone]             [facility] [address] [place] [address]  Phone: [phone]   Patient: Tae Delgado   MR Number: [id number]   YOB: 1960   Date of Visit: 3/12/2020       Dear Dr. Dipesh De La Torre    Thank you for referring Tae Delgado to me for evaluation. Attached you will find relevant portions of my assessment and plan of care.    If you have questions, please do not hesitate to call me. I look forward to following Tae Delgado along with you.    Sincerely,    Apoorva Zhao MD    Enclosure    If you would like to receive this communication electronically, please contact [email or web address] or [phone] to request [email or web address] Link access.    [email or web address] Link is a tool which provides read-only access to select patient information with whom you have a relationship. Its easy to use and provides real time access to review your patients record including encounter summaries, notes, results, and demographic information.    If you feel you have received this communication in error or would no longer like to receive these types of communications, please e-mail [email or web address]

## 2020-03-12 NOTE — PROCEDURES
TXP [facility] INTERROGATIONS 3/12/2020 3/4/2020 2/28/2020 2/17/2020 2/17/2020 2/17/2020 2/16/2020   Type HeartMate3 HeartMate3 HeartMate3 - - - -   Flow 4.1 3.9 4.3 - - - -   Speed 5300 5300 5300 - - - -   PI 6.7 5.4 3.9 - - - -   Power (Berry) 3.9 3.8 3.8 - - - -   LSL 4900 4900 4900 - - - -   Pulsatility No Pulse Pulse Pulse Intermittent pulse Intermittent pulse Intermittent pulse Intermittent pulse   }Interrogation of Ventricular assist device was performed with physician analysis of device parameters and review of device function. I have personally reviewed the interrogation findings and agree with findings as stated.

## 2020-03-12 NOTE — PATIENT INSTRUCTIONS
Call us if you find yourself getting more short of breath, have more swelling or unexpected weight changes, fever, chills, alarms, bloody or black bowel movements, or drainage from your driveline    Keep salt intake to under 2000 mg sodium, fluids to under 2 L (64 oz)    Check your weights every morning after getting out of bed and urinating. If your weight goes up 3# overnight or 5# in one week call us

## 2020-03-13 ENCOUNTER — OFFICE VISIT (OUTPATIENT)
Dept: OTOLARYNGOLOGY | Facility: CLINIC | Age: 60
End: 2020-03-13
Payer: MEDICARE

## 2020-03-13 DIAGNOSIS — J38.01 UNILATERAL COMPLETE VOCAL FOLD PARALYSIS: ICD-10-CM

## 2020-03-13 DIAGNOSIS — R49.0 DYSPHONIA: Primary | ICD-10-CM

## 2020-03-13 PROCEDURE — 99444 PR PHYSICIAN ONLINE EVALUATION & MANAGEMENT SERVICE: CPT | Mod: 95,,, | Performed by: OTOLARYNGOLOGY

## 2020-03-13 PROCEDURE — 99444 PR PHYSICIAN ONLINE EVALUATION & MANAGEMENT SERVICE: ICD-10-PCS | Mod: 95,,, | Performed by: OTOLARYNGOLOGY

## 2020-03-13 NOTE — PROGRESS NOTES
Patient *[phone]) called 03/13/20 requesting a change to his coumadin dose. He want a different dose than what he has now. Please advise.

## 2020-03-13 NOTE — Clinical Note
Please set him up for a follow-up visit in approximately 1 month in the office.  Please try to coordinate this with future C appointments.  Thanks

## 2020-03-13 NOTE — LETTER
March 13, 2020      Isiah Montero MD  3rd Contact  Email           [facility]  [address]  [facility] [address]  Phone: [phone]  Fax: [phone]          Patient: Tae Delgado   MR Number: [id number]   YOB: 1960   Date of Visit: 3/13/2020       Dear Dr. Isiah Montero:    Thank you for referring Tae Delgado to me for evaluation. Attached you will find relevant portions of my assessment and plan of care.    If you have questions, please do not hesitate to call me. I look forward to following Tae Delgado along with you.    Sincerely,    Andrea Qureshi MD    Enclosure  CC:  No Recipients    If you would like to receive this communication electronically, please contact [email or web address] or [phone] to request more information on "[employer]" Link access.    For providers and/or their staff who would like to refer a patient to [facility], please contact us through our one-stop-shop provider referral line, [facility], at [phone].    If you feel you have received this communication in error or would no longer like to receive these types of communications, please e-mail [email or web address]

## 2020-03-13 NOTE — PROGRESS NOTES
[facility] VIRTUAL VISIT  Department of Otorhinolaryngology and Communication Sciences    Subjective:      Tae Delgado is a 60 y.o. male who presents for follow-up. He has left vocal fold immobility following LVAD placement January 23rd, as well as an intubation period from January 21st through February 1st.    Since discharge, he reports his voice has improved. He is tolerating an unrestricted diet p.o..  Inpatient MBSS demonstrated intact airway protection.  He was having some soreness in his throat, but this has improved.    Voice Handicap Index Total Score  3/13/2020   Voice Handicap Index Total Score 20     The patient's medications, allergies, past medical, surgical, social and family histories were reviewed and updated as appropriate.    A detailed review of systems was obtained with pertinent positives as per the above HPI, and otherwise negative.      Objective:     Video visit completed    Constitutional: comfortable  Psychiatric: appropriate affect  Respiratory: comfortably breathing, symmetric chest rise, no stridor  Voice: mild variable roughness/breathiness  Head: normocephalic  Eyes: conjunctivae and sclerae clear      Assessment:     Tae Delgado is a 60 y.o. male with a history of left vocal fold motion impairment, potentially recoverable, following LVAD placement in January 2020, as well as an approximately 10 day intubation period. At this point, his symptoms are mild and improving.    Plan:     I educated the patient on the prognosis of newly identified vocal fold immobility and emphasized that it may take up to a year for the nerve to demonstrate its full recovery potential. In the meantime, treatment which could be considered would be vocal fold injection augmentation.  I spoke with him regarding the risks and benefits thereof.  I spoke with him about performing this either in the office or in the operating room setting.  He reported he has an exuberant gag reflex which might  preclude effective office based treatment.  I gave him the opportunity to ask questions and answered all of them to his satisfaction.  Since his symptoms are mild and improving, we decided to defer on procedural intervention at present.    He will follow up with me in about 1 month, or sooner if needed.    All questions were answered, and the patient is in agreement with the plan.    Andrea Qureshi M.D.  [facility]  Department of Otorhinolaryngology and Communication Sciences

## 2020-03-13 NOTE — PATIENT INSTRUCTIONS
VOCAL CORD PARALYSIS & PARESIS     What is vocal fold paralysis/paresis?     Vocal fold paralysis is a condition in which one or more nerves to the vocal folds work poorly, and results in the vocal folds moving weakly or asymmetrically. In most cases, vocal fold closure is reduced, which results in a weak or breathy voice. There may also be problems with the vocal folds elongating, so it becomes difficult to produce high pitches. In other more rare cases, the vocal folds may not be able to move apart, causing breathing difficulty. Paresis is a similar condition, but less severe.     Vocal fold paralysis and vocal fold paresis have several causes. These include viruses, certain types of surgeries, heart problems, tumors, or other diseases of the brain.     How is it treated?     First, it is best to determine the cause of the paralysis/paresis. Based on the cause, special tests may be conducted to determine if the paralysis or paresis is new or old, and if it is getting worse or better. Clearly, if breathing is an issue, securing the airway surgically is the first step. Otherwise, depending on the results of the tests, it is common to either adopt a wait and see approach or to begin voice therapy. In some cases, microsurgery is used to mechanically reposition the affected vocal fold so that it gets good closure with the good one, thereby improving voice and swallowing.           VOCAL FOLD INJECTION AUGMENTATION     Description   If the vocal folds (vocal cords) cannot close completely, you may experience voice problems: roughness, breathiness, inability to get loud, increased vocal effort, increased vocal fatigue. Some patients may also experience aspiration (coughing or choking with swallowing). Vocal fold injection augmentation plumps up the vocal fold and/or repositions it in the midline in order to help the vocal folds close completely while speaking or swallowing. Following the procedure, most  patients experience a louder, stronger, clearer voice. The procedure also helps some patients protect against aspiration, although the swallowing improvement is not as dramatic as the voice improvement. We use the following materials for the procedure: hyaluronic acid (Restylane); carboxymethylcellulose (Radiesse Voice Gel); and calcium hydroxyapatite (Radiesse Voice). For most patients, the injection is performed with a small needle passed through the skin of the neck. However, in some patients we perform the injection with a device passed through the mouth. In either case, the injection is guided by the visualization provided by a scope passed through the nose.     What to expect during the procedure   We perform the injection in our office under local (topical) anesthesia. You are awake the whole time, and the entire procedure lasts about 15 minutes. Our primary focus is your safety and comfort. We usually make the larynx numb by spraying the throat and/or dripping anesthetic on the larynx. At this time, you may cough or gag, or you may have the sensation that you spilled some water down the wrong pipe. These are temporary sensations that allow us to get you numb. The numbing process takes about 2 minutes. We will not proceed until we know you will be as comfortable as possible. A small needle is passed either through the skin of the neck or via a long instrument through the mouth to perform the injection. During the injection, you may experience mild discomfort in the throat. You may feel an unusual sensation in the ear because the larynx and the ear share the same sensory nerve. In rare cases in which a patient does not tolerate the procedure, it may be performed in the operating room, with the patient completely asleep under general anesthesia.     What to expect afterwards   Most patients note a stronger, less effortful voice immediately after the injection. Sometimes the voice is tight or pressed voice is  noted right after the injection. The voice usually has good days and bad days and gradually improves until you reach your new baseline voice over the first 1-2 weeks. Voice therapy may be a necessary part of your treatment plan to optimize your vocal outcome. None of the materials we inject are permanent. As the material dissolves, you may experience a gradual worsening of voice quality over the course of several months. At this point we may consider repeating the injection. You may be a candidate for a permanent fix, which involves an open surgery in the neck performed in the operating room.     Instructions: before the procedure   1. Do not take aspirin-containing products or other medications that can thin the blood (such as ibuprofen, Advil, Motrin, Aleve, Plavix) for 7 days prior to the injection. If you take Coumadin (warfarin), you may need to stop using this a few days prior to the injection. If you are on blood thinning (anti-platelet or anti-coagulant) medication and it is not clear what you should do, please clarify this with your physician.   2. On the day of your procedure, please make sure you take your other regular morning medications.   3. On the day of your procedure, it is OK to eat and drink as you would normally, up until 3 hours before to your appointment time. During that time frame, we ask that you restrict yourself only to clear liquids. A clear liquid is anything you can see through (water, ginger ale, apple juice).     Instructions: after the procedure   1. Please refrain from eating or drinking for 1 hour following the procedure. This allows time for the numbing medication to wear off.   2. Most patients experience very little pain. If necessary, most patients are able to keep comfortable with plain Tylenol (acetaminophen) and/or other non-steroidal anti-inflammatory medications such as ibuprofen (Advil, Motrin). Please follow package instructions if considering taking these medications.    3. In most instances, it is OK to use your voice immediately after the procedure. However, for the first week, you should avoid speaking over heavy background noise or in a very loud voice. It is rare, but in some cases you will be asked to rest your voice for the first 24 hours.   4. Please call the [facility] at [phone] if   · You have a temperature above 101°F   · You develop Increasing throat pain not relieved by over-the-counter medications   · You have any other post-operative questions or concerns   5. Please go immediately to the nearest emergency room if you are experiencing   · Shortness of breath   · Difficulty breathing   · Difficulty swallowing   · Severe bleeding     FREQUENTLY ASKED QUESTIONS     Is this a Botox injection? No. Botox weakens the voice box muscles. Instead, with a vocal fold injection augmentation, we are injecting filler material to bulk up the vocal fold(s).     How do you decide which material to use for the injection? Our decision is based on the indication for the procedure, the position of the vocal fold, and other patient historical/anatomical factors. In some instances, the approval of your insurance company is an important factor.     How to you decide which technique to use (through the neck versus through the mouth)? Patient anatomy and the position of the vocal fold play an important role. Other patient factors such as gag reflex are also strongly considered.     Why do you perform this in your office instead of in the operating room? Performing the procedure in the office is safe and precise. In addition, performing the injection with the patient awake gives us direct visual and auditory feedback, which we do not get when the patient is asleep in the operating room. Furthermore, an office-based injection is much less time consuming, is more convenient for the patient, and does not involve the risks or the recovery time associated with general anesthesia. We can  still do this in the operating room; we save that setting for specific diagnoses or situations, as well as for the rare patient who is unable to tolerate the awake procedure.     Why did I have discomfort in my ear (during or after the injection)? This is an example of referred pain. The ear and the larynx share the same sensory nerve.     I got an injection 3 days ago. Why is my voice still hoarse? To optimize vocal outcome, we overinject a little bit. Additionally, there may be a mild amount of swelling. Finally, the muscles of the larynx need to adjust to the injected material. Most people will have good days and bad days at first. After 1-2 weeks, you should settle out to your new baseline voice.     How long does the injection last? Carboxymethylcellulose (Radiesse Voice Gel) lasts approximately 1-2 months. Hyaluronic acid (Restylane) lasts approximately 4 months. Calcium hydroxyapatite (Radiesse Voice) lasts up to 1 year; however its characteristics are such that only few patients are appropriate for using this material.     Is there a permanent injectable material? No.     Can the injection be repeated? Yes. There is no limit to the number of times an injection can be repeated. However, a permanent surgical fix is often an option to consider.

## 2020-03-13 NOTE — PROGRESS NOTES
Reviewed dosing with Mr Delgado.  We have modified his dosing slightly for the weekend.  We will decrease a total of 1 mg over the weekend and he is gets back into range, he would be amenable to changing his overall dose to 3 mg QD x 2 mg MWF.  I explained that all of his improvements in blood flow, eating and metabolism will change his warfarin requirement and that this dose may ultimately be too low.  He will repeat on Monday and we will reassess from there.

## 2020-03-17 ENCOUNTER — EXTERNAL HOME HEALTH (OUTPATIENT)
Dept: HOME HEALTH SERVICES | Facility: HOSPITAL | Age: 60
End: 2020-03-17
Payer: MEDICARE

## 2020-03-19 ENCOUNTER — CLINICAL SUPPORT (OUTPATIENT)
Dept: TRANSPLANT | Facility: CLINIC | Age: 60
End: 2020-03-19
Payer: MEDICARE

## 2020-03-19 ENCOUNTER — OFFICE VISIT (OUTPATIENT)
Dept: TRANSPLANT | Facility: CLINIC | Age: 60
End: 2020-03-19
Payer: MEDICARE

## 2020-03-19 ENCOUNTER — HOSPITAL ENCOUNTER (OUTPATIENT)
Facility: HOSPITAL | Age: 60
Discharge: HOME | End: 2020-03-20
Attending: INTERNAL MEDICINE | Admitting: INTERNAL MEDICINE
Payer: MEDICARE

## 2020-03-19 VITALS
TEMPERATURE: 98 F | HEART RATE: 93 BPM | SYSTOLIC BLOOD PRESSURE: 100 MMHG | HEIGHT: 70 IN | BODY MASS INDEX: 32.5 KG/M2 | WEIGHT: 227 LBS

## 2020-03-19 DIAGNOSIS — I48.0 PAROXYSMAL ATRIAL FIBRILLATION: ICD-10-CM

## 2020-03-19 DIAGNOSIS — T82.9XXA LEFT VENTRICULAR ASSIST DEVICE (LVAD) COMPLICATION: ICD-10-CM

## 2020-03-19 DIAGNOSIS — Z79.899 LONG TERM CURRENT USE OF AMIODARONE: ICD-10-CM

## 2020-03-19 DIAGNOSIS — Z95.811 HEART REPLACED BY HEART ASSIST DEVICE: Primary | ICD-10-CM

## 2020-03-19 DIAGNOSIS — I10 ESSENTIAL HYPERTENSION: ICD-10-CM

## 2020-03-19 DIAGNOSIS — Z95.811 HEART REPLACED BY HEART ASSIST DEVICE: ICD-10-CM

## 2020-03-19 DIAGNOSIS — Z95.811 LVAD (LEFT VENTRICULAR ASSIST DEVICE) PRESENT: Primary | ICD-10-CM

## 2020-03-19 DIAGNOSIS — Z95.810 ICD (IMPLANTABLE CARDIOVERTER-DEFIBRILLATOR) IN PLACE: ICD-10-CM

## 2020-03-19 DIAGNOSIS — I50.22 CHRONIC SYSTOLIC CONGESTIVE HEART FAILURE: ICD-10-CM

## 2020-03-19 DIAGNOSIS — R09.89 RALES: ICD-10-CM

## 2020-03-19 DIAGNOSIS — E78.2 MIXED HYPERLIPIDEMIA: ICD-10-CM

## 2020-03-19 DIAGNOSIS — I10 HYPERTENSION: ICD-10-CM

## 2020-03-19 PROCEDURE — 93750 OP LVAD INTERROGATION: ICD-10-PCS | Mod: ,,, | Performed by: INTERNAL MEDICINE

## 2020-03-19 PROCEDURE — 99214 PR OFFICE/OUTPT VISIT, EST, LEVL IV, 30-39 MIN: ICD-10-PCS | Mod: S$PBB,,, | Performed by: INTERNAL MEDICINE

## 2020-03-19 PROCEDURE — G0379 DIRECT REFER HOSPITAL OBSERV: HCPCS

## 2020-03-19 PROCEDURE — G0378 HOSPITAL OBSERVATION PER HR: HCPCS

## 2020-03-19 PROCEDURE — 99214 OFFICE O/P EST MOD 30 MIN: CPT | Mod: PBBFAC | Performed by: INTERNAL MEDICINE

## 2020-03-19 PROCEDURE — 25000242 PHARM REV CODE 250 ALT 637 W/ HCPCS: Performed by: PHYSICIAN ASSISTANT

## 2020-03-19 PROCEDURE — 99214 OFFICE O/P EST MOD 30 MIN: CPT | Mod: S$PBB,,, | Performed by: INTERNAL MEDICINE

## 2020-03-19 PROCEDURE — 25000003 PHARM REV CODE 250: Performed by: PHYSICIAN ASSISTANT

## 2020-03-19 PROCEDURE — 99999 PR PBB SHADOW E&M-EST. PATIENT-LVL IV: CPT | Mod: PBBFAC,,, | Performed by: INTERNAL MEDICINE

## 2020-03-19 PROCEDURE — 27000248 HC VAD-ADDITIONAL DAY

## 2020-03-19 PROCEDURE — 93750 INTERROGATION VAD IN PERSON: CPT | Mod: ,,, | Performed by: INTERNAL MEDICINE

## 2020-03-19 PROCEDURE — 99999 PR PBB SHADOW E&M-EST. PATIENT-LVL IV: ICD-10-PCS | Mod: PBBFAC,,, | Performed by: INTERNAL MEDICINE

## 2020-03-19 RX ORDER — POTASSIUM CHLORIDE 20 MEQ/1
20 TABLET, EXTENDED RELEASE ORAL 2 TIMES DAILY
Status: DISCONTINUED | OUTPATIENT
Start: 2020-03-19 | End: 2020-03-20 | Stop reason: HOSPADM

## 2020-03-19 RX ORDER — LISINOPRIL 10 MG/1
10 TABLET ORAL DAILY
Qty: 90 TABLET | Refills: 3 | Status: ON HOLD | OUTPATIENT
Start: 2020-03-19 | End: 2020-03-20 | Stop reason: SDUPTHER

## 2020-03-19 RX ORDER — WARFARIN 1 MG/1
1 TABLET ORAL DAILY
Status: DISCONTINUED | OUTPATIENT
Start: 2020-03-20 | End: 2020-03-20 | Stop reason: HOSPADM

## 2020-03-19 RX ORDER — FUROSEMIDE 40 MG/1
40 TABLET ORAL DAILY
Status: DISCONTINUED | OUTPATIENT
Start: 2020-03-19 | End: 2020-03-20 | Stop reason: HOSPADM

## 2020-03-19 RX ORDER — ATORVASTATIN CALCIUM 20 MG/1
80 TABLET, FILM COATED ORAL NIGHTLY
Status: DISCONTINUED | OUTPATIENT
Start: 2020-03-19 | End: 2020-03-20 | Stop reason: HOSPADM

## 2020-03-19 RX ORDER — ACETAMINOPHEN 325 MG/1
650 TABLET ORAL EVERY 6 HOURS PRN
Status: DISCONTINUED | OUTPATIENT
Start: 2020-03-19 | End: 2020-03-20 | Stop reason: HOSPADM

## 2020-03-19 RX ORDER — ASPIRIN 325 MG
325 TABLET, DELAYED RELEASE (ENTERIC COATED) ORAL DAILY
Status: DISCONTINUED | OUTPATIENT
Start: 2020-03-19 | End: 2020-03-20 | Stop reason: HOSPADM

## 2020-03-19 RX ORDER — LISINOPRIL 10 MG/1
10 TABLET ORAL DAILY
Status: DISCONTINUED | OUTPATIENT
Start: 2020-03-19 | End: 2020-03-20 | Stop reason: HOSPADM

## 2020-03-19 RX ORDER — HYDROCODONE BITARTRATE AND ACETAMINOPHEN 5; 325 MG/1; MG/1
1 TABLET ORAL 2 TIMES DAILY PRN
Status: DISCONTINUED | OUTPATIENT
Start: 2020-03-19 | End: 2020-03-20 | Stop reason: HOSPADM

## 2020-03-19 RX ORDER — PANTOPRAZOLE SODIUM 40 MG/1
40 TABLET, DELAYED RELEASE ORAL DAILY
Status: DISCONTINUED | OUTPATIENT
Start: 2020-03-19 | End: 2020-03-20 | Stop reason: HOSPADM

## 2020-03-19 RX ORDER — DIGOXIN 125 MCG
0.12 TABLET ORAL DAILY
Status: DISCONTINUED | OUTPATIENT
Start: 2020-03-19 | End: 2020-03-20 | Stop reason: HOSPADM

## 2020-03-19 RX ORDER — FERROUS SULFATE 325(65) MG
325 TABLET, DELAYED RELEASE (ENTERIC COATED) ORAL DAILY
Status: DISCONTINUED | OUTPATIENT
Start: 2020-03-19 | End: 2020-03-20 | Stop reason: HOSPADM

## 2020-03-19 RX ORDER — AMOXICILLIN 250 MG
2 CAPSULE ORAL 2 TIMES DAILY PRN
Status: DISCONTINUED | OUTPATIENT
Start: 2020-03-19 | End: 2020-03-20 | Stop reason: HOSPADM

## 2020-03-19 RX ORDER — AMIODARONE HYDROCHLORIDE 200 MG/1
400 TABLET ORAL DAILY
Status: DISCONTINUED | OUTPATIENT
Start: 2020-03-19 | End: 2020-03-20 | Stop reason: HOSPADM

## 2020-03-19 RX ORDER — FUROSEMIDE 20 MG/1
20 TABLET ORAL NIGHTLY
Status: DISCONTINUED | OUTPATIENT
Start: 2020-03-19 | End: 2020-03-20 | Stop reason: HOSPADM

## 2020-03-19 RX ADMIN — AMIODARONE HYDROCHLORIDE 400 MG: 200 TABLET ORAL at 04:03

## 2020-03-19 RX ADMIN — LISINOPRIL 10 MG: 10 TABLET ORAL at 03:03

## 2020-03-19 RX ADMIN — ASPIRIN 325 MG: 325 TABLET, COATED ORAL at 03:03

## 2020-03-19 RX ADMIN — ATORVASTATIN CALCIUM 80 MG: 20 TABLET, FILM COATED ORAL at 09:03

## 2020-03-19 RX ADMIN — FERROUS SULFATE TAB EC 325 MG (65 MG FE EQUIVALENT) 325 MG: 325 (65 FE) TABLET DELAYED RESPONSE at 03:03

## 2020-03-19 RX ADMIN — POTASSIUM CHLORIDE 20 MEQ: 20 TABLET, EXTENDED RELEASE ORAL at 09:03

## 2020-03-19 RX ADMIN — PANTOPRAZOLE SODIUM 40 MG: 40 TABLET, DELAYED RELEASE ORAL at 03:03

## 2020-03-19 RX ADMIN — DIGOXIN 0.12 MG: 125 TABLET ORAL at 03:03

## 2020-03-19 RX ADMIN — FUROSEMIDE 40 MG: 40 TABLET ORAL at 03:03

## 2020-03-19 RX ADMIN — SALMETEROL XINAFOATE 1 PUFF: 50 POWDER, METERED ORAL; RESPIRATORY (INHALATION) at 09:03

## 2020-03-19 RX ADMIN — FUROSEMIDE 20 MG: 20 TABLET ORAL at 09:03

## 2020-03-19 NOTE — PROGRESS NOTES
Date of Implant with Heartmate 3 LVAD: 2020    PATIENT ARRIVED IN CLINIC:  Ambulatory   Accompanied by: N/A    Vitals  Temperature, oral:   Temp Readings from Last 1 Encounters:   20 97.8 °F (36.6 °C) (Oral)     Blood Pressure:   BP Readings from Last 3 Encounters:   20 (!) 100/0   20 (!) 90/0   20 (!) 85/0        VAD Interrogation:  TXP KLEVER INTERROGATIONS 3/19/2020 3/12/2020 3/4/2020   Type HeartMate3 HeartMate3 HeartMate3   Flow 3.7 4.1 3.9   Speed 5300 5300 5300   PI 7.3 6.7 5.4   Power (Berry) 3.9 3.9 3.8   LSL 4900 4900 4900   Pulsatility Pulse No Pulse Pulse       History Lo.c3e  Problems / Issues / Alarms with VAD if any: None noted  VAD Sounds: HM3 Smooth  Heartmate 3 Module Cable:  No yellow exposed    HCT:   Lab Results   Component Value Date    HCT 36.8 (L) 2020    HCT 25 (L) 2020       Complaints/reason for visit today: routine  Emergency Equipment With Patient: yes   VAD Binder With Patient: no   Reviewed VAD Numbers In Binder: no    Any Equipment Issues: None noted (Refer to  note for complete details)    DLES Assessment:  Appearance Of Driveline: 1-2 with suture and slight bleeding still      Antibiotics: NO  Velour: no  Manual & Visual Inspection Of Driveline: No kinks or tears noted  Stabilization Device In Use: yes, giles securement device      Patient MyChart Questionnaire: No flowsheet data found.     Assessment:   PAIN: YES Location of Pain: Chest discomfort, Description of Pain:  2  Complaints Of Nausea / Vomiting: Nausea when taking medications in the morning    Appearance and Frequency Of Stools: normal and formed without blood & daily  Color Of Urine: clear/yellow  Coping/Depression/Anxiety: coping okay  Sleep Habits: 6 hrs /night  Sleep Aids: None noted  Showering: No  Activity/Exercise: PT    Driving: No.    DLES Dressing Care:   Frequency of Dressing Changes: daily & Betadine and Water  Pt In Need Of Management  Kits?:yes -   1 Box of Betadine and Water  It is medically necessary to have VAD management kits in order to prevent infection or to assist in the healing of an infected DLES.    Labs:    Chemistry        Component Value Date/Time     03/19/2020 0905    K 4.1 03/19/2020 0905     03/19/2020 0905    CO2 29 03/19/2020 0905    BUN 17 03/19/2020 0905    CREATININE 1.6 (H) 03/19/2020 0905     (H) 03/19/2020 0905        Component Value Date/Time    CALCIUM 9.2 03/19/2020 0905    ALKPHOS 82 03/19/2020 0905    AST 17 03/19/2020 0905    ALT 18 03/19/2020 0905    BILITOT 0.6 03/19/2020 0905    ESTGFRAFRICA 53.3 (A) 03/19/2020 0905    EGFRNONAA 46.1 (A) 03/19/2020 0905            Magnesium   Date Value Ref Range Status   03/19/2020 1.8 1.6 - 2.6 mg/dL Final       Lab Results   Component Value Date    WBC 6.17 03/19/2020    HGB 10.7 (L) 03/19/2020    HCT 36.8 (L) 03/19/2020    MCV 91 03/19/2020     03/19/2020       Lab Results   Component Value Date    INR 3.9 (H) 03/19/2020    INR 3.9 (H) 03/12/2020    INR 2.8 (H) 03/09/2020       BNP   Date Value Ref Range Status   03/19/2020 256 (H) 0 - 99 pg/mL Final     Comment:     Values of less than 100 pg/ml are consistent with non-CHF populations.   03/12/2020 279 (H) 0 - 99 pg/mL Final     Comment:     Values of less than 100 pg/ml are consistent with non-CHF populations.   03/09/2020 276 (H) 0 - 99 pg/mL Final     Comment:     Values of less than 100 pg/ml are consistent with non-CHF populations.       LD   Date Value Ref Range Status   03/19/2020 236 110 - 260 U/L Final     Comment:     Results are increased in hemolyzed samples.   03/12/2020 225 110 - 260 U/L Final     Comment:     Results are increased in hemolyzed samples.   03/04/2020 230 110 - 260 U/L Final     Comment:     Results are increased in hemolyzed samples.       Labs reviewed with patient: YES      Patient Satisfaction Survey completed per patient: No  (explained about signature and box to  check)  Medication reconciliation: per MA.  Medication Detail updated today: yes  Coumadin Managed by: [facility]    Education: Reviewed driveline care, emergency procedures, how to change the controller, alarms with patient, as well as discussed how to page the VAD coordinator in case of an emergency.     Plans/Needs: Patient seen in clinic for routine follow up with Dr. Pearson. Patient's BP elevated, MD started patient on lisinopril, patient to get labs on Monday to recheck CR levels per MD. Complained of nausea when taking morning medications, told patient to try and space them out since previous attempts did not work. Complained of mucous, told patient to take mucinex. DLES still has suture and is still bleeding, INR is 3.9 today.Patient is being admitted due to poor lung sounds, high BP, and elevated INR per Dr. Pearson, see provider note.   Hurricane Season: No

## 2020-03-19 NOTE — PROGRESS NOTES
"03/19/2020  Renaldo Miles    Current provider:  Isiah Montero MD      As floor rounding has been requested to be limited during COVID-19 patient quarantine by Infectious Disease and leadership, only "electronic" rounding has been performed on this mechanical assist device patient.  Electronic rounding includes verifying in Epic that current settings and measurements for the device have been documented appropriately and that they are within limits.  Additionally, this rounding verifies that the EQUIPMENT section of the VAD flowsheet is documented in Epic, specifically checking the presence of emergency equipment and controller self test.  Any discrepancies will be related to the care team.    In emergency, the nursing units have been notified to contact the perfusion department either by:  Calling [phone] from 630am to 4pm, or by contacting the hospital  from 4pm to 630am and asking to speak with the perfusionist on call.    Renaldo Miles  3:12 PM  "

## 2020-03-19 NOTE — PROCEDURES
TXP KLEVER INTERROGATIONS 3/19/2020 3/12/2020 3/4/2020 2/28/2020 2/17/2020 2/17/2020 2/17/2020   Type HeartMate3 HeartMate3 HeartMate3 HeartMate3 - - -   Flow 3.7 4.1 3.9 4.3 - - -   Speed 5300 5300 5300 5300 - - -   PI 7.3 6.7 5.4 3.9 - - -   Power (Berry) 3.9 3.9 3.8 3.8 - - -   LSL 4900 4900 4900 4900 - - -   Pulsatility Pulse No Pulse Pulse Pulse Intermittent pulse Intermittent pulse Intermittent pulse   }

## 2020-03-19 NOTE — LETTER
March 19, 2020        Dipesh De La Torre  [address] [facility] [place] [address]  Phone: [phone]  Fax: [phone]             [facility] [address] [place] [address]  Phone: [phone]   Patient: Tae Delgado   MR Number: [id number]   YOB: 1960   Date of Visit: 3/19/2020       Dear Dr. Dipesh De La Torre    Thank you for referring Tae Delgado to me for evaluation. Attached you will find relevant portions of my assessment and plan of care.    If you have questions, please do not hesitate to call me. I look forward to following Tae Delgado along with you.    Sincerely,    Elham Pearson MD    Enclosure    If you would like to receive this communication electronically, please contact [email or web address] or [phone] to request Dydra Link access.    Dydra Link is a tool which provides read-only access to select patient information with whom you have a relationship. Its easy to use and provides real time access to review your patients record including encounter summaries, notes, results, and demographic information.    If you feel you have received this communication in error or would no longer like to receive these types of communications, please e-mail [email or web address]

## 2020-03-19 NOTE — INTERVAL H&P NOTE
The patient has been examined and the H&P has been reviewed:    I concur with the findings and no changes have occurred since H&P was written.   -Patient reports wheezing and cough prodcutive of clear sputum for the last couple days.  -During clinic visit today; he was hypertensive and started on Lisinopril  -his neck veins are up to mid neck which is patients baseline  -Will give home medications (including diuretic) and new rx of Lisinopril and monitor response.      Active Hospital Problems    Diagnosis  POA    *Hypertension [I10]  Yes     Priority: 1 - High    LVAD (left ventricular assist device) present [Z95.811]  Not Applicable     Priority: 2     Paroxysmal atrial fibrillation [I48.0]  Yes     Priority: 3     Hyperlipidemia [E78.5]  Yes     Priority: 4     Left ventricular assist device (LVAD) complication [T82.9XXA]  Yes      Resolved Hospital Problems   No resolved problems to display.

## 2020-03-19 NOTE — PROGRESS NOTES
"Subjective:   Patient ID:  Tae Delgado is a 60 y.o. male who presents for LVAD followup visit.    Implant Date:1/23/2020     Heartmate 3 RPM 5300     INR goal: 2-3   Bridge with Heparin   Antiplatelets:      TXP KLEVER INTERROGATIONS 3/19/2020   Type HeartMate3   Flow 3.7   Speed 5300   PI 7.3   Power (Berry) 3.9   LSL 4900   Pulsatility Pulse       HPI   Mr. Delgado is a very pleasant 55 y.o. White male  With stage D HFrEF, NICMP diagnosed in 2010, ICD, LV thrombus (with prior splenic and renal emboli), Embolic CVA , paroxysmal atrial fib, HTN, HLP, s/p HM 3 1/17/2020 as DT with closure of AV and MV repair with post-op course complicated by RV failure. On 1/24/20 he was taken back to the OR for possible RVAD placement; however, RVAD not placed and patient remained hemodynamically stable in the OR.  He went for wash out on 1/27/20 and his chest was closed on 1/29/20. Postoperatively he had paroxsymal atrial fib; he was started on Digoxin and anticoagulated with Coumadin. Renal function neptali acutely in the post op period but improved during his hospital stay.  Creatinine on day of discharge was 1.8. Right lower lobe pulm nodule found incidentally on CT: recommend repeat chest CT in 3 months and follow up in pulmonary clinic. Patient was discharged to rehab facility for further therapy. He coems for his 3rd clinic visit. Reports having a productive cough for the last 4 days. Also actively wheezing. Denies any fever. VAD speed is at 5300 rpm. No VAD alarms noted on interrogation occasional PI events . BP is elevated at 100 mm of Hg. DLES is a "1". INR is supratherapeutic at 3.9. LDH is at baseline.     Review of Systems   Constitution: Negative. Negative for chills, decreased appetite, diaphoresis, fever, malaise/fatigue, night sweats, weight gain and weight loss.   HENT: Positive for hoarse voice.    Eyes: Negative.    Cardiovascular: Positive for dyspnea on exertion. Negative for chest pain, claudication, cyanosis, " "irregular heartbeat, leg swelling, near-syncope, orthopnea, palpitations, paroxysmal nocturnal dyspnea and syncope.   Respiratory: Positive for cough, sputum production and wheezing. Negative for hemoptysis and shortness of breath.    Endocrine: Negative.    Hematologic/Lymphatic: Negative.    Skin: Negative for color change, dry skin and nail changes.   Musculoskeletal: Negative.    Gastrointestinal: Negative.    Genitourinary: Negative.    Neurological: Negative for weakness.       Objective:   Blood pressure (!) 100/0, pulse 93, temperature 97.8 °F (36.6 °C), temperature source Oral, height 5' 10" (1.778 m), weight 103 kg (227 lb).body mass index is 32.57 kg/m².    Doppler: 100    Physical Exam   Constitutional: He appears well-developed.   BP (!) 100/0 (BP Location: Left arm, Patient Position: Sitting) Comment (BP Method): doppler  Pulse 93   Temp 97.8 °F (36.6 °C) (Oral)   Ht 5' 10" (1.778 m)   Wt 103 kg (227 lb)   BMI 32.57 kg/m²      HENT:   Head: Normocephalic.   Neck: No JVD present. Carotid bruit is not present.   Cardiovascular: Regular rhythm and normal heart sounds.   No murmur heard.  Smooth hum. DLES is "1-2" with suture   Pulmonary/Chest: Effort normal. No respiratory distress. He has no wheezes. He has rales.   Abdominal: Soft. Bowel sounds are normal. He exhibits no distension. There is no tenderness. There is no rebound.   Musculoskeletal: He exhibits no edema.   Neurological: He is alert.   Skin: Skin is warm.   Vitals reviewed.      Lab Results   Component Value Date    WBC 6.17 03/19/2020    HGB 10.7 (L) 03/19/2020    HCT 36.8 (L) 03/19/2020    MCV 91 03/19/2020     03/19/2020    CO2 29 03/19/2020    CREATININE 1.6 (H) 03/19/2020    CALCIUM 9.2 03/19/2020    ALBUMIN 3.5 03/19/2020    AST 17 03/19/2020     (H) 03/19/2020    ALT 18 03/19/2020     03/19/2020       Lab Results   Component Value Date    INR 3.9 (H) 03/19/2020    INR 3.9 (H) 03/12/2020    INR 2.8 (H) " 03/09/2020       BNP   Date Value Ref Range Status   03/19/2020 256 (H) 0 - 99 pg/mL Final     Comment:     Values of less than 100 pg/ml are consistent with non-CHF populations.   03/12/2020 279 (H) 0 - 99 pg/mL Final     Comment:     Values of less than 100 pg/ml are consistent with non-CHF populations.   03/09/2020 276 (H) 0 - 99 pg/mL Final     Comment:     Values of less than 100 pg/ml are consistent with non-CHF populations.       LD   Date Value Ref Range Status   03/19/2020 236 110 - 260 U/L Final     Comment:     Results are increased in hemolyzed samples.   03/12/2020 225 110 - 260 U/L Final     Comment:     Results are increased in hemolyzed samples.   03/04/2020 230 110 - 260 U/L Final     Comment:     Results are increased in hemolyzed samples.       Assessment:      1. Heart replaced by heart assist device    2. Chronic systolic congestive heart failure    3. ICD (implantable cardioverter-defibrillator) in place    4. Essential hypertension    5. Long term current use of amiodarone    6. Mixed hyperlipidemia    7. Paroxysmal atrial fibrillation        Plan:   Worsening cough with rales and wheezing on exam.  BP is significantly elevated with an INR that is supratherapeutic.  I recommend to admit him for observation.  VAD interrogation was performed in clinic  Any VAD management kits dispensed today medically necessary  Recommend 2 gram sodium restriction and 1500cc fluid restriction.  Encourage physical activity with graded exercise program.  Requested patient to weigh themselves daily, and to notify us if their weight increases by more than 3 lbs in 1 day or 5 lbs in 1 week.     Listed for transplant: No. Implanted as DT    UNOS Patient Status  Functional Status: 80% - Normal activity with effort: some symptoms of disease  Physical Capacity: No Limitations  Working for Income: No  If no, reason not working: Demands of Treatment    Elham Pearson MD

## 2020-03-20 VITALS
DIASTOLIC BLOOD PRESSURE: 70 MMHG | RESPIRATION RATE: 18 BRPM | OXYGEN SATURATION: 99 % | HEIGHT: 70 IN | WEIGHT: 225.06 LBS | SYSTOLIC BLOOD PRESSURE: 100 MMHG | TEMPERATURE: 98 F | HEART RATE: 90 BPM | BODY MASS INDEX: 32.22 KG/M2

## 2020-03-20 PROBLEM — R06.2 WHEEZING: Status: ACTIVE | Noted: 2020-03-20

## 2020-03-20 LAB
ALBUMIN SERPL BCP-MCNC: 3.3 G/DL (ref 3.5–5.2)
ALP SERPL-CCNC: 71 U/L (ref 55–135)
ALT SERPL W/O P-5'-P-CCNC: 19 U/L (ref 10–44)
ANION GAP SERPL CALC-SCNC: 11 MMOL/L (ref 8–16)
APTT BLDCRRT: 36.5 SEC (ref 21–32)
AST SERPL-CCNC: 19 U/L (ref 10–40)
BASOPHILS # BLD AUTO: 0.07 K/UL (ref 0–0.2)
BASOPHILS NFR BLD: 1.3 % (ref 0–1.9)
BILIRUB DIRECT SERPL-MCNC: 0.3 MG/DL (ref 0.1–0.3)
BILIRUB SERPL-MCNC: 0.6 MG/DL (ref 0.1–1)
BNP SERPL-MCNC: 271 PG/ML (ref 0–99)
BUN SERPL-MCNC: 17 MG/DL (ref 6–20)
CALCIUM SERPL-MCNC: 8.9 MG/DL (ref 8.7–10.5)
CHLORIDE SERPL-SCNC: 102 MMOL/L (ref 95–110)
CO2 SERPL-SCNC: 27 MMOL/L (ref 23–29)
CREAT SERPL-MCNC: 1.5 MG/DL (ref 0.5–1.4)
CRP SERPL-MCNC: 19.8 MG/L (ref 0–8.2)
DIFFERENTIAL METHOD: ABNORMAL
EOSINOPHIL # BLD AUTO: 0.1 K/UL (ref 0–0.5)
EOSINOPHIL NFR BLD: 2.2 % (ref 0–8)
ERYTHROCYTE [DISTWIDTH] IN BLOOD BY AUTOMATED COUNT: 15.8 % (ref 11.5–14.5)
EST. GFR  (AFRICAN AMERICAN): 57.7 ML/MIN/1.73 M^2
EST. GFR  (NON AFRICAN AMERICAN): 49.9 ML/MIN/1.73 M^2
GLUCOSE SERPL-MCNC: 88 MG/DL (ref 70–110)
HCT VFR BLD AUTO: 31.9 % (ref 40–54)
HGB BLD-MCNC: 9.4 G/DL (ref 14–18)
IMM GRANULOCYTES # BLD AUTO: 0.04 K/UL (ref 0–0.04)
IMM GRANULOCYTES NFR BLD AUTO: 0.7 % (ref 0–0.5)
INR PPP: 2.9 (ref 0.8–1.2)
LDH SERPL L TO P-CCNC: 212 U/L (ref 110–260)
LYMPHOCYTES # BLD AUTO: 1.5 K/UL (ref 1–4.8)
LYMPHOCYTES NFR BLD: 27.7 % (ref 18–48)
MAGNESIUM SERPL-MCNC: 2.1 MG/DL (ref 1.6–2.6)
MCH RBC QN AUTO: 26.6 PG (ref 27–31)
MCHC RBC AUTO-ENTMCNC: 29.5 G/DL (ref 32–36)
MCV RBC AUTO: 90 FL (ref 82–98)
MONOCYTES # BLD AUTO: 0.6 K/UL (ref 0.3–1)
MONOCYTES NFR BLD: 10.1 % (ref 4–15)
NEUTROPHILS # BLD AUTO: 3.2 K/UL (ref 1.8–7.7)
NEUTROPHILS NFR BLD: 58 % (ref 38–73)
NRBC BLD-RTO: 0 /100 WBC
PHOSPHATE SERPL-MCNC: 4.1 MG/DL (ref 2.7–4.5)
PLATELET # BLD AUTO: 221 K/UL (ref 150–350)
PMV BLD AUTO: 10.8 FL (ref 9.2–12.9)
POTASSIUM SERPL-SCNC: 3.8 MMOL/L (ref 3.5–5.1)
PREALB SERPL-MCNC: 20 MG/DL (ref 20–43)
PROT SERPL-MCNC: 6.4 G/DL (ref 6–8.4)
PROTHROMBIN TIME: 27 SEC (ref 9–12.5)
RBC # BLD AUTO: 3.53 M/UL (ref 4.6–6.2)
SODIUM SERPL-SCNC: 140 MMOL/L (ref 136–145)
WBC # BLD AUTO: 5.52 K/UL (ref 3.9–12.7)

## 2020-03-20 PROCEDURE — 97165 OT EVAL LOW COMPLEX 30 MIN: CPT

## 2020-03-20 PROCEDURE — 85025 COMPLETE CBC W/AUTO DIFF WBC: CPT

## 2020-03-20 PROCEDURE — 86140 C-REACTIVE PROTEIN: CPT

## 2020-03-20 PROCEDURE — 85730 THROMBOPLASTIN TIME PARTIAL: CPT

## 2020-03-20 PROCEDURE — 80076 HEPATIC FUNCTION PANEL: CPT

## 2020-03-20 PROCEDURE — 25000003 PHARM REV CODE 250: Performed by: STUDENT IN AN ORGANIZED HEALTH CARE EDUCATION/TRAINING PROGRAM

## 2020-03-20 PROCEDURE — 25000003 PHARM REV CODE 250: Performed by: PHYSICIAN ASSISTANT

## 2020-03-20 PROCEDURE — 27000248 HC VAD-ADDITIONAL DAY

## 2020-03-20 PROCEDURE — G0378 HOSPITAL OBSERVATION PER HR: HCPCS

## 2020-03-20 PROCEDURE — 99217 PR OBSERVATION CARE DISCHARGE: CPT | Mod: ,,, | Performed by: INTERNAL MEDICINE

## 2020-03-20 PROCEDURE — 96374 THER/PROPH/DIAG INJ IV PUSH: CPT | Performed by: INTERNAL MEDICINE

## 2020-03-20 PROCEDURE — 83735 ASSAY OF MAGNESIUM: CPT

## 2020-03-20 PROCEDURE — 97161 PT EVAL LOW COMPLEX 20 MIN: CPT

## 2020-03-20 PROCEDURE — 84100 ASSAY OF PHOSPHORUS: CPT

## 2020-03-20 PROCEDURE — 36415 COLL VENOUS BLD VENIPUNCTURE: CPT

## 2020-03-20 PROCEDURE — 85610 PROTHROMBIN TIME: CPT

## 2020-03-20 PROCEDURE — 83880 ASSAY OF NATRIURETIC PEPTIDE: CPT

## 2020-03-20 PROCEDURE — 99217 PR OBSERVATION CARE DISCHARGE: ICD-10-PCS | Mod: ,,, | Performed by: INTERNAL MEDICINE

## 2020-03-20 PROCEDURE — 84134 ASSAY OF PREALBUMIN: CPT

## 2020-03-20 PROCEDURE — 83615 LACTATE (LD) (LDH) ENZYME: CPT

## 2020-03-20 PROCEDURE — 80048 BASIC METABOLIC PNL TOTAL CA: CPT

## 2020-03-20 RX ORDER — WARFARIN 1 MG/1
TABLET ORAL
Qty: 60 TABLET | Refills: 3
Start: 2020-03-20 | End: 2020-04-06

## 2020-03-20 RX ORDER — LISINOPRIL 10 MG/1
10 TABLET ORAL DAILY
Qty: 90 TABLET | Refills: 3 | Status: SHIPPED | OUTPATIENT
Start: 2020-03-20 | End: 2020-03-23 | Stop reason: SDUPTHER

## 2020-03-20 RX ORDER — FUROSEMIDE 40 MG/1
TABLET ORAL
Qty: 135 TABLET | Refills: 3 | Status: SHIPPED | OUTPATIENT
Start: 2020-03-20 | End: 2020-06-16 | Stop reason: SDUPTHER

## 2020-03-20 RX ORDER — DIGOXIN 125 MCG
TABLET ORAL
Qty: 90 TABLET | Refills: 3 | Status: SHIPPED | OUTPATIENT
Start: 2020-03-20 | End: 2020-06-16 | Stop reason: SDUPTHER

## 2020-03-20 RX ORDER — WARFARIN 1 MG/1
TABLET ORAL
Qty: 60 TABLET | Refills: 3 | Status: SHIPPED | OUTPATIENT
Start: 2020-03-20 | End: 2020-03-20 | Stop reason: SDUPTHER

## 2020-03-20 RX ORDER — PANTOPRAZOLE SODIUM 40 MG/1
TABLET, DELAYED RELEASE ORAL
Qty: 90 TABLET | Refills: 3 | Status: SHIPPED | OUTPATIENT
Start: 2020-03-20 | End: 2020-06-16 | Stop reason: SDUPTHER

## 2020-03-20 RX ORDER — ATORVASTATIN CALCIUM 80 MG/1
TABLET, FILM COATED ORAL
Qty: 90 TABLET | Refills: 3 | Status: SHIPPED | OUTPATIENT
Start: 2020-03-20 | End: 2020-07-01 | Stop reason: SDUPTHER

## 2020-03-20 RX ORDER — POTASSIUM CHLORIDE 20 MEQ/1
20 TABLET, EXTENDED RELEASE ORAL 2 TIMES DAILY
Qty: 180 TABLET | Refills: 3 | Status: SHIPPED | OUTPATIENT
Start: 2020-03-20 | End: 2020-07-01 | Stop reason: SDUPTHER

## 2020-03-20 RX ORDER — LABETALOL HCL 20 MG/4 ML
20 SYRINGE (ML) INTRAVENOUS ONCE
Status: COMPLETED | OUTPATIENT
Start: 2020-03-20 | End: 2020-03-20

## 2020-03-20 RX ORDER — AMIODARONE HYDROCHLORIDE 400 MG/1
TABLET ORAL
Qty: 90 TABLET | Refills: 3 | Status: SHIPPED | OUTPATIENT
Start: 2020-03-20 | End: 2020-03-25

## 2020-03-20 RX ORDER — ASPIRIN 325 MG
TABLET, DELAYED RELEASE (ENTERIC COATED) ORAL
Qty: 90 TABLET | Refills: 3 | Status: SHIPPED | OUTPATIENT
Start: 2020-03-20 | End: 2020-06-16 | Stop reason: SDUPTHER

## 2020-03-20 RX ADMIN — AMIODARONE HYDROCHLORIDE 400 MG: 200 TABLET ORAL at 09:03

## 2020-03-20 RX ADMIN — POTASSIUM CHLORIDE 20 MEQ: 20 TABLET, EXTENDED RELEASE ORAL at 09:03

## 2020-03-20 RX ADMIN — PANTOPRAZOLE SODIUM 40 MG: 40 TABLET, DELAYED RELEASE ORAL at 09:03

## 2020-03-20 RX ADMIN — LABETALOL HCL IV SOLN PREFILLED SYRINGE 20 MG/4ML (5 MG/ML) 20 MG: 20/4 SOLUTION PREFILLED SYRINGE at 12:03

## 2020-03-20 RX ADMIN — LISINOPRIL 10 MG: 10 TABLET ORAL at 09:03

## 2020-03-20 RX ADMIN — ASPIRIN 325 MG: 325 TABLET, COATED ORAL at 09:03

## 2020-03-20 RX ADMIN — FUROSEMIDE 40 MG: 40 TABLET ORAL at 09:03

## 2020-03-20 RX ADMIN — DIGOXIN 0.12 MG: 125 TABLET ORAL at 09:03

## 2020-03-20 RX ADMIN — SALMETEROL XINAFOATE 1 PUFF: 50 POWDER, METERED ORAL; RESPIRATORY (INHALATION) at 09:03

## 2020-03-20 RX ADMIN — FERROUS SULFATE TAB EC 325 MG (65 MG FE EQUIVALENT) 325 MG: 325 (65 FE) TABLET DELAYED RESPONSE at 09:03

## 2020-03-20 NOTE — PROGRESS NOTES
DISCHARGE NOTE:    Tae Delgado is a 60 y.o. male s/p HM3 lvad from 1.23.20 being discharged today following admission for HTN and elevated INR.     Past Medical History:   Diagnosis Date    CHF (congestive heart failure) 1/2010    Dx  1/2010 w/ decreased LV systolic function (EF 15%) by ECHO 1/2015    COCM (congestive cardiomyopathy) 7/20/2016    Hyperlipidemia     Hypertension     Paroxysmal atrial fibrillation     Pulmonary embolus 2008    Stroke     Superficial thrombophlebitis        Hospital Course: During his hospital stay his warfarin dose was held for one day. His INR today is 2.9. I recommended he take 3mg MWF alternating with 2mg. He felt strongly about taking 2.5mg daily, with INR follow up on Tuesday. A 90 day supply of medications was sent to his local pharmacy.     Allergies:   Review of patient's allergies indicates:   Allergen Reactions    Biopatch [chlorhexidine gluconate]      Site burning    Dobutamine      Tremors, SOB, flushing    Dobutamine in d5w      Tachycardia, tremors, SOB, flushing    Chlorhexidine Other (See Comments)     burning    Percocet [oxycodone-acetaminophen] Itching    Penicillins Rash     Cefepime given on 1/23/2020 without issue       Patient Pharmacy: [facility]    Pharmacy Interventions/Recommendations:  1) INR Goal: 2-3    2) Antiplatelet Agents: ASA 325mg     3) Heparin Bridging:  [facility]    4) Patient Counseling/Education:  Completed    5) INR Follow-Up/Discharge Needs:  Tuesday with Coumadin Clinic     See list of discharge medication for dosing instructions.     Tae Delgado and his caregiver verbalized their understanding and had the opportunity to ask questions.      Discharge Medications:   Tae Delgado   Home Medication Instructions DAHIANA:[id number]    Printed on:03/20/20 1051   Medication Information                      acetaminophen (TYLENOL) 325 MG tablet  Take 650 mg by mouth every 6 (six) hours as needed for mild pain 1-3/10 pain scale.              amiodarone (PACERONE) 400 MG tablet  Take 1 tablet (400 mg total) by mouth daily.             aspirin (ECOTRIN) 325 MG EC tablet  Take 1 tablet by mouth once daily             atorvastatin (LIPITOR) 80 MG tablet  Take 1 tablet (80 mg total) by mouth nightly.             colchicine (COLCRYS) 0.6 mg tablet  Take 1 tablet (0.6 mg total) by mouth daily as needed (gout flare).             digoxin (LANOXIN) 125 mcg tablet  Take 1 tablet (125 mcg total) by mouth daily.             ferrous sulfate (FEOSOL) 325 mg (65 mg iron) Tab tablet  Take 1 tablet (325 mg total) by mouth daily with breakfast.             furosemide (LASIX) 40 MG tablet  Take 40mg (1 tablet) in the morning and 20mg (half tablet) in the evening             HYDROcodone-acetaminophen (NORCO) 5-325 mg per tablet  Take 1 tablet by mouth 2 (two) times a day as needed for moderate pain (4 - 6) for up to 7 days. Max Daily Amount: 2 tablets             lisinopriL 10 MG tablet  Take 1 tablet (10 mg total) by mouth once daily.             pantoprazole (PROTONIX) 40 MG tablet  Take 1 tablet (40 mg total) by mouth daily.             potassium chloride SA (K-DUR,KLOR-CON) 20 MEQ tablet  Take 1 tablet (20 mEq total) by mouth 2 (two) times daily.             senna-docusate 8.6-50 mg (PERICOLACE) 8.6-50 mg per tablet  Take 2 tablets by mouth 2 (two) times daily as needed for Constipation.             SEREVENT DISKUS 50 mcg/dose diskus inhaler               warfarin (COUMADIN) 1 MG tablet  Take 2.5 mg orally daily

## 2020-03-20 NOTE — PROGRESS NOTES
03/19/20 2338   Vital Signs   BP (!) 98/0     Notified Dr. Marroquin of BP. Orders to administer hydralazine 10 mg. Will continue to monitor.

## 2020-03-20 NOTE — PLAN OF CARE
PT evaluation complete. No goals established as pt is baseline with mobility and has no acute PT needs at this time. D/C from PT services.    Micki Priest, PT, DPT   3/20/2020  [phone]

## 2020-03-20 NOTE — DISCHARGE SUMMARY
"[facility]  Heart Transplant  Discharge Summary      Patient Name: Tae Delgado  MRN: [id number]  Admission Date: 3/19/2020  Hospital Length of Stay: 0 days  Discharge Date and Time: 03/20/2020 11:44 AM  Attending Physician: Isiah Montero MD   Discharging Provider: Jasmina Charles PA-C  Primary Care Provider: Elham Pearson MD     HPI: Mr. Delgado is a very pleasant 55 y.o. White male  With stage D HFrEF, NICMP diagnosed in 2010, ICD, LV thrombus (with prior splenic and renal emboli), Embolic CVA , paroxysmal atrial fib, HTN, HLP, s/p HM 3 1/17/2020 as DT with closure of AV and MV repair with post-op course complicated by RV failure. On 1/24/20 he was taken back to the OR for possible RVAD placement; however, RVAD not placed and patient remained hemodynamically stable in the OR.  He went for wash out on 1/27/20 and his chest was closed on 1/29/20. Postoperatively he had paroxsymal atrial fib; he was started on Digoxin and anticoagulated with Coumadin. Renal function neptali acutely in the post op period but improved during his hospital stay.  Creatinine on day of discharge was 1.8. Right lower lobe pulm nodule found incidentally on CT: recommend repeat chest CT in 3 months and follow up in pulmonary clinic. Patient was discharged to rehab facility for further therapy. He presented to Dr Johansen clinic on 3/19 for his 3rd clinic visit. Reports having a productive cough for the last 4 days. Also actively wheezing. Denies any fever. VAD speed is at 5300 rpm. No VAD alarms noted on interrogation occasional PI events . BP is elevated at 100 mm of Hg. DLES is a "1". INR is supratherapeutic at 3.9. LDH is at baseline.        * No surgery found *     Hospital Course: Lisinopril added in clinic was continued with resolution of elevated MAPs. <80 after starting ACE. Wheezing completely resolved with salmeterol inhaler. Cough resolving on day of discharge. During his hospital stay his warfarin dose was held for " one day. His INR today is 2.9. He was instructed to take warfarin 2mg on Tu/Th/Sat and 3mg on other days. A 90 day supply of medications was sent to his local pharmacy.     Consults (From admission, onward)        Status Ordering Provider     Inpatient consult to Midline team  Once     Provider:  (Not yet assigned)    Completed ROSAURA ANN        Significant Diagnostic Studies: Labs:   CMP   Recent Labs   Lab 03/19/20  0905 03/20/20  0533    140   K 4.1 3.8    102   CO2 29 27   * 88   BUN 17 17   CREATININE 1.6* 1.5*   CALCIUM 9.2 8.9   PROT 7.0 6.4   ALBUMIN 3.5 3.3*   BILITOT 0.6 0.6   ALKPHOS 82 71   AST 17 19   ALT 18 19   ANIONGAP 10 11   ESTGFRAFRICA 53.3* 57.7*   EGFRNONAA 46.1* 49.9*   , CBC   Recent Labs   Lab 03/19/20  0905 03/20/20  0533   WBC 6.17 5.52   HGB 10.7* 9.4*   HCT 36.8* 31.9*    221    and INR   Lab Results   Component Value Date    INR 2.9 (H) 03/20/2020    INR 3.9 (H) 03/19/2020    INR 3.9 (H) 03/12/2020       Pending Diagnostic Studies:     None        Final Active Diagnoses:    Diagnosis Date Noted POA    PRINCIPAL PROBLEM:  Hypertension [I10] 02/06/2015 Yes    LVAD (left ventricular assist device) present [Z95.811] 01/24/2020 Not Applicable    Wheezing [R06.2] 03/20/2020 Unknown    Left ventricular assist device (LVAD) complication [T82.9XXA] 03/19/2020 Yes    Paroxysmal atrial fibrillation [I48.0] 01/18/2016 Yes    Hyperlipidemia [E78.5] 02/06/2015 Yes      Problems Resolved During this Admission:      Discharged Condition: stable    Disposition: Home or Self Care    Follow Up: As scheduled with LVAD clinic. INR check on Monday.    Patient Instructions:      Diet Cardiac     Notify your health care provider if you experience any of the following:  temperature >100.4     Notify your health care provider if you experience any of the following:  increased confusion or weakness     Notify your health care provider if you experience any of the following:   persistent dizziness, light-headedness, or visual disturbances     Notify your health care provider if you experience any of the following:  worsening rash     Notify your health care provider if you experience any of the following:  severe persistent headache     Notify your health care provider if you experience any of the following:  difficulty breathing or increased cough     Notify your health care provider if you experience any of the following:  redness, tenderness, or signs of infection (pain, swelling, redness, odor or green/yellow discharge around incision site)     Notify your health care provider if you experience any of the following:  severe uncontrolled pain     Notify your health care provider if you experience any of the following:  persistent nausea and vomiting or diarrhea     Activity as tolerated     Medications:  Reconciled Home Medications:      Medication List      CHANGE how you take these medications    warfarin 1 MG tablet  Commonly known as:  COUMADIN  Take 2.5mg orally daily  What changed:    · medication strength  · how much to take  · how to take this  · when to take this  · additional instructions        CONTINUE taking these medications    acetaminophen 325 MG tablet  Commonly known as:  TYLENOL  Take 650 mg by mouth every 6 (six) hours as needed for mild pain 1-3/10 pain scale.     amiodarone 400 MG tablet  Commonly known as:  PACERONE  Take 1 tablet (400 mg total) by mouth daily.     aspirin 325 MG EC tablet  Commonly known as:  ECOTRIN  Take 1 tablet by mouth once daily     atorvastatin 80 MG tablet  Commonly known as:  LIPITOR  Take 1 tablet (80 mg total) by mouth nightly.     colchicine 0.6 mg tablet  Commonly known as:  COLCRYS  Take 1 tablet (0.6 mg total) by mouth daily as needed (gout flare).     digoxin 125 mcg tablet  Commonly known as:  LANOXIN  Take 1 tablet (125 mcg total) by mouth daily.     ferrous sulfate 325 mg (65 mg iron) Tab tablet  Commonly known as:   FEOSOL  Take 1 tablet (325 mg total) by mouth daily with breakfast.     furosemide 40 MG tablet  Commonly known as:  LASIX  Take 40mg (1 tablet) in the morning and 20mg (half tablet) in the evening     HYDROcodone-acetaminophen 5-325 mg per tablet  Commonly known as:  NORCO  Take 1 tablet by mouth 2 (two) times a day as needed for moderate pain (4 - 6) for up to 7 days. Max Daily Amount: 2 tablets     lisinopriL 10 MG tablet  Take 1 tablet (10 mg total) by mouth once daily.     pantoprazole 40 MG tablet  Commonly known as:  PROTONIX  Take 1 tablet (40 mg total) by mouth daily.     potassium chloride SA 20 MEQ tablet  Commonly known as:  K-DUR,KLOR-CON  Take 1 tablet (20 mEq total) by mouth 2 (two) times daily.     senna-docusate 8.6-50 mg 8.6-50 mg per tablet  Commonly known as:  PERICOLACE  Take 2 tablets by mouth 2 (two) times daily as needed for Constipation.     SEREVENT DISKUS 50 mcg/dose diskus inhaler  Generic drug:  salmeteroL        STOP taking these medications    lidocaine-diphenhyd-Al-mag-sim 705--40 mg/30 mL Mwsh     magic mouthwash diphen/antac/lidoc     VENELEX Oint  Generic drug:  balsam peru-castor oiL            Jasmina Charles PA-C  Heart Transplant  [facility]

## 2020-03-20 NOTE — PROGRESS NOTES
[facility]  Heart Transplant  Progress Note    Patient Name: Tae Delgado  MRN: [id number]  Admission Date: 3/19/2020  Hospital Length of Stay: 0 days  Attending Physician: Isiah Montero MD  Primary Care Provider: Elham Pearson MD  Principal Problem:Hypertension    Subjective:     Interval History: No complaints, no events overnight. Feels generally well. Denies NVD, SOB, Chest pain. Wheezing has greatly improved, no SOB. Angry with coumadin clinic. Cough is improving, wants to go home.     Scheduled Meds:   amiodarone  400 mg Oral Daily    aspirin  325 mg Oral Daily    atorvastatin  80 mg Oral QHS    digoxin  0.125 mg Oral Daily    ferrous sulfate  325 mg Oral Daily    furosemide  20 mg Oral QHS    furosemide  40 mg Oral Daily    lisinopriL  10 mg Oral Daily    pantoprazole  40 mg Oral Daily    potassium chloride SA  20 mEq Oral BID    salmeteroL  1 puff Inhalation BID    warfarin  1 mg Oral Daily     PRN Meds:acetaminophen, HYDROcodone-acetaminophen, senna-docusate 8.6-50 mg    Review of patient's allergies indicates:   Allergen Reactions    Biopatch [chlorhexidine gluconate]      Site burning    Dobutamine      Tremors, SOB, flushing    Dobutamine in d5w      Tachycardia, tremors, SOB, flushing    Chlorhexidine Other (See Comments)     burning    Percocet [oxycodone-acetaminophen] Itching    Penicillins Rash     Cefepime given on 1/23/2020 without issue     Objective:     Vital Signs (Most Recent):  Temp: 97.8 °F (36.6 °C) (03/20/20 0818)  Pulse: 87 (03/20/20 0927)  Resp: 18 (03/20/20 0927)  BP: (!) 86/0 (03/20/20 0821)  SpO2: 99 % (03/20/20 0818) Vital Signs (24h Range):  Temp:  [97.5 °F (36.4 °C)-98.4 °F (36.9 °C)] 97.8 °F (36.6 °C)  Pulse:  [] 87  Resp:  [18] 18  SpO2:  [96 %-99 %] 99 %  BP: ()/(0-96) 86/0     Patient Vitals for the past 72 hrs (Last 3 readings):   Weight   03/20/20 0700 102.1 kg (225 lb 1.4 oz)   03/19/20 1420 103.7 kg (228 lb 9.9 oz)     Body  mass index is 32.3 kg/m².      Intake/Output Summary (Last 24 hours) at 3/20/2020 0943  Last data filed at 3/20/2020 0900  Gross per 24 hour   Intake 480 ml   Output 925 ml   Net -445 ml       Hemodynamic Parameters:       Telemetry: reviewed    Physical Exam   Constitutional: He appears well-developed and well-nourished. No distress.   HENT:   Head: Normocephalic and atraumatic.   Eyes: EOM are normal.   Neck: Normal range of motion. JVD present.   Cardiovascular: Normal rate and regular rhythm.   VAD hum smooth   Pulmonary/Chest: Effort normal and breath sounds normal. No stridor. No respiratory distress. He has no wheezes. He has no rales.   Abdominal: Soft. Bowel sounds are normal. He exhibits no distension.   Musculoskeletal: He exhibits edema.   Neurological: He is alert.   Skin: Skin is warm and dry. Capillary refill takes 2 to 3 seconds. He is not diaphoretic.   Psychiatric: He has a normal mood and affect. His behavior is normal. Judgment and thought content normal.   Nursing note and vitals reviewed.      Significant Labs:  CBC:  Recent Labs   Lab 03/19/20 0905 03/20/20  0533   WBC 6.17 5.52   RBC 4.05* 3.53*   HGB 10.7* 9.4*   HCT 36.8* 31.9*    221   MCV 91 90   MCH 26.4* 26.6*   MCHC 29.1* 29.5*     BNP:  Recent Labs   Lab 03/19/20 0905 03/20/20  0533   * 271*     CMP:  Recent Labs   Lab 03/19/20 0905 03/20/20  0533   * 88   CALCIUM 9.2 8.9   ALBUMIN 3.5 3.3*   PROT 7.0 6.4    140   K 4.1 3.8   CO2 29 27    102   BUN 17 17   CREATININE 1.6* 1.5*   ALKPHOS 82 71   ALT 18 19   AST 17 19   BILITOT 0.6 0.6      Coagulation:   Recent Labs   Lab 03/19/20 0905 03/20/20  0533   INR 3.9* 2.9*   APTT  --  36.5*     LDH:  Recent Labs   Lab 03/19/20 0905 03/20/20  0533    212     Microbiology:  Microbiology Results (last 7 days)     ** No results found for the last 168 hours. **          I have reviewed all pertinent labs within the past 24 hours.    Estimated Creatinine  Clearance: 62.7 mL/min (A) (based on SCr of 1.5 mg/dL (H)).    Diagnostic Results:  I have reviewed all pertinent imaging results/findings within the past 24 hours.    Assessment and Plan:     No notes on file    * Hypertension  - Admitted for BP control  - added lisinopril this admission, MAP 88 this AM. Goal map 60-90. Will check BP at home and can up titrate as an outpatient.     LVAD (left ventricular assist device) present  - S/P DT HM3 with closure of AV and repair of MV for regurg 1/23/20 ([facility]).  - HTS primary.  - INR Therapeutic 2.9 today. Prior home dose 2 M/Th, 3 all other days. Was supra thearpeutic on admission.   - INR check Monday, see d/c summary for discharge dose of coumadin  - ASA 325mg.   - LDH stable.   - TTE 2/10 at 5300 rpm: LVEDD 5.01, LVEF 10%, RV function mod depressed,   - Current speed 5300    Procedure: Device Interrogation Including analysis of device parameters  Current Settings: Ventricular Assist Device  Review of device function is stable/unstable stable    TXP LVAD INTERROGATIONS 3/20/2020 3/20/2020 3/19/2020 3/19/2020 3/19/2020 3/19/2020 3/12/2020   Type HeartMate3 HeartMate3 HeartMate3 HeartMate3 HeartMate3 - -   Flow 4.1 4.2 4.0 3.7 - - -   Speed 5300 5300 5300 5300 - - -   PI 5.2 4.3 4.7 6.3 - - -   Power (Berry) 3.8 3.7 3.8 3.8 - - -   LSL 4900 - - - - - -   Pulsatility Pulse - - - - Pulse No Pulse       Wheezing  - admitted for wheezing. 99% O2 sats on RA. Dry cough improving. CXR reviewed with no acute issues.   - started on salmeterol BID with resolution of wheezing. No other signs/symptoms of infection  - d/c today with salmeterol inhaler     Paroxysmal atrial fibrillation  - continue amiodarone, coumadin, digoxin    Hyperlipidemia  - continue statin        Jasmina Charles PATobyC  Heart Transplant  [facility]

## 2020-03-20 NOTE — ASSESSMENT & PLAN NOTE
- Admitted for BP control  - added lisinopril, MAP 88 this AM. Goal map 60-90. Will check BP at home and can up titrate as an outpatient.

## 2020-03-20 NOTE — PROGRESS NOTES
OBS NOTE    Pt was admitted under observation from clinic visit yesterday & discharged home today.  DAISY spoke with Zofia with [facility]  ([id number]) who confirms pt is still current with [facility] (ph: [phone], f: [phone]).  Zofia states [facility] is aware of d/c from [facility] today & will resume home services.  DAISY remains available.

## 2020-03-20 NOTE — ASSESSMENT & PLAN NOTE
- S/P DT HM3 with closure of AV and repair of MV for regurg 1/23/20 ([facility]).  - HTS primary.  - INR Therapeutic 2.9 today. Prior home dose 2 M/Th, 3 all other days. Was supra thearpeutic on admission.  - ASA 325mg.   - LDH stable.   - TTE 2/10 at 5300 rpm: LVEDD 5.01, LVEF 10%, RV function mod depressed,   - Current speed 5300    Procedure: Device Interrogation Including analysis of device parameters  Current Settings: Ventricular Assist Device  Review of device function is stable/unstable stable    TXP LVAD INTERROGATIONS 3/20/2020 3/20/2020 3/19/2020 3/19/2020 3/19/2020 3/19/2020 3/12/2020   Type HeartMate3 HeartMate3 HeartMate3 HeartMate3 HeartMate3 - -   Flow 4.1 4.2 4.0 3.7 - - -   Speed 5300 5300 5300 5300 - - -   PI 5.2 4.3 4.7 6.3 - - -   Power (Berry) 3.8 3.7 3.8 3.8 - - -   LSL 4900 - - - - - -   Pulsatility Pulse - - - - Pulse No Pulse

## 2020-03-20 NOTE — PT/OT/SLP EVAL
"Occupational Therapy   Evaluation and Discharge Note    Name: Tae Delgado  MRN: [id number]  Admitting Diagnosis:  Hypertension     S/p LVAD placement 1/28/2020    Recommendations:     Discharge Recommendations: home with home health  Discharge Equipment Recommendations:  none  Barriers to discharge:  None    Assessment:     Tae Delgado is a 60 y.o. male with a medical diagnosis of Hypertension. At this time, patient is functioning at their prior level of function and does not require further acute OT services.     Plan:     During this hospitalization, patient does not require further acute OT services.  Please re-consult if situation changes.    · Plan of Care Reviewed with: patient    Subjective     Chief Complaint: None stated  Patient/Family Comments/goals: "I may get discharged home today".    Occupational Profile:  Living Environment: Patient and his significant other are currently living in 1st floor apartment in [place]; normally lives [place] [facility] [place] [facility].  Previous level of function: Independent with ADLs and ambulation using no AD. Significant other completes LVAD dressing changes.  Equipment Used at home: bath bench, raised toilet, wheelchair, rollator(Bath bench, rollator, or wheelchair belonged to patient's mother. Patient does not use DME but has available)  Assistance upon Discharge: Significant other will be able to assist    Pain/Comfort:  · Pain Rating 1: 0/10    Patients cultural, spiritual, Confucianist conflicts given the current situation: no    Objective:     Communicated with: RN prior to session. Patient found sitting EOB with RN with telemetry, LVAD upon OT entry to room.    General Precautions: Standard, fall, LVAD, sternal   Orthopedic Precautions:N/A   Braces: N/A     Occupational Performance:    Bed Mobility:    · Not assessed; patient found sitting EOB and left sitting in chair    Functional Mobility/Transfers:  · Patient completed Sit <> Stand Transfer with " supervision with  no assistive device  · Functional Mobility: Patient ambulated community distance in hallway using no AD with supervision and 1 seated rest period. Refer to PT ankush for further details regarding gait.    Activities of Daily Living:  · Lower Body Dressing: independence donning shoes sitting EOB    LVAD:  · Patient found with LVAD on battery power with consolidation bag donned.  · Emergency bag present throughout session.  · Patient left on battery power and no alarms sounded during session.    Cognitive/Visual Perceptual:  Cognitive/Psychosocial Skills:     -       Oriented to: Person, Place, Time and Situation   -       Follows Commands/attention:Follows multistep  commands  -       Communication: clear/fluent  -       Memory: No Deficits noted  -       Safety awareness/insight to disability: intact   -       Mood/Affect/Coping skills/emotional control: Appropriate to situation, Cooperative and Pleasant    Physical Exam:  Edema:  B LE  Upper Extremity Range of Motion:     -       Right Upper Extremity: WFL within limits of sternal precautions  -       Left Upper Extremity: WFL within limits of sternal precautions  Upper Extremity Strength:    -       Right Upper Extremity: WFL within limits of sternal precautions  -       Left Upper Extremity: WFL within limits of sternal precautions   Strength:    -       Right Upper Extremity: WFL  -       Left Upper Extremity: WFL  Fine Motor Coordination:    -       Intact  Gross motor coordination:  -       WFL within limits of sternal precautions    AMPAC 6 Click ADL:  AMPAC Total Score: 23    Treatment & Education:   Therapist provided facilitation and instruction of proper body mechanics, energy conservation, and fall prevention strategies during tasks listed above.   Instructed patient to sit in bedside chair daily to increase OOB/activity tolerance.   Charge nurse notified that patient requesting sacral skin protectant   Educated patient on OT POC  and answered all questions within OT scope of practice.   Whiteboard updated   Education:    Patient left up in chair with all lines intact and call button in reach    GOALS:   Multidisciplinary Problems     Occupational Therapy Goals     Not on file          Multidisciplinary Problems (Resolved)        Problem: Occupational Therapy Goal    Goal Priority Disciplines Outcome Interventions   Occupational Therapy Goal   (Resolved)     OT, PT/OT Met    Description:  Skilled OT services not necessary at this time secondary to patient performing ADLs at [facility]. Patient in agreement. Please re-consult OT if change in patient's functional status is noted.                       History:     Past Medical History:   Diagnosis Date    CHF (congestive heart failure) 1/2010    Dx  1/2010 w/ decreased LV systolic function (EF 15%) by ECHO 1/2015    COCM (congestive cardiomyopathy) 7/20/2016    Hyperlipidemia     Hypertension     Paroxysmal atrial fibrillation     Pulmonary embolus 2008    Stroke     Superficial thrombophlebitis        Past Surgical History:   Procedure Laterality Date    APPLICATION OF WOUND VACUUM-ASSISTED CLOSURE DEVICE N/A 1/29/2020    Procedure: APPLICATION, WOUND VAC;  Surgeon: Ino Schmitt MD;  Location: [facility] OR [address] [place] [address];  Service: Cardiovascular;  Laterality: N/A;    DRAINAGE OF PLEURAL EFFUSION Left 1/24/2020    Procedure: DRAINAGE, PLEURAL EFFUSION;  Surgeon: Ino Schmitt MD;  [facility];  Service: Cardiovascular;  Laterality: Left;  500 ml drainage    INSERTION OF GRAFT TO PERICARDIUM N/A 1/24/2020    Procedure: INSERTION, GRAFT, PERICARDIUM;  Surgeon: Ino Schmitt MD;  [facility];  Service: Cardiovascular;  Laterality: N/A;  Prevena    IRRIGATION OF MEDIASTINUM N/A 1/27/2020    Procedure: IRRIGATION, MEDIASTINUM;  Surgeon: Ino Shcmitt MD;  [facility];  Service: Cardiovascular;  Laterality: N/A;    LEFT VENTRICULAR ASSIST DEVICE Left  1/23/2020    Procedure: INSERTION-LEFT VENTRICULAR ASSIST DEVICE;  Surgeon: Ino Schmitt MD;  [facility];  Service: Cardiovascular;  Laterality: Left;  DT HM3    RIGHT HEART CATHETERIZATION Right 1/16/2020    Procedure: INSERTION, CATHETER, RIGHT HEART;  Surgeon: Isiah Montero MD;  Location: [facility] CATH LAB;  Service: Cardiology;  Laterality: Right;    STERNAL WOUND CLOSURE N/A 1/24/2020    Procedure: CLOSURE, WOUND, STERNUM;  Surgeon: Ino Schmitt MD;  [facility];  Service: Cardiovascular;  Laterality: N/A;    STERNAL WOUND CLOSURE  1/24/2020    Procedure: CLOSURE, WOUND, STERNUM;  Surgeon: Ino Schmitt MD;  [address]  Service: Cardiovascular;;  Temporary closure    STERNAL WOUND CLOSURE N/A 1/29/2020    Procedure: CLOSURE, WOUND, STERNUM;  Surgeon: Ino Schmitt MD;  Location: [address];  Service: Cardiovascular;  Laterality: N/A;    TONSILLECTOMY      VEIN LIGATION AND STRIPPING      WOUND EXPLORATION N/A 1/24/2020    Procedure: EXPLORATION, WOUND;  Surgeon: Ino Schmitt MD;  Location: [address];  Service: Cardiovascular;  Laterality: N/A;  Emergency exploration       Time Tracking:     OT Date of Treatment: 03/20/20  OT Start Time: 0935  OT Stop Time: 0955  OT Total Time (min): 20 min    Billable Minutes:Evaluation 20    Leslee Terrell OT  3/20/2020

## 2020-03-20 NOTE — PROGRESS NOTES
RT LVAD dressing changed using betadine and saline. Small amount of drainage noted on dressing and site pink.-2.

## 2020-03-20 NOTE — NURSING
Pt DC'd via wheelchair to home. NAD. All IV's and tele removed. All belongings accounted for. DC instructions reviewed with pt and Rx provided. VAD equipment inventoried.

## 2020-03-20 NOTE — ASSESSMENT & PLAN NOTE
- admitted for wheezing. 99% O2 sats on RA. Dry cough improving. CXR reviewed with no acute issues.   - started on salmeterol BID with resolution of wheezing. No other signs/symptoms of infection  - d/c today with salmeterol inhaler

## 2020-03-20 NOTE — SUBJECTIVE & OBJECTIVE
Interval History: No complaints, no events overnight. Feels generally well. Denies NVD, SOB, Chest pain. Wheezing has greatly improved, no SOB. Angry with coumadin clinic. Cough is improving, wants to go home.     Scheduled Meds:   amiodarone  400 mg Oral Daily    aspirin  325 mg Oral Daily    atorvastatin  80 mg Oral QHS    digoxin  0.125 mg Oral Daily    ferrous sulfate  325 mg Oral Daily    furosemide  20 mg Oral QHS    furosemide  40 mg Oral Daily    lisinopriL  10 mg Oral Daily    pantoprazole  40 mg Oral Daily    potassium chloride SA  20 mEq Oral BID    salmeteroL  1 puff Inhalation BID    warfarin  1 mg Oral Daily     PRN Meds:acetaminophen, HYDROcodone-acetaminophen, senna-docusate 8.6-50 mg    Review of patient's allergies indicates:   Allergen Reactions    Biopatch [chlorhexidine gluconate]      Site burning    Dobutamine      Tremors, SOB, flushing    Dobutamine in d5w      Tachycardia, tremors, SOB, flushing    Chlorhexidine Other (See Comments)     burning    Percocet [oxycodone-acetaminophen] Itching    Penicillins Rash     Cefepime given on 1/23/2020 without issue     Objective:     Vital Signs (Most Recent):  Temp: 97.8 °F (36.6 °C) (03/20/20 0818)  Pulse: 87 (03/20/20 0927)  Resp: 18 (03/20/20 0927)  BP: (!) 86/0 (03/20/20 0821)  SpO2: 99 % (03/20/20 0818) Vital Signs (24h Range):  Temp:  [97.5 °F (36.4 °C)-98.4 °F (36.9 °C)] 97.8 °F (36.6 °C)  Pulse:  [] 87  Resp:  [18] 18  SpO2:  [96 %-99 %] 99 %  BP: ()/(0-96) 86/0     Patient Vitals for the past 72 hrs (Last 3 readings):   Weight   03/20/20 0700 102.1 kg (225 lb 1.4 oz)   03/19/20 1420 103.7 kg (228 lb 9.9 oz)     Body mass index is 32.3 kg/m².      Intake/Output Summary (Last 24 hours) at 3/20/2020 0943  Last data filed at 3/20/2020 0900  Gross per 24 hour   Intake 480 ml   Output 925 ml   Net -445 ml       Hemodynamic Parameters:       Telemetry: reviewed    Physical Exam   Constitutional: He appears  well-developed and well-nourished. No distress.   HENT:   Head: Normocephalic and atraumatic.   Eyes: EOM are normal.   Neck: Normal range of motion. JVD present.   Cardiovascular: Normal rate and regular rhythm.   VAD hum smooth   Pulmonary/Chest: Effort normal and breath sounds normal. No stridor. No respiratory distress. He has no wheezes. He has no rales.   Abdominal: Soft. Bowel sounds are normal. He exhibits no distension.   Musculoskeletal: He exhibits edema.   Neurological: He is alert.   Skin: Skin is warm and dry. Capillary refill takes 2 to 3 seconds. He is not diaphoretic.   Psychiatric: He has a normal mood and affect. His behavior is normal. Judgment and thought content normal.   Nursing note and vitals reviewed.      Significant Labs:  CBC:  Recent Labs   Lab 03/19/20 0905 03/20/20  0533   WBC 6.17 5.52   RBC 4.05* 3.53*   HGB 10.7* 9.4*   HCT 36.8* 31.9*    221   MCV 91 90   MCH 26.4* 26.6*   MCHC 29.1* 29.5*     BNP:  Recent Labs   Lab 03/19/20  0905 03/20/20  0533   * 271*     CMP:  Recent Labs   Lab 03/19/20 0905 03/20/20  0533   * 88   CALCIUM 9.2 8.9   ALBUMIN 3.5 3.3*   PROT 7.0 6.4    140   K 4.1 3.8   CO2 29 27    102   BUN 17 17   CREATININE 1.6* 1.5*   ALKPHOS 82 71   ALT 18 19   AST 17 19   BILITOT 0.6 0.6      Coagulation:   Recent Labs   Lab 03/19/20 0905 03/20/20  0533   INR 3.9* 2.9*   APTT  --  36.5*     LDH:  Recent Labs   Lab 03/19/20 0905 03/20/20  0533    212     Microbiology:  Microbiology Results (last 7 days)     ** No results found for the last 168 hours. **          I have reviewed all pertinent labs within the past 24 hours.    Estimated Creatinine Clearance: 62.7 mL/min (A) (based on SCr of 1.5 mg/dL (H)).    Diagnostic Results:  I have reviewed all pertinent imaging results/findings within the past 24 hours.

## 2020-03-20 NOTE — PT/OT/SLP EVAL
Physical Therapy Evaluation and Discharge Note    Patient Name:  Tae Delgado   MRN:  [id number]    Recommendations:     Discharge Recommendations:  home health PT(resume)   Discharge Equipment Recommendations: none   Barriers to discharge: None    Assessment:     Tae Delgado is a 60 y.o. male admitted with a medical diagnosis of Hypertension. Pt completed functional mobility without physical assist or use of DME while maintaining sternal precautions. Ambulated greater than household distance with intermittent seated rest break x1 needed 2* SOB upon exertion; however, pt aware of energy conservation and breathing techniques.  At this time, patient is functioning at their prior level of function and does not require further acute PT services.     Recent Surgery: * No surgery found *      Plan:     During this hospitalization, patient does not require further acute PT services.  Please re-consult if situation changes.      Subjective     Chief Complaint: none noted   Patient/Family Comments/goals: Pt hopeful to d/c home today.  Pain/Comfort:  · Pain Rating 1: 0/10    Patients cultural, spiritual, Muslim conflicts given the current situation: no    Living Environment:  Pt lives with his SO in a 1st floor apt with 0 MELINDA.   Pt recently d/c from rehab following LVAD implantation 1/23/2020. Since return home, pt reports that he has been performing mobility, ADLs, and LVAD management (I). Pt was receiving HHPT PTA. Equipment used at home: bath bench, rollator, wheelchair, raised toilet  (was not using PTA). Upon discharge, patient will have assistance from SO.    Objective:     Communicated with RN prior to session.  Patient found seated EOB with telemetry, LVAD upon PT entry to room.    General Precautions: Standard, fall, LVAD, sternal   Orthopedic Precautions:N/A   Braces: N/A     Exams:  · Cognitive Exam:  Patient is oriented to Person, Place, Time and Situation  · Sensation:    · -       Intact  · Skin  Integrity/Edema:      · -       Edema: Moderate BLE  · RLE ROM: WFL  · RLE Strength: WFL  · LLE ROM: WFL  · LLE Strength: WFL    Functional Mobility:  · Transfers:     · Sit to Stand:  supervision with no AD, x1 rep from EOB and x1 rep from chair  · Cues for maintaining sternal precautions   · Gait: ~300 ft + ~100 ft with supervision and no AD; seated rest break between gait trials  · Mask donned prior to ambulation outside of room  · Emergency bag present throughout  · demo'd decreased neva and impaired weight-shifting ability   · Mild SOB noted upon exertion. Cues provided for breathing technique. SOB improved with seated rest.     AM-PAC 6 CLICK MOBILITY  Total Score:24       Therapeutic Activities and Exercises:  Pt educated on role of PT and PT POC, including plan to d/c IP PT services at this time. Pt instructed to contact medical team if therapy needs arise during hospital admission. Pt verbalized understanding.   Pt educated on the effects of bed rest and the importance of OOB activity throughout hospital admission. Pt encouraged to sit UIC majority of day as tolerated. Pt v/u.   Pt found on LVAD battery power with consolidation bag organized and donned. Remained on battery power at end of session.    AM-PAC 6 CLICK MOBILITY  Total Score:24     Patient left up in chair with all lines intact and call button in reach. RN notified.    GOALS:   Multidisciplinary Problems     Physical Therapy Goals     Not on file          Multidisciplinary Problems (Resolved)        Problem: Physical Therapy Goal    Goal Priority Disciplines Outcome Goal Variances Interventions   Physical Therapy Goal   (Resolved)     PT, PT/OT Met                     History:     Past Medical History:   Diagnosis Date    CHF (congestive heart failure) 1/2010    Dx  1/2010 w/ decreased LV systolic function (EF 15%) by ECHO 1/2015    COCM (congestive cardiomyopathy) 7/20/2016    Hyperlipidemia     Hypertension     Paroxysmal atrial  fibrillation     Pulmonary embolus 2008    Stroke     Superficial thrombophlebitis        Past Surgical History:   Procedure Laterality Date    APPLICATION OF WOUND VACUUM-ASSISTED CLOSURE DEVICE N/A 1/29/2020    Procedure: APPLICATION, WOUND VAC;  Surgeon: Ino Schmitt MD;  [facility];  Service: Cardiovascular;  Laterality: N/A;    DRAINAGE OF PLEURAL EFFUSION Left 1/24/2020    Procedure: DRAINAGE, PLEURAL EFFUSION;  Surgeon: Ino Schmitt MD;  [facility];  Service: Cardiovascular;  Laterality: Left;  500 ml drainage    INSERTION OF GRAFT TO PERICARDIUM N/A 1/24/2020    Procedure: INSERTION, GRAFT, PERICARDIUM;  Surgeon: Ino Schmitt MD;  [facility];  Service: Cardiovascular;  Laterality: N/A;  Prevena    IRRIGATION OF MEDIASTINUM N/A 1/27/2020    Procedure: IRRIGATION, MEDIASTINUM;  Surgeon: Ino Schmitt MD;  [facility];  Service: Cardiovascular;  Laterality: N/A;    LEFT VENTRICULAR ASSIST DEVICE Left 1/23/2020    Procedure: INSERTION-LEFT VENTRICULAR ASSIST DEVICE;  Surgeon: Ino Schmitt MD;  [facility];  Service: Cardiovascular;  Laterality: Left;  DT HM3    RIGHT HEART CATHETERIZATION Right 1/16/2020    Procedure: INSERTION, CATHETER, RIGHT HEART;  Surgeon: Isiah Montero MD;  Location: [facility] CATH LAB;  Service: Cardiology;  Laterality: Right;    STERNAL WOUND CLOSURE N/A 1/24/2020    Procedure: CLOSURE, WOUND, STERNUM;  Surgeon: Ino Schmitt MD;  Location: [address];  Service: Cardiovascular;  Laterality: N/A;    STERNAL WOUND CLOSURE  1/24/2020    Procedure: CLOSURE, WOUND, STERNUM;  Surgeon: Ino Schmitt MD;  Location: [facility] OR [address] [place] [address];  Service: Cardiovascular;;  Temporary closure    STERNAL WOUND CLOSURE N/A 1/29/2020    Procedure: CLOSURE, WOUND, STERNUM;  Surgeon: Ino Schmitt MD;  [facility];  Service: Cardiovascular;  Laterality: N/A;    TONSILLECTOMY      VEIN LIGATION AND  STRIPPING      WOUND EXPLORATION N/A 1/24/2020    Procedure: EXPLORATION, WOUND;  Surgeon: Ino Schmitt MD;  Location: [facility] OR [address] [place] [address];  Service: Cardiovascular;  Laterality: N/A;  Emergency exploration       Time Tracking:     PT Received On: 03/20/20  PT Start Time: 0939     PT Stop Time: 0955  PT Total Time (min): 16 min     Billable Minutes: Evaluation 16  (co-eval with OT)    Micki Priest, PT, DPT   3/20/2020  [phone]

## 2020-03-20 NOTE — PLAN OF CARE
Problem: Adult Inpatient Plan of Care  Goal: Plan of Care Review  Outcome: Ongoing, Progressing    Pt remained free from falls/trauma/injuries. No complaints of CP/SOB/discomfort. BP elevated throughout the night. Administered labetol IVP per MAR once. Fall bundle in place. POC explained, no questions at this time. Pt tolerating care.

## 2020-03-23 ENCOUNTER — PATIENT MESSAGE (OUTPATIENT)
Dept: TRANSPLANT | Facility: CLINIC | Age: 60
End: 2020-03-23

## 2020-03-23 ENCOUNTER — LAB VISIT (OUTPATIENT)
Dept: LAB | Facility: HOSPITAL | Age: 60
End: 2020-03-23
Attending: INTERNAL MEDICINE
Payer: OTHER GOVERNMENT

## 2020-03-23 DIAGNOSIS — I11.0 HYPERTENSIVE HEART DISEASE WITH CONGESTIVE HEART FAILURE: Primary | ICD-10-CM

## 2020-03-23 DIAGNOSIS — N18.30 STAGE 3 CHRONIC KIDNEY DISEASE: Primary | ICD-10-CM

## 2020-03-23 DIAGNOSIS — I10 ESSENTIAL HYPERTENSION: ICD-10-CM

## 2020-03-23 DIAGNOSIS — Z95.811 HEART REPLACED BY HEART ASSIST DEVICE: ICD-10-CM

## 2020-03-23 LAB
ALBUMIN SERPL BCP-MCNC: 3.8 G/DL (ref 3.5–5.2)
ALP SERPL-CCNC: 78 U/L (ref 55–135)
ALT SERPL W/O P-5'-P-CCNC: 20 U/L (ref 10–44)
ANION GAP SERPL CALC-SCNC: 11 MMOL/L (ref 8–16)
AST SERPL-CCNC: 18 U/L (ref 10–40)
BASOPHILS # BLD AUTO: 0.06 K/UL (ref 0–0.2)
BASOPHILS NFR BLD: 0.8 % (ref 0–1.9)
BILIRUB SERPL-MCNC: 0.9 MG/DL (ref 0.1–1)
BNP SERPL-MCNC: 265 PG/ML (ref 0–99)
BUN SERPL-MCNC: 15 MG/DL (ref 6–20)
CALCIUM SERPL-MCNC: 9.4 MG/DL (ref 8.7–10.5)
CHLORIDE SERPL-SCNC: 101 MMOL/L (ref 95–110)
CO2 SERPL-SCNC: 27 MMOL/L (ref 23–29)
CREAT SERPL-MCNC: 1.5 MG/DL (ref 0.5–1.4)
DIFFERENTIAL METHOD: ABNORMAL
EOSINOPHIL # BLD AUTO: 0.1 K/UL (ref 0–0.5)
EOSINOPHIL NFR BLD: 1.7 % (ref 0–8)
ERYTHROCYTE [DISTWIDTH] IN BLOOD BY AUTOMATED COUNT: 15.4 % (ref 11.5–14.5)
EST. GFR  (AFRICAN AMERICAN): 57.7 ML/MIN/1.73 M^2
EST. GFR  (NON AFRICAN AMERICAN): 49.9 ML/MIN/1.73 M^2
GLUCOSE SERPL-MCNC: 118 MG/DL (ref 70–110)
HCT VFR BLD AUTO: 38.4 % (ref 40–54)
HGB BLD-MCNC: 11.2 G/DL (ref 14–18)
IMM GRANULOCYTES # BLD AUTO: 0.05 K/UL (ref 0–0.04)
IMM GRANULOCYTES NFR BLD AUTO: 0.7 % (ref 0–0.5)
LYMPHOCYTES # BLD AUTO: 1.7 K/UL (ref 1–4.8)
LYMPHOCYTES NFR BLD: 22.9 % (ref 18–48)
MAGNESIUM SERPL-MCNC: 2 MG/DL (ref 1.6–2.6)
MCH RBC QN AUTO: 26.2 PG (ref 27–31)
MCHC RBC AUTO-ENTMCNC: 29.2 G/DL (ref 32–36)
MCV RBC AUTO: 90 FL (ref 82–98)
MONOCYTES # BLD AUTO: 0.5 K/UL (ref 0.3–1)
MONOCYTES NFR BLD: 6.8 % (ref 4–15)
NEUTROPHILS # BLD AUTO: 5 K/UL (ref 1.8–7.7)
NEUTROPHILS NFR BLD: 67.1 % (ref 38–73)
NRBC BLD-RTO: 0 /100 WBC
PLATELET # BLD AUTO: 298 K/UL (ref 150–350)
PMV BLD AUTO: 10.8 FL (ref 9.2–12.9)
POTASSIUM SERPL-SCNC: 4.2 MMOL/L (ref 3.5–5.1)
PROT SERPL-MCNC: 7.1 G/DL (ref 6–8.4)
RBC # BLD AUTO: 4.27 M/UL (ref 4.6–6.2)
SODIUM SERPL-SCNC: 139 MMOL/L (ref 136–145)
WBC # BLD AUTO: 7.46 K/UL (ref 3.9–12.7)

## 2020-03-23 PROCEDURE — 83880 ASSAY OF NATRIURETIC PEPTIDE: CPT

## 2020-03-23 PROCEDURE — 85025 COMPLETE CBC W/AUTO DIFF WBC: CPT

## 2020-03-23 PROCEDURE — 80053 COMPREHEN METABOLIC PANEL: CPT

## 2020-03-23 PROCEDURE — 83735 ASSAY OF MAGNESIUM: CPT

## 2020-03-23 RX ORDER — LISINOPRIL 10 MG/1
10 TABLET ORAL DAILY
Qty: 90 TABLET | Refills: 3 | Status: SHIPPED | OUTPATIENT
Start: 2020-03-23 | End: 2020-04-06 | Stop reason: SDUPTHER

## 2020-03-23 NOTE — PROGRESS NOTES
D/C -3/20/20-Admitted for productive cough for the last 4 days & actively wheezing.  Lisinopril added in clinic was continued with resolution of elevated MAPs. <80 after starting ACE. Wheezing completely resolved with salmeterol inhaler. Cough resolving on day of discharge. During his hospital stay his warfarin dose was held for one day. His INR today is 2.9. He was instructed to take warfarin 2mg on Tu/Th/Sat and 3mg on other days. A 90 day supply of medications was sent to his local pharmacy.   INR Wednesday 3/25 with appt.  Coumadin calendar updated per MAR.  Pt reports that was taking 2.5 mg daily, also noted in PharmD note.  I have reviewed the dose with Lavern, caregiver and asked that he return to the requested dose to 3 mg QD and 2 mg Tues/Thurs/Sat.  INR Tuesday per team.  Lavern also thinks HH will visit today, will try to get a STAT INR at visit.  Per  note:Zofia with [facility] ([id number]) who confirms pt is still current with [facility] (ph: [phone], f: [phone]).

## 2020-03-25 ENCOUNTER — CLINICAL SUPPORT (OUTPATIENT)
Dept: TRANSPLANT | Facility: CLINIC | Age: 60
End: 2020-03-25
Payer: MEDICARE

## 2020-03-25 ENCOUNTER — OFFICE VISIT (OUTPATIENT)
Dept: TRANSPLANT | Facility: CLINIC | Age: 60
End: 2020-03-25
Payer: MEDICARE

## 2020-03-25 ENCOUNTER — ANTI-COAG VISIT (OUTPATIENT)
Dept: CARDIOLOGY | Facility: CLINIC | Age: 60
End: 2020-03-25
Payer: MEDICARE

## 2020-03-25 ENCOUNTER — HOSPITAL ENCOUNTER (OUTPATIENT)
Dept: CARDIOLOGY | Facility: CLINIC | Age: 60
Discharge: HOME | End: 2020-03-25
Payer: MEDICARE

## 2020-03-25 VITALS
HEIGHT: 70 IN | WEIGHT: 225 LBS | TEMPERATURE: 98 F | BODY MASS INDEX: 32.21 KG/M2 | HEART RATE: 93 BPM | SYSTOLIC BLOOD PRESSURE: 88 MMHG

## 2020-03-25 DIAGNOSIS — I48.0 PAROXYSMAL ATRIAL FIBRILLATION: ICD-10-CM

## 2020-03-25 DIAGNOSIS — Z79.899 LONG TERM CURRENT USE OF AMIODARONE: ICD-10-CM

## 2020-03-25 DIAGNOSIS — Z79.01 LONG TERM (CURRENT) USE OF ANTICOAGULANTS: ICD-10-CM

## 2020-03-25 DIAGNOSIS — Z95.811 HEART REPLACED BY HEART ASSIST DEVICE: ICD-10-CM

## 2020-03-25 DIAGNOSIS — Z95.811 LVAD (LEFT VENTRICULAR ASSIST DEVICE) PRESENT: ICD-10-CM

## 2020-03-25 PROCEDURE — 99999 PR PBB SHADOW E&M-EST. PATIENT-LVL III: CPT | Mod: PBBFAC,,, | Performed by: INTERNAL MEDICINE

## 2020-03-25 PROCEDURE — 93750 PR INTERROGATE VENT ASSIST DEV, IN PERSON, W PHYSICIAN ANALYSIS: ICD-10-PCS | Mod: S$PBB,,, | Performed by: INTERNAL MEDICINE

## 2020-03-25 PROCEDURE — 93750 INTERROGATION VAD IN PERSON: CPT | Mod: PBBFAC | Performed by: INTERNAL MEDICINE

## 2020-03-25 PROCEDURE — 99213 OFFICE O/P EST LOW 20 MIN: CPT | Mod: PBBFAC,25 | Performed by: INTERNAL MEDICINE

## 2020-03-25 PROCEDURE — 93010 EKG 12-LEAD: ICD-10-PCS | Mod: S$PBB,,, | Performed by: INTERNAL MEDICINE

## 2020-03-25 PROCEDURE — 93010 ELECTROCARDIOGRAM REPORT: CPT | Mod: S$PBB,,, | Performed by: INTERNAL MEDICINE

## 2020-03-25 PROCEDURE — 93005 ELECTROCARDIOGRAM TRACING: CPT | Mod: PBBFAC | Performed by: INTERNAL MEDICINE

## 2020-03-25 PROCEDURE — 99214 OFFICE O/P EST MOD 30 MIN: CPT | Mod: S$PBB,,, | Performed by: INTERNAL MEDICINE

## 2020-03-25 PROCEDURE — 93750 INTERROGATION VAD IN PERSON: CPT | Mod: S$PBB,,, | Performed by: INTERNAL MEDICINE

## 2020-03-25 PROCEDURE — 99214 PR OFFICE/OUTPT VISIT, EST, LEVL IV, 30-39 MIN: ICD-10-PCS | Mod: S$PBB,,, | Performed by: INTERNAL MEDICINE

## 2020-03-25 PROCEDURE — 99999 PR PBB SHADOW E&M-EST. PATIENT-LVL III: ICD-10-PCS | Mod: PBBFAC,,, | Performed by: INTERNAL MEDICINE

## 2020-03-25 RX ORDER — AMIODARONE HYDROCHLORIDE 200 MG/1
200 TABLET ORAL DAILY
Qty: 30 TABLET | Refills: 12 | Status: SHIPPED | OUTPATIENT
Start: 2020-03-25 | End: 2020-06-16 | Stop reason: SDUPTHER

## 2020-03-25 NOTE — LETTER
March 25, 2020        Dipesh De La Torre  [address] [facility] [address] [place] [address]  Phone: [phone]  Fax: [phone]             [facility] [address] [place] [address]  Phone: [phone]   Patient: Tae Delgado   MR Number: [id number]   YOB: 1960   Date of Visit: 3/25/2020       Dear Dr. Dipesh De La Torre    Thank you for referring Tae Delgado to me for evaluation. Attached you will find relevant portions of my assessment and plan of care.    If you have questions, please do not hesitate to call me. I look forward to following Tae Delgado along with you.    Sincerely,    Isiah Montero MD    Enclosure    If you would like to receive this communication electronically, please contact [email or web address] or [phone] to request [employer] access.    [employer] is a tool which provides read-only access to select patient information with whom you have a relationship. Its easy to use and provides real time access to review your patients record including encounter summaries, notes, results, and demographic information.    If you feel you have received this communication in error or would no longer like to receive these types of communications, please e-mail [email or web address]

## 2020-03-25 NOTE — PROCEDURES
TXP KLEVER INTERROGATIONS 3/25/2020 3/20/2020 3/19/2020 3/12/2020 3/4/2020 2/28/2020 2/17/2020   Type HeartMate3 - HeartMate3 HeartMate3 HeartMate3 HeartMate3 -   Flow 3.6 - 3.7 4.1 3.9 4.3 -   Speed 5300 - 5300 5300 5300 5300 -   PI 7.5 - 7.3 6.7 5.4 3.9 -   Power (Berry) 3.9 - 3.9 3.9 3.8 3.8 -   LSL 4900 - 4900 4900 4900 4900 -   Pulsatility Pulse Pulse Pulse No Pulse Pulse Pulse Intermittent pulse   }

## 2020-03-25 NOTE — PROGRESS NOTES
Date of Implant with Heartmate 3 LVAD: 20    PATIENT ARRIVED IN CLINIC:  Ambulatory   Accompanied by: no    Vitals  Temperature, oral:   Temp Readings from Last 1 Encounters:   20 97.9 °F (36.6 °C) (Oral)     Blood Pressure:   BP Readings from Last 3 Encounters:   20 (!) 88/0   20 100/70   20 (!) 100/0        VAD Interrogation:  TXP KLEVER INTERROGATIONS 3/25/2020 3/20/2020 3/19/2020   Type HeartMate3 - HeartMate3   Flow 3.6 - 3.7   Speed 5300 - 5300   PI 7.5 - 7.3   Power (Berry) 3.9 - 3.9   LSL - - 4900   Pulsatility Pulse Pulse Pulse       History Lo.c3e  Problems / Issues / Alarms with VAD if any: None noted  VAD Sounds: HM3 Smooth  Heartmate 3 Module Cable:  No yellow exposed and Attempted to unscrew modular cable to ensure it will be able to come lose in the event we ever need to change the modular cable while patient held the driveline in place so it would not move. Modular cable connection able to be unscrewed and re-tightened. Instructed pt to perform this weekly.    HCT:   Lab Results   Component Value Date    HCT 39.0 (L) 2020    HCT 25 (L) 2020       Complaints/reason for visit today: routine  Emergency Equipment With Patient: yes   VAD Binder With Patient: yes   Reviewed VAD Numbers In Binder: no    Any Equipment Issues: None noted (Refer to  note for complete details)    DLES Assessment:  Appearance Of Driveline: 2 with blood, recent mechanical trauma  Antibiotics: NO  Velour: no  Manual & Visual Inspection Of Driveline: No kinks or tears noted  Stabilization Device In Use: yes, giles securement device      Patient MyChart Questionnaire: No flowsheet data found.     Assessment:   PAIN: NO  Complaints Of Nausea / Vomiting: None noted    Appearance and Frequency Of Stools: normal and formed without blood & daily  Color Of Urine: clear/yellow  Coping/Depression/Anxiety: coping okay, annoyed with coumadin clinic  Sleep Habits: 7 hrs  /night  Sleep Aids: None noted  Showering: No  Activity/Exercise: pt reports doing home exercising and does stair daily   Driving: No.    DLES Dressing Care:   Frequency of Dressing Changes: daily & daily kit  Pt In Need Of Management Kits?:no   It is medically necessary to have VAD management kits in order to prevent infection or to assist in the healing of an infected DLES.    Labs:    Chemistry        Component Value Date/Time     03/23/2020 1230    K 4.2 03/23/2020 1230     03/23/2020 1230    CO2 27 03/23/2020 1230    BUN 15 03/23/2020 1230    CREATININE 1.5 (H) 03/23/2020 1230     (H) 03/23/2020 1230        Component Value Date/Time    CALCIUM 9.4 03/23/2020 1230    ALKPHOS 78 03/23/2020 1230    AST 18 03/23/2020 1230    ALT 20 03/23/2020 1230    BILITOT 0.9 03/23/2020 1230    ESTGFRAFRICA 57.7 (A) 03/23/2020 1230    EGFRNONAA 49.9 (A) 03/23/2020 1230            Magnesium   Date Value Ref Range Status   03/23/2020 2.0 1.6 - 2.6 mg/dL Final       Lab Results   Component Value Date    WBC 10.09 03/25/2020    HGB 11.7 (L) 03/25/2020    HCT 39.0 (L) 03/25/2020    MCV 88 03/25/2020     03/25/2020       Lab Results   Component Value Date    INR 3.1 (H) 03/25/2020    INR 2.9 (H) 03/20/2020    INR 3.9 (H) 03/19/2020       BNP   Date Value Ref Range Status   03/23/2020 265 (H) 0 - 99 pg/mL Final     Comment:     Values of less than 100 pg/ml are consistent with non-CHF populations.   03/20/2020 271 (H) 0 - 99 pg/mL Final     Comment:     Values of less than 100 pg/ml are consistent with non-CHF populations.   03/19/2020 256 (H) 0 - 99 pg/mL Final     Comment:     Values of less than 100 pg/ml are consistent with non-CHF populations.       LD   Date Value Ref Range Status   03/25/2020 243 110 - 260 U/L Final     Comment:     Results are increased in hemolyzed samples.   03/20/2020 212 110 - 260 U/L Final     Comment:     Results are increased in hemolyzed samples.   03/19/2020 236 110 - 260 U/L  Final     Comment:     Results are increased in hemolyzed samples.       Labs reviewed with patient: YES      Patient Satisfaction Survey completed per patient: No  (explained about signature and box to check)  Medication reconciliation: per MA.  Medication Detail updated today: no  Coumadin Managed by: [facility]    Education: Reviewed driveline care, emergency procedures, how to change the controller, alarms with patient, as well as discussed how to page the VAD coordinator in case of an emergency.     Plans/Needs: PT doing very well, upset with coumadin clinic dosing.   Dr. Montero decreased amio dosage. Otherwise pt doing well.  Pt to RTC in 2 weeks per Dr. Montero request for echo and possible speed change if possible.  Please refer to MD note for details.     Hurricane Season: No

## 2020-03-25 NOTE — PROGRESS NOTES
Wife reports dose as 2.5mg daily despite being advised dose 3/23. She also reports held dose 3/24 as HH reported to her INR 3.9.  Advised to confirm dosing with Coumadin Clinic before any Coumadin adjustments are taken.  Will resume dose 2.5mg daily. Go OROZCO advised INR by . Per Nina (), INR 3/24 (3.7).

## 2020-03-25 NOTE — PROGRESS NOTES
Subjective:   Patient ID:  Tae Delgado is a 60 y.o. male who presents for LVAD followup visit.    Implant Date:1/23/2020     Heartmate 3 RPM 5300     INR goal: 2-3   Bridge with Heparin   Antiplatelets:      TXP KLEVER INTERROGATIONS 3/25/2020   Type HeartMate3   Flow 3.6   Speed 5300   PI 7.5   Power (Berry) 3.9   LSL 4900   Pulsatility Pulse       HPI   Mr. Delgado is a very pleasant 55 y.o. White male  With stage D HFrEF, NICMP diagnosed in 2010, ICD, LV thrombus (with prior splenic and renal emboli), Embolic CVA , paroxysmal atrial fib, HTN, HLP, s/p HM 3 1/17/2020 as DT with closure of AV and MV repair with post-op course complicated by RV failure. He went for wash out on 1/27/20 and his chest was closed on 1/29/20. Postoperatively he had paroxsymal atrial fib; he was started on Digoxin and anticoagulated with Coumadin. Renal function neptali acutely in the post op period but improved during his hospital stay.  Creatinine on day of discharge was 1.8. Right lower lobe pulm nodule found incidentally on CT: recommend repeat chest CT in 3 months and follow up in pulmonary clinic. Patient was discharged to rehab facility for further therapy. After his third LVAD clinic visit last week,  He was admitted for observation due to Worsening cough with  BP is significantly elevated with an INR that is supratherapeutic.  I recommend to admit him for observation.   His cough was associated with a sick grandchild so he was discharged the next day due to concerns of acquiring COVID 19 while in hospital.     He comes for his 4rd  Weekly clinic visit . VAD speed is at 5300 rpm. No VAD alarms noted on interrogation with occasional PI events. For the first time, he was able to walk 1200 ft twice a day this week .Cough has improved compared to last week as is it clear productive sputum (using mucinex)  Has no wheezing or BELTRAN.   Believes that this could be allergies as it is related to a blanket he and his grandchild both used. DLES  "is a "2 with blood (no PIC) . INR is therapeutic at 3.1. LDH is at baseline. Mild dependent edema which is worse as day goes alone.     Review of Systems   Constitution: Negative. Negative for chills, decreased appetite, diaphoresis, fever, malaise/fatigue, night sweats, weight gain and weight loss.   HENT: Positive for hoarse voice (improving as it gets worse as day goes on).    Eyes: Negative.    Cardiovascular: Positive for dyspnea on exertion (improving ). Negative for chest pain, claudication, cyanosis, irregular heartbeat, leg swelling, near-syncope, orthopnea, palpitations, paroxysmal nocturnal dyspnea and syncope.   Respiratory: Negative for hemoptysis and shortness of breath.    Endocrine: Negative.    Hematologic/Lymphatic: Negative.    Skin: Negative for color change, dry skin and nail changes.   Musculoskeletal: Negative.    Gastrointestinal: Negative.    Genitourinary: Negative.    Neurological: Negative for weakness.       Objective:     Doppler: 88 (pustatile)  MAP 77    Physical Exam   Constitutional: He is oriented to person, place, and time. He appears well-developed and well-nourished. He is active. He is not intubated.   BP (!) 88/0 (BP Location: Right arm, Patient Position: Sitting) Comment (BP Method): doppler  Pulse 93   Temp 97.9 °F (36.6 °C) (Oral)   Ht 5' 10" (1.778 m)   Wt 102.1 kg (225 lb)   BMI 32.28 kg/m²      HENT:   Head: Normocephalic and atraumatic. Hair is normal.   Right Ear: External ear normal.   Left Ear: External ear normal.   Nose: Nose normal. No nasal deformity. No epistaxis.  No foreign bodies.   Mouth/Throat: Mucous membranes are normal. Mucous membranes are not cyanotic. No oropharyngeal exudate.   Eyes: Pupils are equal, round, and reactive to light. Conjunctivae and EOM are normal.   Neck: Neck supple. JVD (JVP 7 ) present. No hepatojugular reflux present.   Cardiovascular: Normal rate, regular rhythm, normal heart sounds and normal pulses. Exam reveals no gallop. "   Pulmonary/Chest: Effort normal and breath sounds normal. No apnea and no tachypnea. He is not intubated. No respiratory distress. He exhibits no tenderness.   Abdominal: Soft. Normal appearance and bowel sounds are normal. There is no tenderness. No hernia.   Musculoskeletal: Normal range of motion.   Neurological: He is alert and oriented to person, place, and time. He displays no seizure activity.   Skin: Skin is warm, dry and intact. No rash noted. No pallor.   Psychiatric: He has a normal mood and affect. His speech is normal and behavior is normal. Thought content normal. Cognition and memory are normal.       Lab Results   Component Value Date    WBC 10.09 03/25/2020    HGB 11.7 (L) 03/25/2020    HCT 39.0 (L) 03/25/2020    MCV 88 03/25/2020     03/25/2020    CO2 27 03/23/2020    CREATININE 1.5 (H) 03/23/2020    CALCIUM 9.4 03/23/2020    ALBUMIN 3.8 03/23/2020    AST 18 03/23/2020     (H) 03/23/2020    ALT 20 03/23/2020     03/20/2020       Lab Results   Component Value Date    INR 3.1 (H) 03/25/2020    INR 2.9 (H) 03/20/2020    INR 3.9 (H) 03/19/2020       BNP   Date Value Ref Range Status   03/23/2020 265 (H) 0 - 99 pg/mL Final     Comment:     Values of less than 100 pg/ml are consistent with non-CHF populations.   03/20/2020 271 (H) 0 - 99 pg/mL Final     Comment:     Values of less than 100 pg/ml are consistent with non-CHF populations.   03/19/2020 256 (H) 0 - 99 pg/mL Final     Comment:     Values of less than 100 pg/ml are consistent with non-CHF populations.       LD   Date Value Ref Range Status   03/20/2020 212 110 - 260 U/L Final     Comment:     Results are increased in hemolyzed samples.   03/19/2020 236 110 - 260 U/L Final     Comment:     Results are increased in hemolyzed samples.   03/12/2020 225 110 - 260 U/L Final     Comment:     Results are increased in hemolyzed samples.       Assessment:      1. Heart replaced by heart assist device    2. Long term current use of  amiodarone        Plan:   Will decrease amiodarone to 200 daily for two months followed by stopping it if he remains in sinus as it is for PAF (notified coumadin clinic)    VAD interrogation was performed in clinic  CKD 3 - will check echo next visit to see if we can change speed to help creatinine   Any VAD management kits dispensed today medically necessary  Recommend 2 gram sodium restriction and 1500cc fluid restriction.  Encourage physical activity with graded exercise program.  Requested patient to weigh themselves daily, and to notify us if their weight increases by more than 3 lbs in 1 day or 5 lbs in 1 week.   Listed for transplant: No. Implanted as DT    UNOS Patient Status  Functional Status: 80% - Normal activity with effort: some symptoms of disease  Physical Capacity: No Limitations  Working for Income: No  If no, reason not working: Demands of Treatment

## 2020-03-27 LAB — INR PPP: 2.5

## 2020-03-30 ENCOUNTER — ANTI-COAG VISIT (OUTPATIENT)
Dept: CARDIOLOGY | Facility: CLINIC | Age: 60
End: 2020-03-30
Payer: MEDICARE

## 2020-03-30 ENCOUNTER — TELEPHONE (OUTPATIENT)
Dept: NEPHROLOGY | Facility: CLINIC | Age: 60
End: 2020-03-30

## 2020-03-30 DIAGNOSIS — Z95.811 LVAD (LEFT VENTRICULAR ASSIST DEVICE) PRESENT: ICD-10-CM

## 2020-03-30 DIAGNOSIS — I48.0 PAROXYSMAL ATRIAL FIBRILLATION: ICD-10-CM

## 2020-03-30 DIAGNOSIS — Z79.01 LONG TERM (CURRENT) USE OF ANTICOAGULANTS: ICD-10-CM

## 2020-03-30 PROCEDURE — 93793 ANTICOAG MGMT PT WARFARIN: CPT | Mod: ,,,

## 2020-03-30 PROCEDURE — 93793 PR ANTICOAGULANT MGMT FOR PT TAKING WARFARIN: ICD-10-PCS | Mod: ,,,

## 2020-03-31 NOTE — PROGRESS NOTES
Coverage information:     Subscriber: [id number] ELMIRA MORRISON     Rel to sub: 01 - Self     Member ID: [id number]     Payor: [id number] Ph: [phone]     Benefit [id number]     Group number: [id number]     Member effective dates: from 01/01/1     Coverage information:     Subscriber: [id number] ELMIRA MORRISON     Rel to sub: 01 - Self     Member ID: [id number]     Payor: [id number]-MEDICARE     Benefit plan: [id number]-MEDICARE PART A & B Ph: [phone]     Group number: Not given     Member effective dates: from 07/01/12      [address] [place] [id number]

## 2020-04-01 ENCOUNTER — LAB VISIT (OUTPATIENT)
Dept: LAB | Facility: HOSPITAL | Age: 60
End: 2020-04-01
Attending: INTERNAL MEDICINE
Payer: MEDICARE

## 2020-04-01 ENCOUNTER — ANTI-COAG VISIT (OUTPATIENT)
Dept: CARDIOLOGY | Facility: CLINIC | Age: 60
End: 2020-04-01
Payer: MEDICARE

## 2020-04-01 DIAGNOSIS — Z95.811 LVAD (LEFT VENTRICULAR ASSIST DEVICE) PRESENT: ICD-10-CM

## 2020-04-01 DIAGNOSIS — I48.0 PAROXYSMAL ATRIAL FIBRILLATION: ICD-10-CM

## 2020-04-01 DIAGNOSIS — Z79.01 LONG TERM (CURRENT) USE OF ANTICOAGULANTS: ICD-10-CM

## 2020-04-01 DIAGNOSIS — I11.0 HYPERTENSIVE HEART DISEASE WITH CONGESTIVE HEART FAILURE: Primary | ICD-10-CM

## 2020-04-01 LAB
ALBUMIN SERPL BCP-MCNC: 3.8 G/DL (ref 3.5–5.2)
ALP SERPL-CCNC: 68 U/L (ref 55–135)
ALT SERPL W/O P-5'-P-CCNC: 20 U/L (ref 10–44)
ANION GAP SERPL CALC-SCNC: 12 MMOL/L (ref 8–16)
AST SERPL-CCNC: 20 U/L (ref 10–40)
BASOPHILS # BLD AUTO: 0.08 K/UL (ref 0–0.2)
BASOPHILS NFR BLD: 1.1 % (ref 0–1.9)
BILIRUB SERPL-MCNC: 0.5 MG/DL (ref 0.1–1)
BNP SERPL-MCNC: 142 PG/ML (ref 0–99)
BUN SERPL-MCNC: 18 MG/DL (ref 6–20)
CALCIUM SERPL-MCNC: 9.5 MG/DL (ref 8.7–10.5)
CHLORIDE SERPL-SCNC: 102 MMOL/L (ref 95–110)
CO2 SERPL-SCNC: 28 MMOL/L (ref 23–29)
CREAT SERPL-MCNC: 1.5 MG/DL (ref 0.5–1.4)
DIFFERENTIAL METHOD: ABNORMAL
EOSINOPHIL # BLD AUTO: 0.1 K/UL (ref 0–0.5)
EOSINOPHIL NFR BLD: 1.8 % (ref 0–8)
ERYTHROCYTE [DISTWIDTH] IN BLOOD BY AUTOMATED COUNT: 15.1 % (ref 11.5–14.5)
EST. GFR  (AFRICAN AMERICAN): 57.7 ML/MIN/1.73 M^2
EST. GFR  (NON AFRICAN AMERICAN): 49.9 ML/MIN/1.73 M^2
GLUCOSE SERPL-MCNC: 112 MG/DL (ref 70–110)
HCT VFR BLD AUTO: 37.8 % (ref 40–54)
HGB BLD-MCNC: 11.2 G/DL (ref 14–18)
IMM GRANULOCYTES # BLD AUTO: 0.07 K/UL (ref 0–0.04)
IMM GRANULOCYTES NFR BLD AUTO: 1 % (ref 0–0.5)
INR PPP: 2.9 (ref 0.8–1.2)
LYMPHOCYTES # BLD AUTO: 1.9 K/UL (ref 1–4.8)
LYMPHOCYTES NFR BLD: 26.5 % (ref 18–48)
MAGNESIUM SERPL-MCNC: 1.9 MG/DL (ref 1.6–2.6)
MCH RBC QN AUTO: 26.9 PG (ref 27–31)
MCHC RBC AUTO-ENTMCNC: 29.6 G/DL (ref 32–36)
MCV RBC AUTO: 91 FL (ref 82–98)
MONOCYTES # BLD AUTO: 0.7 K/UL (ref 0.3–1)
MONOCYTES NFR BLD: 10 % (ref 4–15)
NEUTROPHILS # BLD AUTO: 4.4 K/UL (ref 1.8–7.7)
NEUTROPHILS NFR BLD: 59.6 % (ref 38–73)
NRBC BLD-RTO: 0 /100 WBC
PLATELET # BLD AUTO: 281 K/UL (ref 150–350)
PMV BLD AUTO: 10.6 FL (ref 9.2–12.9)
POTASSIUM SERPL-SCNC: 3.8 MMOL/L (ref 3.5–5.1)
PROT SERPL-MCNC: 7.2 G/DL (ref 6–8.4)
PROTHROMBIN TIME: 27 SEC (ref 9–12.5)
RBC # BLD AUTO: 4.17 M/UL (ref 4.6–6.2)
SODIUM SERPL-SCNC: 142 MMOL/L (ref 136–145)
WBC # BLD AUTO: 7.32 K/UL (ref 3.9–12.7)

## 2020-04-01 PROCEDURE — 93793 PR ANTICOAGULANT MGMT FOR PT TAKING WARFARIN: ICD-10-PCS | Mod: ,,,

## 2020-04-01 PROCEDURE — 83735 ASSAY OF MAGNESIUM: CPT

## 2020-04-01 PROCEDURE — 80053 COMPREHEN METABOLIC PANEL: CPT

## 2020-04-01 PROCEDURE — 85610 PROTHROMBIN TIME: CPT

## 2020-04-01 PROCEDURE — 83880 ASSAY OF NATRIURETIC PEPTIDE: CPT

## 2020-04-01 PROCEDURE — 93793 ANTICOAG MGMT PT WARFARIN: CPT | Mod: ,,,

## 2020-04-01 PROCEDURE — 85025 COMPLETE CBC W/AUTO DIFF WBC: CPT

## 2020-04-02 ENCOUNTER — TELEPHONE (OUTPATIENT)
Dept: NEPHROLOGY | Facility: HOSPITAL | Age: 60
End: 2020-04-02

## 2020-04-02 ENCOUNTER — PATIENT MESSAGE (OUTPATIENT)
Dept: OTOLARYNGOLOGY | Facility: CLINIC | Age: 60
End: 2020-04-02

## 2020-04-02 ENCOUNTER — OFFICE VISIT (OUTPATIENT)
Dept: OTOLARYNGOLOGY | Facility: CLINIC | Age: 60
End: 2020-04-02
Payer: MEDICARE

## 2020-04-02 ENCOUNTER — TELEPHONE (OUTPATIENT)
Dept: OTOLARYNGOLOGY | Facility: CLINIC | Age: 60
End: 2020-04-02

## 2020-04-02 DIAGNOSIS — J38.01 UNILATERAL COMPLETE VOCAL FOLD PARALYSIS: ICD-10-CM

## 2020-04-02 DIAGNOSIS — R49.0 DYSPHONIA: Primary | ICD-10-CM

## 2020-04-02 PROCEDURE — 99212 PR OFFICE/OUTPT VISIT, EST, LEVL II, 10-19 MIN: ICD-10-PCS | Mod: 95,,, | Performed by: OTOLARYNGOLOGY

## 2020-04-02 PROCEDURE — 99212 OFFICE O/P EST SF 10 MIN: CPT | Mod: 95,,, | Performed by: OTOLARYNGOLOGY

## 2020-04-02 RX ORDER — WARFARIN 2.5 MG/1
2.5 TABLET ORAL DAILY
Qty: 30 TABLET | Refills: 11 | Status: CANCELLED | OUTPATIENT
Start: 2020-04-02

## 2020-04-02 NOTE — PROGRESS NOTES
[facility] VIRTUAL VISIT  Department of Otorhinolaryngology and Communication Sciences    Real time audio and video employed  Patient location: [place]    Subjective:      Tae Delgado is a 60 y.o. male who presents for follow-up. He has left vocal fold immobility following LVAD placement January 23rd, as well as an intubation period from January 21st through February 1st.    Since his last visit, he reports his voice is getting better. It is very clear first thing in the am, but usually begins deteriorating around noon or so. He swallowing remains unimpaired. He started taking OTC medication for chest mucus. As the mucus as cleared up, his voice is improving. He is pleased with his progress.    Voice Handicap Index Total Score  3/13/2020   Voice Handicap Index Total Score 20     The patient's medications, allergies, past medical, surgical, social and family histories were reviewed and updated as appropriate.    A detailed review of systems was obtained with pertinent positives as per the above HPI, and otherwise negative.      Objective:     Video visit completed    Constitutional: comfortable  Psychiatric: appropriate affect  Respiratory: comfortably breathing, symmetric chest rise, no stridor  Voice: mild variable roughness/breathiness; mild interval improvement; MPT ~15 sec; impaired projection  Head: normocephalic  Eyes: conjunctivae and sclerae clear      Assessment:     Tae Delgado is a 60 y.o. male with a history of left vocal fold motion impairment, potentially recoverable, following LVAD placement in January 2020, as well as an approximately 10 day intubation period.     His symptoms remain mild and continue to improve.    Plan:     Reassurance was provided. I recommended no treatment at present.    He will follow up with me in about 1 month, or sooner if needed.    All questions were answered, and the patient is in agreement with the plan.    Andrea Qureshi M.D.  [facility]  [place]  Department of Otorhinolaryngology and Communication Sciences

## 2020-04-06 ENCOUNTER — CLINICAL SUPPORT (OUTPATIENT)
Dept: TRANSPLANT | Facility: CLINIC | Age: 60
End: 2020-04-06
Payer: MEDICARE

## 2020-04-06 ENCOUNTER — OFFICE VISIT (OUTPATIENT)
Dept: TRANSPLANT | Facility: CLINIC | Age: 60
End: 2020-04-06
Attending: INTERNAL MEDICINE
Payer: MEDICARE

## 2020-04-06 ENCOUNTER — HOSPITAL ENCOUNTER (OUTPATIENT)
Dept: CARDIOLOGY | Facility: CLINIC | Age: 60
Discharge: HOME | End: 2020-04-06
Attending: INTERNAL MEDICINE
Payer: MEDICARE

## 2020-04-06 ENCOUNTER — ANTI-COAG VISIT (OUTPATIENT)
Dept: CARDIOLOGY | Facility: CLINIC | Age: 60
End: 2020-04-06
Payer: MEDICARE

## 2020-04-06 ENCOUNTER — HOSPITAL ENCOUNTER (OUTPATIENT)
Dept: RADIOLOGY | Facility: HOSPITAL | Age: 60
Discharge: HOME | End: 2020-04-06
Attending: INTERNAL MEDICINE
Payer: MEDICARE

## 2020-04-06 VITALS
BODY MASS INDEX: 31.35 KG/M2 | TEMPERATURE: 98 F | HEART RATE: 76 BPM | WEIGHT: 219 LBS | SYSTOLIC BLOOD PRESSURE: 100 MMHG | HEIGHT: 70 IN

## 2020-04-06 VITALS — BODY MASS INDEX: 32.21 KG/M2 | HEART RATE: 91 BPM | HEIGHT: 70 IN | WEIGHT: 225 LBS

## 2020-04-06 DIAGNOSIS — Z79.899 LONG TERM CURRENT USE OF AMIODARONE: ICD-10-CM

## 2020-04-06 DIAGNOSIS — Z95.811 LVAD (LEFT VENTRICULAR ASSIST DEVICE) PRESENT: ICD-10-CM

## 2020-04-06 DIAGNOSIS — I50.22 CHRONIC SYSTOLIC CONGESTIVE HEART FAILURE: ICD-10-CM

## 2020-04-06 DIAGNOSIS — Z95.811 HEART REPLACED BY HEART ASSIST DEVICE: Primary | ICD-10-CM

## 2020-04-06 DIAGNOSIS — D50.9 IRON DEFICIENCY ANEMIA, UNSPECIFIED IRON DEFICIENCY ANEMIA TYPE: ICD-10-CM

## 2020-04-06 DIAGNOSIS — Z95.811 HEART REPLACED BY HEART ASSIST DEVICE: ICD-10-CM

## 2020-04-06 DIAGNOSIS — I10 ESSENTIAL HYPERTENSION: ICD-10-CM

## 2020-04-06 DIAGNOSIS — Z79.01 LONG TERM (CURRENT) USE OF ANTICOAGULANTS: ICD-10-CM

## 2020-04-06 DIAGNOSIS — R09.89 RALES: ICD-10-CM

## 2020-04-06 DIAGNOSIS — I48.0 PAROXYSMAL ATRIAL FIBRILLATION: ICD-10-CM

## 2020-04-06 DIAGNOSIS — Z95.810 ICD (IMPLANTABLE CARDIOVERTER-DEFIBRILLATOR) IN PLACE: ICD-10-CM

## 2020-04-06 LAB
ASCENDING AORTA: 3.44 CM
BSA FOR ECHO PROCEDURE: 2.24 M2
CV ECHO LV RWT: 0.2 CM
DOP CALC LVOT AREA: 4.3 CM2
DOP CALC LVOT DIAMETER: 2.35 CM
ECHO LV POSTERIOR WALL: 0.66 CM (ref 0.6–1.1)
FRACTIONAL SHORTENING: 10 % (ref 28–44)
INTERVENTRICULAR SEPTUM: 0.42 CM (ref 0.6–1.1)
LA MAJOR: 6.48 CM
LA MINOR: 6.49 CM
LA WIDTH: 5.23 CM
LEFT ATRIUM SIZE: 4.49 CM
LEFT ATRIUM VOLUME INDEX: 59 ML/M2
LEFT ATRIUM VOLUME: 129.44 CM3
LEFT INTERNAL DIMENSION IN SYSTOLE: 5.82 CM (ref 2.1–4)
LEFT VENTRICLE DIASTOLIC VOLUME INDEX: 91.63 ML/M2
LEFT VENTRICLE DIASTOLIC VOLUME: 201.07 ML
LEFT VENTRICLE MASS INDEX: 61 G/M2
LEFT VENTRICLE SYSTOLIC VOLUME INDEX: 76.5 ML/M2
LEFT VENTRICLE SYSTOLIC VOLUME: 167.95 ML
LEFT VENTRICULAR INTERNAL DIMENSION IN DIASTOLE: 6.5 CM (ref 3.5–6)
LEFT VENTRICULAR MASS: 134.46 G
MV PEAK E VEL: 1.25 M/S
PISA TR MAX VEL: 2.57 M/S
PULM VEIN S/D RATIO: 0.48
PV PEAK D VEL: 0.33 M/S
PV PEAK S VEL: 0.16 M/S
RA MAJOR: 6.03 CM
RA PRESSURE: 15 MMHG
RA WIDTH: 5.36 CM
RIGHT VENTRICULAR END-DIASTOLIC DIMENSION: 4.03 CM
RV TISSUE DOPPLER FREE WALL SYSTOLIC VELOCITY 1 (APICAL 4 CHAMBER VIEW): 5.92 CM/S
SINUS: 3.45 CM
STJ: 2.71 CM
TR MAX PG: 26 MMHG
TRICUSPID ANNULAR PLANE SYSTOLIC EXCURSION: 1.03 CM
TV REST PULMONARY ARTERY PRESSURE: 41 MMHG

## 2020-04-06 PROCEDURE — 99214 OFFICE O/P EST MOD 30 MIN: CPT | Mod: S$PBB,,, | Performed by: INTERNAL MEDICINE

## 2020-04-06 PROCEDURE — 71046 XR CHEST PA AND LATERAL: ICD-10-PCS | Mod: 26,,, | Performed by: RADIOLOGY

## 2020-04-06 PROCEDURE — 93306 ECHO (CUPID ONLY): ICD-10-PCS | Mod: 26,S$PBB,, | Performed by: INTERNAL MEDICINE

## 2020-04-06 PROCEDURE — 71046 X-RAY EXAM CHEST 2 VIEWS: CPT | Mod: TC,FY

## 2020-04-06 PROCEDURE — 99214 OFFICE O/P EST MOD 30 MIN: CPT | Mod: PBBFAC,25 | Performed by: INTERNAL MEDICINE

## 2020-04-06 PROCEDURE — 71046 X-RAY EXAM CHEST 2 VIEWS: CPT | Mod: 26,,, | Performed by: RADIOLOGY

## 2020-04-06 PROCEDURE — 93750 OP LVAD INTERROGATION: ICD-10-PCS | Mod: ,,, | Performed by: INTERNAL MEDICINE

## 2020-04-06 PROCEDURE — 93306 TTE W/DOPPLER COMPLETE: CPT | Mod: PBBFAC | Performed by: INTERNAL MEDICINE

## 2020-04-06 PROCEDURE — 99214 PR OFFICE/OUTPT VISIT, EST, LEVL IV, 30-39 MIN: ICD-10-PCS | Mod: S$PBB,,, | Performed by: INTERNAL MEDICINE

## 2020-04-06 PROCEDURE — 99999 PR PBB SHADOW E&M-EST. PATIENT-LVL IV: ICD-10-PCS | Mod: PBBFAC,,, | Performed by: INTERNAL MEDICINE

## 2020-04-06 PROCEDURE — 93750 INTERROGATION VAD IN PERSON: CPT | Mod: ,,, | Performed by: INTERNAL MEDICINE

## 2020-04-06 PROCEDURE — 99999 PR PBB SHADOW E&M-EST. PATIENT-LVL IV: CPT | Mod: PBBFAC,,, | Performed by: INTERNAL MEDICINE

## 2020-04-06 RX ORDER — LISINOPRIL 10 MG/1
10 TABLET ORAL 2 TIMES DAILY
Qty: 180 TABLET | Refills: 3 | Status: SHIPPED | OUTPATIENT
Start: 2020-04-06 | End: 2020-06-16 | Stop reason: SDUPTHER

## 2020-04-06 RX ORDER — FERROUS SULFATE 325(65) MG
TABLET ORAL
Qty: 30 TABLET | Refills: 0 | Status: SHIPPED | OUTPATIENT
Start: 2020-04-06 | End: 2020-05-08 | Stop reason: SDUPTHER

## 2020-04-06 RX ORDER — WARFARIN 1 MG/1
2.5 TABLET ORAL DAILY
Qty: 75 TABLET | Refills: 3 | Status: SHIPPED | OUTPATIENT
Start: 2020-04-06 | End: 2020-05-12

## 2020-04-06 NOTE — PROGRESS NOTES
Date of Implant with Heartmate 3 LVAD: 2020    PATIENT ARRIVED IN CLINIC:  Ambulatory   Accompanied by: None d/t COVID 19, wife in car    Vitals  Temperature, oral:   Temp Readings from Last 1 Encounters:   20 97.9 °F (36.6 °C) (Oral)     Blood Pressure:   BP Readings from Last 3 Encounters:   20 (!) 100/0   20 (!) 88/0   20 100/70        VAD Interrogation:  TXP KLEVER INTERROGATIONS 2020 3/25/2020 3/20/2020   Type HeartMate3 HeartMate3 -   Flow 3.3 3.6 -   Speed 5300 5300 -   PI 8.7 7.5 -   Power (Berry) 3.9 3.9 -   LSL 4900 4900 -   Pulsatility Pulse Pulse Pulse       History Lo.c3e  Problems / Issues / Alarms with VAD if any: None noted  VAD Sounds: HM3 Smooth  Heartmate 3 Module Cable:  No yellow exposed    HCT:   Lab Results   Component Value Date    HCT 40.3 2020    HCT 25 (L) 2020       Complaints/reason for visit today: routine  Emergency Equipment With Patient: yes   VAD Binder With Patient: no   Reviewed VAD Numbers In Binder: yes    Any Equipment Issues: None noted (Refer to  note for complete details)    DLES Assessment:  Appearance Of Driveline: 1-2 with slight bleeding that has improved.         Antibiotics: NO  Velour: no  Manual & Visual Inspection Of Driveline: No kinks or tears noted  Stabilization Device In Use: yes, giles securement device      Patient MyChart Questionnaire: No flowsheet data found.     Assessment:   PAIN: YES Location of Pain: shoulder pain , Description of Pain: 6-8  Complaints Of Nausea / Vomiting: None noted    Appearance and Frequency Of Stools: normal and formed without blood & daily  Color Of Urine: clear/yellow  Coping/Depression/Anxiety: coping okay  Sleep Habits: 7-8 hrs /night  Sleep Aids: None noted  Showering: No  Activity/Exercise: PT   Driving: No.    DLES Dressing Care:   Frequency of Dressing Changes: daily & soap and water dressing (Betadine and Water)  Pt In Need Of Management Kits?:no    It is medically necessary to have VAD management kits in order to prevent infection or to assist in the healing of an infected DLES.    Labs:    Chemistry        Component Value Date/Time     04/06/2020 1012    K 4.0 04/06/2020 1012     04/06/2020 1012    CO2 29 04/06/2020 1012    BUN 20 04/06/2020 1012    CREATININE 1.9 (H) 04/06/2020 1012     (H) 04/06/2020 1012        Component Value Date/Time    CALCIUM 9.7 04/06/2020 1012    ALKPHOS 70 04/06/2020 1012    AST 17 04/06/2020 1012    ALT 17 04/06/2020 1012    BILITOT 0.7 04/06/2020 1012    ESTGFRAFRICA 43.3 (A) 04/06/2020 1012    EGFRNONAA 37.5 (A) 04/06/2020 1012            Magnesium   Date Value Ref Range Status   04/06/2020 1.9 1.6 - 2.6 mg/dL Final       Lab Results   Component Value Date    WBC 6.73 04/06/2020    HGB 11.8 (L) 04/06/2020    HCT 40.3 04/06/2020    MCV 89 04/06/2020     04/06/2020       Lab Results   Component Value Date    INR 2.6 (H) 04/06/2020    INR 2.9 (H) 04/01/2020    INR 2.5 03/27/2020       BNP   Date Value Ref Range Status   04/06/2020 219 (H) 0 - 99 pg/mL Final     Comment:     Values of less than 100 pg/ml are consistent with non-CHF populations.   04/01/2020 142 (H) 0 - 99 pg/mL Final     Comment:     Values of less than 100 pg/ml are consistent with non-CHF populations.   03/25/2020 282 (H) 0 - 99 pg/mL Final     Comment:     Values of less than 100 pg/ml are consistent with non-CHF populations.       LD   Date Value Ref Range Status   04/06/2020 215 110 - 260 U/L Final     Comment:     Results are increased in hemolyzed samples.   03/25/2020 243 110 - 260 U/L Final     Comment:     Results are increased in hemolyzed samples.   03/20/2020 212 110 - 260 U/L Final     Comment:     Results are increased in hemolyzed samples.       Labs reviewed with patient: YES      Patient Satisfaction Survey completed per patient: No  (explained about signature and box to check)  Medication reconciliation: per  MA.  Medication Detail updated today: yes  Coumadin Managed by: [facility]    Education: Reviewed driveline care, emergency procedures, how to change the controller, alarms with patient, as well as discussed how to page the VAD coordinator in case of an emergency.     Plans/Needs: Patient seen in clinic for routine follow up with Dr. Pearson. Patient's BP elevated, lisinopril increased to 20 mg BID. Echo and Chest X-Ray completed and reviewed with patient by Dr. Pearson. Patient is obsessed and convinced that there is something wrong with his chest since he was at rehab and had a misstep.  Scans have been completed multiple times due to patient's concern that show no new issues. Patient continues to have bleeding at DLES but stated it is improving. Patient appears obsessed with finding an issue, attempted to reassure patient that being able to hear the pump at all times is normal, that the DLES will heal around the driveline, but patient then stated he feels like whenever he comes her he is told he is wrong. Explained that we are not telling him he is wrong but that we have done the scans and tests to show if there is an issue but that what he is describing is not uncommon and is actually normal. Patient calmed down and try to hear what coordinator was telling him. Explained that we are doing everything to assist him and that we hear his concerns and will continue to help him through this and trouble shoot. Patient verbalized understanding and appriciation for help. Patient to RTC in 2 weeks per Dr. Pearson, see provider note.     Hurricane Season: No

## 2020-04-06 NOTE — PROCEDURES
TXP KLEVER INTERROGATIONS 4/6/2020 3/25/2020 3/20/2020 3/19/2020 3/12/2020 3/4/2020 2/28/2020   Type HeartMate3 HeartMate3 - HeartMate3 HeartMate3 HeartMate3 HeartMate3   Flow 3.3 3.6 - 3.7 4.1 3.9 4.3   Speed 5300 5300 - 5300 5300 5300 5300   PI 8.7 7.5 - 7.3 6.7 5.4 3.9   Power (Berry) 3.9 3.9 - 3.9 3.9 3.8 3.8   LSL 4900 4900 - 4900 4900 4900 4900   Pulsatility Pulse Pulse Pulse Pulse No Pulse Pulse Pulse   }

## 2020-04-06 NOTE — LETTER
April 6, 2020        Dipesh De La Torre  [address] [facility] [place] [address]  Phone: [phone]  Fax: [phone]             [facility] [address] [place] [address]  Phone: [phone]   Patient: Tae Delgado   MR Number: [id number]   YOB: 1960   Date of Visit: 4/6/2020       Dear Dr. Dipesh De La Torre    Thank you for referring Tae Delgado to me for evaluation. Attached you will find relevant portions of my assessment and plan of care.    If you have questions, please do not hesitate to call me. I look forward to following Tae Delgado along with you.    Sincerely,    Elham Pearson MD    Enclosure    If you would like to receive this communication electronically, please contact [email or web address] or [phone] to request [employer] access.    [employer] is a tool which provides read-only access to select patient information with whom you have a relationship. Its easy to use and provides real time access to review your patients record including encounter summaries, notes, results, and demographic information.    If you feel you have received this communication in error or would no longer like to receive these types of communications, please e-mail [email or web address]

## 2020-04-06 NOTE — PROGRESS NOTES
"Subjective:   Patient ID:  Tae Delgado is a 60 y.o. male who presents for LVAD followup visit.      Implant Date:1/23/2020     Heartmate 3 RPM 5300     INR goal: 2-3   Bridge with Heparin   Antiplatelets:    Inotropes:n/a    TXP KLEVER INTERROGATIONS 4/6/2020   Type HeartMate3   Flow 3.3   Speed 5300   PI 8.7   Power (Berry) 3.9   LSL 4900   Pulsatility Pulse       HPI   Mr. Delgado is a very pleasant 55 y.o. White male  With stage D HFrEF, NICMP diagnosed in 2010, ICD, LV thrombus (with prior splenic and renal emboli), Embolic CVA , paroxysmal atrial fib, HTN, HLP, s/p HM 3 1/17/2020 as DT with closure of AV and MV repair with post-op course complicated by RV failure. He went for wash out on 1/27/20 and his chest was closed on 1/29/20. Postoperatively he had paroxsymal atrial fib; he was started on Digoxin and anticoagulated with Coumadin. Renal function neptali acutely in the post op period but improved during his hospital stay.  Creatinine on day of discharge was 1.8. Right lower lobe pulm nodule found incidentally on CT: recommend repeat chest CT in 3 months and follow up in pulmonary clinic. Patient was discharged to rehab facility for further therapy. After his third LVAD clinic (2 weeks ago), I admitted him for observation due to Worsening cough with  BP is significantly elevated with an INR that is supratherapeutic.  His BP improved with lisinopril and his INR came down. His cough was thought to be associated with a sick grandchild so he was discharged the next day due to concerns of acquiring COVID 19 while in hospital.  He was then seen for his 4th Weekly clinic visit by Dr. Montero and did well since. VAD speed is at 5300 rpm. No VAD alarms noted on interrogation occasional PI events . BP is elevated at 100 mm of Hg. DLES is a "1-2". INR is therapeutic at 2.6. LDH is 215 at baseline.     Review of Systems   Constitution: Negative. Negative for chills, decreased appetite, diaphoresis, fever, malaise/fatigue, " "night sweats, weight gain and weight loss.   Eyes: Negative.    Cardiovascular: Positive for dyspnea on exertion. Negative for chest pain, claudication, cyanosis, irregular heartbeat, leg swelling, near-syncope, orthopnea, palpitations, paroxysmal nocturnal dyspnea and syncope.   Respiratory: Negative for cough, hemoptysis and shortness of breath.    Endocrine: Negative.    Hematologic/Lymphatic: Negative.    Skin: Negative for color change, dry skin and nail changes.   Musculoskeletal: Negative.    Gastrointestinal: Negative.    Genitourinary: Negative.    Neurological: Negative for weakness.       Objective:   Blood pressure (!) 100/0, pulse 76, temperature 97.9 °F (36.6 °C), temperature source Oral, height 5' 10" (1.778 m), weight 99.3 kg (219 lb).body mass index is 31.42 kg/m².    Doppler: 100    Physical Exam   Constitutional: He appears well-developed.   BP (!) 100/0 (BP Location: Left arm, Patient Position: Sitting) Comment (BP Method): doppler  Pulse 76   Temp 97.9 °F (36.6 °C) (Oral)   Ht 5' 10" (1.778 m)   Wt 99.3 kg (219 lb)   BMI 31.42 kg/m²      HENT:   Head: Normocephalic.   Neck: No JVD present. Carotid bruit is not present.   Cardiovascular: Regular rhythm and normal heart sounds.   No murmur heard.  Smooth VAD hum, DLES is "1-2" with bleeding   Pulmonary/Chest: Effort normal and breath sounds normal. No respiratory distress. He has no wheezes.   Abdominal: Soft. Bowel sounds are normal. He exhibits no distension. There is no tenderness. There is no rebound.   Musculoskeletal: He exhibits no edema.   Neurological: He is alert.   Skin: Skin is warm.   Vitals reviewed.          Lab Results   Component Value Date    WBC 6.73 04/06/2020    HGB 11.8 (L) 04/06/2020    HCT 40.3 04/06/2020    MCV 89 04/06/2020     04/06/2020    CO2 29 04/06/2020    CREATININE 1.9 (H) 04/06/2020    CALCIUM 9.7 04/06/2020    ALBUMIN 3.9 04/06/2020    AST 17 04/06/2020     (H) 04/06/2020    ALT 17 04/06/2020 "     04/06/2020       Lab Results   Component Value Date    INR 2.6 (H) 04/06/2020    INR 2.9 (H) 04/01/2020    INR 2.5 03/27/2020       BNP   Date Value Ref Range Status   04/06/2020 219 (H) 0 - 99 pg/mL Final     Comment:     Values of less than 100 pg/ml are consistent with non-CHF populations.   04/01/2020 142 (H) 0 - 99 pg/mL Final     Comment:     Values of less than 100 pg/ml are consistent with non-CHF populations.   03/25/2020 282 (H) 0 - 99 pg/mL Final     Comment:     Values of less than 100 pg/ml are consistent with non-CHF populations.       LD   Date Value Ref Range Status   04/06/2020 215 110 - 260 U/L Final     Comment:     Results are increased in hemolyzed samples.   03/25/2020 243 110 - 260 U/L Final     Comment:     Results are increased in hemolyzed samples.   03/20/2020 212 110 - 260 U/L Final     Comment:     Results are increased in hemolyzed samples.       Assessment:      1. Heart replaced by heart assist device    2. Chronic systolic congestive heart failure    3. Essential hypertension    4. Long term current use of amiodarone    5. ICD (implantable cardioverter-defibrillator) in place        Plan:   Patient is now NYHA class II. BP is not controlled. Increase Lisinopril to 10 mg BID.  INR is therapeutic.  VAD interrogation was performed in clinic  Echo done today was reviewed.   Any VAD management kits dispensed today medically necessary  Recommend 2 gram sodium restriction and 1500cc fluid restriction.  Encourage physical activity with graded exercise program.  Requested patient to weigh themselves daily, and to notify us if their weight increases by more than 3 lbs in 1 day or 5 lbs in 1 week.   RTC in 2 weeks    Listed for transplant: No. Implanted as DT    UNOS Patient Status  Functional Status: 80% - Normal activity with effort: some symptoms of disease  Physical Capacity: No Limitations  Working for Income: No  If no, reason not working: Demands of Treatment    Elham Pearson  MD

## 2020-04-07 ENCOUNTER — DOCUMENT SCAN (OUTPATIENT)
Dept: HOME HEALTH SERVICES | Facility: HOSPITAL | Age: 60
End: 2020-04-07
Payer: MEDICARE

## 2020-04-13 ENCOUNTER — DOCUMENT SCAN (OUTPATIENT)
Dept: HOME HEALTH SERVICES | Facility: HOSPITAL | Age: 60
End: 2020-04-13
Payer: MEDICARE

## 2020-04-13 LAB
ALBUMIN: 4.3
BNP: 104
BUN BLD-MCNC: 24 MG/DL (ref 4–21)
CALCIUM SERPL-MCNC: 10.1 MG/DL
CHLORIDE: 101
CREAT SERPL-MCNC: 1.7 MG/DL
GLUCOSE: 109
HCT VFR BLD AUTO: 40 % (ref 41–53)
HGB BLD-MCNC: 12.2 G/DL (ref 13.5–17.5)
INR PPP: 2.4
MAGNESIUM SERPL-MCNC: 2.2 MG/DL (ref 1.6–2.4)
PLATELET COUNT: 271
POTASSIUM BLOOD: 3.9
PROTEIN TOTAL: 7.1
SODIUM BLOOD: 143
WBC: 6.1

## 2020-04-14 ENCOUNTER — ANTI-COAG VISIT (OUTPATIENT)
Dept: CARDIOLOGY | Facility: CLINIC | Age: 60
End: 2020-04-14
Payer: MEDICARE

## 2020-04-14 ENCOUNTER — TELEPHONE (OUTPATIENT)
Dept: TRANSPLANT | Facility: CLINIC | Age: 60
End: 2020-04-14

## 2020-04-14 DIAGNOSIS — Z79.01 LONG TERM (CURRENT) USE OF ANTICOAGULANTS: ICD-10-CM

## 2020-04-14 DIAGNOSIS — Z95.811 LVAD (LEFT VENTRICULAR ASSIST DEVICE) PRESENT: ICD-10-CM

## 2020-04-14 DIAGNOSIS — I48.0 PAROXYSMAL ATRIAL FIBRILLATION: ICD-10-CM

## 2020-04-14 PROCEDURE — 93793 ANTICOAG MGMT PT WARFARIN: CPT | Mod: ,,,

## 2020-04-14 PROCEDURE — 93793 PR ANTICOAGULANT MGMT FOR PT TAKING WARFARIN: ICD-10-PCS | Mod: ,,,

## 2020-04-14 RX ORDER — WARFARIN 2.5 MG/1
2.5 TABLET ORAL DAILY
Qty: 30 TABLET | Refills: 11 | Status: CANCELLED | OUTPATIENT
Start: 2020-04-02

## 2020-04-15 ENCOUNTER — DOCUMENT SCAN (OUTPATIENT)
Dept: HOME HEALTH SERVICES | Facility: HOSPITAL | Age: 60
End: 2020-04-15
Payer: MEDICARE

## 2020-04-20 ENCOUNTER — LAB VISIT (OUTPATIENT)
Dept: LAB | Facility: HOSPITAL | Age: 60
End: 2020-04-20
Attending: INTERNAL MEDICINE
Payer: MEDICARE

## 2020-04-20 ENCOUNTER — ANTI-COAG VISIT (OUTPATIENT)
Dept: CARDIOLOGY | Facility: CLINIC | Age: 60
End: 2020-04-20
Payer: MEDICARE

## 2020-04-20 DIAGNOSIS — I11.0 HYPERTENSIVE HEART DISEASE WITH CONGESTIVE HEART FAILURE: Primary | ICD-10-CM

## 2020-04-20 LAB
ALBUMIN SERPL BCP-MCNC: 3.9 G/DL (ref 3.5–5.2)
ALP SERPL-CCNC: 61 U/L (ref 55–135)
ALT SERPL W/O P-5'-P-CCNC: 32 U/L (ref 10–44)
ANION GAP SERPL CALC-SCNC: 13 MMOL/L (ref 8–16)
AST SERPL-CCNC: 29 U/L (ref 10–40)
BASOPHILS # BLD AUTO: 0.05 K/UL (ref 0–0.2)
BASOPHILS NFR BLD: 0.7 % (ref 0–1.9)
BILIRUB SERPL-MCNC: 0.6 MG/DL (ref 0.1–1)
BNP SERPL-MCNC: 156 PG/ML (ref 0–99)
BUN SERPL-MCNC: 24 MG/DL (ref 6–20)
CALCIUM SERPL-MCNC: 9.6 MG/DL (ref 8.7–10.5)
CHLORIDE SERPL-SCNC: 103 MMOL/L (ref 95–110)
CO2 SERPL-SCNC: 24 MMOL/L (ref 23–29)
CREAT SERPL-MCNC: 1.7 MG/DL (ref 0.5–1.4)
DIFFERENTIAL METHOD: ABNORMAL
EOSINOPHIL # BLD AUTO: 0.2 K/UL (ref 0–0.5)
EOSINOPHIL NFR BLD: 2.6 % (ref 0–8)
ERYTHROCYTE [DISTWIDTH] IN BLOOD BY AUTOMATED COUNT: 15.1 % (ref 11.5–14.5)
EST. GFR  (AFRICAN AMERICAN): 49.6 ML/MIN/1.73 M^2
EST. GFR  (NON AFRICAN AMERICAN): 42.9 ML/MIN/1.73 M^2
GLUCOSE SERPL-MCNC: 110 MG/DL (ref 70–110)
HCT VFR BLD AUTO: 41.3 % (ref 40–54)
HGB BLD-MCNC: 12.4 G/DL (ref 14–18)
IMM GRANULOCYTES # BLD AUTO: 0.06 K/UL (ref 0–0.04)
IMM GRANULOCYTES NFR BLD AUTO: 0.9 % (ref 0–0.5)
INR PPP: 2.4 (ref 0.8–1.2)
LYMPHOCYTES # BLD AUTO: 2 K/UL (ref 1–4.8)
LYMPHOCYTES NFR BLD: 28.4 % (ref 18–48)
MAGNESIUM SERPL-MCNC: 1.9 MG/DL (ref 1.6–2.6)
MCH RBC QN AUTO: 26.3 PG (ref 27–31)
MCHC RBC AUTO-ENTMCNC: 30 G/DL (ref 32–36)
MCV RBC AUTO: 88 FL (ref 82–98)
MONOCYTES # BLD AUTO: 0.7 K/UL (ref 0.3–1)
MONOCYTES NFR BLD: 9.8 % (ref 4–15)
NEUTROPHILS # BLD AUTO: 4.1 K/UL (ref 1.8–7.7)
NEUTROPHILS NFR BLD: 57.6 % (ref 38–73)
NRBC BLD-RTO: 0 /100 WBC
PLATELET # BLD AUTO: 256 K/UL (ref 150–350)
PMV BLD AUTO: 11.7 FL (ref 9.2–12.9)
POTASSIUM SERPL-SCNC: 3.9 MMOL/L (ref 3.5–5.1)
PROT SERPL-MCNC: 7.4 G/DL (ref 6–8.4)
PROTHROMBIN TIME: 22.7 SEC (ref 9–12.5)
RBC # BLD AUTO: 4.71 M/UL (ref 4.6–6.2)
SODIUM SERPL-SCNC: 140 MMOL/L (ref 136–145)
WBC # BLD AUTO: 7.03 K/UL (ref 3.9–12.7)

## 2020-04-20 PROCEDURE — 80053 COMPREHEN METABOLIC PANEL: CPT

## 2020-04-20 PROCEDURE — 93793 ANTICOAG MGMT PT WARFARIN: CPT | Mod: ,,,

## 2020-04-20 PROCEDURE — 83735 ASSAY OF MAGNESIUM: CPT

## 2020-04-20 PROCEDURE — 83880 ASSAY OF NATRIURETIC PEPTIDE: CPT

## 2020-04-20 PROCEDURE — 85025 COMPLETE CBC W/AUTO DIFF WBC: CPT

## 2020-04-20 PROCEDURE — 93793 PR ANTICOAGULANT MGMT FOR PT TAKING WARFARIN: ICD-10-PCS | Mod: ,,,

## 2020-04-20 PROCEDURE — 85610 PROTHROMBIN TIME: CPT

## 2020-04-20 NOTE — PROGRESS NOTES
INR at goal. Medications and chart reviewed. No changes noted to necessitate adjustment of warfarin or follow-up plan. See calendar.      
no suicidal ideation/no anxiety/no depression

## 2020-04-23 ENCOUNTER — CLINICAL SUPPORT (OUTPATIENT)
Dept: TRANSPLANT | Facility: CLINIC | Age: 60
End: 2020-04-23
Payer: MEDICARE

## 2020-04-23 ENCOUNTER — OFFICE VISIT (OUTPATIENT)
Dept: TRANSPLANT | Facility: CLINIC | Age: 60
End: 2020-04-23
Attending: INTERNAL MEDICINE
Payer: MEDICARE

## 2020-04-23 ENCOUNTER — ANTI-COAG VISIT (OUTPATIENT)
Dept: CARDIOLOGY | Facility: CLINIC | Age: 60
End: 2020-04-23
Payer: MEDICARE

## 2020-04-23 VITALS — HEIGHT: 70 IN | WEIGHT: 220 LBS | SYSTOLIC BLOOD PRESSURE: 89 MMHG | TEMPERATURE: 98 F | BODY MASS INDEX: 31.5 KG/M2

## 2020-04-23 DIAGNOSIS — Z79.01 LONG TERM (CURRENT) USE OF ANTICOAGULANTS: ICD-10-CM

## 2020-04-23 DIAGNOSIS — I42.0 COCM (CONGESTIVE CARDIOMYOPATHY): ICD-10-CM

## 2020-04-23 DIAGNOSIS — I50.42 CHRONIC COMBINED SYSTOLIC AND DIASTOLIC CONGESTIVE HEART FAILURE: ICD-10-CM

## 2020-04-23 DIAGNOSIS — Z91.89 AT RISK FOR AMIODARONE TOXICITY WITH LONG TERM USE: ICD-10-CM

## 2020-04-23 DIAGNOSIS — Z95.811 LVAD (LEFT VENTRICULAR ASSIST DEVICE) PRESENT: Primary | ICD-10-CM

## 2020-04-23 DIAGNOSIS — I47.29 NSVT (NONSUSTAINED VENTRICULAR TACHYCARDIA): ICD-10-CM

## 2020-04-23 DIAGNOSIS — Z79.899 LONG TERM CURRENT USE OF AMIODARONE: ICD-10-CM

## 2020-04-23 DIAGNOSIS — Z95.810 ICD (IMPLANTABLE CARDIOVERTER-DEFIBRILLATOR) IN PLACE: ICD-10-CM

## 2020-04-23 DIAGNOSIS — I11.0 HYPERTENSIVE HEART DISEASE WITH HEART FAILURE: ICD-10-CM

## 2020-04-23 DIAGNOSIS — Z79.899 AT RISK FOR AMIODARONE TOXICITY WITH LONG TERM USE: ICD-10-CM

## 2020-04-23 PROBLEM — N17.9 ACUTE KIDNEY INJURY SUPERIMPOSED ON CHRONIC KIDNEY DISEASE: Status: RESOLVED | Noted: 2020-01-09 | Resolved: 2020-04-23

## 2020-04-23 PROBLEM — N18.9 ACUTE KIDNEY INJURY SUPERIMPOSED ON CHRONIC KIDNEY DISEASE: Status: RESOLVED | Noted: 2020-01-09 | Resolved: 2020-04-23

## 2020-04-23 PROCEDURE — 99999 PR PBB SHADOW E&M-EST. PATIENT-LVL III: CPT | Mod: PBBFAC,,, | Performed by: INTERNAL MEDICINE

## 2020-04-23 PROCEDURE — 99999 PR PBB SHADOW E&M-EST. PATIENT-LVL I: ICD-10-PCS | Mod: PBBFAC,,,

## 2020-04-23 PROCEDURE — 99999 PR PBB SHADOW E&M-EST. PATIENT-LVL III: ICD-10-PCS | Mod: PBBFAC,,, | Performed by: INTERNAL MEDICINE

## 2020-04-23 PROCEDURE — 99214 PR OFFICE/OUTPT VISIT, EST, LEVL IV, 30-39 MIN: ICD-10-PCS | Mod: S$PBB,,, | Performed by: INTERNAL MEDICINE

## 2020-04-23 PROCEDURE — 99213 OFFICE O/P EST LOW 20 MIN: CPT | Mod: PBBFAC | Performed by: INTERNAL MEDICINE

## 2020-04-23 PROCEDURE — 99214 OFFICE O/P EST MOD 30 MIN: CPT | Mod: S$PBB,,, | Performed by: INTERNAL MEDICINE

## 2020-04-23 PROCEDURE — 93750 INTERROGATION VAD IN PERSON: CPT | Mod: ,,, | Performed by: INTERNAL MEDICINE

## 2020-04-23 PROCEDURE — 99211 OFF/OP EST MAY X REQ PHY/QHP: CPT | Mod: PBBFAC,27

## 2020-04-23 PROCEDURE — 99999 PR PBB SHADOW E&M-EST. PATIENT-LVL I: CPT | Mod: PBBFAC,,,

## 2020-04-23 PROCEDURE — 93750 OP LVAD INTERROGATION: ICD-10-PCS | Mod: ,,, | Performed by: INTERNAL MEDICINE

## 2020-04-23 NOTE — PROGRESS NOTES
Subjective:   Patient ID:  Tae Delgado is a 60 y.o. male who presents for LVAD followup visit.    Implant Date:1/23/2020  Initials:RCD     Heartmate 3 RPM 5300     INR goal: 2-3   Bridge with Heparin   Antiplatelets:      TXP KLEVER INTERROGATIONS 4/23/2020   Type HeartMate3   Flow 3.7   Speed 5300   PI 6.3   Power (Berry) 3.8   LSL 4900   Pulsatility Intermittent pulse   Interrogation of Ventricular assist device was performed with physician analysis of device parameters and review of device function. I have personally reviewed the interrogation findings and agree with findings as stated.     HPI   59 yo WM with stage D HFrEF, NICMP, ICD, LV thrombus (with prior splenic and renal emboli), embolic CVA , PAF, HTN, HLP underwent HM 3 implant 1/17/2020 as DT with closure of AV and MV repair.  His post-op course complicated by RV failure. He went for wash out on 1/27/20 and his chest was closed on 1/29/20. Postoperatively he had PAF and was started on Digoxin and anticoagulated with Coumadin. Renal function neptali acutely in the post op period but improved during his hospital stay.  Creatinine on day of discharge was 1.8. Right lower lobe pulm nodule found incidentally on CT: recommend repeat chest CT in 3 months and follow up in pulmonary clinic. Patient was discharged to rehab facility for further therapy. He is upset as reports therapist there lifted him with chest strap and since selling left side of chest near sternum below clavicle, vibration along right sternal border, can hear pump in ears, popping in both shoulders.  He was convinced his pump moved so today I spent a lot of time reviewing CXR's and his exam with him and assured him pump was fine, no sternal wires broken, sternum stable and hearing pump not unusual, vibration feeling right sternal border most likely due to flow through outflow graft but no pump issues and LDH is fine.    He continues to improve with strengthening, less BELTRAN, improve hoarseness  if gargles with listerine twice a day.  He is hoping to avoid vocal cord injections.  He also reports after amiodarone reduced from 400 mg to 200 mg less fluid retention and he feels much better.  Still feels that he needs PM dose of lasix 20 mg along with AM dose of 40 mg.  He is extremely appreciative of Aracelis Schmitt and [employer] and the great work that they did for him.    Review of Systems   Constitution: Negative for chills, fever, malaise/fatigue, night sweats, weight gain and weight loss.   HENT: Negative for congestion, hearing loss, hoarse voice, nosebleeds and sore throat.    Eyes: Negative for double vision, pain, vision loss in left eye and vision loss in right eye.   Cardiovascular: Positive for chest pain (chest wall soreness) and dyspnea on exertion. Negative for claudication, irregular heartbeat, leg swelling, near-syncope, orthopnea, palpitations, paroxysmal nocturnal dyspnea and syncope.   Respiratory: Negative for cough, hemoptysis, shortness of breath, sleep disturbances due to breathing, snoring, sputum production and wheezing.    Endocrine: Negative for cold intolerance, heat intolerance, polydipsia and polyuria.   Hematologic/Lymphatic: Negative for bleeding problem. Does not bruise/bleed easily.   Musculoskeletal: Positive for joint pain (shoulders popping). Negative for falls, muscle cramps, myalgias and neck pain.   Gastrointestinal: Negative for bloating, abdominal pain, change in bowel habit, constipation, diarrhea, hematochezia, jaundice, melena and nausea.   Genitourinary: Negative for bladder incontinence, dysuria, frequency, hematuria, hesitancy, incomplete emptying and urgency.   Neurological: Negative for brief paralysis, dizziness, focal weakness, headaches, numbness, paresthesias, seizures and weakness.   Psychiatric/Behavioral: Negative for depression and memory loss. The patient is not nervous/anxious.      Objective:   Blood pressure 97/80 with calc MAP 86 and doppler 89,  "temperature 97.9 °F (36.6 °C), temperature source Oral, height 5' 10" (1.778 m), weight 99.8 kg (220 lb).body mass index is 31.57 kg/m².  Physical Exam   Constitutional: He appears well-developed and well-nourished. No distress.   Blood pressure 97/80 with calc MAP 86 and doppler 89, temperature 97.9 °F (36.6 °C), temperature source Oral, height 5' 10" (1.778 m), weight 99.8 kg (220 lb).body mass index is 31.57 kg/m².   HENT:   Head: Normocephalic and atraumatic.   Eyes: Conjunctivae are normal. Right eye exhibits no discharge. Left eye exhibits no discharge. No scleral icterus.   Neck: JVD (2 fingers above clavicle sitting) present. No thyromegaly present.   Cardiovascular:   Normal VAD sounds; DL 2; sternum stable; no bruits   Pulmonary/Chest: Effort normal and breath sounds normal. No respiratory distress. He has no wheezes. He has no rales.   Sternum stable   Abdominal: Soft. Bowel sounds are normal. He exhibits no distension and no mass. There is no tenderness. There is no rebound and no guarding.   Musculoskeletal: He exhibits edema (trace ankles). He exhibits no tenderness.   Neurological: He is alert.   Skin: Skin is warm and dry. He is not diaphoretic.   Psychiatric: He has a normal mood and affect. His behavior is normal. Judgment and thought content normal.     Lab Results   Component Value Date     (H) 04/23/2020     04/23/2020    K 3.9 04/23/2020    MG 2.1 04/23/2020     04/23/2020     04/13/2020    CO2 28 04/23/2020    BUN 23 (H) 04/23/2020    BUN 24 (A) 04/13/2020    CREATININE 1.8 (H) 04/23/2020    CREATININE 1.7 04/13/2020     04/23/2020    HGBA1C 6.2 (H) 01/15/2020    AST 27 04/23/2020    ALT 31 04/23/2020    ALBUMIN 4.1 04/23/2020    ALBUMIN 4.3 04/13/2020    PROT 7.7 04/23/2020    BILITOT 0.7 04/23/2020    WBC 7.22 04/23/2020    WBC 6.1 04/13/2020    HGB 12.8 (L) 04/23/2020    HCT 42.0 04/23/2020    HCT 25 (L) 02/05/2020     04/23/2020    INR 2.4 (H) " 04/20/2020    INR 2.4 04/13/2020     04/23/2020    TSH 3.268 01/16/2020    CHOL 177 01/15/2020    HDL 46 01/15/2020    LDLCALC 105.2 01/15/2020    TRIG 129 01/15/2020     ECHO 4/6/2020 Conclusion   · LVAD present. Base speed is 5300 RPMs. The pump type is a Heartmate III.  · The interventricular septum appears midline. The aortic valve does not open.  · Severely decreased left ventricular systolic function. The estimated ejection fraction is 10%.  · Left ventricular diastolic dysfunction.  · Global hypokinetic wall motion.  · Normal right ventricular systolic function.  · Severe left atrial enlargement.  · Mild mitral regurgitation.  · Mild tricuspid regurgitation.  · The estimated PA systolic pressure is 41 mmHg.  · Pulmonary hypertension present.  · Elevated central venous pressure (15 mmHg).     Assessment:      1. LVAD (left ventricular assist device) present    2. Long term current use of amiodarone    3. Chronic combined systolic and diastolic congestive heart failure    4. Hypertensive heart disease with heart failure    5. COCM (congestive cardiomyopathy)    6. Long term (current) use of anticoagulants    7. At risk for amiodarone toxicity with long term use    8. NSVT (nonsustained ventricular tachycardia)    9. ICD (implantable cardioverter-defibrillator) in place        Plan:   Diuresis, wt loss, low sodium diet and fluid restriction reviewed along with daily weights and how to respond to 3# weight gain with prn diuretics.  If this fails to restore dry weight call us within 2-3 days.     JVP up on exam but feels great, BNP trending lower and overall looks good so elected to not alter diuretics.  He is heading home for first time post LVAD today so let's observe for now.    Supplies ordered are medically necessary for care of LVAD and DL    Patient is now NYHA II    Listed for transplant: No    UNOS Patient Status  Functional Status: 80% - Normal activity with effort: some symptoms of  disease  Physical Capacity: No Limitations  Working for Income: No  If no, reason not working: Disability

## 2020-04-23 NOTE — LETTER
April 23, 2020        Dipesh De La Torre  [address] [place] [address]  Phone: [phone]  Fax: [phone]             [facility] [address] [place] [address]  Phone: [phone]   Patient: Tae Delgado   MR Number: [id number]   YOB: 1960   Date of Visit: 4/23/2020       Dear Dr. Dipesh De La Torre    Thank you for referring Tae Delgado to me for evaluation. Attached you will find relevant portions of my assessment and plan of care.    If you have questions, please do not hesitate to call me. I look forward to following Tae Delgado along with you.    Sincerely,    Karson Bucio Jr, MD    Enclosure    If you would like to receive this communication electronically, please contact [email or web address] or [phone] to request [employer] Link access.    [employer] Link is a tool which provides read-only access to select patient information with whom you have a relationship. Its easy to use and provides real time access to review your patients record including encounter summaries, notes, results, and demographic information.    If you feel you have received this communication in error or would no longer like to receive these types of communications, please e-mail [email or web address]

## 2020-04-23 NOTE — PROCEDURES
TXP KLEVER INTERROGATIONS 4/23/2020 4/6/2020 3/25/2020 3/20/2020 3/19/2020 3/12/2020 3/4/2020   Type HeartMate3 HeartMate3 HeartMate3 - HeartMate3 HeartMate3 HeartMate3   Flow 3.7 3.3 3.6 - 3.7 4.1 3.9   Speed 5300 5300 5300 - 5300 5300 5300   PI 6.3 8.7 7.5 - 7.3 6.7 5.4   Power (Berry) 3.8 3.9 3.9 - 3.9 3.9 3.8   LSL 4900 4900 4900 - 4900 4900 4900   Pulsatility Intermittent pulse Pulse Pulse Pulse Pulse No Pulse Pulse   Interrogation of Ventricular assist device was performed with physician analysis of device parameters and review of device function. I have personally reviewed the interrogation findings and agree with findings as stated.

## 2020-04-23 NOTE — PROGRESS NOTES
Date of Implant with Heartmate 3 LVAD: 20    PATIENT ARRIVED IN CLINIC:  Ambulatory   Accompanied by: n/a due to COVID restrictions    Vitals  Temperature, oral:   Temp Readings from Last 1 Encounters:   20 97.9 °F (36.6 °C) (Oral)     Blood Pressure:   BP Readings from Last 3 Encounters:   20 (!) 89/0   20 (!) 100/0   20 (!) 88/0        VAD Interrogation:  TXP KLEVER INTERROGATIONS 2020 2020 3/25/2020   Type HeartMate3 HeartMate3 HeartMate3   Flow 3.7 3.3 3.6   Speed 5300 5300 5300   PI 6.3 8.7 7.5   Power (Berry) 3.8 3.9 3.9   LSL 4900 4900 4900   Pulsatility Intermittent pulse Pulse Pulse       History Lo.c3e  Problems / Issues / Alarms with VAD if any: some PI events noted  VAD Sounds: HM3 Smooth  Heartmate 3 Module Cable:  No yellow exposed and Attempted to unscrew modular cable to ensure it will be able to come lose in the event we ever need to change the modular cable while patient held the driveline in place so it would not move. Modular cable connection able to be unscrewed and re-tightened. Instructed pt to perform this weekly.    HCT:   Lab Results   Component Value Date    HCT 42.0 2020    HCT 25 (L) 2020       Complaints/reason for visit today: routine  Emergency Equipment With Patient: yes   VAD Binder With Patient: yes   Reviewed VAD Numbers In Binder: yes    Any Equipment Issues: None noted (Refer to  note for complete details)    DLES Assessment:  Appearance Of Driveline: 2 with blood, seems to be ongoing mechanical trauma.  Anchored but due to pt activity level seems to be recurrent trauma .  Pt re-educated and instructed to try double anchoring line and or using abdominal binder to help.    Antibiotics: NO  Velour: no  Manual & Visual Inspection Of Driveline: No kinks or tears noted  Stabilization Device In Use: yes, giles securement device      Patient MyChart Questionnaire: No flowsheet data found.     Assessment:  "  PAIN: NO  Complaints Of Nausea / Vomiting: None noted    Appearance and Frequency Of Stools: normal and formed without blood & daily  Color Of Urine: clear/yellow  Coping/Depression/Anxiety: coping okay and angry  - still upset about rehab and "messing me up" emotional support offered  Sleep Habits: 5-6 hrs /night  Sleep Aids: None noted  Showering: No  Activity/Exercise: pt reports being very active, doing home PT with home health.    Driving: Given permission to drive with MD approval and educated to pull over should there be an alarm before looking down at controller. - pt very hesitant to drive but knows he can    DLES Dressing Care:   Frequency of Dressing Changes: daily & betadine and saline method  Pt In Need Of Management Kits?:yes -   1 Box of soap and water dressing - with betadine and size 8 gloves  It is medically necessary to have VAD management kits in order to prevent infection or to assist in the healing of an infected DLES.    Labs:    Chemistry        Component Value Date/Time     04/23/2020 1009    K 3.9 04/23/2020 1009     04/23/2020 1009     04/13/2020    CO2 28 04/23/2020 1009    BUN 23 (H) 04/23/2020 1009    BUN 24 (A) 04/13/2020    CREATININE 1.8 (H) 04/23/2020 1009    CREATININE 1.7 04/13/2020     04/23/2020 1009        Component Value Date/Time    CALCIUM 9.9 04/23/2020 1009    CALCIUM 10.1 04/13/2020    ALKPHOS 72 04/23/2020 1009    AST 27 04/23/2020 1009    ALT 31 04/23/2020 1009    BILITOT 0.7 04/23/2020 1009    ESTGFRAFRICA 46.3 (A) 04/23/2020 1009    EGFRNONAA 40.0 (A) 04/23/2020 1009            Magnesium   Date Value Ref Range Status   04/23/2020 2.1 1.6 - 2.6 mg/dL Final       Lab Results   Component Value Date    WBC 7.22 04/23/2020    HGB 12.8 (L) 04/23/2020    HCT 42.0 04/23/2020    MCV 87 04/23/2020     04/23/2020       Lab Results   Component Value Date    INR 2.5 (H) 04/23/2020    INR 2.4 (H) 04/20/2020    INR 2.4 04/13/2020       BNP   Date " "Value Ref Range Status   04/23/2020 192 (H) 0 - 99 pg/mL Final     Comment:     Values of less than 100 pg/ml are consistent with non-CHF populations.   04/20/2020 156 (H) 0 - 99 pg/mL Final     Comment:     Values of less than 100 pg/ml are consistent with non-CHF populations.   04/06/2020 219 (H) 0 - 99 pg/mL Final     Comment:     Values of less than 100 pg/ml are consistent with non-CHF populations.       LD   Date Value Ref Range Status   04/23/2020 205 110 - 260 U/L Final     Comment:     Results are increased in hemolyzed samples.   04/06/2020 215 110 - 260 U/L Final     Comment:     Results are increased in hemolyzed samples.   03/25/2020 243 110 - 260 U/L Final     Comment:     Results are increased in hemolyzed samples.       Labs reviewed with patient: YES      Patient Satisfaction Survey completed per patient: No  (explained about signature and box to check)  Medication reconciliation: per MA.  Medication Detail updated today: yes  Coumadin Managed by: [facility]    Education: Reviewed driveline care, emergency procedures, how to change the controller, alarms with patient, as well as discussed how to page the VAD coordinator in case of an emergency.     Plans/Needs:  Pt overall ok, but see above for DLES issues. Pt reports feeling "much better" since amiodarone decrease per Dr. Montero.  Pt also reports ongoing concern for "vibrating feeling against chest since rehab incident".  Pt reports it has not lessened.  Dr. Bucio aware.  Pt would also like to cancel hh and start going to [employer] [phone].   Will cancel h/h today and forward info to coumadin clinic.  Pt also is moving back home to [place].  Aggie updated.  PT to RTC in 1 month, please refer to MD note.      Hurricane Season: No      "

## 2020-04-23 NOTE — PROGRESS NOTES
Pt ill be using [facility] lab - [phone], [phone]. As of 4/23/2020

## 2020-05-04 ENCOUNTER — DOCUMENT SCAN (OUTPATIENT)
Dept: HOME HEALTH SERVICES | Facility: HOSPITAL | Age: 60
End: 2020-05-04
Payer: MEDICARE

## 2020-05-07 ENCOUNTER — TELEPHONE (OUTPATIENT)
Dept: TRANSPLANT | Facility: CLINIC | Age: 60
End: 2020-05-07

## 2020-05-07 ENCOUNTER — PATIENT MESSAGE (OUTPATIENT)
Dept: TRANSPLANT | Facility: CLINIC | Age: 60
End: 2020-05-07

## 2020-05-07 NOTE — TELEPHONE ENCOUNTER
Patient Sent Message below.    Memorandum for record     SUBJECT:  Dizzy spell     On 6 May I was in the yard enjoying a great day when all of a sudden I had a dizzy spell. This was around 10:30 am. After about 2 minutes it went away and everything was fine. About an hour later I had another dizzy spell which lasted about 4 minutes and everything was fine. An hour or so after that I had a 3rd dizzy spell which became serious. As I was standing on the patio a dizzy spell hit me where the earth was being jerked out from under me. As I began to fall I was able to slide into my patio chair which prevented me from hitting the ground. I became physically incapacitated where I was totally unable to stand. After about 15 minutes the earth stopped spinning. I never lost consciousness nor did I lose my ability to speak. I brought this up to Dr Pearson at my last LVAD appointment. I did not think to check my blood pressure. However I fell it was due to a sudden drop in blood pressure.     The humming of the pump is still aggravating as well as the constant thumping in my chest. If I sleep slightly on my left side all goes quiet and I get to fall asleep. As of today I normally get between 3 to 4 hours of sleep before I wake up. Afterwards I go to the living room, turn on the TV then eventually fall back to sleep in the recliner for another 3 hours.     I'm reporting to you since this spell was the worst to date.     Attempted to contact patient first number I was not able to leave voicemail. Second number was actually wife's who instructed Coordinator to call patient but wife stated that patient fell, asked wife to confirm that patient fell into chair, wife said no patient actually fell to the ground. Asked wife if he hit anything. Wife said no and asked Coordinator to call patient not her. Attempted to contact patient twice. Unable to leave voicemail, upon second call phone rang and sent to [place]. Sent patient message through  my chart. Faxed orders for labs. Will try to contact patient.

## 2020-05-08 LAB — INR PPP: 2.23

## 2020-05-08 NOTE — PROGRESS NOTES
Mr Sandy reports that he will get an INR today 5/8.  He has not gone to lab this week.  I have asked him to call back and let us know if he needs to r/s until Monday 5/11.  I have faxed a standing order today.

## 2020-05-12 ENCOUNTER — ANTI-COAG VISIT (OUTPATIENT)
Dept: CARDIOLOGY | Facility: CLINIC | Age: 60
End: 2020-05-12
Payer: MEDICARE

## 2020-05-12 DIAGNOSIS — Z79.01 LONG TERM (CURRENT) USE OF ANTICOAGULANTS: ICD-10-CM

## 2020-05-12 DIAGNOSIS — I48.0 PAROXYSMAL ATRIAL FIBRILLATION: ICD-10-CM

## 2020-05-12 DIAGNOSIS — Z95.811 LVAD (LEFT VENTRICULAR ASSIST DEVICE) PRESENT: ICD-10-CM

## 2020-05-12 PROCEDURE — 93793 PR ANTICOAGULANT MGMT FOR PT TAKING WARFARIN: ICD-10-PCS | Mod: ,,,

## 2020-05-12 PROCEDURE — 93793 ANTICOAG MGMT PT WARFARIN: CPT | Mod: ,,,

## 2020-05-12 RX ORDER — WARFARIN 2.5 MG/1
2.5 TABLET ORAL DAILY
Qty: 40 TABLET | Refills: 5 | Status: SHIPPED | OUTPATIENT
Start: 2020-05-12 | End: 2020-06-17 | Stop reason: SDUPTHER

## 2020-05-12 NOTE — PROGRESS NOTES
New 2.5mg tablet Rx called in for patient. States he had INR 5/8. S/w Pham INR drawn 5/8 and will fax, unable to give verbal results

## 2020-05-21 NOTE — ASSESSMENT & PLAN NOTE
KRISTIN on CKD III  Baseline SCr ~ 2.0    60 yo male s/p LVAD placement on 1/23 who was taken back to OR for exploration over concerns for RV failure/tamponade due to elevated CVP and decrease UOP.  Upon opening of chest, he had improved hemodynamics and some improvement in UOP and decision made to leave chest open and transfer back to ICU for closer monitoring on 1/24.  He was administered IV lasix + diuril with improvement in his UOP.      DDx for KRISTIN includes ATN from renal hypoperfusion vs. CRS from RV overload    Plan/recommendations:  -no urgent need for RRT at this time. Renal function stable on Lasix. Adequate UOP.   -hypernatremic on AM (149) and azotemic ( ), Lasix drip adjusted.   -recommend BID dosing of IV Lasix  -continue trending RFP and UOP  -will follow labs closely  -will discuss with Dr. Gilmore    The plan was to restart thyroid since her levels were so abnormal. This could be the culprit in combination with stress.   We will start synthroid and repeat labs in 6-8 weeks.   We also discussed referral to derm if no improvement.   We can do that too as well if she would like so we don't have to wait and see if treating thyroid is going to help.

## 2020-05-27 ENCOUNTER — OFFICE VISIT (OUTPATIENT)
Dept: TRANSPLANT | Facility: CLINIC | Age: 60
End: 2020-05-27
Payer: MEDICARE

## 2020-05-27 ENCOUNTER — ANTI-COAG VISIT (OUTPATIENT)
Dept: CARDIOLOGY | Facility: CLINIC | Age: 60
End: 2020-05-27
Payer: MEDICARE

## 2020-05-27 ENCOUNTER — CLINICAL SUPPORT (OUTPATIENT)
Dept: TRANSPLANT | Facility: CLINIC | Age: 60
End: 2020-05-27
Payer: MEDICARE

## 2020-05-27 VITALS
HEIGHT: 72 IN | BODY MASS INDEX: 30.88 KG/M2 | WEIGHT: 228 LBS | HEART RATE: 118 BPM | TEMPERATURE: 98 F | SYSTOLIC BLOOD PRESSURE: 92 MMHG

## 2020-05-27 DIAGNOSIS — Z95.810 ICD (IMPLANTABLE CARDIOVERTER-DEFIBRILLATOR) IN PLACE: ICD-10-CM

## 2020-05-27 DIAGNOSIS — I48.0 PAROXYSMAL ATRIAL FIBRILLATION: ICD-10-CM

## 2020-05-27 DIAGNOSIS — Z79.01 LONG TERM (CURRENT) USE OF ANTICOAGULANTS: ICD-10-CM

## 2020-05-27 DIAGNOSIS — I50.42 CHRONIC COMBINED SYSTOLIC AND DIASTOLIC CONGESTIVE HEART FAILURE: ICD-10-CM

## 2020-05-27 DIAGNOSIS — I10 ESSENTIAL HYPERTENSION: ICD-10-CM

## 2020-05-27 DIAGNOSIS — Z95.811 LVAD (LEFT VENTRICULAR ASSIST DEVICE) PRESENT: ICD-10-CM

## 2020-05-27 DIAGNOSIS — Z79.899 LONG TERM CURRENT USE OF AMIODARONE: ICD-10-CM

## 2020-05-27 DIAGNOSIS — Z95.811 LVAD (LEFT VENTRICULAR ASSIST DEVICE) PRESENT: Primary | ICD-10-CM

## 2020-05-27 PROCEDURE — 99999 PR PBB SHADOW E&M-EST. PATIENT-LVL III: ICD-10-PCS | Mod: PBBFAC,,, | Performed by: INTERNAL MEDICINE

## 2020-05-27 PROCEDURE — 93750 INTERROGATION VAD IN PERSON: CPT | Mod: ,,, | Performed by: INTERNAL MEDICINE

## 2020-05-27 PROCEDURE — 99214 OFFICE O/P EST MOD 30 MIN: CPT | Mod: S$PBB,,, | Performed by: INTERNAL MEDICINE

## 2020-05-27 PROCEDURE — 99999 PR PBB SHADOW E&M-EST. PATIENT-LVL III: CPT | Mod: PBBFAC,,, | Performed by: INTERNAL MEDICINE

## 2020-05-27 PROCEDURE — 99214 PR OFFICE/OUTPT VISIT, EST, LEVL IV, 30-39 MIN: ICD-10-PCS | Mod: S$PBB,,, | Performed by: INTERNAL MEDICINE

## 2020-05-27 PROCEDURE — 93750 OP LVAD INTERROGATION: ICD-10-PCS | Mod: ,,, | Performed by: INTERNAL MEDICINE

## 2020-05-27 PROCEDURE — 99999 PR PBB SHADOW E&M-EST. PATIENT-LVL I: CPT | Mod: PBBFAC,,,

## 2020-05-27 PROCEDURE — 99211 OFF/OP EST MAY X REQ PHY/QHP: CPT | Mod: PBBFAC

## 2020-05-27 PROCEDURE — 99213 OFFICE O/P EST LOW 20 MIN: CPT | Mod: PBBFAC,27 | Performed by: INTERNAL MEDICINE

## 2020-05-27 PROCEDURE — 99999 PR PBB SHADOW E&M-EST. PATIENT-LVL I: ICD-10-PCS | Mod: PBBFAC,,,

## 2020-05-27 NOTE — PROCEDURES
TXP KLEVER INTERROGATIONS 5/27/2020 4/23/2020 4/6/2020 3/25/2020 3/20/2020 3/19/2020 3/12/2020   Type HeartMate3 HeartMate3 HeartMate3 HeartMate3 - HeartMate3 HeartMate3   Flow 3.9 3.7 3.3 3.6 - 3.7 4.1   Speed 5300 5300 5300 5300 - 5300 5300   PI 7.0 6.3 8.7 7.5 - 7.3 6.7   Power (Berry) 4.0 3.8 3.9 3.9 - 3.9 3.9   LSL 4900 4900 4900 4900 - 4900 4900   Pulsatility Pulse Intermittent pulse Pulse Pulse Pulse Pulse No Pulse   }

## 2020-05-27 NOTE — LETTER
May 27, 2020        Dipesh De La Torre  [address] [facility] [place] [address]  Phone: [phone]  Fax: [phone]             [facility] [address] [place] [address]  Phone: [phone]   Patient: Tae Delgado   MR Number: [id number]   YOB: 1960   Date of Visit: 5/27/2020       Dear Dr. Dipesh De La Torre    Thank you for referring Tae Delgado to me for evaluation. Attached you will find relevant portions of my assessment and plan of care.    If you have questions, please do not hesitate to call me. I look forward to following Tae Delgado along with you.    Sincerely,    Elham Pearson MD    Enclosure    If you would like to receive this communication electronically, please contact [email or web address] or [phone] to request [employer] Link access.    [employer] Link is a tool which provides read-only access to select patient information with whom you have a relationship. Its easy to use and provides real time access to review your patients record including encounter summaries, notes, results, and demographic information.    If you feel you have received this communication in error or would no longer like to receive these types of communications, please e-mail [email or web address]

## 2020-05-27 NOTE — PROGRESS NOTES
"Date of Implant with Heartmate 3 LVAD: 2020    PATIENT ARRIVED IN CLINIC:  Ambulatory   Accompanied by: self    Vitals  Temperature, oral:   Temp Readings from Last 1 Encounters:   20 97.9 °F (36.6 °C) (Oral)     Blood Pressure:   BP Readings from Last 3 Encounters:   20 (!) 92/0   20 (!) 89/0   20 (!) 100/0        VAD Interrogation:  TXP KLEVER INTERROGATIONS 2020   Type HeartMate3 HeartMate3 HeartMate3   Flow 3.9 3.7 3.3   Speed 5300 5300 5300   PI 7.0 6.3 8.7   Power (Berry) 4.0 3.8 3.9   LSL 4900 4900 4900   Pulsatility Pulse Intermittent pulse Pulse       History Lo.c3e  Problems / Issues / Alarms with VAD if any: None noted  VAD Sounds: HM3   Heartmate 3 Module Cable:  No yellow exposed and Attempted to unscrew modular cable to ensure it will be able to come lose in the event we ever need to change the modular cable while patient held the driveline in place so it would not move. Modular cable connection able to be unscrewed and re-tightened. Instructed pt to perform this weekly.    HCT:   Lab Results   Component Value Date    HCT 44.4 2020    HCT 25 (L) 2020       Complaints/reason for visit today: routine  Emergency Equipment With Patient: yes   VAD Binder With Patient: no   Reviewed VAD Numbers In Binder: no    Any Equipment Issues: None noted (Refer to  note for complete details)    DLES Assessment:  Appearance Of Driveline: "1.5".  Scant amount blood noted.  Pt reports his driveline looks like that a lot and it gets worse when he eats too much like last night. Driveline cultured today.  Dr. Pearson did not want to start abx until culture results.         Antibiotics: NO  Velour: no  Manual & Visual Inspection Of Driveline: No kinks or tears noted  Stabilization Device In Use: yes, giles securement device      Patient MyChart Questionnaire: No flowsheet data found.     Assessment:   PAIN: NO  Complaints Of Nausea / " Vomiting: None noted    Appearance and Frequency Of Stools: normal and formed without blood & daily  Color Of Urine: clear/yellow  Coping/Depression/Anxiety: coping okay  Sleep Habits: 3-4 hrs /night  Sleep Aids: None noted  Showering: No  Activity/Exercise: Taking naps and walking   Driving: No.    DLES Dressing Care:   Frequency of Dressing Changes: daily & betadine and water  Pt In Need Of Management Kits?:yes -   2 Box of betadine and water  It is medically necessary to have VAD management kits in order to prevent infection or to assist in the healing of an infected DLES.    Labs:    Chemistry        Component Value Date/Time     05/27/2020 0949    K 4.0 05/27/2020 0949     05/27/2020 0949     04/13/2020    CO2 28 05/27/2020 0949    BUN 29 (H) 05/27/2020 0949    BUN 24 (A) 04/13/2020    CREATININE 1.7 (H) 05/27/2020 0949    CREATININE 1.7 04/13/2020     05/27/2020 0949        Component Value Date/Time    CALCIUM 9.6 05/27/2020 0949    CALCIUM 10.1 04/13/2020    ALKPHOS 73 05/27/2020 0949    AST 27 05/27/2020 0949    ALT 37 05/27/2020 0949    BILITOT 0.7 05/27/2020 0949    ESTGFRAFRICA 49.6 (A) 05/27/2020 0949    EGFRNONAA 42.9 (A) 05/27/2020 0949            Magnesium   Date Value Ref Range Status   05/27/2020 2.2 1.6 - 2.6 mg/dL Final       Lab Results   Component Value Date    WBC 7.87 05/27/2020    HGB 13.3 (L) 05/27/2020    HCT 44.4 05/27/2020    MCV 88 05/27/2020     05/27/2020       Lab Results   Component Value Date    INR 1.9 (H) 05/27/2020    INR 2.23 05/08/2020    INR 2.5 (H) 04/23/2020       BNP   Date Value Ref Range Status   05/27/2020 206 (H) 0 - 99 pg/mL Final     Comment:     Values of less than 100 pg/ml are consistent with non-CHF populations.   04/23/2020 192 (H) 0 - 99 pg/mL Final     Comment:     Values of less than 100 pg/ml are consistent with non-CHF populations.   04/20/2020 156 (H) 0 - 99 pg/mL Final     Comment:     Values of less than 100 pg/ml are  consistent with non-CHF populations.       LD   Date Value Ref Range Status   05/27/2020 196 110 - 260 U/L Final     Comment:     Results are increased in hemolyzed samples.   04/23/2020 205 110 - 260 U/L Final     Comment:     Results are increased in hemolyzed samples.   04/06/2020 215 110 - 260 U/L Final     Comment:     Results are increased in hemolyzed samples.       Labs reviewed with patient: YES      Patient Satisfaction Survey completed per patient: No  (explained about signature and box to check)  Medication reconciliation: per MA.  Medication Detail updated today: no  Coumadin Managed by: [facility]    Education: Reviewed driveline care, emergency procedures, how to change the controller, alarms with patient, as well as discussed how to page the VAD coordinator in case of an emergency.   Coved - 19 education: Reminded patient/caregiver to check temperature daily and call us if it is > 99.0.  Reminded them  to stay 6 feet away from other people, wash hands frequently, don't touch your face and stay home except yo get labs, medications, and appts.      Plans/Needs: LVAD DLES cultured, reminder placed in epic.      Hurricane Season: Yes, discussed with patient: With hurricane season approaching, we want to make sure you are fully prepared for any emergency.  Should the National Weather Service or your local authorities recommend a voluntary or mandatory evacuation of your area, The VAD team requires you to evacuate to a safe place.  Remember, when it is a mandatory evacuation, traffic will become an issue for your limited battery power.  Therefore, we strongly urge you to evacuate early.    The VAD team advises you to have the following in place before hurricane season:  Have an evacuation plan in place including places to evacuate, names and phone numbers.  This information is required to be given to the VAD coordinator.   1. Have your VAD emergency contact numbers with you.   2. Make sure  your prescriptions will not run out by the end of September.   3. Make sure you have enough medications, including pills, inhalers, patches,   etc. to take, should you be gone for more than 2 weeks.   4. Make sure ALL of your batteries are fully charged.   5. Bring enough dressing change supplies to last for at least 2 weeks.   6. Bring your VAD binder with you.  Make sure your binder is updated and complete with alarms reference card, patient hand book, emergency contact numbers, daily log sheets, etc.  If you do not have family or friends as an evacuation destination, we recommend evacuating to a safe area.   Do NOT evacuate to [facility].    The VAD team wants to stress the importance of planning for your evacuation in the event of a hurricane.  If you have any LVAD questions or issues, please contact the LVAD coordinator.

## 2020-05-27 NOTE — PROGRESS NOTES
"Subjective:   Patient ID:  Tae Delgado is a 60 y.o. male who presents for LVAD followup visit.    Implant Date:1/23/2020       Heartmate 3 RPM 5300     INR goal: 2-3   Bridge with Heparin   Antiplatelets:      TXP KLEVER INTERROGATIONS 5/27/2020   Type HeartMate3   Flow 3.9   Speed 5300   PI 7.0   Power (Berry) 4.0   LSL 4900   Pulsatility Pulse       HPI   Mr. Delgado is a very pleasant 55 y.o. White male  With stage D HFrEF, NICMP diagnosed in 2010, ICD, LV thrombus (with prior splenic and renal emboli), Embolic CVA , paroxysmal atrial fib, HTN, HLP, s/p HM 3 1/17/2020 as DT with closure of AV and MV repair with post-op course complicated by RV failure. He went for wash out on 1/27/20 and his chest was closed on 1/29/20. Postoperatively he had paroxsymal atrial fib; he was started on Digoxin and anticoagulated with Coumadin. Renal function neptali acutely in the post op period but improved during his hospital stay.  Creatinine on day of discharge was 1.8. Right lower lobe pulm nodule found incidentally on CT: recommend repeat chest CT in 3 months and follow up in pulmonary clinic. Patient was discharged to rehab facility for further therapy. After his third LVAD clinic (2 weeks ago), I admitted him for observation due to Worsening cough with  BP is significantly elevated with an INR that is supratherapeutic.  His BP improved with lisinopril and his INR came down. His cough was thought to be associated with a sick grandchild so he was discharged the next day due to concerns of acquiring COVID 19 while in hospital.  Has done well since. Was last seen by Dr. Bucio. . Comes for his monthly VAD visit. Doing well. VAD speed is at 5300 rpm. No VAD alarms noted on interrogation occasional PI events . BP is 92 mm of Hg. DLES is a "1". INR is slightly subtherapeutic at 1.9. LDh is at baseline.     Review of Systems   Constitution: Negative. Negative for chills, decreased appetite, diaphoresis, fever, malaise/fatigue, night " "sweats, weight gain and weight loss.   Eyes: Negative.    Cardiovascular: Negative for chest pain, claudication, cyanosis, dyspnea on exertion, irregular heartbeat, leg swelling, near-syncope, orthopnea, palpitations, paroxysmal nocturnal dyspnea and syncope.   Respiratory: Negative for cough, hemoptysis and shortness of breath.    Endocrine: Negative.    Hematologic/Lymphatic: Negative.    Skin: Negative for color change, dry skin and nail changes.   Musculoskeletal: Negative.    Gastrointestinal: Negative.    Genitourinary: Negative.    Neurological: Negative for weakness.       Objective:   Blood pressure (!) 92/0, pulse (!) 118, temperature 97.9 °F (36.6 °C), temperature source Oral, height 6' (1.829 m), weight 103.4 kg (228 lb).body mass index is 30.92 kg/m².    Doppler: 92    Physical Exam   Constitutional: He appears well-developed.   BP (!) 92/0 (BP Location: Right arm, Patient Position: Sitting) Comment (BP Method): doppler  Pulse (!) 118   Temp 97.9 °F (36.6 °C) (Oral)   Ht 6' (1.829 m)   Wt 103.4 kg (228 lb)   BMI 30.92 kg/m²      HENT:   Head: Normocephalic.   Neck: No JVD present. Carotid bruit is not present.   Cardiovascular: Regular rhythm and normal heart sounds.   No murmur heard.  Smooth VAD hum. DLES is "1"   Pulmonary/Chest: Effort normal and breath sounds normal. No respiratory distress. He has no wheezes. He has no rales.   Abdominal: Soft. Bowel sounds are normal. He exhibits no distension. There is no tenderness. There is no rebound.   Musculoskeletal: He exhibits no edema.   Neurological: He is alert.   Skin: Skin is warm.   Vitals reviewed.      Lab Results   Component Value Date    WBC 7.87 05/27/2020    HGB 13.3 (L) 05/27/2020    HCT 44.4 05/27/2020    MCV 88 05/27/2020     05/27/2020    CO2 28 05/27/2020    CREATININE 1.7 (H) 05/27/2020    CALCIUM 9.6 05/27/2020    ALBUMIN 4.0 05/27/2020    AST 27 05/27/2020     (H) 05/27/2020    ALT 37 05/27/2020     " 05/27/2020       Lab Results   Component Value Date    INR 1.9 (H) 05/27/2020    INR 2.23 05/08/2020    INR 2.5 (H) 04/23/2020       BNP   Date Value Ref Range Status   05/27/2020 206 (H) 0 - 99 pg/mL Final     Comment:     Values of less than 100 pg/ml are consistent with non-CHF populations.   04/23/2020 192 (H) 0 - 99 pg/mL Final     Comment:     Values of less than 100 pg/ml are consistent with non-CHF populations.   04/20/2020 156 (H) 0 - 99 pg/mL Final     Comment:     Values of less than 100 pg/ml are consistent with non-CHF populations.       LD   Date Value Ref Range Status   05/27/2020 196 110 - 260 U/L Final     Comment:     Results are increased in hemolyzed samples.   04/23/2020 205 110 - 260 U/L Final     Comment:     Results are increased in hemolyzed samples.   04/06/2020 215 110 - 260 U/L Final     Comment:     Results are increased in hemolyzed samples.       Assessment:      1. LVAD (left ventricular assist device) present    2. Chronic combined systolic and diastolic congestive heart failure    3. Long term current use of amiodarone    4. ICD (implantable cardioverter-defibrillator) in place    5. Essential hypertension        Plan:   Patient is now NYHA class II. BP is controlled.  INR is slightly subtherapeutic. Coumadin clinic to adjust his dose.   VAD interrogation was performed in clinic  Any VAD management kits dispensed today medically necessary  Recommend 2 gram sodium restriction and 1500cc fluid restriction.  Encourage physical activity with graded exercise program.  Requested patient to weigh themselves daily, and to notify us if their weight increases by more than 3 lbs in 1 day or 5 lbs in 1 week.   RTC in 1 month     Listed for transplant: No. Implanted as DT.     UNOS Patient Status  Functional Status: 80% - Normal activity with effort: some symptoms of disease  Physical Capacity: No Limitations  Working for Income: No  If no, reason not working: Demands of Treatment    Elham Pearson  MD

## 2020-06-05 ENCOUNTER — ANTI-COAG VISIT (OUTPATIENT)
Dept: CARDIOLOGY | Facility: CLINIC | Age: 60
End: 2020-06-05

## 2020-06-05 ENCOUNTER — TELEPHONE (OUTPATIENT)
Dept: TRANSPLANT | Facility: CLINIC | Age: 60
End: 2020-06-05

## 2020-06-05 DIAGNOSIS — Z95.811 LVAD (LEFT VENTRICULAR ASSIST DEVICE) PRESENT: ICD-10-CM

## 2020-06-05 DIAGNOSIS — Z79.01 LONG TERM (CURRENT) USE OF ANTICOAGULANTS: ICD-10-CM

## 2020-06-05 DIAGNOSIS — I48.0 PAROXYSMAL ATRIAL FIBRILLATION: ICD-10-CM

## 2020-06-05 NOTE — TELEPHONE ENCOUNTER
Attempted to contact patient regarding his request to have his prescriptions transferred to another pharmacy. Patient did not answer and I was not able to leave a message for the patient. Will reach out to patient via [employer] [place].

## 2020-06-08 ENCOUNTER — PATIENT MESSAGE (OUTPATIENT)
Dept: TRANSPLANT | Facility: CLINIC | Age: 60
End: 2020-06-08

## 2020-06-08 ENCOUNTER — TELEPHONE (OUTPATIENT)
Dept: TRANSPLANT | Facility: CLINIC | Age: 60
End: 2020-06-08

## 2020-06-08 NOTE — TELEPHONE ENCOUNTER
Attempted to contact patient regarding missing supplies and after 1 ring I was sent to voicemail where I cannot leave a message because his voicemail is not set up.

## 2020-06-16 DIAGNOSIS — Z95.811 HEART REPLACED BY HEART ASSIST DEVICE: ICD-10-CM

## 2020-06-16 DIAGNOSIS — I10 ESSENTIAL HYPERTENSION: ICD-10-CM

## 2020-06-16 RX ORDER — ASPIRIN 325 MG
TABLET, DELAYED RELEASE (ENTERIC COATED) ORAL
Qty: 90 TABLET | Refills: 3 | Status: SHIPPED | OUTPATIENT
Start: 2020-06-16 | End: 2020-06-17 | Stop reason: SDUPTHER

## 2020-06-16 RX ORDER — FUROSEMIDE 40 MG/1
TABLET ORAL
Qty: 135 TABLET | Refills: 3 | Status: SHIPPED | OUTPATIENT
Start: 2020-06-16 | End: 2020-07-01 | Stop reason: SDUPTHER

## 2020-06-16 RX ORDER — PANTOPRAZOLE SODIUM 40 MG/1
TABLET, DELAYED RELEASE ORAL
Qty: 90 TABLET | Refills: 3 | Status: SHIPPED | OUTPATIENT
Start: 2020-06-16 | End: 2020-06-17 | Stop reason: SDUPTHER

## 2020-06-16 RX ORDER — LISINOPRIL 10 MG/1
10 TABLET ORAL 2 TIMES DAILY
Qty: 180 TABLET | Refills: 3 | Status: SHIPPED | OUTPATIENT
Start: 2020-06-16 | End: 2020-06-17 | Stop reason: SDUPTHER

## 2020-06-16 RX ORDER — AMIODARONE HYDROCHLORIDE 200 MG/1
200 TABLET ORAL DAILY
Qty: 30 TABLET | Refills: 12 | Status: SHIPPED | OUTPATIENT
Start: 2020-06-16 | End: 2020-06-17 | Stop reason: SDUPTHER

## 2020-06-16 RX ORDER — DIGOXIN 125 MCG
TABLET ORAL
Qty: 90 TABLET | Refills: 3 | Status: SHIPPED | OUTPATIENT
Start: 2020-06-16 | End: 2020-06-17 | Stop reason: SDUPTHER

## 2020-06-16 RX ORDER — SPIRONOLACTONE 25 MG/1
TABLET ORAL
Qty: 30 TABLET | Refills: 11 | Status: SHIPPED | OUTPATIENT
Start: 2020-06-16 | End: 2020-06-17 | Stop reason: SDUPTHER

## 2020-06-16 NOTE — PROGRESS NOTES
"Pt reports that he does not need a refill on warfarin, but is still requesting refills on all other medications.  He is expressing much frustration with the LVAD in general.  He does not see the utility in having his pump if there is no bridge to transplant.  He is voicing his anger and is cursing in response to the constant work required to maintain his LVAD and not been having listed for heart transplant.  I will forward this to his LVAD coordinator and SW.  He states that he will have a ''come to [place]" discussion at his upcoming LVAD appointments on 7/1/20.  He states that he will try to go to lab on Wednesday, 6/17/20.    "

## 2020-06-17 DIAGNOSIS — I10 ESSENTIAL HYPERTENSION: ICD-10-CM

## 2020-06-17 DIAGNOSIS — Z95.811 HEART REPLACED BY HEART ASSIST DEVICE: ICD-10-CM

## 2020-06-17 DIAGNOSIS — Z79.01 LONG TERM (CURRENT) USE OF ANTICOAGULANTS: ICD-10-CM

## 2020-06-17 DIAGNOSIS — Z95.811 LVAD (LEFT VENTRICULAR ASSIST DEVICE) PRESENT: ICD-10-CM

## 2020-06-17 LAB — INR PPP: 1.86

## 2020-06-17 RX ORDER — WARFARIN 2.5 MG/1
2.5 TABLET ORAL DAILY
Qty: 40 TABLET | Refills: 5 | Status: SHIPPED | OUTPATIENT
Start: 2020-06-17 | End: 2020-06-19

## 2020-06-17 RX ORDER — SPIRONOLACTONE 25 MG/1
TABLET ORAL
Qty: 30 TABLET | Refills: 11 | Status: SHIPPED | OUTPATIENT
Start: 2020-06-17 | End: 2020-07-13 | Stop reason: SDUPTHER

## 2020-06-17 RX ORDER — AMIODARONE HYDROCHLORIDE 200 MG/1
200 TABLET ORAL DAILY
Qty: 30 TABLET | Refills: 12 | Status: SHIPPED | OUTPATIENT
Start: 2020-06-17 | End: 2020-07-13 | Stop reason: SDUPTHER

## 2020-06-17 RX ORDER — PANTOPRAZOLE SODIUM 40 MG/1
TABLET, DELAYED RELEASE ORAL
Qty: 90 TABLET | Refills: 3 | Status: SHIPPED | OUTPATIENT
Start: 2020-06-17 | End: 2020-07-13 | Stop reason: SDUPTHER

## 2020-06-17 RX ORDER — LISINOPRIL 10 MG/1
10 TABLET ORAL 2 TIMES DAILY
Qty: 180 TABLET | Refills: 3 | Status: SHIPPED | OUTPATIENT
Start: 2020-06-17 | End: 2020-07-01

## 2020-06-17 RX ORDER — DIGOXIN 125 MCG
TABLET ORAL
Qty: 90 TABLET | Refills: 3 | Status: SHIPPED | OUTPATIENT
Start: 2020-06-17 | End: 2020-07-13 | Stop reason: SDUPTHER

## 2020-06-17 RX ORDER — ASPIRIN 325 MG
TABLET, DELAYED RELEASE (ENTERIC COATED) ORAL
Qty: 90 TABLET | Refills: 3 | Status: SHIPPED | OUTPATIENT
Start: 2020-06-17 | End: 2020-07-13 | Stop reason: SDUPTHER

## 2020-06-18 ENCOUNTER — DOCUMENTATION ONLY (OUTPATIENT)
Dept: TRANSPLANT | Facility: CLINIC | Age: 60
End: 2020-06-18

## 2020-06-18 ENCOUNTER — ANTI-COAG VISIT (OUTPATIENT)
Dept: CARDIOLOGY | Facility: CLINIC | Age: 60
End: 2020-06-18

## 2020-06-18 DIAGNOSIS — Z95.811 LVAD (LEFT VENTRICULAR ASSIST DEVICE) PRESENT: ICD-10-CM

## 2020-06-18 DIAGNOSIS — I48.0 PAROXYSMAL ATRIAL FIBRILLATION: ICD-10-CM

## 2020-06-18 DIAGNOSIS — Z95.811 LVAD (LEFT VENTRICULAR ASSIST DEVICE) PRESENT: Primary | ICD-10-CM

## 2020-06-18 DIAGNOSIS — Z79.01 LONG TERM (CURRENT) USE OF ANTICOAGULANTS: ICD-10-CM

## 2020-06-18 NOTE — PROGRESS NOTES
Called Mr. Delgado to discuss some of his recent concerns and questions. No repsonse from both numbers (hoem and cell). Left a message on [facility] cell to call us back.     Elham Pearson MD

## 2020-06-19 RX ORDER — WARFARIN 2.5 MG/1
TABLET ORAL
Qty: 50 TABLET | Refills: 5 | Status: SHIPPED | OUTPATIENT
Start: 2020-06-19 | End: 2021-03-29 | Stop reason: SDUPTHER

## 2020-06-19 NOTE — PROGRESS NOTES
INR not at goal. Medications, chart, and patient findings reviewed. See calendar for adjustments to dose and follow up plan.  Increasing dose today.  Sending new RX to [facility] for him.

## 2020-07-01 ENCOUNTER — TELEPHONE (OUTPATIENT)
Dept: TRANSPLANT | Facility: CLINIC | Age: 60
End: 2020-07-01

## 2020-07-01 ENCOUNTER — ANTI-COAG VISIT (OUTPATIENT)
Dept: CARDIOLOGY | Facility: CLINIC | Age: 60
End: 2020-07-01
Payer: MEDICARE

## 2020-07-01 ENCOUNTER — CLINICAL SUPPORT (OUTPATIENT)
Dept: TRANSPLANT | Facility: CLINIC | Age: 60
End: 2020-07-01
Payer: MEDICARE

## 2020-07-01 ENCOUNTER — OFFICE VISIT (OUTPATIENT)
Dept: TRANSPLANT | Facility: CLINIC | Age: 60
End: 2020-07-01
Attending: INTERNAL MEDICINE
Payer: MEDICARE

## 2020-07-01 ENCOUNTER — HOSPITAL ENCOUNTER (OUTPATIENT)
Dept: RADIOLOGY | Facility: HOSPITAL | Age: 60
Discharge: HOME | End: 2020-07-01
Attending: INTERNAL MEDICINE
Payer: MEDICARE

## 2020-07-01 VITALS — SYSTOLIC BLOOD PRESSURE: 82 MMHG | BODY MASS INDEX: 31.92 KG/M2 | HEART RATE: 141 BPM | HEIGHT: 70 IN | WEIGHT: 223 LBS

## 2020-07-01 DIAGNOSIS — Z95.811 LVAD (LEFT VENTRICULAR ASSIST DEVICE) PRESENT: ICD-10-CM

## 2020-07-01 DIAGNOSIS — Z95.810 ICD (IMPLANTABLE CARDIOVERTER-DEFIBRILLATOR) IN PLACE: ICD-10-CM

## 2020-07-01 DIAGNOSIS — I11.0 HYPERTENSIVE HEART DISEASE WITH HEART FAILURE: ICD-10-CM

## 2020-07-01 DIAGNOSIS — I42.0 COCM (CONGESTIVE CARDIOMYOPATHY): ICD-10-CM

## 2020-07-01 DIAGNOSIS — R93.89 ABNORMAL CT OF THE CHEST: ICD-10-CM

## 2020-07-01 DIAGNOSIS — Z79.01 LONG TERM (CURRENT) USE OF ANTICOAGULANTS: ICD-10-CM

## 2020-07-01 DIAGNOSIS — I48.0 PAROXYSMAL ATRIAL FIBRILLATION: ICD-10-CM

## 2020-07-01 DIAGNOSIS — I50.42 CHRONIC COMBINED SYSTOLIC AND DIASTOLIC CONGESTIVE HEART FAILURE: ICD-10-CM

## 2020-07-01 DIAGNOSIS — Z86.711 HISTORY OF PULMONARY EMBOLISM: ICD-10-CM

## 2020-07-01 DIAGNOSIS — Z95.811 LVAD (LEFT VENTRICULAR ASSIST DEVICE) PRESENT: Primary | ICD-10-CM

## 2020-07-01 DIAGNOSIS — R91.1 RIGHT LOWER LOBE PULMONARY NODULE: ICD-10-CM

## 2020-07-01 DIAGNOSIS — I47.29 NSVT (NONSUSTAINED VENTRICULAR TACHYCARDIA): ICD-10-CM

## 2020-07-01 DIAGNOSIS — Z79.899 LONG TERM CURRENT USE OF AMIODARONE: ICD-10-CM

## 2020-07-01 DIAGNOSIS — R74.01 ELEVATED TRANSAMINASE MEASUREMENT: ICD-10-CM

## 2020-07-01 DIAGNOSIS — E78.2 MIXED HYPERLIPIDEMIA: ICD-10-CM

## 2020-07-01 PROCEDURE — 99214 OFFICE O/P EST MOD 30 MIN: CPT | Mod: PBBFAC,25 | Performed by: INTERNAL MEDICINE

## 2020-07-01 PROCEDURE — 99999 PR PBB SHADOW E&M-EST. PATIENT-LVL IV: ICD-10-PCS | Mod: PBBFAC,,, | Performed by: INTERNAL MEDICINE

## 2020-07-01 PROCEDURE — 93750 INTERROGATION VAD IN PERSON: CPT | Mod: PBBFAC | Performed by: INTERNAL MEDICINE

## 2020-07-01 PROCEDURE — 74176 CT ABD & PELVIS W/O CONTRAST: CPT | Mod: 26,,, | Performed by: INTERNAL MEDICINE

## 2020-07-01 PROCEDURE — 99214 PR OFFICE/OUTPT VISIT, EST, LEVL IV, 30-39 MIN: ICD-10-PCS | Mod: S$PBB,,, | Performed by: INTERNAL MEDICINE

## 2020-07-01 PROCEDURE — 74176 CT ABD & PELVIS W/O CONTRAST: CPT | Mod: TC

## 2020-07-01 PROCEDURE — 71250 CT THORAX DX C-: CPT | Mod: TC

## 2020-07-01 PROCEDURE — 71250 CT CHEST ABDOMEN PELVIS WITHOUT CONTRAST(XPD): ICD-10-PCS | Mod: 26,,, | Performed by: INTERNAL MEDICINE

## 2020-07-01 PROCEDURE — 71250 CT THORAX DX C-: CPT | Mod: 26,,, | Performed by: INTERNAL MEDICINE

## 2020-07-01 PROCEDURE — 99214 OFFICE O/P EST MOD 30 MIN: CPT | Mod: S$PBB,,, | Performed by: INTERNAL MEDICINE

## 2020-07-01 PROCEDURE — 74176 CT CHEST ABDOMEN PELVIS WITHOUT CONTRAST(XPD): ICD-10-PCS | Mod: 26,,, | Performed by: INTERNAL MEDICINE

## 2020-07-01 PROCEDURE — 99999 PR PBB SHADOW E&M-EST. PATIENT-LVL IV: CPT | Mod: PBBFAC,,, | Performed by: INTERNAL MEDICINE

## 2020-07-01 PROCEDURE — 93750 INTERROGATION VAD IN PERSON: CPT | Mod: S$PBB,,, | Performed by: INTERNAL MEDICINE

## 2020-07-01 PROCEDURE — 93750 PR INTERROGATE VENT ASSIST DEV, IN PERSON, W PHYSICIAN ANALYSIS: ICD-10-PCS | Mod: S$PBB,,, | Performed by: INTERNAL MEDICINE

## 2020-07-01 RX ORDER — ATORVASTATIN CALCIUM 80 MG/1
40 TABLET, FILM COATED ORAL DAILY
Qty: 90 TABLET | Refills: 3
Start: 2020-07-01 | End: 2021-06-14 | Stop reason: SDUPTHER

## 2020-07-01 RX ORDER — POTASSIUM CHLORIDE 20 MEQ/1
TABLET, EXTENDED RELEASE ORAL
Qty: 180 TABLET | Refills: 3 | Status: SHIPPED | OUTPATIENT
Start: 2020-07-01 | End: 2020-09-28 | Stop reason: SDUPTHER

## 2020-07-01 RX ORDER — FUROSEMIDE 40 MG/1
TABLET ORAL
Qty: 90 TABLET | Refills: 3 | Status: SHIPPED | OUTPATIENT
Start: 2020-07-01 | End: 2020-09-28 | Stop reason: SDUPTHER

## 2020-07-01 RX ORDER — HYDRALAZINE HYDROCHLORIDE 25 MG/1
25 TABLET, FILM COATED ORAL 2 TIMES DAILY
Qty: 60 TABLET | Refills: 3 | Status: SHIPPED | OUTPATIENT
Start: 2020-07-01 | End: 2020-07-13 | Stop reason: SDUPTHER

## 2020-07-01 NOTE — PROCEDURES
TXP KLEVER INTERROGATIONS 7/1/2020 5/27/2020 4/23/2020 4/6/2020 3/25/2020 3/20/2020 3/19/2020   Type HeartMate3 HeartMate3 HeartMate3 HeartMate3 HeartMate3 - HeartMate3   Flow 4.2 3.9 3.7 3.3 3.6 - 3.7   Speed 5300 5300 5300 5300 5300 - 5300   PI 6.4 7.0 6.3 8.7 7.5 - 7.3   Power (Berry) 4.0 4.0 3.8 3.9 3.9 - 3.9   LSL 4900 4900 4900 4900 4900 - 4900   Pulsatility Intermittent pulse Pulse Intermittent pulse Pulse Pulse Pulse Pulse   }Interrogation of Ventricular assist device was performed with physician analysis of device parameters and review of device function. I have personally reviewed the interrogation findings and agree with findings as stated.

## 2020-07-01 NOTE — LETTER
July 1, 2020        Dipesh De La Torre  [address] [facility] [place] [address]  Phone: [phone]  Fax: [phone]             [facility] [address] [place] [address]  Phone: [phone]   Patient: Tae Delgado   MR Number: [id number]   YOB: 1960   Date of Visit: 7/1/2020       Dear Dr. Dipesh De La Torre    Thank you for referring Tae Delgado to me for evaluation. Attached you will find relevant portions of my assessment and plan of care.    If you have questions, please do not hesitate to call me. I look forward to following Tae Delgado along with you.    Sincerely,    Karson Bucio Jr, MD    Enclosure    If you would like to receive this communication electronically, please contact [email or web address] or [phone] to request [employer] Link access.    [employer] is a tool which provides read-only access to select patient information with whom you have a relationship. Its easy to use and provides real time access to review your patients record including encounter summaries, notes, results, and demographic information.    If you feel you have received this communication in error or would no longer like to receive these types of communications, please e-mail [email or web address]

## 2020-07-01 NOTE — PROGRESS NOTES
Pt seen in clinic. Mobile Power Unit ([id number]) 6 month maintenance and AA battery replacement complete. MPU, MPU patient cable and AC power cord inspected for damage and noted to be in good condition. Backup  charged and self test completed appropriately.  Pt reminded to follow and complete monthly equipment checklist. Encouraged to notify VAD coordinator and or  for any LVAD related issues.  Please refer to MD and or VAD coordinator note for any additional details about pt visit.

## 2020-07-01 NOTE — PROGRESS NOTES
Subjective:   Patient ID:  Tae Delgado is a 60 y.o. male who presents for LVAD followup visit.    Implant Date:1/23/2020      Heartmate 3 RPM 5300     INR goal: 2-3   Bridge with Heparin   Antiplatelets:      TXP KLEVER INTERROGATIONS 7/1/2020   Type HeartMate3   Flow 4.2   Speed 5300   PI 6.4   Power (Berry) 4.0   LSL 4900   Pulsatility Intermittent pulse   Interrogation of Ventricular assist device was performed with physician analysis of device parameters and review of device function. I have personally reviewed the interrogation findings and agree with findings as stated.     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  His post-op course complicated by RV failure. He went for wash out on 1/27/20 and his chest was closed on 1/29/20. Postoperatively he had PAF and was started on Digoxin and anticoagulated with Coumadin. Renal function neptali acutely in the post op period but improved during his hospital stay.  Creatinine on day of discharge was 1.8. Pre-op a right lower lobe pulm nodule found incidentally on CT: recommend repeat chest CT in 3 months and follow up in pulmonary clinic. Patient was discharged to rehab facility for further therapy. When I saw him April 2020 he was upset and reported a physical therapist in rehab lifted him with chest strap.  After that he noted discomfort left side of chest near sternum below clavicle and a vibration along right sternal border, hearing LVAD in ears, popping of both shoulders.  He was convinced his pump moved so at that visit I spent a lot of time reviewing CXR's and his exam with him and assured him pump was fine, no sternal wires broken, sternum stable and hearing pump not unusual, vibration feeling right sternal border most likely due to flow through outflow graft but no pump issues and LDH was fine.  He has CT  scan after visit with less definition of abnormality described on pre-op CT though it was not normal.    He continued to experience hearing pump at night but reports very loud now and not simply in his ears.  He continues with vibration feeling along right sternum and pain that he attributes to the rehab therapist.  He reports trouble sleeping at night and tells me that he wrote an e-mail while angry in middle of the night.  E-mail described in notes of others and in communications within [facility].  Today he met with Dr. Pearson before our visit and reportedly expressed remorse for his actions, apologized.  Dr. Pearson will have separate documentation.    At this time reports mild BELTRAN but otherwise no CHF symptoms.  He reports feeling dizzy so stopped lisinopril 10 days ago and PM dose of lasix 4 days ago.  He feels better but still markedly fatigued, dizzy, thirsty, no edema.  He reports that he is prone to low BP.  Upon initial interaction BP was elevated here today but after meeting with Dr. Pearson he was more relaxed and BP fell to doppler 86 mm Hg right and 82 mm Hg left. Unable to obtain cuff BP though one could hear change in loudness of doppler signal flow consistent with intermittent pulsation of HM 3 device.    Review of Systems   Constitution: Positive for chills and malaise/fatigue. Negative for decreased appetite, diaphoresis, fever, weight gain and weight loss.   HENT: Negative for congestion, hearing loss, hoarse voice and sore throat.    Eyes: Positive for double vision. Negative for pain.   Cardiovascular: Positive for chest pain and dyspnea on exertion. Negative for leg swelling, palpitations and paroxysmal nocturnal dyspnea.   Respiratory: Positive for shortness of breath and snoring. Negative for cough, sleep disturbances due to breathing, sputum production and wheezing.    Endocrine: Positive for cold intolerance and heat intolerance. Negative for polydipsia and polyuria.   Hematologic/Lymphatic: Negative  "for bleeding problem. Does not bruise/bleed easily.   Musculoskeletal: Negative for falls and neck pain.   Gastrointestinal: Negative for change in bowel habit, constipation, diarrhea, dysphagia, heartburn, hematochezia, jaundice, melena and nausea.   Genitourinary: Positive for frequency. Negative for bladder incontinence, hematuria, hesitancy and urgency.   Neurological: Positive for dizziness, light-headedness and weakness. Negative for headaches, numbness, paresthesias and seizures.   Psychiatric/Behavioral: Positive for memory loss. Negative for depression. The patient is not nervous/anxious.      Objective:   Blood pressure (!) 82/0, pulse (!) 141, height 5' 10" (1.778 m), weight 101.2 kg (223 lb).body mass index is 32 kg/m².  Doppler: right 86 by MA and left 82 mm Hg (left checked twice by myself 82 and MA 80 mm Hg) to confirm BP had indeed fallen as admission to clinic doppler reported by  mm Hg  Physical Exam   Constitutional: He appears well-developed and well-nourished. No distress.   Ht 5' 10" (1.778 m)   Wt 101.2 kg (223 lb)   BMI 32.00 kg/m²   Doppler: right 86 by MA and left 82 mm Hg (left checked twice by myself 82 and MA 80 mm Hg) to confirm BP had indeed fallen as admission to clinic doppler reported by  mm Hg  HR on exam not pulsatile and unable to hear on exam  He was cooperative and friendly during my portion of this visit   HENT:   Head: Normocephalic and atraumatic.   Eyes: Conjunctivae are normal. No scleral icterus.   Neck: No JVD present. No thyromegaly present.   Cardiovascular:   Normal LVAD sounds, DL 1   Pulmonary/Chest: Effort normal and breath sounds normal. No respiratory distress. He has no wheezes. He has no rales.   Abdominal: Soft. Bowel sounds are normal. He exhibits no distension and no mass. There is no abdominal tenderness. There is no rebound and no guarding.   Musculoskeletal:         General: No tenderness or edema.   Neurological: He is alert.   Skin: Skin " is warm and dry. He is not diaphoretic.   Psychiatric: He has a normal mood and affect. His behavior is normal. Judgment and thought content normal.     Lab Results   Component Value Date     (H) 07/01/2020     07/01/2020    K 4.0 07/01/2020    MG 2.2 07/01/2020     07/01/2020     04/13/2020    CO2 27 07/01/2020    BUN 42 (H) 07/01/2020    BUN 24 (A) 04/13/2020    CREATININE 2.4 (H) 07/01/2020    CREATININE 1.7 04/13/2020     (H) 07/01/2020    HGBA1C 6.2 (H) 01/15/2020    AST 50 (H) 07/01/2020    ALT 74 (H) 07/01/2020    ALBUMIN 4.4 07/01/2020    ALBUMIN 4.3 04/13/2020    PROT 8.3 07/01/2020    BILITOT 1.2 (H) 07/01/2020    WBC 10.02 07/01/2020    WBC 6.1 04/13/2020    HGB 15.0 07/01/2020    HCT 48.6 07/01/2020    HCT 25 (L) 02/05/2020     07/01/2020    INR 1.9 (H) 07/01/2020    INR 1.86 06/17/2020     07/01/2020    TSH 2.781 07/01/2020    CHOL 171 07/01/2020    HDL 29 (L) 07/01/2020    LDLCALC 108.0 07/01/2020    TRIG 170 (H) 07/01/2020    R5QSVDE 10.1 07/01/2020     Assessment:      1. LVAD (left ventricular assist device) present    2. Long term current use of amiodarone    3. Elevated transaminase measurement    4. Chronic combined systolic and diastolic congestive heart failure    5. Paroxysmal atrial fibrillation    6. Hypertensive heart disease with heart failure    7. Long term (current) use of anticoagulants    8. COCM (congestive cardiomyopathy)    9. NSVT (nonsustained ventricular tachycardia)    10. Mixed hyperlipidemia    11. Abnormal CT of the chest    12. ICD (implantable cardioverter-defibrillator) in place      Plan:   I think that he is dry with hemoconcentration, dizziness, bump in Cr with drop in BNP so will stop routine dosing of lasix (he has stopped PM dose 4 days ago) and observe; if he feels need to take a dose and infrequent okay to take lasix 20-40 mg with KCl 20 meq for every 20 mg of lasix.  If he needs this 3 or more times a week he is to  notify our team.    Stay off lisinopril    I am concerned over leaving off lasix (due to Cr) that BP will increase so will initiate hydralazine 25 mg BID and recheck BP in 2 weeks    RTC 2 weeks with BMP, BNP    Due to LFT's and amiodarone interaction with atorvastatin I will reduce atorvastatin to 40 mg hs.  If lipids not satisfactorily controlled will change to max dose rosuvastatin 40 mg hs assuming LFT's allow.  This route chosen over changing to rosuvastatin now to avoid 2 new meds at same time.  F/U LFT's within the next 6 weeks and lipids in 3 months    Supplies ordered are medically necessary for care of LVAD and DL    Patient is now NYHA II    Listed for transplant: No    UNOS Patient Status  Functional Status: 90% - Able to carry on normal activity: minor symptoms of disease  Physical Capacity: No Limitations  Working for Income: No  If no, reason not working: Disability

## 2020-07-01 NOTE — TELEPHONE ENCOUNTER
Spoke with pharmacist that it is OK for patient to take spironolactone and potassium.    ----- Message from Saloni Ayon MA sent at 7/1/2020  3:25 PM CDT -----  Regarding: FW: Interaction    ----- Message -----  From: Alina Kaplan  Sent: 7/1/2020   2:47 PM CDT  To: [facility]  Subject: Interaction                                      [facility] calling to report drug interaction with spirolactone & potassium.  Thanks     [facility] [address] [place] [address] [place] [id number] [place] [address] [facility] [address]  Phone: [phone] Fax: [phone]

## 2020-07-01 NOTE — PATIENT INSTRUCTIONS
Reduce atorvastatin to half a tablet once a day  Stay off lisinopril  Stop lasix and potassium but if really feel that you need an occassional dose take 20 meq of potassium for every 20 mg lasix.  If you need lasix 20-40 mg regularly then notify us to check kidney function  Start hydralazine 25 mg twice a day

## 2020-07-02 NOTE — PROGRESS NOTES
Date of Implant with Heartmate 3 LVAD: 2020    PATIENT ARRIVED IN CLINIC:  Ambulatory   Accompanied by: self    Vitals  Temperature, oral:   Temp Readings from Last 1 Encounters:   20 97.9 °F (36.6 °C) (Oral)     Blood Pressure:   BP Readings from Last 3 Encounters:   20 (!) 82/0   20 (!) 92/0   20 (!) 89/0        VAD Interrogation:  TXP KLEVER INTERROGATIONS 2020   Type HeartMate3 HeartMate3 HeartMate3   Flow 4.2 3.9 3.7   Speed 5300 5300 5300   PI 6.4 7.0 6.3   Power (Berry) 4.0 4.0 3.8   LSL 4900 4900 4900   Pulsatility Intermittent pulse Pulse Intermittent pulse       History Lo.[id number]  Problems / Issues / Alarms with VAD if any: None noted  VAD Sounds: HM3 Smooth  Heartmate 3 Module Cable:  No yellow exposed and Attempted to unscrew modular cable to ensure it will be able to come lose in the event we ever need to change the modular cable while patient held the driveline in place so it would not move. Modular cable connection able to be unscrewed and re-tightened. Instructed pt to perform this weekly.    HCT:   Lab Results   Component Value Date    HCT 48.6 2020    HCT 25 (L) 2020       Complaints/reason for visit today: routine  Emergency Equipment With Patient: yes   VAD Binder With Patient: yes   Reviewed VAD Numbers In Binder: yes    Any Equipment Issues: None noted (Refer to  note for complete details)    DLES Assessment:  Appearance Of Driveline: 1  Antibiotics: NO  Velour: no  Manual & Visual Inspection Of Driveline: No kinks or tears noted  Stabilization Device In Use: yes, giles securement device      Patient MyChart Questionnaire:   VAD QUESTIONNAIRE ANSWERS 2020   Have you had any LVAD related issues or alarms? No   Have you had any LVAD Equipment issues? No   How often do you do your LVAD dressing change? Daily   What type of dressing change do you do?  Soap and water   Do you need dressing change supplies?  Yes   If yes, what kind (i.e. boxes/kits)? Box   Do you need any new LVAD Accessories? (i.e Battery Holster Vest, Holster Vest, RT/LT Consolidation Bag) No   If yes, what kind of accessories do you need? None   Have you been using your Monthly Equipment Checklist? Yes   Description of Stools: Green   How Often: Daily   Color Of Urine: Clear/Yellow   Are you experiencing: Anger   How many hours per night are you sleeping? 3   Do you take any medications to help you fall asleep? No   Are you Showering? No   Are you exercising? Yes   If so, what do you do and for how long per day? Walk and do small task light in weight that require little lifting   How often are you exercising? Daily   Are you working or what do you do to stay active? See above   Are you driving? No   Do you know that if you have an alarm while driving you need to pull over first before looking at your alarm to avoid an accident? Yes   Are you in pain? Yes   If so, please rate: 6   What hurts? Chest with vibrations and constant winning of the heart pump 24/7.   Describe pain: Soreness   Do you take any medications for pain?  No   What can we do for you? Come up with a fix to the problem   Is your appointment today? Yes   Do you have your Emergency Bag with you? Yes   Do you have your VAD Binder with you in clinic today?  Yes        Assessment:   PAIN: YES Location of Pain: midsterum , Description of Pain:  Irritation and tightness  Complaints Of Nausea / Vomiting: None noted    Appearance and Frequency Of Stools: normal and formed without blood & daily  Color Of Urine: clear/yellow  Coping/Depression/Anxiety: coping okay  Sleep Habits: 3 hrs /night  Sleep Aids: None noted  Showering: Yes, reminded to change dressing immediately after drying off  Activity/Exercise: 'I cannot do much of anything since getting this LVAD'   Driving: Yes. Reminded to pull over should there be an alarm before looking down at controller.    DLES Dressing Care:   Frequency of  Dressing Changes: daily & betadine and saline  Pt In Need Of Management Kits?:yes -   2 Box of betadine and saline  It is medically necessary to have VAD management kits in order to prevent infection or to assist in the healing of an infected DLES.    Labs:    Chemistry        Component Value Date/Time     07/01/2020 0907    K 4.0 07/01/2020 0907     07/01/2020 0907     04/13/2020    CO2 27 07/01/2020 0907    BUN 42 (H) 07/01/2020 0907    BUN 24 (A) 04/13/2020    CREATININE 2.4 (H) 07/01/2020 0907    CREATININE 1.7 04/13/2020     (H) 07/01/2020 0907        Component Value Date/Time    CALCIUM 10.2 07/01/2020 0907    CALCIUM 10.1 04/13/2020    ALKPHOS 72 07/01/2020 0907    AST 50 (H) 07/01/2020 0907    ALT 74 (H) 07/01/2020 0907    BILITOT 1.2 (H) 07/01/2020 0907    ESTGFRAFRICA 32.7 (A) 07/01/2020 0907    EGFRNONAA 28.3 (A) 07/01/2020 0907            Magnesium   Date Value Ref Range Status   07/01/2020 2.2 1.6 - 2.6 mg/dL Final       Lab Results   Component Value Date    WBC 10.02 07/01/2020    HGB 15.0 07/01/2020    HCT 48.6 07/01/2020    MCV 87 07/01/2020     07/01/2020       Lab Results   Component Value Date    INR 1.9 (H) 07/01/2020    INR 1.86 06/17/2020    INR 1.9 (H) 05/27/2020       BNP   Date Value Ref Range Status   07/01/2020 122 (H) 0 - 99 pg/mL Final     Comment:     Values of less than 100 pg/ml are consistent with non-CHF populations.   05/27/2020 206 (H) 0 - 99 pg/mL Final     Comment:     Values of less than 100 pg/ml are consistent with non-CHF populations.   04/23/2020 192 (H) 0 - 99 pg/mL Final     Comment:     Values of less than 100 pg/ml are consistent with non-CHF populations.       LD   Date Value Ref Range Status   07/01/2020 212 110 - 260 U/L Final     Comment:     Results are increased in hemolyzed samples.   05/27/2020 196 110 - 260 U/L Final     Comment:     Results are increased in hemolyzed samples.   04/23/2020 205 110 - 260 U/L Final     Comment:      "Results are increased in hemolyzed samples.       Labs reviewed with patient: YES      Patient Satisfaction Survey completed per patient: No  (explained about signature and box to check)  Medication reconciliation: per MA.  Medication Detail updated today: yes  Coumadin Managed by: [facility]    Education: Reviewed driveline care, emergency procedures, how to change the controller, alarms with patient, as well as discussed how to page the VAD coordinator in case of an emergency.   Coved - 19 education: Reminded patient/caregiver to check temperature daily and call us if it is > 99.0.  Reminded them  to stay 6 feet away from other people, wash hands frequently, don't touch your face and stay home except yo get labs, medications, and appts.      Plans/Needs: Pt seen for routine f/u. Pt upset this morning, explaining details of why he sent the [facility] message on 6/17/2020. Dr. Pearson is also seeing Mr. Delgado today to discuss patient's concerns and message to staff:  Pt is hypertensive. Pt stopped taking Lisinopril due to c/o dizziness. Pt states he tried decreasing to 5mg once daily in which he was still dizzy so he stopped completely. Pt states that he plans to purchase a doppler to take care of BP better at home.  Pt c/o mindsternal pain/irritation. This pain is in the same place as patient has spoken of in the past after his incident with the gait belt at rehab. Along with pain, pt is hearing a loud noise from his pump at night that causes him not to sleep (along with pain).  Pt is concerned that he has no slack along his driveline which is causing his external part of the driveline to pull at his pump.    Pt verbalizes, "If this doesn't get fixed soon, I will fix it myself." I asked patient what he meant by this and he did not elaborate. I asked pt if he was harmful to himself and/or having SI? Pt denied.     Dr. Paerson spoke to pt regarding concerns/issues. Plan for pt to have a CT C/A/P, Dr. Schmitt to " review and give recommendations. Pt also to record noise and send via email or bring to clinic.   Dr. Bucio saw Mr. Delgado and adjusted the following medications: AST/ALT elevated, Lipitor decreased to 40mg qhs. Hydralazine started at 25mg BID. Lasix changed to PRN. Pt to RTC in 2 weeks with FLP, PFTs. CT C/A/P to be done today.    Hurricane Season: Yes, discussed with patient: With hurricane season approaching, we want to make sure you are fully prepared for any emergency.  Should the National Weather Service or your local authorities recommend a voluntary or mandatory evacuation of your area, The VAD team requires you to evacuate to a safe place.  Remember, when it is a mandatory evacuation, traffic will become an issue for your limited battery power.  Therefore, we strongly urge you to evacuate early.    The VAD team advises you to have the following in place before hurricane season:  Have an evacuation plan in place including places to evacuate, names and phone numbers.  This information is required to be given to the VAD coordinator.   1. Have your VAD emergency contact numbers with you.   2. Make sure your prescriptions will not run out by the end of September.   3. Make sure you have enough medications, including pills, inhalers, patches, etc. to take, should you be gone for more than 2 weeks.   4. Make sure ALL of your batteries are fully charged.   5. Bring enough dressing change supplies to last for at least 2 weeks.   6. Bring your VAD binder with you.  Make sure your binder is updated and complete with alarms reference card, patient hand book, emergency contact numbers, daily log sheets, etc.  If you do not have family or friends as an evacuation destination, we recommend evacuating to a safe area.   Do NOT evacuate to [facility].    The VAD team wants to stress the importance of planning for your evacuation in the event of a hurricane.  If you have any LVAD questions or issues, please contact the  LVAD coordinator.

## 2020-07-07 ENCOUNTER — ANTI-COAG VISIT (OUTPATIENT)
Dept: CARDIOLOGY | Facility: CLINIC | Age: 60
End: 2020-07-07
Payer: MEDICARE

## 2020-07-07 DIAGNOSIS — Z95.811 LVAD (LEFT VENTRICULAR ASSIST DEVICE) PRESENT: ICD-10-CM

## 2020-07-07 DIAGNOSIS — Z79.01 LONG TERM (CURRENT) USE OF ANTICOAGULANTS: ICD-10-CM

## 2020-07-07 DIAGNOSIS — I48.0 PAROXYSMAL ATRIAL FIBRILLATION: ICD-10-CM

## 2020-07-07 LAB — INR PPP: 1.86

## 2020-07-07 PROCEDURE — 93793 PR ANTICOAGULANT MGMT FOR PT TAKING WARFARIN: ICD-10-PCS | Mod: ,,,

## 2020-07-07 PROCEDURE — 93793 ANTICOAG MGMT PT WARFARIN: CPT | Mod: ,,,

## 2020-07-07 NOTE — PROGRESS NOTES
Pt denies missed doses. Denies changes in diet. Overall health doing well. INRs have been trending low. We will have him take 3.75mg today and have LVAD pharmD address any further dose adjustments tomorrow.

## 2020-07-08 NOTE — PROGRESS NOTES
Patient advised of dose instructions and verbalized understanding.  Patient rescheduled appointment from 7/9 to 7/13

## 2020-07-09 ENCOUNTER — TELEPHONE (OUTPATIENT)
Dept: CARDIOTHORACIC SURGERY | Facility: CLINIC | Age: 60
End: 2020-07-09

## 2020-07-09 NOTE — TELEPHONE ENCOUNTER
Called pt to notify him of his appt with Dr. Schmitt on Monday, July 13; no answer.  Left VM asking pt to call me back about this appt.

## 2020-07-09 NOTE — TELEPHONE ENCOUNTER
Called pt to notify him of his appt with Dr. Schmitt on Monday, July 13.  Pt verbalized understanding of appt.

## 2020-07-13 ENCOUNTER — ANTI-COAG VISIT (OUTPATIENT)
Dept: CARDIOLOGY | Facility: CLINIC | Age: 60
End: 2020-07-13
Payer: MEDICARE

## 2020-07-13 ENCOUNTER — LAB VISIT (OUTPATIENT)
Dept: LAB | Facility: HOSPITAL | Age: 60
End: 2020-07-13
Attending: INTERNAL MEDICINE
Payer: MEDICARE

## 2020-07-13 ENCOUNTER — OFFICE VISIT (OUTPATIENT)
Dept: TRANSPLANT | Facility: CLINIC | Age: 60
End: 2020-07-13
Attending: INTERNAL MEDICINE
Payer: MEDICARE

## 2020-07-13 ENCOUNTER — OFFICE VISIT (OUTPATIENT)
Dept: CARDIOTHORACIC SURGERY | Facility: CLINIC | Age: 60
End: 2020-07-13
Payer: MEDICARE

## 2020-07-13 ENCOUNTER — CLINICAL SUPPORT (OUTPATIENT)
Dept: TRANSPLANT | Facility: CLINIC | Age: 60
End: 2020-07-13
Payer: MEDICARE

## 2020-07-13 VITALS
OXYGEN SATURATION: 98 % | TEMPERATURE: 98 F | HEIGHT: 70 IN | WEIGHT: 221.81 LBS | BODY MASS INDEX: 31.75 KG/M2 | HEART RATE: 81 BPM

## 2020-07-13 VITALS — SYSTOLIC BLOOD PRESSURE: 90 MMHG | BODY MASS INDEX: 32.93 KG/M2 | WEIGHT: 230 LBS | HEIGHT: 70 IN | TEMPERATURE: 98 F

## 2020-07-13 DIAGNOSIS — E78.2 MIXED HYPERLIPIDEMIA: ICD-10-CM

## 2020-07-13 DIAGNOSIS — Z95.811 HEART REPLACED BY HEART ASSIST DEVICE: ICD-10-CM

## 2020-07-13 DIAGNOSIS — R74.01 ELEVATED TRANSAMINASE MEASUREMENT: ICD-10-CM

## 2020-07-13 DIAGNOSIS — I48.0 PAROXYSMAL ATRIAL FIBRILLATION: ICD-10-CM

## 2020-07-13 DIAGNOSIS — Z79.899 LONG TERM CURRENT USE OF AMIODARONE: ICD-10-CM

## 2020-07-13 DIAGNOSIS — Z91.89 AT RISK FOR AMIODARONE TOXICITY WITH LONG TERM USE: ICD-10-CM

## 2020-07-13 DIAGNOSIS — Z79.01 LONG TERM (CURRENT) USE OF ANTICOAGULANTS: ICD-10-CM

## 2020-07-13 DIAGNOSIS — Z95.811 LVAD (LEFT VENTRICULAR ASSIST DEVICE) PRESENT: Primary | ICD-10-CM

## 2020-07-13 DIAGNOSIS — I11.0 HYPERTENSIVE HEART DISEASE WITH HEART FAILURE: ICD-10-CM

## 2020-07-13 DIAGNOSIS — Z79.899 AT RISK FOR AMIODARONE TOXICITY WITH LONG TERM USE: ICD-10-CM

## 2020-07-13 DIAGNOSIS — Z95.811 LVAD (LEFT VENTRICULAR ASSIST DEVICE) PRESENT: ICD-10-CM

## 2020-07-13 DIAGNOSIS — I50.42 CHRONIC COMBINED SYSTOLIC AND DIASTOLIC CONGESTIVE HEART FAILURE: ICD-10-CM

## 2020-07-13 DIAGNOSIS — I47.29 NSVT (NONSUSTAINED VENTRICULAR TACHYCARDIA): ICD-10-CM

## 2020-07-13 DIAGNOSIS — I42.0 COCM (CONGESTIVE CARDIOMYOPATHY): ICD-10-CM

## 2020-07-13 DIAGNOSIS — Z95.810 ICD (IMPLANTABLE CARDIOVERTER-DEFIBRILLATOR) IN PLACE: ICD-10-CM

## 2020-07-13 DIAGNOSIS — D50.9 IRON DEFICIENCY ANEMIA, UNSPECIFIED IRON DEFICIENCY ANEMIA TYPE: ICD-10-CM

## 2020-07-13 DIAGNOSIS — T82.9XXA COMPLICATION INVOLVING LEFT VENTRICULAR ASSIST DEVICE (LVAD), INITIAL ENCOUNTER: ICD-10-CM

## 2020-07-13 LAB
ALBUMIN SERPL BCP-MCNC: 4.1 G/DL (ref 3.5–5.2)
ALP SERPL-CCNC: 66 U/L (ref 55–135)
ALT SERPL W/O P-5'-P-CCNC: 81 U/L (ref 10–44)
ANION GAP SERPL CALC-SCNC: 8 MMOL/L (ref 8–16)
AST SERPL-CCNC: 51 U/L (ref 10–40)
BASOPHILS # BLD AUTO: 0.06 K/UL (ref 0–0.2)
BASOPHILS NFR BLD: 0.5 % (ref 0–1.9)
BILIRUB DIRECT SERPL-MCNC: 0.5 MG/DL (ref 0.1–0.3)
BILIRUB SERPL-MCNC: 0.9 MG/DL (ref 0.1–1)
BNP SERPL-MCNC: 232 PG/ML (ref 0–99)
BUN SERPL-MCNC: 28 MG/DL (ref 6–20)
CALCIUM SERPL-MCNC: 10 MG/DL (ref 8.7–10.5)
CHLORIDE SERPL-SCNC: 107 MMOL/L (ref 95–110)
CHOLEST SERPL-MCNC: 116 MG/DL (ref 120–199)
CHOLEST/HDLC SERPL: 4.3 {RATIO} (ref 2–5)
CO2 SERPL-SCNC: 26 MMOL/L (ref 23–29)
CREAT SERPL-MCNC: 1.7 MG/DL (ref 0.5–1.4)
CRP SERPL-MCNC: 7.7 MG/L (ref 0–8.2)
DIFFERENTIAL METHOD: ABNORMAL
EOSINOPHIL # BLD AUTO: 0.2 K/UL (ref 0–0.5)
EOSINOPHIL NFR BLD: 1.6 % (ref 0–8)
ERYTHROCYTE [DISTWIDTH] IN BLOOD BY AUTOMATED COUNT: 15.9 % (ref 11.5–14.5)
EST. GFR  (AFRICAN AMERICAN): 49.6 ML/MIN/1.73 M^2
EST. GFR  (NON AFRICAN AMERICAN): 42.9 ML/MIN/1.73 M^2
GLUCOSE SERPL-MCNC: 80 MG/DL (ref 70–110)
HCT VFR BLD AUTO: 43.4 % (ref 40–54)
HDLC SERPL-MCNC: 27 MG/DL (ref 40–75)
HDLC SERPL: 23.3 % (ref 20–50)
HGB BLD-MCNC: 14.2 G/DL (ref 14–18)
IMM GRANULOCYTES # BLD AUTO: 0.08 K/UL (ref 0–0.04)
IMM GRANULOCYTES NFR BLD AUTO: 0.7 % (ref 0–0.5)
INR PPP: 3 (ref 0.8–1.2)
LDH SERPL L TO P-CCNC: 212 U/L (ref 110–260)
LDLC SERPL CALC-MCNC: 70.8 MG/DL (ref 63–159)
LYMPHOCYTES # BLD AUTO: 3.2 K/UL (ref 1–4.8)
LYMPHOCYTES NFR BLD: 28.7 % (ref 18–48)
MAGNESIUM SERPL-MCNC: 2 MG/DL (ref 1.6–2.6)
MCH RBC QN AUTO: 28.2 PG (ref 27–31)
MCHC RBC AUTO-ENTMCNC: 32.7 G/DL (ref 32–36)
MCV RBC AUTO: 86 FL (ref 82–98)
MONOCYTES # BLD AUTO: 1 K/UL (ref 0.3–1)
MONOCYTES NFR BLD: 8.9 % (ref 4–15)
NEUTROPHILS # BLD AUTO: 6.6 K/UL (ref 1.8–7.7)
NEUTROPHILS NFR BLD: 59.6 % (ref 38–73)
NONHDLC SERPL-MCNC: 89 MG/DL
NRBC BLD-RTO: 0 /100 WBC
PHOSPHATE SERPL-MCNC: 3.5 MG/DL (ref 2.7–4.5)
PLATELET # BLD AUTO: 232 K/UL (ref 150–350)
PMV BLD AUTO: 11.1 FL (ref 9.2–12.9)
POTASSIUM SERPL-SCNC: 3.9 MMOL/L (ref 3.5–5.1)
PREALB SERPL-MCNC: 26 MG/DL (ref 20–43)
PROT SERPL-MCNC: 7.7 G/DL (ref 6–8.4)
PROTHROMBIN TIME: 28.8 SEC (ref 9–12.5)
RBC # BLD AUTO: 5.04 M/UL (ref 4.6–6.2)
SODIUM SERPL-SCNC: 141 MMOL/L (ref 136–145)
TRIGL SERPL-MCNC: 91 MG/DL (ref 30–150)
WBC # BLD AUTO: 11.14 K/UL (ref 3.9–12.7)

## 2020-07-13 PROCEDURE — 85610 PROTHROMBIN TIME: CPT

## 2020-07-13 PROCEDURE — 99213 OFFICE O/P EST LOW 20 MIN: CPT | Mod: PBBFAC,25,27 | Performed by: THORACIC SURGERY (CARDIOTHORACIC VASCULAR SURGERY)

## 2020-07-13 PROCEDURE — 93750 INTERROGATION VAD IN PERSON: CPT | Mod: PBBFAC | Performed by: INTERNAL MEDICINE

## 2020-07-13 PROCEDURE — 84100 ASSAY OF PHOSPHORUS: CPT

## 2020-07-13 PROCEDURE — 99214 PR OFFICE/OUTPT VISIT, EST, LEVL IV, 30-39 MIN: ICD-10-PCS | Mod: S$PBB,,, | Performed by: THORACIC SURGERY (CARDIOTHORACIC VASCULAR SURGERY)

## 2020-07-13 PROCEDURE — 99999 PR PBB SHADOW E&M-EST. PATIENT-LVL III: CPT | Mod: PBBFAC,,, | Performed by: INTERNAL MEDICINE

## 2020-07-13 PROCEDURE — 83735 ASSAY OF MAGNESIUM: CPT

## 2020-07-13 PROCEDURE — 82248 BILIRUBIN DIRECT: CPT

## 2020-07-13 PROCEDURE — 93750 INTERROGATION VAD IN PERSON: CPT | Mod: S$PBB,,, | Performed by: INTERNAL MEDICINE

## 2020-07-13 PROCEDURE — 36415 COLL VENOUS BLD VENIPUNCTURE: CPT

## 2020-07-13 PROCEDURE — 99214 OFFICE O/P EST MOD 30 MIN: CPT | Mod: S$PBB,,, | Performed by: INTERNAL MEDICINE

## 2020-07-13 PROCEDURE — 83615 LACTATE (LD) (LDH) ENZYME: CPT

## 2020-07-13 PROCEDURE — 80061 LIPID PANEL: CPT

## 2020-07-13 PROCEDURE — 86140 C-REACTIVE PROTEIN: CPT

## 2020-07-13 PROCEDURE — 99999 PR PBB SHADOW E&M-EST. PATIENT-LVL III: CPT | Mod: PBBFAC,,, | Performed by: THORACIC SURGERY (CARDIOTHORACIC VASCULAR SURGERY)

## 2020-07-13 PROCEDURE — 99999 PR PBB SHADOW E&M-EST. PATIENT-LVL III: ICD-10-PCS | Mod: PBBFAC,,, | Performed by: THORACIC SURGERY (CARDIOTHORACIC VASCULAR SURGERY)

## 2020-07-13 PROCEDURE — 99214 OFFICE O/P EST MOD 30 MIN: CPT | Mod: S$PBB,,, | Performed by: THORACIC SURGERY (CARDIOTHORACIC VASCULAR SURGERY)

## 2020-07-13 PROCEDURE — 99999 PR PBB SHADOW E&M-EST. PATIENT-LVL III: ICD-10-PCS | Mod: PBBFAC,,, | Performed by: INTERNAL MEDICINE

## 2020-07-13 PROCEDURE — 85025 COMPLETE CBC W/AUTO DIFF WBC: CPT

## 2020-07-13 PROCEDURE — 99213 OFFICE O/P EST LOW 20 MIN: CPT | Mod: PBBFAC,25 | Performed by: INTERNAL MEDICINE

## 2020-07-13 PROCEDURE — 84134 ASSAY OF PREALBUMIN: CPT

## 2020-07-13 PROCEDURE — 83880 ASSAY OF NATRIURETIC PEPTIDE: CPT

## 2020-07-13 PROCEDURE — 80053 COMPREHEN METABOLIC PANEL: CPT

## 2020-07-13 PROCEDURE — 99214 PR OFFICE/OUTPT VISIT, EST, LEVL IV, 30-39 MIN: ICD-10-PCS | Mod: S$PBB,,, | Performed by: INTERNAL MEDICINE

## 2020-07-13 PROCEDURE — 93750 PR INTERROGATE VENT ASSIST DEV, IN PERSON, W PHYSICIAN ANALYSIS: ICD-10-PCS | Mod: S$PBB,,, | Performed by: INTERNAL MEDICINE

## 2020-07-13 RX ORDER — DIGOXIN 125 MCG
TABLET ORAL
Qty: 90 TABLET | Refills: 3 | Status: SHIPPED | OUTPATIENT
Start: 2020-07-13 | End: 2021-06-14 | Stop reason: SDUPTHER

## 2020-07-13 RX ORDER — PANTOPRAZOLE SODIUM 40 MG/1
TABLET, DELAYED RELEASE ORAL
Qty: 90 TABLET | Refills: 3 | Status: SHIPPED | OUTPATIENT
Start: 2020-07-13 | End: 2021-06-14 | Stop reason: SDUPTHER

## 2020-07-13 RX ORDER — SPIRONOLACTONE 25 MG/1
TABLET ORAL
Qty: 90 TABLET | Refills: 3 | Status: SHIPPED | OUTPATIENT
Start: 2020-07-13 | End: 2021-03-29 | Stop reason: SDUPTHER

## 2020-07-13 RX ORDER — AMIODARONE HYDROCHLORIDE 200 MG/1
200 TABLET ORAL DAILY
Qty: 90 TABLET | Refills: 3 | Status: SHIPPED | OUTPATIENT
Start: 2020-07-13 | End: 2021-06-14 | Stop reason: SDUPTHER

## 2020-07-13 RX ORDER — ASPIRIN 325 MG
TABLET, DELAYED RELEASE (ENTERIC COATED) ORAL
Qty: 90 TABLET | Refills: 3 | Status: SHIPPED | OUTPATIENT
Start: 2020-07-13 | End: 2021-06-14 | Stop reason: SDUPTHER

## 2020-07-13 RX ORDER — HYDRALAZINE HYDROCHLORIDE 50 MG/1
50 TABLET, FILM COATED ORAL 2 TIMES DAILY
Qty: 180 TABLET | Refills: 3 | Status: SHIPPED | OUTPATIENT
Start: 2020-07-13 | End: 2021-06-14 | Stop reason: SDUPTHER

## 2020-07-13 RX ORDER — FERROUS SULFATE 325(65) MG
TABLET ORAL
Qty: 90 TABLET | Refills: 3 | Status: SHIPPED | OUTPATIENT
Start: 2020-07-13 | End: 2021-06-14 | Stop reason: SDUPTHER

## 2020-07-13 NOTE — PROGRESS NOTES
Patient is declining dose instructions and is reporting incorrect dose that was given last week . Patient wanted a dose increase last week which was given but now he is reporting taking 3.75 mg m/w/f and 2.5 mg all other days .

## 2020-07-13 NOTE — LETTER
July 13, 2020        Dipesh De La Torre  [address] [facility] [facility] [address] [place] [address]  Phone: [phone]  Fax: [phone]             [facility] [address] [place] [address]  Phone: [phone]   Patient: Tae Delgado   MR Number: [id number]   YOB: 1960   Date of Visit: 7/13/2020       Dear Dr. Dipesh De La Torre    Thank you for referring Tae Delgado to me for evaluation. Attached you will find relevant portions of my assessment and plan of care.    If you have questions, please do not hesitate to call me. I look forward to following Tae Delgado along with you.    Sincerely,    Karson Bucio Jr, MD    Enclosure    If you would like to receive this communication electronically, please contact [email or web address] or [phone] to request [employer] Link access.    [employer] is a tool which provides read-only access to select patient information with whom you have a relationship. Its easy to use and provides real time access to review your patients record including encounter summaries, notes, results, and demographic information.    If you feel you have received this communication in error or would no longer like to receive these types of communications, please e-mail [email or web address]

## 2020-07-13 NOTE — PROCEDURES
TXP KLEVER INTERROGATIONS 7/13/2020 7/1/2020 5/27/2020 4/23/2020 4/6/2020 3/25/2020 3/20/2020   Type HeartMate3 HeartMate3 HeartMate3 HeartMate3 HeartMate3 HeartMate3 -   Flow 3.7 4.2 3.9 3.7 3.3 3.6 -   Speed 5300 5300 5300 5300 5300 5300 -   PI 7.0 6.4 7.0 6.3 8.7 7.5 -   Power (Berry) 3.9 4.0 4.0 3.8 3.9 3.9 -   LSL 4900 4900 4900 4900 4900 4900 -   Pulsatility No Pulse Intermittent pulse Pulse Intermittent pulse Pulse Pulse Pulse   }Interrogation of Ventricular assist device was performed with physician analysis of device parameters and review of device function. I have personally reviewed the interrogation findings and agree with findings as stated.

## 2020-07-13 NOTE — PROGRESS NOTES
Subjective:   Patient ID:  Tae Delgado is a 60 y.o. male who presents for LVAD followup visit.    Implant Date:1/23/2020      Heartmate 3 RPM 5300     INR goal: 2-3   Bridge with Heparin   Antiplatelets:      TXP KLEVER INTERROGATIONS 7/13/2020   Type HeartMate3   Flow 3.7   Speed 5300   PI 7.0   Power (Berry) 3.9   LSL 4900   Pulsatility No Pulse   Interrogation of Ventricular assist device was performed with physician analysis of device parameters and review of device function. I have personally reviewed the interrogation findings and agree with findings as stated.     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  His post-op course complicated by RV failure. He went for wash out on 1/27/20 and his chest was closed on 1/29/20. Postoperatively he had PAF and was started on Digoxin and anticoagulated with Coumadin. Renal function neptali acutely in the post op period but improved during his hospital stay.  Creatinine on day of discharge was 1.8. Pre-op a right lower lobe pulm nodule found incidentally on CT but subsequent CT's have demonstrated no pathology so now felt to have been atelectasis. Patient was discharged to rehab facility for further therapy. When I saw him April 2020 he was upset and reported a physical therapist in rehab lifted him with chest strap.  After that he noted discomfort left side of chest near sternum below clavicle and a vibration along right sternal border, hearing LVAD in ears, popping of both shoulders.  He was convinced his pump moved so at that visit I spent a lot of time reviewing CXR's and his exam with him and assured him pump was fine, no sternal wires broken, sternum stable and hearing pump not unusual, vibration feeling right sternal border most likely due to flow through outflow graft but no pump issues and LDH was fine.  He  had CT scan after Feb visit with less definition of abnormality described on pre-op CT though it was not normal.    He continued to experience hearing pump at night but reports very loud now and not simply in his ears.  He continues with vibration feeling along right sternum and pain that he attributes to the rehab therapist.  He reports trouble sleeping at night and tells me that he wrote an e-mail while angry in middle of the night.  E-mail described in notes of others and in communications within [facility].  July 1, 2020 he met with Dr. Pearson before my visit and reportedly expressed remorse for his actions, apologized.  Dr. Pearson will has separate documentation.    At that visit he reported mild BELTRAN and dizziness so he had stopped lisinopril approx June 20, 2020 and his evening dose of lasix June 27, 2020.  At visit July 1, 2020 Cr had bumped to 2.4.  At that visit I changed lasix to 20-40 mg with KCl 20 meq for every 20 mg of lasix on as needed basis.  I told him to stay off lisinopril and initiated hydralazine 25 mg BID.  At that visit LFT's elevated so with amiodarone interaction with atorvastatin I will reduced atorvastatin to 40 mg hs.    He returns today for f/u of BP and to see Dr. Schmitt re: pump noise that the patient notes.  Dr. Schmitt's note is pending though patient reports no intervention planned.    At this time dizziness resolved, better energy and Cr back to baseline 1.7.    Review of Systems   Constitution: Positive for malaise/fatigue and weight gain (on my scale but he denies on his scale). Negative for chills, fever, night sweats and weight loss.   Cardiovascular: Positive for dyspnea on exertion. Negative for chest pain, irregular heartbeat, leg swelling, near-syncope, orthopnea, palpitations, paroxysmal nocturnal dyspnea and syncope.   Respiratory: Positive for shortness of breath and snoring. Negative for cough, sputum production and wheezing.    Endocrine: Positive for cold intolerance and heat  "intolerance.   Hematologic/Lymphatic: Does not bruise/bleed easily.   Musculoskeletal: Negative for arthritis, joint pain and stiffness.   Gastrointestinal: Negative for hematochezia and melena.   Genitourinary: Negative for hematuria.   Neurological: Positive for weakness (getting better). Negative for brief paralysis, dizziness (resolved), focal weakness, light-headedness (resolved) and seizures.   Psychiatric/Behavioral: Positive for memory loss. The patient has insomnia.      Objective:   Blood pressure (!) 90/0, temperature 97.9 °F (36.6 °C), temperature source Oral, height 5' 10" (1.778 m), weight 104.3 kg (230 lb).body mass index is 33 kg/m².  Doppler: right 86 by MA and left 82 mm Hg (left checked twice by myself 82 and MA 80 mm Hg) to confirm BP had indeed fallen as admission to clinic doppler reported by  mm Hg  Physical Exam   Constitutional: He appears well-developed and well-nourished. No distress.   BP (!) 90/0 (BP Location: Right arm, Patient Position: Sitting) Comment (BP Method): doppler  Temp 97.9 °F (36.6 °C) (Oral)   Ht 5' 10" (1.778 m)   Wt 104.3 kg (230 lb)   BMI 33.00 kg/m²   Last visit wt 101.2 kg (223 lb)   Weight up on my scale but he says no change in wt on his scale  WD, WN WM in NAD   HENT:   Head: Normocephalic and atraumatic.   Eyes: Conjunctivae are normal. Right eye exhibits no discharge. Left eye exhibits no discharge. No scleral icterus.   Neck: No JVD present. No thyromegaly present.   Cardiovascular:   Normal LVAD sounds, DL 1   Pulmonary/Chest: Effort normal and breath sounds normal. No respiratory distress. He has no wheezes. He has no rales.   Abdominal: Soft. Bowel sounds are normal. He exhibits no distension and no mass. There is no abdominal tenderness. There is no rebound and no guarding.   Musculoskeletal:         General: No tenderness or edema.   Neurological: He is alert.   Skin: Skin is warm and dry. He is not diaphoretic.   Dusky color to feet that resolves " when elevated; he does not use sunscreen or sun precautions and has gray discoloration chiefly feet and lower legs.  Chronic stasis changes both lower legs with varicose veins   Psychiatric: He has a normal mood and affect. His behavior is normal. Judgment and thought content normal.     Lab Results   Component Value Date     (H) 07/13/2020     07/13/2020    K 3.9 07/13/2020    MG 2.0 07/13/2020     07/13/2020     04/13/2020    CO2 26 07/13/2020    BUN 28 (H) 07/13/2020    CREATININE 1.7 (H) 07/13/2020    GLU 80 07/13/2020    AST 51 (H) 07/13/2020    ALT 81 (H) 07/13/2020    ALBUMIN 4.1 07/13/2020    PROT 7.7 07/13/2020    BILITOT 0.9 07/13/2020    WBC 11.14 07/13/2020    HGB 14.2 07/13/2020    HCT 43.4 07/13/2020     07/13/2020    INR 3.0 (H) 07/13/2020     07/13/2020    TSH 2.781 07/01/2020    CHOL 116 (L) 07/13/2020    HDL 27 (L) 07/13/2020    LDLCALC 70.8 07/13/2020    TRIG 91 07/13/2020    Z5TWNZE 10.1 07/01/2020     Assessment:      1. LVAD (left ventricular assist device) present    2. Elevated transaminase measurement    3. Long term current use of amiodarone    4. Hypertensive heart disease with heart failure    5. Chronic combined systolic and diastolic congestive heart failure    6. COCM (congestive cardiomyopathy)    7. Paroxysmal atrial fibrillation    8. NSVT (nonsustained ventricular tachycardia)    9. At risk for amiodarone toxicity with long term use    10. Long term (current) use of anticoagulants    11. Mixed hyperlipidemia    12. Iron deficiency anemia, unspecified iron deficiency anemia type    13. ICD (implantable cardioverter-defibrillator) in place      Plan:   Increase hydralazine to 50 mg BID  Sun screen to avoid discoloration of skin from amiodarone   RTC 1 month  Lipids to be repeated in October 2020--today too soon    Supplies ordered are medically necessary for care of LVAD and DL    Melatonin recommended; chronic use of hypnotics is not a viable  solution    Patient is now NYHA II    Listed for transplant: No    UNOS Patient Status  Functional Status: 90% - Able to carry on normal activity: minor symptoms of disease  Physical Capacity: No Limitations  Working for Income: No  If no, reason not working: Disability

## 2020-07-13 NOTE — PROGRESS NOTES
Subjective:      Patient ID: Tae Delgado is a 60 y.o. male.    Chief Complaint: No chief complaint on file.      HPI:  Tae Delgado is a 60 y.o. male who presents for follow up s/p LVAD 1/23/2020. His post-op course was complicated by RV failure and on 1/27/2020 he had a washout with chest closure on 1/29/20. Patient reports hearing his pump since an interaction with a physical therapist in April. Recently sent a threatening email regarding his concerns with his LVAD and post operative course. Met with [facility] recently and per chart review expressed remorse over his words. Would like to discuss his options. States that he sent the e-mail after 3 days of no sleep due to excessively loud LVAD sounds. States that when he was originally discharged after his operation he had no complaints and was very happy with the care he received from Dr. Schmitt and [facility] teams. States after the PT visit in April he has has had a persistent vibration against his chest and loud noises coming from pump, worse when laying flat. The therapist reportedly used a band on his chest for stability. Patient says when he informed the therapist of the increased noise after the use of the band, they essentially said it was his fault, which was very frustrating. Today in clinic notes only a faint sound from pump. Listened to a video he recorded of the noise which is unusually loud. Here today to discuss future options as this is effecting his quality of life. Is frustrated as he does not feel his concerns have been validated and this issue has been going on for some time.     Current medications Reviewed    Review of Systems   Constitutional: Negative for activity change.   Cardiovascular: Negative for leg swelling.        Vibration in chest   Increased noise from LVAD    Gastrointestinal: Negative for nausea.   Musculoskeletal: Negative for gait problem.   Skin: Negative for color change.   Neurological: Negative for weakness.   Hematological: Does not  bruise/bleed easily.   Psychiatric/Behavioral: Positive for sleep disturbance.     Objective:   Physical Exam  Vitals signs reviewed.   Constitutional:       Appearance: He is well-developed.   HENT:      Head: Normocephalic and atraumatic.   Eyes:      Pupils: Pupils are equal, round, and reactive to light.   Cardiovascular:      Rate and Rhythm: Normal rate.      Comments: Smooth VAD hum   Pulmonary:      Effort: Pulmonary effort is normal.      Breath sounds: Normal breath sounds.   Musculoskeletal: Normal range of motion.   Skin:     General: Skin is warm and dry.   Neurological:      Mental Status: He is alert and oriented to person, place, and time.       Diagnotic Results: reviewed   CTA C/A/P 7/1/2020   Stable appearance of left ventricular assist device with no significant inflammatory change surrounding driveline or significant abnormality of the outflow cannula, noting non contrasted exam.  Stable appearance of the aortic valve with stable central radiopaque material.  Hepatomegaly as well as increased attenuation of the liver, which can be seen in amiodarone therapy.  Grade 1 anterolisthesis of L5 on S1 secondary to bilateral pars interarticularis defects.    Assessment:   S/p LVAD   Plan:     Patient was seen and evaluated.  CT scan was reviewed.  At this stage it does not appear from the CT scan findings that there is anything that needs to be done surgically for the no ice that the patient perceives.  If patient had a recording of the noise and it appears it is loud.  This is very unfortunate and unique that 1st time we have experienced such experience from the patient regarding the noise from an lvad.  Patient's thoughts and concerns were validated.  Patient was advised to talked to the medical team and an ask if there are any medications that can help him overcome this anxiety and feeling of loud sound.  Patient was also informed to investigate his transplant options.  All questions and concerns  were addressed .  Patient stated that he had no problems from surgery and felt all of these things started during the rehabilitation process.  Patient is also going to write a letter to the Heart Failure team apologizing for his prior letter.

## 2020-07-13 NOTE — PATIENT INSTRUCTIONS
I am increasing hydralazine to 50 mg twice a day--you can take 2 of the 25 mg to use them up and then  50 mg tablets  Sun screen to avoid discoloration of skin from amiodarone

## 2020-07-16 NOTE — PROGRESS NOTES
Date of Implant with Heartmate 3 LVAD: 2020    PATIENT ARRIVED IN CLINIC:  Ambulatory   Accompanied by: Lavern Jain  Temperature, oral:   Temp Readings from Last 1 Encounters:   20 97.9 °F (36.6 °C) (Oral)     Blood Pressure:   BP Readings from Last 3 Encounters:   20 (!) 90/0   20 (!) 82/0   20 (!) 92/0        VAD Interrogation:  TXP KLEVER INTERROGATIONS 2020   Type HeartMate3 HeartMate3 HeartMate3   Flow 3.7 4.2 3.9   Speed 5300 5300 5300   PI 7.0 6.4 7.0   Power (Berry) 3.9 4.0 4.0   LSL 4900 4900 4900   Pulsatility No Pulse Intermittent pulse Pulse       History Lo.c3e  Problems / Issues / Alarms with VAD if any: None noted  VAD Sounds: HM3 Smooth  Heartmate 3 Module Cable:  No yellow exposed and Attempted to unscrew modular cable to ensure it will be able to come lose in the event we ever need to change the modular cable while patient held the driveline in place so it would not move. Modular cable connection able to be unscrewed and re-tightened. Instructed pt to perform this weekly.    HCT:   Lab Results   Component Value Date    HCT 43.4 2020    HCT 25 (L) 2020       Complaints/reason for visit today: routine  Emergency Equipment With Patient: yes   VAD Binder With Patient: yes   Reviewed VAD Numbers In Binder: yes    Any Equipment Issues: None noted (Refer to  note for complete details)    DLES Assessment:  Appearance Of Driveline: 1  Antibiotics: NO  Velour: no  Manual & Visual Inspection Of Driveline: No kinks or tears noted  Stabilization Device In Use: yes, giles securement device        Assessment:   PAIN: NO  Complaints Of Nausea / Vomiting: None noted    Appearance and Frequency Of Stools: normal and formed without blood & daily  Color Of Urine: clear/yellow  Coping/Depression/Anxiety: coping okay and anxious  Sleep Habits: 3-4 hrs /night  Sleep Aids: None noted  Showering: Patient given permission to  shower, educated on how to use the shower bag per , instructed to cover dressing with Glad Press -n- Seal or Tegaderm, never hang shower bag in the shower, explained the reason for changing dressing immediately after drying off. All questions answered.  Activity/Exercise: walking around house   Driving: Yes. Reminded to pull over should there be an alarm before looking down at controller.    DLES Dressing Care:   Frequency of Dressing Changes: daily & soap and water dressing  Pt In Need Of Management Kits?:yes -   1 Box of soap and water dressing  It is medically necessary to have VAD management kits in order to prevent infection or to assist in the healing of an infected DLES.    Labs:    Chemistry        Component Value Date/Time     07/13/2020 1102    K 3.9 07/13/2020 1102     07/13/2020 1102     04/13/2020    CO2 26 07/13/2020 1102    BUN 28 (H) 07/13/2020 1102    BUN 24 (A) 04/13/2020    CREATININE 1.7 (H) 07/13/2020 1102    CREATININE 1.7 04/13/2020    GLU 80 07/13/2020 1102        Component Value Date/Time    CALCIUM 10.0 07/13/2020 1102    CALCIUM 10.1 04/13/2020    ALKPHOS 66 07/13/2020 1102    AST 51 (H) 07/13/2020 1102    ALT 81 (H) 07/13/2020 1102    BILITOT 0.9 07/13/2020 1102    ESTGFRAFRICA 49.6 (A) 07/13/2020 1102    EGFRNONAA 42.9 (A) 07/13/2020 1102            Magnesium   Date Value Ref Range Status   07/13/2020 2.0 1.6 - 2.6 mg/dL Final       Lab Results   Component Value Date    WBC 11.14 07/13/2020    HGB 14.2 07/13/2020    HCT 43.4 07/13/2020    MCV 86 07/13/2020     07/13/2020       Lab Results   Component Value Date    INR 3.0 (H) 07/13/2020    INR 1.86 07/06/2020    INR 1.9 (H) 07/01/2020       BNP   Date Value Ref Range Status   07/13/2020 232 (H) 0 - 99 pg/mL Final     Comment:     Values of less than 100 pg/ml are consistent with non-CHF populations.   07/01/2020 122 (H) 0 - 99 pg/mL Final     Comment:     Values of less than 100 pg/ml are  consistent with non-CHF populations.   05/27/2020 206 (H) 0 - 99 pg/mL Final     Comment:     Values of less than 100 pg/ml are consistent with non-CHF populations.       LD   Date Value Ref Range Status   07/13/2020 212 110 - 260 U/L Final     Comment:     Results are increased in hemolyzed samples.   07/01/2020 212 110 - 260 U/L Final     Comment:     Results are increased in hemolyzed samples.   05/27/2020 196 110 - 260 U/L Final     Comment:     Results are increased in hemolyzed samples.       Labs reviewed with patient: YES      Patient Satisfaction Survey completed per patient: No  (explained about signature and box to check)  Medication reconciliation: per MA.  Medication Detail updated today: yes  Coumadin Managed by: [facility]    Education: Reviewed driveline care, emergency procedures, how to change the controller, alarms with patient, as well as discussed how to page the VAD coordinator in case of an emergency.   Coved - 19 education: Reminded patient/caregiver to check temperature daily and call us if it is > 99.0.  Reminded them  to stay 6 feet away from other people, wash hands frequently, don't touch your face and stay home except yo get labs, medications, and appts.      Plans/Needs: Routine f/u. Pt saw Dr. Schmitt today for c/o loud noise from VAD- see Dr. Schmitt's note. Pt c/o feet being cold and purple most of the time- Dr. Bucio assessed- Pt has venous statis, varicose veins and is taking amiodarone. Hydralazine increased to 50mg BID for doppler of 90. Pt OK'd to shower.   Pt states that he has been using chlorhexidine soap for his VAD dressing change instead of betadine. Pt is not breaking out and would like to try daily kits. Instructions given to Lavern (caregiver) to review- will check off at next clinic visit.   RTC 1 month with PFTs.      Hurricane Season: Yes, discussed with patient: With hurricane season approaching, we want to make sure you are fully prepared for any  emergency.  Should the National Weather Service or your local authorities recommend a voluntary or mandatory evacuation of your area, The VAD team requires you to evacuate to a safe place.  Remember, when it is a mandatory evacuation, traffic will become an issue for your limited battery power.  Therefore, we strongly urge you to evacuate early.    The VAD team advises you to have the following in place before hurricane season:  Have an evacuation plan in place including places to evacuate, names and phone numbers.  This information is required to be given to the VAD coordinator.   1. Have your VAD emergency contact numbers with you.   2. Make sure your prescriptions will not run out by the end of September.   3. Make sure you have enough medications, including pills, inhalers, patches, etc. to take, should you be gone for more than 2 weeks.   4. Make sure ALL of your batteries are fully charged.   5. Bring enough dressing change supplies to last for at least 2 weeks.   6. Bring your VAD binder with you.  Make sure your binder is updated and complete with alarms reference card, patient hand book, emergency contact numbers, daily log sheets, etc.  If you do not have family or friends as an evacuation destination, we recommend evacuating to a safe area.   Do NOT evacuate to [facility].    The VAD team wants to stress the importance of planning for your evacuation in the event of a hurricane.  If you have any LVAD questions or issues, please contact the LVAD coordinator.

## 2020-07-22 ENCOUNTER — ANTI-COAG VISIT (OUTPATIENT)
Dept: CARDIOLOGY | Facility: CLINIC | Age: 60
End: 2020-07-22
Payer: MEDICARE

## 2020-07-22 DIAGNOSIS — I48.0 PAROXYSMAL ATRIAL FIBRILLATION: ICD-10-CM

## 2020-07-22 DIAGNOSIS — Z79.01 LONG TERM (CURRENT) USE OF ANTICOAGULANTS: ICD-10-CM

## 2020-07-22 DIAGNOSIS — Z95.811 LVAD (LEFT VENTRICULAR ASSIST DEVICE) PRESENT: ICD-10-CM

## 2020-07-22 LAB — INR PPP: 2.18

## 2020-07-22 PROCEDURE — 93793 ANTICOAG MGMT PT WARFARIN: CPT | Mod: ,,,

## 2020-07-22 PROCEDURE — 93793 PR ANTICOAGULANT MGMT FOR PT TAKING WARFARIN: ICD-10-PCS | Mod: ,,,

## 2020-07-31 ENCOUNTER — ANTI-COAG VISIT (OUTPATIENT)
Dept: CARDIOLOGY | Facility: CLINIC | Age: 60
End: 2020-07-31
Payer: MEDICARE

## 2020-07-31 ENCOUNTER — TELEPHONE (OUTPATIENT)
Dept: TRANSPLANT | Facility: CLINIC | Age: 60
End: 2020-07-31

## 2020-07-31 DIAGNOSIS — I48.0 PAROXYSMAL ATRIAL FIBRILLATION: ICD-10-CM

## 2020-07-31 DIAGNOSIS — Z79.01 LONG TERM (CURRENT) USE OF ANTICOAGULANTS: ICD-10-CM

## 2020-07-31 DIAGNOSIS — Z95.811 LVAD (LEFT VENTRICULAR ASSIST DEVICE) PRESENT: ICD-10-CM

## 2020-07-31 LAB — INR PPP: 2.89

## 2020-07-31 PROCEDURE — 93793 PR ANTICOAGULANT MGMT FOR PT TAKING WARFARIN: ICD-10-PCS | Mod: ,,,

## 2020-07-31 PROCEDURE — 93793 ANTICOAG MGMT PT WARFARIN: CPT | Mod: ,,,

## 2020-08-11 DIAGNOSIS — I47.29 NSVT (NONSUSTAINED VENTRICULAR TACHYCARDIA): ICD-10-CM

## 2020-08-11 DIAGNOSIS — Z95.810 ICD (IMPLANTABLE CARDIOVERTER-DEFIBRILLATOR) IN PLACE: Primary | ICD-10-CM

## 2020-08-13 ENCOUNTER — OFFICE VISIT (OUTPATIENT)
Dept: TRANSPLANT | Facility: CLINIC | Age: 60
End: 2020-08-13
Attending: INTERNAL MEDICINE
Payer: MEDICARE

## 2020-08-13 ENCOUNTER — CLINICAL SUPPORT (OUTPATIENT)
Dept: CARDIOLOGY | Facility: HOSPITAL | Age: 60
End: 2020-08-13
Attending: INTERNAL MEDICINE
Payer: MEDICARE

## 2020-08-13 ENCOUNTER — ANTI-COAG VISIT (OUTPATIENT)
Dept: CARDIOLOGY | Facility: CLINIC | Age: 60
End: 2020-08-13
Payer: MEDICARE

## 2020-08-13 ENCOUNTER — CLINICAL SUPPORT (OUTPATIENT)
Dept: CARDIOLOGY | Facility: HOSPITAL | Age: 60
End: 2020-08-13
Payer: MEDICARE

## 2020-08-13 ENCOUNTER — CLINICAL SUPPORT (OUTPATIENT)
Dept: TRANSPLANT | Facility: CLINIC | Age: 60
End: 2020-08-13
Payer: MEDICARE

## 2020-08-13 ENCOUNTER — HOSPITAL ENCOUNTER (OUTPATIENT)
Dept: PULMONOLOGY | Facility: CLINIC | Age: 60
Discharge: HOME | End: 2020-08-13
Payer: MEDICARE

## 2020-08-13 VITALS — WEIGHT: 224.88 LBS | BODY MASS INDEX: 32.19 KG/M2 | HEIGHT: 70 IN

## 2020-08-13 VITALS — TEMPERATURE: 99 F | HEIGHT: 70 IN | SYSTOLIC BLOOD PRESSURE: 92 MMHG | WEIGHT: 233 LBS | BODY MASS INDEX: 33.36 KG/M2

## 2020-08-13 DIAGNOSIS — I47.29 NSVT (NONSUSTAINED VENTRICULAR TACHYCARDIA): ICD-10-CM

## 2020-08-13 DIAGNOSIS — Z95.811 LVAD (LEFT VENTRICULAR ASSIST DEVICE) PRESENT: ICD-10-CM

## 2020-08-13 DIAGNOSIS — Z79.899 LONG TERM CURRENT USE OF AMIODARONE: ICD-10-CM

## 2020-08-13 DIAGNOSIS — Z79.899 AT RISK FOR AMIODARONE TOXICITY WITH LONG TERM USE: ICD-10-CM

## 2020-08-13 DIAGNOSIS — I48.0 PAROXYSMAL ATRIAL FIBRILLATION: ICD-10-CM

## 2020-08-13 DIAGNOSIS — Z95.811 LVAD (LEFT VENTRICULAR ASSIST DEVICE) PRESENT: Primary | ICD-10-CM

## 2020-08-13 DIAGNOSIS — Z95.810 PRESENCE OF AUTOMATIC (IMPLANTABLE) CARDIAC DEFIBRILLATOR: ICD-10-CM

## 2020-08-13 DIAGNOSIS — Z95.810 ICD (IMPLANTABLE CARDIOVERTER-DEFIBRILLATOR) IN PLACE: ICD-10-CM

## 2020-08-13 DIAGNOSIS — Z91.89 AT RISK FOR AMIODARONE TOXICITY WITH LONG TERM USE: ICD-10-CM

## 2020-08-13 DIAGNOSIS — I47.20 VENTRICULAR TACHYCARDIA: ICD-10-CM

## 2020-08-13 DIAGNOSIS — Z79.01 LONG TERM (CURRENT) USE OF ANTICOAGULANTS: ICD-10-CM

## 2020-08-13 DIAGNOSIS — I50.42 CHRONIC COMBINED SYSTOLIC AND DIASTOLIC CONGESTIVE HEART FAILURE: ICD-10-CM

## 2020-08-13 DIAGNOSIS — I11.0 HYPERTENSIVE HEART DISEASE WITH HEART FAILURE: ICD-10-CM

## 2020-08-13 DIAGNOSIS — I42.0 COCM (CONGESTIVE CARDIOMYOPATHY): ICD-10-CM

## 2020-08-13 PROCEDURE — 93282 PRGRMG EVAL IMPLANTABLE DFB: CPT | Mod: 26,,, | Performed by: INTERNAL MEDICINE

## 2020-08-13 PROCEDURE — 99999 PR PBB SHADOW E&M-EST. PATIENT-LVL III: CPT | Mod: PBBFAC,,, | Performed by: INTERNAL MEDICINE

## 2020-08-13 PROCEDURE — 99213 OFFICE O/P EST LOW 20 MIN: CPT | Mod: PBBFAC | Performed by: INTERNAL MEDICINE

## 2020-08-13 PROCEDURE — 93295 DEV INTERROG REMOTE 1/2/MLT: CPT | Mod: ,,, | Performed by: INTERNAL MEDICINE

## 2020-08-13 PROCEDURE — 93750 OP LVAD INTERROGATION: ICD-10-PCS | Mod: ,,, | Performed by: INTERNAL MEDICINE

## 2020-08-13 PROCEDURE — 93282 CARDIAC DEVICE CHECK - IN CLINIC & HOSPITAL: ICD-10-PCS | Mod: 26,,, | Performed by: INTERNAL MEDICINE

## 2020-08-13 PROCEDURE — 93750 INTERROGATION VAD IN PERSON: CPT | Mod: ,,, | Performed by: INTERNAL MEDICINE

## 2020-08-13 PROCEDURE — 94618 PULMONARY STRESS TESTING: ICD-10-PCS | Mod: 26,S$PBB,, | Performed by: INTERNAL MEDICINE

## 2020-08-13 PROCEDURE — 94618 PULMONARY STRESS TESTING: CPT | Mod: PBBFAC | Performed by: INTERNAL MEDICINE

## 2020-08-13 PROCEDURE — 93282 PRGRMG EVAL IMPLANTABLE DFB: CPT

## 2020-08-13 PROCEDURE — 99214 OFFICE O/P EST MOD 30 MIN: CPT | Mod: S$PBB,,, | Performed by: INTERNAL MEDICINE

## 2020-08-13 PROCEDURE — 93295 CARDIAC DEVICE CHECK - REMOTE: ICD-10-PCS | Mod: ,,, | Performed by: INTERNAL MEDICINE

## 2020-08-13 PROCEDURE — 93750 INTERROGATION VAD IN PERSON: CPT | Mod: PBBFAC | Performed by: INTERNAL MEDICINE

## 2020-08-13 PROCEDURE — 99214 PR OFFICE/OUTPT VISIT, EST, LEVL IV, 30-39 MIN: ICD-10-PCS | Mod: S$PBB,,, | Performed by: INTERNAL MEDICINE

## 2020-08-13 PROCEDURE — 99999 PR PBB SHADOW E&M-EST. PATIENT-LVL III: ICD-10-PCS | Mod: PBBFAC,,, | Performed by: INTERNAL MEDICINE

## 2020-08-13 PROCEDURE — 94618 PULMONARY STRESS TESTING: CPT | Mod: 26,S$PBB,, | Performed by: INTERNAL MEDICINE

## 2020-08-13 RX ORDER — LISINOPRIL 5 MG/1
2.5 TABLET ORAL DAILY
Qty: 45 TABLET | Refills: 3
Start: 2020-08-13 | End: 2020-09-28

## 2020-08-13 NOTE — LETTER
August 13, 2020        Dipesh De La Torre  [address] [address]  [place] [address]  Phone: [phone]  Fax: [phone]             [id number]  [address]  [facility] [address]  Phone: [phone]   Patient: Tae Delgado   MR Number: [id number]   YOB: 1960   Date of Visit: 8/13/2020       Dear Dr. Dipesh De La Torre    Thank you for referring Tae Delgado to me for evaluation. Attached you will find relevant portions of my assessment and plan of care.    If you have questions, please do not hesitate to call me. I look forward to following Tae Delgado along with you.    Sincerely,    Karson Bucio Jr, MD    Enclosure    If you would like to receive this communication electronically, please contact [email or web address] or [phone] to request [employer] Link access.    [employer] Link is a tool which provides read-only access to select patient information with whom you have a relationship. Its easy to use and provides real time access to review your patients record including encounter summaries, notes, results, and demographic information.    If you feel you have received this communication in error or would no longer like to receive these types of communications, please e-mail [email or web address]

## 2020-08-13 NOTE — PROCEDURES
TXP KLEVER INTERROGATIONS 8/13/2020 7/13/2020 7/1/2020 5/27/2020 4/23/2020 4/6/2020 3/25/2020   Type HeartMate3 HeartMate3 HeartMate3 HeartMate3 HeartMate3 HeartMate3 HeartMate3   Flow 3.7 3.7 4.2 3.9 3.7 3.3 3.6   Speed 5300 5300 5300 5300 5300 5300 5300   PI 7.2 7.0 6.4 7.0 6.3 8.7 7.5   Power (Berry) 3.9 3.9 4.0 4.0 3.8 3.9 3.9   LSL 4900 4900 4900 4900 4900 4900 4900   Pulsatility No Pulse No Pulse Intermittent pulse Pulse Intermittent pulse Pulse Pulse   }Interrogation of Ventricular assist device was performed with physician analysis of device parameters and review of device function. I have personally reviewed the interrogation findings and agree with findings as stated.
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
## 2020-08-13 NOTE — PROGRESS NOTES
"Subjective:   Patient ID:  Tae Delgado is a 60 y.o. male who presents for LVAD followup visit.    Implant Date:1/23/2020  "[employer]"  Initials:RCD     Heartmate 3 RPM 5300     INR goal: 2-3   Bridge with Heparin   Antiplatelets:     TXP KLEVER INTERROGATIONS 8/13/2020   Type HeartMate3   Flow 3.7   Speed 5300   PI 7.2   Power (Berry) 3.9   LSL 4900   Pulsatility No Pulse   Interrogation of Ventricular assist device was performed with physician analysis of device parameters and review of device function. I have personally reviewed the interrogation findings and agree with findings as stated.     HPI  61 yo WM s/p LVAD back from vacation and had great time.  He reports only taking hydralazine 25 mg BID instead 50 mg BID because he did not like the way he felt--woozy, lightheaded and off balance.  No other c/o except when walking up incline 180 feet in cave trouble getting up but no problem walking level ground or climbing stairs.    Review of Systems   Constitution: Negative for chills, fever, malaise/fatigue, night sweats, weight gain and weight loss.   HENT: Negative for congestion, hearing loss, hoarse voice, nosebleeds and sore throat.    Eyes: Positive for visual disturbance. Negative for double vision, pain, vision loss in left eye and vision loss in right eye.   Cardiovascular: Positive for dyspnea on exertion. Negative for chest pain, claudication, irregular heartbeat, leg swelling, near-syncope, orthopnea, palpitations, paroxysmal nocturnal dyspnea and syncope.   Respiratory: Negative for cough, hemoptysis, shortness of breath, sleep disturbances due to breathing, snoring, sputum production and wheezing.    Endocrine: Negative for cold intolerance, heat intolerance, polydipsia and polyuria.   Hematologic/Lymphatic: Negative for bleeding problem. Does not bruise/bleed easily.   Musculoskeletal: Negative for falls, muscle cramps, myalgias and neck pain.   Gastrointestinal: Negative for bloating, abdominal " "pain, change in bowel habit, constipation, diarrhea, hematochezia, jaundice, melena and nausea.   Genitourinary: Negative for bladder incontinence, dysuria, frequency, hematuria, hesitancy, incomplete emptying and urgency.   Neurological: Negative for brief paralysis, dizziness, focal weakness, headaches, numbness, paresthesias, seizures and weakness.   Psychiatric/Behavioral: Negative for depression and memory loss. The patient has insomnia (he has not tried melatonin as recommended at July visit). The patient is not nervous/anxious.      Objective:   Blood pressure (!) 92/0, temperature 98.6 °F (37 °C), temperature source Oral, height 5' 10" (1.778 m), weight 105.7 kg (233 lb).body mass index is 33.43 kg/m².  Doppler: 92 mm Hg  Physical Exam   Constitutional: He appears well-developed and well-nourished. No distress.   HENT:   Head: Normocephalic and atraumatic.   Eyes: Conjunctivae are normal. Right eye exhibits no discharge. Left eye exhibits no discharge. No scleral icterus.   Neck: No JVD present. No thyromegaly present.   Cardiovascular:   Normal VAD sounds; DL 1   Pulmonary/Chest: Effort normal and breath sounds normal. No respiratory distress. He has no wheezes. He has no rales.   Abdominal: Soft. Bowel sounds are normal. He exhibits no distension and no mass. There is no abdominal tenderness. There is no rebound and no guarding.   Musculoskeletal:         General: No tenderness or edema.   Neurological: He is alert.   Skin: Skin is warm and dry. He is not diaphoretic.   Psychiatric: He has a normal mood and affect. His behavior is normal. Judgment and thought content normal.     Lab Results   Component Value Date     (H) 08/13/2020     08/13/2020    K 4.2 08/13/2020    MG 2.0 08/13/2020     08/13/2020     04/13/2020    CO2 25 08/13/2020    BUN 32 (H) 08/13/2020    BUN 24 (A) 04/13/2020    CREATININE 1.7 (H) 08/13/2020    CREATININE 1.7 04/13/2020     08/13/2020    HGBA1C 6.2 " (H) 01/15/2020    AST 30 08/13/2020    ALT 50 (H) 08/13/2020    ALBUMIN 4.2 08/13/2020    ALBUMIN 4.3 04/13/2020    PROT 7.5 08/13/2020    BILITOT 1.0 08/13/2020    WBC 7.01 08/13/2020    WBC 6.1 04/13/2020    HGB 13.4 (L) 08/13/2020    HCT 42.6 08/13/2020    HCT 25 (L) 02/05/2020     08/13/2020    INR 2.0 (H) 08/13/2020    INR 2.89 07/31/2020     08/13/2020    TSH 2.781 07/01/2020    CHOL 116 (L) 07/13/2020    HDL 27 (L) 07/13/2020    LDLCALC 70.8 07/13/2020    TRIG 91 07/13/2020    S9XPIZT 10.1 07/01/2020     Assessment:      1. LVAD (left ventricular assist device) present    2. Long term current use of amiodarone    3. Chronic combined systolic and diastolic congestive heart failure    4. COCM (congestive cardiomyopathy)    5. Long term (current) use of anticoagulants    6. Hypertensive heart disease with heart failure    7. Paroxysmal atrial fibrillation    8. NSVT (nonsustained ventricular tachycardia)    9. At risk for amiodarone toxicity with long term use    10. ICD (implantable cardioverter-defibrillator) in place      Plan:   Diuresis, wt loss, low sodium diet and fluid restriction reviewed along with daily weights and how to respond to 3# weight gain with prn diuretics.  If this fails to restore dry weight call us within 2-3 days.     For BP he is willing to try lisinopril 2.5 qd taking half of 5 mg tab he has at home and stay on aldactone and hydralazine; obtain BMP in 2 weeks with INR    For sleep try melatonin 5 mg    Get eyes checked for visual disturbance    Supplies ordered are medically necessary for care of LVAD and DL    Patient is now NYHA II    Listed for transplant: No    UNOS Patient Status  Functional Status: 90% - Able to carry on normal activity: minor symptoms of disease  Physical Capacity: No Limitations  Working for Income: No  If no, reason not working: Disability

## 2020-08-13 NOTE — PROCEDURES
Tae Delgado is a 60 y.o.  male patient, who presents for a 6 minute walk test ordered by MD Oskar.  The diagnosis is Congestive Heart Failure; LVAD  The patient's BMI is 32.3 kg/m2.  Predicted distance (lower limit of normal) is 389.4 meters.      Test Results:    The test was completed without stopping.  The total time walked was 360 seconds.  During walking, the patient reported:  Dyspnea.  The patient used no assistive devices during testing.     08/13/2020---------Distance: 426.72 meters (1400 feet)     O2 Sat % Supplemental Oxygen Heart Rate Blood Pressure Krista Scale   Pre-exercise  (Resting) 98 % Room Air 30 bpm Unable to obtain 0   During Exercise 99 % Room Air 89 bpm Unable to obtain 3   Post-exercise  (Recovery) 98 % Room Air  82 bpm Unable to obtain 1     Recovery Time: 96 seconds   The patient walked the first 15 feet in 3.19 seconds.    Performing nurse/tech: Debora MAURICE      PREVIOUS STUDY:   The patient has not had a previous study.      CLINICAL INTERPRETATION:  Six minute walk distance is 426.72 meters (1400 feet) with moderate dyspnea.  During exercise, there was no significant desaturation while breathing room air.  Heart rate increased significantly with walking.  Bradycardia was present prior to exercise.  The patient did not report non-pulmonary symptoms during exercise.  No previous study performed.  Based upon age and body mass index, exercise capacity is normal.

## 2020-08-14 ENCOUNTER — TELEPHONE (OUTPATIENT)
Dept: TRANSPLANT | Facility: CLINIC | Age: 60
End: 2020-08-14

## 2020-08-17 ENCOUNTER — TELEPHONE (OUTPATIENT)
Dept: CARDIOLOGY | Facility: HOSPITAL | Age: 60
End: 2020-08-17

## 2020-08-17 NOTE — TELEPHONE ENCOUNTER
----- Message from Masoud Patel MD sent at 8/14/2020  4:09 PM CDT -----  Regarding: RE: ATP  Tx  U R correct - especially with all what's going on - he has LVAD - nto sure why an LVAD recipient would go spelunking but...  How are things with you?  ----- Message -----  From: Sabrina Garciayumi  Sent: 8/13/2020   5:13 PM CDT  To: Masoud Patel MD  Subject: ATP                                              I dropped a report in [place] to you but wanted to give a bit more explanation.  Mr. Delgado had AF w/ RVR and inappropriate ATP as a result.  He was asymptomatic to the ATP.  He was at the base of a cavern in [place] and had EXTREME difficulty getting back to the top due to slope, exertion, humidity, etc.  He's had no other indication of fast rates.  We discussed that you would probably chalk this one up to the exertion but we could revisit if you had new orders.  I did not reprogram anything.     Thanks,  Sabrina

## 2020-08-24 ENCOUNTER — ANTI-COAG VISIT (OUTPATIENT)
Dept: CARDIOLOGY | Facility: CLINIC | Age: 60
End: 2020-08-24
Payer: MEDICARE

## 2020-08-24 DIAGNOSIS — Z79.01 LONG TERM (CURRENT) USE OF ANTICOAGULANTS: ICD-10-CM

## 2020-08-24 DIAGNOSIS — I48.0 PAROXYSMAL ATRIAL FIBRILLATION: ICD-10-CM

## 2020-08-24 DIAGNOSIS — Z95.811 LVAD (LEFT VENTRICULAR ASSIST DEVICE) PRESENT: ICD-10-CM

## 2020-08-24 LAB — INR PPP: 2.86

## 2020-08-24 PROCEDURE — 93793 PR ANTICOAGULANT MGMT FOR PT TAKING WARFARIN: ICD-10-PCS | Mod: ,,,

## 2020-08-24 PROCEDURE — 93793 ANTICOAG MGMT PT WARFARIN: CPT | Mod: ,,,

## 2020-08-26 ENCOUNTER — TELEPHONE (OUTPATIENT)
Dept: TRANSPLANT | Facility: CLINIC | Age: 60
End: 2020-08-26

## 2020-08-26 NOTE — TELEPHONE ENCOUNTER
----- Message from Mary Davis LPN sent at 8/14/2020  6:43 AM CDT -----  Regarding: CMP/BNP started lisinopril  Lab to be drawn on 8/25  lab phone [phone], [phone];

## 2020-08-26 NOTE — TELEPHONE ENCOUNTER
Spoke with Pham at lab    Only PT/INR drawn    CMP/BNP not drawn, this information will be relayed to VAD Coordinator.

## 2020-09-01 ENCOUNTER — TELEPHONE (OUTPATIENT)
Dept: TRANSPLANT | Facility: CLINIC | Age: 60
End: 2020-09-01

## 2020-09-01 NOTE — TELEPHONE ENCOUNTER
----- Message from Alta Staples RN sent at 8/26/2020  9:59 AM CDT -----  Regarding: BMP/BNP- started lisinopril  [facility]  -lab phone [phone], [phone]
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
## 2020-09-01 NOTE — PROGRESS NOTES
Called Patient -No answer at home #, left message at cell # to call coumadin clinic, need to reschedule 8/31 missed lab, also sent [employer] message

## 2020-09-02 ENCOUNTER — ANTI-COAG VISIT (OUTPATIENT)
Dept: CARDIOLOGY | Facility: CLINIC | Age: 60
End: 2020-09-02
Payer: MEDICARE

## 2020-09-02 DIAGNOSIS — I48.0 PAROXYSMAL ATRIAL FIBRILLATION: ICD-10-CM

## 2020-09-02 DIAGNOSIS — Z79.01 LONG TERM (CURRENT) USE OF ANTICOAGULANTS: ICD-10-CM

## 2020-09-02 DIAGNOSIS — Z95.811 LVAD (LEFT VENTRICULAR ASSIST DEVICE) PRESENT: ICD-10-CM

## 2020-09-02 LAB — INR PPP: 2.74

## 2020-09-02 PROCEDURE — 93793 PR ANTICOAGULANT MGMT FOR PT TAKING WARFARIN: ICD-10-PCS | Mod: ,,,

## 2020-09-02 PROCEDURE — 93793 ANTICOAG MGMT PT WARFARIN: CPT | Mod: ,,,

## 2020-09-03 ENCOUNTER — TELEPHONE (OUTPATIENT)
Dept: TRANSPLANT | Facility: CLINIC | Age: 60
End: 2020-09-03

## 2020-09-03 NOTE — TELEPHONE ENCOUNTER
Spoke with Lavern, pt caregiver.  She reports pt went to lab yesterday and will return 9/9/20.  Explained to her to please tell pt he will have BMP/BNP drawn the same day.  Pt verbalizes understanding and in agreement .

## 2020-09-10 ENCOUNTER — TELEPHONE (OUTPATIENT)
Dept: TRANSPLANT | Facility: CLINIC | Age: 60
End: 2020-09-10

## 2020-09-10 NOTE — TELEPHONE ENCOUNTER
Spoke with Beth from lab    Informed patient last visit was 9/2 only PT/INR drawn    She confirmed that facility has orders for CMP/BNP scanned into their system.     Will contact patient to remind him to visit Lab.

## 2020-09-10 NOTE — TELEPHONE ENCOUNTER
Spoke with patient wife. She informed they forgot to visit lab on yesterday. He went to help a friend this morning and she is at work. She stated if she doesn't get him there this afternoon she will ensure that he goes on tomorrow morning.   Understanding verbalized.     This information will be relayed to VAD coordinator.

## 2020-09-10 NOTE — PROGRESS NOTES
Called lab at [facility] and the pt has not shown up yet. Someone called about the patient earlier.

## 2020-09-10 NOTE — TELEPHONE ENCOUNTER
----- Message from Vaishali Bruner RN sent at 9/3/2020 12:33 PM CDT -----  Regarding: BMP/BNP (started lisinopril), Lipids (started statin 6/30)    ----- Message -----  From: Alta Staples RN  Sent: 8/26/2020   9:59 AM CDT  To: [facility] Lvad Clinical  Subject: BMP/BNP- started lisinopril                      [facility]  -lab phone [phone], [phone]

## 2020-09-11 ENCOUNTER — TELEPHONE (OUTPATIENT)
Dept: TRANSPLANT | Facility: CLINIC | Age: 60
End: 2020-09-11

## 2020-09-11 ENCOUNTER — ANTI-COAG VISIT (OUTPATIENT)
Dept: CARDIOLOGY | Facility: CLINIC | Age: 60
End: 2020-09-11
Payer: MEDICARE

## 2020-09-11 DIAGNOSIS — Z79.01 LONG TERM (CURRENT) USE OF ANTICOAGULANTS: ICD-10-CM

## 2020-09-11 DIAGNOSIS — I48.0 PAROXYSMAL ATRIAL FIBRILLATION: ICD-10-CM

## 2020-09-11 DIAGNOSIS — Z95.811 LVAD (LEFT VENTRICULAR ASSIST DEVICE) PRESENT: ICD-10-CM

## 2020-09-11 LAB
EXT ALBUMIN: 3.7
EXT ALT: 51
EXT AST: 24
EXT BUN: 28
EXT CALCIUM: 9.4
EXT CHLORIDE: 105
EXT CREATININE: 2.75 MG/DL
EXT GLUCOSE: 114
EXT POTASSIUM: 4.4
EXT PROTEIN TOTAL: 7.3
EXT SODIUM: 140 MMOL/L
INR PPP: 2.22
NT-PRO-BNP (RIVER PARISHES): 1242

## 2020-09-11 PROCEDURE — 93793 PR ANTICOAGULANT MGMT FOR PT TAKING WARFARIN: ICD-10-PCS | Mod: ,,,

## 2020-09-11 PROCEDURE — 93793 ANTICOAG MGMT PT WARFARIN: CPT | Mod: ,,,

## 2020-09-11 NOTE — TELEPHONE ENCOUNTER
Contacted [facility] to obtain bmp/bnp results    Patient labs were drawn moments ago, still processing. Will fax over results once completed    Understanding verbalized.

## 2020-09-11 NOTE — TELEPHONE ENCOUNTER
----- Message from Vaishali Bruner RN sent at 9/10/2020 12:29 PM CDT -----  Regarding: BMP/BNP (started lisinopril), Lipids (started statin 6/30)    ----- Message -----  From: Vaishali Bruner RN  Sent: 9/3/2020  12:33 PM CDT  To: [facility] Lvad Clinical  Subject: BMP/BNP (started lisinopril), Lipids (starte#      ----- Message -----  From: Alta Staples RN  Sent: 8/26/2020   9:59 AM CDT  To: [facility] Lvad Clinical  Subject: BMP/BNP- started lisinopril                      [facility]  -lab phone [phone], [phone]

## 2020-09-14 ENCOUNTER — TELEPHONE (OUTPATIENT)
Dept: TRANSPLANT | Facility: CLINIC | Age: 60
End: 2020-09-14

## 2020-09-14 NOTE — TELEPHONE ENCOUNTER
----- Message from Vaishali Bruner RN sent at 9/10/2020 12:29 PM CDT -----  Regarding: BMP/BNP (started lisinopril), Lipids (started statin 6/30)    ----- Message -----  From: Vaishali Bruner RN  Sent: 9/3/2020  12:33 PM CDT  To: [facility] Lvad Clinical  Subject: BMP/BNP (started lisinopril), Lipids (starte#      ----- Message -----  From: Alta Staples RN  Sent: 8/26/2020   9:59 AM CDT  To: [facility] Lvad Clinical  Subject: BMP/BNP- started lisinopril                      [facility]  -lab phone [phone], [phone]

## 2020-09-14 NOTE — TELEPHONE ENCOUNTER
----- Message from Vaishali Bruner RN sent at 9/10/2020 12:29 PM CDT -----  Regarding: BMP/BNP (started lisinopril), Lipids (started statin 6/30)    ----- Message -----  From: Vaishali Bruner RN  Sent: 9/3/2020  12:33 PM CDT  To: [facility] Lvad Clinical  Subject: BMP/BNP (started lisinopril), Lipids (starte#      ----- Message -----  From: Alta Staples RN  Sent: 8/26/2020   9:59 AM CDT  To: [facility] Lvad Clinical  Subject: BMP/BNP- started lisinopril                      [facility]  -lab phone [phone], [phone]

## 2020-09-14 NOTE — TELEPHONE ENCOUNTER
Contacted [facility] to obtain lab results    Spoke with Beth    Provided fax number    Awaiting lab results

## 2020-09-15 ENCOUNTER — TELEPHONE (OUTPATIENT)
Dept: TRANSPLANT | Facility: CLINIC | Age: 60
End: 2020-09-15

## 2020-09-15 NOTE — TELEPHONE ENCOUNTER
Creatinine elevated after being on Lisinopril x1 month. Unable to reach pt via p/c, did speak with wife, Lavern.   Patient message sent via [facility].  Pt to stop taking Lisinopril and check labs on Friday, 9/18.

## 2020-09-18 ENCOUNTER — ANTI-COAG VISIT (OUTPATIENT)
Dept: CARDIOLOGY | Facility: CLINIC | Age: 60
End: 2020-09-18
Payer: MEDICARE

## 2020-09-18 DIAGNOSIS — Z95.811 LVAD (LEFT VENTRICULAR ASSIST DEVICE) PRESENT: ICD-10-CM

## 2020-09-18 DIAGNOSIS — I48.0 PAROXYSMAL ATRIAL FIBRILLATION: ICD-10-CM

## 2020-09-18 DIAGNOSIS — Z79.01 LONG TERM (CURRENT) USE OF ANTICOAGULANTS: ICD-10-CM

## 2020-09-18 LAB
EXT BUN: 29
EXT CALCIUM: 9.3
EXT CHLORIDE: 106
EXT CREATININE: 1.6 MG/DL
EXT GLUCOSE: 98
EXT POTASSIUM: 4.1
EXT SODIUM: 142 MMOL/L
INR PPP: 1.98

## 2020-09-18 PROCEDURE — 93793 ANTICOAG MGMT PT WARFARIN: CPT | Mod: ,,,

## 2020-09-18 PROCEDURE — 93793 PR ANTICOAGULANT MGMT FOR PT TAKING WARFARIN: ICD-10-PCS | Mod: ,,,

## 2020-09-21 ENCOUNTER — TELEPHONE (OUTPATIENT)
Dept: TRANSPLANT | Facility: CLINIC | Age: 60
End: 2020-09-21

## 2020-09-21 NOTE — TELEPHONE ENCOUNTER
Contacted [facility] to obtain BMP results.   Spoke with Mary  Provided fax number  Awaiting results

## 2020-09-21 NOTE — TELEPHONE ENCOUNTER
----- Message from Alta Staples RN sent at 9/15/2020  1:07 PM CDT -----  Regarding: BMP- creatinine, d/c'd lisinopril  [facility]  -lab phone [phone], [phone]

## 2020-09-21 NOTE — TELEPHONE ENCOUNTER
----- Message from Alta Staples RN sent at 9/15/2020  1:07 PM CDT -----  Regarding: BMP- creatinine, d/c'd lisinopril  [facility]  -lab phone [phone], [phone]

## 2020-09-28 ENCOUNTER — ANTI-COAG VISIT (OUTPATIENT)
Dept: CARDIOLOGY | Facility: CLINIC | Age: 60
End: 2020-09-28
Payer: MEDICARE

## 2020-09-28 ENCOUNTER — LAB VISIT (OUTPATIENT)
Dept: LAB | Facility: HOSPITAL | Age: 60
End: 2020-09-28
Attending: INTERNAL MEDICINE
Payer: MEDICARE

## 2020-09-28 ENCOUNTER — OFFICE VISIT (OUTPATIENT)
Dept: TRANSPLANT | Facility: CLINIC | Age: 60
End: 2020-09-28
Attending: INTERNAL MEDICINE
Payer: MEDICARE

## 2020-09-28 ENCOUNTER — CLINICAL SUPPORT (OUTPATIENT)
Dept: TRANSPLANT | Facility: CLINIC | Age: 60
End: 2020-09-28
Payer: MEDICARE

## 2020-09-28 VITALS — HEIGHT: 70 IN | TEMPERATURE: 99 F | WEIGHT: 232 LBS | SYSTOLIC BLOOD PRESSURE: 100 MMHG | BODY MASS INDEX: 33.21 KG/M2

## 2020-09-28 DIAGNOSIS — I11.0 HYPERTENSIVE HEART DISEASE WITH HEART FAILURE: ICD-10-CM

## 2020-09-28 DIAGNOSIS — Z79.899 LONG TERM CURRENT USE OF AMIODARONE: ICD-10-CM

## 2020-09-28 DIAGNOSIS — Z79.899 AT RISK FOR AMIODARONE TOXICITY WITH LONG TERM USE: ICD-10-CM

## 2020-09-28 DIAGNOSIS — I48.0 PAROXYSMAL ATRIAL FIBRILLATION: ICD-10-CM

## 2020-09-28 DIAGNOSIS — I47.29 NSVT (NONSUSTAINED VENTRICULAR TACHYCARDIA): ICD-10-CM

## 2020-09-28 DIAGNOSIS — I42.0 COCM (CONGESTIVE CARDIOMYOPATHY): ICD-10-CM

## 2020-09-28 DIAGNOSIS — Z95.810 ICD (IMPLANTABLE CARDIOVERTER-DEFIBRILLATOR) IN PLACE: ICD-10-CM

## 2020-09-28 DIAGNOSIS — I50.42 CHRONIC COMBINED SYSTOLIC AND DIASTOLIC CONGESTIVE HEART FAILURE: ICD-10-CM

## 2020-09-28 DIAGNOSIS — Z95.811 LVAD (LEFT VENTRICULAR ASSIST DEVICE) PRESENT: ICD-10-CM

## 2020-09-28 DIAGNOSIS — Z79.01 LONG TERM (CURRENT) USE OF ANTICOAGULANTS: ICD-10-CM

## 2020-09-28 DIAGNOSIS — Z91.89 AT RISK FOR AMIODARONE TOXICITY WITH LONG TERM USE: ICD-10-CM

## 2020-09-28 DIAGNOSIS — Z79.01 CHRONIC ANTICOAGULATION: ICD-10-CM

## 2020-09-28 DIAGNOSIS — Z95.811 LVAD (LEFT VENTRICULAR ASSIST DEVICE) PRESENT: Primary | ICD-10-CM

## 2020-09-28 DIAGNOSIS — Z95.811 HEART REPLACED BY HEART ASSIST DEVICE: ICD-10-CM

## 2020-09-28 LAB
ALBUMIN SERPL BCP-MCNC: 4.1 G/DL (ref 3.5–5.2)
ALP SERPL-CCNC: 66 U/L (ref 55–135)
ALT SERPL W/O P-5'-P-CCNC: 39 U/L (ref 10–44)
ANION GAP SERPL CALC-SCNC: 10 MMOL/L (ref 8–16)
AST SERPL-CCNC: 29 U/L (ref 10–40)
BASOPHILS # BLD AUTO: 0.04 K/UL (ref 0–0.2)
BASOPHILS NFR BLD: 0.5 % (ref 0–1.9)
BILIRUB DIRECT SERPL-MCNC: 0.3 MG/DL (ref 0.1–0.3)
BILIRUB SERPL-MCNC: 0.8 MG/DL (ref 0.1–1)
BNP SERPL-MCNC: 170 PG/ML (ref 0–99)
BUN SERPL-MCNC: 26 MG/DL (ref 6–20)
CALCIUM SERPL-MCNC: 9.3 MG/DL (ref 8.7–10.5)
CHLORIDE SERPL-SCNC: 105 MMOL/L (ref 95–110)
CO2 SERPL-SCNC: 25 MMOL/L (ref 23–29)
CREAT SERPL-MCNC: 1.6 MG/DL (ref 0.5–1.4)
CRP SERPL-MCNC: 3.6 MG/L (ref 0–8.2)
DIFFERENTIAL METHOD: ABNORMAL
EOSINOPHIL # BLD AUTO: 0.2 K/UL (ref 0–0.5)
EOSINOPHIL NFR BLD: 1.9 % (ref 0–8)
ERYTHROCYTE [DISTWIDTH] IN BLOOD BY AUTOMATED COUNT: 15.1 % (ref 11.5–14.5)
EST. GFR  (AFRICAN AMERICAN): 53.3 ML/MIN/1.73 M^2
EST. GFR  (NON AFRICAN AMERICAN): 46.1 ML/MIN/1.73 M^2
GLUCOSE SERPL-MCNC: 122 MG/DL (ref 70–110)
HCT VFR BLD AUTO: 44.7 % (ref 40–54)
HGB BLD-MCNC: 14.1 G/DL (ref 14–18)
IMM GRANULOCYTES # BLD AUTO: 0.05 K/UL (ref 0–0.04)
IMM GRANULOCYTES NFR BLD AUTO: 0.6 % (ref 0–0.5)
INR PPP: 2.4 (ref 0.8–1.2)
LDH SERPL L TO P-CCNC: 199 U/L (ref 110–260)
LYMPHOCYTES # BLD AUTO: 2 K/UL (ref 1–4.8)
LYMPHOCYTES NFR BLD: 25.3 % (ref 18–48)
MAGNESIUM SERPL-MCNC: 2.1 MG/DL (ref 1.6–2.6)
MCH RBC QN AUTO: 28.6 PG (ref 27–31)
MCHC RBC AUTO-ENTMCNC: 31.5 G/DL (ref 32–36)
MCV RBC AUTO: 91 FL (ref 82–98)
MONOCYTES # BLD AUTO: 0.5 K/UL (ref 0.3–1)
MONOCYTES NFR BLD: 6.6 % (ref 4–15)
NEUTROPHILS # BLD AUTO: 5.2 K/UL (ref 1.8–7.7)
NEUTROPHILS NFR BLD: 65.1 % (ref 38–73)
NRBC BLD-RTO: 0 /100 WBC
PHOSPHATE SERPL-MCNC: 3 MG/DL (ref 2.7–4.5)
PLATELET # BLD AUTO: 186 K/UL (ref 150–350)
PMV BLD AUTO: 10.7 FL (ref 9.2–12.9)
POTASSIUM SERPL-SCNC: 4.3 MMOL/L (ref 3.5–5.1)
PREALB SERPL-MCNC: 24 MG/DL (ref 20–43)
PROT SERPL-MCNC: 7.3 G/DL (ref 6–8.4)
PROTHROMBIN TIME: 25.2 SEC (ref 9–12.5)
RBC # BLD AUTO: 4.93 M/UL (ref 4.6–6.2)
SODIUM SERPL-SCNC: 140 MMOL/L (ref 136–145)
WBC # BLD AUTO: 7.99 K/UL (ref 3.9–12.7)

## 2020-09-28 PROCEDURE — 82248 BILIRUBIN DIRECT: CPT

## 2020-09-28 PROCEDURE — 80053 COMPREHEN METABOLIC PANEL: CPT

## 2020-09-28 PROCEDURE — 93750 PR INTERROGATE VENT ASSIST DEV, IN PERSON, W PHYSICIAN ANALYSIS: ICD-10-PCS | Mod: S$PBB,,, | Performed by: INTERNAL MEDICINE

## 2020-09-28 PROCEDURE — 36415 COLL VENOUS BLD VENIPUNCTURE: CPT

## 2020-09-28 PROCEDURE — 99999 PR PBB SHADOW E&M-EST. PATIENT-LVL IV: CPT | Mod: PBBFAC,,, | Performed by: INTERNAL MEDICINE

## 2020-09-28 PROCEDURE — 99999 PR PBB SHADOW E&M-EST. PATIENT-LVL IV: ICD-10-PCS | Mod: PBBFAC,,, | Performed by: INTERNAL MEDICINE

## 2020-09-28 PROCEDURE — 84134 ASSAY OF PREALBUMIN: CPT

## 2020-09-28 PROCEDURE — 99214 OFFICE O/P EST MOD 30 MIN: CPT | Mod: PBBFAC,25 | Performed by: INTERNAL MEDICINE

## 2020-09-28 PROCEDURE — 85610 PROTHROMBIN TIME: CPT

## 2020-09-28 PROCEDURE — 83615 LACTATE (LD) (LDH) ENZYME: CPT

## 2020-09-28 PROCEDURE — 83735 ASSAY OF MAGNESIUM: CPT

## 2020-09-28 PROCEDURE — 99214 OFFICE O/P EST MOD 30 MIN: CPT | Mod: S$PBB,,, | Performed by: INTERNAL MEDICINE

## 2020-09-28 PROCEDURE — 93750 INTERROGATION VAD IN PERSON: CPT | Mod: S$PBB,,, | Performed by: INTERNAL MEDICINE

## 2020-09-28 PROCEDURE — 93750 INTERROGATION VAD IN PERSON: CPT | Mod: PBBFAC | Performed by: INTERNAL MEDICINE

## 2020-09-28 PROCEDURE — 99214 PR OFFICE/OUTPT VISIT, EST, LEVL IV, 30-39 MIN: ICD-10-PCS | Mod: S$PBB,,, | Performed by: INTERNAL MEDICINE

## 2020-09-28 PROCEDURE — 86140 C-REACTIVE PROTEIN: CPT

## 2020-09-28 PROCEDURE — 85025 COMPLETE CBC W/AUTO DIFF WBC: CPT

## 2020-09-28 PROCEDURE — 84100 ASSAY OF PHOSPHORUS: CPT

## 2020-09-28 PROCEDURE — 83880 ASSAY OF NATRIURETIC PEPTIDE: CPT

## 2020-09-28 RX ORDER — FUROSEMIDE 40 MG/1
20 TABLET ORAL DAILY
Qty: 90 TABLET | Refills: 3
Start: 2020-09-28 | End: 2021-02-08 | Stop reason: SDUPTHER

## 2020-09-28 RX ORDER — POTASSIUM CHLORIDE 20 MEQ/1
20 TABLET, EXTENDED RELEASE ORAL DAILY
Qty: 90 TABLET | Refills: 3
Start: 2020-09-28 | End: 2021-09-13 | Stop reason: SDUPTHER

## 2020-09-28 NOTE — LETTER
September 28, 2020        Dipesh De La Torre  [address] [address]  [place] [address]  Phone: [phone]  Fax: [phone]             [id number]  [address]  [facility] [address]  Phone: [phone]   Patient: Tae Delgado   MR Number: [id number]   YOB: 1960   Date of Visit: 9/28/2020       Dear Dr. Dipesh De La Torre    Thank you for referring Tae Delgado to me for evaluation. Attached you will find relevant portions of my assessment and plan of care.    If you have questions, please do not hesitate to call me. I look forward to following Tae Delgado along with you.    Sincerely,    Karson Bucio Jr, MD    Enclosure    If you would like to receive this communication electronically, please contact [email or web address] or [phone] to request [employer] access.    [employer] is a tool which provides read-only access to select patient information with whom you have a relationship. Its easy to use and provides real time access to review your patients record including encounter summaries, notes, results, and demographic information.    If you feel you have received this communication in error or would no longer like to receive these types of communications, please e-mail [email or web address]

## 2020-09-28 NOTE — PROCEDURES
TXP KLEVER INTERROGATIONS 9/28/2020 8/13/2020 7/13/2020 7/1/2020 5/27/2020 4/23/2020 4/6/2020   Type HeartMate3 HeartMate3 HeartMate3 HeartMate3 HeartMate3 HeartMate3 HeartMate3   Flow 3.3 3.7 3.7 4.2 3.9 3.7 3.3   Speed 5300 5300 5300 5300 5300 5300 5300   PI 9.8 7.2 7.0 6.4 7.0 6.3 8.7   Power (Berry) 4.0 3.9 3.9 4.0 4.0 3.8 3.9   LSL 4900 4900 4900 4900 4900 4900 4900   Pulsatility No Pulse No Pulse No Pulse Intermittent pulse Pulse Intermittent pulse Pulse   }Interrogation of Ventricular assist device was performed with physician analysis of device parameters and review of device function. I have personally reviewed the interrogation findings and agree with findings as stated.

## 2020-09-28 NOTE — PATIENT INSTRUCTIONS
Take furosemide half tablet + potassium 20 meq every day  Increase hydralazine back to 50 mg (whole tablet) twice a day    We will get a BMP in 1 week when you get INR

## 2020-09-28 NOTE — PROGRESS NOTES
"Subjective:   Patient ID:  Tae Delgado is a 60 y.o. male who presents for LVAD followup visit.    Implant Date:1/23/2020  "[employer]"  Initials:RCD     Heartmate 3 RPM 5300     INR goal: 2-3   Bridge with Heparin   Antiplatelets:      TXP KLEVER INTERROGATIONS 9/28/2020   Type HeartMate3   Flow 3.3   Speed 5300   PI 9.8   Power (Berry) 4.0   LSL 4900   Pulsatility No Pulse   Interrogation of Ventricular assist device was performed with physician analysis of device parameters and review of device function. I have personally reviewed the interrogation findings and agree with findings as stated.   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  His post-op course complicated by RV failure. He went for wash out on 1/27/20 and his chest was closed on 1/29/20. Postoperatively he had PAF and was started on Digoxin and anticoagulated with Coumadin. Renal function neptali acutely in the post op period but improved during his hospital stay.  Creatinine on day of discharge was 1.8. Pre-op a right lower lobe pulm nodule found incidentally on CT but subsequent CT's have demonstrated no pathology so now felt to have been atelectasis. Patient was discharged to rehab facility for further therapy. When I saw him April 2020 he was upset and reported a physical therapist in rehab lifted him with chest strap.  After that he noted discomfort left side of chest near sternum below clavicle and a vibration along right sternal border, hearing LVAD in ears, popping of both shoulders.  He was convinced his pump moved so at that visit I spent a lot of time reviewing CXR's and his exam with him and assured him pump was fine, no sternal wires broken, sternum stable and hearing pump not unusual, vibration " feeling right sternal border most likely due to flow through outflow graft but no pump issues and LDH was fine.  He had CT scan after Feb visit with less definition of abnormality described on pre-op CT though it was not normal.    He continued to experience hearing pump at night but reports very loud now and not simply in his ears.  He continues with vibration feeling along right sternum and pain that he attributes to the rehab therapist.  He reports trouble sleeping at night and tells me that he wrote an e-mail while angry in middle of the night.  E-mail described in notes of others and in communications within [facility].  July 1, 2020 he met with Dr. Pearson before my visit and reportedly expressed remorse for his actions, apologized.  Dr. Pearson will has separate documentation.    At that visit he reported mild BELTRAN and dizziness so he had stopped lisinopril approx June 20, 2020 and his evening dose of lasix June 27, 2020.  At visit July 1, 2020 Cr had bumped to 2.4.  At that visit I changed lasix to 20-40 mg with KCl 20 meq for every 20 mg of lasix on as needed basis.  I told him to stay off lisinopril and initiated hydralazine 25 mg BID.  At that visit LFT's elevated so I reduced atorvastatin to 40 mg hs.    In August 2020 he met with Dr. Schmitt re: pump noise see his note.  At that visit his dizziness had resolved, he reported better energy and Cr was back to baseline 1.7.    At last visit for elevated MAP 92 mm Hg I asked him to try lisinopril 2.5 mg qd.  His Cr jumped so this was stopped.  No replacement BP treatment ordered and today  mm Hg.  He took a single dose of lasix last week.  Uses prn and rarely needs to take this med.  He feels well with minor BELTRAN and intermittent leg swelling (this is why he took furosemide last week).  He has been taking hydralazine 25 mg BID not 50 mg BID.  He is taking aldactone 25 mg qd.    Review of Systems   Constitution: Positive for malaise/fatigue. Negative for chills,  "fever, night sweats, weight gain and weight loss.   HENT: Negative for congestion, hearing loss, hoarse voice, nosebleeds and sore throat.    Eyes: Negative for double vision, pain, vision loss in left eye and vision loss in right eye.   Cardiovascular: Positive for dyspnea on exertion. Negative for chest pain, claudication, irregular heartbeat, leg swelling, near-syncope, orthopnea, palpitations, paroxysmal nocturnal dyspnea and syncope.   Respiratory: Negative for cough, hemoptysis, shortness of breath, sleep disturbances due to breathing, snoring, sputum production and wheezing.    Endocrine: Negative for cold intolerance, heat intolerance, polydipsia and polyuria.   Hematologic/Lymphatic: Negative for bleeding problem. Does not bruise/bleed easily.   Musculoskeletal: Negative for falls, muscle cramps, myalgias and neck pain.   Gastrointestinal: Negative for bloating, abdominal pain, change in bowel habit, constipation, diarrhea, hematochezia, jaundice, melena and nausea.   Genitourinary: Negative for bladder incontinence, dysuria, frequency, hematuria, hesitancy, incomplete emptying and urgency.   Neurological: Negative for brief paralysis, dizziness, focal weakness, headaches, numbness, paresthesias, seizures and weakness.   Psychiatric/Behavioral: Negative for depression and memory loss. The patient is not nervous/anxious.      Objective:   Blood pressure (!) 100/0, temperature 98.6 °F (37 °C), temperature source Oral, height 5' 10" (1.778 m), weight 105.2 kg (232 lb).body mass index is 33.29 kg/m².  Doppler: 100 mm Hg  Physical Exam   Constitutional: He appears well-developed and well-nourished. No distress.   BP (!) 100/0 (BP Location: Left arm, Patient Position: Sitting) Comment (BP Method): doppler  Temp 98.6 °F (37 °C) (Oral)   Ht 5' 10" (1.778 m)   Wt 105.2 kg (232 lb)   BMI 33.29 kg/m²   Last visit wt 105.7   HENT:   Head: Normocephalic and atraumatic.   Eyes: Conjunctivae are normal. No scleral " icterus.   Neck: No JVD present. No tracheal deviation present. No thyromegaly present.   Pulmonary/Chest: Effort normal and breath sounds normal. No respiratory distress. He has no wheezes. He has no rales.   Abdominal: Soft. Bowel sounds are normal. He exhibits no distension and no mass. There is no abdominal tenderness. There is no rebound and no guarding.   Musculoskeletal:         General: No tenderness or edema.   Neurological: He is alert.   Skin: Skin is warm and dry. He is not diaphoretic.   Psychiatric: He has a normal mood and affect. His behavior is normal. Judgment and thought content normal.     Lab Results   Component Value Date     (H) 09/28/2020    BNP--must be NT pro BNP 1,242 09/11/2020     (H) 08/13/2020       09/28/2020    K 4.3 09/28/2020    MG 2.1 09/28/2020     09/28/2020     04/13/2020    CO2 25 09/28/2020    BUN 26 (H) 09/28/2020    BUN 24 (A) 04/13/2020    CREATININE 1.6 (H) 09/28/2020    CREATININE 1.7 04/13/2020     (H) 09/28/2020    HGBA1C 6.2 (H) 01/15/2020    AST 29 09/28/2020    ALT 39 09/28/2020    ALBUMIN 4.1 09/28/2020    ALBUMIN 4.3 04/13/2020    PROT 7.3 09/28/2020    BILITOT 0.8 09/28/2020    WBC 7.99 09/28/2020    WBC 6.1 04/13/2020    HGB 14.1 09/28/2020    HCT 44.7 09/28/2020    HCT 25 (L) 02/05/2020     09/28/2020    INR 2.4 (H) 09/28/2020    INR 1.98 09/18/2020     09/28/2020    TSH 2.781 07/01/2020    CHOL 116 (L) 07/13/2020    HDL 27 (L) 07/13/2020    LDLCALC 70.8 07/13/2020    TRIG 91 07/13/2020    Q7DWGWU 10.1 07/01/2020       Assessment:      1. LVAD (left ventricular assist device) present    2. Chronic combined systolic and diastolic congestive heart failure    3. COCM (congestive cardiomyopathy)    4. Paroxysmal atrial fibrillation    5. Hypertensive heart disease with heart failure    6. NSVT (nonsustained ventricular tachycardia)    7. Chronic anticoagulation    8. At risk for amiodarone toxicity with long term  use    9. Long term current use of amiodarone    10. ICD (implantable cardioverter-defibrillator) in place        Plan:   Diuresis, wt loss, low sodium diet and fluid restriction reviewed along with daily weights and how to respond to 3# weight gain with prn diuretics.  If this fails to restore dry weight call us within 2-3 days.     He has evidence on exam for elevated JVP and BP elevated once more.  Pump flows in the 3's wonder role of BP though are stable compared to past readings here.  He does not have his binder and does not recall flows at home.  Despite BNP, with these findings will have him take furosemide 20 mg (half 40 mg tablet) + potassium 20 meq every day.  He will attempt to increase hydralazine to 50 mg BID (can take 25 mg TID if intolerant).  We will get a BMP in 1 week when he gets INR.    RTC 1 month with echo    Supplies ordered are medically necessary for care of LVAD and DL    Patient is now NYHA II    Listed for transplant: No    AMIODARONE FOLLOW-UP:   CXR yearly--due April 2021   CMP every 6 months   TSH every 6 months--due January 2021    OS Patient Status  Functional Status: 90% - Able to carry on normal activity: minor symptoms of disease  Physical Capacity: No Limitations  Working for Income: No  If no, reason not working: Disability

## 2020-09-28 NOTE — PROGRESS NOTES
Date of Implant with Heartmate 3 LVAD: 2020    PATIENT ARRIVED IN CLINIC:  Ambulatory   Accompanied by: son    Vitals  Temperature, oral:   Temp Readings from Last 1 Encounters:   20 98.6 °F (37 °C) (Oral)     Blood Pressure:   BP Readings from Last 3 Encounters:   20 (!) 100/0   20 (!) 92/0   20 (!) 90/0        VAD Interrogation:  TXP KLEVER INTERROGATIONS 2020   Type HeartMate3 HeartMate3 HeartMate3   Flow 3.3 3.7 3.7   Speed 5300 5300 5300   PI 9.8 7.2 7.0   Power (Berry) 4.0 3.9 3.9   LSL 4900 4900 4900   Pulsatility No Pulse No Pulse No Pulse       History Lo.c3e  Problems / Issues / Alarms with VAD if any: None noted  VAD Sounds: HM3 Smooth  Heartmate 3 Module Cable:  No yellow exposed and Attempted to unscrew modular cable to ensure it will be able to come lose in the event we ever need to change the modular cable while patient held the driveline in place so it would not move. Modular cable connection able to be unscrewed and re-tightened. Instructed pt to perform this weekly.    HCT:   Lab Results   Component Value Date    HCT 44.7 2020    HCT 25 (L) 2020       Complaints/reason for visit today: routine  Emergency Equipment With Patient: yes   VAD Binder With Patient: yes   Reviewed VAD Numbers In Binder: yes    Any Equipment Issues: None noted (Refer to  note for complete details)    DLES Assessment:  Appearance Of Driveline: 1, tinge of brown drainage. Pt states this has always been present but does not occur when he is not active. There is nothing able to culture  Antibiotics: NO  Velour: no  Manual & Visual Inspection Of Driveline: No kinks or tears noted  Stabilization Device In Use: yes, giles securement device      Patient MyChart Questionnaire:        Assessment:   PAIN: NO  Complaints Of Nausea / Vomiting: None noted    Appearance and Frequency Of Stools: normal and formed without blood & daily  Color Of  Urine: clear/yellow  Coping/Depression/Anxiety: coping okay and anxious  Sleep Habits: 5 hrs /night with daytime naps  Sleep Aids: None noted  Showering: Yes, reminded to change dressing immediately after drying off  Activity/Exercise: working on his rental homes, working the yard   Driving: Yes. Reminded to pull over should there be an alarm before looking down at controller.    DLES Dressing Care:   Frequency of Dressing Changes: daily & soap and water dressing  Pt In Need Of Management Kits?:yes -   2 Box of soap and water dressing  It is medically necessary to have VAD management kits in order to prevent infection or to assist in the healing of an infected DLES.    Labs:    Chemistry        Component Value Date/Time     09/28/2020 1243    K 4.3 09/28/2020 1243     09/28/2020 1243     04/13/2020    CO2 25 09/28/2020 1243    BUN 26 (H) 09/28/2020 1243    BUN 24 (A) 04/13/2020    CREATININE 1.6 (H) 09/28/2020 1243    CREATININE 1.7 04/13/2020     (H) 09/28/2020 1243        Component Value Date/Time    CALCIUM 9.3 09/28/2020 1243    CALCIUM 10.1 04/13/2020    ALKPHOS 66 09/28/2020 1243    AST 29 09/28/2020 1243    ALT 39 09/28/2020 1243    BILITOT 0.8 09/28/2020 1243    ESTGFRAFRICA 53.3 (A) 09/28/2020 1243    EGFRNONAA 46.1 (A) 09/28/2020 1243            Magnesium   Date Value Ref Range Status   09/28/2020 2.1 1.6 - 2.6 mg/dL Final       Lab Results   Component Value Date    WBC 7.99 09/28/2020    HGB 14.1 09/28/2020    HCT 44.7 09/28/2020    MCV 91 09/28/2020     09/28/2020       Lab Results   Component Value Date    INR 2.4 (H) 09/28/2020    INR 1.98 09/18/2020    INR 2.22 09/11/2020       NT-pro-BNP  ([place])   Date Value Ref Range Status   09/11/2020 1,242  Final     BNP   Date Value Ref Range Status   09/28/2020 170 (H) 0 - 99 pg/mL Final     Comment:     Values of less than 100 pg/ml are consistent with non-CHF populations.   08/13/2020 155 (H) 0 - 99 pg/mL Final      Comment:     Values of less than 100 pg/ml are consistent with non-CHF populations.   07/13/2020 232 (H) 0 - 99 pg/mL Final     Comment:     Values of less than 100 pg/ml are consistent with non-CHF populations.       LD   Date Value Ref Range Status   08/13/2020 175 110 - 260 U/L Final     Comment:     Results are increased in hemolyzed samples.   07/13/2020 212 110 - 260 U/L Final     Comment:     Results are increased in hemolyzed samples.   07/01/2020 212 110 - 260 U/L Final     Comment:     Results are increased in hemolyzed samples.       Labs reviewed with patient: YES      Patient Satisfaction Survey completed per patient: No  (explained about signature and box to check)  Medication reconciliation: per MA.  Medication Detail updated today: no  Coumadin Managed by: [facility]    Education: Reviewed driveline care, emergency procedures, how to change the controller, alarms with patient, as well as discussed how to page the VAD coordinator in case of an emergency.   Coved - 19 education: Reminded patient/caregiver to check temperature daily and call us if it is > 99.0.  Reminded them  to stay 6 feet away from other people, wash hands frequently, don't touch your face and stay home except yo get labs, medications, and appts.      Plans/Needs: Routine f/u. Creatinine back to baseline since stopping Lisinopril. However, doppler 100 today, VAD flows have dropped to 3s instead of his usual 4s.  Per Dr. Bucio:  Take furosemide half tablet (20mg) + potassium 20 meq every day  Increase hydralazine back to 50 mg (whole tablet) twice a day  BMP in 1 week with INR.  RTC 1 month with echo and PFTs.     Hurricane Season: No

## 2020-09-30 ENCOUNTER — TELEPHONE (OUTPATIENT)
Dept: TRANSPLANT | Facility: CLINIC | Age: 60
End: 2020-09-30

## 2020-09-30 NOTE — TELEPHONE ENCOUNTER
Faxed over lab orders to [facility]     For patient to have BMP/BNP drawn on same day as PT/INR    Oct 5th , patient stated he will go Monday or Tuesday    Faxed lab orders to two fax numbers [phone]

## 2020-09-30 NOTE — TELEPHONE ENCOUNTER
Contacted patient to inform him when he goes to his PT/INR drawn he is to have a BMP/BNP drawn due to medication changes    No concerns or questions noted during call     Understanding verbalized.

## 2020-10-07 ENCOUNTER — TELEPHONE (OUTPATIENT)
Dept: TRANSPLANT | Facility: CLINIC | Age: 60
End: 2020-10-07

## 2020-10-07 NOTE — TELEPHONE ENCOUNTER
----- Message from Mary Davis LPN sent at 9/30/2020  9:54 AM CDT -----  Regarding: BMP/BNP lasix & K+ changes  Getting drawn with PT/INR on Monday or Tuesday  [phone] (phone)  [phone] ( Fax)  [phone] ( fax)

## 2020-10-07 NOTE — TELEPHONE ENCOUNTER
Contacted lab to obtain BMP/BNP results    Patient has yet to visit lab facility this week.    Spoke with Pham

## 2020-10-08 ENCOUNTER — TELEPHONE (OUTPATIENT)
Dept: TRANSPLANT | Facility: CLINIC | Age: 60
End: 2020-10-08

## 2020-10-08 NOTE — TELEPHONE ENCOUNTER
----- Message from Mary Davis LPN sent at 9/30/2020  9:54 AM CDT -----  Regarding: BMP/BNP lasix & K+ changes  Getting drawn with PT/INR on Monday or Tuesday  [phone] (phone)  [phone] ( Fax)  [phone] ( fax)

## 2020-10-12 ENCOUNTER — TELEPHONE (OUTPATIENT)
Dept: TRANSPLANT | Facility: CLINIC | Age: 60
End: 2020-10-12

## 2020-10-12 NOTE — TELEPHONE ENCOUNTER
----- Message from Mary Davis LPN sent at 9/30/2020  9:54 AM CDT -----  Regarding: BMP/BNP lasix & K+ changes  Getting drawn with PT/INR on Monday or Tuesday  [phone] (phone)  [phone] ( Fax)  [phone] ( fax)

## 2020-10-12 NOTE — TELEPHONE ENCOUNTER
----- Message from Mary Davis LPN sent at 9/30/2020  9:54 AM CDT -----  Regarding: BMP/BNP lasix & K+ changes  Getting drawn with PT/INR on Monday or Tuesday  [phone] (phone)  [phone] ( Fax)  [phone] ( fax)

## 2020-10-12 NOTE — TELEPHONE ENCOUNTER
Attempted to  Reach pt to discuss missed lab appointments(f/u from 9/29/20 appt) to see when he is able to return to his local lab.  Unfortunately was unable to reach pt at listed numbers or to reach his significant other Lavern either.  Neither Voicemail set up to receive messages.      Will send pt portal message to try to coordinate, if unable to connect with pt to reschedule, will plan to check labs 10/28 at his next appt.      Dr. Pearson aware and in agreement of plan.

## 2020-10-19 ENCOUNTER — DOCUMENTATION ONLY (OUTPATIENT)
Dept: ELECTROPHYSIOLOGY | Facility: CLINIC | Age: 60
End: 2020-10-19

## 2020-10-19 NOTE — PROGRESS NOTES
Alert received from [employer] stating device at SANGITA.  May have falsely triggered, based off of current voltage.  Called patient at home number, voice mail not set up.  Called secondary number, left message for a return call to discuss.    Discussed SANGITA status with Alban with [employer].  Needs an  In person interrogation to determine if alert is true, and to send diagnostics to Pearson.  Has LVAD appts in [place] for 10/28/20.  Also need to know if he will continue to follow with Dr. Lyn Bose.

## 2020-10-20 ENCOUNTER — TELEPHONE (OUTPATIENT)
Dept: TRANSPLANT | Facility: CLINIC | Age: 60
End: 2020-10-20

## 2020-10-20 NOTE — TELEPHONE ENCOUNTER
Attempted to reach pt again today to reschedule lab visit.  Unable to reach pt at listed numbers.  Pt has not responded to [facility] message either.  Called pt significant other Lavern.  She reports apologies for not returning calling and pt not answering.  She reports she will make sure pt goes to lab tomorrow.   Pt encouraged to call VAD coordinator with any questions problems or concerns. Pt reminded to page VAD coordinator on call for emergencies.  Pt verbalized understanding and in agreement of plan.       Dr. Pearson notified.  Will send orders to [facility] lab for CMP, BNP and INR for tomorrow.  Will follow up on results at that time

## 2020-10-20 NOTE — LETTER
[id number]  [address]  [facility] [address]  Phone: [phone]           Date Sent:  10/20/2020      Tae Delgado  1960      This patient will require the following lab(s). The patient will be instructed to report to your facility for their lab to be drawn.  Please fax all results to [phone]. INR results to be faxed to [phone]    LABS ORDERED    ICD 10     DATE REQUESTED:  Complete Metabolic Panel                ICD Z95.811                                        STAT 10/21/20   BNP Level                                        ICD Z95.811 / I50.9                             STAT 10/21/20   PT/INR                                             .811                                        STAT 10/21/20          Brittni Wright RN, CCRN     [phone]   Vaishali Bruner, RN--[phone]   LATONYA SmithN, RN--[phone]   NATHAN Sullivan, RN--[phone]       Fax all results to [phone].  Fax INR results to [phone].  Sincerely,      Jamaica Doshi M.D.  Medical Director, Mechanical Circulatory Device Support Program  Section of Cardiomyopathy & Heart Transplantation

## 2020-10-21 ENCOUNTER — TELEPHONE (OUTPATIENT)
Dept: TRANSPLANT | Facility: CLINIC | Age: 60
End: 2020-10-21

## 2020-10-21 ENCOUNTER — PATIENT MESSAGE (OUTPATIENT)
Dept: TRANSPLANT | Facility: CLINIC | Age: 60
End: 2020-10-21

## 2020-10-21 NOTE — TELEPHONE ENCOUNTER
"Pt returned call to VAD coordinator to discuss pt message.  Explained to pt that I Called and spoke to Lashell Arenas of ICD clinic to discuss upcoming device check.  She reports that pt is scheduled for ICD interrogation with [employer] clinical rep as pt ICD has sent a possibly erroneous alert that needs further investigation.  Specifically that battery is end of life, but is not showing it is.  Explained to pt and pt reports the word "interrogation" is a very difficult word for pt to hear given his  background.  Explained to pt we understand and explained to pt in a different verbiage and pt verbalizes appreciation of time, temperance and understanding.  Pt reports he will go to appt for device investigation.   Pt encouraged to call VAD coordinator with any questions problems or concerns. Pt reminded to page VAD coordinator on call for emergencies.  Pt verbalized understanding and in agreement of plan.     "

## 2020-10-21 NOTE — TELEPHONE ENCOUNTER
"VAD coordiantor received pt message via [facility].  Called and spoke to Lashell Arenas of [facility] clinic to discuss upcoming device check.  She reports that pt is scheduled for ICD interrogation with [employer] clinical rep as pt ICD has sent a possibly erroneous alert that needs further investigation.  Specifically that battery is end of life, but is not showing it is.      Attempted to reach pt at listed number and was unable to reach pt or leave voicemail as pt voicemail is not set up.     Called and spoke to pt significant other, Lavern.  Above was explained to her and she verbalizes "I bet it was the work interrogation is what did it".  She reports she will discuss with pt and have him call VAD coordinator with any other questions.      Dr. Pearson aware of situation and in agreement of plan.   "

## 2020-10-22 ENCOUNTER — TELEPHONE (OUTPATIENT)
Dept: TRANSPLANT | Facility: CLINIC | Age: 60
End: 2020-10-22

## 2020-10-22 ENCOUNTER — ANTI-COAG VISIT (OUTPATIENT)
Dept: CARDIOLOGY | Facility: CLINIC | Age: 60
End: 2020-10-22
Payer: MEDICARE

## 2020-10-22 DIAGNOSIS — I48.0 PAROXYSMAL ATRIAL FIBRILLATION: ICD-10-CM

## 2020-10-22 DIAGNOSIS — Z79.01 LONG TERM (CURRENT) USE OF ANTICOAGULANTS: ICD-10-CM

## 2020-10-22 DIAGNOSIS — Z95.811 LVAD (LEFT VENTRICULAR ASSIST DEVICE) PRESENT: ICD-10-CM

## 2020-10-22 LAB
EXT ALBUMIN: 4.2
EXT ALT: 47
EXT AST: 35
EXT BUN: 31
EXT CALCIUM: 9.1
EXT CHLORIDE: 103
EXT CREATININE: 1.75 MG/DL
EXT GLUCOSE: 116
EXT POTASSIUM: 4.2
EXT PROTEIN TOTAL: 7.8
EXT SODIUM: 140 MMOL/L
INR PPP: 3.33
NT-PROBNP SERPL-MCNC: 1081 PG/ML

## 2020-10-22 PROCEDURE — 93793 PR ANTICOAGULANT MGMT FOR PT TAKING WARFARIN: ICD-10-PCS | Mod: ,,,

## 2020-10-22 PROCEDURE — 93793 ANTICOAG MGMT PT WARFARIN: CPT | Mod: ,,,

## 2020-10-22 NOTE — PROGRESS NOTES
Verbal result taken from [facility] lab: PT/INR - 33.4/3.33. Results to be faxed as well.

## 2020-10-22 NOTE — TELEPHONE ENCOUNTER
----- Message from Vaishali Bruner RN sent at 10/20/2020 10:24 AM CDT -----  Regarding: BMP/BNP lasix & K+ changes    ----- Message -----  From: Mary Davis LPN  Sent: 9/30/2020   9:54 AM CDT  To: [facility]  Subject: BMP/BNP lasix & K+ changes                       Getting drawn with PT/INR on Monday or Tuesday  [phone] (phone)  [phone] ( Fax)  [phone] ( fax)

## 2020-10-22 NOTE — TELEPHONE ENCOUNTER
Contacted lab to obtain lab results    Patient went to lab on this morning    He went to outpatient clinic, they will  labs around 10am results will become available after noon.    Results will be faxed over    Spoke with Maria R

## 2020-10-22 NOTE — PROGRESS NOTES
Confirmed dose - reports taking fluid pills and potassium meds.   Cranberry juice intake couple glasses in a week denies any other changes.

## 2020-10-23 ENCOUNTER — TELEPHONE (OUTPATIENT)
Dept: TRANSPLANT | Facility: CLINIC | Age: 60
End: 2020-10-23

## 2020-10-23 ENCOUNTER — DOCUMENTATION ONLY (OUTPATIENT)
Dept: ELECTROPHYSIOLOGY | Facility: CLINIC | Age: 60
End: 2020-10-23

## 2020-10-23 NOTE — TELEPHONE ENCOUNTER
Contacted lab to have them fax over lab results    Spoke with Ling    Provided fax number    Awaiting results

## 2020-10-23 NOTE — TELEPHONE ENCOUNTER
----- Message from Vaishali Bruner RN sent at 10/20/2020 10:24 AM CDT -----  Regarding: BMP/BNP lasix & K+ changes    ----- Message -----  From: Mary Davis LPN  Sent: 9/30/2020   9:54 AM CDT  To: [facility] Lv[facility] Clinical  Subject: BMP/BNP lasix & K+ changes                       Getting drawn with PT/INR on Monday or Tuesday  [phone] (phone)  [phone] ( Fax)  [phone] ( fax)

## 2020-10-23 NOTE — PROGRESS NOTES
"Received call from [employer] rep Alban Greg that [facility] headquarters able to evaluate data from patient's home monitoring, and it was determined that SANGITA status of battery received earlier this week is a true alert.  Recommends generator change with old device to be shipped to [facility] for assessment.  Patient last seen in clinic 12/20/18 by Dr. Lyn Bose.      Called patient, advised that alert we received the other day is in fact a true SANGITA alert that will require a change out.  Patient states "I am not having it changed out.  I am nothing but a dollar bill to you".  Advised  I will still need to escalate alert to a physician.  Advised Dr. Lyn Bose now in [place].   Pt ok with escalating alert to Dr. Lyn Bose.  Awaiting further advisement.    Addendum--Spoke with Dr. Lyn Bose.  States will discuss the case with Tawny Lima with [employer] and Dr. Magdaleno as patient has an appointment with him next week.  No new orders at this time.  "

## 2020-10-23 NOTE — TELEPHONE ENCOUNTER
----- Message from Vaishali Bruner RN sent at 10/20/2020 10:24 AM CDT -----  Regarding: BMP/BNP lasix & K+ changes    ----- Message -----  From: Mary Davis LPN  Sent: 9/30/2020   9:54 AM CDT  To: [facility]  Subject: BMP/BNP lasix & K+ changes                       Getting drawn with PT/INR on Monday or Tuesday  [phone] (phone)  [phone] ( Fax)  [phone] ( fax)

## 2020-10-26 ENCOUNTER — PATIENT MESSAGE (OUTPATIENT)
Dept: ELECTROPHYSIOLOGY | Facility: CLINIC | Age: 60
End: 2020-10-26

## 2020-10-27 ENCOUNTER — TELEPHONE (OUTPATIENT)
Dept: CARDIOLOGY | Facility: HOSPITAL | Age: 60
End: 2020-10-27
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
## 2020-11-03 ENCOUNTER — PATIENT MESSAGE (OUTPATIENT)
Dept: CARDIOLOGY | Facility: CLINIC | Age: 60
End: 2020-11-03

## 2020-11-03 ENCOUNTER — PATIENT MESSAGE (OUTPATIENT)
Dept: TRANSPLANT | Facility: CLINIC | Age: 60
End: 2020-11-03

## 2020-11-03 NOTE — PROGRESS NOTES
Per [facility]  staff patient didn't go for INR 11/2/20 or this morning.  I left voice message for patient to call back to be rescheduled for missed INR and sent message on [facility].

## 2020-11-05 ENCOUNTER — PATIENT MESSAGE (OUTPATIENT)
Dept: CARDIOLOGY | Facility: CLINIC | Age: 60
End: 2020-11-05

## 2020-11-05 ENCOUNTER — ANTI-COAG VISIT (OUTPATIENT)
Dept: CARDIOLOGY | Facility: CLINIC | Age: 60
End: 2020-11-05
Payer: MEDICARE

## 2020-11-05 DIAGNOSIS — Z79.01 LONG TERM (CURRENT) USE OF ANTICOAGULANTS: ICD-10-CM

## 2020-11-05 DIAGNOSIS — Z95.811 LVAD (LEFT VENTRICULAR ASSIST DEVICE) PRESENT: ICD-10-CM

## 2020-11-05 DIAGNOSIS — I48.0 PAROXYSMAL ATRIAL FIBRILLATION: ICD-10-CM

## 2020-11-05 LAB — INR PPP: 2.37

## 2020-11-05 PROCEDURE — 93793 PR ANTICOAGULANT MGMT FOR PT TAKING WARFARIN: ICD-10-PCS | Mod: ,,,

## 2020-11-05 PROCEDURE — 93793 ANTICOAG MGMT PT WARFARIN: CPT | Mod: ,,,

## 2020-11-11 ENCOUNTER — CLINICAL SUPPORT (OUTPATIENT)
Dept: CARDIOLOGY | Facility: HOSPITAL | Age: 60
End: 2020-11-11
Payer: MEDICARE

## 2020-11-11 ENCOUNTER — PATIENT MESSAGE (OUTPATIENT)
Dept: CARDIOLOGY | Facility: HOSPITAL | Age: 60
End: 2020-11-11

## 2020-11-11 DIAGNOSIS — Z95.810 PRESENCE OF AUTOMATIC (IMPLANTABLE) CARDIAC DEFIBRILLATOR: ICD-10-CM

## 2020-11-11 PROCEDURE — 93296 REM INTERROG EVL PM/IDS: CPT | Performed by: INTERNAL MEDICINE

## 2020-11-11 PROCEDURE — 93295 CARDIAC DEVICE CHECK - REMOTE: ICD-10-PCS | Mod: ,,, | Performed by: INTERNAL MEDICINE

## 2020-11-11 PROCEDURE — 93295 DEV INTERROG REMOTE 1/2/MLT: CPT | Mod: ,,, | Performed by: INTERNAL MEDICINE

## 2020-11-16 ENCOUNTER — ANTI-COAG VISIT (OUTPATIENT)
Dept: CARDIOLOGY | Facility: CLINIC | Age: 60
End: 2020-11-16
Payer: MEDICARE

## 2020-11-16 DIAGNOSIS — Z95.811 LVAD (LEFT VENTRICULAR ASSIST DEVICE) PRESENT: ICD-10-CM

## 2020-11-16 DIAGNOSIS — I48.0 PAROXYSMAL ATRIAL FIBRILLATION: ICD-10-CM

## 2020-11-16 DIAGNOSIS — Z79.01 LONG TERM (CURRENT) USE OF ANTICOAGULANTS: ICD-10-CM

## 2020-11-16 LAB — INR PPP: 2.75

## 2020-11-16 PROCEDURE — 93793 ANTICOAG MGMT PT WARFARIN: CPT | Mod: ,,,

## 2020-11-16 PROCEDURE — 93793 PR ANTICOAGULANT MGMT FOR PT TAKING WARFARIN: ICD-10-PCS | Mod: ,,,

## 2020-11-27 ENCOUNTER — PATIENT MESSAGE (OUTPATIENT)
Dept: CARDIOTHORACIC SURGERY | Facility: CLINIC | Age: 60
End: 2020-11-27

## 2020-11-27 DIAGNOSIS — Z95.811 LVAD (LEFT VENTRICULAR ASSIST DEVICE) PRESENT: Primary | ICD-10-CM

## 2020-11-27 NOTE — PROGRESS NOTES
Spoke with Ms. Gonzalez at [facility] lab and she states she does not have any result for the patient

## 2020-11-30 ENCOUNTER — ANTI-COAG VISIT (OUTPATIENT)
Dept: CARDIOLOGY | Facility: CLINIC | Age: 60
End: 2020-11-30
Payer: MEDICARE

## 2020-11-30 DIAGNOSIS — I48.0 PAROXYSMAL ATRIAL FIBRILLATION: ICD-10-CM

## 2020-11-30 DIAGNOSIS — Z95.811 LVAD (LEFT VENTRICULAR ASSIST DEVICE) PRESENT: ICD-10-CM

## 2020-11-30 DIAGNOSIS — Z79.01 LONG TERM (CURRENT) USE OF ANTICOAGULANTS: ICD-10-CM

## 2020-11-30 LAB — INR PPP: 4.4

## 2020-11-30 PROCEDURE — 93793 PR ANTICOAGULANT MGMT FOR PT TAKING WARFARIN: ICD-10-PCS | Mod: ,,,

## 2020-11-30 PROCEDURE — 93793 ANTICOAG MGMT PT WARFARIN: CPT | Mod: ,,,

## 2020-11-30 NOTE — PROGRESS NOTES
Shelley w/ Fransisca at [facility] who reported an INR of 4.41.    INR not at goal. Medications, chart, and patient findings reviewed. See calendar for adjustments to dose and follow up plan.

## 2020-12-01 ENCOUNTER — DOCUMENTATION ONLY (OUTPATIENT)
Dept: TRANSPLANT | Facility: CLINIC | Age: 60
End: 2020-12-01

## 2020-12-01 LAB
EXT BUN: 29
EXT CALCIUM: 9.1
EXT CHLORIDE: 103
EXT CREATININE: 1.64 MG/DL
EXT GLUCOSE: 108
EXT POTASSIUM: 4.7
EXT SODIUM: 137 MMOL/L

## 2020-12-03 NOTE — PROGRESS NOTES
Ms. Armijo with [facility], she states the patient did not go on 12/2/2020. Called pt and he rescheduled to 12/7/2020.

## 2020-12-07 ENCOUNTER — ANTI-COAG VISIT (OUTPATIENT)
Dept: CARDIOLOGY | Facility: CLINIC | Age: 60
End: 2020-12-07
Payer: MEDICARE

## 2020-12-07 DIAGNOSIS — Z79.01 LONG TERM (CURRENT) USE OF ANTICOAGULANTS: ICD-10-CM

## 2020-12-07 DIAGNOSIS — Z95.811 LVAD (LEFT VENTRICULAR ASSIST DEVICE) PRESENT: ICD-10-CM

## 2020-12-07 DIAGNOSIS — I48.0 PAROXYSMAL ATRIAL FIBRILLATION: ICD-10-CM

## 2020-12-07 LAB — INR PPP: 3.53

## 2020-12-07 PROCEDURE — 93793 PR ANTICOAGULANT MGMT FOR PT TAKING WARFARIN: ICD-10-PCS | Mod: ,,,

## 2020-12-07 PROCEDURE — 93793 ANTICOAG MGMT PT WARFARIN: CPT | Mod: ,,,

## 2020-12-07 NOTE — PROGRESS NOTES
Pt reports taking 1.25mg as advised on 12/1 but took 2.5mg the rest of the days. He also reports blood when blowing his nose daily. Denies any other changes

## 2020-12-10 ENCOUNTER — PATIENT MESSAGE (OUTPATIENT)
Dept: CARDIOLOGY | Facility: CLINIC | Age: 60
End: 2020-12-10

## 2020-12-14 ENCOUNTER — OFFICE VISIT (OUTPATIENT)
Dept: TRANSPLANT | Facility: CLINIC | Age: 60
End: 2020-12-14
Attending: INTERNAL MEDICINE
Payer: MEDICARE

## 2020-12-14 ENCOUNTER — CLINICAL SUPPORT (OUTPATIENT)
Dept: TRANSPLANT | Facility: CLINIC | Age: 60
End: 2020-12-14
Payer: MEDICARE

## 2020-12-14 ENCOUNTER — ANTI-COAG VISIT (OUTPATIENT)
Dept: CARDIOLOGY | Facility: CLINIC | Age: 60
End: 2020-12-14
Payer: MEDICARE

## 2020-12-14 ENCOUNTER — HOSPITAL ENCOUNTER (OUTPATIENT)
Dept: PULMONOLOGY | Facility: CLINIC | Age: 60
Discharge: HOME | End: 2020-12-14
Payer: MEDICARE

## 2020-12-14 ENCOUNTER — HOSPITAL ENCOUNTER (OUTPATIENT)
Dept: CARDIOLOGY | Facility: HOSPITAL | Age: 60
Discharge: HOME | End: 2020-12-14
Attending: INTERNAL MEDICINE
Payer: MEDICARE

## 2020-12-14 VITALS — BODY MASS INDEX: 35.07 KG/M2 | HEIGHT: 70 IN | WEIGHT: 245 LBS

## 2020-12-14 VITALS
WEIGHT: 237 LBS | BODY MASS INDEX: 32.1 KG/M2 | BODY MASS INDEX: 35.07 KG/M2 | WEIGHT: 245 LBS | HEIGHT: 70 IN | HEIGHT: 72 IN

## 2020-12-14 DIAGNOSIS — Z95.810 ICD (IMPLANTABLE CARDIOVERTER-DEFIBRILLATOR) IN PLACE: ICD-10-CM

## 2020-12-14 DIAGNOSIS — I48.0 PAROXYSMAL ATRIAL FIBRILLATION: ICD-10-CM

## 2020-12-14 DIAGNOSIS — Z79.899 AT RISK FOR AMIODARONE TOXICITY WITH LONG TERM USE: ICD-10-CM

## 2020-12-14 DIAGNOSIS — I47.29 NSVT (NONSUSTAINED VENTRICULAR TACHYCARDIA): ICD-10-CM

## 2020-12-14 DIAGNOSIS — I50.42 CHRONIC COMBINED SYSTOLIC AND DIASTOLIC CONGESTIVE HEART FAILURE: ICD-10-CM

## 2020-12-14 DIAGNOSIS — I10 ESSENTIAL HYPERTENSION: ICD-10-CM

## 2020-12-14 DIAGNOSIS — Z79.01 LONG TERM (CURRENT) USE OF ANTICOAGULANTS: ICD-10-CM

## 2020-12-14 DIAGNOSIS — E66.09 CLASS 1 OBESITY DUE TO EXCESS CALORIES WITH SERIOUS COMORBIDITY AND BODY MASS INDEX (BMI) OF 32.0 TO 32.9 IN ADULT: ICD-10-CM

## 2020-12-14 DIAGNOSIS — Z91.89 AT RISK FOR AMIODARONE TOXICITY WITH LONG TERM USE: ICD-10-CM

## 2020-12-14 DIAGNOSIS — Z95.811 LVAD (LEFT VENTRICULAR ASSIST DEVICE) PRESENT: ICD-10-CM

## 2020-12-14 DIAGNOSIS — I42.0 COCM (CONGESTIVE CARDIOMYOPATHY): ICD-10-CM

## 2020-12-14 DIAGNOSIS — Z95.811 LVAD (LEFT VENTRICULAR ASSIST DEVICE) PRESENT: Primary | ICD-10-CM

## 2020-12-14 DIAGNOSIS — Z79.899 LONG TERM CURRENT USE OF AMIODARONE: ICD-10-CM

## 2020-12-14 DIAGNOSIS — I11.0 HYPERTENSIVE HEART DISEASE WITH HEART FAILURE: ICD-10-CM

## 2020-12-14 PROBLEM — R06.2 WHEEZING: Status: RESOLVED | Noted: 2020-03-20 | Resolved: 2020-12-14

## 2020-12-14 LAB
ASCENDING AORTA: 3.25 CM
BSA FOR ECHO PROCEDURE: 2.34 M2
CV ECHO LV RWT: 0.28 CM
DOP CALC LVOT AREA: 4 CM2
DOP CALC LVOT DIAMETER: 2.25 CM
E WAVE DECELERATION TIME: 119.97 MSEC
E/A RATIO: 1.9
E/E' RATIO: 28.29 M/S
ECHO LV POSTERIOR WALL: 0.88 CM (ref 0.6–1.1)
FRACTIONAL SHORTENING: 6 % (ref 28–44)
INTERVENTRICULAR SEPTUM: 0.9 CM (ref 0.6–1.1)
LA MAJOR: 7.06 CM
LA MINOR: 6.91 CM
LA WIDTH: 4.43 CM
LEFT ATRIUM SIZE: 4.83 CM
LEFT ATRIUM VOLUME INDEX MOD: 55.6 ML/M2
LEFT ATRIUM VOLUME INDEX: 55.8 ML/M2
LEFT ATRIUM VOLUME MOD: 126.41 CM3
LEFT ATRIUM VOLUME: 127.02 CM3
LEFT INTERNAL DIMENSION IN SYSTOLE: 5.92 CM (ref 2.1–4)
LEFT VENTRICLE DIASTOLIC VOLUME INDEX: 87.57 ML/M2
LEFT VENTRICLE DIASTOLIC VOLUME: 199.24 ML
LEFT VENTRICLE MASS INDEX: 101 G/M2
LEFT VENTRICLE SYSTOLIC VOLUME INDEX: 76.8 ML/M2
LEFT VENTRICLE SYSTOLIC VOLUME: 174.75 ML
LEFT VENTRICULAR INTERNAL DIMENSION IN DIASTOLE: 6.27 CM (ref 3.5–6)
LEFT VENTRICULAR MASS: 229.54 G
LV LATERAL E/E' RATIO: 24.75 M/S
LV SEPTAL E/E' RATIO: 33 M/S
MV PEAK A VEL: 0.52 M/S
MV PEAK E VEL: 0.99 M/S
PISA TR MAX VEL: 2.14 M/S
RA MAJOR: 6.12 CM
RA PRESSURE: 15 MMHG
RA WIDTH: 5.17 CM
RIGHT VENTRICULAR END-DIASTOLIC DIMENSION: 6 CM
RV TISSUE DOPPLER FREE WALL SYSTOLIC VELOCITY 1 (APICAL 4 CHAMBER VIEW): 5.75 CM/S
SINUS: 3.44 CM
STJ: 3.06 CM
TDI LATERAL: 0.04 M/S
TDI SEPTAL: 0.03 M/S
TDI: 0.04 M/S
TR MAX PG: 18 MMHG
TRICUSPID ANNULAR PLANE SYSTOLIC EXCURSION: 1.34 CM
TV REST PULMONARY ARTERY PRESSURE: 33 MMHG

## 2020-12-14 PROCEDURE — 94618 PULMONARY STRESS TESTING: CPT | Mod: 26,S$PBB,, | Performed by: INTERNAL MEDICINE

## 2020-12-14 PROCEDURE — 99213 OFFICE O/P EST LOW 20 MIN: CPT | Mod: PBBFAC | Performed by: INTERNAL MEDICINE

## 2020-12-14 PROCEDURE — 93750 PR INTERROGATE VENT ASSIST DEV, IN PERSON, W PHYSICIAN ANALYSIS: ICD-10-PCS | Mod: S$PBB,,, | Performed by: INTERNAL MEDICINE

## 2020-12-14 PROCEDURE — 94618 PULMONARY STRESS TESTING: CPT | Mod: PBBFAC | Performed by: INTERNAL MEDICINE

## 2020-12-14 PROCEDURE — 99214 PR OFFICE/OUTPT VISIT, EST, LEVL IV, 30-39 MIN: ICD-10-PCS | Mod: 25,S$PBB,, | Performed by: INTERNAL MEDICINE

## 2020-12-14 PROCEDURE — 93793 ANTICOAG MGMT PT WARFARIN: CPT | Mod: ,,,

## 2020-12-14 PROCEDURE — 93306 TTE W/DOPPLER COMPLETE: CPT | Mod: 26,,, | Performed by: INTERNAL MEDICINE

## 2020-12-14 PROCEDURE — 93793 PR ANTICOAGULANT MGMT FOR PT TAKING WARFARIN: ICD-10-PCS | Mod: ,,,

## 2020-12-14 PROCEDURE — 93750 INTERROGATION VAD IN PERSON: CPT | Mod: PBBFAC | Performed by: INTERNAL MEDICINE

## 2020-12-14 PROCEDURE — 99999 PR PBB SHADOW E&M-EST. PATIENT-LVL III: ICD-10-PCS | Mod: PBBFAC,,, | Performed by: INTERNAL MEDICINE

## 2020-12-14 PROCEDURE — 93306 ECHO (CUPID ONLY): ICD-10-PCS | Mod: 26,,, | Performed by: INTERNAL MEDICINE

## 2020-12-14 PROCEDURE — 99999 PR PBB SHADOW E&M-EST. PATIENT-LVL III: CPT | Mod: PBBFAC,,, | Performed by: INTERNAL MEDICINE

## 2020-12-14 PROCEDURE — 94618 PULMONARY STRESS TESTING: ICD-10-PCS | Mod: 26,S$PBB,, | Performed by: INTERNAL MEDICINE

## 2020-12-14 PROCEDURE — 99214 OFFICE O/P EST MOD 30 MIN: CPT | Mod: 25,S$PBB,, | Performed by: INTERNAL MEDICINE

## 2020-12-14 PROCEDURE — 93306 TTE W/DOPPLER COMPLETE: CPT

## 2020-12-14 PROCEDURE — 93750 INTERROGATION VAD IN PERSON: CPT | Mod: S$PBB,,, | Performed by: INTERNAL MEDICINE

## 2020-12-14 NOTE — LETTER
"   [facility]  [address]  [facility] [address]  Phone: [phone]     NOTIFICATION LETTER TO FLY    12/14/2020    To whom it may concern:  This letter is to inform you that one of your passengers, whose identifying information appears below, has special healthcare needs.       Tae Delgado  60 y.o. 1960  [address] [place] [address]    The passenger has a Thoratec® HeartMate 3 Left Ventricular Assist System (LVAS) or "blood pump." The device takes over the pumping function of the patient's sick or weakened heart so that the patient's lungs, organs, and tissues get the oxygen-rich blood they need. The VAD is a life-sustaining device.     I am requesting your help in assisting the passenger through the security check in and boarding process. In addition, the patient will need to carry on and stow HeartMate 3 related accessories on the plane since the equipment must remain with him or her at all times for safety reasons. This equipment includes Mobile Power Unit (MPU), MPU power cable, and sufficient back up batteries (compliant) for travel duration including delays.     Be advised that the HeartMate 3 system has been tested and approved for air transport, including commercial aircraft. The VAD system is compliant with all related FAA safety requirements and will not interfere with aviation electronics, per Section 21, Category M of the RTCA document number RTCA/DO-160G, as specified in Use of Portable Electronic Devices Aboard Aircraft AC number 91-21.1B, Section 8A.    The VAD power capacity complies with on-board IATA regulations-Aircraft Travel.     Thank you in advance for your assistance. If you have questions about the device, feel free to contact me directly at the telephone number listed below.     Sincerely,      Jamaica Doshi M.D.  Medical Director, Mechanical Circulatory Device Support Program  Section of Cardiomyopathy & Heart Transplantation        "
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
## 2020-12-14 NOTE — PROGRESS NOTES
Pt states he is about to walk into the lab right now. He has multiple appointments to day at [facility]

## 2020-12-14 NOTE — PROCEDURES
TXP [facility] INTERROGATIONS 12/14/2020 9/28/2020 8/13/2020 7/13/2020 7/1/2020 5/27/2020 4/23/2020   Type HeartMate3 HeartMate3 HeartMate3 HeartMate3 HeartMate3 HeartMate3 HeartMate3   Flow 3.7 3.3 3.7 3.7 4.2 3.9 3.7   Speed 5300 5300 5300 5300 5300 5300 5300   PI 7.3 9.8 7.2 7.0 6.4 7.0 6.3   Power (Berry) 3.9 4.0 3.9 3.9 4.0 4.0 3.8   LSL 4900 4900 4900 4900 4900 4900 4900   Pulsatility No Pulse No Pulse No Pulse No Pulse Intermittent pulse Pulse Intermittent pulse   }Interrogation of Ventricular assist device was performed with physician analysis of device parameters and review of device function. I have personally reviewed the interrogation findings and agree with findings as stated.

## 2020-12-14 NOTE — LETTER
December 14, 2020        Dipesh De La Torre  [address] [address]  [place] [address]  Phone: [phone]  Fax: [phone]             [facility]  [address]  [facility] [address]  Phone: [phone]   Patient: Tae Delgado   MR Number: [id number]   YOB: 1960   Date of Visit: 12/14/2020       Dear Dr. Dipesh De La Torre    Thank you for referring Tae Delgado to me for evaluation. Attached you will find relevant portions of my assessment and plan of care.    If you have questions, please do not hesitate to call me. I look forward to following Tae Delgado along with you.    Sincerely,    Karson Bucio Jr, MD    Enclosure    If you would like to receive this communication electronically, please contact [email or web address] or [phone] to request [employer] Link access.    [employer] Link is a tool which provides read-only access to select patient information with whom you have a relationship. Its easy to use and provides real time access to review your patients record including encounter summaries, notes, results, and demographic information.    If you feel you have received this communication in error or would no longer like to receive these types of communications, please e-mail [email or web address]

## 2020-12-14 NOTE — PROGRESS NOTES
"Subjective:   Patient ID:  Tae Delgado is a 60 y.o. male who presents for LVAD followup visit.    Implant Date:1/23/2020  "[employer]"  Initials:RCD     Heartmate 3 RPM 5300     INR goal: 2-3   Bridge with Heparin   Antiplatelets:      TXP [facility] INTERROGATIONS 12/14/2020   Type HeartMate3   Flow 3.7   Speed 5300   PI 7.3   Power (Berry) 3.9   LSL 4900   Pulsatility No Pulse   Interrogation of Ventricular assist device was performed with physician analysis of device parameters and review of device function. I have personally reviewed the interrogation findings and agree with findings as stated.     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  His post-op course complicated by RV failure.  At prior clinic visits BP elevated but attempts to treat result in intolerable side-effects so felt to have component of white coat hypertension.    He reports today doing well, walks 1200 ft to [employer] stand carrying gun, LVAD pack and supplies as well as hunting equipment.  Sleeps in chair as wakes up after 3 hrs and watches TV.  Says takes power naps.    He can still feel vibration of pump if arms across chest or any pressure applied to chest but much better than previously noted.    Review of Systems   Constitution: Positive for malaise/fatigue. Negative for chills, fever, night sweats, weight gain and weight loss.   HENT: Negative for congestion, hearing loss, hoarse voice, nosebleeds and sore throat.    Eyes: Negative for double vision, pain, vision loss in left eye and vision loss in right eye.   Cardiovascular: Positive for dyspnea on exertion and leg swelling. Negative for chest pain, claudication, irregular heartbeat, near-syncope, orthopnea, palpitations, paroxysmal nocturnal dyspnea and syncope.   Respiratory: Negative for cough, hemoptysis, shortness of breath, sleep " disturbances due to breathing, snoring, sputum production and wheezing.    Endocrine: Negative for cold intolerance, heat intolerance, polydipsia and polyuria.   Hematologic/Lymphatic: Negative for bleeding problem. Does not bruise/bleed easily.   Musculoskeletal: Negative for falls, muscle cramps, myalgias and neck pain.   Gastrointestinal: Negative for bloating, abdominal pain, change in bowel habit, constipation, diarrhea, hematochezia, jaundice, melena and nausea.   Genitourinary: Negative for bladder incontinence, dysuria, frequency, hematuria, hesitancy, incomplete emptying and urgency.   Neurological: Negative for brief paralysis, dizziness, focal weakness, headaches, numbness, paresthesias, seizures and weakness.   Psychiatric/Behavioral: Negative for depression and memory loss. The patient has insomnia. The patient is not nervous/anxious.      Objective:   Height 6' (1.829 m), weight 107.5 kg (237 lb).body mass index is 32.14 kg/m².  Doppler: 100 mm Hg--see HPI and will recheck; on recheck doppler 79  Physical Exam   Constitutional: No distress.   Ht 6' (1.829 m)   Wt 107.5 kg (237 lb)   BMI 32.14 kg/m²   Last visit wt 105.2 kg (232 lb)  Friendly, cooperative WM in NAD   HENT:   Head: Normocephalic and atraumatic.   Eyes: Conjunctivae are normal. Right eye exhibits no discharge. Left eye exhibits no discharge. No scleral icterus.   Neck: No JVD present. No thyromegaly present.   Cardiovascular:   Normal VAD sounds DL 1-2 and he reports unchanged   Pulmonary/Chest: Effort normal and breath sounds normal. No respiratory distress. He has no wheezes. He has no rales.   Abdominal: Soft. Bowel sounds are normal. He exhibits no distension and no mass. There is no abdominal tenderness. There is no rebound and no guarding.   Musculoskeletal:         General: Edema (mild) present. No tenderness.   Neurological: He is alert.   Skin: Skin is warm and dry. He is not diaphoretic.   Psychiatric: He has a normal mood and  affect. His behavior is normal. Judgment and thought content normal.     Lab Results   Component Value Date     (H) 12/14/2020    BNP 1,242 09/11/2020     12/14/2020    K 4.0 12/14/2020    MG 1.9 12/14/2020     12/14/2020     04/13/2020    CO2 30 (H) 12/14/2020    BUN 29 (H) 12/14/2020    BUN 24 (A) 04/13/2020    CREATININE 1.8 (H) 12/14/2020    CREATININE 1.7 04/13/2020     (H) 12/14/2020    HGBA1C 6.2 (H) 01/15/2020    AST 27 12/14/2020    ALT 38 12/14/2020    ALBUMIN 3.9 12/14/2020    ALBUMIN 4.3 04/13/2020    PROT 7.4 12/14/2020    BILITOT 1.5 (H) 12/14/2020    WBC 10.57 12/14/2020    WBC 6.1 04/13/2020    HGB 13.7 (L) 12/14/2020    HCT 43.8 12/14/2020    HCT 25 (L) 02/05/2020     12/14/2020    INR 2.6 (H) 12/14/2020    INR 3.53 12/07/2020     12/14/2020    TSH 2.781 07/01/2020    CHOL 116 (L) 07/13/2020    HDL 27 (L) 07/13/2020    LDLCALC 70.8 07/13/2020    TRIG 91 07/13/2020    L6LTOLA 10.1 07/01/2020     Today's echo to be done after visit    Today's 6  meters  Assessment:      1. LVAD (left ventricular assist device) present    2. Long term current use of amiodarone    3. Chronic combined systolic and diastolic congestive heart failure    4. COCM (congestive cardiomyopathy)    5. Paroxysmal atrial fibrillation    6. At risk for amiodarone toxicity with long term use    7. Long term (current) use of anticoagulants    8. Hypertensive heart disease with heart failure    9. NSVT (nonsustained ventricular tachycardia)    10. ICD (implantable cardioverter-defibrillator) in place    11. Essential hypertension    12. Class 1 obesity due to excess calories with serious comorbidity and body mass index (BMI) of 32.0 to 32.9 in adult      Plan:   Diuresis, wt loss, low sodium diet and fluid restriction reviewed along with daily weights and how to respond to 3# weight gain with prn diuretics.  If this fails to restore dry weight call us within 2-3 days.     AMIODARONE  FOLLOW-UP:   CXR yearly--due April 2021   CMP every 6 months   TSH every 6 months--due next month    Work on weight loss and diet with walking, exercise program    Patient is now NYHA II    Listed for transplant: No    UNOS Patient Status  Functional Status: 90% - Able to carry on normal activity: minor symptoms of disease  Physical Capacity: No Limitations  Working for Income: No  If no, reason not working: Disability

## 2020-12-15 NOTE — PROCEDURES
Tae Delgado is a 60 y.o.  male patient, who presents for a 6 minute walk test ordered by MD Oskar.  The diagnosis is (LVAD).  The patient's BMI is 35.2 kg/m2.  Predicted distance (lower limit of normal) is 373.13 meters.      Test Results:    The test was completed without stopping.   The total time walked was 360 seconds.  During walking, the patient reported:  Dyspnea. The patient used no assistive devices  during testing.     12/14/2020---------Distance: 426.72 meters (1400 feet)     O2 Sat % Supplemental Oxygen Heart Rate Blood Pressure Krista Scale   Pre-exercise  (Resting) 98 % Room Air 91 bpm Unable to obtain 0   During Exercise 98 % Room Air 30 bpm   mmHg 4   Post-exercise  (Recovery) 98 % Room Air  79 bpm   mmHg       Recovery Time: 120 seconds    Performing nurse/tech: Debora MAURICE      PREVIOUS STUDY:   The patient had a previous study.     08/13/2020---------Distance: 426.72 meters (1400 feet)       O2 Sat % Supplemental Oxygen Heart Rate Blood Pressure Krista Scale   Pre-exercise  (Resting) 98 % Room Air 30 bpm Unable to obtain 0   During Exercise 99 % Room Air 89 bpm Unable to obtain 3   Post-exercise  (Recovery) 98 % Room Air  82 bpm Unable to obtain 1          CLINICAL INTERPRETATION:  Six minute walk distance is 426.72 meters (1400 feet) with somewhat heavy dyspnea.  During exercise, there was no significant desaturation while breathing room air.  Heart rate decreased significantly with walking.  This may represent an abnormal cardiovascular response to exercise.  The patient did not report non-pulmonary symptoms during exercise.  Since the previous study in August 2020, exercise capacity is unchanged.  Based upon age and body mass index, exercise capacity is normal.

## 2020-12-18 NOTE — PROGRESS NOTES
Spoke with Ms. Nath with [facility], she states the patient did not go in for labs on 12/17/2020

## 2020-12-28 ENCOUNTER — TELEPHONE (OUTPATIENT)
Dept: CARDIOLOGY | Facility: HOSPITAL | Age: 60
End: 2020-12-28

## 2020-12-28 NOTE — TELEPHONE ENCOUNTER
----- Message from Karson Bucio Jr., MD sent at 12/27/2020  8:49 PM CST -----  Regarding: RE: AICD at [facility]  I will call him this week   Thank you  ----- Message -----  From: Sabrina Gonzalez  Sent: 12/14/2020   3:04 PM CST  To: Karson Bucio Jr., MD  Subject: AICD at [facility]                                      Pt has a Biotronik AICD that has been at [facility] since 10/18/2020.  He has been notified of this in the past and notes indicate he may be refusing generator replacement.  He has missed past appts in [place] with Dr. Loomis as well.    We were scheduled to see the patient at [facility] [place] today following his other HF appts but he did not show.  When called, he stated he wishes to talk to you about his defibrillator.      Please advise.     Thanks,  Sabrina

## 2020-12-29 ENCOUNTER — ANTI-COAG VISIT (OUTPATIENT)
Dept: CARDIOLOGY | Facility: CLINIC | Age: 60
End: 2020-12-29
Payer: MEDICARE

## 2020-12-29 DIAGNOSIS — Z95.811 LVAD (LEFT VENTRICULAR ASSIST DEVICE) PRESENT: ICD-10-CM

## 2020-12-29 DIAGNOSIS — I48.0 PAROXYSMAL ATRIAL FIBRILLATION: ICD-10-CM

## 2020-12-29 DIAGNOSIS — Z79.01 LONG TERM (CURRENT) USE OF ANTICOAGULANTS: ICD-10-CM

## 2020-12-29 LAB — INR PPP: 2.14

## 2020-12-29 PROCEDURE — 93793 ANTICOAG MGMT PT WARFARIN: CPT | Mod: ,,,

## 2020-12-29 PROCEDURE — 93793 PR ANTICOAGULANT MGMT FOR PT TAKING WARFARIN: ICD-10-PCS | Mod: ,,,

## 2021-01-04 ENCOUNTER — ANTI-COAG VISIT (OUTPATIENT)
Dept: CARDIOLOGY | Facility: CLINIC | Age: 61
End: 2021-01-04

## 2021-01-04 DIAGNOSIS — I48.0 PAROXYSMAL ATRIAL FIBRILLATION: ICD-10-CM

## 2021-01-04 DIAGNOSIS — Z79.01 LONG TERM (CURRENT) USE OF ANTICOAGULANTS: ICD-10-CM

## 2021-01-04 DIAGNOSIS — Z95.811 LVAD (LEFT VENTRICULAR ASSIST DEVICE) PRESENT: ICD-10-CM

## 2021-01-05 ENCOUNTER — PATIENT MESSAGE (OUTPATIENT)
Dept: CARDIOLOGY | Facility: CLINIC | Age: 61
End: 2021-01-05

## 2021-01-05 ENCOUNTER — TELEPHONE (OUTPATIENT)
Dept: TRANSPLANT | Facility: CLINIC | Age: 61
End: 2021-01-05

## 2021-01-07 ENCOUNTER — TELEPHONE (OUTPATIENT)
Dept: ELECTROPHYSIOLOGY | Facility: CLINIC | Age: 61
End: 2021-01-07

## 2021-01-07 ENCOUNTER — TELEPHONE (OUTPATIENT)
Dept: TRANSPLANT | Facility: CLINIC | Age: 61
End: 2021-01-07

## 2021-01-08 ENCOUNTER — TELEPHONE (OUTPATIENT)
Dept: ELECTROPHYSIOLOGY | Facility: CLINIC | Age: 61
End: 2021-01-08

## 2021-01-13 ENCOUNTER — ANTI-COAG VISIT (OUTPATIENT)
Dept: CARDIOLOGY | Facility: CLINIC | Age: 61
End: 2021-01-13
Payer: MEDICARE

## 2021-01-13 DIAGNOSIS — Z95.811 LVAD (LEFT VENTRICULAR ASSIST DEVICE) PRESENT: ICD-10-CM

## 2021-01-13 DIAGNOSIS — I48.0 PAROXYSMAL ATRIAL FIBRILLATION: ICD-10-CM

## 2021-01-13 DIAGNOSIS — Z79.01 LONG TERM (CURRENT) USE OF ANTICOAGULANTS: ICD-10-CM

## 2021-01-13 LAB — INR PPP: 2.73

## 2021-01-13 PROCEDURE — 93793 ANTICOAG MGMT PT WARFARIN: CPT | Mod: ,,,

## 2021-01-13 PROCEDURE — 93793 PR ANTICOAGULANT MGMT FOR PT TAKING WARFARIN: ICD-10-PCS | Mod: ,,,

## 2021-01-13 NOTE — PROGRESS NOTES
Patient called to report that he did have his lab drawn 1/08/21, called [facility] and result to be faxed

## 2021-01-21 ENCOUNTER — PATIENT MESSAGE (OUTPATIENT)
Dept: CARDIOLOGY | Facility: CLINIC | Age: 61
End: 2021-01-21

## 2021-01-22 LAB — INR PPP: 2.4

## 2021-01-27 ENCOUNTER — ANTI-COAG VISIT (OUTPATIENT)
Dept: CARDIOLOGY | Facility: CLINIC | Age: 61
End: 2021-01-27
Payer: MEDICARE

## 2021-01-27 DIAGNOSIS — Z79.01 LONG TERM (CURRENT) USE OF ANTICOAGULANTS: ICD-10-CM

## 2021-01-27 DIAGNOSIS — I48.0 PAROXYSMAL ATRIAL FIBRILLATION: Primary | ICD-10-CM

## 2021-01-27 DIAGNOSIS — Z95.811 LVAD (LEFT VENTRICULAR ASSIST DEVICE) PRESENT: ICD-10-CM

## 2021-01-27 PROCEDURE — 93793 ANTICOAG MGMT PT WARFARIN: CPT | Mod: ,,,

## 2021-01-27 PROCEDURE — 93793 PR ANTICOAGULANT MGMT FOR PT TAKING WARFARIN: ICD-10-PCS | Mod: ,,,

## 2021-02-05 ENCOUNTER — ANTI-COAG VISIT (OUTPATIENT)
Dept: CARDIOLOGY | Facility: CLINIC | Age: 61
End: 2021-02-05
Payer: MEDICARE

## 2021-02-05 DIAGNOSIS — Z79.01 LONG TERM (CURRENT) USE OF ANTICOAGULANTS: ICD-10-CM

## 2021-02-05 DIAGNOSIS — Z95.811 LVAD (LEFT VENTRICULAR ASSIST DEVICE) PRESENT: ICD-10-CM

## 2021-02-05 DIAGNOSIS — I48.0 PAROXYSMAL ATRIAL FIBRILLATION: Primary | ICD-10-CM

## 2021-02-05 LAB — INR PPP: 1.89

## 2021-02-05 PROCEDURE — 93793 PR ANTICOAGULANT MGMT FOR PT TAKING WARFARIN: ICD-10-PCS | Mod: ,,,

## 2021-02-05 PROCEDURE — 93793 ANTICOAG MGMT PT WARFARIN: CPT | Mod: ,,,

## 2021-02-08 ENCOUNTER — CLINICAL SUPPORT (OUTPATIENT)
Dept: TRANSPLANT | Facility: CLINIC | Age: 61
End: 2021-02-08
Payer: MEDICARE

## 2021-02-08 ENCOUNTER — OFFICE VISIT (OUTPATIENT)
Dept: TRANSPLANT | Facility: CLINIC | Age: 61
End: 2021-02-08
Attending: INTERNAL MEDICINE
Payer: MEDICARE

## 2021-02-08 ENCOUNTER — LAB VISIT (OUTPATIENT)
Dept: LAB | Facility: HOSPITAL | Age: 61
End: 2021-02-08
Attending: INTERNAL MEDICINE
Payer: MEDICARE

## 2021-02-08 ENCOUNTER — ANTI-COAG VISIT (OUTPATIENT)
Dept: CARDIOLOGY | Facility: CLINIC | Age: 61
End: 2021-02-08
Payer: MEDICARE

## 2021-02-08 ENCOUNTER — DOCUMENTATION ONLY (OUTPATIENT)
Dept: CARDIOTHORACIC SURGERY | Facility: CLINIC | Age: 61
End: 2021-02-08

## 2021-02-08 VITALS — BODY MASS INDEX: 35.07 KG/M2 | SYSTOLIC BLOOD PRESSURE: 90 MMHG | HEIGHT: 70 IN | WEIGHT: 245 LBS | TEMPERATURE: 99 F

## 2021-02-08 DIAGNOSIS — I50.42 CHRONIC COMBINED SYSTOLIC AND DIASTOLIC CONGESTIVE HEART FAILURE: ICD-10-CM

## 2021-02-08 DIAGNOSIS — I11.0 HYPERTENSIVE HEART DISEASE WITH HEART FAILURE: ICD-10-CM

## 2021-02-08 DIAGNOSIS — Z79.01 LONG TERM (CURRENT) USE OF ANTICOAGULANTS: ICD-10-CM

## 2021-02-08 DIAGNOSIS — Z79.899 LONG TERM CURRENT USE OF AMIODARONE: ICD-10-CM

## 2021-02-08 DIAGNOSIS — I42.0 DILATED CARDIOMYOPATHY: ICD-10-CM

## 2021-02-08 DIAGNOSIS — Z79.899 AT RISK FOR AMIODARONE TOXICITY WITH LONG TERM USE: ICD-10-CM

## 2021-02-08 DIAGNOSIS — E66.09 CLASS 1 OBESITY DUE TO EXCESS CALORIES WITH SERIOUS COMORBIDITY AND BODY MASS INDEX (BMI) OF 32.0 TO 32.9 IN ADULT: ICD-10-CM

## 2021-02-08 DIAGNOSIS — I42.0 COCM (CONGESTIVE CARDIOMYOPATHY): ICD-10-CM

## 2021-02-08 DIAGNOSIS — I10 ESSENTIAL HYPERTENSION: ICD-10-CM

## 2021-02-08 DIAGNOSIS — Z95.811 LVAD (LEFT VENTRICULAR ASSIST DEVICE) PRESENT: Primary | ICD-10-CM

## 2021-02-08 DIAGNOSIS — Z91.89 AT RISK FOR AMIODARONE TOXICITY WITH LONG TERM USE: ICD-10-CM

## 2021-02-08 DIAGNOSIS — Z79.899 LONG TERM CURRENT USE OF AMIODARONE: Chronic | ICD-10-CM

## 2021-02-08 DIAGNOSIS — I48.0 PAROXYSMAL ATRIAL FIBRILLATION: ICD-10-CM

## 2021-02-08 DIAGNOSIS — Z95.811 HEART REPLACED BY HEART ASSIST DEVICE: ICD-10-CM

## 2021-02-08 DIAGNOSIS — I48.0 PAROXYSMAL ATRIAL FIBRILLATION: Primary | ICD-10-CM

## 2021-02-08 DIAGNOSIS — Z95.810 ICD (IMPLANTABLE CARDIOVERTER-DEFIBRILLATOR) IN PLACE: ICD-10-CM

## 2021-02-08 DIAGNOSIS — I47.29 NSVT (NONSUSTAINED VENTRICULAR TACHYCARDIA): ICD-10-CM

## 2021-02-08 DIAGNOSIS — Z95.811 LVAD (LEFT VENTRICULAR ASSIST DEVICE) PRESENT: ICD-10-CM

## 2021-02-08 LAB
ALBUMIN SERPL BCP-MCNC: 4.1 G/DL (ref 3.5–5.2)
ALP SERPL-CCNC: 72 U/L (ref 55–135)
ALT SERPL W/O P-5'-P-CCNC: 29 U/L (ref 10–44)
ANION GAP SERPL CALC-SCNC: 10 MMOL/L (ref 8–16)
AST SERPL-CCNC: 24 U/L (ref 10–40)
BASOPHILS # BLD AUTO: 0.07 K/UL (ref 0–0.2)
BASOPHILS NFR BLD: 0.9 % (ref 0–1.9)
BILIRUB DIRECT SERPL-MCNC: 0.7 MG/DL (ref 0.1–0.3)
BILIRUB SERPL-MCNC: 1.3 MG/DL (ref 0.1–1)
BNP SERPL-MCNC: 212 PG/ML (ref 0–99)
BUN SERPL-MCNC: 25 MG/DL (ref 6–20)
CALCIUM SERPL-MCNC: 9.3 MG/DL (ref 8.7–10.5)
CHLORIDE SERPL-SCNC: 105 MMOL/L (ref 95–110)
CO2 SERPL-SCNC: 26 MMOL/L (ref 23–29)
CREAT SERPL-MCNC: 1.7 MG/DL (ref 0.5–1.4)
CRP SERPL-MCNC: 3.9 MG/L (ref 0–8.2)
DIFFERENTIAL METHOD: ABNORMAL
EOSINOPHIL # BLD AUTO: 0.1 K/UL (ref 0–0.5)
EOSINOPHIL NFR BLD: 1.2 % (ref 0–8)
ERYTHROCYTE [DISTWIDTH] IN BLOOD BY AUTOMATED COUNT: 15.2 % (ref 11.5–14.5)
EST. GFR  (AFRICAN AMERICAN): 49.6 ML/MIN/1.73 M^2
EST. GFR  (NON AFRICAN AMERICAN): 42.9 ML/MIN/1.73 M^2
GLUCOSE SERPL-MCNC: 95 MG/DL (ref 70–110)
HCT VFR BLD AUTO: 45.9 % (ref 40–54)
HGB BLD-MCNC: 14.6 G/DL (ref 14–18)
IMM GRANULOCYTES # BLD AUTO: 0.05 K/UL (ref 0–0.04)
IMM GRANULOCYTES NFR BLD AUTO: 0.7 % (ref 0–0.5)
INR PPP: 2 (ref 0.8–1.2)
LDH SERPL L TO P-CCNC: 232 U/L (ref 110–260)
LYMPHOCYTES # BLD AUTO: 1.5 K/UL (ref 1–4.8)
LYMPHOCYTES NFR BLD: 20.8 % (ref 18–48)
MAGNESIUM SERPL-MCNC: 2 MG/DL (ref 1.6–2.6)
MCH RBC QN AUTO: 28.8 PG (ref 27–31)
MCHC RBC AUTO-ENTMCNC: 31.8 G/DL (ref 32–36)
MCV RBC AUTO: 91 FL (ref 82–98)
MONOCYTES # BLD AUTO: 0.7 K/UL (ref 0.3–1)
MONOCYTES NFR BLD: 9.3 % (ref 4–15)
NEUTROPHILS # BLD AUTO: 5 K/UL (ref 1.8–7.7)
NEUTROPHILS NFR BLD: 67.1 % (ref 38–73)
NRBC BLD-RTO: 0 /100 WBC
PHOSPHATE SERPL-MCNC: 3.1 MG/DL (ref 2.7–4.5)
PLATELET # BLD AUTO: 170 K/UL (ref 150–350)
PMV BLD AUTO: 11.6 FL (ref 9.2–12.9)
POTASSIUM SERPL-SCNC: 4.3 MMOL/L (ref 3.5–5.1)
PREALB SERPL-MCNC: 23 MG/DL (ref 20–43)
PROT SERPL-MCNC: 7.3 G/DL (ref 6–8.4)
PROTHROMBIN TIME: 20.5 SEC (ref 9–12.5)
RBC # BLD AUTO: 5.07 M/UL (ref 4.6–6.2)
SODIUM SERPL-SCNC: 141 MMOL/L (ref 136–145)
T4 FREE SERPL-MCNC: 1.28 NG/DL (ref 0.71–1.51)
TSH SERPL DL<=0.005 MIU/L-ACNC: 2.19 UIU/ML (ref 0.4–4)
WBC # BLD AUTO: 7.4 K/UL (ref 3.9–12.7)

## 2021-02-08 PROCEDURE — 83735 ASSAY OF MAGNESIUM: CPT

## 2021-02-08 PROCEDURE — 84443 ASSAY THYROID STIM HORMONE: CPT

## 2021-02-08 PROCEDURE — 84100 ASSAY OF PHOSPHORUS: CPT

## 2021-02-08 PROCEDURE — 83880 ASSAY OF NATRIURETIC PEPTIDE: CPT

## 2021-02-08 PROCEDURE — 84134 ASSAY OF PREALBUMIN: CPT

## 2021-02-08 PROCEDURE — 93750 OP LVAD INTERROGATION: ICD-10-PCS | Mod: ,,, | Performed by: INTERNAL MEDICINE

## 2021-02-08 PROCEDURE — 85025 COMPLETE CBC W/AUTO DIFF WBC: CPT

## 2021-02-08 PROCEDURE — 93750 INTERROGATION VAD IN PERSON: CPT | Mod: PBBFAC | Performed by: INTERNAL MEDICINE

## 2021-02-08 PROCEDURE — 83615 LACTATE (LD) (LDH) ENZYME: CPT

## 2021-02-08 PROCEDURE — 36415 COLL VENOUS BLD VENIPUNCTURE: CPT

## 2021-02-08 PROCEDURE — 93750 INTERROGATION VAD IN PERSON: CPT | Mod: ,,, | Performed by: INTERNAL MEDICINE

## 2021-02-08 PROCEDURE — 99999 PR PBB SHADOW E&M-EST. PATIENT-LVL IV: CPT | Mod: PBBFAC,,, | Performed by: INTERNAL MEDICINE

## 2021-02-08 PROCEDURE — 84439 ASSAY OF FREE THYROXINE: CPT

## 2021-02-08 PROCEDURE — 80053 COMPREHEN METABOLIC PANEL: CPT

## 2021-02-08 PROCEDURE — 86140 C-REACTIVE PROTEIN: CPT

## 2021-02-08 PROCEDURE — 99214 OFFICE O/P EST MOD 30 MIN: CPT | Mod: PBBFAC | Performed by: INTERNAL MEDICINE

## 2021-02-08 PROCEDURE — 99214 PR OFFICE/OUTPT VISIT, EST, LEVL IV, 30-39 MIN: ICD-10-PCS | Mod: S$PBB,,, | Performed by: INTERNAL MEDICINE

## 2021-02-08 PROCEDURE — 99214 OFFICE O/P EST MOD 30 MIN: CPT | Mod: S$PBB,,, | Performed by: INTERNAL MEDICINE

## 2021-02-08 PROCEDURE — 85610 PROTHROMBIN TIME: CPT

## 2021-02-08 PROCEDURE — 99999 PR PBB SHADOW E&M-EST. PATIENT-LVL IV: ICD-10-PCS | Mod: PBBFAC,,, | Performed by: INTERNAL MEDICINE

## 2021-02-08 PROCEDURE — 82248 BILIRUBIN DIRECT: CPT

## 2021-02-08 RX ORDER — FUROSEMIDE 40 MG/1
40 TABLET ORAL DAILY
Qty: 90 TABLET | Refills: 3
Start: 2021-02-08 | End: 2021-12-07 | Stop reason: SDUPTHER

## 2021-02-09 ENCOUNTER — TELEPHONE (OUTPATIENT)
Dept: TRANSPLANT | Facility: CLINIC | Age: 61
End: 2021-02-09

## 2021-02-09 ENCOUNTER — CLINICAL SUPPORT (OUTPATIENT)
Dept: CARDIOLOGY | Facility: HOSPITAL | Age: 61
End: 2021-02-09
Payer: MEDICARE

## 2021-02-09 DIAGNOSIS — I50.42 CHRONIC COMBINED SYSTOLIC (CONGESTIVE) AND DIASTOLIC (CONGESTIVE) HEART FAILURE: ICD-10-CM

## 2021-02-09 DIAGNOSIS — Z95.810 PRESENCE OF AUTOMATIC (IMPLANTABLE) CARDIAC DEFIBRILLATOR: ICD-10-CM

## 2021-02-09 DIAGNOSIS — I42.5 OTHER RESTRICTIVE CARDIOMYOPATHY: ICD-10-CM

## 2021-02-09 PROCEDURE — 93295 CARDIAC DEVICE CHECK - REMOTE: ICD-10-PCS | Mod: ,,, | Performed by: INTERNAL MEDICINE

## 2021-02-09 PROCEDURE — 93295 DEV INTERROG REMOTE 1/2/MLT: CPT | Mod: ,,, | Performed by: INTERNAL MEDICINE

## 2021-02-10 ENCOUNTER — TELEPHONE (OUTPATIENT)
Dept: CARDIOLOGY | Facility: HOSPITAL | Age: 61
End: 2021-02-10

## 2021-02-18 ENCOUNTER — TELEPHONE (OUTPATIENT)
Dept: TRANSPLANT | Facility: CLINIC | Age: 61
End: 2021-02-18

## 2021-02-22 ENCOUNTER — TELEPHONE (OUTPATIENT)
Dept: TRANSPLANT | Facility: CLINIC | Age: 61
End: 2021-02-22

## 2021-02-24 ENCOUNTER — PATIENT MESSAGE (OUTPATIENT)
Dept: CARDIOLOGY | Facility: CLINIC | Age: 61
End: 2021-02-24

## 2021-02-24 ENCOUNTER — TELEPHONE (OUTPATIENT)
Dept: TRANSPLANT | Facility: CLINIC | Age: 61
End: 2021-02-24

## 2021-02-26 ENCOUNTER — ANTI-COAG VISIT (OUTPATIENT)
Dept: CARDIOLOGY | Facility: CLINIC | Age: 61
End: 2021-02-26
Payer: MEDICARE

## 2021-02-26 DIAGNOSIS — Z79.01 LONG TERM (CURRENT) USE OF ANTICOAGULANTS: ICD-10-CM

## 2021-02-26 DIAGNOSIS — I48.0 PAROXYSMAL ATRIAL FIBRILLATION: Primary | ICD-10-CM

## 2021-02-26 DIAGNOSIS — Z95.811 LVAD (LEFT VENTRICULAR ASSIST DEVICE) PRESENT: ICD-10-CM

## 2021-02-26 LAB — INR PPP: 1.82

## 2021-02-26 PROCEDURE — 93793 ANTICOAG MGMT PT WARFARIN: CPT | Mod: ,,,

## 2021-02-26 PROCEDURE — 93793 PR ANTICOAGULANT MGMT FOR PT TAKING WARFARIN: ICD-10-PCS | Mod: ,,,

## 2021-03-01 ENCOUNTER — TELEPHONE (OUTPATIENT)
Dept: TRANSPLANT | Facility: CLINIC | Age: 61
End: 2021-03-01

## 2021-03-08 ENCOUNTER — PATIENT MESSAGE (OUTPATIENT)
Dept: CARDIOLOGY | Facility: CLINIC | Age: 61
End: 2021-03-08

## 2021-03-17 ENCOUNTER — ANTI-COAG VISIT (OUTPATIENT)
Dept: CARDIOLOGY | Facility: CLINIC | Age: 61
End: 2021-03-17
Payer: MEDICARE

## 2021-03-17 DIAGNOSIS — Z95.811 LVAD (LEFT VENTRICULAR ASSIST DEVICE) PRESENT: ICD-10-CM

## 2021-03-17 DIAGNOSIS — Z79.01 LONG TERM (CURRENT) USE OF ANTICOAGULANTS: ICD-10-CM

## 2021-03-17 DIAGNOSIS — I48.0 PAROXYSMAL ATRIAL FIBRILLATION: Primary | ICD-10-CM

## 2021-03-17 LAB — INR PPP: 2.88

## 2021-03-17 PROCEDURE — 93793 PR ANTICOAGULANT MGMT FOR PT TAKING WARFARIN: ICD-10-PCS | Mod: ,,,

## 2021-03-17 PROCEDURE — 93793 ANTICOAG MGMT PT WARFARIN: CPT | Mod: ,,,

## 2021-03-29 ENCOUNTER — OFFICE VISIT (OUTPATIENT)
Dept: TRANSPLANT | Facility: CLINIC | Age: 61
End: 2021-03-29
Attending: INTERNAL MEDICINE
Payer: MEDICARE

## 2021-03-29 ENCOUNTER — LAB VISIT (OUTPATIENT)
Dept: LAB | Facility: HOSPITAL | Age: 61
End: 2021-03-29
Attending: INTERNAL MEDICINE
Payer: MEDICARE

## 2021-03-29 ENCOUNTER — CLINICAL SUPPORT (OUTPATIENT)
Dept: TRANSPLANT | Facility: CLINIC | Age: 61
End: 2021-03-29
Payer: MEDICARE

## 2021-03-29 ENCOUNTER — ANTI-COAG VISIT (OUTPATIENT)
Dept: CARDIOLOGY | Facility: CLINIC | Age: 61
End: 2021-03-29
Payer: MEDICARE

## 2021-03-29 VITALS — SYSTOLIC BLOOD PRESSURE: 95 MMHG | HEIGHT: 70 IN | BODY MASS INDEX: 34.5 KG/M2 | TEMPERATURE: 98 F | WEIGHT: 241 LBS

## 2021-03-29 DIAGNOSIS — Z79.01 LONG TERM (CURRENT) USE OF ANTICOAGULANTS: ICD-10-CM

## 2021-03-29 DIAGNOSIS — Z95.811 LVAD (LEFT VENTRICULAR ASSIST DEVICE) PRESENT: Primary | ICD-10-CM

## 2021-03-29 DIAGNOSIS — I48.0 PAROXYSMAL ATRIAL FIBRILLATION: Primary | ICD-10-CM

## 2021-03-29 DIAGNOSIS — Z79.899 LONG TERM CURRENT USE OF AMIODARONE: ICD-10-CM

## 2021-03-29 DIAGNOSIS — I42.0 COCM (CONGESTIVE CARDIOMYOPATHY): ICD-10-CM

## 2021-03-29 DIAGNOSIS — I50.42 CHRONIC COMBINED SYSTOLIC AND DIASTOLIC CONGESTIVE HEART FAILURE: ICD-10-CM

## 2021-03-29 DIAGNOSIS — I11.0 HYPERTENSIVE HEART DISEASE WITH HEART FAILURE: ICD-10-CM

## 2021-03-29 DIAGNOSIS — Z95.811 HEART REPLACED BY HEART ASSIST DEVICE: ICD-10-CM

## 2021-03-29 DIAGNOSIS — I48.0 PAROXYSMAL ATRIAL FIBRILLATION: ICD-10-CM

## 2021-03-29 DIAGNOSIS — Z95.811 LVAD (LEFT VENTRICULAR ASSIST DEVICE) PRESENT: ICD-10-CM

## 2021-03-29 DIAGNOSIS — Z79.899 LONG TERM CURRENT USE OF AMIODARONE: Chronic | ICD-10-CM

## 2021-03-29 LAB
ALBUMIN SERPL BCP-MCNC: 4 G/DL (ref 3.5–5.2)
ALP SERPL-CCNC: 88 U/L (ref 55–135)
ALT SERPL W/O P-5'-P-CCNC: 29 U/L (ref 10–44)
ANION GAP SERPL CALC-SCNC: 10 MMOL/L (ref 8–16)
AST SERPL-CCNC: 29 U/L (ref 10–40)
BASOPHILS # BLD AUTO: 0.09 K/UL (ref 0–0.2)
BASOPHILS NFR BLD: 1.1 % (ref 0–1.9)
BILIRUB DIRECT SERPL-MCNC: 0.9 MG/DL (ref 0.1–0.3)
BILIRUB SERPL-MCNC: 1.6 MG/DL (ref 0.1–1)
BNP SERPL-MCNC: 256 PG/ML (ref 0–99)
BUN SERPL-MCNC: 35 MG/DL (ref 8–23)
CALCIUM SERPL-MCNC: 9.3 MG/DL (ref 8.7–10.5)
CHLORIDE SERPL-SCNC: 103 MMOL/L (ref 95–110)
CHOLEST SERPL-MCNC: 115 MG/DL (ref 120–199)
CHOLEST/HDLC SERPL: 5.5 {RATIO} (ref 2–5)
CO2 SERPL-SCNC: 27 MMOL/L (ref 23–29)
CREAT SERPL-MCNC: 1.9 MG/DL (ref 0.5–1.4)
CRP SERPL-MCNC: 5.6 MG/L (ref 0–8.2)
DIFFERENTIAL METHOD: ABNORMAL
EOSINOPHIL # BLD AUTO: 0.1 K/UL (ref 0–0.5)
EOSINOPHIL NFR BLD: 1.5 % (ref 0–8)
ERYTHROCYTE [DISTWIDTH] IN BLOOD BY AUTOMATED COUNT: 14.8 % (ref 11.5–14.5)
EST. GFR  (AFRICAN AMERICAN): 43 ML/MIN/1.73 M^2
EST. GFR  (NON AFRICAN AMERICAN): 37.2 ML/MIN/1.73 M^2
GLUCOSE SERPL-MCNC: 109 MG/DL (ref 70–110)
HCT VFR BLD AUTO: 49.1 % (ref 40–54)
HDLC SERPL-MCNC: 21 MG/DL (ref 40–75)
HDLC SERPL: 18.3 % (ref 20–50)
HGB BLD-MCNC: 15.9 G/DL (ref 14–18)
IMM GRANULOCYTES # BLD AUTO: 0.04 K/UL (ref 0–0.04)
IMM GRANULOCYTES NFR BLD AUTO: 0.5 % (ref 0–0.5)
INR PPP: 1.9 (ref 0.8–1.2)
LDH SERPL L TO P-CCNC: 261 U/L (ref 110–260)
LDLC SERPL CALC-MCNC: 73.4 MG/DL (ref 63–159)
LYMPHOCYTES # BLD AUTO: 1.8 K/UL (ref 1–4.8)
LYMPHOCYTES NFR BLD: 23 % (ref 18–48)
MAGNESIUM SERPL-MCNC: 2 MG/DL (ref 1.6–2.6)
MCH RBC QN AUTO: 28.3 PG (ref 27–31)
MCHC RBC AUTO-ENTMCNC: 32.4 G/DL (ref 32–36)
MCV RBC AUTO: 88 FL (ref 82–98)
MONOCYTES # BLD AUTO: 0.7 K/UL (ref 0.3–1)
MONOCYTES NFR BLD: 8.9 % (ref 4–15)
NEUTROPHILS # BLD AUTO: 5.1 K/UL (ref 1.8–7.7)
NEUTROPHILS NFR BLD: 65 % (ref 38–73)
NONHDLC SERPL-MCNC: 94 MG/DL
NRBC BLD-RTO: 0 /100 WBC
PHOSPHATE SERPL-MCNC: 3 MG/DL (ref 2.7–4.5)
PLATELET # BLD AUTO: 173 K/UL (ref 150–450)
PMV BLD AUTO: 11.3 FL (ref 9.2–12.9)
POTASSIUM SERPL-SCNC: 4.2 MMOL/L (ref 3.5–5.1)
PREALB SERPL-MCNC: 22 MG/DL (ref 20–43)
PROT SERPL-MCNC: 7.8 G/DL (ref 6–8.4)
PROTHROMBIN TIME: 20.4 SEC (ref 9–12.5)
RBC # BLD AUTO: 5.61 M/UL (ref 4.6–6.2)
SODIUM SERPL-SCNC: 140 MMOL/L (ref 136–145)
TRIGL SERPL-MCNC: 103 MG/DL (ref 30–150)
WBC # BLD AUTO: 7.88 K/UL (ref 3.9–12.7)

## 2021-03-29 PROCEDURE — 93750 PR INTERROGATE VENT ASSIST DEV, IN PERSON, W PHYSICIAN ANALYSIS: ICD-10-PCS | Mod: S$PBB,,, | Performed by: INTERNAL MEDICINE

## 2021-03-29 PROCEDURE — 93750 INTERROGATION VAD IN PERSON: CPT | Mod: S$PBB,,, | Performed by: INTERNAL MEDICINE

## 2021-03-29 PROCEDURE — 80061 LIPID PANEL: CPT | Performed by: INTERNAL MEDICINE

## 2021-03-29 PROCEDURE — 99214 PR OFFICE/OUTPT VISIT, EST, LEVL IV, 30-39 MIN: ICD-10-PCS | Mod: S$PBB,,, | Performed by: INTERNAL MEDICINE

## 2021-03-29 PROCEDURE — 85610 PROTHROMBIN TIME: CPT | Performed by: INTERNAL MEDICINE

## 2021-03-29 PROCEDURE — 99999 PR PBB SHADOW E&M-EST. PATIENT-LVL III: ICD-10-PCS | Mod: PBBFAC,,, | Performed by: INTERNAL MEDICINE

## 2021-03-29 PROCEDURE — 83880 ASSAY OF NATRIURETIC PEPTIDE: CPT | Performed by: INTERNAL MEDICINE

## 2021-03-29 PROCEDURE — 93750 INTERROGATION VAD IN PERSON: CPT | Mod: PBBFAC | Performed by: INTERNAL MEDICINE

## 2021-03-29 PROCEDURE — 84100 ASSAY OF PHOSPHORUS: CPT | Performed by: INTERNAL MEDICINE

## 2021-03-29 PROCEDURE — 82248 BILIRUBIN DIRECT: CPT | Performed by: INTERNAL MEDICINE

## 2021-03-29 PROCEDURE — 86140 C-REACTIVE PROTEIN: CPT | Performed by: INTERNAL MEDICINE

## 2021-03-29 PROCEDURE — 83615 LACTATE (LD) (LDH) ENZYME: CPT | Performed by: INTERNAL MEDICINE

## 2021-03-29 PROCEDURE — 85025 COMPLETE CBC W/AUTO DIFF WBC: CPT | Performed by: INTERNAL MEDICINE

## 2021-03-29 PROCEDURE — 80053 COMPREHEN METABOLIC PANEL: CPT | Performed by: INTERNAL MEDICINE

## 2021-03-29 PROCEDURE — 99214 OFFICE O/P EST MOD 30 MIN: CPT | Mod: S$PBB,,, | Performed by: INTERNAL MEDICINE

## 2021-03-29 PROCEDURE — 99999 PR PBB SHADOW E&M-EST. PATIENT-LVL III: CPT | Mod: PBBFAC,,, | Performed by: INTERNAL MEDICINE

## 2021-03-29 PROCEDURE — 99213 OFFICE O/P EST LOW 20 MIN: CPT | Mod: PBBFAC,25 | Performed by: INTERNAL MEDICINE

## 2021-03-29 PROCEDURE — 36415 COLL VENOUS BLD VENIPUNCTURE: CPT | Performed by: INTERNAL MEDICINE

## 2021-03-29 PROCEDURE — 83735 ASSAY OF MAGNESIUM: CPT | Performed by: INTERNAL MEDICINE

## 2021-03-29 PROCEDURE — 84134 ASSAY OF PREALBUMIN: CPT | Performed by: INTERNAL MEDICINE

## 2021-03-29 RX ORDER — SPIRONOLACTONE 25 MG/1
TABLET ORAL
Qty: 90 TABLET | Refills: 3 | Status: SHIPPED | OUTPATIENT
Start: 2021-03-29 | End: 2021-07-26 | Stop reason: SDUPTHER

## 2021-03-29 RX ORDER — WARFARIN 2.5 MG/1
TABLET ORAL
Qty: 50 TABLET | Refills: 5 | Status: SHIPPED | OUTPATIENT
Start: 2021-03-29 | End: 2022-03-29

## 2021-03-30 ENCOUNTER — DOCUMENTATION ONLY (OUTPATIENT)
Dept: TRANSFUSION MEDICINE | Facility: HOSPITAL | Age: 61
End: 2021-03-30
Payer: MEDICARE

## 2021-03-30 ENCOUNTER — TELEPHONE (OUTPATIENT)
Dept: TRANSPLANT | Facility: CLINIC | Age: 61
End: 2021-03-30

## 2021-03-30 DIAGNOSIS — Z76.82 HEART TRANSPLANT CANDIDATE: ICD-10-CM

## 2021-03-30 PROCEDURE — 80502 PR  LAB PATHOLOGY CONSULT-COMPLETE: CPT | Mod: ,,, | Performed by: PATHOLOGY

## 2021-03-30 PROCEDURE — 80502 PR  LAB PATHOLOGY CONSULT-COMPLETE: ICD-10-PCS | Mod: ,,, | Performed by: PATHOLOGY

## 2021-03-31 ENCOUNTER — TELEPHONE (OUTPATIENT)
Dept: TRANSPLANT | Facility: CLINIC | Age: 61
End: 2021-03-31

## 2021-03-31 DIAGNOSIS — F06.30 MOOD DISORDER DUE TO MEDICAL CONDITION: Primary | ICD-10-CM

## 2021-04-08 ENCOUNTER — PATIENT MESSAGE (OUTPATIENT)
Dept: CARDIOLOGY | Facility: CLINIC | Age: 61
End: 2021-04-08

## 2021-04-14 LAB
BNP: 595
EXT BUN: 27
EXT CALCIUM: 9.5
EXT CHLORIDE: 102
EXT CREATININE: 2.08 MG/DL
EXT GLUCOSE: 122
EXT POTASSIUM: 4.3
EXT SODIUM: 138 MMOL/L

## 2021-04-15 ENCOUNTER — TELEPHONE (OUTPATIENT)
Dept: TRANSPLANT | Facility: CLINIC | Age: 61
End: 2021-04-15

## 2021-04-15 ENCOUNTER — ANTI-COAG VISIT (OUTPATIENT)
Dept: CARDIOLOGY | Facility: CLINIC | Age: 61
End: 2021-04-15
Payer: MEDICARE

## 2021-04-15 DIAGNOSIS — I48.0 PAROXYSMAL ATRIAL FIBRILLATION: Primary | ICD-10-CM

## 2021-04-15 DIAGNOSIS — Z95.811 LVAD (LEFT VENTRICULAR ASSIST DEVICE) PRESENT: ICD-10-CM

## 2021-04-15 DIAGNOSIS — Z79.01 LONG TERM (CURRENT) USE OF ANTICOAGULANTS: ICD-10-CM

## 2021-04-15 LAB — INR PPP: 2.59

## 2021-04-15 PROCEDURE — 93793 PR ANTICOAGULANT MGMT FOR PT TAKING WARFARIN: ICD-10-PCS | Mod: ,,,

## 2021-04-15 PROCEDURE — 93793 ANTICOAG MGMT PT WARFARIN: CPT | Mod: ,,,

## 2021-04-16 ENCOUNTER — TELEPHONE (OUTPATIENT)
Dept: TRANSPLANT | Facility: CLINIC | Age: 61
End: 2021-04-16

## 2021-04-29 ENCOUNTER — TELEPHONE (OUTPATIENT)
Dept: TRANSPLANT | Facility: CLINIC | Age: 61
End: 2021-04-29

## 2021-05-04 ENCOUNTER — OFFICE VISIT (OUTPATIENT)
Dept: PSYCHIATRY | Facility: CLINIC | Age: 61
End: 2021-05-04
Payer: MEDICARE

## 2021-05-04 VITALS — WEIGHT: 234 LBS | BODY MASS INDEX: 33.58 KG/M2

## 2021-05-04 DIAGNOSIS — Z01.818 PRE-TRANSPLANT EVALUATION FOR HEART TRANSPLANT: Primary | ICD-10-CM

## 2021-05-04 DIAGNOSIS — Z03.89 NO PSYCHIATRIC DISORDER FOUND AFTER EVALUATION: ICD-10-CM

## 2021-05-04 PROCEDURE — 99212 OFFICE O/P EST SF 10 MIN: CPT | Mod: PBBFAC | Performed by: PSYCHIATRY & NEUROLOGY

## 2021-05-04 PROCEDURE — 99999 PR PBB SHADOW E&M-EST. PATIENT-LVL II: ICD-10-PCS | Mod: PBBFAC,,, | Performed by: PSYCHIATRY & NEUROLOGY

## 2021-05-04 PROCEDURE — 99205 OFFICE O/P NEW HI 60 MIN: CPT | Mod: S$PBB,,, | Performed by: PSYCHIATRY & NEUROLOGY

## 2021-05-04 PROCEDURE — 99205 PR OFFICE/OUTPT VISIT, NEW, LEVL V, 60-74 MIN: ICD-10-PCS | Mod: S$PBB,,, | Performed by: PSYCHIATRY & NEUROLOGY

## 2021-05-04 PROCEDURE — 99999 PR PBB SHADOW E&M-EST. PATIENT-LVL II: CPT | Mod: PBBFAC,,, | Performed by: PSYCHIATRY & NEUROLOGY

## 2021-05-14 ENCOUNTER — TELEPHONE (OUTPATIENT)
Dept: TRANSPLANT | Facility: CLINIC | Age: 61
End: 2021-05-14

## 2021-05-17 ENCOUNTER — PATIENT MESSAGE (OUTPATIENT)
Dept: TRANSPLANT | Facility: CLINIC | Age: 61
End: 2021-05-17

## 2021-05-20 ENCOUNTER — PATIENT MESSAGE (OUTPATIENT)
Dept: CARDIOLOGY | Facility: CLINIC | Age: 61
End: 2021-05-20

## 2021-05-28 ENCOUNTER — PATIENT MESSAGE (OUTPATIENT)
Dept: CARDIOTHORACIC SURGERY | Facility: CLINIC | Age: 61
End: 2021-05-28

## 2021-05-28 DIAGNOSIS — Z95.811 LVAD (LEFT VENTRICULAR ASSIST DEVICE) PRESENT: Primary | ICD-10-CM

## 2021-06-09 ENCOUNTER — TELEPHONE (OUTPATIENT)
Dept: TRANSPLANT | Facility: CLINIC | Age: 61
End: 2021-06-09

## 2021-06-11 ENCOUNTER — TELEPHONE (OUTPATIENT)
Dept: TRANSPLANT | Facility: CLINIC | Age: 61
End: 2021-06-11

## 2021-06-11 DIAGNOSIS — Z95.811 HEART REPLACED BY HEART ASSIST DEVICE: Primary | ICD-10-CM

## 2021-06-14 ENCOUNTER — OFFICE VISIT (OUTPATIENT)
Dept: INFECTIOUS DISEASES | Facility: CLINIC | Age: 61
End: 2021-06-14
Payer: MEDICARE

## 2021-06-14 ENCOUNTER — OFFICE VISIT (OUTPATIENT)
Dept: TRANSPLANT | Facility: CLINIC | Age: 61
End: 2021-06-14
Payer: MEDICARE

## 2021-06-14 ENCOUNTER — HOSPITAL ENCOUNTER (OUTPATIENT)
Dept: RADIOLOGY | Facility: HOSPITAL | Age: 61
Discharge: HOME | End: 2021-06-14
Attending: INTERNAL MEDICINE
Payer: MEDICARE

## 2021-06-14 ENCOUNTER — CLINICAL SUPPORT (OUTPATIENT)
Dept: TRANSPLANT | Facility: CLINIC | Age: 61
End: 2021-06-14
Payer: MEDICARE

## 2021-06-14 VITALS — BODY MASS INDEX: 33.5 KG/M2 | WEIGHT: 234 LBS | HEIGHT: 70 IN | SYSTOLIC BLOOD PRESSURE: 88 MMHG | TEMPERATURE: 98 F

## 2021-06-14 DIAGNOSIS — T82.9XXA COMPLICATION INVOLVING LEFT VENTRICULAR ASSIST DEVICE (LVAD), INITIAL ENCOUNTER: ICD-10-CM

## 2021-06-14 DIAGNOSIS — I48.0 PAROXYSMAL ATRIAL FIBRILLATION: ICD-10-CM

## 2021-06-14 DIAGNOSIS — Z95.811 LVAD (LEFT VENTRICULAR ASSIST DEVICE) PRESENT: ICD-10-CM

## 2021-06-14 DIAGNOSIS — I50.42 CHRONIC COMBINED SYSTOLIC AND DIASTOLIC CONGESTIVE HEART FAILURE: ICD-10-CM

## 2021-06-14 DIAGNOSIS — Z86.711 HISTORY OF PULMONARY EMBOLISM: ICD-10-CM

## 2021-06-14 DIAGNOSIS — Z79.899 LONG TERM CURRENT USE OF AMIODARONE: Chronic | ICD-10-CM

## 2021-06-14 DIAGNOSIS — L03.818 CELLULITIS OF OTHER SPECIFIED SITE: ICD-10-CM

## 2021-06-14 DIAGNOSIS — D50.9 IRON DEFICIENCY ANEMIA, UNSPECIFIED IRON DEFICIENCY ANEMIA TYPE: ICD-10-CM

## 2021-06-14 DIAGNOSIS — T82.9XXA COMPLICATION INVOLVING LEFT VENTRICULAR ASSIST DEVICE (LVAD), INITIAL ENCOUNTER: Primary | ICD-10-CM

## 2021-06-14 DIAGNOSIS — Z76.82 HEART TRANSPLANT CANDIDATE: ICD-10-CM

## 2021-06-14 DIAGNOSIS — I11.0 HYPERTENSIVE HEART DISEASE WITH HEART FAILURE: ICD-10-CM

## 2021-06-14 DIAGNOSIS — Z79.899 LONG TERM CURRENT USE OF AMIODARONE: ICD-10-CM

## 2021-06-14 DIAGNOSIS — E66.09 CLASS 1 OBESITY DUE TO EXCESS CALORIES WITH SERIOUS COMORBIDITY AND BODY MASS INDEX (BMI) OF 32.0 TO 32.9 IN ADULT: ICD-10-CM

## 2021-06-14 DIAGNOSIS — Z95.810 ICD (IMPLANTABLE CARDIOVERTER-DEFIBRILLATOR) IN PLACE: ICD-10-CM

## 2021-06-14 DIAGNOSIS — Z79.01 LONG TERM (CURRENT) USE OF ANTICOAGULANTS: ICD-10-CM

## 2021-06-14 DIAGNOSIS — E78.2 MIXED HYPERLIPIDEMIA: ICD-10-CM

## 2021-06-14 DIAGNOSIS — I10 ESSENTIAL HYPERTENSION: Primary | ICD-10-CM

## 2021-06-14 DIAGNOSIS — Z79.01 CHRONIC ANTICOAGULATION: ICD-10-CM

## 2021-06-14 PROCEDURE — 99999 PR PBB SHADOW E&M-EST. PATIENT-LVL I: CPT | Mod: PBBFAC,,,

## 2021-06-14 PROCEDURE — 99211 OFF/OP EST MAY X REQ PHY/QHP: CPT | Mod: PBBFAC,25 | Performed by: STUDENT IN AN ORGANIZED HEALTH CARE EDUCATION/TRAINING PROGRAM

## 2021-06-14 PROCEDURE — 71046 XR CHEST PA AND LATERAL: ICD-10-PCS | Mod: 26,,, | Performed by: RADIOLOGY

## 2021-06-14 PROCEDURE — 99214 OFFICE O/P EST MOD 30 MIN: CPT | Mod: S$PBB,,, | Performed by: INTERNAL MEDICINE

## 2021-06-14 PROCEDURE — 99999 PR PBB SHADOW E&M-EST. PATIENT-LVL I: ICD-10-PCS | Mod: PBBFAC,,, | Performed by: STUDENT IN AN ORGANIZED HEALTH CARE EDUCATION/TRAINING PROGRAM

## 2021-06-14 PROCEDURE — 99211 OFF/OP EST MAY X REQ PHY/QHP: CPT | Mod: PBBFAC,25,27

## 2021-06-14 PROCEDURE — 99999 PR PBB SHADOW E&M-EST. PATIENT-LVL I: CPT | Mod: PBBFAC,,, | Performed by: STUDENT IN AN ORGANIZED HEALTH CARE EDUCATION/TRAINING PROGRAM

## 2021-06-14 PROCEDURE — 99215 OFFICE O/P EST HI 40 MIN: CPT | Mod: S$PBB,,, | Performed by: STUDENT IN AN ORGANIZED HEALTH CARE EDUCATION/TRAINING PROGRAM

## 2021-06-14 PROCEDURE — 99214 PR OFFICE/OUTPT VISIT, EST, LEVL IV, 30-39 MIN: ICD-10-PCS | Mod: S$PBB,,, | Performed by: INTERNAL MEDICINE

## 2021-06-14 PROCEDURE — 99215 PR OFFICE/OUTPT VISIT, EST, LEVL V, 40-54 MIN: ICD-10-PCS | Mod: S$PBB,,, | Performed by: STUDENT IN AN ORGANIZED HEALTH CARE EDUCATION/TRAINING PROGRAM

## 2021-06-14 PROCEDURE — 71046 X-RAY EXAM CHEST 2 VIEWS: CPT | Mod: 26,,, | Performed by: RADIOLOGY

## 2021-06-14 PROCEDURE — 99999 PR PBB SHADOW E&M-EST. PATIENT-LVL III: CPT | Mod: PBBFAC,,, | Performed by: INTERNAL MEDICINE

## 2021-06-14 PROCEDURE — 99213 OFFICE O/P EST LOW 20 MIN: CPT | Mod: PBBFAC,25,27 | Performed by: INTERNAL MEDICINE

## 2021-06-14 PROCEDURE — 99999 PR PBB SHADOW E&M-EST. PATIENT-LVL III: ICD-10-PCS | Mod: PBBFAC,,, | Performed by: INTERNAL MEDICINE

## 2021-06-14 PROCEDURE — 99999 PR PBB SHADOW E&M-EST. PATIENT-LVL I: ICD-10-PCS | Mod: PBBFAC,,,

## 2021-06-14 PROCEDURE — 71046 X-RAY EXAM CHEST 2 VIEWS: CPT | Mod: TC,FY

## 2021-06-14 RX ORDER — AMIODARONE HYDROCHLORIDE 200 MG/1
200 TABLET ORAL DAILY
Qty: 90 TABLET | Refills: 3 | Status: SHIPPED | OUTPATIENT
Start: 2021-06-14

## 2021-06-14 RX ORDER — DIGOXIN 125 MCG
TABLET ORAL
Qty: 90 TABLET | Refills: 3 | Status: SHIPPED | OUTPATIENT
Start: 2021-06-14

## 2021-06-14 RX ORDER — HYDRALAZINE HYDROCHLORIDE 50 MG/1
50 TABLET, FILM COATED ORAL 2 TIMES DAILY
Qty: 180 TABLET | Refills: 3 | Status: ON HOLD | OUTPATIENT
Start: 2021-06-14 | End: 2021-12-14

## 2021-06-14 RX ORDER — HYDROCODONE BITARTRATE AND ACETAMINOPHEN 5; 325 MG/1; MG/1
TABLET ORAL
Qty: 14 TABLET | Refills: 0 | Status: CANCELLED
Start: 2021-06-14

## 2021-06-14 RX ORDER — DOXYCYCLINE 100 MG/1
100 CAPSULE ORAL EVERY 12 HOURS
Qty: 60 CAPSULE | Refills: 3 | Status: SHIPPED | OUTPATIENT
Start: 2021-06-14 | End: 2021-06-14 | Stop reason: SDUPTHER

## 2021-06-14 RX ORDER — PANTOPRAZOLE SODIUM 40 MG/1
TABLET, DELAYED RELEASE ORAL
Qty: 90 TABLET | Refills: 3 | Status: ON HOLD | OUTPATIENT
Start: 2021-06-14 | End: 2021-12-14

## 2021-06-14 RX ORDER — FERROUS SULFATE 325(65) MG
TABLET ORAL
Qty: 90 TABLET | Refills: 3 | Status: SHIPPED | OUTPATIENT
Start: 2021-06-14

## 2021-06-14 RX ORDER — ASPIRIN 325 MG
TABLET, DELAYED RELEASE (ENTERIC COATED) ORAL
Qty: 90 TABLET | Refills: 3 | Status: SHIPPED | OUTPATIENT
Start: 2021-06-14 | End: 2022-01-03

## 2021-06-14 RX ORDER — ATORVASTATIN CALCIUM 80 MG/1
40 TABLET, FILM COATED ORAL DAILY
Qty: 90 TABLET | Refills: 3 | Status: ON HOLD | OUTPATIENT
Start: 2021-06-14 | End: 2021-12-14

## 2021-06-14 RX ORDER — DOXYCYCLINE 100 MG/1
100 CAPSULE ORAL EVERY 12 HOURS
Qty: 60 CAPSULE | Refills: 0 | Status: SHIPPED | OUTPATIENT
Start: 2021-06-14 | End: 2021-07-19 | Stop reason: SINTOL

## 2021-06-15 ENCOUNTER — PATIENT MESSAGE (OUTPATIENT)
Dept: TRANSPLANT | Facility: CLINIC | Age: 61
End: 2021-06-15

## 2021-06-15 ENCOUNTER — ANTI-COAG VISIT (OUTPATIENT)
Dept: CARDIOLOGY | Facility: CLINIC | Age: 61
End: 2021-06-15
Payer: MEDICARE

## 2021-06-15 DIAGNOSIS — Z79.01 LONG TERM (CURRENT) USE OF ANTICOAGULANTS: ICD-10-CM

## 2021-06-15 DIAGNOSIS — I48.0 PAROXYSMAL ATRIAL FIBRILLATION: Primary | ICD-10-CM

## 2021-06-15 DIAGNOSIS — Z95.811 LVAD (LEFT VENTRICULAR ASSIST DEVICE) PRESENT: ICD-10-CM

## 2021-06-15 PROCEDURE — 93793 PR ANTICOAGULANT MGMT FOR PT TAKING WARFARIN: ICD-10-PCS | Mod: ,,,

## 2021-06-15 PROCEDURE — 93793 ANTICOAG MGMT PT WARFARIN: CPT | Mod: ,,,

## 2021-06-18 ENCOUNTER — PATIENT MESSAGE (OUTPATIENT)
Dept: CARDIOTHORACIC SURGERY | Facility: CLINIC | Age: 61
End: 2021-06-18

## 2021-06-23 ENCOUNTER — PATIENT MESSAGE (OUTPATIENT)
Dept: CARDIOLOGY | Facility: CLINIC | Age: 61
End: 2021-06-23

## 2021-06-29 ENCOUNTER — PATIENT MESSAGE (OUTPATIENT)
Dept: CARDIOLOGY | Facility: CLINIC | Age: 61
End: 2021-06-29

## 2021-07-01 ENCOUNTER — PATIENT MESSAGE (OUTPATIENT)
Dept: CARDIOLOGY | Facility: CLINIC | Age: 61
End: 2021-07-01

## 2021-07-01 ENCOUNTER — PATIENT MESSAGE (OUTPATIENT)
Dept: TRANSPLANT | Facility: CLINIC | Age: 61
End: 2021-07-01

## 2021-07-04 ENCOUNTER — PATIENT MESSAGE (OUTPATIENT)
Dept: TRANSPLANT | Facility: CLINIC | Age: 61
End: 2021-07-04

## 2021-07-06 ENCOUNTER — PATIENT MESSAGE (OUTPATIENT)
Dept: CARDIOLOGY | Facility: CLINIC | Age: 61
End: 2021-07-06

## 2021-07-09 ENCOUNTER — PATIENT MESSAGE (OUTPATIENT)
Dept: TRANSPLANT | Facility: CLINIC | Age: 61
End: 2021-07-09

## 2021-07-19 ENCOUNTER — HOSPITAL ENCOUNTER (OUTPATIENT)
Dept: CARDIOLOGY | Facility: HOSPITAL | Age: 61
Discharge: HOME | End: 2021-07-19
Attending: INTERNAL MEDICINE
Payer: MEDICARE

## 2021-07-19 ENCOUNTER — HOSPITAL ENCOUNTER (OUTPATIENT)
Dept: PULMONOLOGY | Facility: CLINIC | Age: 61
Discharge: HOME | End: 2021-07-19
Payer: MEDICARE

## 2021-07-19 ENCOUNTER — OFFICE VISIT (OUTPATIENT)
Dept: TRANSPLANT | Facility: CLINIC | Age: 61
End: 2021-07-19
Attending: INTERNAL MEDICINE
Payer: MEDICARE

## 2021-07-19 ENCOUNTER — ANTI-COAG VISIT (OUTPATIENT)
Dept: CARDIOLOGY | Facility: CLINIC | Age: 61
End: 2021-07-19
Payer: MEDICARE

## 2021-07-19 ENCOUNTER — CLINICAL SUPPORT (OUTPATIENT)
Dept: TRANSPLANT | Facility: CLINIC | Age: 61
End: 2021-07-19
Payer: MEDICARE

## 2021-07-19 ENCOUNTER — OFFICE VISIT (OUTPATIENT)
Dept: INFECTIOUS DISEASES | Facility: CLINIC | Age: 61
End: 2021-07-19
Payer: MEDICARE

## 2021-07-19 VITALS — WEIGHT: 234 LBS | HEART RATE: 74 BPM | HEIGHT: 70 IN | SYSTOLIC BLOOD PRESSURE: 88 MMHG | BODY MASS INDEX: 33.5 KG/M2

## 2021-07-19 VITALS — WEIGHT: 234 LBS | HEIGHT: 70 IN | BODY MASS INDEX: 33.5 KG/M2

## 2021-07-19 VITALS — TEMPERATURE: 98 F | HEIGHT: 70 IN | SYSTOLIC BLOOD PRESSURE: 88 MMHG | BODY MASS INDEX: 33.64 KG/M2 | WEIGHT: 235 LBS

## 2021-07-19 VITALS — WEIGHT: 244.94 LBS | HEIGHT: 70 IN | BODY MASS INDEX: 35.07 KG/M2 | TEMPERATURE: 98 F

## 2021-07-19 DIAGNOSIS — Z79.899 LONG TERM CURRENT USE OF AMIODARONE: Chronic | ICD-10-CM

## 2021-07-19 DIAGNOSIS — I48.0 PAROXYSMAL ATRIAL FIBRILLATION: ICD-10-CM

## 2021-07-19 DIAGNOSIS — I10 ESSENTIAL HYPERTENSION: ICD-10-CM

## 2021-07-19 DIAGNOSIS — T82.9XXD COMPLICATION INVOLVING LEFT VENTRICULAR ASSIST DEVICE (LVAD), SUBSEQUENT ENCOUNTER: Primary | ICD-10-CM

## 2021-07-19 DIAGNOSIS — Z79.01 LONG TERM (CURRENT) USE OF ANTICOAGULANTS: ICD-10-CM

## 2021-07-19 DIAGNOSIS — Z95.811 LVAD (LEFT VENTRICULAR ASSIST DEVICE) PRESENT: Primary | ICD-10-CM

## 2021-07-19 DIAGNOSIS — B95.8 STAPH INFECTION: ICD-10-CM

## 2021-07-19 DIAGNOSIS — E66.09 CLASS 1 OBESITY DUE TO EXCESS CALORIES WITH SERIOUS COMORBIDITY AND BODY MASS INDEX (BMI) OF 33.0 TO 33.9 IN ADULT: ICD-10-CM

## 2021-07-19 DIAGNOSIS — T82.7XXA INFECTION ASSOCIATED WITH DRIVELINE OF LEFT VENTRICULAR ASSIST DEVICE (LVAD): ICD-10-CM

## 2021-07-19 DIAGNOSIS — Z95.811 LVAD (LEFT VENTRICULAR ASSIST DEVICE) PRESENT: ICD-10-CM

## 2021-07-19 DIAGNOSIS — I48.0 PAROXYSMAL ATRIAL FIBRILLATION: Primary | ICD-10-CM

## 2021-07-19 DIAGNOSIS — T82.9XXD COMPLICATION INVOLVING LEFT VENTRICULAR ASSIST DEVICE (LVAD), SUBSEQUENT ENCOUNTER: ICD-10-CM

## 2021-07-19 DIAGNOSIS — I11.0 HYPERTENSIVE HEART DISEASE WITH HEART FAILURE: ICD-10-CM

## 2021-07-19 DIAGNOSIS — I50.42 CHRONIC COMBINED SYSTOLIC AND DIASTOLIC CONGESTIVE HEART FAILURE: ICD-10-CM

## 2021-07-19 DIAGNOSIS — I42.0 COCM (CONGESTIVE CARDIOMYOPATHY): ICD-10-CM

## 2021-07-19 PROBLEM — R79.1 SUBTHERAPEUTIC INTERNATIONAL NORMALIZED RATIO (INR): Status: RESOLVED | Noted: 2020-02-28 | Resolved: 2021-07-19

## 2021-07-19 LAB
ASCENDING AORTA: 3.42 CM
BSA FOR ECHO PROCEDURE: 2.29 M2
CV ECHO LV RWT: 0.28 CM
DOP CALC LVOT AREA: 4.6 CM2
DOP CALC LVOT DIAMETER: 2.41 CM
E/E' RATIO: 21.4 M/S
ECHO LV POSTERIOR WALL: 0.88 CM (ref 0.6–1.1)
EJECTION FRACTION: 20 %
FRACTIONAL SHORTENING: 4 % (ref 28–44)
INTERVENTRICULAR SEPTUM: 0.92 CM (ref 0.6–1.1)
LA MAJOR: 7.52 CM
LA MINOR: 7.69 CM
LA WIDTH: 5.56 CM
LEFT ATRIUM SIZE: 4.71 CM
LEFT ATRIUM VOLUME INDEX: 75.9 ML/M2
LEFT ATRIUM VOLUME: 169.26 CM3
LEFT INTERNAL DIMENSION IN SYSTOLE: 6.15 CM (ref 2.1–4)
LEFT VENTRICLE DIASTOLIC VOLUME INDEX: 93.36 ML/M2
LEFT VENTRICLE DIASTOLIC VOLUME: 208.19 ML
LEFT VENTRICLE MASS INDEX: 108 G/M2
LEFT VENTRICLE SYSTOLIC VOLUME INDEX: 85.4 ML/M2
LEFT VENTRICLE SYSTOLIC VOLUME: 190.34 ML
LEFT VENTRICULAR INTERNAL DIMENSION IN DIASTOLE: 6.4 CM (ref 3.5–6)
LEFT VENTRICULAR MASS: 241.23 G
LV LATERAL E/E' RATIO: 13.38 M/S
LV SEPTAL E/E' RATIO: 53.5 M/S
MV MEAN GRADIENT: 1 MMHG
MV PEAK E VEL: 1.07 M/S
MV PEAK GRADIENT: 5 MMHG
MV STENOSIS PRESSURE HALF TIME: 60.53 MS
MV VALVE AREA P 1/2 METHOD: 3.63 CM2
PISA TR MAX VEL: 2.34 M/S
RA MAJOR: 6 CM
RA PRESSURE: 15 MMHG
RA WIDTH: 6.6 CM
RIGHT ATRIAL AREA: 26 CM2
RIGHT VENTRICULAR END-DIASTOLIC DIMENSION: 5.51 CM
RV TISSUE DOPPLER FREE WALL SYSTOLIC VELOCITY 1 (APICAL 4 CHAMBER VIEW): 4.3 CM/S
SINUS: 3.6 CM
STJ: 2.68 CM
TDI LATERAL: 0.08 M/S
TDI SEPTAL: 0.02 M/S
TDI: 0.05 M/S
TR MAX PG: 22 MMHG
TRICUSPID ANNULAR PLANE SYSTOLIC EXCURSION: 0.86 CM
TV REST PULMONARY ARTERY PRESSURE: 37 MMHG

## 2021-07-19 PROCEDURE — 99214 PR OFFICE/OUTPT VISIT, EST, LEVL IV, 30-39 MIN: ICD-10-PCS | Mod: S$PBB,,, | Performed by: INTERNAL MEDICINE

## 2021-07-19 PROCEDURE — 93306 TTE W/DOPPLER COMPLETE: CPT

## 2021-07-19 PROCEDURE — 93750 INTERROGATION VAD IN PERSON: CPT | Mod: S$PBB,,, | Performed by: INTERNAL MEDICINE

## 2021-07-19 PROCEDURE — 93306 ECHO (CUPID ONLY): ICD-10-PCS | Mod: 26,,, | Performed by: INTERNAL MEDICINE

## 2021-07-19 PROCEDURE — 99999 PR PBB SHADOW E&M-EST. PATIENT-LVL III: ICD-10-PCS | Mod: PBBFAC,,, | Performed by: PHYSICIAN ASSISTANT

## 2021-07-19 PROCEDURE — 99214 OFFICE O/P EST MOD 30 MIN: CPT | Mod: S$PBB,,, | Performed by: INTERNAL MEDICINE

## 2021-07-19 PROCEDURE — 99214 OFFICE O/P EST MOD 30 MIN: CPT | Mod: PBBFAC,25,27 | Performed by: INTERNAL MEDICINE

## 2021-07-19 PROCEDURE — 99215 PR OFFICE/OUTPT VISIT, EST, LEVL V, 40-54 MIN: ICD-10-PCS | Mod: S$PBB,,, | Performed by: PHYSICIAN ASSISTANT

## 2021-07-19 PROCEDURE — 93750 INTERROGATION VAD IN PERSON: CPT | Mod: PBBFAC | Performed by: INTERNAL MEDICINE

## 2021-07-19 PROCEDURE — 99999 PR PBB SHADOW E&M-EST. PATIENT-LVL IV: ICD-10-PCS | Mod: PBBFAC,,, | Performed by: INTERNAL MEDICINE

## 2021-07-19 PROCEDURE — 93750 PR INTERROGATE VENT ASSIST DEV, IN PERSON, W PHYSICIAN ANALYSIS: ICD-10-PCS | Mod: S$PBB,,, | Performed by: INTERNAL MEDICINE

## 2021-07-19 PROCEDURE — 99215 OFFICE O/P EST HI 40 MIN: CPT | Mod: S$PBB,,, | Performed by: PHYSICIAN ASSISTANT

## 2021-07-19 PROCEDURE — 99999 PR PBB SHADOW E&M-EST. PATIENT-LVL III: CPT | Mod: PBBFAC,,, | Performed by: PHYSICIAN ASSISTANT

## 2021-07-19 PROCEDURE — 99213 OFFICE O/P EST LOW 20 MIN: CPT | Mod: PBBFAC,25 | Performed by: PHYSICIAN ASSISTANT

## 2021-07-19 PROCEDURE — 99999 PR PBB SHADOW E&M-EST. PATIENT-LVL IV: CPT | Mod: PBBFAC,,, | Performed by: INTERNAL MEDICINE

## 2021-07-19 PROCEDURE — 94618 PULMONARY STRESS TESTING: ICD-10-PCS | Mod: 26,S$PBB,, | Performed by: INTERNAL MEDICINE

## 2021-07-19 PROCEDURE — 94618 PULMONARY STRESS TESTING: CPT | Mod: PBBFAC | Performed by: INTERNAL MEDICINE

## 2021-07-19 PROCEDURE — 93306 TTE W/DOPPLER COMPLETE: CPT | Mod: 26,,, | Performed by: INTERNAL MEDICINE

## 2021-07-19 PROCEDURE — 94618 PULMONARY STRESS TESTING: CPT | Mod: 26,S$PBB,, | Performed by: INTERNAL MEDICINE

## 2021-07-19 RX ORDER — CEFADROXIL 1000 MG/1
1 TABLET ORAL 2 TIMES DAILY
Qty: 60 TABLET | Refills: 0 | Status: SHIPPED | OUTPATIENT
Start: 2021-07-19 | End: 2021-08-18

## 2021-07-23 ENCOUNTER — PATIENT MESSAGE (OUTPATIENT)
Dept: CARDIOLOGY | Facility: CLINIC | Age: 61
End: 2021-07-23

## 2021-07-24 ENCOUNTER — PATIENT MESSAGE (OUTPATIENT)
Dept: TRANSPLANT | Facility: CLINIC | Age: 61
End: 2021-07-24

## 2021-07-26 ENCOUNTER — PATIENT MESSAGE (OUTPATIENT)
Dept: CARDIOLOGY | Facility: CLINIC | Age: 61
End: 2021-07-26

## 2021-07-26 ENCOUNTER — PATIENT MESSAGE (OUTPATIENT)
Dept: TRANSPLANT | Facility: CLINIC | Age: 61
End: 2021-07-26

## 2021-07-26 RX ORDER — SPIRONOLACTONE 25 MG/1
TABLET ORAL
Qty: 90 TABLET | Refills: 3 | Status: SHIPPED | OUTPATIENT
Start: 2021-07-26

## 2021-08-03 ENCOUNTER — PATIENT MESSAGE (OUTPATIENT)
Dept: CARDIOLOGY | Facility: CLINIC | Age: 61
End: 2021-08-03

## 2021-08-09 ENCOUNTER — PATIENT MESSAGE (OUTPATIENT)
Dept: TRANSPLANT | Facility: CLINIC | Age: 61
End: 2021-08-09

## 2021-08-09 ENCOUNTER — PATIENT MESSAGE (OUTPATIENT)
Dept: CARDIOLOGY | Facility: CLINIC | Age: 61
End: 2021-08-09

## 2021-08-23 ENCOUNTER — OFFICE VISIT (OUTPATIENT)
Dept: INFECTIOUS DISEASES | Facility: CLINIC | Age: 61
End: 2021-08-23
Payer: MEDICARE

## 2021-08-23 DIAGNOSIS — T82.9XXD COMPLICATION INVOLVING LEFT VENTRICULAR ASSIST DEVICE (LVAD), SUBSEQUENT ENCOUNTER: Primary | ICD-10-CM

## 2021-08-23 DIAGNOSIS — T82.7XXA INFECTION ASSOCIATED WITH DRIVELINE OF LEFT VENTRICULAR ASSIST DEVICE (LVAD): ICD-10-CM

## 2021-08-23 PROCEDURE — 99214 OFFICE O/P EST MOD 30 MIN: CPT | Mod: 95,,, | Performed by: PHYSICIAN ASSISTANT

## 2021-08-23 PROCEDURE — 99214 PR OFFICE/OUTPT VISIT, EST, LEVL IV, 30-39 MIN: ICD-10-PCS | Mod: 95,,, | Performed by: PHYSICIAN ASSISTANT

## 2021-09-10 ENCOUNTER — PATIENT MESSAGE (OUTPATIENT)
Dept: TRANSPLANT | Facility: CLINIC | Age: 61
End: 2021-09-10

## 2021-09-13 ENCOUNTER — OFFICE VISIT (OUTPATIENT)
Dept: INFECTIOUS DISEASES | Facility: CLINIC | Age: 61
End: 2021-09-13
Payer: MEDICARE

## 2021-09-13 ENCOUNTER — OFFICE VISIT (OUTPATIENT)
Dept: TRANSPLANT | Facility: CLINIC | Age: 61
End: 2021-09-13
Attending: INTERNAL MEDICINE
Payer: MEDICARE

## 2021-09-13 ENCOUNTER — LAB VISIT (OUTPATIENT)
Dept: LAB | Facility: HOSPITAL | Age: 61
End: 2021-09-13
Attending: INTERNAL MEDICINE
Payer: MEDICARE

## 2021-09-13 ENCOUNTER — CLINICAL SUPPORT (OUTPATIENT)
Dept: TRANSPLANT | Facility: CLINIC | Age: 61
End: 2021-09-13
Payer: MEDICARE

## 2021-09-13 ENCOUNTER — ANTI-COAG VISIT (OUTPATIENT)
Dept: CARDIOLOGY | Facility: CLINIC | Age: 61
End: 2021-09-13
Payer: MEDICARE

## 2021-09-13 VITALS — TEMPERATURE: 98 F | BODY MASS INDEX: 33.5 KG/M2 | HEIGHT: 70 IN | WEIGHT: 234 LBS | SYSTOLIC BLOOD PRESSURE: 100 MMHG

## 2021-09-13 DIAGNOSIS — I50.42 CHRONIC COMBINED SYSTOLIC AND DIASTOLIC CONGESTIVE HEART FAILURE: ICD-10-CM

## 2021-09-13 DIAGNOSIS — I48.0 PAROXYSMAL ATRIAL FIBRILLATION: Primary | ICD-10-CM

## 2021-09-13 DIAGNOSIS — Z79.01 LONG TERM (CURRENT) USE OF ANTICOAGULANTS: ICD-10-CM

## 2021-09-13 DIAGNOSIS — I42.0 COCM (CONGESTIVE CARDIOMYOPATHY): ICD-10-CM

## 2021-09-13 DIAGNOSIS — T82.7XXA INFECTION ASSOCIATED WITH DRIVELINE OF LEFT VENTRICULAR ASSIST DEVICE (LVAD): Primary | ICD-10-CM

## 2021-09-13 DIAGNOSIS — I47.29 NSVT (NONSUSTAINED VENTRICULAR TACHYCARDIA): ICD-10-CM

## 2021-09-13 DIAGNOSIS — I10 ESSENTIAL HYPERTENSION: ICD-10-CM

## 2021-09-13 DIAGNOSIS — Z95.811 HEART REPLACED BY HEART ASSIST DEVICE: ICD-10-CM

## 2021-09-13 DIAGNOSIS — E66.09 CLASS 1 OBESITY DUE TO EXCESS CALORIES WITH SERIOUS COMORBIDITY AND BODY MASS INDEX (BMI) OF 33.0 TO 33.9 IN ADULT: ICD-10-CM

## 2021-09-13 DIAGNOSIS — Z79.01 CHRONIC ANTICOAGULATION: ICD-10-CM

## 2021-09-13 DIAGNOSIS — Z91.89 AT RISK FOR AMIODARONE TOXICITY WITH LONG TERM USE: ICD-10-CM

## 2021-09-13 DIAGNOSIS — Z79.899 LONG TERM CURRENT USE OF AMIODARONE: Chronic | ICD-10-CM

## 2021-09-13 DIAGNOSIS — Z79.899 AT RISK FOR AMIODARONE TOXICITY WITH LONG TERM USE: ICD-10-CM

## 2021-09-13 DIAGNOSIS — I11.0 HYPERTENSIVE HEART DISEASE WITH HEART FAILURE: ICD-10-CM

## 2021-09-13 DIAGNOSIS — Z79.899 LONG TERM CURRENT USE OF AMIODARONE: ICD-10-CM

## 2021-09-13 DIAGNOSIS — Z95.811 LVAD (LEFT VENTRICULAR ASSIST DEVICE) PRESENT: ICD-10-CM

## 2021-09-13 DIAGNOSIS — I48.0 PAROXYSMAL ATRIAL FIBRILLATION: ICD-10-CM

## 2021-09-13 DIAGNOSIS — Z95.811 LVAD (LEFT VENTRICULAR ASSIST DEVICE) PRESENT: Primary | ICD-10-CM

## 2021-09-13 LAB
ALBUMIN SERPL BCP-MCNC: 4.2 G/DL (ref 3.5–5.2)
ALP SERPL-CCNC: 63 U/L (ref 55–135)
ALT SERPL W/O P-5'-P-CCNC: 31 U/L (ref 10–44)
ANION GAP SERPL CALC-SCNC: 11 MMOL/L (ref 8–16)
AST SERPL-CCNC: 24 U/L (ref 10–40)
BASOPHILS # BLD AUTO: 0.09 K/UL (ref 0–0.2)
BASOPHILS NFR BLD: 1 % (ref 0–1.9)
BILIRUB DIRECT SERPL-MCNC: 0.5 MG/DL (ref 0.1–0.3)
BILIRUB SERPL-MCNC: 1.3 MG/DL (ref 0.1–1)
BNP SERPL-MCNC: 220 PG/ML (ref 0–99)
BUN SERPL-MCNC: 28 MG/DL (ref 8–23)
CALCIUM SERPL-MCNC: 9.8 MG/DL (ref 8.7–10.5)
CHLORIDE SERPL-SCNC: 104 MMOL/L (ref 95–110)
CO2 SERPL-SCNC: 26 MMOL/L (ref 23–29)
CREAT SERPL-MCNC: 1.5 MG/DL (ref 0.5–1.4)
CRP SERPL-MCNC: 2.7 MG/L (ref 0–8.2)
DIFFERENTIAL METHOD: ABNORMAL
EOSINOPHIL # BLD AUTO: 0.2 K/UL (ref 0–0.5)
EOSINOPHIL NFR BLD: 2 % (ref 0–8)
ERYTHROCYTE [DISTWIDTH] IN BLOOD BY AUTOMATED COUNT: 14.2 % (ref 11.5–14.5)
EST. GFR  (AFRICAN AMERICAN): 57.3 ML/MIN/1.73 M^2
EST. GFR  (NON AFRICAN AMERICAN): 49.5 ML/MIN/1.73 M^2
GLUCOSE SERPL-MCNC: 86 MG/DL (ref 70–110)
HCT VFR BLD AUTO: 44 % (ref 40–54)
HGB BLD-MCNC: 15.3 G/DL (ref 14–18)
IMM GRANULOCYTES # BLD AUTO: 0.08 K/UL (ref 0–0.04)
IMM GRANULOCYTES NFR BLD AUTO: 0.9 % (ref 0–0.5)
INR PPP: 1.4 (ref 0.8–1.2)
LDH SERPL L TO P-CCNC: 188 U/L (ref 110–260)
LYMPHOCYTES # BLD AUTO: 2 K/UL (ref 1–4.8)
LYMPHOCYTES NFR BLD: 21 % (ref 18–48)
MAGNESIUM SERPL-MCNC: 1.9 MG/DL (ref 1.6–2.6)
MCH RBC QN AUTO: 30.2 PG (ref 27–31)
MCHC RBC AUTO-ENTMCNC: 34.8 G/DL (ref 32–36)
MCV RBC AUTO: 87 FL (ref 82–98)
MONOCYTES # BLD AUTO: 0.9 K/UL (ref 0.3–1)
MONOCYTES NFR BLD: 9.4 % (ref 4–15)
NEUTROPHILS # BLD AUTO: 6.2 K/UL (ref 1.8–7.7)
NEUTROPHILS NFR BLD: 65.7 % (ref 38–73)
NRBC BLD-RTO: 0 /100 WBC
PHOSPHATE SERPL-MCNC: 2.8 MG/DL (ref 2.7–4.5)
PLATELET # BLD AUTO: 181 K/UL (ref 150–450)
PMV BLD AUTO: 10.8 FL (ref 9.2–12.9)
POTASSIUM SERPL-SCNC: 4 MMOL/L (ref 3.5–5.1)
PREALB SERPL-MCNC: 26 MG/DL (ref 20–43)
PROT SERPL-MCNC: 7.5 G/DL (ref 6–8.4)
PROTHROMBIN TIME: 15.4 SEC (ref 9–12.5)
RBC # BLD AUTO: 5.06 M/UL (ref 4.6–6.2)
SODIUM SERPL-SCNC: 141 MMOL/L (ref 136–145)
WBC # BLD AUTO: 9.41 K/UL (ref 3.9–12.7)

## 2021-09-13 PROCEDURE — 84134 ASSAY OF PREALBUMIN: CPT | Performed by: INTERNAL MEDICINE

## 2021-09-13 PROCEDURE — 99214 PR OFFICE/OUTPT VISIT, EST, LEVL IV, 30-39 MIN: ICD-10-PCS | Mod: S$PBB,,, | Performed by: INTERNAL MEDICINE

## 2021-09-13 PROCEDURE — 99213 PR OFFICE/OUTPT VISIT, EST, LEVL III, 20-29 MIN: ICD-10-PCS | Mod: S$PBB,,, | Performed by: INTERNAL MEDICINE

## 2021-09-13 PROCEDURE — 99999 PR PBB SHADOW E&M-EST. PATIENT-LVL I: ICD-10-PCS | Mod: PBBFAC,,, | Performed by: INTERNAL MEDICINE

## 2021-09-13 PROCEDURE — 82248 BILIRUBIN DIRECT: CPT | Performed by: INTERNAL MEDICINE

## 2021-09-13 PROCEDURE — 86140 C-REACTIVE PROTEIN: CPT | Performed by: INTERNAL MEDICINE

## 2021-09-13 PROCEDURE — 99211 OFF/OP EST MAY X REQ PHY/QHP: CPT | Mod: PBBFAC,25 | Performed by: INTERNAL MEDICINE

## 2021-09-13 PROCEDURE — 99999 PR PBB SHADOW E&M-EST. PATIENT-LVL I: CPT | Mod: PBBFAC,,, | Performed by: INTERNAL MEDICINE

## 2021-09-13 PROCEDURE — 83615 LACTATE (LD) (LDH) ENZYME: CPT | Performed by: INTERNAL MEDICINE

## 2021-09-13 PROCEDURE — 83735 ASSAY OF MAGNESIUM: CPT | Performed by: INTERNAL MEDICINE

## 2021-09-13 PROCEDURE — 99213 OFFICE O/P EST LOW 20 MIN: CPT | Mod: S$PBB,,, | Performed by: INTERNAL MEDICINE

## 2021-09-13 PROCEDURE — 85610 PROTHROMBIN TIME: CPT | Performed by: INTERNAL MEDICINE

## 2021-09-13 PROCEDURE — 84100 ASSAY OF PHOSPHORUS: CPT | Performed by: INTERNAL MEDICINE

## 2021-09-13 PROCEDURE — 99999 PR PBB SHADOW E&M-EST. PATIENT-LVL III: CPT | Mod: PBBFAC,,, | Performed by: INTERNAL MEDICINE

## 2021-09-13 PROCEDURE — 80053 COMPREHEN METABOLIC PANEL: CPT | Performed by: INTERNAL MEDICINE

## 2021-09-13 PROCEDURE — 93750 PR INTERROGATE VENT ASSIST DEV, IN PERSON, W PHYSICIAN ANALYSIS: ICD-10-PCS | Mod: S$PBB,,, | Performed by: INTERNAL MEDICINE

## 2021-09-13 PROCEDURE — 93750 INTERROGATION VAD IN PERSON: CPT | Mod: PBBFAC | Performed by: INTERNAL MEDICINE

## 2021-09-13 PROCEDURE — 99999 PR PBB SHADOW E&M-EST. PATIENT-LVL III: ICD-10-PCS | Mod: PBBFAC,,, | Performed by: INTERNAL MEDICINE

## 2021-09-13 PROCEDURE — 99214 OFFICE O/P EST MOD 30 MIN: CPT | Mod: S$PBB,,, | Performed by: INTERNAL MEDICINE

## 2021-09-13 PROCEDURE — 36415 COLL VENOUS BLD VENIPUNCTURE: CPT | Performed by: INTERNAL MEDICINE

## 2021-09-13 PROCEDURE — 99213 OFFICE O/P EST LOW 20 MIN: CPT | Mod: PBBFAC,27 | Performed by: INTERNAL MEDICINE

## 2021-09-13 PROCEDURE — 83880 ASSAY OF NATRIURETIC PEPTIDE: CPT | Performed by: INTERNAL MEDICINE

## 2021-09-13 PROCEDURE — 93750 INTERROGATION VAD IN PERSON: CPT | Mod: S$PBB,,, | Performed by: INTERNAL MEDICINE

## 2021-09-13 PROCEDURE — 85025 COMPLETE CBC W/AUTO DIFF WBC: CPT | Performed by: INTERNAL MEDICINE

## 2021-09-13 RX ORDER — POTASSIUM CHLORIDE 20 MEQ/1
20 TABLET, EXTENDED RELEASE ORAL DAILY
Qty: 90 TABLET | Refills: 3 | Status: SHIPPED | OUTPATIENT
Start: 2021-09-13

## 2021-09-15 ENCOUNTER — PATIENT MESSAGE (OUTPATIENT)
Dept: TRANSPLANT | Facility: CLINIC | Age: 61
End: 2021-09-15

## 2021-09-15 ENCOUNTER — TELEPHONE (OUTPATIENT)
Dept: TRANSPLANT | Facility: CLINIC | Age: 61
End: 2021-09-15

## 2021-09-17 ENCOUNTER — PATIENT MESSAGE (OUTPATIENT)
Dept: CARDIOLOGY | Facility: CLINIC | Age: 61
End: 2021-09-17

## 2021-09-20 ENCOUNTER — PATIENT MESSAGE (OUTPATIENT)
Dept: CARDIOLOGY | Facility: CLINIC | Age: 61
End: 2021-09-20

## 2021-09-27 ENCOUNTER — PATIENT MESSAGE (OUTPATIENT)
Dept: CARDIOLOGY | Facility: CLINIC | Age: 61
End: 2021-09-27

## 2021-10-04 ENCOUNTER — PATIENT MESSAGE (OUTPATIENT)
Dept: CARDIOLOGY | Facility: CLINIC | Age: 61
End: 2021-10-04

## 2021-10-18 ENCOUNTER — PATIENT MESSAGE (OUTPATIENT)
Dept: TRANSPLANT | Facility: CLINIC | Age: 61
End: 2021-10-18
Payer: MEDICARE

## 2021-10-18 ENCOUNTER — PATIENT MESSAGE (OUTPATIENT)
Dept: CARDIOLOGY | Facility: CLINIC | Age: 61
End: 2021-10-18
Payer: MEDICARE

## 2021-10-25 ENCOUNTER — PATIENT MESSAGE (OUTPATIENT)
Dept: CARDIOLOGY | Facility: CLINIC | Age: 61
End: 2021-10-25
Payer: MEDICARE

## 2021-11-08 ENCOUNTER — PATIENT MESSAGE (OUTPATIENT)
Dept: CARDIOLOGY | Facility: CLINIC | Age: 61
End: 2021-11-08
Payer: MEDICARE

## 2021-11-15 ENCOUNTER — PATIENT MESSAGE (OUTPATIENT)
Dept: CARDIOLOGY | Facility: CLINIC | Age: 61
End: 2021-11-15
Payer: MEDICARE

## 2021-11-16 ENCOUNTER — CLINICAL SUPPORT (OUTPATIENT)
Dept: TRANSPLANT | Facility: CLINIC | Age: 61
End: 2021-11-16
Payer: MEDICARE

## 2021-11-16 ENCOUNTER — LAB VISIT (OUTPATIENT)
Dept: LAB | Facility: HOSPITAL | Age: 61
End: 2021-11-16
Attending: INTERNAL MEDICINE
Payer: MEDICARE

## 2021-11-16 ENCOUNTER — ANTI-COAG VISIT (OUTPATIENT)
Dept: CARDIOLOGY | Facility: CLINIC | Age: 61
End: 2021-11-16
Payer: MEDICARE

## 2021-11-16 ENCOUNTER — TELEPHONE (OUTPATIENT)
Dept: TRANSPLANT | Facility: CLINIC | Age: 61
End: 2021-11-16

## 2021-11-16 ENCOUNTER — EDUCATION (OUTPATIENT)
Dept: TRANSPLANT | Facility: CLINIC | Age: 61
End: 2021-11-16

## 2021-11-16 ENCOUNTER — OFFICE VISIT (OUTPATIENT)
Dept: TRANSPLANT | Facility: CLINIC | Age: 61
End: 2021-11-16
Payer: MEDICARE

## 2021-11-16 VITALS — BODY MASS INDEX: 34.5 KG/M2 | HEIGHT: 70 IN | SYSTOLIC BLOOD PRESSURE: 90 MMHG | WEIGHT: 241 LBS | TEMPERATURE: 99 F

## 2021-11-16 DIAGNOSIS — Z95.811 LVAD (LEFT VENTRICULAR ASSIST DEVICE) PRESENT: ICD-10-CM

## 2021-11-16 DIAGNOSIS — I50.9 HEART FAILURE, UNSPECIFIED HF CHRONICITY, UNSPECIFIED HEART FAILURE TYPE: ICD-10-CM

## 2021-11-16 DIAGNOSIS — Z79.899 LONG TERM CURRENT USE OF AMIODARONE: ICD-10-CM

## 2021-11-16 DIAGNOSIS — E87.70 HYPERVOLEMIA, UNSPECIFIED HYPERVOLEMIA TYPE: ICD-10-CM

## 2021-11-16 DIAGNOSIS — Z95.810 ICD (IMPLANTABLE CARDIOVERTER-DEFIBRILLATOR) IN PLACE: ICD-10-CM

## 2021-11-16 DIAGNOSIS — I48.0 PAROXYSMAL ATRIAL FIBRILLATION: Primary | ICD-10-CM

## 2021-11-16 DIAGNOSIS — Z12.9 ENCOUNTER FOR SCREENING FOR MALIGNANT NEOPLASM, SITE UNSPECIFIED: ICD-10-CM

## 2021-11-16 DIAGNOSIS — I50.42 CHRONIC COMBINED SYSTOLIC AND DIASTOLIC CONGESTIVE HEART FAILURE: ICD-10-CM

## 2021-11-16 DIAGNOSIS — Z76.82 ORGAN TRANSPLANT CANDIDATE: ICD-10-CM

## 2021-11-16 DIAGNOSIS — Z79.01 LONG TERM (CURRENT) USE OF ANTICOAGULANTS: ICD-10-CM

## 2021-11-16 DIAGNOSIS — Z95.811 HEART REPLACED BY HEART ASSIST DEVICE: ICD-10-CM

## 2021-11-16 DIAGNOSIS — Z95.811 LVAD (LEFT VENTRICULAR ASSIST DEVICE) PRESENT: Primary | ICD-10-CM

## 2021-11-16 DIAGNOSIS — Z79.899 LONG TERM CURRENT USE OF AMIODARONE: Chronic | ICD-10-CM

## 2021-11-16 DIAGNOSIS — I10 PRIMARY HYPERTENSION: ICD-10-CM

## 2021-11-16 DIAGNOSIS — Z11.59 SCREENING FOR VIRAL DISEASE: ICD-10-CM

## 2021-11-16 LAB
ALBUMIN SERPL BCP-MCNC: 4.2 G/DL (ref 3.5–5.2)
ALP SERPL-CCNC: 61 U/L (ref 55–135)
ALT SERPL W/O P-5'-P-CCNC: 31 U/L (ref 10–44)
ANION GAP SERPL CALC-SCNC: 10 MMOL/L (ref 8–16)
AST SERPL-CCNC: 26 U/L (ref 10–40)
BASOPHILS # BLD AUTO: 0.07 K/UL (ref 0–0.2)
BASOPHILS NFR BLD: 1 % (ref 0–1.9)
BILIRUB DIRECT SERPL-MCNC: 0.5 MG/DL (ref 0.1–0.3)
BILIRUB SERPL-MCNC: 1.3 MG/DL (ref 0.1–1)
BNP SERPL-MCNC: 266 PG/ML (ref 0–99)
BUN SERPL-MCNC: 27 MG/DL (ref 8–23)
CALCIUM SERPL-MCNC: 10.2 MG/DL (ref 8.7–10.5)
CHLORIDE SERPL-SCNC: 103 MMOL/L (ref 95–110)
CO2 SERPL-SCNC: 29 MMOL/L (ref 23–29)
CREAT SERPL-MCNC: 1.7 MG/DL (ref 0.5–1.4)
CRP SERPL-MCNC: 3 MG/L (ref 0–8.2)
DIFFERENTIAL METHOD: ABNORMAL
EOSINOPHIL # BLD AUTO: 0.2 K/UL (ref 0–0.5)
EOSINOPHIL NFR BLD: 2.3 % (ref 0–8)
ERYTHROCYTE [DISTWIDTH] IN BLOOD BY AUTOMATED COUNT: 14.1 % (ref 11.5–14.5)
EST. GFR  (AFRICAN AMERICAN): 49.2 ML/MIN/1.73 M^2
EST. GFR  (NON AFRICAN AMERICAN): 42.6 ML/MIN/1.73 M^2
GLUCOSE SERPL-MCNC: 103 MG/DL (ref 70–110)
HCT VFR BLD AUTO: 45.3 % (ref 40–54)
HGB BLD-MCNC: 14.6 G/DL (ref 14–18)
IMM GRANULOCYTES # BLD AUTO: 0.05 K/UL (ref 0–0.04)
IMM GRANULOCYTES NFR BLD AUTO: 0.7 % (ref 0–0.5)
INR PPP: 2.4 (ref 0.8–1.2)
LDH SERPL L TO P-CCNC: 214 U/L (ref 110–260)
LYMPHOCYTES # BLD AUTO: 1.9 K/UL (ref 1–4.8)
LYMPHOCYTES NFR BLD: 26.6 % (ref 18–48)
MAGNESIUM SERPL-MCNC: 2 MG/DL (ref 1.6–2.6)
MCH RBC QN AUTO: 28.7 PG (ref 27–31)
MCHC RBC AUTO-ENTMCNC: 32.2 G/DL (ref 32–36)
MCV RBC AUTO: 89 FL (ref 82–98)
MONOCYTES # BLD AUTO: 0.6 K/UL (ref 0.3–1)
MONOCYTES NFR BLD: 8.8 % (ref 4–15)
NEUTROPHILS # BLD AUTO: 4.3 K/UL (ref 1.8–7.7)
NEUTROPHILS NFR BLD: 60.6 % (ref 38–73)
NRBC BLD-RTO: 0 /100 WBC
PHOSPHATE SERPL-MCNC: 3.2 MG/DL (ref 2.7–4.5)
PLATELET # BLD AUTO: 191 K/UL (ref 150–450)
PMV BLD AUTO: 11.4 FL (ref 9.2–12.9)
POTASSIUM SERPL-SCNC: 4.4 MMOL/L (ref 3.5–5.1)
PREALB SERPL-MCNC: 24 MG/DL (ref 20–43)
PROT SERPL-MCNC: 7.6 G/DL (ref 6–8.4)
PROTHROMBIN TIME: 24.7 SEC (ref 9–12.5)
RBC # BLD AUTO: 5.08 M/UL (ref 4.6–6.2)
SODIUM SERPL-SCNC: 142 MMOL/L (ref 136–145)
WBC # BLD AUTO: 7.06 K/UL (ref 3.9–12.7)

## 2021-11-16 PROCEDURE — 82248 BILIRUBIN DIRECT: CPT | Mod: TXP | Performed by: INTERNAL MEDICINE

## 2021-11-16 PROCEDURE — 83880 ASSAY OF NATRIURETIC PEPTIDE: CPT | Mod: NTX | Performed by: INTERNAL MEDICINE

## 2021-11-16 PROCEDURE — 83735 ASSAY OF MAGNESIUM: CPT | Mod: NTX | Performed by: INTERNAL MEDICINE

## 2021-11-16 PROCEDURE — 36415 COLL VENOUS BLD VENIPUNCTURE: CPT | Mod: TXP | Performed by: INTERNAL MEDICINE

## 2021-11-16 PROCEDURE — 80053 COMPREHEN METABOLIC PANEL: CPT | Mod: TXP | Performed by: INTERNAL MEDICINE

## 2021-11-16 PROCEDURE — 85610 PROTHROMBIN TIME: CPT | Mod: NTX | Performed by: INTERNAL MEDICINE

## 2021-11-16 PROCEDURE — 93793 PR ANTICOAGULANT MGMT FOR PT TAKING WARFARIN: ICD-10-PCS | Mod: ,,,

## 2021-11-16 PROCEDURE — 86140 C-REACTIVE PROTEIN: CPT | Mod: NTX | Performed by: INTERNAL MEDICINE

## 2021-11-16 PROCEDURE — 93750 OP LVAD INTERROGATION: ICD-10-PCS | Mod: TXP,,, | Performed by: INTERNAL MEDICINE

## 2021-11-16 PROCEDURE — 85025 COMPLETE CBC W/AUTO DIFF WBC: CPT | Mod: NTX | Performed by: INTERNAL MEDICINE

## 2021-11-16 PROCEDURE — 99214 OFFICE O/P EST MOD 30 MIN: CPT | Mod: S$PBB,TXP,, | Performed by: INTERNAL MEDICINE

## 2021-11-16 PROCEDURE — 83615 LACTATE (LD) (LDH) ENZYME: CPT | Mod: NTX | Performed by: INTERNAL MEDICINE

## 2021-11-16 PROCEDURE — 99214 PR OFFICE/OUTPT VISIT, EST, LEVL IV, 30-39 MIN: ICD-10-PCS | Mod: S$PBB,TXP,, | Performed by: INTERNAL MEDICINE

## 2021-11-16 PROCEDURE — 84134 ASSAY OF PREALBUMIN: CPT | Mod: TXP | Performed by: INTERNAL MEDICINE

## 2021-11-16 PROCEDURE — 99999 PR PBB SHADOW E&M-EST. PATIENT-LVL III: ICD-10-PCS | Mod: PBBFAC,TXP,, | Performed by: INTERNAL MEDICINE

## 2021-11-16 PROCEDURE — 99213 OFFICE O/P EST LOW 20 MIN: CPT | Mod: PBBFAC,TXP | Performed by: INTERNAL MEDICINE

## 2021-11-16 PROCEDURE — 84100 ASSAY OF PHOSPHORUS: CPT | Mod: TXP | Performed by: INTERNAL MEDICINE

## 2021-11-16 PROCEDURE — 99999 PR PBB SHADOW E&M-EST. PATIENT-LVL III: CPT | Mod: PBBFAC,TXP,, | Performed by: INTERNAL MEDICINE

## 2021-11-16 PROCEDURE — 93793 ANTICOAG MGMT PT WARFARIN: CPT | Mod: ,,,

## 2021-11-16 PROCEDURE — 93750 INTERROGATION VAD IN PERSON: CPT | Mod: TXP,,, | Performed by: INTERNAL MEDICINE

## 2021-11-16 NOTE — LETTER
November 30, 2021        Dipesh De La Torre  [address] [address]  [place] [address]  Phone: [phone]  Fax: [phone]             [facility]  [address]  [facility] [address]  Phone: [phone]   Patient: Tae Delgado   MR Number: [id number]   YOB: 1960   Date of Visit: 11/16/2021       Dear Dr. Dipesh De La Torre    Thank you for referring Tae Delgado to me for evaluation. Attached you will find relevant portions of my assessment and plan of care.    If you have questions, please do not hesitate to call me. I look forward to following Tae Delgado along with you.    Sincerely,    Jamaica Doshi MD    Enclosure    If you would like to receive this communication electronically, please contact [email or web address] or [phone] to request [employer] Link access.    [employer] Link is a tool which provides read-only access to select patient information with whom you have a relationship. Its easy to use and provides real time access to review your patients record including encounter summaries, notes, results, and demographic information.    If you feel you have received this communication in error or would no longer like to receive these types of communications, please e-mail [email or web address]

## 2021-11-17 ENCOUNTER — TELEPHONE (OUTPATIENT)
Dept: TRANSPLANT | Facility: CLINIC | Age: 61
End: 2021-11-17
Payer: MEDICARE

## 2021-11-23 ENCOUNTER — PATIENT MESSAGE (OUTPATIENT)
Dept: TRANSPLANT | Facility: CLINIC | Age: 61
End: 2021-11-23
Payer: MEDICARE

## 2021-11-24 ENCOUNTER — PATIENT MESSAGE (OUTPATIENT)
Dept: CARDIOLOGY | Facility: CLINIC | Age: 61
End: 2021-11-24
Payer: MEDICARE

## 2021-11-29 ENCOUNTER — PATIENT MESSAGE (OUTPATIENT)
Dept: CARDIOLOGY | Facility: CLINIC | Age: 61
End: 2021-11-29
Payer: MEDICARE

## 2021-12-02 ENCOUNTER — PATIENT MESSAGE (OUTPATIENT)
Dept: CARDIOTHORACIC SURGERY | Facility: CLINIC | Age: 61
End: 2021-12-02
Payer: MEDICARE

## 2021-12-02 ENCOUNTER — PATIENT MESSAGE (OUTPATIENT)
Dept: CARDIOLOGY | Facility: CLINIC | Age: 61
End: 2021-12-02
Payer: MEDICARE

## 2021-12-02 DIAGNOSIS — Z95.811 LVAD (LEFT VENTRICULAR ASSIST DEVICE) PRESENT: Primary | ICD-10-CM

## 2021-12-07 ENCOUNTER — PATIENT MESSAGE (OUTPATIENT)
Dept: CARDIOLOGY | Facility: CLINIC | Age: 61
End: 2021-12-07
Payer: MEDICARE

## 2021-12-08 ENCOUNTER — PATIENT MESSAGE (OUTPATIENT)
Dept: CARDIOLOGY | Facility: CLINIC | Age: 61
End: 2021-12-08

## 2021-12-08 ENCOUNTER — ANTI-COAG VISIT (OUTPATIENT)
Dept: CARDIOLOGY | Facility: CLINIC | Age: 61
End: 2021-12-08
Payer: MEDICARE

## 2021-12-08 DIAGNOSIS — Z79.01 LONG TERM (CURRENT) USE OF ANTICOAGULANTS: Primary | ICD-10-CM

## 2021-12-08 DIAGNOSIS — I48.0 PAROXYSMAL ATRIAL FIBRILLATION: ICD-10-CM

## 2021-12-08 DIAGNOSIS — Z95.811 HEART REPLACED BY HEART ASSIST DEVICE: ICD-10-CM

## 2021-12-08 DIAGNOSIS — Z95.811 LVAD (LEFT VENTRICULAR ASSIST DEVICE) PRESENT: ICD-10-CM

## 2021-12-08 LAB — INR PPP: 2

## 2021-12-08 PROCEDURE — 93793 ANTICOAG MGMT PT WARFARIN: CPT | Mod: ,,,

## 2021-12-08 PROCEDURE — 93793 PR ANTICOAGULANT MGMT FOR PT TAKING WARFARIN: ICD-10-PCS | Mod: ,,,

## 2021-12-13 ENCOUNTER — HOSPITAL ENCOUNTER (OUTPATIENT)
Dept: PULMONOLOGY | Facility: CLINIC | Age: 61
Discharge: HOME | End: 2021-12-13
Payer: MEDICARE

## 2021-12-13 ENCOUNTER — ANTI-COAG VISIT (OUTPATIENT)
Dept: CARDIOLOGY | Facility: CLINIC | Age: 61
End: 2021-12-13
Payer: MEDICARE

## 2021-12-13 ENCOUNTER — PATIENT MESSAGE (OUTPATIENT)
Dept: TRANSPLANT | Facility: CLINIC | Age: 61
End: 2021-12-13

## 2021-12-13 ENCOUNTER — HOSPITAL ENCOUNTER (OUTPATIENT)
Dept: CARDIOLOGY | Facility: HOSPITAL | Age: 61
Discharge: HOME | End: 2021-12-13
Attending: INTERNAL MEDICINE
Payer: MEDICARE

## 2021-12-13 ENCOUNTER — HOSPITAL ENCOUNTER (OUTPATIENT)
Dept: RADIOLOGY | Facility: HOSPITAL | Age: 61
Discharge: HOME | End: 2021-12-13
Attending: INTERNAL MEDICINE
Payer: MEDICARE

## 2021-12-13 ENCOUNTER — CLINICAL SUPPORT (OUTPATIENT)
Dept: TRANSPLANT | Facility: CLINIC | Age: 61
End: 2021-12-13
Payer: MEDICARE

## 2021-12-13 ENCOUNTER — SOCIAL WORK (OUTPATIENT)
Dept: TRANSPLANT | Facility: CLINIC | Age: 61
End: 2021-12-13
Payer: MEDICARE

## 2021-12-13 ENCOUNTER — EDUCATION (OUTPATIENT)
Dept: TRANSPLANT | Facility: CLINIC | Age: 61
End: 2021-12-13
Payer: MEDICARE

## 2021-12-13 ENCOUNTER — OFFICE VISIT (OUTPATIENT)
Dept: CARDIOTHORACIC SURGERY | Facility: CLINIC | Age: 61
End: 2021-12-13
Payer: MEDICARE

## 2021-12-13 ENCOUNTER — DOCUMENTATION ONLY (OUTPATIENT)
Dept: CARDIOTHORACIC SURGERY | Facility: CLINIC | Age: 61
End: 2021-12-13
Payer: MEDICARE

## 2021-12-13 VITALS — BODY MASS INDEX: 34.93 KG/M2 | HEIGHT: 70 IN | WEIGHT: 244 LBS

## 2021-12-13 VITALS — RESPIRATION RATE: 18 BRPM | WEIGHT: 239 LBS | BODY MASS INDEX: 34.22 KG/M2 | HEIGHT: 70 IN

## 2021-12-13 DIAGNOSIS — I50.9 HEART FAILURE, UNSPECIFIED HF CHRONICITY, UNSPECIFIED HEART FAILURE TYPE: ICD-10-CM

## 2021-12-13 DIAGNOSIS — Z01.812 ENCOUNTER FOR PREOPERATIVE SCREENING LABORATORY TESTING FOR COVID-19 VIRUS: Primary | ICD-10-CM

## 2021-12-13 DIAGNOSIS — I48.0 PAROXYSMAL ATRIAL FIBRILLATION: Primary | ICD-10-CM

## 2021-12-13 DIAGNOSIS — Z76.82 ORGAN TRANSPLANT CANDIDATE: ICD-10-CM

## 2021-12-13 DIAGNOSIS — Z95.811 LVAD (LEFT VENTRICULAR ASSIST DEVICE) PRESENT: ICD-10-CM

## 2021-12-13 DIAGNOSIS — R06.02 SHORTNESS OF BREATH: ICD-10-CM

## 2021-12-13 DIAGNOSIS — Z95.811 LVAD (LEFT VENTRICULAR ASSIST DEVICE) PRESENT: Primary | ICD-10-CM

## 2021-12-13 DIAGNOSIS — R06.02 SHORTNESS OF BREATH: Primary | ICD-10-CM

## 2021-12-13 DIAGNOSIS — Z79.01 LONG TERM (CURRENT) USE OF ANTICOAGULANTS: ICD-10-CM

## 2021-12-13 DIAGNOSIS — Z11.52 ENCOUNTER FOR PREOPERATIVE SCREENING LABORATORY TESTING FOR COVID-19 VIRUS: Primary | ICD-10-CM

## 2021-12-13 LAB
DLCO ADJ PRE: 21.83 ML/(MIN*MMHG) (ref 22.1–35.95)
DLCO SINGLE BREATH LLN: 22.1
DLCO SINGLE BREATH PRE REF: 75.2 %
DLCO SINGLE BREATH REF: 29.02
DLCOC SBVA LLN: 2.89
DLCOC SBVA PRE REF: 100.1 %
DLCOC SBVA REF: 4.06
DLCOC SINGLE BREATH LLN: 22.1
DLCOC SINGLE BREATH PRE REF: 75.2 %
DLCOC SINGLE BREATH REF: 29.02
DLCOCSBVAULN: 5.24
DLCOCSINGLEBREATHULN: 35.95
DLCOSINGLEBREATHULN: 35.95
DLCOVA LLN: 2.89
DLCOVA PRE REF: 100.1 %
DLCOVA PRE: 4.07 ML/(MIN*MMHG*L) (ref 2.89–5.24)
DLCOVA REF: 4.06
DLCOVAULN: 5.24
DLVAADJ PRE: 4.07 ML/(MIN*MMHG*L) (ref 2.89–5.24)
FEF 25 75 LLN: 1.39
FEF 25 75 PRE REF: 95.1 %
FEF 25 75 REF: 2.89
FEV05 LLN: 1.67
FEV05 REF: 2.8
FEV1 FVC LLN: 65
FEV1 FVC PRE REF: 102.4 %
FEV1 FVC REF: 77
FEV1 LLN: 2.64
FEV1 PRE REF: 79.9 %
FEV1 REF: 3.53
FVC LLN: 3.48
FVC PRE REF: 77.8 %
FVC REF: 4.58
IVC PRE: 3.29 L (ref 3.48–5.69)
IVC SINGLE BREATH LLN: 3.48
IVC SINGLE BREATH PRE REF: 71.8 %
IVC SINGLE BREATH REF: 4.58
IVCSINGLEBREATHULN: 5.69
LEFT ABI: 1.18
LEFT ARM BP: 92 MMHG
LEFT CBA DIAS: 18 CM/S
LEFT CBA SYS: 26 CM/S
LEFT CCA DIST DIAS: 20 CM/S
LEFT CCA DIST SYS: 32 CM/S
LEFT CCA MID DIAS: 25 CM/S
LEFT CCA MID SYS: 39 CM/S
LEFT CCA PROX DIAS: 19 CM/S
LEFT CCA PROX SYS: 31 CM/S
LEFT DORSALIS PEDIS: 109 MMHG
LEFT ECA DIAS: 16 CM/S
LEFT ECA SYS: 27 CM/S
LEFT ICA DIST DIAS: 29 CM/S
LEFT ICA DIST SYS: 37 CM/S
LEFT ICA MID DIAS: 37 CM/S
LEFT ICA MID SYS: 50 CM/S
LEFT ICA PROX DIAS: 28 CM/S
LEFT ICA PROX SYS: 44 CM/S
LEFT POSTERIOR TIBIAL: 102 MMHG
LEFT VERTEBRAL DIAS: 13 CM/S
LEFT VERTEBRAL SYS: 24 CM/S
OHS CV CAROTID RIGHT ICA EDV HIGHEST: 45
OHS CV CAROTID ULTRASOUND LEFT ICA/CCA RATIO: 1.56
OHS CV CAROTID ULTRASOUND RIGHT ICA/CCA RATIO: 1.9
OHS CV PV CAROTID LEFT HIGHEST CCA: 39
OHS CV PV CAROTID LEFT HIGHEST ICA: 50
OHS CV PV CAROTID RIGHT HIGHEST CCA: 34
OHS CV PV CAROTID RIGHT HIGHEST ICA: 57
OHS CV US CAROTID LEFT HIGHEST EDV: 37
PEF LLN: 6.84
PEF PRE REF: 94.7 %
PEF REF: 9.17
PHYSICIAN COMMENT: ABNORMAL
PRE DLCO: 21.83 ML/(MIN*MMHG) (ref 22.1–35.95)
PRE FEF 25 75: 2.75 L/S (ref 1.39–4.39)
PRE FET 100: 6.68 SEC
PRE FEV05 REF: 83.3 %
PRE FEV1 FVC: 79.06 % (ref 64.95–89.43)
PRE FEV1: 2.82 L (ref 2.64–4.42)
PRE FEV5: 2.33 L (ref 1.67–3.94)
PRE FVC: 3.57 L (ref 3.48–5.69)
PRE PEF: 8.68 L/S (ref 6.84–11.49)
RIGHT ABI: 1.15
RIGHT ARM BP: 87 MMHG
RIGHT CBA DIAS: 20 CM/S
RIGHT CBA SYS: 33 CM/S
RIGHT CCA DIST DIAS: 18 CM/S
RIGHT CCA DIST SYS: 30 CM/S
RIGHT CCA MID DIAS: 20 CM/S
RIGHT CCA MID SYS: 34 CM/S
RIGHT CCA PROX DIAS: 17 CM/S
RIGHT CCA PROX SYS: 28 CM/S
RIGHT DORSALIS PEDIS: 106 MMHG
RIGHT ECA DIAS: 20 CM/S
RIGHT ECA SYS: 33 CM/S
RIGHT ICA DIST DIAS: 45 CM/S
RIGHT ICA DIST SYS: 57 CM/S
RIGHT ICA MID DIAS: 36 CM/S
RIGHT ICA MID SYS: 47 CM/S
RIGHT ICA PROX DIAS: 14 CM/S
RIGHT ICA PROX SYS: 21 CM/S
RIGHT POSTERIOR TIBIAL: 104 MMHG
RIGHT VERTEBRAL DIAS: 14 CM/S
RIGHT VERTEBRAL SYS: 20 CM/S
SARS-COV-2 RDRP RESP QL NAA+PROBE: NEGATIVE
VA PRE: 5.37 L (ref 6.99–6.99)
VA SINGLE BREATH PRE REF: 76.8 %
VA SINGLE BREATH REF: 6.99

## 2021-12-13 PROCEDURE — 93880 CV US DOPPLER CAROTID (CUPID ONLY): ICD-10-PCS | Mod: 26,TXP,, | Performed by: INTERNAL MEDICINE

## 2021-12-13 PROCEDURE — 93922 UPR/L XTREMITY ART 2 LEVELS: CPT | Mod: TXP

## 2021-12-13 PROCEDURE — 94618 PULMONARY STRESS TESTING: CPT | Mod: PBBFAC,TXP | Performed by: INTERNAL MEDICINE

## 2021-12-13 PROCEDURE — 71250 CT CHEST ABDOMEN PELVIS WITHOUT CONTRAST(XPD): ICD-10-PCS | Mod: 26,TXP,, | Performed by: INTERNAL MEDICINE

## 2021-12-13 PROCEDURE — 93922 UPR/L XTREMITY ART 2 LEVELS: CPT | Mod: 26,TXP,, | Performed by: INTERNAL MEDICINE

## 2021-12-13 PROCEDURE — 71250 CT THORAX DX C-: CPT | Mod: 26,TXP,, | Performed by: INTERNAL MEDICINE

## 2021-12-13 PROCEDURE — 94010 BREATHING CAPACITY TEST: ICD-10-PCS | Mod: 26,S$PBB,TXP, | Performed by: INTERNAL MEDICINE

## 2021-12-13 PROCEDURE — 94729 PR C02/MEMBANE DIFFUSE CAPACITY: ICD-10-PCS | Mod: 26,S$PBB,TXP, | Performed by: INTERNAL MEDICINE

## 2021-12-13 PROCEDURE — 94010 BREATHING CAPACITY TEST: CPT | Mod: 26,S$PBB,TXP, | Performed by: INTERNAL MEDICINE

## 2021-12-13 PROCEDURE — 70450 CT HEAD WITHOUT CONTRAST: ICD-10-PCS | Mod: 26,TXP,, | Performed by: RADIOLOGY

## 2021-12-13 PROCEDURE — 94729 DIFFUSING CAPACITY: CPT | Mod: 26,S$PBB,TXP, | Performed by: INTERNAL MEDICINE

## 2021-12-13 PROCEDURE — 99999 PR PBB SHADOW E&M-EST. PATIENT-LVL I: CPT | Mod: PBBFAC,TXP,,

## 2021-12-13 PROCEDURE — 99999 PR PBB SHADOW E&M-EST. PATIENT-LVL III: ICD-10-PCS | Mod: PBBFAC,TXP,, | Performed by: THORACIC SURGERY (CARDIOTHORACIC VASCULAR SURGERY)

## 2021-12-13 PROCEDURE — 70450 CT HEAD/BRAIN W/O DYE: CPT | Mod: 26,TXP,, | Performed by: RADIOLOGY

## 2021-12-13 PROCEDURE — 93880 EXTRACRANIAL BILAT STUDY: CPT | Mod: TXP

## 2021-12-13 PROCEDURE — 74176 CT ABD & PELVIS W/O CONTRAST: CPT | Mod: 26,TXP,, | Performed by: INTERNAL MEDICINE

## 2021-12-13 PROCEDURE — 94010 BREATHING CAPACITY TEST: CPT | Mod: PBBFAC,TXP | Performed by: INTERNAL MEDICINE

## 2021-12-13 PROCEDURE — 99999 PR PBB SHADOW E&M-EST. PATIENT-LVL I: ICD-10-PCS | Mod: PBBFAC,TXP,,

## 2021-12-13 PROCEDURE — 74176 CT CHEST ABDOMEN PELVIS WITHOUT CONTRAST(XPD): ICD-10-PCS | Mod: 26,TXP,, | Performed by: INTERNAL MEDICINE

## 2021-12-13 PROCEDURE — 93880 EXTRACRANIAL BILAT STUDY: CPT | Mod: 26,TXP,, | Performed by: INTERNAL MEDICINE

## 2021-12-13 PROCEDURE — 99211 OFF/OP EST MAY X REQ PHY/QHP: CPT | Mod: PBBFAC,25,TXP

## 2021-12-13 PROCEDURE — 94618 PULMONARY STRESS TESTING: ICD-10-PCS | Mod: 26,S$PBB,TXP, | Performed by: INTERNAL MEDICINE

## 2021-12-13 PROCEDURE — 93922 ANKLE BRACHIAL INDICES (ABI): ICD-10-PCS | Mod: 26,TXP,, | Performed by: INTERNAL MEDICINE

## 2021-12-13 PROCEDURE — 99999 PR PBB SHADOW E&M-EST. PATIENT-LVL III: CPT | Mod: PBBFAC,TXP,, | Performed by: THORACIC SURGERY (CARDIOTHORACIC VASCULAR SURGERY)

## 2021-12-13 PROCEDURE — 71250 CT THORAX DX C-: CPT | Mod: TC,TXP

## 2021-12-13 PROCEDURE — 99215 PR OFFICE/OUTPT VISIT, EST, LEVL V, 40-54 MIN: ICD-10-PCS | Mod: S$PBB,TXP,, | Performed by: THORACIC SURGERY (CARDIOTHORACIC VASCULAR SURGERY)

## 2021-12-13 PROCEDURE — 70450 CT HEAD/BRAIN W/O DYE: CPT | Mod: TC,TXP

## 2021-12-13 PROCEDURE — 74176 CT ABD & PELVIS W/O CONTRAST: CPT | Mod: TC,TXP

## 2021-12-13 PROCEDURE — 94618 PULMONARY STRESS TESTING: CPT | Mod: 26,S$PBB,TXP, | Performed by: INTERNAL MEDICINE

## 2021-12-13 PROCEDURE — U0002 COVID-19 LAB TEST NON-CDC: HCPCS | Mod: TXP | Performed by: INTERNAL MEDICINE

## 2021-12-13 PROCEDURE — 94729 DIFFUSING CAPACITY: CPT | Mod: PBBFAC,TXP | Performed by: INTERNAL MEDICINE

## 2021-12-13 PROCEDURE — 99215 OFFICE O/P EST HI 40 MIN: CPT | Mod: S$PBB,TXP,, | Performed by: THORACIC SURGERY (CARDIOTHORACIC VASCULAR SURGERY)

## 2021-12-13 PROCEDURE — 99213 OFFICE O/P EST LOW 20 MIN: CPT | Mod: PBBFAC,25,27,TXP | Performed by: THORACIC SURGERY (CARDIOTHORACIC VASCULAR SURGERY)

## 2021-12-14 ENCOUNTER — HOSPITAL ENCOUNTER (OUTPATIENT)
Facility: HOSPITAL | Age: 61
Discharge: HOME | End: 2021-12-14
Attending: INTERNAL MEDICINE | Admitting: INTERNAL MEDICINE
Payer: MEDICARE

## 2021-12-14 ENCOUNTER — ANTI-COAG VISIT (OUTPATIENT)
Dept: CARDIOLOGY | Facility: CLINIC | Age: 61
End: 2021-12-14
Payer: MEDICARE

## 2021-12-14 ENCOUNTER — OFFICE VISIT (OUTPATIENT)
Dept: INFECTIOUS DISEASES | Facility: CLINIC | Age: 61
End: 2021-12-14
Payer: MEDICARE

## 2021-12-14 ENCOUNTER — PATIENT MESSAGE (OUTPATIENT)
Dept: TRANSPLANT | Facility: CLINIC | Age: 61
End: 2021-12-14

## 2021-12-14 ENCOUNTER — NUTRITION (OUTPATIENT)
Dept: TRANSPLANT | Facility: CLINIC | Age: 61
End: 2021-12-14
Payer: MEDICARE

## 2021-12-14 VITALS
HEART RATE: 80 BPM | BODY MASS INDEX: 34.96 KG/M2 | HEIGHT: 70 IN | TEMPERATURE: 98 F | WEIGHT: 244.19 LBS | SYSTOLIC BLOOD PRESSURE: 80 MMHG | RESPIRATION RATE: 18 BRPM

## 2021-12-14 VITALS — HEIGHT: 70 IN | WEIGHT: 244.25 LBS | TEMPERATURE: 98 F | BODY MASS INDEX: 34.97 KG/M2

## 2021-12-14 DIAGNOSIS — I48.0 PAROXYSMAL ATRIAL FIBRILLATION: Primary | ICD-10-CM

## 2021-12-14 DIAGNOSIS — Z76.82 PRE-HEART TRANSPLANT, LISTED: Primary | ICD-10-CM

## 2021-12-14 DIAGNOSIS — Z76.82 ORGAN TRANSPLANT CANDIDATE: ICD-10-CM

## 2021-12-14 DIAGNOSIS — Z01.818 ENCOUNTER FOR PRE-TRANSPLANT EVALUATION FOR HEART TRANSPLANT: Primary | ICD-10-CM

## 2021-12-14 DIAGNOSIS — Z79.01 LONG TERM (CURRENT) USE OF ANTICOAGULANTS: ICD-10-CM

## 2021-12-14 DIAGNOSIS — Z95.811 LVAD (LEFT VENTRICULAR ASSIST DEVICE) PRESENT: ICD-10-CM

## 2021-12-14 DIAGNOSIS — I50.9 HEART FAILURE, UNSPECIFIED HF CHRONICITY, UNSPECIFIED HEART FAILURE TYPE: ICD-10-CM

## 2021-12-14 PROCEDURE — C1751 CATH, INF, PER/CENT/MIDLINE: HCPCS | Mod: TXP | Performed by: INTERNAL MEDICINE

## 2021-12-14 PROCEDURE — 93451 PR RIGHT HEART CATH O2 SATURATION & CARDIAC OUTPUT: ICD-10-PCS | Mod: 26,TXP,, | Performed by: INTERNAL MEDICINE

## 2021-12-14 PROCEDURE — 99999 PR PBB SHADOW E&M-EST. PATIENT-LVL III: CPT | Mod: PBBFAC,TXP,, | Performed by: PHYSICIAN ASSISTANT

## 2021-12-14 PROCEDURE — 99204 PR OFFICE/OUTPT VISIT, NEW, LEVL IV, 45-59 MIN: ICD-10-PCS | Mod: S$PBB,TXP,, | Performed by: PHYSICIAN ASSISTANT

## 2021-12-14 PROCEDURE — C1894 INTRO/SHEATH, NON-LASER: HCPCS | Mod: TXP | Performed by: INTERNAL MEDICINE

## 2021-12-14 PROCEDURE — 93451 RIGHT HEART CATH: CPT | Mod: TXP | Performed by: INTERNAL MEDICINE

## 2021-12-14 PROCEDURE — 25000003 PHARM REV CODE 250: Mod: TXP | Performed by: INTERNAL MEDICINE

## 2021-12-14 PROCEDURE — 99213 OFFICE O/P EST LOW 20 MIN: CPT | Mod: PBBFAC,TXP | Performed by: PHYSICIAN ASSISTANT

## 2021-12-14 PROCEDURE — 99999 PR PBB SHADOW E&M-EST. PATIENT-LVL III: ICD-10-PCS | Mod: PBBFAC,TXP,, | Performed by: PHYSICIAN ASSISTANT

## 2021-12-14 PROCEDURE — 93793 PR ANTICOAGULANT MGMT FOR PT TAKING WARFARIN: ICD-10-PCS | Mod: ,,,

## 2021-12-14 PROCEDURE — 93451 RIGHT HEART CATH: CPT | Mod: 26,TXP,, | Performed by: INTERNAL MEDICINE

## 2021-12-14 PROCEDURE — 93793 ANTICOAG MGMT PT WARFARIN: CPT | Mod: ,,,

## 2021-12-14 PROCEDURE — 99204 OFFICE O/P NEW MOD 45 MIN: CPT | Mod: S$PBB,TXP,, | Performed by: PHYSICIAN ASSISTANT

## 2021-12-14 RX ORDER — LIDOCAINE HYDROCHLORIDE AND EPINEPHRINE 10; 10 MG/ML; UG/ML
INJECTION, SOLUTION INFILTRATION; PERINEURAL
Status: DISCONTINUED | OUTPATIENT
Start: 2021-12-14 | End: 2021-12-14 | Stop reason: HOSPADM

## 2021-12-14 RX ORDER — SODIUM CHLORIDE 0.9 G/100ML
IRRIGANT IRRIGATION
Status: DISCONTINUED | OUTPATIENT
Start: 2021-12-14 | End: 2021-12-14 | Stop reason: HOSPADM

## 2021-12-15 ENCOUNTER — PATIENT MESSAGE (OUTPATIENT)
Dept: TRANSPLANT | Facility: CLINIC | Age: 61
End: 2021-12-15
Payer: MEDICARE

## 2021-12-21 ENCOUNTER — PATIENT MESSAGE (OUTPATIENT)
Dept: CARDIOLOGY | Facility: CLINIC | Age: 61
End: 2021-12-21
Payer: MEDICARE

## 2021-12-28 ENCOUNTER — ANTI-COAG VISIT (OUTPATIENT)
Dept: CARDIOLOGY | Facility: CLINIC | Age: 61
End: 2021-12-28
Payer: MEDICARE

## 2021-12-28 DIAGNOSIS — I48.0 PAROXYSMAL ATRIAL FIBRILLATION: Primary | ICD-10-CM

## 2021-12-28 DIAGNOSIS — Z79.01 LONG TERM (CURRENT) USE OF ANTICOAGULANTS: ICD-10-CM

## 2021-12-28 DIAGNOSIS — Z95.811 LVAD (LEFT VENTRICULAR ASSIST DEVICE) PRESENT: ICD-10-CM

## 2021-12-28 LAB — INR PPP: 2.8

## 2021-12-28 PROCEDURE — 93793 PR ANTICOAGULANT MGMT FOR PT TAKING WARFARIN: ICD-10-PCS | Mod: ,,,

## 2021-12-28 PROCEDURE — 93793 ANTICOAG MGMT PT WARFARIN: CPT | Mod: ,,,

## 2021-12-30 ENCOUNTER — PATIENT MESSAGE (OUTPATIENT)
Dept: TRANSPLANT | Facility: CLINIC | Age: 61
End: 2021-12-30
Payer: MEDICARE

## 2022-01-03 ENCOUNTER — OFFICE VISIT (OUTPATIENT)
Dept: TRANSPLANT | Facility: CLINIC | Age: 62
End: 2022-01-03
Attending: INTERNAL MEDICINE
Payer: MEDICARE

## 2022-01-03 ENCOUNTER — CLINICAL SUPPORT (OUTPATIENT)
Dept: TRANSPLANT | Facility: CLINIC | Age: 62
End: 2022-01-03
Payer: MEDICARE

## 2022-01-03 ENCOUNTER — SOCIAL WORK (OUTPATIENT)
Dept: TRANSPLANT | Facility: CLINIC | Age: 62
End: 2022-01-03
Payer: MEDICARE

## 2022-01-03 ENCOUNTER — LAB VISIT (OUTPATIENT)
Dept: LAB | Facility: HOSPITAL | Age: 62
End: 2022-01-03
Attending: INTERNAL MEDICINE
Payer: MEDICARE

## 2022-01-03 ENCOUNTER — ANTI-COAG VISIT (OUTPATIENT)
Dept: CARDIOLOGY | Facility: CLINIC | Age: 62
End: 2022-01-03
Payer: MEDICARE

## 2022-01-03 VITALS — BODY MASS INDEX: 34.15 KG/M2 | HEIGHT: 70 IN | TEMPERATURE: 98 F | SYSTOLIC BLOOD PRESSURE: 98 MMHG | WEIGHT: 238.56 LBS

## 2022-01-03 DIAGNOSIS — Z79.899 LONG TERM CURRENT USE OF AMIODARONE: ICD-10-CM

## 2022-01-03 DIAGNOSIS — I47.29 NSVT (NONSUSTAINED VENTRICULAR TACHYCARDIA): ICD-10-CM

## 2022-01-03 DIAGNOSIS — Z95.811 LVAD (LEFT VENTRICULAR ASSIST DEVICE) PRESENT: Primary | ICD-10-CM

## 2022-01-03 DIAGNOSIS — I50.42 CHRONIC COMBINED SYSTOLIC AND DIASTOLIC CONGESTIVE HEART FAILURE: ICD-10-CM

## 2022-01-03 DIAGNOSIS — I42.0 COCM (CONGESTIVE CARDIOMYOPATHY): ICD-10-CM

## 2022-01-03 DIAGNOSIS — Z79.01 LONG TERM (CURRENT) USE OF ANTICOAGULANTS: ICD-10-CM

## 2022-01-03 DIAGNOSIS — Z95.811 LVAD (LEFT VENTRICULAR ASSIST DEVICE) PRESENT: ICD-10-CM

## 2022-01-03 DIAGNOSIS — I48.0 PAROXYSMAL ATRIAL FIBRILLATION: Primary | ICD-10-CM

## 2022-01-03 DIAGNOSIS — I11.0 HYPERTENSIVE HEART DISEASE WITH HEART FAILURE: ICD-10-CM

## 2022-01-03 DIAGNOSIS — E78.6 LOW HDL (UNDER 40): ICD-10-CM

## 2022-01-03 DIAGNOSIS — Z95.810 ICD (IMPLANTABLE CARDIOVERTER-DEFIBRILLATOR) IN PLACE: ICD-10-CM

## 2022-01-03 DIAGNOSIS — Z79.899 AT RISK FOR AMIODARONE TOXICITY WITH LONG TERM USE: ICD-10-CM

## 2022-01-03 DIAGNOSIS — Z79.899 LONG TERM CURRENT USE OF AMIODARONE: Chronic | ICD-10-CM

## 2022-01-03 DIAGNOSIS — Z95.811 HEART REPLACED BY HEART ASSIST DEVICE: ICD-10-CM

## 2022-01-03 DIAGNOSIS — Z91.89 AT RISK FOR AMIODARONE TOXICITY WITH LONG TERM USE: ICD-10-CM

## 2022-01-03 DIAGNOSIS — E66.09 CLASS 1 OBESITY DUE TO EXCESS CALORIES WITH SERIOUS COMORBIDITY AND BODY MASS INDEX (BMI) OF 34.0 TO 34.9 IN ADULT: ICD-10-CM

## 2022-01-03 DIAGNOSIS — I48.0 PAROXYSMAL ATRIAL FIBRILLATION: ICD-10-CM

## 2022-01-03 LAB
ALBUMIN SERPL BCP-MCNC: 4.2 G/DL (ref 3.5–5.2)
ALP SERPL-CCNC: 65 U/L (ref 55–135)
ALT SERPL W/O P-5'-P-CCNC: 33 U/L (ref 10–44)
ANION GAP SERPL CALC-SCNC: 10 MMOL/L (ref 8–16)
AST SERPL-CCNC: 29 U/L (ref 10–40)
BASOPHILS # BLD AUTO: 0.06 K/UL (ref 0–0.2)
BASOPHILS NFR BLD: 0.9 % (ref 0–1.9)
BILIRUB DIRECT SERPL-MCNC: 0.5 MG/DL (ref 0.1–0.3)
BILIRUB SERPL-MCNC: 1.2 MG/DL (ref 0.1–1)
BNP SERPL-MCNC: 125 PG/ML (ref 0–99)
BUN SERPL-MCNC: 28 MG/DL (ref 8–23)
CALCIUM SERPL-MCNC: 9.7 MG/DL (ref 8.7–10.5)
CHLORIDE SERPL-SCNC: 103 MMOL/L (ref 95–110)
CO2 SERPL-SCNC: 27 MMOL/L (ref 23–29)
CREAT SERPL-MCNC: 1.7 MG/DL (ref 0.5–1.4)
CRP SERPL-MCNC: 10.9 MG/L (ref 0–8.2)
DIFFERENTIAL METHOD: ABNORMAL
EOSINOPHIL # BLD AUTO: 0.2 K/UL (ref 0–0.5)
EOSINOPHIL NFR BLD: 2.3 % (ref 0–8)
ERYTHROCYTE [DISTWIDTH] IN BLOOD BY AUTOMATED COUNT: 13.6 % (ref 11.5–14.5)
EST. GFR  (AFRICAN AMERICAN): 49.2 ML/MIN/1.73 M^2
EST. GFR  (NON AFRICAN AMERICAN): 42.6 ML/MIN/1.73 M^2
GLUCOSE SERPL-MCNC: 94 MG/DL (ref 70–110)
HCT VFR BLD AUTO: 45.4 % (ref 40–54)
HGB BLD-MCNC: 15.2 G/DL (ref 14–18)
IMM GRANULOCYTES # BLD AUTO: 0.04 K/UL (ref 0–0.04)
IMM GRANULOCYTES NFR BLD AUTO: 0.6 % (ref 0–0.5)
INR PPP: 1.7 (ref 0.8–1.2)
LDH SERPL L TO P-CCNC: 233 U/L (ref 110–260)
LYMPHOCYTES # BLD AUTO: 1.5 K/UL (ref 1–4.8)
LYMPHOCYTES NFR BLD: 22.5 % (ref 18–48)
MAGNESIUM SERPL-MCNC: 2.1 MG/DL (ref 1.6–2.6)
MCH RBC QN AUTO: 28.7 PG (ref 27–31)
MCHC RBC AUTO-ENTMCNC: 33.5 G/DL (ref 32–36)
MCV RBC AUTO: 86 FL (ref 82–98)
MONOCYTES # BLD AUTO: 0.7 K/UL (ref 0.3–1)
MONOCYTES NFR BLD: 10.4 % (ref 4–15)
NEUTROPHILS # BLD AUTO: 4.2 K/UL (ref 1.8–7.7)
NEUTROPHILS NFR BLD: 63.3 % (ref 38–73)
NRBC BLD-RTO: 0 /100 WBC
PHOSPHATE SERPL-MCNC: 3 MG/DL (ref 2.7–4.5)
PLATELET # BLD AUTO: 196 K/UL (ref 150–450)
PMV BLD AUTO: 11.6 FL (ref 9.2–12.9)
POTASSIUM SERPL-SCNC: 4.4 MMOL/L (ref 3.5–5.1)
PREALB SERPL-MCNC: 23 MG/DL (ref 20–43)
PROT SERPL-MCNC: 7.6 G/DL (ref 6–8.4)
PROTHROMBIN TIME: 17.6 SEC (ref 9–12.5)
RBC # BLD AUTO: 5.3 M/UL (ref 4.6–6.2)
SODIUM SERPL-SCNC: 140 MMOL/L (ref 136–145)
WBC # BLD AUTO: 6.61 K/UL (ref 3.9–12.7)

## 2022-01-03 PROCEDURE — 93750 INTERROGATION VAD IN PERSON: CPT | Mod: PBBFAC,NTX | Performed by: INTERNAL MEDICINE

## 2022-01-03 PROCEDURE — 84134 ASSAY OF PREALBUMIN: CPT | Mod: TXP | Performed by: INTERNAL MEDICINE

## 2022-01-03 PROCEDURE — 80053 COMPREHEN METABOLIC PANEL: CPT | Mod: TXP | Performed by: INTERNAL MEDICINE

## 2022-01-03 PROCEDURE — 93750 INTERROGATION VAD IN PERSON: CPT | Mod: TXP,,, | Performed by: INTERNAL MEDICINE

## 2022-01-03 PROCEDURE — 86140 C-REACTIVE PROTEIN: CPT | Mod: TXP | Performed by: INTERNAL MEDICINE

## 2022-01-03 PROCEDURE — 82248 BILIRUBIN DIRECT: CPT | Mod: TXP | Performed by: INTERNAL MEDICINE

## 2022-01-03 PROCEDURE — 99213 OFFICE O/P EST LOW 20 MIN: CPT | Mod: PBBFAC,NTX,25 | Performed by: INTERNAL MEDICINE

## 2022-01-03 PROCEDURE — 93750 PR INTERROGATE VENT ASSIST DEV, IN PERSON, W PHYSICIAN ANALYSIS: ICD-10-PCS | Mod: S$PBB,NTX,, | Performed by: INTERNAL MEDICINE

## 2022-01-03 PROCEDURE — 83615 LACTATE (LD) (LDH) ENZYME: CPT | Mod: TXP | Performed by: INTERNAL MEDICINE

## 2022-01-03 PROCEDURE — 84100 ASSAY OF PHOSPHORUS: CPT | Mod: TXP | Performed by: INTERNAL MEDICINE

## 2022-01-03 PROCEDURE — 85025 COMPLETE CBC W/AUTO DIFF WBC: CPT | Mod: TXP | Performed by: INTERNAL MEDICINE

## 2022-01-03 PROCEDURE — 99999 PR PBB SHADOW E&M-EST. PATIENT-LVL III: CPT | Mod: PBBFAC,TXP,, | Performed by: INTERNAL MEDICINE

## 2022-01-03 PROCEDURE — 83735 ASSAY OF MAGNESIUM: CPT | Mod: TXP | Performed by: INTERNAL MEDICINE

## 2022-01-03 PROCEDURE — 99214 OFFICE O/P EST MOD 30 MIN: CPT | Mod: S$PBB,NTX,, | Performed by: INTERNAL MEDICINE

## 2022-01-03 PROCEDURE — 93750 OP LVAD INTERROGATION: ICD-10-PCS | Mod: TXP,,, | Performed by: INTERNAL MEDICINE

## 2022-01-03 PROCEDURE — 83880 ASSAY OF NATRIURETIC PEPTIDE: CPT | Mod: TXP | Performed by: INTERNAL MEDICINE

## 2022-01-03 PROCEDURE — 36415 COLL VENOUS BLD VENIPUNCTURE: CPT | Mod: TXP | Performed by: INTERNAL MEDICINE

## 2022-01-03 PROCEDURE — 93750 INTERROGATION VAD IN PERSON: CPT | Mod: S$PBB,NTX,, | Performed by: INTERNAL MEDICINE

## 2022-01-03 PROCEDURE — 85610 PROTHROMBIN TIME: CPT | Mod: TXP | Performed by: INTERNAL MEDICINE

## 2022-01-03 PROCEDURE — 99999 PR PBB SHADOW E&M-EST. PATIENT-LVL III: ICD-10-PCS | Mod: PBBFAC,TXP,, | Performed by: INTERNAL MEDICINE

## 2022-01-03 PROCEDURE — 99214 PR OFFICE/OUTPT VISIT, EST, LEVL IV, 30-39 MIN: ICD-10-PCS | Mod: S$PBB,NTX,, | Performed by: INTERNAL MEDICINE

## 2022-01-03 RX ORDER — ASPIRIN 81 MG/1
81 TABLET ORAL DAILY
COMMUNITY

## 2022-01-03 NOTE — PROGRESS NOTES
Subjective:   Patient ID:  Tae Delgado is a 61 y.o. male who presents for LVAD followup visit.    Implant Date:1/23/2020    Initials:RCD     Heartmate 3 RPM 5300     INR goal: 2-3   Bridge with Heparin   Antiplatelets: ASA 81    TXP KLEVER INTERROGATIONS 11/16/2021   Type HeartMate3   Flow 3.5   Speed 5300   PI 8.3   Power (Berry) 3.9   LSL 4900   Pulsatility Intermittent pulse   Interrogation of Ventricular assist device was performed with physician analysis of device parameters and review of device function. I have personally reviewed the interrogation findings and agree with findings as stated.     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  His post-op course complicated by RV failure.  At prior clinic visits BP elevated but attempts to treat result in intolerable side-effects so felt to have component of white coat hypertension.  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  He reports the wound was in the epigastric region the mid section of the driveline area as opposed to the exit site.  He was treated with IV Bax during his admission but discharged with no abx, with instructions to follow up with us.  He was seen by infectious disease here July 2021 and they started doxycycline but due to intolerant side effects was switched him to cefadroxil 1 g twice daily.     In July he wanted case reconsidered at transplant selection.  Today I noted selection mtg March 2021 following comments: Pt's case discussed briefly at selection committee as pt has expressed interest in being listed for OHT. Per committee decision, prior to evaluation being initated, pt will need to be evaluated by psychiatry due to report os PTSD and anger  issues with staff members. He will also need to agree to see ALL heart transplant physicians in clinic (not just those he chooses).  He saw Dr. Haque once and otherwise [place] [facility] [place] Dr. Pierce 5/4/2021 in Psych who felt that he was at low risk from psychiatric perspective for poor outcome with heart transplant.  He is willing to do whatever he needs to be reconsidered for heart transplant.    Patient is here because he has not been seen by me in clinic in nearly a year. Is being reconsidered for OHT evaluation. Did not have a great conversation with him today, not sure he understands the rigors of transplant and the ability to work with everone on the team. Patient denies N/V/F/C, lightheadedness, dizziness, PND, orthopnea, LE edema, abdominal pain, abdominal pressure, chest pain, chest pressure, syncope, pre-syncope. Additionally patient has no bleeding, no bright red blood per rectum, melena, no GI symptoms at all.     Review of Systems   Constitutional: Positive for malaise/fatigue. Negative for chills, fever, night sweats, weight gain and weight loss.   HENT: Negative for congestion, hearing loss, hoarse voice, nosebleeds and sore throat.    Eyes: Negative for double vision, pain, vision loss in left eye and vision loss in right eye.   Cardiovascular: Positive for dyspnea on exertion (mild) and leg swelling (minor). Negative for chest pain, claudication, irregular heartbeat, near-syncope, orthopnea, palpitations, paroxysmal nocturnal dyspnea and syncope.   Respiratory: Negative for cough, hemoptysis, shortness of breath, sleep disturbances due to breathing, snoring, sputum production and wheezing.    Endocrine: Negative for cold intolerance, heat intolerance, polydipsia and polyuria.   Hematologic/Lymphatic: Negative for bleeding problem. Does not bruise/bleed easily.   Musculoskeletal: Negative for falls, muscle cramps, myalgias and neck pain.   Gastrointestinal: Negative for  "bloating, abdominal pain, change in bowel habit, constipation, diarrhea, hematochezia, jaundice, melena and nausea.   Genitourinary: Negative for bladder incontinence, dysuria, frequency, hematuria, hesitancy, incomplete emptying and urgency.        ED   Neurological: Negative for brief paralysis, dizziness, focal weakness, headaches, numbness, paresthesias, seizures and weakness.   Psychiatric/Behavioral: Negative for depression and memory loss. The patient has insomnia. The patient is not nervous/anxious.        Objective:   Blood pressure (!) 90/0, temperature 98.6 °F (37 °C), temperature source Oral, height 5' 10" (1.778 m), weight 109.3 kg (241 lb).body mass index is 34.58 kg/m².    Physical Exam  Constitutional:       General: He is not in acute distress.     Appearance: He is well-developed. He is not ill-appearing, toxic-appearing or diaphoretic.   HENT:      Head: Normocephalic and atraumatic.   Eyes:      General: No scleral icterus.        Right eye: No discharge.         Left eye: No discharge.      Conjunctiva/sclera: Conjunctivae normal.   Neck:      Thyroid: No thyromegaly.      Vascular: No JVD.      Trachea: No tracheal deviation.   Cardiovascular:      Comments: Normal LVAD sounds; driveline 1  Pulmonary:      Effort: Pulmonary effort is normal. No respiratory distress.      Breath sounds: Normal breath sounds. No wheezing or rales.   Abdominal:      General: Bowel sounds are normal. There is no distension.      Palpations: Abdomen is soft. There is no mass.      Tenderness: There is no abdominal tenderness. There is no guarding or rebound.   Musculoskeletal:         General: Swelling (Chronic skin changes with trace edema ) present. No tenderness.   Skin:     General: Skin is warm and dry.   Neurological:      General: No focal deficit present.      Mental Status: He is alert. Mental status is at baseline.   Psychiatric:         Mood and Affect: Mood normal.         Behavior: Behavior normal.    "      Thought Content: Thought content normal.         Judgment: Judgment normal.     07/19/2021 echocardiogram Summary  · There is an LVAD present. Base speed is 5300 RPMs. The pump type is a Heartmate III. The interventricular septum appears midline. The aortic valve does not open. s/p aortic valve repair.  · The estimated ejection fraction is 20%. LVEDD 6.6 cm-unchanged from prior.  · The left ventricle is moderately enlarged with severely decreased systolic function.  · Indeterminate left ventricular diastolic function.  · Normal right ventricular size with mildly to moderately reduced right ventricular systolic function.  · Severe left atrial enlargement.  · Moderate right atrial enlargement.  · Mild tricuspid regurgitation.  · Presence of Samir stitch with no significant stenosis and trace MR.  · The estimated PA systolic pressure is 37 mmHg.  · Elevated central venous pressure (15 mmHg).    Lab Results   Component Value Date     (H) 12/14/2021    BNP 1,242 09/11/2020     12/14/2021    K 4.4 12/14/2021    MG 1.8 12/13/2021     12/14/2021     04/13/2020    CO2 26 12/14/2021    BUN 28 (H) 12/14/2021    BUN 24 (A) 04/13/2020    CREATININE 1.6 (H) 12/14/2021    CREATININE 1.7 04/13/2020     12/14/2021    HGBA1C 5.6 12/13/2021    AST 25 12/14/2021    ALT 35 12/14/2021    ALBUMIN 4.1 12/14/2021    ALBUMIN 4.3 04/13/2020    PROT 7.5 12/14/2021    BILITOT 1.3 (H) 12/14/2021    CHOL 162 12/13/2021    HDL 27 (L) 12/13/2021    LDLCALC 112.2 12/13/2021    TRIG 114 12/13/2021       Magnesium   Date Value Ref Range Status   12/13/2021 1.8 1.6 - 2.6 mg/dL Final       Lab Results   Component Value Date    WBC 6.84 12/14/2021    HGB 15.3 12/14/2021    HCT 48.2 12/14/2021    MCV 90 12/14/2021     12/14/2021       Lab Results   Component Value Date    INR 2.8 12/27/2021    INR 2.5 (H) 12/14/2021    INR 2.8 (H) 12/13/2021       NT-pro-BNP  ([facility])   Date Value Ref Range Status    09/11/2020 1,242  Final     BNP   Date Value Ref Range Status   12/14/2021 146 (H) 0 - 99 pg/mL Final     Comment:     Values of less than 100 pg/ml are consistent with non-CHF populations.   12/13/2021 127 (H) 0 - 99 pg/mL Final     Comment:     Values of less than 100 pg/ml are consistent with non-CHF populations.   11/16/2021 266 (H) 0 - 99 pg/mL Final     Comment:     Values of less than 100 pg/ml are consistent with non-CHF populations.       LD   Date Value Ref Range Status   12/13/2021 210 110 - 260 U/L Final     Comment:     Results are increased in hemolyzed samples.   11/16/2021 214 110 - 260 U/L Final     Comment:     Results are increased in hemolyzed samples.   09/13/2021 188 110 - 260 U/L Final     Comment:     Results are increased in hemolyzed samples.       07/19/2021 6 minute walk test:  449.58 m up from 426.72 m December 2020; there was no desaturation    7/19/2021 ECHO Summary  · There is an LVAD present. Base speed is 5300 RPMs. The pump type is a Heartmate III. The interventricular septum appears midline. The aortic valve does not open. s/p aortic valve repair.  · The estimated ejection fraction is 20%. LVEDD 6.6 cm-unchanged from prior.  · The left ventricle is moderately enlarged with severely decreased systolic function.  · Indeterminate left ventricular diastolic function.  · Normal right ventricular size with mildly to moderately reduced right ventricular systolic function.  · Severe left atrial enlargement.  · Moderate right atrial enlargement.  · Mild tricuspid regurgitation.  · Presence of Samir stitch with no significant stenosis and trace MR.  · The estimated PA systolic pressure is 37 mmHg.  · Elevated central venous pressure (15 mmHg).    1/16/2020 Right Heart Pressures  RA: 13/ 20/ 12 RV: 71/ 3/ 14 PA: 70/ 36/ 52 PWP: 36/ 28/ 27 .   Cardiac output was 5.07. Cardiac index is 2.25 L/min/m2.   O2 Sat: PA 68%.   Pulmonary vascular resistance: 394. Systemic vascular resistance:  1277.      Assessment:      1. LVAD (left ventricular assist device) present    2. Long term current use of amiodarone    3. ICD (implantable cardioverter-defibrillator) in place    4. Chronic combined systolic and diastolic congestive heart failure    5. Primary hypertension        Plan:   Patient had a very open conversation with us today. Not sure he is understanding of what is required to proceed with evaluation for OHT. States only wants to see [facility] after OHT, explained this is not how it works. Asked him to work on response to adversity. Will discuss with the team.     Supplies ordered are medically necessary for care of LVAD and DL    Patient is now NYHA II     Recommend 2 gram sodium restriction and 1500cc fluid restriction.  Encourage physical activity with graded exercise program.  Requested patient to weigh themselves daily, and to notify us if their weight increases by more than 3 lbs in 1 day or 5 lbs in 1 week.     Listed for transplant: No    UNOS Patient Status  Functional Status: 90% - Able to carry on normal activity: minor symptoms of disease  Physical Capacity: No Limitations  Working for Income: No  If no, reason not working: Disability   [id number]

## 2022-01-03 NOTE — PROGRESS NOTES
"Date of Implant with Heartmate 3 LVAD: 1/23/2020    PATIENT ARRIVED IN CLINIC:  Ambulatory   Accompanied by: friend    Vitals  Temperature, oral:   Temp Readings from Last 1 Encounters:   01/03/22 98 °F (36.7 °C) (Oral)     Blood Pressure:   BP Readings from Last 3 Encounters:   01/03/22 (!) 98/0   12/14/21 (!) 80/0   11/16/21 (!) 90/0        VAD Interrogation:  TXP KLEVER INTERROGATIONS 1/3/2022 11/16/2021 9/13/2021   Type HeartMate3 HeartMate3 HeartMate3   Flow 3.4 3.5 3.9   Speed 5300 5300 5300   PI 8.7 8.3 6.3   Power (Berry) 4.0 3.9 3.9   LSL 4900 4900 4900   Pulsatility No Pulse Intermittent pulse No Pulse       History Log: [id number]  Problems / Issues / Alarms with VAD if any: None noted  VAD Sounds: HM3 Smooth  Heartmate 3 Module Cable:  No yellow exposed and Attempted to unscrew modular cable to ensure it will be able to come lose in the event we ever need to change the modular cable while patient held the driveline in place so it would not move. Modular cable connection able to be unscrewed and re-tightened. Instructed pt to perform this weekly.    HCT:   Lab Results   Component Value Date    HCT 45.4 01/03/2022    HCT 25 (L) 02/05/2020       Complaints/reason for visit today: routine    VAD Binder With Patient: no   Reviewed VAD Numbers In Binder: no  Enrolled in Care Companion: no    Equipment:  Emergency Equipment With Patient: yes   Any Equipment Issues: None noted   It is medically necessary to ensure patient has properly functioning equipment and wearables to prevent infection, injury or death to patient.     DLES Assessment:  Appearance Of Driveline: "1"  Antibiotics: NO  Velour: no  Manual & Visual Inspection Of Driveline: No kinks or tears noted  Stabilization Device In Use: yes, giles securement device        Assessment:   PAIN: NO  Complaints Of Nausea / Vomiting: None noted    Appearance and Frequency Of Stools: normal and formed without blood & daily  Color Of Urine: " clear/yellow  Coping/Depression/Anxiety: coping okay  Sleep Habits: 6 hrs /night  Sleep Aids: None noted  Showering: Yes, reminded to change dressing immediately after drying off  Activity/Exercise: walking   Driving: Yes. Reminded to pull over should there be an alarm before looking down at controller.    DLES Dressing Care:   Frequency of Dressing Changes: daily & soap and water dressing  Pt In Need Of Management Kits?:yes -   1 Box of soap and water dressing  It is medically necessary to have VAD management kits in order to prevent infection or to assist in the healing of an infected DLES.    Labs:    Chemistry        Component Value Date/Time     12/14/2021 0813    K 4.4 12/14/2021 0813     12/14/2021 0813     04/13/2020 0000    CO2 26 12/14/2021 0813    BUN 28 (H) 12/14/2021 0813    BUN 24 (A) 04/13/2020 0000    CREATININE 1.6 (H) 12/14/2021 0813    CREATININE 1.7 04/13/2020 0000     12/14/2021 0813        Component Value Date/Time    CALCIUM 9.7 12/14/2021 0813    CALCIUM 10.1 04/13/2020 0000    ALKPHOS 67 12/14/2021 0813    AST 25 12/14/2021 0813    ALT 35 12/14/2021 0813    BILITOT 1.3 (H) 12/14/2021 0813    ESTGFRAFRICA 53.0 (A) 12/14/2021 0813    EGFRNONAA 45.8 (A) 12/14/2021 0813            Magnesium   Date Value Ref Range Status   12/13/2021 1.8 1.6 - 2.6 mg/dL Final       Lab Results   Component Value Date    WBC 6.61 01/03/2022    HGB 15.2 01/03/2022    HCT 45.4 01/03/2022    MCV 86 01/03/2022     01/03/2022       Lab Results   Component Value Date    INR 1.7 (H) 01/03/2022    INR 2.8 12/27/2021    INR 2.5 (H) 12/14/2021       NT-pro-BNP  ([facility])   Date Value Ref Range Status   09/11/2020 1,242  Final     BNP   Date Value Ref Range Status   12/14/2021 146 (H) 0 - 99 pg/mL Final     Comment:     Values of less than 100 pg/ml are consistent with non-CHF populations.   12/13/2021 127 (H) 0 - 99 pg/mL Final     Comment:     Values of less than 100 pg/ml are  consistent with non-CHF populations.   11/16/2021 266 (H) 0 - 99 pg/mL Final     Comment:     Values of less than 100 pg/ml are consistent with non-CHF populations.       LD   Date Value Ref Range Status   12/13/2021 210 110 - 260 U/L Final     Comment:     Results are increased in hemolyzed samples.   11/16/2021 214 110 - 260 U/L Final     Comment:     Results are increased in hemolyzed samples.   09/13/2021 188 110 - 260 U/L Final     Comment:     Results are increased in hemolyzed samples.       Labs reviewed with patient: YES      Patient Satisfaction Survey completed per patient: No  (explained about signature and box to check)  Medication reconciliation: per MA.  Medication Detail updated today: patient did not bring the card  Coumadin Managed by: [facility]    Education: Reviewed driveline care, emergency procedures, how to change the controller, alarms with patient, as well as discussed how to page the VAD coordinator in case of an emergency.   Covid - 19 education: Reminded patient/caregiver to check temperature daily and call us if it is > 99.0.  Reminded them  to stay 6 feet away from other people, wash hands frequently, don't touch your face and stay home except to get labs, medications, and appts.    Covid Vaccine: Pt informed that we are encouraging all VAD patients to receive the COVID vaccine.  Informed pt that they can take tylenol but should avoid other NSAIDs.      Plans/Needs: routine f/u w/ Dr Bucio. Patient reports feeling well. No changes at this time.    Return to clinic in 6 weeks.     Hurricane Season: No

## 2022-01-03 NOTE — PROCEDURES
TXP KLEVER INTERROGATIONS 11/16/2021 9/13/2021 7/19/2021 3/29/2021 2/8/2021 12/14/2020 9/28/2020   Type HeartMate3 HeartMate3 HeartMate3 HeartMate3 HeartMate3 HeartMate3 HeartMate3   Flow 3.5 3.9 3.7 3.5 3.4 3.7 3.3   Speed 5300 5300 5300 5300 5300 5300 5300   PI 8.3 6.3 7.0 8.6 9.7 7.3 9.8   Power (Berry) 3.9 3.9 4.0 4.0 4.0 3.9 4.0   LSL 4900 4900 4900 4900 4900 4900 4900   Pulsatility Intermittent pulse No Pulse No Pulse No Pulse No Pulse No Pulse No Pulse   }

## 2022-01-03 NOTE — PROGRESS NOTES
Subjective:   Patient ID:  Tae Delgado is a 61 y.o. male who presents for LVAD followup visit.    Implant Date:1/23/2020    Initials:RCD     Heartmate 3 RPM 5300     INR goal: 2-3   Bridge with Heparin   Antiplatelets: ASA 81    TXP KLEVER INTERROGATIONS 1/3/2022   Type HeartMate3   Flow 3.4   Speed 5300   PI 8.7   Power (Berry) 4.0   LSL 4900   Pulsatility No Pulse   Interrogation of Ventricular assist device was performed with physician analysis of device parameters and review of device function. I have personally reviewed the interrogation findings and agree with findings as stated.     HPI   62 yo WM with stage D HFrEF, NICMP, ICD, LV thrombus (with prior splenic and renal emboli), embolic CVA , PAF, HTN, HLP underwent HM 3 implant 1/17/2020 as DT with closure of AV and MV repair.  His post-op course complicated by RV failure.  At prior clinic visits BP elevated but attempts to treat result in intolerable side-effects so felt to have component of white coat hypertension.  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  He reports the wound was in the epigastric region the mid section of the driveline area as opposed to the exit site.  He was treated with IV Bax during his admission but discharged with no abx, with instructions to follow up with us.  He was seen by infectious disease here July 2021 and they started doxycycline but due to intolerant side effects was switched him to cefadroxil 1 g twice daily. He is no longer on antibiotics.    In July he wanted case reconsidered at transplant selection.  At selection [facility] March 2021 following comments: Pt's case discussed briefly at selection committee as pt has expressed interest in being listed for OHT. Per committee decision, prior to evaluation being initated, pt will need to be evaluated by psychiatry due to report os PTSD and  anger issues with staff members. He will also need to agree to see ALL heart transplant physicians in clinic (not just those he chooses).  He saw Dr. Haque June 2021 and Dr. Doshi Nov 2021.  At his meeting with Glenda concern raised again as told her only wanted to see me post-tx which of course cannot be guaranteed and he needs to agree to see all staff.  He saw Dr. Pierce 5/4/2021 in Psych who felt that he was at low risk from psychiatric perspective for poor outcome with heart transplant.     He comes today for routine visit. He has some BELTRAN but mild (NYHA Class 2).     He has been hunting walking to and from deer stand 400 yd each way without difficulty carrying shotgun.  Also shot deer able to get it into his truck.    Review of Systems   Constitutional: Positive for malaise/fatigue. Negative for chills, fever, night sweats, weight gain and weight loss.   HENT: Negative for congestion, hearing loss, hoarse voice, nosebleeds and sore throat.    Eyes: Negative for double vision, pain, vision loss in left eye and vision loss in right eye.   Cardiovascular: Positive for dyspnea on exertion (mild) and leg swelling (minor). Negative for chest pain, claudication, irregular heartbeat, near-syncope, orthopnea, palpitations, paroxysmal nocturnal dyspnea and syncope.   Respiratory: Negative for cough, hemoptysis, shortness of breath, sleep disturbances due to breathing, snoring, sputum production and wheezing.    Endocrine: Negative for cold intolerance, heat intolerance, polydipsia and polyuria.   Hematologic/Lymphatic: Negative for bleeding problem. Does not bruise/bleed easily.   Musculoskeletal: Negative for falls, muscle cramps, myalgias and neck pain.   Gastrointestinal: Negative for bloating, abdominal pain, change in bowel habit, constipation, diarrhea, hematochezia, jaundice, melena and nausea.   Genitourinary: Negative for bladder incontinence, dysuria, frequency, hematuria, hesitancy, incomplete emptying and  "urgency.        ED   Neurological: Negative for brief paralysis, dizziness, focal weakness, headaches, numbness, paresthesias, seizures and weakness.   Psychiatric/Behavioral: Negative for depression and memory loss. The patient has insomnia. The patient is not nervous/anxious.        Objective:   Blood pressure (!) 98/0, temperature 98 °F (36.7 °C), temperature source Oral, height 5' 10" (1.778 m), weight 108.2 kg (238 lb 8.6 oz).body mass index is 34.23 kg/m².  Doppler: 98 today  Physical Exam  Constitutional:       General: He is not in acute distress.     Appearance: He is well-developed. He is not ill-appearing, toxic-appearing or diaphoretic.      Comments: BP (!) 98 doppler; not pulsatile (BP Location: Right arm, Patient Position: Sitting)   Temp 98 °F (36.7 °C) (Oral)   Ht 5' 10" (1.778 m)   Wt 108.2 kg (238 lb 8.6 oz)   BMI 34.23 kg/m²   Friendly white male in no acute distress   HENT:      Head: Normocephalic and atraumatic.   Eyes:      General: No scleral icterus.        Right eye: No discharge.         Left eye: No discharge.      Conjunctiva/sclera: Conjunctivae normal.   Neck:      Thyroid: No thyromegaly.      Vascular: JVD (Venous pulsations noted 3 fingers above the clavicle in the sitting position  ) present.      Trachea: No tracheal deviation.   Cardiovascular:      Comments: Normal LVAD sounds; driveline 1  Pulmonary:      Effort: Pulmonary effort is normal. No respiratory distress.      Breath sounds: Normal breath sounds. No wheezing or rales.   Abdominal:      General: Bowel sounds are normal. There is no distension.      Palpations: Abdomen is soft. There is no mass.      Tenderness: There is no abdominal tenderness. There is no guarding or rebound.   Musculoskeletal:         General: Swelling (Chronic skin changes with trace edema  ) present. No tenderness.   Skin:     General: Skin is warm and dry.   Neurological:      General: No focal deficit present.      Mental Status: He is " alert. Mental status is at baseline.   Psychiatric:         Mood and Affect: Mood normal.         Behavior: Behavior normal.         Thought Content: Thought content normal.         Judgment: Judgment normal.       Lab Results   Component Value Date     (H) 01/03/2022     (H) 12/14/2021     (H) 12/13/2021     Lab Results   Component Value Date     (H) 01/03/2022    BNP 1,242 09/11/2020     01/03/2022    K 4.4 01/03/2022    MG 2.1 01/03/2022     01/03/2022     04/13/2020    CO2 27 01/03/2022    PHOS 3.0 01/03/2022    BUN 28 (H) 01/03/2022    BUN 24 (A) 04/13/2020    CREATININE 1.7 (H) 01/03/2022    CREATININE 1.7 04/13/2020    GLU 94 01/03/2022    HGBA1C 5.6 12/13/2021    AST 29 01/03/2022    ALT 33 01/03/2022    ALBUMIN 4.2 01/03/2022    ALBUMIN 4.3 04/13/2020    PROT 7.6 01/03/2022    BILITOT 1.2 (H) 01/03/2022    WBC 6.61 01/03/2022    WBC 6.1 04/13/2020    HGB 15.2 01/03/2022    HCT 45.4 01/03/2022    HCT 25 (L) 02/05/2020     01/03/2022    INR 1.7 (H) 01/03/2022    INR 2.8 12/27/2021     01/03/2022    TSH 2.720 12/13/2021    W7RNILG 10.0 07/19/2021    FREET4 1.28 02/08/2021    CHOL 162 12/13/2021    HDL 27 (L) 12/13/2021    LDLCALC 112.2 12/13/2021    TRIG 114 12/13/2021 12/14/2021 RHC  RA: 12/ 16/ 13 RV: 45/ 6/ 13 PA: 45/ 25/ 34 PWP: 24/ 31/ 23 .   Cardiac output was 4.3  by Ana. Cardiac index is 1.9 L/min/m2.   O2 Sat: PA 63%.   Pulmonary vascular resistance: 2.6 PALAFOX.     12/13/2021 6 .19 meters    Dec 2021 LAKISHA normal bilaterally and carotid u/s with plaque but no significant stenosis identified    Last echo performed 7/19/2021 echocardiogram  · There is an LVAD present. Base speed is 5300 RPMs. The pump type is a Heartmate III. The interventricular septum appears midline. The aortic valve does not open. s/p aortic valve repair.  · The estimated ejection fraction is 20%. LVEDD 6.6 cm-unchanged from prior.  · The left ventricle is moderately  "enlarged with severely decreased systolic function.  · Indeterminate left ventricular diastolic function.  · Normal right ventricular size with mildly to moderately reduced right ventricular systolic function.  · Severe left atrial enlargement.  · Moderate right atrial enlargement.  · Mild tricuspid regurgitation.  · Presence of Samir stitch with no significant stenosis and trace MR.  · The estimated PA systolic pressure is 37 mmHg.  · Elevated central venous pressure (15 mmHg).    Assessment:      1. LVAD (left ventricular assist device) present    2. Chronic combined systolic and diastolic congestive heart failure    3. COCM (congestive cardiomyopathy)    4. Hypertensive heart disease with heart failure    5. Paroxysmal atrial fibrillation    6. Long term (current) use of anticoagulants    7. At risk for amiodarone toxicity with long term use    8. NSVT (nonsustained ventricular tachycardia)    9. Class 1 obesity due to excess calories with serious comorbidity and body mass index (BMI) of 34.0 to 34.9 in adult    10. Long term current use of amiodarone    11. Low HDL (under 40)    12. ICD (implantable cardioverter-defibrillator) in place        Plan:   Patient would like to be considered for cardiac transplant.  He saw Dr. Schmitt 12/12/21 whose note says to continue evaluation for tx.  Will ask coordinator to present at selection meeting once eval completed as he assures me today that he would see anyone though would prefer to see me if possible post-tx.      Due for f/u echo    His BP continues to be elevated in clinic.  He is very prone to dizziness symptoms if diuresed or ACE or ARB therapy added to his regimen. Therefore no changes were made in his medical regimen.  He feels BP better with Rupali as she "helps me relax" and indeed was better last visit    No treatment for primary prevention for lipids--could justify as secondary prevention with carotid plaque but won't push to treat at this time    Encouraged " to work on exercise program and weight loss.      Supplies ordered are medically necessary for care of LVAD and DL    Patient is now NYHA II     Recommend 2 gram sodium restriction and 1500cc fluid restriction.  Encourage physical activity with graded exercise program.  Requested patient to weigh themselves daily, and to notify us if their weight increases by more than 3 lbs in 1 day or 5 lbs in 1 week.     Listed for transplant: No    Post LVAD goals of care are to feel stronger and be more active, control HF and avoid admission as thus far he has been able to accomplish.  His next long-term goal is to undergo cardiac transplantation for which she is undergoing repeat evaluation.    [facility] Patient Status  Functional Status: 90% - Able to carry on normal activity: minor symptoms of disease  Physical Capacity: No Limitations  Working for Income: No  If no, reason not working: Disability

## 2022-01-03 NOTE — LETTER
January 3, 2022        Dipesh De La Torre  [address] [address]  [place] [address]  Phone: [phone]  Fax: [phone]             [facility]  [address]  [place] [address]  Phone: [phone]   Patient: Tae Delgado   MR Number: [id number]   YOB: 1960   Date of Visit: 1/3/2022       Dear Dr. Dipesh De La Torre    Thank you for referring Tae Delgado to me for evaluation. Attached you will find relevant portions of my assessment and plan of care.    If you have questions, please do not hesitate to call me. I look forward to following Tae Delgado along with you.    Sincerely,    Karson Bucio Jr, MD    Enclosure    If you would like to receive this communication electronically, please contact [email or web address] or [phone] to request [employer] access.    [employer] is a tool which provides read-only access to select patient information with whom you have a relationship. Its easy to use and provides real time access to review your patients record including encounter summaries, notes, results, and demographic information.    If you feel you have received this communication in error or would no longer like to receive these types of communications, please e-mail [email or web address]

## 2022-01-03 NOTE — PROCEDURES
TXP KLEVER INTERROGATIONS 1/3/2022 11/16/2021 9/13/2021 7/19/2021 3/29/2021 2/8/2021 12/14/2020   Type HeartMate3 HeartMate3 HeartMate3 HeartMate3 HeartMate3 HeartMate3 HeartMate3   Flow 3.4 3.5 3.9 3.7 3.5 3.4 3.7   Speed 5300 5300 5300 5300 5300 5300 5300   PI 8.7 8.3 6.3 7.0 8.6 9.7 7.3   Power (Berry) 4.0 3.9 3.9 4.0 4.0 4.0 3.9   LSL 4900 4900 4900 4900 4900 4900 4900   Pulsatility No Pulse Intermittent pulse No Pulse No Pulse No Pulse No Pulse No Pulse   }Interrogation of Ventricular assist device was performed with physician analysis of device parameters and review of device function. I have personally reviewed the interrogation findings and agree with findings as stated.

## 2022-01-04 NOTE — PROGRESS NOTES
"Update to Transplant Recipient Adult Psychosocial Assessment, Originally completed 12/13/21     Encounter Date: 1/3/2022    Tae Delgado  [address] [place] [id number]      Telephone Information:   Mobile [phone]   Home               [phone] (home)  Work                There is no work phone number on file.  E-mail              [email or web address]     Sex: male  YOB: 1960  Age: 61 y.o.    [place] Citizen: yes  Primary Language: English   Needed: no     Emergency Contact:  Name: Lavern Carbajal  Relationship: significant other  Address: [facility]  Phone Numbers: [phone] (mobile)    SW met with Pt & Pt's backup Caregiver, Martell Carbajal in clinic today.     PRIMARY CAREGIVER: Lavern Carbajal will be primary caregiver, phone number [phone].       provided in-depth information to patient and caregiver regarding pre- and post-transplant caregiver role.   strongly encourages patient and caregiver to have concrete plan regarding post-transplant care giving, including back-up caregiver(s) to ensure care giving needs are met as needed.     Caregiver states understanding all aspects of caregiver role/commitment and is able/willing/committed to being caregiver to the fullest extent necessary.     Caregiver verbalizes understanding of the education provided today and caregiver responsibilities.          remains available. Patient and Caregiver agree to contact  in a timely manner if concerns arise.       Able to take time off work without financial concerns: yes.      Additional Significant Others who will Assist with Transplant:  Name: Martell Carbajal ([phone])  Age: 68  City: [place] State: [place]  Relationship: s/o's father  Does person drive? yes  Martell reports that he is retired and has "too much" time on his hands. He drives, and is able to provide backup support for Pt should primary caregiver need to be away from [place]. Martell " has no dependents. Martell v/u of education that he will need to remain with Pt 24/7 if called upon.         Interview Behavior: Patient and Caregiver Martell Carbajal present as alert and oriented x 4, calm, communicative, cooperative and asking and answering questions appropriately.         Transplant Social Work - Candidacy  Assessment/Plan:      Psychosocial Suitability: Patient presents as a suitable candidate for transplant at this time. Based on psychosocial risk factors, patient presents as medium risk, due to questions of adherence and mistrust of medical team. Protective factors include adequate primary caregiving plan, adequate finances, abstinence from substance misuse.      Recommendations/Additional Comments: Team to continue to encourage adherence to medical regimen & foster trust with Pt. Pt today spoke of frustration with lab draws & coordinating with [facility]. SW reiterated importance of frequent lab work, and provided emotional support/validation to Pt.      Nicole Verdin LCSW

## 2022-01-07 ENCOUNTER — PATIENT MESSAGE (OUTPATIENT)
Dept: CARDIOLOGY | Facility: CLINIC | Age: 62
End: 2022-01-07
Payer: MEDICARE

## 2022-01-10 ENCOUNTER — PATIENT MESSAGE (OUTPATIENT)
Dept: CARDIOLOGY | Facility: CLINIC | Age: 62
End: 2022-01-10
Payer: MEDICARE

## 2022-01-12 ENCOUNTER — TELEPHONE (OUTPATIENT)
Dept: TRANSPLANT | Facility: CLINIC | Age: 62
End: 2022-01-12
Payer: MEDICARE

## 2022-01-12 ENCOUNTER — DOCUMENTATION ONLY (OUTPATIENT)
Dept: TRANSFUSION MEDICINE | Facility: HOSPITAL | Age: 62
End: 2022-01-12
Payer: MEDICARE

## 2022-01-12 ENCOUNTER — COMMITTEE REVIEW (OUTPATIENT)
Dept: TRANSPLANT | Facility: CLINIC | Age: 62
End: 2022-01-12
Payer: MEDICARE

## 2022-01-12 ENCOUNTER — TELEPHONE (OUTPATIENT)
Dept: ADMINISTRATIVE | Facility: HOSPITAL | Age: 62
End: 2022-01-12
Payer: MEDICARE

## 2022-01-12 ENCOUNTER — PATIENT MESSAGE (OUTPATIENT)
Dept: TRANSPLANT | Facility: CLINIC | Age: 62
End: 2022-01-12
Payer: MEDICARE

## 2022-01-12 NOTE — PROGRESS NOTES
[facility] TRANSFUSION MEDICINE  Section of Transfusion Medicine and Histocompatibility  HLA Note    Case Details   Diagnosis:  No primary diagnosis found.  Blood Type: A NEG  HLA Type:   Class I:  Lab Results   Component Value Date    GJGP0TU 2 01/16/2020    ABRQ8EO 30 01/16/2020    KNEA9DU 44 01/16/2020    YQLH5TI 55 01/16/2020    DVJFP8IF 4 01/16/2020    JKNTR1FX 6 01/16/2020    MAVTH5ZR 9 01/16/2020    GOEYI2RL 4 01/16/2020     Class II:  Lab Results   Component Value Date    XMDINY27PV 7 01/16/2020    VCAAAR90XX 14 01/16/2020    QCFAGU132DF 52 01/16/2020    IODNBV2803 53 01/16/2020    EVQWT6UG 2 01/16/2020    LOPVF7AU 5 01/16/2020     Recent Antibody Screen/ID Results:   Lab Results   Component Value Date    VG0FTMG Negative 12/13/2021    ABCMT WEAK: DP1(3095), DP3(2602), DP5(2020) 12/13/2021     Auto T Cell Crossmatch Results:  Lab Results   Component Value Date    XMTCELLRES Negative 01/16/2020     Auto B Cell Crossmatch Results:  Lab Results   Component Value Date    BCELLRES Negative 01/16/2020     Assessment     Interpretation: Mr. Delgado has weak DP antibodies which are not expected to interfere with a flow cytometric crossmatch. A virtual crossmatch is recommended.    Strongly Recommended Unacceptable Antigens: NMone    Optional Unacceptable Antigens: DP1 MFI 3095 (this is likely a false positive bead, and the MFI cut-off for DP is usually much higher than 3000)    Crossmatch Expectations: (Given strongly recommended unacceptable antigens) A prospective or retrospective flow cytometric crossmatch is expected to be negative.    Please call the HLA Lab [phone] with any concerns or questions.    SUSAN Linares MD, JOAN  Section of Transfusion Medicine & Histocompatibility  Department of Pathology and Laboratory Medicine  [facility]  01/12/2022

## 2022-01-12 NOTE — COMMITTEE REVIEW
Native Organ Dx: [facility]    Approved  Tae Delgado's case was presented to the heart transplant selection committee on  01/12/2022 .  Patient has been accepted as a candidate for heart transplantation due to NICM with an NYHA Functional Class 50% - Requires considerable assistance and frequent medical care.  Patient to be listed through [facility] pending financial clearance and pending care conference in clinic and pt signing behavioral contract as a Status 4 at donor weight 30% down.    Note was written by Yissel Petty.    ==========================================================    I agree and attest to the decision of the committee.

## 2022-01-12 NOTE — TELEPHONE ENCOUNTER
Attempted to reach pt to discuss selection committee decision.  No answer and no voicemail option.   Sent [employer] message requesting call back.

## 2022-01-13 NOTE — TELEPHONE ENCOUNTER
No response from pt to [employer] message sent 1/12/22.  Attempted to reach pt again to discuss selection committee decision.  No answer and no voicemail.       Spoke with Lavern, pt's SO, who reports pt dropped his phone and broke it. They are working on a replacement.   Pt is currently sleeping but she will ask him to call this coord when he wakes up.    Will await return call.

## 2022-01-13 NOTE — TELEPHONE ENCOUNTER
"Received call back from pt.  Advised on committee decision to list pending group meeting with Dr Pearson, Dr Dosih, Txp Coord and pt along with pt signing behavioral type agreement.     Pt voiced understanding and agreement.  He did question "who will be the  between your team and me when an issue does arise?"   Advised that we can discuss that when he comes to clinic.   "

## 2022-01-14 DIAGNOSIS — Z95.811 HEART REPLACED BY HEART ASSIST DEVICE: Primary | ICD-10-CM

## 2022-01-14 NOTE — TELEPHONE ENCOUNTER
Pt requests a copy of agreement sent to him for review prior to his visit.  I let him know that we will send once we have finalized next week.

## 2022-01-18 ENCOUNTER — PATIENT MESSAGE (OUTPATIENT)
Dept: TRANSPLANT | Facility: CLINIC | Age: 62
End: 2022-01-18
Payer: MEDICARE

## 2022-01-20 ENCOUNTER — ANTI-COAG VISIT (OUTPATIENT)
Dept: CARDIOLOGY | Facility: CLINIC | Age: 62
End: 2022-01-20
Payer: MEDICARE

## 2022-01-20 DIAGNOSIS — I48.0 PAROXYSMAL ATRIAL FIBRILLATION: Primary | ICD-10-CM

## 2022-01-20 DIAGNOSIS — Z95.811 LVAD (LEFT VENTRICULAR ASSIST DEVICE) PRESENT: ICD-10-CM

## 2022-01-20 DIAGNOSIS — Z79.01 LONG TERM (CURRENT) USE OF ANTICOAGULANTS: ICD-10-CM

## 2022-01-20 LAB — INR PPP: 2.5

## 2022-01-27 ENCOUNTER — PATIENT MESSAGE (OUTPATIENT)
Dept: CARDIOLOGY | Facility: CLINIC | Age: 62
End: 2022-01-27
Payer: MEDICARE

## 2022-01-31 ENCOUNTER — PATIENT MESSAGE (OUTPATIENT)
Dept: CARDIOLOGY | Facility: CLINIC | Age: 62
End: 2022-01-31
Payer: MEDICARE

## 2022-02-14 ENCOUNTER — OFFICE VISIT (OUTPATIENT)
Dept: TRANSPLANT | Facility: CLINIC | Age: 62
End: 2022-02-14
Payer: MEDICARE

## 2022-02-14 ENCOUNTER — HOSPITAL ENCOUNTER (OUTPATIENT)
Dept: CARDIOLOGY | Facility: HOSPITAL | Age: 62
Discharge: HOME | End: 2022-02-14
Attending: INTERNAL MEDICINE
Payer: MEDICARE

## 2022-02-14 ENCOUNTER — CLINICAL SUPPORT (OUTPATIENT)
Dept: TRANSPLANT | Facility: CLINIC | Age: 62
End: 2022-02-14
Payer: MEDICARE

## 2022-02-14 ENCOUNTER — PATIENT MESSAGE (OUTPATIENT)
Dept: CARDIOLOGY | Facility: CLINIC | Age: 62
End: 2022-02-14

## 2022-02-14 ENCOUNTER — ANTI-COAG VISIT (OUTPATIENT)
Dept: CARDIOLOGY | Facility: CLINIC | Age: 62
End: 2022-02-14
Payer: MEDICARE

## 2022-02-14 VITALS — BODY MASS INDEX: 34.07 KG/M2 | WEIGHT: 238 LBS | HEART RATE: 65 BPM | HEIGHT: 70 IN | SYSTOLIC BLOOD PRESSURE: 115 MMHG

## 2022-02-14 VITALS — TEMPERATURE: 98 F | SYSTOLIC BLOOD PRESSURE: 115 MMHG | BODY MASS INDEX: 32.51 KG/M2 | WEIGHT: 240 LBS | HEIGHT: 72 IN

## 2022-02-14 DIAGNOSIS — Z79.01 CHRONIC ANTICOAGULATION: ICD-10-CM

## 2022-02-14 DIAGNOSIS — I11.0 HYPERTENSIVE HEART DISEASE WITH HEART FAILURE: ICD-10-CM

## 2022-02-14 DIAGNOSIS — Z95.810 ICD (IMPLANTABLE CARDIOVERTER-DEFIBRILLATOR) IN PLACE: ICD-10-CM

## 2022-02-14 DIAGNOSIS — I47.29 NSVT (NONSUSTAINED VENTRICULAR TACHYCARDIA): ICD-10-CM

## 2022-02-14 DIAGNOSIS — Z79.01 LONG TERM (CURRENT) USE OF ANTICOAGULANTS: ICD-10-CM

## 2022-02-14 DIAGNOSIS — Z95.811 LVAD (LEFT VENTRICULAR ASSIST DEVICE) PRESENT: ICD-10-CM

## 2022-02-14 DIAGNOSIS — Z79.899 LONG TERM CURRENT USE OF AMIODARONE: Chronic | ICD-10-CM

## 2022-02-14 DIAGNOSIS — Z95.811 HEART REPLACED BY HEART ASSIST DEVICE: ICD-10-CM

## 2022-02-14 DIAGNOSIS — I48.0 PAROXYSMAL ATRIAL FIBRILLATION: Primary | ICD-10-CM

## 2022-02-14 DIAGNOSIS — I42.0 COCM (CONGESTIVE CARDIOMYOPATHY): Primary | ICD-10-CM

## 2022-02-14 LAB
ASCENDING AORTA: 3.63 CM
BSA FOR ECHO PROCEDURE: 2.31 M2
CV ECHO LV RWT: 0.31 CM
DOP CALC LVOT AREA: 4.9 CM2
DOP CALC LVOT DIAMETER: 2.49 CM
E/E' RATIO: 15.08 M/S
ECHO LV POSTERIOR WALL: 0.97 CM (ref 0.6–1.1)
EJECTION FRACTION: 10 %
FRACTIONAL SHORTENING: 3 % (ref 28–44)
INTERVENTRICULAR SEPTUM: 1.02 CM (ref 0.6–1.1)
LA MAJOR: 6.02 CM
LA MINOR: 6.03 CM
LA WIDTH: 5.48 CM
LEFT ATRIUM SIZE: 4.23 CM
LEFT ATRIUM VOLUME INDEX MOD: 60.5 ML/M2
LEFT ATRIUM VOLUME INDEX: 52.8 ML/M2
LEFT ATRIUM VOLUME MOD: 136.09 CM3
LEFT ATRIUM VOLUME: 118.71 CM3
LEFT INTERNAL DIMENSION IN SYSTOLE: 6 CM (ref 2.1–4)
LEFT VENTRICLE DIASTOLIC VOLUME INDEX: 56.6 ML/M2
LEFT VENTRICLE DIASTOLIC VOLUME: 127.34 ML
LEFT VENTRICLE MASS INDEX: 115 G/M2
LEFT VENTRICLE SYSTOLIC VOLUME INDEX: 49.8 ML/M2
LEFT VENTRICLE SYSTOLIC VOLUME: 112.13 ML
LEFT VENTRICULAR INTERNAL DIMENSION IN DIASTOLE: 6.2 CM (ref 3.5–6)
LEFT VENTRICULAR MASS: 259.37 G
LV LATERAL E/E' RATIO: 9.8 M/S
LV SEPTAL E/E' RATIO: 32.67 M/S
MV PEAK E VEL: 0.98 M/S
PISA TR MAX VEL: 2.39 M/S
RA MAJOR: 5.98 CM
RA PRESSURE: 15 MMHG
RA WIDTH: 5.57 CM
RIGHT VENTRICULAR END-DIASTOLIC DIMENSION: 4.65 CM
RV TISSUE DOPPLER FREE WALL SYSTOLIC VELOCITY 1 (APICAL 4 CHAMBER VIEW): 4.96 CM/S
SINUS: 4.09 CM
STJ: 3.48 CM
TDI LATERAL: 0.1 M/S
TDI SEPTAL: 0.03 M/S
TDI: 0.07 M/S
TR MAX PG: 23 MMHG
TRICUSPID ANNULAR PLANE SYSTOLIC EXCURSION: 1.31 CM
TV REST PULMONARY ARTERY PRESSURE: 38 MMHG

## 2022-02-14 PROCEDURE — 93306 TTE W/DOPPLER COMPLETE: CPT | Mod: TXP

## 2022-02-14 PROCEDURE — 93306 TTE W/DOPPLER COMPLETE: CPT | Mod: 26,NTX,, | Performed by: INTERNAL MEDICINE

## 2022-02-14 PROCEDURE — 99999 PR PBB SHADOW E&M-EST. PATIENT-LVL III: ICD-10-PCS | Mod: PBBFAC,TXP,, | Performed by: INTERNAL MEDICINE

## 2022-02-14 PROCEDURE — 93793 PR ANTICOAGULANT MGMT FOR PT TAKING WARFARIN: ICD-10-PCS | Mod: S$PBB,,,

## 2022-02-14 PROCEDURE — 93750 OP LVAD INTERROGATION: ICD-10-PCS | Mod: TXP,,, | Performed by: INTERNAL MEDICINE

## 2022-02-14 PROCEDURE — 99214 PR OFFICE/OUTPT VISIT, EST, LEVL IV, 30-39 MIN: ICD-10-PCS | Mod: S$PBB,NTX,, | Performed by: INTERNAL MEDICINE

## 2022-02-14 PROCEDURE — 99214 OFFICE O/P EST MOD 30 MIN: CPT | Mod: S$PBB,NTX,, | Performed by: INTERNAL MEDICINE

## 2022-02-14 PROCEDURE — 93750 INTERROGATION VAD IN PERSON: CPT | Mod: TXP,,, | Performed by: INTERNAL MEDICINE

## 2022-02-14 PROCEDURE — 99999 PR PBB SHADOW E&M-EST. PATIENT-LVL III: CPT | Mod: PBBFAC,TXP,, | Performed by: INTERNAL MEDICINE

## 2022-02-14 PROCEDURE — 93306 ECHO (CUPID ONLY): ICD-10-PCS | Mod: 26,NTX,, | Performed by: INTERNAL MEDICINE

## 2022-02-14 PROCEDURE — 93793 ANTICOAG MGMT PT WARFARIN: CPT | Mod: S$PBB,,,

## 2022-02-14 PROCEDURE — 99213 OFFICE O/P EST LOW 20 MIN: CPT | Mod: PBBFAC,25,TXP | Performed by: INTERNAL MEDICINE

## 2022-02-14 NOTE — PROGRESS NOTES
Met with pt and wife in clinic along with Dr Pearson and Dr Doshi to review Patient and Health Care Team Partnership Agreement (behavioral contract).  Pt is agreeable to proceed and signed the contract along with Dr Doshi, myself and pt's wife as witness.   Contract scanned into .

## 2022-02-14 NOTE — PROGRESS NOTES
"Date of Implant with Heartmate 3 LVAD: 1/23/2020    PATIENT ARRIVED IN CLINIC:  Ambulatory   Accompanied by: spouse    Vitals  Temperature, oral:   Temp Readings from Last 1 Encounters:   02/14/22 97.8 °F (36.6 °C) (Oral)     Blood Pressure:   BP Readings from Last 3 Encounters:   02/14/22 (!) 115/0   01/03/22 (!) 98/0   12/14/21 (!) 80/0        VAD Interrogation:  TXP KLEVER INTERROGATIONS 2/14/2022 1/3/2022 11/16/2021   Type HeartMate3 HeartMate3 HeartMate3   Flow 3.3 3.4 3.5   Speed 5300 5300 5300   PI 9.5 8.7 8.3   Power (Berry) 4.0 4.0 3.9   LSL 4900 4900 4900   Pulsatility No Pulse No Pulse Intermittent pulse       History Log: [id number]  Problems / Issues / Alarms with VAD if any: None noted  VAD Sounds: HM3 Smooth  Heartmate 3 Module Cable:  No yellow exposed and Attempted to unscrew modular cable to ensure it will be able to come lose in the event we ever need to change the modular cable while patient held the driveline in place so it would not move. Modular cable connection able to be unscrewed and re-tightened. Instructed pt to perform this weekly.    HCT:   Lab Results   Component Value Date    HCT 44.7 02/14/2022    HCT 25 (L) 02/05/2020       Complaints/reason for visit today: routine    VAD Binder With Patient: no   Reviewed VAD Numbers In Binder: no  Enrolled in Care Companion: no    Equipment:  Emergency Equipment With Patient: yes   Any Equipment Issues: reports having occasional low voltage alarm with one battery but unsure what battery it is. Instructed to identify the battery and we can change it out   It is medically necessary to ensure patient has properly functioning equipment and wearables to prevent infection, injury or death to patient.     DLES Assessment:  Appearance Of Driveline: "1"  Antibiotics: NO  Velour: no  Manual & Visual Inspection Of Driveline: Tear, rescue tape applied and Old rescue tape noted  Stabilization Device In Use: yes, giles securement device      Assessment:   PAIN: " NO  Complaints Of Nausea / Vomiting: None noted    Appearance and Frequency Of Stools: normal and formed without blood & daily  Color Of Urine: clear/yellow  Coping/Depression/Anxiety: coping okay  Sleep Habits: 6-7 hrs /night  Sleep Aids: None noted  Showering: Yes, reminded to change dressing immediately after drying off  Activity/Exercise: walking   Driving: Yes. Reminded to pull over should there be an alarm before looking down at controller.    DLES Dressing Care:   Frequency of Dressing Changes: daily & soap and saline   Pt In Need Of Management Kits?:no   It is medically necessary to have VAD management kits in order to prevent infection or to assist in the healing of an infected DLES.    Labs:    Chemistry        Component Value Date/Time     02/14/2022 1205    K 4.3 02/14/2022 1205     02/14/2022 1205     04/13/2020 0000    CO2 26 02/14/2022 1205    BUN 26 (H) 02/14/2022 1205    BUN 24 (A) 04/13/2020 0000    CREATININE 1.6 (H) 02/14/2022 1205    CREATININE 1.7 04/13/2020 0000     (H) 02/14/2022 1205        Component Value Date/Time    CALCIUM 9.5 02/14/2022 1205    CALCIUM 10.1 04/13/2020 0000    ALKPHOS 59 02/14/2022 1205    AST 21 02/14/2022 1205    ALT 27 02/14/2022 1205    BILITOT 0.9 02/14/2022 1205    ESTGFRAFRICA 53.0 (A) 02/14/2022 1205    EGFRNONAA 45.8 (A) 02/14/2022 1205            Magnesium   Date Value Ref Range Status   02/14/2022 1.8 1.6 - 2.6 mg/dL Final       Lab Results   Component Value Date    WBC 8.54 02/14/2022    HGB 14.5 02/14/2022    HCT 44.7 02/14/2022    MCV 89 02/14/2022     02/14/2022       Lab Results   Component Value Date    INR 3.7 (H) 02/14/2022    INR 2.5 01/19/2022    INR 1.7 (H) 01/03/2022       NT-pro-BNP  ([place])   Date Value Ref Range Status   09/11/2020 1,242  Final     BNP   Date Value Ref Range Status   02/14/2022 248 (H) 0 - 99 pg/mL Final     Comment:     Values of less than 100 pg/ml are consistent with non-CHF  populations.   01/03/2022 125 (H) 0 - 99 pg/mL Final     Comment:     Values of less than 100 pg/ml are consistent with non-CHF populations.   12/14/2021 146 (H) 0 - 99 pg/mL Final     Comment:     Values of less than 100 pg/ml are consistent with non-CHF populations.       LD   Date Value Ref Range Status   01/03/2022 233 110 - 260 U/L Final     Comment:     Results are increased in hemolyzed samples.   12/13/2021 210 110 - 260 U/L Final     Comment:     Results are increased in hemolyzed samples.   11/16/2021 214 110 - 260 U/L Final     Comment:     Results are increased in hemolyzed samples.       Labs reviewed with patient: YES      Patient Satisfaction Survey completed per patient: No  (explained about signature and box to check)  Medication reconciliation: per MA.  Medication Detail updated today: patient did not bring the card  Coumadin Managed by: [facility]    Education: Reviewed driveline care, emergency procedures, how to change the controller, alarms with patient, as well as discussed how to page the VAD coordinator in case of an emergency.   Covid - 19 education: Reminded patient/caregiver to check temperature daily and call us if it is > 99.0.  Reminded them  to stay 6 feet away from other people, wash hands frequently, don't touch your face and stay home except to get labs, medications, and appts.    Covid Vaccine: Pt informed that we are encouraging all VAD patients to receive the COVID vaccine.  Informed pt that they can take tylenol but should avoid other NSAIDs.      Plans/Needs: routine f/u w/ Dr Doshi. Patient reports feeling well. Meeting with Pre-heart coordinator today.     RTC 6wks w/ echo and TSH/T4     Hurricane Season: No

## 2022-02-14 NOTE — LETTER
March 27, 2022        Dipesh De La Torre  [address] [address]  [place] [address]  Phone: [phone]  Fax: [phone]             [id number]  [address]  [facility] [address]  Phone: [phone]   Patient: Tae Delgado   MR Number: [id number]   YOB: 1960   Date of Visit: 2/14/2022       Dear Dr. Dipesh De La Torre    Thank you for referring Tae Delgado to me for evaluation. Attached you will find relevant portions of my assessment and plan of care.    If you have questions, please do not hesitate to call me. I look forward to following Tae Delgado along with you.    Sincerely,    Jamaica Doshi MD    Enclosure    If you would like to receive this communication electronically, please contact [email or web address] or [phone] to request [employer] access.    [employer] is a tool which provides read-only access to select patient information with whom you have a relationship. Its easy to use and provides real time access to review your patients record including encounter summaries, notes, results, and demographic information.    If you feel you have received this communication in error or would no longer like to receive these types of communications, please e-mail [email or web address]

## 2022-02-15 ENCOUNTER — TELEPHONE (OUTPATIENT)
Dept: TRANSPLANT | Facility: CLINIC | Age: 62
End: 2022-02-15
Payer: MEDICARE

## 2022-02-15 ENCOUNTER — DOCUMENTATION ONLY (OUTPATIENT)
Dept: PHARMACY | Facility: HOSPITAL | Age: 62
End: 2022-02-15
Payer: MEDICARE

## 2022-02-15 DIAGNOSIS — Z76.82 HEART TRANSPLANT CANDIDATE: Primary | ICD-10-CM

## 2022-02-15 NOTE — TELEPHONE ENCOUNTER
Spoke with pt regarding vaccine recommendations from ID.  Pt reports he got the flu and prevnar vaccine as well as his covid vaccines.  He has not done the others yet because he has a rash still from the covid vaccine.  Pt reports his local MD told him he already had the Hep B vaccine so does not need it again.  Advised pt that his Hep B antibody level was checked during his eval and were undetectable so he needs to repeat the series.       Advised pt to get the remainder of his vaccines does as soon as possible and alert HTS when complete so his records can be updated here.

## 2022-02-15 NOTE — PROGRESS NOTES
PHARM.D. PRE-TRANSPLANT NOTE:    This patient's medication therapy was evaluated as part of his pre-transplant evaluation.      The following pharmacologic concerns were noted: none.       The following medications require consideration prior to HCV DAA therapy:   Amiodarone    The following factors were assessed to determine risk of rejection:     Age: 61 y.o.  Gender: male  Race: White  Prior Pregnancy: NA  cPRA current / peak within one year: 0 / 0 WEAK: DP1(3095), DP3(2602), DP5(2020)    Tae Delgado is considered low risk for rejection, and would not require thymoglobulin induction. Consideration will also be given to HLA antigen match, presence of DSA, crossmatch and cPRA at the time of transplant.     Current Outpatient Medications   Medication Sig Dispense Refill    acetaminophen (TYLENOL) 325 MG tablet Take 650 mg by mouth every 6 (six) hours as needed for mild pain 1-3/10 pain scale.      amiodarone (PACERONE) 200 MG Tab Take 1 tablet (200 mg total) by mouth once daily. 90 tablet 3    aspirin (ECOTRIN) 81 MG EC tablet Take 81 mg by mouth once daily.      colchicine (COLCRYS) 0.6 mg tablet Take 1 tablet (0.6 mg total) by mouth daily as needed (gout flare). 30 tablet 0    digoxin (LANOXIN) 125 mcg tablet Take 1 tablet (125 mcg total) by mouth daily. 90 tablet 3    ferrous sulfate (FEOSOL) 325 mg (65 mg iron) Tab tablet Take 1 tablet (325 mg total) by mouth daily with breakfast. 90 tablet 3    furosemide (LASIX) 40 MG tablet Take 1 tablet (40 mg total) by mouth once daily. Take half to one tablet as needed; reported if needed more that 3x/week 90 tablet 3    HYDROcodone-acetaminophen (NORCO) 5-325 mg per tablet Take 1 tablet by mouth 2 (two) times a day as needed for moderate pain (4 - 6) for up to 7 days. Max Daily Amount: 2 tablets 14 tablet 0    potassium chloride SA (K-DUR,KLOR-CON) 20 MEQ tablet Take 1 tablet (20 mEq total) by mouth once daily. 90 tablet 3    spironolactone (ALDACTONE) 25  MG tablet TAKE 1 TABLET BY MOUTH ONCE DAILY 90 tablet 3    warfarin (COUMADIN) 2.5 MG tablet Take 1 tablet (2.5 mg total) by mouth every Tuesday, Thursday, Saturday, Sunday AND 1.5 tablets (3.75 mg total) every Monday and Friday AND 1 tablet (2.5 mg total) every Wednesday. OR per coumadin clinic. 50 tablet 5     No current facility-administered medications for this visit.       I am available for consultation and can be contacted, as needed by the other members of the team.

## 2022-02-15 NOTE — PROGRESS NOTES
Reports taking 3.75 mg 2/12 on his own because missed 2/7 dose last week .    Held 2/14 on his own - denies any other changes - and would like to be advised via [employer].

## 2022-02-15 NOTE — TELEPHONE ENCOUNTER
"HEART  ADULT  WAIT  LISTING  NOTE    Date of Financial clearance to list: 22    [facility]:      Organ: Heart  Name:       Tae Delgado  : 1960          Gender:     male    MRN#: [id number]                                   State of Permanent Residence:  [address] [place] [address]    Ethnicity: Not  or /a   Race:      White    ABO  ABO Blood Group:   A NEG     ABO Confirmation: (THESE DATES MUST BE PRIOR TO THE LIST DATE AND SUPPORTED BY SEPARATE LAB REPORTS)    Internal Results  Lab Results   Component Value Date    GROUPTRH A NEG 2021    GROUPTRH A NEG 2020     Lab Results   Component Value Date    ABO2 A NEG 01/15/2020       External Results    ABO Date 1:    ABO Date 2  Are either of these ABO results based on External Labs? no  (If Yes, STOP and go to source document in Media Tab for verification).    VITALS  Height:    Ht Readings from Last 1 Encounters:   22 5' 10" (1.778 m)     Weight:    Wt Readings from Last 1 Encounters:   22 108 kg (238 lb)       HLA    Class I:  Lab Results   Component Value Date    WCEY5XK 2 2020    OYVE0GN 30 2020    BCUV7WG 44 2020    JLWG4DH 55 2020    TYQMS9AR 4 2020    ILQXH6JC 6 2020    OJOIS3VT 9 2020    XZVWI9FJ 4 2020       Class II:  Lab Results   Component Value Date    HLMFJD04DB 7 2020    ZMBXGU17CM 14 2020    LQBKMV534YS 52 2020    PSVEBQ4246 53 2020    JJPGB7GY 2 2020    NBCNV4MK 5 2020       Tested for HLA Antibodies: Yes, no antibodies detected     If result is "Positive" antibodies are detected     If result is "Negative or questionable" no antibodies detected    Lab Results   Component Value Date    CIPRAS Negative 2020    CIIPRAS Negative 2020       Unacceptible Antigens  Please list these unacceptable antigens NONE    CLINICAL INFORMATION   Candidate Medical Urgency Status: Status 4  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  Preliminary crossmatch: no  Number of Previous Heart Transplants: 0  Check any additional organs the candidate may need listing: none      DONOR CHARACTERISTICS  Height: n/a  Weight:  167 lbs min, 400 lbs max  Age: 0-55  Gender requirements: Either  DCD: No    Medical and Social History   History of coronary artery disease: yes  Infectious diseases - check with Epic   Accept HBcAB Positive Organ: yes              Accept HBV PATIENCE Positive Organ: no              Accept HCV Antibody Positive Organ: yes               Accept HCV PATIENCE Positive Organ: yes  Did you fill donor characteristics: yes     RISK STRATIFICATION DATA    · Candidate History  Total number of prior sternotomies: available 1  Any prior history of stroke:  Yes  Any prior history of peripheral thormoboembolic events? Yes  Number of hospitalizations for heart failure in last 12 months: available 0    · Current Therapies  Is the candidate on a diuretic:  Yes; Cumulative 24 hour dosage values Furosemide: 40 mg PO  Other diuretic: Enter the name of the diuretic:Aldactone, 25mg PO   Is the candidate on a vasoactive support: No    Is the candidate on anti-arrhythmics? Yes   Is the candidate on pulmonary vasodilators?No  Is the candidate on dialysis?   No  Is the candidate on continuous invasive mechanical ventilation? No    · Most Recent Cardiopulmonary Stress Test   Not performed    · Most Recent Sensitization Data   Assessment date:12/13/21       CPRA:   Lab Results   Component Value Date    CPRA 0 12/13/2021    CPRA 0 12/13/2021    CPRA 0 12/13/2021     PRA Typing method - Flow cytometry w/ bead targets   MFI threshold: 5000    · Most Recent Hemodynamic Data   · Assessment date: 12/14/21 Hemodynamic data obtained using: Invasive pulmonary artery catheter  · Hemodynamic values were obtained when candidate was on: available, Device support  · Systolic blood pressure:  115mmHg  · Diastolic blood pressure:  89mmHg  · Resting  heart rate: 83bpm  · Central venous pressure: :  13mmHg  · Pulmonary artery systolic pressure:  45mmHg  · Pulmonary artery diastolic pressure:  25mmHg  · Mean pulmonary artery pressure:  34mmHg  · Value obtained for PCWP: 23mmHg   · Cardiac output: 4.3L/min  · Cardiac index: 1.9L/min/m2  · Mixed venous oxygen saturation (SvO2):  63%  · Hemoglobin at time of SvO2: 15g/dL       · Most recent data for VAD patients - Note: Please enter data in this section only if candidate is currently on a VAD. If not, please select not performed.  performed,  LDH:   Lab Results   Component Value Date     02/14/2022     Plasma free hemoglobin: No results found for: LABHEMOF @RESUFAST(PLASMA FREE HEMOGLOBIN)   Has the candidate experienced hemoglobinuria:  No    Note:  If initially listing a candidate, please indicate whether the candidate has experienced hemoglobinuria at any time prior to listing.  If submitting a justification form, please indicate whether the candidate has experienced hemoglobinuria since submission of the last justification form    · Most recent Heart Failure Severity data   Performed, Serum sodium:   Lab Results   Component Value Date     02/14/2022     Serum creatinine:   Lab Results   Component Value Date    CREATININE 1.6 (H) 02/14/2022     BUN:   Lab Results   Component Value Date    BUN 26 (H) 02/14/2022     Serum albumin: )  Lab Results   Component Value Date    ALBUMIN 4.0 02/14/2022     AST:   Lab Results   Component Value Date    AST 21 02/14/2022     Serum bilirubin:   Lab Results   Component Value Date    BILITOT 0.9 02/14/2022     Arterial lactate: No results found for: LACTICACID  INR:   Lab Results   Component Value Date    INR 3.7 (H) 02/14/2022     · Was the candidate on oral anticoagulant when INR was obtained? Yes  BNP:   Lab Results   Component Value Date     (H) 02/14/2022           STATUS 4    Surgeon/Physician NPI: [id number]   Surgeon/Physician name: Dr. Elham BAIRES  [facility] telephone number: [phone]      Is the candidate currently admitted to the listing transplant hospital? no    Qualifying device for medical urgency status:  Primary device: Dischargeable VAD : Heartmate III  - Date of implant/initiation: 01/23/2020 - Time of implant/initiation:1230 --- Ventricle Support: Left  Secondary device: n/a    Criteria qualifying for Status 4:  1. Dischargeable left ventricular assist device (LVAD) without discretionary 30 days. Candidate is supported by a dischargeable LVAD.      TCR Information  Ethnicity/Race: White: Not specified/Unknown  Citizenship: [place] Citizen  Highest education level: Associate/Bachelor Degree  Patient on Life Support: No  Patient on Ventricular Assist Device: LVAD   LVAD Brand: Heartmate III  RVAD Brand: n/a  Functional status: 50% - requires considerable assistance and frequent medical care  Working for income - no  General Medical Factors   Diabetes: No   Dialysis: No Dialysis   Symptomatic Cerebrovascular Disease: yes   Any previous malignancy - No  Heart Medical Factors:    Implantable defibrillator: yes   Exercise oxygen consumption:  Not done   Most recent hemodynamics     Inotropes/Vasodilators                      PA (sys) 45 mm/Hg    no                        PA (rhianna) 25 mm/Hg    no   PA (mean) 34 mm/Hg    no   PCW (mean) 23  Mm/ Hg   no   CO 4.3 L/min     no  History of cigarette use: no  Duration of Abstinence: n/a   Prior Cardiac Surgery: Yes, Other specify LVAD  (non transplant)

## 2022-02-15 NOTE — TELEPHONE ENCOUNTER
"LISTING NOTE:    Spoke to Moncho Navarrete, , and confirmed that patient was listed today as a Status 4.       Diagnosis: Idiopathic CM  NYHA Class: NYHA III 50% - Requires considerable assistance and frequent medical care    ABO: A NEG   CPRA: 0%, does not need prospective crossmatch    Ht Readings from Last 1 Encounters:   02/14/22 5' 10" (1.778 m)     Wt Readings from Last 1 Encounters:   02/14/22 108 kg (238 lb)     BMI: Estimated body mass index is 34.15 kg/m² as calculated from the following:    Height as of 2/14/22: 5' 10" (1.778 m).    Weight as of 2/14/22: 108 kg (238 lb).    Donor weight: 167lbs (30% down)  LVAD: HM 3    Inotropes: none    Patient contacted and informed that he is being listed today.  Contact information and Social Security Number verified.  Patient reminded that he should not travel further away than his home and if he does, he must call to let someone know (on-call MD or on-call transplant coordinator).  Patient also reminded that he must notify someone if he is hospitalized somewhere other than here or if he becomes ill.  Patient informed that he must be able to be reached by telephone within 15 minutes of a donor offer.  Patient instructed to be sure to keep his cell phone on and charged.  Also instructed patient to ask the same of his back-up family members and friends regarding their phones.  Patient instructed to call during regular office hours if he has non-urgent/non-symptom based questions regarding any part of being listed.     Patient verbalized understanding of all points discussed.  Patient expressed his satisfaction and relief regarding being listed.    Patient is eligible to receive HCV donor offer.    Patient agrees to consider HCV donors offer.    Patient agrees to consider [facility] Risk Criteria donor offer.    Infectious Disease Acceptance Criteria reviewed in [facility] and verified as correct.  "

## 2022-02-18 NOTE — PROGRESS NOTES
2/17/22 lab no show and patient reschedule for 2/21/22 at [facility].      Patient consented to be processed for INR meter through home monitoring company.  I instructed him on meter set up, meter protocol in [facility], must continue going to lab appointments until trained by home monitoring company on meter, call [facility] with meter training date (only train Monday - Thursday), and difference in cost  related to using INR meter versus going to lab appointments for INR results which he verbalized understanding.  He verified his two insurances and address for meter/supplies to be shipped to by home monitoring company.    [address]  [address]    Coverage information:     Subscriber: [id number] ELMIRA MORRISON     Rel to sub: 01 - Self     Member ID: [id number]     Payor: 2100-MEDICARE     Benefit plan: [id number]-MEDICARE PART A & B Ph: [phone]     Group number: Not given     Member effective dates: from 07/01/12      Coverage information:     Subscriber: [id number] ELMIRA MORRISON     Rel to sub: 01 - Self     Member ID: [id number]     Payor: [id number] Ph: [phone]     Benefit plan: [id number]     Group number: Not given     Member effective dates: from 01/01/18 2/21/22 per Jasmina Burnette staff message Dr Doshi approved patient for INR meter use.    3/2/22 Per Asiya at [facility] POF never received from [facility].  I sent staff message to Jasmina Burnette who replied POF was faxed and she and/or Marycruz will get POF signed again and fax to [facility].    3/4/22  per Asiya's email [facility] received POF, patient's insurance approval was received, and   [employer] will call patient to review benefits and his portion of payment he will be responsible for to obtain his consent to be processed for meter.    3/22/22 per Asiya's email patient scheduled meter training with [facility]  for 3/18/22 and patient was a no show for training.  [facility]   has attempted to contact patient but patient hasn't returned calls.  I attempted to call patient but no answer / no voice mail so I [facility] on [facility] to contact Yvonne to reschedule missed meter training with [facility] .    3/23/22  I was unable to reach patient today no answer and patient has no voicemail set up.  I left voice message on Lavern's voicemail as she is listed as patient's emergency contact.  Asiya at [facility] emailed me today they also are unable to reach patient to reschedule missed meter training.    3/24/22  Patient sent message on [facility] that he schedule INR meter training with [facility] for 3/25/22 even though I did instruct him to only schedule training Monday - Thursday.

## 2022-02-22 ENCOUNTER — TELEPHONE (OUTPATIENT)
Dept: TRANSPLANT | Facility: CLINIC | Age: 62
End: 2022-02-22
Payer: MEDICARE

## 2022-02-23 ENCOUNTER — ANTI-COAG VISIT (OUTPATIENT)
Dept: CARDIOLOGY | Facility: CLINIC | Age: 62
End: 2022-02-23
Payer: MEDICARE

## 2022-02-23 DIAGNOSIS — Z79.01 LONG TERM (CURRENT) USE OF ANTICOAGULANTS: ICD-10-CM

## 2022-02-23 DIAGNOSIS — I48.0 PAROXYSMAL ATRIAL FIBRILLATION: Primary | ICD-10-CM

## 2022-02-23 DIAGNOSIS — Z95.811 LVAD (LEFT VENTRICULAR ASSIST DEVICE) PRESENT: ICD-10-CM

## 2022-02-23 LAB — INR PPP: 2.1

## 2022-02-23 PROCEDURE — 93793 PR ANTICOAGULANT MGMT FOR PT TAKING WARFARIN: ICD-10-PCS | Mod: S$PBB,,,

## 2022-02-23 PROCEDURE — 93793 ANTICOAG MGMT PT WARFARIN: CPT | Mod: S$PBB,,,

## 2022-02-25 ENCOUNTER — PATIENT MESSAGE (OUTPATIENT)
Dept: CARDIOLOGY | Facility: CLINIC | Age: 62
End: 2022-02-25
Payer: MEDICARE

## 2022-03-03 ENCOUNTER — TELEPHONE (OUTPATIENT)
Dept: TRANSPLANT | Facility: CLINIC | Age: 62
End: 2022-03-03
Payer: MEDICARE

## 2022-03-03 ENCOUNTER — ANTI-COAG VISIT (OUTPATIENT)
Dept: CARDIOLOGY | Facility: CLINIC | Age: 62
End: 2022-03-03
Payer: MEDICARE

## 2022-03-03 DIAGNOSIS — I48.0 PAROXYSMAL ATRIAL FIBRILLATION: Primary | ICD-10-CM

## 2022-03-03 DIAGNOSIS — Z79.01 LONG TERM (CURRENT) USE OF ANTICOAGULANTS: ICD-10-CM

## 2022-03-03 DIAGNOSIS — Z95.811 LVAD (LEFT VENTRICULAR ASSIST DEVICE) PRESENT: ICD-10-CM

## 2022-03-03 LAB — INR PPP: 2

## 2022-03-03 PROCEDURE — 93793 ANTICOAG MGMT PT WARFARIN: CPT | Mod: S$PBB,,,

## 2022-03-03 PROCEDURE — 93793 PR ANTICOAGULANT MGMT FOR PT TAKING WARFARIN: ICD-10-PCS | Mod: S$PBB,,,

## 2022-03-21 ENCOUNTER — PATIENT MESSAGE (OUTPATIENT)
Dept: CARDIOLOGY | Facility: CLINIC | Age: 62
End: 2022-03-21
Payer: MEDICARE

## 2022-03-22 ENCOUNTER — PATIENT MESSAGE (OUTPATIENT)
Dept: CARDIOLOGY | Facility: CLINIC | Age: 62
End: 2022-03-22
Payer: MEDICARE

## 2022-03-23 ENCOUNTER — PATIENT MESSAGE (OUTPATIENT)
Dept: CARDIOLOGY | Facility: CLINIC | Age: 62
End: 2022-03-23
Payer: MEDICARE

## 2022-03-23 NOTE — PROGRESS NOTES
I was unable to reach patient today by phone (no answer and patient has no voicemail set up)  to schedule him for INR and to notify him to contact [facility] ( [phone]) for 3/18/22 missed meter training session so sent message on [facility].  I left voice message on Lavern's voicemail as she is listed as patient's emergency contact.  Asiya at [facility] emailed me today they also are unable to reach patient to reschedule 3/18/22 missed meter training.    3/24/22  Patient sent message on [facility] that he schedule INR meter training with [facility] for 3/25/22 even though I did instruct him to only schedule training Monday - Thursday.  In patient's [facility] message he states his INR was 2.4 this week but I called all the listed clinic and lab locations as well as [facility] with no current INR result.  I was unable to reach patient by phone to find out which facility he went to get INR this week.      I sent the following message to the patient on [facility]:  If you tested with your INR meter to get this result before being trained then that isn't how setting up your meter works as you haven't been instructed by the  on use of device nor how to report your INR results to [facility].      Also when we discussed the INR meter program you were instructed to only train with [employer] Mondays - Thursdays.  This is because if you train on a Friday the [facility] may not receive the faxed INR result from [facility] before we close for the weekend.      Since you already have scheduled 3/25/22 meter training with [facility] just proceed.  Since this is a Friday, you must also call the [facility] Clinic at [phone] to report your INR result so we can give you dosing instructions before we close for the weekend.  You also will have to report the 3/25/22 INR result to [facility].    (Chart routed to Pharmacist to review)

## 2022-03-24 ENCOUNTER — PATIENT MESSAGE (OUTPATIENT)
Dept: TRANSPLANT | Facility: CLINIC | Age: 62
End: 2022-03-24
Payer: MEDICARE

## 2022-03-24 ENCOUNTER — PATIENT MESSAGE (OUTPATIENT)
Dept: CARDIOLOGY | Facility: CLINIC | Age: 62
End: 2022-03-24
Payer: MEDICARE

## 2022-03-28 ENCOUNTER — ANTI-COAG VISIT (OUTPATIENT)
Dept: CARDIOLOGY | Facility: CLINIC | Age: 62
End: 2022-03-28
Payer: MEDICARE

## 2022-03-28 DIAGNOSIS — I48.0 PAROXYSMAL ATRIAL FIBRILLATION: Primary | ICD-10-CM

## 2022-03-28 DIAGNOSIS — Z95.811 LVAD (LEFT VENTRICULAR ASSIST DEVICE) PRESENT: ICD-10-CM

## 2022-03-28 DIAGNOSIS — Z79.01 LONG TERM (CURRENT) USE OF ANTICOAGULANTS: ICD-10-CM

## 2022-03-28 LAB — INR PPP: 2.3

## 2022-03-28 PROCEDURE — 93793 PR ANTICOAGULANT MGMT FOR PT TAKING WARFARIN: ICD-10-PCS | Mod: ,,,

## 2022-03-28 PROCEDURE — 93793 ANTICOAG MGMT PT WARFARIN: CPT | Mod: ,,,

## 2022-03-28 NOTE — PROGRESS NOTES
Subjective:   Patient ID:  Tae Delgado is a 62 y.o. male who presents for LVAD followup visit.    Implant Date:1/23/2020    Initials:RCD     Heartmate 3 RPM 5300     INR goal: 2-3   Bridge with Heparin   Antiplatelets: ASA 81    TXP KLEVER INTERROGATIONS 2/14/2022   Type HeartMate3   Flow 3.3   Speed 5300   PI 9.5   Power (Berry) 4.0   LSL 4900   Pulsatility No Pulse   Interrogation of Ventricular assist device was performed with physician analysis of device parameters and review of device function. I have personally reviewed the interrogation findings and agree with findings as stated.     HPI   62 yo WM with stage D HFrEF, NICMP, ICD, LV thrombus (with prior splenic and renal emboli), embolic CVA , PAF, HTN, HLP underwent HM 3 implant 1/17/2020 as DT with closure of AV and MV repair.  His post-op course complicated by RV failure.  At prior clinic visits BP elevated but attempts to treat result in intolerable side-effects so felt to have component of white coat hypertension.  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  He reports the wound was in the epigastric region the mid section of the driveline area as opposed to the exit site.  He was treated with IV Bax during his admission but discharged with no abx, with instructions to follow up with us.  He was seen by infectious disease here July 2021 and they started doxycycline but due to intolerant side effects was switched him to cefadroxil 1 g twice daily.     In July he wanted case reconsidered at transplant selection.  Today I noted selection mtg March 2021 following comments: Pt's case discussed briefly at selection committee as pt has expressed interest in being listed for OHT. Per committee decision, prior to evaluation being initated, pt will need to be evaluated by psychiatry due to report os PTSD and anger issues with  staff members. He will also need to agree to see ALL heart transplant physicians in clinic (not just those he chooses).  He saw Dr. Haque once and otherwise myself since that selection [facility].  He saw Dr. Pierce 5/4/2021 in Psych who felt that he was at low risk from psychiatric perspective for poor outcome with heart transplant.  He is willing to do whatever he needs to be reconsidered for heart transplant.    Patient is here to understand again how we are going to be proceeding with listing for OHT. He and SO are here. Discussed candidly with Dr. Pearson and he is in agreement with how to proceed. Patient denies N/V/F/C, lightheadedness, dizziness, PND, orthopnea, LE edema, abdominal pain, abdominal pressure, chest pain, chest pressure, syncope, pre-syncope. Additionally patient has no bleeding, no bright red blood per rectum, melena, no GI symptoms at all. NYHA FC II.     Review of Systems   Constitutional: Positive for malaise/fatigue. Negative for chills, fever, night sweats, weight gain and weight loss.   HENT: Negative for congestion, hearing loss, hoarse voice, nosebleeds and sore throat.    Eyes: Negative for double vision, pain, vision loss in left eye and vision loss in right eye.   Cardiovascular: Positive for dyspnea on exertion (mild) and leg swelling (minor). Negative for chest pain, claudication, irregular heartbeat, near-syncope, orthopnea, palpitations, paroxysmal nocturnal dyspnea and syncope.   Respiratory: Negative for cough, hemoptysis, shortness of breath, sleep disturbances due to breathing, snoring, sputum production and wheezing.    Endocrine: Negative for cold intolerance, heat intolerance, polydipsia and polyuria.   Hematologic/Lymphatic: Negative for bleeding problem. Does not bruise/bleed easily.   Musculoskeletal: Negative for falls, muscle cramps, myalgias and neck pain.   Gastrointestinal: Negative for bloating, abdominal pain, change in bowel habit, constipation, diarrhea, hematochezia,  jaundice, melena and nausea.   Genitourinary: Negative for bladder incontinence, dysuria, frequency, hematuria, hesitancy, incomplete emptying and urgency.        ED   Neurological: Negative for brief paralysis, dizziness, focal weakness, headaches, numbness, paresthesias, seizures and weakness.   Psychiatric/Behavioral: Negative for depression and memory loss. The patient has insomnia. The patient is not nervous/anxious.        Objective:   Blood pressure (!) 115/0, temperature 97.8 °F (36.6 °C), temperature source Oral, height 6' (1.829 m), weight 108.9 kg (240 lb).body mass index is 32.55 kg/m².    Physical Exam  Constitutional:       General: He is not in acute distress.     Appearance: He is well-developed. He is not ill-appearing, toxic-appearing or diaphoretic.   HENT:      Head: Normocephalic and atraumatic.   Eyes:      General: No scleral icterus.        Right eye: No discharge.         Left eye: No discharge.      Conjunctiva/sclera: Conjunctivae normal.   Neck:      Thyroid: No thyromegaly.      Vascular: No JVD.      Trachea: No tracheal deviation.   Cardiovascular:      Comments: Normal LVAD sounds; driveline 1  Pulmonary:      Effort: Pulmonary effort is normal. No respiratory distress.      Breath sounds: Normal breath sounds. No wheezing or rales.   Abdominal:      General: Bowel sounds are normal. There is no distension.      Palpations: Abdomen is soft. There is no mass.      Tenderness: There is no abdominal tenderness. There is no guarding or rebound.   Musculoskeletal:         General: Swelling (Chronic skin changes with trace edema ) present. No tenderness.   Skin:     General: Skin is warm and dry.   Neurological:      General: No focal deficit present.      Mental Status: He is alert. Mental status is at baseline.   Psychiatric:         Mood and Affect: Mood normal.         Behavior: Behavior normal.         Thought Content: Thought content normal.         Judgment: Judgment normal.      07/19/2021 echocardiogram Summary  · There is an LVAD present. Base speed is 5300 RPMs. The pump type is a Heartmate III. The interventricular septum appears midline. The aortic valve does not open. s/p aortic valve repair.  · The estimated ejection fraction is 20%. LVEDD 6.6 cm-unchanged from prior.  · The left ventricle is moderately enlarged with severely decreased systolic function.  · Indeterminate left ventricular diastolic function.  · Normal right ventricular size with mildly to moderately reduced right ventricular systolic function.  · Severe left atrial enlargement.  · Moderate right atrial enlargement.  · Mild tricuspid regurgitation.  · Presence of Samir stitch with no significant stenosis and trace MR.  · The estimated PA systolic pressure is 37 mmHg.  · Elevated central venous pressure (15 mmHg).    Lab Results   Component Value Date     (H) 02/14/2022    BNP 1,242 09/11/2020     02/14/2022    K 4.3 02/14/2022    MG 1.8 02/14/2022     02/14/2022     04/13/2020    CO2 26 02/14/2022    BUN 26 (H) 02/14/2022    BUN 24 (A) 04/13/2020    CREATININE 1.6 (H) 02/14/2022    CREATININE 1.7 04/13/2020     (H) 02/14/2022    HGBA1C 5.6 12/13/2021    AST 21 02/14/2022    ALT 27 02/14/2022    ALBUMIN 4.0 02/14/2022    ALBUMIN 4.3 04/13/2020    PROT 7.3 02/14/2022    BILITOT 0.9 02/14/2022    CHOL 162 12/13/2021    HDL 27 (L) 12/13/2021    LDLCALC 112.2 12/13/2021    TRIG 114 12/13/2021       Magnesium   Date Value Ref Range Status   02/14/2022 1.8 1.6 - 2.6 mg/dL Final       Lab Results   Component Value Date    WBC 8.54 02/14/2022    HGB 14.5 02/14/2022    HCT 44.7 02/14/2022    MCV 89 02/14/2022     02/14/2022       Lab Results   Component Value Date    INR 2.0 03/02/2022    INR 2.1 02/21/2022    INR 3.7 (H) 02/14/2022       NT-pro-BNP  ([place])   Date Value Ref Range Status   09/11/2020 1,242  Final     BNP   Date Value Ref Range Status   02/14/2022 248 (H) 0 -  99 pg/mL Final     Comment:     Values of less than 100 pg/ml are consistent with non-CHF populations.   01/03/2022 125 (H) 0 - 99 pg/mL Final     Comment:     Values of less than 100 pg/ml are consistent with non-CHF populations.   12/14/2021 146 (H) 0 - 99 pg/mL Final     Comment:     Values of less than 100 pg/ml are consistent with non-CHF populations.       LD   Date Value Ref Range Status   02/14/2022 231 110 - 260 U/L Final     Comment:     Results are increased in hemolyzed samples.   01/03/2022 233 110 - 260 U/L Final     Comment:     Results are increased in hemolyzed samples.   12/13/2021 210 110 - 260 U/L Final     Comment:     Results are increased in hemolyzed samples.       07/19/2021 6 minute walk test:  449.58 m up from 426.72 m December 2020; there was no desaturation    7/19/2021 ECHO Summary  · There is an LVAD present. Base speed is 5300 RPMs. The pump type is a Heartmate III. The interventricular septum appears midline. The aortic valve does not open. s/p aortic valve repair.  · The estimated ejection fraction is 20%. LVEDD 6.6 cm-unchanged from prior.  · The left ventricle is moderately enlarged with severely decreased systolic function.  · Indeterminate left ventricular diastolic function.  · Normal right ventricular size with mildly to moderately reduced right ventricular systolic function.  · Severe left atrial enlargement.  · Moderate right atrial enlargement.  · Mild tricuspid regurgitation.  · Presence of Samir stitch with no significant stenosis and trace MR.  · The estimated PA systolic pressure is 37 mmHg.  · Elevated central venous pressure (15 mmHg).    1/16/2020 Right Heart Pressures  RA: 13/ 20/ 12 RV: 71/ 3/ 14 PA: 70/ 36/ 52 PWP: 36/ 28/ 27 .   Cardiac output was 5.07. Cardiac index is 2.25 L/min/m2.   O2 Sat: PA 68%.   Pulmonary vascular resistance: 394. Systemic vascular resistance: 1277.      Assessment:      1. COCM (congestive cardiomyopathy)    2. Long term current use  of amiodarone    3. Hypertensive heart disease with heart failure    4. LVAD (left ventricular assist device) present    5. NSVT (nonsustained ventricular tachycardia)    6. ICD (implantable cardioverter-defibrillator) in place    7. Chronic anticoagulation        Plan:   Patient had a open conversation again. Agrees to see all of us in clinic.   Recommend 2 gram sodium restriction and 1500cc fluid restriction.  Encourage physical activity with graded exercise program.  Requested patient to weigh themselves daily, and to notify us if their weight increases by more than 3 lbs in 1 day or 5 lbs in 1 week.  Plan to list for OHT after this visit. Expect compliance with patient contract and plan moving forward.     Supplies ordered are medically necessary for care of LVAD and DL    Patient is now NYHA II     Listed for transplant: No but will activate today to status 4.     UNOS Patient Status  Functional Status: 90% - Able to carry on normal activity: minor symptoms of disease  Physical Capacity: No Limitations  Working for Income: No  If no, reason not working: Disability

## 2022-03-28 NOTE — PROCEDURES
TXP KLEVER INTERROGATIONS 2/14/2022 1/3/2022 11/16/2021 9/13/2021 7/19/2021 3/29/2021 2/8/2021   Type HeartMate3 HeartMate3 HeartMate3 HeartMate3 HeartMate3 HeartMate3 HeartMate3   Flow 3.3 3.4 3.5 3.9 3.7 3.5 3.4   Speed 5300 5300 5300 5300 5300 5300 5300   PI 9.5 8.7 8.3 6.3 7.0 8.6 9.7   Power (Berry) 4.0 4.0 3.9 3.9 4.0 4.0 4.0   LSL 4900 4900 4900 4900 4900 4900 4900   Pulsatility No Pulse No Pulse Intermittent pulse No Pulse No Pulse No Pulse No Pulse   }

## 2022-03-31 ENCOUNTER — PATIENT MESSAGE (OUTPATIENT)
Dept: CARDIOLOGY | Facility: CLINIC | Age: 62
End: 2022-03-31
Payer: MEDICARE

## 2022-03-31 NOTE — PROGRESS NOTES
"3/30/22 patient didn't test with INR meter.  I spoke to patient he confirmed he didn't test with meter and he states he will test today 3/31/22.  Patient instructed to report result to [employer] which he verbalized understanding.    3/31/22 at 2:25 pm INR meter result still not received.  I attempted to call patient but no answer so I sent message on [facility] with detailed instructions on how and why he needs to report meter result to [employer].    4/1/22  INR result due on 3/31/22 still hasn't been received.  I attempted to call patient but no answer so I sent the following message with detailed instruction and guidelines patient needs to follow to avoid [employer] from terminating meter use related to noncompliance of testing with meter and reporting result to [employer]. Per Julia at [facility] no current INR result reported by patient and last/only result patient reported was on 3/25/22.     (See below for copy of message sent to patient on [facility]/patient portal)    "The [facility] Clinic hasn't received your INR meter result yet and it was due 3/31/22.  Per our conversation you were instructed to test with your meter on 3/30/22.  Please test with your INR meter today 4/1/22 before 1pm and report INR result to [employer] [facility]  [phone].  [facility] will then fax your INR result to the [facility]in Clinic.  Please don't test with your meter on Saturdays or Sundays or on national holidays as the Coumadin Clinic will be closed so we can't reach you to give Coumadin dosing instructions."    "The INR meter required a Physician order form which [employer] home monitoring company will adhere to as per this requires patients to report INR results to [employer].  If you fail to report your INR results to your home monitoring company then [employer] will terminate your meter use related to noncompliance of testing with INR meter."  "

## 2022-04-01 ENCOUNTER — PATIENT MESSAGE (OUTPATIENT)
Dept: CARDIOLOGY | Facility: CLINIC | Age: 62
End: 2022-04-01
Payer: MEDICARE

## 2022-04-04 ENCOUNTER — PATIENT MESSAGE (OUTPATIENT)
Dept: CARDIOLOGY | Facility: CLINIC | Age: 62
End: 2022-04-04
Payer: MEDICARE

## 2022-04-06 ENCOUNTER — ANTI-COAG VISIT (OUTPATIENT)
Dept: CARDIOLOGY | Facility: CLINIC | Age: 62
End: 2022-04-06
Payer: MEDICARE

## 2022-04-06 DIAGNOSIS — Z79.01 LONG TERM (CURRENT) USE OF ANTICOAGULANTS: ICD-10-CM

## 2022-04-06 DIAGNOSIS — I48.0 PAROXYSMAL ATRIAL FIBRILLATION: Primary | ICD-10-CM

## 2022-04-06 DIAGNOSIS — Z95.811 LVAD (LEFT VENTRICULAR ASSIST DEVICE) PRESENT: ICD-10-CM

## 2022-04-06 LAB — INR PPP: 2.3

## 2022-04-06 PROCEDURE — 93793 ANTICOAG MGMT PT WARFARIN: CPT | Mod: ,,,

## 2022-04-06 PROCEDURE — 93793 PR ANTICOAGULANT MGMT FOR PT TAKING WARFARIN: ICD-10-PCS | Mod: ,,,

## 2022-04-08 ENCOUNTER — TELEPHONE (OUTPATIENT)
Dept: TRANSPLANT | Facility: CLINIC | Age: 62
End: 2022-04-08
Payer: MEDICARE

## 2022-04-08 NOTE — TELEPHONE ENCOUNTER
Spoke with patient regarding equipment maintenance.  Asked patietn to bring MPU equipment with them to next appt please.  Patient verbalized understanding and agreement.  It is medically necessary to ensure patient has properly functioning equipment and wearables to prevent infection, injury or death to patient.

## 2022-04-11 ENCOUNTER — OFFICE VISIT (OUTPATIENT)
Dept: TRANSPLANT | Facility: CLINIC | Age: 62
End: 2022-04-11
Payer: MEDICARE

## 2022-04-11 ENCOUNTER — ANTI-COAG VISIT (OUTPATIENT)
Dept: CARDIOLOGY | Facility: CLINIC | Age: 62
End: 2022-04-11
Payer: MEDICARE

## 2022-04-11 ENCOUNTER — HOSPITAL ENCOUNTER (OUTPATIENT)
Dept: CARDIOLOGY | Facility: HOSPITAL | Age: 62
Discharge: HOME | End: 2022-04-11
Attending: INTERNAL MEDICINE
Payer: MEDICARE

## 2022-04-11 ENCOUNTER — CLINICAL SUPPORT (OUTPATIENT)
Dept: TRANSPLANT | Facility: CLINIC | Age: 62
End: 2022-04-11
Payer: MEDICARE

## 2022-04-11 VITALS
TEMPERATURE: 98 F | WEIGHT: 237 LBS | WEIGHT: 238 LBS | SYSTOLIC BLOOD PRESSURE: 90 MMHG | BODY MASS INDEX: 34.07 KG/M2 | HEIGHT: 70 IN | SYSTOLIC BLOOD PRESSURE: 90 MMHG | BODY MASS INDEX: 33.93 KG/M2 | HEART RATE: 70 BPM | HEIGHT: 70 IN

## 2022-04-11 DIAGNOSIS — Z95.810 ICD (IMPLANTABLE CARDIOVERTER-DEFIBRILLATOR) IN PLACE: ICD-10-CM

## 2022-04-11 DIAGNOSIS — Z79.01 LONG TERM (CURRENT) USE OF ANTICOAGULANTS: ICD-10-CM

## 2022-04-11 DIAGNOSIS — Z79.899 LONG TERM CURRENT USE OF AMIODARONE: Chronic | ICD-10-CM

## 2022-04-11 DIAGNOSIS — I48.0 PAROXYSMAL ATRIAL FIBRILLATION: Primary | ICD-10-CM

## 2022-04-11 DIAGNOSIS — I10 PRIMARY HYPERTENSION: ICD-10-CM

## 2022-04-11 DIAGNOSIS — Z95.811 LVAD (LEFT VENTRICULAR ASSIST DEVICE) PRESENT: ICD-10-CM

## 2022-04-11 DIAGNOSIS — Z95.811 HEART REPLACED BY HEART ASSIST DEVICE: ICD-10-CM

## 2022-04-11 DIAGNOSIS — Z95.811 LVAD (LEFT VENTRICULAR ASSIST DEVICE) PRESENT: Primary | ICD-10-CM

## 2022-04-11 DIAGNOSIS — I50.42 CHRONIC COMBINED SYSTOLIC AND DIASTOLIC HEART FAILURE: ICD-10-CM

## 2022-04-11 DIAGNOSIS — E66.09 CLASS 1 OBESITY DUE TO EXCESS CALORIES WITH SERIOUS COMORBIDITY AND BODY MASS INDEX (BMI) OF 34.0 TO 34.9 IN ADULT: ICD-10-CM

## 2022-04-11 LAB
ASCENDING AORTA: 3.94 CM
BSA FOR ECHO PROCEDURE: 2.31 M2
CV ECHO LV RWT: 0.37 CM
DOP CALC LVOT AREA: 4 CM2
DOP CALC LVOT DIAMETER: 2.26 CM
E/E' RATIO: 14.31 M/S
ECHO LV POSTERIOR WALL: 1.04 CM (ref 0.6–1.1)
EJECTION FRACTION: 10 %
FRACTIONAL SHORTENING: 11 % (ref 28–44)
INTERVENTRICULAR SEPTUM: 0.88 CM (ref 0.6–1.1)
LA MAJOR: 6.33 CM
LA MINOR: 6.54 CM
LA WIDTH: 5.18 CM
LEFT ATRIUM SIZE: 4.57 CM
LEFT ATRIUM VOLUME INDEX MOD: 65.2 ML/M2
LEFT ATRIUM VOLUME INDEX: 57.5 ML/M2
LEFT ATRIUM VOLUME MOD: 146.66 CM3
LEFT ATRIUM VOLUME: 129.45 CM3
LEFT INTERNAL DIMENSION IN SYSTOLE: 5.02 CM (ref 2.1–4)
LEFT VENTRICLE DIASTOLIC VOLUME INDEX: 69.23 ML/M2
LEFT VENTRICLE DIASTOLIC VOLUME: 155.77 ML
LEFT VENTRICLE MASS INDEX: 93 G/M2
LEFT VENTRICLE SYSTOLIC VOLUME INDEX: 53.1 ML/M2
LEFT VENTRICLE SYSTOLIC VOLUME: 119.44 ML
LEFT VENTRICULAR INTERNAL DIMENSION IN DIASTOLE: 5.63 CM (ref 3.5–6)
LEFT VENTRICULAR MASS: 210.19 G
LV LATERAL E/E' RATIO: 9.3 M/S
LV SEPTAL E/E' RATIO: 31 M/S
MV PEAK E VEL: 0.93 M/S
PISA TR MAX VEL: 2.2 M/S
RA MAJOR: 5.92 CM
RA PRESSURE: 8 MMHG
RA WIDTH: 5.45 CM
RIGHT VENTRICULAR END-DIASTOLIC DIMENSION: 4.85 CM
RV TISSUE DOPPLER FREE WALL SYSTOLIC VELOCITY 1 (APICAL 4 CHAMBER VIEW): 5.55 CM/S
SINUS: 4.06 CM
STJ: 3.65 CM
TDI LATERAL: 0.1 M/S
TDI SEPTAL: 0.03 M/S
TDI: 0.07 M/S
TR MAX PG: 19 MMHG
TRICUSPID ANNULAR PLANE SYSTOLIC EXCURSION: 1.21 CM
TV REST PULMONARY ARTERY PRESSURE: 27 MMHG

## 2022-04-11 PROCEDURE — 93306 ECHO (CUPID ONLY): ICD-10-PCS | Mod: 26,NTX,, | Performed by: INTERNAL MEDICINE

## 2022-04-11 PROCEDURE — 93306 TTE W/DOPPLER COMPLETE: CPT | Mod: TXP

## 2022-04-11 PROCEDURE — 99215 PR OFFICE/OUTPT VISIT, EST, LEVL V, 40-54 MIN: ICD-10-PCS | Mod: S$PBB,TXP,, | Performed by: INTERNAL MEDICINE

## 2022-04-11 PROCEDURE — 99215 OFFICE O/P EST HI 40 MIN: CPT | Mod: S$PBB,TXP,, | Performed by: INTERNAL MEDICINE

## 2022-04-11 PROCEDURE — 99999 PR PBB SHADOW E&M-EST. PATIENT-LVL III: CPT | Mod: PBBFAC,TXP,, | Performed by: INTERNAL MEDICINE

## 2022-04-11 PROCEDURE — 99213 OFFICE O/P EST LOW 20 MIN: CPT | Mod: PBBFAC,25,TXP | Performed by: INTERNAL MEDICINE

## 2022-04-11 PROCEDURE — 93306 TTE W/DOPPLER COMPLETE: CPT | Mod: 26,NTX,, | Performed by: INTERNAL MEDICINE

## 2022-04-11 PROCEDURE — 99999 PR PBB SHADOW E&M-EST. PATIENT-LVL III: ICD-10-PCS | Mod: PBBFAC,TXP,, | Performed by: INTERNAL MEDICINE

## 2022-04-11 NOTE — PROGRESS NOTES
"Subjective:   Patient ID:  Tae Delgado is a 62 y.o. male who presents for LVAD followup visit.    Implant Date:1/23/2020        Heartmate 3 RPM 5400     INR goal: 2-3   Bridge with Heparin   Antiplatelets: ASA 81  Inotropes:n/a    TXP KLEVER INTERROGATIONS 4/11/2022   Type HeartMate3   Flow 3.8   Speed 5400   PI 8.6   Power (Berry) 4.1   LSL 5200   Pulsatility No Pulse       HPI  Mr. 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  Was recently reevaluated and approved for listing for OHTx. He is now listed as status 4 for OHTx. Comes today for his regular VAD visit. Has no complaints. VAD speed is at 5400rpm. No VAD alarms noted on interrogation occasional PI events. BP is 90 mm of Hg . DLES is a "1". INR is therapeutic at 2.5. LDh is at baseline.       Review of Systems   Constitutional: Negative. Negative for chills, decreased appetite, diaphoresis, fever, malaise/fatigue, night sweats, weight gain and weight loss.   Eyes: Negative.    Cardiovascular: Negative for chest pain, claudication, cyanosis, dyspnea on exertion, irregular heartbeat, leg swelling, near-syncope, orthopnea, palpitations, paroxysmal nocturnal dyspnea and syncope.   Respiratory: Negative for cough, hemoptysis and shortness of breath.    Endocrine: Negative.    Hematologic/Lymphatic: Negative.    Skin: Negative for color change, dry skin and nail changes.   Musculoskeletal: Negative.    Gastrointestinal: Negative.    Genitourinary: Negative.    Neurological: Negative for weakness.       Objective:   Blood pressure (!) 90/0, temperature 97.9 °F (36.6 °C), temperature source Oral, height 5' 10" (1.778 m), weight 107.5 kg (237 lb).body mass index is 34.01 kg/m².    Doppler: 90    Physical Exam  Vitals reviewed.   Constitutional:       Appearance: He is well-developed.     " " Comments: BP (!) 90/0 (BP Location: Right arm, Patient Position: Sitting)   Temp 97.9 °F (36.6 °C) (Oral)   Ht 5' 10" (1.778 m)   Wt 107.5 kg (237 lb)   BMI 34.01 kg/m²      HENT:      Head: Normocephalic.   Neck:      Vascular: No carotid bruit or JVD.   Cardiovascular:      Heart sounds: No murmur heard.     Comments: Smooth VAD hum. DLES is "1"  Pulmonary:      Effort: Pulmonary effort is normal.      Breath sounds: Normal breath sounds.   Abdominal:      General: Bowel sounds are normal.      Palpations: Abdomen is soft.   Skin:     General: Skin is warm.   Neurological:      Mental Status: He is alert.         Lab Results   Component Value Date    WBC 7.63 04/11/2022    HGB 14.7 04/11/2022    HCT 46.1 04/11/2022    MCV 89 04/11/2022     04/11/2022    CO2 28 04/11/2022    CREATININE 1.6 (H) 04/11/2022    CALCIUM 9.6 04/11/2022    ALBUMIN 4.3 04/11/2022    AST 26 04/11/2022     (H) 04/11/2022    ALT 39 04/11/2022     04/11/2022       Lab Results   Component Value Date    INR 2.5 (H) 04/11/2022    INR 2.3 04/05/2022    INR 2.3 03/25/2022       NT-pro-BNP  ([place])   Date Value Ref Range Status   09/11/2020 1,242  Final     BNP   Date Value Ref Range Status   02/14/2022 248 (H) 0 - 99 pg/mL Final     Comment:     Values of less than 100 pg/ml are consistent with non-CHF populations.   01/03/2022 125 (H) 0 - 99 pg/mL Final     Comment:     Values of less than 100 pg/ml are consistent with non-CHF populations.   12/14/2021 146 (H) 0 - 99 pg/mL Final     Comment:     Values of less than 100 pg/ml are consistent with non-CHF populations.       LD   Date Value Ref Range Status   02/14/2022 231 110 - 260 U/L Final     Comment:     Results are increased in hemolyzed samples.   01/03/2022 233 110 - 260 U/L Final     Comment:     Results are increased in hemolyzed samples.   12/13/2021 210 110 - 260 U/L Final     Comment:     Results are increased in hemolyzed samples.         Assessment: "      1. LVAD (left ventricular assist device) present    2. Chronic combined systolic and diastolic heart failure    3. Long term current use of amiodarone    4. Class 1 obesity due to excess calories with serious comorbidity and body mass index (BMI) of 34.0 to 34.9 in adult    5. ICD (implantable cardioverter-defibrillator) in place    6. Primary hypertension        Plan:   Patient is now NYHA class II. BP is controlled.  INR is therapeutic.  VAD interrogation was performed in clinic  Any VAD management kits dispensed today medically necessary  Fasting lipid profile, HGB A1C and 2D echo with CFD ordered  Recommend 2 gram sodium restriction and 1500cc fluid restriction.  Encourage physical activity with graded exercise program.  Requested patient to weigh themselves daily, and to notify us if their weight increases by more than 3 lbs in 1 day or 5 lbs in 1 week.   RTC in 6 weeks  Additionally, the patient has a patient centered goal of being able to get transplanted      Patient advised that it is recommended that all patients, and their close contacts and household members receive Covid vaccination.    Listed for transplant: Yes, listed as status 4 for OHTx.     UNOS Patient Status  Functional Status: 90% - Able to carry on normal activity: minor symptoms of disease  Physical Capacity: No Limitations  Working for Income: No  If no, reason not working: Demands of Treatment    Elham Pearson MD

## 2022-04-11 NOTE — LETTER
April 11, 2022        Dipesh De La Torre  [address] [address]  [place] [address]  Phone: [phone]  Fax: [phone]             [facility]  [address]  [facility] [address]  Phone: [phone]   Patient: Tae Delgado   MR Number: [id number]   YOB: 1960   Date of Visit: 4/11/2022       Dear Dr. Dipesh De La Torre    Thank you for referring Tae Delgado to me for evaluation. Attached you will find relevant portions of my assessment and plan of care.    If you have questions, please do not hesitate to call me. I look forward to following Tae Delgado along with you.    Sincerely,    Elham Pearson MD    Enclosure    If you would like to receive this communication electronically, please contact [email or web address] or [phone] to request [facility] Link access.    [facility] is a tool which provides read-only access to select patient information with whom you have a relationship. Its easy to use and provides real time access to review your patients record including encounter summaries, notes, results, and demographic information.    If you feel you have received this communication in error or would no longer like to receive these types of communications, please e-mail [email or web address]

## 2022-04-14 NOTE — PROGRESS NOTES
MPU maintenance due: 10/2022  MPU maintenance completed today: yes    Backup controller:  EBB charged today and self test completed: yes  Self test passed:  yes      It is medically necessary to ensure patient has properly functioning equipment and wearables to prevent infection, injury or death to patient.

## 2022-04-19 ENCOUNTER — TELEPHONE (OUTPATIENT)
Dept: TRANSPLANT | Facility: CLINIC | Age: 62
End: 2022-04-19
Payer: MEDICARE

## 2022-04-19 ENCOUNTER — PATIENT MESSAGE (OUTPATIENT)
Dept: CARDIOLOGY | Facility: CLINIC | Age: 62
End: 2022-04-19
Payer: MEDICARE

## 2022-04-19 NOTE — PROGRESS NOTES
[employer] faxed notification that patient is past due for reporting an INR to them, they have been unable to reach patient via phone, and the last INR they received from patient was on 3/25/222.  If patient fails to test with meter and report INRs to [employer] they will terminate his meter use for failure to comply with testing per their regulations.  I have previously attempted to reach patient by phone and by [employer] with no return call nor reply on [employer] (see 3/31/22 progress note).  Today, I again attempted to reach patient by phone and [facility].  I specifically explained in the [facility] message (which is the patient preferred method of communication) that if [employer] terminates his meter account they will not ship him anymore strips and if patient no longer wants to use meter to call [employer] and the [facility] to notify of this.  If, patient's meter account gets deactivated for noncompliance of testing/reporting INRs to [employer] then account will not be reactivated.    4/20/22  I was finally able to reach patient via phone today.  4/19/22 INR meter result not received. Patient requiring a 2nd virtual meter training session with [facility] and he states is is scheduled for 4/22/22 at 8 am.  Patient instructed he must report 4/22/22 INR meter result to [employer] as training doesn't report results which he verbalized understanding.

## 2022-04-19 NOTE — TELEPHONE ENCOUNTER
Faxed over recent med records to the attention to Nicole Chang [phone]    Fax sent through [employer] and manually.

## 2022-04-22 ENCOUNTER — PATIENT MESSAGE (OUTPATIENT)
Dept: CARDIOLOGY | Facility: CLINIC | Age: 62
End: 2022-04-22
Payer: MEDICARE

## 2022-04-25 ENCOUNTER — PATIENT MESSAGE (OUTPATIENT)
Dept: CARDIOLOGY | Facility: CLINIC | Age: 62
End: 2022-04-25

## 2022-04-25 ENCOUNTER — ANTI-COAG VISIT (OUTPATIENT)
Dept: CARDIOLOGY | Facility: CLINIC | Age: 62
End: 2022-04-25
Payer: MEDICARE

## 2022-04-25 DIAGNOSIS — I48.0 PAROXYSMAL ATRIAL FIBRILLATION: Primary | ICD-10-CM

## 2022-04-25 DIAGNOSIS — Z79.01 LONG TERM (CURRENT) USE OF ANTICOAGULANTS: ICD-10-CM

## 2022-04-25 DIAGNOSIS — Z95.811 LVAD (LEFT VENTRICULAR ASSIST DEVICE) PRESENT: ICD-10-CM

## 2022-04-25 LAB — INR PPP: 2.4

## 2022-04-25 PROCEDURE — 93793 PR ANTICOAGULANT MGMT FOR PT TAKING WARFARIN: ICD-10-PCS | Mod: ,,,

## 2022-04-25 PROCEDURE — 93793 ANTICOAG MGMT PT WARFARIN: CPT | Mod: ,,,

## 2022-05-04 ENCOUNTER — TELEPHONE (OUTPATIENT)
Dept: TRANSPLANT | Facility: CLINIC | Age: 62
End: 2022-05-04
Payer: MEDICARE

## 2022-05-04 ENCOUNTER — PATIENT MESSAGE (OUTPATIENT)
Dept: TRANSPLANT | Facility: CLINIC | Age: 62
End: 2022-05-04
Payer: MEDICARE

## 2022-05-04 NOTE — TELEPHONE ENCOUNTER
Per coumadin clinic pt is out of town but refused to say where in [place].   Per care everywhere notes, pt was listed for OHT at [facility] this week and is seeking listing at [place] as well.    Pt did not inform preht or vad coordinators that he would be out of town.     Pt made status 7 in UNOS due to travel out of state.     Attempted to reach pt to inform.  No answer.   Sent [employer] message.     Team informed.

## 2022-05-06 ENCOUNTER — PATIENT MESSAGE (OUTPATIENT)
Dept: CARDIOLOGY | Facility: CLINIC | Age: 62
End: 2022-05-06
Payer: MEDICARE

## 2022-05-09 ENCOUNTER — ANTI-COAG VISIT (OUTPATIENT)
Dept: CARDIOLOGY | Facility: CLINIC | Age: 62
End: 2022-05-09
Payer: MEDICARE

## 2022-05-09 ENCOUNTER — TELEPHONE (OUTPATIENT)
Dept: TRANSPLANT | Facility: CLINIC | Age: 62
End: 2022-05-09
Payer: MEDICARE

## 2022-05-09 ENCOUNTER — PATIENT MESSAGE (OUTPATIENT)
Dept: CARDIOLOGY | Facility: CLINIC | Age: 62
End: 2022-05-09

## 2022-05-09 DIAGNOSIS — I48.0 PAROXYSMAL ATRIAL FIBRILLATION: Primary | ICD-10-CM

## 2022-05-09 DIAGNOSIS — Z95.811 LVAD (LEFT VENTRICULAR ASSIST DEVICE) PRESENT: ICD-10-CM

## 2022-05-09 DIAGNOSIS — Z79.01 LONG TERM (CURRENT) USE OF ANTICOAGULANTS: ICD-10-CM

## 2022-05-09 LAB — INR PPP: 2.5

## 2022-05-09 PROCEDURE — 93793 PR ANTICOAGULANT MGMT FOR PT TAKING WARFARIN: ICD-10-PCS | Mod: ,,,

## 2022-05-09 PROCEDURE — 93793 ANTICOAG MGMT PT WARFARIN: CPT | Mod: ,,,

## 2022-05-09 NOTE — TELEPHONE ENCOUNTER
"Spoke with pt.  Advised again that he must inform a txp coordinator of ALL travel out of town as this affects his ability to accept organ offers.     Pt responded "yep"    Pt re-activated in UNOS to status 4.   Team notified.   "

## 2022-05-11 DIAGNOSIS — Z95.811 HEART REPLACED BY HEART ASSIST DEVICE: Primary | ICD-10-CM

## 2022-05-12 DIAGNOSIS — Z95.811 LVAD (LEFT VENTRICULAR ASSIST DEVICE) PRESENT: Primary | ICD-10-CM

## 2022-05-17 ENCOUNTER — PATIENT MESSAGE (OUTPATIENT)
Dept: CARDIOLOGY | Facility: CLINIC | Age: 62
End: 2022-05-17
Payer: MEDICARE

## 2022-05-23 ENCOUNTER — ANTI-COAG VISIT (OUTPATIENT)
Dept: CARDIOLOGY | Facility: CLINIC | Age: 62
End: 2022-05-23
Payer: MEDICARE

## 2022-05-23 DIAGNOSIS — I48.0 PAROXYSMAL ATRIAL FIBRILLATION: Primary | ICD-10-CM

## 2022-05-23 DIAGNOSIS — Z79.01 LONG TERM (CURRENT) USE OF ANTICOAGULANTS: ICD-10-CM

## 2022-05-23 DIAGNOSIS — Z95.811 LVAD (LEFT VENTRICULAR ASSIST DEVICE) PRESENT: ICD-10-CM

## 2022-05-23 LAB — INR PPP: 2.5

## 2022-05-23 PROCEDURE — 93793 PR ANTICOAGULANT MGMT FOR PT TAKING WARFARIN: ICD-10-PCS | Mod: ,,,

## 2022-05-23 PROCEDURE — 93793 ANTICOAG MGMT PT WARFARIN: CPT | Mod: ,,,

## 2022-05-26 ENCOUNTER — PATIENT MESSAGE (OUTPATIENT)
Dept: CARDIOLOGY | Facility: CLINIC | Age: 62
End: 2022-05-26
Payer: MEDICARE

## 2022-05-27 NOTE — PROGRESS NOTES
"5/26/22 I sent patient a message on [facility] reminding him to test with INR meter.    5/26/22 meter result not received.  I spoke to patient and he verified he didn't test with INR meter on 5/26/22 and patient stated "I'll do it when I get around to it".  Patient has not been testing with INR meter on assigned test dates even after being given warning verbally and written warning on [facility] that his noncompliance will lead to [employer] terminating his meter account.  If patient's meter account gets terminated by [employer] for noncompliance of testing per Physician order form our clinic will not process again to reactivate related to patient not following repeated instructions given verbally and in writing on [facility].   .    "

## 2022-05-30 ENCOUNTER — PATIENT MESSAGE (OUTPATIENT)
Dept: CARDIOLOGY | Facility: CLINIC | Age: 62
End: 2022-05-30
Payer: MEDICARE

## 2022-05-31 ENCOUNTER — ANTI-COAG VISIT (OUTPATIENT)
Dept: CARDIOLOGY | Facility: CLINIC | Age: 62
End: 2022-05-31
Payer: MEDICARE

## 2022-05-31 DIAGNOSIS — Z95.811 LVAD (LEFT VENTRICULAR ASSIST DEVICE) PRESENT: ICD-10-CM

## 2022-05-31 DIAGNOSIS — Z79.01 LONG TERM (CURRENT) USE OF ANTICOAGULANTS: ICD-10-CM

## 2022-05-31 DIAGNOSIS — I48.0 PAROXYSMAL ATRIAL FIBRILLATION: Primary | ICD-10-CM

## 2022-05-31 LAB — INR PPP: 2.5

## 2022-05-31 PROCEDURE — 93793 PR ANTICOAGULANT MGMT FOR PT TAKING WARFARIN: ICD-10-PCS | Mod: ,,,

## 2022-05-31 PROCEDURE — 93793 ANTICOAG MGMT PT WARFARIN: CPT | Mod: ,,,

## 2022-06-07 ENCOUNTER — PATIENT MESSAGE (OUTPATIENT)
Dept: TRANSPLANT | Facility: CLINIC | Age: 62
End: 2022-06-07
Payer: MEDICARE

## 2022-06-08 ENCOUNTER — PATIENT MESSAGE (OUTPATIENT)
Dept: CARDIOLOGY | Facility: CLINIC | Age: 62
End: 2022-06-08
Payer: MEDICARE

## 2022-06-09 ENCOUNTER — PATIENT MESSAGE (OUTPATIENT)
Dept: TRANSPLANT | Facility: CLINIC | Age: 62
End: 2022-06-09
Payer: MEDICARE

## 2022-06-13 ENCOUNTER — ANTI-COAG VISIT (OUTPATIENT)
Dept: CARDIOLOGY | Facility: CLINIC | Age: 62
End: 2022-06-13
Payer: MEDICARE

## 2022-06-13 ENCOUNTER — PATIENT MESSAGE (OUTPATIENT)
Dept: TRANSPLANT | Facility: CLINIC | Age: 62
End: 2022-06-13
Payer: MEDICARE

## 2022-06-13 DIAGNOSIS — Z95.811 LVAD (LEFT VENTRICULAR ASSIST DEVICE) PRESENT: ICD-10-CM

## 2022-06-13 DIAGNOSIS — Z79.01 LONG TERM (CURRENT) USE OF ANTICOAGULANTS: ICD-10-CM

## 2022-06-13 DIAGNOSIS — I48.0 PAROXYSMAL ATRIAL FIBRILLATION: Primary | ICD-10-CM

## 2022-06-13 LAB — INR PPP: 2.6

## 2022-06-13 PROCEDURE — 93793 ANTICOAG MGMT PT WARFARIN: CPT | Mod: ,,,

## 2022-06-13 PROCEDURE — 93793 PR ANTICOAGULANT MGMT FOR PT TAKING WARFARIN: ICD-10-PCS | Mod: ,,,

## 2022-06-14 ENCOUNTER — PATIENT MESSAGE (OUTPATIENT)
Dept: TRANSPLANT | Facility: CLINIC | Age: 62
End: 2022-06-14
Payer: MEDICARE

## 2022-06-15 ENCOUNTER — PATIENT MESSAGE (OUTPATIENT)
Dept: TRANSPLANT | Facility: CLINIC | Age: 62
End: 2022-06-15
Payer: MEDICARE

## 2022-06-16 NOTE — TELEPHONE ENCOUNTER
Mr Delgado sent the following message:    Dr Liu     Rodrick been informed by the heart transplant committee to stop taking ameoderone. Rodrick been accepted by [place] as a transplant candidate who said ameoderone is doing no good.      Let me know if you have any questions.         I notified Dr Bucio and LVAD team.

## 2022-06-21 ENCOUNTER — PATIENT MESSAGE (OUTPATIENT)
Dept: CARDIOLOGY | Facility: CLINIC | Age: 62
End: 2022-06-21
Payer: MEDICARE

## 2022-06-28 LAB — INR PPP: 2.4

## 2022-06-29 ENCOUNTER — ANTI-COAG VISIT (OUTPATIENT)
Dept: CARDIOLOGY | Facility: CLINIC | Age: 62
End: 2022-06-29
Payer: MEDICARE

## 2022-06-29 DIAGNOSIS — Z79.01 LONG TERM (CURRENT) USE OF ANTICOAGULANTS: ICD-10-CM

## 2022-06-29 DIAGNOSIS — Z95.811 LVAD (LEFT VENTRICULAR ASSIST DEVICE) PRESENT: ICD-10-CM

## 2022-06-29 DIAGNOSIS — I48.0 PAROXYSMAL ATRIAL FIBRILLATION: Primary | ICD-10-CM

## 2022-06-29 PROCEDURE — 93793 ANTICOAG MGMT PT WARFARIN: CPT | Mod: ,,,

## 2022-06-29 PROCEDURE — 93793 PR ANTICOAGULANT MGMT FOR PT TAKING WARFARIN: ICD-10-PCS | Mod: ,,,

## 2022-07-08 ENCOUNTER — PATIENT MESSAGE (OUTPATIENT)
Dept: CARDIOLOGY | Facility: CLINIC | Age: 62
End: 2022-07-08
Payer: MEDICARE

## 2022-07-15 ENCOUNTER — TELEPHONE (OUTPATIENT)
Dept: CARDIOTHORACIC SURGERY | Facility: CLINIC | Age: 62
End: 2022-07-15
Payer: MEDICARE

## 2022-07-15 NOTE — TELEPHONE ENCOUNTER
Called and confirmed pt's appt with Dr. Schmitt on 7/18 with pt including appt time and location, which he verbalized understanding to.

## 2022-07-15 NOTE — TELEPHONE ENCOUNTER
Called pt to confirm pt's appt with Dr. Schmitt on 7/18; no answer and unable to leave VM.  Will attempt to contact pt again this afternoon.

## 2022-07-17 ENCOUNTER — PATIENT MESSAGE (OUTPATIENT)
Dept: CARDIOTHORACIC SURGERY | Facility: CLINIC | Age: 62
End: 2022-07-17
Payer: MEDICARE

## 2022-07-18 ENCOUNTER — ANTI-COAG VISIT (OUTPATIENT)
Dept: CARDIOLOGY | Facility: CLINIC | Age: 62
End: 2022-07-18
Payer: MEDICARE

## 2022-07-18 ENCOUNTER — HOSPITAL ENCOUNTER (OUTPATIENT)
Dept: RADIOLOGY | Facility: HOSPITAL | Age: 62
Discharge: HOME | End: 2022-07-18
Attending: INTERNAL MEDICINE
Payer: MEDICARE

## 2022-07-18 ENCOUNTER — TELEPHONE (OUTPATIENT)
Dept: CARDIOTHORACIC SURGERY | Facility: CLINIC | Age: 62
End: 2022-07-18
Payer: MEDICARE

## 2022-07-18 DIAGNOSIS — Z95.811 HEART REPLACED BY HEART ASSIST DEVICE: ICD-10-CM

## 2022-07-18 DIAGNOSIS — Z95.811 LVAD (LEFT VENTRICULAR ASSIST DEVICE) PRESENT: ICD-10-CM

## 2022-07-18 DIAGNOSIS — I48.0 PAROXYSMAL ATRIAL FIBRILLATION: Primary | ICD-10-CM

## 2022-07-18 DIAGNOSIS — Z79.01 LONG TERM (CURRENT) USE OF ANTICOAGULANTS: ICD-10-CM

## 2022-07-18 PROCEDURE — 93793 ANTICOAG MGMT PT WARFARIN: CPT | Mod: ,,,

## 2022-07-18 PROCEDURE — 93793 PR ANTICOAGULANT MGMT FOR PT TAKING WARFARIN: ICD-10-PCS | Mod: ,,,

## 2022-07-18 PROCEDURE — 71046 XR CHEST PA AND LATERAL: ICD-10-PCS | Mod: 26,NTX,, | Performed by: RADIOLOGY

## 2022-07-18 PROCEDURE — 71046 X-RAY EXAM CHEST 2 VIEWS: CPT | Mod: TC,FY,NTX

## 2022-07-18 PROCEDURE — 71046 X-RAY EXAM CHEST 2 VIEWS: CPT | Mod: 26,NTX,, | Performed by: RADIOLOGY

## 2022-07-18 NOTE — TELEPHONE ENCOUNTER
Called and notified pt that his appt with Dr. Schmitt today has been cancelled as Dr. Schmitt will not be in clinic today.  Informed pt that the rest of his scheduled appointments for today are still scheduled and will not change.  Informed pt that when he returns to see the LVAD team after today's appointment, I will schedule pt to see one of the surgeons, as part of his waitlist management for heart transplant, which pt verbalized understanding to.

## 2022-07-20 ENCOUNTER — TELEPHONE (OUTPATIENT)
Dept: TRANSPLANT | Facility: CLINIC | Age: 62
End: 2022-07-20
Payer: MEDICARE

## 2022-07-20 ENCOUNTER — PATIENT MESSAGE (OUTPATIENT)
Dept: TRANSPLANT | Facility: CLINIC | Age: 62
End: 2022-07-20
Payer: MEDICARE

## 2022-07-20 NOTE — TELEPHONE ENCOUNTER
Mr Delgado did not come to his clinic appointments this week.  I called pt but was unable to leave a message.  Sent message through [employer]

## 2022-07-26 ENCOUNTER — PATIENT MESSAGE (OUTPATIENT)
Dept: CARDIOLOGY | Facility: CLINIC | Age: 62
End: 2022-07-26
Payer: MEDICARE

## 2022-07-26 ENCOUNTER — PATIENT MESSAGE (OUTPATIENT)
Dept: TRANSPLANT | Facility: CLINIC | Age: 62
End: 2022-07-26
Payer: MEDICARE

## 2022-07-26 ENCOUNTER — TELEPHONE (OUTPATIENT)
Dept: TRANSPLANT | Facility: CLINIC | Age: 62
End: 2022-07-26
Payer: MEDICARE

## 2022-07-26 NOTE — TELEPHONE ENCOUNTER
Attempted to f/u w/ pt again after missing appointments last week. Unable to leave voicemail on patients phone.   Spoke with VC Jaja at [facility] patient was scheduled to see them yesterday (7/25/22) but was a no show. They spoke with the patient who told them he wouldn't need to be seen there any longer because he is transferring his care to [place].   Contacted VC Roseann at [facility], who states this patient is being followed by their transplant team but has not talked to them about transferring VAD care.   Spoke with Ermelinda Love, transplant coordinator at [facility] patient was approved for listing at their center on 5/27/22 pending relocation and neuro cog eval. Is not listed as of this time. Pt sent message yesterday (7/25/22) that he has found an apartment and is moving next week. Requested that if they speak to patient to please instruct them to reach out to this clinic.   Will also send patient message via [employer].

## 2022-07-26 NOTE — TELEPHONE ENCOUNTER
"Attempted to reach pt to discuss prior no show to vad clinic and testing. No answer and no voicemail option.     Message sent to vad team to see when pt will be rescheduled.         Per notes in care everywhere 7/19/22 pt will no longer be following with [facility] per his report of "transferring his care".    Unable to review records from [facility] or [facility] any longer as these records require a new patient authorization to be signed.       "

## 2022-07-27 ENCOUNTER — PATIENT MESSAGE (OUTPATIENT)
Dept: CARDIOLOGY | Facility: CLINIC | Age: 62
End: 2022-07-27
Payer: MEDICARE

## 2022-07-28 ENCOUNTER — ANTI-COAG VISIT (OUTPATIENT)
Dept: CARDIOLOGY | Facility: CLINIC | Age: 62
End: 2022-07-28
Payer: MEDICARE

## 2022-07-28 DIAGNOSIS — Z95.811 LVAD (LEFT VENTRICULAR ASSIST DEVICE) PRESENT: ICD-10-CM

## 2022-07-28 DIAGNOSIS — Z79.01 LONG TERM (CURRENT) USE OF ANTICOAGULANTS: ICD-10-CM

## 2022-07-28 DIAGNOSIS — I48.0 PAROXYSMAL ATRIAL FIBRILLATION: Primary | ICD-10-CM

## 2022-07-28 LAB — INR PPP: 2.3

## 2022-07-28 PROCEDURE — 93793 ANTICOAG MGMT PT WARFARIN: CPT | Mod: ,,,

## 2022-07-28 PROCEDURE — 93793 PR ANTICOAGULANT MGMT FOR PT TAKING WARFARIN: ICD-10-PCS | Mod: ,,,

## 2022-07-28 NOTE — PROGRESS NOTES
Spoke with patient regarding PT INR. Patient stated he tested at home yesterday 7/27/22 and INR was 2.3. Advised patient to contact meter company to report INR.

## 2022-07-29 ENCOUNTER — DOCUMENTATION ONLY (OUTPATIENT)
Dept: TRANSPLANT | Facility: CLINIC | Age: 62
End: 2022-07-29
Payer: MEDICARE

## 2022-07-29 ENCOUNTER — TELEPHONE (OUTPATIENT)
Dept: TRANSPLANT | Facility: CLINIC | Age: 62
End: 2022-07-29
Payer: MEDICARE

## 2022-07-29 ENCOUNTER — PATIENT MESSAGE (OUTPATIENT)
Dept: TRANSPLANT | Facility: CLINIC | Age: 62
End: 2022-07-29
Payer: MEDICARE

## 2022-07-29 NOTE — TELEPHONE ENCOUNTER
Spoke with Nicole, VAD Coordinator, at [facility] regarding patient message that he has decided to transfer care to them. Nicole states the patient is scheduled with them in early August. Once he is seen in clinic they can officially assume care. Will fax equipment list. No other needs at this time.

## 2022-07-29 NOTE — PROGRESS NOTES
Per VAD team, pt plans to transfer care to [facility] in August.       Sent message via [employer] to pt requesting to know when he is relocating and that [facility] be notified as he will need to be status 7 (pt choice--too far away for offers) .    If pt is able to be listed quickly at [facility] he can sign a time transfer form at [facility] and be removed from [facility] list.   He will not be able to stay status 7 for an extended period of time.

## 2022-08-01 ENCOUNTER — DOCUMENTATION ONLY (OUTPATIENT)
Dept: TRANSPLANT | Facility: CLINIC | Age: 62
End: 2022-08-01
Payer: MEDICARE

## 2022-08-01 NOTE — PROGRESS NOTES
Pt made status 7 in UNOS as he reported he was in the process of moving to [place] this date with plans to be listed at [place].

## 2022-08-04 ENCOUNTER — PATIENT MESSAGE (OUTPATIENT)
Dept: CARDIOLOGY | Facility: CLINIC | Age: 62
End: 2022-08-04
Payer: MEDICARE

## 2022-08-04 ENCOUNTER — DOCUMENTATION ONLY (OUTPATIENT)
Dept: TRANSFUSION MEDICINE | Facility: HOSPITAL | Age: 62
End: 2022-08-04
Payer: MEDICARE

## 2022-08-04 NOTE — PROGRESS NOTES
[facility] TRANSFUSION MEDICINE  Section of Transfusion Medicine and Histocompatibility  HLA Note    Case Details   Diagnosis:  No primary diagnosis found.  Blood Type: A NEG  HLA Type:   Class I:  Lab Results   Component Value Date    QIJQ4PS 2 01/16/2020    IJOA7IN 30 01/16/2020    STWU4QZ 44 01/16/2020    UTBZ0ZO 55 01/16/2020    RMHXH1QT 4 01/16/2020    HBHVN1XN 6 01/16/2020    AMDGY3RY 9 01/16/2020    MGTBD4NN 4 01/16/2020     Class II:  Lab Results   Component Value Date    RZQAUU60EJ 7 01/16/2020    VRYRLC76LW 14 01/16/2020    NAYUJV424KL 52 01/16/2020    OYCFVU9556 53 01/16/2020    XGWET5MT 2 01/16/2020    FSBIR2QF 5 01/16/2020     Recent Antibody Screen/ID Results:   Lab Results   Component Value Date    PX6GJNH Negative 07/18/2022    ABCMT WEAK DP1(3116) 07/18/2022     Auto T Cell Crossmatch Results:  Lab Results   Component Value Date    XMTCELLRES Negative 01/16/2020     Auto B Cell Crossmatch Results:  Lab Results   Component Value Date    BCELLRES Negative 01/16/2020     Assessment     Interpretation: There are no HLA antibodies detected at or near the 5000 MFI cut-off. A virtual crossmatch is recommended.    Strongly Recommended Unacceptable Antigens: None    Optional Unacceptable Antigens: None    Crossmatch Expectations: (Given strongly recommended unacceptable antigens) A retrospective or prospective flow cytometric crossmatch is expected to be negative.  Please call the HLA Lab [phone] with any concerns or questions.    SUSAN Linares MD, JOAN  Section of Transfusion Medicine & Histocompatibility  Department of Pathology and Laboratory Medicine  [facility]  08/04/2022

## 2022-08-08 ENCOUNTER — PATIENT MESSAGE (OUTPATIENT)
Dept: CARDIOLOGY | Facility: CLINIC | Age: 62
End: 2022-08-08
Payer: MEDICARE

## 2022-08-10 ENCOUNTER — TELEPHONE (OUTPATIENT)
Dept: TRANSPLANT | Facility: CLINIC | Age: 62
End: 2022-08-10
Payer: MEDICARE

## 2022-08-10 ENCOUNTER — COMMITTEE REVIEW (OUTPATIENT)
Dept: TRANSPLANT | Facility: CLINIC | Age: 62
End: 2022-08-10
Payer: MEDICARE

## 2022-08-10 NOTE — COMMITTEE REVIEW
Native Organ Dx: Dilated Myopathy: Idiopathic    Pt's case discussed at Selection Committee 08/10/2022. Pt has relocated to [place] to be on the list at [place] and was made status 7 here.  Recommendation made to discuss with txp coord at [facility] to see how quickly pt will be listed in order to transfer his time.     Note was written by Yissel Petty.    ==========================================================    I agree and attest to the decision of the committee.

## 2022-08-10 NOTE — TELEPHONE ENCOUNTER
Spoke with nima Hammond coord at [facility] who confirms pt was listed at their center yesterday.  Mnaish will be sending pt a time transfer form to fill out in order to transfer his wait time from [facility] to [facility].    Requested Manish notify this coord once time transfer has been completed.  Contact info provided.

## 2022-08-12 ENCOUNTER — PATIENT MESSAGE (OUTPATIENT)
Dept: TRANSPLANT | Facility: CLINIC | Age: 62
End: 2022-08-12
Payer: MEDICARE

## 2022-08-15 NOTE — PROGRESS NOTES
I spoke with patient regarding missed INR lab on 8/5/22 and he stated he now lives in [place] and is being followed by a doctor at [place]. Patient stated he doesn't know the doctor's telephone number and thinks his first name is John.

## 2022-08-18 NOTE — PROGRESS NOTES
8/18 - received message from VAD coordinator that patient received heart transplant at [place]. Will discharge from coumadin clinic.

## 2022-08-18 NOTE — TELEPHONE ENCOUNTER
Received [employer] message from pt's wife that pt was transplanted at [place] on Tuesday. Team updated.     Pt removed from unos list.

## 2022-08-20 ENCOUNTER — PATIENT MESSAGE (OUTPATIENT)
Dept: TRANSPLANT | Facility: CLINIC | Age: 62
End: 2022-08-20
Payer: MEDICARE

## 2022-12-06 NOTE — PROGRESS NOTES
"Date of Implant with Heartmate 3 LVAD: 1/23/2020    PATIENT ARRIVED IN CLINIC:  Ambulatory   Accompanied by: spouse    Vitals  Temperature, oral:   Temp Readings from Last 1 Encounters:   04/11/22 97.9 °F (36.6 °C) (Oral)     Blood Pressure:   BP Readings from Last 3 Encounters:   04/11/22 (!) 90/0   04/11/22 (!) 90/0   02/14/22 (!) 115/0        VAD Interrogation:  TXP KLEVER INTERROGATIONS 4/11/2022 2/14/2022 1/3/2022   Type HeartMate3 HeartMate3 HeartMate3   Flow 3.8 3.3 3.4   Speed 5400 5300 5300   PI 8.6 9.5 8.7   Power (Berry) 4.1 4.0 4.0   LSL 5200 4900 4900   Pulsatility No Pulse No Pulse No Pulse       History Log: [id number]  Problems / Issues / Alarms with VAD if any: None noted  VAD Sounds: HM3 Smooth  Heartmate 3 Module Cable:  No yellow exposed and Attempted to unscrew modular cable to ensure it will be able to come lose in the event we ever need to change the modular cable while patient held the driveline in place so it would not move. Modular cable connection able to be unscrewed and re-tightened. Instructed pt to perform this weekly.    HCT:   Lab Results   Component Value Date    HCT 46.1 04/11/2022    HCT 25 (L) 02/05/2020       Complaints/reason for visit today: routine    Equipment:  Emergency Equipment With Patient: yes   Any Equipment Issues: None noted   It is medically necessary to ensure patient has properly functioning equipment and wearables to prevent infection, injury or death to patient.     DLES Assessment:  Appearance Of Driveline: "2" reports some mechanical trauma        Antibiotics: NO  Velour: no  Manual & Visual Inspection Of Driveline: Old rescue tape noted  Stabilization Device In Use: yes, giles securement device        Assessment:   PAIN: NO  Complaints Of Nausea / Vomiting: None noted    Appearance and Frequency Of Stools: normal and formed without blood & daily  Color Of Urine: clear/yellow  Coping/Depression/Anxiety: coping okay  Sleep Habits: 7 hrs /night  Sleep Aids: None " noted  Showering: Yes, reminded to change dressing immediately after drying off  Activity/Exercise: walking   Driving: Yes. Reminded to pull over should there be an alarm before looking down at controller.    DLES Dressing Care:   Frequency of Dressing Changes: every other day & soap and water dressing  Pt In Need Of Management Kits?:yes -   1 Box of soap and water dressing  It is medically necessary to have VAD management kits in order to prevent infection or to assist in the healing of an infected DLES.    Labs:    Chemistry        Component Value Date/Time     04/11/2022 1425    K 4.2 04/11/2022 1425     04/11/2022 1425     04/13/2020 0000    CO2 28 04/11/2022 1425    BUN 27 (H) 04/11/2022 1425    BUN 24 (A) 04/13/2020 0000    CREATININE 1.6 (H) 04/11/2022 1425    CREATININE 1.7 04/13/2020 0000    GLU 85 04/11/2022 1425        Component Value Date/Time    CALCIUM 9.6 04/11/2022 1425    CALCIUM 10.1 04/13/2020 0000    ALKPHOS 63 04/11/2022 1425    AST 26 04/11/2022 1425    ALT 39 04/11/2022 1425    BILITOT 1.0 04/11/2022 1425    ESTGFRAFRICA 52.6 (A) 04/11/2022 1425    EGFRNONAA 45.5 (A) 04/11/2022 1425            Magnesium   Date Value Ref Range Status   04/11/2022 1.9 1.6 - 2.6 mg/dL Final       Lab Results   Component Value Date    WBC 7.63 04/11/2022    HGB 14.7 04/11/2022    HCT 46.1 04/11/2022    MCV 89 04/11/2022     04/11/2022       Lab Results   Component Value Date    INR 2.5 (H) 04/11/2022    INR 2.3 04/05/2022    INR 2.3 03/25/2022       NT-pro-BNP  ([place])   Date Value Ref Range Status   09/11/2020 1,242  Final     BNP   Date Value Ref Range Status   04/11/2022 179 (H) 0 - 99 pg/mL Final     Comment:     Values of less than 100 pg/ml are consistent with non-CHF populations.   02/14/2022 248 (H) 0 - 99 pg/mL Final     Comment:     Values of less than 100 pg/ml are consistent with non-CHF populations.   01/03/2022 125 (H) 0 - 99 pg/mL Final     Comment:     Values of  less than 100 pg/ml are consistent with non-CHF populations.       LD   Date Value Ref Range Status   04/11/2022 217 110 - 260 U/L Final     Comment:     Results are increased in hemolyzed samples.   02/14/2022 231 110 - 260 U/L Final     Comment:     Results are increased in hemolyzed samples.   01/03/2022 233 110 - 260 U/L Final     Comment:     Results are increased in hemolyzed samples.       Labs reviewed with patient: YES      Patient Satisfaction Survey completed per patient: No  (explained about signature and box to check)  Medication reconciliation: per MA.  Medication Detail updated today: patient did not bring the card  Coumadin Managed by: [facility]    Education: Reviewed driveline care, emergency procedures, how to change the controller, alarms with patient, as well as discussed how to page the VAD coordinator in case of an emergency.   Covid - 19 education: Reminded patient/caregiver to check temperature daily and call us if it is > 99.0.  Reminded them  to stay 6 feet away from other people, wash hands frequently, don't touch your face and stay home except to get labs, medications, and appts.    Covid Vaccine: Pt informed that we are encouraging all VAD patients to receive the COVID vaccine.  Informed pt that they can take tylenol but should avoid other NSAIDs.      Plans/Needs: Routine f/u w/ Dr Pearson. Patient reports feeling well. Routine echo completed today and reviewed by MD. No changes at this time.    RTC 6wks w/ routine TSH/T4, PFT's, FLP and CXR       Hurricane Season: No       independent

## 2023-10-10 NOTE — PROGRESS NOTES
labetelol 20mg sivp given for continued elevated B/P instead of hydralazine because pt says that he thought that drug caused him to swell. doppler still 92.    unknown

## 2023-12-21 NOTE — ASSESSMENT & PLAN NOTE
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   - CTS primary  - Taken back to OR 1/24 for possible RVAD placement which was not done. Patient remained hemodynamically stable in OR. Wash out on 1/27. Chest remains open. Remains intubated and sedated.   - CVP 10, SvO2 61  - Currently hemodynamically stable on Epi, Lasix, Lidocaine, and Corby   - Would wean off Lidocaine and start amiodarone  - Current speed 5300    Procedure: Device Interrogation Including analysis of device parameters  Current Settings: Ventricular Assist Device  Review of device function is stable/unstabe    TXP LVAD INTERROGATIONS 1/28/2020 1/28/2020 1/28/2020 1/28/2020 1/28/2020 1/28/2020 1/28/2020   Type HeartMate3 HeartMate3 HeartMate3 HeartMate3 HeartMate3 HeartMate3 HeartMate3   Flow 4.4 4.4 4.4 4.2 4.3 4.2 4.4   Speed 5300 5300 5300 5300 5300 5300 5300   PI 3.1 3.3 3.3 3.4 3.5 3.8 3.1   Power (Berry) 3.9 3.8 3.8 3.7 3.8 3.8 3.7   LSL 4900 4900 4900 4900 4900 4900 4900   Pulsatility Intermittent pulse Intermittent pulse Intermittent pulse Intermittent pulse Intermittent pulse Intermittent pulse Intermittent pulse      Billing Type: Third-Party Bill Expected Date Of Service: 12/21/2023 Bill For Surgical Tray: no

## 2024-01-24 NOTE — ASSESSMENT & PLAN NOTE
KRISTIN on CKD III  Baseline SCr ~ 2.0    58 yo male s/p LVAD placement on 1/23 who was taken back to OR for exploration over concerns for RV failure/tamponade due to elevated CVP and decrease UOP.  Upon opening of chest, he had improved hemodynamics and some improvement in UOP and decision made to leave chest open and transfer back to ICU for closer monitoring on 1/24.  He was administered IV lasix + diuril with improvement in his UOP.      DDx for KRISTIN includes ATN from renal hypoperfusion vs. CRS from RV overload    Plan/recommendations:  -No urgent need for RRT, responded well to diuretics  -continue trending RFP and UOP  -diuretics per primary team  -will follow labs closely, please call if urgent need for ultrafiltration is warranted.   2 seconds or less

## 2024-10-28 NOTE — SUBJECTIVE & OBJECTIVE
Interval History: Renal function continues to improve, sCr down to 2.2 (baseline 2). Patient with adequate urine on Lasix drip. Remains net positive 1.1 L/ hrs (increased input with NG). Intubated, FiO2 50%. CVP 10.   Na 151, FWF increased to 250 mL q 4 hrs.     Review of patient's allergies indicates:   Allergen Reactions    Biopatch [chlorhexidine gluconate]      Site burning    Dobutamine in d5w      Tachycardia, tremors, SOB, flushing    Percocet [oxycodone-acetaminophen] Itching    Penicillins Rash     Cefepime given on 1/23/2020 without issue     Current Facility-Administered Medications   Medication Frequency    albumin human 5% bottle 500 mL PRN    albuterol sulfate nebulizer solution 2.5 mg Q4H PRN    amiodarone 5 mg/mL oral suspension TID    amLODIPine tablet 10 mg Daily    aspirin tablet 325 mg Daily    atorvastatin tablet 80 mg QHS    balsam peru-castor oil Oint BID    bisacodyl suppository 10 mg Daily PRN    dexmedetomidine (PRECEDEX) 400mcg/100mL 0.9% NaCL infusion Continuous PRN    dextrose 10% (D10W) Bolus PRN    dextrose 10% (D10W) Bolus PRN    digoxin tablet 0.125 mg Daily    docusate sodium capsule 200 mg QHS    EPINEPHrine (ADRENALIN) 5 mg in dextrose 5 % 250 mL infusion Continuous    fentaNYL injection 25 mcg Q1H PRN    ferrous gluconate tablet 324 mg Daily with breakfast    furosemide (LASIX) 2 mg/mL in dextrose 5 % 100 mL infusion (conc: 2 mg/mL) Continuous    glucagon (human recombinant) injection 1 mg PRN    heparin 25,000 units in dextrose 5% 250 mL (100 units/mL) infusion (heparin infusion - NO NOMOGRAM) Continuous    hydrALAZINE injection 10 mg Q6H PRN    insulin aspart U-100 pen 0-5 Units Q6H PRN    lidocaine 2000 mg in dextrose 5% 250 mL infusion Continuous    magnesium hydroxide 400 mg/5 ml suspension 2,400 mg Daily PRN    magnesium oxide tablet 400 mg TID    magnesium sulfate 2g in water 50mL IVPB (premix) PRN    niCARdipine 40 mg/200 mL infusion  Continuous    nitric oxide gas Gas 7.5 ppm Continuous    oxyCODONE immediate release tablet 5 mg Q4H PRN    oxyCODONE immediate release tablet Tab 10 mg Q4H PRN    pantoprazole injection 40 mg Daily    polyethylene glycol (GoLYTELY) solution Q4H    propofol (DIPRIVAN) 10 mg/mL infusion Continuous    sodium chloride 0.9% flush 10 mL PRN    sodium chloride 0.9% flush 3 mL Q8H    vancomycin 750 mg in dextrose 5 % 250 mL IVPB (ready to mix system) Q24H    warfarin (COUMADIN) tablet 2 mg Once       Objective:     Vital Signs (Most Recent):  Temp: 98.9 °F (37.2 °C) (01/31/20 0715)  Pulse: 90 (01/31/20 0819)  Resp: (!) 21 (01/30/20 1700)  BP: (!) 82/0 (01/31/20 0715)  SpO2: 99 % (01/31/20 0819)  O2 Device (Oxygen Therapy): ventilator (01/31/20 0819) Vital Signs (24h Range):  Temp:  [98.9 °F (37.2 °C)-101.8 °F (38.8 °C)] 98.9 °F (37.2 °C)  Pulse:  [88-90] 90  Resp:  [18-21] 21  SpO2:  [92 %-100 %] 99 %  BP: (80-86)/(0) 82/0  Arterial Line BP: (78-93)/(64-80) 90/78     Weight: 106.2 kg (234 lb 2.1 oz) (01/31/20 0315)  Body mass index is 33.59 kg/m².  Body surface area is 2.29 meters squared.    I/O last 3 completed shifts:  In: 5238 [I.V.:2367; NG/GT:2871]  Out: 3399 [Urine:3050; Chest Tube:349]    Physical Exam   Constitutional: He appears well-developed. No distress. He is sedated and intubated.   HENT:   Head: Normocephalic and atraumatic.   Right Ear: External ear normal.   Left Ear: External ear normal.   Cardiovascular: Normal rate.   RVAD hum   Pulmonary/Chest: Effort normal and breath sounds normal. He is intubated. He has no rales.   Musculoskeletal: He exhibits edema. He exhibits no deformity.   Skin: Skin is warm and dry. He is not diaphoretic.   Sternal surgical wound, drsg intact       Significant Labs:  CBC:   Recent Labs   Lab 01/31/20  0435 01/31/20  0557   WBC 17.56*  --    RBC 3.48*  --    HGB 9.6*  --    HCT 32.8* 26*     --    MCV 94  --    MCH 27.6  --    MCHC 29.3*  --      CMP:    Recent Labs   Lab 01/31/20  0435   *   CALCIUM 8.8   ALBUMIN 2.3*  2.3*   PROT 6.1  6.1   *   K 4.2   CO2 23   *   BUN 77*   CREATININE 2.2*   ALKPHOS 47*  47*   ALT 14  14   AST 36  36   BILITOT 0.8  0.8           Low Acute Suicide Risk

## (undated) DEVICE — SUT 2/0 36IN ETHIBOND EXCE

## (undated) DEVICE — ELECTRODE EXTENDED BLADE

## (undated) DEVICE — SUT ETHIBOND XTRA 1 CTX

## (undated) DEVICE — CONNECTOR 1/4X3/16X3/16 Y

## (undated) DEVICE — SEE MEDLINE ITEM 146417

## (undated) DEVICE — SHEATH INTRODUCER 7FR 11CM

## (undated) DEVICE — PACK DRAPE UNIVERSAL CONVERTOR

## (undated) DEVICE — CATH SWAN GANZ STND 7FR

## (undated) DEVICE — PLEDGET SUT SOFT 3/8X3/16X1/16

## (undated) DEVICE — DRAIN CHEST DRY SUCTION

## (undated) DEVICE — DRAPE SLUSH WARMER WITH DISC

## (undated) DEVICE — BOOT AIR FLUID HEEL ADLT STD

## (undated) DEVICE — SOL NS 1000CC

## (undated) DEVICE — SWABSTICK BENZOIN 4 IN

## (undated) DEVICE — TRAY MINOR GEN SURG

## (undated) DEVICE — BRUSH SCRUB SURGICALW/BETADINE

## (undated) DEVICE — KIT SAHARA DRAPE DRAW/LIFT

## (undated) DEVICE — CONNECTOR Y 3/8X3/8X3/8

## (undated) DEVICE — SUT PROLENE 4-0 SH BLU 36IN

## (undated) DEVICE — CLOSURE SKIN STERI STRIP 1/2X4

## (undated) DEVICE — SUT PROLENE 5-0 36IN C-1

## (undated) DEVICE — PAD ADHESIVE TAPE REMOVER

## (undated) DEVICE — SUT VICRYL BR 1 GEN 27 CT-1

## (undated) DEVICE — BLADE SAW STERNAL 5/BX

## (undated) DEVICE — SEE MEDLINE ITEM 156894

## (undated) DEVICE — CATH THORACIC 32FR ST

## (undated) DEVICE — DRESSING GAUZE 6PLY 4X4

## (undated) DEVICE — SUT SILK 2-0 SH 18IN BLACK

## (undated) DEVICE — GAUZE SPONGE 4X4 12PLY

## (undated) DEVICE — SUT 3-0 CTD VICRYL 27IN PS

## (undated) DEVICE — VALVE ULTRASITE MALE LUER

## (undated) DEVICE — KIT PROBE COVER WITH GEL

## (undated) DEVICE — SEE MEDLINE ITEM 157117

## (undated) DEVICE — KIT PREVENA PLUS

## (undated) DEVICE — BOOT SUTURE AID

## (undated) DEVICE — CONTAINER SPECIMEN STRL 4OZ

## (undated) DEVICE — DRESSING TRANS 8X12 TEGADERM

## (undated) DEVICE — CLEANER TIP ELECSURG 2X2IN

## (undated) DEVICE — KIT MICROINTRODUCE MINI 5X10CM

## (undated) DEVICE — DRESSING TRANS 6X8 TEGADERM

## (undated) DEVICE — SET MICROPUNCTURE 5FR 501NT

## (undated) DEVICE — PAD DEFIB CADENCE ADULT R2

## (undated) DEVICE — SUT BONE WAX 2.5 GRMS 12/BX

## (undated) DEVICE — TRAY HEART

## (undated) DEVICE — DRESSING AQUACEL SACRAL 9 X 9

## (undated) DEVICE — BLADE 4 INCH EDGE UN-INS

## (undated) DEVICE — SUT SILK BLK BR. 2 2-60

## (undated) DEVICE — GAUZE SPONGE 4'X4 12 PLY

## (undated) DEVICE — SEE MEDLINE ITEM 146313

## (undated) DEVICE — VAC WOUND DISPOSABLE PREVENA

## (undated) DEVICE — CATH THOR STND RGHT ANG 32FR

## (undated) DEVICE — HEMOSTAT SURGICEL NU-KNIT 6X9

## (undated) DEVICE — PAD K-THERMIA 24IN X 60IN

## (undated) DEVICE — STAPLER SKIN ROTATING HEAD

## (undated) DEVICE — PROBE CATH TEMP 16 FRFOLEY 400

## (undated) DEVICE — SUT PROLENE 4-0 RB-1 BL MO

## (undated) DEVICE — Device

## (undated) DEVICE — PACK GENERAL SURGERY

## (undated) DEVICE — DURAPREP SURG SCRUB 26ML

## (undated) DEVICE — DRESSING TRANS 4X4 TEGADERM

## (undated) DEVICE — GLOVE SURG BIOGEL LATEX SZ 7.5

## (undated) DEVICE — SEE MEDLINE ITEM 146347

## (undated) DEVICE — SUT 2/0 36IN COATED VICRYL

## (undated) DEVICE — DRESSING ADH ISLAND 3.6 X 14

## (undated) DEVICE — WIPE ESENTA BARR STNG FREE 3ML

## (undated) DEVICE — DRESSING ABSRBNT ISLAND 3.6X8

## (undated) DEVICE — BANDAGE ESMARK 6X12

## (undated) DEVICE — SEE MEDLINE ITEM 154981

## (undated) DEVICE — TAPE CLOTH SOFT MEDIPORE 4IN

## (undated) DEVICE — TRAY FOLEY 16FR INFECTION CONT

## (undated) DEVICE — TAPE SURG MEDIPORE 6X72IN

## (undated) DEVICE — SEE MEDLINE ITEM 88971

## (undated) DEVICE — KIT URINARY CATH URINE METER

## (undated) DEVICE — PACK SET UP CONVERTORS

## (undated) DEVICE — PAD RADIOLUCENT STAT ADULT

## (undated) DEVICE — SPONGE LAP 18X18 PREWASHED

## (undated) DEVICE — SEE MEDLINE ITEM 152522

## (undated) DEVICE — SEE MEDLINE ITEM 152512

## (undated) DEVICE — SUT 6 18IN STEEL MONO CCS

## (undated) DEVICE — GLOVE BIOGEL PI MICRO SZ 7.5

## (undated) DEVICE — DRAPE INCISE IOBAN 2 23X17IN

## (undated) DEVICE — APPLICATOR CHLORAPREP ORN 26ML

## (undated) DEVICE — KIT MAJOR SINGLE BASIN

## (undated) DEVICE — DRAPE STERI INSTRUMENT 1018

## (undated) DEVICE — NDL BOX COUNTER

## (undated) DEVICE — HOLDER TUBE

## (undated) DEVICE — ELECTRODE REM PLYHSV RETURN 9

## (undated) DEVICE — SEE MEDLINE ITEM 152622

## (undated) DEVICE — DRAPE SPLIT STERILE

## (undated) DEVICE — STAPLER SKIN PROXIMATE WIDE

## (undated) DEVICE — SUT PROLENE 5-0 BL C-1 4-24

## (undated) DEVICE — INTRODUCER RX PSI KIT 8.5 FR

## (undated) DEVICE — SET DECANTER MEDICHOICE

## (undated) DEVICE — RETRACTOR OCTOBASE INSERT HOLD

## (undated) DEVICE — SOL 9P NACL IRR PIC IL